# Patient Record
Sex: FEMALE | Race: WHITE | NOT HISPANIC OR LATINO | Employment: OTHER | URBAN - METROPOLITAN AREA
[De-identification: names, ages, dates, MRNs, and addresses within clinical notes are randomized per-mention and may not be internally consistent; named-entity substitution may affect disease eponyms.]

---

## 2017-02-07 ENCOUNTER — TELEPHONE (OUTPATIENT)
Dept: FAMILY MEDICINE | Facility: CLINIC | Age: 67
End: 2017-02-07

## 2017-02-07 NOTE — TELEPHONE ENCOUNTER
Dr. Biggs, will you auth same day appt slot? pls advise.  Misha Penny,  [facility]

## 2017-02-07 NOTE — TELEPHONE ENCOUNTER
Pt trying to get in this week as she will be out of town next week   And needs to be seen for refills    Call to advise if you will fit her in on thurs    As she leaves Friday at noon    Thank you  Gem Gama (Self) [phone] (H)

## 2017-02-08 NOTE — TELEPHONE ENCOUNTER
Called and left msg for pt to call either Angelica DUMONT or the care team back so we can see where we can fit pt in to be seen tomorrow.  Misha Penny,  [facility]

## 2017-02-09 ENCOUNTER — OFFICE VISIT (OUTPATIENT)
Dept: FAMILY MEDICINE | Facility: CLINIC | Age: 67
End: 2017-02-09
Payer: COMMERCIAL

## 2017-02-09 VITALS
HEART RATE: 95 BPM | TEMPERATURE: 97.3 F | OXYGEN SATURATION: 98 % | DIASTOLIC BLOOD PRESSURE: 83 MMHG | HEIGHT: 65 IN | WEIGHT: 179.1 LBS | BODY MASS INDEX: 29.84 KG/M2 | SYSTOLIC BLOOD PRESSURE: 138 MMHG

## 2017-02-09 DIAGNOSIS — K21.00 GASTROESOPHAGEAL REFLUX DISEASE WITH ESOPHAGITIS: ICD-10-CM

## 2017-02-09 DIAGNOSIS — Z11.59 NEED FOR HEPATITIS C SCREENING TEST: ICD-10-CM

## 2017-02-09 DIAGNOSIS — E61.2 MAGNESIUM DEFICIENCY: ICD-10-CM

## 2017-02-09 DIAGNOSIS — Z23 NEED FOR PROPHYLACTIC VACCINATION AGAINST STREPTOCOCCUS PNEUMONIAE (PNEUMOCOCCUS): ICD-10-CM

## 2017-02-09 DIAGNOSIS — F33.1 MAJOR DEPRESSIVE DISORDER, RECURRENT EPISODE, MODERATE (H): ICD-10-CM

## 2017-02-09 DIAGNOSIS — G47.31 CENTRAL SLEEP APNEA: ICD-10-CM

## 2017-02-09 DIAGNOSIS — E55.9 VITAMIN D DEFICIENCY: ICD-10-CM

## 2017-02-09 DIAGNOSIS — J20.9 ACUTE BRONCHITIS, UNSPECIFIED ORGANISM: ICD-10-CM

## 2017-02-09 DIAGNOSIS — I10 HYPERTENSION, GOAL BELOW 140/90: ICD-10-CM

## 2017-02-09 DIAGNOSIS — N18.2 CHRONIC KIDNEY DISEASE, STAGE II (MILD): ICD-10-CM

## 2017-02-09 DIAGNOSIS — G47.419 CONTROLLED NARCOLEPSY: Primary | ICD-10-CM

## 2017-02-09 DIAGNOSIS — D35.2 PITUITARY MICROADENOMA (H): ICD-10-CM

## 2017-02-09 DIAGNOSIS — N18.2 CKD (CHRONIC KIDNEY DISEASE) STAGE 2, GFR 60-89 ML/MIN: ICD-10-CM

## 2017-02-09 DIAGNOSIS — F98.8 ATTENTION DEFICIT DISORDER OF ADULT: ICD-10-CM

## 2017-02-09 DIAGNOSIS — Z96.653 STATUS POST TOTAL BILATERAL KNEE REPLACEMENT: ICD-10-CM

## 2017-02-09 DIAGNOSIS — Z13.6 CARDIOVASCULAR SCREENING; LDL GOAL LESS THAN 130: ICD-10-CM

## 2017-02-09 DIAGNOSIS — Z78.0 ASYMPTOMATIC POSTMENOPAUSAL STATUS: ICD-10-CM

## 2017-02-09 DIAGNOSIS — Z12.31 VISIT FOR SCREENING MAMMOGRAM: ICD-10-CM

## 2017-02-09 DIAGNOSIS — G47.33 OBSTRUCTIVE SLEEP APNEA SYNDROME: ICD-10-CM

## 2017-02-09 DIAGNOSIS — G47.419 PRIMARY NARCOLEPSY WITHOUT CATAPLEXY: ICD-10-CM

## 2017-02-09 DIAGNOSIS — J31.0 CHRONIC RHINITIS: ICD-10-CM

## 2017-02-09 LAB
CHOLEST SERPL-MCNC: 250 MG/DL
CREAT UR-MCNC: 106 MG/DL
HDLC SERPL-MCNC: 50 MG/DL
LDLC SERPL CALC-MCNC: 175 MG/DL
MICROALBUMIN UR-MCNC: 13 MG/L
MICROALBUMIN/CREAT UR: 12.64 MG/G CR (ref 0–25)
NONHDLC SERPL-MCNC: 200 MG/DL
TRIGL SERPL-MCNC: 125 MG/DL

## 2017-02-09 PROCEDURE — 82043 UR ALBUMIN QUANTITATIVE: CPT | Performed by: FAMILY MEDICINE

## 2017-02-09 PROCEDURE — 82306 VITAMIN D 25 HYDROXY: CPT | Performed by: FAMILY MEDICINE

## 2017-02-09 PROCEDURE — 80061 LIPID PANEL: CPT | Performed by: FAMILY MEDICINE

## 2017-02-09 PROCEDURE — 99214 OFFICE O/P EST MOD 30 MIN: CPT | Performed by: FAMILY MEDICINE

## 2017-02-09 PROCEDURE — 80069 RENAL FUNCTION PANEL: CPT | Performed by: FAMILY MEDICINE

## 2017-02-09 PROCEDURE — 83735 ASSAY OF MAGNESIUM: CPT | Performed by: FAMILY MEDICINE

## 2017-02-09 PROCEDURE — 86803 HEPATITIS C AB TEST: CPT | Performed by: FAMILY MEDICINE

## 2017-02-09 PROCEDURE — 36415 COLL VENOUS BLD VENIPUNCTURE: CPT | Performed by: FAMILY MEDICINE

## 2017-02-09 RX ORDER — DEXTROAMPHETAMINE SACCHARATE, AMPHETAMINE ASPARTATE MONOHYDRATE, DEXTROAMPHETAMINE SULFATE AND AMPHETAMINE SULFATE 7.5; 7.5; 7.5; 7.5 MG/1; MG/1; MG/1; MG/1
30 CAPSULE, EXTENDED RELEASE ORAL DAILY
Qty: 30 CAPSULE | Refills: 0 | Status: SHIPPED | OUTPATIENT
Start: 2017-02-09 | End: 2017-12-18

## 2017-02-09 RX ORDER — FLUTICASONE PROPIONATE 110 UG/1
2 AEROSOL, METERED RESPIRATORY (INHALATION) 2 TIMES DAILY
Qty: 3 INHALER | Refills: 3 | Status: SHIPPED | OUTPATIENT
Start: 2017-02-09 | End: 2017-12-18

## 2017-02-09 RX ORDER — ALBUTEROL SULFATE 0.83 MG/ML
1 SOLUTION RESPIRATORY (INHALATION) EVERY 6 HOURS PRN
Qty: 100 VIAL | Refills: 3 | Status: SHIPPED | OUTPATIENT
Start: 2017-02-09 | End: 2017-12-18

## 2017-02-09 RX ORDER — MOMETASONE FUROATE MONOHYDRATE 50 UG/1
2 SPRAY, METERED NASAL DAILY
Qty: 3 BOX | Refills: 3 | Status: SHIPPED | OUTPATIENT
Start: 2017-02-09 | End: 2017-12-18

## 2017-02-09 RX ORDER — DEXTROAMPHETAMINE SACCHARATE, AMPHETAMINE ASPARTATE, DEXTROAMPHETAMINE SULFATE AND AMPHETAMINE SULFATE 5; 5; 5; 5 MG/1; MG/1; MG/1; MG/1
TABLET ORAL
Qty: 90 TABLET | Refills: 0 | Status: CANCELLED | OUTPATIENT
Start: 2017-02-09

## 2017-02-09 RX ORDER — CODEINE PHOSPHATE AND GUAIFENESIN 10; 100 MG/5ML; MG/5ML
2 SOLUTION ORAL EVERY 4 HOURS PRN
Qty: 236 ML | Refills: 0 | Status: CANCELLED | OUTPATIENT
Start: 2017-02-09

## 2017-02-09 RX ORDER — SERTRALINE HYDROCHLORIDE 100 MG/1
100 TABLET, FILM COATED ORAL 2 TIMES DAILY
Qty: 180 TABLET | Refills: 3 | Status: SHIPPED | OUTPATIENT
Start: 2017-02-09 | End: 2018-03-15

## 2017-02-09 RX ORDER — DEXTROAMPHETAMINE SACCHARATE, AMPHETAMINE ASPARTATE, DEXTROAMPHETAMINE SULFATE AND AMPHETAMINE SULFATE 5; 5; 5; 5 MG/1; MG/1; MG/1; MG/1
20 TABLET ORAL DAILY
Qty: 30 TABLET | Refills: 0 | Status: SHIPPED | OUTPATIENT
Start: 2017-02-09 | End: 2017-12-18

## 2017-02-09 ASSESSMENT — ANXIETY QUESTIONNAIRES
2. NOT BEING ABLE TO STOP OR CONTROL WORRYING: NOT AT ALL
6. BECOMING EASILY ANNOYED OR IRRITABLE: NOT AT ALL
3. WORRYING TOO MUCH ABOUT DIFFERENT THINGS: NOT AT ALL
GAD7 TOTAL SCORE: 0
IF YOU CHECKED OFF ANY PROBLEMS ON THIS QUESTIONNAIRE, HOW DIFFICULT HAVE THESE PROBLEMS MADE IT FOR YOU TO DO YOUR WORK, TAKE CARE OF THINGS AT HOME, OR GET ALONG WITH OTHER PEOPLE: NOT DIFFICULT AT ALL
1. FEELING NERVOUS, ANXIOUS, OR ON EDGE: NOT AT ALL
5. BEING SO RESTLESS THAT IT IS HARD TO SIT STILL: NOT AT ALL
7. FEELING AFRAID AS IF SOMETHING AWFUL MIGHT HAPPEN: NOT AT ALL

## 2017-02-09 ASSESSMENT — PATIENT HEALTH QUESTIONNAIRE - PHQ9: 5. POOR APPETITE OR OVEREATING: NOT AT ALL

## 2017-02-09 ASSESSMENT — PAIN SCALES - GENERAL: PAINLEVEL: MODERATE PAIN (4)

## 2017-02-09 NOTE — NURSING NOTE
"Chief Complaint   Patient presents with     Asthma     Fasting     Hypertension     Chronic Disease Management     Depression     Anxiety     Attention Deficit Disorder       Initial /83 mmHg  Pulse 95  Temp(Src) 97.3  F (36.3  C) (Oral)  Ht 5' 4.75\" (1.645 m)  Wt 179 lb 1.6 oz (81.239 kg)  BMI 30.02 kg/m2  SpO2 98%  Breastfeeding? No Estimated body mass index is 30.02 kg/(m^2) as calculated from the following:    Height as of this encounter: 5' 4.75\" (1.645 m).    Weight as of this encounter: 179 lb 1.6 oz (81.239 kg).  Medication Reconciliation: complete     Larry Broussard CMA    "

## 2017-02-09 NOTE — PROGRESS NOTES
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  She also has a fall backwards about a week ago and saw her specialist.  Inflamed with right shoulder pain. Pain currently 3/10.     Hypertension Follow-up      Outpatient blood pressures are being checked at home.  Results are 130-150/<90.    Low Salt Diet: low salt       Depression and Anxiety Follow-Up    Status since last visit: Worsened    Other associated symptoms:tired/sleeping too much    Complicating factors:     Significant life event: Yes-  Stopped working, Family members passed away     Current substance abuse: None    PHQ-9 SCORE 2/2/2016 10/7/2016 2/9/2017   Total Score - - -   Total Score 6 7 15     LORETTA-7 SCORE 2/2/2016 10/7/2016 2/9/2017   Total Score - - -   Total Score 1 6 0        PHQ-9  English      PHQ-9   Any Language     GAD7       Asthma Follow-Up    Was ACT completed today?    Yes    ACT Total Scores 2/9/2017   ACT TOTAL SCORE -   ASTHMA ER VISITS -   ASTHMA HOSPITALIZATIONS -   ACT TOTAL SCORE (Goal Greater than or Equal to 20) 23   In the past 12 months, how many times did you visit the emergency room for your asthma without being admitted to the hospital? 0   In the past 12 months, how many times were you hospitalized overnight because of your asthma? 0       Chronic Kidney Disease Follow-up      Current NSAID use? Yes, occasionally takes Ibuprofen for pain      Amount of exercise or physical activity: None    Problems taking medications regularly: No    Medication side effects: none  Diet: low salt    Medication Followup of Adderall    Taking Medication as prescribed: yes    Side Effects:  None    Medication Helping Symptoms:  \"too much or too little\" -  " Patient would like to discuss about extended release           Problem list and histories reviewed & adjusted, as indicated.  Additional history: as documented    Patient Active Problem List   Diagnosis     Depression with anxiety     TMJ (temporomandibular joint syndrome)     S/P hip replacement     Congenital ureteric stenosis     Attention deficit disorder of adult     Lacrimal duct stenosis     Chronic rhinitis     CARDIOVASCULAR SCREENING; LDL GOAL LESS THAN 160     Intermittent asthma     Advanced directives, counseling/discussion     Major depressive disorder, recurrent episode, moderate (H)     Pituitary microadenoma (H)     Obstructive sleep apnea syndrome     Hypertension, goal below 140/90     Osteoarthritis of right knee     S/P total hip arthroplasty     Acute posthemorrhagic anemia     Constipation     Central sleep apnea     Controlled narcolepsy     Esophageal reflux (GERD)     Status post total knee replacement     CKD (chronic kidney disease) stage 2, GFR 60-89 ml/min     Primary narcolepsy without cataplexy     Past Surgical History   Procedure Laterality Date     C reimplant ureter,extensive tailoring  1973 1997     C repair enterocele,vag apprch  2008     Prolift     Soft tissue surgery       Orthopedic surgery       bilat total hip     C total knee arthroplasty       Rectocele repair       Arthroplasty knee  7/9/2014     Procedure: ARTHROPLASTY KNEE;  Surgeon: Levi Johnson MD;  Location:  OR     C total abd hysterectomy+blad repr  1992     Vaginal approach with oophrectomy     Genitourinary surgery       kidney surgery X 3     Arthroplasty knee Left 11/24/2014     Procedure: ARTHROPLASTY KNEE;  Surgeon: Levi Johnson MD;  Location:  OR       Social History   Substance Use Topics     Smoking status: Never Smoker      Smokeless tobacco: Never Used     Alcohol Use: No     Family History   Problem Relation Age of Onset     Hypertension Mother      Arthritis Mother      CEREBROVASCULAR  DISEASE Father      Three Strokes     DIABETES Father      and brother     Thyroid Disease Sister      Hypothyroid         Current Outpatient Prescriptions   Medication Sig Dispense Refill     sertraline (ZOLOFT) 100 MG tablet Take 1 tablet (100 mg) by mouth 2 times daily 180 tablet 3     omeprazole (PRILOSEC) 20 MG CR capsule Take 1 capsule (20 mg) by mouth daily as needed 90 capsule 1     mometasone (NASONEX) 50 MCG/ACT spray Spray 2 sprays into both nostrils daily 3 Box 3     fluticasone (FLOVENT HFA) 110 MCG/ACT Inhaler Inhale 2 puffs into the lungs 2 times daily 3 Inhaler 3     albuterol (2.5 MG/3ML) 0.083% neb solution Take 1 vial (2.5 mg) by nebulization every 6 hours as needed for shortness of breath / dyspnea 100 vial 3     amphetamine-dextroamphetamine (ADDERALL XR) 30 MG per 24 hr capsule Take 1 capsule (30 mg) by mouth daily 30 capsule 0     amphetamine-dextroamphetamine (ADDERALL) 20 MG per tablet Take 1 tablet (20 mg) by mouth daily In the afternoon 30 tablet 0     losartan (COZAAR) 50 MG tablet Take 1 tablet (50 mg) by mouth daily 90 tablet 3     guaiFENesin-codeine (ROBITUSSIN AC) 100-10 MG/5ML SOLN Take 10 mLs by mouth every 4 hours as needed for cough 236 mL 0     [DISCONTINUED] sertraline (ZOLOFT) 100 MG tablet Take 1 tablet (100 mg) by mouth 2 times daily 60 tablet 0     [DISCONTINUED] omeprazole (PRILOSEC) 20 MG capsule Take 1 capsule (20 mg) by mouth daily as needed 90 capsule 3     [DISCONTINUED] fluticasone (FLOVENT HFA) 110 MCG/ACT inhaler Inhale 2 puffs into the lungs 2 times daily 3 Inhaler 3     [DISCONTINUED] albuterol (2.5 MG/3ML) 0.083% nebulizer solution Take 3 mLs (2.5 mg) by nebulization every 6 hours as needed for shortness of breath / dyspnea (Patient taking differently: Take 1 ampule by nebulization 3 times daily q6h prn) 30 vial 1     Allergies   Allergen Reactions     Flagyl [Metronidazole Hcl] Anaphylaxis and Rash     Latex Other (See Comments)     Runny nose     Sulfa Drugs  "Anaphylaxis     Tape [Adhesive Tape] Hives     Metoprolol Itching and Rash     Recent Labs   Lab Test  02/02/16   1634  11/25/14   0620   09/19/13   0717   08/06/13   1636  08/25/11   A1C   --    --    --   5.6   --    --    --    --    LDL  126*   --    --   141*   --    --    --   150*   HDL  54   --    --   51   --    --    --   59   TRIG  105   --    --   111   --    --    --   84   ALT   --    --    --    --    --   20   --    --    CR  1.06*  1.04   < >  0.84   < >  0.82   < >   --    GFRESTIMATED  52*  53*   < >  68   < >  70   < >   --    GFRESTBLACK  63  64   < >  83   < >  85   < >   --    POTASSIUM  3.8  4.4   < >  4.1   < >  5.2   < >   --    TSH  1.62   --    --   1.56   --   1.20   < >   --     < > = values in this interval not displayed.      BP Readings from Last 3 Encounters:   02/09/17 138/83   12/17/16 124/74   10/07/16 120/86    Wt Readings from Last 3 Encounters:   02/09/17 179 lb 1.6 oz (81.239 kg)   12/17/16 184 lb 6 oz (83.632 kg)   10/07/16 184 lb (83.462 kg)                  Labs reviewed in EPIC  Problem list, Medication list, Allergies, and Medical/Social/Surgical histories reviewed in [facility] and updated as appropriate.    ROS:  Constitutional, HEENT, cardiovascular, pulmonary, gi and gu systems are negative, except as otherwise noted.    OBJECTIVE:                                                    /83 mmHg  Pulse 95  Temp(Src) 97.3  F (36.3  C) (Oral)  Ht 5' 4.75\" (1.645 m)  Wt 179 lb 1.6 oz (81.239 kg)  BMI 30.02 kg/m2  SpO2 98%  Breastfeeding? No  Body mass index is 30.02 kg/(m^2).  GENERAL APPEARANCE: healthy, alert and no distress  EYES: Eyes grossly normal to inspection, PERRL and conjunctivae and sclerae normal  HENT: ear canals and TM's normal, nose and mouth without ulcers or lesions, oropharynx clear and oral mucous membranes moist  NECK: no adenopathy, no asymmetry, masses, or scars and thyroid normal to palpation  RESP: lungs clear to auscultation - no rales, " rhonchi or wheezes  CV: regular rates and rhythm, normal S1 S2, no S3 or S4, no murmur, click or rub, no peripheral edema and peripheral pulses strong  ABDOMEN: soft, nontender, no hepatosplenomegaly, no masses and bowel sounds normal  MS: no musculoskeletal defects are noted and gait is age appropriate without ataxia  SKIN: no suspicious lesions or rashes  NEURO: Normal strength and tone, sensory exam grossly normal, mentation intact and speech normal  PSYCH: mentation appears normal and affect normal/bright     Diagnostic Test Results:  Results for orders placed or performed in visit on 12/17/16   *UA reflex to Microscopic and Culture ([facility] and [facility] (except [place] and [place])   Result Value Ref Range    Color Urine Yellow     Appearance Urine Slightly Cloudy     Glucose Urine Negative NEG mg/dL    Bilirubin Urine Negative NEG    Ketones Urine Negative NEG mg/dL    Specific Gravity Urine >1.030 1.003 - 1.035    Blood Urine Trace (A) NEG    pH Urine 5.5 5.0 - 7.0 pH    Protein Albumin Urine Negative NEG mg/dL    Urobilinogen Urine 0.2 0.2 - 1.0 EU/dL    Nitrite Urine Positive (A) NEG    Leukocyte Esterase Urine Moderate (A) NEG    Source Midstream Urine    Urine Microscopic   Result Value Ref Range    WBC Urine >100  Clumps of WBC's seen   (A) 0 - 2 /HPF    RBC Urine O - 2 0 - 2 /HPF    Squamous Epithelial /LPF Urine Few FEW /LPF    Bacteria Urine Many (A) NEG /HPF   Urine Culture Aerobic Bacterial   Result Value Ref Range    Specimen Description Midstream Urine     Culture Micro >100,000 colonies/mL Escherichia coli (A)     Micro Report Status FINAL 12/19/2016     Organism: >100,000 colonies/mL Escherichia coli        Susceptibility    >100,000 colonies/ml escherichia coli (jonathan) -  (no method available)     AMPICILLIN <=2 Susceptible  ug/mL     CEFAZOLIN Value in next row  ug/mL      <=4 SusceptibleCefazolin JONATHAN breakpoints are for the treatment of uncomplicated urinary tract  infections.  For the treatment of systemic infections, please contact the laboratory for additional testing.     CEFOXITIN Value in next row  ug/mL      <=4 SusceptibleCefazolin DAKOTA breakpoints are for the treatment of uncomplicated urinary tract infections.  For the treatment of systemic infections, please contact the laboratory for additional testing.     CEFTAZIDIME Value in next row  ug/mL      <=4 SusceptibleCefazolin DAKOTA breakpoints are for the treatment of uncomplicated urinary tract infections.  For the treatment of systemic infections, please contact the laboratory for additional testing.     CEFTRIAXONE Value in next row  ug/mL      <=4 SusceptibleCefazolin DAKOTA breakpoints are for the treatment of uncomplicated urinary tract infections.  For the treatment of systemic infections, please contact the laboratory for additional testing.     CIPROFLOXACIN Value in next row  ug/mL      <=4 SusceptibleCefazolin DAKOTA breakpoints are for the treatment of uncomplicated urinary tract infections.  For the treatment of systemic infections, please contact the laboratory for additional testing.     GENTAMICIN Value in next row  ug/mL      <=4 SusceptibleCefazolin DAKOTA breakpoints are for the treatment of uncomplicated urinary tract infections.  For the treatment of systemic infections, please contact the laboratory for additional testing.     LEVOFLOXACIN Value in next row  ug/mL      <=4 SusceptibleCefazolin DAKOTA breakpoints are for the treatment of uncomplicated urinary tract infections.  For the treatment of systemic infections, please contact the laboratory for additional testing.     NITROFURANTOIN Value in next row  ug/mL      <=4 SusceptibleCefazolin DAKOTA breakpoints are for the treatment of uncomplicated urinary tract infections.  For the treatment of systemic infections, please contact the laboratory for additional testing.     TOBRAMYCIN Value in next row  ug/mL      <=4 SusceptibleCefazolin DAKOTA breakpoints are for  "the treatment of uncomplicated urinary tract infections.  For the treatment of systemic infections, please contact the laboratory for additional testing.     Trimethoprim/Sulfa Value in next row  ug/mL      <=4 SusceptibleCefazolin DAKOTA breakpoints are for the treatment of uncomplicated urinary tract infections.  For the treatment of systemic infections, please contact the laboratory for additional testing.     AMPICILLIN/SULBACTAM Value in next row  ug/mL      <=4 SusceptibleCefazolin DAKOTA breakpoints are for the treatment of uncomplicated urinary tract infections.  For the treatment of systemic infections, please contact the laboratory for additional testing.     Piperacillin/Tazo Value in next row  ug/mL      <=4 SusceptibleCefazolin DAKOTA breakpoints are for the treatment of uncomplicated urinary tract infections.  For the treatment of systemic infections, please contact the laboratory for additional testing.     CEFEPIME Value in next row  ug/mL      <=4 SusceptibleCefazolin DAKOTA breakpoints are for the treatment of uncomplicated urinary tract infections.  For the treatment of systemic infections, please contact the laboratory for additional testing.        ASSESSMENT/PLAN:                                                        BMI:   Estimated body mass index is 30.02 kg/(m^2) as calculated from the following:    Height as of this encounter: 5' 4.75\" (1.645 m).    Weight as of this encounter: 179 lb 1.6 oz (81.239 kg).   Weight management plan: Discussed healthy diet and exercise guidelines and patient will follow up in 3 months in clinic to re-evaluate.        ICD-10-CM    1. Controlled narcolepsy- will change to long acting first in the morning to see if this allows for a smoother transition. Booster dose in the afternoon as needed.  G47.419 amphetamine-dextroamphetamine (ADDERALL XR) 30 MG per 24 hr capsule     amphetamine-dextroamphetamine (ADDERALL) 20 MG per tablet   2. Attention deficit disorder of adult " F98.8 amphetamine-dextroamphetamine (ADDERALL) 20 MG per tablet   3. Major depressive disorder, recurrent episode, moderate (H) F33.1 sertraline (ZOLOFT) 100 MG tablet twice a day    4. Gastroesophageal reflux disease with esophagitis K21.0 omeprazole (PRILOSEC) 20 MG CR capsule   5. Hypertension, goal below 140/90 I10 Albumin Random Urine Quantitative   6. Chronic rhinitis J31.0 mometasone (NASONEX) 50 MCG/ACT spray     fluticasone (FLOVENT HFA) 110 MCG/ACT Inhaler   7. Visit for screening mammogram Z12.31 MA SCREENING DIGITAL BILAT - Future  (s+30)   8. Need for prophylactic vaccination against Streptococcus pneumoniae (pneumococcus) Z23      ADMIN VACCINE, ADDL [[id number]]     PNEUMOCOCCAL CONJ VACCINE 13 VALENT IM (PREVNAR 13)   9. Need for hepatitis C screening test Z11.59 Hepatitis C Screen Reflex to HCV RNA Quant and Genotype   10. Asymptomatic postmenopausal status Z78.0 DEXA HIP/PELVIS/SPINE - Future   11. Obstructive sleep apnea syndrome G47.33 Uses CPAP   12. Pituitary microadenoma (H) D35.2 stable   13. CKD (chronic kidney disease) stage 2, GFR 60-89 ml/min N18.2 Flow variation on ultrasound report   14. Status post total bilateral knee replacement Z96.653    15. Central sleep apnea G47.31    16. Primary narcolepsy without cataplexy G47.419    17. CARDIOVASCULAR SCREENING; LDL GOAL LESS THAN 130 Z13.6 Lipid panel reflex to direct LDL   18. Acute bronchitis, unspecified organism J20.9 albuterol (2.5 MG/3ML) 0.083% neb solution       CONSULTATION/REFERRAL to neurology as needed for narcolepsy if not improving.  FUTURE LABS:       - Schedule fasting labs in 6 months  FUTURE APPOINTMENTS:       - Follow-up visit in 3 months or sooner if any questions or concerns.   Work on weight loss  Regular exercise  See Patient Instructions    Edna Biggs MD  [facility]

## 2017-02-09 NOTE — MR AVS SNAPSHOT
After Visit Summary   2/9/2017    Gem Gama    MRN: [id number]           Patient Information     Date Of Birth          1950        Visit Information        Provider Department      2/9/2017 11:40 AM Edna Biggs MD [facility]        Today's Diagnoses     Asymptomatic postmenopausal status    -  1     Gastroesophageal reflux disease with esophagitis         Major depressive disorder, recurrent episode, moderate (H)         Chronic rhinitis         Visit for screening mammogram         Attention deficit disorder of adult         Controlled narcolepsy         Need for prophylactic vaccination against Streptococcus pneumoniae (pneumococcus)         Need for hepatitis C screening test         Hypertension, goal below 140/90         Obstructive sleep apnea syndrome         Pituitary microadenoma (H)         CKD (chronic kidney disease) stage 2, GFR 60-89 ml/min         Status post total bilateral knee replacement         Central sleep apnea         Primary narcolepsy without cataplexy         CARDIOVASCULAR SCREENING; LDL GOAL LESS THAN 130         Acute bronchitis, unspecified organism           Care Instructions      Attention Deficit/Hyperactivity Disorder (ADHD, ADD) in Adults  You ve always had trouble concentrating. Your mind wanders, and it s hard to finish tasks. As a result, you didn t do well in school. And now, you often struggle with your job. Sometimes this makes you sykes or depressed. These may be symptoms of attention deficit  hyperactivity disorder (ADHD) or may still be referred to as attention deficit disorder (ADD). To find out more, talk to your healthcare provider. He or she can offer guidance and support.  Symptoms  of ADD in adults  For an adult to be diagnosed with ADHD, the symptoms must have been present since childhood. The symptoms may include:    Trouble thinking things through    Low self-esteem    Depression    Trouble holding a  job    Memory problems    Problems with a marriage or relationship    Lack of discipline   What is ADHD?  Attention deficit hyperactivity disorder makes it hard to focus your mind. You may daydream a lot. And you may be restless much of the time. As a result, you may have trouble with detailed or boring work. And it may be hard to stick with one project for very long. You also may forget things. Or, you may miss key points during a lecture or meeting. You may even have trouble sitting through a movie or concert. At times, you may feel frustrated or angry. This can affect your relationships with others.  Who does it affect?  ADHD begins in childhood. Sometimes, your symptoms may improve as you get older. But they also may persist into your adult years. ADHD is often thought of as a  kid s problem.  That s why it s often missed in adults. In fact, many parents learn they have ADHD when their children are diagnosed.  What causes it?  The exact cause of ADHD isn t known. The disorder does run in families. Having one parent with ADHD makes it more likely you ll have it too. And the part of your brain that controls attention may be involved. Certain brain chemicals that are out of balance may also play a role.  What can be done?  The first step is finding out if you really have ADHD. Your doctor will use special guidelines to diagnose the disorder. Most adults with ADHD are greatly helped by therapy and coaching. In some cases, your doctor may also prescribe medicine to ease your symptoms.  Resources    National Resource Center on AD/HDwww.bjit0geti.org    Attention Deficit Disorder Associationwww.add.org     7037-4688 [employer]. [address] [place] [address], [place] [address]. All rights reserved. This information is not intended as a substitute for professional medical care. Always follow your healthcare professional's instructions.        Bronchitis with Wheezing (Viral or Bacterial: Adult)    Bronchitis is  an infection of the air passages. It often occurs during the common cold and is usually caused by a virus. Symptoms include cough with mucus (phlegm) and low-grade fever. This illness is contagious during the first few days and is spread through the air by coughing and sneezing, or by direct contact (touching the sick person and then touching your own eyes, nose, or mouth).  If there is a lot of inflammation, air flow is restricted. The air passages may also go into spasm, especially if you have asthma. This causes wheezing and difficulty breathing even in people who do not have asthma.  Bronchitis usually lasts 7 to 14 days. The wheezing should improve with treatment during the first week. An inhaler is often prescribed to relax the air passages and stop wheezing. Antibiotics will be prescribed if your doctor thinks there is also a secondary bacterial infection.  Home care    If symptoms are severe, rest at home for the first 2 to 3 days. When you go back to your usual activities, don't let yourself get too tired.    Do not smoke. Also avoid being exposed to secondhand smoke.    You may use over-the-counter medicine to control fever or pain, unless another medicine was prescribed. Note: If you have chronic liver or kidney disease or have ever had a stomach ulcer or gastrointestinal bleeding, talk with your healthcare provider before using these medicines. Also talk to your provider if you are taking medicine to prevent blood clots.) Aspirin should never be given to anyone younger than 18 years of age who is ill with a viral infection or fever. It may cause severe liver or brain damage.    Your appetite may be poor, so a light diet is fine. Avoid dehydration by drinking 6 to 8 glasses of fluids per day (such as water, soft drinks, sports drinks, juices, tea, or soup). Extra fluids will help loosen secretions in the nose and lungs.    Over-the-counter cough, cold, and sore-throat medicines will not shorten the length  of the illness, but they may be helpful to reduce symptoms. (Note: Do not use decongestants if you have high blood pressure.)    If you were given an inhaler, use it exactly as directed. If you need to use it more often than prescribed, your condition may be worsening. If this happens, contact your healthcare provider.    If prescribed, finish all antibiotic medicine, even if you are feeling better after only a few days.  Follow-up care  Follow up with your healthcare provider, or as advised. If you had an X-ray or ECG (electrocardiogram), a specialist will review it. You will be notified of any new findings that may affect your care.  Note: If you are age 65 or older, or if you have a chronic lung disease or condition that affects your immune system, or you smoke, talk to your healthcare provider about having a pneumococcal vaccinations and a yearly influenza vaccination (flu shot).  When to seek medical advice   Call your healthcare provider right away if any of these occur:    Fever of 100.4 F (38 C) or higher    Coughing up increasing amounts of colored sputum    Weakness, drowsiness, headache, facial pain, ear pain, or a stiff neck  Call 911, or get immediate medical care  Contact emergency services right away if any of these occur.    Coughing up blood    Worsening weakness, drowsiness, headache, or stiff neck    Increased wheezing not helped with medication, shortness of breath, or pain with breathing    7525-7896 [employer] [address] [place], [place] [address]. All rights reserved. This information is not intended as a substitute for professional medical care. Always follow your healthcare professional's instructions.              Follow-ups after your visit        Future tests that were ordered for you today     Open Future Orders        Priority Expected Expires Ordered    MA SCREENING DIGITAL BILAT - Future  (s+30) Routine  2/9/2018 2/9/2017    DEXA HIP/PELVIS/SPINE - Future Routine   "2/9/2018 2/9/2017            Who to contact     If you have questions or need follow up information about today's clinic visit or your schedule please contact [facility] directly at [phone].  Normal or non-critical lab and imaging results will be communicated to you by MyChart, letter or phone within 4 business days after the clinic has received the results. If you do not hear from us within 7 days, please contact the clinic through [employer] or phone. If you have a critical or abnormal lab result, we will notify you by phone as soon as possible.  Submit refill requests through [employer] or call your pharmacy and they will forward the refill request to us. Please allow 3 business days for your refill to be completed.          Additional Information About Your Visit        [employer] Information     [employer] gives you secure access to your electronic health record. If you see a primary care provider, you can also send messages to your care team and make appointments. If you have questions, please call your primary care clinic.  If you do not have a primary care provider, please call [phone] and they will assist you.        Care EveryWhere ID     This is your Care EveryWhere ID. This could be used by other organizations to access your [place] medical records  [id number]        Your Vitals Were     Pulse Temperature Height BMI (Body Mass Index) Pulse Oximetry Breastfeeding?    95 97.3  F (36.3  C) (Oral) 5' 4.75\" (1.645 m) 30.02 kg/m2 98% No       Blood Pressure from Last 3 Encounters:   02/09/17 138/83   12/17/16 124/74   10/07/16 120/86    Weight from Last 3 Encounters:   02/09/17 179 lb 1.6 oz (81.239 kg)   12/17/16 184 lb 6 oz (83.632 kg)   10/07/16 184 lb (83.462 kg)              We Performed the Following          ADMIN VACCINE, ADDL [27099]     Albumin Random Urine Quantitative     Asthma Action Plan   (Please complete E-AAP by signing order and opening link in order details)     " DEPRESSION ACTION PLAN (DAP) Order [[id number]]     Hepatitis C Screen Reflex to HCV RNA Quant and Genotype     Lipid panel reflex to direct LDL     PNEUMOCOCCAL CONJ VACCINE 13 VALENT IM (PREVNAR 13)          Today's Medication Changes          These changes are accurate as of: 2/9/17 12:14 PM.  If you have any questions, ask your nurse or doctor.               These medicines have changed or have updated prescriptions.        Dose/Directions    albuterol (2.5 MG/3ML) 0.083% neb solution   This may have changed:    - how much to take  - when to take this  - reasons to take this  - additional instructions   Used for:  Acute bronchitis, unspecified organism        Dose:  1 vial   Take 1 vial (2.5 mg) by nebulization every 6 hours as needed for shortness of breath / dyspnea   Quantity:  100 vial   Refills:  3       * amphetamine-dextroamphetamine 30 MG per 24 hr capsule   Commonly known as:  ADDERALL XR   This may have changed:  You were already taking a medication with the same name, and this prescription was added. Make sure you understand how and when to take each.   Used for:  Controlled narcolepsy   Changed by:  Edna Biggs MD        Dose:  30 mg   Take 1 capsule (30 mg) by mouth daily   Quantity:  30 capsule   Refills:  0       * amphetamine-dextroamphetamine 20 MG per tablet   Commonly known as:  ADDERALL   This may have changed:    - how much to take  - how to take this  - when to take this  - additional instructions   Used for:  Attention deficit disorder of adult, Controlled narcolepsy   Changed by:  Edna Biggs MD        Dose:  20 mg   Take 1 tablet (20 mg) by mouth daily In the afternoon   Quantity:  30 tablet   Refills:  0       * Notice:  This list has 2 medication(s) that are the same as other medications prescribed for you. Read the directions carefully, and ask your doctor or other care provider to review them with you.         Where to get your medicines      These medications were  sent to [facility] - [place] - [address] [place] AT [facility] [address] [place] [address]     Phone:  [phone]    - albuterol (2.5 MG/3ML) 0.083% neb solution  - fluticasone 110 MCG/ACT Inhaler  - mometasone 50 MCG/ACT spray  - omeprazole 20 MG CR capsule  - sertraline 100 MG tablet      Some of these will need a paper prescription and others can be bought over the counter.  Ask your nurse if you have questions.     Bring a paper prescription for each of these medications    - amphetamine-dextroamphetamine 20 MG per tablet  - amphetamine-dextroamphetamine 30 MG per 24 hr capsule             Primary Care Provider Office Phone # Fax #    Ednaariel Biggs [phone]       [facility] [address]  [facility] [address]        Thank you!     Thank you for choosing [facility]  for your care. Our goal is always to provide you with excellent care. Hearing back from our patients is one way we can continue to improve our services. Please take a few minutes to complete the written survey that you may receive in the mail after your visit with us. Thank you!             Your Updated Medication List - Protect others around you: Learn how to safely use, store and throw away your medicines at www.disposemymeds.org.          This list is accurate as of: 2/9/17 12:14 PM.  Always use your most recent med list.                   Brand Name Dispense Instructions for use    albuterol (2.5 MG/3ML) 0.083% neb solution     100 vial    Take 1 vial (2.5 mg) by nebulization every 6 hours as needed for shortness of breath / dyspnea       * amphetamine-dextroamphetamine 30 MG per 24 hr capsule    ADDERALL XR    30 capsule    Take 1 capsule (30 mg) by mouth daily       * amphetamine-dextroamphetamine 20 MG per tablet    ADDERALL    30 tablet    Take 1 tablet (20 mg) by mouth daily In the afternoon       fluticasone 110 MCG/ACT  Inhaler    FLOVENT HFA    3 Inhaler    Inhale 2 puffs into the lungs 2 times daily       guaiFENesin-codeine 100-10 MG/5ML Soln solution    ROBITUSSIN AC    236 mL    Take 10 mLs by mouth every 4 hours as needed for cough       losartan 50 MG tablet    COZAAR    90 tablet    Take 1 tablet (50 mg) by mouth daily       mometasone 50 MCG/ACT spray    NASONEX    3 Box    Spray 2 sprays into both nostrils daily       omeprazole 20 MG CR capsule    priLOSEC    90 capsule    Take 1 capsule (20 mg) by mouth daily as needed       sertraline 100 MG tablet    ZOLOFT    180 tablet    Take 1 tablet (100 mg) by mouth 2 times daily       * Notice:  This list has 2 medication(s) that are the same as other medications prescribed for you. Read the directions carefully, and ask your doctor or other care provider to review them with you.

## 2017-02-09 NOTE — PATIENT INSTRUCTIONS
Attention Deficit/Hyperactivity Disorder (ADHD, ADD) in Adults  You ve always had trouble concentrating. Your mind wanders, and it s hard to finish tasks. As a result, you didn t do well in school. And now, you often struggle with your job. Sometimes this makes you sykes or depressed. These may be symptoms of attention deficit  hyperactivity disorder (ADHD) or may still be referred to as attention deficit disorder (ADD). To find out more, talk to your healthcare provider. He or she can offer guidance and support.  Symptoms  of ADD in adults  For an adult to be diagnosed with ADHD, the symptoms must have been present since childhood. The symptoms may include:    Trouble thinking things through    Low self-esteem    Depression    Trouble holding a job    Memory problems    Problems with a marriage or relationship    Lack of discipline   What is ADHD?  Attention deficit hyperactivity disorder makes it hard to focus your mind. You may daydream a lot. And you may be restless much of the time. As a result, you may have trouble with detailed or boring work. And it may be hard to stick with one project for very long. You also may forget things. Or, you may miss key points during a lecture or meeting. You may even have trouble sitting through a movie or concert. At times, you may feel frustrated or angry. This can affect your relationships with others.  Who does it affect?  ADHD begins in childhood. Sometimes, your symptoms may improve as you get older. But they also may persist into your adult years. ADHD is often thought of as a  kid s problem.  That s why it s often missed in adults. In fact, many parents learn they have ADHD when their children are diagnosed.  What causes it?  The exact cause of ADHD isn t known. The disorder does run in families. Having one parent with ADHD makes it more likely you ll have it too. And the part of your brain that controls attention may be involved. Certain brain chemicals that are out  of balance may also play a role.  What can be done?  The first step is finding out if you really have ADHD. Your doctor will use special guidelines to diagnose the disorder. Most adults with ADHD are greatly helped by therapy and coaching. In some cases, your doctor may also prescribe medicine to ease your symptoms.  Resources    National Resource Center on AD/HDwww.vbmg6ujvp.org    Attention Deficit Disorder Associationwww.add.org     2777-2427 [employer]. [address] [place] [address]. All rights reserved. This information is not intended as a substitute for professional medical care. Always follow your healthcare professional's instructions.        Bronchitis with Wheezing (Viral or Bacterial: Adult)    Bronchitis is an infection of the air passages. It often occurs during the common cold and is usually caused by a virus. Symptoms include cough with mucus (phlegm) and low-grade fever. This illness is contagious during the first few days and is spread through the air by coughing and sneezing, or by direct contact (touching the sick person and then touching your own eyes, nose, or mouth).  If there is a lot of inflammation, air flow is restricted. The air passages may also go into spasm, especially if you have asthma. This causes wheezing and difficulty breathing even in people who do not have asthma.  Bronchitis usually lasts 7 to 14 days. The wheezing should improve with treatment during the first week. An inhaler is often prescribed to relax the air passages and stop wheezing. Antibiotics will be prescribed if your doctor thinks there is also a secondary bacterial infection.  Home care    If symptoms are severe, rest at home for the first 2 to 3 days. When you go back to your usual activities, don't let yourself get too tired.    Do not smoke. Also avoid being exposed to secondhand smoke.    You may use over-the-counter medicine to control fever or pain, unless another medicine was  prescribed. Note: If you have chronic liver or kidney disease or have ever had a stomach ulcer or gastrointestinal bleeding, talk with your healthcare provider before using these medicines. Also talk to your provider if you are taking medicine to prevent blood clots.) Aspirin should never be given to anyone younger than 18 years of age who is ill with a viral infection or fever. It may cause severe liver or brain damage.    Your appetite may be poor, so a light diet is fine. Avoid dehydration by drinking 6 to 8 glasses of fluids per day (such as water, soft drinks, sports drinks, juices, tea, or soup). Extra fluids will help loosen secretions in the nose and lungs.    Over-the-counter cough, cold, and sore-throat medicines will not shorten the length of the illness, but they may be helpful to reduce symptoms. (Note: Do not use decongestants if you have high blood pressure.)    If you were given an inhaler, use it exactly as directed. If you need to use it more often than prescribed, your condition may be worsening. If this happens, contact your healthcare provider.    If prescribed, finish all antibiotic medicine, even if you are feeling better after only a few days.  Follow-up care  Follow up with your healthcare provider, or as advised. If you had an X-ray or ECG (electrocardiogram), a specialist will review it. You will be notified of any new findings that may affect your care.  Note: If you are age 65 or older, or if you have a chronic lung disease or condition that affects your immune system, or you smoke, talk to your healthcare provider about having a pneumococcal vaccinations and a yearly influenza vaccination (flu shot).  When to seek medical advice   Call your healthcare provider right away if any of these occur:    Fever of 100.4 F (38 C) or higher    Coughing up increasing amounts of colored sputum    Weakness, drowsiness, headache, facial pain, ear pain, or a stiff neck  Call 911, or get immediate  medical care  Contact emergency services right away if any of these occur.    Coughing up blood    Worsening weakness, drowsiness, headache, or stiff neck    Increased wheezing not helped with medication, shortness of breath, or pain with breathing    1848-2215 [employer]. [address] [place] [address], [place] [address]. All rights reserved. This information is not intended as a substitute for professional medical care. Always follow your healthcare professional's instructions.

## 2017-02-10 LAB — HCV AB SERPL QL IA: NORMAL

## 2017-02-10 ASSESSMENT — ASTHMA QUESTIONNAIRES: ACT_TOTALSCORE: 23

## 2017-02-10 ASSESSMENT — PATIENT HEALTH QUESTIONNAIRE - PHQ9: SUM OF ALL RESPONSES TO PHQ QUESTIONS 1-9: 15

## 2017-02-10 ASSESSMENT — ANXIETY QUESTIONNAIRES: GAD7 TOTAL SCORE: 0

## 2017-02-11 ENCOUNTER — TELEPHONE (OUTPATIENT)
Dept: FAMILY MEDICINE | Facility: CLINIC | Age: 67
End: 2017-02-11

## 2017-02-11 DIAGNOSIS — E61.2 MAGNESIUM DEFICIENCY: ICD-10-CM

## 2017-02-11 DIAGNOSIS — E55.9 VITAMIN D DEFICIENCY: ICD-10-CM

## 2017-02-11 DIAGNOSIS — N18.2 CHRONIC KIDNEY DISEASE, STAGE II (MILD): Primary | ICD-10-CM

## 2017-02-11 DIAGNOSIS — N18.2 CKD (CHRONIC KIDNEY DISEASE) STAGE 2, GFR 60-89 ML/MIN: ICD-10-CM

## 2017-02-11 NOTE — PROGRESS NOTES
Quick Note:    Dear Gem Gama,    Your test results are attached. I am happy to let you know that they are stable and your medications can stay the same.    The cholesterol is higher and this may be due to weight gain. They did not do the other labs that were ordered and should have been released. I am having them add these, but they may need more blood sample for some of the testing.     Please call me if you have any questions about these test results or about your care.    Sincerely,    Edna Biggs MD  ______

## 2017-02-11 NOTE — TELEPHONE ENCOUNTER
Labs should have been released when patient was seen on 2-. Please do them as ordered or call patient to return to lab to complete orders.  Edna Biggs MD

## 2017-02-13 LAB
ALBUMIN SERPL-MCNC: 4.4 G/DL (ref 3.4–5)
ANION GAP SERPL CALCULATED.3IONS-SCNC: 10 MMOL/L (ref 3–14)
BUN SERPL-MCNC: 15 MG/DL (ref 7–30)
CALCIUM SERPL-MCNC: 8.8 MG/DL (ref 8.5–10.1)
CHLORIDE SERPL-SCNC: 105 MMOL/L (ref 94–109)
CO2 SERPL-SCNC: 23 MMOL/L (ref 20–32)
CREAT SERPL-MCNC: 1.05 MG/DL (ref 0.52–1.04)
DEPRECATED CALCIDIOL+CALCIFEROL SERPL-MC: 17 UG/L (ref 20–75)
GFR SERPL CREATININE-BSD FRML MDRD: 52 ML/MIN/1.7M2
GLUCOSE SERPL-MCNC: 101 MG/DL (ref 70–99)
MAGNESIUM SERPL-MCNC: 2.3 MG/DL (ref 1.6–2.3)
PHOSPHATE SERPL-MCNC: 4.2 MG/DL (ref 2.5–4.5)
POTASSIUM SERPL-SCNC: 4.5 MMOL/L (ref 3.4–5.3)
SODIUM SERPL-SCNC: 138 MMOL/L (ref 133–144)

## 2017-02-14 NOTE — PROGRESS NOTES
Dear Gem    The test for vitamin D is getting low and you may want to take at least 2000 units a day for now. The kidney test is stable. The blood sugar is normal and you do not have diabetes.     Please contact me by MyChart if you have any questions about your labs or management.    Edna Biggs MD

## 2017-02-23 ENCOUNTER — OFFICE VISIT (OUTPATIENT)
Dept: FAMILY MEDICINE | Facility: CLINIC | Age: 67
End: 2017-02-23
Payer: COMMERCIAL

## 2017-02-23 VITALS
HEART RATE: 133 BPM | OXYGEN SATURATION: 98 % | BODY MASS INDEX: 29.66 KG/M2 | SYSTOLIC BLOOD PRESSURE: 122 MMHG | HEIGHT: 65 IN | WEIGHT: 178 LBS | TEMPERATURE: 97.1 F | DIASTOLIC BLOOD PRESSURE: 78 MMHG

## 2017-02-23 DIAGNOSIS — J01.01 ACUTE RECURRENT MAXILLARY SINUSITIS: Primary | ICD-10-CM

## 2017-02-23 DIAGNOSIS — E78.5 HYPERLIPIDEMIA LDL GOAL <130: ICD-10-CM

## 2017-02-23 DIAGNOSIS — N18.30 CKD (CHRONIC KIDNEY DISEASE) STAGE 3, GFR 30-59 ML/MIN (H): ICD-10-CM

## 2017-02-23 DIAGNOSIS — G47.419 PRIMARY NARCOLEPSY WITHOUT CATAPLEXY: ICD-10-CM

## 2017-02-23 DIAGNOSIS — I10 HYPERTENSION, GOAL BELOW 140/90: ICD-10-CM

## 2017-02-23 DIAGNOSIS — E55.9 VITAMIN D DEFICIENCY: ICD-10-CM

## 2017-02-23 PROCEDURE — 99214 OFFICE O/P EST MOD 30 MIN: CPT | Performed by: FAMILY MEDICINE

## 2017-02-23 RX ORDER — AMOXICILLIN 500 MG/1
1000 CAPSULE ORAL 3 TIMES DAILY
Qty: 60 CAPSULE | Refills: 0 | Status: SHIPPED | OUTPATIENT
Start: 2017-02-23 | End: 2017-12-18

## 2017-02-23 ASSESSMENT — PAIN SCALES - GENERAL: PAINLEVEL: NO PAIN (0)

## 2017-02-23 NOTE — PATIENT INSTRUCTIONS
How to contact your care team: [phone] Pharmacy [phone]   PATTY HAMILTON MD KATYA GEORGIEV, PA-C CHRIS JONES, PA-C NAM HO, MD JONATHAN BATES, MD ARVIN VOCAL, MD    Clinic hours M-Th 7am-7pm Fri 7am-5pm.   Urgent care M-F 11am-9pm  Sat/Sun 9am-5pm.   Pharmacy   Mon-Th:  8:00am-8pm   Fri:  8:00am-6:00pm  Sat/Sun  8:00am-5:00 pm     Vitamin D 2000 units once a day.       Sinusitis (Antibiotic Treatment)    The sinuses are air-filled spaces within the bones of the face. They connect to the inside of the nose. Sinusitis is an inflammation of the tissue lining the sinus cavity. Sinus inflammation can occur during a cold. It can also be due to allergies to pollens and other particles in the air. Sinusitis can cause symptoms of sinus congestion and fullness. A sinus infection causes fever, headache and facial pain. There is often green or yellow drainage from the nose or into the back of the throat (post-nasal drip). You have been given antibiotics to treat this condition.  Home care:    Take the full course of antibiotics as instructed. Do not stop taking them, even if you feel better.    Drink plenty of water, hot tea, and other liquids. This may help thin mucus. It also may promote sinus drainage.    Heat may help soothe painful areas of the face. Use a towel soaked in hot water. Or,  the shower and direct the hot spray onto your face. Using a vaporizer along with a menthol rub at night may also help.     An expectorant containing guaifenesin may help thin the mucus and promote drainage from the sinuses.    Over-the-counter decongestants may be used unless a similar medicine was prescribed. Nasal sprays work the fastest. Use one that contains phenylephrine or oxymetazoline. First blow the nose gently. Then use the spray. Do not use these medicines more often than directed on the label or symptoms may get worse. You may also use tablets containing pseudoephedrine. Avoid  products that combine ingredients, because side effects may be increased. Read labels. You can also ask the pharmacist for help. (NOTE: Persons with high blood pressure should not use decongestants. They can raise blood pressure.)    Over-the-counter antihistamines may help if allergies contributed to your sinusitis.      Do not use nasal rinses or irrigation during an acute sinus infection, unless told to by your health care provider. Rinsing may spread the infection to other sinuses.    Use acetaminophen or ibuprofen to control pain, unless another pain medicine was prescribed. (If you have chronic liver or kidney disease or ever had a stomach ulcer, talk with your doctor before using these medicines. Aspirin should never be used in anyone under 18 years of age who is ill with a fever. It may cause severe liver damage.)    Don't smoke. This can worsen symptoms.  Follow-up care  Follow up with your healthcare provider or our staff if you are not improving within the next week.  When to seek medical advice  Call your healthcare provider if any of these occur:    Facial pain or headache becoming more severe    Stiff neck    Unusual drowsiness or confusion    Swelling of the forehead or eyelids    Vision problems, including blurred or double vision    Fever of 100.4 F (38 C) or higher, or as directed by your healthcare provider    Seizure    Breathing problems    Symptoms not resolving within 10 days    [id number] [employer]. [address] [place] [address], [place] [address]. All rights reserved. This information is not intended as a substitute for professional medical care. Always follow your healthcare professional's instructions.        Chronic Kidney Disease (CKD)    The role of the kidneys is to remove waste products and excess water from the blood.  When the kidneys do not function normally and waste products begin to build up in the blood, this is called chronic kidney disease (CKD). CKD is defined as the  presence of kidney damage or a decrease in kidney function lasting 3 months or more. CKD allows excess water, waste, and toxic substances to build up in the body. This can eventually become life-threatening, requiring dialysis or a kidney transplant to stay alive. This most severe form is called end stage renal disease or ESRD.  Diabetes is the leading causes of chronic renal failure. Other causes include high blood pressure, hardening of the arteries (atherosclerosis), lupus, inflammation of the blood vessels (vasculitis), prior viral and bacterial infections, and others. Certain over-the-counter pain medicines can cause renal failure when taken often over a long period of time. These include aspirin, ibuprofen, and related anti-inflammatory medicines called NSAIDs (nonsteroidal anti-inflammatory drugs).  Home care  The following guidelines will help you care for yourself at home:  1. If you have diabetes, talk to your doctor about the quality of your blood sugar control and any adjustments needed to your diet, lifestyle, or medicines.  2. If you have high blood pressure:    Take prescribed medicine to lower your blood pressure to the recommended goal of less than 130/80.    Take up a regular exercise program that you enjoy. Check with your doctor to be sure your planned exercise program is right for you.    Reduce your salt (sodium) intake. Your doctor can tell you how much salt per day is safe for you.  3. If you are overweight, talk to your doctor about a weight loss plan.  4. If you smoke, you must quit. Smoking worsens kidney disease. Talk to your doctor about ways to help you quit.  For more information, visit the following links:    www.smokefree.gov/sites/default/files/pdf/clearing-the-air-accessible.pdf    www.smokefree.gov    www.cancer.org/healthy/stayawayfromtobacco/guidetoquittingsmoking/  5. Most patients with chronic renal failure need to follow a special diet.  Be sure you understand yours. In  general, you will need to restrict protein, salt, potassium, and phosphorus. You also need to limit fluid intake. A calcium supplement may be prescribed to protect your bones from osteoporosis.  6. CKD is a risk factor for heart disease. Talk to your doctor about any other risk factors you might have and what you can do to lessen them.  7. Talk to your doctor about any medicines you are taking to determine if they need to be reduced or stopped.  8. Avoid the following over-the-counter medicines, or consult your doctor before using:    Aspirin and nonsteroidal anti-inflammatory drugs (NSAIDs) such as ibuprofen or naproxen (short-term use of acetaminophen for fever or pain is okay)    Laxatives and antacids containing magnesium or aluminum    Fleet or phospho soda enemas containing phosphorus    Certain stomach acid-blocking medicine such as cimetidine or ranitidine     Decongestants containing pseudoephedrine     Herbal supplements  Follow-up care  Follow up with your doctor or as advised by our staff. Contact one of the following for more information:    American Association of Kidney Patients [phone] [phone] www.aakp.org    National Kidney Foundation [phone] 478-0323 www.kidney.org    American Kidney Fund [phone] [phone] www.kidneyfund.org    National Kidney Disease Education Program (324) 4KIDNXL www.nkdep.nih.gov  [NOTE: If an X-ray, EKG (cardiogram), or other diagnostic test was taken, another specialist will review it. You will be notified of any new findings that may affect your care.]  When to seek medical care  Get prompt medical attention or contact your doctor if any of the following occur:    Nausea or vomiting    Fever greater than 100.4 F (38 C)    Severe weakness, dizziness, fainting, drowsiness, or confusion    Chest pain or shortness of breath    Unexpected weight gain or swelling in the legs, ankles, or around the eyes    Heart beating fast, slow, or irregularly    Decrease or absent urine output     [id number] [employer]. [address] [place] [address], [place] [address]. All rights reserved. This information is not intended as a substitute for professional medical care. Always follow your healthcare professional's instructions.

## 2017-02-23 NOTE — PROGRESS NOTES
SUBJECTIVE:                                                    Gem Gama is a 66 year old female who presents to clinic today for the following health issues:    Acute Illness   Acute illness concerns: Possible sinus infection  Onset: x2weeks, worsened 1/19/17    Fever: YES    Chills/Sweats: yes    Headache (location?): YES- frontal    Sinus Pressure:YES    Conjunctivitis:  no    Ear Pain: no but plugged    Rhinorrhea: YES    Congestion: YES    Sore Throat: YES     Cough: YES-productive of green sputum, with shortness of breath    Wheeze: no     Decreased Appetite: YES    Nausea: no     Vomiting: no     Diarrhea:  YES- one day and took Imodium and helped    Dysuria/Freq.: no     Fatigue/Achiness: YES    Sick/Strep Exposure: no      Therapies Tried and outcome: Benadryl helped over night but not in the morning, Ibuprofen helped loosen the sinus pressure      Hyperlipidemia Follow-Up      Rate your low fat/cholesterol diet?: good    Taking statin?  Yes, no muscle aches from statin    Other lipid medications/supplements?:  none     Hypertension Follow-up      Outpatient blood pressures are not being checked.    Low Salt Diet: no added salt     Chronic Kidney Disease Follow-up      Current NSAID use?  No      Problem list and histories reviewed & adjusted, as indicated.  Additional history: as documented    Patient Active Problem List   Diagnosis     Depression with anxiety     TMJ (temporomandibular joint syndrome)     S/P hip replacement     Congenital ureteric stenosis     Attention deficit disorder of adult     Lacrimal duct stenosis     Chronic rhinitis     CARDIOVASCULAR SCREENING; LDL GOAL LESS THAN 160     Intermittent asthma     Advanced directives, counseling/discussion     Major depressive disorder, recurrent episode, moderate (H)     Pituitary microadenoma (H)     Obstructive sleep apnea syndrome     Hypertension, goal below 140/90     Osteoarthritis of right knee     S/P total hip arthroplasty      Acute posthemorrhagic anemia     Constipation     Central sleep apnea     Controlled narcolepsy     Esophageal reflux (GERD)     Status post total knee replacement     Primary narcolepsy without cataplexy     Past Surgical History   Procedure Laterality Date     C reimplant ureter,extensive tailoring  1973 1997     C repair enterocele,vag apprch  2008     Prolift     Soft tissue surgery       Orthopedic surgery       bilat total hip     C total knee arthroplasty       Rectocele repair       Arthroplasty knee  7/9/2014     Procedure: ARTHROPLASTY KNEE;  Surgeon: Levi Johnson MD;  Location: [facility] OR     C total abd hysterectomy+blad repr  1992     Vaginal approach with oophrectomy     Genitourinary surgery       kidney surgery X 3     Arthroplasty knee Left 11/24/2014     Procedure: ARTHROPLASTY KNEE;  Surgeon: Levi Johnson MD;  Location:  OR       Social History   Substance Use Topics     Smoking status: Never Smoker     Smokeless tobacco: Never Used     Alcohol use No     Family History   Problem Relation Age of Onset     Hypertension Mother      Arthritis Mother      CEREBROVASCULAR DISEASE Father      Three Strokes     DIABETES Father      and brother     Thyroid Disease Sister      Hypothyroid         Current Outpatient Prescriptions   Medication Sig Dispense Refill     sertraline (ZOLOFT) 100 MG tablet Take 1 tablet (100 mg) by mouth 2 times daily 180 tablet 3     omeprazole (PRILOSEC) 20 MG CR capsule Take 1 capsule (20 mg) by mouth daily as needed 90 capsule 1     mometasone (NASONEX) 50 MCG/ACT spray Spray 2 sprays into both nostrils daily 3 Box 3     fluticasone (FLOVENT HFA) 110 MCG/ACT Inhaler Inhale 2 puffs into the lungs 2 times daily 3 Inhaler 3     albuterol (2.5 MG/3ML) 0.083% neb solution Take 1 vial (2.5 mg) by nebulization every 6 hours as needed for shortness of breath / dyspnea 100 vial 3     amphetamine-dextroamphetamine (ADDERALL XR) 30 MG per 24 hr capsule Take 1 capsule (30 mg) by mouth  "daily 30 capsule 0     amphetamine-dextroamphetamine (ADDERALL) 20 MG per tablet Take 1 tablet (20 mg) by mouth daily In the afternoon 30 tablet 0     losartan (COZAAR) 50 MG tablet Take 1 tablet (50 mg) by mouth daily 90 tablet 3     guaiFENesin-codeine (ROBITUSSIN AC) 100-10 MG/5ML SOLN Take 10 mLs by mouth every 4 hours as needed for cough 236 mL 0     Allergies   Allergen Reactions     Flagyl [Metronidazole Hcl] Anaphylaxis and Rash     Latex Other (See Comments)     Runny nose     Sulfa Drugs Anaphylaxis     Tape [Adhesive Tape] Hives     Metoprolol Itching and Rash     Recent Labs   Lab Test  02/09/17   1230  02/02/16   1634   09/19/13   0717   08/06/13   1636   A1C   --    --    --   5.6   --    --    LDL  175*  126*   --   141*   --    --    HDL  50  54   --   51   --    --    TRIG  125  105   --   111   --    --    ALT   --    --    --    --    --   20   CR  1.05*  1.06*   < >  0.84   < >  0.82   GFRESTIMATED  52*  52*   < >  68   < >  70   GFRESTBLACK  63  63   < >  83   < >  85   POTASSIUM  4.5  3.8   < >  4.1   < >  5.2   TSH   --   1.62   --   1.56   --   1.20    < > = values in this interval not displayed.      BP Readings from Last 3 Encounters:   02/23/17 122/78   02/09/17 138/83   12/17/16 124/74    Wt Readings from Last 3 Encounters:   02/23/17 178 lb (80.7 kg)   02/09/17 179 lb 1.6 oz (81.2 kg)   12/17/16 184 lb 6 oz (83.6 kg)                  Labs reviewed in EPIC  Problem list, Medication list, Allergies, and Medical/Social/Surgical histories reviewed in [facility] and updated as appropriate.    ROS:  Constitutional, HEENT, cardiovascular, pulmonary, gi and gu systems are negative, except as otherwise noted.    OBJECTIVE:                                                    /78 (BP Location: Left arm, Patient Position: Chair, Cuff Size: Adult Regular)  Pulse 133  Temp 97.1  F (36.2  C) (Oral)  Ht 5' 4.75\" (1.645 m)  Wt 178 lb (80.7 kg)  SpO2 98%  Breastfeeding? No  BMI 29.85 kg/m2  Body mass index " is 29.85 kg/(m^2).  GENERAL: mild acutely ill, alert and no distress  EYES: Eyes grossly normal to inspection, PERRL and conjunctivae and sclerae normal  HENT: ear canals and TM's normal, nose and mouth without ulcers or lesions, throat is erythematous with no exudate. Sinuses tender to percussion over maxillary sinus area. Nasal discharge present.  NECK: anterior cervical adenopathy, no asymmetry, masses, or scars and thyroid normal to palpation  RESP: lungs clear to auscultation - no rales, rhonchi or wheezes  CV: regular rate and rhythm, normal S1 S2, no S3 or S4, no murmur, click or rub, no peripheral edema and peripheral pulses strong  ABDOMEN: soft, nontender, no hepatosplenomegaly, no masses and bowel sounds normal  MS: no gross musculoskeletal defects noted, no edema  SKIN: no suspicious lesions or rashes  NEURO: Normal strength and tone, mentation intact and speech normal  PSYCH: mentation appears normal, affect normal/bright     Diagnostic Test Results:  Results for orders placed or performed in visit on 02/09/17   Hepatitis C Screen Reflex to HCV RNA Quant and Genotype   Result Value Ref Range    Hepatitis C Antibody  NR     Nonreactive   Assay performance characteristics have not been established for newborns,   infants, and children     Lipid panel reflex to direct LDL   Result Value Ref Range    Cholesterol 250 (H) <200 mg/dL    Triglycerides 125 <150 mg/dL    HDL Cholesterol 50 >49 mg/dL    LDL Cholesterol Calculated 175 (H) <100 mg/dL    Non HDL Cholesterol 200 (H) <130 mg/dL   Albumin Random Urine Quantitative   Result Value Ref Range    Creatinine Urine 106 mg/dL    Albumin Urine mg/L 13 mg/L    Albumin Urine mg/g Cr 12.64 0 - 25 mg/g Cr   Magnesium   Result Value Ref Range    Magnesium 2.3 1.6 - 2.3 mg/dL   Vitamin D Deficiency   Result Value Ref Range    Vitamin D Deficiency screening 17 (L) 20 - 75 ug/L   Renal panel (Alb, BUN, Ca, Cl, CO2, Creat, Gluc, Phos, K, Na)   Result Value Ref Range     "Sodium 138 133 - 144 mmol/L    Potassium 4.5 3.4 - 5.3 mmol/L    Chloride 105 94 - 109 mmol/L    Carbon Dioxide 23 20 - 32 mmol/L    Anion Gap 10 3 - 14 mmol/L    Glucose 101 (H) 70 - 99 mg/dL    Urea Nitrogen 15 7 - 30 mg/dL    Creatinine 1.05 (H) 0.52 - 1.04 mg/dL    GFR Estimate 52 (L) >60 mL/min/1.7m2    GFR Estimate If Black 63 >60 mL/min/1.7m2    Calcium 8.8 8.5 - 10.1 mg/dL    Phosphorus 4.2 2.5 - 4.5 mg/dL    Albumin 4.4 3.4 - 5.0 g/dL        ASSESSMENT/PLAN:                                                        BMI:   Estimated body mass index is 29.85 kg/(m^2) as calculated from the following:    Height as of this encounter: 5' 4.75\" (1.645 m).    Weight as of this encounter: 178 lb (80.7 kg).   Weight management plan: Discussed healthy diet and exercise guidelines and patient will follow up in 3 months in clinic to re-evaluate.        ICD-10-CM    1. Acute recurrent maxillary sinusitis J01.01 amoxicillin (AMOXIL) 500 MG capsule 2 three times a day for 10 days  Use Afrin nose spray or other 12 hour spray twice a day for 3 days. Follow this spray 5-10 minutes later with steroid nose spray after nasal passages open up. Continue to use the steroid nose spray twice a day for the next 2-3 weeks or longer to keep the nasal passages and sinuses open.     2. Hypertension, goal below 140/90 I10 Well controlled on medications    3. CKD (chronic kidney disease) stage 3, GFR 30-59 ml/min N18.3 Stable for past 5 years   4. Hyperlipidemia LDL goal <130 E78.5 Recheck 3 months, diet changes.    5. Primary narcolepsy without cataplexy G47.419 Continue Adderall   6. Vitamin D deficiency E55.9 Vitamin D 2000 units a day.        FUTURE LABS:       - Schedule non-fasting labs in 3 months  FUTURE APPOINTMENTS:       - Follow-up visit in 3 months or sooner if any questions or concerns.   Work on weight loss  Regular exercise  See Patient Instructions    Edna Biggs MD  [facility]    "

## 2017-02-23 NOTE — MR AVS SNAPSHOT
After Visit Summary   2/23/2017    Gem Gama    MRN: [id number]           Patient Information     Date Of Birth          1950        Visit Information        Provider Department      2/23/2017 1:20 PM Edna Biggs MD [facility]        Today's Diagnoses     Acute recurrent maxillary sinusitis    -  1    Hypertension, goal below 140/90        CKD (chronic kidney disease) stage 3, GFR 30-59 ml/min        Hyperlipidemia LDL goal <130        Primary narcolepsy without cataplexy        Vitamin D deficiency          Care Instructions    How to contact your care team: [phone] Pharmacy [phone]   PATTY HAMILTON MD KATYA GEORGIEV, PA-C CHRIS JONES, PA-C NAM HO, MD JONATHAN BATES, MD ARVIN VOCAL, MD    Clinic hours M-Th 7am-7pm Fri 7am-5pm.   Urgent care M-F 11am-9pm  Sat/Sun 9am-5pm.   Pharmacy   Mon-Th:  8:00am-8pm   Fri:  8:00am-6:00pm  Sat/Sun  8:00am-5:00 pm     Vitamin D 2000 units once a day.       Sinusitis (Antibiotic Treatment)    The sinuses are air-filled spaces within the bones of the face. They connect to the inside of the nose. Sinusitis is an inflammation of the tissue lining the sinus cavity. Sinus inflammation can occur during a cold. It can also be due to allergies to pollens and other particles in the air. Sinusitis can cause symptoms of sinus congestion and fullness. A sinus infection causes fever, headache and facial pain. There is often green or yellow drainage from the nose or into the back of the throat (post-nasal drip). You have been given antibiotics to treat this condition.  Home care:    Take the full course of antibiotics as instructed. Do not stop taking them, even if you feel better.    Drink plenty of water, hot tea, and other liquids. This may help thin mucus. It also may promote sinus drainage.    Heat may help soothe painful areas of the face. Use a towel soaked in hot water. Or,  the  shower and direct the hot spray onto your face. Using a vaporizer along with a menthol rub at night may also help.     An expectorant containing guaifenesin may help thin the mucus and promote drainage from the sinuses.    Over-the-counter decongestants may be used unless a similar medicine was prescribed. Nasal sprays work the fastest. Use one that contains phenylephrine or oxymetazoline. First blow the nose gently. Then use the spray. Do not use these medicines more often than directed on the label or symptoms may get worse. You may also use tablets containing pseudoephedrine. Avoid products that combine ingredients, because side effects may be increased. Read labels. You can also ask the pharmacist for help. (NOTE: Persons with high blood pressure should not use decongestants. They can raise blood pressure.)    Over-the-counter antihistamines may help if allergies contributed to your sinusitis.      Do not use nasal rinses or irrigation during an acute sinus infection, unless told to by your health care provider. Rinsing may spread the infection to other sinuses.    Use acetaminophen or ibuprofen to control pain, unless another pain medicine was prescribed. (If you have chronic liver or kidney disease or ever had a stomach ulcer, talk with your doctor before using these medicines. Aspirin should never be used in anyone under 18 years of age who is ill with a fever. It may cause severe liver damage.)    Don't smoke. This can worsen symptoms.  Follow-up care  Follow up with your healthcare provider or our staff if you are not improving within the next week.  When to seek medical advice  Call your healthcare provider if any of these occur:    Facial pain or headache becoming more severe    Stiff neck    Unusual drowsiness or confusion    Swelling of the forehead or eyelids    Vision problems, including blurred or double vision    Fever of 100.4 F (38 C) or higher, or as directed by your healthcare  provider    Seizure    Breathing problems    Symptoms not resolving within 10 days    1327-5943 [employer].. [address] [place] [address], [place] [address]. All rights reserved. This information is not intended as a substitute for professional medical care. Always follow your healthcare professional's instructions.        Chronic Kidney Disease (CKD)    The role of the kidneys is to remove waste products and excess water from the blood.  When the kidneys do not function normally and waste products begin to build up in the blood, this is called chronic kidney disease (CKD). CKD is defined as the presence of kidney damage or a decrease in kidney function lasting 3 months or more. CKD allows excess water, waste, and toxic substances to build up in the body. This can eventually become life-threatening, requiring dialysis or a kidney transplant to stay alive. This most severe form is called end stage renal disease or ESRD.  Diabetes is the leading causes of chronic renal failure. Other causes include high blood pressure, hardening of the arteries (atherosclerosis), lupus, inflammation of the blood vessels (vasculitis), prior viral and bacterial infections, and others. Certain over-the-counter pain medicines can cause renal failure when taken often over a long period of time. These include aspirin, ibuprofen, and related anti-inflammatory medicines called NSAIDs (nonsteroidal anti-inflammatory drugs).  Home care  The following guidelines will help you care for yourself at home:  1. If you have diabetes, talk to your doctor about the quality of your blood sugar control and any adjustments needed to your diet, lifestyle, or medicines.  2. If you have high blood pressure:    Take prescribed medicine to lower your blood pressure to the recommended goal of less than 130/80.    Take up a regular exercise program that you enjoy. Check with your doctor to be sure your planned exercise program is right for you.    Reduce  your salt (sodium) intake. Your doctor can tell you how much salt per day is safe for you.  3. If you are overweight, talk to your doctor about a weight loss plan.  4. If you smoke, you must quit. Smoking worsens kidney disease. Talk to your doctor about ways to help you quit.  For more information, visit the following links:    www.smokefree.gov/sites/default/files/pdf/clearing-the-air-accessible.pdf    www.smokefree.gov    www.cancer.org/healthy/stayawayfromtobacco/guidetoquittingsmoking/  5. Most patients with chronic renal failure need to follow a special diet.  Be sure you understand yours. In general, you will need to restrict protein, salt, potassium, and phosphorus. You also need to limit fluid intake. A calcium supplement may be prescribed to protect your bones from osteoporosis.  6. CKD is a risk factor for heart disease. Talk to your doctor about any other risk factors you might have and what you can do to lessen them.  7. Talk to your doctor about any medicines you are taking to determine if they need to be reduced or stopped.  8. Avoid the following over-the-counter medicines, or consult your doctor before using:    Aspirin and nonsteroidal anti-inflammatory drugs (NSAIDs) such as ibuprofen or naproxen (short-term use of acetaminophen for fever or pain is okay)    Laxatives and antacids containing magnesium or aluminum    Fleet or phospho soda enemas containing phosphorus    Certain stomach acid-blocking medicine such as cimetidine or ranitidine     Decongestants containing pseudoephedrine     Herbal supplements  Follow-up care  Follow up with your doctor or as advised by our staff. Contact one of the following for more information:    American Association of Kidney Patients (708) 552-7822 www.aakp.org    National Kidney Foundation ([phone] 139-9782 www.kidney.org    American Kidney Fund (436) 175-8480 www.kidneyfund.org    National Kidney Disease Education Program (973) 4GADNROJELIO www.nkdep.nih.gov  [NOTE:  If an X-ray, EKG (cardiogram), or other diagnostic test was taken, another specialist will review it. You will be notified of any new findings that may affect your care.]  When to seek medical care  Get prompt medical attention or contact your doctor if any of the following occur:    Nausea or vomiting    Fever greater than 100.4 F (38 C)    Severe weakness, dizziness, fainting, drowsiness, or confusion    Chest pain or shortness of breath    Unexpected weight gain or swelling in the legs, ankles, or around the eyes    Heart beating fast, slow, or irregularly    Decrease or absent urine output    8314-5456 [employer]. [address] [place] [address]. All rights reserved. This information is not intended as a substitute for professional medical care. Always follow your healthcare professional's instructions.              Follow-ups after your visit        Follow-up notes from your care team     Return in about 3 months (around 5/23/2017) for Lab Work, medication follow up, Physical Exam.      Who to contact     If you have questions or need follow up information about today's clinic visit or your schedule please contact [facility] directly at [phone].  Normal or non-critical lab and imaging results will be communicated to you by RocketBankhart, letter or phone within 4 business days after the clinic has received the results. If you do not hear from us within 7 days, please contact the clinic through RocketBankhart or phone. If you have a critical or abnormal lab result, we will notify you by phone as soon as possible.  Submit refill requests through AIRTAME or call your pharmacy and they will forward the refill request to us. Please allow 3 business days for your refill to be completed.          Additional Information About Your Visit        RocketBankhart Information     AIRTAME gives you secure access to your electronic health record. If you see a primary care provider, you can also  "send messages to your care team and make appointments. If you have questions, please call your primary care clinic.  If you do not have a primary care provider, please call [phone] and they will assist you.        Care EveryWhere ID     This is your Care EveryWhere ID. This could be used by other organizations to access your [facility] medical records  [id number]        Your Vitals Were     Pulse Temperature Height Pulse Oximetry Breastfeeding? BMI (Body Mass Index)    133 97.1  F (36.2  C) (Oral) 5' 4.75\" (1.645 m) 98% No 29.85 kg/m2       Blood Pressure from Last 3 Encounters:   02/23/17 122/78   02/09/17 138/83   12/17/16 124/74    Weight from Last 3 Encounters:   02/23/17 178 lb (80.7 kg)   02/09/17 179 lb 1.6 oz (81.2 kg)   12/17/16 184 lb 6 oz (83.6 kg)              Today, you had the following     No orders found for display         Today's Medication Changes          These changes are accurate as of: 2/23/17  1:52 PM.  If you have any questions, ask your nurse or doctor.               Start taking these medicines.        Dose/Directions    amoxicillin 500 MG capsule   Commonly known as:  AMOXIL   Used for:  Acute recurrent maxillary sinusitis   Started by:  Edna Biggs MD        Dose:  1000 mg   Take 2 capsules (1,000 mg) by mouth 3 times daily   Quantity:  60 capsule   Refills:  0            Where to get your medicines      These medications were sent to [facility] - [place], [place] - [address]  [address], [facility] [address]     Phone:  [phone]     amoxicillin 500 MG capsule                Primary Care Provider Office Phone # Fax #    Edna Biggs [phone]       [facility] [address]  [facility] [address]        Thank you!     Thank you for choosing [facility]  for your care. Our goal is always to provide you with excellent care. Hearing back from our patients is one way we can " continue to improve our services. Please take a few minutes to complete the written survey that you may receive in the mail after your visit with us. Thank you!             Your Updated Medication List - Protect others around you: Learn how to safely use, store and throw away your medicines at www.disposemymeds.org.          This list is accurate as of: 2/23/17  1:52 PM.  Always use your most recent med list.                   Brand Name Dispense Instructions for use    albuterol (2.5 MG/3ML) 0.083% neb solution     100 vial    Take 1 vial (2.5 mg) by nebulization every 6 hours as needed for shortness of breath / dyspnea       amoxicillin 500 MG capsule    AMOXIL    60 capsule    Take 2 capsules (1,000 mg) by mouth 3 times daily       * amphetamine-dextroamphetamine 30 MG per 24 hr capsule    ADDERALL XR    30 capsule    Take 1 capsule (30 mg) by mouth daily       * amphetamine-dextroamphetamine 20 MG per tablet    ADDERALL    30 tablet    Take 1 tablet (20 mg) by mouth daily In the afternoon       fluticasone 110 MCG/ACT Inhaler    FLOVENT HFA    3 Inhaler    Inhale 2 puffs into the lungs 2 times daily       guaiFENesin-codeine 100-10 MG/5ML Soln solution    ROBITUSSIN AC    236 mL    Take 10 mLs by mouth every 4 hours as needed for cough       losartan 50 MG tablet    COZAAR    90 tablet    Take 1 tablet (50 mg) by mouth daily       mometasone 50 MCG/ACT spray    NASONEX    3 Box    Spray 2 sprays into both nostrils daily       omeprazole 20 MG CR capsule    priLOSEC    90 capsule    Take 1 capsule (20 mg) by mouth daily as needed       sertraline 100 MG tablet    ZOLOFT    180 tablet    Take 1 tablet (100 mg) by mouth 2 times daily       * Notice:  This list has 2 medication(s) that are the same as other medications prescribed for you. Read the directions carefully, and ask your doctor or other care provider to review them with you.

## 2017-02-23 NOTE — NURSING NOTE
"Chief Complaint   Patient presents with     Sinus Problem     Possible infection       Initial /78 (BP Location: Left arm, Patient Position: Chair, Cuff Size: Adult Regular)  Pulse 133  Temp 97.1  F (36.2  C) (Oral)  Ht 5' 4.75\" (1.645 m)  Wt 178 lb (80.7 kg)  SpO2 98%  Breastfeeding? No  BMI 29.85 kg/m2 Estimated body mass index is 29.85 kg/(m^2) as calculated from the following:    Height as of this encounter: 5' 4.75\" (1.645 m).    Weight as of this encounter: 178 lb (80.7 kg).  Medication Reconciliation: complete     Larry rBoussard CMA    "

## 2017-05-31 ENCOUNTER — TELEPHONE (OUTPATIENT)
Dept: FAMILY MEDICINE | Facility: CLINIC | Age: 67
End: 2017-05-31

## 2017-05-31 NOTE — LETTER
May 31, 2017      Gem Gama  [address]  [place] [address]    Dear Gem Gama,     At [facility]  we care about your health and are committed to providing quality patient care, which includes staying current on preventative cancer screenings.  You can increase your chances of finding and treating cancers through regular screenings.      Our records show that you are due for the following screening:      Mammogram for breast cancer   Recommended every 1-2 years for women age 50 and older  Mammograms help detect breast cancer, which is the most common cancer among women in the [place].  You may need to start having mammograms earlier and more often if you have had breast cancer, breast problems, or a family history of breast cancer.       You may contact us by phone at [facility] at [phone] to schedule your mammogram at your earliest convenience.    If you have already had a mammogram at another facility, please call us so that we may update your chart.      Your partners in health,      Quality Committee   [facility]

## 2017-05-31 NOTE — TELEPHONE ENCOUNTER
Patient is due for a mammogram. Left message for patient to call back  Lilibeth Lerner Scheduling at [phone]. Encounter sent to reception team to send letter to home address.     If patient returns call, please help them schedule a mammogram.     Thank you,    [facility]

## 2017-07-07 ENCOUNTER — TELEPHONE (OUTPATIENT)
Dept: FAMILY MEDICINE | Facility: CLINIC | Age: 67
End: 2017-07-07

## 2017-07-07 NOTE — TELEPHONE ENCOUNTER
[facility] ([facility]) - Declined; Patient is not interested in scheduling a mammo at this time/    Outreach ,  Angelica Broussard

## 2017-07-13 ENCOUNTER — TRANSFERRED RECORDS (OUTPATIENT)
Dept: HEALTH INFORMATION MANAGEMENT | Facility: CLINIC | Age: 67
End: 2017-07-13

## 2017-07-13 LAB
CREAT SERPL-MCNC: 0.86 MG/DL (ref 0.5–0.99)
GFR SERPL CREATININE-BSD FRML MDRD: 69 ML/MIN/1.73M2
GLUCOSE SERPL-MCNC: 96 MG/DL (ref 65–99)
POTASSIUM SERPL-SCNC: 4.4 MMOL/L (ref 3.5–5.3)

## 2017-07-14 ENCOUNTER — TELEPHONE (OUTPATIENT)
Dept: FAMILY MEDICINE | Facility: CLINIC | Age: 67
End: 2017-07-14

## 2017-07-14 NOTE — LETTER
[address] [address]  [phone]  Dept: [phone]      August 8, 2017      Gem Gama  [address]  [place] [facility] [address]      Dear Gem,     At [facility] we care about your health and are committed to providing quality patient care. Which includes staying current on preventive cancer screenings.  You can increase your chances of finding and treating cancers through regular screenings.      Our records indicate you may be due for the following:    Physical Exam  Yearly health examination and possible blood work.    Mammogram  Mammogram for breast cancer   Recommended every 1-2 years for women age 50 and older  Mammograms help detect breast cancer, which is the most common cancer among women in the [place].  You may need to start having mammograms earlier and more often if you have had breast cancer, breast problems, or a family history of breast cancer.     Depression Check  Follow up with a simple, quick depression questionnaire called a PHQ-9. Please complete from attachment in the blueKiwi message or contact our office to go over the questionnaire.    This questionnaire has two main purposes: 1)  It helps your healthcare provider to determine your response to the care we have provided you and helps us guide further management of your mental well being.  2)  Reassure your healthcare provider that your symptoms are stable, if you are no longer experiencing depression symptoms.     Asthma Check  The Asthma Control Test(ACT) is a screening tool helps us to assess how well your asthma is controlled. Good asthma control leads to less asthma symptoms and greater health. If your asthma is not in good control (score less than 20) it is recommended you be seen by your provider for medication and lifestyle adjustments. Please complete the attached questionnaire and return back at your earliest convenience.    To  schedule an appointment or discuss any further questions, you may contact us by phone at the [facility] at [phone] or online through the patient [email or web address] @ [email or web address]    If you have had any of the screenings listed above at another facility, please call us so that we may update your chart.      Your partners in health,        Quality Committee at [facility]/hal

## 2017-07-14 NOTE — TELEPHONE ENCOUNTER
Panel Management Review      BP Readings from Last 1 Encounters:   02/23/17 122/78    ,   Lab Results   Component Value Date    A1C 5.6 09/19/2013   , 7/7/2017    Fail List measure:     Depression / Dysthymia review  PHQ-9 SCORE 2/2/2016 10/7/2016 2/9/2017   Total Score - - -   Total Score 6 7 15      Patient is due for:  PHQ9      Patient is due/failing the following:   PHQ9    Action needed:   Patient needs to do PHQ9.    Type of outreach:    Sent Swipesense message.    Questions for provider review:    None                                                                                                                                    Larry Broussard      Chart routed to Care Team .

## 2017-07-25 NOTE — TELEPHONE ENCOUNTER
This writer attempted to contact Gem on 07/25/17  Reason for call update PHQ9 and left message to return call.  When patient calls back, please contact 1st floor Lilibeth Lerner. routine priority.        Larry Broussard

## 2017-08-01 NOTE — TELEPHONE ENCOUNTER
This writer attempted to contact Gem on 08/01/17  Reason for call update PHQ9 and left message to return call or check mychart.  When patient calls back, please contact 1st floor Lilibeth Lerner. routine priority.        Larry Broussard

## 2017-08-08 NOTE — TELEPHONE ENCOUNTER
PHQ Index dates 7/9 - 9/9/17    This writer attempted to contact Gem on 08/08/17  Reason for call update PHQ9, update ACT, Physical exam with labs, medication check and left message to return call.  When patient calls back, please contact 1st floor Lilibeth Lerner. routine priority.    Reminder letter also sent to patient's home due to patient has not read Quixey message and there have been previous multiple phone attempts.    Larry Broussard

## 2017-08-15 NOTE — TELEPHONE ENCOUNTER
PHQ Index dates 7/9 - 9/9/17     This writer attempted to contact Gem on 08/15/17  Reason for call update PHQ9, update ACT, Physical exam with labs, medication check and left message to return call.  When patient calls back, please contact 1st floor Lilibeth Lerner. routine priority.       Larry Broussard

## 2017-08-22 ASSESSMENT — PATIENT HEALTH QUESTIONNAIRE - PHQ9: SUM OF ALL RESPONSES TO PHQ QUESTIONS 1-9: 4

## 2017-08-22 NOTE — TELEPHONE ENCOUNTER
Patient contacted. PHQ9 score 4.  Appointment schedule for 9/7/17 with Dr Biggs for physical, med check, labs.    Larry Broussard CMA

## 2017-12-18 ENCOUNTER — OFFICE VISIT (OUTPATIENT)
Dept: FAMILY MEDICINE | Facility: CLINIC | Age: 67
End: 2017-12-18
Payer: MEDICARE

## 2017-12-18 VITALS
OXYGEN SATURATION: 98 % | RESPIRATION RATE: 14 BRPM | DIASTOLIC BLOOD PRESSURE: 94 MMHG | WEIGHT: 194 LBS | SYSTOLIC BLOOD PRESSURE: 177 MMHG | HEART RATE: 115 BPM | HEIGHT: 65 IN | TEMPERATURE: 98.1 F | BODY MASS INDEX: 32.32 KG/M2

## 2017-12-18 DIAGNOSIS — Z91.81 AT RISK FOR FALLING: ICD-10-CM

## 2017-12-18 DIAGNOSIS — N18.30 CKD (CHRONIC KIDNEY DISEASE) STAGE 3, GFR 30-59 ML/MIN (H): ICD-10-CM

## 2017-12-18 DIAGNOSIS — D35.2 PITUITARY MICROADENOMA (H): ICD-10-CM

## 2017-12-18 DIAGNOSIS — Z12.31 VISIT FOR SCREENING MAMMOGRAM: ICD-10-CM

## 2017-12-18 DIAGNOSIS — Z78.0 ASYMPTOMATIC POSTMENOPAUSAL STATUS: ICD-10-CM

## 2017-12-18 DIAGNOSIS — K21.00 GASTROESOPHAGEAL REFLUX DISEASE WITH ESOPHAGITIS: ICD-10-CM

## 2017-12-18 DIAGNOSIS — F33.1 MAJOR DEPRESSIVE DISORDER, RECURRENT EPISODE, MODERATE (H): Primary | ICD-10-CM

## 2017-12-18 DIAGNOSIS — Z23 NEED FOR PROPHYLACTIC VACCINATION AND INOCULATION AGAINST INFLUENZA: ICD-10-CM

## 2017-12-18 DIAGNOSIS — Z96.653 STATUS POST TOTAL BILATERAL KNEE REPLACEMENT: ICD-10-CM

## 2017-12-18 DIAGNOSIS — F98.8 ATTENTION DEFICIT DISORDER OF ADULT: ICD-10-CM

## 2017-12-18 DIAGNOSIS — Z23 NEED FOR PROPHYLACTIC VACCINATION AGAINST STREPTOCOCCUS PNEUMONIAE (PNEUMOCOCCUS): ICD-10-CM

## 2017-12-18 DIAGNOSIS — I10 HYPERTENSION, GOAL BELOW 140/90: ICD-10-CM

## 2017-12-18 DIAGNOSIS — I10 ESSENTIAL HYPERTENSION WITH GOAL BLOOD PRESSURE LESS THAN 140/90: ICD-10-CM

## 2017-12-18 DIAGNOSIS — J45.20 MILD INTERMITTENT ASTHMA WITHOUT COMPLICATION: ICD-10-CM

## 2017-12-18 DIAGNOSIS — J20.9 ACUTE BRONCHITIS, UNSPECIFIED ORGANISM: ICD-10-CM

## 2017-12-18 LAB
ALBUMIN SERPL-MCNC: 4 G/DL (ref 3.4–5)
ALBUMIN UR-MCNC: NEGATIVE MG/DL
ANION GAP SERPL CALCULATED.3IONS-SCNC: 11 MMOL/L (ref 3–14)
APPEARANCE UR: CLEAR
BACTERIA #/AREA URNS HPF: ABNORMAL /HPF
BILIRUB UR QL STRIP: NEGATIVE
BUN SERPL-MCNC: 20 MG/DL (ref 7–30)
CALCIUM SERPL-MCNC: 9.2 MG/DL (ref 8.5–10.1)
CHLORIDE SERPL-SCNC: 107 MMOL/L (ref 94–109)
CO2 SERPL-SCNC: 25 MMOL/L (ref 20–32)
COLOR UR AUTO: YELLOW
CREAT SERPL-MCNC: 1 MG/DL (ref 0.52–1.04)
CREAT UR-MCNC: 108 MG/DL
ERYTHROCYTE [DISTWIDTH] IN BLOOD BY AUTOMATED COUNT: 13.9 % (ref 10–15)
GFR SERPL CREATININE-BSD FRML MDRD: 55 ML/MIN/1.7M2
GLUCOSE SERPL-MCNC: 120 MG/DL (ref 70–99)
GLUCOSE UR STRIP-MCNC: NEGATIVE MG/DL
HCT VFR BLD AUTO: 41.7 % (ref 35–47)
HGB BLD-MCNC: 14.8 G/DL (ref 11.7–15.7)
HGB UR QL STRIP: ABNORMAL
KETONES UR STRIP-MCNC: NEGATIVE MG/DL
LEUKOCYTE ESTERASE UR QL STRIP: NEGATIVE
MCH RBC QN AUTO: 30 PG (ref 26.5–33)
MCHC RBC AUTO-ENTMCNC: 35.5 G/DL (ref 31.5–36.5)
MCV RBC AUTO: 84 FL (ref 78–100)
MICROALBUMIN UR-MCNC: 9 MG/L
MICROALBUMIN/CREAT UR: 8.44 MG/G CR (ref 0–25)
NITRATE UR QL: NEGATIVE
NON-SQ EPI CELLS #/AREA URNS LPF: ABNORMAL /LPF
PH UR STRIP: 5.5 PH (ref 5–7)
PHOSPHATE SERPL-MCNC: 5.1 MG/DL (ref 2.5–4.5)
PLATELET # BLD AUTO: 177 10E9/L (ref 150–450)
POTASSIUM SERPL-SCNC: 3.8 MMOL/L (ref 3.4–5.3)
RBC # BLD AUTO: 4.94 10E12/L (ref 3.8–5.2)
RBC #/AREA URNS AUTO: ABNORMAL /HPF
SODIUM SERPL-SCNC: 143 MMOL/L (ref 133–144)
SOURCE: ABNORMAL
SP GR UR STRIP: 1.02 (ref 1–1.03)
UROBILINOGEN UR STRIP-ACNC: 0.2 EU/DL (ref 0.2–1)
WBC # BLD AUTO: 5.6 10E9/L (ref 4–11)
WBC #/AREA URNS AUTO: ABNORMAL /HPF

## 2017-12-18 PROCEDURE — 82306 VITAMIN D 25 HYDROXY: CPT | Performed by: FAMILY MEDICINE

## 2017-12-18 PROCEDURE — 81001 URINALYSIS AUTO W/SCOPE: CPT | Performed by: FAMILY MEDICINE

## 2017-12-18 PROCEDURE — 85027 COMPLETE CBC AUTOMATED: CPT | Performed by: FAMILY MEDICINE

## 2017-12-18 PROCEDURE — 82043 UR ALBUMIN QUANTITATIVE: CPT | Performed by: FAMILY MEDICINE

## 2017-12-18 PROCEDURE — 36415 COLL VENOUS BLD VENIPUNCTURE: CPT | Performed by: FAMILY MEDICINE

## 2017-12-18 PROCEDURE — 99214 OFFICE O/P EST MOD 30 MIN: CPT | Performed by: FAMILY MEDICINE

## 2017-12-18 PROCEDURE — 80069 RENAL FUNCTION PANEL: CPT | Performed by: FAMILY MEDICINE

## 2017-12-18 RX ORDER — ALBUTEROL SULFATE 0.83 MG/ML
1 SOLUTION RESPIRATORY (INHALATION) EVERY 6 HOURS PRN
Qty: 100 VIAL | Refills: 3 | Status: SHIPPED | OUTPATIENT
Start: 2017-12-18 | End: 2019-06-22

## 2017-12-18 RX ORDER — LOSARTAN POTASSIUM 100 MG/1
100 TABLET ORAL DAILY
Qty: 90 TABLET | Refills: 3 | Status: SHIPPED | OUTPATIENT
Start: 2017-12-18 | End: 2018-09-27

## 2017-12-18 ASSESSMENT — PAIN SCALES - GENERAL: PAINLEVEL: NO PAIN (0)

## 2017-12-18 NOTE — PATIENT INSTRUCTIONS
At [facility], we strive to deliver an exceptional experience to you, every time we see you.  If you receive a survey in the mail, please send us back your thoughts. We really do value your feedback.    Based on your medical history, these are the current health maintenance/preventive care services that you are due for (some may have been done at this visit.)  Health Maintenance Due   Topic Date Due     FALL RISK ASSESSMENT  12/23/2013     DEXA SCAN SCREENING (SYSTEM ASSIGNED)  12/23/2013     PNEUMOCOCCAL (1 of 2 - PCV13) 12/23/2013     MAMMO SCREEN Q2 YR (SYSTEM ASSIGNED)  11/05/2014     ADVANCE DIRECTIVE PLANNING Q5 YRS  06/18/2017     ASTHMA CONTROL TEST Q6 MOS  08/09/2017     INFLUENZA VACCINE (SYSTEM ASSIGNED)  09/01/2017     PHQ-9 Q6 MONTHS  01/14/2018         Suggested websites for health information:  Www.[address].[employer] : Up to date and easily searchable information on multiple topics.  Www.[place].gov : medication info, interactive tutorials, watch real surgeries online  Www.familydoctor.org : good info from the Academy of Family Physicians  Www.cdc.gov : public health info, travel advisories, epidemics (H1N1)  Www.aap.org : children's health info, normal development, vaccinations  Www.health.[facility].[place].us : [place] dept of health, public health issues in [place], N1N1    Your care team:                            Family Medicine Internal Medicine   MD Danny Christian MD Shantel Branch-Fleming, MD Katya Georgiev PA-C Nam Ho, MD Pediatrics   PATTY Caro, DEVON Vallejo APRN MD Raissa Handy MD Deborah Mielke, MD Kim Thein, KATE CNP      Clinic hours: Monday - Thursday 7 am-7 pm; Fridays 7 am-5 pm.   Urgent care: Monday - Friday 11 am-9 pm; Saturday and Sunday 9 am-5 pm.  Pharmacy : Monday -Thursday 8 am-8 pm; Friday 8 am-6 pm; Saturday and Sunday 9 am-5 pm.     Clinic: [phone]   Pharmacy: [phone]    Tips  to Control Acid Reflux    To control acid reflux, you ll need to make some basic diet and lifestyle changes. The simple steps outlined below may be all you ll need to ease discomfort.  Watch what you eat    Avoid fatty foods and spicy foods.    Eat fewer acidic foods, such as citrus and tomato-based foods. These can increase symptoms.    Limit drinking alcohol, caffeine, and fizzy beverages. All increase acid reflux.    Try limiting chocolate, peppermint, and spearmint. These can worsen acid reflux in some people.  Watch when you eat    Avoid lying down for 3 hours after eating.    Do not snack before going to bed.  Raise your head  Raising your head and upper body by 4 to 6 inches helps limit reflux when you re lying down. Put blocks under the head of your bed frame to raise it.  Other changes    Lose weight, if you need to    Don t exercise near bedtime    Avoid tight-fitting clothes    Limit aspirin and ibuprofen    Stop smoking   Date Last Reviewed: 7/1/2016 2000-2017 [employer]. [address] [place] [address], [place] [address]. All rights reserved. This information is not intended as a substitute for professional medical care. Always follow your healthcare professional's instructions.        Chronic Kidney Disease (CKD)     The role of the kidneys is to remove waste products and extra water from the blood.  When the kidneys do not work as they should, waste products begin to build up in the blood. This is called chronic kidney disease (CKD). CKD means that you have kidney damage or a decrease in kidney function lasting at least 3 months. CKD allows extra water, waste, and toxins to build up in the body. This can eventually become life-threatening. You might need dialysis or a kidney transplant to stay alive. This most severe form is called end stage renal disease.  Diabetes is the leading causes of chronic renal failure. Other causes include high blood pressure, hardening of the arteries  (atherosclerosis), lupus, inflammation of the blood vessels (vasculitis), and past viral or bacterial infections. Certain over-the-counter pain medicines can cause renal failure when taken often over a long period of time. These include aspirin, ibuprofen, and related anti-inflammatory medicines called NSAIDs (nonsteroidal anti-inflammatory drugs).  Home care  The following guidelines will help you care for yourself at home:    If you have diabetes, talk with your healthcare provider about keeping your blood sugar under control. Ask if you need to make and changes to your diet, lifestyle, or medicines.    If you have high blood pressure:    Take prescribed medicine to lower your blood pressure to the recommended goal of less than 130/80.    Start a regular exercise program that you enjoy. Check with your healthcare provider to be sure your planned exercise program is right for you.    Eat less salt (sodium). Your healthcare provider can tell you how much salt per day is safe for you.    If you are overweight, talk with your healthcare provider about a weight loss plan.    If you smoke, you must quit. Smoking makes kidney disease worse. Talk with your healthcare provider about ways to help you quit.  For more information, visit the following links:    www.smokefree.gov/sites/default/files/pdf/clearing-the-air-accessible.pdf    www.smokefree.gov    www.cancer.org/healthy/stayawayfromtobacco/guidetoquittingsmoking/    Most people with CKD need to follow a special diet.  Be sure you understand yours. In general, you will need to limit protein, salt, potassium, and phosphorus. You also need to limit how much fluid you drink.     CKD is a risk factor for heart disease. Talk with your healthcare provider about any other risk factors you might have and what you can do to lessen them.    Talk with your healthcare provider about any medicines you are taking to find out if they need to be reduced or stopped.    Don't use the  following over-the-counter medicines, or consult your healthcare provider before using:    Aspirin and NSAIDs such as ibuprofen or naproxen. Using acetaminophen for fever or pain is OK.    Laxatives and antacids containing magnesium or aluminum    Fleet or phospho soda enemas containing phosphorus    Certain stomach acid-blocking medicine such as cimetidine or ranitidine     Decongestants containing pseudoephedrine     Herbal supplements  Follow-up care  Follow up with your healthcare provider, or as advised. Contact one of the following for more information:    American Association of Kidney Patients 978-908-6155 www.aakp.org    National Kidney Foundation 780-647-3004 www.kidney.org    American Kidney Fund 504-813-9744 www.kidneyfund.org    National Kidney Disease Education Program 866-4KIDNEY www.nkdep.nih.gov  If an X-ray, ECG (cardiogram), or other diagnostic test was taken, you will be told of any new findings that may affect your care.  Call 911  Call 911 if you have any of the following:    Severe weakness, dizziness, fainting, drowsiness, or confusion    Chest pain or shortness of breath    Heart beating fast, slow, or irregularly  When to seek medical advice  Call your healthcare provider right away if any of these occur:    Nausea or vomiting    Fever of 100.4 F (38 C) or higher, or as directed by your healthcare provider    Unexpected weight gain or swelling in the legs, ankles, or around the eyes    Decrease or absent urine output  Date Last Reviewed: 9/1/2016 2000-2017 [employer]. [address] [place] [address], [place] [address]. All rights reserved. This information is not intended as a substitute for professional medical care. Always follow your healthcare professional's instructions.

## 2017-12-18 NOTE — PROGRESS NOTES
SUBJECTIVE:   Gem Gama is a 66 year old female who presents to clinic today for the following health issues:    Hypertension Follow-up      Outpatient blood pressures are being checked at home.  Results are high.    Low Salt Diet: no added salt          Amount of exercise or physical activity: None    Problems taking medications regularly: Yes,  side effects    Medication side effects: made pt too gittery and dry mouth    Diet: low salt    Would like to talk about kidneys and shortness of breath.      Depression and Anxiety Follow-Up    Status since last visit: Worsened due to loss of job and purpose in life. 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     Other associated symptoms:None    Complicating factors:     Significant life event: No     Current substance abuse: None    PHQ-9 Score and MyChart F/U Questions 10/7/2016 2/9/2017 8/22/2017   Total Score 7 15 4   Q9: Suicide Ideation Not at all Not at all Not at all     LORETTA-7 SCORE 2/2/2016 10/7/2016 2/9/2017   Total Score - - -   Total Score 1 6 0       PHQ-9  English  PHQ-9   Any Language  GAD7  Suicide Assessment Five-step Evaluation and Treatment (SAFE-T)        Problem list and histories reviewed & adjusted, as indicated.  Additional history: as documented    Patient Active Problem List   Diagnosis     Depression with anxiety     TMJ (temporomandibular joint syndrome)     S/P hip replacement     Congenital ureteric stenosis     Attention deficit disorder of adult     Lacrimal duct stenosis     Chronic rhinitis     CARDIOVASCULAR SCREENING; LDL GOAL LESS THAN 160     Intermittent asthma     Advanced directives, counseling/discussion     Major depressive disorder, recurrent episode, moderate (H)     Pituitary microadenoma (H)     Obstructive sleep apnea syndrome     Hypertension, goal below 140/90     Osteoarthritis of right knee     S/P total hip arthroplasty     Acute posthemorrhagic anemia     Constipation     Central sleep apnea     Controlled  narcolepsy     Esophageal reflux (GERD)     Status post total knee replacement     Primary narcolepsy without cataplexy     Vitamin D deficiency     CKD (chronic kidney disease) stage 3, GFR 30-59 ml/min     Past Surgical History:   Procedure Laterality Date     ARTHROPLASTY KNEE  7/9/2014    Procedure: ARTHROPLASTY KNEE;  Surgeon: Levi Johnson MD;  Location: [facility] OR     ARTHROPLASTY KNEE Left 11/24/2014    Procedure: ARTHROPLASTY KNEE;  Surgeon: Levi Johnson MD;  Location: [facility] OR     C REIMPLANT URETER,EXTENSIVE TAILORING  1973 1997     C REPAIR ENTEROCELE,VAG APPRCH  2008    Prolift     C TOTAL ABD HYSTERECTOMY+BLAD REPR  1992    Vaginal approach with oophrectomy     C TOTAL KNEE ARTHROPLASTY       GENITOURINARY SURGERY      kidney surgery X 3     ORTHOPEDIC SURGERY      bilat total hip     RECTOCELE REPAIR       SOFT TISSUE SURGERY         Social History   Substance Use Topics     Smoking status: Never Smoker     Smokeless tobacco: Never Used     Alcohol use No     Family History   Problem Relation Age of Onset     Hypertension Mother      Arthritis Mother      CEREBROVASCULAR DISEASE Father      Three Strokes     DIABETES Father      and brother     Thyroid Disease Sister      Hypothyroid         Current Outpatient Prescriptions   Medication Sig Dispense Refill     sertraline (ZOLOFT) 100 MG tablet Take 1 tablet (100 mg) by mouth 2 times daily 180 tablet 3     omeprazole (PRILOSEC) 20 MG CR capsule Take 1 capsule (20 mg) by mouth daily as needed 90 capsule 1     albuterol (2.5 MG/3ML) 0.083% neb solution Take 1 vial (2.5 mg) by nebulization every 6 hours as needed for shortness of breath / dyspnea 100 vial 3     amphetamine-dextroamphetamine (ADDERALL XR) 30 MG per 24 hr capsule Take 1 capsule (30 mg) by mouth daily 30 capsule 0     amphetamine-dextroamphetamine (ADDERALL) 20 MG per tablet Take 1 tablet (20 mg) by mouth daily In the afternoon 30 tablet 0     losartan (COZAAR) 50 MG tablet Take 1 tablet (50  "mg) by mouth daily 90 tablet 3     guaiFENesin-codeine (ROBITUSSIN AC) 100-10 MG/5ML SOLN Take 10 mLs by mouth every 4 hours as needed for cough 236 mL 0     Allergies   Allergen Reactions     Flagyl [Metronidazole Hcl] Anaphylaxis and Rash     Latex Other (See Comments)     Runny nose     Sulfa Drugs Anaphylaxis     Tape [Adhesive Tape] Hives     Metoprolol Itching and Rash     Recent Labs   Lab Test 07/13/17 02/09/17   1230  02/02/16   1634   09/19/13   0717   08/06/13   1636   A1C   --    --    --    --   5.6   --    --    LDL   --   175*  126*   --   141*   --    --    HDL   --   50  54   --   51   --    --    TRIG   --   125  105   --   111   --    --    ALT   --    --    --    --    --    --   20   CR  0.86  1.05*  1.06*   < >  0.84   < >  0.82   GFRESTIMATED  69  52*  52*   < >  68   < >  70   GFRESTBLACK  80  63  63   < >  83   < >  85   POTASSIUM  4.4  4.5  3.8   < >  4.1   < >  5.2   TSH   --    --   1.62   --   1.56   --   1.20    < > = values in this interval not displayed.      BP Readings from Last 3 Encounters:   12/18/17 (!) 177/94   02/23/17 122/78   02/09/17 138/83    Wt Readings from Last 3 Encounters:   12/18/17 194 lb (88 kg)   02/23/17 178 lb (80.7 kg)   02/09/17 179 lb 1.6 oz (81.2 kg)                  Labs reviewed in EPIC          Reviewed and updated as needed this visit by clinical staffTobacco  Allergies  Meds  Med Hx  Surg Hx  Fam Hx  Soc Hx      Reviewed and updated as needed this visit by Provider         ROS:  Constitutional, HEENT, cardiovascular, pulmonary, gi and gu systems are negative, except as otherwise noted.      OBJECTIVE:   BP (!) 177/94 (BP Location: Left arm, Patient Position: Chair, Cuff Size: Adult Large)  Pulse 115  Temp 98.1  F (36.7  C) (Oral)  Resp 14  Ht 5' 4.75\" (1.645 m)  Wt 194 lb (88 kg)  SpO2 98%  BMI 32.53 kg/m2  Body mass index is 32.53 kg/(m^2).  GENERAL: healthy, alert, well nourished, well hydrated, no distress, obese  HENT: ear canals- " normal; TMs- normal; Nose- normal; Mouth- no ulcers, no lesions, throat is clear with no erythema or exudate.   NECK: no tenderness, no adenopathy, no asymmetry, no masses, no stiffness; thyroid- normal to palpation  RESP: lungs clear to auscultation - no rales, no rhonchi, no wheezes  CV: regular rates and rhythm, normal S1 S2, no S3 or S4 and no murmur, no click or rub -  ABDOMEN: soft, no tenderness, no  hepatosplenomegaly, no masses, normal bowel sounds  MS: extremities- no gross deformities noted, no edema  SKIN: no suspicious lesions, no rashes  NEURO: strength and tone- normal, sensory exam- grossly normal, mentation- intact, speech- normal, reflexes- symmetric  BACK: no CVA tenderness, no paralumbar tenderness  PSYCH: Alert and oriented times 3; speech- coherent , normal rate and volume; able to articulate logical thoughts, able to abstract reason, no tangential thoughts, no hallucinations or delusions, affect- normal     Diagnostic Test Results:  Results for orders placed or performed in visit on 12/18/17 (from the past 24 hour(s))   Renal panel   Result Value Ref Range    Sodium 143 133 - 144 mmol/L    Potassium 3.8 3.4 - 5.3 mmol/L    Chloride 107 94 - 109 mmol/L    Carbon Dioxide 25 20 - 32 mmol/L    Anion Gap 11 3 - 14 mmol/L    Glucose 120 (H) 70 - 99 mg/dL    Urea Nitrogen 20 7 - 30 mg/dL    Creatinine 1.00 0.52 - 1.04 mg/dL    GFR Estimate 55 (L) >60 mL/min/1.7m2    GFR Estimate If Black 67 >60 mL/min/1.7m2    Calcium 9.2 8.5 - 10.1 mg/dL    Phosphorus 5.1 (H) 2.5 - 4.5 mg/dL    Albumin 4.0 3.4 - 5.0 g/dL   CBC with platelets   Result Value Ref Range    WBC 5.6 4.0 - 11.0 10e9/L    RBC Count 4.94 3.8 - 5.2 10e12/L    Hemoglobin 14.8 11.7 - 15.7 g/dL    Hematocrit 41.7 35.0 - 47.0 %    MCV 84 78 - 100 fl    MCH 30.0 26.5 - 33.0 pg    MCHC 35.5 31.5 - 36.5 g/dL    RDW 13.9 10.0 - 15.0 %    Platelet Count 177 150 - 450 10e9/L   Albumin Random Urine Quantitative with Creat Ratio   Result Value Ref Range  "   Creatinine Urine 108 mg/dL    Albumin Urine mg/L 9 mg/L    Albumin Urine mg/g Cr 8.44 0 - 25 mg/g Cr   UA reflex to Microscopic and Culture   Result Value Ref Range    Color Urine Yellow     Appearance Urine Clear     Glucose Urine Negative NEG^Negative mg/dL    Bilirubin Urine Negative NEG^Negative    Ketones Urine Negative NEG^Negative mg/dL    Specific Gravity Urine 1.025 1.003 - 1.035    Blood Urine Trace (A) NEG^Negative    pH Urine 5.5 5.0 - 7.0 pH    Protein Albumin Urine Negative NEG^Negative mg/dL    Urobilinogen Urine 0.2 0.2 - 1.0 EU/dL    Nitrite Urine Negative NEG^Negative    Leukocyte Esterase Urine Negative NEG^Negative    Source Midstream Urine    Urine Microscopic   Result Value Ref Range    WBC Urine 2-5 (A) OTO2^O - 2 /HPF    RBC Urine O - 2 OTO2^O - 2 /HPF    Squamous Epithelial /LPF Urine Moderate (A) FEW^Few /LPF    Bacteria Urine Few (A) NEG^Negative /HPF       ASSESSMENT/PLAN:         Tobacco Cessation:   reports that she has never smoked. She has never used smokeless tobacco.      BMI:   Estimated body mass index is 32.53 kg/(m^2) as calculated from the following:    Height as of this encounter: 5' 4.75\" (1.645 m).    Weight as of this encounter: 194 lb (88 kg).   Weight management plan: Discussed healthy diet and exercise guidelines and patient will follow up in 3 months in clinic to re-evaluate.            ICD-10-CM    1. Major depressive disorder, recurrent episode, moderate (H) F33.1 Feeling abandoned by her co-workers and family since losing her job. Has lost her purpose in life. Not interested in therapy having been through this in the past too often. She is looking at work and other opportunities. Family issues also. Difficult to get out due to occasional diarrhea with fecal incontinence   2. Attention deficit disorder of adult F98.8 Stopped the Adderall last spring after stopped working. Sleeping OK and does not need to restart. No symptoms of Narcolepsy.     3. Pituitary " microadenoma (H) D35.2 stable   4. Need for prophylactic vaccination and inoculation against influenza- patient left without vaccines Z23      ADMIN VACCINE, FIRST [33762]     CANCELED: FLU VACCINE, INCREASED ANTIGEN, PRESV FREE, AGE 65+ [37622]   5. Need for prophylactic vaccination against Streptococcus pneumoniae (pneumococcus) Z23      ADMIN VACCINE, ADDL [61818]     CANCELED: Pneumococcal vaccine 23 valent PPSV23  (Pneumovax) [54776]   6. CKD (chronic kidney disease) stage 3, GFR 30-59 ml/min- follow up abnormal kidney tests in the past.  N18.3 Vitamin D Deficiency     Renal panel     CBC with platelets     Albumin Random Urine Quantitative with Creat Ratio     UA reflex to Microscopic and Culture  Urine Microscopic   7. Mild intermittent asthma without complication J45.20    8. Hypertension, goal below 140/90 I10    9. Visit for screening mammogram Z12.31 MA SCREENING DIGITAL BILAT - Future  (s+30)   10. Status post total bilateral knee replacement Z96.653 stable   11. At risk for falling Z91.81 No falls   12. Essential hypertension with goal blood pressure less than 140/90 I10 losartan (COZAAR) 100 MG tablet- increase to 100 mg and follow up in 1 months   13. Acute bronchitis, unspecified organism J20.9 albuterol (2.5 MG/3ML) 0.083% neb solution   14. Asymptomatic postmenopausal status Z78.0 DEXA HIP/PELVIS/SPINE - Future   15. Gastroesophageal reflux disease with esophagitis K21.0 GASTROENTEROLOGY ADULT REF PROCEDURE ONLY for endoscopy to assess.       FURTHER TESTING:       - endoscopy for persistent reflux symptoms   CONSULTATION/REFERRAL to gastroenterology and may need colorectal for fecal incontinence.   FUTURE LABS:       - Schedule fasting labs in 3 months  FUTURE APPOINTMENTS:       - Follow-up visit in 1-2 months or sooner if any questions or concerns. We did not have time today to address all issues in depth.  Work on weight loss  Regular exercise  See Patient Instructions    Edna Biggs,  MD  [facility]

## 2017-12-18 NOTE — MR AVS SNAPSHOT
After Visit Summary   12/18/2017    Gem Gama    MRN: [id number]           Patient Information     Date Of Birth          1950        Visit Information        Provider Department      12/18/2017 2:00 PM Edna Biggs MD [facility]        Today's Diagnoses     Major depressive disorder, recurrent episode, moderate (H)    -  1    Asymptomatic postmenopausal status        Visit for screening mammogram        At risk for falling        Need for prophylactic vaccination and inoculation against influenza        Need for prophylactic vaccination against Streptococcus pneumoniae (pneumococcus)        Attention deficit disorder of adult        Mild intermittent asthma without complication        Pituitary microadenoma (H)        Hypertension, goal below 140/90        Status post total bilateral knee replacement        Essential hypertension with goal blood pressure less than 140/90        CKD (chronic kidney disease) stage 3, GFR 30-59 ml/min        Acute bronchitis, unspecified organism        Gastroesophageal reflux disease with esophagitis          Care Instructions    At [facility], we strive to deliver an exceptional experience to you, every time we see you.  If you receive a survey in the mail, please send us back your thoughts. We really do value your feedback.    Based on your medical history, these are the current health maintenance/preventive care services that you are due for (some may have been done at this visit.)  Health Maintenance Due   Topic Date Due     FALL RISK ASSESSMENT  12/23/2013     DEXA SCAN SCREENING (SYSTEM ASSIGNED)  12/23/2013     PNEUMOCOCCAL (1 of 2 - PCV13) 12/23/2013     MAMMO SCREEN Q2 YR (SYSTEM ASSIGNED)  11/05/2014     ADVANCE DIRECTIVE PLANNING Q5 YRS  06/18/2017     ASTHMA CONTROL TEST Q6 MOS  08/09/2017     INFLUENZA VACCINE (SYSTEM ASSIGNED)  09/01/2017     PHQ-9 Q6 MONTHS  01/14/2018         Suggested  websites for health information:  Www.[employer].org : Up to date and easily searchable information on multiple topics.  Www.medlineplus.gov : medication info, interactive tutorials, watch real surgeries online  Www.familydoctor.org : good info from the Academy of Family Physicians  Www.cdc.gov : public health info, travel advisories, epidemics (H1N1)  Www.aap.org : children's health info, normal development, vaccinations  Www.health.[facility].[place].us : [place] dept of health, public health issues in [place], N1N1    Your care team:                            Family Medicine Internal Medicine   MD Danny Christian MD Shantel Branch-Fleming, MD Katya Georgiev PA-C Nam Ho, MD Pediatrics   PATTY Caro, DEVON Vallejo APRN CNP   MD Raissa Bravo MD Deborah Mielke, MD Kim Thein, APRN CNP      Clinic hours: Monday - Thursday 7 am-7 pm; Fridays 7 am-5 pm.   Urgent care: Monday - Friday 11 am-9 pm; Saturday and Sunday 9 am-5 pm.  Pharmacy : Monday -Thursday 8 am-8 pm; Friday 8 am-6 pm; Saturday and Sunday 9 am-5 pm.     Clinic: [phone]   Pharmacy: [phone]    Tips to Control Acid Reflux    To control acid reflux, you ll need to make some basic diet and lifestyle changes. The simple steps outlined below may be all you ll need to ease discomfort.  Watch what you eat    Avoid fatty foods and spicy foods.    Eat fewer acidic foods, such as citrus and tomato-based foods. These can increase symptoms.    Limit drinking alcohol, caffeine, and fizzy beverages. All increase acid reflux.    Try limiting chocolate, peppermint, and spearmint. These can worsen acid reflux in some people.  Watch when you eat    Avoid lying down for 3 hours after eating.    Do not snack before going to bed.  Raise your head  Raising your head and upper body by 4 to 6 inches helps limit reflux when you re lying down. Put blocks under the head of your bed frame to raise it.  Other  changes    Lose weight, if you need to    Don t exercise near bedtime    Avoid tight-fitting clothes    Limit aspirin and ibuprofen    Stop smoking   Date Last Reviewed: 7/1/2016 2000-2017 [employer]. [address] [place] [address], [place] [address]. All rights reserved. This information is not intended as a substitute for professional medical care. Always follow your healthcare professional's instructions.        Chronic Kidney Disease (CKD)     The role of the kidneys is to remove waste products and extra water from the blood.  When the kidneys do not work as they should, waste products begin to build up in the blood. This is called chronic kidney disease (CKD). CKD means that you have kidney damage or a decrease in kidney function lasting at least 3 months. CKD allows extra water, waste, and toxins to build up in the body. This can eventually become life-threatening. You might need dialysis or a kidney transplant to stay alive. This most severe form is called end stage renal disease.  Diabetes is the leading causes of chronic renal failure. Other causes include high blood pressure, hardening of the arteries (atherosclerosis), lupus, inflammation of the blood vessels (vasculitis), and past viral or bacterial infections. Certain over-the-counter pain medicines can cause renal failure when taken often over a long period of time. These include aspirin, ibuprofen, and related anti-inflammatory medicines called NSAIDs (nonsteroidal anti-inflammatory drugs).  Home care  The following guidelines will help you care for yourself at home:    If you have diabetes, talk with your healthcare provider about keeping your blood sugar under control. Ask if you need to make and changes to your diet, lifestyle, or medicines.    If you have high blood pressure:    Take prescribed medicine to lower your blood pressure to the recommended goal of less than 130/80.    Start a regular exercise program that you enjoy. Check with  your healthcare provider to be sure your planned exercise program is right for you.    Eat less salt (sodium). Your healthcare provider can tell you how much salt per day is safe for you.    If you are overweight, talk with your healthcare provider about a weight loss plan.    If you smoke, you must quit. Smoking makes kidney disease worse. Talk with your healthcare provider about ways to help you quit.  For more information, visit the following links:    www.smokefree.gov/sites/default/files/pdf/clearing-the-air-accessible.pdf    www.smokefree.gov    www.cancer.org/healthy/stayawayfromtobacco/guidetoquittingsmoking/    Most people with CKD need to follow a special diet.  Be sure you understand yours. In general, you will need to limit protein, salt, potassium, and phosphorus. You also need to limit how much fluid you drink.     CKD is a risk factor for heart disease. Talk with your healthcare provider about any other risk factors you might have and what you can do to lessen them.    Talk with your healthcare provider about any medicines you are taking to find out if they need to be reduced or stopped.    Don't use the following over-the-counter medicines, or consult your healthcare provider before using:    Aspirin and NSAIDs such as ibuprofen or naproxen. Using acetaminophen for fever or pain is OK.    Laxatives and antacids containing magnesium or aluminum    Fleet or phospho soda enemas containing phosphorus    Certain stomach acid-blocking medicine such as cimetidine or ranitidine     Decongestants containing pseudoephedrine     Herbal supplements  Follow-up care  Follow up with your healthcare provider, or as advised. Contact one of the following for more information:    American Association of Kidney Patients 452-894-4279 www.aakp.org    National Kidney Foundation 052-386-7360 www.kidney.org    American Kidney Fund 475-960-3469 www.kidneyfund.org    National Kidney Disease Education Program 866-4KIDNEY  www.nkdep.nih.gov  If an X-ray, ECG (cardiogram), or other diagnostic test was taken, you will be told of any new findings that may affect your care.  Call 911  Call 911 if you have any of the following:    Severe weakness, dizziness, fainting, drowsiness, or confusion    Chest pain or shortness of breath    Heart beating fast, slow, or irregularly  When to seek medical advice  Call your healthcare provider right away if any of these occur:    Nausea or vomiting    Fever of 100.4 F (38 C) or higher, or as directed by your healthcare provider    Unexpected weight gain or swelling in the legs, ankles, or around the eyes    Decrease or absent urine output  Date Last Reviewed: 9/1/2016 2000-2017 [employer]. [address] [place] [address]. All rights reserved. This information is not intended as a substitute for professional medical care. Always follow your healthcare professional's instructions.                Follow-ups after your visit        Additional Services     GASTROENTEROLOGY ADULT REF PROCEDURE ONLY       Last Lab Result: Creatinine (mg/dL)       Date                     Value                 07/13/2017               0.86             ----------  Body mass index is 32.53 kg/(m^2).      Patient will be contacted to schedule procedure.     Please be aware that coverage of these services is subject to the terms and limitations of your health insurance plan.  Call member services at your health plan with any benefit or coverage questions.  Any procedures must be performed at a [facility] facility OR coordinated by your clinic's referral office.    Please bring the following with you to your appointment:    (1) Any X-Rays, CTs or MRIs which have been performed.  Contact the facility where they were done to arrange for  prior to your scheduled appointment.    (2) List of current medications   (3) This referral request   (4) Any documents/labs given to you for this referral                   Follow-up notes from your care team     Return in about 4 weeks (around 1/15/2018) for medication follow up, recheck.      Your next 10 appointments already scheduled     Dec 18, 2017  2:00 PM CST   [facility]na [facility]gs MD   [facility]    [address] [place] [id number]   [phone]           Bring a current list of meds and any records pertaining to this visit. For Physicals, please bring immunization records and any forms needing to be filled out. Please arrive 10 minutes early to complete paperwork.              Future tests that were ordered for you today     Open Future Orders        Priority Expected Expires Ordered    DEXA HIP/PELVIS/SPINE - Future Routine  12/18/2018 12/18/2017    MA SCREENING DIGITAL BILAT - Future  (s+30) Routine  12/18/2018 12/18/2017            Who to contact     If you have questions or need follow up information about today's clinic visit or your schedule please contact [facility] directly at [phone].  Normal or non-critical lab and imaging results will be communicated to you by [employer], letter or phone within 4 business days after the clinic has received the results. If you do not hear from us within 7 days, please contact the clinic through [employer] or phone. If you have a critical or abnormal lab result, we will notify you by phone as soon as possible.  Submit refill requests through [employer] or call your pharmacy and they will forward the refill request to us. Please allow 3 business days for your refill to be completed.          Additional Information About Your Visit        [employer] nethart Information     [employer] gives you secure access to your electronic health record. If you see a primary care provider, you can also send messages to your care team and make appointments. If you have questions, please call your primary care clinic.  If you do not have a primary care  "provider, please call [phone] and they will assist you.        Care EveryWhere ID     This is your Care EveryWhere ID. This could be used by other organizations to access your [facility] medical records  [id number]        Your Vitals Were     Pulse Temperature Respirations Height Pulse Oximetry BMI (Body Mass Index)    115 98.1  F (36.7  C) (Oral) 14 5' 4.75\" (1.645 m) 98% 32.53 kg/m2       Blood Pressure from Last 3 Encounters:   12/18/17 (!) 177/94   02/23/17 122/78   02/09/17 138/83    Weight from Last 3 Encounters:   12/18/17 194 lb (88 kg)   02/23/17 178 lb (80.7 kg)   02/09/17 179 lb 1.6 oz (81.2 kg)              We Performed the Following          ADMIN VACCINE, ADDL [08242]          ADMIN VACCINE, FIRST [57404]     Albumin Random Urine Quantitative with Creat Ratio     CBC with platelets     FLU VACCINE, INCREASED ANTIGEN, PRESV FREE, AGE 65+ [03819]     GASTROENTEROLOGY ADULT REF PROCEDURE ONLY     Pneumococcal vaccine 23 valent PPSV23  (Pneumovax) [18755]     Renal panel     UA reflex to Microscopic and Culture     Vitamin D Deficiency          Today's Medication Changes          These changes are accurate as of: 12/18/17  1:47 PM.  If you have any questions, ask your nurse or doctor.               These medicines have changed or have updated prescriptions.        Dose/Directions    losartan 100 MG tablet   Commonly known as:  COZAAR   This may have changed:    - medication strength  - how much to take   Used for:  Essential hypertension with goal blood pressure less than 140/90   Changed by:  Edna Biggs MD        Dose:  100 mg   Take 1 tablet (100 mg) by mouth daily   Quantity:  90 tablet   Refills:  3         Stop taking these medicines if you haven't already. Please contact your care team if you have questions.     amphetamine-dextroamphetamine 20 MG per tablet   Commonly known as:  ADDERALL   Stopped by:  Edna Biggs MD           amphetamine-dextroamphetamine 30 MG per 24 hr " capsule   Commonly known as:  ADDERALL XR   Stopped by:  Edna Biggs MD                Where to get your medicines      These medications [place] [id number] [place] [address] [place] AT [facility] [address] [place] [address]     Phone:  [phone]     albuterol (2.5 MG/3ML) 0.083% neb solution    losartan 100 MG tablet                Primary Care Provider Office Phone # Fax #    Edna Biggs [phone]       [address] [place]  [facility] [address]        Equal Access to Services     [facility]: Hadii aad ku hadasho Soomaali, waaxda luqadaha, qaybta kaalmada adeegyada, waxay idiin hayaan adeyvonne lawson . So [facility] [phone].    ATENCIÓN: Si habla español, tiene a amato disposición servicios gratuitos de asistencia lingüística. Llame al [phone].    We comply with applicable federal civil rights laws and [place] laws. We do not discriminate on the basis of race, color, national origin, age, disability, sex, sexual orientation, or gender identity.            Thank you!     Thank you for choosing [facility]  for your care. Our goal is always to provide you with excellent care. Hearing back from our patients is one way we can continue to improve our services. Please take a few minutes to complete the written survey that you may receive in the mail after your visit with us. Thank you!             Your Updated Medication List - Protect others around you: Learn how to safely use, store and throw away your medicines at www.disposemymeds.org.          This list is accurate as of: 12/18/17  1:47 PM.  Always use your most recent med list.                   Brand Name Dispense Instructions for use Diagnosis    albuterol (2.5 MG/3ML) 0.083% neb solution     100 vial    Take 1 vial (2.5 mg) by nebulization every 6 hours as needed for shortness of breath / dyspnea    Acute bronchitis, unspecified  organism       guaiFENesin-codeine 100-10 MG/5ML Soln solution    ROBITUSSIN AC    236 mL    Take 10 mLs by mouth every 4 hours as needed for cough    Acute sinusitis with symptoms > 10 days       losartan 100 MG tablet    COZAAR    90 tablet    Take 1 tablet (100 mg) by mouth daily    Essential hypertension with goal blood pressure less than 140/90       omeprazole 20 MG CR capsule    priLOSEC    90 capsule    Take 1 capsule (20 mg) by mouth daily as needed    Gastroesophageal reflux disease with esophagitis       sertraline 100 MG tablet    ZOLOFT    180 tablet    Take 1 tablet (100 mg) by mouth 2 times daily    Major depressive disorder, recurrent episode, moderate (H)

## 2017-12-19 LAB — DEPRECATED CALCIDIOL+CALCIFEROL SERPL-MC: 26 UG/L (ref 20–75)

## 2017-12-19 NOTE — PROGRESS NOTES
Dear Gem    Your test results are attached. I am happy to let you know that they are stable.    The kidney test is a little lower and is in chronic kidney disease stage 3 level. The blood sugar is normal and you do not have diabetes. The urine does not show any infection. The complete blood count was normal and you do not have anemia or signs of infection. The vitamin D test is still pending. I will let you know these results later.     Please follow up with the endoscopy. Let me know if you also would like to see someone for the issue with the stool.     Keep me updated on your next endeavors. Follow up in 1-2 months.     Please contact me by [employer] if you have any questions about your labs or management.    Edna Biggs MD

## 2017-12-20 NOTE — PROGRESS NOTES
Dear Gem    Your test results are attached.    The vitamin D is better at 26. Please keep taking 1000 to 2000 units a day.     Please contact me by MyChart if you have any questions about your labs or management.    Edna Biggs MD

## 2017-12-26 ENCOUNTER — RADIANT APPOINTMENT (OUTPATIENT)
Dept: MAMMOGRAPHY | Facility: CLINIC | Age: 67
End: 2017-12-26
Attending: FAMILY MEDICINE
Payer: MEDICARE

## 2017-12-26 DIAGNOSIS — Z12.31 VISIT FOR SCREENING MAMMOGRAM: ICD-10-CM

## 2017-12-26 PROCEDURE — G0202 SCR MAMMO BI INCL CAD: HCPCS | Mod: TC

## 2017-12-26 NOTE — LETTER
December 27, 2017      Gem Gama  [address]  [place] [address]              Dear Gem ,    I am happy to let you know that your mammogram was normal. You may schedule a follow up mammogram in 1-2 years depending on your risk factors and family history. Please let me know if you have any questions about your results. You should be receiving a letter from the radiologist.    Edna Biggs MD/carlos  Results for orders placed or performed in visit on 12/26/17   MA SCREENING DIGITAL BILAT - Future  (s+30)    Narrative    SCREENING MAMMOGRAM, BILATERAL, DIGITAL w/CAD - 12/26/2017 1:20 PM.    BREAST SYMPTOMS: No current breast complaints.     COMPARISON:  07/23/2008.    BREAST DENSITY: Scattered fibroglandular densities.    COMMENTS: No findings of suspicion for malignancy.       Impression    IMPRESSION: BI-RADS CATEGORY: 1 - Negative.    RECOMMENDED FOLLOW-UP: Annual Mammography.  Recommend routine annual screening mammography.    Exam results letter mailed to patient.                BARBY LEAHY MD

## 2017-12-27 NOTE — PROGRESS NOTES
Dear Gem Gama,    I am happy to let you know that your mammogram was normal. You may schedule a follow up mammogram in 1-2 years depending on your risk factors and family history. Please let me know if you have any questions about your results. You should be receiving a letter from the radiologist.    Edna Biggs MD

## 2018-02-13 ENCOUNTER — TRANSFERRED RECORDS (OUTPATIENT)
Dept: HEALTH INFORMATION MANAGEMENT | Facility: CLINIC | Age: 68
End: 2018-02-13

## 2018-02-19 ENCOUNTER — TRANSFERRED RECORDS (OUTPATIENT)
Dept: HEALTH INFORMATION MANAGEMENT | Facility: CLINIC | Age: 68
End: 2018-02-19

## 2018-02-21 ENCOUNTER — TRANSFERRED RECORDS (OUTPATIENT)
Dept: HEALTH INFORMATION MANAGEMENT | Facility: CLINIC | Age: 68
End: 2018-02-21

## 2018-02-27 ENCOUNTER — TRANSFERRED RECORDS (OUTPATIENT)
Dept: HEALTH INFORMATION MANAGEMENT | Facility: CLINIC | Age: 68
End: 2018-02-27

## 2018-03-15 ENCOUNTER — OFFICE VISIT (OUTPATIENT)
Dept: FAMILY MEDICINE | Facility: CLINIC | Age: 68
End: 2018-03-15
Payer: MEDICARE

## 2018-03-15 VITALS
SYSTOLIC BLOOD PRESSURE: 146 MMHG | HEART RATE: 118 BPM | BODY MASS INDEX: 32.27 KG/M2 | DIASTOLIC BLOOD PRESSURE: 73 MMHG | TEMPERATURE: 98.1 F | OXYGEN SATURATION: 97 % | HEIGHT: 64 IN | WEIGHT: 189 LBS

## 2018-03-15 DIAGNOSIS — N18.30 CKD (CHRONIC KIDNEY DISEASE) STAGE 3, GFR 30-59 ML/MIN (H): ICD-10-CM

## 2018-03-15 DIAGNOSIS — E78.5 HYPERLIPIDEMIA LDL GOAL <130: ICD-10-CM

## 2018-03-15 DIAGNOSIS — Z00.00 NORMAL PHYSICAL EXAM, ROUTINE: Primary | ICD-10-CM

## 2018-03-15 DIAGNOSIS — I49.9 IRREGULAR HEARTBEAT: ICD-10-CM

## 2018-03-15 DIAGNOSIS — I10 HYPERTENSION, GOAL BELOW 140/90: ICD-10-CM

## 2018-03-15 DIAGNOSIS — G47.31 CENTRAL SLEEP APNEA: ICD-10-CM

## 2018-03-15 DIAGNOSIS — R15.9 INCONTINENCE OF FECES WITH FECAL URGENCY: ICD-10-CM

## 2018-03-15 DIAGNOSIS — F33.1 MAJOR DEPRESSIVE DISORDER, RECURRENT EPISODE, MODERATE (H): ICD-10-CM

## 2018-03-15 DIAGNOSIS — R15.2 INCONTINENCE OF FECES WITH FECAL URGENCY: ICD-10-CM

## 2018-03-15 DIAGNOSIS — G47.419 PRIMARY NARCOLEPSY WITHOUT CATAPLEXY: ICD-10-CM

## 2018-03-15 PROCEDURE — 92551 PURE TONE HEARING TEST AIR: CPT | Performed by: FAMILY MEDICINE

## 2018-03-15 PROCEDURE — G0438 PPPS, INITIAL VISIT: HCPCS | Performed by: FAMILY MEDICINE

## 2018-03-15 PROCEDURE — 93000 ELECTROCARDIOGRAM COMPLETE: CPT | Performed by: FAMILY MEDICINE

## 2018-03-15 RX ORDER — SERTRALINE HYDROCHLORIDE 100 MG/1
100 TABLET, FILM COATED ORAL DAILY
Qty: 90 TABLET | Refills: 3 | Status: SHIPPED | OUTPATIENT
Start: 2018-03-15 | End: 2019-06-22

## 2018-03-15 ASSESSMENT — ACTIVITIES OF DAILY LIVING (ADL): CURRENT_FUNCTION: NO ASSISTANCE NEEDED

## 2018-03-15 ASSESSMENT — PAIN SCALES - GENERAL: PAINLEVEL: NO PAIN (0)

## 2018-03-15 NOTE — PROGRESS NOTES
SUBJECTIVE:   Gem Gama is a 67 year old female who presents for Preventive Visit.  Are you in the first 12 months of your Medicare coverage?  No    Physical   Annual:     Getting at least 3 servings of Calcium per day::  NO    Bi-annual eye exam::  Yes    Dental care twice a year::  Yes    Sleep apnea or symptoms of sleep apnea::  Sleep apnea (CPAP-further discuss)    Diet::  Other (tries to avoid foods with phosporous)    Frequency of exercise::  1 day/week    Duration of exercise::  15-30 minutes    Taking medications regularly::  Yes    Medication side effects::  None    Additional concerns today::  YES (saw [facility] for sparadic diarrhea and completed a scope, gettings results tomorrow)    Ability to successfully perform activities of daily living: no assistance needed  Home Safety:  No safety concerns identified  Hearing Impairment: difficulty understanding soft or whispered speech, need to ask people to speak up or repeat themselves and feel that people are mumbling or not speaking clearly      Right Ear:      1000 Hz RESPONSE- on Level:   20 db  (Conditioning sound)   1000 Hz: RESPONSE- on Level:   20 db    2000 Hz: RESPONSE- on Level:   20 db    4000 Hz: RESPONSE- on Level: 25 db  6000 Hz: RESPONSE - on Level: 35 db    Left Ear:     6000 Hz: RESPONSE- on Level: 20 db   4000 Hz: RESPONSE- on Level:   20 db    2000 Hz: RESPONSE- on Level:   20 db    1000 Hz: RESPONSE- on Level:   20 db     500 Hz: RESPONSE- on Level:   20 db     Right Ear:    500 Hz: RESPONSE- on Level: 20 db    Hearing Acuity: REFER    Hearing Assessment: abnormal--refer to audiology    Fall risk:  Fallen 2 or more times in the past year?: No  Any fall with injury in the past year?: Yes (once on black ice)  Timed Up and Go Test (>13.5 is fall risk; contact physician) : 7    COGNITIVE SCREEN  1) Repeat 3 items (Banana, Sunrise, Chair)    2) Clock draw: NORMAL  3) 3 item recall: Recalls NO objects   Results: Normal clock, 0 items recalled:  PROBABLE COGNITIVE IMPAIRMENT, **INFORM PROVIDER**    Mini-CogTM Copyright CLAU Dow. Licensed by the author for use in [facility]; reprinted with permission (manisha@[email or web address]). All rights reserved.        Reviewed and updated as needed this visit by clinical staff  Tobacco  Allergies  Meds  Med Hx  Surg Hx  Fam Hx  Soc Hx        Reviewed and updated as needed this visit by Provider        Social History   Substance Use Topics     Smoking status: Never Smoker     Smokeless tobacco: Never Used     Alcohol use No       No flowsheet data found.No flowsheet data found.        Hypertension Follow-up      Outpatient blood pressures are not being checked.    Low Salt Diet: no added salt    Depression and Anxiety Follow-Up    Status since last visit: No change    Other associated symptoms:None    Complicating factors:     Significant life event: No     Current substance abuse: None    PHQ-9 2/9/2017 8/22/2017 3/15/2018   Total Score 15 4 10   Q9: Suicide Ideation Not at all Not at all Not at all     LORETTA-7 SCORE 2/2/2016 10/7/2016 2/9/2017   Total Score - - -   Total Score 1 6 0       PHQ-9  English  PHQ-9   Any Language  LORETTA-7  Suicide Assessment Five-step Evaluation and Treatment (SAFE-T)  Chronic Kidney Disease Follow-up      Current NSAID use?  No      Today's PHQ-2 Score:   PHQ-2 ( 1999 Pfizer) 3/15/2018   Q1: Little interest or pleasure in doing things 3   Q2: Feeling down, depressed or hopeless 1   PHQ-2 Score 4       Do you feel safe in your environment - Yes    Do you have a Health Care Directive?: No: Advance care planning was reviewed with patient; patient declined at this time.    Current providers sharing in care for this patient include:   Patient Care Team:  Edna Biggs MD as PCP - General (Family Practice)    The following health maintenance items are reviewed in Epic and correct as of today:  Health Maintenance   Topic Date Due     DEXA SCAN SCREENING (SYSTEM ASSIGNED)  12/23/2013      ADVANCE DIRECTIVE PLANNING Q5 YRS  06/18/2017     ASTHMA CONTROL TEST Q6 MOS  08/09/2017     PHQ-9 Q6 MONTHS  01/14/2018     ASTHMA ACTION PLAN Q1 YR  02/09/2018     LIPID MONITORING Q1 YEAR  02/09/2018     DEPRESSION ACTION PLAN Q1 YR  02/09/2018     INFLUENZA VACCINE (SYSTEM ASSIGNED)  09/01/2018     BMP Q1 YR  12/18/2018     FALL RISK ASSESSMENT  12/18/2018     MICROALBUMIN Q1 YEAR  12/18/2018     PNEUMOCOCCAL (2 of 2 - PPSV23) 02/19/2019     MAMMO SCREEN Q2 YR (SYSTEM ASSIGNED)  12/26/2019     TETANUS IMMUNIZATION (SYSTEM ASSIGNED)  08/28/2023     COLON CANCER SCREEN (SYSTEM ASSIGNED)  02/21/2028     HEPATITIS C SCREENING  Completed     Labs reviewed in EPIC  BP Readings from Last 3 Encounters:   03/15/18 146/73   12/18/17 (!) 177/94   02/23/17 122/78    Wt Readings from Last 3 Encounters:   03/15/18 189 lb (85.7 kg)   12/18/17 194 lb (88 kg)   02/23/17 178 lb (80.7 kg)                  Patient Active Problem List   Diagnosis     Depression with anxiety     TMJ (temporomandibular joint syndrome)     S/P hip replacement     Congenital ureteric stenosis     Attention deficit disorder of adult     Lacrimal duct stenosis     Chronic rhinitis     CARDIOVASCULAR SCREENING; LDL GOAL LESS THAN 160     Intermittent asthma     Advanced directives, counseling/discussion     Major depressive disorder, recurrent episode, moderate (H)     Pituitary microadenoma (H)     Obstructive sleep apnea syndrome     Hypertension, goal below 140/90     Osteoarthritis of right knee     S/P total hip arthroplasty     Acute posthemorrhagic anemia     Constipation     Central sleep apnea     Controlled narcolepsy     Esophageal reflux (GERD)     Status post total knee replacement     Primary narcolepsy without cataplexy     Vitamin D deficiency     CKD (chronic kidney disease) stage 3, GFR 30-59 ml/min     Past Surgical History:   Procedure Laterality Date     ARTHROPLASTY KNEE  7/9/2014    Procedure: ARTHROPLASTY KNEE;  Surgeon: Elizabeth  Levi SALEH MD;  Location: [facility] OR     ARTHROPLASTY KNEE Left 11/24/2014    Procedure: ARTHROPLASTY KNEE;  Surgeon: Levi Johnson MD;  Location: [facility] OR     C REIMPLANT URETER,EXTENSIVE TAILORING  1973 1997     C REPAIR ENTEROCELE,VAG APPRCH  2008    Prolift     C TOTAL ABD HYSTERECTOMY+BLAD REPR  1992    Vaginal approach with oophrectomy     C TOTAL KNEE ARTHROPLASTY       GENITOURINARY SURGERY      kidney surgery X 3     ORTHOPEDIC SURGERY      bilat total hip     RECTOCELE REPAIR       SOFT TISSUE SURGERY         Social History   Substance Use Topics     Smoking status: Never Smoker     Smokeless tobacco: Never Used     Alcohol use No     Family History   Problem Relation Age of Onset     Hypertension Mother      Arthritis Mother      CEREBROVASCULAR DISEASE Father      Three Strokes     DIABETES Father      and brother     Thyroid Disease Sister      Hypothyroid         Current Outpatient Prescriptions   Medication Sig Dispense Refill     VITAMIN D, CHOLECALCIFEROL, PO Take 2,000 Units by mouth 3 times daily        sertraline (ZOLOFT) 100 MG tablet Take 1 tablet (100 mg) by mouth daily 90 tablet 3     losartan (COZAAR) 100 MG tablet Take 1 tablet (100 mg) by mouth daily 90 tablet 3     albuterol (2.5 MG/3ML) 0.083% neb solution Take 1 vial (2.5 mg) by nebulization every 6 hours as needed for shortness of breath / dyspnea (Patient not taking: Reported on 3/15/2018) 100 vial 3     [DISCONTINUED] sertraline (ZOLOFT) 100 MG tablet Take 1 tablet (100 mg) by mouth 2 times daily (Patient taking differently: Take 50 mg by mouth daily ) 180 tablet 3     guaiFENesin-codeine (ROBITUSSIN AC) 100-10 MG/5ML SOLN Take 10 mLs by mouth every 4 hours as needed for cough (Patient not taking: Reported on 3/15/2018) 236 mL 0     Allergies   Allergen Reactions     Flagyl [Metronidazole Hcl] Anaphylaxis and Rash     Latex Other (See Comments)     Runny nose     Sulfa Drugs Anaphylaxis     Tape [Adhesive Tape] Hives     Metoprolol Itching  "and Rash     Recent Labs   Lab Test  12/18/17   1353 07/13/17 02/09/17   1230  02/02/16   1634   09/19/13   0717   08/06/13   1636   A1C   --    --    --    --    --   5.6   --    --    LDL   --    --   175*  126*   --   141*   --    --    HDL   --    --   50  54   --   51   --    --    TRIG   --    --   125  105   --   111   --    --    ALT   --    --    --    --    --    --    --   20   CR  1.00  0.86  1.05*  1.06*   < >  0.84   < >  0.82   GFRESTIMATED  55*  69  52*  52*   < >  68   < >  70   GFRESTBLACK  67  80  63  63   < >  83   < >  85   POTASSIUM  3.8  4.4  4.5  3.8   < >  4.1   < >  5.2   TSH   --    --    --   1.62   --   1.56   --   1.20    < > = values in this interval not displayed.        Pneumonia Vaccine:Adults age 65+ who received Pneumovax (PPSV23) at 65 years or older: Should be given PCV13 > 1 year after their most recent PPSV23  Mammogram Screening: Patient over age 50, mutual decision to screen reflected in health maintenance.    Review of Systems  Constitutional, HEENT, cardiovascular, pulmonary, gi and gu systems are negative, except as otherwise noted.    OBJECTIVE:   /73 (BP Location: Left arm, Patient Position: Chair, Cuff Size: Adult Large)  Pulse 118  Temp 98.1  F (36.7  C) (Oral)  Ht 5' 4\" (1.626 m)  Wt 189 lb (85.7 kg)  SpO2 97%  Breastfeeding? No  BMI 32.44 kg/m2 Estimated body mass index is 32.44 kg/(m^2) as calculated from the following:    Height as of this encounter: 5' 4\" (1.626 m).    Weight as of this encounter: 189 lb (85.7 kg).  Physical Exam  GENERAL: elderly, alert, well nourished, well hydrated, no distress  HENT: ear canals- normal; TMs- normal; Nose- normal; Mouth- no ulcers, no lesions, missing dentition  NECK: no tenderness, no adenopathy, no asymmetry, no masses, no stiffness; thyroid- normal to palpation  RESP: lungs clear to auscultation - no rales, no rhonchi, no wheezes  CV: regular rates and rhythm, normal S1 S2, no S3 or S4 and no murmur, no click " or rub, normal pulses  ABDOMEN: soft, no tenderness, no  hepatosplenomegaly, no masses, normal bowel sounds  MS: extremities- no gross deformities noted, no edema  SKIN: no suspicious lesions, no rashes, age related skin changes with seborrheic keratosis and no actinic keratosis.    NEURO: strength and tone- normal, sensory exam- grossly normal, reflexes- symmetric  BACK: no CVA tenderness, no paralumbar tenderness  MENTAL STATUS EXAM:  Appearance/Behavior: no apparent distress, neatly groomed, dressed appropriately for weather, appears stated age and is not frail-appearing  Speech: normal  Mood/Affect: normal affect  Insight: Good     ASSESSMENT / PLAN:       ICD-10-CM    1. 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 Also hearing issues and intermittent diarrhea causing fecal incontinence. Sleep apnea symptoms managed by sleep specialist.    2. Major depressive disorder, recurrent episode, moderate (H) F33.1 sertraline (ZOLOFT) 100 MG tablet- stopped medication but has restarted at 50 mg and will go to 100 mg due to recurrent symptoms.    3. Hypertension, goal below 140/90 I10 Very high last visit and back in normal range.    4. CKD (chronic kidney disease) stage 3, GFR 30-59 ml/min N18.3 High 50's. Low risk of dialysis. Recheck in 3-6 months.    5. Central sleep apnea G47.31 CPAP has not been effective and uses dental appliance   6. Primary narcolepsy without cataplexy G47.419 Needs to go to specialist for refill of Adderall if needed.    7. Hyperlipidemia LDL goal <130 E78.5 Lipid panel reflex to direct LDL Fasting   8. Irregular heartbeat I49.9 EKG 12-lead complete w/read - Clinics     Zio Patch Holter- 10 day to catch weekly episodes of panic like attacks associated with irregular heart beat. Normal EKG   9. Incontinence of feces with fecal urgency R15.9 Associated with diarrhea and loose crampy  "stools. Had a colonoscopy with [facility]. Results tomorrow.     R15.2        End of Life Planning:  Patient currently has an advanced directive: Yes.  Practitioner is supportive of decision.    COUNSELING:  Reviewed preventive health counseling, as reflected in patient instructions       Regular exercise       Healthy diet/nutrition       Vision screening       Hearing screening       Dental care       Osteoporosis Prevention/Bone Health       Colon cancer screening        Estimated body mass index is 32.44 kg/(m^2) as calculated from the following:    Height as of this encounter: 5' 4\" (1.626 m).    Weight as of this encounter: 189 lb (85.7 kg).  Weight management plan: Discussed healthy diet and exercise guidelines and patient will follow up in 3 months in clinic to re-evaluate.   reports that she has never smoked. She has never used smokeless tobacco.      Appropriate preventive services were discussed with this patient, including applicable screening as appropriate for cardiovascular disease, diabetes, osteopenia/osteoporosis, and glaucoma.  As appropriate for age/gender, discussed screening for colorectal cancer, prostate cancer, breast cancer, and cervical cancer. Checklist reviewing preventive services available has been given to the patient.    Reviewed patients plan of care and provided an AVS. The Basic Care Plan (routine screening as documented in Health Maintenance) for Gem meets the Care Plan requirement. This Care Plan has been established and reviewed with the Patient.    Counseling Resources:  ATP IV Guidelines  Pooled Cohorts Equation Calculator  Breast Cancer Risk Calculator  FRAX Risk Assessment  ICSI Preventive Guidelines  Dietary Guidelines for Americans, 2010  USDA's MyPlate  ASA Prophylaxis  Lung CA Screening    Edna Biggs MD  [facility]  "

## 2018-03-15 NOTE — MR AVS SNAPSHOT
After Visit Summary   3/15/2018    Gem Gama    MRN: [id number]           Patient Information     Date Of Birth          1950        Visit Information        Provider Department      3/15/2018 7:20 AM Edna Biggs MD [facility]        Today's Diagnoses     Normal physical exam, routine    -  1    Asymptomatic postmenopausal status        Major depressive disorder, recurrent episode, moderate (H)        Hypertension, goal below 140/90        CKD (chronic kidney disease) stage 3, GFR 30-59 ml/min        Central sleep apnea        Primary narcolepsy without cataplexy        Hyperlipidemia LDL goal <130        Irregular heartbeat          Care Instructions    At [facility], we strive to deliver an exceptional experience to you, every time we see you.  If you receive a survey in the mail, please send us back your thoughts. We really do value your feedback.    Based on your medical history, these are the current health maintenance/preventive care services that you are due for (some may have been done at this visit.)  Health Maintenance Due   Topic Date Due     DEXA SCAN SCREENING (SYSTEM ASSIGNED)  12/23/2013     ADVANCE DIRECTIVE PLANNING Q5 YRS  06/18/2017     ASTHMA CONTROL TEST Q6 MOS  08/09/2017     PHQ-9 Q6 MONTHS  01/14/2018     ASTHMA ACTION PLAN Q1 YR  02/09/2018     LIPID MONITORING Q1 YEAR  02/09/2018     DEPRESSION ACTION PLAN Q1 YR  02/09/2018         Suggested websites for health information:  Www.iiyuma.org : Up to date and easily searchable information on multiple topics.  Www.medlineplus.gov : medication info, interactive tutorials, watch real surgeries online  Www.familydoctor.org : good info from the Academy of Family Physicians  Www.cdc.gov : public health info, travel advisories, epidemics (H1N1)  Www.aap.org : children's health info, normal development, vaccinations  Www.health.state.[place].us : [place] dept of health, public  health issues in [place], N1N1    Your care team:                            Family Medicine Internal Medicine   MD Danny Christian MD Shantel Branch-Fleming, MD Katya Georgiev PA-C Nam Ho, MD Pediatrics   PATTY Caro, DEVON Vallejo APRN CNP   MD Raissa Bravo MD Deborah Mielke, MD Kim Thein, APRN [facility]      Clinic hours: Monday - Thursday 7 am-7 pm; Fridays 7 am-5 pm.   Urgent care: Monday - Friday 11 am-9 pm; Saturday and Sunday 9 am-5 pm.  Pharmacy : Monday -Thursday 8 am-8 pm; Friday 8 am-6 pm; Saturday and Sunday 9 am-5 pm.     Clinic: [phone]   Pharmacy: [phone]    Please call to schedule your DEXA scan at [facility] by calling [phone].       Preventive Health Recommendations  Female Ages 65 +    Yearly exam:     See your health care provider every year in order to  o Review health changes.   o Discuss preventive care.    o Review your medicines if your doctor has prescribed any.      You no longer need a yearly Pap test unless you've had an abnormal Pap test in the past 10 years. If you have vaginal symptoms, such as bleeding or discharge, be sure to talk with your provider about a Pap test.      Every 1 to 2 years, have a mammogram.  If you are over 69, talk with your health care provider about whether or not you want to continue having screening mammograms.      Every 10 years, have a colonoscopy. Or, have a yearly FIT test (stool test). These exams will check for colon cancer.       Have a cholesterol test every 5 years, or more often if your doctor advises it.       Have a diabetes test (fasting glucose) every three years. If you are at risk for diabetes, you should have this test more often.       At age 65, have a bone density scan (DEXA) to check for osteoporosis (brittle bone disease).    Shots:    Get a flu shot each year.    Get a tetanus shot every 10 years.    Talk to your doctor about  your pneumonia vaccines. There are now two you should receive - Pneumovax (PPSV 23) and Prevnar (PCV 13).    Talk to your doctor about the shingles vaccine.    Talk to your doctor about the hepatitis B vaccine.    Nutrition:     Eat at least 5 servings of fruits and vegetables each day.      Eat whole-grain bread, whole-wheat pasta and brown rice instead of white grains and rice.      Talk to your provider about Calcium and Vitamin D.     Lifestyle    Exercise at least 150 minutes a week (30 minutes a day, 5 days a week). This will help you control your weight and prevent disease.      Limit alcohol to one drink per day.      No smoking.       Wear sunscreen to prevent skin cancer.       See your dentist twice a year for an exam and cleaning.      See your eye doctor every 1 to 2 years to screen for conditions such as glaucoma, macular degeneration, cataracts, etc           Follow-ups after your visit        Follow-up notes from your care team     Return in about 6 months (around 9/15/2018) for medication follow up, BP Recheck.      Future tests that were ordered for you today     Open Future Orders        Priority Expected Expires Ordered    Zio Patch Holter Routine  4/29/2018 3/15/2018    Lipid panel reflex to direct LDL Fasting Routine 3/22/2018 3/15/2019 3/15/2018            Who to contact     If you have questions or need follow up information about today's clinic visit or your schedule please contact [facility] directly at [phone].  Normal or non-critical lab and imaging results will be communicated to you by MyChart, letter or phone within 4 business days after the clinic has received the results. If you do not hear from us within 7 days, please contact the clinic through MyChart or phone. If you have a critical or abnormal lab result, we will notify you by phone as soon as possible.  Submit refill requests through [employer] or call your pharmacy and they will forward the refill request  "to us. Please allow 3 business days for your refill to be completed.          Additional Information About Your Visit        [employer] Information     [employer] (formerly [employer]) gives you secure access to your electronic health record. If you see a primary care provider, you can also send messages to your care team and make appointments. If you have questions, please call your primary care clinic.  If you do not have a primary care provider, please call [phone] and they will assist you.        Care EveryWhere ID     This is your Care EveryWhere ID. This could be used by other organizations to access your [facility] medical records  [id number]        Your Vitals Were     Pulse Temperature Height Pulse Oximetry Breastfeeding? BMI (Body Mass Index)    118 98.1  F (36.7  C) (Oral) 5' 4\" (1.626 m) 97% No 32.44 kg/m2       Blood Pressure from Last 3 Encounters:   03/15/18 146/73   12/18/17 (!) 177/94   02/23/17 122/78    Weight from Last 3 Encounters:   03/15/18 189 lb (85.7 kg)   12/18/17 194 lb (88 kg)   02/23/17 178 lb (80.7 kg)              We Performed the Following     EKG 12-lead complete w/read - Clinics          Today's Medication Changes          These changes are accurate as of 3/15/18  8:13 AM.  If you have any questions, ask your nurse or doctor.               These medicines have changed or have updated prescriptions.        Dose/Directions    sertraline 100 MG tablet   Commonly known as:  ZOLOFT   This may have changed:  when to take this   Used for:  Major depressive disorder, recurrent episode, moderate (H)   Changed by:  Edna Biggs MD        Dose:  100 mg   Take 1 tablet (100 mg) by mouth daily   Quantity:  90 tablet   Refills:  3            Where to get your medicines      These medications were sent to [facility]  [address] - [address] [place] AT [facility] [address] [place] [address]     Phone:  [phone]     sertraline 100 MG tablet                " Primary Care Provider Office Phone # Fax #    Edna Biggs [phone] [address]  [facility] [address]        Equal Access to Services     NAMITA KU : Hadii ranjan ku hadzaino Soomaali, waaxda luqadaha, qaybta kaalmada adeegyada, deidre shearern dwight vicente laBibianapaige baldwin. So [facility] [phone].    ATENCIÓN: Si habla español, tiene a amato disposición servicios gratuitos de asistencia lingüística. Llame al [phone].    We comply with applicable federal civil rights laws and [place] laws. We do not discriminate on the basis of race, color, national origin, age, disability, sex, sexual orientation, or gender identity.            Thank you!     Thank you for choosing [facility]  for your care. Our goal is always to provide you with excellent care. Hearing back from our patients is one way we can continue to improve our services. Please take a few minutes to complete the written survey that you may receive in the mail after your visit with us. Thank you!             Your Updated Medication List - Protect others around you: Learn how to safely use, store and throw away your medicines at www.disposemymeds.org.          This list is accurate as of 3/15/18  8:13 AM.  Always use your most recent med list.                   Brand Name Dispense Instructions for use Diagnosis    albuterol (2.5 MG/3ML) 0.083% neb solution     100 vial    Take 1 vial (2.5 mg) by nebulization every 6 hours as needed for shortness of breath / dyspnea    Acute bronchitis, unspecified organism       guaiFENesin-codeine 100-10 MG/5ML Soln solution    ROBITUSSIN AC    236 mL    Take 10 mLs by mouth every 4 hours as needed for cough    Acute sinusitis with symptoms > 10 days       losartan 100 MG tablet    COZAAR    90 tablet    Take 1 tablet (100 mg) by mouth daily    Essential hypertension with goal blood pressure less than 140/90       sertraline 100 MG tablet    ZOLOFT    90 tablet    Take 1  tablet (100 mg) by mouth daily    Major depressive disorder, recurrent episode, moderate (H)       VITAMIN D (CHOLECALCIFEROL) PO      Take 2,000 Units by mouth 3 times daily

## 2018-03-15 NOTE — PATIENT INSTRUCTIONS
At [facility], we strive to deliver an exceptional experience to you, every time we see you.  If you receive a survey in the mail, please send us back your thoughts. We really do value your feedback.    Based on your medical history, these are the current health maintenance/preventive care services that you are due for (some may have been done at this visit.)  Health Maintenance Due   Topic Date Due     DEXA SCAN SCREENING (SYSTEM ASSIGNED)  12/23/2013     ADVANCE DIRECTIVE PLANNING Q5 YRS  06/18/2017     ASTHMA CONTROL TEST Q6 MOS  08/09/2017     PHQ-9 Q6 MONTHS  01/14/2018     ASTHMA ACTION PLAN Q1 YR  02/09/2018     LIPID MONITORING Q1 YEAR  02/09/2018     DEPRESSION ACTION PLAN Q1 YR  02/09/2018         Suggested websites for health information:  Www.[employer] : Up to date and easily searchable information on multiple topics.  Www.[employer].gov : medication info, interactive tutorials, watch real surgeries online  Www.familydoctor.org : good info from the Academy of Family Physicians  Www.cdc.gov : public health info, travel advisories, epidemics (H1N1)  Www.aap.org : children's health info, normal development, vaccinations  Www.health.[facility].[place].us : [place] dept of health, public health issues in [place], N1N1    Your care team:                            Family Medicine Internal Medicine   MD Danny Christian MD Shantel Branch-Fleming, MD Katya Georgiev PA-C Nam Ho, MD Pediatrics   Mustapha Suresh PABETO Avlaos, MD Raissa Morrison CNP, MD Deborah Mielke, MD Kim Thein, APRN CNP      Clinic hours: Monday - Thursday 7 am-7 pm; Fridays 7 am-5 pm.   Urgent care: Monday - Friday 11 am-9 pm; Saturday and Sunday 9 am-5 pm.  Pharmacy : Monday -Thursday 8 am-8 pm; Friday 8 am-6 pm; Saturday and Sunday 9 am-5 pm.     Clinic: [phone]   Pharmacy: [phone]    Please call to schedule your DEXA scan at [facility]  imaging center by calling [phone].       Preventive Health Recommendations  Female Ages 65 +    Yearly exam:     See your health care provider every year in order to  o Review health changes.   o Discuss preventive care.    o Review your medicines if your doctor has prescribed any.      You no longer need a yearly Pap test unless you've had an abnormal Pap test in the past 10 years. If you have vaginal symptoms, such as bleeding or discharge, be sure to talk with your provider about a Pap test.      Every 1 to 2 years, have a mammogram.  If you are over 69, talk with your health care provider about whether or not you want to continue having screening mammograms.      Every 10 years, have a colonoscopy. Or, have a yearly FIT test (stool test). These exams will check for colon cancer.       Have a cholesterol test every 5 years, or more often if your doctor advises it.       Have a diabetes test (fasting glucose) every three years. If you are at risk for diabetes, you should have this test more often.       At age 65, have a bone density scan (DEXA) to check for osteoporosis (brittle bone disease).    Shots:    Get a flu shot each year.    Get a tetanus shot every 10 years.    Talk to your doctor about your pneumonia vaccines. There are now two you should receive - Pneumovax (PPSV 23) and Prevnar (PCV 13).    Talk to your doctor about the shingles vaccine.    Talk to your doctor about the hepatitis B vaccine.    Nutrition:     Eat at least 5 servings of fruits and vegetables each day.      Eat whole-grain bread, whole-wheat pasta and brown rice instead of white grains and rice.      Talk to your provider about Calcium and Vitamin D.     Lifestyle    Exercise at least 150 minutes a week (30 minutes a day, 5 days a week). This will help you control your weight and prevent disease.      Limit alcohol to one drink per day.      No smoking.       Wear sunscreen to prevent skin cancer.       See your dentist twice a  year for an exam and cleaning.    See your eye doctor every 1 to 2 years to screen for conditions such as glaucoma, macular degeneration, cataracts, etc   About Arrhythmias    Electrical impulses cause the normal heart to beat 60 to 100 times a minute while at rest. These impulses come from a natural pacemaker deep inside the heart muscle. Each impulse causes the heart muscle to contract. This causes the blood to flow through the heart and out to the tissues and organs of your body.  An arrhythmia is a change from the normal speed or pattern of these electrical impulses. This can cause the heart to beat too fast (tachycardia); or too slow (bradycardia); or in an unsteady pattern (irregular rhythm).  Symptoms of arrhythmias  Different people experience arrhythmias differently. Sometimes they may not have symptoms, but just notice a change in their pulse. Symptoms can include:  Fluttering feeling in the chest  Shortness of breath  Chest pain or pressure  Neck fullness  Lightheadedness or dizziness  Fainting or almost fainting  Palpitations (the sense that your heart is fluttering or beating fast or hard or irregularly)  Tiredness, fatigue, or weakness  Cardiac arrest  Causes of arrhythmias  Arrhythmias are most often due to heart disease such as:  Coronary artery disease  Heart valve disease  Enlarged heart  High blood pressure  Heart failure  Other causes of  arrhythmia include:  Certain medicines (such as asthma inhalers and decongestants)  Some herbal supplements  Cardiac stimulant drugs (such as cocaine, amphetamine, diet pills, certain decongestant cold medicines, caffeine, and nicotine)  Excessive alcohol use  Anxiety and panic disorder  Thyroid disease  Anemia  Diabetes  Sleep apnea  Obesity  Congenital heart disease  Cardiac genetic diseases  Arrhythmias can often be prevented. The cause and type of arrhythmia determines the best treatment. Sometimes your doctor may want to monitor your heart rate over a  24-hour period or longer. This can help identify the cause of your arrhythmia and find the best treatment. This can be done with a Holter monitor, a portable EKG recording device attached by wires to your chest. Or you may get an event monitor, which you can place over the skin in front of your heart to record heart rhythms. You can carry this with you as you go about your routine activities during the monitoring period. Implantable loop recorders may also be used to monitor the heart rhythm for up to 2 years. This miniature device is placed underneath the skin overlying the heart.  Home care  The following guidelines will help you care for yourself at home:  Avoid cardiac stimulants (such as cocaine, amphetamine, diet pills, certain decongestant cold medicines, caffeine, and nicotine).  If you smoke, stop smoking. Contact your doctor or a local stop-smoking program for help.  Tell your doctor about any prescription, over-the-counter, or herbal medicines you take. These may be affecting your heart rhythm.  Follow-up care  Follow up with your healthcare provider, or as advised. If a Holter monitor has been recommended, contact the cardiologist you have been referred to as soon as you can  the device. Other outpatient tests may also be arranged for you at that time.  Call 911  This is the fastest and safest way to get to the emergency department. The paramedics can also start treatment on the way to the hospital, if needed.  Don't wait until your symptoms are severe to call 911. Other reasons to call 911 besides chest pain include:  Chest, shoulder, arm, neck, or back pain  Shortness of breath  Feeling lightheaded, faint, or dizzy  Unexplained fainting  Rapid heart beat  Slower than usual heart rate compared to your normal  Very irregular heartbeat  Chest pain (angina) with weakness, dizziness, heavy sweating, nausea, or vomiting  Extreme drowsiness, or confusion  Weakness of an arm or leg or one side of the  "face  Difficulty with speech or vision  When to seek medical advice  Remember, things are not always like they are on TV. Sometimes it is not so obvious. You may only feel weak or just \"not right.\" If it is not clear or if you have any doubt, call for advice.  Seek help for chest pain, or it feels different from usual, even if your symptoms are mild.  Don't drive yourself. Have someone else drive. If no one can drive you, call 911.  If your doctor has given you medicines to take when you have symptoms, take them, but do not delay getting help while trying to find them.  Date Last Reviewed: 4/25/2016 2000-2017 [employer]. [address], [place] [address]. All rights reserved. This information is not intended as a substitute for professional medical care. Always follow your healthcare professional's instructions.          "

## 2018-03-16 ENCOUNTER — TRANSFERRED RECORDS (OUTPATIENT)
Dept: HEALTH INFORMATION MANAGEMENT | Facility: CLINIC | Age: 68
End: 2018-03-16

## 2018-03-16 ASSESSMENT — ASTHMA QUESTIONNAIRES: ACT_TOTALSCORE: 21

## 2018-03-16 ASSESSMENT — PATIENT HEALTH QUESTIONNAIRE - PHQ9: SUM OF ALL RESPONSES TO PHQ QUESTIONS 1-9: 10

## 2018-03-22 ENCOUNTER — TRANSFERRED RECORDS (OUTPATIENT)
Dept: HEALTH INFORMATION MANAGEMENT | Facility: CLINIC | Age: 68
End: 2018-03-22

## 2018-04-09 ENCOUNTER — TRANSFERRED RECORDS (OUTPATIENT)
Dept: HEALTH INFORMATION MANAGEMENT | Facility: CLINIC | Age: 68
End: 2018-04-09

## 2018-04-11 ENCOUNTER — TRANSFERRED RECORDS (OUTPATIENT)
Dept: HEALTH INFORMATION MANAGEMENT | Facility: CLINIC | Age: 68
End: 2018-04-11

## 2018-06-13 ENCOUNTER — HOSPITAL ENCOUNTER (OUTPATIENT)
Dept: SPEECH THERAPY | Facility: CLINIC | Age: 68
End: 2018-06-13
Attending: INTERNAL MEDICINE
Payer: MEDICARE

## 2018-06-13 ENCOUNTER — HOSPITAL ENCOUNTER (OUTPATIENT)
Dept: GENERAL RADIOLOGY | Facility: CLINIC | Age: 68
Discharge: HOME | End: 2018-06-13
Attending: INTERNAL MEDICINE | Admitting: INTERNAL MEDICINE
Payer: MEDICARE

## 2018-06-13 DIAGNOSIS — R13.10 DYSPHAGIA, UNSPECIFIED TYPE: ICD-10-CM

## 2018-06-13 DIAGNOSIS — R19.7 DIARRHEA, UNSPECIFIED TYPE: ICD-10-CM

## 2018-06-13 DIAGNOSIS — R15.9 INCONTINENCE OF FECES, UNSPECIFIED FECAL INCONTINENCE TYPE: ICD-10-CM

## 2018-06-13 PROCEDURE — 74230 X-RAY XM SWLNG FUNCJ C+: CPT

## 2018-06-13 PROCEDURE — 92526 ORAL FUNCTION THERAPY: CPT | Mod: GN | Performed by: SPEECH-LANGUAGE PATHOLOGIST

## 2018-06-13 PROCEDURE — G8998 SWALLOW D/C STATUS: HCPCS | Mod: GN,CI | Performed by: SPEECH-LANGUAGE PATHOLOGIST

## 2018-06-13 PROCEDURE — 92610 EVALUATE SWALLOWING FUNCTION: CPT | Mod: GN,59 | Performed by: SPEECH-LANGUAGE PATHOLOGIST

## 2018-06-13 PROCEDURE — G8997 SWALLOW GOAL STATUS: HCPCS | Mod: GN,CI | Performed by: SPEECH-LANGUAGE PATHOLOGIST

## 2018-06-13 PROCEDURE — G8996 SWALLOW CURRENT STATUS: HCPCS | Mod: GN,CI | Performed by: SPEECH-LANGUAGE PATHOLOGIST

## 2018-06-13 PROCEDURE — 40000211 ZZHC STATISTIC SLP  DEPARTMENT VISIT: Performed by: SPEECH-LANGUAGE PATHOLOGIST

## 2018-06-13 PROCEDURE — 92611 MOTION FLUOROSCOPY/SWALLOW: CPT | Mod: GN | Performed by: SPEECH-LANGUAGE PATHOLOGIST

## 2018-06-13 NOTE — PROGRESS NOTES
[facility]          OUTPATIENT SWALLOW  EVALUATION  PLAN OF TREATMENT FOR OUTPATIENT REHABILITATION  (COMPLETE FOR INITIAL CLAIMS ONLY)  Patient's Last Name, First Name, M.I.  YOB: 1950  Gem Gama     Provider's Name   [facility]   Medical Record No.  [id number]     Start of Care Date:  06/13/18   Onset Date:  06/13/08   Type:     ___PT   ____OT  ___X_SLP Medical Diagnosis:  Dysphagia      Treatment Diagnosis:  Dysphagia Visits from SOC:  1     _________________________________________________________________________________  Plan of Treatment/Functional Goals:  Planned Therapy Interventions: Dysphagia Treatment  Dysphagia treatment: Instruction of safe swallow strategies                     Goals   1. Goal Identifier: Education       Goal Description: Pt will verbalize understanding of safe swallow strategies to reduce pyriform residue.        Target Date: 06/13/18       Date Met: 06/13/18   2.                             3.                             4.                             5.                            6.                              7.                              8.                              Therapy Frequency:  (1 session)  Predicted Duration of Therapy Intervention (days/wks): 1 day    TYLER Green       I CERTIFY THE NEED FOR THESE SERVICES FURNISHED UNDER        THIS PLAN OF TREATMENT AND WHILE UNDER MY CARE     (Physician co-signature of this document indicates review and certification of the therapy plan).                  Certification date from: 06/13/18 Certification date to: 06/13/18          Referring Physician: Leatha Ricketts    Initial Assessment        See Epic Evaluation Start Of Care Date: 06/13/18

## 2018-06-13 NOTE — PROGRESS NOTES
06/13/18 1500   General Information   Type Of Visit Initial   Start Of Care Date 06/13/18   Referring Physician Leatha Ricketts   Orders Evaluate And Treat   Medical Diagnosis Dysphagia   Onset Of Illness/injury Or Date Of Surgery 06/13/08   Patient Role/employment History Retired   Patient/family Goals to improve swallow function   General Information Comments Per GI, this patient is a 67 year old female with PMHx of depression, kidney disease, previous rectal prolapse status post repair, and heartburn. Pt is currently not taking medication for GERD. Pt reported years of dysphagia with solids and now liquids. EGD and esophageal manometry was normal.    Clinical Swallow Evaluation   Oral Musculature generally intact   Structural Abnormalities none present   Dentition present and adequate   Mucosal Quality good   Mandibular Strength and Mobility intact   Oral Labial Strength and Mobility WFL   Lingual Strength and Mobility WFL   Velar Elevation intact   Buccal Strength and Mobility intact   Laryngeal Function Swallow;Voicing initiated;Dry swallow palpated   Oral Musculature Comments WFL   Additional Documentation Yes   Additional evaluation(s) completed today Yes   Rationale for completing additional evaluation Video swallow to assess pharyngeal phase   Clinical Swallow Eval: Thin Liquid Texture Trial   Mode of Presentation, Thin Liquids cup;self-fed   Oral Phase of Swallow WFL   Pharyngeal Phase of Swallow intact   Diagnostic Statement no oral deficits   Clinical Swallow Eval: Solid Food Texture Trial   Mode of Presentation, Solid self-fed   Oral Phase, Solid WFL   Pharyngeal Phase, Solid intact   Diagnostic Statement no oral deficits   VFSS Evaluation   VFSS Additional Documentation Yes   VFSS Eval: Radiology   Radiologist Dr. Maynard   Views Taken left lateral;A/P   Physical Location of Procedure [facility] Room 5   VFSS Eval: Thin Liquid Texture Trial   Mode of Presentation, Thin Liquid cup;straw   Order of  Presentation 1, 2, 6   Preparatory Phase WFL   Oral Phase, Thin Liquid WFL   Pharyngeal Phase, Thin Liquid Residue in pyriform sinus   Rosenbek's Penetration Aspiration Scale: Thin Liquid Trial Results 1 - no aspiration, contrast does not enter airway   Diagnostic Statement No penetration or aspiration; minimal pyriform residue   VFSS Eval: Puree Solid Texture Trial   Mode of Presentation, Puree spoon;self-fed   Order of Presentation 3   Preparatory Phase WFL   Oral Phase, Puree WFL   Pharyngeal Phase, Puree Residue in pyriform sinus   Rosenbek's Penetration Aspiration Scale: Puree Food Trial Results 1 - no aspiration, contrast does not enter airway   Diagnostic Statement No penetration or aspiration; mild pyriform residue   VFSS Eval: Semisolid Texture Trial   Mode of Presentation, Semisolid self-fed   Order of Presentation 4   Preparatory Phase WFL   Oral Phase, Semisolid WFL   Pharyngeal Phase, Semisolid Residue in pyriform sinus   Rosenbek's Penetration Aspiration Scale: Semisolid Food Trial Results 1 - no aspiration, contrast does not enter airway   Diagnostic Statement No penetration or aspiration; mild pyriform residue; did not clear with double swallow   VFSS Eval: Solid Food Texture Trial   Mode of Presentation, Solid self-fed   Order of Presentation 5   Preparatory Phase WFL   Oral Phase, Solid WFL   Pharyngeal Phase, Solid Residue in pyriform sinus   Rosenbek's Penetration Aspiration Scale: Solid Food Trial Results 1 - no aspiration, contrast does not enter airway   Diagnostic Statement No penetration or aspiration; mild pyriform residue; did not clear with double swallow   Swallow Compensations   Swallow Compensations Head turn right   Educational Assessment   Barriers to Learning No barriers   Esophageal Phase of Swallow   Patient reports or presents with symptoms of esophageal dysphagia Yes   Esophageal comments Hx of GERD   General Therapy Interventions   Planned Therapy Interventions Dysphagia  Treatment   Dysphagia treatment Instruction of safe swallow strategies   Swallow Eval: Clinical Impressions   Skilled Criteria for Therapy Intervention Skilled criteria met.  Treatment indicated.   Functional Assessment Scale (FAS) 6   Dysphagia Outcome Severity Scale (ALLY) Level 6 - ALLY   Treatment Diagnosis Dysphagia   Diet texture recommendations Regular diet;Thin liquids   Recommended Feeding/Eating Techniques alternate between small bites and sips of food/liquid;turn head right during every swallow;small sips/bites;hard swallow w/ each bite or sip   Rehab Potential good, to achieve stated therapy goals   Demonstrates Need for Referral to Another Service (ENT)   Therapy Frequency (1 session)   Predicted Duration of Therapy Intervention (days/wks) 1 day   Anticipated Discharge Disposition home   Risks and Benefits of Treatment have been explained. Yes   Patient, family and/or staff in agreement with Plan of Care Yes   Clinical Impression Comments SLP: This 67 year old female was referred for a video swallow study due to reports of difficulty swallowing both solids and liquids. Oral motor exam WFL. Adequate hyolaryngeal excursion and no overt s/sx of aspiration. Continued with video swallow study that found adequate oral control, good epiglottic inversion and no penetration or aspiration throughout. Abnormality noted after puree textures, pyriform sinus retention observed that continued with solids and liquids. When pt turned to A/P view, suspected diverticulum in the right pyriform sinus.  Also, cervical prominence noted at C6-7 level that did not obstruct the bolus. Pt educated on recommended: continue regular diet and thin liquids with head turn to the right as able. Continue choosing softer foods and alternating solids and liquids. Consider ENT consult for abnormality noted above. No further SLP services indicated at this time.    Swallow Goals   SLP Swallow Goals 1   Swallow Goal 1   Goal Identifier  Education   Goal Description Pt will verbalize understanding of safe swallow strategies to reduce pyriform residue.    Target Date 06/13/18   Date Met 06/13/18   Total Session Time   Total Session Time 62   Total Evaluation Time 35   Therapy Certification   Certification date from 06/13/18   Certification date to 06/13/18   Medical Diagnosis Dysphagia    Certification I certify the need for these services furnished under this plan of treatment and while under my care.  (Physician co-signature of this document indicates review and certification of the therapy plan).   SLP Medicare Only G-code   G-code Swallowing   Swallowing   Swallowing:  Current Status , Goal , Discharge -Mqqs Only-Modifier the same for all G-codes CI: 1-19% impairment   Swallowing: Current  & Discharge Modifier Rationale-Eval Only Video swallow study

## 2018-06-15 ENCOUNTER — TRANSFERRED RECORDS (OUTPATIENT)
Dept: HEALTH INFORMATION MANAGEMENT | Facility: CLINIC | Age: 68
End: 2018-06-15

## 2018-08-16 ENCOUNTER — APPOINTMENT (OUTPATIENT)
Dept: GENERAL RADIOLOGY | Facility: CLINIC | Age: 68
End: 2018-08-16
Attending: EMERGENCY MEDICINE
Payer: MEDICARE

## 2018-08-16 ENCOUNTER — HOSPITAL ENCOUNTER (EMERGENCY)
Facility: CLINIC | Age: 68
Discharge: HOME | End: 2018-08-16
Attending: EMERGENCY MEDICINE | Admitting: EMERGENCY MEDICINE
Payer: MEDICARE

## 2018-08-16 VITALS
WEIGHT: 190 LBS | DIASTOLIC BLOOD PRESSURE: 64 MMHG | TEMPERATURE: 98.9 F | HEIGHT: 64 IN | RESPIRATION RATE: 14 BRPM | SYSTOLIC BLOOD PRESSURE: 104 MMHG | OXYGEN SATURATION: 97 % | BODY MASS INDEX: 32.44 KG/M2 | HEART RATE: 105 BPM

## 2018-08-16 DIAGNOSIS — N28.9 ACUTE RENAL INSUFFICIENCY: ICD-10-CM

## 2018-08-16 DIAGNOSIS — E86.0 DEHYDRATION: ICD-10-CM

## 2018-08-16 DIAGNOSIS — R42 DIZZINESS: ICD-10-CM

## 2018-08-16 LAB
ALBUMIN SERPL-MCNC: 3.5 G/DL (ref 3.4–5)
ALBUMIN UR-MCNC: NEGATIVE MG/DL
ALP SERPL-CCNC: 108 U/L (ref 40–150)
ALT SERPL W P-5'-P-CCNC: 20 U/L (ref 0–50)
ANION GAP SERPL CALCULATED.3IONS-SCNC: 5 MMOL/L (ref 3–14)
ANION GAP SERPL CALCULATED.3IONS-SCNC: 8 MMOL/L (ref 3–14)
APPEARANCE UR: CLEAR
AST SERPL W P-5'-P-CCNC: 19 U/L (ref 0–45)
BACTERIA #/AREA URNS HPF: ABNORMAL /HPF
BASOPHILS # BLD AUTO: 0 10E9/L (ref 0–0.2)
BASOPHILS NFR BLD AUTO: 0.4 %
BILIRUB SERPL-MCNC: 0.4 MG/DL (ref 0.2–1.3)
BILIRUB UR QL STRIP: NEGATIVE
BUN SERPL-MCNC: 21 MG/DL (ref 7–30)
BUN SERPL-MCNC: 23 MG/DL (ref 7–30)
CALCIUM SERPL-MCNC: 8.1 MG/DL (ref 8.5–10.1)
CALCIUM SERPL-MCNC: 8.5 MG/DL (ref 8.5–10.1)
CHLORIDE SERPL-SCNC: 105 MMOL/L (ref 94–109)
CHLORIDE SERPL-SCNC: 111 MMOL/L (ref 94–109)
CO2 SERPL-SCNC: 27 MMOL/L (ref 20–32)
CO2 SERPL-SCNC: 29 MMOL/L (ref 20–32)
COLOR UR AUTO: ABNORMAL
CREAT SERPL-MCNC: 1.21 MG/DL (ref 0.52–1.04)
CREAT SERPL-MCNC: 1.34 MG/DL (ref 0.52–1.04)
DIFFERENTIAL METHOD BLD: NORMAL
EOSINOPHIL # BLD AUTO: 0 10E9/L (ref 0–0.7)
EOSINOPHIL NFR BLD AUTO: 0.3 %
ERYTHROCYTE [DISTWIDTH] IN BLOOD BY AUTOMATED COUNT: 13 % (ref 10–15)
GFR SERPL CREATININE-BSD FRML MDRD: 39 ML/MIN/1.7M2
GFR SERPL CREATININE-BSD FRML MDRD: 44 ML/MIN/1.7M2
GLUCOSE SERPL-MCNC: 109 MG/DL (ref 70–99)
GLUCOSE SERPL-MCNC: 98 MG/DL (ref 70–99)
GLUCOSE UR STRIP-MCNC: NEGATIVE MG/DL
HCT VFR BLD AUTO: 38.7 % (ref 35–47)
HGB BLD-MCNC: 13.7 G/DL (ref 11.7–15.7)
HGB UR QL STRIP: NEGATIVE
HYALINE CASTS #/AREA URNS LPF: 30 /LPF (ref 0–2)
IMM GRANULOCYTES # BLD: 0 10E9/L (ref 0–0.4)
IMM GRANULOCYTES NFR BLD: 0.1 %
INTERPRETATION ECG - MUSE: NORMAL
KETONES UR STRIP-MCNC: NEGATIVE MG/DL
LEUKOCYTE ESTERASE UR QL STRIP: ABNORMAL
LIPASE SERPL-CCNC: 205 U/L (ref 73–393)
LYMPHOCYTES # BLD AUTO: 1.7 10E9/L (ref 0.8–5.3)
LYMPHOCYTES NFR BLD AUTO: 23.4 %
MAGNESIUM SERPL-MCNC: 2 MG/DL (ref 1.6–2.3)
MCH RBC QN AUTO: 30 PG (ref 26.5–33)
MCHC RBC AUTO-ENTMCNC: 35.4 G/DL (ref 31.5–36.5)
MCV RBC AUTO: 85 FL (ref 78–100)
MONOCYTES # BLD AUTO: 0.5 10E9/L (ref 0–1.3)
MONOCYTES NFR BLD AUTO: 6.8 %
MUCOUS THREADS #/AREA URNS LPF: PRESENT /LPF
NEUTROPHILS # BLD AUTO: 5.1 10E9/L (ref 1.6–8.3)
NEUTROPHILS NFR BLD AUTO: 69 %
NITRATE UR QL: NEGATIVE
NRBC # BLD AUTO: 0 10*3/UL
NRBC BLD AUTO-RTO: 0 /100
PH UR STRIP: 5 PH (ref 5–7)
PLATELET # BLD AUTO: 154 10E9/L (ref 150–450)
POTASSIUM SERPL-SCNC: 3.5 MMOL/L (ref 3.4–5.3)
POTASSIUM SERPL-SCNC: 3.8 MMOL/L (ref 3.4–5.3)
PROT SERPL-MCNC: 6.8 G/DL (ref 6.8–8.8)
RBC # BLD AUTO: 4.57 10E12/L (ref 3.8–5.2)
RBC #/AREA URNS AUTO: 2 /HPF (ref 0–2)
SODIUM SERPL-SCNC: 140 MMOL/L (ref 133–144)
SODIUM SERPL-SCNC: 145 MMOL/L (ref 133–144)
SOURCE: ABNORMAL
SP GR UR STRIP: 1.01 (ref 1–1.03)
SQUAMOUS #/AREA URNS AUTO: <1 /HPF (ref 0–1)
UROBILINOGEN UR STRIP-MCNC: NORMAL MG/DL (ref 0–2)
WBC # BLD AUTO: 7.5 10E9/L (ref 4–11)
WBC #/AREA URNS AUTO: 1 /HPF (ref 0–5)

## 2018-08-16 PROCEDURE — 85025 COMPLETE CBC W/AUTO DIFF WBC: CPT | Performed by: EMERGENCY MEDICINE

## 2018-08-16 PROCEDURE — 83690 ASSAY OF LIPASE: CPT | Performed by: EMERGENCY MEDICINE

## 2018-08-16 PROCEDURE — 81001 URINALYSIS AUTO W/SCOPE: CPT | Performed by: EMERGENCY MEDICINE

## 2018-08-16 PROCEDURE — 96360 HYDRATION IV INFUSION INIT: CPT

## 2018-08-16 PROCEDURE — 71046 X-RAY EXAM CHEST 2 VIEWS: CPT

## 2018-08-16 PROCEDURE — 96361 HYDRATE IV INFUSION ADD-ON: CPT

## 2018-08-16 PROCEDURE — 99285 EMERGENCY DEPT VISIT HI MDM: CPT | Mod: 25

## 2018-08-16 PROCEDURE — 80048 BASIC METABOLIC PNL TOTAL CA: CPT | Performed by: EMERGENCY MEDICINE

## 2018-08-16 PROCEDURE — 80053 COMPREHEN METABOLIC PANEL: CPT | Performed by: EMERGENCY MEDICINE

## 2018-08-16 PROCEDURE — 93005 ELECTROCARDIOGRAM TRACING: CPT

## 2018-08-16 PROCEDURE — 83735 ASSAY OF MAGNESIUM: CPT | Performed by: EMERGENCY MEDICINE

## 2018-08-16 PROCEDURE — 25000128 H RX IP 250 OP 636: Performed by: EMERGENCY MEDICINE

## 2018-08-16 RX ADMIN — SODIUM CHLORIDE, POTASSIUM CHLORIDE, SODIUM LACTATE AND CALCIUM CHLORIDE 1000 ML: 600; 310; 30; 20 INJECTION, SOLUTION INTRAVENOUS at 18:32

## 2018-08-16 RX ADMIN — SODIUM CHLORIDE, POTASSIUM CHLORIDE, SODIUM LACTATE AND CALCIUM CHLORIDE 1000 ML: 600; 310; 30; 20 INJECTION, SOLUTION INTRAVENOUS at 17:01

## 2018-08-16 ASSESSMENT — ENCOUNTER SYMPTOMS
COUGH: 0
SHORTNESS OF BREATH: 1
BLOOD IN STOOL: 0
BACK PAIN: 1
LIGHT-HEADEDNESS: 1
NAUSEA: 1
VOMITING: 0
DIARRHEA: 0

## 2018-08-16 NOTE — ED AVS SNAPSHOT
Emergency Department    [address] [facility] [address]    Phone:  [phone]    Fax:  [phone]                                       Gem Gama   MRN: [id number]    Department:   Emergency Department   Date of Visit:  8/16/2018           After Visit Summary Signature Page     I have received my discharge instructions, and my questions have been answered. I have discussed any challenges I see with this plan with the nurse or doctor.    ..........................................................................................................................................  Patient/Patient Representative Signature      ..........................................................................................................................................  Patient Representative Print Name and Relationship to Patient    ..................................................               ................................................  Date                                            Time    ..........................................................................................................................................  Reviewed by Signature/Title    ...................................................              ..............................................  Date                                                            Time

## 2018-08-16 NOTE — ED AVS SNAPSHOT
Emergency Department    [address] [facility] [address]    Phone:  [phone]    Fax:  [phone]                                       Gem Gama   MRN: [id number]    Department:   Emergency Department   Date of Visit:  8/16/2018           Patient Information     Date Of Birth          1950        Your diagnoses for this visit were:     Dehydration     Acute renal insufficiency     Dizziness        You were seen by Estuardo Rondon MD.      Follow-up Information     Follow up with Edna Biggs MD. Schedule an appointment as soon as possible for a visit in 2 days.    Specialty:  Family Practice    Why:  For close follow up and recheck of your kidney function    Contact information:    [address]  [place] [address]  [phone]          Discharge Instructions         YOUR PRIMARY CARE PHYSICIAN SHOULD RECHECK YOUR KIDNEY FUNCTION IN 2-3 DAYS TO MAKE SURE IT HAS NORMALIZED. MAKE SURE TO DRINK 8 FULL GLASSES OF CLEAR LIQUID (NONCAFFEINATED) PER DAY. RETURN TO THE EMERGENCY DEPARTMENT FOR ANY WORSENING OF YOUR CONDITION    Renal Insufficiency  Your kidneys remove waste products and extra water from your body. When your kidneys don t work as they should, waste products build up in your blood. The early stage of this process is called renal insufficiency. If renal insufficiency gets worse, you can develop chronic renal failure. This allows extra water, waste, and toxic substances to build up in your body. This can become life threatening. You may need dialysis or a kidney transplant. The most serious form of renal insufficiency is end-stage renal disease.  Diabetes is the main cause of renal insufficiency.  Other causes include:    High blood pressure    Hardening of the arteries    Lupus    Inflammation of the blood vessels (vasculitis)    Viral or bacterial infection  Some over-the-counter (OTC) pain medicines can cause renal failure if you take them for a long  time. These include aspirin, ibuprofen, naproxen, and other nonsteroidal anti-inflammatory drugs (NSAIDs).  Home care  Follow these tips when caring for yourself at home:    If you have diabetes, talk with your healthcare provider about controlling your blood sugar. Ask if you need to make any changes to your diet, lifestyle, or medicines.    If you have high blood pressure:  ? Take your prescribed medicine. Your goal is to lower your blood pressure to less than 130/80, or as recommended by your provider.  ? Start a regular exercise program that you enjoy. Check with your healthcare provider to be sure your planned exercise program is right for you.  ? Cut back on the amount of salt (sodium) you eat. Your healthcare provider can tell you how much salt each day is safe for you.    If you are overweight, talk with your healthcare provider about a weight loss plan.    If you smoke, quit. Smoking makes kidney disease worse. Talk with your healthcare provider about ways to help you quit. For more information, visit:  ? [employer].gov/sites/default/files/pdf/clearing-the-air-accessible.pdf  ? www.smokefree.gov  ? www.cancer.org/healthy/stayawayfromtobacco/guidetoquittingsmoking/    Talk with your healthcare provider about any restrictions you should make in your diet. In general, you should limit the amount of protein, salt, potassium, and phosphorus. Don t drink too many fluids. Don t add salt at the table, and stay away from salty foods. You may need a calcium supplement to help prevent osteoporosis.    Talk with your healthcare provider about any medicines you are taking to find out if they need to be reduced or stopped.    Don t take the following OTC medicines, or talk with your healthcare provider before you take them:  ? Aspirin, ibuprofen, naproxen, and other NSAIDs. You may be able to use these for a short time to help with fever or pain.  ? Laxatives and antacids with magnesium or aluminum  ? Phospho soda enemas  with phosphorus  ? Certain stomach acid-blocking medicine such as cimetidine or ranitidine  ? Decongestants with pseudoephedrine  ? Herbal supplements  Follow-up care  Follow up with your healthcare provider, or as advised.  Contact one of the following for more information:    American Association of Kidney Patients, www.aakp.org    National Kidney Foundation, www.kidney.org    American Kidney Fund, www.kidneyfund.org    National Kidney Disease Education Program, www.nkdep.nih.gov  Call 911  Call 911 if any of the following occur:    Severe weakness, dizziness, fainting, drowsiness, or confusion    Chest pain or shortness of breath    Heart beating fast, slowly, or irregularly  When to seek medical advice  Call your healthcare provider right away if any of these occur:    Nausea or vomiting    Fever of 100.4 F (38 C) or higher, or as directed by your healthcare provider    Unexpected weight gain or swelling in the legs, ankles, or around your eyes    You don t urinate as much as normal, or you aren t able to urinate  Date Last Reviewed: 10/1/2016    9590-7813 [employer]. [address] [place] [address]. All rights reserved. This information is not intended as a substitute for professional medical care. Always follow your healthcare professional's instructions.      Dehydration (Adult)  Dehydration occurs when your body loses too much fluid. This may be the result of prolonged vomiting or diarrhea, excessive sweating, or a high fever. It may also happen if you don t drink enough fluid when you re sick or out in the heat. Misuse of diuretics (water pills) can also be a cause.  Symptoms include thirst and decreased urine output. You may also feel dizzy, weak, fatigued, or very drowsy. The diet described below is usually enough to treat dehydration. In some cases, you may need medicine.  Home care    Drink at least 12 8-ounce glasses of fluid every day to resolve the dehydration. Fluid may  include water; orange juice; lemonade; apple, grape, or cranberry juice; clear fruit drinks; electrolyte replacement and sports drinks; and teas and coffee without caffeine. Don't drink alcohol. If you have been diagnosed with a kidney disease, ask your doctor how much and what types of fluids you should drink to prevent dehydration. If you have kidney disease, fluid can build up in the body. This can be dangerous to your health.    If you have a fever, muscle aches, or a headache as a result of a cold or flu, you may take acetaminophen or ibuprofen, unless another medicine was prescribed. If you have chronic liver or kidney disease, or have ever had a stomach ulcer or gastrointestinal bleeding, talk with your healthcare provider before using these medicines. Don't take aspirin if you are younger than 18 and have a fever. Aspirin raises the chance for severe liver injury.  Follow-up care  Follow up with your healthcare provider, or as advised.  When to seek medical advice  Call your healthcare provider right away if any of these occur:    Continued vomiting    Frequent diarrhea (more than 5 times a day); blood (red or black color) or mucus in diarrhea    Blood in vomit or stool    Swollen abdomen or increasing abdominal pain    Weakness, dizziness, or fainting    Unusual drowsiness or confusion    Reduced urine output or extreme thirst    Fever of 100.4 F (38 C) or higher  Date Last Reviewed: 5/1/2017 2000-2017 [employer]. [address] [place], [place] [id number]. All rights reserved. This information is not intended as a substitute for professional medical care. Always follow your healthcare professional's instructions.          24 Hour Appointment Hotline       To make an appointment at any [facility] clinic, call [phone] ([phone]). If you don't have a family doctor or clinic, we will help you find one. [facility] clinics are conveniently located to serve the needs of you and your  family.             Review of your medicines      Our records show that you are taking the medicines listed below. If these are incorrect, please call your family doctor or clinic.        Dose / Directions Last dose taken    albuterol (2.5 MG/3ML) 0.083% neb solution   Dose:  1 vial   Quantity:  100 vial        Take 1 vial (2.5 mg) by nebulization every 6 hours as needed for shortness of breath / dyspnea   Refills:  3        guaiFENesin-codeine 100-10 MG/5ML Soln solution   Commonly known as:  ROBITUSSIN AC   Dose:  2 tsp.   Quantity:  236 mL        Take 10 mLs by mouth every 4 hours as needed for cough   Refills:  0        losartan 100 MG tablet   Commonly known as:  COZAAR   Dose:  100 mg   Quantity:  90 tablet        Take 1 tablet (100 mg) by mouth daily   Refills:  3        sertraline 100 MG tablet   Commonly known as:  ZOLOFT   Dose:  100 mg   Quantity:  90 tablet        Take 1 tablet (100 mg) by mouth daily   Refills:  3        VITAMIN D (CHOLECALCIFEROL) PO   Dose:  2000 Units        Take 2,000 Units by mouth 3 times daily   Refills:  0                Procedures and tests performed during your visit     Basic metabolic panel    CBC with platelets differential    Comprehensive metabolic panel    EKG 12-lead, tracing only    Lipase    Magnesium    Peripheral IV catheter    UA reflex to Microscopic and Culture    XR Chest 2 Views      Orders Needing Specimen Collection     None      Pending Results     No orders found from 8/14/2018 to 8/17/2018.            Pending Culture Results     No orders found from 8/14/2018 to 8/17/2018.            Pending Results Instructions     If you had any lab results that were not finalized at the time of your Discharge, you can call the ED Lab Result RN at [phone]. You will be contacted by this team for any positive Lab results or changes in treatment. The nurses are available 7 days a week from 10A to 6:30P.  You can leave a message 24 hours per day and they will return  your call.        Test Results From Your Hospital Stay        8/16/2018  5:24 PM      Component Results     Component Value Ref Range & Units Status    WBC 7.5 4.0 - 11.0 10e9/L Final    RBC Count 4.57 3.8 - 5.2 10e12/L Final    Hemoglobin 13.7 11.7 - 15.7 g/dL Final    Hematocrit 38.7 35.0 - 47.0 % Final    MCV 85 78 - 100 fl Final    MCH 30.0 26.5 - 33.0 pg Final    MCHC 35.4 31.5 - 36.5 g/dL Final    RDW 13.0 10.0 - 15.0 % Final    Platelet Count 154 150 - 450 10e9/L Final    Diff Method Automated Method  Final    % Neutrophils 69.0 % Final    % Lymphocytes 23.4 % Final    % Monocytes 6.8 % Final    % Eosinophils 0.3 % Final    % Basophils 0.4 % Final    % Immature Granulocytes 0.1 % Final    Nucleated RBCs 0 0 /100 Final    Absolute Neutrophil 5.1 1.6 - 8.3 10e9/L Final    Absolute Lymphocytes 1.7 0.8 - 5.3 10e9/L Final    Absolute Monocytes 0.5 0.0 - 1.3 10e9/L Final    Absolute Eosinophils 0.0 0.0 - 0.7 10e9/L Final    Absolute Basophils 0.0 0.0 - 0.2 10e9/L Final    Abs Immature Granulocytes 0.0 0 - 0.4 10e9/L Final    Absolute Nucleated RBC 0.0  Final         8/16/2018  5:42 PM      Component Results     Component Value Ref Range & Units Status    Sodium 140 133 - 144 mmol/L Final    Potassium 3.5 3.4 - 5.3 mmol/L Final    Chloride 105 94 - 109 mmol/L Final    Carbon Dioxide 27 20 - 32 mmol/L Final    Anion Gap 8 3 - 14 mmol/L Final    Glucose 109 (H) 70 - 99 mg/dL Final    Urea Nitrogen 23 7 - 30 mg/dL Final    Creatinine 1.34 (H) 0.52 - 1.04 mg/dL Final    GFR Estimate 39 (L) >60 mL/min/1.7m2 Final    Non  GFR Calc    GFR Estimate If Black 47 (L) >60 mL/min/1.7m2 Final    African American GFR Calc    Calcium 8.5 8.5 - 10.1 mg/dL Final    Bilirubin Total 0.4 0.2 - 1.3 mg/dL Final    Albumin 3.5 3.4 - 5.0 g/dL Final    Protein Total 6.8 6.8 - 8.8 g/dL Final    Alkaline Phosphatase 108 40 - 150 U/L Final    ALT 20 0 - 50 U/L Final    AST 19 0 - 45 U/L Final         8/16/2018  5:40 PM       Component Results     Component Value Ref Range & Units Status    Magnesium 2.0 1.6 - 2.3 mg/dL Final         8/16/2018  5:40 PM      Component Results     Component Value Ref Range & Units Status    Lipase 205 73 - 393 U/L Final         8/16/2018  6:17 PM      Component Results     Component Value Ref Range & Units Status    Color Urine Light Yellow  Final    Appearance Urine Clear  Final    Glucose Urine Negative NEG^Negative mg/dL Final    Bilirubin Urine Negative NEG^Negative Final    Ketones Urine Negative NEG^Negative mg/dL Final    Specific Gravity Urine 1.008 1.003 - 1.035 Final    Blood Urine Negative NEG^Negative Final    pH Urine 5.0 5.0 - 7.0 pH Final    Protein Albumin Urine Negative NEG^Negative mg/dL Final    Urobilinogen mg/dL Normal 0.0 - 2.0 mg/dL Final    Nitrite Urine Negative NEG^Negative Final    Leukocyte Esterase Urine Trace (A) NEG^Negative Final    Source Midstream Urine  Final    RBC Urine 2 0 - 2 /HPF Final    WBC Urine 1 0 - 5 /HPF Final    Bacteria Urine Few (A) NEG^Negative /HPF Final    Squamous Epithelial /HPF Urine <1 0 - 1 /HPF Final    Mucous Urine Present (A) NEG^Negative /LPF Final    Hyaline Casts 30 (H) 0 - 2 /LPF Final         8/16/2018  5:39 PM      Narrative     XR CHEST TWO VIEWS   8/16/2018 5:12 PM     HISTORY: Back pain.    COMPARISON: Chest x-ray 1/10/2014.        Impression     IMPRESSION: PA and lateral views of the chest. Lungs are clear. Heart  is normal in size. No effusions are evident. No pneumothorax.  Degenerative spine changes are noted.    ALVIN ÁLVAREZ MD         8/16/2018  8:05 PM      Component Results     Component Value Ref Range & Units Status    Sodium 145 (H) 133 - 144 mmol/L Final    Potassium 3.8 3.4 - 5.3 mmol/L Final    Chloride 111 (H) 94 - 109 mmol/L Final    Carbon Dioxide 29 20 - 32 mmol/L Final    Anion Gap 5 3 - 14 mmol/L Final    Glucose 98 70 - 99 mg/dL Final    Urea Nitrogen 21 7 - 30 mg/dL Final    Creatinine 1.21 (H) 0.52 - 1.04 mg/dL  Final    GFR Estimate 44 (L) >60 mL/min/1.7m2 Final    Non  GFR Calc    GFR Estimate If Black 53 (L) >60 mL/min/1.7m2 Final    African American GFR Calc    Calcium 8.1 (L) 8.5 - 10.1 mg/dL Final                Clinical Quality Measure: Blood Pressure Screening     Your blood pressure was checked while you were in the emergency department today. The last reading we obtained was  BP: 142/76 . Please read the guidelines below about what these numbers mean and what you should do about them.  If your systolic blood pressure (the top number) is less than 120 and your diastolic blood pressure (the bottom number) is less than 80, then your blood pressure is normal. There is nothing more that you need to do about it.  If your systolic blood pressure (the top number) is 120-139 or your diastolic blood pressure (the bottom number) is 80-89, your blood pressure may be higher than it should be. You should have your blood pressure rechecked within a year by a primary care provider.  If your systolic blood pressure (the top number) is 140 or greater or your diastolic blood pressure (the bottom number) is 90 or greater, you may have high blood pressure. High blood pressure is treatable, but if left untreated over time it can put you at risk for heart attack, stroke, or kidney failure. You should have your blood pressure rechecked by a primary care provider within the next 4 weeks.  If your provider in the emergency department today gave you specific instructions to follow-up with your doctor or provider even sooner than that, you should follow that instruction and not wait for up to 4 weeks for your follow-up visit.        Thank you for choosing [facility]       Thank you for choosing [facility] for your care. Our goal is always to provide you with excellent care. Hearing back from our patients is one way we can continue to improve our services. Please take a few minutes to complete the written survey that you may  receive in the mail after you visit with us. Thank you!        [employer] Information     [employer] gives you secure access to your electronic health record. If you see a primary care provider, you can also send messages to your care team and make appointments. If you have questions, please call your primary care clinic.  If you do not have a primary care provider, please call [phone] and they will assist you.        Care EveryWhere ID     This is your Care EveryWhere ID. This could be used by other organizations to access your [facility] medical records  [id number]        Equal Access to Services     [facility] : Laurent Wiggins, clayton potts, layne granados, deidre lawson . So [facility] [phone].    ATENCIÓN: Si habla español, tiene a amato disposición servicios gratuitos de asistencia lingüística. Llame al [phone].    We comply with applicable federal civil rights laws and [place] laws. We do not discriminate on the basis of race, color, national origin, age, disability, sex, sexual orientation, or gender identity.            After Visit Summary       This is your record. Keep this with you and show to your community pharmacist(s) and doctor(s) at your next visit.

## 2018-08-16 NOTE — ED PROVIDER NOTES
History     Chief Complaint:  Lightheadedness    HPI   Gem Gama is a 67 year old female who presents with lightheadedness and back pain. The patient states that she had an urgent bowel movement at 1330 and shortly thereafter she started feeling lightheaded and off balanced. She continued her regular activity and had to stop and sit down multiple times because she felt like she was about to faint. Sitting down seemed to help momentarily, but did not resolve her lightheadedness. The patient also started experiencing back pain between her shoulder blades this afternoon which is worse with movement. She denies any fall or trauma. She has also felt nauseated, but denies any chest pain, syncope, vomiting, diarrhea today, bloody stool, leg swelling, rashes, worsening shortness of breath, or coughing. The patient notes that she has been seeing a gastroenterologist for problems with fecal urgency and incontinence. She has had a colonoscopy in the last 6 months which found a few diverticula and benign polyps. She also notes that she has a history of feeling lightheaded, but it usually quickly resolves once she sits down.       Allergies:  Flagyl [Metronidazole Hcl]  Latex  Sulfa Drugs  Tape [Adhesive Tape]  Metoprolol     Medications:    Albuterol  Cozaar  Zoloft  Vitamin D     Past Medical History:    ADHD  Anxiety  Arthritis  Asthma  C. Difficile diarrhea  Sleep apnea  Depression  GERD  Hypertension  Narcolepsy  Renal Disease      Past Surgical History:    Knee arthroplasty  Enterocele repair  Hysterectomy  Kidney surgery  Hip surgery      Family History:    Hypertension  Arthritis  Cerebrovascular disease  Diabetes  Thyroid disease    Social History:  Patient presents alone  Negative for tobacco use.  Negative for alcohol use.  Marital Status:       Review of Systems   Respiratory: Positive for shortness of breath. Negative for cough.    Cardiovascular: Negative for chest pain and leg swelling.  "  Gastrointestinal: Positive for nausea. Negative for blood in stool, diarrhea and vomiting.   Musculoskeletal: Positive for back pain.   Neurological: Positive for light-headedness. Negative for syncope.   All other systems reviewed and are negative.      Physical Exam   First Vitals:  BP: 142/76  Pulse: 105  Temp: 98.9  F (37.2  C)  Resp: 14  Height: 162.6 cm (5' 4\")  Weight: 86.2 kg (190 lb)  SpO2: 96 %      Physical Exam  General: Well appearing, nontoxic. Resting comfortably  Head:  Scalp, face, and head appear normal  Eyes:  Pupils are equal, round, and reactive to light    Conjunctivae non-injected and sclerae white  ENT:    The external nose is normal    Pinnae are normal    The oropharynx is normal, mucous membranes dry    Posterior pharynx clear without swelling, exudates or erythema     Uvula is in the midline  Neck:  Normal range of motion    There is no rigidity noted    Trachea is in the midline  CV:  Regular rate and rhythm     Normal S1/S2, no S3/S4    No murmur or rub. Radial pulses 2+ bilaterally. DP pulses 2+ and intact bilaterally.  Resp:  Lungs are clear and equal bilaterally    There is no tachypnea    No increased work of breathing    No rales, wheezing, or rhonchi  GI:  Abdomen is soft, no rigidity or guarding    No distension, or mass    No tenderness or rebound tenderness   MS:  Normal muscular tone. Mild soft tissue tenderness to palpation over the mid back. T and L spine non-tender without stepoffs.    Symmetric motor strength    No lower extremity edema. No calf swelling or tenderness to palpation.  Skin:  No rash or acute skin lesions noted  Neuro: Awake and alert, oriented x3. CN II-XII intact. Strength and sensation 5/5 and intact throughout.    Speech is normal and fluent    Moves all extremities spontaneously  Psych: Normal affect.  Appropriate interactions.     Emergency Department Course   ECG:    Time: 1522 Vent. Rate 101 bpm. TN interval 134. QRS duration 76. QT/QTc 316/409. " P-R-T axis 33 -16 59. Sinus tachycardia. Possible anterior infarct, age undetermined. Abnormal ECG.    Imaging:  Radiographic findings were communicated with the patient who voiced understanding of the findings.  X-ray Chest, 2 views:  PA and lateral views of the chest. Lungs are clear. Heart  is normal in size. No effusions are evident. No pneumothorax.  Degenerative spine changes are noted.  Result per radiology.      Laboratory:  CBC: WBC: 7.5, HGB: 13.7, PLT: 154  CMP: Glucose 109 (H), Creatinine: 1.34 (H), GFR: 39 (L), o/w WNL   Magnesium: 2.0  Lipase: 205  UA with micro: Leukocyte esterase: trace, Bacteria: few, Mucous: present, Hyaline casts: 30 (H) o/w negative    Interventions:  1701 - Lactated ringers 1L IV  1832 - Lactated ringers 1L IV    Emergency Department Course:  Nursing notes and vitals reviewed.  1615: I performed an exam of the patient as documented above. IV inserted and blood drawn.  Labs and imaging ordered.    2014: I rechecked the patient. Findings and plan explained to the Patient. Patient discharged home with instructions regarding supportive care, medications, and reasons to return. The importance of close follow-up was reviewed.       Impression & Plan    Medical Decision Making:  Gem Gama is a 67 year old female who presents for evaluation of lightheadedness.  This is clearly not a vertiginous sensation so would not do further workup for peripheral or central vertigo.  A broad differential of their lightheadedness was considered including dehydration, anemia, electrolyte abnormality, medication, stroke, ACS, orthostasis, dehydration, presyncope, arrhythmias, metabolic disturbances, endocrine disturbances, hyperglycemia, infection, etc. Lab results show that the patient is dehydrated with a mild ARTURO. Given the increase in her creatinine 2L IVF where given and BMP rechecked which showed improving creatinine. They feel improved after fluids and she was able to ambulate without  difficulty. The remainder of her ED workup was unremarkable. EKG without acute ischemia or dysrhythmia. No evidence of heart strain. Ultimately given her likely prerenal ARTURO and UA results I feel that her symptoms are due to dehydration.  Supportive outpatient management is indicated. I discussed with the patient that her PCP should follow her creatinine very closely to make sure it returns to her previous baseline. I encouraged good PO fluid intake. Return precautions were discussed with patient. The patient's questions were answered and the patient was agreeable with discharge.       Diagnosis:    ICD-10-CM    1. Dehydration E86.0    2. Acute renal insufficiency N28.9    3. Dizziness R42        Disposition:  discharged to home    IEstuardo, am serving as a scribe on 8/16/2018 at 4:15 PM to personally document services performed by Estuardo Rondon MD based on my observations and the provider's statements to me.       Estuardo Wyman  8/16/2018    EMERGENCY DEPARTMENT       Estuardo Rondon MD  08/17/18 [id number]

## 2018-08-21 ENCOUNTER — OFFICE VISIT (OUTPATIENT)
Dept: FAMILY MEDICINE | Facility: CLINIC | Age: 68
End: 2018-08-21
Payer: MEDICARE

## 2018-08-21 VITALS
TEMPERATURE: 97.6 F | BODY MASS INDEX: 31.93 KG/M2 | DIASTOLIC BLOOD PRESSURE: 89 MMHG | WEIGHT: 186 LBS | HEART RATE: 101 BPM | SYSTOLIC BLOOD PRESSURE: 149 MMHG | OXYGEN SATURATION: 97 %

## 2018-08-21 DIAGNOSIS — I10 HYPERTENSION GOAL BP (BLOOD PRESSURE) < 140/90: ICD-10-CM

## 2018-08-21 DIAGNOSIS — N18.31 CHRONIC KIDNEY DISEASE (CKD) STAGE G3A/A1, MODERATELY DECREASED GLOMERULAR FILTRATION RATE (GFR) BETWEEN 45-59 ML/MIN/1.73 SQUARE METER AND ALBUMINURIA CREATININE RATIO LESS THAN 30 MG/G (H): Primary | ICD-10-CM

## 2018-08-21 DIAGNOSIS — N39.41 URGE INCONTINENCE OF URINE: ICD-10-CM

## 2018-08-21 LAB
ANION GAP SERPL CALCULATED.3IONS-SCNC: 9 MMOL/L (ref 3–14)
BUN SERPL-MCNC: 15 MG/DL (ref 7–30)
CALCIUM SERPL-MCNC: 9 MG/DL (ref 8.5–10.1)
CHLORIDE SERPL-SCNC: 109 MMOL/L (ref 94–109)
CO2 SERPL-SCNC: 22 MMOL/L (ref 20–32)
CREAT SERPL-MCNC: 1.04 MG/DL (ref 0.52–1.04)
GFR SERPL CREATININE-BSD FRML MDRD: 52 ML/MIN/1.7M2
GLUCOSE SERPL-MCNC: 97 MG/DL (ref 70–99)
POTASSIUM SERPL-SCNC: 3.9 MMOL/L (ref 3.4–5.3)
SODIUM SERPL-SCNC: 140 MMOL/L (ref 133–144)

## 2018-08-21 PROCEDURE — 36415 COLL VENOUS BLD VENIPUNCTURE: CPT | Performed by: INTERNAL MEDICINE

## 2018-08-21 PROCEDURE — 80048 BASIC METABOLIC PNL TOTAL CA: CPT | Performed by: INTERNAL MEDICINE

## 2018-08-21 PROCEDURE — 99214 OFFICE O/P EST MOD 30 MIN: CPT | Performed by: INTERNAL MEDICINE

## 2018-08-21 RX ORDER — DARIFENACIN 7.5 MG/1
7.5 TABLET, EXTENDED RELEASE ORAL DAILY
Qty: 30 TABLET | Refills: 0 | Status: SHIPPED | OUTPATIENT
Start: 2018-08-21 | End: 2018-09-27

## 2018-08-21 RX ORDER — AMLODIPINE BESYLATE 5 MG/1
5 TABLET ORAL 2 TIMES DAILY WITH MEALS
Qty: 60 TABLET | Refills: 1 | Status: SHIPPED | OUTPATIENT
Start: 2018-08-21 | End: 2018-09-27

## 2018-08-21 ASSESSMENT — ANXIETY QUESTIONNAIRES
5. BEING SO RESTLESS THAT IT IS HARD TO SIT STILL: NOT AT ALL
6. BECOMING EASILY ANNOYED OR IRRITABLE: NOT AT ALL
2. NOT BEING ABLE TO STOP OR CONTROL WORRYING: NOT AT ALL
GAD7 TOTAL SCORE: 1
1. FEELING NERVOUS, ANXIOUS, OR ON EDGE: NOT AT ALL
3. WORRYING TOO MUCH ABOUT DIFFERENT THINGS: NOT AT ALL
7. FEELING AFRAID AS IF SOMETHING AWFUL MIGHT HAPPEN: SEVERAL DAYS

## 2018-08-21 ASSESSMENT — PATIENT HEALTH QUESTIONNAIRE - PHQ9: 5. POOR APPETITE OR OVEREATING: NOT AT ALL

## 2018-08-21 ASSESSMENT — PAIN SCALES - GENERAL: PAINLEVEL: NO PAIN (0)

## 2018-08-21 NOTE — PATIENT INSTRUCTIONS
At [facility], we strive to deliver an exceptional experience to you, every time we see you.  If you receive a survey in the mail, please send us back your thoughts. We really do value your feedback.    Suggested websites for health information:  Www.[employer].org : Up to date and easily searchable information on multiple topics.  Www.medlineplus.gov : medication info, interactive tutorials, watch real surgeries online  Www.familydoctor.org : good info from the Academy of Family Physicians  Www.cdc.gov : public health info, travel advisories, epidemics (H1N1)  Www.aap.org : children's health info, normal development, vaccinations  Www.health.[facility].[place].us : [place] dept of health, public health issues in [place], N1N1    Your care team:                            Family Medicine Internal Medicine   MD Danny Christian MD Shantel Branch-Fleming, MD Katya Georgiev PA-C Megan Hill, APRN CNP    Sd Wagner MD Pediatrics   Mustapha Suresh, PABETO Avalos, CNP MD Maria E Bone APRN CNP   MD Raissa Bravo MD Deborah Mielke, MD Kim Thein, APRN CNP      Clinic hours: Monday - Thursday 7 am-7 pm; Fridays 7 am-5 pm.   Urgent care: Monday - Friday 11 am-9 pm; Saturday and Sunday 9 am-5 pm.  Pharmacy : Monday -Thursday 8 am-8 pm; Friday 8 am-6 pm; Saturday and Sunday 9 am-5 pm.     Clinic: [phone]   Pharmacy: [phone]

## 2018-08-21 NOTE — PROGRESS NOTES
SUBJECTIVE:   Gem Gama is a 67 year old female who presents to clinic today for the following health issues:    ED/UC Followup:    Facility:   ED  Date of visit: 8/16/2018  Reason for visit: dehydration, acute renal insufficiency, and dizziness  Current Status: Pt was told to f/u with pcp to have her kidney function rechecked.  Pt denies any dizziness. Pt states she feel foggy and tired. Pt has increased her fluid intake. Pt was told to drink 12 cups of water daily, which appears to improve her condition.         Problem list and histories reviewed & adjusted, as indicated.  Additional history: as documented    Patient Active Problem List   Diagnosis     Depression with anxiety     TMJ (temporomandibular joint syndrome)     S/P hip replacement     Congenital ureteric stenosis     Attention deficit disorder of adult     Lacrimal duct stenosis     Chronic rhinitis     CARDIOVASCULAR SCREENING; LDL GOAL LESS THAN 160     Intermittent asthma     Advanced directives, counseling/discussion     Major depressive disorder, recurrent episode, moderate (H)     Pituitary microadenoma (H)     Obstructive sleep apnea syndrome     Hypertension, goal below 140/90     Osteoarthritis of right knee     S/P total hip arthroplasty     Acute posthemorrhagic anemia     Constipation     Central sleep apnea     Controlled narcolepsy     Esophageal reflux (GERD)     Status post total knee replacement     Primary narcolepsy without cataplexy     Vitamin D deficiency     CKD (chronic kidney disease) stage 3, GFR 30-59 ml/min     Incontinence of feces with fecal urgency     Past Surgical History:   Procedure Laterality Date     ARTHROPLASTY KNEE  7/9/2014    Procedure: ARTHROPLASTY KNEE;  Surgeon: Levi Johnson MD;  Location: [facility] OR     ARTHROPLASTY KNEE Left 11/24/2014    Procedure: ARTHROPLASTY KNEE;  Surgeon: Levi Johnson MD;  Location:  OR     C REIMPLANT URETER,EXTENSIVE TAILORING  1973 1997     [facility]     C TOTAL ABD HYSTERECTOMY+BLAD REPR  1992    Vaginal approach with oophrectomy     C TOTAL KNEE ARTHROPLASTY       GENITOURINARY SURGERY      kidney surgery X 3     ORTHOPEDIC SURGERY      bilat total hip     RECTOCELE REPAIR       SOFT TISSUE SURGERY         Social History   Substance Use Topics     Smoking status: Never Smoker     Smokeless tobacco: Never Used     Alcohol use No     Family History   Problem Relation Age of Onset     Hypertension Mother      Arthritis Mother      Cerebrovascular Disease Father      Three Strokes     Diabetes Father      and brother     Thyroid Disease Sister      Hypothyroid         Allergies   Allergen Reactions     Flagyl [Metronidazole Hcl] Anaphylaxis and Rash     Latex Other (See Comments)     Runny nose     Sulfa Drugs Anaphylaxis     Tape [Adhesive Tape] Hives     Metoprolol Itching and Rash     Recent Labs   Lab Test  08/21/18   1058  08/16/18   1938  08/16/18   1700   02/09/17   1230  02/02/16   1634   09/19/13   0717   08/06/13   1636   A1C   --    --    --    --    --    --    --   5.6   --    --    LDL   --    --    --    --   175*  126*   --   141*   --    --    HDL   --    --    --    --   50  54   --   51   --    --    TRIG   --    --    --    --   125  105   --   111   --    --    ALT   --    --   20   --    --    --    --    --    --   20   CR  1.04  1.21*  1.34*   < >  1.05*  1.06*   < >  0.84   < >  0.82   GFRESTIMATED  52*  44*  39*   < >  52*  52*   < >  68   < >  70   GFRESTBLACK  63  53*  47*   < >  63  63   < >  83   < >  85   POTASSIUM  3.9  3.8  3.5   < >  4.5  3.8   < >  4.1   < >  5.2   TSH   --    --    --    --    --   1.62   --   1.56   --   1.20    < > = values in this interval not displayed.      BP Readings from Last 3 Encounters:   08/21/18 149/89   08/16/18 104/64   03/15/18 146/73    Wt Readings from Last 3 Encounters:   08/21/18 186 lb (84.4 kg)   08/16/18 190 lb (86.2 kg)   03/15/18 189 lb (85.7 kg)                  ROS:  CONSTITUTIONAL: NEGATIVE for fever, chills, change in weight  INTEGUMENTARY/SKIN: NEGATIVE for worrisome rashes, moles or lesions  ENT/MOUTH: NEGATIVE for ear, mouth and throat problems  RESP: NEGATIVE for significant cough or SOB  CV: NEGATIVE for chest pain, palpitations or peripheral edema  GI: NEGATIVE for nausea, abdominal pain, heartburn, or change in bowel habits   female: POSITIVE for urge incontinence.  MUSCULOSKELETAL: NEGATIVE for significant arthralgias or myalgia  NEURO: NEGATIVE for weakness, dizziness or paresthesias  ENDOCRINE: NEGATIVE for temperature intolerance, skin/hair changes  HEME: NEGATIVE for bleeding problems  PSYCHIATRIC: NEGATIVE for changes in mood or affect    OBJECTIVE:     /89 (BP Location: Left arm, Patient Position: Sitting, Cuff Size: Adult Regular)  Pulse 101  Temp 97.6  F (36.4  C) (Oral)  Wt 186 lb (84.4 kg)  SpO2 97%  BMI 31.93 kg/m2  Body mass index is 31.93 kg/(m^2).  GENERAL: healthy, alert and no distress  EYES: Eyes grossly normal to inspection and conjunctivae and sclerae normal  HENT: normal cephalic/atraumatic and oral mucous membranes moist  NECK: no adenopathy, no asymmetry, masses, or scars and thyroid normal to palpation  RESP: lungs clear to auscultation - no rales, rhonchi or wheezes  CV: regular rate and rhythm, normal S1 S2, no S3 or S4, no murmur, click or rub, no peripheral edema and peripheral pulses strong  ABDOMEN: soft, nontender, no hepatosplenomegaly, no masses and bowel sounds normal  MS: no gross musculoskeletal defects noted, no edema  SKIN: no suspicious lesions or rashes  NEURO: Normal strength and tone, mentation intact and speech normal  PSYCH: mentation appears normal, affect normal/bright    Diagnostic Test Results:  Pending.  ASSESSMENT/PLAN:   (N18.3) Chronic kidney disease (CKD) stage G3a/A1, moderately decreased glomerular filtration rate (GFR) between 45-59 mL/min/1.73 square meter and albuminuria creatinine ratio  less than 30 mg/g  (primary encounter diagnosis)  Comment:   Plan: Basic metabolic panel  (Ca, Cl, CO2, Creat,         Gluc, K, Na, BUN)            (N39.41) Urge incontinence of urine  Comment: Recommend medication with less muscarinic side effecf  Plan: darifenacin (ENABLEX) 7.5 MG 24 hr tablet            (I10) Hypertension goal BP (blood pressure) < 140/90  Comment: Avoid any diuretics due to risk of kidney injury.  Plan: amLODIPine (NORVASC) 5 MG tablet          Follow-up visit if condition worsens.    Danny Conteh MD  [facility]

## 2018-08-21 NOTE — MR AVS SNAPSHOT
After Visit Summary   8/21/2018    Gem Gama    MRN: [id number]           Patient Information     Date Of Birth          1950        Visit Information        Provider Department      8/21/2018 10:00 AM Danny Conteh MD [facility]        Today's Diagnoses     Chronic kidney disease (CKD) stage G3a/A1, moderately decreased glomerular filtration rate (GFR) between 45-59 mL/min/1.73 square meter and albuminuria creatinine ratio less than 30 mg/g    -  1    Urge incontinence of urine        Hypertension goal BP (blood pressure) < 140/90          Care Instructions    At [facility], we strive to deliver an exceptional experience to you, every time we see you.  If you receive a survey in the mail, please send us back your thoughts. We really do value your feedback.    Suggested websites for health information:  Www.backstitch : Up to date and easily searchable information on multiple topics.  Www.[employer].gov : medication info, interactive tutorials, watch real surgeries online  Www.familydoctor.org : good info from the Academy of Family Physicians  Www.cdc.gov : public health info, travel advisories, epidemics (H1N1)  Www.aap.org : children's health info, normal development, vaccinations  Www.health.[facility].[place].us : [place] dept of health, public health issues in [place], N1N1    Your care team:                            Family Medicine Internal Medicine   MD Danny Christian MD Shantel Branch-Fleming, MD Katya Georgiev PA-C Megan Hill, APRBELGICA Wagner MD Pediatrics   PATTY Caro, MD Maria E Ram APRN CNP   MD Raissa Bravo MD Deborah Mielke, MD Kim Thein, APRN [facility]      Clinic hours: Monday - Thursday 7 am-7 pm; Fridays 7 am-5 pm.   Urgent care: Monday - Friday 11 am-9 pm; Saturday and Sunday 9 am-5 pm.  Pharmacy : Monday -Thursday 8 am-8 pm; Friday 8 am-6 pm;  Saturday and Sunday 9 am-5 pm.     Clinic: [phone]   Pharmacy: [phone]              Follow-ups after your visit        Follow-up notes from your care team     Return in about 4 weeks (around 9/18/2018).      Who to contact     If you have questions or need follow up information about today's clinic visit or your schedule please contact [facility] [place] [facility] directly at [phone].  Normal or non-critical lab and imaging results will be communicated to you by BatesHookhart, letter or phone within 4 business days after the clinic has received the results. If you do not hear from us within 7 days, please contact the clinic through BatesHookhart or phone. If you have a critical or abnormal lab result, we will notify you by phone as soon as possible.  Submit refill requests through Freenom or call your pharmacy and they will forward the refill request to us. Please allow 3 business days for your refill to be completed.          Additional Information About Your Visit        BatesHookhart Information     Freenom gives you secure access to your electronic health record. If you see a primary care provider, you can also send messages to your care team and make appointments. If you have questions, please call your primary care clinic.  If you do not have a primary care provider, please call [phone] and they will assist you.        Care EveryWhere ID     This is your Care EveryWhere ID. This could be used by other organizations to access your [place] medical records  [id number]        Your Vitals Were     Pulse Temperature Pulse Oximetry BMI (Body Mass Index)          101 97.6  F (36.4  C) (Oral) 97% 31.93 kg/m2         Blood Pressure from Last 3 Encounters:   08/21/18 149/89   08/16/18 104/64   03/15/18 146/73    Weight from Last 3 Encounters:   08/21/18 186 lb (84.4 kg)   08/16/18 190 lb (86.2 kg)   03/15/18 189 lb (85.7 kg)              We Performed the Following     Basic metabolic panel  (Ca, Cl, CO2,  Creat, Gluc, K, Na, BUN)          Today's Medication Changes          These changes are accurate as of 8/21/18 10:54 AM.  If you have any questions, ask your nurse or doctor.               Start taking these medicines.        Dose/Directions    amLODIPine 5 MG tablet   Commonly known as:  NORVASC   Used for:  Hypertension goal BP (blood pressure) < 140/90   Started by:  Danny Conteh MD        Dose:  5 mg   Take 1 tablet (5 mg) by mouth 2 times daily (with meals)   Quantity:  60 tablet   Refills:  1       darifenacin 7.5 MG 24 hr tablet   Commonly known as:  ENABLEX   Used for:  Urge incontinence of urine   Started by:  Danny Conteh MD        Dose:  7.5 mg   Take 1 tablet (7.5 mg) by mouth daily   Quantity:  30 tablet   Refills:  0            Where to get your medicines      These medications were sent to [facility] [address] [place] [address] [place] [id number] [place] [address] [facility] [address]  [address] [place] [address]     Phone:  [phone]     amLODIPine 5 MG tablet    darifenacin 7.5 MG 24 hr tablet                Primary Care Provider Office Phone # Fax #    Edna Sharona Biggs [phone]       [address]  [facility] [address]        Equal Access to Services     [facility] AH: Hadii aad ku hadasho Soomaali, waaxda luqadaha, qaybta kaalmada adeegyada, waxay idiin hayaan adeeg kharash la'paige . So [facility] [phone].    ATENCIÓN: Si habla español, tiene a amato disposición servicios gratuitos de asistencia lingüística. Llame al [phone].    We comply with applicable federal civil rights laws and [place] laws. We do not discriminate on the basis of race, color, national origin, age, disability, sex, sexual orientation, or gender identity.            Thank you!     Thank you for choosing [facility]  for your care. Our goal is always to provide you with excellent care. Hearing back from our patients is one way we can continue to  improve our services. Please take a few minutes to complete the written survey that you may receive in the mail after your visit with us. Thank you!             Your Updated Medication List - Protect others around you: Learn how to safely use, store and throw away your medicines at www.disposemymeds.org.          This list is accurate as of 8/21/18 10:54 AM.  Always use your most recent med list.                   Brand Name Dispense Instructions for use Diagnosis    albuterol (2.5 MG/3ML) 0.083% neb solution     100 vial    Take 1 vial (2.5 mg) by nebulization every 6 hours as needed for shortness of breath / dyspnea    Acute bronchitis, unspecified organism       amLODIPine 5 MG tablet    NORVASC    60 tablet    Take 1 tablet (5 mg) by mouth 2 times daily (with meals)    Hypertension goal BP (blood pressure) < 140/90       darifenacin 7.5 MG 24 hr tablet    ENABLEX    30 tablet    Take 1 tablet (7.5 mg) by mouth daily    Urge incontinence of urine       guaiFENesin-codeine 100-10 MG/5ML Soln solution    ROBITUSSIN AC    236 mL    Take 10 mLs by mouth every 4 hours as needed for cough    Acute sinusitis with symptoms > 10 days       losartan 100 MG tablet    COZAAR    90 tablet    Take 1 tablet (100 mg) by mouth daily    Essential hypertension with goal blood pressure less than 140/90       sertraline 100 MG tablet    ZOLOFT    90 tablet    Take 1 tablet (100 mg) by mouth daily    Major depressive disorder, recurrent episode, moderate (H)       VITAMIN D (CHOLECALCIFEROL) PO      Take 2,000 Units by mouth 3 times daily

## 2018-08-22 ASSESSMENT — ANXIETY QUESTIONNAIRES: GAD7 TOTAL SCORE: 1

## 2018-08-22 ASSESSMENT — ASTHMA QUESTIONNAIRES: ACT_TOTALSCORE: 24

## 2018-08-22 ASSESSMENT — PATIENT HEALTH QUESTIONNAIRE - PHQ9: SUM OF ALL RESPONSES TO PHQ QUESTIONS 1-9: 8

## 2018-09-27 ENCOUNTER — OFFICE VISIT (OUTPATIENT)
Dept: FAMILY MEDICINE | Facility: CLINIC | Age: 68
End: 2018-09-27
Payer: MEDICARE

## 2018-09-27 VITALS
RESPIRATION RATE: 18 BRPM | OXYGEN SATURATION: 98 % | DIASTOLIC BLOOD PRESSURE: 52 MMHG | HEIGHT: 64 IN | SYSTOLIC BLOOD PRESSURE: 127 MMHG | HEART RATE: 120 BPM | TEMPERATURE: 98.4 F | BODY MASS INDEX: 32.1 KG/M2 | WEIGHT: 188 LBS

## 2018-09-27 DIAGNOSIS — N18.30 CHRONIC KIDNEY DISEASE, STAGE 3 (MODERATE): ICD-10-CM

## 2018-09-27 DIAGNOSIS — Z23 NEED FOR PROPHYLACTIC VACCINATION AND INOCULATION AGAINST INFLUENZA: ICD-10-CM

## 2018-09-27 DIAGNOSIS — I10 HYPERTENSION GOAL BP (BLOOD PRESSURE) < 130/80: Primary | ICD-10-CM

## 2018-09-27 DIAGNOSIS — N32.81 OVERACTIVE BLADDER: ICD-10-CM

## 2018-09-27 DIAGNOSIS — Z23 NEED FOR PROPHYLACTIC VACCINATION AGAINST STREPTOCOCCUS PNEUMONIAE (PNEUMOCOCCUS): ICD-10-CM

## 2018-09-27 PROCEDURE — G0008 ADMIN INFLUENZA VIRUS VAC: HCPCS | Performed by: INTERNAL MEDICINE

## 2018-09-27 PROCEDURE — G0009 ADMIN PNEUMOCOCCAL VACCINE: HCPCS | Performed by: INTERNAL MEDICINE

## 2018-09-27 PROCEDURE — 90662 IIV NO PRSV INCREASED AG IM: CPT | Performed by: INTERNAL MEDICINE

## 2018-09-27 PROCEDURE — 90732 PPSV23 VACC 2 YRS+ SUBQ/IM: CPT | Performed by: INTERNAL MEDICINE

## 2018-09-27 PROCEDURE — 99214 OFFICE O/P EST MOD 30 MIN: CPT | Mod: 25 | Performed by: INTERNAL MEDICINE

## 2018-09-27 RX ORDER — TOLTERODINE 4 MG/1
4 CAPSULE, EXTENDED RELEASE ORAL DAILY
Qty: 30 CAPSULE | Refills: 11 | Status: ON HOLD | OUTPATIENT
Start: 2018-09-27 | End: 2019-10-09

## 2018-09-27 RX ORDER — AMLODIPINE BESYLATE 5 MG/1
5 TABLET ORAL 2 TIMES DAILY WITH MEALS
Qty: 180 TABLET | Refills: 3 | Status: ON HOLD | OUTPATIENT
Start: 2018-09-27 | End: 2019-10-09

## 2018-09-27 ASSESSMENT — PAIN SCALES - GENERAL: PAINLEVEL: NO PAIN (0)

## 2018-09-27 NOTE — MR AVS SNAPSHOT
After Visit Summary   9/27/2018    Gem Gama    MRN: [id number]           Patient Information     Date Of Birth          1950        Visit Information        Provider Department      9/27/2018 8:20 AM Danny Conteh MD [facility]        Today's Diagnoses     Hypertension goal BP (blood pressure) < 130/80    -  1    Overactive bladder        Chronic kidney disease, stage 3 (moderate)        Fatigue, unspecified type        Need for prophylactic vaccination and inoculation against influenza        Need for prophylactic vaccination against Streptococcus pneumoniae (pneumococcus)          Care Instructions    At [facility], we strive to deliver an exceptional experience to you, every time we see you.  If you receive a survey in the mail, please send us back your thoughts. We really do value your feedback.    Your care team:                            Family Medicine Internal Medicine   MD Danny Christian MD Shantel Branch-Fleming, MD Katya Georgiev PA-C Megan Hill, APRN CNP    Sd Wagner MD Pediatrics   PATTY Caro, CNP MD Maria E Bone APRN CNP   MD Raissa Bravo MD Deborah Mielke, MD Kim Thein, APRN [facility]      Clinic hours: Monday - Thursday 7 am-7 pm; Fridays 7 am-5 pm.   Urgent care: Monday - Friday 11 am-9 pm; Saturday and Sunday 9 am-5 pm.  Pharmacy : Monday -Thursday 8 am-8 pm; Friday 8 am-6 pm; Saturday and Sunday 9 am-5 pm.     Clinic: [phone]   Pharmacy: [phone]                Follow-ups after your visit        Additional Services     NEPHROLOGY ADULT REFERRAL       Your provider has referred you to: FM[facility]: [facility] [place] [phone]   [email or web address]    Please be aware that coverage of these services is subject to the terms  and limitations of your health insurance plan.  Call member services at your health plan with any benefit or coverage questions.      Reason for referral:  Unexplained decline in eGFR > 15 ml/min between two readings.   Please bring the following to your appointment:    >>   Any x-rays, CTs or MRIs which have been performed.  Contact the facility where they were done to arrange for  prior to your scheduled appointment.   >>   List of current medications   >>   This referral request   >>   Any documents/labs given to you for this referral                  Future tests that were ordered for you today     Open Future Orders        Priority Expected Expires Ordered    US Renal Complete Routine  9/27/2019 9/27/2018            Who to contact     If you have questions or need follow up information about today's clinic visit or your schedule please contact [facility] directly at [phone].  Normal or non-critical lab and imaging results will be communicated to you by MyChart, letter or phone within 4 business days after the clinic has received the results. If you do not hear from us within 7 days, please contact the clinic through Sumavisoshart or phone. If you have a critical or abnormal lab result, we will notify you by phone as soon as possible.  Submit refill requests through [email or web address] or call your pharmacy and they will forward the refill request to us. Please allow 3 business days for your refill to be completed.          Additional Information About Your Visit        Found[employer].com Information     [email or web address] gives you secure access to your electronic health record. If you see a primary care provider, you can also send messages to your care team and make appointments. If you have questions, please call your primary care clinic.  If you do not have a primary care provider, please call [phone] and they will assist you.        Care EveryWhere ID     This is your Care EveryWhere ID. This could be used by  "other organizations to access your [place] medical records  [id number]        Your Vitals Were     Pulse Temperature Respirations Height Pulse Oximetry BMI (Body Mass Index)    120 98.4  F (36.9  C) (Oral) 18 5' 4\" (1.626 m) 98% 32.27 kg/m2       Blood Pressure from Last 3 Encounters:   09/27/18 127/52   08/21/18 149/89   08/16/18 104/64    Weight from Last 3 Encounters:   09/27/18 188 lb (85.3 kg)   08/21/18 186 lb (84.4 kg)   08/16/18 190 lb (86.2 kg)              We Performed the Following     FLU VACCINE, INCREASED ANTIGEN, PRESV FREE     NEPHROLOGY ADULT REFERRAL     PPSV23, IM/SUBQ (2+ YRS) - [id number]          Today's Medication Changes          These changes are accurate as of 9/27/18  9:31 AM.  If you have any questions, ask your nurse or doctor.               Start taking these medicines.        Dose/Directions    tolterodine 4 MG 24 hr capsule   Commonly known as:  DETROL LA   Used for:  Overactive bladder   Started by:  Danny Conteh MD        Dose:  4 mg   Take 1 capsule (4 mg) by mouth daily   Quantity:  30 capsule   Refills:  11         Stop taking these medicines if you haven't already. Please contact your care team if you have questions.     darifenacin 7.5 MG 24 hr tablet   Commonly known as:  ENABLEX   Stopped by:  Danny Conteh MD           losartan 100 MG tablet   Commonly known as:  COZAAR   Stopped by:  Danny Conteh MD                Where to get your medicines      These medications were sent to [id number]  [place]  [address] [place] AT [facility] [address] [place] [address]     Phone:  [phone]     amLODIPine 5 MG tablet    tolterodine 4 MG 24 hr capsule                Primary Care Provider Office Phone # Fax #    Danny Conteh [phone] [address]  [place] [address]        Equal Access to Services     ROLAN KU AH: clayton Fuentes " delroy quynhlucindapaul nazanindeidre esparza ah. So [facility] [phone].    ATENCIÓN: Si rosemary ramirez, tiene a amato disposición servicios gratuitos de asistencia lingüística. Stephan al [phone].    We comply with applicable federal civil rights laws and [place] laws. We do not discriminate on the basis of race, color, national origin, age, disability, sex, sexual orientation, or gender identity.            Thank you!     Thank you for choosing [facility]  for your care. Our goal is always to provide you with excellent care. Hearing back from our patients is one way we can continue to improve our services. Please take a few minutes to complete the written survey that you may receive in the mail after your visit with us. Thank you!             Your Updated Medication List - Protect others around you: Learn how to safely use, store and throw away your medicines at www.disposemymeds.org.          This list is accurate as of 9/27/18  9:31 AM.  Always use your most recent med list.                   Brand Name Dispense Instructions for use Diagnosis    albuterol (2.5 MG/3ML) 0.083% neb solution     100 vial    Take 1 vial (2.5 mg) by nebulization every 6 hours as needed for shortness of breath / dyspnea    Acute bronchitis, unspecified organism       amLODIPine 5 MG tablet    NORVASC    180 tablet    Take 1 tablet (5 mg) by mouth 2 times daily (with meals)    Hypertension goal BP (blood pressure) < 130/80       guaiFENesin-codeine 100-10 MG/5ML Soln solution    ROBITUSSIN AC    236 mL    Take 10 mLs by mouth every 4 hours as needed for cough    Acute sinusitis with symptoms > 10 days       sertraline 100 MG tablet    ZOLOFT    90 tablet    Take 1 tablet (100 mg) by mouth daily    Major depressive disorder, recurrent episode, moderate (H)       tolterodine 4 MG 24 hr capsule    DETROL LA    30 capsule    Take 1 capsule (4 mg) by mouth daily    Overactive bladder        VITAMIN D (CHOLECALCIFEROL) PO      Take 2,000 Units by mouth 3 times daily

## 2018-09-27 NOTE — PATIENT INSTRUCTIONS
At [facility], we strive to deliver an exceptional experience to you, every time we see you.  If you receive a survey in the mail, please send us back your thoughts. We really do value your feedback.    Your care team:                            Family Medicine Internal Medicine   MD Danny Christian MD Shantel Branch-Fleming, MD Katya Georgiev PA-C Megan Hill, APRN DEVON Wagner MD Pediatrics   Mustapha Suresh, PATTY Avalos, MD Maria E Ram APRN CNP   MD Raissa Bravo MD Deborah Mielke, MD Sharona Huitron, APRN [facility]      Clinic hours: Monday - Thursday 7 am-7 pm; Fridays 7 am-5 pm.   Urgent care: Monday - Friday 11 am-9 pm; Saturday and Sunday 9 am-5 pm.  Pharmacy : Monday -Thursday 8 am-8 pm; Friday 8 am-6 pm; Saturday and Sunday 9 am-5 pm.     Clinic: [phone]   Pharmacy: [phone]

## 2018-09-27 NOTE — PROGRESS NOTES
SUBJECTIVE:   Gem Gama is a 67 year old female who presents to clinic today for the following health issues:      1) Hypertension Follow-up      Outpatient blood pressures are not being checked.    Low Salt Diet: low salt    Amount of exercise or physical activity: None    Problems taking medications regularly: No    Medication side effects: nuisance side effects from Amlodipine (minimal swelling of both ankles)    Diet: regular (no restrictions) and low salt          Problem list and histories reviewed & adjusted, as indicated.  Additional history: as documented    Patient Active Problem List   Diagnosis     Depression with anxiety     TMJ (temporomandibular joint syndrome)     S/P hip replacement     Congenital ureteric stenosis     Attention deficit disorder of adult     Lacrimal duct stenosis     Chronic rhinitis     CARDIOVASCULAR SCREENING; LDL GOAL LESS THAN 160     Intermittent asthma     Advanced directives, counseling/discussion     Major depressive disorder, recurrent episode, moderate (H)     Pituitary microadenoma (H)     Obstructive sleep apnea syndrome     Hypertension, goal below 140/90     Osteoarthritis of right knee     S/P total hip arthroplasty     Acute posthemorrhagic anemia     Constipation     Central sleep apnea     Controlled narcolepsy     Esophageal reflux (GERD)     Status post total knee replacement     Primary narcolepsy without cataplexy     Vitamin D deficiency     CKD (chronic kidney disease) stage 3, GFR 30-59 ml/min     Incontinence of feces with fecal urgency     Past Surgical History:   Procedure Laterality Date     ARTHROPLASTY KNEE  7/9/2014    Procedure: ARTHROPLASTY KNEE;  Surgeon: Levi Johnson MD;  Location: [facility] OR     ARTHROPLASTY KNEE Left 11/24/2014    Procedure: ARTHROPLASTY KNEE;  Surgeon: Levi Johnson MD;  Location: [facility] OR     C REIMPLANT URETER,EXTENSIVE TAILORING  1973 1997     C REPAIR ENTEROCELE,VAG APPRCH  2008    Prolift     C TOTAL ABD  HYSTERECTOMY+BLAD REPR  1992    Vaginal approach with oophrectomy     C TOTAL KNEE ARTHROPLASTY       GENITOURINARY SURGERY      kidney surgery X 3     ORTHOPEDIC SURGERY      bilat total hip     RECTOCELE REPAIR       SOFT TISSUE SURGERY         Social History   Substance Use Topics     Smoking status: Never Smoker     Smokeless tobacco: Never Used     Alcohol use No     Family History   Problem Relation Age of Onset     Hypertension Mother      Arthritis Mother      Cerebrovascular Disease Father      Three Strokes     Diabetes Father      and brother     Thyroid Disease Sister      Hypothyroid         Allergies   Allergen Reactions     Flagyl [Metronidazole Hcl] Anaphylaxis and Rash     Latex Other (See Comments)     Runny nose     Sulfa Drugs Anaphylaxis     Tape [Adhesive Tape] Hives     Metoprolol Itching and Rash     Recent Labs   Lab Test  08/21/18   1058  08/16/18   1938  08/16/18   1700   02/09/17   1230  02/02/16   1634   09/19/13   0717   08/06/13   1636   A1C   --    --    --    --    --    --    --   5.6   --    --    LDL   --    --    --    --   175*  126*   --   141*   --    --    HDL   --    --    --    --   50  54   --   51   --    --    TRIG   --    --    --    --   125  105   --   111   --    --    ALT   --    --   20   --    --    --    --    --    --   20   CR  1.04  1.21*  1.34*   < >  1.05*  1.06*   < >  0.84   < >  0.82   GFRESTIMATED  52*  44*  39*   < >  52*  52*   < >  68   < >  70   GFRESTBLACK  63  53*  47*   < >  63  63   < >  83   < >  85   POTASSIUM  3.9  3.8  3.5   < >  4.5  3.8   < >  4.1   < >  5.2   TSH   --    --    --    --    --   1.62   --   1.56   --   1.20    < > = values in this interval not displayed.      BP Readings from Last 3 Encounters:   09/27/18 127/52   08/21/18 149/89   08/16/18 104/64    Wt Readings from Last 3 Encounters:   09/27/18 188 lb (85.3 kg)   08/21/18 186 lb (84.4 kg)   08/16/18 190 lb (86.2 kg)                       ROS:  CONSTITUTIONAL: NEGATIVE for  "fever, chills, change in weight  INTEGUMENTARY/SKIN: NEGATIVE for worrisome rashes, moles or lesions  EYES: NEGATIVE for vision changes or irritation  ENT/MOUTH: NEGATIVE for ear, mouth and throat problems  RESP: NEGATIVE for significant cough or SOB  CV: NEGATIVE for chest pain, palpitations or peripheral edema  GI: NEGATIVE for nausea, abdominal pain, heartburn, or change in bowel habits  : NEGATIVE for frequency, dysuria, or hematuria  MUSCULOSKELETAL: NEGATIVE for significant arthralgias or myalgia  NEURO: NEGATIVE for weakness, dizziness or paresthesias  ENDOCRINE: NEGATIVE for temperature intolerance, skin/hair changes  HEME: NEGATIVE for bleeding problems  PSYCHIATRIC: NEGATIVE for changes in mood or affect    OBJECTIVE:     /52 (BP Location: Left arm, Patient Position: Chair, Cuff Size: Adult Large)  Pulse 120  Temp 98.4  F (36.9  C) (Oral)  Resp 18  Ht 5' 4\" (1.626 m)  Wt 188 lb (85.3 kg)  SpO2 98%  BMI 32.27 kg/m2  Body mass index is 32.27 kg/(m^2).  GENERAL: healthy, alert and no distress  EYES: Eyes grossly normal to inspection, PERRL and conjunctivae and sclerae normal  HENT: ear canals and TM's normal, nose and mouth without ulcers or lesions  NECK: no adenopathy, no asymmetry, masses, or scars and thyroid normal to palpation  RESP: lungs clear to auscultation - no rales, rhonchi or wheezes  CV: regular rate and rhythm, normal S1 S2, no S3 or S4, no murmur, click or rub, no peripheral edema and peripheral pulses strong  ABDOMEN: soft, nontender, no hepatosplenomegaly, no masses and bowel sounds normal  MS: no gross musculoskeletal defects noted, no edema  SKIN: no suspicious lesions or rashes  NEURO: Normal strength and tone, mentation intact and speech normal  PSYCH: mentation appears normal, affect normal/bright    Diagnostic Test Results:  Results for orders placed or performed in visit on 08/21/18   Basic metabolic panel  (Ca, Cl, CO2, Creat, Gluc, K, Na, BUN)   Result Value Ref " Range    Sodium 140 133 - 144 mmol/L    Potassium 3.9 3.4 - 5.3 mmol/L    Chloride 109 94 - 109 mmol/L    Carbon Dioxide 22 20 - 32 mmol/L    Anion Gap 9 3 - 14 mmol/L    Glucose 97 70 - 99 mg/dL    Urea Nitrogen 15 7 - 30 mg/dL    Creatinine 1.04 0.52 - 1.04 mg/dL    GFR Estimate 52 (L) >60 mL/min/1.7m2    GFR Estimate If Black 63 >60 mL/min/1.7m2    Calcium 9.0 8.5 - 10.1 mg/dL       ASSESSMENT/PLAN:     (I10) Hypertension goal BP (blood pressure) < 130/80  (primary encounter diagnosis)  Comment: Improved with Amlodipine, with minor nuisance side effects.  Plan: amLODIPine (NORVASC) 5 MG tablet            (N32.81) Overactive bladder  Comment:   Plan: tolterodine (DETROL LA) 4 MG 24 hr capsule,         UROLOGY ADULT REFERRAL            (N18.3) Chronic kidney disease, stage 3 (moderate)  Comment: History of dilated ureteropelvic junction, right kidney. Recent serum creatinine is normal after discontinuation of Losartan. Avoid nephrotoxic agents.  Plan: US Renal Complete, NEPHROLOGY ADULT REFERRAL            (Z23) Need for prophylactic vaccination and inoculation against influenza  Comment:   Plan: FLU VACCINE, INCREASED ANTIGEN, PRESV FREE,         Vaccine Administration, Initial [82255]            (Z23) Need for prophylactic vaccination against Streptococcus pneumoniae (pneumococcus)  Comment:   Plan: PPSV23, IM/SUBQ (2+ YRS) - [id number], EA         ADD'L VACCINE            Follow-up visit if condition worsens.      Danny Conteh MD  [facility]

## 2018-09-27 NOTE — NURSING NOTE
Screening Questionnaire for Adult Immunization    Are you sick today?   No   Do you have allergies to medications, food, a vaccine component or latex?   Yes   Have you ever had a serious reaction after receiving a vaccination?   No   Do you have a long-term health problem with heart disease, lung disease, asthma, kidney disease, metabolic disease (e.g. diabetes), anemia, or other blood disorder?   Yes   Do you have cancer, leukemia, HIV/AIDS, or any other immune system problem?   No   In the past 3 months, have you taken medications that affect  your immune system, such as prednisone, other steroids, or anticancer drugs; drugs for the treatment of rheumatoid arthritis, Crohn s disease, or psoriasis; or have you had radiation treatments?   No   Have you had a seizure, or a brain or other nervous system problem?   No   During the past year, have you received a transfusion of blood or blood     products, or been given immune (gamma) globulin or antiviral drug?   No   For women: Are you pregnant or is there a chance you could become        pregnant during the next month?   No   Have you received any vaccinations in the past 4 weeks?   No     Immunization questionnaire was positive for at least one answer.  Notified Wero.        Per orders of Dr. Conteh, injection of FLU and PCV 23 given by Dionne Curtis. Patient instructed to remain in clinic for 15 minutes afterwards, and to report any adverse reaction to me immediately.       Screening performed by Dionne Curtis on 9/27/2018 at 10:05 AM.

## 2019-03-07 DIAGNOSIS — N18.30 CKD (CHRONIC KIDNEY DISEASE) STAGE 3, GFR 30-59 ML/MIN (H): Primary | ICD-10-CM

## 2019-03-13 ENCOUNTER — OFFICE VISIT (OUTPATIENT)
Dept: NEPHROLOGY | Facility: CLINIC | Age: 69
End: 2019-03-13
Payer: MEDICARE

## 2019-03-13 ENCOUNTER — TELEPHONE (OUTPATIENT)
Dept: NEPHROLOGY | Facility: CLINIC | Age: 69
End: 2019-03-13

## 2019-03-13 VITALS
BODY MASS INDEX: 32.61 KG/M2 | DIASTOLIC BLOOD PRESSURE: 85 MMHG | OXYGEN SATURATION: 98 % | HEART RATE: 79 BPM | WEIGHT: 190 LBS | SYSTOLIC BLOOD PRESSURE: 144 MMHG

## 2019-03-13 DIAGNOSIS — E55.9 VITAMIN D DEFICIENCY: ICD-10-CM

## 2019-03-13 DIAGNOSIS — Q62.10: ICD-10-CM

## 2019-03-13 DIAGNOSIS — I10 HYPERTENSION, GOAL BELOW 140/90: ICD-10-CM

## 2019-03-13 DIAGNOSIS — N18.30 CKD (CHRONIC KIDNEY DISEASE) STAGE 3, GFR 30-59 ML/MIN (H): Primary | ICD-10-CM

## 2019-03-13 DIAGNOSIS — E55.9 VITAMIN D DEFICIENCY: Primary | ICD-10-CM

## 2019-03-13 LAB
ALBUMIN SERPL-MCNC: 3.9 G/DL (ref 3.4–5)
ALBUMIN UR-MCNC: NEGATIVE MG/DL
ANION GAP SERPL CALCULATED.3IONS-SCNC: 6 MMOL/L (ref 3–14)
APPEARANCE UR: ABNORMAL
BILIRUB UR QL STRIP: NEGATIVE
BUN SERPL-MCNC: 15 MG/DL (ref 7–30)
CALCIUM SERPL-MCNC: 8.8 MG/DL (ref 8.5–10.1)
CHLORIDE SERPL-SCNC: 108 MMOL/L (ref 94–109)
CO2 SERPL-SCNC: 28 MMOL/L (ref 20–32)
COLOR UR AUTO: ABNORMAL
CREAT SERPL-MCNC: 0.94 MG/DL (ref 0.52–1.04)
CREAT UR-MCNC: 60 MG/DL
DEPRECATED CALCIDIOL+CALCIFEROL SERPL-MC: 18 UG/L (ref 20–75)
ERYTHROCYTE [DISTWIDTH] IN BLOOD BY AUTOMATED COUNT: 13.1 % (ref 10–15)
GFR SERPL CREATININE-BSD FRML MDRD: 61 ML/MIN/{1.73_M2}
GLUCOSE SERPL-MCNC: 104 MG/DL (ref 70–99)
GLUCOSE UR STRIP-MCNC: NEGATIVE MG/DL
HCT VFR BLD AUTO: 42.7 % (ref 35–47)
HGB BLD-MCNC: 15 G/DL (ref 11.7–15.7)
HGB UR QL STRIP: NEGATIVE
KETONES UR STRIP-MCNC: NEGATIVE MG/DL
LEUKOCYTE ESTERASE UR QL STRIP: NEGATIVE
MCH RBC QN AUTO: 29.5 PG (ref 26.5–33)
MCHC RBC AUTO-ENTMCNC: 35.1 G/DL (ref 31.5–36.5)
MCV RBC AUTO: 84 FL (ref 78–100)
MICROALBUMIN UR-MCNC: <5 MG/L
MICROALBUMIN/CREAT UR: NORMAL MG/G CR (ref 0–25)
NITRATE UR QL: NEGATIVE
NON-SQ EPI CELLS #/AREA URNS LPF: ABNORMAL /LPF
PH UR STRIP: 5 PH (ref 5–7)
PHOSPHATE SERPL-MCNC: 4.7 MG/DL (ref 2.5–4.5)
PLATELET # BLD AUTO: 186 10E9/L (ref 150–450)
POTASSIUM SERPL-SCNC: 3.6 MMOL/L (ref 3.4–5.3)
PROT UR-MCNC: <0.05 G/L
PROT/CREAT 24H UR: NORMAL G/G CR (ref 0–0.2)
PTH-INTACT SERPL-MCNC: 30 PG/ML (ref 18–80)
RBC # BLD AUTO: 5.09 10E12/L (ref 3.8–5.2)
RBC #/AREA URNS AUTO: ABNORMAL /HPF
SODIUM SERPL-SCNC: 142 MMOL/L (ref 133–144)
SOURCE: ABNORMAL
SP GR UR STRIP: 1.01 (ref 1–1.03)
UROBILINOGEN UR STRIP-MCNC: NORMAL MG/DL (ref 0–2)
WBC # BLD AUTO: 5.4 10E9/L (ref 4–11)
WBC #/AREA URNS AUTO: ABNORMAL /HPF

## 2019-03-13 PROCEDURE — 99204 OFFICE O/P NEW MOD 45 MIN: CPT | Performed by: INTERNAL MEDICINE

## 2019-03-13 PROCEDURE — 83970 ASSAY OF PARATHORMONE: CPT | Performed by: INTERNAL MEDICINE

## 2019-03-13 PROCEDURE — 80069 RENAL FUNCTION PANEL: CPT | Performed by: INTERNAL MEDICINE

## 2019-03-13 PROCEDURE — 82043 UR ALBUMIN QUANTITATIVE: CPT | Performed by: INTERNAL MEDICINE

## 2019-03-13 PROCEDURE — 82306 VITAMIN D 25 HYDROXY: CPT | Performed by: INTERNAL MEDICINE

## 2019-03-13 PROCEDURE — 84156 ASSAY OF PROTEIN URINE: CPT | Performed by: INTERNAL MEDICINE

## 2019-03-13 PROCEDURE — 36415 COLL VENOUS BLD VENIPUNCTURE: CPT | Performed by: INTERNAL MEDICINE

## 2019-03-13 PROCEDURE — 85027 COMPLETE CBC AUTOMATED: CPT | Performed by: INTERNAL MEDICINE

## 2019-03-13 PROCEDURE — 81001 URINALYSIS AUTO W/SCOPE: CPT | Performed by: INTERNAL MEDICINE

## 2019-03-13 RX ORDER — AMOXICILLIN 500 MG/1
CAPSULE ORAL
Refills: 1 | COMMUNITY
Start: 2019-02-09 | End: 2020-09-06

## 2019-03-13 RX ORDER — LOSARTAN POTASSIUM 25 MG/1
12.5 TABLET ORAL DAILY
Qty: 30 TABLET | Refills: 1 | Status: SHIPPED | OUTPATIENT
Start: 2019-03-13 | End: 2019-06-25

## 2019-03-13 RX ORDER — CHOLECALCIFEROL (VITAMIN D3) 50 MCG
1 TABLET ORAL DAILY
Qty: 30 TABLET | Refills: 1 | Status: SHIPPED | OUTPATIENT
Start: 2019-03-13 | End: 2019-06-25

## 2019-03-13 ASSESSMENT — PAIN SCALES - GENERAL: PAINLEVEL: MILD PAIN (3)

## 2019-03-13 NOTE — PATIENT INSTRUCTIONS
1. Start taking Losartan at 12.5 mg daily   2. Your goal blood pressure will be < 130/90  3. Increase Losartan to 25 mg daily if pressures remain above the goal  4. We will recheck your kidney function in 1 week after starting Losartan

## 2019-03-13 NOTE — PROGRESS NOTES
March 13, 2019    CC:     HPI: Gem Gama is a 68 year old female who presents for evaluation of elevated creatinine.   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 She has had surgery for the same in 1972. She also had ureteropelvic junction obstruction requiring surgery in 1996 and 1997. She has also been diagnosed with HTN and has been on antihypertensives for past few years. Her creatinine has ranged most recently between 0.8-1.0 mg/dl however has had creatinine as high as 1.34 mg/dl and has fluctuated widely.     - History of Hematuria: no  - Swelling: no  - Hx of UTIs: no  - Hx of stones: no  - Rashes/Joint pain: no  - Family hx of kidney disease: no  - NSAID use: no    Patient denies any LE edema, exertional dyspnea/orthopnea/PND, dizziness   lightheadedness, nausea, vomiting or diarrhea.     # Elevated creatinine: she likely has some degree of underlying CKD from the complications of congenital ureteral constriction. Renogram done in 2013 was consistent with partially obstructed right renal pelvis, but the degree was low and appeared to be a result of remote pyeloplasty.   --Currently her creatinine is at 0.94 which is her baseline. UA without hematuria or proteinuria. Fluctuations in her kidney function are expected especially after starting Losartan.   --Will recheck US kidneys.     # BP/Volume status: Most of the times optimally controlled to a goal of < 130/90 mm of Hg. She will continue to check home blood pressures.   --Currently on 5 mg BID of amlodipine   --Will start losartan 12.5 mg daily.  --Check BMP in 1 week.     # Vitamin D deficiency: Will start Cholecalciferol 2000 international unit(s) daily.     Candice Interiano MD        Allergies   Allergen Reactions     Flagyl [Metronidazole Hcl] Anaphylaxis and Rash     Latex Other (See Comments)     Runny nose     Sulfa Drugs Anaphylaxis     Tape [Adhesive Tape] Hives     Metoprolol Itching and Rash          Current Outpatient Medications on File Prior to Visit:  amLODIPine (NORVASC) 5 MG tablet Take 1 tablet (5 mg) by mouth 2 times daily (with meals)   sertraline (ZOLOFT) 100 MG tablet Take 1 tablet (100 mg) by mouth daily   albuterol (2.5 MG/3ML) 0.083% neb solution Take 1 vial (2.5 mg) by nebulization every 6 hours as needed for shortness of breath / dyspnea (Patient not taking: Reported on 3/15/2018)   amoxicillin (AMOXIL) 500 MG capsule TK 4 CS PO ONE HOUR BEFORE DENTAL APPOINTMENT   guaiFENesin-codeine (ROBITUSSIN AC) 100-10 MG/5ML SOLN Take 10 mLs by mouth every 4 hours as needed for cough (Patient not taking: Reported on 3/15/2018)   tolterodine (DETROL LA) 4 MG 24 hr capsule Take 1 capsule (4 mg) by mouth daily (Patient not taking: Reported on 3/13/2019)   VITAMIN D, CHOLECALCIFEROL, PO Take 2,000 Units by mouth 3 times daily      No current facility-administered medications on file prior to visit.     Past Medical History:   Diagnosis Date     ADHD (attention deficit hyperactivity disorder)      Anxiety      Arthritis     back, hands, knees, hips     Asthma     controlled--has coughing symptoms     C. difficile diarrhea      Central sleep apnea      Depression      Gastro-oesophageal reflux disease      Hypertension      Narcolepsy      Renal disease     gfr is aroung 48     Sleep apnea     uses Cpap       Past Surgical History:   Procedure Laterality Date     ARTHROPLASTY KNEE  7/9/2014    Procedure: ARTHROPLASTY KNEE;  Surgeon: Levi Johnson MD;  Location: [facility] OR     ARTHROPLASTY KNEE Left 11/24/2014    Procedure: ARTHROPLASTY KNEE;  Surgeon: Levi Johnson MD;  Location:  OR     C REIMPLANT URETER,EXTENSIVE TAILORING  1973 1997     C REPAIR ENTEROCELE,VAG APPRCH  2008    Prolift     C TOTAL ABD HYSTERECTOMY+BLAD REPR  1992    Vaginal approach with oophrectomy     C TOTAL KNEE ARTHROPLASTY       GENITOURINARY SURGERY      kidney surgery X 3     ORTHOPEDIC SURGERY      bilat total hip      RECTOCELE REPAIR       SOFT TISSUE SURGERY         Social History     Tobacco Use     Smoking status: Never Smoker     Smokeless tobacco: Never Used   Substance Use Topics     Alcohol use: No     Drug use: No       Family History   Problem Relation Age of Onset     Hypertension Mother      Arthritis Mother      Cerebrovascular Disease Father         Three Strokes     Diabetes Father         and brother     Thyroid Disease Sister         Hypothyroid       ROS: A 12 system review of systems was negative other than noted here or above.     Exam:  /85 (BP Location: Right arm, Patient Position: Sitting, Cuff Size: Adult Large)   Pulse 79   Wt 86.2 kg (190 lb)   SpO2 98%   BMI 32.61 kg/m      GENERAL APPEARANCE: alert and no distress  EYES: PERRL, no scleral icterus  HENT: mouth without ulcers or lesions  NECK: supple, no adenopathy  RESP: lungs clear to auscultation   CV: regular rhythm, normal rate, no rub  Extremities: no clubbing, cyanosis, or edema  SKIN: no rash  NEURO: mentation intact and speech normal  PSYCH: affect normal/bright    Results:    Orders Only on 03/13/2019   Component Date Value Ref Range Status     Creatinine Urine 03/13/2019 60  mg/dL Final     Albumin Urine mg/L 03/13/2019 <5  mg/L Final     Albumin Urine mg/g Cr 03/13/2019 Unable to calculate due to low value  0 - 25 mg/g Cr Final     WBC 03/13/2019 5.4  4.0 - 11.0 10e9/L Final     RBC Count 03/13/2019 5.09  3.8 - 5.2 10e12/L Final     Hemoglobin 03/13/2019 15.0  11.7 - 15.7 g/dL Final     Hematocrit 03/13/2019 42.7  35.0 - 47.0 % Final     MCV 03/13/2019 84  78 - 100 fl Final     MCH 03/13/2019 29.5  26.5 - 33.0 pg Final     MCHC 03/13/2019 35.1  31.5 - 36.5 g/dL Final     RDW 03/13/2019 13.1  10.0 - 15.0 % Final     Platelet Count 03/13/2019 186  150 - 450 10e9/L Final     Color Urine 03/13/2019 Straw   Final     Appearance Urine 03/13/2019 Slightly Cloudy   Final     Glucose Urine 03/13/2019 Negative  NEG^Negative mg/dL Final      Bilirubin Urine 03/13/2019 Negative  NEG^Negative Final     Ketones Urine 03/13/2019 Negative  NEG^Negative mg/dL Final     Specific Gravity Urine 03/13/2019 1.006  1.003 - 1.035 Final     Blood Urine 03/13/2019 Negative  NEG^Negative Final     pH Urine 03/13/2019 5.0  5.0 - 7.0 pH Final     Protein Albumin Urine 03/13/2019 Negative  NEG^Negative mg/dL Final     Urobilinogen mg/dL 03/13/2019 Normal  0.0 - 2.0 mg/dL Final     Nitrite Urine 03/13/2019 Negative  NEG^Negative Final     Leukocyte Esterase Urine 03/13/2019 Negative  NEG^Negative Final     Source 03/13/2019 Midstream Urine   Final     WBC Urine 03/13/2019 0 - 5  OTO5^0 - 5 /HPF Final     RBC Urine 03/13/2019 O - 2  OTO2^O - 2 /HPF Final     Squamous Epithelial /LPF Urine 03/13/2019 Moderate* FEW^Few /LPF Final

## 2019-03-13 NOTE — TELEPHONE ENCOUNTER
Per Dr Interiano, she would like the pt to take Cholecalciferol 2000 units daily. This prescription was sent to the pt's pharmacy.  Luisa Winters RN

## 2019-03-13 NOTE — TELEPHONE ENCOUNTER
Candice Interiano MD  [facility] Nephrology Adult [place]             Your creatinine is 0.9 mg/dl with an estimated GFR of 61. This is great. Like we discussed it has remained stable over last several years and we often do see fluctuations depending on the hydration status. Lets start the Losartan for your blood pressure with anticipating a slight rise in creatinine.      Written by Candice Interiano MD on 3/13/2019  2:59 PM   You do have vitamin D deficiency. We will send the prescription for replacement to your pharmacy.

## 2019-03-13 NOTE — NURSING NOTE
Gem Gama's goals for this visit include:   Chief Complaint   Patient presents with     Consult     CKD3  RP: Dr. Conteh       She requests these members of her care team be copied on today's visit information: no    PCP: Danny Conteh    Referring Provider:  Danny Conteh MD  [id number] [place] [address]    /85 (BP Location: Right arm, Patient Position: Sitting, Cuff Size: Adult Large)   Pulse 79   Wt 86.2 kg (190 lb)   SpO2 98%   BMI 32.61 kg/m      Do you need any medication refills at today's visit? No    Jossie Cedeno LPN

## 2019-03-13 NOTE — TELEPHONE ENCOUNTER
Spoke to the pt regarding Dr Interiano's recommendations below. She would like a prescription for Vitamin D sent to her pharmacy as soon as possible because she is going out of town until next week. She is aware that the Losartan was already sent.  She is planning on repeating her labs on 3/20 and keep an eye on her blood pressure in the meantime.    Luisa Winters RN

## 2019-03-20 ENCOUNTER — ANCILLARY PROCEDURE (OUTPATIENT)
Dept: ULTRASOUND IMAGING | Facility: CLINIC | Age: 69
End: 2019-03-20
Attending: INTERNAL MEDICINE
Payer: MEDICARE

## 2019-03-20 DIAGNOSIS — N18.30 CKD (CHRONIC KIDNEY DISEASE) STAGE 3, GFR 30-59 ML/MIN (H): ICD-10-CM

## 2019-03-20 DIAGNOSIS — N18.30 CHRONIC KIDNEY DISEASE, STAGE 3 (MODERATE): ICD-10-CM

## 2019-03-20 LAB
ANION GAP SERPL CALCULATED.3IONS-SCNC: 7 MMOL/L (ref 3–14)
BUN SERPL-MCNC: 16 MG/DL (ref 7–30)
CALCIUM SERPL-MCNC: 8.4 MG/DL (ref 8.5–10.1)
CHLORIDE SERPL-SCNC: 112 MMOL/L (ref 94–109)
CO2 SERPL-SCNC: 29 MMOL/L (ref 20–32)
CREAT SERPL-MCNC: 1 MG/DL (ref 0.52–1.04)
GFR SERPL CREATININE-BSD FRML MDRD: 57 ML/MIN/{1.73_M2}
GLUCOSE SERPL-MCNC: 122 MG/DL (ref 70–99)
POTASSIUM SERPL-SCNC: 4 MMOL/L (ref 3.4–5.3)
SODIUM SERPL-SCNC: 148 MMOL/L (ref 133–144)

## 2019-03-20 PROCEDURE — 80048 BASIC METABOLIC PNL TOTAL CA: CPT | Performed by: INTERNAL MEDICINE

## 2019-03-20 PROCEDURE — 76770 US EXAM ABDO BACK WALL COMP: CPT | Performed by: RADIOLOGY

## 2019-03-20 PROCEDURE — 36415 COLL VENOUS BLD VENIPUNCTURE: CPT | Performed by: INTERNAL MEDICINE

## 2019-06-25 DIAGNOSIS — I10 HYPERTENSION, GOAL BELOW 140/90: ICD-10-CM

## 2019-06-25 DIAGNOSIS — E55.9 VITAMIN D DEFICIENCY: ICD-10-CM

## 2019-06-25 RX ORDER — CHOLECALCIFEROL (VITAMIN D3) 50 MCG
1 TABLET ORAL DAILY
Qty: 90 TABLET | Refills: 3 | Status: SHIPPED | OUTPATIENT
Start: 2019-06-25

## 2019-06-25 RX ORDER — LOSARTAN POTASSIUM 25 MG/1
12.5 TABLET ORAL DAILY
Qty: 45 TABLET | Refills: 1 | Status: SHIPPED | OUTPATIENT
Start: 2019-06-25 | End: 2019-09-05

## 2019-06-25 NOTE — TELEPHONE ENCOUNTER
VITAMIN D3 2,000IU TABLETS  Last Written Prescription Date:  03/13/2019  Last Fill Quantity: 30 TABLETS,  # refills: 1   Last office visit: 3/13/2019 with prescribing provider:    Last refill per Pharmacy   Future Office Visit:   Next 5 appointments (look out 90 days)    Sep 11, 2019 10:00 AM CDT  [facility] [address] [place] [id number]  [phone]         Chance Coleman CMA (AAMA)

## 2019-06-25 NOTE — TELEPHONE ENCOUNTER
LOSARTAN 25MG TABLETS  Last Written Prescription Date:  03/13/2019  Last Fill Quantity: 30 TABLETS,  # refills: 1   Last office visit: 3/13/2019 with prescribing provider:    Last refill per Pharmacy   Future Office Visit:   Next 5 appointments (look out 90 days)    Sep 11, 2019 10:00 AM CDT  Return Visit with Candice Interiano MD  [facility] [address] [place] [id number]  [phone]         Chance Coleman CMA (AAMA)

## 2019-08-15 DIAGNOSIS — N18.30 CKD (CHRONIC KIDNEY DISEASE) STAGE 3, GFR 30-59 ML/MIN (H): Primary | ICD-10-CM

## 2019-08-29 DIAGNOSIS — I10 HYPERTENSION GOAL BP (BLOOD PRESSURE) < 140/90: ICD-10-CM

## 2019-08-29 NOTE — TELEPHONE ENCOUNTER
"Requested Prescriptions   Pending Prescriptions Disp Refills     amLODIPine (NORVASC) 5 MG tablet [Pharmacy Med Name: AMLODIPINE BESYLATE 5MG TABLETS]  Last Written Prescription Date:  09/27/18  Last Fill Quantity: 180,  # refills: 3   Last Office Visit with FMDELFINO, ML or [facility] prescribing provider:  09/27/18-Vocal   Future Office Visit:    Next 5 appointments (look out 90 days)    Sep 11, 2019 10:00 AM CDT  Return Visit with Candice Interiano MD  [facility]) [id number] [place] [id number] [phone]        180 tablet 0     Sig: TAKE 1 TABLET BY MOUTH TWICE DAILY WITH MEALS       Calcium Channel Blockers Protocol  Failed - 8/29/2019  8:13 AM        Failed - Blood pressure under 140/90 in past 12 months     BP Readings from Last 3 Encounters:   03/13/19 144/85   09/27/18 127/52   08/21/18 149/89                 Passed - Recent (12 mo) or future (30 days) visit within the authorizing provider's specialty     Patient had office visit in the last 12 months or has a visit in the next 30 days with authorizing provider or within the authorizing provider's specialty.  See \"Patient Info\" tab in inbasket, or \"Choose Columns\" in Meds & Orders section of the refill encounter.              Passed - Medication is active on med list        Passed - Patient is age 18 or older        Passed - No active pregnancy on record        Passed - Normal serum creatinine on file in past 12 months     Recent Labs   Lab Test 03/20/19  0911  04/29/14  1318   CR 1.00   < >  --    CRPOC  --   --  0.8    < > = values in this interval not displayed.             Passed - No positive pregnancy test in past 12 months          "

## 2019-09-03 DIAGNOSIS — I10 HYPERTENSION GOAL BP (BLOOD PRESSURE) < 140/90: ICD-10-CM

## 2019-09-03 RX ORDER — AMLODIPINE BESYLATE 5 MG/1
TABLET ORAL
Qty: 30 TABLET | Refills: 0 | Status: SHIPPED | OUTPATIENT
Start: 2019-09-03 | End: 2019-09-03

## 2019-09-03 NOTE — TELEPHONE ENCOUNTER
Please schedule patient. Mary refill given.   Needs appointment for further refills.   Tori Ndiaye RN

## 2019-09-04 NOTE — TELEPHONE ENCOUNTER
"Requested Prescriptions   Pending Prescriptions Disp Refills     amLODIPine (NORVASC) 5 MG tablet [Pharmacy Med Name: AMLODIPINE BESYLATE 5MG TABLETS] 180 tablet 0     Sig: TAKE 1 TABLET BY MOUTH TWICE DAILY WITH MEALS         Last Written Prescription Date:  9/27/18  Last Fill Quantity: 180,  # refills: 3   Last Office Visit with FMG, P or [facility] prescribing provider:  9/27/18  Future Office Visit:    Next 5 appointments (look out 90 days)    Sep 11, 2019 10:00 AM CDT  Return Visit with Candice Interiano MD  [facility] ([facility]) [id number] [place] [id number]  [phone]             Calcium Channel Blockers Protocol  Failed - 9/3/2019  7:02 PM        Failed - Blood pressure under 140/90 in past 12 months     BP Readings from Last 3 Encounters:   03/13/19 144/85   09/27/18 127/52   08/21/18 149/89                 Passed - Recent (12 mo) or future (30 days) visit within the authorizing provider's specialty     Patient had office visit in the last 12 months or has a visit in the next 30 days with authorizing provider or within the authorizing provider's specialty.  See \"Patient Info\" tab in inbasket, or \"Choose Columns\" in Meds & Orders section of the refill encounter.              Passed - Medication is active on med list        Passed - Patient is age 18 or older        Passed - No active pregnancy on record        Passed - Normal serum creatinine on file in past 12 months     Recent Labs   Lab Test 03/20/19  0911  04/29/14  1318   CR 1.00   < >  --    CRPOC  --   --  0.8    < > = values in this interval not displayed.             Passed - No positive pregnancy test in past 12 months              Deacon Faarax  [facility]  "

## 2019-09-05 DIAGNOSIS — I10 HYPERTENSION, GOAL BELOW 140/90: ICD-10-CM

## 2019-09-05 RX ORDER — LOSARTAN POTASSIUM 25 MG/1
12.5 TABLET ORAL DAILY
Qty: 45 TABLET | Refills: 1 | Status: ON HOLD | OUTPATIENT
Start: 2019-09-05 | End: 2019-10-09

## 2019-09-05 NOTE — TELEPHONE ENCOUNTER
Writer received a refill request from: [facility] in [place], [place]. [phone]    Medication: losartan (COZAAR) 25 MG tablet     Sig: Take 0.5 tablets (12.5 mg) by mouth daily     Date last dispensed: 6/25/19      Pt's last office visit: 3/13/19  Next scheduled office visit: 9/11/19      Per the RN/LPN medication refill protocol, writer is to refill this request. If able to refill, please call the pt to inform them the RX was sent to the pharmacy. If unable to refill, route this encounter to the prescribing physician for authorization or further instructions.

## 2019-09-06 RX ORDER — AMLODIPINE BESYLATE 5 MG/1
TABLET ORAL
Qty: 180 TABLET | Refills: 0 | Status: ON HOLD | OUTPATIENT
Start: 2019-09-06 | End: 2019-10-13

## 2019-10-03 ENCOUNTER — HEALTH MAINTENANCE LETTER (OUTPATIENT)
Age: 69
End: 2019-10-03

## 2019-10-09 ENCOUNTER — APPOINTMENT (OUTPATIENT)
Dept: GENERAL RADIOLOGY | Facility: CLINIC | Age: 69
DRG: 271 | End: 2019-10-09
Attending: EMERGENCY MEDICINE
Payer: MEDICARE

## 2019-10-09 ENCOUNTER — HOSPITAL ENCOUNTER (INPATIENT)
Facility: CLINIC | Age: 69
LOS: 4 days | Discharge: HOME | DRG: 271 | End: 2019-10-13
Attending: EMERGENCY MEDICINE | Admitting: INTERNAL MEDICINE
Payer: MEDICARE

## 2019-10-09 ENCOUNTER — SURGERY (OUTPATIENT)
Age: 69
End: 2019-10-09
Payer: MEDICARE

## 2019-10-09 DIAGNOSIS — I21.02 ST ELEVATION MYOCARDIAL INFARCTION INVOLVING LEFT ANTERIOR DESCENDING (LAD) CORONARY ARTERY (H): ICD-10-CM

## 2019-10-09 DIAGNOSIS — I21.3 ST ELEVATION MYOCARDIAL INFARCTION (STEMI), UNSPECIFIED ARTERY (H): ICD-10-CM

## 2019-10-09 DIAGNOSIS — I21.02 ST ELEVATION MYOCARDIAL INFARCTION INVOLVING LEFT ANTERIOR DESCENDING (LAD) CORONARY ARTERY (H): Primary | ICD-10-CM

## 2019-10-09 LAB
ANION GAP SERPL CALCULATED.3IONS-SCNC: 9 MMOL/L (ref 3–14)
BASOPHILS # BLD AUTO: 0 10E9/L (ref 0–0.2)
BASOPHILS NFR BLD AUTO: 0.4 %
BUN SERPL-MCNC: 12 MG/DL (ref 7–30)
CALCIUM SERPL-MCNC: 9.2 MG/DL (ref 8.5–10.1)
CHLORIDE SERPL-SCNC: 107 MMOL/L (ref 94–109)
CHOLEST SERPL-MCNC: 238 MG/DL
CO2 SERPL-SCNC: 25 MMOL/L (ref 20–32)
CREAT SERPL-MCNC: 1.06 MG/DL (ref 0.52–1.04)
DIFFERENTIAL METHOD BLD: NORMAL
EOSINOPHIL # BLD AUTO: 0.2 10E9/L (ref 0–0.7)
EOSINOPHIL NFR BLD AUTO: 1.9 %
ERYTHROCYTE [DISTWIDTH] IN BLOOD BY AUTOMATED COUNT: 13.3 % (ref 10–15)
ERYTHROCYTE [DISTWIDTH] IN BLOOD BY AUTOMATED COUNT: 13.4 % (ref 10–15)
GFR SERPL CREATININE-BSD FRML MDRD: 53 ML/MIN/{1.73_M2}
GLUCOSE BLDC GLUCOMTR-MCNC: 129 MG/DL (ref 70–99)
GLUCOSE BLDC GLUCOMTR-MCNC: 145 MG/DL (ref 70–99)
GLUCOSE SERPL-MCNC: 145 MG/DL (ref 70–99)
HCT VFR BLD AUTO: 38.1 % (ref 35–47)
HCT VFR BLD AUTO: 40.5 % (ref 35–47)
HDLC SERPL-MCNC: 45 MG/DL
HGB BLD-MCNC: 13.6 G/DL (ref 11.7–15.7)
HGB BLD-MCNC: 14.6 G/DL (ref 11.7–15.7)
IMM GRANULOCYTES # BLD: 0 10E9/L (ref 0–0.4)
IMM GRANULOCYTES NFR BLD: 0.2 %
INTERPRETATION ECG - MUSE: NORMAL
KCT BLD-ACNC: 219 SEC (ref 75–150)
KCT BLD-ACNC: 223 SEC (ref 75–150)
LDLC SERPL CALC-MCNC: 166 MG/DL
LMWH PPP CHRO-ACNC: 0.89 IU/ML
LYMPHOCYTES # BLD AUTO: 2.1 10E9/L (ref 0.8–5.3)
LYMPHOCYTES NFR BLD AUTO: 25.8 %
MCH RBC QN AUTO: 29.8 PG (ref 26.5–33)
MCH RBC QN AUTO: 30 PG (ref 26.5–33)
MCHC RBC AUTO-ENTMCNC: 35.7 G/DL (ref 31.5–36.5)
MCHC RBC AUTO-ENTMCNC: 36 G/DL (ref 31.5–36.5)
MCV RBC AUTO: 83 FL (ref 78–100)
MCV RBC AUTO: 84 FL (ref 78–100)
MONOCYTES # BLD AUTO: 0.7 10E9/L (ref 0–1.3)
MONOCYTES NFR BLD AUTO: 9.1 %
NEUTROPHILS # BLD AUTO: 5 10E9/L (ref 1.6–8.3)
NEUTROPHILS NFR BLD AUTO: 62.6 %
NONHDLC SERPL-MCNC: 193 MG/DL
NRBC # BLD AUTO: 0 10*3/UL
NRBC BLD AUTO-RTO: 0 /100
PLATELET # BLD AUTO: 169 10E9/L (ref 150–450)
PLATELET # BLD AUTO: 204 10E9/L (ref 150–450)
POTASSIUM SERPL-SCNC: 3.6 MMOL/L (ref 3.4–5.3)
RBC # BLD AUTO: 4.54 10E12/L (ref 3.8–5.2)
RBC # BLD AUTO: 4.9 10E12/L (ref 3.8–5.2)
SODIUM SERPL-SCNC: 141 MMOL/L (ref 133–144)
TRIGL SERPL-MCNC: 136 MG/DL
TROPONIN I SERPL-MCNC: 0.04 UG/L (ref 0–0.04)
WBC # BLD AUTO: 12.2 10E9/L (ref 4–11)
WBC # BLD AUTO: 8.1 10E9/L (ref 4–11)

## 2019-10-09 PROCEDURE — 027035Z DILATION OF CORONARY ARTERY, ONE ARTERY WITH TWO DRUG-ELUTING INTRALUMINAL DEVICES, PERCUTANEOUS APPROACH: ICD-10-PCS | Performed by: INTERNAL MEDICINE

## 2019-10-09 PROCEDURE — 25000128 H RX IP 250 OP 636: Performed by: INTERNAL MEDICINE

## 2019-10-09 PROCEDURE — C1894 INTRO/SHEATH, NON-LASER: HCPCS | Performed by: INTERNAL MEDICINE

## 2019-10-09 PROCEDURE — 93010 ELECTROCARDIOGRAM REPORT: CPT | Performed by: INTERNAL MEDICINE

## 2019-10-09 PROCEDURE — 25800030 ZZH RX IP 258 OP 636: Performed by: INTERNAL MEDICINE

## 2019-10-09 PROCEDURE — 96374 THER/PROPH/DIAG INJ IV PUSH: CPT

## 2019-10-09 PROCEDURE — 99285 EMERGENCY DEPT VISIT HI MDM: CPT | Mod: 25

## 2019-10-09 PROCEDURE — 3E033XZ INTRODUCTION OF VASOPRESSOR INTO PERIPHERAL VEIN, PERCUTANEOUS APPROACH: ICD-10-PCS | Performed by: INTERNAL MEDICINE

## 2019-10-09 PROCEDURE — 71045 X-RAY EXAM CHEST 1 VIEW: CPT

## 2019-10-09 PROCEDURE — 25000132 ZZH RX MED GY IP 250 OP 250 PS 637: Mod: GY | Performed by: INTERNAL MEDICINE

## 2019-10-09 PROCEDURE — C1887 CATHETER, GUIDING: HCPCS | Performed by: INTERNAL MEDICINE

## 2019-10-09 PROCEDURE — C1725 CATH, TRANSLUMIN NON-LASER: HCPCS | Performed by: INTERNAL MEDICINE

## 2019-10-09 PROCEDURE — 84484 ASSAY OF TROPONIN QUANT: CPT | Performed by: EMERGENCY MEDICINE

## 2019-10-09 PROCEDURE — 99152 MOD SED SAME PHYS/QHP 5/>YRS: CPT | Mod: GC | Performed by: INTERNAL MEDICINE

## 2019-10-09 PROCEDURE — 20000003 ZZH R&B ICU

## 2019-10-09 PROCEDURE — 27210794 ZZH OR GENERAL SUPPLY STERILE: Performed by: INTERNAL MEDICINE

## 2019-10-09 PROCEDURE — 99222 1ST HOSP IP/OBS MODERATE 55: CPT | Mod: 25 | Performed by: INTERNAL MEDICINE

## 2019-10-09 PROCEDURE — 99233 SBSQ HOSP IP/OBS HIGH 50: CPT | Performed by: INTERNAL MEDICINE

## 2019-10-09 PROCEDURE — 92941 PRQ TRLML REVSC TOT OCCL AMI: CPT | Mod: LD | Performed by: INTERNAL MEDICINE

## 2019-10-09 PROCEDURE — 5A02210 ASSISTANCE WITH CARDIAC OUTPUT USING BALLOON PUMP, CONTINUOUS: ICD-10-PCS | Performed by: INTERNAL MEDICINE

## 2019-10-09 PROCEDURE — 25000132 ZZH RX MED GY IP 250 OP 250 PS 637: Mod: GY | Performed by: EMERGENCY MEDICINE

## 2019-10-09 PROCEDURE — 00000146 ZZHCL STATISTIC GLUCOSE BY METER IP

## 2019-10-09 PROCEDURE — C1769 GUIDE WIRE: HCPCS | Performed by: INTERNAL MEDICINE

## 2019-10-09 PROCEDURE — 25000128 H RX IP 250 OP 636: Performed by: EMERGENCY MEDICINE

## 2019-10-09 PROCEDURE — 80061 LIPID PANEL: CPT | Performed by: EMERGENCY MEDICINE

## 2019-10-09 PROCEDURE — 85520 HEPARIN ASSAY: CPT | Performed by: INTERNAL MEDICINE

## 2019-10-09 PROCEDURE — 85347 COAGULATION TIME ACTIVATED: CPT

## 2019-10-09 PROCEDURE — 93005 ELECTROCARDIOGRAM TRACING: CPT

## 2019-10-09 PROCEDURE — B2111ZZ FLUOROSCOPY OF MULTIPLE CORONARY ARTERIES USING LOW OSMOLAR CONTRAST: ICD-10-PCS | Performed by: INTERNAL MEDICINE

## 2019-10-09 PROCEDURE — 36415 COLL VENOUS BLD VENIPUNCTURE: CPT | Performed by: INTERNAL MEDICINE

## 2019-10-09 PROCEDURE — 93454 CORONARY ARTERY ANGIO S&I: CPT | Mod: XU

## 2019-10-09 PROCEDURE — 93454 CORONARY ARTERY ANGIO S&I: CPT | Mod: 26 | Performed by: INTERNAL MEDICINE

## 2019-10-09 PROCEDURE — 85025 COMPLETE CBC W/AUTO DIFF WBC: CPT | Performed by: EMERGENCY MEDICINE

## 2019-10-09 PROCEDURE — 85027 COMPLETE CBC AUTOMATED: CPT | Performed by: INTERNAL MEDICINE

## 2019-10-09 PROCEDURE — 80048 BASIC METABOLIC PNL TOTAL CA: CPT | Performed by: EMERGENCY MEDICINE

## 2019-10-09 PROCEDURE — 33967 INSERT I-AORT PERCUT DEVICE: CPT | Performed by: INTERNAL MEDICINE

## 2019-10-09 PROCEDURE — C9606 PERC D-E COR REVASC W AMI S: HCPCS | Performed by: INTERNAL MEDICINE

## 2019-10-09 PROCEDURE — 33967 INSERT I-AORT PERCUT DEVICE: CPT | Mod: GC | Performed by: INTERNAL MEDICINE

## 2019-10-09 PROCEDURE — 25000125 ZZHC RX 250: Performed by: INTERNAL MEDICINE

## 2019-10-09 PROCEDURE — C1874 STENT, COATED/COV W/DEL SYS: HCPCS | Performed by: INTERNAL MEDICINE

## 2019-10-09 DEVICE — STENT SYNERGY DRUG ELUTING 3.00X16MM  H7493926016300: Type: IMPLANTABLE DEVICE | Status: FUNCTIONAL

## 2019-10-09 DEVICE — STENT SYNERGY DRUG ELUTING 2.75X38MM  H7493926038270: Type: IMPLANTABLE DEVICE | Status: FUNCTIONAL

## 2019-10-09 RX ORDER — ASPIRIN 81 MG/1
324 TABLET, CHEWABLE ORAL ONCE
Status: COMPLETED | OUTPATIENT
Start: 2019-10-09 | End: 2019-10-09

## 2019-10-09 RX ORDER — LOSARTAN POTASSIUM 25 MG/1
25 TABLET ORAL DAILY
Status: ON HOLD | COMMUNITY
End: 2019-10-13

## 2019-10-09 RX ORDER — POTASSIUM CHLORIDE 29.8 MG/ML
20 INJECTION INTRAVENOUS
Status: DISCONTINUED | OUTPATIENT
Start: 2019-10-09 | End: 2019-10-13 | Stop reason: HOSPADM

## 2019-10-09 RX ORDER — ONDANSETRON 4 MG/1
4 TABLET, ORALLY DISINTEGRATING ORAL EVERY 6 HOURS PRN
Status: DISCONTINUED | OUTPATIENT
Start: 2019-10-09 | End: 2019-10-13 | Stop reason: HOSPADM

## 2019-10-09 RX ORDER — HYDROMORPHONE HYDROCHLORIDE 1 MG/ML
0.2 INJECTION, SOLUTION INTRAMUSCULAR; INTRAVENOUS; SUBCUTANEOUS
Status: DISCONTINUED | OUTPATIENT
Start: 2019-10-09 | End: 2019-10-13 | Stop reason: HOSPADM

## 2019-10-09 RX ORDER — ASPIRIN 81 MG/1
81 TABLET ORAL DAILY
Status: DISCONTINUED | OUTPATIENT
Start: 2019-10-10 | End: 2019-10-13 | Stop reason: HOSPADM

## 2019-10-09 RX ORDER — ATROPINE SULFATE 0.1 MG/ML
0.5 INJECTION INTRAVENOUS EVERY 5 MIN PRN
Status: ACTIVE | OUTPATIENT
Start: 2019-10-09 | End: 2019-10-10

## 2019-10-09 RX ORDER — ACETAMINOPHEN 325 MG/1
650 TABLET ORAL EVERY 4 HOURS PRN
Status: DISCONTINUED | OUTPATIENT
Start: 2019-10-09 | End: 2019-10-10

## 2019-10-09 RX ORDER — NALOXONE HYDROCHLORIDE 0.4 MG/ML
.2-.4 INJECTION, SOLUTION INTRAMUSCULAR; INTRAVENOUS; SUBCUTANEOUS
Status: DISCONTINUED | OUTPATIENT
Start: 2019-10-09 | End: 2019-10-09

## 2019-10-09 RX ORDER — LORAZEPAM 2 MG/ML
0.5 INJECTION INTRAMUSCULAR
Status: DISCONTINUED | OUTPATIENT
Start: 2019-10-09 | End: 2019-10-10 | Stop reason: HOSPADM

## 2019-10-09 RX ORDER — SODIUM CHLORIDE 9 MG/ML
INJECTION, SOLUTION INTRAVENOUS CONTINUOUS
Status: ACTIVE | OUTPATIENT
Start: 2019-10-09 | End: 2019-10-10

## 2019-10-09 RX ORDER — HEPARIN SODIUM 1000 [USP'U]/ML
INJECTION, SOLUTION INTRAVENOUS; SUBCUTANEOUS
Status: DISCONTINUED | OUTPATIENT
Start: 2019-10-09 | End: 2019-10-09 | Stop reason: HOSPADM

## 2019-10-09 RX ORDER — POTASSIUM CL/LIDO/0.9 % NACL 10MEQ/0.1L
10 INTRAVENOUS SOLUTION, PIGGYBACK (ML) INTRAVENOUS
Status: DISCONTINUED | OUTPATIENT
Start: 2019-10-09 | End: 2019-10-13 | Stop reason: HOSPADM

## 2019-10-09 RX ORDER — NITROGLYCERIN 0.4 MG/1
0.4 TABLET SUBLINGUAL EVERY 5 MIN PRN
Status: DISCONTINUED | OUTPATIENT
Start: 2019-10-09 | End: 2019-10-13 | Stop reason: HOSPADM

## 2019-10-09 RX ORDER — POTASSIUM CHLORIDE 1500 MG/1
20 TABLET, EXTENDED RELEASE ORAL
Status: DISCONTINUED | OUTPATIENT
Start: 2019-10-09 | End: 2019-10-10 | Stop reason: HOSPADM

## 2019-10-09 RX ORDER — NITROGLYCERIN 0.4 MG/1
0.4 TABLET SUBLINGUAL EVERY 5 MIN PRN
Status: DISCONTINUED | OUTPATIENT
Start: 2019-10-09 | End: 2019-10-09

## 2019-10-09 RX ORDER — NALOXONE HYDROCHLORIDE 0.4 MG/ML
.1-.4 INJECTION, SOLUTION INTRAMUSCULAR; INTRAVENOUS; SUBCUTANEOUS
Status: DISCONTINUED | OUTPATIENT
Start: 2019-10-09 | End: 2019-10-09

## 2019-10-09 RX ORDER — POTASSIUM CHLORIDE 1.5 G/1.58G
20-40 POWDER, FOR SOLUTION ORAL
Status: DISCONTINUED | OUTPATIENT
Start: 2019-10-09 | End: 2019-10-13 | Stop reason: HOSPADM

## 2019-10-09 RX ORDER — MAGNESIUM SULFATE HEPTAHYDRATE 40 MG/ML
4 INJECTION, SOLUTION INTRAVENOUS EVERY 4 HOURS PRN
Status: DISCONTINUED | OUTPATIENT
Start: 2019-10-09 | End: 2019-10-13 | Stop reason: HOSPADM

## 2019-10-09 RX ORDER — POTASSIUM CHLORIDE 1500 MG/1
20-40 TABLET, EXTENDED RELEASE ORAL
Status: DISCONTINUED | OUTPATIENT
Start: 2019-10-09 | End: 2019-10-13 | Stop reason: HOSPADM

## 2019-10-09 RX ORDER — FENTANYL CITRATE 50 UG/ML
25-50 INJECTION, SOLUTION INTRAMUSCULAR; INTRAVENOUS
Status: DISCONTINUED | OUTPATIENT
Start: 2019-10-09 | End: 2019-10-09

## 2019-10-09 RX ORDER — ATORVASTATIN CALCIUM 40 MG/1
40 TABLET, FILM COATED ORAL DAILY
Status: DISCONTINUED | OUTPATIENT
Start: 2019-10-09 | End: 2019-10-13 | Stop reason: HOSPADM

## 2019-10-09 RX ORDER — EPTIFIBATIDE 2 MG/ML
INJECTION, SOLUTION INTRAVENOUS
Status: DISCONTINUED | OUTPATIENT
Start: 2019-10-09 | End: 2019-10-09 | Stop reason: HOSPADM

## 2019-10-09 RX ORDER — MAGNESIUM SULFATE HEPTAHYDRATE 40 MG/ML
2 INJECTION, SOLUTION INTRAVENOUS DAILY PRN
Status: DISCONTINUED | OUTPATIENT
Start: 2019-10-09 | End: 2019-10-13 | Stop reason: HOSPADM

## 2019-10-09 RX ORDER — HEPARIN SODIUM 10000 [USP'U]/100ML
800 INJECTION, SOLUTION INTRAVENOUS CONTINUOUS
Status: DISCONTINUED | OUTPATIENT
Start: 2019-10-09 | End: 2019-10-09

## 2019-10-09 RX ORDER — SODIUM CHLORIDE 9 MG/ML
INJECTION, SOLUTION INTRAVENOUS CONTINUOUS
Status: DISCONTINUED | OUTPATIENT
Start: 2019-10-09 | End: 2019-10-09

## 2019-10-09 RX ORDER — LIDOCAINE 40 MG/G
CREAM TOPICAL
Status: DISCONTINUED | OUTPATIENT
Start: 2019-10-09 | End: 2019-10-10 | Stop reason: HOSPADM

## 2019-10-09 RX ORDER — PHENYLEPHRINE HYDROCHLORIDE 10 MG/ML
INJECTION INTRAVENOUS
Status: DISCONTINUED | OUTPATIENT
Start: 2019-10-09 | End: 2019-10-09 | Stop reason: HOSPADM

## 2019-10-09 RX ORDER — LORAZEPAM 0.5 MG/1
0.5 TABLET ORAL
Status: DISCONTINUED | OUTPATIENT
Start: 2019-10-09 | End: 2019-10-10 | Stop reason: HOSPADM

## 2019-10-09 RX ORDER — FLUMAZENIL 0.1 MG/ML
0.2 INJECTION, SOLUTION INTRAVENOUS
Status: ACTIVE | OUTPATIENT
Start: 2019-10-09 | End: 2019-10-10

## 2019-10-09 RX ORDER — ADENOSINE 3 MG/ML
INJECTION, SOLUTION INTRAVENOUS
Status: DISCONTINUED | OUTPATIENT
Start: 2019-10-09 | End: 2019-10-09 | Stop reason: HOSPADM

## 2019-10-09 RX ORDER — DOPAMINE HYDROCHLORIDE 160 MG/100ML
INJECTION, SOLUTION INTRAVENOUS CONTINUOUS PRN
Status: DISCONTINUED | OUTPATIENT
Start: 2019-10-09 | End: 2019-10-09 | Stop reason: HOSPADM

## 2019-10-09 RX ORDER — FENTANYL CITRATE 50 UG/ML
INJECTION, SOLUTION INTRAMUSCULAR; INTRAVENOUS
Status: DISCONTINUED | OUTPATIENT
Start: 2019-10-09 | End: 2019-10-09 | Stop reason: HOSPADM

## 2019-10-09 RX ORDER — POTASSIUM CHLORIDE 7.45 MG/ML
10 INJECTION INTRAVENOUS
Status: DISCONTINUED | OUTPATIENT
Start: 2019-10-09 | End: 2019-10-13 | Stop reason: HOSPADM

## 2019-10-09 RX ORDER — IOPAMIDOL 755 MG/ML
INJECTION, SOLUTION INTRAVASCULAR
Status: DISCONTINUED | OUTPATIENT
Start: 2019-10-09 | End: 2019-10-09 | Stop reason: HOSPADM

## 2019-10-09 RX ORDER — ONDANSETRON 2 MG/ML
4 INJECTION INTRAMUSCULAR; INTRAVENOUS EVERY 6 HOURS PRN
Status: DISCONTINUED | OUTPATIENT
Start: 2019-10-09 | End: 2019-10-13 | Stop reason: HOSPADM

## 2019-10-09 RX ORDER — ATROPINE SULFATE 0.1 MG/ML
INJECTION INTRAVENOUS
Status: DISCONTINUED | OUTPATIENT
Start: 2019-10-09 | End: 2019-10-09 | Stop reason: HOSPADM

## 2019-10-09 RX ORDER — HEPARIN SODIUM 10000 [USP'U]/100ML
650 INJECTION, SOLUTION INTRAVENOUS CONTINUOUS
Status: DISCONTINUED | OUTPATIENT
Start: 2019-10-09 | End: 2019-10-10

## 2019-10-09 RX ORDER — NALOXONE HYDROCHLORIDE 0.4 MG/ML
.1-.4 INJECTION, SOLUTION INTRAMUSCULAR; INTRAVENOUS; SUBCUTANEOUS
Status: DISCONTINUED | OUTPATIENT
Start: 2019-10-09 | End: 2019-10-10

## 2019-10-09 RX ADMIN — PHENYLEPHRINE HYDROCHLORIDE 100 MCG: 10 INJECTION INTRAVENOUS at 13:10

## 2019-10-09 RX ADMIN — LIDOCAINE HYDROCHLORIDE 10 ML: 10 INJECTION, SOLUTION EPIDURAL; INFILTRATION; INTRACAUDAL; PERINEURAL at 12:51

## 2019-10-09 RX ADMIN — MIDAZOLAM 0.5 MG: 1 INJECTION INTRAMUSCULAR; INTRAVENOUS at 12:50

## 2019-10-09 RX ADMIN — ATROPINE SULFATE 0.5 MG: 0.1 INJECTION, SOLUTION ENDOTRACHEAL; INTRAMUSCULAR; INTRAVENOUS; SUBCUTANEOUS at 12:53

## 2019-10-09 RX ADMIN — TICAGRELOR 90 MG: 90 TABLET ORAL at 19:44

## 2019-10-09 RX ADMIN — PHENYLEPHRINE HYDROCHLORIDE 100 MCG: 10 INJECTION INTRAVENOUS at 13:14

## 2019-10-09 RX ADMIN — MIDAZOLAM 1 MG: 1 INJECTION INTRAMUSCULAR; INTRAVENOUS at 12:41

## 2019-10-09 RX ADMIN — ACETAMINOPHEN 650 MG: 325 TABLET, FILM COATED ORAL at 19:44

## 2019-10-09 RX ADMIN — ASPIRIN 81 MG 324 MG: 81 TABLET ORAL at 12:16

## 2019-10-09 RX ADMIN — SODIUM CHLORIDE: 9 INJECTION, SOLUTION INTRAVENOUS at 21:51

## 2019-10-09 RX ADMIN — NITROGLYCERIN 0.4 MG: 0.4 TABLET SUBLINGUAL at 12:28

## 2019-10-09 RX ADMIN — TICAGRELOR 180 MG: 90 TABLET ORAL at 12:16

## 2019-10-09 RX ADMIN — FENTANYL CITRATE 25 MCG: 50 INJECTION, SOLUTION INTRAMUSCULAR; INTRAVENOUS at 13:30

## 2019-10-09 RX ADMIN — ATORVASTATIN CALCIUM 40 MG: 40 TABLET, FILM COATED ORAL at 16:13

## 2019-10-09 RX ADMIN — HEPARIN SODIUM 3000 UNITS: 1000 INJECTION, SOLUTION INTRAVENOUS; SUBCUTANEOUS at 12:53

## 2019-10-09 RX ADMIN — EPTIFIBATIDE 14688 MCG: 2 INJECTION, SOLUTION INTRAVENOUS at 13:24

## 2019-10-09 RX ADMIN — IOPAMIDOL 230 ML: 755 INJECTION, SOLUTION INTRAVENOUS at 13:51

## 2019-10-09 RX ADMIN — HEPARIN SODIUM 800 UNITS/HR: 10000 INJECTION, SOLUTION INTRAVENOUS at 16:10

## 2019-10-09 RX ADMIN — HEPARIN SODIUM 4000 UNITS: 1000 INJECTION, SOLUTION INTRAVENOUS; SUBCUTANEOUS at 13:05

## 2019-10-09 RX ADMIN — ADENOSINE 360 MCG: 3 INJECTION, SOLUTION INTRAVENOUS at 13:27

## 2019-10-09 RX ADMIN — FENTANYL CITRATE 50 MCG: 50 INJECTION, SOLUTION INTRAMUSCULAR; INTRAVENOUS at 12:41

## 2019-10-09 RX ADMIN — PHENYLEPHRINE HYDROCHLORIDE 100 MCG: 10 INJECTION INTRAVENOUS at 13:09

## 2019-10-09 RX ADMIN — HEPARIN SODIUM 4500 UNITS: 1000 INJECTION, SOLUTION INTRAVENOUS; SUBCUTANEOUS at 12:24

## 2019-10-09 RX ADMIN — FENTANYL CITRATE 25 MCG: 50 INJECTION, SOLUTION INTRAMUSCULAR; INTRAVENOUS at 12:50

## 2019-10-09 RX ADMIN — DOPAMINE HYDROCHLORIDE 5 MCG/KG/MIN: 160 INJECTION, SOLUTION INTRAVENOUS at 13:13

## 2019-10-09 RX ADMIN — MIDAZOLAM 0.5 MG: 1 INJECTION INTRAMUSCULAR; INTRAVENOUS at 13:30

## 2019-10-09 ASSESSMENT — MIFFLIN-ST. JEOR
SCORE: 1321.47
SCORE: 1378

## 2019-10-09 ASSESSMENT — ACTIVITIES OF DAILY LIVING (ADL)
ADLS_ACUITY_SCORE: 17
ADLS_ACUITY_SCORE: 14

## 2019-10-09 NOTE — ED NOTES
Bed: ED04  Expected date:   Expected time:   Means of arrival:   Comments:  [place] [address] Syncope 70 f

## 2019-10-09 NOTE — PLAN OF CARE
Resumed care at approximately 1600. Alert and oriented. Clear lung sounds. On NC 2L. SR. Balloon pump in place. Right groin site WDL. Clear liquid diet. Purwick in place. Heparin gtt started. Family at bedside. Will continue to monitor.

## 2019-10-09 NOTE — Clinical Note
Stent deployed in the proximal left anterior descending. Max pressure = 14 sandoval. Total duration = 18 seconds.

## 2019-10-09 NOTE — PROVIDER NOTIFICATION
Notified Dr. Briones with cardiology of [facility] see rhythm strip, per MD it is to be expected and will continue to monitor, also asked for IABP parameters, goal MAP greater than 65, ok to leave IABP on 1:2 settings as long as pt tolerates, I asked Dr. Briones about heparin gtt for pt if IABP is not on 1:1 status, Dr. Briones to order heparin gtt, also clarified diet orders and plan of care.

## 2019-10-09 NOTE — H&P
[facility]    History and Physical  [facility] Intensive Care     Date of Admission:  10/9/2019    Assessment & Plan    Ms. Gama is a 67 y/o woman with PMH significant for hypertension who presented earlier today with chest pain and brief loss of consciousness.  ECG demonstrated ST elevation in the anterolateral leads and she was taken emergently to the cath lab and underwent PCI with EDWIN x2 to proximal LAD.  She was hypotensive in the cath lab requiring dopamine and neosynephrine and intra-aortic balloon pump was placed.    Gem states this morning she was out shopping and had the sudden onset of chest discomfort, diaphoresis, n/v/diarrhea.  She continued to shop and a bystander noted she was not well, called EMS.  There are reports she had a brief loss of consciousness and the bystander lowered her to the ground.    In the cath lab, she was found to have an occluded proximal LAD.  She was hypotensive and given multiple doses of johnny and started on a dopamine gtt.  Ultimately, an IABP was placed.  She underwent successful PCI of proximal LAD x2.  She had moderate disease in her LCx.  Her RCA was not evaluated.  Currently, in the ICU she is stable on IABP at 1:1 with MAPs in the 80's.    Neurology:  No issues: alert, oriented    Cardiovascular:  S/p STEMI: LAD distribution  -balloon pump in place, patient now stable. On heparin drip. Antiplatelet drugs per cardiology    Pulmonary:        No issues  -    Renal  No issues: follow electrolytes following dye load. Avoid nephrotoxins  -    ID:         No issues  -    GI  No issues: NPO while having to lie flat  -    Nutrition  NPO for now:  -    Endocrine:  No issues: glycemic control per unit protocol  -    Heme/Onc:  No issues  -    DVT Prophylaxis: On heparin drip  GI Prophylaxis: Not indicated    Restraints: Restraints for medical healing needed: NO    Family update by me today: No    Roddy Hunter    Time Spent on this Encounter    Billing:  I spent 45 minutes bedside and on the inpatient unit today managing the critical care of Gem Gama in relation to the issues listed in this note.    Code Status   Full Code    Primary Care Physician   Danny Balbuena Vocal    Chief Complaint   Chest pain    History is obtained from the electronic health record and cardiology    History of Present Illness   Gem Gama is a 68 year old female who presents with STEMI    Past Medical History    I have reviewed this patient's medical history and updated it with pertinent information if needed.   Past Medical History:   Diagnosis Date     ADHD (attention deficit hyperactivity disorder)      Anxiety      Arthritis     back, hands, knees, hips     Asthma     controlled--has coughing symptoms     C. difficile diarrhea      Central sleep apnea      Depression      Gastro-oesophageal reflux disease      Hypertension      Narcolepsy      Renal disease     gfr is aroung 48     Sleep apnea     uses Cpap       Past Surgical History   I have reviewed this patient's surgical history and updated it with pertinent information if needed.  Past Surgical History:   Procedure Laterality Date     ARTHROPLASTY KNEE  7/9/2014    Procedure: ARTHROPLASTY KNEE;  Surgeon: Levi Johnson MD;  Location: [facility] OR     ARTHROPLASTY KNEE Left 11/24/2014    Procedure: ARTHROPLASTY KNEE;  Surgeon: Levi Johnson MD;  Location: [facility] OR     C REIMPLANT URETER,EXTENSIVE TAILORING  1973 1997     C REPAIR ENTEROCELE,VAG APPRCH  2008    Prolift     C TOTAL ABD HYSTERECTOMY+BLAD REPR  1992    Vaginal approach with oophrectomy     C TOTAL KNEE ARTHROPLASTY       GENITOURINARY SURGERY      kidney surgery X 3     ORTHOPEDIC SURGERY      bilat total hip     RECTOCELE REPAIR       SOFT TISSUE SURGERY         Prior to Admission Medications   Prior to Admission Medications   Prescriptions Last Dose Informant Patient Reported? Taking?   albuterol (PROVENTIL) (2.5 MG/3ML) 0.083% neb solution prn  Self No No   Sig: USE 1 VIAL BY NEBULIZATION EVERY 6 HOURS AS NEEDED FOR SHORTNESS OF BREATH/ DYSPNEA   amLODIPine (NORVASC) 5 MG tablet 10/8/2019 at Unknown time Self No Yes   Sig: TAKE 1 TABLET BY MOUTH TWICE DAILY WITH MEALS   Patient taking differently: Take 10 mg by mouth daily    amoxicillin (AMOXIL) 500 MG capsule prn Self Yes No   Sig: TK 4 CS PO ONE HOUR BEFORE DENTAL APPOINTMENT   guaiFENesin-codeine (ROBITUSSIN AC) 100-10 MG/5ML SOLN prn Self No No   Sig: Take 10 mLs by mouth every 4 hours as needed for cough   Patient not taking: Reported on 3/15/2018   losartan (COZAAR) 25 MG tablet 10/9/2019 at Unknown time Self Yes Yes   Sig: Take 25 mg by mouth daily   sertraline (ZOLOFT) 100 MG tablet 10/9/2019 at Unknown time Self No Yes   Sig: TAKE 1 TABLET(100 MG) BY MOUTH DAILY   vitamin D3 (CHOLECALCIFEROL) 2000 units (50 mcg) tablet 10/9/2019 at Unknown time Self No Yes   Sig: Take 1 tablet (2,000 Units) by mouth daily      Facility-Administered Medications: None     Allergies   Allergies   Allergen Reactions     Flagyl [Metronidazole Hcl] Anaphylaxis and Rash     Latex Other (See Comments)     Runny nose     Sulfa Drugs Anaphylaxis     Tape [Adhesive Tape] Hives     Metoprolol Itching and Rash       Social History   I have reviewed this patient's social history and updated it with pertinent information if needed. Gem Gama  reports that she has never smoked. She has never used smokeless tobacco. She reports that she does not drink alcohol or use drugs.    Family History   I have reviewed this patient's family history and updated it with pertinent information if needed.   Family History   Problem Relation Age of Onset     Hypertension Mother      Arthritis Mother      Cerebrovascular Disease Father         Three Strokes     Diabetes Father         and brother     Thyroid Disease Sister         Hypothyroid       Review of Systems   Review of systems not obtained due to patient factors - critical  condition    Physical Exam   Temp: 99.1  F (37.3  C) Temp src: Oral Temp  Min: 97.3  F (36.3  C)  Max: 99.1  F (37.3  C) BP: 90/58 Pulse: 85 Heart Rate: 85 Resp: 17 SpO2: 97 % O2 Device: Nasal cannula Oxygen Delivery: 2 LPM  Vital Signs with Ranges  Temp:  [97.3  F (36.3  C)-99.1  F (37.3  C)] 99.1  F (37.3  C)  Pulse:  [61-85] 85  Heart Rate:  [61-90] 85  Resp:  [11-24] 17  BP: ()/() 90/58  SpO2:  [93 %-97 %] 97 %  192 lbs 7.39 oz    Constitutional: alert, oriented, comfortable  Eyes: PERRL  ENT: moist mucosal membranes  Respiratory: no rales, rhonchi  Cardiovascular: RRR, no murmur or rub  GI: soft, without tenderness  Genitourinary: no Greene  Skin: warm, no mottling  Musculoskeletal: balloon pump in place  Neurologic: grossly intact    Data   Results for orders placed or performed during the hospital encounter of 10/09/19 (from the past 24 hour(s))   EKG 12-lead, tracing only   Result Value Ref Range    Interpretation ECG Click View Image link to view waveform and result    Basic metabolic panel   Result Value Ref Range    Sodium 141 133 - 144 mmol/L    Potassium 3.6 3.4 - 5.3 mmol/L    Chloride 107 94 - 109 mmol/L    Carbon Dioxide 25 20 - 32 mmol/L    Anion Gap 9 3 - 14 mmol/L    Glucose 145 (H) 70 - 99 mg/dL    Urea Nitrogen 12 7 - 30 mg/dL    Creatinine 1.06 (H) 0.52 - 1.04 mg/dL    GFR Estimate 53 (L) >60 mL/min/[1.73_m2]    GFR Estimate If Black 61 >60 mL/min/[1.73_m2]    Calcium 9.2 8.5 - 10.1 mg/dL   CBC with platelets differential   Result Value Ref Range    WBC 8.1 4.0 - 11.0 10e9/L    RBC Count 4.90 3.8 - 5.2 10e12/L    Hemoglobin 14.6 11.7 - 15.7 g/dL    Hematocrit 40.5 35.0 - 47.0 %    MCV 83 78 - 100 fl    MCH 29.8 26.5 - 33.0 pg    MCHC 36.0 31.5 - 36.5 g/dL    RDW 13.3 10.0 - 15.0 %    Platelet Count 204 150 - 450 10e9/L    Diff Method Automated Method     % Neutrophils 62.6 %    % Lymphocytes 25.8 %    % Monocytes 9.1 %    % Eosinophils 1.9 %    % Basophils 0.4 %    % Immature  Granulocytes 0.2 %    Nucleated RBCs 0 0 /100    Absolute Neutrophil 5.0 1.6 - 8.3 10e9/L    Absolute Lymphocytes 2.1 0.8 - 5.3 10e9/L    Absolute Monocytes 0.7 0.0 - 1.3 10e9/L    Absolute Eosinophils 0.2 0.0 - 0.7 10e9/L    Absolute Basophils 0.0 0.0 - 0.2 10e9/L    Abs Immature Granulocytes 0.0 0 - 0.4 10e9/L    Absolute Nucleated RBC 0.0    Troponin I   Result Value Ref Range    Troponin I ES 0.035 0.000 - 0.045 ug/L   Lipid Profile   Result Value Ref Range    Cholesterol 238 (H) <200 mg/dL    Triglycerides 136 <150 mg/dL    HDL Cholesterol 45 (L) >49 mg/dL    LDL Cholesterol Calculated 166 (H) <100 mg/dL    Non HDL Cholesterol 193 (H) <130 mg/dL   XR Chest Port 1 View    Narrative    CHEST ONE VIEW PORTABLE   10/9/2019 12:18 PM     HISTORY: Acute chest pain.    COMPARISON: 8/16/2018.      Impression    IMPRESSION: No acute abnormality.    BROOK SERRA MD   Activated clotting time POCT   Result Value Ref Range    Activated Clot Time 219 (H) 75 - 150 sec   Activated clotting time POCT   Result Value Ref Range    Activated Clot Time 223 (H) 75 - 150 sec   Cardiac Catheterization    Narrative      Severe single-vessel obstructive CAD with thrombotic occlusion of the   proximal LAD, as well as moderate non-obstructive CAD in OM1 and OM2. The   RCA was not injected due to its expected small size, concerns re: contrast   exposure, and hemodynamic instability prioritizing IABP insertion.    Successful PCI of the proximal LAD with overlapping Synergy EDWIN x 2 (3.0   x 16 proximal, 2.75 x 38 distal)    Successful IABP insertion in the RFA      Glucose by meter   Result Value Ref Range    Glucose 145 (H) 70 - 99 mg/dL   Cardiology IP Consult: Patient to be seen: Routine - within 24 hours; STEMI; Consultant may enter orders: Yes; Requesting provider? Other supervising provider; Name: MD Jennifer Zarate Cynthia K, MD     10/9/2019  3:16 PM  CARDIOLOGY CONSULT    REASON FOR CONSULT: STEMI    HISTORY  OF PRESENT ILLNESS:   Ms. Gama is a 67 y/o woman with PMH   significant for hypertension who presented earlier today with   chest pain and brief loss of consciousness.  ECG demonstrated ST   elevation in the anterolateral leads and she was taken emergently   to the cath lab and underwent PCI with EDWIN x2 to proximal LAD.    She was hypotensive in the cath lab requiring dopamine and   neosynephrine and intra-aortic balloon pump was placed.    Gem states this morning she was out shopping and had the   sudden onset of chest discomfort, diaphoresis, n/v/diarrhea.  She   continued to shop and a bystander noted she was not well, called   EMS.  There are reports she had a brief loss of consciousness and   the bystander lowered her to the ground.    In the cath lab, she was found to have an occluded proximal LAD.    She was hypotensive and given multiple doses of johnny and started   on a dopamine gtt.  Ultimately, an IABP was placed.  She   underwent successful PCI of proximal LAD x2.  She had moderate   disease in her LCx.  Her RCA was not evaluated.  Currently, in the ICU she is stable on IABP at 1:1 with MAPs in   the 80's.  She is feeling well.  She denies any exertional chest   pain, chest discomfort.  She has some left leg discomfort and low   back pain from lying on her back.        PAST MEDICAL HISTORY:  1.  GERD  2.  Anxiety/ADHD  3.  Asthma  4.  Depression  5.  GERD  6.  Hypertension  7.  LEA     MEDICATIONS:  Current Facility-Administered Medications   Medication     0.9% sodium chloride BOLUS     acetaminophen (TYLENOL) tablet 650 mg     [START ON 10/10/2019] aspirin EC tablet 81 mg     atorvastatin (LIPITOR) tablet 40 mg     atropine injection 0.5 mg     flumazenil (ROMAZICON) injection 0.2 mg     HYDROmorphone (PF) (DILAUDID) injection 0.2 mg     lidocaine (LMX4) cream     lidocaine 1 % 0.1-1 mL     lidocaine 1 % 5 mL     LORazepam (ATIVAN) injection 0.5 mg    Or     LORazepam (ATIVAN) tablet 0.5 mg      magnesium sulfate 2 g in water intermittent infusion     magnesium sulfate 4 g in 100 mL sterile water (premade)     midazolam (VERSED) injection 0.5-1 mg     naloxone (NARCAN) injection 0.1-0.4 mg     nitroGLYcerin (NITROSTAT) sublingual tablet 0.4 mg     ondansetron (ZOFRAN-ODT) ODT tab 4 mg    Or     ondansetron (ZOFRAN) injection 4 mg     Patient is already receiving anticoagulation with heparin,   enoxaparin (LOVENOX), warfarin (COUMADIN)  or other anticoagulant   medication     Patient is NOT allergic to contrast dye OR was given   pre-procedure oral steroids for treatment     Percutaneous Coronary Intervention orders placed (this is   information for BPA alerting)     potassium chloride (KLOR-CON) Packet 20-40 mEq     potassium chloride 10 mEq in 100 mL intermittent infusion with   10 mg lidocaine     potassium chloride 10 mEq in 100 mL sterile water intermittent   infusion (premix)     potassium chloride 20 mEq in 50 mL intermittent infusion     potassium chloride ER (K-DUR/KLOR-CON M) CR tablet 20 mEq     potassium chloride ER (K-DUR/KLOR-CON M) CR tablet 20-40 mEq     Reason ACE/ARB/ARNI order not selected     Reason beta blocker not prescribed     sodium chloride (PF) 0.9% PF flush 3 mL     sodium chloride (PF) 0.9% PF flush 3 mL     sodium chloride 0.9% infusion     sodium phosphate 15 mmol in D5W intermittent infusion     sodium phosphate 20 mmol in D5W intermittent infusion     sodium phosphate 25 mmol in D5W intermittent infusion     ticagrelor (BRILINTA) tablet 90 mg       ALLERGIES:  Allergies   Allergen Reactions     Flagyl [Metronidazole Hcl] Anaphylaxis and Rash     Latex Other (See Comments)     Runny nose     Sulfa Drugs Anaphylaxis     Tape [Adhesive Tape] Hives     Metoprolol Itching and Rash       SOCIAL HISTORY:  I have reviewed this patient's social history and updated it with   pertinent information if needed. Gem Curranan Natan  reports that   she has never smoked. She has never used  smokeless tobacco. She   reports that she does not drink alcohol or use drugs.    FAMILY HISTORY:  I have reviewed this patient's family history and updated it with   pertinent information if needed.   Family History   Problem Relation Age of Onset     Hypertension Mother      Arthritis Mother      Cerebrovascular Disease Father         Three Strokes     Diabetes Father         and brother     Thyroid Disease Sister         Hypothyroid       REVIEW OF SYSTEMS:  A complete ROS was obtained and the pertinent positives were   outlined in the history of present illness above.  The remainder   of systems is negative.        PHYSICAL EXAM:  Temp: 97.3  F (36.3  C) Temp src: Oral BP: 90/58 Pulse: 85 Heart   Rate: 80 Resp: 11 SpO2: 96 % O2 Device: Nasal cannula Oxygen   Delivery: 4 LPM  Vital Signs with Ranges  Temp:  [97.3  F (36.3  C)] 97.3  F (36.3  C)  Pulse:  [61-85] 85  Heart Rate:  [61-88] 80  Resp:  [11-19] 11  BP: ()/(58-94) 90/58  SpO2:  [96 %-97 %] 96 %  180 lbs 0 oz    Constitutional: awake, alert, no distress  Eyes: PERRL, sclera nonicteric  ENT: trachea midline  Respiratory:  CTAB  Cardiovascular: RRR no m/r/g  GI: nondistended, nontender, bowel sounds present  Lymph/Hematologic: no lymphadenopathy  Skin: dry, no rash  Musculoskeletal: good muscle tone, no edema bilaterally  Neurologic: no focal deficits  Neuropsychiatric: appropriate affact    DATA:  Reviewed in EPIC    Coronary Angiogram 10/9/19    Severe single-vessel obstructive CAD with thrombotic occlusion   of the proximal LAD, as well as moderate non-obstructive CAD in   OM1 and OM2. The RCA was not injected due to its expected small   size, concerns re: contrast exposure, and hemodynamic instability   prioritizing IABP insertion.    Successful PCI of the proximal LAD with overlapping Synergy EDWIN   x 2 (3.0 x 16 proximal, 2.75 x 38 distal)    Successful IABP insertion in the RFA        ASSESSMENT:  1.  Anterior STEMI with hemodynamic instability:   S/P PCI of   culprit occluded proximal LAD with EDWIN x2.  IABP in place with   1:1 setting; hemodynamically stable with MAPs 80's.    2.  Hypertension      RECOMMENDATIONS:  1. Turn down IABP to 2:1.  May be able to discontinue later today   versus tomorrow morning.  2.  Echocardiogram pending  3.  ASA indefinitely, brilinta 90 mg PO BID at least on year  4.  Atorvastatin 40 mg daily  5.  When balloon bump is ready to be removed, will bring to the   cath lab to remove and evaluate RCA before sheath removal.      Mel Dennison MD  Cardiology - [facility]  Pager:  [phone]  October 9, 2019   EKG 12-lead, tracing only    Result Value Ref Range    Interpretation ECG Click View Image link to view waveform and result    CBC with platelets   Result Value Ref Range    WBC 12.2 (H) 4.0 - 11.0 10e9/L    RBC Count 4.54 3.8 - 5.2 10e12/L    Hemoglobin 13.6 11.7 - 15.7 g/dL    Hematocrit 38.1 35.0 - 47.0 %    MCV 84 78 - 100 fl    MCH 30.0 26.5 - 33.0 pg    MCHC 35.7 31.5 - 36.5 g/dL    RDW 13.4 10.0 - 15.0 %    Platelet Count 169 150 - 450 10e9/L     Roddy Hunter MD  [facility] Intensivist Service

## 2019-10-09 NOTE — ED TRIAGE NOTES
"Out shopping and became hot, sweaty, had emesis x1, became syncopal and assiste dot floor by bystander who states \"momentary loss of consciousness\". Repeat emesis and BM in rig. Epigastric and R chest pain.   "

## 2019-10-09 NOTE — CONSULTS
CARDIOLOGY CONSULT    REASON FOR CONSULT: STEMI    HISTORY OF PRESENT ILLNESS:   Ms. Gama is a 67 y/o woman with PMH significant for hypertension who presented earlier today with chest pain and brief loss of consciousness.  ECG demonstrated ST elevation in the anterolateral leads and she was taken emergently to the cath lab and underwent PCI with EDWIN x2 to proximal LAD.  She was hypotensive in the cath lab requiring dopamine and neosynephrine and intra-aortic balloon pump was placed.    Gem states this morning she was out shopping and had the sudden onset of chest discomfort, diaphoresis, n/v/diarrhea.  She continued to shop and a bystander noted she was not well, called EMS.  There are reports she had a brief loss of consciousness and the bystander lowered her to the ground.    In the cath lab, she was found to have an occluded proximal LAD.  She was hypotensive and given multiple doses of johnny and started on a dopamine gtt.  Ultimately, an IABP was placed.  She underwent successful PCI of proximal LAD x2.  She had moderate disease in her LCx.  Her RCA was not evaluated.  Currently, in the ICU she is stable on IABP at 1:1 with MAPs in the 80's.  She is feeling well.  She denies any exertional chest pain, chest discomfort.  She has some left leg discomfort and low back pain from lying on her back.        PAST MEDICAL HISTORY:  1.  GERD  2.  Anxiety/ADHD  3.  Asthma  4.  Depression  5.  GERD  6.  Hypertension  7.  LEA     MEDICATIONS:  Current Facility-Administered Medications   Medication     0.9% sodium chloride BOLUS     acetaminophen (TYLENOL) tablet 650 mg     [START ON 10/10/2019] aspirin EC tablet 81 mg     atorvastatin (LIPITOR) tablet 40 mg     atropine injection 0.5 mg     flumazenil (ROMAZICON) injection 0.2 mg     HYDROmorphone (PF) (DILAUDID) injection 0.2 mg     lidocaine (LMX4) cream     lidocaine 1 % 0.1-1 mL     lidocaine 1 % 5 mL     LORazepam (ATIVAN) injection 0.5 mg    Or     LORazepam (ATIVAN)  tablet 0.5 mg     magnesium sulfate 2 g in water intermittent infusion     magnesium sulfate 4 g in 100 mL sterile water (premade)     midazolam (VERSED) injection 0.5-1 mg     naloxone (NARCAN) injection 0.1-0.4 mg     nitroGLYcerin (NITROSTAT) sublingual tablet 0.4 mg     ondansetron (ZOFRAN-ODT) ODT tab 4 mg    Or     ondansetron (ZOFRAN) injection 4 mg     Patient is already receiving anticoagulation with heparin, enoxaparin (LOVENOX), warfarin (COUMADIN)  or other anticoagulant medication     Patient is NOT allergic to contrast dye OR was given pre-procedure oral steroids for treatment     Percutaneous Coronary Intervention orders placed (this is information for BPA alerting)     potassium chloride (KLOR-CON) Packet 20-40 mEq     potassium chloride 10 mEq in 100 mL intermittent infusion with 10 mg lidocaine     potassium chloride 10 mEq in 100 mL sterile water intermittent infusion (premix)     potassium chloride 20 mEq in 50 mL intermittent infusion     potassium chloride ER (K-DUR/KLOR-CON M) CR tablet 20 mEq     potassium chloride ER (K-DUR/KLOR-CON M) CR tablet 20-40 mEq     Reason ACE/ARB/ARNI order not selected     Reason beta blocker not prescribed     sodium chloride (PF) 0.9% PF flush 3 mL     sodium chloride (PF) 0.9% PF flush 3 mL     sodium chloride 0.9% infusion     sodium phosphate 15 mmol in D5W intermittent infusion     sodium phosphate 20 mmol in D5W intermittent infusion     sodium phosphate 25 mmol in D5W intermittent infusion     ticagrelor (BRILINTA) tablet 90 mg       ALLERGIES:  Allergies   Allergen Reactions     Flagyl [Metronidazole Hcl] Anaphylaxis and Rash     Latex Other (See Comments)     Runny nose     Sulfa Drugs Anaphylaxis     Tape [Adhesive Tape] Hives     Metoprolol Itching and Rash       SOCIAL HISTORY:  I have reviewed this patient's social history and updated it with pertinent information if needed. Gem Romano Natan  reports that she has never smoked. She has never used  smokeless tobacco. She reports that she does not drink alcohol or use drugs.    FAMILY HISTORY:  I have reviewed this patient's family history and updated it with pertinent information if needed.   Family History   Problem Relation Age of Onset     Hypertension Mother      Arthritis Mother      Cerebrovascular Disease Father         Three Strokes     Diabetes Father         and brother     Thyroid Disease Sister         Hypothyroid       REVIEW OF SYSTEMS:  A complete ROS was obtained and the pertinent positives were outlined in the history of present illness above.  The remainder of systems is negative.        PHYSICAL EXAM:  Temp: 97.3  F (36.3  C) Temp src: Oral BP: 90/58 Pulse: 85 Heart Rate: 80 Resp: 11 SpO2: 96 % O2 Device: Nasal cannula Oxygen Delivery: 4 LPM  Vital Signs with Ranges  Temp:  [97.3  F (36.3  C)] 97.3  F (36.3  C)  Pulse:  [61-85] 85  Heart Rate:  [61-88] 80  Resp:  [11-19] 11  BP: ()/(58-94) 90/58  SpO2:  [96 %-97 %] 96 %  180 lbs 0 oz    Constitutional: awake, alert, no distress  Eyes: PERRL, sclera nonicteric  ENT: trachea midline  Respiratory:  CTAB  Cardiovascular: RRR no m/r/g  GI: nondistended, nontender, bowel sounds present  Lymph/Hematologic: no lymphadenopathy  Skin: dry, no rash  Musculoskeletal: good muscle tone, no edema bilaterally  Neurologic: no focal deficits  Neuropsychiatric: appropriate affact    DATA:  Reviewed in EPIC    Coronary Angiogram 10/9/19    Severe single-vessel obstructive CAD with thrombotic occlusion of the proximal LAD, as well as moderate non-obstructive CAD in OM1 and OM2. The RCA was not injected due to its expected small size, concerns re: contrast exposure, and hemodynamic instability prioritizing IABP insertion.    Successful PCI of the proximal LAD with overlapping Synergy EDWIN x 2 (3.0 x 16 proximal, 2.75 x 38 distal)    Successful IABP insertion in the RFA        ASSESSMENT:  1.  Anterior STEMI with hemodynamic instability:  S/P PCI of culprit  occluded proximal LAD with EDWIN x2.  IABP in place with 1:1 setting; hemodynamically stable with MAPs 80's.    2.  Hypertension      RECOMMENDATIONS:  1. Turn down IABP to 2:1.  May be able to discontinue later today versus tomorrow morning.  2.  Echocardiogram pending  3.  ASA indefinitely, brilinta 90 mg PO BID at least on year  4.  Atorvastatin 40 mg daily  5.  When balloon bump is ready to be removed, will bring to the cath lab to remove and evaluate RCA before sheath removal.      Mel Dennison MD  Cardiology - [facility]  Pager:  [phone]  October 9, 2019

## 2019-10-09 NOTE — ED PROVIDER NOTES
History     Chief Complaint:  Chest Pain and Loss of Consciousness    HPI history is somewhat limited by patient acuity.    Gem Gama is a 68 year old female, with a history of hypertension, who presents to the ED via EMS for evaluation of chest pain and loss of consciousness. The patient reports that she was shopping earlier today when she developed chest pain and became hot, diaphoretic and vomited. The patient became syncopal and was assisted down to the floor by a bystander who reports that the patient had a momentary loss of consciousness. EMS reports that the patient had a repeat episode of emesis and bowel movement en route. The patient reports central chest pain that radiates to her right side jaw.  She is never been diagnosed with any heart problems.    Allergies:  Flagyl, anaphylaxis and rash  Sulfa drugs, anaphylaxis  Metoprolol, itching and rash     Medications:    Proventil  Norvasc  Amoxil  Robitussin AC  Cozaar  Zoloft  Detrol LA  Vitamin D    Past Medical History:    ADHD  Anxiety  Arthritis  Asthma  Clostridium difficile  Central sleep apnea  Depression  GERD  Hypertension  Narcolepsy  Renal disease  Congenital ureteric stenosis  TMJ  Chronic rhinitis  Lacrimal duct stenosis  Pituitary microadenoma    Past Surgical History:    Arthroplasty knee, right and left  Reimplant ureter, extensive tailoring  Repair enterocele, vag approach  Total abdominal hysterectomy  Kidney surgery x3  Bilateral total hip   Rectocele repair  Soft tissue surgery    Family History:    Hypertension, mother  Arthritis, mother  CVD, father  Strokes x3, father  Diabetes, father and brother  Thyroid disease, sister    Social History:  Negative for tobacco use.  Negative for alcohol use.  Negative for drug use.  Marital Status:   [5]     Review of Systems   Unable to perform ROS: Acuity of condition     Physical Exam     Patient Vitals for the past 24 hrs:   BP Temp Temp src Pulse Heart Rate Resp SpO2 Height  "Weight   10/09/19 1219 124/84 -- -- 64 61 -- -- -- --   10/09/19 1207 113/66 97.3  F (36.3  C) Oral 61 -- 18 97 % 1.626 m (5' 4\") 81.6 kg (180 lb)     Physical Exam  General: Ill-appearing woman sitting upright in room 4  HENT: mucous membranes moist  CV: regular rate, regular rhythm, no lower extremity edema, no JVD, palpable symmetric radial pulses  Resp: clear throughout, normal effort, no crackles or wheezing  GI: abdomen soft and nontender, no guarding, negative Harry's sign  MSK: no bony tenderness to chest  Skin: appropriately warm, diaphoretic, no erythema or vesicles to chest wall  Neuro: alert, clear speech, oriented   Psych: anxious, cooperative      Emergency Department Course   ECG:  Indication: Chest Pain and loss of consciousness   Time: 1206  Vent. Rate 62 bpm. MN interval 154. QRS duration 82. QT/QTc 426/432. P-R-T axis -18 -29 -25.  Normal sinus rhythm. ST elevation, consider anterior injury or acute infarct. ACUTE MI/STEMI. Abnormal ECG. Read time: 1210    Imaging:  Radiographic findings were communicated with the patient who voiced understanding of the findings.  XR Chest 1 view, portable:   No acute abnormality as per radiology.    Laboratory:  CBC: WBC: 8.1, HGB: 14.6, PLT: 204  BMP: Glucose 145 (H), Creatinine: 1.06 (H), GFR: 53 (L), o/w WNL   1208 Troponin: 0.035    Interventions:  1216 Aspirin chewable tablet 324 mg PO   Brilinta tablet 180 mg PO  Heparin bolus IV    Emergency Department Course:  Nursing notes and vitals reviewed. (1207) I performed an exam of the patient as documented above.     ECG was obtained. I spoke with patient to obtain initial focused history.    1208 I called STEMI code.     I performed electronic chart review in [employer].  The patient was placed on continuous cardiac and pulse ox monitoring.    1211 I spoke with the interventional cardiologist , Dr. Chatterjee, who agrees with the plan for emergent cardiac catheterization.    IV inserted. Medicine administered as " documented above. Blood drawn. This was sent to the lab for further testing, results above.     I updated the patient.  I spoke with her son who is now at bedside.    The patient had a portable chest x-ray performed while in the emergency department, findings above.     1225 I rechecked the patient and discussed the results of her workup thus far.     The patient was sent to the cath lab.     Impression & Plan    CMS Diagnoses: The patient has a STEMI     The patient was evaluated at 1208   Patient was transferred  to the cath lab which was activated due to ECG changes.   ASA given in the ER  Medical Decision Making:  Her presentation is consistent with an acute anterior STEMI.  She has acute chest discomfort, diaphoresis, vomiting, and electrocardiogram findings including ST elevation with reciprocal ST depression to support this diagnosis.  Alternate etiologies including aortic pathology, pneumothorax, gastritis, and many others were considered.  She was given our standard STEMI medications and transferred emergently to the cardiac Cath Lab for further intervention.  It is anticipated that she will be admitted to the CICU thereafter.    Diagnosis:    ICD-10-CM    1. ST elevation myocardial infarction (STEMI), unspecified artery (H) I21.3 Basic metabolic panel     Basic metabolic panel     CBC with platelets differential     CBC with platelets differential     Troponin I     Troponin I       Disposition:  The patient was sent to the cath lab.     Critical Care Time: Over 35 minutes, independent of procedures.  This included time spent obtaining a history and physical, reviewing the patient's chart, interpreting lab and imaging studies, re-examining the patient, coordinating care with other providers, and documenting ED care provided.  She is having an acute heart attack and required initiation of heparin and multiple other medications in the emergency department as part of her initial resuscitation, and was  transferred emergently to the cardiac catheterization lab for further care.  Her condition carries high morbidity and mortality.      Scribe Disclosure:  I, Pham Jeong, am serving as a scribe on 10/9/2019 at 12:08 PM to personally document services performed by Luis Leyva MD based on my observations and the provider's statements to me.     This record was created at least in part using electronic voice recognition software, so please excuse any typographical errors.     Pham Jeong  10/9/2019    EMERGENCY DEPARTMENT       Luis Leyva MD  10/09/19 [id number]

## 2019-10-09 NOTE — Clinical Note
The first balloon was inserted into the left anterior descending and proximal left anterior descending.Max pressure = 10 sandoval. Total duration = 10 seconds.     Max pressure = 10 sandoval. Total duration = 5 seconds.    Balloon reinflated a second time: Max pressure = 10 sandoval. Total duration = 5 seconds.  Balloon reinflated a third time: Max pressure = 10 sandoval. Total duration = 5 seconds.  Balloon reinflated a fourth time: Max pressure = 12 sandoval. Total duration = 8 seconds.  Balloon reinflated a fourth time: Max pressure = 12 sandoval. Total duration = 5 seconds.

## 2019-10-09 NOTE — Clinical Note
The first balloon was inserted into the left anterior descending and proximal left anterior descending.Max pressure = 12 sandoval. Total duration = 5 seconds.     Max pressure = 12 sandoval. Total duration = 5 seconds.    Balloon reinflated a second time: Max pressure = 12 sandoval. Total duration = 5 seconds.

## 2019-10-09 NOTE — Clinical Note
Stent deployed in the proximal left anterior descending. Max pressure = 14 sandoval. Total duration = 20 seconds.

## 2019-10-10 ENCOUNTER — APPOINTMENT (OUTPATIENT)
Dept: CARDIOLOGY | Facility: CLINIC | Age: 69
DRG: 271 | End: 2019-10-10
Attending: INTERNAL MEDICINE
Payer: MEDICARE

## 2019-10-10 PROBLEM — I21.02 ST ELEVATION MYOCARDIAL INFARCTION INVOLVING LEFT ANTERIOR DESCENDING (LAD) CORONARY ARTERY (H): Status: ACTIVE | Noted: 2019-10-09

## 2019-10-10 LAB
ANION GAP SERPL CALCULATED.3IONS-SCNC: 5 MMOL/L (ref 3–14)
BUN SERPL-MCNC: 11 MG/DL (ref 7–30)
CALCIUM SERPL-MCNC: 8.2 MG/DL (ref 8.5–10.1)
CHLORIDE SERPL-SCNC: 111 MMOL/L (ref 94–109)
CO2 SERPL-SCNC: 26 MMOL/L (ref 20–32)
CREAT SERPL-MCNC: 0.94 MG/DL (ref 0.52–1.04)
ERYTHROCYTE [DISTWIDTH] IN BLOOD BY AUTOMATED COUNT: 13.6 % (ref 10–15)
GFR SERPL CREATININE-BSD FRML MDRD: 61 ML/MIN/{1.73_M2}
GLUCOSE BLDC GLUCOMTR-MCNC: 117 MG/DL (ref 70–99)
GLUCOSE BLDC GLUCOMTR-MCNC: 122 MG/DL (ref 70–99)
GLUCOSE BLDC GLUCOMTR-MCNC: 85 MG/DL (ref 70–99)
GLUCOSE BLDC GLUCOMTR-MCNC: 90 MG/DL (ref 70–99)
GLUCOSE BLDC GLUCOMTR-MCNC: 92 MG/DL (ref 70–99)
GLUCOSE SERPL-MCNC: 92 MG/DL (ref 70–99)
HCT VFR BLD AUTO: 36.7 % (ref 35–47)
HGB BLD-MCNC: 12.9 G/DL (ref 11.7–15.7)
LMWH PPP CHRO-ACNC: 0.14 IU/ML
MAGNESIUM SERPL-MCNC: 2.1 MG/DL (ref 1.6–2.3)
MCH RBC QN AUTO: 29.7 PG (ref 26.5–33)
MCHC RBC AUTO-ENTMCNC: 35.1 G/DL (ref 31.5–36.5)
MCV RBC AUTO: 84 FL (ref 78–100)
PHOSPHATE SERPL-MCNC: 3.5 MG/DL (ref 2.5–4.5)
PLATELET # BLD AUTO: 161 10E9/L (ref 150–450)
POTASSIUM SERPL-SCNC: 3.4 MMOL/L (ref 3.4–5.3)
RBC # BLD AUTO: 4.35 10E12/L (ref 3.8–5.2)
SODIUM SERPL-SCNC: 142 MMOL/L (ref 133–144)
WBC # BLD AUTO: 8.4 10E9/L (ref 4–11)

## 2019-10-10 PROCEDURE — 00000146 ZZHCL STATISTIC GLUCOSE BY METER IP

## 2019-10-10 PROCEDURE — 99233 SBSQ HOSP IP/OBS HIGH 50: CPT | Performed by: INTERNAL MEDICINE

## 2019-10-10 PROCEDURE — 87641 MR-STAPH DNA AMP PROBE: CPT | Performed by: INTERNAL MEDICINE

## 2019-10-10 PROCEDURE — 80048 BASIC METABOLIC PNL TOTAL CA: CPT | Performed by: INTERNAL MEDICINE

## 2019-10-10 PROCEDURE — 93306 TTE W/DOPPLER COMPLETE: CPT | Mod: 26 | Performed by: INTERNAL MEDICINE

## 2019-10-10 PROCEDURE — B2111ZZ FLUOROSCOPY OF MULTIPLE CORONARY ARTERIES USING LOW OSMOLAR CONTRAST: ICD-10-PCS | Performed by: INTERNAL MEDICINE

## 2019-10-10 PROCEDURE — 25000128 H RX IP 250 OP 636: Performed by: INTERNAL MEDICINE

## 2019-10-10 PROCEDURE — 99153 MOD SED SAME PHYS/QHP EA: CPT | Performed by: INTERNAL MEDICINE

## 2019-10-10 PROCEDURE — 25000132 ZZH RX MED GY IP 250 OP 250 PS 637: Mod: GY | Performed by: INTERNAL MEDICINE

## 2019-10-10 PROCEDURE — 25500064 ZZH RX 255 OP 636: Performed by: INTERNAL MEDICINE

## 2019-10-10 PROCEDURE — 40000264 ECHOCARDIOGRAM COMPLETE

## 2019-10-10 PROCEDURE — 25800030 ZZH RX IP 258 OP 636: Performed by: INTERNAL MEDICINE

## 2019-10-10 PROCEDURE — 20000003 ZZH R&B ICU

## 2019-10-10 PROCEDURE — 27210794 ZZH OR GENERAL SUPPLY STERILE: Performed by: INTERNAL MEDICINE

## 2019-10-10 PROCEDURE — 87640 STAPH A DNA AMP PROBE: CPT | Performed by: INTERNAL MEDICINE

## 2019-10-10 PROCEDURE — 85347 COAGULATION TIME ACTIVATED: CPT

## 2019-10-10 PROCEDURE — 36415 COLL VENOUS BLD VENIPUNCTURE: CPT | Performed by: INTERNAL MEDICINE

## 2019-10-10 PROCEDURE — 93454 CORONARY ARTERY ANGIO S&I: CPT | Performed by: INTERNAL MEDICINE

## 2019-10-10 PROCEDURE — 99232 SBSQ HOSP IP/OBS MODERATE 35: CPT | Performed by: INTERNAL MEDICINE

## 2019-10-10 PROCEDURE — 99152 MOD SED SAME PHYS/QHP 5/>YRS: CPT | Performed by: INTERNAL MEDICINE

## 2019-10-10 PROCEDURE — 84100 ASSAY OF PHOSPHORUS: CPT | Performed by: INTERNAL MEDICINE

## 2019-10-10 PROCEDURE — 83735 ASSAY OF MAGNESIUM: CPT | Performed by: INTERNAL MEDICINE

## 2019-10-10 PROCEDURE — 85027 COMPLETE CBC AUTOMATED: CPT | Performed by: INTERNAL MEDICINE

## 2019-10-10 PROCEDURE — 85520 HEPARIN ASSAY: CPT | Performed by: INTERNAL MEDICINE

## 2019-10-10 RX ORDER — NALOXONE HYDROCHLORIDE 0.4 MG/ML
.1-.4 INJECTION, SOLUTION INTRAMUSCULAR; INTRAVENOUS; SUBCUTANEOUS
Status: DISCONTINUED | OUTPATIENT
Start: 2019-10-10 | End: 2019-10-13 | Stop reason: HOSPADM

## 2019-10-10 RX ORDER — FENTANYL CITRATE 50 UG/ML
50 INJECTION, SOLUTION INTRAMUSCULAR; INTRAVENOUS ONCE
Status: COMPLETED | OUTPATIENT
Start: 2019-10-10 | End: 2019-10-10

## 2019-10-10 RX ORDER — SODIUM CHLORIDE 9 MG/ML
INJECTION, SOLUTION INTRAVENOUS CONTINUOUS
Status: DISCONTINUED | OUTPATIENT
Start: 2019-10-10 | End: 2019-10-11

## 2019-10-10 RX ORDER — FENTANYL CITRATE 50 UG/ML
25-50 INJECTION, SOLUTION INTRAMUSCULAR; INTRAVENOUS
Status: DISCONTINUED | OUTPATIENT
Start: 2019-10-10 | End: 2019-10-11

## 2019-10-10 RX ORDER — LORAZEPAM 2 MG/ML
0.5 INJECTION INTRAMUSCULAR
Status: DISCONTINUED | OUTPATIENT
Start: 2019-10-10 | End: 2019-10-10

## 2019-10-10 RX ORDER — LIDOCAINE 40 MG/G
CREAM TOPICAL
Status: DISCONTINUED | OUTPATIENT
Start: 2019-10-10 | End: 2019-10-10

## 2019-10-10 RX ORDER — NALOXONE HYDROCHLORIDE 0.4 MG/ML
.2-.4 INJECTION, SOLUTION INTRAMUSCULAR; INTRAVENOUS; SUBCUTANEOUS
Status: DISCONTINUED | OUTPATIENT
Start: 2019-10-10 | End: 2019-10-10

## 2019-10-10 RX ORDER — FLUMAZENIL 0.1 MG/ML
0.2 INJECTION, SOLUTION INTRAVENOUS
Status: DISCONTINUED | OUTPATIENT
Start: 2019-10-10 | End: 2019-10-11

## 2019-10-10 RX ORDER — LIDOCAINE 40 MG/G
CREAM TOPICAL
Status: DISCONTINUED | OUTPATIENT
Start: 2019-10-10 | End: 2019-10-13 | Stop reason: HOSPADM

## 2019-10-10 RX ORDER — POTASSIUM CHLORIDE 1500 MG/1
20 TABLET, EXTENDED RELEASE ORAL
Status: DISCONTINUED | OUTPATIENT
Start: 2019-10-10 | End: 2019-10-10

## 2019-10-10 RX ORDER — SODIUM CHLORIDE 9 MG/ML
INJECTION, SOLUTION INTRAVENOUS CONTINUOUS
Status: DISCONTINUED | OUTPATIENT
Start: 2019-10-10 | End: 2019-10-10

## 2019-10-10 RX ORDER — ATROPINE SULFATE 0.1 MG/ML
0.5 INJECTION INTRAVENOUS EVERY 5 MIN PRN
Status: DISCONTINUED | OUTPATIENT
Start: 2019-10-10 | End: 2019-10-11

## 2019-10-10 RX ORDER — FENTANYL CITRATE 50 UG/ML
INJECTION, SOLUTION INTRAMUSCULAR; INTRAVENOUS
Status: DISCONTINUED | OUTPATIENT
Start: 2019-10-10 | End: 2019-10-10 | Stop reason: HOSPADM

## 2019-10-10 RX ORDER — ACETAMINOPHEN 325 MG/1
650 TABLET ORAL EVERY 4 HOURS PRN
Status: DISCONTINUED | OUTPATIENT
Start: 2019-10-10 | End: 2019-10-13 | Stop reason: HOSPADM

## 2019-10-10 RX ORDER — NITROGLYCERIN 5 MG/ML
VIAL (ML) INTRAVENOUS
Status: DISCONTINUED | OUTPATIENT
Start: 2019-10-10 | End: 2019-10-10 | Stop reason: HOSPADM

## 2019-10-10 RX ORDER — LORAZEPAM 0.5 MG/1
0.5 TABLET ORAL
Status: DISCONTINUED | OUTPATIENT
Start: 2019-10-10 | End: 2019-10-10

## 2019-10-10 RX ADMIN — ASPIRIN 81 MG: 81 TABLET, DELAYED RELEASE ORAL at 09:10

## 2019-10-10 RX ADMIN — ACETAMINOPHEN 650 MG: 325 TABLET, FILM COATED ORAL at 07:43

## 2019-10-10 RX ADMIN — POTASSIUM CHLORIDE 20 MEQ: 1.5 POWDER, FOR SOLUTION ORAL at 07:39

## 2019-10-10 RX ADMIN — ATORVASTATIN CALCIUM 40 MG: 40 TABLET, FILM COATED ORAL at 09:10

## 2019-10-10 RX ADMIN — TICAGRELOR 90 MG: 90 TABLET ORAL at 19:52

## 2019-10-10 RX ADMIN — Medication 2000 UNITS: at 07:27

## 2019-10-10 RX ADMIN — HUMAN ALBUMIN MICROSPHERES AND PERFLUTREN 9 ML: 10; .22 INJECTION, SOLUTION INTRAVENOUS at 12:00

## 2019-10-10 RX ADMIN — SODIUM CHLORIDE: 9 INJECTION, SOLUTION INTRAVENOUS at 16:00

## 2019-10-10 RX ADMIN — SODIUM CHLORIDE: 9 INJECTION, SOLUTION INTRAVENOUS at 22:01

## 2019-10-10 RX ADMIN — MIDAZOLAM HYDROCHLORIDE 2 MG: 1 INJECTION, SOLUTION INTRAMUSCULAR; INTRAVENOUS at 15:51

## 2019-10-10 RX ADMIN — TICAGRELOR 90 MG: 90 TABLET ORAL at 07:39

## 2019-10-10 RX ADMIN — FENTANYL CITRATE 50 MCG: 50 INJECTION INTRAMUSCULAR; INTRAVENOUS at 15:50

## 2019-10-10 ASSESSMENT — ACTIVITIES OF DAILY LIVING (ADL)
ADLS_ACUITY_SCORE: 13

## 2019-10-10 ASSESSMENT — MIFFLIN-ST. JEOR: SCORE: 1375

## 2019-10-10 NOTE — PROGRESS NOTES
[facility]  Intensive Care Daily Progress Note  [facility] Intensive Care     Date of Admission:  10/9/2019    Assessment & Plan    Ms. Gama is a 67 y/o woman with PMH significant for hypertension who presented earlier today with chest pain and brief loss of consciousness.  ECG demonstrated ST elevation in the anterolateral leads and she was taken emergently to the cath lab and underwent PCI with EDWIN x2 to proximal LAD.  She was hypotensive in the cath lab requiring dopamine and neosynephrine and intra-aortic balloon pump was placed.    Gem states this morning she was out shopping and had the sudden onset of chest discomfort, diaphoresis, n/v/diarrhea.  She continued to shop and a bystander noted she was not well, called EMS.  There are reports she had a brief loss of consciousness and the bystander lowered her to the ground.    In the cath lab, she was found to have an occluded proximal LAD.  She was hypotensive and given multiple doses of johnny and started on a dopamine gtt.  Ultimately, an IABP was placed.  She underwent successful PCI of proximal LAD x2.  She had moderate disease in her LCx.  Her RCA was not evaluated.  Currently, in the ICU she is hemodynamically stable on IABP at 1:2 with MAPs in the 80's.    Plan is to go to cath lab, remove balloon pump and check RCA.    Neurology:  No issues: alert, oriented    Cardiovascular:  S/p STEMI: LAD distribution  -balloon pump in place, patient now stable. On heparin drip. Antiplatelet drugs per cardiology    Pulmonary:        No issues  -    Renal  No issues: follow electrolytes following dye load. Avoid nephrotoxins  -    ID:         No issues  -    GI  No issues: NPO while having to lie flat  -    Nutrition  NPO for now:  -    Endocrine:  No issues: glycemic control per unit protocol  -    Heme/Onc:  No issues  -    DVT Prophylaxis: On heparin drip  GI Prophylaxis: Not indicated    Restraints: Restraints for medical healing needed:  NO    Family update by me today: No    Roddy Hunter    Time Spent on this Encounter   Level 3    Code Status   Full Code    Primary Care Physician   Danny Balbuena Vocal    Chief Complaint   Chest pain    History is obtained from the electronic health record and cardiology    History of Present Illness   Gem Gama is a 68 year old female who presents with STEMI    Past Medical History    I have reviewed this patient's medical history and updated it with pertinent information if needed.   Past Medical History:   Diagnosis Date     ADHD (attention deficit hyperactivity disorder)      Anxiety      Arthritis     back, hands, knees, hips     Asthma     controlled--has coughing symptoms     C. difficile diarrhea      Central sleep apnea      Depression      Gastro-oesophageal reflux disease      Hypertension      Narcolepsy      Renal disease     gfr is aroung 48     Sleep apnea     uses Cpap       Past Surgical History   I have reviewed this patient's surgical history and updated it with pertinent information if needed.  Past Surgical History:   Procedure Laterality Date     ARTHROPLASTY KNEE  7/9/2014    Procedure: ARTHROPLASTY KNEE;  Surgeon: Levi Johnson MD;  Location: [facility] OR     ARTHROPLASTY KNEE Left 11/24/2014    Procedure: ARTHROPLASTY KNEE;  Surgeon: Levi Johnson MD;  Location: [facility] OR     C REIMPLANT URETER,EXTENSIVE TAILORING  1973 1997     C REPAIR ENTEROCELE,VAG APPRCH  2008    Prolift     C TOTAL ABD HYSTERECTOMY+BLAD REPR  1992    Vaginal approach with oophrectomy     C TOTAL KNEE ARTHROPLASTY       GENITOURINARY SURGERY      kidney surgery X 3     ORTHOPEDIC SURGERY      bilat total hip     RECTOCELE REPAIR       SOFT TISSUE SURGERY         Prior to Admission Medications   Prior to Admission Medications   Prescriptions Last Dose Informant Patient Reported? Taking?   albuterol (PROVENTIL) (2.5 MG/3ML) 0.083% neb solution prn Self No No   Sig: USE 1 VIAL BY NEBULIZATION EVERY 6 HOURS AS  NEEDED FOR SHORTNESS OF BREATH/ DYSPNEA   amLODIPine (NORVASC) 5 MG tablet 10/8/2019 at Unknown time Self No Yes   Sig: TAKE 1 TABLET BY MOUTH TWICE DAILY WITH MEALS   Patient taking differently: Take 10 mg by mouth daily    amoxicillin (AMOXIL) 500 MG capsule prn Self Yes No   Sig: TK 4 CS PO ONE HOUR BEFORE DENTAL APPOINTMENT   guaiFENesin-codeine (ROBITUSSIN AC) 100-10 MG/5ML SOLN prn Self No No   Sig: Take 10 mLs by mouth every 4 hours as needed for cough   Patient not taking: Reported on 3/15/2018   losartan (COZAAR) 25 MG tablet 10/9/2019 at Unknown time Self Yes Yes   Sig: Take 25 mg by mouth daily   sertraline (ZOLOFT) 100 MG tablet 10/9/2019 at Unknown time Self No Yes   Sig: TAKE 1 TABLET(100 MG) BY MOUTH DAILY   vitamin D3 (CHOLECALCIFEROL) 2000 units (50 mcg) tablet 10/9/2019 at Unknown time Self No Yes   Sig: Take 1 tablet (2,000 Units) by mouth daily      Facility-Administered Medications: None     Allergies   Allergies   Allergen Reactions     Flagyl [Metronidazole Hcl] Anaphylaxis and Rash     Latex Other (See Comments)     Runny nose     Sulfa Drugs Anaphylaxis     Tape [Adhesive Tape] Hives     Metoprolol Itching and Rash       Social History   I have reviewed this patient's social history and updated it with pertinent information if needed. Gem Gama  reports that she has never smoked. She has never used smokeless tobacco. She reports that she does not drink alcohol or use drugs.    Family History   I have reviewed this patient's family history and updated it with pertinent information if needed.   Family History   Problem Relation Age of Onset     Hypertension Mother      Arthritis Mother      Cerebrovascular Disease Father         Three Strokes     Diabetes Father         and brother     Thyroid Disease Sister         Hypothyroid       Review of Systems   Review of systems not obtained due to patient factors - critical condition    Physical Exam   Temp: 100.4  F (38  C) Temp src: Oral  Temp  Min: 97.9  F (36.6  C)  Max: 100.4  F (38  C) BP: 101/69 Pulse: 68 Heart Rate: 78 Resp: 19 SpO2: 97 % O2 Device: Nasal cannula Oxygen Delivery: 2 LPM  Vital Signs with Ranges  Temp:  [97.9  F (36.6  C)-100.4  F (38  C)] 100.4  F (38  C)  Pulse:  [64-85] 68  Heart Rate:  [57-90] 78  Resp:  [10-31] 19  BP: ()/() 101/69  SpO2:  [93 %-100 %] 97 %  191 lbs 12.8 oz    Constitutional: alert, oriented, comfortable  Eyes: PERRL  ENT: moist mucosal membranes  Respiratory: no rales, rhonchi  Cardiovascular: RRR, no murmur or rub  GI: soft, without tenderness  Genitourinary: no Greene  Skin: warm, no mottling  Musculoskeletal: balloon pump in place  Neurologic: grossly intact    Data   Results for orders placed or performed during the hospital encounter of 10/09/19 (from the past 24 hour(s))   XR Chest Port 1 View    Narrative    CHEST ONE VIEW PORTABLE   10/9/2019 12:18 PM     HISTORY: Acute chest pain.    COMPARISON: 8/16/2018.      Impression    IMPRESSION: No acute abnormality.    BROOK SERRA MD   Activated clotting time POCT   Result Value Ref Range    Activated Clot Time 219 (H) 75 - 150 sec   Activated clotting time POCT   Result Value Ref Range    Activated Clot Time 223 (H) 75 - 150 sec   Cardiac Catheterization    Narrative      Severe single-vessel obstructive CAD with thrombotic occlusion of the   proximal LAD, as well as moderate non-obstructive CAD in OM1 and OM2. The   RCA was not injected due to its expected small size, concerns re: contrast   exposure, and hemodynamic instability prioritizing IABP insertion.    Successful PCI of the proximal LAD with overlapping Synergy EDWIN x 2 (3.0   x 16 proximal, 2.75 x 38 distal)    Successful IABP insertion in the RFA      Glucose by meter   Result Value Ref Range    Glucose 145 (H) 70 - 99 mg/dL   Cardiology IP Consult: Patient to be seen: Routine - within 24 hours; STEMI; Consultant may enter orders: Yes; Requesting provider? Other supervising  provider; Name: MD Jennifer Zarate Cynthia K, MD     10/9/2019  3:16 PM  CARDIOLOGY CONSULT    REASON FOR CONSULT: STEMI    HISTORY OF PRESENT ILLNESS:   Ms. Gama is a 69 y/o woman with PMH   significant for hypertension who presented earlier today with   chest pain and brief loss of consciousness.  ECG demonstrated ST   elevation in the anterolateral leads and she was taken emergently   to the cath lab and underwent PCI with EDWIN x2 to proximal LAD.    She was hypotensive in the cath lab requiring dopamine and   neosynephrine and intra-aortic balloon pump was placed.    Gem states this morning she was out shopping and had the   sudden onset of chest discomfort, diaphoresis, n/v/diarrhea.  She   continued to shop and a bystander noted she was not well, called   EMS.  There are reports she had a brief loss of consciousness and   the bystander lowered her to the ground.    In the cath lab, she was found to have an occluded proximal LAD.    She was hypotensive and given multiple doses of johnny and started   on a dopamine gtt.  Ultimately, an IABP was placed.  She   underwent successful PCI of proximal LAD x2.  She had moderate   disease in her LCx.  Her RCA was not evaluated.  Currently, in the ICU she is stable on IABP at 1:1 with MAPs in   the 80's.  She is feeling well.  She denies any exertional chest   pain, chest discomfort.  She has some left leg discomfort and low   back pain from lying on her back.        PAST MEDICAL HISTORY:  1.  GERD  2.  Anxiety/ADHD  3.  Asthma  4.  Depression  5.  GERD  6.  Hypertension  7.  LEA     MEDICATIONS:  Current Facility-Administered Medications   Medication     0.9% sodium chloride BOLUS     acetaminophen (TYLENOL) tablet 650 mg     [START ON 10/10/2019] aspirin EC tablet 81 mg     atorvastatin (LIPITOR) tablet 40 mg     atropine injection 0.5 mg     flumazenil (ROMAZICON) injection 0.2 mg     HYDROmorphone (PF) (DILAUDID) injection 0.2 mg     lidocaine  (LMX4) cream     lidocaine 1 % 0.1-1 mL     lidocaine 1 % 5 mL     LORazepam (ATIVAN) injection 0.5 mg    Or     LORazepam (ATIVAN) tablet 0.5 mg     magnesium sulfate 2 g in water intermittent infusion     magnesium sulfate 4 g in 100 mL sterile water (premade)     midazolam (VERSED) injection 0.5-1 mg     naloxone (NARCAN) injection 0.1-0.4 mg     nitroGLYcerin (NITROSTAT) sublingual tablet 0.4 mg     ondansetron (ZOFRAN-ODT) ODT tab 4 mg    Or     ondansetron (ZOFRAN) injection 4 mg     Patient is already receiving anticoagulation with heparin,   enoxaparin (LOVENOX), warfarin (COUMADIN)  or other anticoagulant   medication     Patient is NOT allergic to contrast dye OR was given   pre-procedure oral steroids for treatment     Percutaneous Coronary Intervention orders placed (this is   information for BPA alerting)     potassium chloride (KLOR-CON) Packet 20-40 mEq     potassium chloride 10 mEq in 100 mL intermittent infusion with   10 mg lidocaine     potassium chloride 10 mEq in 100 mL sterile water intermittent   infusion (premix)     potassium chloride 20 mEq in 50 mL intermittent infusion     potassium chloride ER (K-DUR/KLOR-CON M) CR tablet 20 mEq     potassium chloride ER (K-DUR/KLOR-CON M) CR tablet 20-40 mEq     Reason ACE/ARB/ARNI order not selected     Reason beta blocker not prescribed     sodium chloride (PF) 0.9% PF flush 3 mL     sodium chloride (PF) 0.9% PF flush 3 mL     sodium chloride 0.9% infusion     sodium phosphate 15 mmol in D5W intermittent infusion     sodium phosphate 20 mmol in D5W intermittent infusion     sodium phosphate 25 mmol in D5W intermittent infusion     ticagrelor (BRILINTA) tablet 90 mg       ALLERGIES:  Allergies   Allergen Reactions     Flagyl [Metronidazole Hcl] Anaphylaxis and Rash     Latex Other (See Comments)     Runny nose     Sulfa Drugs Anaphylaxis     Tape [Adhesive Tape] Hives     Metoprolol Itching and Rash       SOCIAL HISTORY:  I have reviewed this patient's  social history and updated it with   pertinent information if needed. Gem Gama  reports that   she has never smoked. She has never used smokeless tobacco. She   reports that she does not drink alcohol or use drugs.    FAMILY HISTORY:  I have reviewed this patient's family history and updated it with   pertinent information if needed.   Family History   Problem Relation Age of Onset     Hypertension Mother      Arthritis Mother      Cerebrovascular Disease Father         Three Strokes     Diabetes Father         and brother     Thyroid Disease Sister         Hypothyroid       REVIEW OF SYSTEMS:  A complete ROS was obtained and the pertinent positives were   outlined in the history of present illness above.  The remainder   of systems is negative.        PHYSICAL EXAM:  Temp: 97.3  F (36.3  C) Temp src: Oral BP: 90/58 Pulse: 85 Heart   Rate: 80 Resp: 11 SpO2: 96 % O2 Device: Nasal cannula Oxygen   Delivery: 4 LPM  Vital Signs with Ranges  Temp:  [97.3  F (36.3  C)] 97.3  F (36.3  C)  Pulse:  [61-85] 85  Heart Rate:  [61-88] 80  Resp:  [11-19] 11  BP: ()/(58-94) 90/58  SpO2:  [96 %-97 %] 96 %  180 lbs 0 oz    Constitutional: awake, alert, no distress  Eyes: PERRL, sclera nonicteric  ENT: trachea midline  Respiratory:  CTAB  Cardiovascular: RRR no m/r/g  GI: nondistended, nontender, bowel sounds present  Lymph/Hematologic: no lymphadenopathy  Skin: dry, no rash  Musculoskeletal: good muscle tone, no edema bilaterally  Neurologic: no focal deficits  Neuropsychiatric: appropriate affact    DATA:  Reviewed in EPIC    Coronary Angiogram 10/9/19    Severe single-vessel obstructive CAD with thrombotic occlusion   of the proximal LAD, as well as moderate non-obstructive CAD in   OM1 and OM2. The RCA was not injected due to its expected small   size, concerns re: contrast exposure, and hemodynamic instability   prioritizing IABP insertion.    Successful PCI of the proximal LAD with overlapping Synergy EDWIN   x 2  (3.0 x 16 proximal, 2.75 x 38 distal)    Successful IABP insertion in the RFA        ASSESSMENT:  1.  Anterior STEMI with hemodynamic instability:  S/P PCI of   culprit occluded proximal LAD with EDWIN x2.  IABP in place with   1:1 setting; hemodynamically stable with MAPs 80's.    2.  Hypertension      RECOMMENDATIONS:  1. Turn down IABP to 2:1.  May be able to discontinue later today   versus tomorrow morning.  2.  Echocardiogram pending  3.  ASA indefinitely, brilinta 90 mg PO BID at least on year  4.  Atorvastatin 40 mg daily  5.  When balloon bump is ready to be removed, will bring to the   cath lab to remove and evaluate RCA before sheath removal.      Mel Dennison MD  Cardiology - [facility]  Pager:  [phone]  October 9, 2019   EKG 12-lead, tracing only    Result Value Ref Range    Interpretation ECG Click View Image link to view waveform and result    CBC with platelets   Result Value Ref Range    WBC 12.2 (H) 4.0 - 11.0 10e9/L    RBC Count 4.54 3.8 - 5.2 10e12/L    Hemoglobin 13.6 11.7 - 15.7 g/dL    Hematocrit 38.1 35.0 - 47.0 %    MCV 84 78 - 100 fl    MCH 30.0 26.5 - 33.0 pg    MCHC 35.7 31.5 - 36.5 g/dL    RDW 13.4 10.0 - 15.0 %    Platelet Count 169 150 - 450 10e9/L   Glucose by meter   Result Value Ref Range    Glucose 129 (H) 70 - 99 mg/dL   Heparin Xa level (AM Draw)   Result Value Ref Range    Heparin 10A Level 0.89 IU/mL   Glucose by meter   Result Value Ref Range    Glucose 117 (H) 70 - 99 mg/dL   CBC with platelets   Result Value Ref Range    WBC 8.4 4.0 - 11.0 10e9/L    RBC Count 4.35 3.8 - 5.2 10e12/L    Hemoglobin 12.9 11.7 - 15.7 g/dL    Hematocrit 36.7 35.0 - 47.0 %    MCV 84 78 - 100 fl    MCH 29.7 26.5 - 33.0 pg    MCHC 35.1 31.5 - 36.5 g/dL    RDW 13.6 10.0 - 15.0 %    Platelet Count 161 150 - 450 10e9/L   Basic metabolic panel   Result Value Ref Range    Sodium 142 133 - 144 mmol/L    Potassium 3.4 3.4 - 5.3 mmol/L    Chloride 111 (H) 94 - 109 mmol/L    Carbon Dioxide 26 20 - 32  mmol/L    Anion Gap 5 3 - 14 mmol/L    Glucose 92 70 - 99 mg/dL    Urea Nitrogen 11 7 - 30 mg/dL    Creatinine 0.94 0.52 - 1.04 mg/dL    GFR Estimate 61 >60 mL/min/[1.73_m2]    GFR Estimate If Black 71 >60 mL/min/[1.73_m2]    Calcium 8.2 (L) 8.5 - 10.1 mg/dL   Heparin Xa (10a) Level   Result Value Ref Range    Heparin 10A Level 0.14 IU/mL   Magnesium   Result Value Ref Range    Magnesium 2.1 1.6 - 2.3 mg/dL   Phosphorus   Result Value Ref Range    Phosphorus 3.5 2.5 - 4.5 mg/dL   Glucose by meter   Result Value Ref Range    Glucose 92 70 - 99 mg/dL     Roddy Hunter MD  [facility] Intensivist Service

## 2019-10-10 NOTE — PRE-PROCEDURE
GENERAL PRE-PROCEDURE:   Procedure:  Rca injection, remove iabp  Date/Time:  10/10/2019 2:44 PM    Written consent obtained?: Yes    Risks and benefits: Risks, benefits and alternatives were discussed    Consent given by:  Patient  Patient states understanding of procedure being performed: Yes    Patient's understanding of procedure matches consent: Yes    Procedure consent matches procedure scheduled: Yes    Appropriately NPO:  Yes  ASA Class:  Class 3- Severe systemic disease, definite functional limitations  Mallampati  :  Grade 2- soft palate, base of uvula, tonsillar pillars, and portion of posterior pharyngeal wall visible  Lungs:  Lungs clear with good breath sounds bilaterally  Heart:  Normal heart sounds and rate  History & Physical reviewed:  History and physical reviewed and no updates needed  Statement of review:  I have reviewed the lab findings, diagnostic data, medications, and the plan for sedation

## 2019-10-10 NOTE — PLAN OF CARE
VSS. IABP at 1:2 with MAP 80-90 overnight. Some c/o slight CP and HA, relieved with tylenol. Heparin gtt infusing. Unable to void- strait cathed for 1000. Likely angio of RCA today, then balloon pump removal.

## 2019-10-10 NOTE — PLAN OF CARE
Patient alert and oriented x 4, vital signs stable. Patient went to cath lab at 1415, returned at 1545, left groin site clean, dry and intact, no hematoma no bleed. IABP sheath removed at 1555 by Dr Licea, pressure held on right groin until 1625, at 1630 reapplied until 1645 for small bleed. Left groin site slightly ecchymotic, no hematoma no bleeding. Patient son at bedside for visit and update.

## 2019-10-10 NOTE — PROGRESS NOTES
" [facility]    Cardiology Progress Note    Date of Service (when I saw the patient): 10/10/2019            Assessment and Plan:     ASSESSMENT:  1.  Anterior STEMI with hemodynamic instability:  S/P PCI of culprit occluded proximal LAD with EDWIN x2.  RCA not evaluated during procedure due to instability, contrast exposure and expected small vessel size.  IABP placed for hemodynamic instability during cath procedure.  Patient has remained hemodynamically stable overnight on 1:2 IABP.   IABP paused and patients MAPs remained >65.    2.  Ischemic cardiomyopathy:  Preliminary echo images reviewed demonstrating moderately reduced LV function and LAD wall motion abnormalities.    3.  Hx of hypertension        RECOMMENDATIONS:  1.  Return to cath lab today to evaluate RCA and IABP/sheath removal.  Procedure and risks reviewed and consent obtained. Heparin gtt on hold per cath lab.    2.  ASA indefinitely, brilinta 90 mg PO BID at least on year  3.  Atorvastatin 40 mg daily  4.  Will start low dose beta blocker/ACEI as BP allows once  IABP removed and patients hemodynamics have remained stable.    Mel Dennison MD [facility]  Cardiology                Interval History:   Doing well. Noted some mild chest discomfort overnight that improved with repositioning.               Medications:       aspirin  81 mg Oral Daily     atorvastatin  40 mg Oral Daily     sodium chloride (PF)  3 mL Intracatheter Q8H     ticagrelor  90 mg Oral Q12H              Physical Exam:   Blood pressure 101/69, pulse 68, temperature 99.6  F (37.6  C), temperature source Oral, resp. rate 14, height 1.626 m (5' 4\"), weight 87 kg (191 lb 12.8 oz), SpO2 96 %, not currently breastfeeding.  Vitals:    10/09/19 1207 10/09/19 1415 10/10/19 0600   Weight: 81.6 kg (180 lb) 87.3 kg (192 lb 7.4 oz) 87 kg (191 lb 12.8 oz)       Intake/Output Summary (Last 24 hours) at 10/10/2019 1129  Last data filed at 10/10/2019 1100  Gross per 24 hour   Intake 673 ml "   Output 1900 ml   Net -1227 ml           Vital Sign Ranges  Temperature Temp  Av.9  F (37.2  C)  Min: 97.3  F (36.3  C)  Max: 100  F (37.8  C)   Blood pressure Systolic (24hrs), Av , Min:90 , Max:130        Diastolic (24hrs), Av, Min:48, Max:115      Pulse Pulse  Av.3  Min: 61  Max: 85   Respirations Resp  Av.3  Min: 9  Max: 31   Pulse oximetry SpO2  Av.9 %  Min: 93 %  Max: 100 %         EXAM:    Constitutional:    in no apparent distress    Skin:    normal    Head:    Normocephalic, atramatic    Eyes:    pupils equal, round and reactive to light, extra ocular muscles intact, sclera clear, conjunctiva normal    Neck:    No JVD   Lungs:    CTAB   Cardiovascular:    S1, S2 RRR no m/r/g   Abdomen:    normal bowel sounds    Extremities and Back:    no cyanosis or clubbing and No edema bilaterally, warm and well perfused   Neurological:    No gross or focal neurologic abnormalities               Data:     Recent Labs   Lab Test 10/10/19  0639 10/09/19  1625  14  0553 14  0628   WBC 8.4 12.2*   < >  --   --    HGB 12.9 13.6   < >  --  10.3*   MCV 84 84   < >  --   --     169   < >  --   --    INR  --   --   --  1.69* 1.76*    < > = values in this interval not displayed.      Recent Labs   Lab Test 10/10/19  0639 10/09/19  1208    141   POTASSIUM 3.4 3.6   CHLORIDE 111* 107   BUN 11 12   CR 0.94 1.06*     Recent Labs   Lab 10/10/19  0639 10/09/19  1208   GLC 92 145*     Recent Labs   Lab Test 18  1700 13  1636   ALT 20 20   AST 19 26     Troponin I ES   Date Value Ref Range Status   10/09/2019 0.035 0.000 - 0.045 ug/L Final     Comment:     The 99th percentile for upper reference range is 0.045 ug/L.  Troponin values   in the range of 0.045 - 0.120 ug/L may be associated with risks of adverse   clinical events.           Recent Labs   Lab Test 10/10/19  0639 18  1700 17  1230   MAG 2.1 2.0 2.3       Lab Results   Component Value Date    CHOL 238  10/09/2019     Lab Results   Component Value Date    HDL 45 10/09/2019     Lab Results   Component Value Date     10/09/2019     Lab Results   Component Value Date    TRIG 136 10/09/2019     Lab Results   Component Value Date    CHOLHDLRATIO 4.2 09/19/2013          TSH   Date Value Ref Range Status   02/02/2016 1.62 0.40 - 4.00 mU/L Final         Mel Dennison MD, FACC  Cardiology

## 2019-10-10 NOTE — CONSULTS
Received standard consult to see patient --> Provider Order:  Dietitian to see for Heart Healthy Diet education    Patient currently resting in ICU  Plan for 2nd angiogram today  Patient currently not appropriate for diet education  Will check education needs as indicated closer to discharge    Olena Hazel RD, LD, CNSC   Clinical Dietitian - [facility]

## 2019-10-11 ENCOUNTER — APPOINTMENT (OUTPATIENT)
Dept: OCCUPATIONAL THERAPY | Facility: CLINIC | Age: 69
DRG: 271 | End: 2019-10-11
Attending: INTERNAL MEDICINE
Payer: MEDICARE

## 2019-10-11 LAB
ANION GAP SERPL CALCULATED.3IONS-SCNC: 5 MMOL/L (ref 3–14)
BUN SERPL-MCNC: 8 MG/DL (ref 7–30)
CALCIUM SERPL-MCNC: 8 MG/DL (ref 8.5–10.1)
CHLORIDE SERPL-SCNC: 111 MMOL/L (ref 94–109)
CO2 SERPL-SCNC: 25 MMOL/L (ref 20–32)
CREAT SERPL-MCNC: 0.95 MG/DL (ref 0.52–1.04)
GFR SERPL CREATININE-BSD FRML MDRD: 60 ML/MIN/{1.73_M2}
GLUCOSE BLDC GLUCOMTR-MCNC: 107 MG/DL (ref 70–99)
GLUCOSE SERPL-MCNC: 92 MG/DL (ref 70–99)
INTERPRETATION ECG - MUSE: NORMAL
KCT BLD-ACNC: 118 SEC (ref 75–150)
LMWH PPP CHRO-ACNC: <0.1 IU/ML
MAGNESIUM SERPL-MCNC: 2 MG/DL (ref 1.6–2.3)
MRSA DNA SPEC QL NAA+PROBE: NEGATIVE
POTASSIUM SERPL-SCNC: 3.9 MMOL/L (ref 3.4–5.3)
SODIUM SERPL-SCNC: 141 MMOL/L (ref 133–144)
SPECIMEN SOURCE: NORMAL

## 2019-10-11 PROCEDURE — 25000132 ZZH RX MED GY IP 250 OP 250 PS 637: Mod: GY | Performed by: PHYSICIAN ASSISTANT

## 2019-10-11 PROCEDURE — 25000132 ZZH RX MED GY IP 250 OP 250 PS 637: Mod: GY | Performed by: INTERNAL MEDICINE

## 2019-10-11 PROCEDURE — 25000128 H RX IP 250 OP 636: Performed by: INTERNAL MEDICINE

## 2019-10-11 PROCEDURE — 85520 HEPARIN ASSAY: CPT | Performed by: INTERNAL MEDICINE

## 2019-10-11 PROCEDURE — 21000001 ZZH R&B HEART CARE

## 2019-10-11 PROCEDURE — 99233 SBSQ HOSP IP/OBS HIGH 50: CPT | Performed by: INTERNAL MEDICINE

## 2019-10-11 PROCEDURE — 83735 ASSAY OF MAGNESIUM: CPT | Performed by: INTERNAL MEDICINE

## 2019-10-11 PROCEDURE — 97165 OT EVAL LOW COMPLEX 30 MIN: CPT | Mod: GO

## 2019-10-11 PROCEDURE — 00000146 ZZHCL STATISTIC GLUCOSE BY METER IP

## 2019-10-11 PROCEDURE — 99207 ZZC CONSULT E&M CHANGED TO INITIAL LEVEL: CPT | Performed by: PHYSICIAN ASSISTANT

## 2019-10-11 PROCEDURE — 99232 SBSQ HOSP IP/OBS MODERATE 35: CPT | Performed by: INTERNAL MEDICINE

## 2019-10-11 PROCEDURE — 97110 THERAPEUTIC EXERCISES: CPT | Mod: GO

## 2019-10-11 PROCEDURE — 80048 BASIC METABOLIC PNL TOTAL CA: CPT | Performed by: INTERNAL MEDICINE

## 2019-10-11 PROCEDURE — 97535 SELF CARE MNGMENT TRAINING: CPT | Mod: GO

## 2019-10-11 PROCEDURE — 36415 COLL VENOUS BLD VENIPUNCTURE: CPT | Performed by: INTERNAL MEDICINE

## 2019-10-11 PROCEDURE — 99222 1ST HOSP IP/OBS MODERATE 55: CPT | Performed by: PHYSICIAN ASSISTANT

## 2019-10-11 RX ORDER — SERTRALINE HYDROCHLORIDE 100 MG/1
100 TABLET, FILM COATED ORAL DAILY
Status: DISCONTINUED | OUTPATIENT
Start: 2019-10-11 | End: 2019-10-13 | Stop reason: HOSPADM

## 2019-10-11 RX ORDER — HEPARIN SODIUM 5000 [USP'U]/.5ML
5000 INJECTION, SOLUTION INTRAVENOUS; SUBCUTANEOUS EVERY 8 HOURS
Status: DISCONTINUED | OUTPATIENT
Start: 2019-10-11 | End: 2019-10-11

## 2019-10-11 RX ORDER — CARVEDILOL 3.12 MG/1
3.12 TABLET ORAL 2 TIMES DAILY WITH MEALS
Status: DISCONTINUED | OUTPATIENT
Start: 2019-10-11 | End: 2019-10-13 | Stop reason: HOSPADM

## 2019-10-11 RX ORDER — ALBUTEROL SULFATE 0.83 MG/ML
2.5 SOLUTION RESPIRATORY (INHALATION) EVERY 4 HOURS PRN
Status: DISCONTINUED | OUTPATIENT
Start: 2019-10-11 | End: 2019-10-13 | Stop reason: HOSPADM

## 2019-10-11 RX ADMIN — LOSARTAN POTASSIUM 12.5 MG: 25 TABLET ORAL at 13:48

## 2019-10-11 RX ADMIN — HEPARIN SODIUM 5000 UNITS: 5000 INJECTION, SOLUTION INTRAVENOUS; SUBCUTANEOUS at 11:21

## 2019-10-11 RX ADMIN — TICAGRELOR 90 MG: 90 TABLET ORAL at 21:00

## 2019-10-11 RX ADMIN — SERTRALINE HYDROCHLORIDE 100 MG: 100 TABLET ORAL at 11:21

## 2019-10-11 RX ADMIN — ATORVASTATIN CALCIUM 40 MG: 40 TABLET, FILM COATED ORAL at 08:07

## 2019-10-11 RX ADMIN — CARVEDILOL 3.12 MG: 3.12 TABLET, FILM COATED ORAL at 17:51

## 2019-10-11 RX ADMIN — ASPIRIN 81 MG: 81 TABLET, DELAYED RELEASE ORAL at 08:07

## 2019-10-11 RX ADMIN — CARVEDILOL 3.12 MG: 3.12 TABLET, FILM COATED ORAL at 11:21

## 2019-10-11 RX ADMIN — TICAGRELOR 90 MG: 90 TABLET ORAL at 08:07

## 2019-10-11 RX ADMIN — POTASSIUM CHLORIDE 20 MEQ: 1500 TABLET, EXTENDED RELEASE ORAL at 10:23

## 2019-10-11 ASSESSMENT — ACTIVITIES OF DAILY LIVING (ADL)
DRESS: 0-->INDEPENDENT
AMBULATION: 0-->INDEPENDENT
ADLS_ACUITY_SCORE: 16
ADLS_ACUITY_SCORE: 17
BATHING: 0-->INDEPENDENT
TRANSFERRING: 0-->INDEPENDENT
RETIRED_COMMUNICATION: 0-->UNDERSTANDS/COMMUNICATES WITHOUT DIFFICULTY
ADLS_ACUITY_SCORE: 13
COGNITION: 0 - NO COGNITION ISSUES REPORTED
ADLS_ACUITY_SCORE: 16
ADLS_ACUITY_SCORE: 13
RETIRED_EATING: 0-->INDEPENDENT
ADLS_ACUITY_SCORE: 13
TOILETING: 0-->INDEPENDENT
SWALLOWING: 0-->SWALLOWS FOODS/LIQUIDS WITHOUT DIFFICULTY
FALL_HISTORY_WITHIN_LAST_SIX_MONTHS: NO

## 2019-10-11 ASSESSMENT — MIFFLIN-ST. JEOR: SCORE: 1367

## 2019-10-11 NOTE — PLAN OF CARE
"Pt A/O. VSS. Up with sba/ind. Tele shows SR. Pt denies any CP or SOB. Transferred from ICU today. Possible discharge tomorrow.   Blood pressure 117/74, pulse 93, temperature 98.1  F (36.7  C), temperature source Oral, resp. rate 16, height 1.626 m (5' 4\"), weight 86.2 kg (190 lb 0.6 oz), SpO2 95 %, not currently breastfeeding.      "

## 2019-10-11 NOTE — PLAN OF CARE
A&Ox4. VSS on RA. SR with occasional PAC/PVCs. Up to BSC with assist of 1, GB, voiding adequately. L wrist site bruised, unchanged. 2 groin sites intact, CMS intact, unchanged.

## 2019-10-11 NOTE — PLAN OF CARE
"Discharge Planner OT   Patient plan for discharge: home w/OPCR at [place] location  Current status: Cardiac rehab eval completed and treatment initiated on 67yo female admitted with STEMI, now s/p PCI w/EDWIN x 2 to LAD. She also returned to cath leg for removal of IABP and further assessment of L Cx and R RCA but no intervention performed. Pt lives alone in a split-level home w/approx 14 stairs. She reports being indep with all self-cares, household tasks, meds, finances, driving. She hires outdoor work. Her son spoke privately with therapist and reported she is a \"hoarder\". Today patient demonstrated indep with basic self-cares, bed mobility and ambulation in ICU room. VSS during out of bed activity -see VS flowsheet. Will advance activity upon transfer to floor.  Initiated education in S/S and risk factor mgmt, activity progression post STEMI, HEP.   Barriers to return to prior living situation: none anticipated   Recommendations for discharge: home w/assist for heavy chores and OPCR  Rationale for recommendations: pt will benefit from progressive, monitored exercise program at discharge to maximize recovery       Entered by: Andrea Mahan 10/11/2019 12:03 PM      "

## 2019-10-11 NOTE — PROGRESS NOTES
[facility]  Intensive Care Daily Progress Note  [facility] Intensive Care     Date of Admission:  10/9/2019    Assessment & Plan    Ms. Gama is a 69 y/o woman with PMH significant for hypertension who presented with chest pain and brief loss of consciousness.  ECG demonstrated ST elevation in the anterolateral leads and she was taken emergently to the cath lab and underwent PCI with EDWIN x2 to proximal LAD.  She was hypotensive in the cath lab requiring dopamine and neosynephrine and intra-aortic balloon pump was placed.    Gem states that the morning of admissionsjorge was out shopping and had the sudden onset of chest discomfort, diaphoresis, n/v/diarrhea.  She continued to shop and a bystander noted she was not well, called EMS.  There are reports she had a brief loss of consciousness and the bystander lowered her to the ground.    In the cath lab, she was found to have an occluded proximal LAD.  She was hypotensive and given multiple doses of johnny and started on a dopamine gtt.  Ultimately, an IABP was placed.  She underwent successful PCI of proximal LAD x2.  She had moderate disease in her LCx.  Her RCA was not evaluated.  The following day her hemodynamics improved, her balloon pump was removed, she was weaned off pressors and her RCA was evaluated in cath lab.  Today she remains hemodynamically stable and is breathing comfortably on room air.    Neurology:  No issues: alert, oriented    Cardiovascular:  S/p STEMI: LAD distribution  Continue atorva, ASA, brilinta.  Holding metoprolol today due to allergy.      Pulmonary:        No issues--on room air  -    Renal:  Creatinine stable at 0.95  Electrolytes ok  -    ID:         No issues  -    GI/nutrition  Advanced to 2gm sodium diet  -    Endocrine:  Glycemic control per unit protocol:  Presently at goal with fsg 92.  -    Heme/Onc:  Last cbc 10/10 without issues.  hgb ok 12.9, plttls ok 161, wbc ok 8.4.  -    DVT Prophylaxis: On heparin  "drip  GI Prophylaxis: Not indicated    Restraints: Restraints for medical healing needed: NO    Family update by me today: pt herself at bedside    Nadeem Purdy    Time Spent on this Encounter   Level 3    Code Status   Full Code    Primary Care Physician   Danny Balbuena Vocal    Chief Complaint   Chest pain    History is obtained from the electronic health record and cardiology    History of Present Illness   Gem Gama is a 68 year old female who presents with STEMI.    INTERVAL EVENTS:   No overnight events.  Remains hemodynamically stable, satting well on room air.  Denies chest pain.  Says she occasionally feels like she is only getting \"half a breath\" but this has improved today and her breathing is unlabored.  Denies dizzyness, lightheadedness.  Metoprolol was ordered for her yesterday but she states this causes her severe itching and refuses to take it, so I've cancelled it.  Her heart rate remains acceptable in the 80s to low 90s.    Past Medical History    I have reviewed this patient's medical history and updated it with pertinent information if needed.   Past Medical History:   Diagnosis Date     ADHD (attention deficit hyperactivity disorder)      Anxiety      Arthritis     back, hands, knees, hips     Asthma     controlled--has coughing symptoms     C. difficile diarrhea      Central sleep apnea      Depression      Gastro-oesophageal reflux disease      Hypertension      Narcolepsy      Renal disease     gfr is aroung 48     Sleep apnea     uses Cpap       Past Surgical History   I have reviewed this patient's surgical history and updated it with pertinent information if needed.  Past Surgical History:   Procedure Laterality Date     ARTHROPLASTY KNEE  7/9/2014    Procedure: ARTHROPLASTY KNEE;  Surgeon: Levi Johnson MD;  Location: [facility] OR     ARTHROPLASTY KNEE Left 11/24/2014    Procedure: ARTHROPLASTY KNEE;  Surgeon: Levi Johnson MD;  Location:  OR     C REIMPLANT URETER,EXTENSIVE " TAILORING  1973 1997     C REPAIR ENTEROCELE,VAG APPRCH  2008    Prolift     C TOTAL ABD HYSTERECTOMY+BLAD REPR  1992    Vaginal approach with oophrectomy     C TOTAL KNEE ARTHROPLASTY       GENITOURINARY SURGERY      kidney surgery X 3     ORTHOPEDIC SURGERY      bilat total hip     RECTOCELE REPAIR       SOFT TISSUE SURGERY         Prior to Admission Medications   Prior to Admission Medications   Prescriptions Last Dose Informant Patient Reported? Taking?   albuterol (PROVENTIL) (2.5 MG/3ML) 0.083% neb solution prn Self No No   Sig: USE 1 VIAL BY NEBULIZATION EVERY 6 HOURS AS NEEDED FOR SHORTNESS OF BREATH/ DYSPNEA   amLODIPine (NORVASC) 5 MG tablet 10/8/2019 at Unknown time Self No Yes   Sig: TAKE 1 TABLET BY MOUTH TWICE DAILY WITH MEALS   Patient taking differently: Take 10 mg by mouth daily    amoxicillin (AMOXIL) 500 MG capsule prn Self Yes No   Sig: TK 4 CS PO ONE HOUR BEFORE DENTAL APPOINTMENT   guaiFENesin-codeine (ROBITUSSIN AC) 100-10 MG/5ML SOLN prn Self No No   Sig: Take 10 mLs by mouth every 4 hours as needed for cough   Patient not taking: Reported on 3/15/2018   losartan (COZAAR) 25 MG tablet 10/9/2019 at Unknown time Self Yes Yes   Sig: Take 25 mg by mouth daily   sertraline (ZOLOFT) 100 MG tablet 10/9/2019 at Unknown time Self No Yes   Sig: TAKE 1 TABLET(100 MG) BY MOUTH DAILY   vitamin D3 (CHOLECALCIFEROL) 2000 units (50 mcg) tablet 10/9/2019 at Unknown time Self No Yes   Sig: Take 1 tablet (2,000 Units) by mouth daily      Facility-Administered Medications: None     Allergies   Allergies   Allergen Reactions     Flagyl [Metronidazole Hcl] Anaphylaxis and Rash     Latex Other (See Comments)     Runny nose     Sulfa Drugs Anaphylaxis     Tape [Adhesive Tape] Hives     Metoprolol Itching and Rash       Social History   I have reviewed this patient's social history and updated it with pertinent information if needed. Gem Curranan Natan  reports that she has never smoked. She has never used smokeless  tobacco. She reports that she does not drink alcohol or use drugs.    Family History   I have reviewed this patient's family history and updated it with pertinent information if needed.   Family History   Problem Relation Age of Onset     Hypertension Mother      Arthritis Mother      Cerebrovascular Disease Father         Three Strokes     Diabetes Father         and brother     Thyroid Disease Sister         Hypothyroid       Review of Systems   Review of systems not obtained due to patient factors - critical condition    Physical Exam   Temp: 99.9  F (37.7  C) Temp src: Oral Temp  Min: 98.4  F (36.9  C)  Max: 99.9  F (37.7  C) BP: 120/72 Pulse: 97 Heart Rate: 87 Resp: 10 SpO2: 98 % O2 Device: None (Room air) Oxygen Delivery: 2 LPM  Vital Signs with Ranges  Temp:  [98.4  F (36.9  C)-99.9  F (37.7  C)] 99.9  F (37.7  C)  Pulse:  [64-97] 97  Heart Rate:  [66-95] 87  Resp:  [0-29] 10  BP: ()/(52-89) 120/72  SpO2:  [91 %-100 %] 98 %  190 lbs .58 oz    Constitutional: alert, oriented, comfortable  Eyes: PERRL  ENT: moist mucosal membranes  Respiratory: no rales, rhonchi  Cardiovascular: RRR, no murmur or rub  GI: soft, without tenderness  Genitourinary: no Greene  Skin: warm, no mottling  Musculoskeletal: extremities warm/well perfused.  1+ symmetric b/l LE edema  Neurologic: awake, alert, oriented, interactive, rapid/fluent/appropriate speech, normal str/sens x4.    Data   Results for orders placed or performed during the hospital encounter of 10/09/19 (from the past 24 hour(s))   Echocardiogram Complete    Narrative    [id number]  [id number]  [id number]  [id number]^ERMA^NITA^[facility]  Echocardiography Laboratory  [address] [place] [address]        Name: INDER SMITH  MRN: [id number]  : 1950  Study Date: 10/10/2019 11:04 AM  Age: 68 yrs  Gender: Female  Patient Location: [facility]  Reason For Study: CAD  Ordering Physician: NITA RITCHIE  Referring  Physician: Danny Conteh  Performed By: Jp Velasquez     BSA: 1.9 m2  Height: 64 in  Weight: 180 lb  HR: 78  BP: 101/69 mmHg  _____________________________________________________________________________  __        Procedure  Complete Portable Echo Adult. Optison (NDC #[id number]) given intravenously.  _____________________________________________________________________________  __        Interpretation Summary     Left ventricular systolic function is moderate to severely reduced. LVEF is  estimated at 35-40%.  Akinesis of the basal to apical anterior, mid anteroseptal, apical septal wall  segments. Dyskinesis of the apex. Hypokinesis of the basal to mid inferoseptal  wall segments.  The right ventricle is normal in structure, function and size.  There are no prior studies available for comparison.  _____________________________________________________________________________  __        Left Ventricle  The left ventricle is normal in size. There is normal left ventricular wall  thickness. Left ventricular systolic function is moderate to severely reduced.  The visual ejection fraction is estimated at 35-40%. Grade I or early  diastolic dysfunction. Akinesis of the basal to apical anterior, mid  anteroseptal, apical septal wall segments. Dyskinesis of the apex. Hypokinesis  of the basal to mid inferoseptal wall segments. No evidence of left  ventricular mass or tumors.     Right Ventricle  The right ventricle is normal in structure, function and size.     Atria  The left atrium is borderline dilated. Right atrial size is normal.     Mitral Valve  There is trace to mild mitral regurgitation.        Tricuspid Valve  The tricuspid valve is not well visualized, but is grossly normal. There is  trace tricuspid regurgitation. The right ventricular systolic pressure is  approximated at 24mmHg plus the right atrial pressure.     Aortic Valve  The aortic valve is trileaflet. There is moderate trileaflet aortic  sclerosis.  There is trace to mild aortic regurgitation.     Pulmonic Valve  The pulmonic valve is not well seen, but is grossly normal.     Vessels  The aortic root is normal size. The inferior vena cava was normal in size with  preserved respiratory variability. IVC diameter <2.1 cm collapsing >50% with  sniff suggests a normal RA pressure of 3 mmHg.     Pericardium  There is no pericardial effusion.     _____________________________________________________________________________  __  MMode/2D Measurements & Calculations  IVSd: 1.0 cm  LVIDd: 4.0 cm  LVIDs: 3.1 cm  LVPWd: 0.84 cm  FS: 22.9 %  LV mass(C)d: 115.9 grams  LV mass(C)dI: 62.0 grams/m2     Ao root diam: 3.4 cm  LA dimension: 3.6 cm  asc Aorta Diam: 2.5 cm  LA/Ao: 1.1  LVOT diam: 1.9 cm  LVOT area: 2.9 cm2  LA Volume (BP): 67.3 ml  LA Volume Index (BP): 36.0 ml/m2  RWT: 0.42        Doppler Measurements & Calculations  MV E max charlie: 75.2 cm/sec  MV A max charlie: 74.7 cm/sec  MV E/A: 1.0  MV dec slope: 482.2 cm/sec2     Ao V2 max: 162.9 cm/sec  Ao max PG: 10.0 mmHg  Ao V2 mean: 103.6 cm/sec  Ao mean P.1 mmHg  Ao V2 VTI: 29.9 cm  THADDEUS(I,D): 1.9 cm2  THADDEUS(V,D): 2.2 cm2  LV V1 max P.8 mmHg  LV V1 max: 120.3 cm/sec  LV V1 VTI: 19.3 cm  SV(LVOT): 56.7 ml  SI(LVOT): 30.3 ml/m2  PA acc time: 0.11 sec  TR max charlie: 242.3 cm/sec  TR max P.5 mmHg  Pulm Sys Charlie: 41.2 cm/sec  Pulm Mello Charlie: 31.5 cm/sec  Pulm S/D: 1.3  AV Charlie Ratio (DI): 0.74  THADDEUS Index (cm2/m2): 1.0  E/E' avg: 10.4  Lateral E/e': 7.6  Medial E/e': 13.3              _____________________________________________________________________________  __        Report approved by: Jasson Love 10/10/2019 02:09 PM      Glucose by meter   Result Value Ref Range    Glucose 85 70 - 99 mg/dL   Activated clotting time POCT   Result Value Ref Range    Activated Clot Time 118 75 - 150 sec   Cardiac Catheterization    Narrative      Ost LAD lesion is 10% stenosed.    Prox Cx lesion is 15% stenosed.    Prox RCA  lesion is 40% stenosed.     1.Limited LCA injections. The stents from yesterday in LAD widely patent.   No significant lesions in LCA  2 Very small technically codominant RCA with moderate non flow limiting   disease  3. IABP pulled in ICU     Glucose by meter   Result Value Ref Range    Glucose 90 70 - 99 mg/dL   Methicillin Resist/Sens S. aureus PCR   Result Value Ref Range    Specimen Description Nares     Methicillin Resist/Sens S. aureus PCR Negative NEG^Negative   Glucose by meter   Result Value Ref Range    Glucose 122 (H) 70 - 99 mg/dL   Glucose by meter   Result Value Ref Range    Glucose 107 (H) 70 - 99 mg/dL   Basic metabolic panel   Result Value Ref Range    Sodium 141 133 - 144 mmol/L    Potassium 3.9 3.4 - 5.3 mmol/L    Chloride 111 (H) 94 - 109 mmol/L    Carbon Dioxide 25 20 - 32 mmol/L    Anion Gap 5 3 - 14 mmol/L    Glucose 92 70 - 99 mg/dL    Urea Nitrogen 8 7 - 30 mg/dL    Creatinine 0.95 0.52 - 1.04 mg/dL    GFR Estimate 60 (L) >60 mL/min/[1.73_m2]    GFR Estimate If Black 70 >60 mL/min/[1.73_m2]    Calcium 8.0 (L) 8.5 - 10.1 mg/dL   Heparin Xa (10a) Level   Result Value Ref Range    Heparin 10A Level <0.10 IU/mL   Magnesium   Result Value Ref Range    Magnesium 2.0 1.6 - 2.3 mg/dL     Labs personally reviewed/interpreted:  See a/p  D/w Dr. Ya of the hospitalist service.

## 2019-10-11 NOTE — PROGRESS NOTES
" [facility]    Cardiology Progress Note    Date of Service (when I saw the patient): 10/11/2019            Assessment and Plan:     ASSESSMENT:  1.  Anterior STEMI with hemodynamic instability:  S/P PCI of culprit occluded proximal LAD with EDWIN x2.  IABP placed jasmeet-procedurally. Patient went back to the cath lab yesterday to have RCA evaluated (not performed at initial procedure) and IABP removed. Doing well, no symptoms of concern.   2.  Ischemic cardiomyopathy:  EF 35-40% with LAD wall motion abnormalities.  Weight up since admit but appears euvolemic and maintaining sats on room air  3.  Hx of hypertension        RECOMMENDATIONS:  1.  ASA indefinitely, brilinta 90 mg PO BID at least on year  2.  Atorvastatin 40 mg daily  3.  Patient reports an allergy to metoprolol with itching.  She is agreeable to trying carvedilol 3.125 mg PO BID.  Will add low dose lisinopril as BPallows.  4.  Agree with transfer out of ICU today, probable discharge tomorrow.       Mel Dennison MD [facility]  Cardiology            Interval History:     Doing well.  Denies chest pain, shortness of breath.           Medications:       aspirin  81 mg Oral Daily     atorvastatin  40 mg Oral Daily     carvedilol  3.125 mg Oral BID w/meals     heparin  5,000 Units Subcutaneous Q8H     [START ON 10/12/2019] influenza Vac Split High-Dose  0.5 mL Intramuscular Prior to discharge     sodium chloride (PF)  3 mL Intracatheter Q8H     ticagrelor  90 mg Oral Q12H              Physical Exam:   Blood pressure 123/65, pulse 93, temperature 99.9  F (37.7  C), temperature source Oral, resp. rate 16, height 1.626 m (5' 4\"), weight 86.2 kg (190 lb 0.6 oz), SpO2 97 %, not currently breastfeeding.  Vitals:    10/09/19 1207 10/09/19 1415 10/10/19 0600 10/11/19 0300   Weight: 81.6 kg (180 lb) 87.3 kg (192 lb 7.4 oz) 87 kg (191 lb 12.8 oz) 86.2 kg (190 lb 0.6 oz)       Intake/Output Summary (Last 24 hours) at 10/11/2019 1037  Last data filed at " 10/11/2019 1000  Gross per 24 hour   Intake 2886.5 ml   Output 3000 ml   Net -113.5 ml           Vital Sign Ranges  Temperature Temp  Av.1  F (37.3  C)  Min: 98.4  F (36.9  C)  Max: 99.9  F (37.7  C)   Blood pressure Systolic (24hrs), Av , Min:84 , Max:123        Diastolic (24hrs), Av, Min:52, Max:89      Pulse Pulse  Av.3  Min: 64  Max: 97   Respirations Resp  Av.7  Min: 0  Max: 29   Pulse oximetry SpO2  Av.2 %  Min: 91 %  Max: 100 %         EXAM:    Constitutional:    in no apparent distress    Skin:    normal    Head:    Normocephalic, atramatic    Eyes:    pupils equal, round and reactive to light, extra ocular muscles intact, sclera clear, conjunctiva normal    Neck:    No JVD   Lungs:    CTAB   Cardiovascular:    S1, S2 RRR no m/r/g   Abdomen:    normal bowel sounds    Extremities and Back:    no cyanosis or clubbing and No edema bilaterally   Neurological:    No gross or focal neurologic abnormalities               Data:     Recent Labs   Lab Test 10/10/19  0639 10/09/19  1625  14  0553 14  0628   WBC 8.4 12.2*   < >  --   --    HGB 12.9 13.6   < >  --  10.3*   MCV 84 84   < >  --   --     169   < >  --   --    INR  --   --   --  1.69* 1.76*    < > = values in this interval not displayed.      Recent Labs   Lab Test 10/11/19  0523 10/10/19  0639    142   POTASSIUM 3.9 3.4   CHLORIDE 111* 111*   BUN 8 11   CR 0.95 0.94     Recent Labs   Lab 10/11/19  0523 10/10/19  0639 10/09/19  1208   GLC 92 92 145*     Recent Labs   Lab Test 18  1700 13  1636   ALT 20 20   AST 19 26     Troponin I ES   Date Value Ref Range Status   10/09/2019 0.035 0.000 - 0.045 ug/L Final     Comment:     The 99th percentile for upper reference range is 0.045 ug/L.  Troponin values   in the range of 0.045 - 0.120 ug/L may be associated with risks of adverse   clinical events.           Recent Labs   Lab Test 10/11/19  0523 10/10/19  0639 18  1700   MAG 2.0 2.1 2.0        Lab Results   Component Value Date    CHOL 238 10/09/2019     Lab Results   Component Value Date    HDL 45 10/09/2019     Lab Results   Component Value Date     10/09/2019     Lab Results   Component Value Date    TRIG 136 10/09/2019     Lab Results   Component Value Date    CHOLHDLRATIO 4.2 09/19/2013          TSH   Date Value Ref Range Status   02/02/2016 1.62 0.40 - 4.00 mU/L Final           Mel Dennison MD, FACC  Cardiology

## 2019-10-11 NOTE — PROGRESS NOTES
"   10/11/19 1022   Quick Adds   Type of Visit Initial Occupational Therapy Evaluation   Living Environment   Lives With alone   Living Arrangements independent living facility   Home Accessibility stairs to enter home;stairs within home   Number of Stairs, Main Entrance 3   Stair Railings, Main Entrance none   Number of Stairs, Within Home, Primary other (see comments)  (spllit - 2 sets of 7)   Stair Railings, Within Home, Primary railings safe and in good condition   Transportation Anticipated car, drives self;family or friend will provide   Living Environment Comment Son spoke to therapist privately and reported that his mother is a \"hoarder\" and that the kids do not like to go over there anymore   Self-Care   Usual Activity Tolerance good   Current Activity Tolerance fair   Regular Exercise No   Functional Level   Ambulation 0-->independent   Transferring 0-->independent   Toileting 0-->independent   Bathing 0-->independent   Dressing 0-->independent   Eating 0-->independent   Communication 0-->understands/communicates without difficulty   Swallowing 0-->swallows foods/liquids without difficulty   Cognition 0 - no cognition issues reported   Fall history within last six months no   Prior Functional Level Comment Indep all IADLs including meds, finances, driving. Hires outdoor work.   General Information   Onset of Illness/Injury or Date of Surgery - Date 10/10/19   Referring Physician Dr. Otoole   Patient/Family Goals Statement return home   Additional Occupational Profile Info/Pertinent History of Current Problem 67yo female admitted with chest pain and LOC and diagnosed STEMI. Underwent emergent cardiac cath w/PCI/EDWIN x 2 to prox LAD. Later same day, returned to cath lab for removal of IABP and  further assessment of L cx and R RCA but no intervention done.    Precautions/Limitations   (L wrist and B groin access sites)   Cognitive Status Examination   Orientation orientation to person, place and time   Level " "of Consciousness alert   Follows Commands (Cognition) WNL   Visual Perception   Visual Perception No deficits were identified;Wears glasses   Pain Assessment   Patient Currently in Pain No   Range of Motion (ROM)   ROM Quick Adds No deficits were identified   Strength   Manual Muscle Testing Quick Adds No deficits were identified   Mobility   Bed Mobility Comments Independent   Transfer Skill: Bed to Chair/Chair to Bed   Level of Liberty: Bed to Chair independent   Transfer Skill: Sit to Stand   Level of Liberty: Sit/Stand independent   Transfer Skill: Toilet Transfer   Level of Liberty: Toilet independent   Balance   Balance Comments Indep ambulation no AD   General Therapy Interventions   Planned Therapy Interventions ADL retraining;home program guidelines;progressive activity/exercise;risk factor education   Clinical Impression   Criteria for Skilled Therapeutic Interventions Met yes, treatment indicated   OT Diagnosis decreased IADL performance   Influenced by the following impairments endurance   Assessment of Occupational Performance 1-3 Performance Deficits   Identified Performance Deficits household mgmt, social skills   Clinical Decision Making (Complexity) Low complexity   Therapy Frequency 2x/day   Predicted Duration of Therapy Intervention (days/wks) 3 days   Anticipated Discharge Disposition Home with Outpatient Therapy   Risks and Benefits of Treatment have been explained. Yes   Patient, Family & other staff in agreement with plan of care Yes   [facility] [employer] [employer] \"6 Clicks\"   2016, Trustees of [facility], under license to [employer].  All rights reserved.   6 Clicks Short Forms Daily Activity Inpatient Short Form   [facility]  \"6 Clicks\" Daily Activity Inpatient Short Form   1. Putting on and taking off regular lower body clothing? 4 - None   2. Bathing (including washing, rinsing, drying)? 4 - None   3. Toileting, which includes using toilet, bedpan or " urinal? 4 - None   4. Putting on and taking off regular upper body clothing? 4 - None   5. Taking care of personal grooming such as brushing teeth? 4 - None   6. Eating meals? 4 - None   Daily Activity Raw Score (Score out of 24.Lower scores equate to lower levels of function) 24   Total Evaluation Time   Total Evaluation Time (Minutes) 15

## 2019-10-11 NOTE — CONSULTS
Consult Date:  10/11/2019      PRIMARY CARE PROVIDER:  Danny Conteh MD.      REASON FOR CONSULTATION:  Assume medical management.      REQUESTING PHYSICIAN:  Dr. Hunter.      HISTORY OF PRESENT ILLNESS:  Gem Gama is a 68-year-old female with past medical history of hypertension, CKD, vitamin D deficiency, depression, obstructive sleep apnea who presented on 10/9/2019 to the Emergency Department at [facility] for evaluation of abrupt onset chest pain and presyncope.  The [place] where she developed abrupt onset substernal chest pain with associated jaw pain, diaphoresis and nausea.  The patient was slightly lightheaded and unsteady on her feet.  She was assisted to the floor by a bystander and called 911.      On arrival in the Emergency Department, the patient was noted to have ST segment elevation in the anterior leads.  She was given full dose aspirin and loaded with Brilinta and a cardiac catheterization lab was activated.  She is now status post drug-eluting stent placement x2 to the proximal LAD.  The patient had hemodynamic instability during initial catheterization requiring intraaortic balloon pump and pressor support.  The patient was transported to the ICU following stent placement and following stabilization, returned to the Cath Lab on 10/10/2019 for complete evaluation of the RCA and removal of balloon pump.  The patient since has been asymptomatic and hemodynamically stable without requiring ongoing pressure management post-stenting echocardiogram indicated a reduced EF to 35% to 40% with LAD wall motion abnormalities.  She has been followed closely by Cardiology Service in the Intensive Care Unit and has been cleared to transfer to the floor today.  Hospitalist Service was contacted.      The patient presently is evaluated in hospital room with son at bedside.  She is awake, reports that she has not been having any symptoms at all.  Does note she  typically has symptoms of heartburn which inhibit her ability to sleep throughout the night; however, now in hindsight, she is wondering if that was related to a cardiac source.  She otherwise has questions regarding logistics of returning home.  She lives in a single family home and will not be able to lift more than 10 pounds for the foreseeable future.  She otherwise has no acute concerns at this time.      PAST MEDICAL HISTORY:   1.  Hypertension.   2.  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 Dominguez of Nephrology.   3.  Vitamin D deficiency.   4.  Depression.   5.  Asthma.   6.  Obstructive sleep apnea.      PAST SURGICAL HISTORY:   1.  Bilateral total hip.   2.  Kidney surgery x3 related to congenital kidney defect.   3.  Hysterectomy with bladder repair.   4.  Left total knee arthroplasty.   5.  Right total knee arthroplasty.      PRIOR TO ADMISSION MEDICATIONS:   Prior to Admission medications    Medication Sig Last Dose Taking? Auth Provider   aspirin (ASA) 81 MG EC tablet Take 1 tablet (81 mg) by mouth daily  Yes Dalia Stein MD   atorvastatin (LIPITOR) 40 MG tablet Take 1 tablet (40 mg) by mouth daily  Yes Dalia Stein MD   carvedilol (COREG) 3.125 MG tablet Take 1 tablet (3.125 mg) by mouth 2 times daily (with meals)  Yes Dalia Stein MD   clopidogrel (PLAVIX) 75 MG tablet Take 1 tablet (75 mg) by mouth daily  Yes Dalia Stein MD   losartan (COZAAR) 25 MG tablet Take 0.5 tablets (12.5 mg) by mouth daily  Yes Dalia Stein MD   nitroGLYcerin (NITROSTAT) 0.4 MG sublingual tablet For chest pain place 1 tablet under the tongue every 5 minutes for 3 doses. If symptoms persist 5 minutes after 1st dose call 911.  Yes Dalia Stein MD   sertraline (ZOLOFT) 100 MG tablet TAKE 1 TABLET(100 MG) BY MOUTH DAILY 10/9/2019 at Unknown time Yes Danny Conteh MD   vitamin D3 (CHOLECALCIFEROL) 2000 units (50 mcg) tablet Take  1 tablet (2,000 Units) by mouth daily 10/9/2019 at Unknown time Yes Candice Interiano MD   albuterol (PROVENTIL) (2.5 MG/3ML) 0.083% neb solution USE 1 VIAL BY NEBULIZATION EVERY 6 HOURS AS NEEDED FOR SHORTNESS OF BREATH/ DYSPNEA prn  Danny Conteh MD   amoxicillin (AMOXIL) 500 MG capsule TK 4 CS PO ONE HOUR BEFORE DENTAL APPOINTMENT prn  Reported, Patient   guaiFENesin-codeine (ROBITUSSIN AC) 100-10 MG/5ML SOLN Take 10 mLs by mouth every 4 hours as needed for cough  Patient not taking: Reported on 3/15/2018 prn  Drea Mcadams PA-C           ALLERGIES:   1.  FLAGYL CAUSING ANAPHYLAXIS AND A RASH.   2.  LATEX, REACTION IS UNKNOWN.   3.  SULFA CAUSING ANAPHYLAXIS.   4.  TAPE, CAUSING HIVES.   5.  METOPROLOL CAUSING ITCHING AND A RASH.      FAMILY HISTORY:  Significant for CVA and heart disease in father.  Mother with history of hypertension.      SOCIAL HISTORY:  The patient currently lives in [place] in a single family home.  She does live alone.  She is a nonsmoker, nondrinker.      REVIEW OF SYSTEMS:  A 10-point review of systems was performed and is otherwise negative.  Please refer to the HPI.      PHYSICAL EXAMINATION:   VITAL SIGNS:  Temperature 98.6, heart rate 93, respiratory rate 26, blood pressure 119/94, SpO2 of 95% on room air.   GENERAL:  Well-developed, well-nourished female who appears comfortable.   HEENT:  Head is normocephalic.  EOMs are intact bilaterally.  Nose and mouth are patent.  Mucous membranes are moist.   LUNGS:  Clear to auscultation bilaterally without wheeze or crackles.   CARDIOVASCULAR:  Tachycardic, normal S1, S2, no murmur appreciated.   ABDOMEN:  Obese, nontender, nondistended, positive bowel sounds, located diffusely  EXTREMITIES:  The patient is spontaneously moving bilateral upper and lower extremities.  Radial and pedal pulses are 2+ bilaterally.   SKIN:  Warm and dry, no rash, no pedal edema.      LABORATORY AND IMAGING:  As reviewed in Epic and as  noted in HPI.      ASSESSMENT AND PLAN:  Gem Gama is a 68-year-old female with past medical history of hypertension, CKD, vitamin D deficiency, depression, asthma, obstructive sleep apnea who presented to the Emergency Department on 10/09/2019 for evaluation of abrupt onset chest pain with associated diaphoresis, nausea and presyncope was identified to have a STEMI involving the anterior leads.  She is now status post cardiac catheterization with drug-eluting stent x2 to the LAD.  The patient had hemodynamic instability, initially requiring intraaortic balloon pump as well as pressor support, which has since been weaned.  She is now asymptomatic and stable to transfer from the ICU.  Hospitalist Service will assume care.   1.  STEMI involving the anterior wall.  The patient is status post drug-eluting stent x2 on 10/09/2019, patient is being followed by Cardiology postprocedurally.  She has been initiated on dual antiplatelet therapy with Brilinta and aspirin.  She has also been started on low-dose Coreg following adverse reaction to metoprolol as well as atorvastatin 40 mg daily.  Blood pressures remained slightly elevated on my evaluation today.  Notably, she is on losartan as an outpatient.  Therefore, will resume at a low-dose of 12.5 mg daily and monitor with repeat BMP in the morning.  Further management as per Cardiology.   2.  Ischemic cardiomyopathy with an EF of 35% to 40%, as evidenced on echocardiogram on admission.  The patient presently appears euvolemic, will continue to monitor.   3.  Chronic kidney disease, secondary to congenital kidney defect, creatinine appears at baseline.  Avoid nephrotoxic medications as able.   4.  Depression.  Continue on prior to admission medications.   5.  Asthma, not in acute exacerbation, monitor.   6.  Deep venous thrombosis prophylaxis:  Ambulation and PCDs.      CODE STATUS:  The patient is full code.      This patient was staffed with Dr. Dalia Stein who  independently interviewed and evaluated patient and is in agreement with above.         WILBERTO HUMMEL MD       As dictated by ANSELMO MICHAUD PA-C            D: 10/11/2019   T: 10/11/2019   MT: GA      Name:     INDER SMITH   MRN:      [id number]        Account:       [id number]   :      1950           Consult Date:  10/11/2019      Document: [id number]

## 2019-10-12 ENCOUNTER — APPOINTMENT (OUTPATIENT)
Dept: OCCUPATIONAL THERAPY | Facility: CLINIC | Age: 69
DRG: 271 | End: 2019-10-12
Payer: MEDICARE

## 2019-10-12 LAB
ANION GAP SERPL CALCULATED.3IONS-SCNC: 6 MMOL/L (ref 3–14)
BUN SERPL-MCNC: 13 MG/DL (ref 7–30)
CALCIUM SERPL-MCNC: 8.5 MG/DL (ref 8.5–10.1)
CHLORIDE SERPL-SCNC: 114 MMOL/L (ref 94–109)
CO2 SERPL-SCNC: 23 MMOL/L (ref 20–32)
CREAT SERPL-MCNC: 0.83 MG/DL (ref 0.52–1.04)
GFR SERPL CREATININE-BSD FRML MDRD: 71 ML/MIN/{1.73_M2}
GLUCOSE BLDC GLUCOMTR-MCNC: 113 MG/DL (ref 70–99)
GLUCOSE SERPL-MCNC: 99 MG/DL (ref 70–99)
POTASSIUM SERPL-SCNC: 4.2 MMOL/L (ref 3.4–5.3)
SODIUM SERPL-SCNC: 143 MMOL/L (ref 133–144)

## 2019-10-12 PROCEDURE — 25000132 ZZH RX MED GY IP 250 OP 250 PS 637: Mod: GY | Performed by: PHYSICIAN ASSISTANT

## 2019-10-12 PROCEDURE — 80048 BASIC METABOLIC PNL TOTAL CA: CPT | Performed by: PHYSICIAN ASSISTANT

## 2019-10-12 PROCEDURE — 97535 SELF CARE MNGMENT TRAINING: CPT | Mod: GO

## 2019-10-12 PROCEDURE — 36415 COLL VENOUS BLD VENIPUNCTURE: CPT | Performed by: PHYSICIAN ASSISTANT

## 2019-10-12 PROCEDURE — 97110 THERAPEUTIC EXERCISES: CPT | Mod: GO | Performed by: OCCUPATIONAL THERAPIST

## 2019-10-12 PROCEDURE — 00000146 ZZHCL STATISTIC GLUCOSE BY METER IP

## 2019-10-12 PROCEDURE — 90662 IIV NO PRSV INCREASED AG IM: CPT | Performed by: INTERNAL MEDICINE

## 2019-10-12 PROCEDURE — 25000128 H RX IP 250 OP 636: Performed by: INTERNAL MEDICINE

## 2019-10-12 PROCEDURE — 99232 SBSQ HOSP IP/OBS MODERATE 35: CPT | Performed by: INTERNAL MEDICINE

## 2019-10-12 PROCEDURE — 21000001 ZZH R&B HEART CARE

## 2019-10-12 PROCEDURE — 97110 THERAPEUTIC EXERCISES: CPT | Mod: GO

## 2019-10-12 RX ORDER — LISINOPRIL 2.5 MG/1
2.5 TABLET ORAL DAILY
Status: DISCONTINUED | OUTPATIENT
Start: 2019-10-12 | End: 2019-10-12

## 2019-10-12 RX ADMIN — INFLUENZA A VIRUS A/MICHIGAN/45/2015 X-275 (H1N1) ANTIGEN (FORMALDEHYDE INACTIVATED), INFLUENZA A VIRUS A/SINGAPORE/INFIMH-16-0019/2016 IVR-186 (H3N2) ANTIGEN (FORMALDEHYDE INACTIVATED), AND INFLUENZA B VIRUS B/MARYLAND/15/2016 BX-69A (A B/COLORADO/6/2017-LIKE VIRUS) ANTIGEN (FORMALDEHYDE INACTIVATED) 0.5 ML: 60; 60; 60 INJECTION, SUSPENSION INTRAMUSCULAR at 09:53

## 2019-10-12 RX ADMIN — TICAGRELOR 90 MG: 90 TABLET ORAL at 21:20

## 2019-10-12 RX ADMIN — CARVEDILOL 3.12 MG: 3.12 TABLET, FILM COATED ORAL at 09:44

## 2019-10-12 RX ADMIN — SERTRALINE HYDROCHLORIDE 100 MG: 100 TABLET ORAL at 09:43

## 2019-10-12 RX ADMIN — TICAGRELOR 90 MG: 90 TABLET ORAL at 09:43

## 2019-10-12 RX ADMIN — CARVEDILOL 3.12 MG: 3.12 TABLET, FILM COATED ORAL at 17:13

## 2019-10-12 RX ADMIN — ASPIRIN 81 MG: 81 TABLET, DELAYED RELEASE ORAL at 09:43

## 2019-10-12 RX ADMIN — ATORVASTATIN CALCIUM 40 MG: 40 TABLET, FILM COATED ORAL at 09:43

## 2019-10-12 RX ADMIN — LOSARTAN POTASSIUM 12.5 MG: 25 TABLET ORAL at 09:43

## 2019-10-12 ASSESSMENT — ACTIVITIES OF DAILY LIVING (ADL)
ADLS_ACUITY_SCORE: 18

## 2019-10-12 ASSESSMENT — MIFFLIN-ST. JEOR: SCORE: 1353.23

## 2019-10-12 NOTE — PROGRESS NOTES
[facility]    Cardiology Progress Note     Assessment & Plan    Ms. Gama is a 67 y/o woman with PMH significant for hypertension who presented  with chest pain and brief loss of consciousness.  ECG demonstrated ST elevation in the anterolateral leads and she was taken emergently to the cath lab and underwent PCI with EDWIN x2 to proximal LAD.     1.  Anterior STEMI with hemodynamic instability:    S/P PCI of culprit occluded proximal LAD with EDWIN x2.  IABP placed jasmeet-procedurally. Patient went back to the cath lab yesterday to have RCA evaluated (not performed at initial procedure) and IABP removed. No obstructive disease in the right circulation. TTE with LVED 35-40%. Doing well, no symptoms of concern.    - ASA indefinitely, brilinta 90 mg PO BID at least on year   - Atorvastatin 40 mg daily   - Continue carvedilol 3.125 mg BID, hx of allergy to metoprolol   - Continue losartan 12.5 mg, uptitrate as tolerated   - Cardiac rehab, OP cardiology f/u   - Consider discharging tomorrow    2.  Ischemic cardiomyopathy:    LVEF 35-40% with LAD wall motion abnormalities   - Heart failure meds as above    3.  Dyspne on exertion  Chronic and likely driven by deconditioning. Her coronary disease has been fixed as documented above. She is not volume overloaded on physical exam and did not have elevated filling pressures or IVC dilation on TTE. Denies smoking history, no known diagnosis of COPD. Hgb within acceptable range. Explained to her that getting on a regular exercise regimen would be critical.    - Inpatient cardiac rehab, transition to outpatient on discharge   - Likely home tomorrow, patient doesn't feel up to going home today    4. Hx of hypertension   - Acceptable BP control   - Coreg and losartan as above      Tiesha Robles MD    Interval History   No acute events overnight. Feels well this AM. Ambulating without difficulty.       Physical Exam   Temp: 99.4  F (37.4  C) Temp src: Oral BP:  107/71 Pulse: 93 Heart Rate: 78 Resp: 16 SpO2: 95 % O2 Device: None (Room air)    Vitals:    10/10/19 0600 10/11/19 0300 10/12/19 0553   Weight: 87 kg (191 lb 12.8 oz) 86.2 kg (190 lb 0.6 oz) 84.8 kg (187 lb)     Vital Signs with Ranges  Temp:  [98.1  F (36.7  C)-99.4  F (37.4  C)] 99.4  F (37.4  C)  Pulse:  [] 93  Heart Rate:  [78-94] 78  Resp:  [10-27] 16  BP: (107-139)/() 107/71  SpO2:  [90 %-98 %] 95 %  I/O last 3 completed shifts:  In: 1140 [P.O.:840; I.V.:300]  Out: 750 [Urine:750]    Constitutional: No apparent distress.   Eyes: No xanthelasma or conjunctivitis  Respiratory: Clear to auscultation bilaterally. No crackles or wheezes.  Cardiovascular: Regular rate and rhythm. Normal S1 and S2. No murmurs. No extra heart sounds. No JVD.   Extremities: No peripheral edema. Bilateral groin access sites without undue pain, no hematoma or bruits.   Neurologic: Moving all extremities. No facial assymmetry.  Psychiatric: Alert and oriented. Answers questions appropriately.     Medications     - MEDICATION INSTRUCTIONS -       - MEDICATION INSTRUCTIONS -       Percutaneous Coronary Intervention orders placed (this is information for BPA alerting)       ACE/ARB/ARNI NOT PRESCRIBED         aspirin  81 mg Oral Daily     atorvastatin  40 mg Oral Daily     carvedilol  3.125 mg Oral BID w/meals     influenza Vac Split High-Dose  0.5 mL Intramuscular Prior to discharge     losartan  12.5 mg Oral Daily     sertraline  100 mg Oral Daily     sodium chloride (PF)  3 mL Intracatheter Q8H     ticagrelor  90 mg Oral Q12H       Data      Coronary angiogram (10/11/2019):    Ost LAD lesion is 10% stenosed.    Prox Cx lesion is 15% stenosed.    Prox RCA lesion is 40% stenosed.     1.Limited LCA injections. The stents from yesterday in LAD widely patent. No significant lesions in LCA  2 Very small technically codominant RCA with moderate non flow limiting disease  3. IABP pulled in ICU     Results for orders placed or  performed during the hospital encounter of 10/09/19 (from the past 24 hour(s))   Basic metabolic panel   Result Value Ref Range    Sodium 143 133 - 144 mmol/L    Potassium 4.2 3.4 - 5.3 mmol/L    Chloride 114 (H) 94 - 109 mmol/L    Carbon Dioxide 23 20 - 32 mmol/L    Anion Gap 6 3 - 14 mmol/L    Glucose 99 70 - 99 mg/dL    Urea Nitrogen 13 7 - 30 mg/dL    Creatinine 0.83 0.52 - 1.04 mg/dL    GFR Estimate 71 >60 mL/min/[1.73_m2]    GFR Estimate If Black 82 >60 mL/min/[1.73_m2]    Calcium 8.5 8.5 - 10.1 mg/dL

## 2019-10-12 NOTE — PROGRESS NOTES
[facility]    Medicine Progress Note - Hospitalist Service       Date of Admission:  10/9/2019  Assessment & Plan       Gem Gama is a 68-year-old female with past medical history of hypertension, CKD, vitamin D deficiency, depression, asthma, obstructive sleep apnea who presented to the Emergency Department on 10/09/2019 for evaluation of abrupt onset chest pain with associated diaphoresis, nausea and presyncope was identified to have a STEMI involving the anterior leads.  She is now status post cardiac catheterization with drug-eluting stent x2 to the LAD.  The patient had hemodynamic instability, initially requiring intraaortic balloon pump as well as pressor support, which has since been weaned.  She is now asymptomatic and stable to transfer from the ICU.  Hospitalist Service will assume care.       STEMI involving the anterior wall.  The patient is status post drug-eluting stent x2 on 10/09/2019, patient is being followed by Cardiology postprocedurally.  She has been initiated on dual antiplatelet therapy with Brilinta and aspirin.  She has also been started on low-dose Coreg following adverse reaction to metoprolol as well as atorvastatin 40 mg daily.  Also on low-dose losartan of 12.5 mg daily  -Blood pressure today stable/on the softer side so continue current medication     Ischemic cardiomyopathy with an EF of 35% to 40%,   as evidenced on echocardiogram on admission.  The patient presently appears euvolemic, will continue to monitor.   -Continue statin, losartan and Coreg as above     Chronic kidney disease, secondary to congenital kidney defect, creatinine appears at baseline.  Avoid nephrotoxic medications as able.     Sleep apnea:  Patient was found to have dyspnea and woke-up multiple times in the middle of night unable to breathe, has not tolerated CPAP machines before   -Recommend outpatient formal sleep study and follow-up with pulmonary specialist   -Will do overnight sleep  study to determine the lowest oxygen saturation    Depression.  Continue on prior to admission medications.    Asthma, not in acute exacerbation, monitor.     Diet: 2 Gram Sodium Diet    DVT Prophylaxis: Pneumatic Compression Devices  Greene Catheter: not present  Code Status: Full Code      Disposition Plan   Expected discharge: Tomorrow, recommended to prior living arrangement once Once cleared by cardiology and needs to remain stable.  Entered: Dalia Stein MD 10/12/2019, 4:53 PM       The patient's care was discussed with the Bedside Nurse and Patient.    Dalia Stein MD  Hospitalist Service  [facility]    ______________________________________________________________________    Interval History   Patient continued to have multiple episodes of dyspnea overnight which was apparently bothersome otherwise denies any chest pain, shortness of breath during the day, cough, nausea or vomiting.    Data reviewed today: I reviewed all medications, new labs and imaging results over the last 24 hours. I personally reviewed no images or EKG's today.    Physical Exam   Vital Signs: Temp: 99.4  F (37.4  C) Temp src: Oral BP: 121/64(post CR exercise)   Heart Rate: 84 Resp: 16 SpO2: 95 % O2 Device: None (Room air)    Weight: 187 lbs 0 oz  Exam:  Constitutional: Awake, alert and no distress. Appears comfortable  Head: Normocephalic. No masses, lesions, tenderness or abnormalities  ENT: ENT exam normal, no neck nodes or sinus tenderness  Cardiovascular: RRR.  No murmurs, no rubs or JVD  Respiratory: Normal WOB,b/l equal air entry, no wheezes or crackles   Gastrointestinal: Abdomen soft, non-tender. BS normal. No masses, organomegaly  : Deferred  Extremities : No edema , no clubbing or cyanosis        Data   Recent Labs   Lab 10/12/19  0623 10/11/19  0523 10/10/19  0639 10/09/19  1625 10/09/19  1208   WBC  --   --  8.4 12.2* 8.1   HGB  --   --  12.9 13.6 14.6   MCV  --   --  84 84 83   PLT  --   --  161 791 411     141 142  --  141   POTASSIUM 4.2 3.9 3.4  --  3.6   CHLORIDE 114* 111* 111*  --  107   CO2 23 25 26  --  25   BUN 13 8 11  --  12   CR 0.83 0.95 0.94  --  1.06*   ANIONGAP 6 5 5  --  9   FRACISCO 8.5 8.0* 8.2*  --  9.2   GLC 99 92 92  --  145*   TROPI  --   --   --   --  0.035     No results found for this or any previous visit (from the past 24 hour(s)).  Medications     - MEDICATION INSTRUCTIONS -       - MEDICATION INSTRUCTIONS -       Percutaneous Coronary Intervention orders placed (this is information for BPA alerting)       ACE/ARB/ARNI NOT PRESCRIBED         aspirin  81 mg Oral Daily     atorvastatin  40 mg Oral Daily     carvedilol  3.125 mg Oral BID w/meals     losartan  12.5 mg Oral Daily     sertraline  100 mg Oral Daily     sodium chloride (PF)  3 mL Intracatheter Q8H     ticagrelor  90 mg Oral Q12H

## 2019-10-12 NOTE — PLAN OF CARE
Discharge Planner OT   Patient plan for discharge: home w/OPCR at [place] location  Current status: pt seen for cardiac rehab and tolerated 6min walk without adaptive device CGA also completing 10 stairs. Appeared significantly SOB however 02 100%, /71, HR 99 bpm at completion. Initiated education in post-MI/CAD risk factor and S/S mgmt, activity progression.   Barriers to return to prior living situation: lives alone  Recommendations for discharge: home with OPCR and family assist with IADLS including household mgmt, transport  Rationale for recommendations: will benefit from progressive, monitored exercise program       Entered by: Andrea Mahan 10/12/2019 1:31 PM

## 2019-10-12 NOTE — PLAN OF CARE
Patient is S/P two stents to LAD. L/R groin sites unchanged. R is bruised. L wrist is bruised, intact. CMS intact. VSS. A/O x 4, pleasant. Very BANDA. She is able to get to BR but is SOB by the time she gets to bed. On RA. Denies pain x did c/o dysuria that she thinks may actually be her hemorrhoids. Will forward. Cup in BR pending collection for UA if ordered. Has incontinence of bladder/bowel at times and per her history. SBA while up.

## 2019-10-12 NOTE — PLAN OF CARE
Discharge Planner OT   Patient plan for discharge: home w/OPCR at [place] location   Current status: Pt completed ambulation in the halls for 275 feet, 5 minutes on the treadmill at 1.0 MPH, and 10 stairs.  Anxious and worked up at times about her situation and how she has been to see medical people several times before this but they have not found any cardiac issues.  Partly blaming herself that she did not do something more to find out.  Reassurance provided by therapist which helped somewhat.  Vital signs stable, HHR during exercise 105 bpm.  Barriers to return to prior living situation: Lives alone  Recommendations for discharge: home with OPCR and family assist with IADLS including household mgmt, transport  Rationale for recommendations: Will benefit from skilled CR to increase endurance and awareness of heart healthy  behaviors.       Entered by: Shade Wetzel 10/12/2019 4:39 PM

## 2019-10-12 NOTE — PLAN OF CARE
"Pt A/O. VSS. Tele shows SR. Pt denies any CP or SOB. Walked with rehab today, did well except for some SOB, pt states has had this feeling for a while. Woke up last night SOB as well, doing sleep study overnight. Possible discharge tomorrow.    Blood pressure 121/64, pulse 93, temperature 99.4  F (37.4  C), temperature source Oral, resp. rate 16, height 1.626 m (5' 4\"), weight 84.8 kg (187 lb), SpO2 95 %, not currently breastfeeding.    "

## 2019-10-13 ENCOUNTER — APPOINTMENT (OUTPATIENT)
Dept: OCCUPATIONAL THERAPY | Facility: CLINIC | Age: 69
DRG: 271 | End: 2019-10-13
Payer: MEDICARE

## 2019-10-13 VITALS
BODY MASS INDEX: 31.72 KG/M2 | OXYGEN SATURATION: 97 % | RESPIRATION RATE: 16 BRPM | HEIGHT: 64 IN | TEMPERATURE: 98 F | SYSTOLIC BLOOD PRESSURE: 134 MMHG | WEIGHT: 185.8 LBS | HEART RATE: 102 BPM | DIASTOLIC BLOOD PRESSURE: 70 MMHG

## 2019-10-13 LAB
ALBUMIN UR-MCNC: NEGATIVE MG/DL
ANION GAP SERPL CALCULATED.3IONS-SCNC: 6 MMOL/L (ref 3–14)
APPEARANCE UR: CLEAR
BASOPHILS # BLD AUTO: 0 10E9/L (ref 0–0.2)
BASOPHILS NFR BLD AUTO: 0.5 %
BILIRUB UR QL STRIP: NEGATIVE
BUN SERPL-MCNC: 19 MG/DL (ref 7–30)
CALCIUM SERPL-MCNC: 8.5 MG/DL (ref 8.5–10.1)
CHLORIDE SERPL-SCNC: 111 MMOL/L (ref 94–109)
CO2 SERPL-SCNC: 23 MMOL/L (ref 20–32)
COLOR UR AUTO: YELLOW
CREAT SERPL-MCNC: 0.88 MG/DL (ref 0.52–1.04)
DIFFERENTIAL METHOD BLD: ABNORMAL
EOSINOPHIL # BLD AUTO: 0.4 10E9/L (ref 0–0.7)
EOSINOPHIL NFR BLD AUTO: 6.8 %
ERYTHROCYTE [DISTWIDTH] IN BLOOD BY AUTOMATED COUNT: 13.6 % (ref 10–15)
GFR SERPL CREATININE-BSD FRML MDRD: 66 ML/MIN/{1.73_M2}
GLUCOSE SERPL-MCNC: 109 MG/DL (ref 70–99)
GLUCOSE UR STRIP-MCNC: NEGATIVE MG/DL
HCT VFR BLD AUTO: 35.8 % (ref 35–47)
HGB BLD-MCNC: 12.5 G/DL (ref 11.7–15.7)
HGB UR QL STRIP: NEGATIVE
IMM GRANULOCYTES # BLD: 0 10E9/L (ref 0–0.4)
IMM GRANULOCYTES NFR BLD: 0.2 %
KETONES UR STRIP-MCNC: NEGATIVE MG/DL
LEUKOCYTE ESTERASE UR QL STRIP: ABNORMAL
LYMPHOCYTES # BLD AUTO: 1.1 10E9/L (ref 0.8–5.3)
LYMPHOCYTES NFR BLD AUTO: 16.9 %
MCH RBC QN AUTO: 29.8 PG (ref 26.5–33)
MCHC RBC AUTO-ENTMCNC: 34.9 G/DL (ref 31.5–36.5)
MCV RBC AUTO: 85 FL (ref 78–100)
MONOCYTES # BLD AUTO: 0.4 10E9/L (ref 0–1.3)
MONOCYTES NFR BLD AUTO: 6.9 %
MUCOUS THREADS #/AREA URNS LPF: PRESENT /LPF
NEUTROPHILS # BLD AUTO: 4.3 10E9/L (ref 1.6–8.3)
NEUTROPHILS NFR BLD AUTO: 68.7 %
NITRATE UR QL: NEGATIVE
NRBC # BLD AUTO: 0 10*3/UL
NRBC BLD AUTO-RTO: 0 /100
PH UR STRIP: 5 PH (ref 5–7)
PLATELET # BLD AUTO: 137 10E9/L (ref 150–450)
POTASSIUM SERPL-SCNC: 4 MMOL/L (ref 3.4–5.3)
RBC # BLD AUTO: 4.2 10E12/L (ref 3.8–5.2)
RBC #/AREA URNS AUTO: 4 /HPF (ref 0–2)
SODIUM SERPL-SCNC: 140 MMOL/L (ref 133–144)
SOURCE: ABNORMAL
SP GR UR STRIP: 1.01 (ref 1–1.03)
SQUAMOUS #/AREA URNS AUTO: 3 /HPF (ref 0–1)
UROBILINOGEN UR STRIP-MCNC: NORMAL MG/DL (ref 0–2)
WBC # BLD AUTO: 6.2 10E9/L (ref 4–11)
WBC #/AREA URNS AUTO: 1 /HPF (ref 0–5)

## 2019-10-13 PROCEDURE — 36415 COLL VENOUS BLD VENIPUNCTURE: CPT | Performed by: PHYSICIAN ASSISTANT

## 2019-10-13 PROCEDURE — 25000132 ZZH RX MED GY IP 250 OP 250 PS 637: Mod: GY | Performed by: PHYSICIAN ASSISTANT

## 2019-10-13 PROCEDURE — 99239 HOSP IP/OBS DSCHRG MGMT >30: CPT | Performed by: INTERNAL MEDICINE

## 2019-10-13 PROCEDURE — 25000132 ZZH RX MED GY IP 250 OP 250 PS 637: Mod: GY | Performed by: HOSPITALIST

## 2019-10-13 PROCEDURE — 40000275 ZZH STATISTIC RCP TIME EA 10 MIN

## 2019-10-13 PROCEDURE — 85025 COMPLETE CBC W/AUTO DIFF WBC: CPT | Performed by: PHYSICIAN ASSISTANT

## 2019-10-13 PROCEDURE — 94762 N-INVAS EAR/PLS OXIMTRY CONT: CPT

## 2019-10-13 PROCEDURE — 25000132 ZZH RX MED GY IP 250 OP 250 PS 637: Mod: GY | Performed by: INTERNAL MEDICINE

## 2019-10-13 PROCEDURE — 80048 BASIC METABOLIC PNL TOTAL CA: CPT | Performed by: PHYSICIAN ASSISTANT

## 2019-10-13 PROCEDURE — 81001 URINALYSIS AUTO W/SCOPE: CPT | Performed by: INTERNAL MEDICINE

## 2019-10-13 PROCEDURE — 97110 THERAPEUTIC EXERCISES: CPT | Mod: GO | Performed by: OCCUPATIONAL THERAPIST

## 2019-10-13 PROCEDURE — 99231 SBSQ HOSP IP/OBS SF/LOW 25: CPT | Performed by: INTERNAL MEDICINE

## 2019-10-13 RX ORDER — CLOPIDOGREL BISULFATE 75 MG/1
75 TABLET ORAL DAILY
Qty: 30 TABLET | Refills: 0 | Status: SHIPPED | OUTPATIENT
Start: 2019-10-13 | End: 2019-10-28

## 2019-10-13 RX ORDER — CARVEDILOL 3.12 MG/1
3.12 TABLET ORAL 2 TIMES DAILY WITH MEALS
Qty: 60 TABLET | Refills: 0 | Status: SHIPPED | OUTPATIENT
Start: 2019-10-13 | End: 2019-10-28

## 2019-10-13 RX ORDER — CLOPIDOGREL 300 MG/1
300 TABLET, FILM COATED ORAL ONCE
Status: COMPLETED | OUTPATIENT
Start: 2019-10-13 | End: 2019-10-13

## 2019-10-13 RX ORDER — ATORVASTATIN CALCIUM 40 MG/1
40 TABLET, FILM COATED ORAL DAILY
Qty: 30 TABLET | Refills: 0 | Status: SHIPPED | OUTPATIENT
Start: 2019-10-14 | End: 2019-10-28

## 2019-10-13 RX ORDER — NITROGLYCERIN 0.4 MG/1
TABLET SUBLINGUAL
Qty: 15 TABLET | Refills: 0 | Status: SHIPPED | OUTPATIENT
Start: 2019-10-13 | End: 2020-09-06

## 2019-10-13 RX ORDER — LOSARTAN POTASSIUM 25 MG/1
12.5 TABLET ORAL DAILY
Qty: 30 TABLET | Refills: 0 | Status: SHIPPED | OUTPATIENT
Start: 2019-10-14 | End: 2019-10-28

## 2019-10-13 RX ADMIN — SERTRALINE HYDROCHLORIDE 100 MG: 100 TABLET ORAL at 09:13

## 2019-10-13 RX ADMIN — ASPIRIN 81 MG: 81 TABLET, DELAYED RELEASE ORAL at 09:13

## 2019-10-13 RX ADMIN — CLOPIDOGREL BISULFATE 300 MG: 300 TABLET, FILM COATED ORAL at 12:55

## 2019-10-13 RX ADMIN — TICAGRELOR 90 MG: 90 TABLET ORAL at 09:13

## 2019-10-13 RX ADMIN — MELATONIN 5 MG TABLET 5 MG: at 00:18

## 2019-10-13 RX ADMIN — LOSARTAN POTASSIUM 12.5 MG: 25 TABLET ORAL at 09:13

## 2019-10-13 RX ADMIN — CARVEDILOL 3.12 MG: 3.12 TABLET, FILM COATED ORAL at 09:12

## 2019-10-13 RX ADMIN — ATORVASTATIN CALCIUM 40 MG: 40 TABLET, FILM COATED ORAL at 09:13

## 2019-10-13 ASSESSMENT — MIFFLIN-ST. JEOR: SCORE: 1347.78

## 2019-10-13 ASSESSMENT — ACTIVITIES OF DAILY LIVING (ADL)
ADLS_ACUITY_SCORE: 18

## 2019-10-13 NOTE — PROGRESS NOTES
[facility]    Cardiology Progress Note     Assessment & Plan   Ms. Gama is a 67 y/o woman with PMH significant for hypertension who presented  with chest pain and brief loss of consciousness.  ECG demonstrated ST elevation in the anterolateral leads and she was taken emergently to the cath lab and underwent PCI with EDWIN x2 to proximal LAD.      1.  Anterior STEMI with hemodynamic instability:    S/P PCI of culprit occluded proximal LAD with EDWIN x2.  IABP placed jasmeet-procedurally. Patient went back to the cath lab yesterday to have RCA evaluated (not performed at initial procedure) and IABP removed. No obstructive disease in the right circulation. TTE with LVED 35-40%. Doing well, no symptoms of concern.               - ASA indefinitely, brilinta 90 mg PO BID at least on year              - Atorvastatin 40 mg daily              - Continue carvedilol 3.125 mg BID, hx of allergy to metoprolol              - Continue losartan 12.5 mg, uptitrate as tolerated              - Cardiac rehab, OP cardiology f/u              - Ok to discharge today from a cardiac perspective     2.  Ischemic cardiomyopathy:    LVEF 35-40% with LAD wall motion abnormalities              - Heart failure meds as above     3.  Dyspne on exertion  Chronic and likely driven by deconditioning. No compelling cardiac etiology found. Her coronary disease has been fixed as documented above. She is not volume overloaded on physical exam and did not have elevated left sided filling pressures or IVC dilation on TTE. She denies any significant smoking history, no known diagnosis of COPD. Hgb within acceptable range. Explained to her that getting on a regular exercise regimen would be critical. SoB proceeded initiation of ticagrelor and has not worsened since the medication was introduced.              - Inpatient cardiac rehab, transition to outpatient on discharge   - Will defer to hospitalist whether to proceed with CXR +/- PFTs     4. Hx  of hypertension              - Acceptable BP control              - Coreg and losartan as above      Tiesha Robles MD    Interval History   No acute events overnight. Feels well this AM.    Physical Exam   Temp: 98  F (36.7  C) Temp src: Oral BP: 99/60 Pulse: 79 Heart Rate: 82 Resp: 16 SpO2: 97 % O2 Device: None (Room air)    Vitals:    10/11/19 0300 10/12/19 0553 10/13/19 0208   Weight: 86.2 kg (190 lb 0.6 oz) 84.8 kg (187 lb) 84.3 kg (185 lb 12.8 oz)     Vital Signs with Ranges  Temp:  [98  F (36.7  C)-99.4  F (37.4  C)] 98  F (36.7  C)  Pulse:  [79] 79  Heart Rate:  [74-99] 82  Resp:  [16] 16  BP: ()/(57-71) 99/60  SpO2:  [95 %-100 %] 97 %  I/O last 3 completed shifts:  In: 103 [P.O.:100; I.V.:3]  Out: -     Constitutional: Awake, alert and no distress. Appears comfortable  Head: Normocephalic. No masses, lesions, tenderness or abnormalities  ENT: ENT exam normal, no neck nodes or sinus tenderness  Cardiovascular: RRR.  No murmurs, no rubs or JVD  Respiratory: Normal WOB,b/l equal air entry, no wheezes or crackles   Gastrointestinal: Abdomen soft, non-tender. BS normal. No masses, organomegaly  : Deferred  Extremities : No edema , no clubbing or cyanosis      Medications     - MEDICATION INSTRUCTIONS -       - MEDICATION INSTRUCTIONS -       Percutaneous Coronary Intervention orders placed (this is information for BPA alerting)       ACE/ARB/ARNI NOT PRESCRIBED         aspirin  81 mg Oral Daily     atorvastatin  40 mg Oral Daily     carvedilol  3.125 mg Oral BID w/meals     losartan  12.5 mg Oral Daily     melatonin  5 mg Oral At Bedtime     sertraline  100 mg Oral Daily     sodium chloride (PF)  3 mL Intracatheter Q8H     ticagrelor  90 mg Oral Q12H       Data   Results for orders placed or performed during the hospital encounter of 10/09/19 (from the past 24 hour(s))   Glucose by meter   Result Value Ref Range    Glucose 113 (H) 70 - 99 mg/dL   Basic metabolic panel   Result Value Ref Range     Sodium 140 133 - 144 mmol/L    Potassium 4.0 3.4 - 5.3 mmol/L    Chloride 111 (H) 94 - 109 mmol/L    Carbon Dioxide 23 20 - 32 mmol/L    Anion Gap 6 3 - 14 mmol/L    Glucose 109 (H) 70 - 99 mg/dL    Urea Nitrogen 19 7 - 30 mg/dL    Creatinine 0.88 0.52 - 1.04 mg/dL    GFR Estimate 66 >60 mL/min/[1.73_m2]    GFR Estimate If Black 76 >60 mL/min/[1.73_m2]    Calcium 8.5 8.5 - 10.1 mg/dL   CBC with platelets differential   Result Value Ref Range    WBC 6.2 4.0 - 11.0 10e9/L    RBC Count 4.20 3.8 - 5.2 10e12/L    Hemoglobin 12.5 11.7 - 15.7 g/dL    Hematocrit 35.8 35.0 - 47.0 %    MCV 85 78 - 100 fl    MCH 29.8 26.5 - 33.0 pg    MCHC 34.9 31.5 - 36.5 g/dL    RDW 13.6 10.0 - 15.0 %    Platelet Count 137 (L) 150 - 450 10e9/L    Diff Method Automated Method     % Neutrophils 68.7 %    % Lymphocytes 16.9 %    % Monocytes 6.9 %    % Eosinophils 6.8 %    % Basophils 0.5 %    % Immature Granulocytes 0.2 %    Nucleated RBCs 0 0 /100    Absolute Neutrophil 4.3 1.6 - 8.3 10e9/L    Absolute Lymphocytes 1.1 0.8 - 5.3 10e9/L    Absolute Monocytes 0.4 0.0 - 1.3 10e9/L    Absolute Eosinophils 0.4 0.0 - 0.7 10e9/L    Absolute Basophils 0.0 0.0 - 0.2 10e9/L    Abs Immature Granulocytes 0.0 0 - 0.4 10e9/L    Absolute Nucleated RBC 0.0

## 2019-10-13 NOTE — PLAN OF CARE
Discharge Planner OT   Patient plan for discharge: Pt planning to go to rashid's home, willing to participate in Phase II CR  Current status: Pt more relaxed, not as anxious this morning.  Ambulated 275 feet in cruz, did 10 stairs, and ambulated on treadmill 7 minutes at 1.0 MPH.  Rated exertion at 4.  Vital signs stable.  Was a little preoccupied on what she would need to arrange to go home to daughter's but looking forward to discharge.  Barriers to return to prior living situation: None at this time if with family.  Will have a longer drive to Phase II CR if coming to [place] for therapy.  Daughter lives in [place].  Recommendations for discharge: Daughter's home with Phase II CR.  Rationale for recommendations: Pt would benefit from skilled services to increase endurance and independence in ADLs and home care tasks, learn more about heart healthy behaviors.       Entered by: Shade Wetzel 10/13/2019 11:36 AM

## 2019-10-13 NOTE — DISCHARGE INSTRUCTIONS
Cardiac Angioplasty/Stent Discharge Instructions - Femoral & Radial    After you go home:      Have an adult stay with you until tomorrow.    Drink extra fluids for 2 days.    You may resume your normal diet.    No smoking       For 24 hours - due to the sedation you received:    Relax and take it easy.    Do NOT make any important or legal decisions.    Do NOT drive or operate machines at home or at work.    Do NOT drink alcohol.    Care of Wrist & Groin Puncture Sites:      For the first 24 hrs - check the puncture site every 1-2 hours while awake.    For 2 days, when you cough, sneeze, laugh or move your bowels,  hold your hand over the puncture site and press firmly on/above the site    Remove the bandaid after 24 hours. If there is minor oozing, apply another bandaid and remove it after 12 hours.    It is normal to have a small bruise or pea size lump at the site.    You may shower tomorrow. Do NOT take a bath, or use a hot tub or pool for at least 3 days. Do NOT scrub the site. Do not use lotion or powder near the puncture site.    Activity - For 2 days:    Wrist site:      do not use your hand or arm to support your weight (such as rising from a chair)     do not bend your wrist (such as lifting a garage door).    do not lift more than 5 pounds or exercise your arm (such as tennis, golf or bowling).    Do NOT do any heavy activity such as exercise, lifting, or straining.     Groin site:        No stooping or squatting    Do NOT do any heavy activity such as exercise, lifting, or straining.     No housework, yard work or any activity that make you sweat    Do NOT lift more than 10 pounds    Bleeding:    If you start bleeding from the site in your wrist:      Sit down and press firmly on/above the site with your fingers for 10 minutes.     Once bleeding stops, keep your arm still for 2 hours.     Call [facility] as soon as you can.    If you start bleeding from the site in your groin:      Lie down flat and  press firmly on/above the site for 10 minutes.    Once bleeding stops, lay flat for 2 hours.     Call [facility] as soon as you can.    Call 911 right away if you have heavy bleeding or bleeding that does not stop.      Medicines:      If you are taking an antiplatelet medication such as Plavix, Brilinta or Effient, do not stop taking it until you talk to your cardiologist.        If you are on Metformin (Glucophage), do not restart it until you have blood tests (within 2 to 3 days after discharge).  After you have your blood drawn, you may restart the Metformin.     Take your medications, including blood thinners, unless your provider tells you not to.  If you take Coumadin (Warfarin), have your INR checked by your provider in  3-5 days. Call your clinic to schedule this.    If you have stopped any medicines, check with your provider about when to restart them.    Follow Up Appointments:      Follow up with [facility] Nurse Practitioner at [facility] of patient preference in 7-10 days.    Cardiac Rehab will contact you for follow up care.    Call the clinic if:      You have increased pain or a large or growing hard lump around the site.    The site is red, swollen, hot or tender.    Blood or fluid is draining from the site.    You have chills or a fever greater than 101 F (38 C).    Your arm or leg feels numb, cool or changes color.    You have hives, a rash or unusual itching.    New pain in the back or belly that you cannot control with Tylenol.    Any questions or concerns.    Other Instructions:      If you received a stent - carry your stent card with you at all times.        [facility] [place]:    [phone] [facility] (7 days a week)

## 2019-10-13 NOTE — PROVIDER NOTIFICATION
MD Notification    Notified Person: MD    Notified Person Name: Marianna    Notification Date/Time: 10/12/19 1009    Notification Interaction:amcon text page    Purpose of Notification:Pt request for prn bedtime melatonin, pt reports usually taking 5mg at home.     Orders Received: See new order for bedtime 5mg of melatonin.    Comments:

## 2019-10-13 NOTE — DISCHARGE SUMMARY
[facility]  Hospitalist Discharge Summary       Date of Admission:  10/9/2019  Date of Discharge:  10/13/2019  1:36 PM  Discharging Provider: Dalia Stein MD      Discharge Diagnoses   ST elevation MI involving the anterior wall  Ischemic cardiomyopathy with a EF of 35 to 40%  Chronic kidney disease  History of asthma  Depression    Follow-ups Needed After Discharge   Follow-up Appointments     Follow-up and recommended labs and tests       Follow up with primary care provider, Danny Conteh, within 7 days   for hospital follow- up.  The following labs/tests are recommended:   CBC/BMP.    Follow-up with cardiology next available.  Follow-up with cardiac rehab   next available.             Unresulted Labs Ordered in the Past 30 Days of this Admission     No orders found from 9/9/2019 to 10/10/2019.          Discharge Disposition   Discharged to home  Condition at discharge: Stable    Hospital Course   Gem Gama is a 68-year-old female with past medical history of hypertension, CKD, vitamin D deficiency, depression, asthma, obstructive sleep apnea who presented to the Emergency Department on 10/09/2019 for evaluation of abrupt onset chest pain with associated diaphoresis, nausea and presyncope was identified to have a STEMI involving the anterior leads.  She is now status post cardiac catheterization with drug-eluting stent x2 to the LAD.  The patient had hemodynamic instability, initially requiring intraaortic balloon pump as well as pressor support, which has since been weaned.  She is now asymptomatic and stable to transfer from the ICU.  Hospitalist Service will assume care.        STEMI involving the anterior wall.  The patient is status post drug-eluting stent x2 on 10/09/2019, patient is being followed by Cardiology postprocedurally.  She has been initiated on dual antiplatelet therapy with Brilinta and aspirin.  However due to cost Brilinta changed to Plavix.  She was given loading  dose of Plavix prior to discharge  She has also been started on low-dose Coreg following adverse reaction to metoprolol as well as atorvastatin 40 mg daily.  Also on low-dose losartan of 12.5 mg daily  -Patient advised to monitor her blood pressure and readings to PCP/cardiology for any medication adjustment      Ischemic cardiomyopathy with an EF of 35% to 40%,   as evidenced on echocardiogram on admission.  The patient presently appears euvolemic, will continue to monitor.   -Continue statin, losartan and Coreg as above      Chronic kidney disease, secondary to congenital kidney defect, creatinine appears at baseline.  Avoid nephrotoxic medications as able.      Question of sleep apnea:  Patient was found to have dyspnea and woke-up multiple times in the middle of night unable to breathe, reported that she has not tolerated CPAP machines before   -However on doing overnight sleep study her O2 sats did not drop below 89 even once.       Depression.  Continue on prior to admission medications.    Asthma, not in acute exacerbation, monitor.     Consultations This Hospital Stay   NUTRITION SERVICES ADULT IP CONSULT  CARDIAC REHAB IP CONSULT  PHARMACY IP CONSULT  PHARMACY IP CONSULT  CARDIOLOGY IP CONSULT  PHARMACY TO DOSE HEPARIN  PHARMACY TO DOSE MEDICATION  PHARMACY IP CONSULT  PHARMACY IP CONSULT  SOCIAL WORK IP CONSULT  PHARMACY LIAISON FOR MEDICATION COVERAGE CONSULT  SMOKING CESSATION PROGRAM IP CONSULT  SMOKING CESSATION PROGRAM IP CONSULT    Code Status   Full Code    Time Spent on this Encounter   I, Dalia Stein MD, personally saw the patient today and spent greater than 30 minutes discharging this patient.       Dalia Stein MD  [facility]  ______________________________________________________________________    Physical Exam   Vital Signs: Temp: 98  F (36.7  C) Temp src: Oral BP: 134/70(post CR exercise) Pulse: 102 Heart Rate: 82 Resp: 16 SpO2: 97 % O2 Device: None (Room air)    Weight:  185 lbs 12.8 oz  Exam:  Constitutional: Awake, alert and no distress. Appears comfortable  Head: Normocephalic. No masses, lesions, tenderness or abnormalities  ENT: ENT exam normal, no neck nodes or sinus tenderness  Cardiovascular: RRR.  No murmurs, no rubs or JVD  Respiratory: Normal WOB,b/l equal air entry, no wheezes or crackles   Gastrointestinal: Abdomen soft, non-tender. BS normal. No masses, organomegaly  : Deferred  Extremities : No edema , no clubbing or cyanosis         Primary Care Physician   Danny Conteh    Discharge Orders      CARDIAC REHAB REFERRAL      Discharge Order: F/U with Cardiac  [facility]      Reason for your hospital stay    You had ST elevation MI and had coronary angiogram and stent placed.  Also during evaluation you are found to have ischemic cardiomyopathy with ejection fraction 35 to 40%(normal 55 to 60%).  You will need to take aspirin and Brilinta for a year and aspirin lifelong, also you been started on Coreg at a smaller dose.  Due to your soft/low blood pressure your home losartan has been decreased to half the dose of 12.5 mg.  Also your home amlodipine has been discontinued.  Monitor your blood pressure on a regular basis and readings to PCP/cardiology for any medication adjustment.  Also would recommend low-sodium, fluid restricted diet.     Follow-up and recommended labs and tests     Follow up with primary care provider, Danny Conteh, within 7 days for hospital follow- up.  The following labs/tests are recommended: CBC/BMP.    Follow-up with cardiology next available.  Follow-up with cardiac rehab next available.     Activity    Your activity upon discharge: activity as tolerated.  Post coronary angiogram precautions as advised     Monitor and record    blood pressure daily and readings to PCP/cardiology for any medication adjustment  fluid intake and output daily  Limit intake to 2 L per day     Full Code    As documented during admission     Diet    Follow this  diet upon discharge: Orders Placed This Encounter      2 Gram Sodium Diet       Significant Results and Procedures   Most Recent 3 CBC's:  Recent Labs   Lab Test 10/13/19  0600 10/10/19  0639 10/09/19  1625   WBC 6.2 8.4 12.2*   HGB 12.5 12.9 13.6   MCV 85 84 84   * 161 169     Most Recent 3 BMP's:  Recent Labs   Lab Test 10/13/19  0600 10/12/19  0623 10/11/19  0523    143 141   POTASSIUM 4.0 4.2 3.9   CHLORIDE 111* 114* 111*   CO2 23 23 25   BUN 19 13 8   CR 0.88 0.83 0.95   ANIONGAP 6 6 5   FRACISCO 8.5 8.5 8.0*   * 99 92     Most Recent 2 LFT's:  Recent Labs   Lab Test 08/16/18  1700 08/06/13  1636   AST 19 26   ALT 20 20   ALKPHOS 108 98   BILITOTAL 0.4 0.8   ,   Results for orders placed or performed during the hospital encounter of 10/09/19   XR Chest Port 1 View    Narrative    CHEST ONE VIEW PORTABLE   10/9/2019 12:18 PM     HISTORY: Acute chest pain.    COMPARISON: 8/16/2018.      Impression    IMPRESSION: No acute abnormality.    BROOK SERRA MD       Discharge Medications   Discharge Medication List as of 10/13/2019  1:26 PM      START taking these medications    Details   aspirin (ASA) 81 MG EC tablet Take 1 tablet (81 mg) by mouth daily, Disp-90 tablet, R-0, E-PrescribeFuture refills by PCP Dr. Danny Conteh with phone number [phone].      atorvastatin (LIPITOR) 40 MG tablet Take 1 tablet (40 mg) by mouth daily, Disp-30 tablet, R-0, E-Prescribe      carvedilol (COREG) 3.125 MG tablet Take 1 tablet (3.125 mg) by mouth 2 times daily (with meals), Disp-60 tablet, R-0, E-PrescribeFuture refills by PCP Dr. Danny Conteh with phone number [phone].      clopidogrel (PLAVIX) 75 MG tablet Take 1 tablet (75 mg) by mouth daily, Disp-30 tablet, R-0, E-PrescribeFuture refills by PCP Dr. Danny Conteh with phone number [phone].      nitroGLYcerin (NITROSTAT) 0.4 MG sublingual tablet For chest pain place 1 tablet under the tongue every 5 minutes for 3 doses. If  symptoms persist 5 minutes after 1st dose call 911., Disp-15 tablet, R-0, E-PrescribeFuture refills by PCP Dr. Danny Conteh with phone number [phone].         CONTINUE these medications which have CHANGED    Details   losartan (COZAAR) 25 MG tablet Take 0.5 tablets (12.5 mg) by mouth daily, Disp-30 tablet, R-0, E-PrescribeFuture refills by PCP Dr. Danny Conteh with phone number [phone].         CONTINUE these medications which have NOT CHANGED    Details   albuterol (PROVENTIL) (2.5 MG/3ML) 0.083% neb solution USE 1 VIAL BY NEBULIZATION EVERY 6 HOURS AS NEEDED FOR SHORTNESS OF BREATH/ DYSPNEA, Disp-300 mL, R-5, E-Prescribe      amoxicillin (AMOXIL) 500 MG capsule TK 4 CS PO ONE HOUR BEFORE DENTAL APPOINTMENT, R-1, Historical      guaiFENesin-codeine (ROBITUSSIN AC) 100-10 MG/5ML SOLN Take 10 mLs by mouth every 4 hours as needed for cough, Disp-236 mL, R-0, Local Print      sertraline (ZOLOFT) 100 MG tablet TAKE 1 TABLET(100 MG) BY MOUTH DAILY, Disp-90 tablet, R-1, E-Prescribe      vitamin D3 (CHOLECALCIFEROL) 2000 units (50 mcg) tablet Take 1 tablet (2,000 Units) by mouth daily, Disp-90 tablet, R-3, E-Prescribe         STOP taking these medications       amLODIPine (NORVASC) 5 MG tablet Comments:   Reason for Stopping:         ticagrelor (BRILINTA) 90 MG tablet Comments:   Reason for Stopping:             Allergies   Allergies   Allergen Reactions     Flagyl [Metronidazole Hcl] Anaphylaxis and Rash     Latex Other (See Comments)     Runny nose     Sulfa Drugs Anaphylaxis     Tape [Adhesive Tape] Hives     Metoprolol Itching and Rash

## 2019-10-13 NOTE — PLAN OF CARE
VSS. Tele shows SR. Pt denies any CP or SOB. Discharge instructions, medication indications/side effects, and follow up instructions discussed w/ pt. Handouts given. All questions answered. All pt belongings are accounted for and sent  w/ pt. Pt left floor via wheelchair and was driven home by son.

## 2019-10-13 NOTE — PLAN OF CARE
Occupational Therapy Discharge Summary    Reason for therapy discharge:    Discharged to home with outpatient therapy.    Progress towards therapy goal(s). See goals on Care Plan in [facility] electronic health record for goal details.  Goals partially met.  Barriers to achieving goals:   limited tolerance for therapy and discharge from facility.    Therapy recommendation(s):    Continued therapy is recommended.  Rationale/Recommendations:  OPCR phase 2 recommended for progressive monitored exercise. Pt going to daughter's home during her recovery and will then be able to attend OPCR from there.

## 2019-10-13 NOTE — PLAN OF CARE
3801-1225: A&Ox4. VSS on RA. Denies CP or shortness of breath. Bilat groin sites and L radial, ecchymotic, soft, flat. Tele SR. Up independently. Completed overnight oximetry trend. Plan for dismissal 10-13.

## 2019-10-14 ENCOUNTER — TELEPHONE (OUTPATIENT)
Dept: FAMILY MEDICINE | Facility: CLINIC | Age: 69
End: 2019-10-14

## 2019-10-14 ENCOUNTER — TELEPHONE (OUTPATIENT)
Dept: CARDIOLOGY | Facility: CLINIC | Age: 69
End: 2019-10-14

## 2019-10-14 NOTE — TELEPHONE ENCOUNTER
Patient discharged from [facility] IP  ( Inpatient or ER).    Discharge location: [facility]  Discharge date: 10/13/19  Diagnosis: ST Elevation myocardial Infarction (STEMI), unspecified ARTERY (H), ST elevation myocardial infarction involving left anterio  Patient has been in the ER/IP 0/1 times.  Care Coord:  NA  Please follow up as appropriate. If no follow up required, please close encounter.

## 2019-10-14 NOTE — TELEPHONE ENCOUNTER
Next 5 appointments (look out 90 days)    Oct 15, 2019 11:40 AM CDT  [facility] [address] [place] [id number] [phone]        Patient is already scheduled for hospital follow up. Closing encounter.       Mert Resendiz RN, BSN, PHN

## 2019-10-14 NOTE — TELEPHONE ENCOUNTER
Patient was evaluated by cardiology while inpatient for chest pain with syncope and STEMI. 10/9/19: PCI via RFA with overlapping EDWIN x 2 to pLAD. 10/10/19: repeat coronary angiogram via LFA secondary to recurrent chest pain shows patent stents. Writer attempted to call patient to discuss any post hospital d/c questions, review medication changes, and confirm f/u appts, but no answer. VM left to return my phone call. RN will confirm with patient that she was d/c with the antiplatelet Plavix. Pt has an Rx for PRN SL Nitroglycerin. RN will confirm with patient that she needs to still schedule for cardiology [facility] f/u as well as cardiac rehab. JERICA Ruffin RN.

## 2019-10-15 ENCOUNTER — OFFICE VISIT (OUTPATIENT)
Dept: FAMILY MEDICINE | Facility: CLINIC | Age: 69
End: 2019-10-15
Payer: MEDICARE

## 2019-10-15 VITALS
OXYGEN SATURATION: 97 % | BODY MASS INDEX: 31.89 KG/M2 | DIASTOLIC BLOOD PRESSURE: 69 MMHG | RESPIRATION RATE: 18 BRPM | HEIGHT: 64 IN | HEART RATE: 88 BPM | TEMPERATURE: 98.9 F | SYSTOLIC BLOOD PRESSURE: 118 MMHG

## 2019-10-15 DIAGNOSIS — I25.10 ATHEROSCLEROSIS OF CORONARY ARTERY OF NATIVE HEART, ANGINA PRESENCE UNSPECIFIED, UNSPECIFIED VESSEL OR LESION TYPE: ICD-10-CM

## 2019-10-15 DIAGNOSIS — Z86.79 H/O MYOCARDIAL ISCHEMIA: Primary | ICD-10-CM

## 2019-10-15 DIAGNOSIS — I21.02 ST ELEVATION MYOCARDIAL INFARCTION INVOLVING LEFT ANTERIOR DESCENDING (LAD) CORONARY ARTERY (H): ICD-10-CM

## 2019-10-15 LAB
ALBUMIN SERPL-MCNC: 4 G/DL (ref 3.4–5)
ALP SERPL-CCNC: 89 U/L (ref 40–150)
ALT SERPL W P-5'-P-CCNC: 32 U/L (ref 0–50)
ANION GAP SERPL CALCULATED.3IONS-SCNC: 6 MMOL/L (ref 3–14)
AST SERPL W P-5'-P-CCNC: 31 U/L (ref 0–45)
BASOPHILS # BLD AUTO: 0.1 10E9/L (ref 0–0.2)
BASOPHILS NFR BLD AUTO: 1 %
BILIRUB SERPL-MCNC: 1 MG/DL (ref 0.2–1.3)
BUN SERPL-MCNC: 19 MG/DL (ref 7–30)
CALCIUM SERPL-MCNC: 8.9 MG/DL (ref 8.5–10.1)
CHLORIDE SERPL-SCNC: 107 MMOL/L (ref 94–109)
CO2 SERPL-SCNC: 25 MMOL/L (ref 20–32)
CREAT SERPL-MCNC: 1.09 MG/DL (ref 0.52–1.04)
DIFFERENTIAL METHOD BLD: NORMAL
EOSINOPHIL # BLD AUTO: 0.5 10E9/L (ref 0–0.7)
EOSINOPHIL NFR BLD AUTO: 8 %
ERYTHROCYTE [DISTWIDTH] IN BLOOD BY AUTOMATED COUNT: 14 % (ref 10–15)
GFR SERPL CREATININE-BSD FRML MDRD: 51 ML/MIN/{1.73_M2}
GLUCOSE SERPL-MCNC: 90 MG/DL (ref 70–99)
HCT VFR BLD AUTO: 39.7 % (ref 35–47)
HGB BLD-MCNC: 13.8 G/DL (ref 11.7–15.7)
LYMPHOCYTES # BLD AUTO: 1 10E9/L (ref 0.8–5.3)
LYMPHOCYTES NFR BLD AUTO: 17 %
MCH RBC QN AUTO: 30.7 PG (ref 26.5–33)
MCHC RBC AUTO-ENTMCNC: 34.8 G/DL (ref 31.5–36.5)
MCV RBC AUTO: 88 FL (ref 78–100)
MONOCYTES # BLD AUTO: 0.5 10E9/L (ref 0–1.3)
MONOCYTES NFR BLD AUTO: 9 %
NEUTROPHILS # BLD AUTO: 3.9 10E9/L (ref 1.6–8.3)
NEUTROPHILS NFR BLD AUTO: 65 %
PLATELET # BLD AUTO: 204 10E9/L (ref 150–450)
PLATELET # BLD EST: NORMAL 10*3/UL
POTASSIUM SERPL-SCNC: 4.6 MMOL/L (ref 3.4–5.3)
PROT SERPL-MCNC: 8.1 G/DL (ref 6.8–8.8)
RBC # BLD AUTO: 4.5 10E12/L (ref 3.8–5.2)
RBC MORPH BLD: NORMAL
SODIUM SERPL-SCNC: 138 MMOL/L (ref 133–144)
WBC # BLD AUTO: 6 10E9/L (ref 4–11)

## 2019-10-15 PROCEDURE — 80053 COMPREHEN METABOLIC PANEL: CPT | Performed by: INTERNAL MEDICINE

## 2019-10-15 PROCEDURE — 99495 TRANSJ CARE MGMT MOD F2F 14D: CPT | Performed by: INTERNAL MEDICINE

## 2019-10-15 PROCEDURE — 36415 COLL VENOUS BLD VENIPUNCTURE: CPT | Performed by: INTERNAL MEDICINE

## 2019-10-15 PROCEDURE — 93000 ELECTROCARDIOGRAM COMPLETE: CPT | Performed by: INTERNAL MEDICINE

## 2019-10-15 PROCEDURE — 85025 COMPLETE CBC W/AUTO DIFF WBC: CPT | Performed by: INTERNAL MEDICINE

## 2019-10-15 ASSESSMENT — PATIENT HEALTH QUESTIONNAIRE - PHQ9: SUM OF ALL RESPONSES TO PHQ QUESTIONS 1-9: 8

## 2019-10-15 NOTE — PATIENT INSTRUCTIONS
At [facility], we strive to deliver an exceptional experience to you, every time we see you.  If you receive a survey in the mail, please send us back your thoughts. We really do value your feedback.    Based on your medical history, these are the current health maintenance/preventive care services that you are due for (some may have been done at this visit.)  Health Maintenance Due   Topic Date Due     DEXA  1950     ZOSTER IMMUNIZATION (2 of 3) 10/23/2013     ADVANCE CARE PLANNING  06/18/2017     ASTHMA ACTION PLAN  02/09/2018     ASTHMA CONTROL TEST  02/21/2019     PHQ-9  02/21/2019     MEDICARE ANNUAL WELLNESS VISIT  03/15/2019     FALL RISK ASSESSMENT  03/15/2019         Suggested websites for health information:  Www.[employer] : Up to date and easily searchable information on multiple topics.  Www.[employer].gov : medication info, interactive tutorials, watch real surgeries online  Www.familydoctor.org : good info from the Academy of Family Physicians  Www.cdc.gov : public health info, travel advisories, epidemics (H1N1)  Www.aap.org : children's health info, normal development, vaccinations  Www.health.[facility].[place].us : [place] dept of health, public health issues in [place], N1N1    Your care team:                            Family Medicine Internal Medicine   MD Danny Christian MD Shantel Branch-Fleming, MD Katya Georgiev PA-C Nam Ho, MD Pediatrics   PATTY Caro, MD Raissa Morrison CNP, MD Deborah Mielke, MD Kim Thein, APRBELGICA CNP      Clinic hours: Monday - Thursday 7 am-7 pm; Fridays 7 am-5 pm.   Urgent care: Monday - Friday 11 am-9 pm; Saturday and Sunday 9 am-5 pm.  Pharmacy : Monday -Thursday 8 am-8 pm; Friday 8 am-6 pm; Saturday and Sunday 9 am-5 pm.     Clinic: [phone]   Pharmacy: [phone]    GOALS:    1) Start cardiac rehabilitation.    2) Increase Losartan to 50 mg during next  appointment (4 weeks).    3) Prevent another cardiac event.    4) Consider taking a long-acting nitroglycerin (Isosorbide mononitrate) to prevent ischemia.    5) Check BP every morning.    6) Watch for intake of salt due to risk of heart failure.

## 2019-10-15 NOTE — PROGRESS NOTES
Subjective     Gem Gama is a 68 year old female who presents to clinic today for the following health issues:    [facility]       Hospital Follow-up Visit:    Hospital/Nursing Home/IP Rehab Facility: [facility]  Date of Admission: 10/09/2019  Date of Discharge: 10/13/2019  Reason(s) for Admission: ST elevation MI involving the anterior wall, Ischemic cardiomyopathy with a EF of 35 to 40%, CKD, Asthma, Depression        -patient is still having some SOB   -patient noticed 2 bumps on her wrist where the catheter was inserted  -patient stated that yesterday she had a sensation in hr right eye, has now gone away    -still tired, but able to walk for 5 minutes.  -prior to hospitalization, patient was experiencing chest pain, radiating to right subcostal and right posterior thoracic pain, jaw pain bilaterally and right eye cramping (for the past year)         Problems taking medications regularly:  None       Medication changes since discharge: Aspirin, atorvastatin, carvedilol, clopidogrel, nitroglycerin       Problems adhering to non-medication therapy:  None    Summary of hospitalization:  [facility] discharge summary reviewed  Diagnostic Tests/Treatments reviewed.  Follow up needed: Yes.  Other Healthcare Providers Involved in Patient s Care:         None  Update since discharge: improved.     Post Discharge Medication Reconciliation: discharge medications reconciled, continue medications without change.  Plan of care communicated with patient     Coding guidelines for this visit:  Type of Medical   Decision Making Face-to-Face Visit       within 7 Days of discharge Face-to-Face Visit        within 14 days of discharge   Moderate Complexity [id number]   High Complexity [id number]                Patient Active Problem List   Diagnosis     Depression with anxiety     TMJ (temporomandibular joint syndrome)     S/P hip replacement     Congenital ureteric stenosis     Lacrimal duct stenosis      Chronic rhinitis     CARDIOVASCULAR SCREENING; LDL GOAL LESS THAN 160     Intermittent asthma     Advanced directives, counseling/discussion     Major depressive disorder, recurrent episode, moderate (H)     Pituitary microadenoma (H)     Obstructive sleep apnea syndrome     Hypertension, goal below 140/90     Osteoarthritis of right knee     S/P total hip arthroplasty     Constipation     Central sleep apnea     Controlled narcolepsy     Esophageal reflux (GERD)     Status post total knee replacement     Primary narcolepsy without cataplexy     Vitamin D deficiency     CKD (chronic kidney disease) stage 3, GFR 30-59 ml/min (H)     Incontinence of feces with fecal urgency     Overactive bladder     Fatigue, unspecified type     STEMI (ST elevation myocardial infarction) (H)     ST elevation myocardial infarction involving left anterior descending (LAD) coronary artery (H)     Past Surgical History:   Procedure Laterality Date     ARTHROPLASTY KNEE  7/9/2014    Procedure: ARTHROPLASTY KNEE;  Surgeon: Levi Johnson MD;  Location:  OR     ARTHROPLASTY KNEE Left 11/24/2014    Procedure: ARTHROPLASTY KNEE;  Surgeon: Levi Johnson MD;  Location:  OR      REIMPLANT URETER,EXTENSIVE TAILORING  1973 1997     C REPAIR ENTEROCELE,VAG APPRCH  2008    Prolift     C TOTAL ABD HYSTERECTOMY+BLAD REPR  1992    Vaginal approach with oophrectomy     C TOTAL KNEE ARTHROPLASTY       CV CORONARY ANGIOGRAM N/A 10/9/2019    Procedure: Coronary Angiogram;  Surgeon: Chiquita Chatterjee MD;  Location:  HEART CARDIAC CATH LAB     CV HEART CATHETERIZATION WITH POSSIBLE INTERVENTION N/A 10/10/2019    Procedure: Heart Catheterization with Possible Intervention;  Surgeon: Chin Garcia MD;  Location:  HEART CARDIAC CATH LAB     CV INTRA-AORTIC BALLOON PUMP INSERTION N/A 10/9/2019    Procedure: Intra-Aortic Balloon Pump Insertion;  Surgeon: Chiquita Chatterjee MD;  Location:  HEART CARDIAC CATH LAB     CV INTRA-AORTIC  BALLOON PUMP REMOVAL N/A 10/10/2019    Procedure: Intra-Aortic Balloon Pump Removal;  Surgeon: Chin Garcia MD;  Location:  HEART CARDIAC CATH LAB     CV PCI STENT DRUG ELUTING N/A 10/9/2019    Procedure: PCI Stent Drug Eluting;  Surgeon: Chiquita Chatterjee MD;  Location:  HEART CARDIAC CATH LAB     GENITOURINARY SURGERY      kidney surgery X 3     ORTHOPEDIC SURGERY      bilat total hip     RECTOCELE REPAIR       SOFT TISSUE SURGERY         Social History     Tobacco Use     Smoking status: Never Smoker     Smokeless tobacco: Never Used   Substance Use Topics     Alcohol use: No     Family History   Problem Relation Age of Onset     Hypertension Mother      Arthritis Mother      Cerebrovascular Disease Father         Three Strokes     Diabetes Father         and brother     Thyroid Disease Sister         Hypothyroid         Allergies   Allergen Reactions     Flagyl [Metronidazole Hcl] Anaphylaxis and Rash     Latex Other (See Comments)     Runny nose     Sulfa Drugs Anaphylaxis     Tape [Adhesive Tape] Hives     Metoprolol Itching and Rash     Recent Labs   Lab Test 10/15/19  1246 10/13/19  0600  10/09/19  1208  08/16/18  1700  02/09/17  1230 02/02/16  1634  09/19/13  0717  08/06/13  1636   A1C  --   --   --   --   --   --   --   --   --   --  5.6  --   --    LDL  --   --   --  166*  --   --   --  175* 126*  --  141*  --   --    HDL  --   --   --  45*  --   --   --  50 54  --  51  --   --    TRIG  --   --   --  136  --   --   --  125 105  --  111  --   --    ALT 32  --   --   --   --  20  --   --   --   --   --   --  20   CR 1.09* 0.88   < > 1.06*   < > 1.34*   < > 1.05* 1.06*   < > 0.84   < > 0.82   GFRESTIMATED 51* 66   < > 53*   < > 39*   < > 52* 52*   < > 68   < > 70   GFRESTBLACK 59* 76   < > 61   < > 47*   < > 63 63   < > 83   < > 85   POTASSIUM 4.6 4.0   < > 3.6   < > 3.5   < > 4.5 3.8   < > 4.1   < > 5.2   TSH  --   --   --   --   --   --   --   --  1.62  --  1.56  --  1.20    < > = values in  "this interval not displayed.      BP Readings from Last 3 Encounters:   10/15/19 118/69   10/13/19 134/70   03/13/19 144/85    Wt Readings from Last 3 Encounters:   10/13/19 84.3 kg (185 lb 12.8 oz)   03/13/19 86.2 kg (190 lb)   09/27/18 85.3 kg (188 lb)                      Reviewed and updated as needed this visit by Provider         Review of Systems   ROS COMP: CONSTITUTIONAL: NEGATIVE for fever, chills, change in weight  INTEGUMENTARY/SKIN: NEGATIVE for worrisome rashes, moles or lesions  EYES: NEGATIVE for vision changes or irritation  ENT/MOUTH: NEGATIVE for ear, mouth and throat problems  RESP: NEGATIVE for significant cough or SOB  CV: NEGATIVE for chest pain, palpitations or peripheral edema  GI: NEGATIVE for nausea, abdominal pain, heartburn, or change in bowel habits  : NEGATIVE for frequency, dysuria, or hematuria  MUSCULOSKELETAL: NEGATIVE for significant arthralgias or myalgia  NEURO: NEGATIVE for weakness, dizziness or paresthesias  ENDOCRINE: NEGATIVE for temperature intolerance, skin/hair changes  HEME: NEGATIVE for bleeding problems  PSYCHIATRIC: NEGATIVE for changes in mood or affect      Objective    /69 (BP Location: Right arm, Patient Position: Chair, Cuff Size: Adult Regular)   Pulse 88   Temp 98.9  F (37.2  C) (Oral)   Resp 18   Ht 1.626 m (5' 4\")   SpO2 97%   BMI 31.89 kg/m     Physical Exam   GENERAL: healthy, alert and no distress  EYES: Eyes grossly normal to inspection, PERRL and conjunctivae and sclerae normal  HENT: ear canals and TM's normal, nose and mouth without ulcers or lesions  NECK: no adenopathy, no asymmetry, masses, or scars and thyroid normal to palpation  RESP: lungs clear to auscultation - no rales, rhonchi or wheezes  CV: regular rate and rhythm, normal S1 S2, no S3 or S4, no murmur, click or rub, no peripheral edema and peripheral pulses strong  ABDOMEN: soft, nontender, no hepatosplenomegaly, no masses and bowel sounds normal  MS: no gross " musculoskeletal defects noted, no edema  SKIN: no suspicious lesions or rashes  NEURO: Normal strength and tone, mentation intact and speech normal  PSYCH: mentation appears normal, affect normal/bright    Diagnostic Test Results:  Results for orders placed or performed in visit on 10/15/19   CBC with platelets and differential   Result Value Ref Range    WBC 6.0 4.0 - 11.0 10e9/L    RBC Count 4.50 3.8 - 5.2 10e12/L    Hemoglobin 13.8 11.7 - 15.7 g/dL    Hematocrit 39.7 35.0 - 47.0 %    MCV 88 78 - 100 fl    MCH 30.7 26.5 - 33.0 pg    MCHC 34.8 31.5 - 36.5 g/dL    RDW 14.0 10.0 - 15.0 %    Platelet Count 204 150 - 450 10e9/L    % Neutrophils 65.0 %    % Lymphocytes 17.0 %    % Monocytes 9.0 %    % Eosinophils 8.0 %    % Basophils 1.0 %    Absolute Neutrophil 3.9 1.6 - 8.3 10e9/L    Absolute Lymphocytes 1.0 0.8 - 5.3 10e9/L    Absolute Monocytes 0.5 0.0 - 1.3 10e9/L    Absolute Eosinophils 0.5 0.0 - 0.7 10e9/L    Absolute Basophils 0.1 0.0 - 0.2 10e9/L    RBC Morphology Normal     Platelet Estimate       Automated count confirmed.  Platelet morphology is normal.    Diff Method Automated Method    Comprehensive metabolic panel (BMP + Alb, Alk Phos, ALT, AST, Total. Bili, TP)   Result Value Ref Range    Sodium 138 133 - 144 mmol/L    Potassium 4.6 3.4 - 5.3 mmol/L    Chloride 107 94 - 109 mmol/L    Carbon Dioxide 25 20 - 32 mmol/L    Anion Gap 6 3 - 14 mmol/L    Glucose 90 70 - 99 mg/dL    Urea Nitrogen 19 7 - 30 mg/dL    Creatinine 1.09 (H) 0.52 - 1.04 mg/dL    GFR Estimate 51 (L) >60 mL/min/[1.73_m2]    GFR Estimate If Black 59 (L) >60 mL/min/[1.73_m2]    Calcium 8.9 8.5 - 10.1 mg/dL    Bilirubin Total 1.0 0.2 - 1.3 mg/dL    Albumin 4.0 3.4 - 5.0 g/dL    Protein Total 8.1 6.8 - 8.8 g/dL    Alkaline Phosphatase 89 40 - 150 U/L    ALT 32 0 - 50 U/L    AST 31 0 - 45 U/L           Assessment & Plan     1. H/O myocardial ischemia    - EKG 12-lead complete w/read - Clinics    2. Atherosclerosis of coronary artery of native  "heart, angina presence unspecified, unspecified vessel or lesion type    - EKG 12-lead complete w/read - Clinics  - CBC with platelets and differential  - Comprehensive metabolic panel (BMP + Alb, Alk Phos, ALT, AST, Total. Bili, TP)    3. ST elevation myocardial infarction involving left anterior descending (LAD) coronary artery (H)         BMI:   Estimated body mass index is 31.89 kg/m  as calculated from the following:    Height as of this encounter: 1.626 m (5' 4\").    Weight as of 10/13/19: 84.3 kg (185 lb 12.8 oz).   Weight management plan: dietary changes.          Return in about 4 weeks (around 11/12/2019).    Danny Conteh MD  [facility]        "

## 2019-10-16 ASSESSMENT — ASTHMA QUESTIONNAIRES: ACT_TOTALSCORE: 20

## 2019-10-18 NOTE — TELEPHONE ENCOUNTER
Writer called pt and she denies any chest pain or lightheadedness. States she continues to experience some BANDA, but improved since hospitalization. Denies any medication questions and states has adequate supply of Plavix. Raul groin access sites are healing without signs of infection, swelling, drainage. Reviewed cardiac rehab OV and need to schedule ARMANDO f/u OV as ordered. Call transferred to scheduling. Verbalized understanding without further questions. JERICA Ruffin RN.

## 2019-10-25 PROBLEM — I25.10 CORONARY ARTERY DISEASE INVOLVING NATIVE CORONARY ARTERY OF NATIVE HEART WITHOUT ANGINA PECTORIS: Status: ACTIVE | Noted: 2019-10-25

## 2019-10-25 PROBLEM — I25.5 ISCHEMIC CARDIOMYOPATHY: Status: ACTIVE | Noted: 2019-10-25

## 2019-10-28 ENCOUNTER — OFFICE VISIT (OUTPATIENT)
Dept: CARDIOLOGY | Facility: CLINIC | Age: 69
End: 2019-10-28
Attending: INTERNAL MEDICINE
Payer: MEDICARE

## 2019-10-28 VITALS
SYSTOLIC BLOOD PRESSURE: 110 MMHG | BODY MASS INDEX: 31.43 KG/M2 | HEIGHT: 64 IN | WEIGHT: 184.1 LBS | HEART RATE: 78 BPM | DIASTOLIC BLOOD PRESSURE: 62 MMHG | OXYGEN SATURATION: 97 %

## 2019-10-28 DIAGNOSIS — N18.30 CKD (CHRONIC KIDNEY DISEASE) STAGE 3, GFR 30-59 ML/MIN (H): ICD-10-CM

## 2019-10-28 DIAGNOSIS — I10 BENIGN ESSENTIAL HYPERTENSION: ICD-10-CM

## 2019-10-28 DIAGNOSIS — I50.22 CHRONIC SYSTOLIC HEART FAILURE (H): ICD-10-CM

## 2019-10-28 DIAGNOSIS — I21.02 ST ELEVATION MYOCARDIAL INFARCTION INVOLVING LEFT ANTERIOR DESCENDING (LAD) CORONARY ARTERY (H): ICD-10-CM

## 2019-10-28 DIAGNOSIS — I25.5 ISCHEMIC CARDIOMYOPATHY: ICD-10-CM

## 2019-10-28 DIAGNOSIS — I25.810 CORONARY ARTERY DISEASE INVOLVING AUTOLOGOUS ARTERY CORONARY BYPASS GRAFT WITHOUT ANGINA PECTORIS: Primary | ICD-10-CM

## 2019-10-28 LAB
ANION GAP SERPL CALCULATED.3IONS-SCNC: 11.3 MMOL/L (ref 6–17)
BUN SERPL-MCNC: 16 MG/DL (ref 7–30)
CALCIUM SERPL-MCNC: 9.6 MG/DL (ref 8.5–10.5)
CHLORIDE SERPL-SCNC: 105 MMOL/L (ref 98–107)
CO2 SERPL-SCNC: 26 MMOL/L (ref 23–29)
CREAT SERPL-MCNC: 1.62 MG/DL (ref 0.7–1.3)
GFR SERPL CREATININE-BSD FRML MDRD: 31 ML/MIN/{1.73_M2}
GLUCOSE SERPL-MCNC: 97 MG/DL (ref 70–105)
POTASSIUM SERPL-SCNC: 4.3 MMOL/L (ref 3.5–5.1)
SODIUM SERPL-SCNC: 138 MMOL/L (ref 136–145)

## 2019-10-28 PROCEDURE — 99214 OFFICE O/P EST MOD 30 MIN: CPT | Performed by: NURSE PRACTITIONER

## 2019-10-28 PROCEDURE — 80048 BASIC METABOLIC PNL TOTAL CA: CPT | Performed by: NURSE PRACTITIONER

## 2019-10-28 PROCEDURE — 36415 COLL VENOUS BLD VENIPUNCTURE: CPT | Performed by: NURSE PRACTITIONER

## 2019-10-28 RX ORDER — CLOPIDOGREL BISULFATE 75 MG/1
75 TABLET ORAL DAILY
Qty: 90 TABLET | Refills: 3 | Status: SHIPPED | OUTPATIENT
Start: 2019-10-28 | End: 2020-10-06

## 2019-10-28 RX ORDER — CARVEDILOL 3.12 MG/1
3.12 TABLET ORAL 2 TIMES DAILY WITH MEALS
Qty: 180 TABLET | Refills: 3 | Status: SHIPPED | OUTPATIENT
Start: 2019-10-28 | End: 2020-10-15

## 2019-10-28 RX ORDER — PNV NO.95/FERROUS FUM/FOLIC AC 28MG-0.8MG
1 TABLET ORAL EVERY MORNING
COMMUNITY

## 2019-10-28 RX ORDER — LOSARTAN POTASSIUM 25 MG/1
12.5 TABLET ORAL DAILY
Qty: 45 TABLET | Refills: 3 | Status: SHIPPED | OUTPATIENT
Start: 2019-10-28 | End: 2019-10-28

## 2019-10-28 RX ORDER — ATORVASTATIN CALCIUM 40 MG/1
40 TABLET, FILM COATED ORAL DAILY
Qty: 90 TABLET | Refills: 3 | Status: SHIPPED | OUTPATIENT
Start: 2019-10-28 | End: 2020-10-15

## 2019-10-28 ASSESSMENT — MIFFLIN-ST. JEOR: SCORE: 1340.07

## 2019-10-28 NOTE — LETTER
10/28/2019    Danny Conteh MD  [address]  [place] [address]    RE: Gem Gama       Dear Colleague,    I had the pleasure of seeing Gem Gama in the [facility].    Cardiology Clinic Progress Note  Gem Gama MRN# [id number]   YOB: 1950 Age: 68 year old   Primary Cardiologist: Dr. Dennison Reason for visit: Post hospital follow up.             Assessment and Plan:   Gem Gama is a very pleasant 68 year old female with a history of coronary artery disease, ischemic cardiomyopathy, hypertension, CKD, asthma and depression. Patient here today for post-hospital follow up.     1. Coronary artery disease - s/p EDWIN x 2 to LAD 10/9/2019 in the setting of anterior STEMI, Coronary angiogram 10/9 showed severe single vessel obstructive CAD of LAD (100%) as well as moderate non-obstructive CAD in OM1 and OM2 (60%, 50%). She went back to the cath lab 10/10 for evaluation of RCA which showed no obstructive RCA disease. Stable, no signs/symptoms of angina.    - Continue aspirin and plavix for at minimum of 1 year post revascularization uninterrupted.    - Continue carvedilol and atorvastatin   - Cardiac rehab to start 10/29   - Counseled on lifestyle modifications.     2. Ischemic cardiomyopathy/HFrEF - EF 35-40%, grade I diastolic dysfunction, RV normal function and size. Patient appears compensated and euvolemic on exam. HF symptoms well controlled. Plan to recheck labs today after clinic visit, if kidney function stable will further optimize HF medications by increasing losartan to 25 mg daily.    - NYHA class II, stage C   - Etiology : ischemic    - Fluid status : euvolemic    - Diuretic regimen : no diuretic needs   - Last RHC : none   - Ischemic evaluation : cardiac catheterization with revascularization 10/9/19   - Guideline directed medical therapy    - Betablocker: carvedilol 3.125mg BID    - ACEI/ARB/ARNI: losartan 12.5mg  daily    - Aldactone antagonist: none    3. Hypertension - continue current medication regimen.     4. CKD - secondary to congenital kidney defect, labs 10/15/19 show stable kidney function, plan to recheck labs today after visit and further optimize HF medications if kidney function stable (increase losartan to 25mg daily).           Changes today: none    Follow up plan:     BMP after clinic visit today, if kidney function stable will increase losartan to 25mg daily    Follow up with me in 1 month with labs prior    Cardiac rehab starting 10/29/2019    Follow up with Dr. Dennison with echocardiogram prior in 3 months        History of Presenting Illness:    Gem Gama is a very pleasant 68 year old female with a history of coronary artery disease, ischemic cardiomyopathy, hypertension, CKD, asthma and depression.     Patient was hospitalized 10/9/2019-10/13/2019 due to STEMI, EKG demonstrated ST elevation in the anterolateral leads, patient was taken emergently taken to cath lab and underwent EDWIN x 2 to proximal LAD. Coronary angiogram showed severe single vessel obstructive CAD of LAD (100%) as well as moderate non-obstructive CAD in OM1 and OM2 (60%, 50%). Patient initially requiring intraaortic balloon pump and pressor support. Echocardiogram showed EF 35-40%, grade I diastolic dysfunction, RV normal function and size, and no significant valvular disease. Patient was taken back to cath lab to have RCA evaluated which showed no obstructive disease (40% RCA stenosis).  Patient was initially on Brilinta and aspirin, but due to cost Brilinta was changed to Plavix. Patient was started on carvedilol, losartan and atorvastatin.     Patient is here today for post hospital follow up.     Patient reports feeling good. Patient has long list of questions today, brings her notebook and concerns about symptoms she was having prior to MI. Patient states the day she went home from the hospital she wasn't feeling great,  noting she was short of breath and lightheaded. Has noted she has gradually continued to feel better. States end of last week started to feel close to baseline. Monitoring weights daily a home. Patient denies chest pain or chest tightness. Notes her back pain, jaw pain and indigestion has improved. Sleeping good. Denies orthopnea or PND. Denies shortness of breath at rest. Will note exertional dyspnea with walking > 11 mins, otherwise able to complete ADLs and IADLs with no dyspnea. Denies dizziness, lightheadedness or other presyncopal symptoms. Denies tachycardia or palpitations. States significant levels of stress in 2016 or 2017, and states typically 20/10. Taking medications daily, denies any missed doses. Denies episodes of bleeding.     Right and left groin acces site denies tender, denies drainage, states unsure about ecchymosis.     Labs from 10/15/19 show stable kidney function and electrolytes, creatinine 1.09. Hemoglobin 13.8. Blood pressure 145/80, recheck 110/62 and HR 78 in clinic today. Monitoring blood pressure at home, 100-130s/60-70s.     Appetite good, using healthy choice freezer meals picking options with less than 500mg sodium. Walking most days for exercise for 11 mins, also getting activity with errands, walked in the grocery store for an hour this weekend. Denies tobacco or alcohol use.         Recent Hospitalizations   10/9/2019-10/13/2019 due to STEMI, EKG demonstrated ST elevation in the anterolateral leads, patient was taken emergently taken to cath lab and underwent EDWIN x 2 to proximal LAD. Echocardiogram showed EF 35-40%, grade I diastolic dysfunction, RV normal function and size, and no significant valvular disease.        Social History    Single, 4 children, 9 grandchildren, retired  (physics and chemistry), enjoys knitting.   Social History     Socioeconomic History     Marital status:      Spouse name: Not on file     Number of children: Not on file      Years of education: Not on file     Highest education level: Not on file   Occupational History     Not on file   Social Needs     Financial resource strain: Not on file     Food insecurity:     Worry: Not on file     Inability: Not on file     Transportation needs:     Medical: Not on file     Non-medical: Not on file   Tobacco Use     Smoking status: Never Smoker     Smokeless tobacco: Never Used   Substance and Sexual Activity     Alcohol use: No     Drug use: No     Sexual activity: Never     Partners: Male     Birth control/protection: None   Lifestyle     Physical activity:     Days per week: Not on file     Minutes per session: Not on file     Stress: Not on file   Relationships     Social connections:     Talks on phone: Not on file     Gets together: Not on file     Attends [employer] service: Not on file     Active member of club or organization: Not on file     Attends meetings of clubs or organizations: Not on file     Relationship status: Not on file     Intimate partner violence:     Fear of current or ex partner: Not on file     Emotionally abused: Not on file     Physically abused: Not on file     Forced sexual activity: Not on file   Other Topics Concern     Parent/sibling w/ CABG, MI or angioplasty before 65F 55M? No      Service No     Blood Transfusions No     Caffeine Concern No     Occupational Exposure No     Hobby Hazards No     Sleep Concern Yes     Comment: Uses CPAP     Stress Concern No     Weight Concern Yes     Special Diet No     Back Care No     Exercise No     Bike Helmet No     Comment:       Seat Belt Yes     Self-Exams Yes   Social History Narrative     Not on file            Review of Systems:   Skin:  Negative     Eyes:  Positive for glasses  ENT:  Positive for hearing loss  Respiratory:  Positive for shortness of breath  Cardiovascular:    Positive for;lightheadedness;dizziness  Gastroenterology: Negative    Genitourinary:  not assessed    Musculoskeletal:  Positive for  "arthritis  Neurologic:  Positive for headaches  Psychiatric:  Positive for sleep disturbances  Heme/Lymph/Imm:  Positive for allergies  Endocrine:  Negative           Physical Exam:   Vitals: BP (!) 145/80   Pulse 78   Ht 1.626 m (5' 4\")   Wt 83.5 kg (184 lb 1.6 oz)   SpO2 97%   BMI 31.60 kg/m      Wt Readings from Last 4 Encounters:   10/28/19 83.5 kg (184 lb 1.6 oz)   10/13/19 84.3 kg (185 lb 12.8 oz)   03/13/19 86.2 kg (190 lb)   09/27/18 85.3 kg (188 lb)     GEN: well nourished, in no acute distress, anxious.  HEENT:  Pupils equal, round. Sclerae nonicteric.   NECK: Supple, no masses appreciated. No JVP appreciated on exam.   C/V:  Regular rate and rhythm, no murmur, rub or gallop.   RESP: Respirations are unlabored. Clear to auscultation bilaterally without wheezing, rales, or rhonchi.  GI: Abdomen soft, nontender.  EXTREM: No LE edema. Bilateral posterior tibialis pulses palpable.   NEURO: Alert and oriented, cooperative.  SKIN: Warm and dry.        Data:   ECHO 10/10/2019  Left ventricular systolic function is moderate to severely reduced. LVEF is  estimated at 35-40%.  Akinesis of the basal to apical anterior, mid anteroseptal, apical septal wall  segments. Dyskinesis of the apex. Hypokinesis of the basal to mid inferoseptal  wall segments.  The right ventricle is normal in structure, function and size.  There are no prior studies available for comparison.    Cardiac catheterization 10/9/2019  Left Main   Ost LM lesion is 30% stenosed. The lesion is moderately calcified.   Left Anterior Descending   Prox LAD lesion is 100% stenosed. Culprit lesion. The lesion is type C - high risk and thrombotic.   Left Circumflex   The vessel is large.   First Obtuse Marginal Branch   The vessel is large.   1st Mrg lesion is 60% stenosed.   Second Obtuse Marginal Branch   The vessel is large.   2nd Mrg lesion is 50% stenosed.   Third Obtuse Marginal Branch   Right Coronary Artery   The vessel was not injected. "     Cardiac catheterization 10/10/2019  Left Main   The vessel was visualized by selective angiography and is moderate in size. There was 0% vessel disease.   Left Anterior Descending   Ost LAD lesion is 10% stenosed.   Previously placed Prox LAD drug eluting stent is widely patent. Previous treatment took place <1 month ago. No thrombosis in the previous stent. No restenosis in the previous stent.   Left Circumflex   Prox Cx lesion is 15% stenosed.   Right Coronary Artery   Prox RCA lesion is 40% stenosed. Very small barely codominant RCA mild narrowing vessel <2.5mm     1.Limited LCA injections. The stents from yesterday in LAD widely patent. No significant lesions in LCA  2 Very small technically codominant RCA with moderate non flow limiting disease  3. IABP pulled in ICU      LIPID RESULTS:  Lab Results   Component Value Date    CHOL 238 (H) 10/09/2019    HDL 45 (L) 10/09/2019     (H) 10/09/2019    TRIG 136 10/09/2019    CHOLHDLRATIO 4.2 09/19/2013     LIVER ENZYME RESULTS:  Lab Results   Component Value Date    AST 31 10/15/2019    ALT 32 10/15/2019     CBC RESULTS:  Lab Results   Component Value Date    WBC 6.0 10/15/2019    RBC 4.50 10/15/2019    HGB 13.8 10/15/2019    HCT 39.7 10/15/2019    MCV 88 10/15/2019    MCH 30.7 10/15/2019    MCHC 34.8 10/15/2019    RDW 14.0 10/15/2019     10/15/2019     BMP RESULTS:  Lab Results   Component Value Date     10/15/2019    POTASSIUM 4.6 10/15/2019    CHLORIDE 107 10/15/2019    CO2 25 10/15/2019    ANIONGAP 6 10/15/2019    GLC 90 10/15/2019    BUN 19 10/15/2019    CR 1.09 (H) 10/15/2019    GFRESTIMATED 51 (L) 10/15/2019    GFRESTBLACK 59 (L) 10/15/2019    FRACISCO 8.9 10/15/2019      A1C RESULTS:  Lab Results   Component Value Date    A1C 5.6 09/19/2013     INR RESULTS:  Lab Results   Component Value Date    INR 1.69 (H) 11/27/2014    INR 1.76 (H) 11/26/2014            Medications     Current Outpatient Medications   Medication Sig Dispense Refill      albuterol (PROVENTIL) (2.5 MG/3ML) 0.083% neb solution USE 1 VIAL BY NEBULIZATION EVERY 6 HOURS AS NEEDED FOR SHORTNESS OF BREATH/ DYSPNEA 300 mL 5     amoxicillin (AMOXIL) 500 MG capsule TK 4 CS PO ONE HOUR BEFORE DENTAL APPOINTMENT  1     aspirin (ASA) 81 MG EC tablet Take 1 tablet (81 mg) by mouth daily 90 tablet 0     atorvastatin (LIPITOR) 40 MG tablet Take 1 tablet (40 mg) by mouth daily 30 tablet 0     carvedilol (COREG) 3.125 MG tablet Take 1 tablet (3.125 mg) by mouth 2 times daily (with meals) 60 tablet 0     clopidogrel (PLAVIX) 75 MG tablet Take 1 tablet (75 mg) by mouth daily 30 tablet 0     losartan (COZAAR) 25 MG tablet Take 0.5 tablets (12.5 mg) by mouth daily 30 tablet 0     Melatonin-Pyridoxine (MELATIN PO) prn       nitroGLYcerin (NITROSTAT) 0.4 MG sublingual tablet For chest pain place 1 tablet under the tongue every 5 minutes for 3 doses. If symptoms persist 5 minutes after 1st dose call 911. 15 tablet 0     Prenatal Multivit-Min-Fe-FA (PRENATAL/IRON) TABS        sertraline (ZOLOFT) 100 MG tablet TAKE 1 TABLET(100 MG) BY MOUTH DAILY 90 tablet 1     vitamin D3 (CHOLECALCIFEROL) 2000 units (50 mcg) tablet Take 1 tablet (2,000 Units) by mouth daily 90 tablet 3     guaiFENesin-codeine (ROBITUSSIN AC) 100-10 MG/5ML SOLN Take 10 mLs by mouth every 4 hours as needed for cough (Patient not taking: Reported on 3/15/2018) 236 mL 0          Past Medical History     Past Medical History:   Diagnosis Date     ADHD (attention deficit hyperactivity disorder)      Anxiety      Arthritis     back, hands, knees, hips     Asthma     controlled--has coughing symptoms     C. difficile diarrhea      Central sleep apnea      Coronary artery disease involving native coronary artery of native heart without angina pectoris 10/25/2019     Depression      Gastro-oesophageal reflux disease      Hypertension      Ischemic cardiomyopathy 10/25/2019     Narcolepsy      Renal disease     gfr is aroung 48     Sleep apnea      uses Cpap     Past Surgical History:   Procedure Laterality Date     ARTHROPLASTY KNEE  7/9/2014    Procedure: ARTHROPLASTY KNEE;  Surgeon: Levi Johnson MD;  Location:  OR     ARTHROPLASTY KNEE Left 11/24/2014    Procedure: ARTHROPLASTY KNEE;  Surgeon: Levi Johnson MD;  Location:  OR     C REIMPLANT URETER,EXTENSIVE TAILORING  1973 1997     C REPAIR ENTEROCELE,VAG APPRCH  2008    Prolift     C TOTAL ABD HYSTERECTOMY+BLAD REPR  1992    Vaginal approach with oophrectomy     C TOTAL KNEE ARTHROPLASTY       CV CORONARY ANGIOGRAM N/A 10/9/2019    Procedure: Coronary Angiogram;  Surgeon: Chiquita Chatterjee MD;  Location:  HEART CARDIAC CATH LAB     CV HEART CATHETERIZATION WITH POSSIBLE INTERVENTION N/A 10/10/2019    Procedure: Heart Catheterization with Possible Intervention;  Surgeon: Chin Garcia MD;  Location:  HEART CARDIAC CATH LAB     CV INTRA-AORTIC BALLOON PUMP INSERTION N/A 10/9/2019    Procedure: Intra-Aortic Balloon Pump Insertion;  Surgeon: Chiquita Chatterjee MD;  Location:  HEART CARDIAC CATH LAB     CV INTRA-AORTIC BALLOON PUMP REMOVAL N/A 10/10/2019    Procedure: Intra-Aortic Balloon Pump Removal;  Surgeon: Chin Garcia MD;  Location:  HEART CARDIAC CATH LAB     CV PCI STENT DRUG ELUTING N/A 10/9/2019    Procedure: PCI Stent Drug Eluting;  Surgeon: Chiquita Chatterjee MD;  Location:  HEART CARDIAC CATH LAB     GENITOURINARY SURGERY      kidney surgery X 3     ORTHOPEDIC SURGERY      bilat total hip     RECTOCELE REPAIR       SOFT TISSUE SURGERY       Family History   Problem Relation Age of Onset     Hypertension Mother      Arthritis Mother      Cerebrovascular Disease Father         Three Strokes     Diabetes Father         and brother     Thyroid Disease Sister         Hypothyroid            Allergies   Flagyl [metronidazole hcl]; Latex; Sulfa drugs; Tape [adhesive tape]; and Metoprolol        KATE Murdock CNP  [facility]  Pager:  [phone]      Thank you for allowing me to participate in the care of your patient.    Sincerely,     KATE Murdock [facility]

## 2019-10-28 NOTE — PROGRESS NOTES
Cardiology Clinic Progress Note  Gem Gama MRN# [id number]   YOB: 1950 Age: 68 year old   Primary Cardiologist: Dr. Dennison Reason for visit: Post hospital follow up.             Assessment and Plan:   Gem Gama is a very pleasant 68 year old female with a history of coronary artery disease, ischemic cardiomyopathy, hypertension, CKD, asthma and depression. Patient here today for post-hospital follow up.     1. Coronary artery disease - s/p EDWIN x 2 to LAD 10/9/2019 in the setting of anterior STEMI, Coronary angiogram 10/9 showed severe single vessel obstructive CAD of LAD (100%) as well as moderate non-obstructive CAD in OM1 and OM2 (60%, 50%). She went back to the cath lab 10/10 for evaluation of RCA which showed no obstructive RCA disease. Stable, no signs/symptoms of angina.    - Continue aspirin and plavix for at minimum of 1 year post revascularization uninterrupted.    - Continue carvedilol and atorvastatin   - Cardiac rehab to start 10/29   - Counseled on lifestyle modifications.     2. Ischemic cardiomyopathy/HFrEF - EF 35-40%, grade I diastolic dysfunction, RV normal function and size. Patient appears compensated and euvolemic on exam. HF symptoms well controlled. Plan to recheck labs today after clinic visit, if kidney function stable will further optimize HF medications by increasing losartan to 25 mg daily.    - NYHA class II, stage C   - Etiology : ischemic    - Fluid status : euvolemic    - Diuretic regimen : no diuretic needs   - Last RHC : none   - Ischemic evaluation : cardiac catheterization with revascularization 10/9/19   - Guideline directed medical therapy    - Betablocker: carvedilol 3.125mg BID    - ACEI/ARB/ARNI: losartan 12.5mg daily    - Aldactone antagonist: none    3. Hypertension - continue current medication regimen.     4. CKD - secondary to congenital kidney defect, labs 10/15/19 show stable kidney function, plan to recheck labs today after visit and  further optimize HF medications if kidney function stable (increase losartan to 25mg daily).           Changes today: none    Follow up plan:     BMP after clinic visit today, if kidney function stable will increase losartan to 25mg daily    Follow up with me in 1 month with labs prior    Cardiac rehab starting 10/29/2019    Follow up with Dr. Dennison with echocardiogram prior in 3 months        History of Presenting Illness:    Gem Gama is a very pleasant 68 year old female with a history of coronary artery disease, ischemic cardiomyopathy, hypertension, CKD, asthma and depression.     Patient was hospitalized 10/9/2019-10/13/2019 due to STEMI, EKG demonstrated ST elevation in the anterolateral leads, patient was taken emergently taken to cath lab and underwent EDWIN x 2 to proximal LAD. Coronary angiogram showed severe single vessel obstructive CAD of LAD (100%) as well as moderate non-obstructive CAD in OM1 and OM2 (60%, 50%). Patient initially requiring intraaortic balloon pump and pressor support. Echocardiogram showed EF 35-40%, grade I diastolic dysfunction, RV normal function and size, and no significant valvular disease. Patient was taken back to cath lab to have RCA evaluated which showed no obstructive disease (40% RCA stenosis).  Patient was initially on Brilinta and aspirin, but due to cost Brilinta was changed to Plavix. Patient was started on carvedilol, losartan and atorvastatin.     Patient is here today for post hospital follow up.     Patient reports feeling good. Patient has long list of questions today, brings her notebook and concerns about symptoms she was having prior to MI. Patient states the day she went home from the hospital she wasn't feeling great, noting she was short of breath and lightheaded. Has noted she has gradually continued to feel better. States end of last week started to feel close to baseline. Monitoring weights daily a home. Patient denies chest pain or chest  tightness. Notes her back pain, jaw pain and indigestion has improved. Sleeping good. Denies orthopnea or PND. Denies shortness of breath at rest. Will note exertional dyspnea with walking > 11 mins, otherwise able to complete ADLs and IADLs with no dyspnea. Denies dizziness, lightheadedness or other presyncopal symptoms. Denies tachycardia or palpitations. States significant levels of stress in 2016 or 2017, and states typically 20/10. Taking medications daily, denies any missed doses. Denies episodes of bleeding.     Right and left groin acces site denies tender, denies drainage, states unsure about ecchymosis.     Labs from 10/15/19 show stable kidney function and electrolytes, creatinine 1.09. Hemoglobin 13.8. Blood pressure 145/80, recheck 110/62 and HR 78 in clinic today. Monitoring blood pressure at home, 100-130s/60-70s.     Appetite good, using healthy choice freezer meals picking options with less than 500mg sodium. Walking most days for exercise for 11 mins, also getting activity with errands, walked in the grocery store for an hour this weekend. Denies tobacco or alcohol use.         Recent Hospitalizations   10/9/2019-10/13/2019 due to STEMI, EKG demonstrated ST elevation in the anterolateral leads, patient was taken emergently taken to cath lab and underwent EDWIN x 2 to proximal LAD. Echocardiogram showed EF 35-40%, grade I diastolic dysfunction, RV normal function and size, and no significant valvular disease.        Social History    Single, 4 children, 9 grandchildren, retired  (physics and chemistry), enjoys knitting.   Social History     Socioeconomic History     Marital status:      Spouse name: Not on file     Number of children: Not on file     Years of education: Not on file     Highest education level: Not on file   Occupational History     Not on file   Social Needs     Financial resource strain: Not on file     Food insecurity:     Worry: Not on file     Inability: Not  on file     Transportation needs:     Medical: Not on file     Non-medical: Not on file   Tobacco Use     Smoking status: Never Smoker     Smokeless tobacco: Never Used   Substance and Sexual Activity     Alcohol use: No     Drug use: No     Sexual activity: Never     Partners: Male     Birth control/protection: None   Lifestyle     Physical activity:     Days per week: Not on file     Minutes per session: Not on file     Stress: Not on file   Relationships     Social connections:     Talks on phone: Not on file     Gets together: Not on file     Attends Buddhism service: Not on file     Active member of club or organization: Not on file     Attends meetings of clubs or organizations: Not on file     Relationship status: Not on file     Intimate partner violence:     Fear of current or ex partner: Not on file     Emotionally abused: Not on file     Physically abused: Not on file     Forced sexual activity: Not on file   Other Topics Concern     Parent/sibling w/ CABG, MI or angioplasty before 65F 55M? No      Service No     Blood Transfusions No     Caffeine Concern No     Occupational Exposure No     Hobby Hazards No     Sleep Concern Yes     Comment: Uses CPAP     Stress Concern No     Weight Concern Yes     Special Diet No     Back Care No     Exercise No     Bike Helmet No     Comment:       Seat Belt Yes     Self-Exams Yes   Social History Narrative     Not on file            Review of Systems:   Skin:  Negative     Eyes:  Positive for glasses  ENT:  Positive for hearing loss  Respiratory:  Positive for shortness of breath  Cardiovascular:    Positive for;lightheadedness;dizziness  Gastroenterology: Negative    Genitourinary:  not assessed    Musculoskeletal:  Positive for arthritis  Neurologic:  Positive for headaches  Psychiatric:  Positive for sleep disturbances  Heme/Lymph/Imm:  Positive for allergies  Endocrine:  Negative           Physical Exam:   Vitals: BP (!) 145/80   Pulse 78   Ht 1.626 m  "(5' 4\")   Wt 83.5 kg (184 lb 1.6 oz)   SpO2 97%   BMI 31.60 kg/m     Wt Readings from Last 4 Encounters:   10/28/19 83.5 kg (184 lb 1.6 oz)   10/13/19 84.3 kg (185 lb 12.8 oz)   03/13/19 86.2 kg (190 lb)   09/27/18 85.3 kg (188 lb)     GEN: well nourished, in no acute distress, anxious.  HEENT:  Pupils equal, round. Sclerae nonicteric.   NECK: Supple, no masses appreciated. No JVP appreciated on exam.   C/V:  Regular rate and rhythm, no murmur, rub or gallop.   RESP: Respirations are unlabored. Clear to auscultation bilaterally without wheezing, rales, or rhonchi.  GI: Abdomen soft, nontender.  EXTREM: No LE edema. Bilateral posterior tibialis pulses palpable.   NEURO: Alert and oriented, cooperative.  SKIN: Warm and dry.        Data:   ECHO 10/10/2019  Left ventricular systolic function is moderate to severely reduced. LVEF is  estimated at 35-40%.  Akinesis of the basal to apical anterior, mid anteroseptal, apical septal wall  segments. Dyskinesis of the apex. Hypokinesis of the basal to mid inferoseptal  wall segments.  The right ventricle is normal in structure, function and size.  There are no prior studies available for comparison.    Cardiac catheterization 10/9/2019  Left Main   Ost LM lesion is 30% stenosed. The lesion is moderately calcified.   Left Anterior Descending   Prox LAD lesion is 100% stenosed. Culprit lesion. The lesion is type C - high risk and thrombotic.   Left Circumflex   The vessel is large.   First Obtuse Marginal Branch   The vessel is large.   1st Mrg lesion is 60% stenosed.   Second Obtuse Marginal Branch   The vessel is large.   2nd Mrg lesion is 50% stenosed.   Third Obtuse Marginal Branch   Right Coronary Artery   The vessel was not injected.     Cardiac catheterization 10/10/2019  Left Main   The vessel was visualized by selective angiography and is moderate in size. There was 0% vessel disease.   Left Anterior Descending   Ost LAD lesion is 10% stenosed.   Previously placed " Prox LAD drug eluting stent is widely patent. Previous treatment took place <1 month ago. No thrombosis in the previous stent. No restenosis in the previous stent.   Left Circumflex   Prox Cx lesion is 15% stenosed.   Right Coronary Artery   Prox RCA lesion is 40% stenosed. Very small barely codominant RCA mild narrowing vessel <2.5mm     1.Limited LCA injections. The stents from yesterday in LAD widely patent. No significant lesions in LCA  2 Very small technically codominant RCA with moderate non flow limiting disease  3. IABP pulled in ICU      LIPID RESULTS:  Lab Results   Component Value Date    CHOL 238 (H) 10/09/2019    HDL 45 (L) 10/09/2019     (H) 10/09/2019    TRIG 136 10/09/2019    CHOLHDLRATIO 4.2 09/19/2013     LIVER ENZYME RESULTS:  Lab Results   Component Value Date    AST 31 10/15/2019    ALT 32 10/15/2019     CBC RESULTS:  Lab Results   Component Value Date    WBC 6.0 10/15/2019    RBC 4.50 10/15/2019    HGB 13.8 10/15/2019    HCT 39.7 10/15/2019    MCV 88 10/15/2019    MCH 30.7 10/15/2019    MCHC 34.8 10/15/2019    RDW 14.0 10/15/2019     10/15/2019     BMP RESULTS:  Lab Results   Component Value Date     10/15/2019    POTASSIUM 4.6 10/15/2019    CHLORIDE 107 10/15/2019    CO2 25 10/15/2019    ANIONGAP 6 10/15/2019    GLC 90 10/15/2019    BUN 19 10/15/2019    CR 1.09 (H) 10/15/2019    GFRESTIMATED 51 (L) 10/15/2019    GFRESTBLACK 59 (L) 10/15/2019    FRACISCO 8.9 10/15/2019      A1C RESULTS:  Lab Results   Component Value Date    A1C 5.6 09/19/2013     INR RESULTS:  Lab Results   Component Value Date    INR 1.69 (H) 11/27/2014    INR 1.76 (H) 11/26/2014            Medications     Current Outpatient Medications   Medication Sig Dispense Refill     albuterol (PROVENTIL) (2.5 MG/3ML) 0.083% neb solution USE 1 VIAL BY NEBULIZATION EVERY 6 HOURS AS NEEDED FOR SHORTNESS OF BREATH/ DYSPNEA 300 mL 5     amoxicillin (AMOXIL) 500 MG capsule TK 4 CS PO ONE HOUR BEFORE DENTAL APPOINTMENT  1      aspirin (ASA) 81 MG EC tablet Take 1 tablet (81 mg) by mouth daily 90 tablet 0     atorvastatin (LIPITOR) 40 MG tablet Take 1 tablet (40 mg) by mouth daily 30 tablet 0     carvedilol (COREG) 3.125 MG tablet Take 1 tablet (3.125 mg) by mouth 2 times daily (with meals) 60 tablet 0     clopidogrel (PLAVIX) 75 MG tablet Take 1 tablet (75 mg) by mouth daily 30 tablet 0     losartan (COZAAR) 25 MG tablet Take 0.5 tablets (12.5 mg) by mouth daily 30 tablet 0     Melatonin-Pyridoxine (MELATIN PO) prn       nitroGLYcerin (NITROSTAT) 0.4 MG sublingual tablet For chest pain place 1 tablet under the tongue every 5 minutes for 3 doses. If symptoms persist 5 minutes after 1st dose call 911. 15 tablet 0     Prenatal Multivit-Min-Fe-FA (PRENATAL/IRON) TABS        sertraline (ZOLOFT) 100 MG tablet TAKE 1 TABLET(100 MG) BY MOUTH DAILY 90 tablet 1     vitamin D3 (CHOLECALCIFEROL) 2000 units (50 mcg) tablet Take 1 tablet (2,000 Units) by mouth daily 90 tablet 3     guaiFENesin-codeine (ROBITUSSIN AC) 100-10 MG/5ML SOLN Take 10 mLs by mouth every 4 hours as needed for cough (Patient not taking: Reported on 3/15/2018) 236 mL 0          Past Medical History     Past Medical History:   Diagnosis Date     ADHD (attention deficit hyperactivity disorder)      Anxiety      Arthritis     back, hands, knees, hips     Asthma     controlled--has coughing symptoms     C. difficile diarrhea      Central sleep apnea      Coronary artery disease involving native coronary artery of native heart without angina pectoris 10/25/2019     Depression      Gastro-oesophageal reflux disease      Hypertension      Ischemic cardiomyopathy 10/25/2019     Narcolepsy      Renal disease     gfr is aroung 48     Sleep apnea     uses Cpap     Past Surgical History:   Procedure Laterality Date     ARTHROPLASTY KNEE  7/9/2014    Procedure: ARTHROPLASTY KNEE;  Surgeon: Levi Johnson MD;  Location: [facility] OR     ARTHROPLASTY KNEE Left 11/24/2014    Procedure: ARTHROPLASTY  KNEE;  Surgeon: Levi Johnson MD;  Location:  OR     C REIMPLANT URETER,EXTENSIVE TAILORING  1973 1997     C REPAIR ENTEROCELE,VAG APPRCH  2008    Prolift     C TOTAL ABD HYSTERECTOMY+BLAD REPR  1992    Vaginal approach with oophrectomy     C TOTAL KNEE ARTHROPLASTY       CV CORONARY ANGIOGRAM N/A 10/9/2019    Procedure: Coronary Angiogram;  Surgeon: Chiquita Chatterjee MD;  Location:  HEART CARDIAC CATH LAB     CV HEART CATHETERIZATION WITH POSSIBLE INTERVENTION N/A 10/10/2019    Procedure: Heart Catheterization with Possible Intervention;  Surgeon: Chin Garcia MD;  Location:  HEART CARDIAC CATH LAB     CV INTRA-AORTIC BALLOON PUMP INSERTION N/A 10/9/2019    Procedure: Intra-Aortic Balloon Pump Insertion;  Surgeon: Chiquita Chatterjee MD;  Location:  HEART CARDIAC CATH LAB     CV INTRA-AORTIC BALLOON PUMP REMOVAL N/A 10/10/2019    Procedure: Intra-Aortic Balloon Pump Removal;  Surgeon: Chin Garcia MD;  Location:  HEART CARDIAC CATH LAB     CV PCI STENT DRUG ELUTING N/A 10/9/2019    Procedure: PCI Stent Drug Eluting;  Surgeon: Chiquita Chatterjee MD;  Location:  HEART CARDIAC CATH LAB     GENITOURINARY SURGERY      kidney surgery X 3     ORTHOPEDIC SURGERY      bilat total hip     RECTOCELE REPAIR       SOFT TISSUE SURGERY       Family History   Problem Relation Age of Onset     Hypertension Mother      Arthritis Mother      Cerebrovascular Disease Father         Three Strokes     Diabetes Father         and brother     Thyroid Disease Sister         Hypothyroid            Allergies   Flagyl [metronidazole hcl]; Latex; Sulfa drugs; Tape [adhesive tape]; and Metoprolol        KATE Murdock [facility]  Pager: [phone]

## 2019-10-28 NOTE — PATIENT INSTRUCTIONS
1. Labs today, will call with results, if kidney function stable will plan to increase losartan to 1 tablet daily.  2. Follow up with Leonor in 1 month with labs prior  3. Please call with any questions/concerns [phone]

## 2019-10-28 NOTE — LETTER
10/28/2019    Danny Conteh MD  [address]  [place] [address]    RE: Gem Gama       Dear Colleague,    I had the pleasure of seeing Gem Gama in the [facility].    Cardiology Clinic Progress Note  Gem Gama MRN# [id number]   YOB: 1950 Age: 68 year old   Primary Cardiologist: Dr. Dennison Reason for visit: Post hospital follow up.             Assessment and Plan:   Gem Gama is a very pleasant 68 year old female with a history of coronary artery disease, ischemic cardiomyopathy, hypertension, CKD, asthma and depression. Patient here today for post-hospital follow up.     1. Coronary artery disease - s/p EDWIN x 2 to LAD 10/9/2019 in the setting of anterior STEMI, Coronary angiogram 10/9 showed severe single vessel obstructive CAD of LAD (100%) as well as moderate non-obstructive CAD in OM1 and OM2 (60%, 50%). She went back to the cath lab 10/10 for evaluation of RCA which showed no obstructive RCA disease. Stable, no signs/symptoms of angina.    - Continue aspirin and plavix for at minimum of 1 year post revascularization uninterrupted.    - Continue carvedilol and atorvastatin   - Cardiac rehab to start 10/29   - Counseled on lifestyle modifications.     2. Ischemic cardiomyopathy/HFrEF - EF 35-40%, grade I diastolic dysfunction, RV normal function and size. Patient appears compensated and euvolemic on exam. HF symptoms well controlled. Plan to recheck labs today after clinic visit, if kidney function stable will further optimize HF medications by increasing losartan to 25 mg daily.    - NYHA class II, stage C   - Etiology : ischemic    - Fluid status : euvolemic    - Diuretic regimen : no diuretic needs   - Last RHC : none   - Ischemic evaluation : cardiac catheterization with revascularization 10/9/19   - Guideline directed medical therapy    - Betablocker: carvedilol 3.125mg BID    - ACEI/ARB/ARNI: losartan 12.5mg  daily    - Aldactone antagonist: none    3. Hypertension - continue current medication regimen.     4. CKD - secondary to congenital kidney defect, labs 10/15/19 show stable kidney function, plan to recheck labs today after visit and further optimize HF medications if kidney function stable (increase losartan to 25mg daily).           Changes today: none    Follow up plan:     BMP after clinic visit today, if kidney function stable will increase losartan to 25mg daily    Follow up with me in 1 month with labs prior    Cardiac rehab starting 10/29/2019    Follow up with Dr. Dennison with echocardiogram prior in 3 months        History of Presenting Illness:    Gem Gama is a very pleasant 68 year old female with a history of coronary artery disease, ischemic cardiomyopathy, hypertension, CKD, asthma and depression.     Patient was hospitalized 10/9/2019-10/13/2019 due to STEMI, EKG demonstrated ST elevation in the anterolateral leads, patient was taken emergently taken to cath lab and underwent EDWIN x 2 to proximal LAD. Coronary angiogram showed severe single vessel obstructive CAD of LAD (100%) as well as moderate non-obstructive CAD in OM1 and OM2 (60%, 50%). Patient initially requiring intraaortic balloon pump and pressor support. Echocardiogram showed EF 35-40%, grade I diastolic dysfunction, RV normal function and size, and no significant valvular disease. Patient was taken back to cath lab to have RCA evaluated which showed no obstructive disease (40% RCA stenosis).  Patient was initially on Brilinta and aspirin, but due to cost Brilinta was changed to Plavix. Patient was started on carvedilol, losartan and atorvastatin.     Patient is here today for post hospital follow up.     Patient reports feeling good. Patient has long list of questions today, brings her notebook and concerns about symptoms she was having prior to MI. Patient states the day she went home from the hospital she wasn't feeling great,  noting she was short of breath and lightheaded. Has noted she has gradually continued to feel better. States end of last week started to feel close to baseline. Monitoring weights daily a home. Patient denies chest pain or chest tightness. Notes her back pain, jaw pain and indigestion has improved. Sleeping good. Denies orthopnea or PND. Denies shortness of breath at rest. Will note exertional dyspnea with walking > 11 mins, otherwise able to complete ADLs and IADLs with no dyspnea. Denies dizziness, lightheadedness or other presyncopal symptoms. Denies tachycardia or palpitations. States significant levels of stress in 2016 or 2017, and states typically 20/10. Taking medications daily, denies any missed doses. Denies episodes of bleeding.     Right and left groin acces site denies tender, denies drainage, states unsure about ecchymosis.     Labs from 10/15/19 show stable kidney function and electrolytes, creatinine 1.09. Hemoglobin 13.8. Blood pressure 145/80, recheck 110/62 and HR 78 in clinic today. Monitoring blood pressure at home, 100-130s/60-70s.     Appetite good, using healthy choice freezer meals picking options with less than 500mg sodium. Walking most days for exercise for 11 mins, also getting activity with errands, walked in the grocery store for an hour this weekend. Denies tobacco or alcohol use.         Recent Hospitalizations   10/9/2019-10/13/2019 due to STEMI, EKG demonstrated ST elevation in the anterolateral leads, patient was taken emergently taken to cath lab and underwent EDWIN x 2 to proximal LAD. Echocardiogram showed EF 35-40%, grade I diastolic dysfunction, RV normal function and size, and no significant valvular disease.        Social History    Single, 4 children, 9 grandchildren, retired  (physics and chemistry), enjoys knitting.   Social History     Socioeconomic History     Marital status:      Spouse name: Not on file     Number of children: Not on file      Years of education: Not on file     Highest education level: Not on file   Occupational History     Not on file   Social Needs     Financial resource strain: Not on file     Food insecurity:     Worry: Not on file     Inability: Not on file     Transportation needs:     Medical: Not on file     Non-medical: Not on file   Tobacco Use     Smoking status: Never Smoker     Smokeless tobacco: Never Used   Substance and Sexual Activity     Alcohol use: No     Drug use: No     Sexual activity: Never     Partners: Male     Birth control/protection: None   Lifestyle     Physical activity:     Days per week: Not on file     Minutes per session: Not on file     Stress: Not on file   Relationships     Social connections:     Talks on phone: Not on file     Gets together: Not on file     Attends Confucianist service: Not on file     Active member of club or organization: Not on file     Attends meetings of clubs or organizations: Not on file     Relationship status: Not on file     Intimate partner violence:     Fear of current or ex partner: Not on file     Emotionally abused: Not on file     Physically abused: Not on file     Forced sexual activity: Not on file   Other Topics Concern     Parent/sibling w/ CABG, MI or angioplasty before 65F 55M? No      Service No     Blood Transfusions No     Caffeine Concern No     Occupational Exposure No     Hobby Hazards No     Sleep Concern Yes     Comment: Uses CPAP     Stress Concern No     Weight Concern Yes     Special Diet No     Back Care No     Exercise No     Bike Helmet No     Comment:       Seat Belt Yes     Self-Exams Yes   Social History Narrative     Not on file            Review of Systems:   Skin:  Negative     Eyes:  Positive for glasses  ENT:  Positive for hearing loss  Respiratory:  Positive for shortness of breath  Cardiovascular:    Positive for;lightheadedness;dizziness  Gastroenterology: Negative    Genitourinary:  not assessed    Musculoskeletal:  Positive for  "arthritis  Neurologic:  Positive for headaches  Psychiatric:  Positive for sleep disturbances  Heme/Lymph/Imm:  Positive for allergies  Endocrine:  Negative           Physical Exam:   Vitals: BP (!) 145/80   Pulse 78   Ht 1.626 m (5' 4\")   Wt 83.5 kg (184 lb 1.6 oz)   SpO2 97%   BMI 31.60 kg/m      Wt Readings from Last 4 Encounters:   10/28/19 83.5 kg (184 lb 1.6 oz)   10/13/19 84.3 kg (185 lb 12.8 oz)   03/13/19 86.2 kg (190 lb)   09/27/18 85.3 kg (188 lb)     GEN: well nourished, in no acute distress, anxious.  HEENT:  Pupils equal, round. Sclerae nonicteric.   NECK: Supple, no masses appreciated. No JVP appreciated on exam.   C/V:  Regular rate and rhythm, no murmur, rub or gallop.   RESP: Respirations are unlabored. Clear to auscultation bilaterally without wheezing, rales, or rhonchi.  GI: Abdomen soft, nontender.  EXTREM: No LE edema. Bilateral posterior tibialis pulses palpable.   NEURO: Alert and oriented, cooperative.  SKIN: Warm and dry.        Data:   ECHO 10/10/2019  Left ventricular systolic function is moderate to severely reduced. LVEF is  estimated at 35-40%.  Akinesis of the basal to apical anterior, mid anteroseptal, apical septal wall  segments. Dyskinesis of the apex. Hypokinesis of the basal to mid inferoseptal  wall segments.  The right ventricle is normal in structure, function and size.  There are no prior studies available for comparison.    Cardiac catheterization 10/9/2019  Left Main   Ost LM lesion is 30% stenosed. The lesion is moderately calcified.   Left Anterior Descending   Prox LAD lesion is 100% stenosed. Culprit lesion. The lesion is type C - high risk and thrombotic.   Left Circumflex   The vessel is large.   First Obtuse Marginal Branch   The vessel is large.   1st Mrg lesion is 60% stenosed.   Second Obtuse Marginal Branch   The vessel is large.   2nd Mrg lesion is 50% stenosed.   Third Obtuse Marginal Branch   Right Coronary Artery   The vessel was not injected. "     Cardiac catheterization 10/10/2019  Left Main   The vessel was visualized by selective angiography and is moderate in size. There was 0% vessel disease.   Left Anterior Descending   Ost LAD lesion is 10% stenosed.   Previously placed Prox LAD drug eluting stent is widely patent. Previous treatment took place <1 month ago. No thrombosis in the previous stent. No restenosis in the previous stent.   Left Circumflex   Prox Cx lesion is 15% stenosed.   Right Coronary Artery   Prox RCA lesion is 40% stenosed. Very small barely codominant RCA mild narrowing vessel <2.5mm     1.Limited LCA injections. The stents from yesterday in LAD widely patent. No significant lesions in LCA  2 Very small technically codominant RCA with moderate non flow limiting disease  3. IABP pulled in ICU      LIPID RESULTS:  Lab Results   Component Value Date    CHOL 238 (H) 10/09/2019    HDL 45 (L) 10/09/2019     (H) 10/09/2019    TRIG 136 10/09/2019    CHOLHDLRATIO 4.2 09/19/2013     LIVER ENZYME RESULTS:  Lab Results   Component Value Date    AST 31 10/15/2019    ALT 32 10/15/2019     CBC RESULTS:  Lab Results   Component Value Date    WBC 6.0 10/15/2019    RBC 4.50 10/15/2019    HGB 13.8 10/15/2019    HCT 39.7 10/15/2019    MCV 88 10/15/2019    MCH 30.7 10/15/2019    MCHC 34.8 10/15/2019    RDW 14.0 10/15/2019     10/15/2019     BMP RESULTS:  Lab Results   Component Value Date     10/15/2019    POTASSIUM 4.6 10/15/2019    CHLORIDE 107 10/15/2019    CO2 25 10/15/2019    ANIONGAP 6 10/15/2019    GLC 90 10/15/2019    BUN 19 10/15/2019    CR 1.09 (H) 10/15/2019    GFRESTIMATED 51 (L) 10/15/2019    GFRESTBLACK 59 (L) 10/15/2019    FRACISCO 8.9 10/15/2019      A1C RESULTS:  Lab Results   Component Value Date    A1C 5.6 09/19/2013     INR RESULTS:  Lab Results   Component Value Date    INR 1.69 (H) 11/27/2014    INR 1.76 (H) 11/26/2014            Medications     Current Outpatient Medications   Medication Sig Dispense Refill      albuterol (PROVENTIL) (2.5 MG/3ML) 0.083% neb solution USE 1 VIAL BY NEBULIZATION EVERY 6 HOURS AS NEEDED FOR SHORTNESS OF BREATH/ DYSPNEA 300 mL 5     amoxicillin (AMOXIL) 500 MG capsule TK 4 CS PO ONE HOUR BEFORE DENTAL APPOINTMENT  1     aspirin (ASA) 81 MG EC tablet Take 1 tablet (81 mg) by mouth daily 90 tablet 0     atorvastatin (LIPITOR) 40 MG tablet Take 1 tablet (40 mg) by mouth daily 30 tablet 0     carvedilol (COREG) 3.125 MG tablet Take 1 tablet (3.125 mg) by mouth 2 times daily (with meals) 60 tablet 0     clopidogrel (PLAVIX) 75 MG tablet Take 1 tablet (75 mg) by mouth daily 30 tablet 0     losartan (COZAAR) 25 MG tablet Take 0.5 tablets (12.5 mg) by mouth daily 30 tablet 0     Melatonin-Pyridoxine (MELATIN PO) prn       nitroGLYcerin (NITROSTAT) 0.4 MG sublingual tablet For chest pain place 1 tablet under the tongue every 5 minutes for 3 doses. If symptoms persist 5 minutes after 1st dose call 911. 15 tablet 0     Prenatal Multivit-Min-Fe-FA (PRENATAL/IRON) TABS        sertraline (ZOLOFT) 100 MG tablet TAKE 1 TABLET(100 MG) BY MOUTH DAILY 90 tablet 1     vitamin D3 (CHOLECALCIFEROL) 2000 units (50 mcg) tablet Take 1 tablet (2,000 Units) by mouth daily 90 tablet 3     guaiFENesin-codeine (ROBITUSSIN AC) 100-10 MG/5ML SOLN Take 10 mLs by mouth every 4 hours as needed for cough (Patient not taking: Reported on 3/15/2018) 236 mL 0          Past Medical History     Past Medical History:   Diagnosis Date     ADHD (attention deficit hyperactivity disorder)      Anxiety      Arthritis     back, hands, knees, hips     Asthma     controlled--has coughing symptoms     C. difficile diarrhea      Central sleep apnea      Coronary artery disease involving native coronary artery of native heart without angina pectoris 10/25/2019     Depression      Gastro-oesophageal reflux disease      Hypertension      Ischemic cardiomyopathy 10/25/2019     Narcolepsy      Renal disease     gfr is aroung 48     Sleep apnea      uses Cpap     Past Surgical History:   Procedure Laterality Date     ARTHROPLASTY KNEE  7/9/2014    Procedure: ARTHROPLASTY KNEE;  Surgeon: Levi Johnson MD;  Location:  OR     ARTHROPLASTY KNEE Left 11/24/2014    Procedure: ARTHROPLASTY KNEE;  Surgeon: Levi Johnson MD;  Location:  OR     C REIMPLANT URETER,EXTENSIVE TAILORING  1973 1997     C REPAIR ENTEROCELE,VAG APPRCH  2008    Prolift     C TOTAL ABD HYSTERECTOMY+BLAD REPR  1992    Vaginal approach with oophrectomy     C TOTAL KNEE ARTHROPLASTY       CV CORONARY ANGIOGRAM N/A 10/9/2019    Procedure: Coronary Angiogram;  Surgeon: Chiquita Chatterjee MD;  Location:  HEART CARDIAC CATH LAB     CV HEART CATHETERIZATION WITH POSSIBLE INTERVENTION N/A 10/10/2019    Procedure: Heart Catheterization with Possible Intervention;  Surgeon: Chin Garcia MD;  Location:  HEART CARDIAC CATH LAB     CV INTRA-AORTIC BALLOON PUMP INSERTION N/A 10/9/2019    Procedure: Intra-Aortic Balloon Pump Insertion;  Surgeon: Chiquita Chatterjee MD;  Location:  HEART CARDIAC CATH LAB     CV INTRA-AORTIC BALLOON PUMP REMOVAL N/A 10/10/2019    Procedure: Intra-Aortic Balloon Pump Removal;  Surgeon: Chin Garcia MD;  Location:  HEART CARDIAC CATH LAB     CV PCI STENT DRUG ELUTING N/A 10/9/2019    Procedure: PCI Stent Drug Eluting;  Surgeon: Chiquita Chatterjee MD;  Location:  HEART CARDIAC CATH LAB     GENITOURINARY SURGERY      kidney surgery X 3     ORTHOPEDIC SURGERY      bilat total hip     RECTOCELE REPAIR       SOFT TISSUE SURGERY       Family History   Problem Relation Age of Onset     Hypertension Mother      Arthritis Mother      Cerebrovascular Disease Father         Three Strokes     Diabetes Father         and brother     Thyroid Disease Sister         Hypothyroid            Allergies   Flagyl [metronidazole hcl]; Latex; Sulfa drugs; Tape [adhesive tape]; and Metoprolol        KATE Murdock CNP  [facility]  Pager:  [phone]      Thank you for allowing me to participate in the care of your patient.      Sincerely,     KATE Murdock [facility]    cc:   Kelvin Jones MD  [address]  [place] [id number]

## 2019-10-29 ENCOUNTER — HOSPITAL ENCOUNTER (OUTPATIENT)
Dept: CARDIAC REHAB | Facility: CLINIC | Age: 69
End: 2019-10-29
Attending: INTERNAL MEDICINE
Payer: MEDICARE

## 2019-10-29 DIAGNOSIS — I21.02 ST ELEVATION MYOCARDIAL INFARCTION INVOLVING LEFT ANTERIOR DESCENDING (LAD) CORONARY ARTERY (H): ICD-10-CM

## 2019-10-29 PROCEDURE — 93797 PHYS/QHP OP CAR RHAB WO ECG: CPT

## 2019-10-29 PROCEDURE — 40000575 ZZH STATISTIC OP CARDIAC VISIT #2

## 2019-10-29 PROCEDURE — 40000116 ZZH STATISTIC OP CR VISIT

## 2019-10-29 PROCEDURE — 93798 PHYS/QHP OP CAR RHAB W/ECG: CPT

## 2019-11-04 ENCOUNTER — HOSPITAL ENCOUNTER (OUTPATIENT)
Dept: CARDIAC REHAB | Facility: CLINIC | Age: 69
End: 2019-11-04
Attending: INTERNAL MEDICINE
Payer: MEDICARE

## 2019-11-04 PROCEDURE — 93798 PHYS/QHP OP CAR RHAB W/ECG: CPT | Performed by: CLINICAL EXERCISE PHYSIOLOGIST

## 2019-11-04 PROCEDURE — 40000116 ZZH STATISTIC OP CR VISIT: Performed by: CLINICAL EXERCISE PHYSIOLOGIST

## 2019-11-06 ENCOUNTER — HOSPITAL ENCOUNTER (OUTPATIENT)
Dept: CARDIAC REHAB | Facility: CLINIC | Age: 69
End: 2019-11-06
Attending: INTERNAL MEDICINE
Payer: MEDICARE

## 2019-11-06 PROCEDURE — 40000116 ZZH STATISTIC OP CR VISIT

## 2019-11-06 PROCEDURE — 93798 PHYS/QHP OP CAR RHAB W/ECG: CPT

## 2019-11-08 ENCOUNTER — HOSPITAL ENCOUNTER (OUTPATIENT)
Dept: CARDIAC REHAB | Facility: CLINIC | Age: 69
End: 2019-11-08
Attending: INTERNAL MEDICINE
Payer: MEDICARE

## 2019-11-08 PROCEDURE — 40000116 ZZH STATISTIC OP CR VISIT

## 2019-11-08 PROCEDURE — 93798 PHYS/QHP OP CAR RHAB W/ECG: CPT

## 2019-11-11 ENCOUNTER — HOSPITAL ENCOUNTER (OUTPATIENT)
Dept: CARDIAC REHAB | Facility: CLINIC | Age: 69
End: 2019-11-11
Attending: INTERNAL MEDICINE
Payer: MEDICARE

## 2019-11-11 PROCEDURE — 40000116 ZZH STATISTIC OP CR VISIT: Performed by: REHABILITATION PRACTITIONER

## 2019-11-11 PROCEDURE — 93798 PHYS/QHP OP CAR RHAB W/ECG: CPT | Performed by: REHABILITATION PRACTITIONER

## 2019-11-12 ENCOUNTER — OFFICE VISIT (OUTPATIENT)
Dept: CARDIOLOGY | Facility: CLINIC | Age: 69
End: 2019-11-12
Payer: MEDICARE

## 2019-11-12 VITALS
WEIGHT: 182 LBS | HEIGHT: 64 IN | HEART RATE: 84 BPM | SYSTOLIC BLOOD PRESSURE: 102 MMHG | DIASTOLIC BLOOD PRESSURE: 70 MMHG | BODY MASS INDEX: 31.07 KG/M2

## 2019-11-12 DIAGNOSIS — I25.810 CORONARY ARTERY DISEASE INVOLVING AUTOLOGOUS ARTERY CORONARY BYPASS GRAFT WITHOUT ANGINA PECTORIS: ICD-10-CM

## 2019-11-12 DIAGNOSIS — I50.22 CHRONIC SYSTOLIC HEART FAILURE (H): ICD-10-CM

## 2019-11-12 DIAGNOSIS — I25.810 CORONARY ARTERY DISEASE INVOLVING AUTOLOGOUS ARTERY CORONARY BYPASS GRAFT WITHOUT ANGINA PECTORIS: Primary | ICD-10-CM

## 2019-11-12 DIAGNOSIS — N18.30 CKD (CHRONIC KIDNEY DISEASE) STAGE 3, GFR 30-59 ML/MIN (H): ICD-10-CM

## 2019-11-12 DIAGNOSIS — I25.5 ISCHEMIC CARDIOMYOPATHY: ICD-10-CM

## 2019-11-12 DIAGNOSIS — I10 BENIGN ESSENTIAL HYPERTENSION: ICD-10-CM

## 2019-11-12 LAB
ANION GAP SERPL CALCULATED.3IONS-SCNC: 15 MMOL/L (ref 6–17)
BUN SERPL-MCNC: 12 MG/DL (ref 7–30)
CALCIUM SERPL-MCNC: 10 MG/DL (ref 8.5–10.5)
CHLORIDE SERPL-SCNC: 106 MMOL/L (ref 98–107)
CO2 SERPL-SCNC: 28 MMOL/L (ref 23–29)
CREAT SERPL-MCNC: 1.13 MG/DL (ref 0.7–1.3)
GFR SERPL CREATININE-BSD FRML MDRD: 48 ML/MIN/{1.73_M2}
GLUCOSE SERPL-MCNC: 103 MG/DL (ref 70–105)
POTASSIUM SERPL-SCNC: 4 MMOL/L (ref 3.5–5.1)
SODIUM SERPL-SCNC: 145 MMOL/L (ref 136–145)

## 2019-11-12 PROCEDURE — 80048 BASIC METABOLIC PNL TOTAL CA: CPT | Performed by: NURSE PRACTITIONER

## 2019-11-12 PROCEDURE — 99214 OFFICE O/P EST MOD 30 MIN: CPT | Performed by: NURSE PRACTITIONER

## 2019-11-12 PROCEDURE — 36415 COLL VENOUS BLD VENIPUNCTURE: CPT | Performed by: NURSE PRACTITIONER

## 2019-11-12 ASSESSMENT — MIFFLIN-ST. JEOR: SCORE: 1330.55

## 2019-11-12 NOTE — PATIENT INSTRUCTIONS
1. No medication changes  2. Follow up with Dr. Dennison with echocardiogram prior in 3 months  3. Please call with any questions/concerns [phone]  4. Labs today show improved kidney function with stopping losartan.  Component      Latest Ref Rng & Units 10/15/2019 10/28/2019 11/12/2019   Sodium      136 - 145 mmol/L 138 138 145   Potassium      3.5 - 5.1 mmol/L 4.6 4.3 4.0   Chloride      98 - 107 mmol/L 107 105 106   Carbon Dioxide      23 - 29 mmol/L 25 26 28   Anion Gap      6 - 17 mmol/L 6 11.3 15   Glucose      70 - 105 mg/dL 90 97 103   Urea Nitrogen      7 - 30 mg/dL 19 16 12   Creatinine      0.70 - 1.30 mg/dL 1.09 (H) 1.62 (H) 1.13   GFR Estimate      >60 mL/min/1.73:m2 51 (L) 31 (L) 48 (L)   GFR Estimate If Black      >60 mL/min/1.73:m2 59 (L) 38 (L) 58 (L)   Calcium      8.5 - 10.5 mg/dL 8.9 9.6 10.0   Bilirubin Total      0.2 - 1.3 mg/dL 1.0     Albumin      3.4 - 5.0 g/dL 4.0     Protein Total      6.8 - 8.8 g/dL 8.1     Alkaline Phosphatase      40 - 150 U/L 89     ALT      0 - 50 U/L 32     AST      0 - 45 U/L 31

## 2019-11-12 NOTE — LETTER
11/12/2019    Danny Conteh MD  [address]  [place] [address]    RE: Gem Gama       Dear Colleague,    I had the pleasure of seeing Gem Gama in the [facility].    Cardiology Clinic Progress Note  Gem Gama MRN# [id number]   YOB: 1950 Age: 68 year old   Primary Cardiologist: Dr. Dennison Reason for visit: Cardiology follow up            Assessment and Plan:   Gem Gama is a very pleasant 68 year old female with a history of coronary artery disease, ischemic cardiomyopathy, hypertension, CKD, asthma and depression. Patient here today for cardiology follow up, patient is doing well from cardiac stand point.     1. Coronary artery disease - s/p EDWIN x 2 to LAD 10/9/2019 in the setting of anterior STEMI, Coronary angiogram 10/9 showed severe single vessel obstructive CAD of LAD (100%) as well as moderate non-obstructive CAD in OM1 and OM2 (60%, 50%). She went back to the cath lab 10/10 for evaluation of RCA which showed no obstructive RCA disease. Stable, no signs/symptoms of angina.    - Continue aspirin and plavix for at minimum of 1 year post revascularization uninterrupted.    - Continue carvedilol and atorvastatin   - Continue cardiac rehab   - Counseled on lifestyle modifications.     2. Ischemic cardiomyopathy/HFrEF - EF 35-40%, grade I diastolic dysfunction, RV normal function and size. Patient appears compensated and euvolemic on exam. HF symptoms well controlled. Intolerant to losartan due to ARTURO, resolved with discontinuation of low dose losartan. Plan to repeat echocardiogram in 3 months after revascularization.    - NYHA class II, stage C   - Etiology : ischemic    - Fluid status : euvolemic    - Diuretic regimen : no diuretic needs   - Last RHC : none   - Ischemic evaluation : cardiac catheterization with revascularization 10/9/19   - Guideline directed medical therapy    - Betablocker: carvedilol 3.125mg BID    -  ACEI/ARB/ARNI: none, losartan 12.5mg stopped due to worsening kidney function, improved kidney function after stopping losartan.     - Aldactone antagonist: none    3. Hypertension - continue current medication regimen.     4. CKD - secondary to congenital kidney defect, labs 10/15/19 show stable kidney function, labs after last clinic visit 2 weeks ago showed worsening kidney function, creatinine 1.09-> 1.62, at that time stopped losartan with plan to recheck labs today which showed improved kidney function creatinine 1.13. Plan to remain off losartan.           Changes today: none    Follow up plan:     Follow up with Dr. Dennison with echocardiogram prior in 3 months        History of Presenting Illness:    Gem Gama is a very pleasant 68 year old female with a history of coronary artery disease, ischemic cardiomyopathy, hypertension, CKD, asthma and depression.     Patient was hospitalized 10/9/2019-10/13/2019 due to STEMI, EKG demonstrated ST elevation in the anterolateral leads, patient was taken emergently taken to cath lab and underwent EDWIN x 2 to proximal LAD. Coronary angiogram showed severe single vessel obstructive CAD of LAD (100%) as well as moderate non-obstructive CAD in OM1 and OM2 (60%, 50%). Patient initially requiring intraaortic balloon pump and pressor support. Echocardiogram showed EF 35-40%, grade I diastolic dysfunction, RV normal function and size, and no significant valvular disease. Patient was taken back to cath lab to have RCA evaluated which showed no obstructive disease (40% RCA stenosis).  Patient was initially on Brilinta and aspirin, but due to cost Brilinta was changed to Plavix. Patient was started on carvedilol, losartan and atorvastatin.     I first met patient during post hospital follow up in October 2019, at that time patient was doing well from cardiac standpoint, plans to start cardiac rehab, BMP was completed after clinic visit which showed worsening kidney  function, creatinine 1.09->1.62. Advised to stop losartan 12.5mg and move f/u to 2 weeks.     Patient is here today for cardiology follow up.     Patient reports feeling good. Patient again comes with a long list of questions, which we spent a long time reviewing, most of these questions are all about things from her past many years ago, including tests from 1999. Has not been monitoring weight at home. Clinic weight stable, today 182#. Patient denies chest pain or chest tightness. Denies shortness of breath at rest. Notes occasional exertional dyspnea with stairs/walking too fast, feels this has been improving. Sleeping good. Denies orthopnea or PND. Occasional postural lightheadedness, otherwise denies dizziness or other presyncopal symptoms. Denies tachycardia or palpitations. Taking medications daily, denies any missed doses. Denies episodes of bleeding (hematochezia, hemoptysis, melena, hematuria). Feels like her stress levels have been better.     Labs today show improved kidney function, creatinine 1.62-> 1.13. Blood pressure 110/80 and HR 84 in clinic today. Monitoring blood pressure at home, reports always less than 120/80.     Appetite good, using healthy choice freezer meals picking options with less than 500mg sodium. Eating most meals at home. Continues to diligently monitor sodium. Going to cardiac rehab 3 days per week, notes she has been increasing regimen at rehab. Still getting activity with errands and household chores. Denies tobacco or alcohol use.         Recent Hospitalizations   10/9/2019-10/13/2019 due to STEMI, EKG demonstrated ST elevation in the anterolateral leads, patient was taken emergently taken to cath lab and underwent EDWIN x 2 to proximal LAD. Echocardiogram showed EF 35-40%, grade I diastolic dysfunction, RV normal function and size, and no significant valvular disease.        Social History    Single, 4 children, 9 grandchildren, retired  (physics and chemistry),  enjoys knitting.   Social History     Socioeconomic History     Marital status:      Spouse name: Not on file     Number of children: Not on file     Years of education: Not on file     Highest education level: Not on file   Occupational History     Not on file   Social Needs     Financial resource strain: Not on file     Food insecurity:     Worry: Not on file     Inability: Not on file     Transportation needs:     Medical: Not on file     Non-medical: Not on file   Tobacco Use     Smoking status: Never Smoker     Smokeless tobacco: Never Used   Substance and Sexual Activity     Alcohol use: No     Drug use: No     Sexual activity: Never     Partners: Male     Birth control/protection: None   Lifestyle     Physical activity:     Days per week: Not on file     Minutes per session: Not on file     Stress: Not on file   Relationships     Social connections:     Talks on phone: Not on file     Gets together: Not on file     Attends [employer] service: Not on file     Active member of club or organization: Not on file     Attends meetings of clubs or organizations: Not on file     Relationship status: Not on file     Intimate partner violence:     Fear of current or ex partner: Not on file     Emotionally abused: Not on file     Physically abused: Not on file     Forced sexual activity: Not on file   Other Topics Concern     Parent/sibling w/ CABG, MI or angioplasty before 65F 55M? No      Service No     Blood Transfusions No     Caffeine Concern No     Occupational Exposure No     Hobby Hazards No     Sleep Concern Yes     Comment: Uses CPAP     Stress Concern No     Weight Concern Yes     Special Diet No     Back Care No     Exercise No     Bike Helmet No     Comment:       Seat Belt Yes     Self-Exams Yes   Social History Narrative     Not on file            Review of Systems:   Skin:  Negative     Eyes:  Positive for glasses  ENT:  Positive for hearing loss  Respiratory:  Positive for dyspnea on  "exertion  Cardiovascular:    Positive for;lightheadedness  Gastroenterology: Negative    Genitourinary:  Negative    Musculoskeletal:  Positive for arthritis  Neurologic:  Negative    Psychiatric:  Positive for sleep disturbances  Heme/Lymph/Imm:  Positive for allergies  Endocrine:  Negative           Physical Exam:   Vitals: /70   Pulse 84   Ht 1.626 m (5' 4\")   Wt 82.6 kg (182 lb)   BMI 31.24 kg/m      Wt Readings from Last 4 Encounters:   11/12/19 82.6 kg (182 lb)   10/28/19 83.5 kg (184 lb 1.6 oz)   10/13/19 84.3 kg (185 lb 12.8 oz)   03/13/19 86.2 kg (190 lb)     GEN: well nourished, in no acute distress, anxious.  HEENT:  Pupils equal, round. Sclerae nonicteric.   NECK: Supple, no masses appreciated. No JVP appreciated on exam.   C/V:  Regular rate and rhythm, no murmur, rub or gallop.   RESP: Respirations are unlabored. Clear to auscultation bilaterally without wheezing, rales, or rhonchi.  GI: Abdomen soft, nontender.  EXTREM: No LE edema.   NEURO: Alert and oriented, cooperative.  SKIN: Warm and dry.        Data:   ECHO 10/10/2019  Left ventricular systolic function is moderate to severely reduced. LVEF is  estimated at 35-40%.  Akinesis of the basal to apical anterior, mid anteroseptal, apical septal wall  segments. Dyskinesis of the apex. Hypokinesis of the basal to mid inferoseptal  wall segments.  The right ventricle is normal in structure, function and size.  There are no prior studies available for comparison.    Cardiac catheterization 10/9/2019  Left Main   Ost LM lesion is 30% stenosed. The lesion is moderately calcified.   Left Anterior Descending   Prox LAD lesion is 100% stenosed. Culprit lesion. The lesion is type C - high risk and thrombotic.   Left Circumflex   The vessel is large.   First Obtuse Marginal Branch   The vessel is large.   1st Mrg lesion is 60% stenosed.   Second Obtuse Marginal Branch   The vessel is large.   2nd Mrg lesion is 50% stenosed.   Third Obtuse Marginal " Branch   Right Coronary Artery   The vessel was not injected.     Cardiac catheterization 10/10/2019  Left Main   The vessel was visualized by selective angiography and is moderate in size. There was 0% vessel disease.   Left Anterior Descending   Ost LAD lesion is 10% stenosed.   Previously placed Prox LAD drug eluting stent is widely patent. Previous treatment took place <1 month ago. No thrombosis in the previous stent. No restenosis in the previous stent.   Left Circumflex   Prox Cx lesion is 15% stenosed.   Right Coronary Artery   Prox RCA lesion is 40% stenosed. Very small barely codominant RCA mild narrowing vessel <2.5mm     1.Limited LCA injections. The stents from yesterday in LAD widely patent. No significant lesions in LCA  2 Very small technically codominant RCA with moderate non flow limiting disease  3. IABP pulled in ICU      LIPID RESULTS:  Lab Results   Component Value Date    CHOL 238 (H) 10/09/2019    HDL 45 (L) 10/09/2019     (H) 10/09/2019    TRIG 136 10/09/2019    CHOLHDLRATIO 4.2 09/19/2013     LIVER ENZYME RESULTS:  Lab Results   Component Value Date    AST 31 10/15/2019    ALT 32 10/15/2019     CBC RESULTS:  Lab Results   Component Value Date    WBC 6.0 10/15/2019    RBC 4.50 10/15/2019    HGB 13.8 10/15/2019    HCT 39.7 10/15/2019    MCV 88 10/15/2019    MCH 30.7 10/15/2019    MCHC 34.8 10/15/2019    RDW 14.0 10/15/2019     10/15/2019     BMP RESULTS:  Lab Results   Component Value Date     11/12/2019    POTASSIUM 4.0 11/12/2019    CHLORIDE 106 11/12/2019    CO2 28 11/12/2019    ANIONGAP 15 11/12/2019     11/12/2019    BUN 12 11/12/2019    CR 1.13 11/12/2019    GFRESTIMATED 48 (L) 11/12/2019    GFRESTBLACK 58 (L) 11/12/2019    FRACISCO 10.0 11/12/2019      A1C RESULTS:  Lab Results   Component Value Date    A1C 5.6 09/19/2013     INR RESULTS:  Lab Results   Component Value Date    INR 1.69 (H) 11/27/2014    INR 1.76 (H) 11/26/2014            Medications     Current  Outpatient Medications   Medication Sig Dispense Refill     albuterol (PROVENTIL) (2.5 MG/3ML) 0.083% neb solution USE 1 VIAL BY NEBULIZATION EVERY 6 HOURS AS NEEDED FOR SHORTNESS OF BREATH/ DYSPNEA 300 mL 5     amoxicillin (AMOXIL) 500 MG capsule TK 4 CS PO ONE HOUR BEFORE DENTAL APPOINTMENT  1     aspirin (ASA) 81 MG EC tablet Take 1 tablet (81 mg) by mouth daily 90 tablet 0     atorvastatin (LIPITOR) 40 MG tablet Take 1 tablet (40 mg) by mouth daily 90 tablet 3     carvedilol (COREG) 3.125 MG tablet Take 1 tablet (3.125 mg) by mouth 2 times daily (with meals) 180 tablet 3     clopidogrel (PLAVIX) 75 MG tablet Take 1 tablet (75 mg) by mouth daily 90 tablet 3     guaiFENesin-codeine (ROBITUSSIN AC) 100-10 MG/5ML SOLN Take 10 mLs by mouth every 4 hours as needed for cough 236 mL 0     Melatonin-Pyridoxine (MELATIN PO) prn       nitroGLYcerin (NITROSTAT) 0.4 MG sublingual tablet For chest pain place 1 tablet under the tongue every 5 minutes for 3 doses. If symptoms persist 5 minutes after 1st dose call 911. 15 tablet 0     Prenatal Multivit-Min-Fe-FA (PRENATAL/IRON) TABS        sertraline (ZOLOFT) 100 MG tablet TAKE 1 TABLET(100 MG) BY MOUTH DAILY 90 tablet 1     vitamin D3 (CHOLECALCIFEROL) 2000 units (50 mcg) tablet Take 1 tablet (2,000 Units) by mouth daily 90 tablet 3          Past Medical History     Past Medical History:   Diagnosis Date     ADHD (attention deficit hyperactivity disorder)      Anxiety      Arthritis     back, hands, knees, hips     Asthma     controlled--has coughing symptoms     C. difficile diarrhea      Central sleep apnea      Coronary artery disease involving native coronary artery of native heart without angina pectoris 10/25/2019     Depression      Gastro-oesophageal reflux disease      Hypertension      Ischemic cardiomyopathy 10/25/2019     Narcolepsy      Renal disease     gfr is aroung 48     Sleep apnea     uses Cpap     Past Surgical History:   Procedure Laterality Date      ARTHROPLASTY KNEE  7/9/2014    Procedure: ARTHROPLASTY KNEE;  Surgeon: Levi Johnson MD;  Location:  OR     ARTHROPLASTY KNEE Left 11/24/2014    Procedure: ARTHROPLASTY KNEE;  Surgeon: Levi Johnson MD;  Location:  OR     C REIMPLANT URETER,EXTENSIVE TAILORING  1973 1997     C REPAIR ENTEROCELE,VAG APPRCH  2008    Prolift     C TOTAL ABD HYSTERECTOMY+BLAD REPR  1992    Vaginal approach with oophrectomy     C TOTAL KNEE ARTHROPLASTY       CV CORONARY ANGIOGRAM N/A 10/9/2019    Procedure: Coronary Angiogram;  Surgeon: Chiquita Chatterjee MD;  Location:  HEART CARDIAC CATH LAB     CV HEART CATHETERIZATION WITH POSSIBLE INTERVENTION N/A 10/10/2019    Procedure: Heart Catheterization with Possible Intervention;  Surgeon: Chin Garcia MD;  Location:  HEART CARDIAC CATH LAB     CV INTRA-AORTIC BALLOON PUMP INSERTION N/A 10/9/2019    Procedure: Intra-Aortic Balloon Pump Insertion;  Surgeon: Chiquita Chatterjee MD;  Location:  HEART CARDIAC CATH LAB     CV INTRA-AORTIC BALLOON PUMP REMOVAL N/A 10/10/2019    Procedure: Intra-Aortic Balloon Pump Removal;  Surgeon: Chni Garcia MD;  Location:  HEART CARDIAC CATH LAB     CV PCI STENT DRUG ELUTING N/A 10/9/2019    Procedure: PCI Stent Drug Eluting;  Surgeon: Chiquita Chatterjee MD;  Location:  HEART CARDIAC CATH LAB     GENITOURINARY SURGERY      kidney surgery X 3     ORTHOPEDIC SURGERY      bilat total hip     RECTOCELE REPAIR       SOFT TISSUE SURGERY       Family History   Problem Relation Age of Onset     Hypertension Mother      Arthritis Mother      Cerebrovascular Disease Father         Three Strokes     Diabetes Father         and brother     Thyroid Disease Sister         Hypothyroid            Allergies   Flagyl [metronidazole hcl]; Latex; Sulfa drugs; Tape [adhesive tape]; and Metoprolol        KATE Murdock CNP  [facility]  Pager: [phone]          Thank you for allowing me to participate in the care of your  patient.    Sincerely,     KATE Murdock [facility]

## 2019-11-12 NOTE — PROGRESS NOTES
Cardiology Clinic Progress Note  Gem Gama MRN# [id number]   YOB: 1950 Age: 68 year old   Primary Cardiologist: Dr. Dennison Reason for visit: Cardiology follow up            Assessment and Plan:   Gem Gama is a very pleasant 68 year old female with a history of coronary artery disease, ischemic cardiomyopathy, hypertension, CKD, asthma and depression. Patient here today for cardiology follow up, patient is doing well from cardiac stand point.     1. Coronary artery disease - s/p EDWIN x 2 to LAD 10/9/2019 in the setting of anterior STEMI, Coronary angiogram 10/9 showed severe single vessel obstructive CAD of LAD (100%) as well as moderate non-obstructive CAD in OM1 and OM2 (60%, 50%). She went back to the cath lab 10/10 for evaluation of RCA which showed no obstructive RCA disease. Stable, no signs/symptoms of angina.    - Continue aspirin and plavix for at minimum of 1 year post revascularization uninterrupted.    - Continue carvedilol and atorvastatin   - Continue cardiac rehab   - Counseled on lifestyle modifications.     2. Ischemic cardiomyopathy/HFrEF - EF 35-40%, grade I diastolic dysfunction, RV normal function and size. Patient appears compensated and euvolemic on exam. HF symptoms well controlled. Intolerant to losartan due to ARTURO, resolved with discontinuation of low dose losartan. Plan to repeat echocardiogram in 3 months after revascularization.    - NYHA class II, stage C   - Etiology : ischemic    - Fluid status : euvolemic    - Diuretic regimen : no diuretic needs   - Last RHC : none   - Ischemic evaluation : cardiac catheterization with revascularization 10/9/19   - Guideline directed medical therapy    - Betablocker: carvedilol 3.125mg BID    - ACEI/ARB/ARNI: none, losartan 12.5mg stopped due to worsening kidney function, improved kidney function after stopping losartan.     - Aldactone antagonist: none    3. Hypertension - continue current medication regimen.     4.  CKD - secondary to congenital kidney defect, labs 10/15/19 show stable kidney function, labs after last clinic visit 2 weeks ago showed worsening kidney function, creatinine 1.09-> 1.62, at that time stopped losartan with plan to recheck labs today which showed improved kidney function creatinine 1.13. Plan to remain off losartan.           Changes today: none    Follow up plan:     Follow up with Dr. Dennison with echocardiogram prior in 3 months        History of Presenting Illness:    Gem Gama is a very pleasant 68 year old female with a history of coronary artery disease, ischemic cardiomyopathy, hypertension, CKD, asthma and depression.     Patient was hospitalized 10/9/2019-10/13/2019 due to STEMI, EKG demonstrated ST elevation in the anterolateral leads, patient was taken emergently taken to cath lab and underwent EDWIN x 2 to proximal LAD. Coronary angiogram showed severe single vessel obstructive CAD of LAD (100%) as well as moderate non-obstructive CAD in OM1 and OM2 (60%, 50%). Patient initially requiring intraaortic balloon pump and pressor support. Echocardiogram showed EF 35-40%, grade I diastolic dysfunction, RV normal function and size, and no significant valvular disease. Patient was taken back to cath lab to have RCA evaluated which showed no obstructive disease (40% RCA stenosis).  Patient was initially on Brilinta and aspirin, but due to cost Brilinta was changed to Plavix. Patient was started on carvedilol, losartan and atorvastatin.     I first met patient during post hospital follow up in October 2019, at that time patient was doing well from cardiac standpoint, plans to start cardiac rehab, BMP was completed after clinic visit which showed worsening kidney function, creatinine 1.09->1.62. Advised to stop losartan 12.5mg and move f/u to 2 weeks.     Patient is here today for cardiology follow up.     Patient reports feeling good. Patient again comes with a long list of questions, which we  spent a long time reviewing, most of these questions are all about things from her past many years ago, including tests from 1999. Has not been monitoring weight at home. Clinic weight stable, today 182#. Patient denies chest pain or chest tightness. Denies shortness of breath at rest. Notes occasional exertional dyspnea with stairs/walking too fast, feels this has been improving. Sleeping good. Denies orthopnea or PND. Occasional postural lightheadedness, otherwise denies dizziness or other presyncopal symptoms. Denies tachycardia or palpitations. Taking medications daily, denies any missed doses. Denies episodes of bleeding (hematochezia, hemoptysis, melena, hematuria). Feels like her stress levels have been better.     Labs today show improved kidney function, creatinine 1.62-> 1.13. Blood pressure 110/80 and HR 84 in clinic today. Monitoring blood pressure at home, reports always less than 120/80.     Appetite good, using healthy choice freezer meals picking options with less than 500mg sodium. Eating most meals at home. Continues to diligently monitor sodium. Going to cardiac rehab 3 days per week, notes she has been increasing regimen at rehab. Still getting activity with errands and household chores. Denies tobacco or alcohol use.         Recent Hospitalizations   10/9/2019-10/13/2019 due to STEMI, EKG demonstrated ST elevation in the anterolateral leads, patient was taken emergently taken to cath lab and underwent EDWIN x 2 to proximal LAD. Echocardiogram showed EF 35-40%, grade I diastolic dysfunction, RV normal function and size, and no significant valvular disease.        Social History    Single, 4 children, 9 grandchildren, retired  (physics and chemistry), enjoys knitting.   Social History     Socioeconomic History     Marital status:      Spouse name: Not on file     Number of children: Not on file     Years of education: Not on file     Highest education level: Not on file    Occupational History     Not on file   Social Needs     Financial resource strain: Not on file     Food insecurity:     Worry: Not on file     Inability: Not on file     Transportation needs:     Medical: Not on file     Non-medical: Not on file   Tobacco Use     Smoking status: Never Smoker     Smokeless tobacco: Never Used   Substance and Sexual Activity     Alcohol use: No     Drug use: No     Sexual activity: Never     Partners: Male     Birth control/protection: None   Lifestyle     Physical activity:     Days per week: Not on file     Minutes per session: Not on file     Stress: Not on file   Relationships     Social connections:     Talks on phone: Not on file     Gets together: Not on file     Attends Anabaptism service: Not on file     Active member of club or organization: Not on file     Attends meetings of clubs or organizations: Not on file     Relationship status: Not on file     Intimate partner violence:     Fear of current or ex partner: Not on file     Emotionally abused: Not on file     Physically abused: Not on file     Forced sexual activity: Not on file   Other Topics Concern     Parent/sibling w/ CABG, MI or angioplasty before 65F 55M? No      Service No     Blood Transfusions No     Caffeine Concern No     Occupational Exposure No     Hobby Hazards No     Sleep Concern Yes     Comment: Uses CPAP     Stress Concern No     Weight Concern Yes     Special Diet No     Back Care No     Exercise No     Bike Helmet No     Comment:       Seat Belt Yes     Self-Exams Yes   Social History Narrative     Not on file            Review of Systems:   Skin:  Negative     Eyes:  Positive for glasses  ENT:  Positive for hearing loss  Respiratory:  Positive for dyspnea on exertion  Cardiovascular:    Positive for;lightheadedness  Gastroenterology: Negative    Genitourinary:  Negative    Musculoskeletal:  Positive for arthritis  Neurologic:  Negative    Psychiatric:  Positive for sleep  "disturbances  Heme/Lymph/Imm:  Positive for allergies  Endocrine:  Negative           Physical Exam:   Vitals: /70   Pulse 84   Ht 1.626 m (5' 4\")   Wt 82.6 kg (182 lb)   BMI 31.24 kg/m     Wt Readings from Last 4 Encounters:   11/12/19 82.6 kg (182 lb)   10/28/19 83.5 kg (184 lb 1.6 oz)   10/13/19 84.3 kg (185 lb 12.8 oz)   03/13/19 86.2 kg (190 lb)     GEN: well nourished, in no acute distress, anxious.  HEENT:  Pupils equal, round. Sclerae nonicteric.   NECK: Supple, no masses appreciated. No JVP appreciated on exam.   C/V:  Regular rate and rhythm, no murmur, rub or gallop.   RESP: Respirations are unlabored. Clear to auscultation bilaterally without wheezing, rales, or rhonchi.  GI: Abdomen soft, nontender.  EXTREM: No LE edema.   NEURO: Alert and oriented, cooperative.  SKIN: Warm and dry.        Data:   ECHO 10/10/2019  Left ventricular systolic function is moderate to severely reduced. LVEF is  estimated at 35-40%.  Akinesis of the basal to apical anterior, mid anteroseptal, apical septal wall  segments. Dyskinesis of the apex. Hypokinesis of the basal to mid inferoseptal  wall segments.  The right ventricle is normal in structure, function and size.  There are no prior studies available for comparison.    Cardiac catheterization 10/9/2019  Left Main   Ost LM lesion is 30% stenosed. The lesion is moderately calcified.   Left Anterior Descending   Prox LAD lesion is 100% stenosed. Culprit lesion. The lesion is type C - high risk and thrombotic.   Left Circumflex   The vessel is large.   First Obtuse Marginal Branch   The vessel is large.   1st Mrg lesion is 60% stenosed.   Second Obtuse Marginal Branch   The vessel is large.   2nd Mrg lesion is 50% stenosed.   Third Obtuse Marginal Branch   Right Coronary Artery   The vessel was not injected.     Cardiac catheterization 10/10/2019  Left Main   The vessel was visualized by selective angiography and is moderate in size. There was 0% vessel disease. "   Left Anterior Descending   Ost LAD lesion is 10% stenosed.   Previously placed Prox LAD drug eluting stent is widely patent. Previous treatment took place <1 month ago. No thrombosis in the previous stent. No restenosis in the previous stent.   Left Circumflex   Prox Cx lesion is 15% stenosed.   Right Coronary Artery   Prox RCA lesion is 40% stenosed. Very small barely codominant RCA mild narrowing vessel <2.5mm     1.Limited LCA injections. The stents from yesterday in LAD widely patent. No significant lesions in LCA  2 Very small technically codominant RCA with moderate non flow limiting disease  3. IABP pulled in ICU      LIPID RESULTS:  Lab Results   Component Value Date    CHOL 238 (H) 10/09/2019    HDL 45 (L) 10/09/2019     (H) 10/09/2019    TRIG 136 10/09/2019    CHOLHDLRATIO 4.2 09/19/2013     LIVER ENZYME RESULTS:  Lab Results   Component Value Date    AST 31 10/15/2019    ALT 32 10/15/2019     CBC RESULTS:  Lab Results   Component Value Date    WBC 6.0 10/15/2019    RBC 4.50 10/15/2019    HGB 13.8 10/15/2019    HCT 39.7 10/15/2019    MCV 88 10/15/2019    MCH 30.7 10/15/2019    MCHC 34.8 10/15/2019    RDW 14.0 10/15/2019     10/15/2019     BMP RESULTS:  Lab Results   Component Value Date     11/12/2019    POTASSIUM 4.0 11/12/2019    CHLORIDE 106 11/12/2019    CO2 28 11/12/2019    ANIONGAP 15 11/12/2019     11/12/2019    BUN 12 11/12/2019    CR 1.13 11/12/2019    GFRESTIMATED 48 (L) 11/12/2019    GFRESTBLACK 58 (L) 11/12/2019    FRACISCO 10.0 11/12/2019      A1C RESULTS:  Lab Results   Component Value Date    A1C 5.6 09/19/2013     INR RESULTS:  Lab Results   Component Value Date    INR 1.69 (H) 11/27/2014    INR 1.76 (H) 11/26/2014            Medications     Current Outpatient Medications   Medication Sig Dispense Refill     albuterol (PROVENTIL) (2.5 MG/3ML) 0.083% neb solution USE 1 VIAL BY NEBULIZATION EVERY 6 HOURS AS NEEDED FOR SHORTNESS OF BREATH/ DYSPNEA 300 mL 5      amoxicillin (AMOXIL) 500 MG capsule TK 4 CS PO ONE HOUR BEFORE DENTAL APPOINTMENT  1     aspirin (ASA) 81 MG EC tablet Take 1 tablet (81 mg) by mouth daily 90 tablet 0     atorvastatin (LIPITOR) 40 MG tablet Take 1 tablet (40 mg) by mouth daily 90 tablet 3     carvedilol (COREG) 3.125 MG tablet Take 1 tablet (3.125 mg) by mouth 2 times daily (with meals) 180 tablet 3     clopidogrel (PLAVIX) 75 MG tablet Take 1 tablet (75 mg) by mouth daily 90 tablet 3     guaiFENesin-codeine (ROBITUSSIN AC) 100-10 MG/5ML SOLN Take 10 mLs by mouth every 4 hours as needed for cough 236 mL 0     Melatonin-Pyridoxine (MELATIN PO) prn       nitroGLYcerin (NITROSTAT) 0.4 MG sublingual tablet For chest pain place 1 tablet under the tongue every 5 minutes for 3 doses. If symptoms persist 5 minutes after 1st dose call 911. 15 tablet 0     Prenatal Multivit-Min-Fe-FA (PRENATAL/IRON) TABS        sertraline (ZOLOFT) 100 MG tablet TAKE 1 TABLET(100 MG) BY MOUTH DAILY 90 tablet 1     vitamin D3 (CHOLECALCIFEROL) 2000 units (50 mcg) tablet Take 1 tablet (2,000 Units) by mouth daily 90 tablet 3          Past Medical History     Past Medical History:   Diagnosis Date     ADHD (attention deficit hyperactivity disorder)      Anxiety      Arthritis     back, hands, knees, hips     Asthma     controlled--has coughing symptoms     C. difficile diarrhea      Central sleep apnea      Coronary artery disease involving native coronary artery of native heart without angina pectoris 10/25/2019     Depression      Gastro-oesophageal reflux disease      Hypertension      Ischemic cardiomyopathy 10/25/2019     Narcolepsy      Renal disease     gfr is aroung 48     Sleep apnea     uses Cpap     Past Surgical History:   Procedure Laterality Date     ARTHROPLASTY KNEE  7/9/2014    Procedure: ARTHROPLASTY KNEE;  Surgeon: Levi Johnson MD;  Location: [facility] OR     ARTHROPLASTY KNEE Left 11/24/2014    Procedure: ARTHROPLASTY KNEE;  Surgeon: Levi Johnson MD;   Location:  OR     C REIMPLANT URETER,EXTENSIVE TAILORING  1973 1997     C REPAIR ENTEROCELE,VAG APPRCH  2008    Prolift     C TOTAL ABD HYSTERECTOMY+BLAD REPR  1992    Vaginal approach with oophrectomy     C TOTAL KNEE ARTHROPLASTY       CV CORONARY ANGIOGRAM N/A 10/9/2019    Procedure: Coronary Angiogram;  Surgeon: Chiquita Chatterjee MD;  Location:  HEART CARDIAC CATH LAB     CV HEART CATHETERIZATION WITH POSSIBLE INTERVENTION N/A 10/10/2019    Procedure: Heart Catheterization with Possible Intervention;  Surgeon: Chin Garcia MD;  Location:  HEART CARDIAC CATH LAB     CV INTRA-AORTIC BALLOON PUMP INSERTION N/A 10/9/2019    Procedure: Intra-Aortic Balloon Pump Insertion;  Surgeon: Chiquita Chatterjee MD;  Location:  HEART CARDIAC CATH LAB     CV INTRA-AORTIC BALLOON PUMP REMOVAL N/A 10/10/2019    Procedure: Intra-Aortic Balloon Pump Removal;  Surgeon: Chin Garcia MD;  Location:  HEART CARDIAC CATH LAB     CV PCI STENT DRUG ELUTING N/A 10/9/2019    Procedure: PCI Stent Drug Eluting;  Surgeon: Chiquita Chatterjee MD;  Location:  HEART CARDIAC CATH LAB     GENITOURINARY SURGERY      kidney surgery X 3     ORTHOPEDIC SURGERY      bilat total hip     RECTOCELE REPAIR       SOFT TISSUE SURGERY       Family History   Problem Relation Age of Onset     Hypertension Mother      Arthritis Mother      Cerebrovascular Disease Father         Three Strokes     Diabetes Father         and brother     Thyroid Disease Sister         Hypothyroid            Allergies   Flagyl [metronidazole hcl]; Latex; Sulfa drugs; Tape [adhesive tape]; and Metoprolol        KATE Murdock [facility]  Pager: [phone]

## 2019-11-13 ENCOUNTER — HOSPITAL ENCOUNTER (OUTPATIENT)
Dept: CARDIAC REHAB | Facility: CLINIC | Age: 69
End: 2019-11-13
Attending: INTERNAL MEDICINE
Payer: MEDICARE

## 2019-11-13 PROCEDURE — 40000116 ZZH STATISTIC OP CR VISIT

## 2019-11-13 PROCEDURE — 93798 PHYS/QHP OP CAR RHAB W/ECG: CPT

## 2019-11-13 PROCEDURE — 93797 PHYS/QHP OP CAR RHAB WO ECG: CPT

## 2019-11-13 PROCEDURE — 40000575 ZZH STATISTIC OP CARDIAC VISIT #2

## 2019-11-15 ENCOUNTER — HOSPITAL ENCOUNTER (OUTPATIENT)
Dept: CARDIAC REHAB | Facility: CLINIC | Age: 69
End: 2019-11-15
Attending: INTERNAL MEDICINE
Payer: MEDICARE

## 2019-11-15 PROCEDURE — 93798 PHYS/QHP OP CAR RHAB W/ECG: CPT | Performed by: OCCUPATIONAL THERAPIST

## 2019-11-15 PROCEDURE — 40000116 ZZH STATISTIC OP CR VISIT: Performed by: OCCUPATIONAL THERAPIST

## 2019-11-18 ENCOUNTER — HOSPITAL ENCOUNTER (OUTPATIENT)
Dept: CARDIAC REHAB | Facility: CLINIC | Age: 69
End: 2019-11-18
Attending: INTERNAL MEDICINE
Payer: MEDICARE

## 2019-11-18 PROCEDURE — 40000116 ZZH STATISTIC OP CR VISIT

## 2019-11-18 PROCEDURE — 40000575 ZZH STATISTIC OP CARDIAC VISIT #2

## 2019-11-18 PROCEDURE — 93797 PHYS/QHP OP CAR RHAB WO ECG: CPT | Mod: 59

## 2019-11-18 PROCEDURE — 93798 PHYS/QHP OP CAR RHAB W/ECG: CPT

## 2019-11-20 ENCOUNTER — HOSPITAL ENCOUNTER (OUTPATIENT)
Dept: CARDIAC REHAB | Facility: CLINIC | Age: 69
End: 2019-11-20
Attending: INTERNAL MEDICINE
Payer: MEDICARE

## 2019-11-20 PROCEDURE — 40000116 ZZH STATISTIC OP CR VISIT

## 2019-11-20 PROCEDURE — 93797 PHYS/QHP OP CAR RHAB WO ECG: CPT

## 2019-11-20 PROCEDURE — 93798 PHYS/QHP OP CAR RHAB W/ECG: CPT

## 2019-11-20 PROCEDURE — 40000575 ZZH STATISTIC OP CARDIAC VISIT #2

## 2019-11-22 ENCOUNTER — HOSPITAL ENCOUNTER (OUTPATIENT)
Dept: CARDIAC REHAB | Facility: CLINIC | Age: 69
End: 2019-11-22
Attending: INTERNAL MEDICINE
Payer: MEDICARE

## 2019-11-22 PROCEDURE — 40000116 ZZH STATISTIC OP CR VISIT

## 2019-11-22 PROCEDURE — 93798 PHYS/QHP OP CAR RHAB W/ECG: CPT

## 2019-11-25 ENCOUNTER — HOSPITAL ENCOUNTER (OUTPATIENT)
Dept: CARDIAC REHAB | Facility: CLINIC | Age: 69
End: 2019-11-25
Attending: INTERNAL MEDICINE
Payer: MEDICARE

## 2019-11-25 PROCEDURE — 40000116 ZZH STATISTIC OP CR VISIT

## 2019-11-25 PROCEDURE — 93798 PHYS/QHP OP CAR RHAB W/ECG: CPT

## 2019-11-25 PROCEDURE — 93797 PHYS/QHP OP CAR RHAB WO ECG: CPT

## 2019-11-25 PROCEDURE — 40000575 ZZH STATISTIC OP CARDIAC VISIT #2

## 2019-11-27 ENCOUNTER — HOSPITAL ENCOUNTER (OUTPATIENT)
Dept: CARDIAC REHAB | Facility: CLINIC | Age: 69
End: 2019-11-27
Attending: INTERNAL MEDICINE
Payer: MEDICARE

## 2019-11-27 PROCEDURE — 40000575 ZZH STATISTIC OP CARDIAC VISIT #2: Performed by: OCCUPATIONAL THERAPIST

## 2019-11-27 PROCEDURE — 93797 PHYS/QHP OP CAR RHAB WO ECG: CPT | Performed by: OCCUPATIONAL THERAPIST

## 2019-11-27 PROCEDURE — 40000116 ZZH STATISTIC OP CR VISIT: Performed by: OCCUPATIONAL THERAPIST

## 2019-11-27 PROCEDURE — 93798 PHYS/QHP OP CAR RHAB W/ECG: CPT | Performed by: OCCUPATIONAL THERAPIST

## 2019-12-02 ENCOUNTER — HOSPITAL ENCOUNTER (OUTPATIENT)
Dept: CARDIAC REHAB | Facility: CLINIC | Age: 69
End: 2019-12-02
Attending: INTERNAL MEDICINE
Payer: MEDICARE

## 2019-12-02 PROCEDURE — 93798 PHYS/QHP OP CAR RHAB W/ECG: CPT | Performed by: CLINICAL EXERCISE PHYSIOLOGIST

## 2019-12-02 PROCEDURE — 40000575 ZZH STATISTIC OP CARDIAC VISIT #2: Performed by: CLINICAL EXERCISE PHYSIOLOGIST

## 2019-12-02 PROCEDURE — 40000116 ZZH STATISTIC OP CR VISIT: Performed by: CLINICAL EXERCISE PHYSIOLOGIST

## 2019-12-02 PROCEDURE — 93797 PHYS/QHP OP CAR RHAB WO ECG: CPT | Performed by: CLINICAL EXERCISE PHYSIOLOGIST

## 2019-12-04 ENCOUNTER — OFFICE VISIT (OUTPATIENT)
Dept: URGENT CARE | Facility: URGENT CARE | Age: 69
End: 2019-12-04
Payer: MEDICARE

## 2019-12-04 VITALS
TEMPERATURE: 98 F | SYSTOLIC BLOOD PRESSURE: 156 MMHG | DIASTOLIC BLOOD PRESSURE: 81 MMHG | BODY MASS INDEX: 30.73 KG/M2 | OXYGEN SATURATION: 98 % | HEART RATE: 74 BPM | WEIGHT: 179 LBS

## 2019-12-04 DIAGNOSIS — J01.90 ACUTE SINUSITIS WITH SYMPTOMS > 10 DAYS: Primary | ICD-10-CM

## 2019-12-04 PROCEDURE — 99213 OFFICE O/P EST LOW 20 MIN: CPT | Performed by: FAMILY MEDICINE

## 2019-12-04 RX ORDER — AMOXICILLIN 875 MG
875 TABLET ORAL 2 TIMES DAILY
Qty: 14 TABLET | Refills: 0 | Status: SHIPPED | OUTPATIENT
Start: 2019-12-04 | End: 2020-02-27

## 2019-12-04 ASSESSMENT — ENCOUNTER SYMPTOMS
RHINORRHEA: 0
VOMITING: 0
FEVER: 0
ABDOMINAL PAIN: 0
DYSURIA: 0
NAUSEA: 0
SHORTNESS OF BREATH: 1
SORE THROAT: 0
CHILLS: 0
PALPITATIONS: 0
FREQUENCY: 0
COUGH: 0
WHEEZING: 0
STRIDOR: 0
CHEST TIGHTNESS: 0
WOUND: 0
CONSTIPATION: 0
DIARRHEA: 0
HEADACHES: 0
EYE PAIN: 0

## 2019-12-04 NOTE — PROGRESS NOTES
"SUBJECTIVE:   Gem Gama is a 68 year old female presenting with a chief complaint of   Chief Complaint   Patient presents with     Sinus Problem     Patient complains of sinus problems and sob        She is an established patient of [facility].    Hx of nasal congestion and \"possible sinus infection\". Shortness of breath with exertion over the past 24 hours. Denies chest pain however. Post nasal drip. Nasal congestion and sinus pressure     Hx of prior MI -- 2 months ago   LAD blockage and CHF recently   Had itching with lasix in the past       Review of Systems   Constitutional: Negative for chills and fever.   HENT: Positive for congestion (over the past week. ). Negative for ear pain, rhinorrhea and sore throat.    Eyes: Negative for pain and visual disturbance.   Respiratory: Positive for shortness of breath. Negative for cough, chest tightness, wheezing and stridor.    Cardiovascular: Negative for palpitations.   Gastrointestinal: Negative for abdominal pain, constipation, diarrhea, nausea and vomiting.   Genitourinary: Negative for dyspareunia, dysuria, frequency, genital sores, vaginal bleeding, vaginal discharge and vaginal pain.        Hysterectomy 1992    Skin: Negative for rash and wound.   Allergic/Immunologic: Negative for environmental allergies, food allergies and immunocompromised state.   Neurological: Negative for headaches.       Past Medical History:   Diagnosis Date     ADHD (attention deficit hyperactivity disorder)      Anxiety      Arthritis     back, hands, knees, hips     Asthma     controlled--has coughing symptoms     C. difficile diarrhea      Central sleep apnea      Coronary artery disease involving native coronary artery of native heart without angina pectoris 10/25/2019     Depression      Gastro-oesophageal reflux disease      Hypertension      Ischemic cardiomyopathy 10/25/2019     Narcolepsy      Renal disease     gfr is aroung 48     Sleep apnea     uses Cpap     Family " History   Problem Relation Age of Onset     Hypertension Mother      Arthritis Mother      Cerebrovascular Disease Father         Three Strokes     Diabetes Father         and brother     Thyroid Disease Sister         Hypothyroid     Current Outpatient Medications   Medication Sig Dispense Refill     albuterol (PROVENTIL) (2.5 MG/3ML) 0.083% neb solution USE 1 VIAL BY NEBULIZATION EVERY 6 HOURS AS NEEDED FOR SHORTNESS OF BREATH/ DYSPNEA 300 mL 5     amoxicillin (AMOXIL) 500 MG capsule TK 4 CS PO ONE HOUR BEFORE DENTAL APPOINTMENT  1     aspirin (ASA) 81 MG EC tablet Take 1 tablet (81 mg) by mouth daily 90 tablet 0     atorvastatin (LIPITOR) 40 MG tablet Take 1 tablet (40 mg) by mouth daily 90 tablet 3     carvedilol (COREG) 3.125 MG tablet Take 1 tablet (3.125 mg) by mouth 2 times daily (with meals) 180 tablet 3     clopidogrel (PLAVIX) 75 MG tablet Take 1 tablet (75 mg) by mouth daily 90 tablet 3     guaiFENesin-codeine (ROBITUSSIN AC) 100-10 MG/5ML SOLN Take 10 mLs by mouth every 4 hours as needed for cough 236 mL 0     Melatonin-Pyridoxine (MELATIN PO) prn       nitroGLYcerin (NITROSTAT) 0.4 MG sublingual tablet For chest pain place 1 tablet under the tongue every 5 minutes for 3 doses. If symptoms persist 5 minutes after 1st dose call 911. 15 tablet 0     Prenatal Multivit-Min-Fe-FA (PRENATAL/IRON) TABS        sertraline (ZOLOFT) 100 MG tablet TAKE 1 TABLET(100 MG) BY MOUTH DAILY 90 tablet 1     vitamin D3 (CHOLECALCIFEROL) 2000 units (50 mcg) tablet Take 1 tablet (2,000 Units) by mouth daily 90 tablet 3     Social History     Tobacco Use     Smoking status: Never Smoker     Smokeless tobacco: Never Used   Substance Use Topics     Alcohol use: No       OBJECTIVE  BP (!) 156/81 (BP Location: Left arm, Patient Position: Chair, Cuff Size: Adult Regular)   Pulse 74   Temp 98  F (36.7  C) (Oral)   Wt 81.2 kg (179 lb)   SpO2 98%   BMI 30.73 kg/m      Physical Exam  HENT:      Head: Normocephalic and atraumatic.       Right Ear: External ear normal.      Left Ear: External ear normal.      Nose: Mucosal edema present.      Right Turbinates: Swollen.      Left Turbinates: Swollen.      Right Sinus: Frontal sinus tenderness present.      Left Sinus: Frontal sinus tenderness present.      Mouth/Throat:      Pharynx: No oropharyngeal exudate.   Eyes:      General: No scleral icterus.        Right eye: No discharge.         Left eye: No discharge.      Conjunctiva/sclera: Conjunctivae normal.      Pupils: Pupils are equal, round, and reactive to light.   Neck:      Musculoskeletal: Neck supple.      Thyroid: No thyromegaly.      Trachea: No tracheal deviation.   Cardiovascular:      Rate and Rhythm: Normal rate and regular rhythm.      Heart sounds: Normal heart sounds. No murmur. No friction rub. No gallop.    Pulmonary:      Effort: Pulmonary effort is normal. No respiratory distress.      Breath sounds: Normal breath sounds. No stridor. No wheezing or rales.   Chest:      Chest wall: No tenderness.   Abdominal:      General: Bowel sounds are normal. There is no distension.      Palpations: Abdomen is soft. There is no mass.      Tenderness: There is no abdominal tenderness. There is no guarding or rebound.   Musculoskeletal:         General: No tenderness or deformity.   Lymphadenopathy:      Cervical: No cervical adenopathy.   Skin:     General: Skin is warm and dry.      Findings: No erythema or rash.   Neurological:      Mental Status: She is alert and oriented to person, place, and time.      Cranial Nerves: No cranial nerve deficit.   Psychiatric:         Judgment: Judgment normal.           ASSESSMENT:    ICD-10-CM    1. Acute sinusitis with symptoms > 10 days J01.90 amoxicillin (AMOXIL) 875 MG tablet        PLAN:  Will hold x 24 hours and begin abx if worsening   Trial of nasal saline OTC and consideration for trial of flonase OTC   Patient educational/instructional material provided including reasons for follow-up     The patient indicates understanding of these issues and agrees with the plan.   Ron Sandhu MD

## 2019-12-06 ENCOUNTER — HOSPITAL ENCOUNTER (OUTPATIENT)
Dept: CARDIAC REHAB | Facility: CLINIC | Age: 69
End: 2019-12-06
Attending: INTERNAL MEDICINE
Payer: MEDICARE

## 2019-12-06 PROCEDURE — 40000116 ZZH STATISTIC OP CR VISIT: Performed by: OCCUPATIONAL THERAPIST

## 2019-12-06 PROCEDURE — 93798 PHYS/QHP OP CAR RHAB W/ECG: CPT | Performed by: OCCUPATIONAL THERAPIST

## 2019-12-09 ENCOUNTER — HOSPITAL ENCOUNTER (OUTPATIENT)
Dept: CARDIAC REHAB | Facility: CLINIC | Age: 69
End: 2019-12-09
Attending: INTERNAL MEDICINE
Payer: MEDICARE

## 2019-12-09 PROCEDURE — 40000116 ZZH STATISTIC OP CR VISIT: Performed by: CLINICAL EXERCISE PHYSIOLOGIST

## 2019-12-09 PROCEDURE — 93798 PHYS/QHP OP CAR RHAB W/ECG: CPT | Performed by: CLINICAL EXERCISE PHYSIOLOGIST

## 2019-12-10 ENCOUNTER — OFFICE VISIT (OUTPATIENT)
Dept: FAMILY MEDICINE | Facility: CLINIC | Age: 69
End: 2019-12-10
Payer: MEDICARE

## 2019-12-10 VITALS
OXYGEN SATURATION: 98 % | BODY MASS INDEX: 31 KG/M2 | RESPIRATION RATE: 18 BRPM | DIASTOLIC BLOOD PRESSURE: 71 MMHG | WEIGHT: 181.6 LBS | HEIGHT: 64 IN | TEMPERATURE: 98 F | SYSTOLIC BLOOD PRESSURE: 110 MMHG | HEART RATE: 82 BPM

## 2019-12-10 DIAGNOSIS — I50.22 CHRONIC SYSTOLIC HEART FAILURE (H): ICD-10-CM

## 2019-12-10 DIAGNOSIS — E78.5 HYPERLIPIDEMIA LDL GOAL <70: ICD-10-CM

## 2019-12-10 DIAGNOSIS — I50.22 CHRONIC SYSTOLIC HEART FAILURE (H): Primary | ICD-10-CM

## 2019-12-10 DIAGNOSIS — E55.9 VITAMIN D DEFICIENCY: ICD-10-CM

## 2019-12-10 DIAGNOSIS — I10 HYPERTENSION GOAL BP (BLOOD PRESSURE) < 130/80: ICD-10-CM

## 2019-12-10 LAB
ALBUMIN SERPL-MCNC: 4 G/DL (ref 3.4–5)
ALP SERPL-CCNC: 111 U/L (ref 40–150)
ALT SERPL W P-5'-P-CCNC: 30 U/L (ref 0–50)
ANION GAP SERPL CALCULATED.3IONS-SCNC: 7 MMOL/L (ref 3–14)
AST SERPL W P-5'-P-CCNC: 26 U/L (ref 0–45)
BILIRUB SERPL-MCNC: 0.7 MG/DL (ref 0.2–1.3)
BUN SERPL-MCNC: 13 MG/DL (ref 7–30)
CALCIUM SERPL-MCNC: 9.2 MG/DL (ref 8.5–10.1)
CHLORIDE SERPL-SCNC: 111 MMOL/L (ref 94–109)
CHOLEST SERPL-MCNC: 123 MG/DL
CO2 SERPL-SCNC: 26 MMOL/L (ref 20–32)
CREAT SERPL-MCNC: 0.93 MG/DL (ref 0.52–1.04)
CREAT UR-MCNC: 185 MG/DL
GFR SERPL CREATININE-BSD FRML MDRD: 62 ML/MIN/{1.73_M2}
GLUCOSE SERPL-MCNC: 93 MG/DL (ref 70–99)
HDLC SERPL-MCNC: 44 MG/DL
LDLC SERPL CALC-MCNC: 57 MG/DL
MICROALBUMIN UR-MCNC: 9 MG/L
MICROALBUMIN/CREAT UR: 4.95 MG/G CR (ref 0–25)
NONHDLC SERPL-MCNC: 79 MG/DL
POTASSIUM SERPL-SCNC: 4.4 MMOL/L (ref 3.4–5.3)
PROT SERPL-MCNC: 7.4 G/DL (ref 6.8–8.8)
SODIUM SERPL-SCNC: 144 MMOL/L (ref 133–144)
TRIGL SERPL-MCNC: 110 MG/DL

## 2019-12-10 PROCEDURE — 80053 COMPREHEN METABOLIC PANEL: CPT | Performed by: INTERNAL MEDICINE

## 2019-12-10 PROCEDURE — 80061 LIPID PANEL: CPT | Performed by: INTERNAL MEDICINE

## 2019-12-10 PROCEDURE — 36415 COLL VENOUS BLD VENIPUNCTURE: CPT | Performed by: INTERNAL MEDICINE

## 2019-12-10 PROCEDURE — 99214 OFFICE O/P EST MOD 30 MIN: CPT | Performed by: INTERNAL MEDICINE

## 2019-12-10 PROCEDURE — 82306 VITAMIN D 25 HYDROXY: CPT | Performed by: INTERNAL MEDICINE

## 2019-12-10 PROCEDURE — 82043 UR ALBUMIN QUANTITATIVE: CPT | Performed by: INTERNAL MEDICINE

## 2019-12-10 RX ORDER — LISINOPRIL 2.5 MG/1
2.5 TABLET ORAL AT BEDTIME
Qty: 30 TABLET | Refills: 5 | Status: SHIPPED | OUTPATIENT
Start: 2019-12-10 | End: 2019-12-10

## 2019-12-10 ASSESSMENT — PAIN SCALES - GENERAL: PAINLEVEL: NO PAIN (0)

## 2019-12-10 ASSESSMENT — MIFFLIN-ST. JEOR: SCORE: 1328.73

## 2019-12-10 NOTE — PROGRESS NOTES
Subjective     Gem Gama is a 68 year old female who presents to clinic today for the following health issues:    HPI   Hypertension Follow-up      Do you check your blood pressure regularly outside of the clinic? Yes     Are you following a low salt diet? Yes- Patient is requesting a list of food     Are your blood pressures ever more than 140 on the top number (systolic) OR more   than 90 on the bottom number (diastolic), for example 140/90? No    How many servings of fruits and vegetables do you eat daily?  2-3    On average, how many sweetened beverages do you drink each day (Examples: soda, juice, sweet tea, etc.  Do NOT count diet or artificially sweetened beverages)?   2-3    How many days per week do you miss taking your medication? 0    Other concerns: fluid retention (according to patient), associated with weight gain.       Patient Active Problem List   Diagnosis     Depression with anxiety     TMJ (temporomandibular joint syndrome)     S/P hip replacement     Congenital ureteric stenosis     Lacrimal duct stenosis     Chronic rhinitis     CARDIOVASCULAR SCREENING; LDL GOAL LESS THAN 160     Intermittent asthma     Advanced directives, counseling/discussion     Major depressive disorder, recurrent episode, moderate (H)     Pituitary microadenoma (H)     Obstructive sleep apnea syndrome     Benign essential hypertension     Osteoarthritis of right knee     S/P total hip arthroplasty     Constipation     Central sleep apnea     Controlled narcolepsy     Esophageal reflux (GERD)     Status post total knee replacement     Primary narcolepsy without cataplexy     Vitamin D deficiency     CKD (chronic kidney disease) stage 3, GFR 30-59 ml/min (H)     Incontinence of feces with fecal urgency     Overactive bladder     Fatigue, unspecified type     STEMI (ST elevation myocardial infarction) (H)     ST elevation myocardial infarction involving left anterior descending (LAD) coronary artery (H)     Coronary  artery disease involving native coronary artery of native heart without angina pectoris     Ischemic cardiomyopathy     Chronic systolic heart failure (H)     Past Surgical History:   Procedure Laterality Date     ARTHROPLASTY KNEE  7/9/2014    Procedure: ARTHROPLASTY KNEE;  Surgeon: Levi Johnson MD;  Location: [facility] OR     ARTHROPLASTY KNEE Left 11/24/2014    Procedure: ARTHROPLASTY KNEE;  Surgeon: Levi Johnson MD;  Location:  OR     C REIMPLANT URETER,EXTENSIVE TAILORING  1973 1997     C REPAIR ENTEROCELE,VAG APPRCH  2008    Prolift     C TOTAL ABD HYSTERECTOMY+BLAD REPR  1992    Vaginal approach with oophrectomy     C TOTAL KNEE ARTHROPLASTY       CV CORONARY ANGIOGRAM N/A 10/9/2019    Procedure: Coronary Angiogram;  Surgeon: Chiquita Chatterjee MD;  Location:  [facility]     CV HEART CATHETERIZATION WITH POSSIBLE INTERVENTION N/A 10/10/2019    Procedure: Heart Catheterization with Possible Intervention;  Surgeon: Chin Garcia MD;  Location:  [facility]     CV INTRA-AORTIC BALLOON PUMP INSERTION N/A 10/9/2019    Procedure: Intra-Aortic Balloon Pump Insertion;  Surgeon: Chiquita Chatterjee MD;  Location:  [facility]     CV INTRA-AORTIC BALLOON PUMP REMOVAL N/A 10/10/2019    Procedure: Intra-Aortic Balloon Pump Removal;  Surgeon: Chin Garcia MD;  Location:  [facility]     CV PCI STENT DRUG ELUTING N/A 10/9/2019    Procedure: PCI Stent Drug Eluting;  Surgeon: Chiquita Chatterjee MD;  Location:  HE[facility]ARD[facility] CATH LAB     GENITOURINARY SURGERY      kidney surgery X 3     ORTHOPEDIC SURGERY      bilat total hip     RECTOCELE REPAIR       SOFT TISSUE SURGERY         Social History     Tobacco Use     Smoking status: Never Smoker     Smokeless tobacco: Never Used   Substance Use Topics     Alcohol use: No     Family History   Problem Relation Age of Onset     Hypertension Mother      Arthritis Mother      Cerebrovascular Disease Father          Three Strokes     Diabetes Father         and brother     Thyroid Disease Sister         Hypothyroid         Allergies   Allergen Reactions     Latex Other (See Comments) and Shortness Of Breath     Runny nose  PN: LW Reaction: DIFFICULTY BREATHING       Flagyl [Metronidazole Hcl] Anaphylaxis and Rash     Food      PN: LW FI1: nka LW FI2:     Hydrochlorothiazide W/Triamterene      PN: LW Reaction: skin rash     No Clinical Screening - See Comments      PN: LW Other1: -Adhesive Tape     Sulfa Drugs Anaphylaxis, Hives and Itching     PN: LW Reaction: HIVES, Swelling     Tape [Adhesive Tape] Hives     Metoprolol Itching and Rash     Metronidazole Hives and Rash     PN: LW Reaction: HIVES       Recent Labs   Lab Test 12/10/19  1215 11/12/19  1006  10/15/19  1246  10/09/19  1208  08/16/18  1700  02/09/17  1230 02/02/16  1634  09/19/13  0717   A1C  --   --   --   --   --   --   --   --   --   --   --   --  5.6   LDL 57  --   --   --   --  166*  --   --   --  175* 126*  --  141*   HDL 44*  --   --   --   --  45*  --   --   --  50 54  --  51   TRIG 110  --   --   --   --  136  --   --   --  125 105  --  111   ALT 30  --   --  32  --   --   --  20  --   --   --   --   --    CR 0.93 1.13   < > 1.09*   < > 1.06*   < > 1.34*   < > 1.05* 1.06*   < > 0.84   GFRESTIMATED 62 48*   < > 51*   < > 53*   < > 39*   < > 52* 52*   < > 68   GFRESTBLACK 71 58*   < > 59*   < > 61   < > 47*   < > 63 63   < > 83   POTASSIUM 4.4 4.0   < > 4.6   < > 3.6   < > 3.5   < > 4.5 3.8   < > 4.1   TSH  --   --   --   --   --   --   --   --   --   --  1.62  --  1.56    < > = values in this interval not displayed.      BP Readings from Last 3 Encounters:   12/10/19 110/71   12/04/19 (!) 156/81   11/12/19 102/70    Wt Readings from Last 3 Encounters:   12/10/19 82.4 kg (181 lb 9.6 oz)   12/04/19 81.2 kg (179 lb)   11/12/19 82.6 kg (182 lb)                      Reviewed and updated as needed this visit by Provider         Review of Systems   ROS COMP:  "CONSTITUTIONAL: NEGATIVE for fever, chills, change in weight  INTEGUMENTARY/SKIN: NEGATIVE for worrisome rashes, moles or lesions  EYES: NEGATIVE for vision changes or irritation  ENT/MOUTH: NEGATIVE for ear, mouth and throat problems  RESP: NEGATIVE for significant cough or SOB  CV: NEGATIVE for chest pain, palpitations or peripheral edema  GI: NEGATIVE for nausea, abdominal pain, heartburn, or change in bowel habits  : NEGATIVE for frequency, dysuria, or hematuria  MUSCULOSKELETAL: NEGATIVE for significant arthralgias or myalgia  NEURO: NEGATIVE for weakness, dizziness or paresthesias  ENDOCRINE: NEGATIVE for temperature intolerance, skin/hair changes  HEME: NEGATIVE for bleeding problems  PSYCHIATRIC: NEGATIVE for changes in mood or affect      Objective    /71 (BP Location: Left arm, Patient Position: Sitting, Cuff Size: Adult Large)   Pulse 82   Temp 98  F (36.7  C) (Oral)   Resp 18   Ht 1.626 m (5' 4\")   Wt 82.4 kg (181 lb 9.6 oz)   LMP  (LMP Unknown)   SpO2 98%   Breastfeeding No   BMI 31.17 kg/m     Physical Exam   GENERAL: healthy, alert and no distress  EYES: Eyes grossly normal to inspection, PERRL and conjunctivae and sclerae normal  HENT: ear canals and TM's normal, nose and mouth without ulcers or lesions  NECK: no adenopathy, no asymmetry, masses, or scars and thyroid normal to palpation  RESP: lungs clear to auscultation - no rales, rhonchi or wheezes  CV: regular rate and rhythm, normal S1 S2, no S3 or S4, no murmur, click or rub, no peripheral edema and peripheral pulses strong  ABDOMEN: soft, nontender, no hepatosplenomegaly, no masses and bowel sounds normal  MS: no gross musculoskeletal defects noted, no edema  SKIN: no suspicious lesions or rashes  NEURO: Normal strength and tone, mentation intact and speech normal  PSYCH: mentation appears normal, affect normal/bright    Diagnostic Test Results:  Results for orders placed or performed in visit on 12/10/19   Lipid panel reflex " to direct LDL Non-fasting     Status: Abnormal   Result Value Ref Range    Cholesterol 123 <200 mg/dL    Triglycerides 110 <150 mg/dL    HDL Cholesterol 44 (L) >49 mg/dL    LDL Cholesterol Calculated 57 <100 mg/dL    Non HDL Cholesterol 79 <130 mg/dL   Vitamin D Deficiency     Status: None   Result Value Ref Range    Vitamin D Deficiency screening 42 20 - 75 ug/L   Albumin Random Urine Quantitative with Creat Ratio     Status: None   Result Value Ref Range    Creatinine Urine 185 mg/dL    Albumin Urine mg/L 9 mg/L    Albumin Urine mg/g Cr 4.95 0 - 25 mg/g Cr   Comprehensive metabolic panel     Status: Abnormal   Result Value Ref Range    Sodium 144 133 - 144 mmol/L    Potassium 4.4 3.4 - 5.3 mmol/L    Chloride 111 (H) 94 - 109 mmol/L    Carbon Dioxide 26 20 - 32 mmol/L    Anion Gap 7 3 - 14 mmol/L    Glucose 93 70 - 99 mg/dL    Urea Nitrogen 13 7 - 30 mg/dL    Creatinine 0.93 0.52 - 1.04 mg/dL    GFR Estimate 62 >60 mL/min/[1.73_m2]    GFR Estimate If Black 71 >60 mL/min/[1.73_m2]    Calcium 9.2 8.5 - 10.1 mg/dL    Bilirubin Total 0.7 0.2 - 1.3 mg/dL    Albumin 4.0 3.4 - 5.0 g/dL    Protein Total 7.4 6.8 - 8.8 g/dL    Alkaline Phosphatase 111 40 - 150 U/L    ALT 30 0 - 50 U/L    AST 26 0 - 45 U/L           Assessment & Plan     1. Chronic systolic heart failure (H)    - NUTRITION REFERRAL  - Creatinine; Standing  - Potassium; Standing  - Comprehensive metabolic panel    2. Hypertension goal BP (blood pressure) < 130/80    - Albumin Random Urine Quantitative with Creat Ratio    3. Hyperlipidemia LDL goal <70    - Lipid panel reflex to direct LDL Non-fasting    4. Vitamin D deficiency    - Vitamin D Deficiency       Return in about 4 months (around 4/10/2020).    Danny Conteh MD  [facility]

## 2019-12-10 NOTE — TELEPHONE ENCOUNTER
"Requested Prescriptions   Pending Prescriptions Disp Refills     lisinopril (PRINIVIL/ZESTRIL) 2.5 MG tablet [Pharmacy Med Name: LISINOPRIL 2.5MG TABLETS] 90 tablet 0     Sig: TAKE 1 TABLET(2.5 MG) BY MOUTH AT BEDTIME         Last Written Prescription Date:  12/10/19  Last Fill Quantity: 30,  # refills: 5   Last Office Visit with [facility], [facility] or [facility] prescribing provider:  12/10/19   Future Office Visit:    Next 5 appointments (look out 90 days)    Feb 24, 2020 10:45 AM CST  Return Visit with Mel Dennison MD  [facility] [address] [place], [place] [id number]  [facility] [id number] [phone] OPT 2             ACE Inhibitors (Including Combos) Protocol Passed - 12/10/2019 12:13 PM        Passed - Blood pressure under 140/90 in past 12 months     BP Readings from Last 3 Encounters:   12/10/19 110/71   12/04/19 (!) 156/81   11/12/19 102/70                 Passed - Recent (12 mo) or future (30 days) visit within the authorizing provider's specialty     Patient has had an office visit with the authorizing provider or a provider within the authorizing providers department within the previous 12 mos or has a future within next 30 days. See \"Patient Info\" tab in inbasket, or \"Choose Columns\" in Meds & Orders section of the refill encounter.              Passed - Medication is active on med list        Passed - Patient is age 18 or older        Passed - No active pregnancy on record        Passed - Normal serum creatinine on file in past 12 months     Recent Labs   Lab Test 11/12/19  1006  04/29/14  1318   CR 1.13   < >  --    CRPOC  --   --  0.8    < > = values in this interval not displayed.             Passed - Normal serum potassium on file in past 12 months     Recent Labs   Lab Test 11/12/19  1006   POTASSIUM 4.0             Passed - No positive pregnancy test within past 12 months              Deacon Faarax  [facility]  "

## 2019-12-10 NOTE — PATIENT INSTRUCTIONS
Patient Education     Eating Heart-Healthy Food: Using the DASH Plan    Eating for your heart doesn t have to be hard or boring. You just need to know how to make healthier choices. The DASH eating plan has been developed to help you do just that. DASH stands for Dietary Approaches to Stop Hypertension. It is a plan that has been proven to be healthier for your heart and to lower your risk for high blood pressure. It can also help lower your risk for cancer, heart disease, osteoporosis, and diabetes.  Choosing from each food group  Choose foods from each of the food groups below each day. Try to get the recommended number of servings for each food group. The serving numbers are based on a diet of 2,000 calories a day. Talk to your doctor if you re unsure about your calorie needs. Along with getting the correct servings, the DASH plan also recommends a sodium intake less than 2,300 mg per day.        Grains  Servings: 6 to 8 a day  A serving is:    1 slice bread    1 ounce dry cereal    Half a cup cooked rice, pasta or cereal  Best choices: Whole grains and any grains high in fiber. Vegetables  Servings: 4 to 5 a day  A serving is:    1 cup raw leafy vegetable    Half a cup cut-up raw or cooked vegetable    Half a cup vegetable juice  Best choices: Fresh or frozen vegetables prepared without added salt or fat.   Fruits  Servings: 4 to 5 a day  A serving is:    1 medium fruit    One-quarter cup dried fruit    Half a cup fresh, frozen, or canned fruit    Half a cup of 100% fruit juices  Best choices: A variety of fresh fruits of different colors. Whole fruits are a better choice than fruit juices. Low-fat or fat-free dairy  Servings: 2 to 3 a day  A serving is:    1 cup milk    1 cup yogurt    One and a half ounces cheese  Best choices: Skim or 1% milk, low-fat or fat-free yogurt or buttermilk, and low-fat cheeses.         Lean meats, poultry, fish  Servings: 6 or fewer a day  A serving is:    1 ounce cooked meats,  poultry, or fish    1 egg  Best choices: Lean poultry and fish. Trim away visible fat. Broil, grill, roast, or boil instead of frying. Remove skin from poultry before eating. Limit how much red meat you eat.  Nuts, seeds, beans  Servings: 4 to 5 a week  A serving is:    One-third cup nuts (one and a half ounces)    2 tablespoons nut butter or seeds    Half a cup cooked dry beans or legumes  Best choices: Dry roasted nuts with no salt added, lentils, kidney beans, garbanzo beans, and whole taveras beans.   Fats and oils  Servings: 2 to 3 a day  A serving is:    1 teaspoon vegetable oil    1 teaspoon soft margarine    1 tablespoon mayonnaise    2 tablespoons salad dressing  Best choices: Nut and vegetable oils (nontropical vegetable oils), such as olive and canola oil. Sweets  Servings: 5 a week or fewer  A serving is:    1 tablespoon sugar, maple syrup, or honey    1 tablespoon jam or jelly    1 half-ounce jelly beans (about 15)    1 cup lemonade  Best choices: Dried fruit can be a satisfying sweet. Choose low-fat sweets. And watch your serving sizes!      For more on the DASH eating plan, visit:  www.nhlbi.nih.gov/health/health-topics/topics/dash   Date Last Reviewed: 6/1/2016 2000-2018 [employer]. [address] [place] [address]. All rights reserved. This information is not intended as a substitute for professional medical care. Always follow your healthcare professional's instructions.           Patient Education     Eating Heart-Healthy Foods  Eating has a big impact on your heart health. In fact, eating healthier can improve several of your heart risks at once. For instance, it helps you manage weight, cholesterol, and blood pressure. Here are ideas to help you make heart-healthy changes without giving up all the foods and flavors you love.  Getting started    Talk with your healthcare provider about eating plans, such as the DASH or Mediterranean diet. You may also be referred to a  dietitian.    Change a few things at a time. Give yourself time to get used to a few eating changes before adding more.    Work to create a tasty, healthy eating plan that you can stick to for the rest of your life.    Goals for healthy eating  Below are some tips to improve your eating habits:    Limit saturated fats and trans fats. Saturated fats raise your levels of cholesterol, so keep these fats to a minimum. They are found in foods such as fatty meats, whole milk, cheese, and palm and coconut oils. Avoid trans fats because they lower good cholesterol as well as raise bad cholesterol. Trans fats are most often found in processed foods.    Reduce sodium (salt) intake. Eating too much salt may increase your blood pressure. Limit your sodium intake to 2,300 milligrams (mg) per day (the amount in 1 teaspoon of salt), or less if your healthcare provider recommends it. Dining out less often and eating fewer processed foods are two great ways to decrease the amount of salt you consume.    Managing calories. A calorie is a unit of energy. Your body burns calories for fuel, but if you eat more calories than your body burns, the extras are stored as fat. Your healthcare provider can help you create a diet plan to manage your calories. This will likely include eating healthier foods as well as exercising regularly. To help you track your progress, keep a diary to record what you eat and how often you exercise.  Choose the right foods  Aim to make these foods staples of your diet. If you have diabetes, you may have different recommendations than what is listed here:    Fruits and vegetables provide plenty of nutrients without a lot of calories. At meals, fill half your plate with these foods. Split the other half of your plate between whole grains and lean protein.    Whole grains are high in fiber and rich in vitamins and nutrients. Good choices include whole-wheat bread, pasta, and brown rice.    Lean proteins give you  nutrition with less fat. Good choices include fish, skinless chicken, and beans.    Low-fat or nonfat dairy provides nutrients without a lot of fat. Try low-fat or nonfat milk, cheese, or yogurt.    Healthy fats can be good for you in small amounts. These are unsaturated fats, such as olive oil, nuts, and fish. Try to have at least 2 servings per week of fatty fish, such as salmon, sardines, mackerel, rainbow trout, and albacore tuna. These contain omega-3 fatty acids, which are good for your heart. Flaxseed is another source of a heart-healthy fat.  More on heart-healthy eating  Read food labels  Healthy eating starts at the grocery store. Be sure to pay attention to food labels on packaged foods. Look for products that are high in fiber and protein, and low in saturated fat, cholesterol, and sodium. Avoid products that contain trans fat. And pay close attention to serving size. For instance, if you plan to eat two servings, double all the numbers on the label.  Prepare food right  A key part of healthy cooking is cutting down on added fat and salt. Look on the internet for lower-fat, lower-sodium recipes. Also, try these tips:    Remove fat from meat and skin from poultry before cooking.    Skim fat from the surface of soups and sauces.    Broil, boil, bake, steam, grill, and microwave food without added fats.    Choose ingredients that spice up your food without adding calories, fat, or sodium. Try these items: horseradish, hot sauce, lemon, mustard, nonfat salad dressings, and vinegar. For salt-free herbs and spices, try basil, cilantro, cinnamon, pepper, and rosemary.  Date Last Reviewed: 10/1/2017    3730-5757 [employer]. [address] [place] [address], [place] [address]. All rights reserved. This information is not intended as a substitute for professional medical care. Always follow your healthcare professional's instructions.           Patient Education     Eating Heart-Healthy Foods  Eating has a  big impact on your heart health. In fact, eating healthier can improve several of your heart risks at once. For instance, it helps you manage weight, cholesterol, and blood pressure. Here are ideas to help you make heart-healthy changes without giving up all the foods and flavors you love.  Getting started    Talk with your healthcare provider about eating plans, such as the DASH or Mediterranean diet. You may also be referred to a dietitian.    Change a few things at a time. Give yourself time to get used to a few eating changes before adding more.    Work to create a tasty, healthy eating plan that you can stick to for the rest of your life.    Goals for healthy eating  Below are some tips to improve your eating habits:    Limit saturated fats and trans fats. Saturated fats raise your levels of cholesterol, so keep these fats to a minimum. They are found in foods such as fatty meats, whole milk, cheese, and palm and coconut oils. Avoid trans fats because they lower good cholesterol as well as raise bad cholesterol. Trans fats are most often found in processed foods.    Reduce sodium (salt) intake. Eating too much salt may increase your blood pressure. Limit your sodium intake to 2,300 milligrams (mg) per day (the amount in 1 teaspoon of salt), or less if your healthcare provider recommends it. Dining out less often and eating fewer processed foods are two great ways to decrease the amount of salt you consume.    Managing calories. A calorie is a unit of energy. Your body burns calories for fuel, but if you eat more calories than your body burns, the extras are stored as fat. Your healthcare provider can help you create a diet plan to manage your calories. This will likely include eating healthier foods as well as exercising regularly. To help you track your progress, keep a diary to record what you eat and how often you exercise.  Choose the right foods  Aim to make these foods staples of your diet. If you have  diabetes, you may have different recommendations than what is listed here:    Fruits and vegetables provide plenty of nutrients without a lot of calories. At meals, fill half your plate with these foods. Split the other half of your plate between whole grains and lean protein.    Whole grains are high in fiber and rich in vitamins and nutrients. Good choices include whole-wheat bread, pasta, and brown rice.    Lean proteins give you nutrition with less fat. Good choices include fish, skinless chicken, and beans.    Low-fat or nonfat dairy provides nutrients without a lot of fat. Try low-fat or nonfat milk, cheese, or yogurt.    Healthy fats can be good for you in small amounts. These are unsaturated fats, such as olive oil, nuts, and fish. Try to have at least 2 servings per week of fatty fish, such as salmon, sardines, mackerel, rainbow trout, and albacore tuna. These contain omega-3 fatty acids, which are good for your heart. Flaxseed is another source of a heart-healthy fat.  More on heart-healthy eating  Read food labels  Healthy eating starts at the grocery store. Be sure to pay attention to food labels on packaged foods. Look for products that are high in fiber and protein, and low in saturated fat, cholesterol, and sodium. Avoid products that contain trans fat. And pay close attention to serving size. For instance, if you plan to eat two servings, double all the numbers on the label.  Prepare food right  A key part of healthy cooking is cutting down on added fat and salt. Look on the internet for lower-fat, lower-sodium recipes. Also, try these tips:    Remove fat from meat and skin from poultry before cooking.    Skim fat from the surface of soups and sauces.    Broil, boil, bake, steam, grill, and microwave food without added fats.    Choose ingredients that spice up your food without adding calories, fat, or sodium. Try these items: horseradish, hot sauce, lemon, mustard, nonfat salad dressings, and  vinegar. For salt-free herbs and spices, try basil, cilantro, cinnamon, pepper, and rosemary.  Date Last Reviewed: 10/1/2017    7759-7792 [employer]. [address] [place] [address], [place] [address]. All rights reserved. This information is not intended as a substitute for professional medical care. Always follow your healthcare professional's instructions.           Patient Education     Low-Salt Choices  Eating salt (sodium) can make your body retain too much water. Excess water makes your heart work harder. Canned, packaged, and frozen foods are easy to prepare. But they are often high in sodium. Here are some ideas for low-salt foods you can easily make yourself.    For breakfast    Fruit or 100% fruit juice    Whole-wheat bread or an English muffin. Look for sodium content on Nutrition Facts labels.    Low-fat milk or yogurt    Unsalted eggs    Shredded wheat    Corn tortillas    Unsalted steamed rice    Regular (not instant) hot cereal, made without salt  Stay away from:    Sausage, alford, and ham    Flour tortillas    Packaged muffins, pancakes, and biscuits    Instant hot cereals    Cottage cheese  For lunch and dinner    Fresh fish, chicken, turkey, or meat--baked, broiled, or roasted without salt    Dry beans, cooked without salt    Tofu, stir-fried without salt    Unsalted fresh fruit and vegetables, or frozen or canned fruit and vegetables with no added salt  Stay away from:    Lunch or deli meat that is cured or smoked    Cheese    Tomato juice and ketchup    Canned vegetables, soups, and fish not labeled as no-salt-added or reduced sodium    Packaged gravies and sauces    Olives, pickles, and relish    Bottled salad dressings  For snacks and desserts    Yogurt    Unsalted, air-popped popcorn    Unsalted nuts or seeds  Stay away from:    Pies and cakes    Packaged dessert mixes    Pizza    Canned and packaged puddings    Pretzels, chips, crackers, and nuts--unless the label says unsalted  Date  Last Reviewed: 6/1/2017 2000-2018 [employer]. [address] [place] [address], [place] [address]. All rights reserved. This information is not intended as a substitute for professional medical care. Always follow your healthcare professional's instructions.

## 2019-12-11 ENCOUNTER — HOSPITAL ENCOUNTER (OUTPATIENT)
Dept: CARDIAC REHAB | Facility: CLINIC | Age: 69
End: 2019-12-11
Attending: INTERNAL MEDICINE
Payer: MEDICARE

## 2019-12-11 LAB — DEPRECATED CALCIDIOL+CALCIFEROL SERPL-MC: 42 UG/L (ref 20–75)

## 2019-12-11 PROCEDURE — 93798 PHYS/QHP OP CAR RHAB W/ECG: CPT | Performed by: CLINICAL EXERCISE PHYSIOLOGIST

## 2019-12-11 PROCEDURE — 93797 PHYS/QHP OP CAR RHAB WO ECG: CPT | Performed by: CLINICAL EXERCISE PHYSIOLOGIST

## 2019-12-11 PROCEDURE — 40000575 ZZH STATISTIC OP CARDIAC VISIT #2: Performed by: CLINICAL EXERCISE PHYSIOLOGIST

## 2019-12-11 PROCEDURE — 40000116 ZZH STATISTIC OP CR VISIT: Performed by: CLINICAL EXERCISE PHYSIOLOGIST

## 2019-12-12 RX ORDER — LISINOPRIL 2.5 MG/1
TABLET ORAL
Qty: 90 TABLET | Refills: 1 | Status: SHIPPED | OUTPATIENT
Start: 2019-12-12 | End: 2020-09-11

## 2019-12-12 NOTE — TELEPHONE ENCOUNTER
Rx converted to 90 day supply with one refill instead of 30 day supply with 5 refills.     Rx sent.     Tori Ndiaye RN  [facility]

## 2019-12-13 ENCOUNTER — HOSPITAL ENCOUNTER (OUTPATIENT)
Dept: CARDIAC REHAB | Facility: CLINIC | Age: 69
End: 2019-12-13
Attending: INTERNAL MEDICINE
Payer: MEDICARE

## 2019-12-13 PROCEDURE — 93798 PHYS/QHP OP CAR RHAB W/ECG: CPT | Performed by: OCCUPATIONAL THERAPIST

## 2019-12-13 PROCEDURE — 40000116 ZZH STATISTIC OP CR VISIT: Performed by: OCCUPATIONAL THERAPIST

## 2019-12-16 ENCOUNTER — HOSPITAL ENCOUNTER (OUTPATIENT)
Dept: CARDIAC REHAB | Facility: CLINIC | Age: 69
End: 2019-12-16
Attending: INTERNAL MEDICINE
Payer: MEDICARE

## 2019-12-16 PROCEDURE — 93798 PHYS/QHP OP CAR RHAB W/ECG: CPT | Performed by: CLINICAL EXERCISE PHYSIOLOGIST

## 2019-12-16 PROCEDURE — 40000116 ZZH STATISTIC OP CR VISIT: Performed by: CLINICAL EXERCISE PHYSIOLOGIST

## 2019-12-27 ENCOUNTER — HOSPITAL ENCOUNTER (OUTPATIENT)
Dept: CARDIAC REHAB | Facility: CLINIC | Age: 69
End: 2019-12-27
Attending: INTERNAL MEDICINE
Payer: MEDICARE

## 2019-12-27 PROCEDURE — 93798 PHYS/QHP OP CAR RHAB W/ECG: CPT | Performed by: OCCUPATIONAL THERAPIST

## 2019-12-27 PROCEDURE — 40000116 ZZH STATISTIC OP CR VISIT: Performed by: OCCUPATIONAL THERAPIST

## 2019-12-31 ENCOUNTER — HOSPITAL ENCOUNTER (OUTPATIENT)
Dept: CARDIAC REHAB | Facility: CLINIC | Age: 69
End: 2019-12-31
Attending: INTERNAL MEDICINE
Payer: MEDICARE

## 2019-12-31 PROCEDURE — 93798 PHYS/QHP OP CAR RHAB W/ECG: CPT

## 2019-12-31 PROCEDURE — 40000116 ZZH STATISTIC OP CR VISIT

## 2020-01-01 ENCOUNTER — NURSE TRIAGE (OUTPATIENT)
Dept: NURSING | Facility: CLINIC | Age: 70
End: 2020-01-01

## 2020-01-02 DIAGNOSIS — I50.22 CHRONIC SYSTOLIC HEART FAILURE (H): ICD-10-CM

## 2020-01-02 LAB
CREAT SERPL-MCNC: 1.1 MG/DL (ref 0.52–1.04)
GFR SERPL CREATININE-BSD FRML MDRD: 50 ML/MIN/{1.73_M2}
POTASSIUM SERPL-SCNC: 5.1 MMOL/L (ref 3.4–5.3)

## 2020-01-02 PROCEDURE — 36415 COLL VENOUS BLD VENIPUNCTURE: CPT | Performed by: INTERNAL MEDICINE

## 2020-01-02 PROCEDURE — 82565 ASSAY OF CREATININE: CPT | Performed by: INTERNAL MEDICINE

## 2020-01-02 PROCEDURE — 84132 ASSAY OF SERUM POTASSIUM: CPT | Performed by: INTERNAL MEDICINE

## 2020-01-02 NOTE — TELEPHONE ENCOUNTER
Patient calling because would like to make appt for lab draw at [facility] location. Warm transfer to scheduling.

## 2020-01-08 ENCOUNTER — HOSPITAL ENCOUNTER (OUTPATIENT)
Dept: CARDIAC REHAB | Facility: CLINIC | Age: 70
End: 2020-01-08
Attending: INTERNAL MEDICINE
Payer: MEDICARE

## 2020-01-08 PROCEDURE — 93798 PHYS/QHP OP CAR RHAB W/ECG: CPT

## 2020-01-08 PROCEDURE — 40000116 ZZH STATISTIC OP CR VISIT

## 2020-01-16 DIAGNOSIS — I50.22 CHRONIC SYSTOLIC HEART FAILURE (H): ICD-10-CM

## 2020-01-16 LAB
CREAT SERPL-MCNC: 1.12 MG/DL (ref 0.52–1.04)
GFR SERPL CREATININE-BSD FRML MDRD: 49 ML/MIN/{1.73_M2}
POTASSIUM SERPL-SCNC: 4.7 MMOL/L (ref 3.4–5.3)

## 2020-01-16 PROCEDURE — 82565 ASSAY OF CREATININE: CPT | Performed by: INTERNAL MEDICINE

## 2020-01-16 PROCEDURE — 36415 COLL VENOUS BLD VENIPUNCTURE: CPT | Performed by: INTERNAL MEDICINE

## 2020-01-16 PROCEDURE — 84132 ASSAY OF SERUM POTASSIUM: CPT | Performed by: INTERNAL MEDICINE

## 2020-01-17 ENCOUNTER — TELEPHONE (OUTPATIENT)
Dept: CARDIOLOGY | Facility: CLINIC | Age: 70
End: 2020-01-17

## 2020-01-17 NOTE — TELEPHONE ENCOUNTER
Received VM from pt stating she took 2 SL nitroglycerin tablets today and still has a little bit of a strange feeling in her chest. Pt said she was not sure if she should go to the ER or not. Called back to pt, no answer. Left VM with call back number for Team 1.     Addendum 1/17/20: Received return call from pt. Pt stated she is now feeling better and she is not sure if her symptoms were due to chest pain or indigestion. Pt noted she had some dietary indiscretions yesterday and ate more salt and cholesterol than she usually does. Pt stated her weight is up 1.5 lbs today. Regarding her chest pain symptoms, pt stated she awoke with the symptoms just before 12:00 pm today, described symptoms as moderate pressure on the right side of her chest and also in her right flank. Pt stated she also felt indigestion, which she described as nausea and a gassy feeling. Pt stated she took 2 SL nitroglycerin and still felt some pressure in her chest and right flank, although it did decrease. Pt stated she then drank some Mountain Dew and had a bowel movement and now she feels fine. Pt wonders if MARQUES Galvez would like to see her again in clinic. Pt is currently scheduled for an echo on 2/20/20 and follow up with Dr. Dennison on 2/24/20. Will update MARQUES Galvez and call back with any recommendations.

## 2020-01-17 NOTE — TELEPHONE ENCOUNTER
Called back to pt and communicated recommendations from MARQUES Galvez to continue to monitor and RN will call back to check in next week to see how pt is doing. Reviewed that pt can call back sooner if needed, provided number for Team 1.Pt verbalized agreement with plan. Reminder sent to Team 1 board to call pt next week.

## 2020-01-17 NOTE — TELEPHONE ENCOUNTER
Reviewed phone encounter. Recommend continued monitoring of symptoms. Cardiology nurse to call patient next week, if recurrent/worsening symptoms would recommend sooner follow up appointment.     KATE Murdock [facility]  Pager: [phone]

## 2020-01-23 NOTE — TELEPHONE ENCOUNTER
Called pt to check in and review if any recurrent symptoms, no answer. Left VM requesting call back to Team 1.     Addendum 1/23/20: Received return call from pt. Pt stated she has not had any recurrence of her symptoms. Pt stated unfortunately she has had issues with her narcolepsy and missed recent appointments with the dietitian and cardiac rehab because she was sleeping. Pt stated she is going to call and reschedule and she has the numbers to call. Requested pt call back to our office if she has recurrent symptoms prior to her follow up with Dr. Dennison.

## 2020-01-29 ENCOUNTER — HOSPITAL ENCOUNTER (OUTPATIENT)
Dept: CARDIAC REHAB | Facility: CLINIC | Age: 70
End: 2020-01-29
Attending: INTERNAL MEDICINE
Payer: MEDICARE

## 2020-01-29 PROCEDURE — G0463 HOSPITAL OUTPT CLINIC VISIT: HCPCS

## 2020-01-29 PROCEDURE — 40000116 ZZH STATISTIC OP CR VISIT

## 2020-02-03 ENCOUNTER — HOSPITAL ENCOUNTER (OUTPATIENT)
Dept: CARDIAC REHAB | Facility: CLINIC | Age: 70
End: 2020-02-03
Attending: INTERNAL MEDICINE
Payer: MEDICARE

## 2020-02-03 PROCEDURE — 40000116 ZZH STATISTIC OP CR VISIT: Performed by: OCCUPATIONAL THERAPIST

## 2020-02-03 PROCEDURE — 93798 PHYS/QHP OP CAR RHAB W/ECG: CPT | Performed by: OCCUPATIONAL THERAPIST

## 2020-02-05 ENCOUNTER — HOSPITAL ENCOUNTER (OUTPATIENT)
Dept: CARDIAC REHAB | Facility: CLINIC | Age: 70
End: 2020-02-05
Attending: INTERNAL MEDICINE
Payer: MEDICARE

## 2020-02-05 PROCEDURE — 40000116 ZZH STATISTIC OP CR VISIT

## 2020-02-05 PROCEDURE — 93798 PHYS/QHP OP CAR RHAB W/ECG: CPT

## 2020-02-08 ENCOUNTER — HEALTH MAINTENANCE LETTER (OUTPATIENT)
Age: 70
End: 2020-02-08

## 2020-02-24 ENCOUNTER — OFFICE VISIT (OUTPATIENT)
Dept: CARDIOLOGY | Facility: CLINIC | Age: 70
End: 2020-02-24
Attending: NURSE PRACTITIONER
Payer: MEDICARE

## 2020-02-24 VITALS
SYSTOLIC BLOOD PRESSURE: 94 MMHG | HEIGHT: 64 IN | BODY MASS INDEX: 29.53 KG/M2 | DIASTOLIC BLOOD PRESSURE: 50 MMHG | HEART RATE: 86 BPM | WEIGHT: 173 LBS

## 2020-02-24 DIAGNOSIS — I25.810 CORONARY ARTERY DISEASE INVOLVING AUTOLOGOUS ARTERY CORONARY BYPASS GRAFT WITHOUT ANGINA PECTORIS: ICD-10-CM

## 2020-02-24 PROCEDURE — 99214 OFFICE O/P EST MOD 30 MIN: CPT | Performed by: INTERNAL MEDICINE

## 2020-02-24 ASSESSMENT — MIFFLIN-ST. JEOR: SCORE: 1284.72

## 2020-02-24 NOTE — LETTER
2/24/2020    Danny Conteh MD  [address]  [place] [address]    RE: Gem Gama       Dear Colleague,    I had the pleasure of seeing Gem Gama in the [facility].    CARDIOLOGY CLINIC VISIT  DATE OF SERVICE:  February 24, 2020    PRIMARY CARE PHYSICIAN:  Danny Conteh    HISTORY OF PRESENT ILLNESS:  Gem Gama is a very pleasant 68 year old female with a history of coronary artery disease, ischemic cardiomyopathy, hypertension, CKD, asthma and depression who presents to clinic today for follow up.  She was last seen in 11/2019 with Leonor Dan CNP.  In brief review, patient was hospitalized 10/9/2019-10/13/2019 due to STEMI, EKG demonstrated ST elevation in the anterolateral leads, patient was taken emergently taken to cath lab and underwent EDWIN x 2 to proximal LAD. Coronary angiogram showed severe single vessel obstructive CAD of LAD (100%) as well as moderate non-obstructive CAD in OM1 and OM2 (60%, 50%). Patient initially requiring intraaortic balloon pump and pressor support. Echocardiogram showed EF 35-40%, grade I diastolic dysfunction, RV normal function and size, and no significant valvular disease. Patient was taken back to cath lab to have RCA evaluated which showed no obstructive disease (40% RCA stenosis).  Patient was initially on Brilinta and aspirin, but due to cost Brilinta was changed to Plavix. Patient was started on carvedilol, losartan and atorvastatin.   She was recently hospitalized 2/9/2020-2/10/2020 for chest pain, n/v.  This was felt related to a reaction to something she ate followed by a vagal reaction.  An echocardiogram was obtained during that admission which demonstrated normalization of LV function 55-60% with no regional wall motion abnormalities.  She had ARTURO which responded to IV fluids.  She was also started on low dose lisinopril 2.5 mg daily.  In follow up today, Heidy reports feeling well.  She denies any  exertional chest pain, chest discomfort or shortness of breath.  She denies any orthopnea, PND or lower extremity edema.  She is entirely without cardiovascular complaints.        PAST MEDICAL HISTORY:  Past Medical History:   Diagnosis Date     ADHD (attention deficit hyperactivity disorder)      Anxiety      Arthritis     back, hands, knees, hips     Asthma     controlled--has coughing symptoms     C. difficile diarrhea      Central sleep apnea      Coronary artery disease involving native coronary artery of native heart without angina pectoris 10/25/2019     Depression      Gastro-oesophageal reflux disease      Hypertension      Ischemic cardiomyopathy 10/25/2019     Narcolepsy      Renal disease     gfr is aroung 48     Sleep apnea     uses Cpap       MEDICATIONS:  Current Outpatient Medications   Medication     albuterol (PROVENTIL) (2.5 MG/3ML) 0.083% neb solution     amoxicillin (AMOXIL) 500 MG capsule     aspirin (ASA) 81 MG EC tablet     atorvastatin (LIPITOR) 40 MG tablet     carvedilol (COREG) 3.125 MG tablet     clopidogrel (PLAVIX) 75 MG tablet     guaiFENesin-codeine (ROBITUSSIN AC) 100-10 MG/5ML SOLN     lisinopril (PRINIVIL/ZESTRIL) 2.5 MG tablet     Melatonin-Pyridoxine (MELATIN PO)     nitroGLYcerin (NITROSTAT) 0.4 MG sublingual tablet     Prenatal Multivit-Min-Fe-FA (PRENATAL/IRON) TABS     sertraline (ZOLOFT) 100 MG tablet     vitamin D3 (CHOLECALCIFEROL) 2000 units (50 mcg) tablet     No current facility-administered medications for this visit.        ALLERGIES:  Allergies   Allergen Reactions     Latex Other (See Comments) and Shortness Of Breath     Runny nose  PN: LW Reaction: DIFFICULTY BREATHING       Flagyl [Metronidazole Hcl] Anaphylaxis and Rash     Food      PN: LW FI1: nka LW FI2:     Hydrochlorothiazide W/Triamterene      PN: LW Reaction: skin rash     No Clinical Screening - See Comments      PN: LW Other1: -Adhesive Tape     Sulfa Drugs Anaphylaxis, Hives and Itching     PN: LW  Reaction: HIVES, Swelling     Tape [Adhesive Tape] Hives     Metoprolol Itching and Rash     Metronidazole Hives and Rash     PN: LW Reaction: HIVES         SOCIAL HISTORY:  I have reviewed this patient's social history and updated it with pertinent information if needed. Gem Gama  reports that she has never smoked. She has never used smokeless tobacco. She reports that she does not drink alcohol or use drugs.    FAMILY HISTORY:  I have reviewed this patient's family history and updated it with pertinent information if needed.   Family History   Problem Relation Age of Onset     Hypertension Mother      Arthritis Mother      Cerebrovascular Disease Father         Three Strokes     Diabetes Father         and brother     Thyroid Disease Sister         Hypothyroid       REVIEW OF SYSTEMS:  A complete ROS was obtained and the pertinent positives are outlined in the history of present illness above.  The remainder of systems is negative.        PHYSICAL EXAM:                     Vital Signs with Ranges     0 lbs 0 oz    Constitutional: awake, alert, no distress  Eyes: PERRL, sclera nonicteric  ENT: trachea midline  Respiratory: CTAB  Cardiovascular: RRR no m/r/g, no JVD  GI: nondistended, nontender, bowel sounds present  Lymph/Hematologic: no lymphadenopathy  Skin: dry, no rash  Musculoskeletal: good muscle tone, strength 5/5 in upper and lower extremities  Neurologic: no focal deficits  Neuropsychiatric: appropriate affact    DATA:  Labs: Reviewed in EPIC          ASSESSMENT/PLAN:  Gem Gama is a very pleasant 68 year old female with a history of coronary artery disease, ischemic cardiomyopathy with normalization of LV function, hypertension, CKD, asthma and depression. Patient is here today for routine follow up, doing well.       1. Coronary artery disease - s/p EDWIN x 2 to LAD 10/9/2019 in the setting of anterior STEMI, Coronary angiogram 10/9 showed severe single vessel obstructive CAD of LAD (100%)  as well as moderate non-obstructive CAD in OM1 and OM2 (60%, 50%). She went back to the cath lab 10/10 for evaluation of RCA which showed no obstructive RCA disease. Stable, no signs/symptoms of angina.               - Continue aspirin and plavix for at minimum of 1 year post revascularization uninterrupted.               - Continue carvedilol and atorvastatin             2. Previous ischemic cardiomyopathy/HFrEF - EF 35-40% initially with normalization of LV function EF 55-60% and wall motion on recent echocardiogram performed at outside hospital.  Continue low dose carvedilol and lisinopril.       3. Hypertension - continue current medication regimen.      4. CKD -  Renal function stable on low dose lisinopril.    5.  Plan for follow up in 6 months with Leonor Dan CNP.        Mel Dennison MD  Cardiology - [facility]  Pager:  [phone]  February 24, 2020    Thank you for allowing me to participate in the care of your patient.      Sincerely,     Mel Dennison MD     [facility]

## 2020-02-24 NOTE — PROGRESS NOTES
CARDIOLOGY CLINIC VISIT  DATE OF SERVICE:  February 24, 2020    PRIMARY CARE PHYSICIAN:  Danny Balbuena Vocal    HISTORY OF PRESENT ILLNESS:  Gem Gama is a very pleasant 68 year old female with a history of coronary artery disease, ischemic cardiomyopathy, hypertension, CKD, asthma and depression who presents to clinic today for follow up.  She was last seen in 11/2019 with Leonor Dan CNP.  In brief review, patient was hospitalized 10/9/2019-10/13/2019 due to STEMI, EKG demonstrated ST elevation in the anterolateral leads, patient was taken emergently taken to cath lab and underwent EDWIN x 2 to proximal LAD. Coronary angiogram showed severe single vessel obstructive CAD of LAD (100%) as well as moderate non-obstructive CAD in OM1 and OM2 (60%, 50%). Patient initially requiring intraaortic balloon pump and pressor support. Echocardiogram showed EF 35-40%, grade I diastolic dysfunction, RV normal function and size, and no significant valvular disease. Patient was taken back to cath lab to have RCA evaluated which showed no obstructive disease (40% RCA stenosis).  Patient was initially on Brilinta and aspirin, but due to cost Brilinta was changed to Plavix. Patient was started on carvedilol, losartan and atorvastatin.   She was recently hospitalized 2/9/2020-2/10/2020 for chest pain, n/v.  This was felt related to a reaction to something she ate followed by a vagal reaction.  An echocardiogram was obtained during that admission which demonstrated normalization of LV function 55-60% with no regional wall motion abnormalities.  She had ARTURO which responded to IV fluids.  She was also started on low dose lisinopril 2.5 mg daily.  In follow up today, Heidy reports feeling well.  She denies any exertional chest pain, chest discomfort or shortness of breath.  She denies any orthopnea, PND or lower extremity edema.  She is entirely without cardiovascular complaints.        PAST MEDICAL HISTORY:  Past Medical History:    Diagnosis Date     ADHD (attention deficit hyperactivity disorder)      Anxiety      Arthritis     back, hands, knees, hips     Asthma     controlled--has coughing symptoms     C. difficile diarrhea      Central sleep apnea      Coronary artery disease involving native coronary artery of native heart without angina pectoris 10/25/2019     Depression      Gastro-oesophageal reflux disease      Hypertension      Ischemic cardiomyopathy 10/25/2019     Narcolepsy      Renal disease     gfr is aroung 48     Sleep apnea     uses Cpap       MEDICATIONS:  Current Outpatient Medications   Medication     albuterol (PROVENTIL) (2.5 MG/3ML) 0.083% neb solution     amoxicillin (AMOXIL) 500 MG capsule     aspirin (ASA) 81 MG EC tablet     atorvastatin (LIPITOR) 40 MG tablet     carvedilol (COREG) 3.125 MG tablet     clopidogrel (PLAVIX) 75 MG tablet     guaiFENesin-codeine (ROBITUSSIN AC) 100-10 MG/5ML SOLN     lisinopril (PRINIVIL/ZESTRIL) 2.5 MG tablet     Melatonin-Pyridoxine (MELATIN PO)     nitroGLYcerin (NITROSTAT) 0.4 MG sublingual tablet     Prenatal Multivit-Min-Fe-FA (PRENATAL/IRON) TABS     sertraline (ZOLOFT) 100 MG tablet     vitamin D3 (CHOLECALCIFEROL) 2000 units (50 mcg) tablet     No current facility-administered medications for this visit.        ALLERGIES:  Allergies   Allergen Reactions     Latex Other (See Comments) and Shortness Of Breath     Runny nose  PN: LW Reaction: DIFFICULTY BREATHING       Flagyl [Metronidazole Hcl] Anaphylaxis and Rash     Food      PN: LW FI1: nka LW FI2:     Hydrochlorothiazide W/Triamterene      PN: LW Reaction: skin rash     No Clinical Screening - See Comments      PN: LW Other1: -Adhesive Tape     Sulfa Drugs Anaphylaxis, Hives and Itching     PN: LW Reaction: HIVES, Swelling     Tape [Adhesive Tape] Hives     Metoprolol Itching and Rash     Metronidazole Hives and Rash     PN: LW Reaction: HIVES         SOCIAL HISTORY:  I have reviewed this patient's social history and  updated it with pertinent information if needed. Gem Gama  reports that she has never smoked. She has never used smokeless tobacco. She reports that she does not drink alcohol or use drugs.    FAMILY HISTORY:  I have reviewed this patient's family history and updated it with pertinent information if needed.   Family History   Problem Relation Age of Onset     Hypertension Mother      Arthritis Mother      Cerebrovascular Disease Father         Three Strokes     Diabetes Father         and brother     Thyroid Disease Sister         Hypothyroid       REVIEW OF SYSTEMS:  A complete ROS was obtained and the pertinent positives are outlined in the history of present illness above.  The remainder of systems is negative.        PHYSICAL EXAM:                     Vital Signs with Ranges     0 lbs 0 oz    Constitutional: awake, alert, no distress  Eyes: PERRL, sclera nonicteric  ENT: trachea midline  Respiratory: CTAB  Cardiovascular: RRR no m/r/g, no JVD  GI: nondistended, nontender, bowel sounds present  Lymph/Hematologic: no lymphadenopathy  Skin: dry, no rash  Musculoskeletal: good muscle tone, strength 5/5 in upper and lower extremities  Neurologic: no focal deficits  Neuropsychiatric: appropriate affact    DATA:  Labs: Reviewed in EPIC          ASSESSMENT/PLAN:  Gem Gama is a very pleasant 68 year old female with a history of coronary artery disease, ischemic cardiomyopathy with normalization of LV function, hypertension, CKD, asthma and depression. Patient is here today for routine follow up, doing well.       1. Coronary artery disease - s/p EDWIN x 2 to LAD 10/9/2019 in the setting of anterior STEMI, Coronary angiogram 10/9 showed severe single vessel obstructive CAD of LAD (100%) as well as moderate non-obstructive CAD in OM1 and OM2 (60%, 50%). She went back to the cath lab 10/10 for evaluation of RCA which showed no obstructive RCA disease. Stable, no signs/symptoms of angina.               -  Continue aspirin and plavix for at minimum of 1 year post revascularization uninterrupted.               - Continue carvedilol and atorvastatin             2. Previous ischemic cardiomyopathy/HFrEF - EF 35-40% initially with normalization of LV function EF 55-60% and wall motion on recent echocardiogram performed at outside hospital.  Continue low dose carvedilol and lisinopril.       3. Hypertension - continue current medication regimen.      4. CKD - Renal function stable on low dose lisinopril.    5.  Plan for follow up in 6 months with Leonor Dan CNP.        Mel Dennison MD  Cardiology - [facility]  Pager:  [phone]  February 24, 2020

## 2020-02-27 ENCOUNTER — OFFICE VISIT (OUTPATIENT)
Dept: FAMILY MEDICINE | Facility: CLINIC | Age: 70
End: 2020-02-27
Payer: MEDICARE

## 2020-02-27 VITALS
OXYGEN SATURATION: 98 % | SYSTOLIC BLOOD PRESSURE: 135 MMHG | TEMPERATURE: 97.8 F | HEIGHT: 64 IN | WEIGHT: 173 LBS | RESPIRATION RATE: 18 BRPM | BODY MASS INDEX: 29.53 KG/M2 | DIASTOLIC BLOOD PRESSURE: 69 MMHG | HEART RATE: 74 BPM

## 2020-02-27 DIAGNOSIS — K52.9 POSTPRANDIAL DIARRHEA: Primary | ICD-10-CM

## 2020-02-27 PROCEDURE — 82656 EL-1 FECAL QUAL/SEMIQ: CPT | Performed by: INTERNAL MEDICINE

## 2020-02-27 PROCEDURE — 83516 IMMUNOASSAY NONANTIBODY: CPT | Performed by: INTERNAL MEDICINE

## 2020-02-27 PROCEDURE — 36415 COLL VENOUS BLD VENIPUNCTURE: CPT | Performed by: INTERNAL MEDICINE

## 2020-02-27 PROCEDURE — 87506 IADNA-DNA/RNA PROBE TQ 6-11: CPT | Performed by: INTERNAL MEDICINE

## 2020-02-27 PROCEDURE — 87209 SMEAR COMPLEX STAIN: CPT | Performed by: INTERNAL MEDICINE

## 2020-02-27 PROCEDURE — 99214 OFFICE O/P EST MOD 30 MIN: CPT | Performed by: INTERNAL MEDICINE

## 2020-02-27 PROCEDURE — 87177 OVA AND PARASITES SMEARS: CPT | Performed by: INTERNAL MEDICINE

## 2020-02-27 PROCEDURE — 83516 IMMUNOASSAY NONANTIBODY: CPT | Mod: 59 | Performed by: INTERNAL MEDICINE

## 2020-02-27 ASSESSMENT — MIFFLIN-ST. JEOR: SCORE: 1284.72

## 2020-02-27 ASSESSMENT — PAIN SCALES - GENERAL: PAINLEVEL: NO PAIN (0)

## 2020-02-27 NOTE — PATIENT INSTRUCTIONS
At [facility], we strive to deliver an exceptional experience to you, every time we see you.  If you receive a survey in the mail, please send us back your thoughts. We really do value your feedback.    Based on your medical history, these are the current health maintenance/preventive care services that you are due for (some may have been done at this visit.)  Health Maintenance Due   Topic Date Due     DEXA  1950     ZOSTER IMMUNIZATION (2 of 3) 10/23/2013     ADVANCE CARE PLANNING  06/18/2017     ASTHMA ACTION PLAN  02/09/2018     MEDICARE ANNUAL WELLNESS VISIT  03/15/2019     FALL RISK ASSESSMENT  03/15/2019     MAMMO SCREENING  12/26/2019         Suggested websites for health information:  [email or web address] : Up to date and easily searchable information on multiple topics.  Www.medlineplus.gov : medication info, interactive tutorials, watch real surgeries online  Www.familydoctor.org : good info from the Academy of Family Physicians  Www.cdc.gov : public health info, travel advisories, epidemics (H1N1)  Www.aap.org : children's health info, normal development, vaccinations  Www.health.state[place] : [place] dept of health, public health issues in [place], N1N1    Your care team:                            Family Medicine Internal Medicine   MD Danny Christian MD Shantel Branch-Fleming, MD Katya Georgiev PA-C Nam Ho, MD Pediatrics   PATTY Caro, DEVON Vallejo APRN MD Raissa Handy MD Deborah Mielke, MD Kim Thein, APRN CNP      Clinic hours: Monday - Thursday 7 am-7 pm; Fridays 7 am-5 pm.   Urgent care: Monday - Friday 11 am-9 pm; Saturday and Sunday 9 am-5 pm.  Pharmacy : Monday -Thursday 8 am-8 pm; Friday 8 am-6 pm; Saturday and Sunday 9 am-5 pm.     Clinic: [phone]   Pharmacy: [phone]

## 2020-02-27 NOTE — PROGRESS NOTES
Subjective     Gem Gama is a 69 year old female who presents to clinic today for the following health issues:    HPI       Hospital Follow-up Visit:    Hospital/Nursing Home/ Rehab Facility: [facility]  Date of Admission: 2/9/2020  Date of Discharge: 2/10/2020  Reason(s) for Admission: Chest pain, nausea and vomiting, diarrhea and indigestion. Patient noticed that her symptoms are precipitated by intake of fatty foods (deep fried appetizers). Paternal family history is significant for unspecified gallbladder disorder. Maternal side is significant for cholelithiasis.         Problems taking medications regularly:  None       Medication changes since discharge: None       Problems adhering to non-medication therapy:  None    Summary of hospitalization:  CareEverywhere information obtained and reviewed  Diagnostic Tests/Treatments reviewed.  Follow up needed: Yes.  Other Healthcare Providers Involved in Patient s Care:         None  Update since discharge: fluctuating course.     Post Discharge Medication Reconciliation: discharge medications reconciled and changed, per note/orders (see AVS).  Plan of care communicated with patient     Coding guidelines for this visit:  Type of Medical   Decision Making Face-to-Face Visit       within 7 Days of discharge Face-to-Face Visit        within 14 days of discharge   Moderate Complexity [id number]   High Complexity [id number]                Patient Active Problem List   Diagnosis     Depression with anxiety     TMJ (temporomandibular joint syndrome)     S/P hip replacement     Congenital ureteric stenosis     Lacrimal duct stenosis     Chronic rhinitis     CARDIOVASCULAR SCREENING; LDL GOAL LESS THAN 160     Intermittent asthma     Advanced directives, counseling/discussion     Major depressive disorder, recurrent episode, moderate (H)     Pituitary microadenoma (H)     Obstructive sleep apnea syndrome     Benign essential hypertension      Osteoarthritis of right knee     S/P total hip arthroplasty     Constipation     Central sleep apnea     Controlled narcolepsy     Esophageal reflux (GERD)     Status post total knee replacement     Primary narcolepsy without cataplexy     Vitamin D deficiency     CKD (chronic kidney disease) stage 3, GFR 30-59 ml/min (H)     Incontinence of feces with fecal urgency     Overactive bladder     Fatigue, unspecified type     STEMI (ST elevation myocardial infarction) (H)     ST elevation myocardial infarction involving left anterior descending (LAD) coronary artery (H)     Coronary artery disease involving native coronary artery of native heart without angina pectoris     Ischemic cardiomyopathy     Chronic systolic heart failure (H)     Past Surgical History:   Procedure Laterality Date     ARTHROPLASTY KNEE  7/9/2014    Procedure: ARTHROPLASTY KNEE;  Surgeon: Levi Johnson MD;  Location:  OR     ARTHROPLASTY KNEE Left 11/24/2014    Procedure: ARTHROPLASTY KNEE;  Surgeon: Levi Johnson MD;  Location: [facility] REIMPLANT URETER,EXTENSIVE TAILORING  1973 1997     C REPAIR ENTEROCELE,VAG APPRCH  2008    Prolift     C TOTAL ABD HYSTERECTOMY+BLAD REPR  1992    Vaginal approach with oophrectomy     C TOTAL KNEE ARTHROPLASTY       CV CORONARY ANGIOGRAM N/A 10/9/2019    Procedure: Coronary Angiogram;  Surgeon: Chiquita Chatterjee MD;  Location:  HEART CARDIAC CATH LAB     CV HEART CATHETERIZATION WITH POSSIBLE INTERVENTION N/A 10/10/2019    Procedure: Heart Catheterization with Possible Intervention;  Surgeon: Chin Garcia MD;  Location:  HEART CARDIAC CATH LAB     CV INTRA-AORTIC BALLOON PUMP INSERTION N/A 10/9/2019    Procedure: Intra-Aortic Balloon Pump Insertion;  Surgeon: Chiquita Chatterjee MD;  Location:  HEART CARDIAC CATH LAB     CV INTRA-AORTIC BALLOON PUMP REMOVAL N/A 10/10/2019    Procedure: Intra-Aortic Balloon Pump Removal;  Surgeon: Chin Garcia MD;  Location:  HEART CARDIAC  CATH LAB     CV PCI STENT DRUG ELUTING N/A 10/9/2019    Procedure: PCI Stent Drug Eluting;  Surgeon: Chiquita Chatterjee MD;  Location:  HEART CARDIAC CATH LAB     GENITOURINARY SURGERY      kidney surgery X 3     ORTHOPEDIC SURGERY      bilat total hip     RECTOCELE REPAIR       SOFT TISSUE SURGERY         Social History     Tobacco Use     Smoking status: Never Smoker     Smokeless tobacco: Never Used   Substance Use Topics     Alcohol use: No     Family History   Problem Relation Age of Onset     Hypertension Mother      Arthritis Mother      Cerebrovascular Disease Father         Three Strokes     Diabetes Father         and brother     Thyroid Disease Sister         Hypothyroid         Allergies   Allergen Reactions     Latex Other (See Comments) and Shortness Of Breath     Runny nose  PN: LW Reaction: DIFFICULTY BREATHING       Flagyl [Metronidazole Hcl] Anaphylaxis and Rash     Food      PN: LW FI1: nka LW FI2:     Hydrochlorothiazide W/Triamterene      PN: LW Reaction: skin rash     No Clinical Screening - See Comments      PN: LW Other1: -Adhesive Tape     Sulfa Drugs Anaphylaxis, Hives and Itching     PN: LW Reaction: HIVES, Swelling     Tape [Adhesive Tape] Hives     Metoprolol Itching and Rash     Metronidazole Hives and Rash     PN: LW Reaction: HIVES       Recent Labs   Lab Test 01/16/20  1634 01/02/20  1704 12/10/19  1215  10/15/19  1246  10/09/19  1208  08/16/18  1700  02/09/17  1230 02/02/16  1634  09/19/13  0717   A1C  --   --   --   --   --   --   --   --   --   --   --   --   --  5.6   LDL  --   --  57  --   --   --  166*  --   --   --  175* 126*  --  141*   HDL  --   --  44*  --   --   --  45*  --   --   --  50 54  --  51   TRIG  --   --  110  --   --   --  136  --   --   --  125 105  --  111   ALT  --   --  30  --  32  --   --   --  20  --   --   --   --   --    CR 1.12* 1.10* 0.93   < > 1.09*   < > 1.06*   < > 1.34*   < > 1.05* 1.06*   < > 0.84   GFRESTIMATED 49* 50* 62   < > 51*   < >  "53*   < > 39*   < > 52* 52*   < > 68   GFRESTBLACK 57* 58* 71   < > 59*   < > 61   < > 47*   < > 63 63   < > 83   POTASSIUM 4.7 5.1 4.4   < > 4.6   < > 3.6   < > 3.5   < > 4.5 3.8   < > 4.1   TSH  --   --   --   --   --   --   --   --   --   --   --  1.62  --  1.56    < > = values in this interval not displayed.      BP Readings from Last 3 Encounters:   02/27/20 135/69   02/24/20 94/50   12/10/19 110/71    Wt Readings from Last 3 Encounters:   02/27/20 78.5 kg (173 lb)   02/24/20 78.5 kg (173 lb)   12/10/19 82.4 kg (181 lb 9.6 oz)                    Reviewed and updated as needed this visit by Provider         Review of Systems   ROS COMP: CONSTITUTIONAL: NEGATIVE for fever, chills, change in weight  INTEGUMENTARY/SKIN: NEGATIVE for worrisome rashes, moles or lesions  EYES: NEGATIVE for vision changes or irritation  ENT/MOUTH: NEGATIVE for ear, mouth and throat problems  RESP: NEGATIVE for significant cough or SOB  CV: NEGATIVE for chest pain, palpitations or peripheral edema  GI: NEGATIVE for hematemesis, hematochezia, jaundice, melena and nausea  : NEGATIVE for frequency, dysuria, or hematuria  MUSCULOSKELETAL: NEGATIVE for significant arthralgias or myalgia  NEURO: NEGATIVE for weakness, dizziness or paresthesias  ENDOCRINE: NEGATIVE for temperature intolerance, skin/hair changes  HEME: NEGATIVE for bleeding problems  PSYCHIATRIC: NEGATIVE for changes in mood or affect      Objective    /69 (BP Location: Left arm, Patient Position: Chair, Cuff Size: Adult Large)   Pulse 74   Temp 97.8  F (36.6  C) (Oral)   Resp 18   Ht 1.626 m (5' 4\")   Wt 78.5 kg (173 lb)   LMP  (LMP Unknown)   SpO2 98%   BMI 29.70 kg/m    Body mass index is 29.7 kg/m .  Physical Exam   GENERAL: healthy, alert and no distress  EYES: Eyes grossly normal to inspection, PERRL and conjunctivae and sclerae normal  HENT: ear canals and TM's normal, nose and mouth without ulcers or lesions  NECK: no adenopathy, no asymmetry, masses, or " scars and thyroid normal to palpation  RESP: lungs clear to auscultation - no rales, rhonchi or wheezes  CV: regular rate and rhythm, normal S1 S2, no S3 or S4, no murmur, click or rub, no peripheral edema and peripheral pulses strong  ABDOMEN: soft, nontender, no hepatosplenomegaly, no masses and bowel sounds normal  MS: no gross musculoskeletal defects noted, no edema  SKIN: no suspicious lesions or rashes  NEURO: Normal strength and tone, mentation intact and speech normal  PSYCH: mentation appears normal, affect normal/bright    Diagnostic Test Results:  Epic reviewed.     Assessment & Plan     1. Postprandial diarrhea    - Elastase Fecal; Future  - Enteric Bacteria and Virus Panel by KRISTEN Stool; Future  - Clostridium difficile Toxin B PCR; Future  - Ova and Parasite Exam Routine; Future  - Tissue transglutaminase lissette IgA and IgG  - US Abdomen Complete; Future       FUTURE APPOINTMENTS:       - Follow-up visit in 4 weeks.      Danny Conteh MD  [facility]

## 2020-02-28 DIAGNOSIS — K52.9 POSTPRANDIAL DIARRHEA: ICD-10-CM

## 2020-02-28 LAB
C COLI+JEJUNI+LARI FUSA STL QL NAA+PROBE: NOT DETECTED
EC STX1 GENE STL QL NAA+PROBE: NOT DETECTED
EC STX2 GENE STL QL NAA+PROBE: NOT DETECTED
ENTERIC PATHOGEN COMMENT: NORMAL
NOROV GI+II ORF1-ORF2 JNC STL QL NAA+PR: NOT DETECTED
RVA NSP5 STL QL NAA+PROBE: NOT DETECTED
SALMONELLA SP RPOD STL QL NAA+PROBE: NOT DETECTED
SHIGELLA SP+EIEC IPAH STL QL NAA+PROBE: NOT DETECTED
TTG IGA SER-ACNC: 1 U/ML
TTG IGG SER-ACNC: 1 U/ML
V CHOL+PARA RFBL+TRKH+TNAA STL QL NAA+PR: NOT DETECTED
Y ENTERO RECN STL QL NAA+PROBE: NOT DETECTED

## 2020-03-02 LAB
O+P STL MICRO: NORMAL
O+P STL MICRO: NORMAL
SPECIMEN SOURCE: NORMAL

## 2020-03-04 ENCOUNTER — ANCILLARY PROCEDURE (OUTPATIENT)
Dept: ULTRASOUND IMAGING | Facility: CLINIC | Age: 70
End: 2020-03-04
Attending: INTERNAL MEDICINE
Payer: MEDICARE

## 2020-03-04 DIAGNOSIS — K52.9 POSTPRANDIAL DIARRHEA: ICD-10-CM

## 2020-03-04 PROCEDURE — 76700 US EXAM ABDOM COMPLETE: CPT

## 2020-03-05 LAB — ELASTASE PANC STL-MCNT: >800 UG/G

## 2020-03-12 ENCOUNTER — HOSPITAL ENCOUNTER (OUTPATIENT)
Dept: CARDIAC REHAB | Facility: CLINIC | Age: 70
End: 2020-03-12
Attending: INTERNAL MEDICINE
Payer: MEDICARE

## 2020-03-12 PROCEDURE — 93798 PHYS/QHP OP CAR RHAB W/ECG: CPT | Performed by: OCCUPATIONAL THERAPIST

## 2020-03-12 PROCEDURE — 93797 PHYS/QHP OP CAR RHAB WO ECG: CPT | Performed by: OCCUPATIONAL THERAPIST

## 2020-03-12 PROCEDURE — 40000575 ZZH STATISTIC OP CARDIAC VISIT #2: Performed by: OCCUPATIONAL THERAPIST

## 2020-03-12 PROCEDURE — 40000116 ZZH STATISTIC OP CR VISIT: Performed by: OCCUPATIONAL THERAPIST

## 2020-04-13 ENCOUNTER — NURSE TRIAGE (OUTPATIENT)
Dept: NURSING | Facility: CLINIC | Age: 70
End: 2020-04-13

## 2020-04-14 NOTE — TELEPHONE ENCOUNTER
"Ate big dinner, and afterwards got sharp pain in shoulder blades and up to neck, 7/10.      Took Nitroglycerine under the tongue and pain went down to 1/10.    BP 77/62 before nitroglycerin, then 96/60 after the Nitroglycerine.  Now 126/70.    Denies other symptoms at this time.    This is similar pain she had when she had her heart attack, so strongly recommended 911 per guidelines.      Patient said more than once she is not having any concerning symptoms and doesn't feel she needs to call 911 at this time.  She will follow up with her cardiologist tomorrow, and if pain comes back will call 911.     Damari Ram RN  [facility]      Reason for Disposition    [1] Similar pain previously AND [2] it was from \"heart attack\"    Additional Information    Negative: Passed out (i.e., lost consciousness, collapsed and was not responding)    Negative: Shock suspected (e.g., cold/pale/clammy skin, too weak to stand, low BP, rapid pulse)    Protocols used: SHOULDER PAIN-A-AH      "

## 2020-05-09 ENCOUNTER — NURSE TRIAGE (OUTPATIENT)
Dept: NURSING | Facility: CLINIC | Age: 70
End: 2020-05-09
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
## 2020-05-18 ENCOUNTER — OFFICE VISIT (OUTPATIENT)
Dept: URGENT CARE | Facility: URGENT CARE | Age: 70
End: 2020-05-18
Payer: MEDICARE

## 2020-05-18 VITALS
OXYGEN SATURATION: 97 % | DIASTOLIC BLOOD PRESSURE: 69 MMHG | RESPIRATION RATE: 12 BRPM | SYSTOLIC BLOOD PRESSURE: 105 MMHG | HEART RATE: 85 BPM | WEIGHT: 171.2 LBS | TEMPERATURE: 98.7 F | BODY MASS INDEX: 29.39 KG/M2

## 2020-05-18 DIAGNOSIS — N18.30 CKD (CHRONIC KIDNEY DISEASE) STAGE 3, GFR 30-59 ML/MIN (H): ICD-10-CM

## 2020-05-18 DIAGNOSIS — B02.9 HERPES ZOSTER WITHOUT COMPLICATION: Primary | ICD-10-CM

## 2020-05-18 PROCEDURE — 99214 OFFICE O/P EST MOD 30 MIN: CPT | Performed by: PHYSICIAN ASSISTANT

## 2020-05-18 RX ORDER — VALACYCLOVIR HYDROCHLORIDE 1 G/1
1000 TABLET, FILM COATED ORAL 3 TIMES DAILY
Qty: 21 TABLET | Refills: 0 | Status: SHIPPED | OUTPATIENT
Start: 2020-05-18 | End: 2020-06-01

## 2020-05-18 ASSESSMENT — ENCOUNTER SYMPTOMS
SORE THROAT: 0
ENDOCRINE NEGATIVE: 1
NECK STIFFNESS: 0
BACK PAIN: 0
RESPIRATORY NEGATIVE: 1
COUGH: 0
CARDIOVASCULAR NEGATIVE: 1
EYES NEGATIVE: 1
WOUND: 0
HEADACHES: 0
FEVER: 0
WEAKNESS: 0
PALPITATIONS: 0
ALLERGIC/IMMUNOLOGIC NEGATIVE: 1
MYALGIAS: 0
JOINT SWELLING: 0
SHORTNESS OF BREATH: 0
CHILLS: 0
ARTHRALGIAS: 0
RHINORRHEA: 0
NECK PAIN: 0
HEMATOLOGIC/LYMPHATIC NEGATIVE: 1
DIZZINESS: 0
DIARRHEA: 0
LIGHT-HEADEDNESS: 0
VOMITING: 0
MUSCULOSKELETAL NEGATIVE: 1
NAUSEA: 0
BRUISES/BLEEDS EASILY: 0

## 2020-05-18 NOTE — PATIENT INSTRUCTIONS
Patient Education     Shingles  Shingles is a viral infection caused by the same virus that causes chicken pox. Anyone who has had chicken pox may get shingles later in life. The virus stays in the body, but remains asleep (dormant). Shingles often occurs in older persons or persons with lowered immunity. But it can affect anyone at any age.  Shingles starts as a tingling patch of skin on one side of the body. Small, painful blisters may then appear. The rash rarely spreads to other parts of the body.  Exposure to shingles can't cause shingles. However, it can cause chicken pox in anyone who has not had chicken pox or has not been vaccinated. The contagious period ends when all blisters have crusted over, generally 1 to 2 weeks after the illness starts.  After the blisters heal, the affected skin may be sensitive or painful for weeks or months, gradually resolving over time. But, sometimes this can last longer and be permanent (called postherpetic neuralgia.)  Shingles vaccines are available. Vaccination can help prevent shingles or make it less painful. It is generally recommended for adults older than 50, even if you've had singles in the past. Talk with your healthcare provider about when to get vaccinated and which vaccine is best for you.  Home care    Medicines may be prescribed to help relieve pain. Take these medicines as directed. Ask your healthcare provider or pharmacist before using over-the-counter medicines for helping treat pain and itching.    In certain cases, antiviral medicines may be prescribed to reduce pain, shorten the illness, and prevent neuralgia. Take these medicines as directed.    Compresses made from a solution of cool water mixed with cornstarch or baking soda may help relieve pain and itching.     Gently wash skin daily with soap and water to help prevent infection. Be certain to rinse off all of the soap, which can be irritating.    Trim fingernails and try not to scratch.  Scratching the sores may leave scars.    Stay home from work or school until all blisters have formed a crust and you are no longer contagious.  Follow-up care  Follow up with your healthcare provider, or as directed.  When to seek medical advice    Fever of 100.4 F (38 C) or higher, or as directed by your healthcare provider    Affected skin is on the face or neck and any of the following occur:  ? Headache  ? Eye pain  ? Changes in vision  ? Sores near the eye  ? Weakness of facial muscles    Blisters occurring on new areas of the body    Pain, redness, or swelling of a joint    Signs of skin infection: colored drainage from the sores, warmth, increasing redness, fever, or increasing pain  Date Last Reviewed: 4/1/2018 2000-2019 [employer]. [address] [place] [address], [place] [address]. All rights reserved. This information is not intended as a substitute for professional medical care. Always follow your healthcare professional's instructions.

## 2020-05-18 NOTE — PROGRESS NOTES
"Chief Complaint:    Chief Complaint   Patient presents with     Derm Problem     Rash on face x1 week, pain in right ear and under right eye       HPI: Gem Gama is an 69 year old female who presents for evaluation and treatment of rash on the face.  Symptoms started 1 week ago and have not changed.  The rash is painful with a prickly, burning sensation.      Patient has a complicated medical Hx \"see problem list below.\"    ROS:      Review of Systems   Constitutional: Negative for chills and fever.   HENT: Negative for congestion, ear pain, rhinorrhea and sore throat.    Eyes: Negative.    Respiratory: Negative.  Negative for cough and shortness of breath.    Cardiovascular: Negative.  Negative for chest pain and palpitations.   Gastrointestinal: Negative for diarrhea, nausea and vomiting.   Endocrine: Negative.    Genitourinary: Negative.    Musculoskeletal: Negative.  Negative for arthralgias, back pain, joint swelling, myalgias, neck pain and neck stiffness.   Skin: Positive for rash. Negative for wound.   Allergic/Immunologic: Negative.  Negative for immunocompromised state.   Neurological: Negative for dizziness, weakness, light-headedness and headaches.   Hematological: Negative.  Does not bruise/bleed easily.        Family History   Family History   Problem Relation Age of Onset     Hypertension Mother      Arthritis Mother      Cerebrovascular Disease Father         Three Strokes     Diabetes Father         and brother     Thyroid Disease Sister         Hypothyroid       Social History  Social History     Socioeconomic History     Marital status:      Spouse name: Not on file     Number of children: Not on file     Years of education: Not on file     Highest education level: Not on file   Occupational History     Not on file   Social Needs     Financial resource strain: Not on file     Food insecurity     Worry: Not on file     Inability: Not on file     Transportation needs     Medical: Not " on file     Non-medical: Not on file   Tobacco Use     Smoking status: Never Smoker     Smokeless tobacco: Never Used   Substance and Sexual Activity     Alcohol use: No     Drug use: No     Sexual activity: Never     Partners: Male     Birth control/protection: None   Lifestyle     Physical activity     Days per week: Not on file     Minutes per session: Not on file     Stress: Not on file   Relationships     Social connections     Talks on phone: Not on file     Gets together: Not on file     Attends Sabianism service: Not on file     Active member of club or organization: Not on file     Attends meetings of clubs or organizations: Not on file     Relationship status: Not on file     Intimate partner violence     Fear of current or ex partner: Not on file     Emotionally abused: Not on file     Physically abused: Not on file     Forced sexual activity: Not on file   Other Topics Concern     Parent/sibling w/ CABG, MI or angioplasty before 65F 55M? No      Service No     Blood Transfusions No     Caffeine Concern No     Occupational Exposure No     Hobby Hazards No     Sleep Concern Yes     Comment: Uses CPAP     Stress Concern No     Weight Concern Yes     Special Diet No     Back Care No     Exercise No     Bike Helmet No     Comment:       Seat Belt Yes     Self-Exams Yes   Social History Narrative     Not on file        Surgical History:  Past Surgical History:   Procedure Laterality Date     ARTHROPLASTY KNEE  7/9/2014    Procedure: ARTHROPLASTY KNEE;  Surgeon: Levi Johnson MD;  Location: [facility] OR     ARTHROPLASTY KNEE Left 11/24/2014    Procedure: ARTHROPLASTY KNEE;  Surgeon: Levi Johnson MD;  Location:  OR     C REIMPLANT URETER,EXTENSIVE TAILORING  1973 1997     C REPAIR ENTEROCELE,VAG APPRCH  2008    Prolift     C TOTAL ABD HYSTERECTOMY+BLAD REPR  1992    Vaginal approach with oophrectomy     C TOTAL KNEE ARTHROPLASTY       CV CORONARY ANGIOGRAM N/A 10/9/2019    Procedure: Coronary Angiogram;   Surgeon: Chiquita Chatterjee MD;  Location:  [facility]ATH [facility]     CV HEART CATHETERIZATION WITH POSSIBLE INTERVENTION N/A 10/10/2019    Procedure: Heart Catheterization with Possible Intervention;  Surgeon: Chin Garcia MD;  Location:  [facility]     CV INTRA-AORTIC BALLOON PUMP INSERTION N/A 10/9/2019    Procedure: Intra-Aortic Balloon Pump Insertion;  Surgeon: Chiquita Chatterjee MD;  Location:  HE[facility] CATH LAB     CV INTRA-AORTIC BALLOON PUMP REMOVAL N/A 10/10/2019    Procedure: Intra-Aortic Balloon Pump Removal;  Surgeon: Chin Garcia MD;  Location:  [facility]ATH [facility]     CV PCI STENT DRUG ELUTING N/A 10/9/2019    Procedure: PCI Stent Drug Eluting;  Surgeon: Chiquita Chatterjee MD;  Location:  HE[facility] CARD[facility] CATH LAB     GENITOURINARY SURGERY      kidney surgery X 3     ORTHOPEDIC SURGERY      bilat total hip     RECTOCELE REPAIR       SOFT TISSUE SURGERY          Problem List:  Patient Active Problem List   Diagnosis     Depression with anxiety     TMJ (temporomandibular joint syndrome)     S/P hip replacement     Congenital ureteric stenosis     Lacrimal duct stenosis     Chronic rhinitis     CARDIOVASCULAR SCREENING; LDL GOAL LESS THAN 160     Intermittent asthma     Advanced directives, counseling/discussion     Major depressive disorder, recurrent episode, moderate (H)     Pituitary microadenoma (H)     Obstructive sleep apnea syndrome     Benign essential hypertension     Osteoarthritis of right knee     S/P total hip arthroplasty     Constipation     Central sleep apnea     Controlled narcolepsy     Esophageal reflux (GERD)     Status post total knee replacement     Primary narcolepsy without cataplexy     Vitamin D deficiency     CKD (chronic kidney disease) stage 3, GFR 30-59 ml/min (H)     Incontinence of feces with fecal urgency     Overactive bladder     Fatigue, unspecified type     STEMI (ST elevation myocardial infarction) (H)     ST  elevation myocardial infarction involving left anterior descending (LAD) coronary artery (H)     Coronary artery disease involving native coronary artery of native heart without angina pectoris     Ischemic cardiomyopathy     Chronic systolic heart failure (H)        Allergies:  Allergies   Allergen Reactions     Latex Other (See Comments) and Shortness Of Breath     Runny nose  PN: LW Reaction: DIFFICULTY BREATHING       Flagyl [Metronidazole Hcl] Anaphylaxis and Rash     Food      PN: LW FI1: nka LW FI2:     Hydrochlorothiazide W/Triamterene      PN: LW Reaction: skin rash     No Clinical Screening - See Comments      PN: LW Other1: -Adhesive Tape     Sulfa Drugs Anaphylaxis, Hives and Itching     PN: LW Reaction: HIVES, Swelling     Tape [Adhesive Tape] Hives     Metoprolol Itching and Rash     Metronidazole Hives and Rash     PN: LW Reaction: HIVES          Current Meds:    Current Outpatient Medications:      aspirin (ASA) 81 MG EC tablet, Take 1 tablet (81 mg) by mouth daily, Disp: 90 tablet, Rfl: 0     atorvastatin (LIPITOR) 40 MG tablet, Take 1 tablet (40 mg) by mouth daily, Disp: 90 tablet, Rfl: 3     carvedilol (COREG) 3.125 MG tablet, Take 1 tablet (3.125 mg) by mouth 2 times daily (with meals), Disp: 180 tablet, Rfl: 3     clopidogrel (PLAVIX) 75 MG tablet, Take 1 tablet (75 mg) by mouth daily, Disp: 90 tablet, Rfl: 3     lisinopril (PRINIVIL/ZESTRIL) 2.5 MG tablet, TAKE 1 TABLET(2.5 MG) BY MOUTH AT BEDTIME, Disp: 90 tablet, Rfl: 1     Melatonin-Pyridoxine (MELATIN PO), prn, Disp: , Rfl:      Prenatal Multivit-Min-Fe-FA (PRENATAL/IRON) TABS, , Disp: , Rfl:      sertraline (ZOLOFT) 100 MG tablet, TAKE 1 TABLET(100 MG) BY MOUTH DAILY, Disp: 90 tablet, Rfl: 0     valACYclovir (VALTREX) 1000 mg tablet, Take 1 tablet (1,000 mg) by mouth 3 times daily for 7 days, Disp: 21 tablet, Rfl: 0     vitamin D3 (CHOLECALCIFEROL) 2000 units (50 mcg) tablet, Take 1 tablet (2,000 Units) by mouth daily, Disp: 90 tablet,  Rfl: 3     amoxicillin (AMOXIL) 500 MG capsule, TK 4 CS PO ONE HOUR BEFORE DENTAL APPOINTMENT, Disp: , Rfl: 1     nitroGLYcerin (NITROSTAT) 0.4 MG sublingual tablet, For chest pain place 1 tablet under the tongue every 5 minutes for 3 doses. If symptoms persist 5 minutes after 1st dose call 911. (Patient not taking: Reported on 5/18/2020), Disp: 15 tablet, Rfl: 0     PHYSICAL EXAM:     Vital signs noted and reviewed by Darrell Gillette PA-C  /69   Pulse 85   Temp 98.7  F (37.1  C) (Tympanic)   Resp 12   Wt 77.7 kg (171 lb 3.2 oz)   LMP  (LMP Unknown)   SpO2 97%   BMI 29.39 kg/m       PEFR:    Physical Exam  Vitals signs and nursing note reviewed.   Constitutional:       General: She is not in acute distress.     Appearance: She is well-developed. She is not ill-appearing, toxic-appearing or diaphoretic.   HENT:      Head: Normocephalic and atraumatic.      Right Ear: Tympanic membrane and external ear normal. No drainage, swelling or tenderness. Tympanic membrane is not perforated, erythematous, retracted or bulging.      Left Ear: Tympanic membrane and external ear normal. No drainage, swelling or tenderness. Tympanic membrane is not perforated, erythematous, retracted or bulging.      Nose: No mucosal edema, congestion or rhinorrhea.      Right Sinus: No maxillary sinus tenderness or frontal sinus tenderness.      Left Sinus: No maxillary sinus tenderness or frontal sinus tenderness.      Mouth/Throat:      Pharynx: No pharyngeal swelling, oropharyngeal exudate, posterior oropharyngeal erythema or uvula swelling.      Tonsils: No tonsillar abscesses.   Eyes:      Pupils: Pupils are equal, round, and reactive to light.   Neck:      Musculoskeletal: Full passive range of motion without pain, normal range of motion and neck supple.      Trachea: Trachea normal.   Cardiovascular:      Rate and Rhythm: Normal rate and regular rhythm.      Heart sounds: Normal heart sounds, S1 normal and S2 normal. No  murmur. No friction rub. No gallop.    Pulmonary:      Effort: Pulmonary effort is normal. No respiratory distress.      Breath sounds: Normal breath sounds. No decreased breath sounds, wheezing, rhonchi or rales.   Abdominal:      General: Bowel sounds are normal. There is no distension.      Palpations: Abdomen is soft. Abdomen is not rigid. There is no mass.      Tenderness: There is no abdominal tenderness. There is no guarding or rebound.   Lymphadenopathy:      Cervical: No cervical adenopathy.   Skin:     General: Skin is warm and dry.      Findings: Rash present. Rash is papular and vesicular.      Comments: Papules and vesicles on R side of face.     Neurological:      Mental Status: She is alert and oriented to person, place, and time.      Cranial Nerves: No cranial nerve deficit.      Deep Tendon Reflexes: Reflexes are normal and symmetric.   Psychiatric:         Behavior: Behavior normal. Behavior is cooperative.         Thought Content: Thought content normal.         Judgment: Judgment normal.          Labs:     No results found for any visits on 05/18/20.    Medical Decision Making:    Differential Diagnosis:  Rash: Atopic dermatitis  Herpes zoster  Hives      ASSESSMENT:     1. Herpes zoster without complication    2. CKD (chronic kidney disease) stage 3, GFR 30-59 ml/min (H)           PLAN:     Rx for Valtrex sent in.  Dosage adjustment for CKD not needed at this time per up to date.  Patient instructed to follow up with PCP in 1 week if symptoms are not improving.  Sooner if symptoms worsen.  Patient instructed to follow up with her eye doctor ASAP.  Worrisome symptoms discussed with instructions to go to the ED.  Patient verbalized understanding and agreed with this plan.     Darrell Gillette PA-C  5/18/2020, 11:47 AM

## 2020-06-01 ENCOUNTER — TELEPHONE (OUTPATIENT)
Dept: FAMILY MEDICINE | Facility: CLINIC | Age: 70
End: 2020-06-01

## 2020-06-01 DIAGNOSIS — B02.9 HERPES ZOSTER WITHOUT COMPLICATION: ICD-10-CM

## 2020-06-01 RX ORDER — VALACYCLOVIR HYDROCHLORIDE 1 G/1
1000 TABLET, FILM COATED ORAL 3 TIMES DAILY
Qty: 21 TABLET | Refills: 0 | Status: SHIPPED | OUTPATIENT
Start: 2020-06-01 | End: 2020-09-06

## 2020-06-01 NOTE — TELEPHONE ENCOUNTER
Called and let patient know that medication was sent to pharmacy.     Patient is also aware that if sx are still ongoing/persistant it will require a visit with her PCP.    Patient has agreed to this plan and has no questions at this time.     Humaira Membreno RN  [facility]

## 2020-06-01 NOTE — TELEPHONE ENCOUNTER
Please call patient and advise her that I sent in for a refill.  If symptoms are not improving, she will need to be seen by one of the primary providers.    Darrell Gillette PA-C

## 2020-06-01 NOTE — TELEPHONE ENCOUNTER
"Called to discuss the reaction that patient had left in message    States that the soreness is coming back in her nose, on her cheeks and the bottom of her nose.     Since she has stopped taking it the sores are coming back and spreading on her face.     The day I started taking the valtrex the pain started to improve.     RN attempted to get the patient scheduled for a virtual visit with PCP.     Patient is declining. States that there is no way for her provider to be able to see up her nose and would just like a message to be sent to Dr. Conteh and to  for a refill on medication.     Patient also would rather not come into UC as she has \"risk factors\"    Routing to providers to please advise.     Humaira Membreno RN  [facility]          "

## 2020-06-01 NOTE — TELEPHONE ENCOUNTER
.Reason for call:  Medication   If this is a refill request, has the caller requested the refill from the pharmacy already? No  Will the patient be using a [facility]? No  Name of the pharmacy and phone number for the current request[facility]    Name of the medication requested: valacy clovir 1000mg tablet    Other request: pt would like a refill on the script. She has stopped taking it and it is giving her a bad reaction    Phone number to reach patient:  Home number on file [phone] (home)    Best Time:  anytime    Can we leave a detailed message on this number?  YES    Travel screening: Negative

## 2020-06-16 DIAGNOSIS — F33.1 MAJOR DEPRESSIVE DISORDER, RECURRENT EPISODE, MODERATE (H): ICD-10-CM

## 2020-06-17 RX ORDER — SERTRALINE HYDROCHLORIDE 100 MG/1
TABLET, FILM COATED ORAL
Qty: 90 TABLET | Refills: 0 | Status: SHIPPED | OUTPATIENT
Start: 2020-06-17 | End: 2020-09-28

## 2020-06-17 NOTE — TELEPHONE ENCOUNTER
Routing refill request to provider for review/approval because:      PHQ-9 score not less than 5 in past 6 months    Sara Shrestha RN

## 2020-06-18 ASSESSMENT — PATIENT HEALTH QUESTIONNAIRE - PHQ9: SUM OF ALL RESPONSES TO PHQ QUESTIONS 1-9: 3

## 2020-06-18 NOTE — TELEPHONE ENCOUNTER
Patient would like provider to know that her shingles are better.   Also want to bring to provider's attention that she has been very dehydrated and since then her tailbone hurts. Rates pain 4/10 when sitting. Offered to schedule virtual visit  Patient declined and states she just want to inform provider, if it gets worse she will call clinic back. States she thinks its gotten better.    Zoraida Andersen MA

## 2020-06-18 NOTE — TELEPHONE ENCOUNTER
Patient informed by phone of provider's message.   PHQ9  Completed.  PHQ-9 score:    PHQ 6/18/2020   PHQ-9 Total Score 3   Q9: Thoughts of better off dead/self-harm past 2 weeks Not at all     Zoraida Andersen MA

## 2020-06-19 ASSESSMENT — ASTHMA QUESTIONNAIRES: ACT_TOTALSCORE: 24

## 2020-09-06 ENCOUNTER — OFFICE VISIT (OUTPATIENT)
Dept: URGENT CARE | Facility: URGENT CARE | Age: 70
End: 2020-09-06
Payer: MEDICARE

## 2020-09-06 VITALS
WEIGHT: 168 LBS | DIASTOLIC BLOOD PRESSURE: 74 MMHG | TEMPERATURE: 99 F | OXYGEN SATURATION: 99 % | HEART RATE: 129 BPM | SYSTOLIC BLOOD PRESSURE: 127 MMHG | BODY MASS INDEX: 28.84 KG/M2

## 2020-09-06 DIAGNOSIS — R30.0 DYSURIA: Primary | ICD-10-CM

## 2020-09-06 PROCEDURE — 87186 SC STD MICRODIL/AGAR DIL: CPT | Performed by: FAMILY MEDICINE

## 2020-09-06 PROCEDURE — 87088 URINE BACTERIA CULTURE: CPT | Performed by: FAMILY MEDICINE

## 2020-09-06 PROCEDURE — 99213 OFFICE O/P EST LOW 20 MIN: CPT | Performed by: FAMILY MEDICINE

## 2020-09-06 PROCEDURE — 87086 URINE CULTURE/COLONY COUNT: CPT | Performed by: FAMILY MEDICINE

## 2020-09-06 RX ORDER — FLUCONAZOLE 150 MG/1
150 TABLET ORAL DAILY
Qty: 3 TABLET | Refills: 0 | Status: SHIPPED | OUTPATIENT
Start: 2020-09-06 | End: 2020-09-09

## 2020-09-06 RX ORDER — AMOXICILLIN 500 MG/1
CAPSULE ORAL
Qty: 18 CAPSULE | Refills: 0 | Status: SHIPPED | OUTPATIENT
Start: 2020-09-06 | End: 2020-09-29

## 2020-09-06 NOTE — PATIENT INSTRUCTIONS
Take prescribed medication as directed.    Keep up fluid intake.    Return to clinic or E [facility] if symptoms worsen.

## 2020-09-06 NOTE — PROGRESS NOTES
CHIEF COMPLAINT    Dysuria X 3 days.      HISTORY    She developed dysuria, chills and some back ache. SX for 3 days. No V or D.    Has h/o bladder mesh problem.      Patient Active Problem List   Diagnosis     Depression with anxiety     TMJ (temporomandibular joint syndrome)     S/P hip replacement     Congenital ureteric stenosis     Lacrimal duct stenosis     Chronic rhinitis     CARDIOVASCULAR SCREENING; LDL GOAL LESS THAN 160     Intermittent asthma     Advanced directives, counseling/discussion     Major depressive disorder, recurrent episode, moderate (H)     Pituitary microadenoma (H)     Obstructive sleep apnea syndrome     Benign essential hypertension     Osteoarthritis of right knee     S/P total hip arthroplasty     Constipation     Central sleep apnea     Controlled narcolepsy     Esophageal reflux (GERD)     Status post total knee replacement     Primary narcolepsy without cataplexy     Vitamin D deficiency     CKD (chronic kidney disease) stage 3, GFR 30-59 ml/min (H)     Incontinence of feces with fecal urgency     Overactive bladder     Fatigue, unspecified type     STEMI (ST elevation myocardial infarction) (H)     ST elevation myocardial infarction involving left anterior descending (LAD) coronary artery (H)     Coronary artery disease involving native coronary artery of native heart without angina pectoris     Ischemic cardiomyopathy     Chronic systolic heart failure (H)       REVIEW OF SYSTEMS    No cough or SOB  No abd pain  No edema      Past Medical History:   Diagnosis Date     ADHD (attention deficit hyperactivity disorder)      Anxiety      Arthritis     back, hands, knees, hips     Asthma     controlled--has coughing symptoms     C. difficile diarrhea      Central sleep apnea      Coronary artery disease involving native coronary artery of native heart without angina pectoris 10/25/2019     Depression      Gastro-oesophageal reflux disease      Hypertension      Ischemic cardiomyopathy  10/25/2019     Narcolepsy      Renal disease     gfr is aroung 48     Sleep apnea     uses Cpap         EXAM  /74   Pulse 129   Temp 99  F (37.2  C) (Tympanic)   Wt 76.2 kg (168 lb)   LMP  (LMP Unknown)   SpO2 99%   BMI 28.84 kg/m      NAD  Non labored resp  No CVAT  Abd non tender      Unable to void for full U/A  Attempt culture.      (R30.0) Dysuria  (primary encounter diagnosis)  Comment:   Plan: Urine Culture Aerobic Bacterial, amoxicillin         (AMOXIL) 500 MG capsule, fluconazole (DIFLUCAN)        150 MG tablet, CANCELED: UA with Microscopic         reflex to Culture

## 2020-09-09 LAB
BACTERIA SPEC CULT: ABNORMAL
SPECIMEN SOURCE: ABNORMAL

## 2020-09-11 DIAGNOSIS — I50.22 CHRONIC SYSTOLIC HEART FAILURE (H): ICD-10-CM

## 2020-09-11 RX ORDER — LISINOPRIL 2.5 MG/1
TABLET ORAL
Qty: 30 TABLET | Refills: 0 | Status: SHIPPED | OUTPATIENT
Start: 2020-09-11 | End: 2020-10-14

## 2020-09-11 NOTE — TELEPHONE ENCOUNTER
Mary refill given.    Inform patient that she is due for a clinic (face-to-face) encounter in order to receive additional refills.

## 2020-09-11 NOTE — TELEPHONE ENCOUNTER
Routing refill request to provider for review/approval because:      No Normal serum creatinine on file in past 12 months    Sara Shrestha RN

## 2020-09-11 NOTE — LETTER
September 14, 2020      Gem Gama  [address]  [place] [address]        Dear ,    At [facility] we care about your health and are committed to providing quality patient care. Regular appointments are a vital part of the care and management of your health and can help prevent many of the complications that can occur.       It has come to our attention that you have been given a one time refill of lisinopril (ZESTRIL)  and that you are due for a visit with your provider for further refills.  Please call [facility] at [phone] or use [email or web address] to schedule your appointment.       Sincerely,   [facility] Care Team

## 2020-09-14 NOTE — TELEPHONE ENCOUNTER
Reminder letter to schedule needed appt for further refills mailed to pt's home address.      Cony VELASQUEZ (R))

## 2020-09-28 ENCOUNTER — TELEPHONE (OUTPATIENT)
Dept: FAMILY MEDICINE | Facility: CLINIC | Age: 70
End: 2020-09-28

## 2020-09-28 DIAGNOSIS — F33.1 MAJOR DEPRESSIVE DISORDER, RECURRENT EPISODE, MODERATE (H): ICD-10-CM

## 2020-09-28 NOTE — LETTER
October 1, 2020      Gem Gama  [address]  [place] [address]        Dear ,    At [facility] we care about your health and are committed to providing quality patient care. Regular appointments are a vital part of the care and management of your health and can help prevent many of the complications that can occur.       It has come to our attention that you have been given a one time refill of sertraline (ZOLOFT)  and that you are due for a visit with your provider for further refills.  Please call [facility] at [phone] or use [facility] to schedule your appointment.       Sincerely,   [facility] Care Team

## 2020-09-29 ENCOUNTER — VIRTUAL VISIT (OUTPATIENT)
Dept: CARDIOLOGY | Facility: CLINIC | Age: 70
End: 2020-09-29
Attending: INTERNAL MEDICINE
Payer: MEDICARE

## 2020-09-29 DIAGNOSIS — I25.5 ISCHEMIC CARDIOMYOPATHY: ICD-10-CM

## 2020-09-29 DIAGNOSIS — I25.810 CORONARY ARTERY DISEASE INVOLVING AUTOLOGOUS ARTERY CORONARY BYPASS GRAFT WITHOUT ANGINA PECTORIS: Primary | ICD-10-CM

## 2020-09-29 DIAGNOSIS — I10 BENIGN ESSENTIAL HYPERTENSION: ICD-10-CM

## 2020-09-29 DIAGNOSIS — I21.02 ST ELEVATION MYOCARDIAL INFARCTION INVOLVING LEFT ANTERIOR DESCENDING (LAD) CORONARY ARTERY (H): ICD-10-CM

## 2020-09-29 PROCEDURE — 99214 OFFICE O/P EST MOD 30 MIN: CPT | Mod: 95 | Performed by: NURSE PRACTITIONER

## 2020-09-29 RX ORDER — AMOXICILLIN 500 MG/1
2000 CAPSULE ORAL
COMMUNITY
End: 2023-09-08

## 2020-09-29 NOTE — PROGRESS NOTES
"Gem Gama is a 69 year old female who is being evaluated via a billable video visit.      The patient has been notified of following:     \"This video visit will be conducted via a call between you and your physician/provider. We have found that certain health care needs can be provided without the need for an in-person physical exam.  This service lets us provide the care you need with a video conversation.  If a prescription is necessary we can send it directly to your pharmacy.  If lab work is needed we can place an order for that and you can then stop by our lab to have the test done at a later time.    Video visits are billed at different rates depending on your insurance coverage.  Please reach out to your insurance provider with any questions.    If during the course of the call the physician/provider feels a video visit is not appropriate, you will not be charged for this service.\"    Patient has given verbal consent for Video visit? Yes  How would you like to obtain your AVS? MyChart  If you are dropped from the video visit, the video invite should be resent to: [phone]  Will anyone else be joining your video visit? No       Vitals - Patient Reported  Weight (Patient Reported): 77.6 kg (171 lb)  Height (Patient Reported): 163.8 cm (5' 4.5\")  BMI (Based on Pt Reported Ht/Wt): 28.9    Review Of Systems  Skin: NEGATIVE  Eyes:Ears/Nose/Throat: NEGATIVE  Respiratory: coughing- getting worse  Cardiovascular: chest pain- \"vauge ache\" a couple episodes, light headed  Gastrointestinal: some heart burn   Genitourinary:NEGATIVE   Musculoskeletal: jaw ache couple nights ago  Neurologic: numbness in right hand, headache   Psychiatric: NEGATIVE  Hematologic/Lymphatic/Immunologic: NEGATIVE  Endocrine:  NEGATIVE    Send invitation to: [phone]    Jaja Wray LPN    Video-Visit Details    Type of service:  Video Visit    Video Start Time: 1:10 PM  Video End Time: 1:40 PM    Originating Location (pt. " Location): Home    Distant Location (provider location):  [facility]     Platform used for Video Visit: [facility]        Cardiology Clinic Progress Note  Gem Gama MRN# [id number]   YOB: 1950 Age: 69 year old   Primary Cardiologist: Dr. Dennison Reason for visit: cardiology follow up            Assessment and Plan:   Gme Gama is a very pleasant 69 year old female with a history of coronary artery disease, ischemic cardiomyopathy, hypertension, CKD, asthma and depression. Patient here today for cardiology follow up, patient is doing well from cardiac stand point.      1. Coronary artery disease - s/p EDWIN x 2 to LAD 10/9/2019 in the setting of anterior STEMI, Coronary angiogram 10/9 showed severe single vessel obstructive CAD of LAD (100%) as well as moderate non-obstructive CAD in OM1 and OM2 (60%, 50%). She went back to the cath lab 10/10 for evaluation of RCA which showed no obstructive RCA disease. Stable, reports of vague chest ache which is atypical. Advised to continue monitoring. Reviewed signs/symptoms of cardiac emergency and instructed to seek immediate care if develop. Also advised to notify clinic if any increase in frequency or change in chest pain.               - Continue aspirin and plavix for at minimum of 1 year post revascularization uninterrupted.               - Continue carvedilol and atorvastatin              - Continue cardiac rehab              - Counseled on lifestyle modifications.      2. Ischemic cardiomyopathy/HFrEF - LVEF normalized to 55-60% in Feb 2020 from 35-40% in October 2019.   HF symptoms well controlled. Continue current medications.               - NYHA class II, stage C              - Etiology : ischemic               - Fluid status : euvolemic               - Diuretic regimen : no diuretic needs              - Last RHC : none              - Ischemic evaluation : cardiac catheterization with revascularization  10/9/19              - Guideline directed medical therapy                          - Betablocker: carvedilol 3.125mg BID                          - ACEI/ARB/ARNI: lisinopril 2.5                           - Aldactone antagonist: none     3. Hypertension - controlled, continue current medication regimen.      4. CKD - secondary to congenital kidney defect, stable, no recently labs.             Changes today: None    Follow up plan:     Follow up with Dr. Dennison in 6 months for annual follow up.     Addendum 10/6/20  Reviewed DAPT with Dr. Dennison and given patients proximal LAD stents and tolerating DAPT would recommend continuing for another year.         History of Presenting Illness:    Gem Gama is a very pleasant 68 year old female with a history of coronary artery disease, ischemic cardiomyopathy, hypertension, CKD, asthma and depression.      Patient was hospitalized 10/9/2019-10/13/2019 due to STEMI, EKG demonstrated ST elevation in the anterolateral leads, patient was taken emergently taken to cath lab and underwent EDWIN x 2 to proximal LAD. Coronary angiogram showed severe single vessel obstructive CAD of LAD (100%) as well as moderate non-obstructive CAD in OM1 and OM2 (60%, 50%). Patient initially requiring intraaortic balloon pump and pressor support. Echocardiogram showed EF 35-40%, grade I diastolic dysfunction, RV normal function and size, and no significant valvular disease. Patient was taken back to cath lab to have RCA evaluated which showed no obstructive disease (40% RCA stenosis).  Patient was initially on Brilinta and aspirin, but due to cost Brilinta was changed to Plavix. Patient was started on carvedilol, losartan and atorvastatin.      I first met patient during post hospital follow up in October 2019, at that time patient was doing well from cardiac standpoint.     Heidy was rehospitalized in February 2020 with complaints of chest pain and nausea/vomiting. This was thought to be  related to something she ate followed by a vagal reaction. Patient tells me that after discharge her PCP found gallstones which were felt to likely be contributing. An echocardiogram was completed at this time which demonstrated normalization of LV function, EF 55-60% and no regional wall motion abnormalities. She was started on low dose lisinopril 2.5mg.     She was last seen by Dr. Dennison in February 2020. At that time she was doing well from a cardiovascular standpoint and no medications were changed.     Video visit today for cardiology follow up.     Patient reports feeling good. Monitoring weights daily a home. Notes some vague chest aching, lasting seconds to a couple mins and occurring randomly 1 time every 2-4 weeks, can occur at rest, activity does not worsening pain, not similar to prior chest pain. Denies any aggravating factors. Pain resolves on its own. No associated symptoms. Notes this sensation can be similar to her gallbladder symptoms. Denies shortness of breath. Will note some exertional dyspnea with more strenuous activity. Occasional postural lightheadedness, otherwise denies dizziness, lightheadedness or other presyncopal symptoms. Denies tachycardia or palpitations. Denies any episodes of bleeding. Taking medications daily.     No recent labs. No home vital signs.     Appetite good. Trying to limit sodium and cholesterol. No set exercise routine, note her plan was to attend the [employer] program but currently cancelled due to COVID. Denies alcohol and tobacco use. 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 Notes she has been struggling with isolation with COVID.         Recent Hospitalizations   2/2020 : chest pain, echo showed normalized LVEF        Social History    Single, 4 children, 9 grandchildren, retired  (physics and chemistry), enjoys knitting.   Social History      Socioeconomic History     Marital status:      Spouse name: Not on file     Number of children: Not on file     Years of education: Not on file     Highest education level: Not on file   Occupational History     Not on file   Social Needs     Financial resource strain: Not on file     Food insecurity     Worry: Not on file     Inability: Not on file     Transportation needs     Medical: Not on file     Non-medical: Not on file   Tobacco Use     Smoking status: Never Smoker     Smokeless tobacco: Never Used   Substance and Sexual Activity     Alcohol use: No     Drug use: No     Sexual activity: Never     Partners: Male     Birth control/protection: None   Lifestyle     Physical activity     Days per week: Not on file     Minutes per session: Not on file     Stress: Not on file   Relationships     Social connections     Talks on phone: Not on file     Gets together: Not on file     Attends Jainism service: Not on file     Active member of club or organization: Not on file     Attends meetings of clubs or organizations: Not on file     Relationship status: Not on file     Intimate partner violence     Fear of current or ex partner: Not on file     Emotionally abused: Not on file     Physically abused: Not on file     Forced sexual activity: Not on file   Other Topics Concern     Parent/sibling w/ CABG, MI or angioplasty before 65F 55M? No      Service No     Blood Transfusions No     Caffeine Concern No     Occupational Exposure No     Hobby Hazards No     Sleep Concern Yes     Comment: Uses CPAP     Stress Concern No     Weight Concern Yes     Special Diet No     Back Care No     Exercise No     Bike Helmet No     Comment:       Seat Belt Yes     Self-Exams Yes   Social History Narrative     Not on file            Review of Systems:   Agree with above ROS         Physical Exam:   Vitals: LMP  (LMP Unknown)    Wt Readings from Last 4 Encounters:   09/06/20 76.2 kg (168 lb)   05/18/20 77.7 kg (171  lb 3.2 oz)   02/27/20 78.5 kg (173 lb)   02/24/20 78.5 kg (173 lb)     GEN: well nourished, in no acute distress.  HEENT:  Pupils equal, round. Sclerae nonicteric.   NECK: No masses appreciated  RESP: Respirations are unlabored. No cough. No audible wheezing.   EXTREM: No LE edema.  NEURO: Alert and oriented, cooperative.  SKIN: No visible rashes       Data:   ECHO 2/2020  * The left ventricle is normal size.  * The left ventricular systolic function is normal, estimated LVEF 55-60%.  * Focal hypokinesis of the mid-anteroseptum and LV apex.  * There is no hemodynamically significant valvular heart disease.  * No pulmonary hypertension, estimated right ventricular systolic pressure  is 27 mmHg.  * Compared to prior study dated 10/10/2019 (report in Care Everywhere) the  wall motion abnormalities and the EF are improved.    Cardiac catheterization 10/9/2019  Left Main   Ost LM lesion is 30% stenosed. The lesion is moderately calcified.   Left Anterior Descending   Prox LAD lesion is 100% stenosed. Culprit lesion. The lesion is type C - high risk and thrombotic.   Left Circumflex   The vessel is large.   First Obtuse Marginal Branch   The vessel is large.   1st Mrg lesion is 60% stenosed.   Second Obtuse Marginal Branch   The vessel is large.   2nd Mrg lesion is 50% stenosed.   Third Obtuse Marginal Branch   Right Coronary Artery   The vessel was not injected.      Cardiac catheterization 10/10/2019  Left Main   The vessel was visualized by selective angiography and is moderate in size. There was 0% vessel disease.   Left Anterior Descending   Ost LAD lesion is 10% stenosed.   Previously placed Prox LAD drug eluting stent is widely patent. Previous treatment took place <1 month ago. No thrombosis in the previous stent. No restenosis in the previous stent.   Left Circumflex   Prox Cx lesion is 15% stenosed.   Right Coronary Artery   Prox RCA lesion is 40% stenosed. Very small barely codominant RCA mild narrowing vessel  <2.5mm      1.Limited LCA injections. The stents from yesterday in LAD widely patent. No significant lesions in LCA  2 Very small technically codominant RCA with moderate non flow limiting disease  3. IABP pulled in ICU    LIPID RESULTS:  Lab Results   Component Value Date    CHOL 123 12/10/2019    HDL 44 (L) 12/10/2019    LDL 57 12/10/2019    TRIG 110 12/10/2019    CHOLHDLRATIO 4.2 09/19/2013     LIVER ENZYME RESULTS:  Lab Results   Component Value Date    AST 26 12/10/2019    ALT 30 12/10/2019     CBC RESULTS:  Lab Results   Component Value Date    WBC 6.0 10/15/2019    RBC 4.50 10/15/2019    HGB 13.8 10/15/2019    HCT 39.7 10/15/2019    MCV 88 10/15/2019    MCH 30.7 10/15/2019    MCHC 34.8 10/15/2019    RDW 14.0 10/15/2019     10/15/2019     BMP RESULTS:  Lab Results   Component Value Date     12/10/2019    POTASSIUM 4.7 01/16/2020    CHLORIDE 111 (H) 12/10/2019    CO2 26 12/10/2019    ANIONGAP 7 12/10/2019    GLC 93 12/10/2019    BUN 13 12/10/2019    CR 1.12 (H) 01/16/2020    GFRESTIMATED 49 (L) 01/16/2020    GFRESTBLACK 57 (L) 01/16/2020    FRACISCO 9.2 12/10/2019      A1C RESULTS:  Lab Results   Component Value Date    A1C 5.6 09/19/2013     INR RESULTS:  Lab Results   Component Value Date    INR 1.69 (H) 11/27/2014    INR 1.76 (H) 11/26/2014            Medications     Current Outpatient Medications   Medication Sig Dispense Refill     amoxicillin (AMOXIL) 500 MG capsule Take as directed before dental work       aspirin (ASA) 81 MG EC tablet Take 1 tablet (81 mg) by mouth daily 90 tablet 0     atorvastatin (LIPITOR) 40 MG tablet Take 1 tablet (40 mg) by mouth daily 90 tablet 3     carvedilol (COREG) 3.125 MG tablet Take 1 tablet (3.125 mg) by mouth 2 times daily (with meals) 180 tablet 3     clopidogrel (PLAVIX) 75 MG tablet Take 1 tablet (75 mg) by mouth daily 90 tablet 3     lisinopril (ZESTRIL) 2.5 MG tablet TAKE 1 TABLET(2.5 MG) BY MOUTH AT BEDTIME 30 tablet 0     Melatonin-Pyridoxine (MELATIN PO)  prn       Prenatal Multivit-Min-Fe-FA (PRENATAL/IRON) TABS        sertraline (ZOLOFT) 100 MG tablet TAKE 1 TABLET(100 MG) BY MOUTH DAILY 90 tablet 0     vitamin D3 (CHOLECALCIFEROL) 2000 units (50 mcg) tablet Take 1 tablet (2,000 Units) by mouth daily 90 tablet 3          Past Medical History     Past Medical History:   Diagnosis Date     ADHD (attention deficit hyperactivity disorder)      Anxiety      Arthritis     back, hands, knees, hips     Asthma     controlled--has coughing symptoms     C. difficile diarrhea      Central sleep apnea      Coronary artery disease involving native coronary artery of native heart without angina pectoris 10/25/2019     Depression      Gastro-oesophageal reflux disease      Hypertension      Ischemic cardiomyopathy 10/25/2019     Narcolepsy      Renal disease     gfr is aroung 48     Sleep apnea     uses Cpap     Past Surgical History:   Procedure Laterality Date     ARTHROPLASTY KNEE  7/9/2014    Procedure: ARTHROPLASTY KNEE;  Surgeon: Levi Johnson MD;  Location:  OR     ARTHROPLASTY KNEE Left 11/24/2014    Procedure: ARTHROPLASTY KNEE;  Surgeon: Levi Johnson MD;  Location:  OR     C REIMPLANT URETER,EXTENSIVE TAILORING  1973 1997     C REPAIR ENTEROCELE,VAG APPRCH  2008    Prolift     C TOTAL ABD HYSTERECTOMY+BLAD REPR  1992    Vaginal approach with oophrectomy     C TOTAL KNEE ARTHROPLASTY       CV CORONARY ANGIOGRAM N/A 10/9/2019    Procedure: Coronary Angiogram;  Surgeon: Chiquita Chatterjee MD;  Location:  HEART CARDIAC CATH LAB     CV HEART CATHETERIZATION WITH POSSIBLE INTERVENTION N/A 10/10/2019    Procedure: Heart Catheterization with Possible Intervention;  Surgeon: Chin Garcia MD;  Location:  HEART CARDIAC CATH LAB     CV INTRA-AORTIC BALLOON PUMP INSERTION N/A 10/9/2019    Procedure: Intra-Aortic Balloon Pump Insertion;  Surgeon: Chiquita Chatterjee MD;  Location:  HEART CARDIAC CATH LAB     CV INTRA-AORTIC BALLOON PUMP REMOVAL N/A  10/10/2019    Procedure: Intra-Aortic Balloon Pump Removal;  Surgeon: Chin Garcia MD;  Location:  HEART CARDIAC CATH LAB     CV PCI STENT DRUG ELUTING N/A 10/9/2019    Procedure: PCI Stent Drug Eluting;  Surgeon: Chiquita Chatterjee MD;  Location:  HEART CARDIAC CATH LAB     GENITOURINARY SURGERY      kidney surgery X 3     ORTHOPEDIC SURGERY      bilat total hip     RECTOCELE REPAIR       SOFT TISSUE SURGERY       Family History   Problem Relation Age of Onset     Hypertension Mother      Arthritis Mother      Cerebrovascular Disease Father         Three Strokes     Diabetes Father         and brother     Thyroid Disease Sister         Hypothyroid            Allergies   Latex; Flagyl [metronidazole hcl]; Food; Hydrochlorothiazide w/triamterene; No clinical screening - see comments; Sulfa drugs; Tape [adhesive tape]; Metoprolol; and Metronidazole        KATE Guajardo [facility]  Pager: [phone]

## 2020-09-29 NOTE — LETTER
"9/29/2020    Danny Conteh MD  [address]  [place] [address]    RE: Gem Gama       Dear Colleague,    I had the pleasure of seeing Gem Gama in the [facility].    Gem Gama is a 69 year old female who is being evaluated via a billable video visit.      The patient has been notified of following:     \"This video visit will be conducted via a call between you and your physician/provider. We have found that certain health care needs can be provided without the need for an in-person physical exam.  This service lets us provide the care you need with a video conversation.  If a prescription is necessary we can send it directly to your pharmacy.  If lab work is needed we can place an order for that and you can then stop by our lab to have the test done at a later time.    Video visits are billed at different rates depending on your insurance coverage.  Please reach out to your insurance provider with any questions.    If during the course of the call the physician/provider feels a video visit is not appropriate, you will not be charged for this service.\"    Patient has given verbal consent for Video visit? Yes  How would you like to obtain your AVS? MyChart  If you are dropped from the video visit, the video invite should be resent to: [phone]  Will anyone else be joining your video visit? No       Vitals - Patient Reported  Weight (Patient Reported): 77.6 kg (171 lb)  Height (Patient Reported): 163.8 cm (5' 4.5\")  BMI (Based on Pt Reported Ht/Wt): 28.9    Review Of Systems  Skin: NEGATIVE  Eyes:Ears/Nose/Throat: NEGATIVE  Respiratory: coughing- getting worse  Cardiovascular: chest pain- \"vauge ache\" a couple episodes, light headed  Gastrointestinal: some heart burn   Genitourinary:NEGATIVE   Musculoskeletal: jaw ache couple nights ago  Neurologic: numbness in right hand, headache   Psychiatric: NEGATIVE  Hematologic/Lymphatic/Immunologic: " NEGATIVE  Endocrine:  NEGATIVE    Send invitation to: [phone]    Jaja Wray LPN    Video-Visit Details    Type of service:  Video Visit    Video Start Time: 1:10 PM  Video End Time: 1:40 PM    Originating Location (pt. Location): Home    Distant Location (provider location):  [facility]     Platform used for Video Visit: [facility]        Cardiology Clinic Progress Note  Gem Gama MRN# [id number]   YOB: 1950 Age: 69 year old   Primary Cardiologist: Dr. Dennison Reason for visit: cardiology follow up            Assessment and Plan:   Gem Gama is a very pleasant 69 year old female with a history of coronary artery disease, ischemic cardiomyopathy, hypertension, CKD, asthma and depression. Patient here today for cardiology follow up, patient is doing well from cardiac stand point.      1. Coronary artery disease - s/p EDWIN x 2 to LAD 10/9/2019 in the setting of anterior STEMI, Coronary angiogram 10/9 showed severe single vessel obstructive CAD of LAD (100%) as well as moderate non-obstructive CAD in OM1 and OM2 (60%, 50%). She went back to the cath lab 10/10 for evaluation of RCA which showed no obstructive RCA disease. Stable, reports of vague chest ache which is atypical. Advised to continue monitoring. Reviewed signs/symptoms of cardiac emergency and instructed to seek immediate care if develop. Also advised to notify clinic if any increase in frequency or change in chest pain.               - Continue aspirin and plavix for at minimum of 1 year post revascularization uninterrupted.               - Continue carvedilol and atorvastatin              - Continue cardiac rehab              - Counseled on lifestyle modifications.      2. Ischemic cardiomyopathy/HFrEF - LVEF normalized to 55-60% in Feb 2020 from 35-40% in October 2019.   HF symptoms well controlled. Continue current medications.               - NYHA class II, stage C              -  Etiology : ischemic               - Fluid status : euvolemic               - Diuretic regimen : no diuretic needs              - Last RHC : none              - Ischemic evaluation : cardiac catheterization with revascularization 10/9/19              - Guideline directed medical therapy                          - Betablocker: carvedilol 3.125mg BID                          - ACEI/ARB/ARNI: lisinopril 2.5                           - Aldactone antagonist: none     3. Hypertension - controlled, continue current medication regimen.      4. CKD - secondary to congenital kidney defect, stable, no recently labs.         Changes today: None    Follow up plan:     Follow up with Dr. Dennison in 6 months for annual follow up.     Addendum 10/6/20  Reviewed DAPT with Dr. Dennison and given patients proximal LAD stents and tolerating DAPT would recommend continuing for another year.         History of Presenting Illness:    Gem Gama is a very pleasant 68 year old female with a history of coronary artery disease, ischemic cardiomyopathy, hypertension, CKD, asthma and depression.      Patient was hospitalized 10/9/2019-10/13/2019 due to STEMI, EKG demonstrated ST elevation in the anterolateral leads, patient was taken emergently taken to cath lab and underwent EDWIN x 2 to proximal LAD. Coronary angiogram showed severe single vessel obstructive CAD of LAD (100%) as well as moderate non-obstructive CAD in OM1 and OM2 (60%, 50%). Patient initially requiring intraaortic balloon pump and pressor support. Echocardiogram showed EF 35-40%, grade I diastolic dysfunction, RV normal function and size, and no significant valvular disease. Patient was taken back to cath lab to have RCA evaluated which showed no obstructive disease (40% RCA stenosis).  Patient was initially on Brilinta and aspirin, but due to cost Brilinta was changed to Plavix. Patient was started on carvedilol, losartan and atorvastatin.      I first met patient during  post hospital follow up in October 2019, at that time patient was doing well from cardiac standpoint.     Heidy was rehospitalized in February 2020 with complaints of chest pain and nausea/vomiting. This was thought to be related to something she ate followed by a vagal reaction. Patient tells me that after discharge her PCP found gallstones which were felt to likely be contributing. An echocardiogram was completed at this time which demonstrated normalization of LV function, EF 55-60% and no regional wall motion abnormalities. She was started on low dose lisinopril 2.5mg.     She was last seen by Dr. Dennison in February 2020. At that time she was doing well from a cardiovascular standpoint and no medications were changed.     Video visit today for cardiology follow up.     Patient reports feeling good. Monitoring weights daily a home. Notes some vague chest aching, lasting seconds to a couple mins and occurring randomly 1 time every 2-4 weeks, can occur at rest, activity does not worsening pain, not similar to prior chest pain. Denies any aggravating factors. Pain resolves on its own. No associated symptoms. Notes this sensation can be similar to her gallbladder symptoms. Denies shortness of breath. Will note some exertional dyspnea with more strenuous activity. Occasional postural lightheadedness, otherwise denies dizziness, lightheadedness or other presyncopal symptoms. Denies tachycardia or palpitations. Denies any episodes of bleeding. Taking medications daily.     No recent labs. No home vital signs.     Appetite good. Trying to limit sodium and cholesterol. No set exercise routine, note her plan was to attend the [employer] program but currently cancelled due to COVID. Denies alcohol and tobacco use. 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 Notes she has been struggling with isolation with COVID.         Recent  Hospitalizations   2/2020 : chest pain, echo showed normalized LVEF        Social History    Single, 4 children, 9 grandchildren, retired  (physics and chemistry), enjoys knitting.   Social History     Socioeconomic History     Marital status:      Spouse name: Not on file     Number of children: Not on file     Years of education: Not on file     Highest education level: Not on file   Occupational History     Not on file   Social Needs     Financial resource strain: Not on file     Food insecurity     Worry: Not on file     Inability: Not on file     Transportation needs     Medical: Not on file     Non-medical: Not on file   Tobacco Use     Smoking status: Never Smoker     Smokeless tobacco: Never Used   Substance and Sexual Activity     Alcohol use: No     Drug use: No     Sexual activity: Never     Partners: Male     Birth control/protection: None   Lifestyle     Physical activity     Days per week: Not on file     Minutes per session: Not on file     Stress: Not on file   Relationships     Social connections     Talks on phone: Not on file     Gets together: Not on file     Attends [employer] service: Not on file     Active member of club or organization: Not on file     Attends meetings of clubs or organizations: Not on file     Relationship status: Not on file     Intimate partner violence     Fear of current or ex partner: Not on file     Emotionally abused: Not on file     Physically abused: Not on file     Forced sexual activity: Not on file   Other Topics Concern     Parent/sibling w/ CABG, MI or angioplasty before 65F 55M? No      Service No     Blood Transfusions No     Caffeine Concern No     Occupational Exposure No     Hobby Hazards No     Sleep Concern Yes     Comment: Uses CPAP     Stress Concern No     Weight Concern Yes     Special Diet No     Back Care No     Exercise No     Bike Helmet No     Comment:       Seat Belt Yes     Self-Exams Yes   Social History Narrative      Not on file            Review of Systems:   Agree with above ROS         Physical Exam:   Vitals: LMP  (LMP Unknown)    Wt Readings from Last 4 Encounters:   09/06/20 76.2 kg (168 lb)   05/18/20 77.7 kg (171 lb 3.2 oz)   02/27/20 78.5 kg (173 lb)   02/24/20 78.5 kg (173 lb)     GEN: well nourished, in no acute distress.  HEENT:  Pupils equal, round. Sclerae nonicteric.   NECK: No masses appreciated  RESP: Respirations are unlabored. No cough. No audible wheezing.   EXTREM: No LE edema.  NEURO: Alert and oriented, cooperative.  SKIN: No visible rashes       Data:   ECHO 2/2020  * The left ventricle is normal size.  * The left ventricular systolic function is normal, estimated LVEF 55-60%.  * Focal hypokinesis of the mid-anteroseptum and LV apex.  * There is no hemodynamically significant valvular heart disease.  * No pulmonary hypertension, estimated right ventricular systolic pressure  is 27 mmHg.  * Compared to prior study dated 10/10/2019 (report in Care Everywhere) the  wall motion abnormalities and the EF are improved.    Cardiac catheterization 10/9/2019  Left Main   Ost LM lesion is 30% stenosed. The lesion is moderately calcified.   Left Anterior Descending   Prox LAD lesion is 100% stenosed. Culprit lesion. The lesion is type C - high risk and thrombotic.   Left Circumflex   The vessel is large.   First Obtuse Marginal Branch   The vessel is large.   1st Mrg lesion is 60% stenosed.   Second Obtuse Marginal Branch   The vessel is large.   2nd Mrg lesion is 50% stenosed.   Third Obtuse Marginal Branch   Right Coronary Artery   The vessel was not injected.      Cardiac catheterization 10/10/2019  Left Main   The vessel was visualized by selective angiography and is moderate in size. There was 0% vessel disease.   Left Anterior Descending   Ost LAD lesion is 10% stenosed.   Previously placed Prox LAD drug eluting stent is widely patent. Previous treatment took place <1 month ago. No thrombosis in the  previous stent. No restenosis in the previous stent.   Left Circumflex   Prox Cx lesion is 15% stenosed.   Right Coronary Artery   Prox RCA lesion is 40% stenosed. Very small barely codominant RCA mild narrowing vessel <2.5mm      1.Limited LCA injections. The stents from yesterday in LAD widely patent. No significant lesions in LCA  2 Very small technically codominant RCA with moderate non flow limiting disease  3. IABP pulled in ICU    LIPID RESULTS:  Lab Results   Component Value Date    CHOL 123 12/10/2019    HDL 44 (L) 12/10/2019    LDL 57 12/10/2019    TRIG 110 12/10/2019    CHOLHDLRATIO 4.2 09/19/2013     LIVER ENZYME RESULTS:  Lab Results   Component Value Date    AST 26 12/10/2019    ALT 30 12/10/2019     CBC RESULTS:  Lab Results   Component Value Date    WBC 6.0 10/15/2019    RBC 4.50 10/15/2019    HGB 13.8 10/15/2019    HCT 39.7 10/15/2019    MCV 88 10/15/2019    MCH 30.7 10/15/2019    MCHC 34.8 10/15/2019    RDW 14.0 10/15/2019     10/15/2019     BMP RESULTS:  Lab Results   Component Value Date     12/10/2019    POTASSIUM 4.7 01/16/2020    CHLORIDE 111 (H) 12/10/2019    CO2 26 12/10/2019    ANIONGAP 7 12/10/2019    GLC 93 12/10/2019    BUN 13 12/10/2019    CR 1.12 (H) 01/16/2020    GFRESTIMATED 49 (L) 01/16/2020    GFRESTBLACK 57 (L) 01/16/2020    FRACISCO 9.2 12/10/2019      A1C RESULTS:  Lab Results   Component Value Date    A1C 5.6 09/19/2013     INR RESULTS:  Lab Results   Component Value Date    INR 1.69 (H) 11/27/2014    INR 1.76 (H) 11/26/2014            Medications     Current Outpatient Medications   Medication Sig Dispense Refill     amoxicillin (AMOXIL) 500 MG capsule Take as directed before dental work       aspirin (ASA) 81 MG EC tablet Take 1 tablet (81 mg) by mouth daily 90 tablet 0     atorvastatin (LIPITOR) 40 MG tablet Take 1 tablet (40 mg) by mouth daily 90 tablet 3     carvedilol (COREG) 3.125 MG tablet Take 1 tablet (3.125 mg) by mouth 2 times daily (with meals) 180 tablet 3      clopidogrel (PLAVIX) 75 MG tablet Take 1 tablet (75 mg) by mouth daily 90 tablet 3     lisinopril (ZESTRIL) 2.5 MG tablet TAKE 1 TABLET(2.5 MG) BY MOUTH AT BEDTIME 30 tablet 0     Melatonin-Pyridoxine (MELATIN PO) prn       Prenatal Multivit-Min-Fe-FA (PRENATAL/IRON) TABS        sertraline (ZOLOFT) 100 MG tablet TAKE 1 TABLET(100 MG) BY MOUTH DAILY 90 tablet 0     vitamin D3 (CHOLECALCIFEROL) 2000 units (50 mcg) tablet Take 1 tablet (2,000 Units) by mouth daily 90 tablet 3          Past Medical History     Past Medical History:   Diagnosis Date     ADHD (attention deficit hyperactivity disorder)      Anxiety      Arthritis     back, hands, knees, hips     Asthma     controlled--has coughing symptoms     C. difficile diarrhea      Central sleep apnea      Coronary artery disease involving native coronary artery of native heart without angina pectoris 10/25/2019     Depression      Gastro-oesophageal reflux disease      Hypertension      Ischemic cardiomyopathy 10/25/2019     Narcolepsy      Renal disease     gfr is aroung 48     Sleep apnea     uses Cpap     Past Surgical History:   Procedure Laterality Date     ARTHROPLASTY KNEE  7/9/2014    Procedure: ARTHROPLASTY KNEE;  Surgeon: Levi Johnson MD;  Location:  OR     ARTHROPLASTY KNEE Left 11/24/2014    Procedure: ARTHROPLASTY KNEE;  Surgeon: Levi Johnson MD;  Location:  OR      REIMPLANT URETER,EXTENSIVE TAILORING  1973 1997     C REPAIR ENTEROCELE,VAG APPRCH  2008    Prolift     C TOTAL ABD HYSTERECTOMY+BLAD REPR  1992    Vaginal approach with oophrectomy     C TOTAL KNEE ARTHROPLASTY       CV CORONARY ANGIOGRAM N/A 10/9/2019    Procedure: Coronary Angiogram;  Surgeon: Chiquita Chatterjee MD;  Location:  HEART CARDIAC CATH LAB     CV HEART CATHETERIZATION WITH POSSIBLE INTERVENTION N/A 10/10/2019    Procedure: Heart Catheterization with Possible Intervention;  Surgeon: Chin Garcia MD;  Location:  HEART CARDIAC CATH LAB     CV  INTRA-AORTIC BALLOON PUMP INSERTION N/A 10/9/2019    Procedure: Intra-Aortic Balloon Pump Insertion;  Surgeon: Chiquita Chatterjee MD;  Location:  HEART CARDIAC CATH LAB     CV INTRA-AORTIC BALLOON PUMP REMOVAL N/A 10/10/2019    Procedure: Intra-Aortic Balloon Pump Removal;  Surgeon: Chin Garcia MD;  Location:  HEART CARDIAC CATH LAB     CV PCI STENT DRUG ELUTING N/A 10/9/2019    Procedure: PCI Stent Drug Eluting;  Surgeon: Chiquita Chatterjee MD;  Location:  HEART CARDIAC CATH LAB     GENITOURINARY SURGERY      kidney surgery X 3     ORTHOPEDIC SURGERY      bilat total hip     RECTOCELE REPAIR       SOFT TISSUE SURGERY       Family History   Problem Relation Age of Onset     Hypertension Mother      Arthritis Mother      Cerebrovascular Disease Father         Three Strokes     Diabetes Father         and brother     Thyroid Disease Sister         Hypothyroid            Allergies   Latex; Flagyl [metronidazole hcl]; Food; Hydrochlorothiazide w/triamterene; No clinical screening - see comments; Sulfa drugs; Tape [adhesive tape]; Metoprolol; and Metronidazole        KATE Guajardo CNP  [facility]  Pager: [phone]      Thank you for allowing me to participate in the care of your patient.    Sincerely,     KATE Guajardo CNP     [facility]

## 2020-10-01 RX ORDER — SERTRALINE HYDROCHLORIDE 100 MG/1
100 TABLET, FILM COATED ORAL DAILY
Qty: 90 TABLET | Refills: 0 | Status: SHIPPED | OUTPATIENT
Start: 2020-10-01 | End: 2020-10-15

## 2020-10-01 NOTE — TELEPHONE ENCOUNTER
"Medication is being filled for 1 time refill only due to:  Patient needs to be seen because due for depression recheck.     Team to please call patient and schedule medication recheck appointment. Thank you.        Requested Prescriptions   Pending Prescriptions Disp Refills     sertraline (ZOLOFT) 100 MG tablet 90 tablet 0     Sig: Take 1 tablet (100 mg) by mouth daily       SSRIs Protocol Failed - 10/1/2020  1:42 PM        Failed - Recent (6 mo) or future (30 days) visit within the authorizing provider's specialty     Patient had office visit in the last 6 months or has a visit in the next 30 days with authorizing provider or within the authorizing provider's specialty.  See \"Patient Info\" tab in inbasket, or \"Choose Columns\" in Meds & Orders section of the refill encounter.            Passed - PHQ-9 score less than 5 in past 6 months     Please review last PHQ-9 score.           Passed - Medication is active on med list        Passed - Patient is age 18 or older        Passed - No active pregnancy on record        Passed - No positive pregnancy test in last 12 months               Mert Resendiz RN, BSN, PHN    "

## 2020-10-06 RX ORDER — CLOPIDOGREL BISULFATE 75 MG/1
75 TABLET ORAL DAILY
Qty: 90 TABLET | Refills: 3 | Status: SHIPPED | OUTPATIENT
Start: 2020-10-06 | End: 2021-10-14

## 2020-10-12 DIAGNOSIS — I50.22 CHRONIC SYSTOLIC HEART FAILURE (H): ICD-10-CM

## 2020-10-14 RX ORDER — LISINOPRIL 2.5 MG/1
TABLET ORAL
Qty: 90 TABLET | Refills: 0 | Status: SHIPPED | OUTPATIENT
Start: 2020-10-14 | End: 2020-10-15

## 2020-10-14 NOTE — TELEPHONE ENCOUNTER
Mary refill given.    Patient informed that she requires a face-to-face appointment tomorrow.    She agrees to a face-to-face appointment at 11:00 AM tomorrow.

## 2020-10-14 NOTE — TELEPHONE ENCOUNTER
Routing refill request to provider for review/approval because:  Labs not current:  Creatinine    Luisa Bonilla RN, [facility] Triage

## 2020-10-15 ENCOUNTER — OFFICE VISIT (OUTPATIENT)
Dept: FAMILY MEDICINE | Facility: CLINIC | Age: 70
End: 2020-10-15
Payer: MEDICARE

## 2020-10-15 VITALS
HEART RATE: 78 BPM | WEIGHT: 170 LBS | SYSTOLIC BLOOD PRESSURE: 130 MMHG | DIASTOLIC BLOOD PRESSURE: 78 MMHG | TEMPERATURE: 98.2 F | RESPIRATION RATE: 20 BRPM | HEIGHT: 65 IN | OXYGEN SATURATION: 99 % | BODY MASS INDEX: 28.32 KG/M2

## 2020-10-15 DIAGNOSIS — Z23 NEED FOR PROPHYLACTIC VACCINATION AND INOCULATION AGAINST INFLUENZA: ICD-10-CM

## 2020-10-15 DIAGNOSIS — E78.5 HYPERLIPIDEMIA LDL GOAL <70: ICD-10-CM

## 2020-10-15 DIAGNOSIS — D84.9 IMMUNOSUPPRESSED STATUS (H): ICD-10-CM

## 2020-10-15 DIAGNOSIS — I50.22 CHRONIC SYSTOLIC HEART FAILURE (H): ICD-10-CM

## 2020-10-15 DIAGNOSIS — K80.20 CALCULUS OF GALLBLADDER WITHOUT CHOLECYSTITIS WITHOUT OBSTRUCTION: ICD-10-CM

## 2020-10-15 DIAGNOSIS — Z62.812 PERSONAL HISTORY OF NEGLECT IN CHILDHOOD: ICD-10-CM

## 2020-10-15 DIAGNOSIS — I25.10 CORONARY ARTERY DISEASE INVOLVING NATIVE CORONARY ARTERY OF NATIVE HEART WITHOUT ANGINA PECTORIS: ICD-10-CM

## 2020-10-15 DIAGNOSIS — Z23 NEED FOR SHINGLES VACCINE: ICD-10-CM

## 2020-10-15 DIAGNOSIS — F43.10 PTSD (POST-TRAUMATIC STRESS DISORDER): ICD-10-CM

## 2020-10-15 DIAGNOSIS — F33.1 MAJOR DEPRESSIVE DISORDER, RECURRENT EPISODE, MODERATE (H): ICD-10-CM

## 2020-10-15 DIAGNOSIS — I10 HYPERTENSION GOAL BP (BLOOD PRESSURE) < 140/90: Primary | ICD-10-CM

## 2020-10-15 DIAGNOSIS — N18.31 CKD STAGE G3A/A2, GFR 45-59 AND ALBUMIN CREATININE RATIO 30-299 MG/G (H): ICD-10-CM

## 2020-10-15 LAB
ALBUMIN SERPL-MCNC: 4.1 G/DL (ref 3.4–5)
ALP SERPL-CCNC: 103 U/L (ref 40–150)
ALT SERPL W P-5'-P-CCNC: 38 U/L (ref 0–50)
ANION GAP SERPL CALCULATED.3IONS-SCNC: 4 MMOL/L (ref 3–14)
AST SERPL W P-5'-P-CCNC: 33 U/L (ref 0–45)
BILIRUB DIRECT SERPL-MCNC: 0.2 MG/DL (ref 0–0.2)
BILIRUB SERPL-MCNC: 0.8 MG/DL (ref 0.2–1.3)
BUN SERPL-MCNC: 19 MG/DL (ref 7–30)
CALCIUM SERPL-MCNC: 9.2 MG/DL (ref 8.5–10.1)
CHLORIDE SERPL-SCNC: 107 MMOL/L (ref 94–109)
CHOLEST SERPL-MCNC: 162 MG/DL
CO2 SERPL-SCNC: 29 MMOL/L (ref 20–32)
CREAT SERPL-MCNC: 1.14 MG/DL (ref 0.52–1.04)
CREAT UR-MCNC: 107 MG/DL
GFR SERPL CREATININE-BSD FRML MDRD: 48 ML/MIN/{1.73_M2}
GLUCOSE SERPL-MCNC: 101 MG/DL (ref 70–99)
HDLC SERPL-MCNC: 59 MG/DL
LDLC SERPL CALC-MCNC: 82 MG/DL
MICROALBUMIN UR-MCNC: 9 MG/L
MICROALBUMIN/CREAT UR: 8.61 MG/G CR (ref 0–25)
NONHDLC SERPL-MCNC: 103 MG/DL
POTASSIUM SERPL-SCNC: 4.8 MMOL/L (ref 3.4–5.3)
PROT SERPL-MCNC: 7.8 G/DL (ref 6.8–8.8)
SODIUM SERPL-SCNC: 140 MMOL/L (ref 133–144)
TRIGL SERPL-MCNC: 105 MG/DL

## 2020-10-15 PROCEDURE — 80061 LIPID PANEL: CPT | Performed by: INTERNAL MEDICINE

## 2020-10-15 PROCEDURE — 36415 COLL VENOUS BLD VENIPUNCTURE: CPT | Performed by: INTERNAL MEDICINE

## 2020-10-15 PROCEDURE — 99214 OFFICE O/P EST MOD 30 MIN: CPT | Mod: 25 | Performed by: INTERNAL MEDICINE

## 2020-10-15 PROCEDURE — 90471 IMMUNIZATION ADMIN: CPT | Performed by: INTERNAL MEDICINE

## 2020-10-15 PROCEDURE — G0008 ADMIN INFLUENZA VIRUS VAC: HCPCS | Mod: 59 | Performed by: INTERNAL MEDICINE

## 2020-10-15 PROCEDURE — 90662 IIV NO PRSV INCREASED AG IM: CPT | Performed by: INTERNAL MEDICINE

## 2020-10-15 PROCEDURE — 82043 UR ALBUMIN QUANTITATIVE: CPT | Performed by: INTERNAL MEDICINE

## 2020-10-15 PROCEDURE — 90750 HZV VACC RECOMBINANT IM: CPT | Performed by: INTERNAL MEDICINE

## 2020-10-15 PROCEDURE — 80076 HEPATIC FUNCTION PANEL: CPT | Performed by: INTERNAL MEDICINE

## 2020-10-15 PROCEDURE — 80048 BASIC METABOLIC PNL TOTAL CA: CPT | Performed by: INTERNAL MEDICINE

## 2020-10-15 RX ORDER — URSODIOL 300 MG/1
300 CAPSULE ORAL 2 TIMES DAILY
Qty: 60 CAPSULE | Refills: 11 | Status: SHIPPED | OUTPATIENT
Start: 2020-10-15 | End: 2021-10-14

## 2020-10-15 RX ORDER — VALSARTAN 40 MG/1
40 TABLET ORAL DAILY
Qty: 30 TABLET | Refills: 11 | Status: SHIPPED | OUTPATIENT
Start: 2020-10-15 | End: 2021-10-14

## 2020-10-15 RX ORDER — LISINOPRIL 2.5 MG/1
TABLET ORAL
Qty: 90 TABLET | Refills: 0 | Status: CANCELLED | OUTPATIENT
Start: 2020-10-15

## 2020-10-15 RX ORDER — CARVEDILOL 3.12 MG/1
3.12 TABLET ORAL 2 TIMES DAILY WITH MEALS
Qty: 180 TABLET | Refills: 3 | Status: SHIPPED | OUTPATIENT
Start: 2020-10-15 | End: 2021-09-13

## 2020-10-15 RX ORDER — ATORVASTATIN CALCIUM 40 MG/1
40 TABLET, FILM COATED ORAL DAILY
Qty: 90 TABLET | Refills: 3 | Status: SHIPPED | OUTPATIENT
Start: 2020-10-15 | End: 2021-09-13

## 2020-10-15 RX ORDER — NITROGLYCERIN 0.4 MG/1
0.4 TABLET SUBLINGUAL
COMMUNITY
Start: 2020-02-10 | End: 2020-12-09

## 2020-10-15 RX ORDER — SERTRALINE HYDROCHLORIDE 100 MG/1
100 TABLET, FILM COATED ORAL DAILY
Qty: 90 TABLET | Refills: 3 | Status: SHIPPED | OUTPATIENT
Start: 2020-10-15 | End: 2021-09-13

## 2020-10-15 ASSESSMENT — MIFFLIN-ST. JEOR: SCORE: 1279.05

## 2020-10-15 ASSESSMENT — PAIN SCALES - GENERAL: PAINLEVEL: NO PAIN (0)
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
## 2020-11-07 ENCOUNTER — HEALTH MAINTENANCE LETTER (OUTPATIENT)
Age: 70
End: 2020-11-07

## 2020-12-09 ENCOUNTER — APPOINTMENT (OUTPATIENT)
Dept: GENERAL RADIOLOGY | Facility: CLINIC | Age: 70
DRG: 287 | End: 2020-12-09
Attending: EMERGENCY MEDICINE
Payer: MEDICARE

## 2020-12-09 ENCOUNTER — HOSPITAL ENCOUNTER (INPATIENT)
Facility: CLINIC | Age: 70
LOS: 2 days | Discharge: HOME | DRG: 287 | End: 2020-12-12
Attending: EMERGENCY MEDICINE | Admitting: STUDENT IN AN ORGANIZED HEALTH CARE EDUCATION/TRAINING PROGRAM
Payer: MEDICARE

## 2020-12-09 ENCOUNTER — OFFICE VISIT (OUTPATIENT)
Dept: FAMILY MEDICINE | Facility: CLINIC | Age: 70
End: 2020-12-09
Payer: MEDICARE

## 2020-12-09 VITALS
SYSTOLIC BLOOD PRESSURE: 135 MMHG | HEIGHT: 64 IN | DIASTOLIC BLOOD PRESSURE: 72 MMHG | OXYGEN SATURATION: 98 % | HEART RATE: 68 BPM | WEIGHT: 173 LBS | BODY MASS INDEX: 29.53 KG/M2

## 2020-12-09 DIAGNOSIS — M54.6 MIDLINE THORACIC BACK PAIN, UNSPECIFIED CHRONICITY: ICD-10-CM

## 2020-12-09 DIAGNOSIS — M54.9 ACUTE RIGHT-SIDED BACK PAIN, UNSPECIFIED BACK LOCATION: ICD-10-CM

## 2020-12-09 DIAGNOSIS — I25.119 CORONARY ARTERY DISEASE WITH ANGINA PECTORIS, UNSPECIFIED VESSEL OR LESION TYPE, UNSPECIFIED WHETHER NATIVE OR TRANSPLANTED HEART (H): ICD-10-CM

## 2020-12-09 DIAGNOSIS — R07.9 CHEST PAIN, UNSPECIFIED TYPE: ICD-10-CM

## 2020-12-09 DIAGNOSIS — D35.2 PITUITARY MICROADENOMA (H): ICD-10-CM

## 2020-12-09 DIAGNOSIS — I25.10 CORONARY ARTERY DISEASE INVOLVING NATIVE CORONARY ARTERY OF NATIVE HEART WITHOUT ANGINA PECTORIS: Primary | ICD-10-CM

## 2020-12-09 LAB
ALBUMIN SERPL-MCNC: 3.6 G/DL (ref 3.4–5)
ALP SERPL-CCNC: 92 U/L (ref 40–150)
ALT SERPL W P-5'-P-CCNC: 25 U/L (ref 0–50)
ANION GAP SERPL CALCULATED.3IONS-SCNC: 6 MMOL/L (ref 3–14)
AST SERPL W P-5'-P-CCNC: 32 U/L (ref 0–45)
BASOPHILS # BLD AUTO: 0 10E9/L (ref 0–0.2)
BASOPHILS NFR BLD AUTO: 0.7 %
BILIRUB DIRECT SERPL-MCNC: 0.2 MG/DL (ref 0–0.2)
BILIRUB SERPL-MCNC: 0.7 MG/DL (ref 0.2–1.3)
BUN SERPL-MCNC: 15 MG/DL (ref 7–30)
CALCIUM SERPL-MCNC: 8.6 MG/DL (ref 8.5–10.1)
CHLORIDE SERPL-SCNC: 107 MMOL/L (ref 94–109)
CO2 SERPL-SCNC: 26 MMOL/L (ref 20–32)
CREAT SERPL-MCNC: 0.97 MG/DL (ref 0.52–1.04)
DIFFERENTIAL METHOD BLD: NORMAL
EOSINOPHIL # BLD AUTO: 0.2 10E9/L (ref 0–0.7)
EOSINOPHIL NFR BLD AUTO: 3.7 %
ERYTHROCYTE [DISTWIDTH] IN BLOOD BY AUTOMATED COUNT: 13.1 % (ref 10–15)
GFR SERPL CREATININE-BSD FRML MDRD: 58 ML/MIN/{1.73_M2}
GLUCOSE SERPL-MCNC: 95 MG/DL (ref 70–99)
HCT VFR BLD AUTO: 39.4 % (ref 35–47)
HGB BLD-MCNC: 13.7 G/DL (ref 11.7–15.7)
IMM GRANULOCYTES # BLD: 0 10E9/L (ref 0–0.4)
IMM GRANULOCYTES NFR BLD: 0 %
INTERPRETATION ECG - MUSE: NORMAL
LABORATORY COMMENT REPORT: NORMAL
LYMPHOCYTES # BLD AUTO: 1.3 10E9/L (ref 0.8–5.3)
LYMPHOCYTES NFR BLD AUTO: 23.3 %
MCH RBC QN AUTO: 29.9 PG (ref 26.5–33)
MCHC RBC AUTO-ENTMCNC: 34.8 G/DL (ref 31.5–36.5)
MCV RBC AUTO: 86 FL (ref 78–100)
MONOCYTES # BLD AUTO: 0.4 10E9/L (ref 0–1.3)
MONOCYTES NFR BLD AUTO: 6.9 %
NEUTROPHILS # BLD AUTO: 3.5 10E9/L (ref 1.6–8.3)
NEUTROPHILS NFR BLD AUTO: 65.4 %
NRBC # BLD AUTO: 0 10*3/UL
NRBC BLD AUTO-RTO: 0 /100
PLATELET # BLD AUTO: 164 10E9/L (ref 150–450)
POTASSIUM SERPL-SCNC: 4.1 MMOL/L (ref 3.4–5.3)
PROT SERPL-MCNC: 7.3 G/DL (ref 6.8–8.8)
RBC # BLD AUTO: 4.58 10E12/L (ref 3.8–5.2)
SARS-COV-2 RNA SPEC QL NAA+PROBE: NEGATIVE
SARS-COV-2 RNA SPEC QL NAA+PROBE: NORMAL
SODIUM SERPL-SCNC: 139 MMOL/L (ref 133–144)
SPECIMEN SOURCE: NORMAL
SPECIMEN SOURCE: NORMAL
TROPONIN I SERPL-MCNC: <0.015 UG/L (ref 0–0.04)
TROPONIN I SERPL-MCNC: <0.015 UG/L (ref 0–0.04)
WBC # BLD AUTO: 5.4 10E9/L (ref 4–11)

## 2020-12-09 PROCEDURE — G0378 HOSPITAL OBSERVATION PER HR: HCPCS

## 2020-12-09 PROCEDURE — 93000 ELECTROCARDIOGRAM COMPLETE: CPT | Performed by: NURSE PRACTITIONER

## 2020-12-09 PROCEDURE — 71046 X-RAY EXAM CHEST 2 VIEWS: CPT

## 2020-12-09 PROCEDURE — C9803 HOPD COVID-19 SPEC COLLECT: HCPCS

## 2020-12-09 PROCEDURE — U0003 INFECTIOUS AGENT DETECTION BY NUCLEIC ACID (DNA OR RNA); SEVERE ACUTE RESPIRATORY SYNDROME CORONAVIRUS 2 (SARS-COV-2) (CORONAVIRUS DISEASE [COVID-19]), AMPLIFIED PROBE TECHNIQUE, MAKING USE OF HIGH THROUGHPUT TECHNOLOGIES AS DESCRIBED BY CMS-2020-01-R: HCPCS | Performed by: EMERGENCY MEDICINE

## 2020-12-09 PROCEDURE — 80048 BASIC METABOLIC PNL TOTAL CA: CPT | Performed by: EMERGENCY MEDICINE

## 2020-12-09 PROCEDURE — 85025 COMPLETE CBC W/AUTO DIFF WBC: CPT | Performed by: EMERGENCY MEDICINE

## 2020-12-09 PROCEDURE — 99220 PR INITIAL OBSERVATION CARE,LEVEL III: CPT | Performed by: STUDENT IN AN ORGANIZED HEALTH CARE EDUCATION/TRAINING PROGRAM

## 2020-12-09 PROCEDURE — 80076 HEPATIC FUNCTION PANEL: CPT | Performed by: EMERGENCY MEDICINE

## 2020-12-09 PROCEDURE — 250N000013 HC RX MED GY IP 250 OP 250 PS 637: Performed by: STUDENT IN AN ORGANIZED HEALTH CARE EDUCATION/TRAINING PROGRAM

## 2020-12-09 PROCEDURE — 99285 EMERGENCY DEPT VISIT HI MDM: CPT | Mod: 25

## 2020-12-09 PROCEDURE — 84484 ASSAY OF TROPONIN QUANT: CPT | Performed by: EMERGENCY MEDICINE

## 2020-12-09 PROCEDURE — 93005 ELECTROCARDIOGRAM TRACING: CPT

## 2020-12-09 PROCEDURE — 99207 PR INPT ADMISSION FROM CLINIC: CPT | Performed by: NURSE PRACTITIONER

## 2020-12-09 RX ORDER — LIDOCAINE 40 MG/G
CREAM TOPICAL
Status: DISCONTINUED | OUTPATIENT
Start: 2020-12-09 | End: 2020-12-12 | Stop reason: HOSPADM

## 2020-12-09 RX ORDER — NITROGLYCERIN 0.4 MG/1
0.4 TABLET SUBLINGUAL EVERY 5 MIN PRN
Status: DISCONTINUED | OUTPATIENT
Start: 2020-12-09 | End: 2020-12-12 | Stop reason: HOSPADM

## 2020-12-09 RX ORDER — CLOPIDOGREL BISULFATE 75 MG/1
75 TABLET ORAL DAILY
Status: DISCONTINUED | OUTPATIENT
Start: 2020-12-10 | End: 2020-12-12 | Stop reason: HOSPADM

## 2020-12-09 RX ORDER — AMOXICILLIN 250 MG
2 CAPSULE ORAL 2 TIMES DAILY
Status: DISCONTINUED | OUTPATIENT
Start: 2020-12-09 | End: 2020-12-12 | Stop reason: HOSPADM

## 2020-12-09 RX ORDER — ATORVASTATIN CALCIUM 40 MG/1
40 TABLET, FILM COATED ORAL DAILY
Status: DISCONTINUED | OUTPATIENT
Start: 2020-12-10 | End: 2020-12-12 | Stop reason: HOSPADM

## 2020-12-09 RX ORDER — ASPIRIN 81 MG/1
81 TABLET ORAL DAILY
Status: DISCONTINUED | OUTPATIENT
Start: 2020-12-10 | End: 2020-12-11

## 2020-12-09 RX ORDER — ACETAMINOPHEN 325 MG/1
650 TABLET ORAL EVERY 4 HOURS PRN
Status: DISCONTINUED | OUTPATIENT
Start: 2020-12-09 | End: 2020-12-11

## 2020-12-09 RX ORDER — VALSARTAN 40 MG/1
40 TABLET ORAL DAILY
Status: DISCONTINUED | OUTPATIENT
Start: 2020-12-10 | End: 2020-12-12 | Stop reason: HOSPADM

## 2020-12-09 RX ORDER — NITROGLYCERIN 0.4 MG/1
0.4 TABLET SUBLINGUAL EVERY 5 MIN PRN
Qty: 10 TABLET | Refills: 0 | Status: SHIPPED | OUTPATIENT
Start: 2020-12-09 | End: 2021-02-11

## 2020-12-09 RX ORDER — ONDANSETRON 2 MG/ML
4 INJECTION INTRAMUSCULAR; INTRAVENOUS EVERY 6 HOURS PRN
Status: DISCONTINUED | OUTPATIENT
Start: 2020-12-09 | End: 2020-12-12 | Stop reason: HOSPADM

## 2020-12-09 RX ORDER — POLYETHYLENE GLYCOL 3350 17 G/17G
17 POWDER, FOR SOLUTION ORAL DAILY
Status: DISCONTINUED | OUTPATIENT
Start: 2020-12-09 | End: 2020-12-12 | Stop reason: HOSPADM

## 2020-12-09 RX ORDER — ONDANSETRON 4 MG/1
4 TABLET, ORALLY DISINTEGRATING ORAL EVERY 6 HOURS PRN
Status: DISCONTINUED | OUTPATIENT
Start: 2020-12-09 | End: 2020-12-12 | Stop reason: HOSPADM

## 2020-12-09 RX ORDER — AMOXICILLIN 250 MG
1 CAPSULE ORAL 2 TIMES DAILY
Status: DISCONTINUED | OUTPATIENT
Start: 2020-12-09 | End: 2020-12-12 | Stop reason: HOSPADM

## 2020-12-09 RX ADMIN — ACETAMINOPHEN 650 MG: 325 TABLET, FILM COATED ORAL at 19:30

## 2020-12-09 ASSESSMENT — ANXIETY QUESTIONNAIRES
3. WORRYING TOO MUCH ABOUT DIFFERENT THINGS: NOT AT ALL
6. BECOMING EASILY ANNOYED OR IRRITABLE: NOT AT ALL
7. FEELING AFRAID AS IF SOMETHING AWFUL MIGHT HAPPEN: SEVERAL DAYS
5. BEING SO RESTLESS THAT IT IS HARD TO SIT STILL: NOT AT ALL
GAD7 TOTAL SCORE: 3
1. FEELING NERVOUS, ANXIOUS, OR ON EDGE: SEVERAL DAYS
2. NOT BEING ABLE TO STOP OR CONTROL WORRYING: NOT AT ALL
IF YOU CHECKED OFF ANY PROBLEMS ON THIS QUESTIONNAIRE, HOW DIFFICULT HAVE THESE PROBLEMS MADE IT FOR YOU TO DO YOUR WORK, TAKE CARE OF THINGS AT HOME, OR GET ALONG WITH OTHER PEOPLE: NOT DIFFICULT AT ALL

## 2020-12-09 ASSESSMENT — PAIN SCALES - GENERAL: PAINLEVEL: MODERATE PAIN (5)

## 2020-12-09 ASSESSMENT — ENCOUNTER SYMPTOMS
DIARRHEA: 0
DIFFICULTY URINATING: 0
MYALGIAS: 1
CONSTIPATION: 0
DYSURIA: 0
BACK PAIN: 1
SHORTNESS OF BREATH: 0
HEMATURIA: 0

## 2020-12-09 ASSESSMENT — MIFFLIN-ST. JEOR
SCORE: 1309.67
SCORE: 1284.72

## 2020-12-09 ASSESSMENT — PATIENT HEALTH QUESTIONNAIRE - PHQ9
SUM OF ALL RESPONSES TO PHQ QUESTIONS 1-9: 3
5. POOR APPETITE OR OVEREATING: SEVERAL DAYS

## 2020-12-09 NOTE — PATIENT INSTRUCTIONS
EKG does not show a new abnormality but given your history and progressive symptoms, you need to go to the emergency room for further evaluation and monitoring.

## 2020-12-09 NOTE — ED NOTES
Bed: ED21  Expected date:   Expected time:   Means of arrival:   Comments:  Back pain change in ekg

## 2020-12-09 NOTE — PHARMACY-ADMISSION MEDICATION HISTORY
Pharmacy Medication History  Admission medication history interview status for the 12/9/2020  admission is complete. See EPIC admission navigator for prior to admission medications       Medication history sources: Patient and Care Everywhere  Location of interview: Phone  Adherence Assessment: Good    Significant changes made to the medication list:         Additional medication history information:        Medication reconciliation completed by provider prior to medication history? No    Time spent in this activity: 15min      Prior to Admission medications    Medication Sig Last Dose Taking? Auth Provider   aspirin (ASA) 81 MG EC tablet Take 1 tablet (81 mg) by mouth daily 12/9/2020 at am Yes Dalia Stein MD   atorvastatin (LIPITOR) 40 MG tablet Take 1 tablet (40 mg) by mouth daily 12/9/2020 at am Yes Danny Conteh MD   carvedilol (COREG) 3.125 MG tablet Take 1 tablet (3.125 mg) by mouth 2 times daily (with meals) 12/9/2020 at am Yes Danny Conteh MD   clopidogrel (PLAVIX) 75 MG tablet Take 1 tablet (75 mg) by mouth daily 12/9/2020 at am Yes Leonor Wilson APRN CNP   Melatonin-Pyridoxine (MELATIN PO) Take 1 tablet by mouth nightly as needed (sleep)  prn Yes Reported, Patient   Prenatal Multivit-Min-Fe-FA (PRENATAL/IRON) TABS  12/9/2020 at am Yes Reported, Patient   sertraline (ZOLOFT) 100 MG tablet Take 1 tablet (100 mg) by mouth daily 12/9/2020 at am Yes Danny Conteh MD   ursodiol (ACTIGALL) 300 MG capsule Take 1 capsule (300 mg) by mouth 2 times daily 12/9/2020 at am Yes Danny Conteh MD   valsartan (DIOVAN) 40 MG tablet Take 1 tablet (40 mg) by mouth daily 12/9/2020 at am Yes Danny Conteh MD   vitamin D3 (CHOLECALCIFEROL) 2000 units (50 mcg) tablet Take 1 tablet (2,000 Units) by mouth daily 12/9/2020 at am Yes Candice Interiano MD   amoxicillin (AMOXIL) 500 MG capsule Take as directed before dental work prn  Reported, Patient   nitroGLYcerin (NITROSTAT) 0.4 MG  sublingual tablet Place 1 tablet (0.4 mg) under the tongue every 5 minutes as needed for chest pain Rosalva Rolon, KATE CNP       The information provided in this note is only as accurate as the sources available at the time of the update(s).

## 2020-12-09 NOTE — ED NOTES
"[facility]  ED Nurse Handoff Report    ED Chief complaint: Back Pain and Abnormal Ekg      ED Diagnosis:   Final diagnoses:   None       Code Status: To be addressed by admitting provider    Allergies:   Allergies   Allergen Reactions     Latex Other (See Comments) and Shortness Of Breath     Runny nose  PN: LW Reaction: DIFFICULTY BREATHING       Flagyl [Metronidazole Hcl] Anaphylaxis and Rash     Food      PN: LW FI1: nka LW FI2:     Hydrochlorothiazide W/Triamterene      PN: LW Reaction: skin rash     No Clinical Screening - See Comments      PN: LW Other1: -Adhesive Tape     Sulfa Drugs Anaphylaxis, Hives and Itching     PN: LW Reaction: HIVES, Swelling     Tape [Adhesive Tape] Hives     Metoprolol Itching and Rash     Metronidazole Hives and Rash     PN: LW Reaction: HIVES         Patient Story: sent over from primary for back pain, same back pain when she had her STEMI.   Focused Assessment:  A&Ox4. Denies CP in ED, denies sob, cough, fevers. Reports \"5/10\" pain between shoulder blades that goes \"straight through\" into anterior chest. VSS in ED. Calm, cooperative and resting on cart.     Treatments and/or interventions provided: EKG, CXR  Patient's response to treatments and/or interventions: resting on cart    To be done/followed up on inpatient unit:  Continue with plan of care per admitting MD.    Does this patient have any cognitive concerns?: none    Activity level - Baseline/Home:  Independent  Activity Level - Current:   Independent    Patient's Preferred language: English   Needed?: No    Isolation: None  Infection: Not Applicable  Patient tested for COVID 19 prior to admission: YES  Bariatric?: No    Vital Signs:   Vitals:    12/09/20 1030 12/09/20 1045 12/09/20 1115 12/09/20 1230   BP:    119/52   Pulse: 52 54 58 54   Resp: 21 18 16    Temp:       TempSrc:       SpO2: 97% 97% 96% 96%   Weight:       Height:           Cardiac Rhythm:     Was the PSS-3 completed:   Yes  What " interventions are required if any?               Family Comments: None present  OBS brochure/video discussed/provided to patient/family: N/A          For the majority of the shift this patient's behavior was Green.   Behavioral interventions performed were NA.    ED NURSE PHONE NUMBER: [phone]  RECEIVING UNIT ED HANDOFF REVIEW    ED Nurse Handoff Report was reviewed by: Nadeem Gary RN on December 9, 2020 at 3:54 PM

## 2020-12-10 ENCOUNTER — APPOINTMENT (OUTPATIENT)
Dept: NUCLEAR MEDICINE | Facility: CLINIC | Age: 70
DRG: 287 | End: 2020-12-10
Attending: HOSPITALIST
Payer: MEDICARE

## 2020-12-10 ENCOUNTER — APPOINTMENT (OUTPATIENT)
Dept: CARDIOLOGY | Facility: CLINIC | Age: 70
DRG: 287 | End: 2020-12-10
Attending: HOSPITALIST
Payer: MEDICARE

## 2020-12-10 LAB
ANION GAP SERPL CALCULATED.3IONS-SCNC: 3 MMOL/L (ref 3–14)
BUN SERPL-MCNC: 16 MG/DL (ref 7–30)
CALCIUM SERPL-MCNC: 8.6 MG/DL (ref 8.5–10.1)
CHLORIDE SERPL-SCNC: 109 MMOL/L (ref 94–109)
CO2 SERPL-SCNC: 29 MMOL/L (ref 20–32)
CREAT SERPL-MCNC: 1.04 MG/DL (ref 0.52–1.04)
CV STRESS MAX HR HE: 80
ERYTHROCYTE [DISTWIDTH] IN BLOOD BY AUTOMATED COUNT: 13.2 % (ref 10–15)
GFR SERPL CREATININE-BSD FRML MDRD: 54 ML/MIN/{1.73_M2}
GLUCOSE SERPL-MCNC: 99 MG/DL (ref 70–99)
HCT VFR BLD AUTO: 39.7 % (ref 35–47)
HGB BLD-MCNC: 13.8 G/DL (ref 11.7–15.7)
MCH RBC QN AUTO: 29.7 PG (ref 26.5–33)
MCHC RBC AUTO-ENTMCNC: 34.8 G/DL (ref 31.5–36.5)
MCV RBC AUTO: 85 FL (ref 78–100)
PLATELET # BLD AUTO: 154 10E9/L (ref 150–450)
POTASSIUM SERPL-SCNC: 4.3 MMOL/L (ref 3.4–5.3)
RATE PRESSURE PRODUCT: NORMAL
RBC # BLD AUTO: 4.65 10E12/L (ref 3.8–5.2)
SODIUM SERPL-SCNC: 141 MMOL/L (ref 133–144)
STRESS ECHO BASELINE DIASTOLIC HE: 72
STRESS ECHO BASELINE HR: 60
STRESS ECHO BASELINE SYSTOLIC BP: 125
STRESS ECHO CALCULATED PERCENT HR: 54 %
STRESS ECHO LAST STRESS DIASTOLIC BP: 60
STRESS ECHO LAST STRESS SYSTOLIC BP: 130
STRESS ECHO TARGET HR: 149
TROPONIN I SERPL-MCNC: <0.015 UG/L (ref 0–0.04)
WBC # BLD AUTO: 4.7 10E9/L (ref 4–11)

## 2020-12-10 PROCEDURE — A9502 TC99M TETROFOSMIN: HCPCS | Performed by: STUDENT IN AN ORGANIZED HEALTH CARE EDUCATION/TRAINING PROGRAM

## 2020-12-10 PROCEDURE — 343N000001 HC RX 343: Performed by: STUDENT IN AN ORGANIZED HEALTH CARE EDUCATION/TRAINING PROGRAM

## 2020-12-10 PROCEDURE — 93017 CV STRESS TEST TRACING ONLY: CPT

## 2020-12-10 PROCEDURE — 78452 HT MUSCLE IMAGE SPECT MULT: CPT

## 2020-12-10 PROCEDURE — 85027 COMPLETE CBC AUTOMATED: CPT | Performed by: STUDENT IN AN ORGANIZED HEALTH CARE EDUCATION/TRAINING PROGRAM

## 2020-12-10 PROCEDURE — 999N000049 HC STATISTIC ECHO STRESS OR NM NPI

## 2020-12-10 PROCEDURE — 36415 COLL VENOUS BLD VENIPUNCTURE: CPT | Performed by: STUDENT IN AN ORGANIZED HEALTH CARE EDUCATION/TRAINING PROGRAM

## 2020-12-10 PROCEDURE — 84484 ASSAY OF TROPONIN QUANT: CPT | Performed by: STUDENT IN AN ORGANIZED HEALTH CARE EDUCATION/TRAINING PROGRAM

## 2020-12-10 PROCEDURE — 78452 HT MUSCLE IMAGE SPECT MULT: CPT | Mod: 26 | Performed by: INTERNAL MEDICINE

## 2020-12-10 PROCEDURE — 93018 CV STRESS TEST I&R ONLY: CPT | Performed by: INTERNAL MEDICINE

## 2020-12-10 PROCEDURE — 99222 1ST HOSP IP/OBS MODERATE 55: CPT | Mod: 25 | Performed by: INTERNAL MEDICINE

## 2020-12-10 PROCEDURE — 99225 PR SUBSEQUENT OBSERVATION CARE,LEVEL II: CPT | Performed by: HOSPITALIST

## 2020-12-10 PROCEDURE — 250N000013 HC RX MED GY IP 250 OP 250 PS 637: Performed by: STUDENT IN AN ORGANIZED HEALTH CARE EDUCATION/TRAINING PROGRAM

## 2020-12-10 PROCEDURE — G0378 HOSPITAL OBSERVATION PER HR: HCPCS

## 2020-12-10 PROCEDURE — 80048 BASIC METABOLIC PNL TOTAL CA: CPT | Performed by: STUDENT IN AN ORGANIZED HEALTH CARE EDUCATION/TRAINING PROGRAM

## 2020-12-10 PROCEDURE — 210N000002 HC R&B HEART CARE

## 2020-12-10 PROCEDURE — 93016 CV STRESS TEST SUPVJ ONLY: CPT | Performed by: INTERNAL MEDICINE

## 2020-12-10 PROCEDURE — 250N000011 HC RX IP 250 OP 636: Performed by: INTERNAL MEDICINE

## 2020-12-10 RX ORDER — CAFFEINE CITRATE 20 MG/ML
60 SOLUTION INTRAVENOUS
Status: DISCONTINUED | OUTPATIENT
Start: 2020-12-10 | End: 2020-12-11 | Stop reason: HOSPADM

## 2020-12-10 RX ORDER — AMINOPHYLLINE 25 MG/ML
50-100 INJECTION, SOLUTION INTRAVENOUS
Status: DISCONTINUED | OUTPATIENT
Start: 2020-12-10 | End: 2020-12-11 | Stop reason: HOSPADM

## 2020-12-10 RX ORDER — REGADENOSON 0.08 MG/ML
0.4 INJECTION, SOLUTION INTRAVENOUS ONCE
Status: COMPLETED | OUTPATIENT
Start: 2020-12-10 | End: 2020-12-10

## 2020-12-10 RX ORDER — ALBUTEROL SULFATE 90 UG/1
2 AEROSOL, METERED RESPIRATORY (INHALATION) EVERY 5 MIN PRN
Status: DISCONTINUED | OUTPATIENT
Start: 2020-12-10 | End: 2020-12-11 | Stop reason: HOSPADM

## 2020-12-10 RX ORDER — ACYCLOVIR 200 MG/1
0-1 CAPSULE ORAL
Status: DISCONTINUED | OUTPATIENT
Start: 2020-12-10 | End: 2020-12-11 | Stop reason: HOSPADM

## 2020-12-10 RX ADMIN — ASPIRIN 81 MG: 81 TABLET, DELAYED RELEASE ORAL at 08:55

## 2020-12-10 RX ADMIN — VALSARTAN 40 MG: 40 TABLET ORAL at 08:54

## 2020-12-10 RX ADMIN — DOCUSATE SODIUM 50 MG AND SENNOSIDES 8.6 MG 1 TABLET: 8.6; 5 TABLET, FILM COATED ORAL at 08:56

## 2020-12-10 RX ADMIN — TETROFOSMIN 32.4 MCI.: 1.38 INJECTION, POWDER, LYOPHILIZED, FOR SOLUTION INTRAVENOUS at 10:10

## 2020-12-10 RX ADMIN — DOCUSATE SODIUM 50 MG AND SENNOSIDES 8.6 MG 2 TABLET: 8.6; 5 TABLET, FILM COATED ORAL at 20:50

## 2020-12-10 RX ADMIN — ACETAMINOPHEN 650 MG: 325 TABLET, FILM COATED ORAL at 22:30

## 2020-12-10 RX ADMIN — TETROFOSMIN 9.8 MCI.: 1.38 INJECTION, POWDER, LYOPHILIZED, FOR SOLUTION INTRAVENOUS at 08:30

## 2020-12-10 RX ADMIN — ATORVASTATIN CALCIUM 40 MG: 40 TABLET, FILM COATED ORAL at 08:54

## 2020-12-10 RX ADMIN — REGADENOSON 0.4 MG: 0.08 INJECTION, SOLUTION INTRAVENOUS at 10:06

## 2020-12-10 RX ADMIN — CLOPIDOGREL BISULFATE 75 MG: 75 TABLET, FILM COATED ORAL at 08:54

## 2020-12-10 ASSESSMENT — ACTIVITIES OF DAILY LIVING (ADL)
ADLS_ACUITY_SCORE: 17
ADLS_ACUITY_SCORE: 17

## 2020-12-10 ASSESSMENT — ANXIETY QUESTIONNAIRES: GAD7 TOTAL SCORE: 3

## 2020-12-10 ASSESSMENT — ASTHMA QUESTIONNAIRES: ACT_TOTALSCORE: 24

## 2020-12-10 NOTE — CONSULTS
Consult Date:  12/10/2020      CARDIOLOGY CONSULTATION      REASON FOR CONSULTATION:  Chest pain.      HISTORY OF PRESENT ILLNESS:  This is a 69-year-old woman, a very pleasant woman.  She has coronary disease, and I actually met her a year and a half ago when she presented with her heart problem.  On 10/09/2019, she presented in extremis condition.  She had clotted off her ostial LAD.  Dr. Chatterjee did balloon angioplasty and stented that vessel.  There was, however, some slow flow and emboli down.  The circumflex marginal had moderate disease.  She was left dominant, and the right coronary was imaged actually on a separate day because of dye load, and there was only mild-to-moderate disease.  A balloon pump was placed at that time.  Her last evaluation of her EF was 10/10/2019.  EF was 35-40% with akinesis of the basal to apical anterior and anteroseptal walls, dyskinesis of the actual apex, and some hypokinesis of the basal mid inferoseptal wall segment.  The right ventricle was normal.  As best I can tell, there has been no followup of her ejection fraction since that time.  She presents now with back pain, which was her initial presentation last year.  She described this as a pressure band across her upper back, worse and if she goes for a walk and occasionally going to perhaps into her jaw.  No dizziness, no syncope.  This has been on and off since the spring, became much worse in the past month, and finally she came in.  She is ruled out for myocardial infarction.  Nuclear stress test shows a small area of a mild degree of infarction in the apical segment with normal ejection fraction.  That would suggest a remarkable improvement after Dr. Chatterjee's an angioplasty.      PAST MEDICAL HISTORY:  Cardiac as above, ADHD, narcolepsy, Clostridium difficile diarrhea in the past, central sleep apnea, GERD, depression, anxiety, arthritis, hypertension, asthma, renal insufficiency, cholelithiasis, which she is on medication  for.       PAST SURGICAL HISTORY:  Knee surgery, rectocele repair, ureter reimplantation, vaginal hysterectomy, and bladder repair.      MEDICATIONS ON ADMISSION:  Aspirin 81 mg daily, Lipitor 40 mg daily, carvedilol 3.125 mg b.i.d., Plavix 75 mg daily, melatonin as needed, multivitamins, Zoloft 100 mg daily, Actigall 300 mg b.i.d., Diovan 40 mg daily, vitamin D3, nitroglycerin p.r.n.      ALLERGIES:  LISTED AS LATEX, FLAGYL (RASH), HYDROCHLOROTHIAZIDE; SULFA LEADS TO ANAPHYLAXIS AND HIVES, TAPE; METOPROLOL CAUSES ITCHING.      SOCIAL HISTORY:  She is a never smoker, does not drink alcohol.  She is .      FAMILY HISTORY:  Mother has arthritis and hypertension.  Father had cerebrovascular disease with multiple strokes and diabetes.  Sister has thyroid disease.  Daughter has multiple sclerosis and Sjogren's.      REVIEW OF SYSTEMS:     MUSCULOSKELETAL:  She has had various arthritides in her back, and that is why she was not sure if this is a recurrence of her heart pain, which showed her back last year, or simply arthritis.  She has had previous orthopedic procedures.   GASTROINTESTINAL:  Negative.   RESPIRATORY:  Negative.   NEUROLOGIC:  Otherwise negative, although she mentions tingling or numbness in both in the fingers of both hands.     INFECTIOUS DISEASE:  There has been no recent travel, no infection.   The remainder of review of systems negative.      PHYSICAL EXAMINATION:   GENERAL:  Reveals a pleasant woman in no apparent distress.   VITAL SIGNS:  Currently, blood pressure 112/69, heart rate 66, temperature 36.4.  Weight 79.4 kg.   SKIN:  No petechiae or rash.   HEENT:  Nonicteric.   NECK:  Supple without thyromegaly or adenopathy.   CHEST:  Exam is clear.   CARDIAC:  Regular rate and rhythm.  No murmur, gallop, rub, or click.  Carotid pulses 2+, femoral pulses 2+, dorsal pedal pulse 1+.  Neck veins are hard to assess but are probably normal.   ABDOMEN:  There is no organomegaly.  Abdominal aorta is  not palpably enlarged.   EXTREMITIES:  Show no cyanosis, clubbing or edema.   NEUROLOGIC:  Alert and oriented, cranial nerves II-XII are intact, motor and sensory intact.      LABORATORY DATA:  Nuclear stress test as above.  Sodium 141, potassium 4.3, creatinine 1.04.  Serial troponin negative.  Glucose 99.  White count 4.7, hemoglobin 13.8, MCV 85, platelet count 154,000.  COVID test is negative.  Chest x-ray:  No acute infiltrates.  EKG from outside hospital shows sinus bradycardia, a QS in lead V1 and V2.  Followup EKG here shows sinus bradycardia, but there is not a Q-wave in V2.  ST-T segments are normal.      IMPRESSION:  This patient presents with back pain, which is a little confusing because she does have a history of arthritis in her back, but that was also one of her presenting symptoms with her heart attack.  The difference here is some of this back pain seems to get worse now with exercise.  The nuclear stress test shows a small area of apical ischemia.  This is first of all a remarkable improvement in her EF after Dr. Chatterjee placed the stent, but one would expect if this is stent restenosis it would have been a large area of anterior wall, not just the apex since it was in origin LAD occlusion.  There was also moderate OM disease and mild nondominant right disease.  Given the clinical history of back pain, which comes on with exertion and a stress test, albeit a small area of ischemia, I think heart catheterization is indicated to make sure there is not a problem with the stent or any new blockages.  The risks and benefits of angiogram, angioplasty or bypass surgery including the risk of stroke, heart attack and death were discussed with the patient, and she wishes to proceed.  We will plan on doing it tomorrow.  She understands that she will have a heart catheterization and if she needs a stent, she will have to extend another year of the time on Plavix.  If nothing is found, and this was a false  positive stress test which is possible, than other thoughts would include simple arthritis or gallbladder disease since she has known gallstones.  Those tend not to come on with exertion, however.      Total time spent was 40 minutes, greater than 50% with counseling.         CHIQUI BENITEZ MD             D: 12/10/2020   T: 12/10/2020   MT:       Name:     INDER SMITH   MRN:      [id number]        Account:       [id number]   :      1950           Consult Date:  12/10/2020      Document: [id number]       cc: Mel Dennison MD

## 2020-12-10 NOTE — UTILIZATION REVIEW
Admission Status; Secondary Review Determination         Under the authority of the Utilization Management Committee, the utilization review process indicated a secondary review on the above patient.  The review outcome is based on review of the medical records, discussions with staff, and applying clinical experience noted on the date of the review.        (x)      Inpatient Status Appropriate - This patient's medical care is consistent with medical management for inpatient care and reasonable inpatient medical practice.     RATIONALE FOR DETERMINATION   The patient is a 69-year-old female admitted on 12/9/2020.  She came in with history of atypical chest pain along the right side of her chest and back.  She has a long history of coronary artery disease with prior anterior STEMI and coronary artery stents.  She had an abnormal Lexiscan today and is scheduled for an angiogram tomorrow.  Due to complex cardiac past medical history and requirement to stay an additional night to obtain an angiogram for possible intervention, recommendation is to switch her from patient class observation to patient class inpatient.  Troponin levels have remained low.  Dr. Romie Mccall paged and request to make this change will be made.     The severity of illness, intensity of service provided, expected LOS and risk for adverse outcome make the care complex, high risk and appropriate for hospital admission.        The information on this document is developed by the utilization review team in order for the business office to ensure compliance.  This only denotes the appropriateness of proper admission status and does not reflect the quality of care rendered.         The definitions of Inpatient Status and Observation Status used in making the determination above are those provided in the CMS Coverage Manual, Chapter 1 and Chapter 6, section 70.4.      Sincerely,     Guanako Ricci MD  Physician Advisor  Utilization Review/ Case  Management  [facility].

## 2020-12-10 NOTE — PROGRESS NOTES
Patient tolerated Divina-scan well.  Endorses sub sternal chest pressure during test rated at 2/10.  Patient states chest tightness/pressure subsided at 2 minutes post completion of scan.  Patient will wait in hospital for next step of testing.  Patient transferred back to room.

## 2020-12-10 NOTE — PLAN OF CARE
Alert and oriented. VSS on RA. Tele SB. Up independently in the room. Denies any pain/sob. Slept all night. NPO since midnight for stress echo today. Will continue to monitor.

## 2020-12-10 NOTE — PROGRESS NOTES
[facility]    Medicine Progress Note - Hospitalist Service       Date of Admission:  12/9/2020  Assessment & Plan       Gem Gama is a 69 year old female admitted on 12/9/2020. She presents with right sided chest pain.         Chest pain, unspecified type  Ischemic cardiomyopathy  Chronic systolic heart failure (H)  Coronary artery disease with angina pectoris, unspecified vessel or lesion type, unspecified whether native or transplanted heart (H)    Assessment: Presents with atypical chest pain over the past several days along with right-sided back pain.  Admission work-up is unremarkable including CBC/BMP/troponin.  Her EKG showed no dynamic ST changes.  Does have a history of s/p EDWIN x 2 to LAD 10/9/2019 in the setting of anterior STEMI, Coronary angiogram 10/9 showed severe single vessel obstructive CAD of LAD (100%) as well as moderate non-obstructive CAD in OM1 and OM2 (60%, 50%). She went back to the cath lab 10/10/2019 for evaluation of RCA which showed no obstructive RCA disease.  Currently she is hemodynamically stable without any chest pain.  - Lexiscan today - abnormal results - given history will consult cardiology - greatly appreciate assistance  - Continue prior to admission aspirin/Lipitor/Plavix/valsartan  - Held prior to admission Coreg for stress echo in a.m.     Depression with anxiety  Major depressive disorder, recurrent episode, moderate (H)    Assessment/Plan: Continue prior to admission Zoloft at discharge     Intermittent asthma  Obstructive sleep apnea syndrome    Assessment/Plan: Stable, follows outpatient     Esophageal reflux (GERD)    Assessment/Plan: Stable, follows outpatient     CKD stage G3a/A2, GFR 45-59 and albumin creatinine ratio  mg/g    Assessment/Plan: Creatinine on admission stable at 0.97, avoid NSAIDs and significant nephrotoxins     Hyperlipidemia LDL goal <70    Assessment/Plan: Continue statin     Acute right-sided back pain,  unspecified back location    Assessment/Plan: Appears musculoskeletal in nature, treat supportively         Diet: NPO for Medical/Clinical Reasons Except for: Ice Chips, Meds    DVT Prophylaxis: Pneumatic Compression Devices  Greene Catheter: not present  Code Status: Full Code           Disposition Plan   Expected discharge: pending clinical course and cardiology recs  Entered: Romie Tomas MD 12/10/2020, 8:11 AM       The patient's care was discussed with the Bedside Nurse and Patient.    Romie Tomas MD  Hospitalist Service  [facility]  Contact information available via [facility] Paging/Directory    ______________________________________________________________________    Interval History   Patient seen and examined earlier today.  Continues to have some discomfort in her back primarily.  She denies any shortness of breath, fevers or chills.  Lexiscan completed today with abnormal results.  Given her history will request input of cardiology prior to discharge as she is continuing to have symptoms.    Data reviewed today: I reviewed all medications, new labs and imaging results over the last 24 hours. I personally reviewed no images or EKG's today.    Physical Exam   Vital Signs: Temp: 97.6  F (36.4  C) Temp src: Oral BP: 121/69 Pulse: 53   Resp: 16 SpO2: 96 % O2 Device: None (Room air)    Weight: 175 lbs 0 oz    Gen: NAD, pleasant  HEENT: Normocephalic, EOMI, MMM  Resp: no crackles,  no wheezes, no increased work of resp  CV: S1S2 heard, reg rhythm, reg rate, no pitting pedal edema  Abdo: soft, nontender, nondistended, bowel sounds present  Ext: calves nontender, well perfused  Neuro: AAOx3, CN grossly intact, no facial asymmetry      Data   Recent Labs   Lab 12/10/20  0557 12/09/20  1231 12/09/20  1031   WBC 4.7  --  5.4   HGB 13.8  --  13.7   MCV 85  --  86     --  164     --  139   POTASSIUM 4.3  --  4.1   CHLORIDE 109  --  107   CO2 29  --  26   BUN 16  --  15   CR  1.04  --  0.97   ANIONGAP 3  --  6   FRACISCO 8.6  --  8.6   GLC 99  --  95   ALBUMIN  --   --  3.6   PROTTOTAL  --   --  7.3   BILITOTAL  --   --  0.7   ALKPHOS  --   --  92   ALT  --   --  25   AST  --   --  32   TROPI <0.015 <0.015 <0.015     Recent Results (from the past 24 hour(s))   NM Lexiscan stress test (nuc card)   Result Value    Target     Baseline Systolic     Baseline Diastolic BP 72    Last Stress Systolic     Last Stress Diastolic BP 60    Baseline HR 60    Max HR 80    Calculated Percent HR 54    Rate Pressure Product 10,400.0    Narrative       The nuclear stress test is abnormal.     There is a small area of a mild degree of infarction in the apical   segment(s) of the left ventricle associated with a mild degree of   jasmeet-infarct ischemia. This appears to be in the left anterior descending   distribution.     Left ventricular function is normal.     There is no prior study for comparison.

## 2020-12-10 NOTE — H&P
[facility]    History and Physical - Hospitalist Service       Date of Admission:  12/9/2020    Assessment & Plan   Gem Gama is a 69 year old female admitted on 12/9/2020. She presents with right sided chest pain.         Chest pain, unspecified type    Ischemic cardiomyopathy    Chronic systolic heart failure (H)    Coronary artery disease with angina pectoris, unspecified vessel or lesion type, unspecified whether native or transplanted heart (H)    Assessment: Presents with atypical chest pain over the past several days along with right-sided back pain.  Admission work-up is unremarkable including CBC/BMP/troponin.  Her EKG showed no dynamic ST changes.  Does have a history of s/p EDWIN x 2 to LAD 10/9/2019 in the setting of anterior STEMI, Coronary angiogram 10/9 showed severe single vessel obstructive CAD of LAD (100%) as well as moderate non-obstructive CAD in OM1 and OM2 (60%, 50%). She went back to the cath lab 10/10/2019 for evaluation of RCA which showed no obstructive RCA disease.  Currently she is hemodynamically stable without any chest pain.    Plan:   -Admit observation  -Stress echo in a.m.  -Continue prior to admission aspirin/Lipitor/Plavix/valsartan  -Hold prior to admission Coreg for stress echo in a.m.      Depression with anxiety    Major depressive disorder, recurrent episode, moderate (H)    Assessment/Plan: Continue prior to admission Zoloft at discharge      Intermittent asthma    Obstructive sleep apnea syndrome    Assessment/Plan: Stable, follows outpatient      Esophageal reflux (GERD)    Assessment/Plan: Stable, follows outpatient      CKD stage G3a/A2, GFR 45-59 and albumin creatinine ratio  mg/g    Assessment/Plan: Creatinine on admission stable at 0.97, avoid NSAIDs and significant nephrotoxins      Hyperlipidemia LDL goal <70    Assessment/Plan: Continue statin      Acute right-sided back pain, unspecified back location    Assessment/Plan:  Appears musculoskeletal in nature, treat supportively       Diet: Combination Diet 2 gm NA Diet; No Caffeine Diet  NPO for Medical/Clinical Reasons Except for: Ice Chips, Meds    DVT Prophylaxis: DAPT  Greene Catheter: not present  Code Status: Full Code           Disposition Plan   Expected discharge: Tomorrow, recommended to prior living arrangement once ACS rule out completed.  Entered: Armando Herbert MD 12/09/2020, 6:34 PM     The patient's care was discussed with the Patient and ED provider.    Armando Herbert MD  [facility]  Contact information available via [facility] Paging/Directory      ______________________________________________________________________    Chief Complaint     Chest pain    History is obtained from the patient    History of Present Illness   Gem Gama is a 69 year old female with past medical history of coronary disease status post STEMI, chronic systolic heart failure, hypertension presents for evaluation of chest pain/back pain.    She reports that for the past several days she had intermittent right-sided chest along with some back pain.  She also endorsed jaw pain and right shoulder pain in addition to her other symptoms.  She reports that her pain improves when she lays flat, and is worse with exertion.  She reports that due to her anginal symptoms, she has had more difficulty ambulating over the past couple days.  She denies taking any pain control medications.  She reports her symptoms are essentially identical to her MI in 2019.  She denies any fevers or chills, no cough, no worsening lower extremity swelling or calf pain.  She denies any nausea/vomiting or abdominal pain.  She denies any headaches, slurred speech, lightheadedness.  She has no other complaints at this time    Review of Systems    The 10 point Review of Systems is negative other than noted in the HPI or here.     Past Medical History    I have reviewed this patient's medical history and updated  it with pertinent information if needed.   Past Medical History:   Diagnosis Date     ADHD (attention deficit hyperactivity disorder)      Anxiety      Arthritis     back, hands, knees, hips     Asthma     controlled--has coughing symptoms     C. difficile diarrhea      Central sleep apnea      Coronary artery disease involving native coronary artery of native heart without angina pectoris 10/25/2019     Depression      Gastro-oesophageal reflux disease      Hypertension      Ischemic cardiomyopathy 10/25/2019     Narcolepsy      Renal disease     gfr is aroung 48     Sleep apnea     uses Cpap       Past Surgical History   I have reviewed this patient's surgical history and updated it with pertinent information if needed.  Past Surgical History:   Procedure Laterality Date     ARTHROPLASTY KNEE  7/9/2014    Procedure: ARTHROPLASTY KNEE;  Surgeon: Levi Johnson MD;  Location:  OR     ARTHROPLASTY KNEE Left 11/24/2014    Procedure: ARTHROPLASTY KNEE;  Surgeon: Levi Johnson MD;  Location:  OR     C REIMPLANT URETER,EXTENSIVE TAILORING  1973 1997     C REPAIR ENTEROCELE,VAG APPRCH  2008    Prolift     C TOTAL ABD HYSTERECTOMY+BLAD REPR  1992    Vaginal approach with oophrectomy     C TOTAL KNEE ARTHROPLASTY       CV CORONARY ANGIOGRAM N/A 10/9/2019    Procedure: Coronary Angiogram;  Surgeon: Chiquita Chatterjee MD;  Location: [facility] CARDIAC CATH LAB     CV HEART CATHETERIZATION WITH POSSIBLE INTERVENTION N/A 10/10/2019    Procedure: Heart Catheterization with Possible Intervention;  Surgeon: Chin Garcia MD;  Location:  HEART CARDIAC CATH LAB     CV INTRA-AORTIC BALLOON PUMP INSERTION N/A 10/9/2019    Procedure: Intra-Aortic Balloon Pump Insertion;  Surgeon: Chiquita Chatterjee MD;  Location:  HEART CARDIAC CATH LAB     CV INTRA-AORTIC BALLOON PUMP REMOVAL N/A 10/10/2019    Procedure: Intra-Aortic Balloon Pump Removal;  Surgeon: Chin Garcia MD;  Location:  HEART CARDIAC CATH LAB      CV PCI STENT DRUG ELUTING N/A 10/9/2019    Procedure: PCI Stent Drug Eluting;  Surgeon: Chiquita Chatterjee MD;  Location:  HEART CARDIAC CATH LAB     GENITOURINARY SURGERY      kidney surgery X 3     ORTHOPEDIC SURGERY      bilat total hip     RECTOCELE REPAIR       SOFT TISSUE SURGERY         Social History   I have reviewed this patient's social history and updated it with pertinent information if needed.  Social History     Tobacco Use     Smoking status: Never Smoker     Smokeless tobacco: Never Used   Substance Use Topics     Alcohol use: No     Drug use: No       Family History   I have reviewed this patient's family history and updated it with pertinent information if needed.  Family History   Problem Relation Age of Onset     Hypertension Mother      Arthritis Mother      Cerebrovascular Disease Father         Three Strokes     Diabetes Father         and brother     Thyroid Disease Sister         Hypothyroid     Multiple Sclerosis Daughter      Sjogren's Daughter        Prior to Admission Medications   Prior to Admission Medications   Prescriptions Last Dose Informant Patient Reported? Taking?   Melatonin-Pyridoxine (MELATIN PO) prn  Yes Yes   Sig: Take 1 tablet by mouth nightly as needed (sleep)    Prenatal Multivit-Min-Fe-FA (PRENATAL/IRON) TABS 12/9/2020 at am  Yes Yes   amoxicillin (AMOXIL) 500 MG capsule prn  Yes No   Sig: Take as directed before dental work   aspirin (ASA) 81 MG EC tablet 12/9/2020 at am  No Yes   Sig: Take 1 tablet (81 mg) by mouth daily   atorvastatin (LIPITOR) 40 MG tablet 12/9/2020 at am  No Yes   Sig: Take 1 tablet (40 mg) by mouth daily   carvedilol (COREG) 3.125 MG tablet 12/9/2020 at am  No Yes   Sig: Take 1 tablet (3.125 mg) by mouth 2 times daily (with meals)   clopidogrel (PLAVIX) 75 MG tablet 12/9/2020 at am  No Yes   Sig: Take 1 tablet (75 mg) by mouth daily   nitroGLYcerin (NITROSTAT) 0.4 MG sublingual tablet prn  No No   Sig: Place 1 tablet (0.4 mg) under the  tongue every 5 minutes as needed for chest pain   sertraline (ZOLOFT) 100 MG tablet 12/9/2020 at am  No Yes   Sig: Take 1 tablet (100 mg) by mouth daily   ursodiol (ACTIGALL) 300 MG capsule 12/9/2020 at am  No Yes   Sig: Take 1 capsule (300 mg) by mouth 2 times daily   valsartan (DIOVAN) 40 MG tablet 12/9/2020 at am  No Yes   Sig: Take 1 tablet (40 mg) by mouth daily   vitamin D3 (CHOLECALCIFEROL) 2000 units (50 mcg) tablet 12/9/2020 at am Self No Yes   Sig: Take 1 tablet (2,000 Units) by mouth daily      Facility-Administered Medications: None     Allergies   Allergies   Allergen Reactions     Latex Other (See Comments) and Shortness Of Breath     Runny nose  PN: LW Reaction: DIFFICULTY BREATHING       Flagyl [Metronidazole Hcl] Anaphylaxis and Rash     Food      PN: LW FI1: nka LW FI2:     Hydrochlorothiazide W/Triamterene      PN: LW Reaction: skin rash     No Clinical Screening - See Comments      PN: LW Other1: -Adhesive Tape     Sulfa Drugs Anaphylaxis, Hives and Itching     PN: LW Reaction: HIVES, Swelling     Tape [Adhesive Tape] Hives     Metoprolol Itching and Rash     Metronidazole Hives and Rash     PN: LW Reaction: HIVES         Physical Exam   Vital Signs: Temp: 98.3  F (36.8  C) Temp src: Oral BP: 136/59 Pulse: 54   Resp: 28 SpO2: 97 % O2 Device: None (Room air)    Weight: 175 lbs 0 oz    Constitutional: awake, alert, cooperative, no apparent distress.   Eyes: Lids and lashes normal, pupils equal, round and reactive to light   ENT: Normocephalic, without obvious abnormality, atraumatic, sinuses nontender on palpation   Hematologic / Lymphatic: no cervical lymphadenopathy   Respiratory: CTABL   Cardiovascular: RRR with no m/r/g   GI: Normal bowel sounds, soft, non-distended, non-tender.   kin: normal skin color, texture, turgor   Musculoskeletal: There is no redness, warmth, or swelling of the joints. Full range of motion noted.   Neurologic: Awake, alert, oriented to name, place and time. Cranial  nerves II-XII are grossly intact. Motor is 5 out of 5 bilaterally. Sensory is intact.   Neuropsychiatric: normal mood and affect    Data   Data reviewed today: I reviewed all medications, new labs and imaging results over the last 24 hours. I personally reviewed the EKG tracing showing Sinus bradycardia and the chest x-ray image(s) showing See below.    Most Recent 3 CBC's:  Recent Labs   Lab Test 12/09/20  1031 10/15/19  1246 10/13/19  0600   WBC 5.4 6.0 6.2   HGB 13.7 13.8 12.5   MCV 86 88 85    204 137*     Most Recent 3 BMP's:  Recent Labs   Lab Test 12/09/20  1031 10/15/20  1256 01/16/20  1634 12/10/19  1215 12/10/19  1215    140  --   --  144   POTASSIUM 4.1 4.8 4.7   < > 4.4   CHLORIDE 107 107  --   --  111*   CO2 26 29  --   --  26   BUN 15 19  --   --  13   CR 0.97 1.14* 1.12*   < > 0.93   ANIONGAP 6 4  --   --  7   FRACISCO 8.6 9.2  --   --  9.2   GLC 95 101*  --   --  93    < > = values in this interval not displayed.     Most Recent 2 LFT's:  Recent Labs   Lab Test 12/09/20  1031 10/15/20  1256   AST 32 33   ALT 25 38   ALKPHOS 92 103   BILITOTAL 0.7 0.8     Most Recent 3 INR's:  Recent Labs   Lab Test 11/27/14  0553 11/26/14  0628 11/25/14  0620   INR 1.69* 1.76* 1.61*     Most Recent 3 Troponin's:  Recent Labs   Lab Test 12/09/20  1231 12/09/20  1031 10/09/19  1208   TROPI <0.015 <0.015 0.035     Recent Results (from the past 24 hour(s))   XR Chest 2 Views    Narrative    CHEST TWO VIEWS 12/9/2020 11:37 AM     HISTORY: chest pain    COMPARISON: 10/9/2019       Impression    IMPRESSION: There are no acute infiltrates. The cardiac silhouette is  not enlarged. Pulmonary vasculature is unremarkable.    SCAR GALLEGOS MD

## 2020-12-10 NOTE — PLAN OF CARE
Pt denies chest pain and says the pain in her upper back has gotten better, up independently, alert and oriented, Tele SB

## 2020-12-11 ENCOUNTER — SURGERY (OUTPATIENT)
Age: 70
End: 2020-12-11
Payer: COMMERCIAL

## 2020-12-11 DIAGNOSIS — I25.10 CORONARY ARTERY DISEASE INVOLVING NATIVE CORONARY ARTERY OF NATIVE HEART WITHOUT ANGINA PECTORIS: ICD-10-CM

## 2020-12-11 PROCEDURE — 250N000011 HC RX IP 250 OP 636: Performed by: INTERNAL MEDICINE

## 2020-12-11 PROCEDURE — 250N000009 HC RX 250: Performed by: INTERNAL MEDICINE

## 2020-12-11 PROCEDURE — B2111ZZ FLUOROSCOPY OF MULTIPLE CORONARY ARTERIES USING LOW OSMOLAR CONTRAST: ICD-10-PCS | Performed by: INTERNAL MEDICINE

## 2020-12-11 PROCEDURE — 4A023N7 MEASUREMENT OF CARDIAC SAMPLING AND PRESSURE, LEFT HEART, PERCUTANEOUS APPROACH: ICD-10-PCS | Performed by: INTERNAL MEDICINE

## 2020-12-11 PROCEDURE — 93458 L HRT ARTERY/VENTRICLE ANGIO: CPT | Performed by: INTERNAL MEDICINE

## 2020-12-11 PROCEDURE — 99152 MOD SED SAME PHYS/QHP 5/>YRS: CPT | Mod: 59 | Performed by: INTERNAL MEDICINE

## 2020-12-11 PROCEDURE — 250N000013 HC RX MED GY IP 250 OP 250 PS 637: Performed by: INTERNAL MEDICINE

## 2020-12-11 PROCEDURE — 93454 CORONARY ARTERY ANGIO S&I: CPT | Performed by: INTERNAL MEDICINE

## 2020-12-11 PROCEDURE — 258N000003 HC RX IP 258 OP 636: Performed by: INTERNAL MEDICINE

## 2020-12-11 PROCEDURE — 99232 SBSQ HOSP IP/OBS MODERATE 35: CPT | Mod: 25 | Performed by: INTERNAL MEDICINE

## 2020-12-11 PROCEDURE — 272N000001 HC OR GENERAL SUPPLY STERILE: Performed by: INTERNAL MEDICINE

## 2020-12-11 PROCEDURE — 210N000002 HC R&B HEART CARE

## 2020-12-11 PROCEDURE — 99152 MOD SED SAME PHYS/QHP 5/>YRS: CPT | Performed by: INTERNAL MEDICINE

## 2020-12-11 PROCEDURE — 250N000013 HC RX MED GY IP 250 OP 250 PS 637: Performed by: PHYSICIAN ASSISTANT

## 2020-12-11 PROCEDURE — 93454 CORONARY ARTERY ANGIO S&I: CPT | Mod: 26 | Performed by: INTERNAL MEDICINE

## 2020-12-11 PROCEDURE — 250N000013 HC RX MED GY IP 250 OP 250 PS 637: Performed by: STUDENT IN AN ORGANIZED HEALTH CARE EDUCATION/TRAINING PROGRAM

## 2020-12-11 PROCEDURE — 99153 MOD SED SAME PHYS/QHP EA: CPT | Performed by: INTERNAL MEDICINE

## 2020-12-11 RX ORDER — FENTANYL CITRATE 50 UG/ML
INJECTION, SOLUTION INTRAMUSCULAR; INTRAVENOUS
Status: DISCONTINUED | OUTPATIENT
Start: 2020-12-11 | End: 2020-12-11 | Stop reason: HOSPADM

## 2020-12-11 RX ORDER — IOPAMIDOL 755 MG/ML
INJECTION, SOLUTION INTRAVASCULAR
Status: DISCONTINUED | OUTPATIENT
Start: 2020-12-11 | End: 2020-12-11 | Stop reason: HOSPADM

## 2020-12-11 RX ORDER — NALOXONE HYDROCHLORIDE 0.4 MG/ML
0.4 INJECTION, SOLUTION INTRAMUSCULAR; INTRAVENOUS; SUBCUTANEOUS
Status: DISCONTINUED | OUTPATIENT
Start: 2020-12-11 | End: 2020-12-12 | Stop reason: HOSPADM

## 2020-12-11 RX ORDER — CARVEDILOL 3.12 MG/1
3.12 TABLET ORAL 2 TIMES DAILY WITH MEALS
Status: DISCONTINUED | OUTPATIENT
Start: 2020-12-11 | End: 2020-12-12 | Stop reason: HOSPADM

## 2020-12-11 RX ORDER — NITROGLYCERIN 0.4 MG/1
0.4 TABLET SUBLINGUAL EVERY 5 MIN PRN
Qty: 10 TABLET | Refills: 0 | Status: CANCELLED | OUTPATIENT
Start: 2020-12-11

## 2020-12-11 RX ORDER — FENTANYL CITRATE 50 UG/ML
25-50 INJECTION, SOLUTION INTRAMUSCULAR; INTRAVENOUS
Status: ACTIVE | OUTPATIENT
Start: 2020-12-11 | End: 2020-12-11

## 2020-12-11 RX ORDER — LIDOCAINE 40 MG/G
CREAM TOPICAL
Status: DISCONTINUED | OUTPATIENT
Start: 2020-12-11 | End: 2020-12-11

## 2020-12-11 RX ORDER — SODIUM CHLORIDE 9 MG/ML
INJECTION, SOLUTION INTRAVENOUS CONTINUOUS
Status: DISCONTINUED | OUTPATIENT
Start: 2020-12-11 | End: 2020-12-12 | Stop reason: HOSPADM

## 2020-12-11 RX ORDER — SODIUM CHLORIDE 9 MG/ML
INJECTION, SOLUTION INTRAVENOUS CONTINUOUS
Status: DISCONTINUED | OUTPATIENT
Start: 2020-12-11 | End: 2020-12-11 | Stop reason: HOSPADM

## 2020-12-11 RX ORDER — ACETAMINOPHEN 325 MG/1
650 TABLET ORAL EVERY 4 HOURS PRN
Status: DISCONTINUED | OUTPATIENT
Start: 2020-12-11 | End: 2020-12-12 | Stop reason: HOSPADM

## 2020-12-11 RX ORDER — ATROPINE SULFATE 0.1 MG/ML
0.5 INJECTION INTRAVENOUS
Status: ACTIVE | OUTPATIENT
Start: 2020-12-11 | End: 2020-12-12

## 2020-12-11 RX ORDER — LORAZEPAM 0.5 MG/1
0.5 TABLET ORAL
Status: DISCONTINUED | OUTPATIENT
Start: 2020-12-11 | End: 2020-12-11 | Stop reason: HOSPADM

## 2020-12-11 RX ORDER — LORAZEPAM 2 MG/ML
0.5 INJECTION INTRAMUSCULAR
Status: DISCONTINUED | OUTPATIENT
Start: 2020-12-11 | End: 2020-12-11 | Stop reason: HOSPADM

## 2020-12-11 RX ORDER — NALOXONE HYDROCHLORIDE 0.4 MG/ML
0.2 INJECTION, SOLUTION INTRAMUSCULAR; INTRAVENOUS; SUBCUTANEOUS
Status: DISCONTINUED | OUTPATIENT
Start: 2020-12-11 | End: 2020-12-12 | Stop reason: HOSPADM

## 2020-12-11 RX ORDER — POTASSIUM CHLORIDE 1500 MG/1
20 TABLET, EXTENDED RELEASE ORAL
Status: DISCONTINUED | OUTPATIENT
Start: 2020-12-11 | End: 2020-12-11 | Stop reason: HOSPADM

## 2020-12-11 RX ORDER — FLUMAZENIL 0.1 MG/ML
0.2 INJECTION, SOLUTION INTRAVENOUS
Status: ACTIVE | OUTPATIENT
Start: 2020-12-11 | End: 2020-12-12

## 2020-12-11 RX ADMIN — MIDAZOLAM 1 MG: 1 INJECTION INTRAMUSCULAR; INTRAVENOUS at 16:58

## 2020-12-11 RX ADMIN — SODIUM CHLORIDE: 9 INJECTION, SOLUTION INTRAVENOUS at 09:08

## 2020-12-11 RX ADMIN — ASPIRIN 325 MG: 325 TABLET, COATED ORAL at 09:10

## 2020-12-11 RX ADMIN — CARVEDILOL 3.12 MG: 3.12 TABLET, FILM COATED ORAL at 22:05

## 2020-12-11 RX ADMIN — LIDOCAINE HYDROCHLORIDE 10 ML: 10 INJECTION, SOLUTION EPIDURAL; INFILTRATION; INTRACAUDAL; PERINEURAL at 17:12

## 2020-12-11 RX ADMIN — IOPAMIDOL 35 ML: 755 INJECTION, SOLUTION INTRAVENOUS at 17:21

## 2020-12-11 RX ADMIN — ATORVASTATIN CALCIUM 40 MG: 40 TABLET, FILM COATED ORAL at 09:10

## 2020-12-11 RX ADMIN — CLOPIDOGREL BISULFATE 75 MG: 75 TABLET, FILM COATED ORAL at 09:09

## 2020-12-11 RX ADMIN — FENTANYL CITRATE 50 MCG: 50 INJECTION, SOLUTION INTRAMUSCULAR; INTRAVENOUS at 16:58

## 2020-12-11 RX ADMIN — VALSARTAN 40 MG: 40 TABLET ORAL at 09:09

## 2020-12-11 RX ADMIN — DOCUSATE SODIUM 50 MG AND SENNOSIDES 8.6 MG 1 TABLET: 8.6; 5 TABLET, FILM COATED ORAL at 22:00

## 2020-12-11 RX ADMIN — CARVEDILOL 3.12 MG: 3.12 TABLET, FILM COATED ORAL at 12:37

## 2020-12-11 RX ADMIN — DOCUSATE SODIUM 50 MG AND SENNOSIDES 8.6 MG 1 TABLET: 8.6; 5 TABLET, FILM COATED ORAL at 09:10

## 2020-12-11 ASSESSMENT — ACTIVITIES OF DAILY LIVING (ADL)
DRESSING/BATHING_DIFFICULTY: NO
FALL_HISTORY_WITHIN_LAST_SIX_MONTHS: NO
ADLS_ACUITY_SCORE: 17
ADLS_ACUITY_SCORE: 17
PATIENT_/_FAMILY_COMMUNICATION_STYLE: SPOKEN LANGUAGE (ENGLISH OR BILINGUAL)
ADLS_ACUITY_SCORE: 17
TOILETING_ISSUES: NO
ADLS_ACUITY_SCORE: 17
WEAR_GLASSES_OR_BLIND: YES
ADLS_ACUITY_SCORE: 17
WALKING_OR_CLIMBING_STAIRS_DIFFICULTY: NO
DIFFICULTY_EATING/SWALLOWING: NO
DOING_ERRANDS_INDEPENDENTLY_DIFFICULTY: NO
CONCENTRATING,_REMEMBERING_OR_MAKING_DECISIONS_DIFFICULTY: NO
DIFFICULTY_COMMUNICATING: NO
ADLS_ACUITY_SCORE: 17

## 2020-12-11 ASSESSMENT — MIFFLIN-ST. JEOR: SCORE: 1277.47

## 2020-12-11 NOTE — PROGRESS NOTES
[facility]    Medicine Progress Note - Hospitalist Service       Date of Admission:  12/9/2020  Assessment & Plan       Gem Gama is a 69 year old female admitted on 12/9/2020. She presents with right sided chest pain.      Chest pain, unspecified type  Ischemic cardiomyopathy  Chronic systolic heart failure (H)  Coronary artery disease with angina pectoris, unspecified vessel or lesion type, unspecified whether native or transplanted heart (H): Presents with atypical chest pain over the past several days along with right-sided back pain.  Admission work-up is unremarkable including CBC/BMP/troponin.  Her EKG showed no dynamic ST changes.  Does have a history of s/p EDWIN x 2 to LAD 10/9/2019 in the setting of anterior STEMI, Coronary angiogram 10/9 showed severe single vessel obstructive CAD of LAD (100%) as well as moderate non-obstructive CAD in OM1 and OM2 (60%, 50%). She went back to the cath lab 10/10/2019 for evaluation of RCA which showed no obstructive RCA disease.    - Underwent Lexiscan 12/10/20,  small area of a mild degree of infarction in the apical segment(s) of the left ventricle associated with a mild degree of jasmeet-infarct ischemia. Cardiology consulted. Plan for angiogram today  - Continue prior to admission aspirin/Lipitor/Plavix/valsartan/Coreg  - Possibly discharge later today if angiogram negative     Depression with anxiety  Major depressive disorder, recurrent episode, moderate (H)    Assessment/Plan: Continue prior to admission Zoloft at discharge     Intermittent asthma  Obstructive sleep apnea syndrome    Assessment/Plan: Stable, follows outpatient     Esophageal reflux (GERD)    Assessment/Plan: Stable, follows outpatient     CKD stage G3a/A2, GFR 45-59 and albumin creatinine ratio  mg/g    Assessment/Plan: Creatinine on admission stable at 0.97, avoid NSAIDs and significant nephrotoxins      Acute right-sided back pain, unspecified back location     Assessment/Plan: Appears musculoskeletal in nature, treat supportively        Diet: Combination Diet 2 gm NA Diet  NPO for Medical/Clinical Reasons Except for: Meds    DVT Prophylaxis: Pneumatic Compression Devices  Greene Catheter: not present  Code Status: Full Code           Disposition Plan   Expected discharge: Pending Cardiolgoy work-up  Entered: Kelli Marks PA-C 12/11/2020, 12:01 PM       The patient's care was discussed with the Patient.    Kelli Marks PA-C  Hospitalist Service  [facility]  Contact information available via [facility] Paging/Directory    ______________________________________________________________________    Interval History   Doing well. Back pain improved. Angiogram later today    Data reviewed today: I reviewed all medications, new labs and imaging results over the last 24 hours. I personally reviewed no images or EKG's today.    Physical Exam   Vital Signs: Temp: 97.9  F (36.6  C) Temp src: Oral BP: 128/70 Pulse: 54   Resp: 20 SpO2: 96 % O2 Device: None (Room air)    Weight: 167 lbs 14.4 oz  Constitutional: Alert, resting comfortably in NAD  HEENT: Head normocephalic, atraumatic. Eyes sclera non icteric.   Respiratory: Normal effort, symmetric expansion, no crackles or wheezing  Cardiovascular: RRR no murmurs   GI: Non distended, normal bowels sounds, no tenderness or guarding  MSK: LE without edema. Dorsalis pedis pulse palpated bilaterally.   Skin/Integumen: Clear  Neuro: CN II-XII grossly intact  Psych:  Alert and oriented x 3. Normal affect      Data   Recent Labs   Lab 12/10/20  0557 12/09/20  1231 12/09/20  1031   WBC 4.7  --  5.4   HGB 13.8  --  13.7   MCV 85  --  86     --  164     --  139   POTASSIUM 4.3  --  4.1   CHLORIDE 109  --  107   CO2 29  --  26   BUN 16  --  15   CR 1.04  --  0.97   ANIONGAP 3  --  6   FRACISCO 8.6  --  8.6   GLC 99  --  95   ALBUMIN  --   --  3.6   PROTTOTAL  --   --  7.3   BILITOTAL  --   --  0.7   ALKPHOS   --   --  92   ALT  --   --  25   AST  --   --  32   TROPI <0.015 <0.015 <0.015     Recent Results (from the past 24 hour(s))   NM Lexiscan stress test (nuc card)   Result Value    Target     Baseline Systolic     Baseline Diastolic BP 72    Last Stress Systolic     Last Stress Diastolic BP 60    Baseline HR 60    Max HR 80    Calculated Percent HR 54    Rate Pressure Product 10,400.0    Narrative       The nuclear stress test is abnormal.     There is a small area of a mild degree of infarction in the apical   segment(s) of the left ventricle associated with a mild degree of   jasmeet-infarct ischemia. This appears to be in the left anterior descending   distribution.     Left ventricular function is normal.     There is no prior study for comparison.

## 2020-12-11 NOTE — PROGRESS NOTES
"[facility]  Cardiology Progress Note         Assessment and Plan:     Chest pain, unspecified type    Depression with anxiety    Intermittent asthma    Major depressive disorder, recurrent episode, moderate (H)    Obstructive sleep apnea syndrome    Esophageal reflux (GERD)    CKD stage G3a/A2, GFR 45-59 and albumin creatinine ratio  mg/g    Ischemic cardiomyopathy    Chronic systolic heart failure (H)    Hyperlipidemia LDL goal <70    Coronary artery disease with angina pectoris, unspecified vessel or lesion type, unspecified whether native or transplanted heart (H)    Acute right-sided back pain, unspecified back location    * No resolved hospital problems. *      Waxing and waning back pain and fleeting ant chest pain--sounds less cardiac now but given history and gxt result will do heart cath today  No other problems noted over noc             Interval History:   See above              Medications:   Scheduled Meds:     aspirin  325 mg Oral Daily     atorvastatin  40 mg Oral Daily     clopidogrel  75 mg Oral Daily     polyethylene glycol  17 g Oral Daily     senna-docusate  1 tablet Oral BID    Or     senna-docusate  2 tablet Oral BID     sodium chloride (PF)  3 mL Intracatheter Q8H     valsartan  40 mg Oral Daily     PRN Meds: acetaminophen, albuterol, aminophylline, caffeine citrate, HOLD MEDICATION, HOLD MEDICATION, HOLD MEDICATION, HOLD MEDICATION, lidocaine 4%, lidocaine (buffered or not buffered), LORazepam **OR** LORazepam, melatonin, nitroGLYcerin, ondansetron **OR** ondansetron, - MEDICATION INSTRUCTIONS -, potassium chloride, sodium chloride (PF), sodium chloride (PF), sodium chloride (PF), sodium chloride bacteriostatic         Physical Exam:   Blood pressure 126/69, pulse 54, temperature 97.9  F (36.6  C), temperature source Oral, resp. rate 16, height 1.651 m (5' 5\"), weight 76.2 kg (167 lb 14.4 oz), SpO2 96 %, not currently breastfeeding.  Vitals:    12/09/20 1021 12/11/20 " 0540   Weight: 79.4 kg (175 lb) 76.2 kg (167 lb 14.4 oz)       Intake/Output Summary (Last 24 hours) at 2020 0956  Last data filed at 2020 0908  Gross per 24 hour   Intake 480 ml   Output --   Net 480 ml           Vital Sign Ranges  Temperature Temp  Av.3  F (36.8  C)  Min: 97.9  F (36.6  C)  Max: 98.5  F (36.9  C)   Blood pressure Systolic (24hrs), Av , Min:112 , Max:142        Diastolic (24hrs), Av, Min:60, Max:86      Pulse Pulse  Av.5  Min: 54  Max: 90   Respirations Resp  Av  Min: 16  Max: 16   Pulse oximetry SpO2  Av.2 %  Min: 96 %  Max: 98 %             Constitutional: Awake, alert, cooperative, no apparent distress       Skin: No rash, petechia, cyanosis       Neck: No thyromegaly or adenopathy       Lungs: Clear        Cardiovasc: rrr       Abdomen: Normal bowel sounds, soft, non-distended, non-tender, no hepatomegaly       Neuro: Alert and oriented x3, non focal exam       Extremities: No edema--I palpated her back/spine and no difference in on/off back pain       Other:             Data:     Recent Labs   Lab Test 12/10/20  0557 20  1031 14  0553 14  0553 14  0628   WBC 4.7 5.4   < >  --   --    HGB 13.8 13.7   < >  --  10.3*   MCV 85 86   < >  --   --     164   < >  --   --    INR  --   --   --  1.69* 1.76*    < > = values in this interval not displayed.      Recent Labs   Lab Test 12/10/20  0557 20  1031    139   POTASSIUM 4.3 4.1   CHLORIDE 109 107   BUN 16 15   CR 1.04 0.97     Recent Labs   Lab 12/10/20  0557 20  1031   GLC 99 95     Recent Labs   Lab Test 20  1031 10/15/20  1256   ALT 25 38   AST 32 33     No components found for: TROPONINIES    Lab Results   Component Value Date    CHOL 162 10/15/2020     Lab Results   Component Value Date    HDL 59 10/15/2020     Lab Results   Component Value Date    LDL 82 10/15/2020     Lab Results   Component Value Date    TRIG 105 10/15/2020     Lab Results    Component Value Date    CHOLHDLRATIO 4.2 09/19/2013          TSH   Date Value Ref Range Status   02/02/2016 1.62 0.40 - 4.00 mU/L Final

## 2020-12-11 NOTE — PLAN OF CARE
A&Ox4, denies CP, mild pain to upper back. Denies SOB, on RA. C/o headache with relief from tylenol. Tele SR, VSS, on RA. Up independent in room. NPO since MN for angiogram in am.

## 2020-12-11 NOTE — PLAN OF CARE
Pt A/O. Denied pain. Vitals stable on RA. Tele SR. St. By assist with IV pole. Reviewed angiogram process, gave packet.  Planning angio today, scheduled for 2pm. NPO all shift. Consent signed and on chart. Fluids running. Continue to monitor.

## 2020-12-11 NOTE — PROGRESS NOTES
A/O x 4, vss, bradycardic , a-febrile, denies chest pain, up with SBA to the bathroom, tele SB, no symptoms, patient had diana-scan this morning, results abnormal, cardiology consulted, angiogram tomorrow, NPO after midnight, continue to monitor

## 2020-12-12 ENCOUNTER — APPOINTMENT (OUTPATIENT)
Dept: CT IMAGING | Facility: CLINIC | Age: 70
DRG: 287 | End: 2020-12-12
Attending: INTERNAL MEDICINE
Payer: MEDICARE

## 2020-12-12 VITALS
BODY MASS INDEX: 28.84 KG/M2 | WEIGHT: 173.1 LBS | OXYGEN SATURATION: 99 % | SYSTOLIC BLOOD PRESSURE: 127 MMHG | DIASTOLIC BLOOD PRESSURE: 61 MMHG | RESPIRATION RATE: 18 BRPM | HEART RATE: 57 BPM | HEIGHT: 65 IN | TEMPERATURE: 97.7 F

## 2020-12-12 PROCEDURE — 250N000011 HC RX IP 250 OP 636: Performed by: INTERNAL MEDICINE

## 2020-12-12 PROCEDURE — 250N000013 HC RX MED GY IP 250 OP 250 PS 637: Performed by: STUDENT IN AN ORGANIZED HEALTH CARE EDUCATION/TRAINING PROGRAM

## 2020-12-12 PROCEDURE — 250N000013 HC RX MED GY IP 250 OP 250 PS 637: Performed by: INTERNAL MEDICINE

## 2020-12-12 PROCEDURE — 71275 CT ANGIOGRAPHY CHEST: CPT

## 2020-12-12 PROCEDURE — 99233 SBSQ HOSP IP/OBS HIGH 50: CPT | Performed by: INTERNAL MEDICINE

## 2020-12-12 PROCEDURE — 99238 HOSP IP/OBS DSCHRG MGMT 30/<: CPT | Performed by: INTERNAL MEDICINE

## 2020-12-12 PROCEDURE — 250N000009 HC RX 250: Performed by: INTERNAL MEDICINE

## 2020-12-12 PROCEDURE — 250N000013 HC RX MED GY IP 250 OP 250 PS 637: Performed by: PHYSICIAN ASSISTANT

## 2020-12-12 RX ORDER — IOPAMIDOL 755 MG/ML
80 INJECTION, SOLUTION INTRAVASCULAR ONCE
Status: COMPLETED | OUTPATIENT
Start: 2020-12-12 | End: 2020-12-12

## 2020-12-12 RX ADMIN — VALSARTAN 40 MG: 40 TABLET ORAL at 09:08

## 2020-12-12 RX ADMIN — ATORVASTATIN CALCIUM 40 MG: 40 TABLET, FILM COATED ORAL at 09:04

## 2020-12-12 RX ADMIN — SODIUM CHLORIDE 92 ML: 9 INJECTION, SOLUTION INTRAVENOUS at 10:34

## 2020-12-12 RX ADMIN — CARVEDILOL 3.12 MG: 3.12 TABLET, FILM COATED ORAL at 09:04

## 2020-12-12 RX ADMIN — ACETAMINOPHEN 650 MG: 325 TABLET, FILM COATED ORAL at 12:49

## 2020-12-12 RX ADMIN — CLOPIDOGREL BISULFATE 75 MG: 75 TABLET, FILM COATED ORAL at 09:04

## 2020-12-12 RX ADMIN — IOPAMIDOL 80 ML: 755 INJECTION, SOLUTION INTRAVENOUS at 10:34

## 2020-12-12 RX ADMIN — DOCUSATE SODIUM 50 MG AND SENNOSIDES 8.6 MG 2 TABLET: 8.6; 5 TABLET, FILM COATED ORAL at 09:47

## 2020-12-12 ASSESSMENT — MIFFLIN-ST. JEOR: SCORE: 1301.06

## 2020-12-12 ASSESSMENT — ACTIVITIES OF DAILY LIVING (ADL)
ADLS_ACUITY_SCORE: 17

## 2020-12-12 NOTE — PLAN OF CARE
Neuro- A&OX4  Most Recent Vitals- Temp: 97.7  F (36.5  C) Temp src: Oral BP: 127/61 Pulse: 57   Resp: 18 SpO2: 99 % O2 Device: None (Room air) Oxygen Delivery: 2 LPM  Tele/Cardiac- SR  Resp- RA  Activity- Independent   Pain- mid back pain Tylenol given with some effect  Drips- none   Drains/Tubes- P-IV removed before discharge   Skin- intact   GI/- WDL  Aggression Color- Green  COVID status- Negative  Plan- Discharge instructions reviewed with pt and answered all questions. Pt expressed understanding of all discharge instructions and follow ups. Pt was discharged home with her son at 1500.   Misc-     Daron Lau RN

## 2020-12-12 NOTE — PROGRESS NOTES
Pt returned from cath lab, [facility], no intervention. Bedrest till 22:00. Good distal pulse, groin site CDI. no noted bruit. Denies pain. vitals stable. Tele SR. Continue to monitior

## 2020-12-12 NOTE — PROGRESS NOTES
[facility]    Cardiology Progress Note     Assessment & Plan   Gem Gama is a 69 year old female who was admitted on 12/9/2020.    #1 Chest pain and back pain, unspecified type  #2 Depression with anxiety  #3 Intermittent asthma  #4 Major depressive disorder, recurrent episode, moderate (H)  #5 Obstructive sleep apnea syndrome  #6  Esophageal reflux (GERD)  #7  CKD stage G3a/A2, GFR 45-59 and albumin creatinine ratio  mg/g  #8  Ischemic cardiomyopathy  #9  Chronic systolic heart failure (H)  #10 Hyperlipidemia LDL goal <70  #11 Coronary artery disease with angina pectoris    Assessment:  Patient was admitted with chest and back pain.  The etiology of the back pain and chest pain is unclear at this time.  The patient underwent a coronary angiogram that noted no lesions amendable to PCI.  She continues to have chest and back pain this morning.  She believes that this is better than admission.  Patient has a known history of coronary artery disease and relays a history of what sounds to be like renal artery stenosis.    I do understand that the patient had a contrast load yesterday however, I believe it would be reasonable to obtain a chest CTA to ensure that she does not have aortic dissection.  The patient understands the risk of further contrast loads and would like to proceed.  If possible I would hope that the radiologist could also obtain some information about her pulmonary arteries as well.  The patient endorses shortness of breath as well with the chest and back pain.    Plan:  We will obtain a CTA of the chest.  We will try to prioritize evaluation for aortic dissection but I am hopeful that we could obtain information regarding potential pulmonary embolism as well.  CT would also assess the other chest anatomy to determine if there is any clear cause for her pain.    If her scan does not have any concerning features she may be eligible for discharge later today pending  the timing of the test.    Continue other medical therapies at this time.      Nickolas Martinez MD    Interval History   Continues to have chest and back pain.  The coronary angiogram did not note any lesions amenable to PCI.    Physical Exam   Temp: 97.7  F (36.5  C) Temp src: Oral BP: 127/61 Pulse: 57   Resp: 18 SpO2: 97 % O2 Device: None (Room air) Oxygen Delivery: 2 LPM  Vitals:    12/09/20 1021 12/11/20 0540 12/12/20 0621   Weight: 79.4 kg (175 lb) 76.2 kg (167 lb 14.4 oz) 78.5 kg (173 lb 1.6 oz)     Vital Signs with Ranges  Temp:  [97.7  F (36.5  C)-98.1  F (36.7  C)] 97.7  F (36.5  C)  Pulse:  [48-61] 57  Resp:  [18-20] 18  BP: ()/(48-95) 127/61  SpO2:  [94 %-97 %] 97 %  I/O last 3 completed shifts:  In: 1240 [P.O.:240; I.V.:1000]  Out: 0     GENERAL APPEARANCE: Alert and oriented x3. No acute distress.  SKIN: Inspection of the skin reveals no rashes, ulcerations, warm, dry  NECK: Supple and symmetric.   Normal JVP  LUNGS: Clear breath sounds throughout bilaterally, no wheezes, no rhonchi  CARDIOVASCULAR: S1, S2, regular rate and rhythm without any murmurs, gallops, rubs. The carotid pulses were normal and 2+ bilaterally without bruits. Peripheral pulses were 2+ and symmetric.  ABDOMEN: Soft, non-tender, non-distended with normal bowel sounds.  No ascites noted.  EXTREMITIES: No edema.  NEUROLOGIC:  Normal mood and affect.  Sensation to touch was normal.    Medications     sodium chloride Stopped (12/11/20 2012)     sodium chloride         atorvastatin  40 mg Oral Daily     carvedilol  3.125 mg Oral BID w/meals     clopidogrel  75 mg Oral Daily     polyethylene glycol  17 g Oral Daily     senna-docusate  1 tablet Oral BID    Or     senna-docusate  2 tablet Oral BID     sodium chloride (PF)  3 mL Intracatheter Q8H     valsartan  40 mg Oral Daily       Data   Results for orders placed or performed during the hospital encounter of 12/09/20 (from the past 24 hour(s))   Cardiac Catheterization     Narrative    1.  No acute coronary lesions to explain the patient's current   presentation.  2.  There is a  at the ostium of the jailed, small-moderate D1 branch   which would not be a good candidate for  PCI.  3.  Widely patent proximal-mid LAD stent.    4.  Mild-moderate nonobstructive CAD elsewhere.

## 2020-12-12 NOTE — DISCHARGE INSTRUCTIONS
Cardiac Angiogram Discharge Instructions - Femoral    After you go home:      Have an adult stay with you until tomorrow.    Drink extra fluids for 2 days.    You may resume your normal diet.    No smoking       For 24 hours - due to the sedation you received:    Relax and take it easy.    Do NOT make any important or legal decisions.    Do NOT drive or operate machines at home or at work.    Do NOT drink alcohol.    Care of Groin Puncture Site:      For the first 24 hrs - check the puncture site every 1-2 hours while awake.    For 2 days, when you cough, sneeze, laugh or move your bowels, hold your hand over the puncture site and press firmly.    Remove the bandaid after 24 hours. If there is minor oozing, apply another bandaid and remove it after 12 hours.    It is normal to have a small bruise or pea size lump at the site.    You may shower tomorrow. Do NOT take a bath, or use a hot tub or pool for at least 3 days. Do NOT scrub the site. Do not use lotion or powder near the puncture site.    Activity:            For 2 days:    No stooping or squatting    Do NOT do any heavy activity such as exercise, lifting, or straining.     No housework, yard work or any activity that make you sweat    Do NOT lift more than 10 pounds    Bleeding:      If you start bleeding from the site in your groin, lie down flat and press firmly on/above the site for 10 minutes.     Once bleeding stops, lay flat for 2 hours.     Call [facility] as soon as you can.       Call 911 right away if you have heavy bleeding or bleeding that does not stop.      Medicines:      If you are taking an antiplatelet medication such as Plavix, Brilinta or Effient, do not stop taking it until you talk to your cardiologist.      If you are on Metformin (Glucophage), do not restart it until you have blood tests (within 2 to 3 days after discharge).  After you have your blood drawn, you may restart the Metformin.     Take your medications, including blood  thinners, unless your provider tells you not to.      If you take Coumadin (Warfarin), have your INR checked by your provider in  3-5 days. Call your clinic to schedule this.    If you have stopped any medicines, check with your provider about when to restart them.    Follow Up Appointments:      Follow up with [facility] Nurse Practitioner at [facility] of patient preference in 7-10 days.    Call the clinic if:      You have increased pain or a large or growing hard lump around the site.    The site is red, swollen, hot or tender.    Blood or fluid is draining from the site.    You have chills or a fever greater than 101 F (38 C).    Your leg feels numb, cool or changes color.    You have hives, a rash or unusual itching.    New pain in the back or belly that you cannot control with Tylenol.    Any questions or concerns.          [facility] [place]:    [phone] [facility] (7 days a week)

## 2020-12-12 NOTE — DISCHARGE SUMMARY
[facility]  Hospitalist Discharge Summary      Date of Admission:  12/9/2020  Date of Discharge:  12/12/2020  Discharging Provider: Lorrie Light MD, FACP    Discharge Diagnoses   Chest pain, unspecified, most likely musculoskeletal.  History of coronary artery disease with angina pectoris.  Chronic systolic heart failure.  Ischemic cardiomyopathy.  History of depression with anxiety.  Major depressive disorder, recurrent episode, moderate.  Intermittent asthma.  Obstructive sleep apnea syndrome.  Esophageal reflux.  CKD stage III.  Chronic back pain, seems to be musculoskeletal.    Follow-ups Needed After Discharge   Follow-up Appointments     Follow-up and recommended labs and tests      Follow up with primary care provider, Danny Conteh, within 7 days   to evaluate treatment change and for hospital follow- up.  The following   labs/tests are recommended: BMP.  Follow up with Cardiology 4-6 weeks or as recommended             Unresulted Labs Ordered in the Past 30 Days of this Admission     No orders found from 11/9/2020 to 12/10/2020.          Discharge Disposition   Discharged to home  Condition at discharge: Stable      Hospital Course   Gem Gama is a 69 year old female admitted on 12/9/2020. She presents with right sided chest pain. Here are further details regarding her current hospitalization      Chest pain, unspecified type  Ischemic cardiomyopathy  Chronic systolic heart failure (H)  Coronary artery disease with angina pectoris, unspecified vessel or lesion type, unspecified whether native or transplanted heart (H): Presented with atypical chest pain over the past several days along with right-sided back pain.  Admission work-up is unremarkable including CBC/BMP/troponin.  Her EKG showed no dynamic ST changes.  Does have a history of s/p EDWIN x 2 to LAD 10/9/2019 in the setting of anterior STEMI, Coronary angiogram 10/9 showed severe single vessel obstructive CAD of  LAD (100%) as well as moderate non-obstructive CAD in OM1 and OM2 (60%, 50%). She went back to the cath lab 10/10/2019 for evaluation of RCA which showed no obstructive RCA disease.        -- Underwent Lexiscan 12/10/20,  small area of a mild degree of infarction in the apical segment(s) of the left ventricle associated with a mild degree of jasmeet-infarct ischemia. Cardiology consulted.   --Patient underwent coronary angiogram on December 11 which noted no lesions amendable to PCI.  --Patient was admitted by cardiology and due to her presentation of chest and back pain, CTA chest was obtained to ensure that she does not have any aortic dissection.  --The results were reviewed which is negative for any thoracic aortic dissection or other acute abnormalities and there is also no evidence of pulmonary embolism.  --Patient was okay to be discharged from cardiac point of view, patient was discharged on her PTA medications.  --I advised patient to follow-up with her primary care physician and cardiology after the hospitalization.    Depression with anxiety  Major depressive disorder, recurrent episode, moderate (H)  -- Continue prior to admission Zoloft at discharge     Intermittent asthma  Obstructive sleep apnea syndrome  -- Stable, follows outpatient     Esophageal reflux (GERD)  -- Stable, follows outpatient     CKD stage G3a/A2, GFR 45-59 and albumin creatinine ratio  mg/g  -- Creatinine on admission stable at 0.97, avoid NSAIDs and significant nephrotoxins      Acute right-sided back pain, unspecified back location  -- Appears musculoskeletal in nature, treat supportively    Patient was seen and examined on the day of discharge ,she is feeling well, does not have any complaints , I did review the discharge medications and instructions with the patient and plan for her to follow up with the PCP after the hospitalization .patient was in agreement , she is discharged in stable condition to her  home    Consultations This Hospital Stay   CARDIOLOGY IP CONSULT  PHARMACY IP CONSULT  PHARMACY IP CONSULT  SMOKING CESSATION PROGRAM IP CONSULT    Code Status   Full Code    Time Spent on this Encounter   I, Lorrie Light MD, personally saw the patient today and spent less than or equal to 30 minutes discharging this patient.     Lorrie Light MD, FACP  [facility]  [address], [address]  [place] [address]  Phone: [phone]  ______________________________________________________________________    Physical Exam   Vital Signs: Temp: 97.7  F (36.5  C) Temp src: Oral BP: 127/61 Pulse: 51   Resp: 18 SpO2: 97 % O2 Device: None (Room air) Oxygen Delivery: 2 LPM  Weight: 173 lbs 1.6 oz     Physical Exam  Vitals signs and nursing note reviewed.   Constitutional:       Appearance: She is well-developed.   HENT:      Head: Normocephalic and atraumatic.   Eyes:      Conjunctiva/sclera: Conjunctivae normal.      Pupils: Pupils are equal, round, and reactive to light.   Neck:      Musculoskeletal: Normal range of motion and neck supple.      Thyroid: No thyromegaly.   Cardiovascular:      Rate and Rhythm: Normal rate and regular rhythm.      Heart sounds: Normal heart sounds. No murmur.   Pulmonary:      Effort: Pulmonary effort is normal. No respiratory distress.      Breath sounds: Normal breath sounds. No wheezing.   Abdominal:      General: Bowel sounds are normal.      Palpations: Abdomen is soft.      Tenderness: There is no abdominal tenderness. There is no guarding or rebound.   Musculoskeletal: Normal range of motion.         General: No deformity.   Skin:     General: Skin is warm and dry.   Neurological:      Mental Status: She is alert and oriented to person, place, and time.   Psychiatric:         Behavior: Behavior normal.              Primary Care Physician   Danny Balbuena Vocal    Discharge Orders      Reason for your hospital stay    You were admitted to the hospital  secondary to chest pain, your angiogram was negative for any obstruction and showed stable medical disease     Follow-up and recommended labs and tests    Follow up with primary care provider, Danny Conteh, within 7 days to evaluate treatment change and for hospital follow- up.  The following labs/tests are recommended: BMP.  Follow up with Cardiology 4-6 weeks or as recommended     Activity    Your activity upon discharge: activity as tolerated and no driving for today     Discharge Instructions    Please continue to take your home medications , you underwent angiogram yesterday,s evening which did not show any new obstruction and you did not need any stent placement , your cardiologist has recommended to continue taking current med's , if you continue to have issues with discomfort then please have further discussion with your PCP.     Full Code     Diet    Follow this diet upon discharge: Orders Placed This Encounter      Low Saturated Fat Na <2400 mg         Significant Results and Procedures   Results for orders placed or performed during the hospital encounter of 12/09/20   XR Chest 2 Views    Narrative    CHEST TWO VIEWS 12/9/2020 11:37 AM     HISTORY: chest pain    COMPARISON: 10/9/2019       Impression    IMPRESSION: There are no acute infiltrates. The cardiac silhouette is  not enlarged. Pulmonary vasculature is unremarkable.    SCAR GALLEGOS MD   Cardiac Catheterization    Narrative    1.  No acute coronary lesions to explain the patient's current   presentation.  2.  There is a  at the ostium of the jailed, small-moderate D1 branch   which would not be a good candidate for  PCI.  3.  Widely patent proximal-mid LAD stent.    4.  Mild-moderate nonobstructive CAD elsewhere.   NM Lexiscan stress test (nuc card)     Value    Target     Baseline Systolic     Baseline Diastolic BP 72    Last Stress Systolic     Last Stress Diastolic BP 60    Baseline HR 60    Max HR 80     Calculated Percent HR 54    Rate Pressure Product 10,400.0    Narrative       The nuclear stress test is abnormal.     There is a small area of a mild degree of infarction in the apical   segment(s) of the left ventricle associated with a mild degree of   jasmeet-infarct ischemia. This appears to be in the left anterior descending   distribution.     Left ventricular function is normal.     There is no prior study for comparison.           Discharge Medications   Current Discharge Medication List      CONTINUE these medications which have NOT CHANGED    Details   aspirin (ASA) 81 MG EC tablet Take 1 tablet (81 mg) by mouth daily  Qty: 90 tablet, Refills: 0    Comments: Future refills by PCP Dr. Danny Conteh with phone number [phone].  Associated Diagnoses: ST elevation myocardial infarction involving left anterior descending (LAD) coronary artery (H)      atorvastatin (LIPITOR) 40 MG tablet Take 1 tablet (40 mg) by mouth daily  Qty: 90 tablet, Refills: 3    Associated Diagnoses: Coronary artery disease involving native coronary artery of native heart without angina pectoris; Hyperlipidemia LDL goal <70      carvedilol (COREG) 3.125 MG tablet Take 1 tablet (3.125 mg) by mouth 2 times daily (with meals)  Qty: 180 tablet, Refills: 3    Associated Diagnoses: Coronary artery disease involving native coronary artery of native heart without angina pectoris      clopidogrel (PLAVIX) 75 MG tablet Take 1 tablet (75 mg) by mouth daily  Qty: 90 tablet, Refills: 3    Associated Diagnoses: ST elevation myocardial infarction involving left anterior descending (LAD) coronary artery (H)      Melatonin-Pyridoxine (MELATIN PO) Take 1 tablet by mouth nightly as needed (sleep)       Prenatal Multivit-Min-Fe-FA (PRENATAL/IRON) TABS       sertraline (ZOLOFT) 100 MG tablet Take 1 tablet (100 mg) by mouth daily  Qty: 90 tablet, Refills: 3    Associated Diagnoses: Major depressive disorder, recurrent episode, moderate (H)       ursodiol (ACTIGALL) 300 MG capsule Take 1 capsule (300 mg) by mouth 2 times daily  Qty: 60 capsule, Refills: 11    Associated Diagnoses: Calculus of gallbladder without cholecystitis without obstruction      valsartan (DIOVAN) 40 MG tablet Take 1 tablet (40 mg) by mouth daily  Qty: 30 tablet, Refills: 11    Comments: Substitute for Lisinopril 2.5 mg.  Associated Diagnoses: Chronic systolic heart failure (H)      vitamin D3 (CHOLECALCIFEROL) 2000 units (50 mcg) tablet Take 1 tablet (2,000 Units) by mouth daily  Qty: 90 tablet, Refills: 3    Associated Diagnoses: Vitamin D deficiency      amoxicillin (AMOXIL) 500 MG capsule Take as directed before dental work      nitroGLYcerin (NITROSTAT) 0.4 MG sublingual tablet Place 1 tablet (0.4 mg) under the tongue every 5 minutes as needed for chest pain  Qty: 10 tablet, Refills: 0    Associated Diagnoses: Coronary artery disease involving native coronary artery of native heart without angina pectoris           Allergies   Allergies   Allergen Reactions     Latex Other (See Comments) and Shortness Of Breath     Runny nose  PN: LW Reaction: DIFFICULTY BREATHING       Flagyl [Metronidazole Hcl] Anaphylaxis and Rash     Food      PN: LW FI1: nka LW FI2:     Hydrochlorothiazide W/Triamterene      PN: LW Reaction: skin rash     No Clinical Screening - See Comments      PN: LW Other1: -Adhesive Tape     Sulfa Drugs Anaphylaxis, Hives and Itching     PN: LW Reaction: HIVES, Swelling     Tape [Adhesive Tape] Hives     Metoprolol Itching and Rash     Metronidazole Hives and Rash     PN: LW Reaction: HIVES

## 2020-12-12 NOTE — PROGRESS NOTES
Pt here with angio today-normal coronaries. A&O. Neurovascular intact. VSS. Tele sb/sd 59. caridac diet.   Up with sba. Denies pain. Pt scoring green on the Aggression Stop Light Tool. Plan:  Discharge to home today.

## 2020-12-14 NOTE — TELEPHONE ENCOUNTER
[facility] fax states this refill request for Nitrostat was a computer error and to disregard.    Will close encounter.

## 2020-12-15 ENCOUNTER — VIRTUAL VISIT (OUTPATIENT)
Dept: CARDIOLOGY | Facility: CLINIC | Age: 70
End: 2020-12-15
Payer: MEDICARE

## 2020-12-15 DIAGNOSIS — I10 HYPERTENSION GOAL BP (BLOOD PRESSURE) < 140/90: ICD-10-CM

## 2020-12-15 DIAGNOSIS — I25.119 CORONARY ARTERY DISEASE WITH ANGINA PECTORIS, UNSPECIFIED VESSEL OR LESION TYPE, UNSPECIFIED WHETHER NATIVE OR TRANSPLANTED HEART (H): Primary | ICD-10-CM

## 2020-12-15 DIAGNOSIS — I25.5 ISCHEMIC CARDIOMYOPATHY: ICD-10-CM

## 2020-12-15 DIAGNOSIS — R07.9 CHEST PAIN, UNSPECIFIED TYPE: ICD-10-CM

## 2020-12-15 DIAGNOSIS — E78.5 HYPERLIPIDEMIA LDL GOAL <70: ICD-10-CM

## 2020-12-15 DIAGNOSIS — N18.31 CKD STAGE G3A/A2, GFR 45-59 AND ALBUMIN CREATININE RATIO 30-299 MG/G (H): ICD-10-CM

## 2020-12-15 PROCEDURE — 99214 OFFICE O/P EST MOD 30 MIN: CPT | Mod: 95 | Performed by: NURSE PRACTITIONER

## 2020-12-15 NOTE — LETTER
"12/15/2020    Danny Conteh MD  [address]  [place] [address]    RE: Gem Gama       Dear Colleague,    I had the pleasure of seeing Gem Gama in the [facility].    Gem Gama is a 69 year old female who is being evaluated via a billable video visit.      The patient has been notified of following:     \"This video visit will be conducted via a call between you and your physician/provider. We have found that certain health care needs can be provided without the need for an in-person physical exam.  This service lets us provide the care you need with a video conversation.  If a prescription is necessary we can send it directly to your pharmacy.  If lab work is needed we can place an order for that and you can then stop by our lab to have the test done at a later time.    Video visits are billed at different rates depending on your insurance coverage.  Please reach out to your insurance provider with any questions.    If during the course of the call the physician/provider feels a video visit is not appropriate, you will not be charged for this service.\"    Patient has given verbal consent for Video visit? Yes  How would you like to obtain your AVS? MyChart  If you are dropped from the video visit, the video invite should be resent to: Text to cell phone: [phone]  Will anyone else be joining your video visit? No        Video-Visit Details    Type of service:  Video Visit    Video Start Time: 4:28 PM  Video End Time: 4:56 PM    Originating Location (pt. Location): Home    Distant Location (provider location):  [facility]     Platform used for Video Visit: Doximity      Patient unable to assess vitals   Review Of Systems  Skin: NEGATIVE  Eyes:Ears/Nose/Throat: NEGATIVE  Respiratory: NEGATIVE  Cardiovascular:NEGATIVE  Gastrointestinal: NEGATIVE  Genitourinary:NEGATIVE   Musculoskeletal: NEGATIVE  Neurologic: " NEGATIVE  Psychiatric: NEGATIVE  Hematologic/Lymphatic/Immunologic: NEGATIVE  Endocrine:  NEGATIVE      Cardiology Clinic Progress Note  Gem Gama MRN# [id number]   YOB: 1950 Age: 69 year old   Primary Cardiologist: Dr. Dennison Reason for visit: post hospital follow up            Assessment and Plan:   Gem Gama is a very pleasant 69 year old female with a history of coronary artery disease, ischemic cardiomyopathy, hypertension, CKD, asthma and depression.     Video visit today for post hospital follow up. She reports her chest/back pain has improved. During her hospitalization no cardiac etiology for the pain was identified. She underwent a nuclear stress test which showed mild degree of jasmeet-infarct ischemia and ultimately a coronary angiogram no acute coronary lesions, widely patent proximal-mid LAD stent,  at the ostium of the jailed, small-moderate D1 branch, not amendable to PCI. CTA chest 12/12 no evidence of thoracic aortic dissection and no evidence of PE.      1. Coronary artery disease - s/p EDWIN x 2 to LAD 10/9/2019 in the setting of anterior STEMI, Coronary angiogram 10/9 showed severe single vessel obstructive CAD of LAD (100%) as well as moderate non-obstructive CAD in OM1 and OM2 (60%, 50%). She went back to the cath lab 10/10 for evaluation of RCA which showed no obstructive RCA disease. As above she was recently hospitalized for chest pain, notes symptoms have improved since discharge, still unclear etiology.               - Continue aspirin and plavix for 2 years post intervention.               - Continue carvedilol and atorvastatin              - Counseled on lifestyle modifications.      2. Ischemic cardiomyopathy/HFrEF - LVEF normalized to 55-60% in Feb 2020 from 35-40% in October 2019.   HF symptoms well controlled. Continue current medications.               - NYHA class II, stage C              - Etiology : ischemic               - Fluid status : euvolemic                - Diuretic regimen : no diuretic needs              - Last RHC : none              - Ischemic evaluation : cardiac catheterization with revascularization 10/9/19              - Guideline directed medical therapy                          - Betablocker: carvedilol 3.125mg BID                          - ACEI/ARB/ARNI: lisinopril 2.5mg                           - Aldactone antagonist: none     3. Hypertension - controlled, continue current medication regimen.      4. CKD - secondary to congenital kidney defect, stable, labs from 12/10 show creatinine 1.04.          Changes today: none    Follow up plan:     Follow up with Dr. Dennison as planned in March        History of Presenting Illness:    Gem Gama is a very pleasant 68 year old female with a history of coronary artery disease, ischemic cardiomyopathy, hypertension, CKD, asthma and depression.      Patient was hospitalized 10/9/2019-10/13/2019 due to STEMI, EKG demonstrated ST elevation in the anterolateral leads, patient was taken emergently taken to cath lab and underwent EDWIN x 2 to proximal LAD. Coronary angiogram showed severe single vessel obstructive CAD of LAD (100%) as well as moderate non-obstructive CAD in OM1 and OM2 (60%, 50%). Patient initially requiring intraaortic balloon pump and pressor support. Echocardiogram showed EF 35-40%, grade I diastolic dysfunction, RV normal function and size, and no significant valvular disease. Patient was taken back to cath lab to have RCA evaluated which showed no obstructive disease (40% RCA stenosis).  Patient was initially on Brilinta and aspirin, but due to cost Brilinta was changed to Plavix. Patient was started on carvedilol, losartan and atorvastatin.      I first met patient during post hospital follow up in October 2019, at that time patient was doing well from cardiac standpoint.      Heidy was rehospitalized in February 2020 with complaints of chest pain and nausea/vomiting. This was  thought to be related to something she ate followed by a vagal reaction. Patient tells me that after discharge her PCP found gallstones which were felt to likely be contributing. An echocardiogram was completed at this time which demonstrated normalization of LV function, EF 55-60% and no regional wall motion abnormalities. She was started on low dose lisinopril 2.5mg.      She was last seen by Dr. Dennison in February 2020. At that time she was doing well from a cardiovascular standpoint and no medications were changed.     Most recently she was hospitalized 12/9-12/12 for chest pain. Nuclear stress test completed which showed small area of a mild degree of infarction in the apical segment associated with mild degree of jasmeet-infarct ischemia. Cardiac catheterization completed 12/11 no acute coronary lesions, widely patent proximal-mid LAD stent,  at the ostium of the jailed, small-moderate D1 branch, not amendable to PCI. CTA chest 12/12 no evidence of thoracic aortic dissection and no evidence of PE. She was discharged on her prior to admission medications.     Video visit today for cardiology follow up post hospitalization.     Patient reports feeling good. States chest/back pain has improved.  Since hospital discharge denies chest pain or chest tightness. Denies shortness of breath at rest. Denies exertional dyspnea. Denies dizziness, lightheadedness or other presyncopal symptoms. Denies tachycardia or palpitations.     Labs from 12/10 showed stable kidney function (creatinine 1.04, BUN 16), stable electrolytes. No vitals at home today. Recalls last check at home 120s systolically.     Appetite good. Trying to limit sodium and cholesterol. No set exercise routine, note her plan was to attend the [employer] program but currently cancelled due to COVID. Denies alcohol and tobacco use. 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         Recent Hospitalizations   2/2020 : chest pain, echo showed normalized LVEF        Social History    Single, 4 children, 9 grandchildren, retired  (physics and chemistry), enjoys knitting.    Social History     Socioeconomic History     Marital status:      Spouse name: Not on file     Number of children: Not on file     Years of education: Not on file     Highest education level: Not on file   Occupational History     Not on file   Social Needs     Financial resource strain: Not on file     Food insecurity     Worry: Not on file     Inability: Not on file     Transportation needs     Medical: Not on file     Non-medical: Not on file   Tobacco Use     Smoking status: Never Smoker     Smokeless tobacco: Never Used   Substance and Sexual Activity     Alcohol use: No     Drug use: No     Sexual activity: Never     Partners: Male     Birth control/protection: None   Lifestyle     Physical activity     Days per week: Not on file     Minutes per session: Not on file     Stress: Not on file   Relationships     Social connections     Talks on phone: Not on file     Gets together: Not on file     Attends Worship service: Not on file     Active member of club or organization: Not on file     Attends meetings of clubs or organizations: Not on file     Relationship status: Not on file     Intimate partner violence     Fear of current or ex partner: Not on file     Emotionally abused: Not on file     Physically abused: Not on file     Forced sexual activity: Not on file   Other Topics Concern     Parent/sibling w/ CABG, MI or angioplasty before 65F 55M? No      Service No     Blood Transfusions No     Caffeine Concern No     Occupational Exposure No     Hobby Hazards No     Sleep Concern Yes     Comment: Uses CPAP     Stress Concern No     Weight Concern Yes     Special Diet No     Back Care No     Exercise No     Bike Helmet No     Comment:       Seat Belt Yes     Self-Exams  Yes   Social History Narrative     Not on file            Review of Systems:   Agree with above ROS         Physical Exam:   Vitals: LMP  (LMP Unknown)    Wt Readings from Last 4 Encounters:   12/12/20 78.5 kg (173 lb 1.6 oz)   12/09/20 78.5 kg (173 lb)   10/15/20 77.1 kg (170 lb)   09/06/20 76.2 kg (168 lb)     GEN: well nourished, in no acute distress.  HEENT:  Pupils equal, round. Sclerae nonicteric.   NECK: Supple, no masses appreciated.  RESP: Respirations are unlabored. No cough.   NEURO: Alert and oriented, cooperative.  SKIN: No visible rash       Data:   ECHO 10/10/19  Left ventricular systolic function is moderate to severely reduced. LVEF is  estimated at 35-40%.  Akinesis of the basal to apical anterior, mid anteroseptal, apical septal wall  segments. Dyskinesis of the apex. Hypokinesis of the basal to mid inferoseptal  wall segments.  The right ventricle is normal in structure, function and size.  There are no prior studies available for comparison.    CTA Chest 12/12/20  1.  No evidence for thoracic aortic dissection or other acute  abnormality. No evidence for pulmonary embolism.  2.  Very mild subpleural patchy groundglass opacities at the right  lower lobe suggesting an infectious or inflammatory cause and clinical  correlation is recommended.  3.  Cholelithiasis.    Cardiac catheterization 12/11/20  1.  No acute coronary lesions to explain the patient's current presentation.  2.  There is a  at the ostium of the jailed, small-moderate D1 branch which would not be a good candidate for  PCI.  3.  Widely patent proximal-mid LAD stent.    4.  Mild-moderate nonobstructive CAD elsewhere.    Nuclear stress test 12/10/20     The nuclear stress test is abnormal.     There is a small area of a mild degree of infarction in the apical segment(s) of the left ventricle associated with a mild degree of jasmeet-infarct ischemia. This appears to be in the left anterior descending distribution.     Left  ventricular function is normal.     There is no prior study for comparison.      LIPID RESULTS:  Lab Results   Component Value Date    CHOL 162 10/15/2020    HDL 59 10/15/2020    LDL 82 10/15/2020    TRIG 105 10/15/2020    CHOLHDLRATIO 4.2 09/19/2013     LIVER ENZYME RESULTS:  Lab Results   Component Value Date    AST 32 12/09/2020    ALT 25 12/09/2020     CBC RESULTS:  Lab Results   Component Value Date    WBC 4.7 12/10/2020    RBC 4.65 12/10/2020    HGB 13.8 12/10/2020    HCT 39.7 12/10/2020    MCV 85 12/10/2020    MCH 29.7 12/10/2020    MCHC 34.8 12/10/2020    RDW 13.2 12/10/2020     12/10/2020     BMP RESULTS:  Lab Results   Component Value Date     12/10/2020    POTASSIUM 4.3 12/10/2020    CHLORIDE 109 12/10/2020    CO2 29 12/10/2020    ANIONGAP 3 12/10/2020    GLC 99 12/10/2020    BUN 16 12/10/2020    CR 1.04 12/10/2020    GFRESTIMATED 54 (L) 12/10/2020    GFRESTBLACK 62 12/10/2020    FRACISCO 8.6 12/10/2020      A1C RESULTS:  Lab Results   Component Value Date    A1C 5.6 09/19/2013     INR RESULTS:  Lab Results   Component Value Date    INR 1.69 (H) 11/27/2014    INR 1.76 (H) 11/26/2014            Medications     Current Outpatient Medications   Medication Sig Dispense Refill     aspirin (ASA) 81 MG EC tablet Take 1 tablet (81 mg) by mouth daily 90 tablet 0     atorvastatin (LIPITOR) 40 MG tablet Take 1 tablet (40 mg) by mouth daily 90 tablet 3     carvedilol (COREG) 3.125 MG tablet Take 1 tablet (3.125 mg) by mouth 2 times daily (with meals) 180 tablet 3     clopidogrel (PLAVIX) 75 MG tablet Take 1 tablet (75 mg) by mouth daily 90 tablet 3     Melatonin-Pyridoxine (MELATIN PO) Take 1 tablet by mouth nightly as needed (sleep)        Prenatal Multivit-Min-Fe-FA (PRENATAL/IRON) TABS        sertraline (ZOLOFT) 100 MG tablet Take 1 tablet (100 mg) by mouth daily 90 tablet 3     ursodiol (ACTIGALL) 300 MG capsule Take 1 capsule (300 mg) by mouth 2 times daily 60 capsule 11     valsartan (DIOVAN) 40 MG  tablet Take 1 tablet (40 mg) by mouth daily 30 tablet 11     vitamin D3 (CHOLECALCIFEROL) 2000 units (50 mcg) tablet Take 1 tablet (2,000 Units) by mouth daily 90 tablet 3     amoxicillin (AMOXIL) 500 MG capsule Take as directed before dental work       nitroGLYcerin (NITROSTAT) 0.4 MG sublingual tablet Place 1 tablet (0.4 mg) under the tongue every 5 minutes as needed for chest pain (Patient not taking: Reported on 12/15/2020) 10 tablet 0          Past Medical History     Past Medical History:   Diagnosis Date     ADHD (attention deficit hyperactivity disorder)      Anxiety      Arthritis     back, hands, knees, hips     Asthma     controlled--has coughing symptoms     C. difficile diarrhea      Central sleep apnea      Coronary artery disease involving native coronary artery of native heart without angina pectoris 10/25/2019     Depression      Gastro-oesophageal reflux disease      Hypertension      Ischemic cardiomyopathy 10/25/2019     Narcolepsy      Renal disease     gfr is aroung 48     Sleep apnea     uses Cpap     Past Surgical History:   Procedure Laterality Date     ARTHROPLASTY KNEE  7/9/2014    Procedure: ARTHROPLASTY KNEE;  Surgeon: Levi Johnson MD;  Location:  OR     ARTHROPLASTY KNEE Left 11/24/2014    Procedure: ARTHROPLASTY KNEE;  Surgeon: Levi Johnson MD;  Location:  OR      REIMPLANT URETER,EXTENSIVE TAILORING  1973 1997     C REPAIR ENTEROCELE,VAG APPRCH  2008    Prolift     C TOTAL ABD HYSTERECTOMY+BLAD REPR  1992    Vaginal approach with oophrectomy     C TOTAL KNEE ARTHROPLASTY       CV CORONARY ANGIOGRAM N/A 10/9/2019    Procedure: Coronary Angiogram;  Surgeon: Chiquita Chatterjee MD;  Location:  HEART CARDIAC CATH LAB     CV HEART CATHETERIZATION WITH POSSIBLE INTERVENTION N/A 10/10/2019    Procedure: Heart Catheterization with Possible Intervention;  Surgeon: Chin Garcia MD;  Location:  HEART CARDIAC CATH LAB     CV INTRA-AORTIC BALLOON PUMP INSERTION N/A  10/9/2019    Procedure: Intra-Aortic Balloon Pump Insertion;  Surgeon: Chiquita Chatterjee MD;  Location:  HEART CARDIAC CATH LAB     CV INTRA-AORTIC BALLOON PUMP REMOVAL N/A 10/10/2019    Procedure: Intra-Aortic Balloon Pump Removal;  Surgeon: Chin Garcia MD;  Location:  HEART CARDIAC CATH LAB     CV LEFT HEART CATH N/A 12/11/2020    Procedure: Heart Catheterization with Possible Intervention;  Surgeon: Pilo Otoole MD;  Location:  HEART CARDIAC CATH LAB     CV PCI STENT DRUG ELUTING N/A 10/9/2019    Procedure: PCI Stent Drug Eluting;  Surgeon: Chiquita Chatterjee MD;  Location:  HEART CARDIAC CATH LAB     GENITOURINARY SURGERY      kidney surgery X 3     ORTHOPEDIC SURGERY      bilat total hip     RECTOCELE REPAIR       SOFT TISSUE SURGERY       Family History   Problem Relation Age of Onset     Hypertension Mother      Arthritis Mother      Cerebrovascular Disease Father         Three Strokes     Diabetes Father         and brother     Thyroid Disease Sister         Hypothyroid     Multiple Sclerosis Daughter      Sjogren's Daughter             Allergies   Latex, Flagyl [metronidazole hcl], Food, Hydrochlorothiazide w/triamterene, No clinical screening - see comments, Sulfa drugs, Tape [adhesive tape], Metoprolol, and Metronidazole        KATE Guajardo CNP  [facility]  Pager: [phone]      Thank you for allowing me to participate in the care of your patient.    Sincerely,     KATE Guajardo CNP     [facility]

## 2020-12-15 NOTE — PROGRESS NOTES
"Gem Gama is a 69 year old female who is being evaluated via a billable video visit.      The patient has been notified of following:     \"This video visit will be conducted via a call between you and your physician/provider. We have found that certain health care needs can be provided without the need for an in-person physical exam.  This service lets us provide the care you need with a video conversation.  If a prescription is necessary we can send it directly to your pharmacy.  If lab work is needed we can place an order for that and you can then stop by our lab to have the test done at a later time.    Video visits are billed at different rates depending on your insurance coverage.  Please reach out to your insurance provider with any questions.    If during the course of the call the physician/provider feels a video visit is not appropriate, you will not be charged for this service.\"    Patient has given verbal consent for Video visit? Yes  How would you like to obtain your AVS? MyChart  If you are dropped from the video visit, the video invite should be resent to: Text to cell phone: [phone]  Will anyone else be joining your video visit? No        Video-Visit Details    Type of service:  Video Visit    Video Start Time: 4:28 PM  Video End Time: 4:56 PM    Originating Location (pt. Location): Home    Distant Location (provider location):  [facility]     Platform used for Video Visit: Doximity      Patient unable to assess vitals   Review Of Systems  Skin: NEGATIVE  Eyes:Ears/Nose/Throat: NEGATIVE  Respiratory: NEGATIVE  Cardiovascular:NEGATIVE  Gastrointestinal: NEGATIVE  Genitourinary:NEGATIVE   Musculoskeletal: NEGATIVE  Neurologic: NEGATIVE  Psychiatric: NEGATIVE  Hematologic/Lymphatic/Immunologic: NEGATIVE  Endocrine:  NEGATIVE      Cardiology Clinic Progress Note  Gem Gama MRN# [id number]   YOB: 1950 Age: 69 year old   Primary Cardiologist: Dr." Juma Reason for visit: post hospital follow up            Assessment and Plan:   Gem Gama is a very pleasant 69 year old female with a history of coronary artery disease, ischemic cardiomyopathy, hypertension, CKD, asthma and depression.     Video visit today for post hospital follow up. She reports her chest/back pain has improved. During her hospitalization no cardiac etiology for the pain was identified. She underwent a nuclear stress test which showed mild degree of jasmeet-infarct ischemia and ultimately a coronary angiogram no acute coronary lesions, widely patent proximal-mid LAD stent,  at the ostium of the jailed, small-moderate D1 branch, not amendable to PCI. CTA chest 12/12 no evidence of thoracic aortic dissection and no evidence of PE.      1. Coronary artery disease - s/p EDWIN x 2 to LAD 10/9/2019 in the setting of anterior STEMI, Coronary angiogram 10/9 showed severe single vessel obstructive CAD of LAD (100%) as well as moderate non-obstructive CAD in OM1 and OM2 (60%, 50%). She went back to the cath lab 10/10 for evaluation of RCA which showed no obstructive RCA disease. As above she was recently hospitalized for chest pain, notes symptoms have improved since discharge, still unclear etiology.               - Continue aspirin and plavix for 2 years post intervention.               - Continue carvedilol and atorvastatin              - Counseled on lifestyle modifications.      2. Ischemic cardiomyopathy/HFrEF - LVEF normalized to 55-60% in Feb 2020 from 35-40% in October 2019.   HF symptoms well controlled. Continue current medications.               - NYHA class II, stage C              - Etiology : ischemic               - Fluid status : euvolemic               - Diuretic regimen : no diuretic needs              - Last RHC : none              - Ischemic evaluation : cardiac catheterization with revascularization 10/9/19              - Guideline directed medical  therapy                          - Betablocker: carvedilol 3.125mg BID                          - ACEI/ARB/ARNI: lisinopril 2.5mg                           - Aldactone antagonist: none     3. Hypertension - controlled, continue current medication regimen.      4. CKD - secondary to congenital kidney defect, stable, labs from 12/10 show creatinine 1.04.            Changes today: none    Follow up plan:     Follow up with Dr. Dennison as planned in March        History of Presenting Illness:    Gem Gama is a very pleasant 68 year old female with a history of coronary artery disease, ischemic cardiomyopathy, hypertension, CKD, asthma and depression.      Patient was hospitalized 10/9/2019-10/13/2019 due to STEMI, EKG demonstrated ST elevation in the anterolateral leads, patient was taken emergently taken to cath lab and underwent EDWIN x 2 to proximal LAD. Coronary angiogram showed severe single vessel obstructive CAD of LAD (100%) as well as moderate non-obstructive CAD in OM1 and OM2 (60%, 50%). Patient initially requiring intraaortic balloon pump and pressor support. Echocardiogram showed EF 35-40%, grade I diastolic dysfunction, RV normal function and size, and no significant valvular disease. Patient was taken back to cath lab to have RCA evaluated which showed no obstructive disease (40% RCA stenosis).  Patient was initially on Brilinta and aspirin, but due to cost Brilinta was changed to Plavix. Patient was started on carvedilol, losartan and atorvastatin.      I first met patient during post hospital follow up in October 2019, at that time patient was doing well from cardiac standpoint.      Heidy was rehospitalized in February 2020 with complaints of chest pain and nausea/vomiting. This was thought to be related to something she ate followed by a vagal reaction. Patient tells me that after discharge her PCP found gallstones which were felt to likely be contributing. An echocardiogram was completed at this  time which demonstrated normalization of LV function, EF 55-60% and no regional wall motion abnormalities. She was started on low dose lisinopril 2.5mg.      She was last seen by Dr. Dennison in February 2020. At that time she was doing well from a cardiovascular standpoint and no medications were changed.     Most recently she was hospitalized 12/9-12/12 for chest pain. Nuclear stress test completed which showed small area of a mild degree of infarction in the apical segment associated with mild degree of jasmeet-infarct ischemia. Cardiac catheterization completed 12/11 no acute coronary lesions, widely patent proximal-mid LAD stent,  at the ostium of the jailed, small-moderate D1 branch, not amendable to PCI. CTA chest 12/12 no evidence of thoracic aortic dissection and no evidence of PE. She was discharged on her prior to admission medications.     Video visit today for cardiology follow up post hospitalization.     Patient reports feeling good. States chest/back pain has improved.  Since hospital discharge denies chest pain or chest tightness. Denies shortness of breath at rest. Denies exertional dyspnea. Denies dizziness, lightheadedness or other presyncopal symptoms. Denies tachycardia or palpitations.     Labs from 12/10 showed stable kidney function (creatinine 1.04, BUN 16), stable electrolytes. No vitals at home today. Recalls last check at home 120s systolically.     Appetite good. Trying to limit sodium and cholesterol. No set exercise routine, note her plan was to attend the [employer] program but currently cancelled due to COVID. Denies alcohol and tobacco use. 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         Recent Hospitalizations   2/2020 : chest pain, echo showed normalized LVEF        Social History    Single, 4 children, 9 grandchildren, retired  (physics and chemistry), enjoys  shiloh.    Social History     Socioeconomic History     Marital status:      Spouse name: Not on file     Number of children: Not on file     Years of education: Not on file     Highest education level: Not on file   Occupational History     Not on file   Social Needs     Financial resource strain: Not on file     Food insecurity     Worry: Not on file     Inability: Not on file     Transportation needs     Medical: Not on file     Non-medical: Not on file   Tobacco Use     Smoking status: Never Smoker     Smokeless tobacco: Never Used   Substance and Sexual Activity     Alcohol use: No     Drug use: No     Sexual activity: Never     Partners: Male     Birth control/protection: None   Lifestyle     Physical activity     Days per week: Not on file     Minutes per session: Not on file     Stress: Not on file   Relationships     Social connections     Talks on phone: Not on file     Gets together: Not on file     Attends Alevism service: Not on file     Active member of club or organization: Not on file     Attends meetings of clubs or organizations: Not on file     Relationship status: Not on file     Intimate partner violence     Fear of current or ex partner: Not on file     Emotionally abused: Not on file     Physically abused: Not on file     Forced sexual activity: Not on file   Other Topics Concern     Parent/sibling w/ CABG, MI or angioplasty before 65F 55M? No      Service No     Blood Transfusions No     Caffeine Concern No     Occupational Exposure No     Hobby Hazards No     Sleep Concern Yes     Comment: Uses CPAP     Stress Concern No     Weight Concern Yes     Special Diet No     Back Care No     Exercise No     Bike Helmet No     Comment:       Seat Belt Yes     Self-Exams Yes   Social History Narrative     Not on file            Review of Systems:   Agree with above ROS         Physical Exam:   Vitals: LMP  (LMP Unknown)    Wt Readings from Last 4 Encounters:   12/12/20 78.5 kg (173  lb 1.6 oz)   12/09/20 78.5 kg (173 lb)   10/15/20 77.1 kg (170 lb)   09/06/20 76.2 kg (168 lb)     GEN: well nourished, in no acute distress.  HEENT:  Pupils equal, round. Sclerae nonicteric.   NECK: Supple, no masses appreciated.  RESP: Respirations are unlabored. No cough.   NEURO: Alert and oriented, cooperative.  SKIN: No visible rash       Data:   ECHO 10/10/19  Left ventricular systolic function is moderate to severely reduced. LVEF is  estimated at 35-40%.  Akinesis of the basal to apical anterior, mid anteroseptal, apical septal wall  segments. Dyskinesis of the apex. Hypokinesis of the basal to mid inferoseptal  wall segments.  The right ventricle is normal in structure, function and size.  There are no prior studies available for comparison.    CTA Chest 12/12/20  1.  No evidence for thoracic aortic dissection or other acute  abnormality. No evidence for pulmonary embolism.  2.  Very mild subpleural patchy groundglass opacities at the right  lower lobe suggesting an infectious or inflammatory cause and clinical  correlation is recommended.  3.  Cholelithiasis.    Cardiac catheterization 12/11/20  1.  No acute coronary lesions to explain the patient's current presentation.  2.  There is a  at the ostium of the jailed, small-moderate D1 branch which would not be a good candidate for  PCI.  3.  Widely patent proximal-mid LAD stent.    4.  Mild-moderate nonobstructive CAD elsewhere.    Nuclear stress test 12/10/20     The nuclear stress test is abnormal.     There is a small area of a mild degree of infarction in the apical segment(s) of the left ventricle associated with a mild degree of jasmeet-infarct ischemia. This appears to be in the left anterior descending distribution.     Left ventricular function is normal.     There is no prior study for comparison.      LIPID RESULTS:  Lab Results   Component Value Date    CHOL 162 10/15/2020    HDL 59 10/15/2020    LDL 82 10/15/2020    TRIG 105 10/15/2020     CHOLHDLRATIO 4.2 09/19/2013     LIVER ENZYME RESULTS:  Lab Results   Component Value Date    AST 32 12/09/2020    ALT 25 12/09/2020     CBC RESULTS:  Lab Results   Component Value Date    WBC 4.7 12/10/2020    RBC 4.65 12/10/2020    HGB 13.8 12/10/2020    HCT 39.7 12/10/2020    MCV 85 12/10/2020    MCH 29.7 12/10/2020    MCHC 34.8 12/10/2020    RDW 13.2 12/10/2020     12/10/2020     BMP RESULTS:  Lab Results   Component Value Date     12/10/2020    POTASSIUM 4.3 12/10/2020    CHLORIDE 109 12/10/2020    CO2 29 12/10/2020    ANIONGAP 3 12/10/2020    GLC 99 12/10/2020    BUN 16 12/10/2020    CR 1.04 12/10/2020    GFRESTIMATED 54 (L) 12/10/2020    GFRESTBLACK 62 12/10/2020    FRACISCO 8.6 12/10/2020      A1C RESULTS:  Lab Results   Component Value Date    A1C 5.6 09/19/2013     INR RESULTS:  Lab Results   Component Value Date    INR 1.69 (H) 11/27/2014    INR 1.76 (H) 11/26/2014            Medications     Current Outpatient Medications   Medication Sig Dispense Refill     aspirin (ASA) 81 MG EC tablet Take 1 tablet (81 mg) by mouth daily 90 tablet 0     atorvastatin (LIPITOR) 40 MG tablet Take 1 tablet (40 mg) by mouth daily 90 tablet 3     carvedilol (COREG) 3.125 MG tablet Take 1 tablet (3.125 mg) by mouth 2 times daily (with meals) 180 tablet 3     clopidogrel (PLAVIX) 75 MG tablet Take 1 tablet (75 mg) by mouth daily 90 tablet 3     Melatonin-Pyridoxine (MELATIN PO) Take 1 tablet by mouth nightly as needed (sleep)        Prenatal Multivit-Min-Fe-FA (PRENATAL/IRON) TABS        sertraline (ZOLOFT) 100 MG tablet Take 1 tablet (100 mg) by mouth daily 90 tablet 3     ursodiol (ACTIGALL) 300 MG capsule Take 1 capsule (300 mg) by mouth 2 times daily 60 capsule 11     valsartan (DIOVAN) 40 MG tablet Take 1 tablet (40 mg) by mouth daily 30 tablet 11     vitamin D3 (CHOLECALCIFEROL) 2000 units (50 mcg) tablet Take 1 tablet (2,000 Units) by mouth daily 90 tablet 3     amoxicillin (AMOXIL) 500 MG capsule Take as  directed before dental work       nitroGLYcerin (NITROSTAT) 0.4 MG sublingual tablet Place 1 tablet (0.4 mg) under the tongue every 5 minutes as needed for chest pain (Patient not taking: Reported on 12/15/2020) 10 tablet 0          Past Medical History     Past Medical History:   Diagnosis Date     ADHD (attention deficit hyperactivity disorder)      Anxiety      Arthritis     back, hands, knees, hips     Asthma     controlled--has coughing symptoms     C. difficile diarrhea      Central sleep apnea      Coronary artery disease involving native coronary artery of native heart without angina pectoris 10/25/2019     Depression      Gastro-oesophageal reflux disease      Hypertension      Ischemic cardiomyopathy 10/25/2019     Narcolepsy      Renal disease     gfr is aroung 48     Sleep apnea     uses Cpap     Past Surgical History:   Procedure Laterality Date     ARTHROPLASTY KNEE  7/9/2014    Procedure: ARTHROPLASTY KNEE;  Surgeon: Levi Johnson MD;  Location:  OR     ARTHROPLASTY KNEE Left 11/24/2014    Procedure: ARTHROPLASTY KNEE;  Surgeon: Levi Johnson MD;  Location:  OR      REIMPLANT URETER,EXTENSIVE TAILORING  1973 1997     C REPAIR ENTEROCELE,VAG APPRCH  2008    Prolift     C TOTAL ABD HYSTERECTOMY+BLAD REPR  1992    Vaginal approach with oophrectomy     C TOTAL KNEE ARTHROPLASTY       CV CORONARY ANGIOGRAM N/A 10/9/2019    Procedure: Coronary Angiogram;  Surgeon: Chiquita Chatterjee MD;  Location:  HEART CARDIAC CATH LAB     CV HEART CATHETERIZATION WITH POSSIBLE INTERVENTION N/A 10/10/2019    Procedure: Heart Catheterization with Possible Intervention;  Surgeon: Chin Garcia MD;  Location:  HEART CARDIAC CATH LAB     CV INTRA-AORTIC BALLOON PUMP INSERTION N/A 10/9/2019    Procedure: Intra-Aortic Balloon Pump Insertion;  Surgeon: Chiquita Chatterjee MD;  Location:  HEART CARDIAC CATH LAB     CV INTRA-AORTIC BALLOON PUMP REMOVAL N/A 10/10/2019    Procedure: Intra-Aortic Balloon  Pump Removal;  Surgeon: Chin Garcia MD;  Location:  HEART CARDIAC CATH LAB     CV LEFT HEART CATH N/A 12/11/2020    Procedure: Heart Catheterization with Possible Intervention;  Surgeon: Pilo Otoole MD;  Location:  HEART CARDIAC CATH LAB     CV PCI STENT DRUG ELUTING N/A 10/9/2019    Procedure: PCI Stent Drug Eluting;  Surgeon: Chiquita Chatterjee MD;  Location:  HEART CARDIAC CATH LAB     GENITOURINARY SURGERY      kidney surgery X 3     ORTHOPEDIC SURGERY      bilat total hip     RECTOCELE REPAIR       SOFT TISSUE SURGERY       Family History   Problem Relation Age of Onset     Hypertension Mother      Arthritis Mother      Cerebrovascular Disease Father         Three Strokes     Diabetes Father         and brother     Thyroid Disease Sister         Hypothyroid     Multiple Sclerosis Daughter      Sjogren's Daughter             Allergies   Latex, Flagyl [metronidazole hcl], Food, Hydrochlorothiazide w/triamterene, No clinical screening - see comments, Sulfa drugs, Tape [adhesive tape], Metoprolol, and Metronidazole        KATE Guajardo [facility]  Pager: [phone]

## 2020-12-16 ENCOUNTER — TELEPHONE (OUTPATIENT)
Dept: FAMILY MEDICINE | Facility: CLINIC | Age: 70
End: 2020-12-16

## 2020-12-16 NOTE — TELEPHONE ENCOUNTER
Patient had virtual visit yesterday with cardiology and has hospital follow up scheduled tomorrow in less than 24 hours. Closing encounter.  Sara Shrestha RN

## 2020-12-16 NOTE — TELEPHONE ENCOUNTER
Patient discharged from [facility] IP  ( Inpatient or ER).    Discharge location: [facility]  Discharge date: 12/12/2020  Diagnosis: Chest Pain, Unspecified type  Patient has been in the ER/IP 0/1 times.  Care Coord:  NA  Please follow up as appropriate. If no follow up required, please close encounter.

## 2020-12-17 ENCOUNTER — OFFICE VISIT (OUTPATIENT)
Dept: FAMILY MEDICINE | Facility: CLINIC | Age: 70
End: 2020-12-17
Payer: MEDICARE

## 2020-12-17 VITALS
SYSTOLIC BLOOD PRESSURE: 135 MMHG | TEMPERATURE: 97.6 F | OXYGEN SATURATION: 99 % | DIASTOLIC BLOOD PRESSURE: 64 MMHG | BODY MASS INDEX: 29.37 KG/M2 | HEART RATE: 70 BPM | WEIGHT: 176.25 LBS | HEIGHT: 65 IN

## 2020-12-17 DIAGNOSIS — N18.31 CKD STAGE G3A/A2, GFR 45-59 AND ALBUMIN CREATININE RATIO 30-299 MG/G (H): ICD-10-CM

## 2020-12-17 DIAGNOSIS — Z00.00 ENCOUNTER FOR MEDICARE ANNUAL WELLNESS EXAM: Primary | ICD-10-CM

## 2020-12-17 DIAGNOSIS — Z12.31 ENCOUNTER FOR SCREENING MAMMOGRAM FOR BREAST CANCER: ICD-10-CM

## 2020-12-17 DIAGNOSIS — E28.39 ESTROGEN DEFICIENCY: ICD-10-CM

## 2020-12-17 DIAGNOSIS — Z09 HOSPITAL DISCHARGE FOLLOW-UP: ICD-10-CM

## 2020-12-17 DIAGNOSIS — R07.9 CHEST PAIN, UNSPECIFIED TYPE: ICD-10-CM

## 2020-12-17 DIAGNOSIS — R32 URINARY INCONTINENCE, UNSPECIFIED TYPE: ICD-10-CM

## 2020-12-17 DIAGNOSIS — Z23 NEED FOR VACCINATION: ICD-10-CM

## 2020-12-17 DIAGNOSIS — I10 HYPERTENSION GOAL BP (BLOOD PRESSURE) < 140/90: ICD-10-CM

## 2020-12-17 DIAGNOSIS — Z23 NEED FOR SHINGLES VACCINE: ICD-10-CM

## 2020-12-17 DIAGNOSIS — E78.5 HYPERLIPIDEMIA LDL GOAL <70: ICD-10-CM

## 2020-12-17 DIAGNOSIS — R73.09 ELEVATED GLUCOSE: ICD-10-CM

## 2020-12-17 LAB
ALBUMIN SERPL-MCNC: 3.7 G/DL (ref 3.4–5)
ALP SERPL-CCNC: 106 U/L (ref 40–150)
ALT SERPL W P-5'-P-CCNC: 26 U/L (ref 0–50)
ANION GAP SERPL CALCULATED.3IONS-SCNC: 6 MMOL/L (ref 3–14)
AST SERPL W P-5'-P-CCNC: 26 U/L (ref 0–45)
BILIRUB SERPL-MCNC: 0.4 MG/DL (ref 0.2–1.3)
BUN SERPL-MCNC: 18 MG/DL (ref 7–30)
CALCIUM SERPL-MCNC: 8.8 MG/DL (ref 8.5–10.1)
CHLORIDE SERPL-SCNC: 107 MMOL/L (ref 94–109)
CHOLEST SERPL-MCNC: 122 MG/DL
CO2 SERPL-SCNC: 30 MMOL/L (ref 20–32)
CREAT SERPL-MCNC: 1 MG/DL (ref 0.52–1.04)
GFR SERPL CREATININE-BSD FRML MDRD: 56 ML/MIN/{1.73_M2}
GLUCOSE SERPL-MCNC: 113 MG/DL (ref 70–99)
HBA1C MFR BLD: 5.2 % (ref 0–5.6)
HDLC SERPL-MCNC: 51 MG/DL
LDLC SERPL CALC-MCNC: 37 MG/DL
NONHDLC SERPL-MCNC: 71 MG/DL
POTASSIUM SERPL-SCNC: 3.9 MMOL/L (ref 3.4–5.3)
PROT SERPL-MCNC: 7 G/DL (ref 6.8–8.8)
SODIUM SERPL-SCNC: 143 MMOL/L (ref 133–144)
TRIGL SERPL-MCNC: 168 MG/DL

## 2020-12-17 PROCEDURE — 83036 HEMOGLOBIN GLYCOSYLATED A1C: CPT | Performed by: NURSE PRACTITIONER

## 2020-12-17 PROCEDURE — 99214 OFFICE O/P EST MOD 30 MIN: CPT | Mod: 25 | Performed by: NURSE PRACTITIONER

## 2020-12-17 PROCEDURE — 80053 COMPREHEN METABOLIC PANEL: CPT | Performed by: NURSE PRACTITIONER

## 2020-12-17 PROCEDURE — G0439 PPPS, SUBSEQ VISIT: HCPCS | Performed by: NURSE PRACTITIONER

## 2020-12-17 PROCEDURE — 90471 IMMUNIZATION ADMIN: CPT | Performed by: NURSE PRACTITIONER

## 2020-12-17 PROCEDURE — 80061 LIPID PANEL: CPT | Performed by: NURSE PRACTITIONER

## 2020-12-17 PROCEDURE — 36415 COLL VENOUS BLD VENIPUNCTURE: CPT | Performed by: NURSE PRACTITIONER

## 2020-12-17 PROCEDURE — 90750 HZV VACC RECOMBINANT IM: CPT | Performed by: NURSE PRACTITIONER

## 2020-12-17 RX ORDER — ZOSTER VACCINE RECOMBINANT, ADJUVANTED 50 MCG/0.5
1 KIT INTRAMUSCULAR ONCE
Qty: 0.5 ML | Refills: 0 | Status: SHIPPED | OUTPATIENT
Start: 2020-12-17 | End: 2020-12-17

## 2020-12-17 ASSESSMENT — MIFFLIN-ST. JEOR: SCORE: 1315.34

## 2020-12-17 NOTE — NURSING NOTE
Prior to immunization administration, verified patients identity using patient s name and date of birth. Please see Immunization Activity for additional information.     Screening Questionnaire for Adult Immunization    Are you sick today?   No   Do you have allergies to medications, food, a vaccine component or latex?   No   Have you ever had a serious reaction after receiving a vaccination?   No   Do you have a long-term health problem with heart, lung, kidney, or metabolic disease (e.g., diabetes), asthma, a blood disorder, no spleen, complement component deficiency, a cochlear implant, or a spinal fluid leak?  Are you on long-term aspirin therapy?   Yes   Do you have cancer, leukemia, HIV/AIDS, or any other immune system problem?   No   Do you have a parent, brother, or sister with an immune system problem?   No   In the past 3 months, have you taken medications that affect  your immune system, such as prednisone, other steroids, or anticancer drugs; drugs for the treatment of rheumatoid arthritis, Crohn s disease, or psoriasis; or have you had radiation treatments?   No   Have you had a seizure, or a brain or other nervous system problem?   No   During the past year, have you received a transfusion of blood or blood    products, or been given immune (gamma) globulin or antiviral drug?   No   For women: Are you pregnant or is there a chance you could become       pregnant during the next month?   No   Have you received any vaccinations in the past 4 weeks?   No     Immunization questionnaire was positive for at least one answer.  Notified kirt lewis.        Per orders of kirt lewis, injection of Shingrix #2 given by Rosemarie Nation CMA. Patient instructed to remain in clinic for 15 minutes afterwards, and to report any adverse reaction to me immediately.       Screening performed by Rosemarie Nation CMA on 12/17/2020 at 2:29 PM.

## 2020-12-17 NOTE — RESULT ENCOUNTER NOTE
Daren Moody,  You do not have diabetes or pre-diabetes.  Your other labs are still pending.  Thank you,  KATE Gómez, NP-C  [facility]

## 2020-12-17 NOTE — PROGRESS NOTES
Subjective     Gem Gama is a 69 year old female who presents to clinic today for the following health issues:    HPI           Hospital Follow-up Visit:    Hospital/Nursing Home/IP Rehab Facility: [facility]  Date of Admission: 12/9/20  Date of Discharge: 12/12/20  Reason(s) for Admission: Chest and upper back pain      Was your hospitalization related to COVID-19? No   Problems taking medications regularly:  None  Medication changes since discharge: None  Problems adhering to non-medication therapy:  None    Summary of hospitalization:  [facility] discharge summary reviewed  Diagnostic Tests/Treatments reviewed.  Follow up needed: see cardiology  Other Healthcare Providers Involved in Patient s Care:         None  Update since discharge: stable. Post Discharge Medication Reconciliation: discharge medications reconciled, continue medications without change.  Plan of care communicated with patient          Daughter had a stroke and Heidy is staying with her right now since October.     Saw cardiology 2 days ago. Heart is stable, it was not her heart causing chest pain. Under went nuc stress test, mild jasmeet-infarct ischemia, angio noted no acute coronary lesions. No stenting needed. CTA chest 12/12/ no dissection or PE.  Still having some upper back pain/discomfort. Continue on plavix and aspirin. Use coreg 3.125 mg BID and lisinopril 2.5 mg daily    Often headaches central forehead. tylenol helps. Occurring before she was living with her daughter but maybe not as frequent. Her daughter has bad migraines. Heidy had them when she was in her 30s. Glasses script is up to date.  Hx of shingles on her face and into her right eye, sometimes it will get blurry.     BS-notes has been high at times when checked with labs. Does not check sugars at home.  She was told at some point she is prediabetic, she got upset about that as she had never been told that before.  A1c today was 5.2, she is not  "prediabetic.    She reports she has some ADHD, unable to take medication because of her heart.  Her conversation was not scattered but she did seem to be speaking fast and talking about a lot of topics that branched off from what we were talking about.  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    Hx of gall bladder disease    Also going to do medicare wellness exam today      Patient has been advised of split billing requirements and indicates understanding: Yes  Are you in the first 12 months of your Medicare Part B coverage?  No    Physical Health:    In general, how would you rate your overall physical health? good    Outside of work, how many days during the week do you exercise? none    Outside of work, approximately how many minutes a day do you exercise?less than 15 minutes    If you drink alcohol do you typically have >3 drinks per day or >7 drinks per week? No    Do you usually eat at least 4 servings of fruit and vegetables a day, include whole grains & fiber and avoid regularly eating high fat or \"junk\" foods? NO    Do you have any problems taking medications regularly?  No    Do you have any side effects from medications? none    Needs assistance for the following daily activities: no assistance needed    Which of the following safety concerns are present in your home?  lack of grab bars in the bathroom     Hearing impairment: Yes, Difficulty following a conversation in a noisy restaurant or crowded room.    Need to ask people to speak up or repeat themselves. Right ear is worse than the left. Feels early for hearing aids.     Difficulty understanding soft or whispered speech.    In the past 6 months, have you been bothered by leaking of urine? Yes, wears overnight pads, sometimes cannot control it. In the 1990s, she had a 'sling' for her bladder.     Mental Health:    In general, how would you rate your overall mental " or emotional health? good  PHQ-2 Score:      Do you feel safe in your environment? Yes    Have you ever done Advance Care Planning? (For example, a Health Directive, POLST, or a discussion with a medical provider or your loved ones about your wishes): No, advance care planning information given to patient to review.  Patient declined advance care planning discussion at this time.    Additional concerns to address?  No    Fall risk:  Fallen 2 or more times in the past year?: No  Any fall with injury in the past year?: No  click delete button to remove this line now  Cognitive Screenin) Repeat 3 items (Leader, Season, Table)  2 items  2) Clock draw: NORMAL  3) 3 item recall: Recalls NO objects   Results: NORMAL clock, 0 items recalled: COGNITIVE IMPAIRMENT LESS LIKELY    Has ADHD, but cannot be on medication for it due to heart  Hx of dyslexia also, which makes it difficult memorization    Colonoscopy due   Had flu shot and first shingle vaccine. Due for 2nd shingrix today.     Mini-CogTM Copyright CLAU [employer]. Licensed by the author for use in [facility]; reprinted with permission ([email or web address]). All rights reserved.      Do you have sleep apnea, excessive snoring or daytime drowsiness?: yes had apnea on her back, had sleep study 3 times. [place] more tired with cpap due to discomfort.     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 Daughter reports hearing less snoring. Sleeping okay since stents placed a year ago.     Mood: is good for now.         Reviewed and updated as needed this visit by clinical staff  Tobacco  Allergies  Meds  Problems  Med Hx  Surg Hx  Fam Hx  Soc Hx          Reviewed and updated as needed this visit by Provider  Tobacco  Allergies  Meds  Problems  Med Hx  Surg Hx  Fam Hx         Social History     Tobacco Use     Smoking status: Never Smoker     Smokeless tobacco: Never Used   Substance Use  "Topics     Alcohol use: No                           Current providers sharing in care for this patient include:   Patient Care Team:  Danny Conteh MD as PCP - General (Internal Medicine)  Danny Conteh MD as Assigned PCP  Mel Dennison MD as Assigned Heart and Vascular Provider    The following health maintenance items are reviewed in Epic and correct as of today:  Health Maintenance   Topic Date Due     DEXA  1950     ASTHMA ACTION PLAN  02/09/2018     MAMMO SCREENING  12/26/2019     ASTHMA CONTROL TEST  06/09/2021     PHQ-9  06/09/2021     MICROALBUMIN  10/15/2021     MEDICARE ANNUAL WELLNESS VISIT  12/17/2021     BMP  12/17/2021     LIPID  12/17/2021     FALL RISK ASSESSMENT  12/17/2021     DTAP/TDAP/TD IMMUNIZATION (2 - Td) 08/28/2023     ADVANCE CARE PLANNING  12/17/2025     COLORECTAL CANCER SCREENING  02/21/2028     HEPATITIS C SCREENING  Completed     DEPRESSION ACTION PLAN  Completed     INFLUENZA VACCINE  Completed     Pneumococcal Vaccine: 65+ Years  Completed     ZOSTER IMMUNIZATION  Completed     Pneumococcal Vaccine: Pediatrics (0 to 5 Years) and At-Risk Patients (6 to 64 Years)  Aged Out     IPV IMMUNIZATION  Aged Out     MENINGITIS IMMUNIZATION  Aged Out     HEPATITIS B IMMUNIZATION  Aged Out     Lab work is in process  Labs reviewed in EPIC  Pneumonia Vaccine:Adults age 65+ who received Pneumovax (PPSV23) at 65 years or older: Should be given PCV13 > 1 year after their most recent PPSV23  Mammogram Screening: Mammogram Screening: Patient over age 50, mutual decision to screen reflected in health maintenance.  Last 3 Pap and HPV Results:      ROS:  Constitutional, HEENT, cardiovascular, pulmonary, gi and gu systems are negative, except as otherwise noted.    OBJECTIVE:   /64 (BP Location: Left arm, Patient Position: Sitting, Cuff Size: Adult Regular)   Pulse 70   Temp 97.6  F (36.4  C) (Oral)   Ht 1.651 m (5' 5\")   Wt 79.9 kg (176 lb 4 oz)   LMP  (LMP Unknown) " "  SpO2 99%   BMI 29.33 kg/m   Estimated body mass index is 29.33 kg/m  as calculated from the following:    Height as of this encounter: 1.651 m (5' 5\").    Weight as of this encounter: 79.9 kg (176 lb 4 oz).  EXAM:   GENERAL APPEARANCE: healthy, alert and no distress  EYES: Eyes grossly normal to inspection, PERRL and conjunctivae and sclerae normal  HENT: ear canals and TM's normal, nose and mouth without ulcers or lesions, oropharynx clear and oral mucous membranes moist  NECK: no adenopathy, no asymmetry, masses, or scars and thyroid normal to palpation  RESP: lungs clear to auscultation - no rales, rhonchi or wheezes  BREAST: normal without masses, tenderness or nipple discharge and no palpable axillary masses or adenopathy  CV: regular rate and rhythm, normal S1 S2, no S3 or S4, no murmur, click or rub, no peripheral edema and peripheral pulses strong  ABDOMEN: soft, nontender, no hepatosplenomegaly, no masses and bowel sounds normal  MS: no musculoskeletal defects are noted and gait is age appropriate without ataxia  SKIN: no suspicious lesions or rashes  NEURO: Normal strength and tone, sensory exam grossly normal, mentation intact and speech normal  PSYCH: mentation appears normal and affect normal/bright    Diagnostic Test Results:  Labs reviewed in Epic  Results for orders placed or performed in visit on 12/17/20 (from the past 24 hour(s))   Comprehensive metabolic panel (BMP + Alb, Alk Phos, ALT, AST, Total. Bili, TP)   Result Value Ref Range    Sodium 143 133 - 144 mmol/L    Potassium 3.9 3.4 - 5.3 mmol/L    Chloride 107 94 - 109 mmol/L    Carbon Dioxide 30 20 - 32 mmol/L    Anion Gap 6 3 - 14 mmol/L    Glucose 113 (H) 70 - 99 mg/dL    Urea Nitrogen 18 7 - 30 mg/dL    Creatinine 1.00 0.52 - 1.04 mg/dL    GFR Estimate 56 (L) >60 mL/min/[1.73_m2]    GFR Estimate If Black 65 >60 mL/min/[1.73_m2]    Calcium 8.8 8.5 - 10.1 mg/dL    Bilirubin Total 0.4 0.2 - 1.3 mg/dL    Albumin 3.7 3.4 - 5.0 g/dL    " Protein Total 7.0 6.8 - 8.8 g/dL    Alkaline Phosphatase 106 40 - 150 U/L    ALT 26 0 - 50 U/L    AST 26 0 - 45 U/L   Lipid panel reflex to direct LDL Non-fasting   Result Value Ref Range    Cholesterol 122 <200 mg/dL    Triglycerides 168 (H) <150 mg/dL    HDL Cholesterol 51 >49 mg/dL    LDL Cholesterol Calculated 37 <100 mg/dL    Non HDL Cholesterol 71 <130 mg/dL   Hemoglobin A1c   Result Value Ref Range    Hemoglobin A1C 5.2 0 - 5.6 %       ASSESSMENT / PLAN:   1. Encounter for Medicare annual wellness exam  Exam was normal    Hospital follow up/chest pain  Stable, saw cardiology, determined to not be related to the heart. No further issues and back is improved also    2. Elevated glucose  Not diabetic  - Hemoglobin A1c    3. Hypertension goal BP (blood pressure) < 140/90  stable  - Comprehensive metabolic panel (BMP + Alb, Alk Phos, ALT, AST, Total. Bili, TP)    4. Hyperlipidemia LDL goal <70  stable  - Lipid panel reflex to direct LDL Non-fasting    5. CKD stage G3a/A2, GFR 45-59 and albumin creatinine ratio  mg/g  stable  - Comprehensive metabolic panel (BMP + Alb, Alk Phos, ALT, AST, Total. Bili, TP)    6. Urinary incontinence, unspecified type  On-going for years, she isn't sure if she wants it fixed or not but she states if she lives as long as some relatives she would want it fixed  - UROLOGY ADULT REFERRAL; Future    7. Encounter for screening mammogram for breast cancer  Due for mammogram  - MA SCREENING DIGITAL BILAT - Future  (s+30); Future    8. Estrogen deficiency  Due for DEXA  - DEXA HIP/PELVIS/SPINE - Future; Future    9. Need for shingles vaccine  Given 2nd shot  - zoster vaccine recombinant adjuvanted (SHINGRIX) injection; Inject 0.5 mLs into the muscle once for 1 dose  Dispense: 0.5 mL; Refill: 0    10. Need for vaccination  As above  - ZOSTER VACCINE RECOMBINANT ADJUVANTED IM NJX    Patient has been advised of split billing requirements and indicates understanding: Yes  With results  "message  COUNSELING:  Reviewed preventive health counseling, as reflected in patient instructions    Estimated body mass index is 29.33 kg/m  as calculated from the following:    Height as of this encounter: 1.651 m (5' 5\").    Weight as of this encounter: 79.9 kg (176 lb 4 oz).        She reports that she has never smoked. She has never used smokeless tobacco.    Appropriate preventive services were discussed with this patient, including applicable screening as appropriate for cardiovascular disease, diabetes, osteopenia/osteoporosis, and glaucoma.  As appropriate for age/gender, discussed screening for colorectal cancer, prostate cancer, breast cancer, and cervical cancer. Checklist reviewing preventive services available has been given to the patient.    Reviewed patients plan of care and provided an AVS. The Basic Care Plan (routine screening as documented in Health Maintenance) for Gem meets the Care Plan requirement. This Care Plan has been established and reviewed with the Patient.    Counseling Resources:  ATP IV Guidelines  Pooled Cohorts Equation Calculator  Breast Cancer Risk Calculator  BRCA-Related Cancer Risk Assessment: FHS-7 Tool  FRAX Risk Assessment  ICSI Preventive Guidelines  Dietary Guidelines for Americans, 2010  USDA's MyPlate  ASA Prophylaxis  Lung CA Screening    KATE Gómez, NP-C  [facility]    "

## 2020-12-17 NOTE — PATIENT INSTRUCTIONS
Patient Education   Personalized Prevention Plan  You are due for the preventive services outlined below.  Your care team is available to assist you in scheduling these services.  If you have already completed any of these items, please share that information with your care team to update in your medical record.  Health Maintenance Due   Topic Date Due     Osteoporosis Screening  1950     Discuss Advance Care Planning  06/18/2017     Asthma Action Plan - yearly  02/09/2018     Annual Wellness Visit  03/15/2019     Mammogram  12/26/2019     Zoster (Shingles) Vaccine (3 of 3) 12/10/2020        Patient Education   Personalized Prevention Plan  You are due for the preventive services outlined below.  Your care team is available to assist you in scheduling these services.  If you have already completed any of these items, please share that information with your care team to update in your medical record.  Health Maintenance Due   Topic Date Due     Osteoporosis Screening  1950     Discuss Advance Care Planning  06/18/2017     Asthma Action Plan - yearly  02/09/2018     Annual Wellness Visit  03/15/2019     Mammogram  12/26/2019     Zoster (Shingles) Vaccine (3 of 3) 12/10/2020

## 2020-12-18 NOTE — RESULT ENCOUNTER NOTE
Daren Moody,   Your labs are back and are stable. As a reminder there may be split billing for your visit as we also did your medicare wellness exam plus the hospital follow up. I will send the note to Dr. Conteh also to make sure he is aware of what we all talked about. Take care! Let me know if you have questions.   Thank you,  KATE Gómez, NP-C  [facility]

## 2021-02-02 ENCOUNTER — ANCILLARY PROCEDURE (OUTPATIENT)
Dept: MAMMOGRAPHY | Facility: CLINIC | Age: 71
End: 2021-02-02
Attending: NURSE PRACTITIONER
Payer: MEDICARE

## 2021-02-02 DIAGNOSIS — Z12.31 ENCOUNTER FOR SCREENING MAMMOGRAM FOR BREAST CANCER: ICD-10-CM

## 2021-02-02 PROCEDURE — 77063 BREAST TOMOSYNTHESIS BI: CPT | Mod: TC | Performed by: RADIOLOGY

## 2021-02-02 PROCEDURE — 77067 SCR MAMMO BI INCL CAD: CPT | Mod: TC | Performed by: RADIOLOGY

## 2021-02-03 NOTE — RESULT ENCOUNTER NOTE
Daren Moody,  Your mammogram was normal.  Please repeat in 1 year.  Thank you,  KATE Gómez, NP-C  [facility]

## 2021-02-10 ENCOUNTER — MYC MEDICAL ADVICE (OUTPATIENT)
Dept: FAMILY MEDICINE | Facility: CLINIC | Age: 71
End: 2021-02-10

## 2021-02-11 ENCOUNTER — MYC REFILL (OUTPATIENT)
Dept: FAMILY MEDICINE | Facility: CLINIC | Age: 71
End: 2021-02-11

## 2021-02-11 DIAGNOSIS — I25.10 CORONARY ARTERY DISEASE INVOLVING NATIVE CORONARY ARTERY OF NATIVE HEART WITHOUT ANGINA PECTORIS: ICD-10-CM

## 2021-02-12 RX ORDER — NITROGLYCERIN 0.4 MG/1
0.4 TABLET SUBLINGUAL EVERY 5 MIN PRN
Qty: 10 TABLET | Refills: 0 | Status: SHIPPED | OUTPATIENT
Start: 2021-02-12 | End: 2021-02-19

## 2021-02-12 NOTE — TELEPHONE ENCOUNTER
Prescription approved per [facility] Refill Protocol.    Joann Nelson RN  [facility]

## 2021-02-17 DIAGNOSIS — I25.10 CORONARY ARTERY DISEASE INVOLVING NATIVE CORONARY ARTERY OF NATIVE HEART WITHOUT ANGINA PECTORIS: ICD-10-CM

## 2021-02-17 RX ORDER — NITROGLYCERIN 0.4 MG/1
0.4 TABLET SUBLINGUAL EVERY 5 MIN PRN
Qty: 10 TABLET | Refills: 0 | OUTPATIENT
Start: 2021-02-17

## 2021-02-17 NOTE — TELEPHONE ENCOUNTER
Patient just refilled #10 SL NTG tablets 1 week ago.   Routing to provider to advise.  Darcy Saucedo BSN, RN

## 2021-02-17 NOTE — TELEPHONE ENCOUNTER
Patient needs to be seen if using her nitroglycerin that often.  Please have her schedule an appointment.

## 2021-02-18 NOTE — TELEPHONE ENCOUNTER
This writer attempted to contact Patient on 02/18/21      Reason for call needs visit and left message.      If patient calls back:   Schedule Office Visit appointment within 1 week with primary care, document that pt called and close encounter         Stephy Freeman MA

## 2021-02-19 RX ORDER — NITROGLYCERIN 0.4 MG/1
0.4 TABLET SUBLINGUAL EVERY 5 MIN PRN
Qty: 10 TABLET | Refills: 0 | Status: SHIPPED | OUTPATIENT
Start: 2021-02-19 | End: 2021-10-14

## 2021-02-19 NOTE — TELEPHONE ENCOUNTER
Called and spoke to the patient and she states that she just wanted a refill to keep 1 bottle in her purse and 1 bottle to leave at home. Please advise.  Stephy Freeman MA  [facility]  2nd Floor  Primary Care

## 2021-05-06 NOTE — DISCHARGE INSTRUCTIONS
YOUR PRIMARY CARE PHYSICIAN SHOULD RECHECK YOUR KIDNEY FUNCTION IN 2-3 DAYS TO MAKE SURE IT HAS NORMALIZED. MAKE SURE TO DRINK 8 FULL GLASSES OF CLEAR LIQUID (NONCAFFEINATED) PER DAY. RETURN TO THE EMERGENCY DEPARTMENT FOR ANY WORSENING OF YOUR CONDITION    Renal Insufficiency  Your kidneys remove waste products and extra water from your body. When your kidneys don t work as they should, waste products build up in your blood. The early stage of this process is called renal insufficiency. If renal insufficiency gets worse, you can develop chronic renal failure. This allows extra water, waste, and toxic substances to build up in your body. This can become life threatening. You may need dialysis or a kidney transplant. The most serious form of renal insufficiency is end-stage renal disease.  Diabetes is the main cause of renal insufficiency.  Other causes include:    High blood pressure    Hardening of the arteries    Lupus    Inflammation of the blood vessels (vasculitis)    Viral or bacterial infection  Some over-the-counter (OTC) pain medicines can cause renal failure if you take them for a long time. These include aspirin, ibuprofen, naproxen, and other nonsteroidal anti-inflammatory drugs (NSAIDs).  Home care  Follow these tips when caring for yourself at home:    If you have diabetes, talk with your healthcare provider about controlling your blood sugar. Ask if you need to make any changes to your diet, lifestyle, or medicines.    If you have high blood pressure:  ? Take your prescribed medicine. Your goal is to lower your blood pressure to less than 130/80, or as recommended by your provider.  ? Start a regular exercise program that you enjoy. Check with your healthcare provider to be sure your planned exercise program is right for you.  ? Cut back on the amount of salt (sodium) you eat. Your healthcare provider can tell you how much salt each day is safe for you.    If you are overweight, talk with your  Patient presents with:  ER F/U: DD Rm 6, ER F/U Cortisone Injection, Headache HTN      HPI:  Pt presents for ER f/u. Pt had elevated BP after recent R knee cortisone injection. Her BP normalized in under 24 hours. Pt is compliant with her meds.   She is Post-menopausal     CAD (coronary artery disease)     History of non-ST elevation myocardial infarction (NSTEMI)     Prediabetes     Chest pain of uncertain etiology     Presence of drug coated stent in right coronary artery     PMR (polymyalgia rheumatica healthcare provider about a weight loss plan.    If you smoke, quit. Smoking makes kidney disease worse. Talk with your healthcare provider about ways to help you quit. For more information, visit:  ? smokefree.gov/sites/default/files/pdf/clearing-the-air-accessible.pdf  ? www.smokefree.gov  ? www.cancer.org/healthy/stayawayfromtobacco/guidetoquittingsmoking/    Talk with your healthcare provider about any restrictions you should make in your diet. In general, you should limit the amount of protein, salt, potassium, and phosphorus. Don t drink too many fluids. Don t add salt at the table, and stay away from salty foods. You may need a calcium supplement to help prevent osteoporosis.    Talk with your healthcare provider about any medicines you are taking to find out if they need to be reduced or stopped.    Don t take the following OTC medicines, or talk with your healthcare provider before you take them:  ? Aspirin, ibuprofen, naproxen, and other NSAIDs. You may be able to use these for a short time to help with fever or pain.  ? Laxatives and antacids with magnesium or aluminum  ? Phospho soda enemas with phosphorus  ? Certain stomach acid-blocking medicine such as cimetidine or ranitidine  ? Decongestants with pseudoephedrine  ? Herbal supplements  Follow-up care  Follow up with your healthcare provider, or as advised.  Contact one of the following for more information:    American Association of Kidney Patients, www.aakp.org    National Kidney Foundation, www.kidney.org    American Kidney Fund, www.kidneyfund.org    National Kidney Disease Education Program, www.nkdep.nih.gov  Call 911  Call 911 if any of the following occur:    Severe weakness, dizziness, fainting, drowsiness, or confusion    Chest pain or shortness of breath    Heart beating fast, slowly, or irregularly  When to seek medical advice  Call your healthcare provider right away if any of these occur:    Nausea or vomiting    Fever of 100.4 F (38 C)  or higher, or as directed by your healthcare provider    Unexpected weight gain or swelling in the legs, ankles, or around your eyes    You don t urinate as much as normal, or you aren t able to urinate  Date Last Reviewed: 10/1/2016    0423-3821 [employer]. [address] [place] [address]. All rights reserved. This information is not intended as a substitute for professional medical care. Always follow your healthcare professional's instructions.      Dehydration (Adult)  Dehydration occurs when your body loses too much fluid. This may be the result of prolonged vomiting or diarrhea, excessive sweating, or a high fever. It may also happen if you don t drink enough fluid when you re sick or out in the heat. Misuse of diuretics (water pills) can also be a cause.  Symptoms include thirst and decreased urine output. You may also feel dizzy, weak, fatigued, or very drowsy. The diet described below is usually enough to treat dehydration. In some cases, you may need medicine.  Home care    Drink at least 12 8-ounce glasses of fluid every day to resolve the dehydration. Fluid may include water; orange juice; lemonade; apple, grape, or cranberry juice; clear fruit drinks; electrolyte replacement and sports drinks; and teas and coffee without caffeine. Don't drink alcohol. If you have been diagnosed with a kidney disease, ask your doctor how much and what types of fluids you should drink to prevent dehydration. If you have kidney disease, fluid can build up in the body. This can be dangerous to your health.    If you have a fever, muscle aches, or a headache as a result of a cold or flu, you may take acetaminophen or ibuprofen, unless another medicine was prescribed. If you have chronic liver or kidney disease, or have ever had a stomach ulcer or gastrointestinal bleeding, talk with your healthcare provider before using these medicines. Don't take aspirin if you are younger than 18 and have a fever.  past we agreed to take this approach again. Pt will take Amlodipine 2.5 mg PO q3-4 hours for a total of 10 mg in a 24 hour period. Pt voices understanding and will call with questions or problems.     Adverse effect of drug, subsequent encounter    No ord Aspirin raises the chance for severe liver injury.  Follow-up care  Follow up with your healthcare provider, or as advised.  When to seek medical advice  Call your healthcare provider right away if any of these occur:    Continued vomiting    Frequent diarrhea (more than 5 times a day); blood (red or black color) or mucus in diarrhea    Blood in vomit or stool    Swollen abdomen or increasing abdominal pain    Weakness, dizziness, or fainting    Unusual drowsiness or confusion    Reduced urine output or extreme thirst    Fever of 100.4 F (38 C) or higher  Date Last Reviewed: 5/1/2017 2000-2017 [employer]. [address] [place] [phone]. All rights reserved. This information is not intended as a substitute for professional medical care. Always follow your healthcare professional's instructions.

## 2021-06-12 ENCOUNTER — NURSE TRIAGE (OUTPATIENT)
Dept: NURSING | Facility: CLINIC | Age: 71
End: 2021-06-12

## 2021-06-12 ENCOUNTER — ANCILLARY PROCEDURE (OUTPATIENT)
Dept: GENERAL RADIOLOGY | Facility: CLINIC | Age: 71
End: 2021-06-12
Attending: NURSE PRACTITIONER
Payer: MEDICARE

## 2021-06-12 ENCOUNTER — OFFICE VISIT (OUTPATIENT)
Dept: URGENT CARE | Facility: URGENT CARE | Age: 71
End: 2021-06-12
Payer: MEDICARE

## 2021-06-12 VITALS
WEIGHT: 177 LBS | DIASTOLIC BLOOD PRESSURE: 68 MMHG | HEART RATE: 78 BPM | SYSTOLIC BLOOD PRESSURE: 129 MMHG | TEMPERATURE: 98.2 F | OXYGEN SATURATION: 97 % | BODY MASS INDEX: 29.45 KG/M2 | RESPIRATION RATE: 20 BRPM

## 2021-06-12 DIAGNOSIS — R30.0 DYSURIA: ICD-10-CM

## 2021-06-12 DIAGNOSIS — D69.8 OTHER SPECIFIED HEMORRHAGIC CONDITIONS (H): ICD-10-CM

## 2021-06-12 DIAGNOSIS — R23.3 EASY BRUISING: ICD-10-CM

## 2021-06-12 DIAGNOSIS — M19.031 OSTEOARTHRITIS OF RIGHT WRIST, UNSPECIFIED OSTEOARTHRITIS TYPE: ICD-10-CM

## 2021-06-12 DIAGNOSIS — R82.90 BAD ODOR OF URINE: ICD-10-CM

## 2021-06-12 DIAGNOSIS — M25.531 RIGHT WRIST PAIN: Primary | ICD-10-CM

## 2021-06-12 LAB
ALBUMIN UR-MCNC: NEGATIVE MG/DL
APPEARANCE UR: CLEAR
APTT PPP: 28 SEC (ref 22–37)
BACTERIA #/AREA URNS HPF: ABNORMAL /HPF
BASOPHILS # BLD AUTO: 0 10E9/L (ref 0–0.2)
BASOPHILS NFR BLD AUTO: 0.7 %
BILIRUB UR QL STRIP: NEGATIVE
COLOR UR AUTO: YELLOW
DIFFERENTIAL METHOD BLD: NORMAL
EOSINOPHIL # BLD AUTO: 0.3 10E9/L (ref 0–0.7)
EOSINOPHIL NFR BLD AUTO: 4.5 %
ERYTHROCYTE [DISTWIDTH] IN BLOOD BY AUTOMATED COUNT: 12.8 % (ref 10–15)
GLUCOSE UR STRIP-MCNC: NEGATIVE MG/DL
HCT VFR BLD AUTO: 42.4 % (ref 35–47)
HGB BLD-MCNC: 14.8 G/DL (ref 11.7–15.7)
HGB UR QL STRIP: NEGATIVE
KETONES UR STRIP-MCNC: NEGATIVE MG/DL
LEUKOCYTE ESTERASE UR QL STRIP: ABNORMAL
LYMPHOCYTES # BLD AUTO: 1.3 10E9/L (ref 0.8–5.3)
LYMPHOCYTES NFR BLD AUTO: 22.8 %
MCH RBC QN AUTO: 30 PG (ref 26.5–33)
MCHC RBC AUTO-ENTMCNC: 34.9 G/DL (ref 31.5–36.5)
MCV RBC AUTO: 86 FL (ref 78–100)
MONOCYTES # BLD AUTO: 0.4 10E9/L (ref 0–1.3)
MONOCYTES NFR BLD AUTO: 6.8 %
NEUTROPHILS # BLD AUTO: 3.6 10E9/L (ref 1.6–8.3)
NEUTROPHILS NFR BLD AUTO: 65.2 %
NITRATE UR QL: NEGATIVE
PH UR STRIP: 6 PH (ref 5–7)
PLATELET # BLD AUTO: 177 10E9/L (ref 150–450)
RBC # BLD AUTO: 4.94 10E12/L (ref 3.8–5.2)
RBC #/AREA URNS AUTO: ABNORMAL /HPF
SOURCE: ABNORMAL
SP GR UR STRIP: 1.01 (ref 1–1.03)
UROBILINOGEN UR STRIP-ACNC: 0.2 EU/DL (ref 0.2–1)
WBC # BLD AUTO: 5.6 10E9/L (ref 4–11)
WBC #/AREA URNS AUTO: ABNORMAL /HPF

## 2021-06-12 PROCEDURE — 99214 OFFICE O/P EST MOD 30 MIN: CPT | Performed by: NURSE PRACTITIONER

## 2021-06-12 PROCEDURE — 87086 URINE CULTURE/COLONY COUNT: CPT | Performed by: NURSE PRACTITIONER

## 2021-06-12 PROCEDURE — 81001 URINALYSIS AUTO W/SCOPE: CPT | Performed by: NURSE PRACTITIONER

## 2021-06-12 PROCEDURE — 73110 X-RAY EXAM OF WRIST: CPT | Mod: RT | Performed by: RADIOLOGY

## 2021-06-12 PROCEDURE — 85730 THROMBOPLASTIN TIME PARTIAL: CPT | Performed by: NURSE PRACTITIONER

## 2021-06-12 PROCEDURE — 85025 COMPLETE CBC W/AUTO DIFF WBC: CPT | Performed by: NURSE PRACTITIONER

## 2021-06-12 PROCEDURE — 36415 COLL VENOUS BLD VENIPUNCTURE: CPT | Performed by: NURSE PRACTITIONER

## 2021-06-12 RX ORDER — NITROFURANTOIN 25; 75 MG/1; MG/1
100 CAPSULE ORAL 2 TIMES DAILY
Qty: 10 CAPSULE | Refills: 0 | Status: SHIPPED | OUTPATIENT
Start: 2021-06-12 | End: 2021-06-17

## 2021-06-12 ASSESSMENT — ENCOUNTER SYMPTOMS
FEVER: 0
SHORTNESS OF BREATH: 0
COUGH: 0
NAUSEA: 0
BRUISES/BLEEDS EASILY: 1
SORE THROAT: 0
HEADACHES: 0
VOMITING: 0
CHILLS: 0
DIARRHEA: 0
RHINORRHEA: 0

## 2021-06-12 NOTE — PROGRESS NOTES
SUBJECTIVE:   Gem Gama is a 70 year old female presenting with a chief complaint of   Chief Complaint   Patient presents with     Bleeding/Bruising     Bruising on left lower leg and upper leg, noticed this morning       She is an established patient of [facility].    Bruising     Onset of symptoms was this morning.  Course of illness is same.    Severity moderate  Current and Associated symptoms: bruising easily, has noted a bruise on top of left foot, noted 2 on the right thigh last week  Treatment measures tried include None tried.  Predisposing factors include None.    Urine has odor  Duration : 2 days  Current and associated symptoms: urine odor  Treatment measures tried: None  Predisposing factors: non    Right wrist pain  Duration : wrist pain injury 7  days ago. No injury or trauma encountered, suspect a sharp bone inside  Treatment measures tried include None tried.   Prior history of related problems: no prior problems with this area in the past.  Fell and injured right wrist more than 10 years ago.         Review of Systems   Constitutional: Negative for chills and fever.   HENT: Negative for congestion, ear pain, rhinorrhea and sore throat.    Respiratory: Negative for cough and shortness of breath.    Gastrointestinal: Negative for diarrhea, nausea and vomiting.   Genitourinary:        Odor in urine   Musculoskeletal:        Right wrist pain   Neurological: Negative for headaches.   Hematological: Bruises/bleeds easily.   All other systems reviewed and are negative.      Past Medical History:   Diagnosis Date     ADHD (attention deficit hyperactivity disorder)      Anxiety      Arthritis     back, hands, knees, hips     Asthma     controlled--has coughing symptoms     C. difficile diarrhea      Central sleep apnea      Coronary artery disease involving native coronary artery of native heart without angina pectoris 10/25/2019     Depression      Gastro-oesophageal reflux disease      Hypertension       Ischemic cardiomyopathy 10/25/2019     Narcolepsy      Renal disease     gfr is aroung 48     Sleep apnea     uses Cpap     Family History   Problem Relation Age of Onset     Hypertension Mother      Arthritis Mother      Cerebrovascular Disease Father         Three Strokes     Diabetes Father         and brother     Thyroid Disease Sister         Hypothyroid     Multiple Sclerosis Daughter      Sjogren's Daughter      Current Outpatient Medications   Medication Sig Dispense Refill     amoxicillin (AMOXIL) 500 MG capsule Take as directed before dental work       aspirin (ASA) 81 MG EC tablet Take 1 tablet (81 mg) by mouth daily 90 tablet 0     atorvastatin (LIPITOR) 40 MG tablet Take 1 tablet (40 mg) by mouth daily 90 tablet 3     carvedilol (COREG) 3.125 MG tablet Take 1 tablet (3.125 mg) by mouth 2 times daily (with meals) 180 tablet 3     clopidogrel (PLAVIX) 75 MG tablet Take 1 tablet (75 mg) by mouth daily 90 tablet 3     Melatonin-Pyridoxine (MELATIN PO) Take 1 tablet by mouth nightly as needed (sleep)        nitroGLYcerin (NITROSTAT) 0.4 MG sublingual tablet Place 1 tablet (0.4 mg) under the tongue every 5 minutes as needed for chest pain 10 tablet 0     Prenatal Multivit-Min-Fe-FA (PRENATAL/IRON) TABS        sertraline (ZOLOFT) 100 MG tablet Take 1 tablet (100 mg) by mouth daily 90 tablet 3     ursodiol (ACTIGALL) 300 MG capsule Take 1 capsule (300 mg) by mouth 2 times daily 60 capsule 11     valsartan (DIOVAN) 40 MG tablet Take 1 tablet (40 mg) by mouth daily 30 tablet 11     vitamin D3 (CHOLECALCIFEROL) 2000 units (50 mcg) tablet Take 1 tablet (2,000 Units) by mouth daily 90 tablet 3     Social History     Tobacco Use     Smoking status: Never Smoker     Smokeless tobacco: Never Used   Substance Use Topics     Alcohol use: No       OBJECTIVE  /68 (BP Location: Left arm, Patient Position: Sitting, Cuff Size: Adult Large)   Pulse 78   Temp 98.2  F (36.8  C) (Tympanic)   Resp 20   Wt 80.3 kg  (177 lb)   LMP  (LMP Unknown)   SpO2 97%   BMI 29.45 kg/m      Physical Exam  Vitals signs and nursing note reviewed.   Constitutional:       General: She is not in acute distress.     Appearance: She is well-developed. She is not diaphoretic.   HENT:      Head: Normocephalic and atraumatic.      Right Ear: Tympanic membrane and external ear normal.      Left Ear: Tympanic membrane and external ear normal.   Eyes:      Pupils: Pupils are equal, round, and reactive to light.   Neck:      Musculoskeletal: Normal range of motion and neck supple.   Pulmonary:      Effort: Pulmonary effort is normal. No respiratory distress.      Breath sounds: Normal breath sounds.   Musculoskeletal:      Comments: Right wrist exam: normal exam, no swelling, tenderness, instability; ligaments intact, FROM all hand, wrist, finger joints.   Lymphadenopathy:      Cervical: No cervical adenopathy.   Skin:     General: Skin is warm and dry.      Findings: Bruising present.      Comments: A 4 mm in diameter bruise on the right dorsum of left foot  Bruises on right thigh have changed color to blue/black consistent with resolution   Neurological:      Mental Status: She is alert.      Cranial Nerves: No cranial nerve deficit.         Labs:  Results for orders placed or performed in visit on 06/12/21 (from the past 24 hour(s))   CBC with platelets and differential   Result Value Ref Range    WBC 5.6 4.0 - 11.0 10e9/L    RBC Count 4.94 3.8 - 5.2 10e12/L    Hemoglobin 14.8 11.7 - 15.7 g/dL    Hematocrit 42.4 35.0 - 47.0 %    MCV 86 78 - 100 fl    MCH 30.0 26.5 - 33.0 pg    MCHC 34.9 31.5 - 36.5 g/dL    RDW 12.8 10.0 - 15.0 %    Platelet Count 177 150 - 450 10e9/L    % Neutrophils 65.2 %    % Lymphocytes 22.8 %    % Monocytes 6.8 %    % Eosinophils 4.5 %    % Basophils 0.7 %    Absolute Neutrophil 3.6 1.6 - 8.3 10e9/L    Absolute Lymphocytes 1.3 0.8 - 5.3 10e9/L    Absolute Monocytes 0.4 0.0 - 1.3 10e9/L    Absolute Eosinophils 0.3 0.0 - 0.7  10e9/L    Absolute Basophils 0.0 0.0 - 0.2 10e9/L    Diff Method Automated Method    XR Wrist Right G/E 3 Views    Narrative    XR WRIST RT G/E 3 VW 6/12/2021 12:36 PM    HISTORY: right wrist pain; Right wrist pain    COMPARISON: None.    FINDINGS: No fracture. Moderate ulnar positive variance. Advanced  degenerative changes at the first CMC joint.    JOSE DAS MD   *UA reflex to Microscopic and Culture ([facility] and [facility] (except [place] and [place])    Specimen: Midstream Urine   Result Value Ref Range    Color Urine Yellow     Appearance Urine Clear     Glucose Urine Negative NEG^Negative mg/dL    Bilirubin Urine Negative NEG^Negative    Ketones Urine Negative NEG^Negative mg/dL    Specific Gravity Urine 1.010 1.003 - 1.035    Blood Urine Negative NEG^Negative    pH Urine 6.0 5.0 - 7.0 pH    Protein Albumin Urine Negative NEG^Negative mg/dL    Urobilinogen Urine 0.2 0.2 - 1.0 EU/dL    Nitrite Urine Negative NEG^Negative    Leukocyte Esterase Urine Small (A) NEG^Negative    Source Midstream Urine        X-Ray was done, my findings are: negative for fractures or dislocation    ASSESSMENT:      ICD-10-CM    1. Right wrist pain  M25.531 XR Wrist Right G/E 3 Views     Urine Microscopic   2. Bad odor of urine  R82.90 *UA reflex to Microscopic and Culture ([place] [facility] (except [place] and [place])   3. Easy bruising  R23.8 CBC with platelets and differential     Partial thromboplastin time   4. Other specified hemorrhagic conditions (H)   D69.8 Partial thromboplastin time   5. Osteoarthritis of right wrist, unspecified osteoarthritis type  M19.031           PLAN:  I discussed lab and xray results with the patient.  Gem Gama will follow up with PCP tod discuss rest ofthe results.   Antibiotics as prescribed.    Will do a urine culture  Patient educational/instructional material provided including reasons for follow-up    The patient indicates understanding of these issues  and agrees with the plan.

## 2021-06-12 NOTE — PATIENT INSTRUCTIONS
Patient Education     Dysuria  Dysuria is when you have pain during urination. It is often described as a burning feeling. Learn more about this problem and how it can be treated.     Painful urination (dysuria) is often caused by a problem in the urinary tract.   What causes dysuria?  Possible causes include:    Infection with a bacteria or virus such as a urinary tract infection (UTI) or a sexually transmitted infection (STI)    Sensitivity or allergy to chemicals such as those found in lotions and other products    Prostate or bladder problems    Radiation therapy to the pelvic area  How is dysuria diagnosed?  Your healthcare provider will examine you. He or she will ask about your symptoms and health. After talking with you and doing a physical exam, your healthcare provider may know what is causing your dysuria. You will often have to give a urine sample. Tests of your urine (urinalysis) are done. A urinalysis may include:    Looking at the urine sample (visual exam)    Checking for substances (chemical exam)    Looking at a small amount of the urine under a microscope (microscopic exam)  Some parts of the urinalysis may be done in the provider's office and some in a lab. The urine sample may also be checked for bacteria and yeast (urine culture). Your healthcare provider will tell you more about these tests if they are needed.  How is dysuria treated?  Treatment depends on the cause. If you have a bacterial infection, you may need antibiotics. You may be given medicines to make it easier for you to urinate and help ease pain. Your healthcare provider can tell you more about your treatment options. If not treated, symptoms may get worse.  When to call your healthcare provider  Call the healthcare provider right away if you have any of the following:    Fever of  100.4  F ( 38 C) or higher, or as directed by your provider    No improvement after 3 days of treatment    Trouble urinating because of pain    New  or increased discharge from the vagina or penis    Rash or joint pain    Increased back or belly pain    Enlarged painful lymph nodes (lumps) in the groin  [employer] last reviewed this educational content on 4/1/2019 2000-2021 [employer]. All rights reserved. This information is not intended as a substitute for professional medical care. Always follow your healthcare professional's instructions.           Patient Education     What Is Arthritis?  Arthritis is a disease that affects the joints. Joints are the parts where bones meet and move. It can affect any joint in your body. There are more than 100 types of arthritis. They include:      Osteoarthritis    Rheumatoid arthritis    Gout    Lupus  If your symptoms are mild, medicines may be enough to ease pain and swelling. For more severe arthritis, you may need surgery. This can improve the condition of the joint. Or it can replace part or all of the joint.       What causes arthritis?  Cartilage is a smooth substance that protects the ends of your bones and provides cushioning. When you have arthritis, this cartilage breaks down and can no longer protect your bones. This can happen from an autoimmune disease or it can happen from wear and tear, infections, or trauma. The bones rub against each other, causing pain and swelling. Over time, small pieces of rough or splintered bone (bone spurs) may develop. The joint's range of motion can become limited.   Symptoms  Some of the more common symptoms of arthritis include:     Joint pain and stiffness. These symptoms get worse with long periods of rest. They may also get worse from using a joint too long or too hard.    Joints that have lost normal shape and motion. They may look swollen and be hard to move.    Sore, inflamed joints. They may look red and feel warm.    Grinding or popping noise. This happens with joint movement.    Tiredness (fatigue). You may feel tired all the time.  Reducing  symptoms  You can help ease symptoms in these ways:    Losing weight    Exercising    Strengthening muscles around the joint to reduce the strain on the joint    Using hot and cold packs on your joints    Using over-the-counter and prescription medicines  Talk with your healthcare provider about the best treatments for your condition.   [employer] last reviewed this educational content on 7/1/2019 2000-2021 [employer]. All rights reserved. This information is not intended as a substitute for professional medical care. Always follow your healthcare professional's instructions.           Patient Education     What Is Arthritis?  Arthritis is a disease that affects the joints. Joints are the parts where bones meet and move. It can affect any joint in your body. There are more than 100 types of arthritis. They include:      Osteoarthritis    Rheumatoid arthritis    Gout    Lupus  If your symptoms are mild, medicines may be enough to ease pain and swelling. For more severe arthritis, you may need surgery. This can improve the condition of the joint. Or it can replace part or all of the joint.       What causes arthritis?  Cartilage is a smooth substance that protects the ends of your bones and provides cushioning. When you have arthritis, this cartilage breaks down and can no longer protect your bones. This can happen from an autoimmune disease or it can happen from wear and tear, infections, or trauma. The bones rub against each other, causing pain and swelling. Over time, small pieces of rough or splintered bone (bone spurs) may develop. The joint's range of motion can become limited.   Symptoms  Some of the more common symptoms of arthritis include:     Joint pain and stiffness. These symptoms get worse with long periods of rest. They may also get worse from using a joint too long or too hard.    Joints that have lost normal shape and motion. They may look swollen and be hard to move.    Sore, inflamed  joints. They may look red and feel warm.    Grinding or popping noise. This happens with joint movement.    Tiredness (fatigue). You may feel tired all the time.  Reducing symptoms  You can help ease symptoms in these ways:    Losing weight    Exercising    Strengthening muscles around the joint to reduce the strain on the joint    Using hot and cold packs on your joints    Using over-the-counter and prescription medicines  Talk with your healthcare provider about the best treatments for your condition.   [employer] last reviewed this educational content on 7/1/2019 2000-2021 [employer]. All rights reserved. This information is not intended as a substitute for professional medical care. Always follow your healthcare professional's instructions.

## 2021-06-12 NOTE — TELEPHONE ENCOUNTER
She previously had an heart attack. MDs asked her if she had bruising, she now sees three big brown bruises on her left leg. Noticed it several days ago on thigh. Last night, didn't notice anything on foot and now foot has a brown spot on left foot. She said she bleeds from her gums when she brushes her teeth. She will go to urgent care today.  Maria Guadalupe Zuniga RN  [facility]      Additional Information    Negative: Shock suspected (e.g., cold/pale/clammy skin, too weak to stand, low BP, rapid pulse)    Negative: Sounds like a life-threatening emergency to the triager    Negative: Bruises with fever    Negative: Tiny bruises (spots or dots) of unknown cause    Negative: Bruise(s) of forehead or head    Negative: Bruise(s) of face or jaw    Negative: Followed an injury, and triager doesn't know which injury guideline to use first    Negative: Post-operative bruising    Negative: Dizziness or lightheadedness    Negative: [1] Bruise on head/face, chest, or abdomen AND [2]  taking Coumadin (warfarin) or other strong blood thinner, or known bleeding disorder (e.g., thrombocytopenia)    Negative: Unexplained bleeding from another site (e.g., gums, nose, urine) as well    Negative: Patient sounds very sick or weak to the triager    Negative: [1] Not caused by an injury AND [2] 5 or more bruises now    [1] Raised bruise AND [2] size > 2 inches (5 cm) AND [3] getting bigger    Protocols used: BRUISES-A-AH

## 2021-06-13 LAB
BACTERIA SPEC CULT: NO GROWTH
Lab: NORMAL
SPECIMEN SOURCE: NORMAL

## 2021-09-05 ENCOUNTER — HEALTH MAINTENANCE LETTER (OUTPATIENT)
Age: 71
End: 2021-09-05

## 2021-09-23 ENCOUNTER — APPOINTMENT (OUTPATIENT)
Dept: GENERAL RADIOLOGY | Facility: CLINIC | Age: 71
DRG: 690 | End: 2021-09-23
Attending: EMERGENCY MEDICINE
Payer: MEDICARE

## 2021-09-23 ENCOUNTER — APPOINTMENT (OUTPATIENT)
Dept: CT IMAGING | Facility: CLINIC | Age: 71
DRG: 690 | End: 2021-09-23
Attending: EMERGENCY MEDICINE
Payer: MEDICARE

## 2021-09-23 ENCOUNTER — HOSPITAL ENCOUNTER (INPATIENT)
Facility: CLINIC | Age: 71
LOS: 2 days | Discharge: HOME | DRG: 690 | End: 2021-09-26
Attending: EMERGENCY MEDICINE | Admitting: INTERNAL MEDICINE
Payer: MEDICARE

## 2021-09-23 DIAGNOSIS — Z29.9 PREVENTIVE MEASURE: ICD-10-CM

## 2021-09-23 DIAGNOSIS — R31.9 URINARY TRACT INFECTION WITH HEMATURIA, SITE UNSPECIFIED: ICD-10-CM

## 2021-09-23 DIAGNOSIS — A49.8 CITROBACTER INFECTION: Primary | ICD-10-CM

## 2021-09-23 DIAGNOSIS — R07.9 ACUTE CHEST PAIN: ICD-10-CM

## 2021-09-23 DIAGNOSIS — R50.9 FEVER AND CHILLS: ICD-10-CM

## 2021-09-23 DIAGNOSIS — B96.20 E. COLI UTI: ICD-10-CM

## 2021-09-23 DIAGNOSIS — B37.31 CANDIDIASIS OF VAGINA: ICD-10-CM

## 2021-09-23 DIAGNOSIS — N39.0 URINARY TRACT INFECTION WITH HEMATURIA, SITE UNSPECIFIED: ICD-10-CM

## 2021-09-23 DIAGNOSIS — N39.0 E. COLI UTI: ICD-10-CM

## 2021-09-23 DIAGNOSIS — R51.9 ACUTE NONINTRACTABLE HEADACHE, UNSPECIFIED HEADACHE TYPE: ICD-10-CM

## 2021-09-23 LAB
ALBUMIN SERPL-MCNC: 3.4 G/DL (ref 3.4–5)
ALBUMIN UR-MCNC: NEGATIVE MG/DL
ALP SERPL-CCNC: 93 U/L (ref 40–150)
ALT SERPL W P-5'-P-CCNC: 25 U/L (ref 0–50)
ANION GAP SERPL CALCULATED.3IONS-SCNC: 6 MMOL/L (ref 3–14)
APPEARANCE UR: CLEAR
AST SERPL W P-5'-P-CCNC: 23 U/L (ref 0–45)
ATRIAL RATE - MUSE: 73 BPM
BACTERIA #/AREA URNS HPF: ABNORMAL /HPF
BASOPHILS # BLD AUTO: 0 10E3/UL (ref 0–0.2)
BASOPHILS NFR BLD AUTO: 0 %
BILIRUB SERPL-MCNC: 0.9 MG/DL (ref 0.2–1.3)
BILIRUB UR QL STRIP: NEGATIVE
BUN SERPL-MCNC: 15 MG/DL (ref 7–30)
CALCIUM SERPL-MCNC: 8.1 MG/DL (ref 8.5–10.1)
CHLORIDE BLD-SCNC: 106 MMOL/L (ref 94–109)
CO2 SERPL-SCNC: 25 MMOL/L (ref 20–32)
COLOR UR AUTO: ABNORMAL
CREAT SERPL-MCNC: 1.06 MG/DL (ref 0.52–1.04)
DIASTOLIC BLOOD PRESSURE - MUSE: NORMAL MMHG
EOSINOPHIL # BLD AUTO: 0.1 10E3/UL (ref 0–0.7)
EOSINOPHIL NFR BLD AUTO: 1 %
ERYTHROCYTE [DISTWIDTH] IN BLOOD BY AUTOMATED COUNT: 12.4 % (ref 10–15)
GFR SERPL CREATININE-BSD FRML MDRD: 53 ML/MIN/1.73M2
GLUCOSE BLD-MCNC: 97 MG/DL (ref 70–99)
GLUCOSE UR STRIP-MCNC: NEGATIVE MG/DL
HCT VFR BLD AUTO: 37.6 % (ref 35–47)
HGB BLD-MCNC: 13.1 G/DL (ref 11.7–15.7)
HGB UR QL STRIP: ABNORMAL
HOLD SPECIMEN: NORMAL
IMM GRANULOCYTES # BLD: 0 10E3/UL
IMM GRANULOCYTES NFR BLD: 0 %
INTERPRETATION ECG - MUSE: NORMAL
KETONES UR STRIP-MCNC: NEGATIVE MG/DL
LACTATE SERPL-SCNC: 0.8 MMOL/L (ref 0.7–2)
LEUKOCYTE ESTERASE UR QL STRIP: ABNORMAL
LIPASE SERPL-CCNC: 107 U/L (ref 73–393)
LYMPHOCYTES # BLD AUTO: 0.7 10E3/UL (ref 0.8–5.3)
LYMPHOCYTES NFR BLD AUTO: 8 %
MCH RBC QN AUTO: 30.1 PG (ref 26.5–33)
MCHC RBC AUTO-ENTMCNC: 34.8 G/DL (ref 31.5–36.5)
MCV RBC AUTO: 86 FL (ref 78–100)
MONOCYTES # BLD AUTO: 0.7 10E3/UL (ref 0–1.3)
MONOCYTES NFR BLD AUTO: 8 %
MUCOUS THREADS #/AREA URNS LPF: PRESENT /LPF
NEUTROPHILS # BLD AUTO: 7.1 10E3/UL (ref 1.6–8.3)
NEUTROPHILS NFR BLD AUTO: 83 %
NITRATE UR QL: POSITIVE
NRBC # BLD AUTO: 0 10E3/UL
NRBC BLD AUTO-RTO: 0 /100
NT-PROBNP SERPL-MCNC: 724 PG/ML (ref 0–900)
P AXIS - MUSE: 45 DEGREES
PH UR STRIP: 5 [PH] (ref 5–7)
PLATELET # BLD AUTO: 133 10E3/UL (ref 150–450)
POTASSIUM BLD-SCNC: 3.9 MMOL/L (ref 3.4–5.3)
PR INTERVAL - MUSE: 138 MS
PROT SERPL-MCNC: 7.3 G/DL (ref 6.8–8.8)
QRS DURATION - MUSE: 80 MS
QT - MUSE: 382 MS
QTC - MUSE: 420 MS
R AXIS - MUSE: -22 DEGREES
RBC # BLD AUTO: 4.35 10E6/UL (ref 3.8–5.2)
RBC URINE: 2 /HPF
SARS-COV-2 RNA RESP QL NAA+PROBE: NEGATIVE
SODIUM SERPL-SCNC: 137 MMOL/L (ref 133–144)
SP GR UR STRIP: 1.01 (ref 1–1.03)
SQUAMOUS EPITHELIAL: <1 /HPF
SYSTOLIC BLOOD PRESSURE - MUSE: NORMAL MMHG
T AXIS - MUSE: 50 DEGREES
TROPONIN I SERPL-MCNC: <0.015 UG/L (ref 0–0.04)
TROPONIN I SERPL-MCNC: <0.015 UG/L (ref 0–0.04)
UROBILINOGEN UR STRIP-MCNC: NORMAL MG/DL
VENTRICULAR RATE- MUSE: 73 BPM
WBC # BLD AUTO: 8.6 10E3/UL (ref 4–11)
WBC CLUMPS #/AREA URNS HPF: PRESENT /HPF
WBC URINE: 56 /HPF

## 2021-09-23 PROCEDURE — 87086 URINE CULTURE/COLONY COUNT: CPT | Performed by: EMERGENCY MEDICINE

## 2021-09-23 PROCEDURE — 83690 ASSAY OF LIPASE: CPT | Performed by: EMERGENCY MEDICINE

## 2021-09-23 PROCEDURE — 87635 SARS-COV-2 COVID-19 AMP PRB: CPT | Performed by: EMERGENCY MEDICINE

## 2021-09-23 PROCEDURE — 99285 EMERGENCY DEPT VISIT HI MDM: CPT | Mod: 25

## 2021-09-23 PROCEDURE — 85025 COMPLETE CBC W/AUTO DIFF WBC: CPT | Performed by: EMERGENCY MEDICINE

## 2021-09-23 PROCEDURE — C9803 HOPD COVID-19 SPEC COLLECT: HCPCS

## 2021-09-23 PROCEDURE — 70450 CT HEAD/BRAIN W/O DYE: CPT | Mod: MG

## 2021-09-23 PROCEDURE — 83880 ASSAY OF NATRIURETIC PEPTIDE: CPT | Performed by: EMERGENCY MEDICINE

## 2021-09-23 PROCEDURE — 96361 HYDRATE IV INFUSION ADD-ON: CPT

## 2021-09-23 PROCEDURE — 250N000011 HC RX IP 250 OP 636: Performed by: EMERGENCY MEDICINE

## 2021-09-23 PROCEDURE — 84484 ASSAY OF TROPONIN QUANT: CPT | Mod: 91 | Performed by: EMERGENCY MEDICINE

## 2021-09-23 PROCEDURE — 258N000003 HC RX IP 258 OP 636: Performed by: EMERGENCY MEDICINE

## 2021-09-23 PROCEDURE — 36415 COLL VENOUS BLD VENIPUNCTURE: CPT | Performed by: EMERGENCY MEDICINE

## 2021-09-23 PROCEDURE — 96366 THER/PROPH/DIAG IV INF ADDON: CPT

## 2021-09-23 PROCEDURE — 87040 BLOOD CULTURE FOR BACTERIA: CPT | Performed by: EMERGENCY MEDICINE

## 2021-09-23 PROCEDURE — 82040 ASSAY OF SERUM ALBUMIN: CPT | Performed by: EMERGENCY MEDICINE

## 2021-09-23 PROCEDURE — 71045 X-RAY EXAM CHEST 1 VIEW: CPT

## 2021-09-23 PROCEDURE — 250N000013 HC RX MED GY IP 250 OP 250 PS 637: Performed by: EMERGENCY MEDICINE

## 2021-09-23 PROCEDURE — 96365 THER/PROPH/DIAG IV INF INIT: CPT

## 2021-09-23 PROCEDURE — 84484 ASSAY OF TROPONIN QUANT: CPT | Performed by: EMERGENCY MEDICINE

## 2021-09-23 PROCEDURE — 81001 URINALYSIS AUTO W/SCOPE: CPT | Performed by: EMERGENCY MEDICINE

## 2021-09-23 PROCEDURE — 83605 ASSAY OF LACTIC ACID: CPT | Performed by: EMERGENCY MEDICINE

## 2021-09-23 PROCEDURE — 93005 ELECTROCARDIOGRAM TRACING: CPT

## 2021-09-23 RX ORDER — CEFTRIAXONE 2 G/1
2 INJECTION, POWDER, FOR SOLUTION INTRAMUSCULAR; INTRAVENOUS ONCE
Status: COMPLETED | OUTPATIENT
Start: 2021-09-23 | End: 2021-09-23

## 2021-09-23 RX ORDER — ACETAMINOPHEN 325 MG/1
650 TABLET ORAL ONCE
Status: COMPLETED | OUTPATIENT
Start: 2021-09-23 | End: 2021-09-23

## 2021-09-23 RX ADMIN — CEFTRIAXONE SODIUM 2 G: 2 INJECTION, POWDER, FOR SOLUTION INTRAMUSCULAR; INTRAVENOUS at 20:35

## 2021-09-23 RX ADMIN — SODIUM CHLORIDE 1000 ML: 9 INJECTION, SOLUTION INTRAVENOUS at 22:40

## 2021-09-23 RX ADMIN — ACETAMINOPHEN 650 MG: 325 TABLET, FILM COATED ORAL at 20:03

## 2021-09-23 NOTE — ED PROVIDER NOTES
History   Chief Complaint:  Chest Pain and Headache     HPI   Gem Gama is a 70 year old female with history of STEMI, ischemic cardiomyopathy, chronic kidney disease who presents from home for concern of urinary burning for the past few days which is since developed into an intermittent headache with associated intermittent chest pressure that radiates to the jaw and shoulder.  No associated shortness of breath or palpitations.  No associated diaphoresis.  She does not believe it is exertional.  She states that she has a chronic cough and nothing new.  She states that she has had some worsening shortness of breath on exertion.  She is currently chest pain-free.  She was not aware that she had a fever.  She is fully vaccinated against Covid.  No known Covid exposures.    Review of Systems  As noted per HPI.  Remainder of a 10 point review of systems was negative.    Allergies:  Latex  Flagyl  Food  Hydrochlorothiazide   Sulfa Drugs  Adhesive Tape  Metoprolol  Metronidazole    Medications:  Amoxicillin  Lipitor  Coreg  Plavix  Melatin  Nitrostat  Zoloft  Actigall  Divan    Past Medical History:    ADHD  Anxiety  Arthritis  Asthma  C. diff  CAD  Depression  Central sleep apnea  GERD  Hypertension  Ischemia cardiomyopathy  Narcolepsy  CKD  Calculus of gallbladder without obstruction  Congential ureteric stenosis  TMJ  Chronic rhinitis  Lacrimal duct stenosis  Pituitary microadenoma  Osteoarthritis, right knee  Vitamin D deficiency  Overactive bladder  STEMI  Ischemic cardiomyopathy    Past Surgical History:    Rectocele repair  Total hip replacement, bilateral  Kidney surgery x3  CV PCI stent drug eluting  Left heart cath  Intra aortic balloon removal  Intra aortic balloon  Total knee arthroplasty, left  Total abdominal hysterectomy, bladder repair, combined     Family History:    Mother: Hypertension, Arthritis  Father: Stroke x3, Diabetes  Brother: Diabetes  Sister: Hypothyroid  Daughter: Sjogren's, Multiple  Sclerosis    Social History:  The patient was accompanied to the ED by EMS.    Physical Exam     Patient Vitals for the past 24 hrs:   BP Temp Temp src Pulse Resp SpO2 Weight   09/23/21 2330 96/55 -- -- 82 11 95 % --   09/23/21 2300 114/53 -- -- -- -- -- --   09/23/21 2255 -- 99.7  F (37.6  C) Oral -- -- -- --   09/23/21 2200 119/54 -- -- 86 21 96 % --   09/23/21 2130 112/60 -- -- 79 10 94 % --   09/23/21 2115 -- -- -- 74 16 94 % --   09/23/21 2100 126/62 -- -- 70 13 95 % --   09/23/21 2030 116/53 -- -- 69 8 95 % --   09/23/21 2000 110/56 -- -- 76 15 -- --   09/23/21 1930 120/64 -- -- 74 16 95 % --   09/23/21 1821 128/87 (!) 102.5  F (39.2  C) Oral 72 14 99 % 81.6 kg (180 lb)       Physical Exam  General: Well-nourished, warm to touch  Eyes: PERRL, conjunctivae pink no scleral icterus or conjunctival injection.  No photophobia  ENT:  Moist mucus membranes, posterior oropharynx clear without erythema or exudates  Respiratory:  Lungs clear to auscultation bilaterally, no crackles/rubs/wheezes.  Good air movement  CV: Normal rate and rhythm, no murmurs/rubs/gallops  GI:  Abdomen soft and non-distended.  Normoactive BS.  No tenderness, guarding or rebound  Skin: Warm, dry.  No rashes or petechiae  Musculoskeletal: No peripheral edema or calf tenderness  Neuro: Alert and oriented to person/place/time.  Neck supple.  PERRL, EOMI no nystagmus, no aphasia/facial droop/dysarthria, tongue midline, symmetric palatal elevation, normal strength at SCM/trapezius/BUE/BLE, normal coordination to FNF at BUE, normal casual gait, negative romberg, sensation intact to LT over face/BUE/BLE  Psychiatric: Normal affect    Emergency Department Course   ECG:  ECG taken at 1834, ECG read at 1839  Sinus rhythm with prematura atrial complexes with aberrant conduction  Cannot rule out Anteroseptal infarct, age undetermined  Abnormal ECG  Rate 73 bpm. DC interval 138 ms. QRS duration 80 ms. QT/QTc 382/420 ms. P-R-T axes 42 -22  50.    Imaging:  Head CT w/o contrast  1.  No acute intracranial process.  Reading per radiology    XR Chest Port 1 View  Negative chest.  Reading per radiology    Laboratory:  UA with Microscopic: Blood Trace (A), Nitrite Positive (A), Leukocyte Esterase Large (A), WBC 56 (H), WBC Clumps Present (A), Bacteria Few (A), Mucous Urine Present (A) o/w Negative    CBC: WBC 8.6, HGB 13.1,  (L)  CMP: GFR 8.1 (L), Calcium 53 (L) o/w WNL (Creatinine 1.06 (H))    Troponin (Collected 1832): <0.015  Troponin (Collected 2241): <0.015    Lactic acid (Collected 1832): 0.8    Lipase: 107    Nt probnp inpatient: 724    Symptomatic COVID-19 Virus (Coronavirus) by PCR Nasopharyngeal swab: Negative    Blood Cultures x2: Pending    Urine Culture Aerobic Bacterial: Pending    Emergency Department Course:  Reviewed:  I reviewed nursing notes, vitals, past medical history and care everywhere    Assessments:  1826 I obtained history and examined the patient as noted above.     2334 I rechecked and updated the patient regarding the imaging results, laboratory results and the plan for care.    Consults:    I spoke with the hospitalist, Dr. Luong, regarding placement for the patient.    Interventions:  2003 Tylenol 650mg PO  2035 Rocephin 2g IV  2240 NS 1L IV Bolus    Disposition:  The patient was admitted to the hospital under the care of Dr. Luong.     Impression & Plan   Medical Decision Making:  Gem Gama is a 70 year old female who comes with several days of urinary tract infection symptoms and new chest pain with radiation to the jaw and the neck along with a headache.  She is noted to have a fever here.  Headache was gradual in onset.  I doubt subarachnoid hemorrhage.  She has no neck stiffness or photophobia.  I doubt meningitis.  Head CT was eventually obtained because she was somewhat dizzy with walking and fortunately this shows no signs of intracranial hemorrhage or other abnormality.  I do not believe she needs a  spinal tap at this time.  Her labs reveal a convincing urinary tract infection.  She does not appear to have severe sepsis with a normal lactic acid, however I do suspect that her symptom complex may be secondary to bacteremia.  Blood cultures are pending.  She was treated with IV fluids and IV Rocephin.  She was given Tylenol for her fever.  In terms of her chest pain, her EKG was nonischemic.  Her troponin was negative and a repeat troponin were negative as well.  I suspect this has more to do with the febrile UTI.  The patient ambulated and she had no focal neurologic deficits but she was very weak and stumbled.  Given her generalized weakness, fever and urinary tract infection along with her other symptoms we will admit her to the hospital for ongoing monitoring and treatment with parenteral antibiotics until she is improved and back to her baseline and safe for discharge to her home.    Diagnosis:    ICD-10-CM    1. Urinary tract infection with hematuria, site unspecified  N39.0     R31.9    2. Fever and chills  R50.9    3. Acute chest pain  R07.9    4. Acute nonintractable headache, unspecified headache type  R51.9        Scribe Disclosure:  TONYA, Roddy Dennis, am serving as a scribe at 6:24 PM on 9/23/2021 to document services personally performed by Veronika Ramirez MD based on my observations and the provider's statements to me.      Veronika Ramirez MD  09/24/21 0134

## 2021-09-23 NOTE — ED TRIAGE NOTES
Patient comes in via EMS from home.  Patient reports that she first began having urinary burning for several days. She then started to develop a headache and chest pain/shoulder and jaw pain several days ago that is intermittent.  She had a MI in 2019.  CHF history.  Denies any new cough but does endorse some worsening SOB on exertion.  EKG unremarkable for EMS.  Currently chest pain free.    Pt is 15y1m old  girl, identifies as bisexual, domiciled with mother and brother (19) and half brother (26) - dad lives [facility] (parents sep when pt was 1), attends [employer] 12th grade reg ed, PMH of migraine, reported hx of depression and anxiety, no prior suicidal attempts or hospitalizations, hx of multiple ER eval to [facility] 9/20/18 and 11/9/18, hx non suicidal self harm via cutting, sent by school after endorsing to school episode of suicidal ideations and self injurious behavior last night.    Patient is minimally engaging and guarded.  Patient was provided with collateral information and confirms that she went to the guidance office and spent the day there after telling guidance that she had thoughts of wanting to die and cut herself last night.  Patient reports that school was "OK" yesterday and denies acute stressors, but states that home is complicated and would not elaborate.  Patient reports that she has been taking medications inconsistently and as per collateral, had recent break up with college boyfriend and maternal grandmother is now living with them and was recently diagnosed with Alzheimer's.  Mother reports that patient has been taking on too much responsibility between school and her friends and does not get enough sleep and is probably feeling overwhelmed.      Patient denies depressive symptoms including depressed mood, anhedonia, changes in energy/concentration/appetite, sleep disturbances, or feelings of guilt. States that she does not sleep much or eat much, but denies weight changes.  Patient denies manic symptoms including elevated mood, increased irritability, mood lability, distractibility, grandiosity, pressured speech, increase in goal-directed activity, or decreased need for sleep. Patient denies any psychotic symptoms including paranoia, ideas of reference, thought insertion/broadcasting, or auditory/visual/olfactory/tactile/gustatory hallucinations. Patient reports anxiety secondary to being in the hospital.  She denies suicidal/homicidal ideations, intent or plans.  Denies urges to self harm.  States that she cut last night to her left arm with blade from a pencil sharpener, and then states but prior she cut a week ago and then prior to that months to years ago.       Future oriented, long term goals of going to college, wants to attend [employer] and study theater. Pt reports that she has missed taking her medications recently stating that she is just forgetful and "forgot." Currently on Zoloft 200mg, with some benefit per records pt. was previously on Prozac since 08/2018.     Please see  note for additional collateral information obtained by . No acute safety concerns by patient or parent

## 2021-09-23 NOTE — ED NOTES
Patient attempted to ambulate to the bathroom but became lightheaded upon standing.  Straight cath for urine done.  Tolerated procedure well

## 2021-09-23 NOTE — ED NOTES
Bed: ED07  Expected date:   Expected time:   Means of arrival:   Comments:  [place] 70 F chest pain eta 1800

## 2021-09-24 PROBLEM — R51.9 ACUTE NONINTRACTABLE HEADACHE, UNSPECIFIED HEADACHE TYPE: Status: ACTIVE | Noted: 2021-09-24

## 2021-09-24 PROBLEM — R07.9 ACUTE CHEST PAIN: Status: ACTIVE | Noted: 2021-09-24

## 2021-09-24 PROBLEM — N39.0 URINARY TRACT INFECTION WITH HEMATURIA, SITE UNSPECIFIED: Status: ACTIVE | Noted: 2021-09-24

## 2021-09-24 PROBLEM — R50.9 FEVER AND CHILLS: Status: ACTIVE | Noted: 2021-09-24

## 2021-09-24 PROBLEM — R31.9 URINARY TRACT INFECTION WITH HEMATURIA, SITE UNSPECIFIED: Status: ACTIVE | Noted: 2021-09-24

## 2021-09-24 PROCEDURE — 250N000013 HC RX MED GY IP 250 OP 250 PS 637: Performed by: INTERNAL MEDICINE

## 2021-09-24 PROCEDURE — 99233 SBSQ HOSP IP/OBS HIGH 50: CPT | Performed by: INTERNAL MEDICINE

## 2021-09-24 PROCEDURE — 250N000011 HC RX IP 250 OP 636: Performed by: INTERNAL MEDICINE

## 2021-09-24 PROCEDURE — 999N000128 HC STATISTIC PERIPHERAL IV START W/O US GUIDANCE

## 2021-09-24 PROCEDURE — G0378 HOSPITAL OBSERVATION PER HR: HCPCS

## 2021-09-24 PROCEDURE — 96361 HYDRATE IV INFUSION ADD-ON: CPT

## 2021-09-24 PROCEDURE — 250N000013 HC RX MED GY IP 250 OP 250 PS 637: Performed by: EMERGENCY MEDICINE

## 2021-09-24 PROCEDURE — 99223 1ST HOSP IP/OBS HIGH 75: CPT | Mod: AI | Performed by: INTERNAL MEDICINE

## 2021-09-24 PROCEDURE — 120N000004 HC R&B MS OVERFLOW

## 2021-09-24 PROCEDURE — 258N000003 HC RX IP 258 OP 636: Performed by: INTERNAL MEDICINE

## 2021-09-24 RX ORDER — ACETAMINOPHEN 325 MG/1
975 TABLET ORAL ONCE
Status: COMPLETED | OUTPATIENT
Start: 2021-09-24 | End: 2021-09-24

## 2021-09-24 RX ORDER — ATORVASTATIN CALCIUM 40 MG/1
40 TABLET, FILM COATED ORAL DAILY
Status: DISCONTINUED | OUTPATIENT
Start: 2021-09-24 | End: 2021-09-26 | Stop reason: HOSPADM

## 2021-09-24 RX ORDER — NALOXONE HYDROCHLORIDE 0.4 MG/ML
0.4 INJECTION, SOLUTION INTRAMUSCULAR; INTRAVENOUS; SUBCUTANEOUS
Status: DISCONTINUED | OUTPATIENT
Start: 2021-09-24 | End: 2021-09-26 | Stop reason: HOSPADM

## 2021-09-24 RX ORDER — CEFTRIAXONE 1 G/1
1 INJECTION, POWDER, FOR SOLUTION INTRAMUSCULAR; INTRAVENOUS EVERY 24 HOURS
Status: DISCONTINUED | OUTPATIENT
Start: 2021-09-24 | End: 2021-09-26 | Stop reason: HOSPADM

## 2021-09-24 RX ORDER — PROCHLORPERAZINE MALEATE 5 MG
5 TABLET ORAL EVERY 6 HOURS PRN
Status: DISCONTINUED | OUTPATIENT
Start: 2021-09-24 | End: 2021-09-26 | Stop reason: HOSPADM

## 2021-09-24 RX ORDER — ONDANSETRON 4 MG/1
4 TABLET, ORALLY DISINTEGRATING ORAL EVERY 6 HOURS PRN
Status: DISCONTINUED | OUTPATIENT
Start: 2021-09-24 | End: 2021-09-26 | Stop reason: HOSPADM

## 2021-09-24 RX ORDER — CLOPIDOGREL BISULFATE 75 MG/1
75 TABLET ORAL DAILY
Status: DISCONTINUED | OUTPATIENT
Start: 2021-09-24 | End: 2021-09-26 | Stop reason: HOSPADM

## 2021-09-24 RX ORDER — NALOXONE HYDROCHLORIDE 0.4 MG/ML
0.2 INJECTION, SOLUTION INTRAMUSCULAR; INTRAVENOUS; SUBCUTANEOUS
Status: DISCONTINUED | OUTPATIENT
Start: 2021-09-24 | End: 2021-09-26 | Stop reason: HOSPADM

## 2021-09-24 RX ORDER — ACETAMINOPHEN 325 MG/1
650 TABLET ORAL EVERY 6 HOURS PRN
Status: DISCONTINUED | OUTPATIENT
Start: 2021-09-24 | End: 2021-09-26 | Stop reason: HOSPADM

## 2021-09-24 RX ORDER — ASPIRIN 81 MG/1
81 TABLET ORAL DAILY
Status: DISCONTINUED | OUTPATIENT
Start: 2021-09-24 | End: 2021-09-26 | Stop reason: HOSPADM

## 2021-09-24 RX ORDER — ACETAMINOPHEN 650 MG/1
650 SUPPOSITORY RECTAL EVERY 6 HOURS PRN
Status: DISCONTINUED | OUTPATIENT
Start: 2021-09-24 | End: 2021-09-26 | Stop reason: HOSPADM

## 2021-09-24 RX ORDER — ONDANSETRON 2 MG/ML
4 INJECTION INTRAMUSCULAR; INTRAVENOUS EVERY 6 HOURS PRN
Status: DISCONTINUED | OUTPATIENT
Start: 2021-09-24 | End: 2021-09-26 | Stop reason: HOSPADM

## 2021-09-24 RX ORDER — URSODIOL 300 MG/1
300 CAPSULE ORAL 2 TIMES DAILY
Status: DISCONTINUED | OUTPATIENT
Start: 2021-09-24 | End: 2021-09-26 | Stop reason: HOSPADM

## 2021-09-24 RX ORDER — PHENOL 1.4 %
10 AEROSOL, SPRAY (ML) MUCOUS MEMBRANE
COMMUNITY

## 2021-09-24 RX ORDER — VALSARTAN 40 MG/1
40 TABLET ORAL DAILY
Status: DISCONTINUED | OUTPATIENT
Start: 2021-09-24 | End: 2021-09-26 | Stop reason: HOSPADM

## 2021-09-24 RX ORDER — SODIUM CHLORIDE 9 MG/ML
INJECTION, SOLUTION INTRAVENOUS CONTINUOUS
Status: DISCONTINUED | OUTPATIENT
Start: 2021-09-24 | End: 2021-09-24

## 2021-09-24 RX ORDER — CARVEDILOL 3.12 MG/1
3.12 TABLET ORAL 2 TIMES DAILY WITH MEALS
Status: DISCONTINUED | OUTPATIENT
Start: 2021-09-24 | End: 2021-09-26 | Stop reason: HOSPADM

## 2021-09-24 RX ORDER — PROCHLORPERAZINE 25 MG
12.5 SUPPOSITORY, RECTAL RECTAL EVERY 12 HOURS PRN
Status: DISCONTINUED | OUTPATIENT
Start: 2021-09-24 | End: 2021-09-26 | Stop reason: HOSPADM

## 2021-09-24 RX ADMIN — URSODIOL 300 MG: 300 CAPSULE ORAL at 20:38

## 2021-09-24 RX ADMIN — SODIUM CHLORIDE 1000 ML: 9 INJECTION, SOLUTION INTRAVENOUS at 05:02

## 2021-09-24 RX ADMIN — VALSARTAN 40 MG: 40 TABLET ORAL at 20:38

## 2021-09-24 RX ADMIN — CARVEDILOL 3.12 MG: 3.12 TABLET, FILM COATED ORAL at 19:52

## 2021-09-24 RX ADMIN — ACETAMINOPHEN 975 MG: 325 TABLET, FILM COATED ORAL at 04:45

## 2021-09-24 RX ADMIN — SERTRALINE HYDROCHLORIDE 100 MG: 50 TABLET ORAL at 19:52

## 2021-09-24 RX ADMIN — CLOPIDOGREL BISULFATE 75 MG: 75 TABLET ORAL at 19:52

## 2021-09-24 RX ADMIN — ASPIRIN 81 MG: 81 TABLET, COATED ORAL at 19:52

## 2021-09-24 RX ADMIN — SODIUM CHLORIDE: 9 INJECTION, SOLUTION INTRAVENOUS at 08:24

## 2021-09-24 RX ADMIN — ATORVASTATIN CALCIUM 40 MG: 40 TABLET, FILM COATED ORAL at 19:51

## 2021-09-24 RX ADMIN — ACETAMINOPHEN 650 MG: 325 TABLET, FILM COATED ORAL at 19:51

## 2021-09-24 RX ADMIN — CEFTRIAXONE SODIUM 1 G: 1 INJECTION, POWDER, FOR SOLUTION INTRAMUSCULAR; INTRAVENOUS at 19:48

## 2021-09-24 ASSESSMENT — ACTIVITIES OF DAILY LIVING (ADL)
ADLS_ACUITY_SCORE: 6
ADLS_ACUITY_SCORE: 6

## 2021-09-24 NOTE — PROGRESS NOTES
Observation goals PRIOR TO DISCHARGE    Comments:   -diagnostic tests and consults completed and resulted - Not Met  -vital signs normal or at patient baseline - Not Met  -tolerating oral intake to maintain hydration - Met  -infection is improving - Not Met  -returns to baseline functional status - Not Met  Nurse to notify provider when observation goals have been met and patient is ready for discharge.

## 2021-09-24 NOTE — UTILIZATION REVIEW
Admission Status; Secondary Review Determination    Under the authority of the Utilization Management Committee, the utilization review process indicated a secondary review on the above patient. The review outcome is based on review of the medical records, discussions with staff, and applying clinical experience noted on the date of the review.    (x) Inpatient Status Appropriate - This patient's medical care is consistent with medical management for inpatient care and reasonable inpatient medical practice.    RATIONALE FOR DETERMINATION: 70-year-old female with history of coronary disease with ischemic cardiomyopathy, chronic kidney disease who presented to the hospital with progressive worsening dysuria leading to headache and chest discomfort on the day of admission and found to have marked pyuria with a temperature of 102.5 degrees.  Patient felt to have significant UTI with systemic symptoms.  Patient started on IV fluids and IV antibiotics with a significant recurrent temperature of 103.2 early morning on hospital day 2.  Due to patient's comorbidities and significant systemic UTI, inpatient management is appropriate.    At the time of admission with the information available to the attending physician more than 2 nights Hospital complex care was anticipated, based on patient risk of adverse outcome if treated as outpatient and complex care required. Inpatient admission is appropriate based on the Medicare guidelines.    This document was produced using voice recognition software    The information on this document is developed by the utilization review team in order for the business office to ensure compliance. This only denotes the appropriateness of proper admission status and does not reflect the quality of care rendered.    The definitions of Inpatient Status and Observation Status used in making the determination above are those provided in the CMS Coverage Manual, Chapter 1 and Chapter 6, section  70.4.    Sincerely,    Taye Castro MD  Utilization Review  Physician Advisor  [facility].

## 2021-09-24 NOTE — PROGRESS NOTES
[facility]    Hospitalist Progress Note    Date of Service (when I saw the patient): 09/24/2021  Admit date: 9/23/2021    Assessment & Plan   Gem Gama is a 70 year old female with coronary artery disease, ischemic cardiomyopathy, dyslipidemia, history of ST elevation MI, CKD stage III, LEA, GERD, depression, anxiety who was admitted to observation on 9/23/2021 for treatment of urinary tract infection.    Presented with several days of dysuria and then developed fever.  Febrile to 103 F but normal WBC and no hypotension or tachycardia.  Very weak in the emergency department and did not feel safe going home.  Urinalysis with positive nitrates, large leukocyte esterase, WBC 56, few bacteria.     Recurrent fevers, weakness secondary to pylenephritis - WBC nl, no other criteria for SIRS. UA 56 WBC.   H/o reconstructive surgery of R kidney due to history of congenital kidney defect - last surgery ~ 1996    - UCx > 100,000 GNR follow. BCx NGTD  - continue ceftriaxone pending urine & blood culture and sensitivities  - If fevers continues or develops SIRS/Sepsis, image kidneys.      H/o STEMI  CAD  Ischemic Cardiomyopathy  * S/p PCI with stent to LAD in 10/2019 and required temporary IABP.   * Angiogram in 12/2020  at the ostium of jailed, small-moderate D1 branch, not good candidate for  PCI. Medical mgt recommended.   * Last echo in 10/2019 EF 35-40% with significant WMAs. RV normal size and fxn. Normal EF on lexiscan in 12/2020.   Dyslipidemia  Transient chest pain   Transient R upper chest pain on arrival to the ED which resolved within a couple of minutes. Troponins negative x 2 and EKG was nonischemic. Chest x-ray normal.  - Resume PTA ASA, atorvastatin, coreg, clopidogrel  - Stop IVF started at admission   - changed to low sodium, cardiac diet (patient actually requested)      Headache  Likely secondary to above. Associated with fever. No neck stiffness or vision changes.  Head  CT without any acute intracranial process.   - Avoiding NSAIDs due to CKD, DAPT  - excedrine (without ASA) PRN  - oxycodone 2.5 -5 mg ordered PRN up to 3 doses.      CKD stage 3A, stable - baseline Cr ~ 1  Recent Labs   Lab 09/23/21  1832   CR 1.06*      Gallstones - patient has intermittent bouts of diarrhea with abdominal cramping she attributes to her gallstones. Liver enzymes normal at admission. No RUQ pain.   - Continue PTA ursodiol  - Defer referral for cholecystectomy to PCP  - F/u liver panel    Depression, Anxiety  - Resume PTA sertraline.     Central sleep apnea and narcolepsy     Asymptomatic Rule Out of COVID-19 infection - Negative      Diet: Orders Placed This Encounter      Combination Diet 2 gm NA Diet; Low Saturated Fat Diet     IVF: Stop  Greene Catheter: Not present     DVT Prophylaxis: DAPT, PCDs  Code Status: Full Code     Disposition: Expected discharge. Anticipate 1-2 more days. Once without a fever for 24 hours. Given recurrent fevers, she systemic symptoms > changed to inpatient.   Communication: Discussed with RN and patient  on 09/24/21    Tatyana Arellano  Hospitalist Service  [facility]  Securely message with the Vocera Web Console (learn more here)  Text page via Mendeley Paging/Directory    Total time spent:  > 35 minutes  Time spent counseling, coordination of care: 25 minutes including discussion with care team and patient, personal review and interpretation of labs and studies as noted above (Note: more history added above after review of EMR and discussion with patient.)       Interval History   Events over last 24 hours as outlined above.   Patient noting HA is coming back in forehead, moderate in severity.   Discussed multiple issues and symptoms as outlined above.   Denies any SOB, no further CP, abdominal pain, N/V/D.     -Data reviewed today: I reviewed all new labs and imaging results over the last 24 hours. I personally reviewed the EKG tracing showing  NSR with nonspecific ST - T wave changes. No specific acute ischemic changes. .    Physical Exam   Temp: 98  F (36.7  C) Temp src: Oral BP: 120/47 Pulse: 71   Resp: 20 SpO2: 99 % O2 Device: None (Room air)    Vitals:    09/23/21 1821 09/24/21 0757   Weight: 81.6 kg (180 lb) 82.4 kg (181 lb 11.2 oz)     Vital Signs with Ranges  Temp:  [98  F (36.7  C)-103.2  F (39.6  C)] 98  F (36.7  C)  Pulse:  [63-86] 71  Resp:  [8-21] 20  BP: ()/(39-87) 120/47  SpO2:  [91 %-99 %] 99 %  I/O last 3 completed shifts:  In: 425 [P.O.:325; IV Piggyback:100]  Out: -     Today's Exam  Constitutional:  NAD,   Neuropsyche: Very talkative,alert and oriented, answers questions appropriately.   Respiratory:  Breathing comfortably, good air exchange, no wheezes, no crackles.   Cardiovascular:  Regular rate and rhythm, no edema.  GI:  soft, NT/ND, BS normal  Skin/Integumen:  No acute rash or sign of bleeding.     Medications   All medications reviewed on 09/24/21      sodium chloride 100 mL/hr at 09/24/21 1529       aspirin  81 mg Oral Daily     atorvastatin  40 mg Oral Daily     carvedilol  3.125 mg Oral BID w/meals     cefTRIAXone  1 g Intravenous Q24H     clopidogrel  75 mg Oral Daily     sertraline  100 mg Oral Daily     ursodiol  300 mg Oral BID     valsartan  40 mg Oral Daily       Data   Recent Labs   Lab 09/23/21  2241 09/23/21  1905 09/23/21  1832   WBC  --  8.6  --    HGB  --  13.1  --    MCV  --  86  --    PLT  --  133*  --    NA  --   --  137   POTASSIUM  --   --  3.9   CHLORIDE  --   --  106   CO2  --   --  25   BUN  --   --  15   CR  --   --  1.06*   ANIONGAP  --   --  6   FRACISCO  --   --  8.1*   GLC  --   --  97   ALBUMIN  --   --  3.4   PROTTOTAL  --   --  7.3   BILITOTAL  --   --  0.9   ALKPHOS  --   --  93   ALT  --   --  25   AST  --   --  23   LIPASE  --   --  107   TROPONIN <0.015  --  <0.015       Recent Results (from the past 24 hour(s))   XR Chest Port 1 View    Narrative    EXAM: XR CHEST PORT 1 VIEW  LOCATION: [facility]  DATE/TIME: 9/23/2021 6:55 PM    INDICATION: fever, chest pain  COMPARISON: 12/09/2020      Impression    IMPRESSION: Negative chest.   Head CT w/o contrast    Narrative    EXAM: CT HEAD W/O CONTRAST  LOCATION: [facility]  DATE/TIME: 9/23/2021 10:45 PM    INDICATION: Dizziness, non-specific  COMPARISON: MRI brain 4/29/2014  TECHNIQUE: Routine CT Head without IV contrast. Multiplanar reformats. Dose reduction techniques were used.    FINDINGS:  INTRACRANIAL CONTENTS: No intracranial hemorrhage, extraaxial collection, or mass effect.  No CT evidence of acute infarct. Normal parenchymal attenuation. Normal ventricles and sulci.     VISUALIZED ORBITS/SINUSES/MASTOIDS: No intraorbital abnormality. No paranasal sinus mucosal disease. No middle ear or mastoid effusion.    BONES/SOFT TISSUES: No acute abnormality.      Impression    IMPRESSION:  1.  No acute intracranial process.

## 2021-09-24 NOTE — PROGRESS NOTES
"IV team Paged:   \"Pt IV is compromised, Please help me start IV in OBS 6. Pt need IV access for IV fluids and IV antibiotics.   Thank you, Lakisha \"  "

## 2021-09-24 NOTE — ED NOTES
[facility]  ED Nurse Handoff Report    ED Chief complaint: Chest Pain and Headache      ED Diagnosis:   Final diagnoses:   None       Code Status: Full Code    Allergies:   Allergies   Allergen Reactions     Latex Other (See Comments) and Shortness Of Breath     Runny nose  PN: LW Reaction: DIFFICULTY BREATHING       Flagyl [Metronidazole Hcl] Anaphylaxis and Rash     Food      PN: LW FI1: nka LW FI2:     Hydrochlorothiazide W/Triamterene      PN: LW Reaction: skin rash     No Clinical Screening - See Comments      PN: LW Other1: -Adhesive Tape     Sulfa Drugs Anaphylaxis, Hives and Itching     PN: LW Reaction: HIVES, Swelling     Tape [Adhesive Tape] Hives     Metoprolol Itching and Rash     Metronidazole Hives and Rash     PN: LW Reaction: HIVES         Patient Story: Patient comes in with chest pain/headache and urinary burning.   Focused Assessment:  Urine shows UTI.  Patient was febrile on arrival, resolved with tylenol.  Patient is quite lightheaded when ambulating.  Chest pain seems to be resolved, it has been intermittent at home  But she has not had any in ER.  Vitals stable.  Abx given.     Treatments and/or interventions provided: IV, labs, CT   Patient's response to treatments and/or interventions: tolerated well     To be done/followed up on inpatient unit:  see in patient orders     Does this patient have any cognitive concerns?: none     Activity level - Baseline/Home:  Independent  Activity Level - Current:   Stand with assist x2    Patient's Preferred language: English   Needed?: No    Isolation: None  Infection: Not Applicable  Patient tested for COVID 19 prior to admission: YES  Bariatric?: No    Vital Signs:   Vitals:    09/23/21 2200 09/23/21 2255 09/23/21 2300 09/23/21 2330   BP: 119/54  114/53 96/55   Pulse: 86   82   Resp: 21   11   Temp:  99.7  F (37.6  C)     TempSrc:  Oral     SpO2: 96%   95%   Weight:           Cardiac Rhythm:Cardiac Rhythm: Normal sinus  rhythm    Was the PSS-3 completed:   Yes  What interventions are required if any?               Family Comments: son was here   OBS brochure/video discussed/provided to patient/family: N/A              Name of person given brochure if not patient:               Relationship to patient:     For the majority of the shift this patient's behavior was Green.   Behavioral interventions performed were .    ED NURSE PHONE NUMBER: [phone]

## 2021-09-24 NOTE — PHARMACY-ADMISSION MEDICATION HISTORY
Pharmacy Medication History  Admission medication history interview status for the 9/23/2021  admission is complete. See EPIC admission navigator for prior to admission medications     Location of Interview: Phone  Medication history sources: Patient, Surescripts and Care Everywhere    Significant changes made to the medication list:  - None    In the past week, patient estimated taking medication this percent of the time: greater than 90%    Additional medication history information:   - None    Medication reconciliation completed by provider prior to medication history? No    Time spent in this activity: 15 minutes    Prior to Admission medications    Medication Sig Last Dose Taking? Auth Provider   amoxicillin (AMOXIL) 500 MG capsule Take as directed before dental work prn Yes Reported, Patient   aspirin (ASA) 81 MG EC tablet Take 1 tablet (81 mg) by mouth daily 9/23/2021 at am Yes Dalia Stein MD   atorvastatin (LIPITOR) 40 MG tablet TAKE 1 TABLET(40 MG) BY MOUTH DAILY 9/23/2021 at am Yes Danny Conteh MD   carvedilol (COREG) 3.125 MG tablet TAKE 1 TABLET(3.125 MG) BY MOUTH TWICE DAILY WITH MEALS 9/23/2021 at am Yes Danny Conteh MD   clopidogrel (PLAVIX) 75 MG tablet Take 1 tablet (75 mg) by mouth daily 9/23/2021 at am Yes Leonor Wilson APRN CNP   Melatonin 10 MG TABS tablet Take 10 mg by mouth nightly as needed for sleep prn Yes Unknown, Entered By History   nitroGLYcerin (NITROSTAT) 0.4 MG sublingual tablet Place 1 tablet (0.4 mg) under the tongue every 5 minutes as needed for chest pain prn Yes Rosalva Avalos APRN CNP   Prenatal Multivit-Min-Fe-FA (PRENATAL/IRON) TABS Take 1 tablet by mouth daily  9/23/2021 at am Yes Reported, Patient   sertraline (ZOLOFT) 100 MG tablet TAKE 1 TABLET(100 MG) BY MOUTH DAILY 9/23/2021 at am Yes Danny Conteh MD   ursodiol (ACTIGALL) 300 MG capsule Take 1 capsule (300 mg) by mouth 2 times daily 9/23/2021 at am Yes Danny Conteh MD    valsartan (DIOVAN) 40 MG tablet Take 1 tablet (40 mg) by mouth daily 9/23/2021 at am Yes Danny Conteh MD   vitamin D3 (CHOLECALCIFEROL) 2000 units (50 mcg) tablet Take 1 tablet (2,000 Units) by mouth daily 9/23/2021 at am Yes Candice Interiano MD       The information provided in this note is only as accurate as the sources available at the time of update(s)

## 2021-09-24 NOTE — ED NOTES
Patients son heading out for the night.  Patient readjusted, lights turned off and she is going to get some sleep.  Hooked up to monitors, vitals q2 hours

## 2021-09-24 NOTE — PLAN OF CARE
Pt is A&Ox4, VSS on RA, Denies pain, Incontinent at times, SBA w/ GB, IV antibiotics q24hr, IV  ml/hr, continue to monitor

## 2021-09-24 NOTE — ED NOTES
"Pt was able to ambulate to the restroom and back independently. Pt reports that she is feeling \"wobbly\" and not very steady on her feet. Pt is not sure if she feels comfortable going home. MD notified.   "

## 2021-09-25 LAB
ALBUMIN SERPL-MCNC: 2.8 G/DL (ref 3.4–5)
ALP SERPL-CCNC: 75 U/L (ref 40–150)
ALT SERPL W P-5'-P-CCNC: 21 U/L (ref 0–50)
ANION GAP SERPL CALCULATED.3IONS-SCNC: 9 MMOL/L (ref 3–14)
AST SERPL W P-5'-P-CCNC: 19 U/L (ref 0–45)
BACTERIA UR CULT: ABNORMAL
BILIRUB DIRECT SERPL-MCNC: 0.2 MG/DL (ref 0–0.2)
BILIRUB SERPL-MCNC: 0.7 MG/DL (ref 0.2–1.3)
BUN SERPL-MCNC: 14 MG/DL (ref 7–30)
CALCIUM SERPL-MCNC: 8.1 MG/DL (ref 8.5–10.1)
CHLORIDE BLD-SCNC: 114 MMOL/L (ref 94–109)
CO2 SERPL-SCNC: 23 MMOL/L (ref 20–32)
CREAT SERPL-MCNC: 0.94 MG/DL (ref 0.52–1.04)
GFR SERPL CREATININE-BSD FRML MDRD: 61 ML/MIN/1.73M2
GLUCOSE BLD-MCNC: 102 MG/DL (ref 70–99)
POTASSIUM BLD-SCNC: 3.6 MMOL/L (ref 3.4–5.3)
PROT SERPL-MCNC: 6.3 G/DL (ref 6.8–8.8)
SODIUM SERPL-SCNC: 146 MMOL/L (ref 133–144)

## 2021-09-25 PROCEDURE — 250N000013 HC RX MED GY IP 250 OP 250 PS 637: Performed by: INTERNAL MEDICINE

## 2021-09-25 PROCEDURE — 80053 COMPREHEN METABOLIC PANEL: CPT | Performed by: INTERNAL MEDICINE

## 2021-09-25 PROCEDURE — 36415 COLL VENOUS BLD VENIPUNCTURE: CPT | Performed by: INTERNAL MEDICINE

## 2021-09-25 PROCEDURE — 99232 SBSQ HOSP IP/OBS MODERATE 35: CPT | Performed by: HOSPITALIST

## 2021-09-25 PROCEDURE — 250N000011 HC RX IP 250 OP 636: Performed by: INTERNAL MEDICINE

## 2021-09-25 PROCEDURE — 120N000004 HC R&B MS OVERFLOW

## 2021-09-25 RX ADMIN — VALSARTAN 40 MG: 40 TABLET ORAL at 08:18

## 2021-09-25 RX ADMIN — URSODIOL 300 MG: 300 CAPSULE ORAL at 19:45

## 2021-09-25 RX ADMIN — CARVEDILOL 3.12 MG: 3.12 TABLET, FILM COATED ORAL at 08:18

## 2021-09-25 RX ADMIN — SERTRALINE HYDROCHLORIDE 100 MG: 50 TABLET ORAL at 08:17

## 2021-09-25 RX ADMIN — CEFTRIAXONE SODIUM 1 G: 1 INJECTION, POWDER, FOR SOLUTION INTRAMUSCULAR; INTRAVENOUS at 19:45

## 2021-09-25 RX ADMIN — ACETAMINOPHINE, CAFFEINE 2 TABLET: 500; 65 TABLET, FILM COATED ORAL at 16:15

## 2021-09-25 RX ADMIN — URSODIOL 300 MG: 300 CAPSULE ORAL at 08:17

## 2021-09-25 RX ADMIN — ASPIRIN 81 MG: 81 TABLET, COATED ORAL at 08:17

## 2021-09-25 RX ADMIN — CARVEDILOL 3.12 MG: 3.12 TABLET, FILM COATED ORAL at 18:05

## 2021-09-25 RX ADMIN — CLOPIDOGREL BISULFATE 75 MG: 75 TABLET ORAL at 08:18

## 2021-09-25 RX ADMIN — ATORVASTATIN CALCIUM 40 MG: 40 TABLET, FILM COATED ORAL at 08:18

## 2021-09-25 ASSESSMENT — ACTIVITIES OF DAILY LIVING (ADL)
ADLS_ACUITY_SCORE: 6

## 2021-09-25 NOTE — PROGRESS NOTES
[facility]  Hospitalist Progress Note    Date of Service (when I saw the patient): 09/25/2021  Admit date: 9/23/2021    Assessment & Plan   Gem Gama is a 70 year old female with coronary artery disease, ischemic cardiomyopathy, dyslipidemia, history of ST elevation MI, CKD stage III, LEA, GERD, depression, anxiety who was admitted to observation on 9/23/2021 for treatment of urinary tract infection.    Presented with several days of dysuria and then developed fever.  Febrile to 103 F but normal WBC and no hypotension or tachycardia.  Very weak in the emergency department and did not feel safe going home.  Urinalysis with positive nitrates, large leukocyte esterase, WBC 56, few bacteria.     Recurrent fevers, weakness secondary to pylenephritis - WBC nl, no other criteria for SIRS. UA 56 WBC.   H/o reconstructive surgery of R kidney due to history of congenital kidney defect - last surgery ~ 1996    - UCx > E coli, sensitivity pending. BCx NGTD  - Continue ceftriaxone   - If fevers continues, plan CT abd pelvis.      H/o STEMI  CAD  Ischemic Cardiomyopathy  * S/p PCI with stent to LAD in 10/2019 and required temporary IABP.   * Angiogram in 12/2020  at the ostium of jailed, small-moderate D1 branch, not good candidate for  PCI. Medical mgt recommended.   * Last echo in 10/2019 EF 35-40% with significant WMAs. RV normal size and fxn. Normal EF on lexiscan in 12/2020.   Dyslipidemia  Transient chest pain   Transient Rt upper chest pain on arrival to the ED which resolved within a couple of minutes. Troponins negative x 2 and EKG was nonischemic. Chest x-ray normal.  - Resume PTA ASA, atorvastatin, coreg, clopidogrel  - Off IVF   - Changed to low sodium, cardiac diet (patient actually requested)      Headache  Likely secondary to above. Associated with fever. No neck stiffness or vision changes.  Head CT without any acute intracranial process.   - Avoiding NSAIDs due to CKD,  DAPT  - excedrine (without ASA) PRN  - oxycodone 2.5 -5 mg ordered PRN up to 3 doses.      CKD stage 3A, stable - baseline Cr ~ 1  Recent Labs   Lab 09/25/21  0648 09/23/21  1832   CR 0.94 1.06*      Gallstones - patient has intermittent bouts of diarrhea with abdominal cramping she attributes to her gallstones. Liver enzymes normal at admission. No RUQ pain.   - Continue PTA ursodiol  - Defer referral for cholecystectomy to PCP  - F/u liver panel    Depression, Anxiety  - Resume PTA sertraline.     Central sleep apnea and narcolepsy     Asymptomatic Rule Out of COVID-19 infection - Negative      Diet: Orders Placed This Encounter      Combination Diet 2 gm NA Diet; Low Saturated Fat Diet     IVF: Stop  Greene Catheter: Not present     DVT Prophylaxis: DAPT, PCDs  Code Status: Full Code     Disposition: Expected discharge. 1 to 2 days, once afebrile for 24 hours and urine culture and sensitivity available.     Communication: Discussed with RN and patient     Tiara Leonard  Hospitalist [facility]    Total time spent:  > 25 minutes     Interval History      Chart reviewed, discussed with RN, patient was evaluated.   She reports discomfort/ache over the bilateral flanks. Denies dysuria/hematuria.   Noted fever of upto 102.8 last night.    No dyspnea, nausea, cough or diarrhea.     -Data reviewed today: I reviewed all new labs  results over the last 24 hours. I personally reviewed no images or EKG's today.    Physical Exam   Temp: 97.8  F (36.6  C) Temp src: Oral BP: 124/55 Pulse: 66   Resp: 18 SpO2: 96 % O2 Device: None (Room air)    Vitals:    09/23/21 1821 09/24/21 0757   Weight: 81.6 kg (180 lb) 82.4 kg (181 lb 11.2 oz)     Vital Signs with Ranges  Temp:  [97.8  F (36.6  C)-102.8  F (39.3  C)] 97.8  F (36.6  C)  Pulse:  [63-76] 66  Resp:  [16-20] 18  BP: ()/(42-64) 124/55  SpO2:  [95 %-99 %] 96 %  I/O last 3 completed shifts:  In: 1182 [P.O.:325; I.V.:857]  Out: -         Constitutional:  NAD  Neuropsyche:  alert and oriented, very pleasant, calm, answers questions appropriately.   Respiratory: Breathing comfortably, good air exchange, no wheezes, no crackles.   Cardiovascular:  Regular rate and rhythm, no edema.  GI:  soft, NT/ND, BS normal. No CVA tenderness  Skin/Integumen:  No acute rash or sign of bleeding.     Medications   All medications reviewed on 09/25/2021        aspirin  81 mg Oral Daily     atorvastatin  40 mg Oral Daily     carvedilol  3.125 mg Oral BID w/meals     cefTRIAXone  1 g Intravenous Q24H     clopidogrel  75 mg Oral Daily     sertraline  100 mg Oral Daily     ursodiol  300 mg Oral BID     valsartan  40 mg Oral Daily       Data   Recent Labs   Lab 09/25/21  0648 09/23/21  2241 09/23/21  1905 09/23/21  1832   WBC  --   --  8.6  --    HGB  --   --  13.1  --    MCV  --   --  86  --    PLT  --   --  133*  --    *  --   --  137   POTASSIUM 3.6  --   --  3.9   CHLORIDE 114*  --   --  106   CO2 23  --   --  25   BUN 14  --   --  15   CR 0.94  --   --  1.06*   ANIONGAP 9  --   --  6   FRACISCO 8.1*  --   --  8.1*   *  --   --  97   ALBUMIN 2.8*  --   --  3.4   PROTTOTAL 6.3*  --   --  7.3   BILITOTAL 0.7  --   --  0.9   ALKPHOS 75  --   --  93   ALT 21  --   --  25   AST 19  --   --  23   LIPASE  --   --   --  107   TROPONIN  --  <0.015  --  <0.015       No results found for this or any previous visit (from the past 24 hour(s)).

## 2021-09-25 NOTE — PLAN OF CARE
Pt had t-max of 102.8 and after tylenol it came down to 99.7 other VSS. Pt c/o of a mild headache tylenol was given for that and her fever. Tolerate regular diet. Up independently to the bathroom voiding well.

## 2021-09-25 NOTE — PLAN OF CARE
A&Ox4 and VSS on RA. Up independently and tolerating 2 gm Na diet. IV SL and PRN Excedrin given for headache. Continue to monitor.

## 2021-09-26 VITALS
HEART RATE: 68 BPM | OXYGEN SATURATION: 96 % | SYSTOLIC BLOOD PRESSURE: 133 MMHG | RESPIRATION RATE: 16 BRPM | DIASTOLIC BLOOD PRESSURE: 70 MMHG | WEIGHT: 181.7 LBS | TEMPERATURE: 98.5 F | BODY MASS INDEX: 30.24 KG/M2

## 2021-09-26 LAB
ANION GAP SERPL CALCULATED.3IONS-SCNC: 5 MMOL/L (ref 3–14)
BUN SERPL-MCNC: 12 MG/DL (ref 7–30)
CALCIUM SERPL-MCNC: 8.7 MG/DL (ref 8.5–10.1)
CHLORIDE BLD-SCNC: 107 MMOL/L (ref 94–109)
CO2 SERPL-SCNC: 28 MMOL/L (ref 20–32)
CREAT SERPL-MCNC: 1.02 MG/DL (ref 0.52–1.04)
GFR SERPL CREATININE-BSD FRML MDRD: 55 ML/MIN/1.73M2
GLUCOSE BLD-MCNC: 95 MG/DL (ref 70–99)
POTASSIUM BLD-SCNC: 4.2 MMOL/L (ref 3.4–5.3)
SODIUM SERPL-SCNC: 140 MMOL/L (ref 133–144)

## 2021-09-26 PROCEDURE — 250N000013 HC RX MED GY IP 250 OP 250 PS 637: Performed by: INTERNAL MEDICINE

## 2021-09-26 PROCEDURE — 36415 COLL VENOUS BLD VENIPUNCTURE: CPT | Performed by: HOSPITALIST

## 2021-09-26 PROCEDURE — 99239 HOSP IP/OBS DSCHRG MGMT >30: CPT | Performed by: HOSPITALIST

## 2021-09-26 PROCEDURE — 80048 BASIC METABOLIC PNL TOTAL CA: CPT | Performed by: HOSPITALIST

## 2021-09-26 RX ORDER — SACCHAROMYCES BOULARDII 250 MG
250 CAPSULE ORAL 2 TIMES DAILY
Qty: 14 CAPSULE | Refills: 0 | Status: SHIPPED | OUTPATIENT
Start: 2021-09-26

## 2021-09-26 RX ORDER — FLUCONAZOLE 150 MG/1
150 TABLET ORAL ONCE
Qty: 1 TABLET | Refills: 0 | Status: SHIPPED | OUTPATIENT
Start: 2021-09-26 | End: 2021-09-26

## 2021-09-26 RX ORDER — CEFUROXIME AXETIL 500 MG/1
500 TABLET ORAL 2 TIMES DAILY
Qty: 14 TABLET | Refills: 0 | Status: SHIPPED | OUTPATIENT
Start: 2021-09-26 | End: 2021-09-26

## 2021-09-26 RX ORDER — CEFUROXIME AXETIL 500 MG/1
500 TABLET ORAL 2 TIMES DAILY
Qty: 14 TABLET | Refills: 0 | Status: SHIPPED | OUTPATIENT
Start: 2021-09-26 | End: 2021-11-08

## 2021-09-26 RX ADMIN — VALSARTAN 40 MG: 40 TABLET ORAL at 07:41

## 2021-09-26 RX ADMIN — CLOPIDOGREL BISULFATE 75 MG: 75 TABLET ORAL at 07:41

## 2021-09-26 RX ADMIN — ATORVASTATIN CALCIUM 40 MG: 40 TABLET, FILM COATED ORAL at 07:41

## 2021-09-26 RX ADMIN — SERTRALINE HYDROCHLORIDE 100 MG: 50 TABLET ORAL at 07:41

## 2021-09-26 RX ADMIN — URSODIOL 300 MG: 300 CAPSULE ORAL at 07:41

## 2021-09-26 RX ADMIN — ASPIRIN 81 MG: 81 TABLET, COATED ORAL at 07:41

## 2021-09-26 RX ADMIN — CARVEDILOL 3.12 MG: 3.12 TABLET, FILM COATED ORAL at 07:41

## 2021-09-26 ASSESSMENT — ACTIVITIES OF DAILY LIVING (ADL)
ADLS_ACUITY_SCORE: 6

## 2021-09-26 NOTE — DISCHARGE SUMMARY
[facility]  Hospitalist Discharge Summary      Date of Admission:  9/23/2021  Date of Discharge:  9/26/2021  Discharging Provider: Tiara Leonard MD      Discharge Diagnoses   E coli UTI and suspected pyelonephritis   H/o reconstructive surgery of Rt kidney   H/o STEMI  CAD  Ischemic Cardiomyopathy  Hyperlipidemia  Headache  CKD stage 3  Gallstone  Depression and anxiety  Central sleep apnea and narcolepsy      Follow-ups Needed After Discharge   Follow-up Appointments     Follow-up and recommended labs and tests       Follow up with primary care provider, Danny Conteh, within 7 days   to evaluate medication change and for hospital follow- up.               Unresulted Labs Ordered in the Past 30 Days of this Admission     Date and Time Order Name Status Description    9/23/2021  6:36 PM Blood Culture Arm, Right Preliminary     9/23/2021  6:36 PM Blood Culture Wrist, Right Preliminary       These results will be followed up by PCP    Discharge Disposition   Discharged to home  Condition at discharge: Stable     Hospital Course   Gem Gama is a 70 year old female with coronary artery disease, ischemic cardiomyopathy, dyslipidemia, history of ST elevation MI, CKD stage III, LEA, GERD, depression, anxiety who was admitted to observation on 9/23/2021 for treatment of urinary tract infection.    Presented with several days of dysuria and then developed fever.  Febrile to 103 F but normal WBC and no hypotension or tachycardia.  Very weak in the emergency department and did not feel safe going home.  Urinalysis with positive nitrates, large leukocyte esterase, WBC 56, few bacteria.     Recurrent fevers, weakness secondary to pylenephritis - WBC nl, no other criteria for SIRS. UA 56 WBC.   H/o reconstructive surgery of R kidney due to history of congenital kidney defect - last surgery ~ 1996    - UCx > E coli, pansensitivity. BCx NGTD  - Patient now has become afebrile for over 24  hours.  - Continued with ceftriaxone while in hospital, and continuing with 7 more days of Ceftin at discharge.   - with h/o renal surgery and recurrent UTIs, recommended follow up with PCP/urology for consideration of chronic suppressive therapy.     H/o STEMI  CAD  Ischemic Cardiomyopathy  * S/p PCI with stent to LAD in 10/2019 and required temporary IABP.   * Angiogram in 12/2020  at the ostium of jailed, small-moderate D1 branch, not good candidate for  PCI. Medical mgt recommended.   * Last echo in 10/2019 EF 35-40% with significant WMAs. RV normal size and fxn. Normal EF on lexiscan in 12/2020.   Dyslipidemia  Transient chest pain   Transient Rt upper chest pain on arrival to the ED which resolved within a couple of minutes. Troponins negative x 2 and EKG was nonischemic. Chest x-ray normal.  - Resumed PTA ASA, atorvastatin, coreg, clopidogrel      Headache  Likely secondary to above. Associated with fever. No neck stiffness or vision changes.  Head CT without any acute intracranial process.   - Avoiding NSAIDs due to CKD, DAPT  - excedrine (without ASA) PRN  - oxycodone 2.5 -5 mg ordered PRN up to 3 doses.   - suspect related to infection, resolved.      CKD stage 3A, stable - baseline Cr ~ 1  Recent Labs   Lab 09/25/21  0648 09/23/21  1832   CR 0.94 1.06*      Gallstones - patient has intermittent bouts of diarrhea with abdominal cramping she attributes to her gallstones. Liver enzymes normal at admission. No RUQ pain.   - Continue PTA ursodiol  - Defer referral for cholecystectomy to PCP  - F/u liver panel    Depression, Anxiety  - Resume PTA sertraline.     Central sleep apnea and narcolepsy     Asymptomatic Rule Out of COVID-19 infection - Negative      Diet: Orders Placed This Encounter      Combination Diet 2 gm NA Diet; Low Saturated Fat Diet     Greene Catheter: Not present     DVT Prophylaxis: DAPT, PCDs  Code Status: Full Code        Tiara Leonard  Hospitalist Service  [facility]  [facility]    Total time spent:  > 25 minutes       Consultations This Hospital Stay   None    Code Status   Full Code    Time Spent on this Encounter   I, Tiara Leonard MD, personally saw the patient today and spent greater than 30 minutes discharging this patient.       Tiara Leonard MD  [employer] [facility]  [address] [facility] [address]  Phone: [phone]  ______________________________________________________________________    Physical Exam   Vital Signs: Temp: 98.5  F (36.9  C) Temp src: Oral BP: 133/70 Pulse: 68   Resp: 16 SpO2: 96 % O2 Device: None (Room air)    Weight: 181 lbs 11.2 oz     Constitutional: NAD  HEENT: PERRLA EOMI  Neuropsyche:  alert and oriented, very pleasant, calm, answers questions appropriately.   Respiratory: Breathing comfortably, good air exchange, no wheezes, no crackles.   Cardiovascular:  Regular rate and rhythm, no edema.  GI:  soft, NT/ND, BS normal. No CVA tenderness  Skin/Integumen:  No acute rash or sign of bleeding.        Primary Care Physician   Danny Conteh    Discharge Orders      Reason for your hospital stay    UTI and sepsis     Follow-up and recommended labs and tests     Follow up with primary care provider, Danny Conteh, within 7 days to evaluate medication change and for hospital follow- up.     Activity    Your activity upon discharge: activity as tolerated     Diet    Follow this diet upon discharge: Orders Placed This Encounter      Combination Diet 2 gm NA Diet; Low Saturated Fat Diet       Significant Results and Procedures   Most Recent 3 CBC's:Recent Labs   Lab Test 09/23/21  1905 06/12/21  1232 12/10/20  0557   WBC 8.6 5.6 4.7   HGB 13.1 14.8 13.8   MCV 86 86 85   * 177 154     Most Recent 3 BMP's:Recent Labs   Lab Test 09/26/21  0646 09/25/21  0648 09/23/21  1832    146* 137   POTASSIUM 4.2 3.6 3.9   CHLORIDE 107 114* 106   CO2 28 23 25   BUN 12 14 15   CR 1.02 0.94 1.06*    ANIONGAP 5 9 6   FRACISCO 8.7 8.1* 8.1*   GLC 95 102* 97     Most Recent 2 LFT's:Recent Labs   Lab Test 09/25/21  0648 09/23/21  1832   AST 19 23   ALT 21 25   ALKPHOS 75 93   BILITOTAL 0.7 0.9     Most Recent 3 INR's:Recent Labs   Lab Test 11/27/14  0553 11/26/14  0628 11/25/14  0620   INR 1.69* 1.76* 1.61*     Most Recent 3 BNP's:Recent Labs   Lab Test 09/23/21  1832   NTBNPI 724     Most Recent D-dimer:No lab results found.  Most Recent 6 Bacteria Isolates From Any Culture (See EPIC Reports for Culture Details):Recent Labs   Lab Test 06/12/21  1244 09/06/20  1658 12/17/16  1339 07/31/15  1732 11/22/14  1323 06/24/14  1517   CULT No growth >100,000 colonies/mL  Escherichia coli  * >100,000 colonies/mL Escherichia coli* No growth >100,000 colonies/mL Escherichia coli* >100,000 colonies/mL Escherichia coli*     Most Recent TSH and T4:Recent Labs   Lab Test 02/02/16  1634 09/19/13  0717 09/19/13  0717   TSH 1.62   < > 1.56   T4  --   --  0.79    < > = values in this interval not displayed.     Most Recent 6 glucoses:Recent Labs   Lab Test 09/26/21  0646 09/25/21  0648 09/23/21  1832 12/17/20  1403 12/10/20  0557 12/09/20  1031   GLC 95 102* 97 113* 99 95     Most Recent Urinalysis:Recent Labs   Lab Test 09/23/21  1851 06/12/21  1244   COLOR Light Yellow Yellow   APPEARANCE Clear Clear   URINEGLC Negative Negative   URINEBILI Negative Negative   URINEKETONE Negative Negative   SG 1.014 1.010   UBLD Trace* Negative   URINEPH 5.0 6.0   PROTEIN Negative Negative   UROBILINOGEN  --  0.2   NITRITE Positive* Negative   LEUKEST Large* Small*   RBCU 2 O - 2   WBCU 56* 0 - 5   ,   Results for orders placed or performed during the hospital encounter of 09/23/21   XR Chest Port 1 View    Narrative    EXAM: XR CHEST PORT 1 VIEW  LOCATION: [facility]  DATE/TIME: 9/23/2021 6:55 PM    INDICATION: fever, chest pain  COMPARISON: 12/09/2020      Impression    IMPRESSION: Negative chest.   Head CT w/o contrast     Narrative    EXAM: CT HEAD W/O CONTRAST  LOCATION: [facility]  DATE/TIME: 9/23/2021 10:45 PM    INDICATION: Dizziness, non-specific  COMPARISON: MRI brain 4/29/2014  TECHNIQUE: Routine CT Head without IV contrast. Multiplanar reformats. Dose reduction techniques were used.    FINDINGS:  INTRACRANIAL CONTENTS: No intracranial hemorrhage, extraaxial collection, or mass effect.  No CT evidence of acute infarct. Normal parenchymal attenuation. Normal ventricles and sulci.     VISUALIZED ORBITS/SINUSES/MASTOIDS: No intraorbital abnormality. No paranasal sinus mucosal disease. No middle ear or mastoid effusion.    BONES/SOFT TISSUES: No acute abnormality.      Impression    IMPRESSION:  1.  No acute intracranial process.       Discharge Medications   Current Discharge Medication List      START taking these medications    Details   cefuroxime (CEFTIN) 500 MG tablet Take 1 tablet (500 mg) by mouth 2 times daily  Qty: 14 tablet, Refills: 0    Associated Diagnoses: Citrobacter infection; E. coli UTI      fluconazole (DIFLUCAN) 150 MG tablet Take 1 tablet (150 mg) by mouth once for 1 dose  Qty: 1 tablet, Refills: 0    Associated Diagnoses: Candidiasis of vagina      saccharomyces boulardii (FLORASTOR) 250 MG capsule Take 1 capsule (250 mg) by mouth 2 times daily  Qty: 14 capsule, Refills: 0    Associated Diagnoses: Preventive measure         CONTINUE these medications which have NOT CHANGED    Details   amoxicillin (AMOXIL) 500 MG capsule Take as directed before dental work      aspirin (ASA) 81 MG EC tablet Take 1 tablet (81 mg) by mouth daily  Qty: 90 tablet, Refills: 0    Comments: Future refills by PCP Dr. Danny Conteh with phone number [phone].  Associated Diagnoses: ST elevation myocardial infarction involving left anterior descending (LAD) coronary artery (H)      atorvastatin (LIPITOR) 40 MG tablet TAKE 1 TABLET(40 MG) BY MOUTH DAILY  Qty: 90 tablet, Refills: 0    Associated  Diagnoses: Coronary artery disease involving native coronary artery of native heart without angina pectoris; Hyperlipidemia LDL goal <70      carvedilol (COREG) 3.125 MG tablet TAKE 1 TABLET(3.125 MG) BY MOUTH TWICE DAILY WITH MEALS  Qty: 180 tablet, Refills: 0    Associated Diagnoses: Coronary artery disease involving native coronary artery of native heart without angina pectoris      clopidogrel (PLAVIX) 75 MG tablet Take 1 tablet (75 mg) by mouth daily  Qty: 90 tablet, Refills: 3    Associated Diagnoses: ST elevation myocardial infarction involving left anterior descending (LAD) coronary artery (H)      Melatonin 10 MG TABS tablet Take 10 mg by mouth nightly as needed for sleep      nitroGLYcerin (NITROSTAT) 0.4 MG sublingual tablet Place 1 tablet (0.4 mg) under the tongue every 5 minutes as needed for chest pain  Qty: 10 tablet, Refills: 0    Associated Diagnoses: Coronary artery disease involving native coronary artery of native heart without angina pectoris      Prenatal Multivit-Min-Fe-FA (PRENATAL/IRON) TABS Take 1 tablet by mouth daily       sertraline (ZOLOFT) 100 MG tablet TAKE 1 TABLET(100 MG) BY MOUTH DAILY  Qty: 90 tablet, Refills: 0    Associated Diagnoses: Major depressive disorder, recurrent episode, moderate (H)      ursodiol (ACTIGALL) 300 MG capsule Take 1 capsule (300 mg) by mouth 2 times daily  Qty: 60 capsule, Refills: 11    Associated Diagnoses: Calculus of gallbladder without cholecystitis without obstruction      valsartan (DIOVAN) 40 MG tablet Take 1 tablet (40 mg) by mouth daily  Qty: 30 tablet, Refills: 11    Comments: Substitute for Lisinopril 2.5 mg.  Associated Diagnoses: Chronic systolic heart failure (H)      vitamin D3 (CHOLECALCIFEROL) 2000 units (50 mcg) tablet Take 1 tablet (2,000 Units) by mouth daily  Qty: 90 tablet, Refills: 3    Associated Diagnoses: Vitamin D deficiency           Allergies   Allergies   Allergen Reactions     Latex Other (See Comments) and Shortness Of  Breath     Runny nose  PN: LW Reaction: DIFFICULTY BREATHING       Flagyl [Metronidazole Hcl] Anaphylaxis and Rash     Food      PN: LW FI1: nka LW FI2:     Hydrochlorothiazide W/Triamterene      PN: LW Reaction: skin rash     No Clinical Screening - See Comments      PN: LW Other1: -Adhesive Tape     Sulfa Drugs Anaphylaxis, Hives and Itching     PN: LW Reaction: HIVES, Swelling     Tape [Adhesive Tape] Hives     Metoprolol Itching and Rash     Metronidazole Hives and Rash     PN: LW Reaction: HIVES

## 2021-09-27 ENCOUNTER — PATIENT OUTREACH (OUTPATIENT)
Dept: CARE COORDINATION | Facility: CLINIC | Age: 71
End: 2021-09-27

## 2021-09-27 DIAGNOSIS — Z71.89 OTHER SPECIFIED COUNSELING: ICD-10-CM

## 2021-09-27 NOTE — PROGRESS NOTES
"Clinic Care Coordination Contact  [facility]: Post-Discharge Note  SITUATION                                                      Admission:    Admission Date: 09/23/21   Reason for Admission: E coli UTI and suspected pyelonephritis  Discharge:   Discharge Date: 09/26/21  Discharge Diagnosis: E coli UTI and suspected pyelonephritis    BACKGROUND                                                      Gem Gama is a very pleasant 70 year old female with coronary artery disease, ischemic cardiomyopathy, dyslipidemia, history of ST elevation MI, CKD stage III, LEA, GERD, depression, anxiety who presents with several days of dysuria.  Her initial triage complaint was of some chest pain and headache.  The headache was associated around the time frame of the urinary symptoms but given the chest pain that she noticed and her history of coronary artery disease she decided to come to the emergency department.  Feverish at home up to 103  F.  Denies any vision changes, loss of consciousness, neck stiffness.  She has not noticed any blood or clots in her urine.  Denies shortness of breath, cough, recent travel or ill contacts.  Denies abdominal pain, nausea, vomiting. Case reviewed with Dr. Ramirez from the Emergency Department. Labs and imaging reviewed.     ASSESSMENT           Discharge Assessment  How are you doing now that you are home?: \"I'm doing absolutely fine\"  How are your symptoms? (Red Flag symptoms escalate to triage hotline per guidelines): Improved  Do you feel your condition is stable enough to be safe at home until your provider visit?: Yes  Does the patient have their discharge instructions? : Yes  Does the patient have questions regarding their discharge instructions? : No  Were you started on any new medications or were there changes to any of your previous medications? : Yes  Does the patient have all of their medications?: Yes  Do you have all of your needed medical supplies or equipment (DME)?  " (i.e. oxygen tank, CPAP, cane, etc.): Yes  Discharge follow-up appointment scheduled within 14 calendar days? : No  Is patient agreeable to assistance with scheduling? : No                  PLAN                                                      Outpatient Plan:   Follow-up and recommended labs and tests       Follow up with primary care provider, Danny Conteh, within 7 days   to evaluate medication change and for hospital follow- up.            No future appointments.      For any urgent concerns, please contact our 24 hour nurse triage line: [phone] ([address] [place] [address])         Genny Julien  Community Health Worker  [facility]  [phone]

## 2021-09-28 LAB
BACTERIA BLD CULT: NO GROWTH
BACTERIA BLD CULT: NO GROWTH

## 2021-10-13 ENCOUNTER — TELEPHONE (OUTPATIENT)
Dept: FAMILY MEDICINE | Facility: CLINIC | Age: 71
End: 2021-10-13

## 2021-10-13 NOTE — TELEPHONE ENCOUNTER
Calling to schedule a lab appointment to come in now but not future labs are ordered, and it's too late in the day to get this done for patient, she has an appointment tomorrow with Dr. Conteh and she can do labs after her appointment.     Nenita Bowling RN

## 2021-10-14 ENCOUNTER — OFFICE VISIT (OUTPATIENT)
Dept: FAMILY MEDICINE | Facility: CLINIC | Age: 71
End: 2021-10-14
Payer: MEDICARE

## 2021-10-14 VITALS
TEMPERATURE: 97.8 F | WEIGHT: 179 LBS | OXYGEN SATURATION: 99 % | BODY MASS INDEX: 29.82 KG/M2 | HEIGHT: 65 IN | HEART RATE: 97 BPM | DIASTOLIC BLOOD PRESSURE: 80 MMHG | SYSTOLIC BLOOD PRESSURE: 134 MMHG

## 2021-10-14 DIAGNOSIS — E55.9 VITAMIN D DEFICIENCY: ICD-10-CM

## 2021-10-14 DIAGNOSIS — F33.1 MAJOR DEPRESSIVE DISORDER, RECURRENT EPISODE, MODERATE (H): ICD-10-CM

## 2021-10-14 DIAGNOSIS — I10 HYPERTENSION GOAL BP (BLOOD PRESSURE) < 140/90: ICD-10-CM

## 2021-10-14 DIAGNOSIS — I25.728 CORONARY ARTERY DISEASE OF AUTOLOGOUS BYPASS GRAFT WITH STABLE ANGINA PECTORIS (H): ICD-10-CM

## 2021-10-14 DIAGNOSIS — I50.22 CHRONIC SYSTOLIC HEART FAILURE (H): ICD-10-CM

## 2021-10-14 DIAGNOSIS — N18.32 CKD STAGE G3B/A1, GFR 30-44 AND ALBUMIN CREATININE RATIO <30 MG/G (H): ICD-10-CM

## 2021-10-14 DIAGNOSIS — K80.20 CHOLELITHIASIS WITHOUT CHOLECYSTITIS: ICD-10-CM

## 2021-10-14 DIAGNOSIS — Z23 NEED FOR PROPHYLACTIC VACCINATION AND INOCULATION AGAINST INFLUENZA: ICD-10-CM

## 2021-10-14 DIAGNOSIS — N12 PYELONEPHRITIS OF RIGHT KIDNEY: Primary | ICD-10-CM

## 2021-10-14 DIAGNOSIS — E78.5 HYPERLIPIDEMIA LDL GOAL <70: ICD-10-CM

## 2021-10-14 PROBLEM — I25.10 CORONARY ARTERY DISEASE INVOLVING NATIVE CORONARY ARTERY OF NATIVE HEART WITHOUT ANGINA PECTORIS: Status: RESOLVED | Noted: 2019-10-25 | Resolved: 2021-10-14

## 2021-10-14 PROBLEM — I21.3 STEMI (ST ELEVATION MYOCARDIAL INFARCTION) (H): Status: RESOLVED | Noted: 2019-10-09 | Resolved: 2021-10-14

## 2021-10-14 PROBLEM — R51.9 ACUTE NONINTRACTABLE HEADACHE, UNSPECIFIED HEADACHE TYPE: Status: RESOLVED | Noted: 2021-09-24 | Resolved: 2021-10-14

## 2021-10-14 PROBLEM — R07.9 CHEST PAIN, UNSPECIFIED TYPE: Status: RESOLVED | Noted: 2020-12-09 | Resolved: 2021-10-14

## 2021-10-14 PROBLEM — I21.02 ST ELEVATION MYOCARDIAL INFARCTION INVOLVING LEFT ANTERIOR DESCENDING (LAD) CORONARY ARTERY (H): Status: RESOLVED | Noted: 2019-10-09 | Resolved: 2021-10-14

## 2021-10-14 PROBLEM — R07.9 ACUTE CHEST PAIN: Status: RESOLVED | Noted: 2021-09-24 | Resolved: 2021-10-14

## 2021-10-14 PROBLEM — M54.9 ACUTE RIGHT-SIDED BACK PAIN, UNSPECIFIED BACK LOCATION: Status: RESOLVED | Noted: 2020-12-09 | Resolved: 2021-10-14

## 2021-10-14 LAB
ALBUMIN UR-MCNC: NEGATIVE MG/DL
ANION GAP SERPL CALCULATED.3IONS-SCNC: 7 MMOL/L (ref 3–14)
APPEARANCE UR: CLEAR
BILIRUB UR QL STRIP: NEGATIVE
BUN SERPL-MCNC: 13 MG/DL (ref 7–30)
CALCIUM SERPL-MCNC: 8.8 MG/DL (ref 8.5–10.1)
CHLORIDE BLD-SCNC: 109 MMOL/L (ref 94–109)
CO2 SERPL-SCNC: 26 MMOL/L (ref 20–32)
COLOR UR AUTO: YELLOW
CREAT SERPL-MCNC: 0.92 MG/DL (ref 0.52–1.04)
CREAT UR-MCNC: 101 MG/DL
GFR SERPL CREATININE-BSD FRML MDRD: 62 ML/MIN/1.73M2
GLUCOSE BLD-MCNC: 108 MG/DL (ref 70–99)
GLUCOSE UR STRIP-MCNC: NEGATIVE MG/DL
HGB UR QL STRIP: NEGATIVE
KETONES UR STRIP-MCNC: NEGATIVE MG/DL
LEUKOCYTE ESTERASE UR QL STRIP: NEGATIVE
MICROALBUMIN UR-MCNC: 6 MG/L
MICROALBUMIN/CREAT UR: 5.94 MG/G CR (ref 0–25)
NITRATE UR QL: NEGATIVE
PH UR STRIP: 5 [PH] (ref 5–7)
PHOSPHATE SERPL-MCNC: 4.7 MG/DL (ref 2.5–4.5)
POTASSIUM BLD-SCNC: 4 MMOL/L (ref 3.4–5.3)
RBC #/AREA URNS AUTO: ABNORMAL /HPF
SODIUM SERPL-SCNC: 142 MMOL/L (ref 133–144)
SP GR UR STRIP: 1.02 (ref 1–1.03)
SQUAMOUS #/AREA URNS AUTO: ABNORMAL /LPF
UROBILINOGEN UR STRIP-ACNC: 0.2 E.U./DL
WBC #/AREA URNS AUTO: ABNORMAL /HPF

## 2021-10-14 PROCEDURE — 82043 UR ALBUMIN QUANTITATIVE: CPT | Performed by: INTERNAL MEDICINE

## 2021-10-14 PROCEDURE — 82306 VITAMIN D 25 HYDROXY: CPT | Performed by: INTERNAL MEDICINE

## 2021-10-14 PROCEDURE — 90662 IIV NO PRSV INCREASED AG IM: CPT | Performed by: INTERNAL MEDICINE

## 2021-10-14 PROCEDURE — 80048 BASIC METABOLIC PNL TOTAL CA: CPT | Performed by: INTERNAL MEDICINE

## 2021-10-14 PROCEDURE — 96127 BRIEF EMOTIONAL/BEHAV ASSMT: CPT | Performed by: INTERNAL MEDICINE

## 2021-10-14 PROCEDURE — 99214 OFFICE O/P EST MOD 30 MIN: CPT | Mod: 25 | Performed by: INTERNAL MEDICINE

## 2021-10-14 PROCEDURE — 36415 COLL VENOUS BLD VENIPUNCTURE: CPT | Performed by: INTERNAL MEDICINE

## 2021-10-14 PROCEDURE — G0008 ADMIN INFLUENZA VIRUS VAC: HCPCS | Performed by: INTERNAL MEDICINE

## 2021-10-14 PROCEDURE — 84100 ASSAY OF PHOSPHORUS: CPT | Performed by: INTERNAL MEDICINE

## 2021-10-14 PROCEDURE — 81001 URINALYSIS AUTO W/SCOPE: CPT | Performed by: INTERNAL MEDICINE

## 2021-10-14 RX ORDER — SERTRALINE HYDROCHLORIDE 100 MG/1
100 TABLET, FILM COATED ORAL DAILY
Qty: 90 TABLET | Refills: 3 | Status: SHIPPED | OUTPATIENT
Start: 2021-10-14 | End: 2022-08-26

## 2021-10-14 RX ORDER — NITROGLYCERIN 0.4 MG/1
0.4 TABLET SUBLINGUAL EVERY 5 MIN PRN
Qty: 25 TABLET | Refills: 11 | Status: SHIPPED | OUTPATIENT
Start: 2021-10-14 | End: 2023-07-17

## 2021-10-14 RX ORDER — CARVEDILOL 3.12 MG/1
TABLET ORAL
Qty: 180 TABLET | Refills: 3 | Status: SHIPPED | OUTPATIENT
Start: 2021-10-14 | End: 2022-08-26

## 2021-10-14 RX ORDER — URSODIOL 300 MG/1
300 CAPSULE ORAL 2 TIMES DAILY
Qty: 180 CAPSULE | Refills: 3 | Status: SHIPPED | OUTPATIENT
Start: 2021-10-14 | End: 2021-11-08

## 2021-10-14 RX ORDER — ATORVASTATIN CALCIUM 40 MG/1
40 TABLET, FILM COATED ORAL DAILY
Qty: 90 TABLET | Refills: 3 | Status: SHIPPED | OUTPATIENT
Start: 2021-10-14 | End: 2022-08-26

## 2021-10-14 RX ORDER — ISOSORBIDE MONONITRATE 30 MG/1
15 TABLET, EXTENDED RELEASE ORAL DAILY
Qty: 45 TABLET | Refills: 3 | Status: SHIPPED | OUTPATIENT
Start: 2021-10-14 | End: 2022-02-08

## 2021-10-14 RX ORDER — CLOPIDOGREL BISULFATE 75 MG/1
75 TABLET ORAL DAILY
Qty: 90 TABLET | Refills: 3 | Status: SHIPPED | OUTPATIENT
Start: 2021-10-14 | End: 2022-08-26

## 2021-10-14 RX ORDER — VALSARTAN 40 MG/1
40 TABLET ORAL DAILY
Qty: 90 TABLET | Refills: 3 | Status: SHIPPED | OUTPATIENT
Start: 2021-10-14 | End: 2022-08-26

## 2021-10-14 ASSESSMENT — PATIENT HEALTH QUESTIONNAIRE - PHQ9
SUM OF ALL RESPONSES TO PHQ QUESTIONS 1-9: 9
SUM OF ALL RESPONSES TO PHQ QUESTIONS 1-9: 9
10. IF YOU CHECKED OFF ANY PROBLEMS, HOW DIFFICULT HAVE THESE PROBLEMS MADE IT FOR YOU TO DO YOUR WORK, TAKE CARE OF THINGS AT HOME, OR GET ALONG WITH OTHER PEOPLE: SOMEWHAT DIFFICULT

## 2021-10-14 ASSESSMENT — PAIN SCALES - GENERAL: PAINLEVEL: NO PAIN (0)

## 2021-10-14 ASSESSMENT — MIFFLIN-ST. JEOR: SCORE: 1322.82

## 2021-10-14 NOTE — PATIENT INSTRUCTIONS
At [facility], we strive to deliver an exceptional experience to you, every time we see you. If you receive a survey, please complete it as we do value your feedback.  If you have MyChart, you can expect to receive results automatically within 24 hours of their completion.  Your provider will send a note interpreting your results as well.   If you do not have MyChart, you should receive your results in about a week by mail.    Your care team:                            Family Medicine Internal Medicine   MD Danny Christian MD Shantel Branch-Fleming, MD Srinivasa Vaka, MD Katya Belousova, PABETO Gama, APRN CNP    Sd Wagner, MD Pediatrics   Mustapha Suresh, PABETO Avalos, CNP MD Maria E Bone APRN CNP   MD Raissa Bravo MD Deborah Mielke, MD Sharona Huitron, APRN [facility]      Clinic hours: Monday - Thursday 7 am-6 pm; Fridays 7 am-5 pm.   Urgent care: Monday - Friday 10 am- 8 pm; Saturday and Sunday 9 am-5 pm.    Clinic: [phone]       [facility]: Monday - Thursday 8 am - 7 pm; Friday 8 am - 6 pm  [facility]: [phone]     Use [email or web address] for 24/7 diagnosis and treatment of dozens of conditions.

## 2021-10-14 NOTE — PROGRESS NOTES
Naila Moody is a 70 year old who presents for the following health issues     1) Hospital follow-up for pyelonephritis.    2) Ursodiol follow-up      Answers for HPI/ROS submitted by the patient on 10/14/2021  If you checked off any problems, how difficult have these problems made it for you to do your work, take care of things at home, or get along with other people?: Somewhat difficult  PHQ9 TOTAL SCORE: 9\    DIAGNOSTICS    Sodium 133 - 144 mmol/L 140      Potassium 3.4 - 5.3 mmol/L 4.2     Chloride 94 - 109 mmol/L 107     Carbon Dioxide (CO2) 20 - 32 mmol/L 28     Anion Gap 3 - 14 mmol/L 5     Urea Nitrogen 7 - 30 mg/dL 12     Creatinine 0.52 - 1.04 mg/dL 1.02     Calcium 8.5 - 10.1 mg/dL 8.7     Glucose 70 - 99 mg/dL 95     GFR Estimate >60 mL/min/1.73m2 55Low     Comment: As of July 11, 2021, eGFR is calculated by the CKD-EPI creatinine equation, without race adjustment. eGFR can be influenced by muscle mass, exercise, and diet. The reported eGFR is an estimation only and is only applicable if the renal function is stable.   Resulting Agency  [facility]         Specimen Collected: 09/26/21  6:46 AM        Bilirubin Total 0.2 - 1.3 mg/dL 0.7      Bilirubin Direct 0.0 - 0.2 mg/dL 0.2     Protein Total 6.8 - 8.8 g/dL 6.3Low      Albumin 3.4 - 5.0 g/dL 2.8Low      Alkaline Phosphatase 40 - 150 U/L 75     AST 0 - 45 U/L 19     ALT 0 - 50 U/L 21    Resulting Agency  [facility]         Specimen Collected: 09/25/21  6:48 AM        Creatinine Urine mg/dL 107      Albumin Urine mg/L mg/L 9     Albumin Urine mg/g Cr 0 - 25 mg/g Cr 8.61    Resulting Agency  [facility]         Specimen Collected: 10/15/20  1:49 PM   Last Resulted: 10/15/20  8:44 PM         NM Lexiscan stress test (nuc card)  Order: [id number]  Status:  Final result   Visible to patient:  No (not released) Next appt:  10/20/2021 at 02:00 PM in Behavioral Health ([facility] Rose, [facility])   0 Result Notes    Details    Reading Physician Reading Date Result Priority    Edson Hidalgo MD  [phone] 12/10/2020 Routine   Edson Hidalgo MD  [phone] 12/10/2020                 Result Text        The nuclear stress test is abnormal.     There is a small area of a mild degree of infarction in the apical segment(s) of the left ventricle associated with a mild degree of jasmeet-infarct ischemia. This appears to be in the left anterior descending distribution.     Left ventricular function is normal.     There is no prior study for comparison.        ECG Summary    ECG Delayed R wave progression    The stress electrocardiogram is negative for inducible ischemic EKG changes.  There were no arrhythmias during stress.  There were no arrhythmias during recovery.         ASSESSMENT/PLAN  Pyelonephritis of right kidney  - UA with Microscopic reflex to Culture - lab collect  - Urine Microscopic    Cholelithiasis without cholecystitis  - US Abdomen Complete; Future  - ursodiol (ACTIGALL) 300 MG capsule; Take 1 capsule (300 mg) by mouth 2 times daily    Chronic systolic heart failure (H)  - REVIEW OF HEALTH MAINTENANCE PROTOCOL ORDERS  - Basic metabolic panel  (Ca, Cl, CO2, Creat, Gluc, K, Na, BUN)  - valsartan (DIOVAN) 40 MG tablet; Take 1 tablet (40 mg) by mouth daily  - carvedilol (COREG) 3.125 MG tablet; TAKE 1 TABLET(3.125 MG) BY MOUTH TWICE DAILY WITH MEALS  - Echocardiogram Complete; Future    Coronary artery disease of autologous bypass graft with stable angina pectoris (H)  - Basic metabolic panel  (Ca, Cl, CO2, Creat, Gluc, K, Na, BUN)  - valsartan (DIOVAN) 40 MG tablet; Take 1 tablet (40 mg) by mouth daily  - clopidogrel (PLAVIX) 75 MG tablet; Take 1 tablet (75 mg) by mouth daily  - nitroGLYcerin (NITROSTAT) 0.4 MG sublingual tablet; Place 1 tablet (0.4 mg) under the tongue every 5 minutes as needed for chest pain  - atorvastatin (LIPITOR) 40 MG tablet; Take 1 tablet (40 mg) by mouth daily  - isosorbide mononitrate (IMDUR) 30 MG 24 hr tablet; Take 0.5 tablets (15 mg) by  mouth daily  - Echocardiogram Complete; Future    Hypertension goal BP (blood pressure) < 140/90  - REVIEW OF HEALTH MAINTENANCE PROTOCOL ORDERS  - Albumin Random Urine Quantitative with Creat Ratio  - Basic metabolic panel  (Ca, Cl, CO2, Creat, Gluc, K, Na, BUN)    Hyperlipidemia LDL goal <70  - REVIEW OF HEALTH MAINTENANCE PROTOCOL ORDERS  - atorvastatin (LIPITOR) 40 MG tablet; Take 1 tablet (40 mg) by mouth daily    Major depressive disorder, recurrent episode, moderate (H)  - REVIEW OF HEALTH MAINTENANCE PROTOCOL ORDERS  - sertraline (ZOLOFT) 100 MG tablet; Take 1 tablet (100 mg) by mouth daily    CKD stage G3b/A1, GFR 30-44 and albumin creatinine ratio <30 mg/g (H)  - Phosphorus    Vitamin D deficiency  - Vitamin D Deficiency    Need for prophylactic vaccination and inoculation against influenza  - SC FLU VACCINE, INCREASED ANTIGEN, PRESV FREE    Disposition:  Follow-up in 6 months.    Danny Conteh MD  Internal Medicine

## 2021-10-15 LAB — DEPRECATED CALCIDIOL+CALCIFEROL SERPL-MC: 66 UG/L (ref 20–75)

## 2021-10-15 ASSESSMENT — PATIENT HEALTH QUESTIONNAIRE - PHQ9: SUM OF ALL RESPONSES TO PHQ QUESTIONS 1-9: 9

## 2021-10-20 ENCOUNTER — HOSPITAL ENCOUNTER (OUTPATIENT)
Dept: BEHAVIORAL HEALTH | Facility: CLINIC | Age: 71
End: 2021-10-20
Attending: FAMILY MEDICINE

## 2021-10-20 PROCEDURE — 999N000216 HC STATISTIC ADULT CD FACE TO FACE-NO CHRG: Mod: GT | Performed by: COUNSELOR
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
## 2021-10-24 PROBLEM — N12 PYELONEPHRITIS OF RIGHT KIDNEY: Status: ACTIVE | Noted: 2021-10-24

## 2021-10-28 ENCOUNTER — ANCILLARY PROCEDURE (OUTPATIENT)
Dept: ULTRASOUND IMAGING | Facility: CLINIC | Age: 71
End: 2021-10-28
Attending: INTERNAL MEDICINE
Payer: MEDICARE

## 2021-10-28 ENCOUNTER — ANCILLARY PROCEDURE (OUTPATIENT)
Dept: CARDIOLOGY | Facility: CLINIC | Age: 71
End: 2021-10-28
Attending: INTERNAL MEDICINE
Payer: MEDICARE

## 2021-10-28 DIAGNOSIS — I25.728 CORONARY ARTERY DISEASE OF AUTOLOGOUS BYPASS GRAFT WITH STABLE ANGINA PECTORIS (H): Primary | ICD-10-CM

## 2021-10-28 DIAGNOSIS — K80.20 CHOLELITHIASIS WITHOUT CHOLECYSTITIS: ICD-10-CM

## 2021-10-28 DIAGNOSIS — I50.22 CHRONIC SYSTOLIC HEART FAILURE (H): ICD-10-CM

## 2021-10-28 LAB — LVEF ECHO: NORMAL

## 2021-10-28 PROCEDURE — 76700 US EXAM ABDOM COMPLETE: CPT

## 2021-10-28 PROCEDURE — C8929 TTE W OR WO FOL WCON,DOPPLER: HCPCS | Performed by: INTERNAL MEDICINE

## 2021-10-28 RX ADMIN — Medication 7 ML: at 10:37

## 2021-10-29 ENCOUNTER — TELEPHONE (OUTPATIENT)
Dept: FAMILY MEDICINE | Facility: CLINIC | Age: 71
End: 2021-10-29
Payer: MEDICARE

## 2021-10-29 DIAGNOSIS — N13.5 URETEROPELVIC JUNCTION (UPJ) OBSTRUCTION, RIGHT: Primary | ICD-10-CM

## 2021-10-29 NOTE — TELEPHONE ENCOUNTER
Attempted to reach pt. There was no answer. LM to return call to nurse at [facility] at [phone].    Reason for call relay provider result message below as written and left message to return call to a nurse.    If patient calls back:   Route to Lilibeth Turner RN    ----- Message -----  From: Danny Conteh MD  Sent: 10/28/2021   2:47 PM CDT  To: [facility]    Ultrasound still shows gallstones WITHOUT gallbladder wall thickening.  Continue Ursodiol at the same dose.  Rest of the ultrasound is unremarkable (normal pancreas, normal liver, normal spleen and normal kidneys)    (Call patient)

## 2021-11-01 NOTE — TELEPHONE ENCOUNTER
This writer attempted to contact Gem on 11/01/21      Reason for call results note from provider as written below and left detailed message to give a return phone call at the [facility] and ask to speak to a nurse. [phone]      If patient calls back:   Relay message from provider below, (read verbatim), document that pt called and close encounter        Nenita Bowling RN

## 2021-11-02 NOTE — TELEPHONE ENCOUNTER
"Pt calling back, RN gave her the information below.  Patient has additional questions and would like a call-back.     1) Pt questions if the Ursodiol is working, states \"If the medication is working, why are the gallstones still present?\"  Pt also questions if she needs to have surgery to remove the stones.  Pt continues to have diarrhea but not as bad as before.     2) Pt was told to see a Urologist by a hospital provider.  Pt doesn't want to pick a random doctor.  She would like Dr. Conteh's input on a Urologist recommendation. Pt does not want to see a young doctor who strictly follows algorithms.  She wants to find a doctor who sees the \"big picture\" and follows \"heuristics\".  Pt would appreciate any input on finding a Urologist who would be good for her.      Pt can be reached at # on file, leave a detailed VM if no answer.     Ghada Stern RN/FNA  "

## 2021-11-03 NOTE — TELEPHONE ENCOUNTER
Ursodiol is necessary to reduce biliary colic even if gallstones are still present. It dissolves slowly and it takes 2 - 3 years.    Urology referral sent.   Patient agrees with plan.

## 2021-11-03 NOTE — TELEPHONE ENCOUNTER
Routing to provider to review questions below from Pt regarding the ultrasound results.       Marilu Wilson RN, BSN

## 2021-11-08 ENCOUNTER — OFFICE VISIT (OUTPATIENT)
Dept: CARDIOLOGY | Facility: CLINIC | Age: 71
End: 2021-11-08
Payer: MEDICARE

## 2021-11-08 VITALS
HEIGHT: 65 IN | WEIGHT: 179 LBS | BODY MASS INDEX: 29.82 KG/M2 | HEART RATE: 82 BPM | OXYGEN SATURATION: 97 % | DIASTOLIC BLOOD PRESSURE: 76 MMHG | SYSTOLIC BLOOD PRESSURE: 131 MMHG

## 2021-11-08 DIAGNOSIS — R06.09 DYSPNEA ON EXERTION: Primary | ICD-10-CM

## 2021-11-08 PROCEDURE — 99214 OFFICE O/P EST MOD 30 MIN: CPT | Performed by: INTERNAL MEDICINE

## 2021-11-08 ASSESSMENT — MIFFLIN-ST. JEOR: SCORE: 1314.88

## 2021-11-08 NOTE — PROGRESS NOTES
CARDIOLOGY CLINIC VISIT   DATE OF SERVICE:  November 8, 2021    PRIMARY CARE PHYSICIAN:  Danny Balbuena Vocal    HISTORY OF PRESENT ILLNESS:  Ms. Gem Gama is a very pleasant 68 year old female with a history of coronary artery disease, ischemic cardiomyopathy, hypertension, CKD, asthma and depression. She presents to clinic today for follow up.  She was last seen by Leonor Wilson CNP in 12/2020.       In brief review, the patient was hospitalized 10/9/2019-10/13/2019 due to STEMI, EKG demonstrated ST elevation in the anterolateral leads, patient was taken emergently taken to cath lab and underwent EDWIN x 2 to proximal LAD. Coronary angiogram showed severe single vessel obstructive CAD of LAD (100%) as well as moderate non-obstructive CAD in OM1 and OM2 (60%, 50%). Patient initially requiring intraaortic balloon pump and pressor support. Echocardiogram showed EF 35-40%, grade I diastolic dysfunction, RV normal function and size, and no significant valvular disease. Patient was taken back to cath lab to have RCA evaluated which showed no obstructive disease (40% RCA stenosis).  Patient was initially on Brilinta and aspirin, but due to cost Brilinta was changed to Plavix. Patient was started on carvedilol, losartan and atorvastatin.     Heidy Polo rehospitalized in February 2020 with complaints of chest pain and nausea/vomiting. This was thought to be related to something she ate followed by a vagal reaction. Patient tells me that after discharge her PCP found gallstones which were felt to likely be contributing. An echocardiogram was completed at this time which demonstrated normalization of LV function, EF 55-60% and no regional wall motion abnormalities. She was started on low dose lisinopril 2.5mg.        Most recently she was hospitalized 12/9-12/12/2020 for chest pain. Nuclear stress test completed which showed small area of a mild degree of infarction in the apical segment associated with mild degree of  "jasmeet-infarct ischemia. Cardiac catheterization completed 12/11 no acute coronary lesions, widely patent proximal-mid LAD stent,  at the ostium of the jailed, small-moderate D1 branch, not amendable to PCI. CTA chest 12/12 no evidence of thoracic aortic dissection and no evidence of PE. She was discharged on her prior to admission medications.     In follow up today, Heidy notes concerns of ongoing shortness of breath.  She states this is mostly with activity.  She states she has had it for \"years\", but isn't sure if there is something different about it since her last angiogram in 12/2020.  Her primary care physician put her on low dose imdur, and she feels this has helped with her shortness of breath symptoms.  She denies any exertional chest pain, chest discomfort.  She notes neck and upper back discomfort.  She also notes increased stress since her daughter had a stroke last year.  She has been told she has asthma in the past, but has not had a recent PFT assessment and is not on inhalers.   She had an echocardiogram performed last week which demonstrated significant improvement in her LV function to EF 50-55% with hypokinesis of the mid anteroseptal and inferoseptal segments.      PAST MEDICAL HISTORY:  Past Medical History:   Diagnosis Date     ADHD (attention deficit hyperactivity disorder)      Anxiety      Arthritis     back, hands, knees, hips     Asthma     controlled--has coughing symptoms     C. difficile diarrhea      Central sleep apnea      Coronary artery disease involving native coronary artery of native heart without angina pectoris 10/25/2019     Depression      Gastro-oesophageal reflux disease      Hypertension      Ischemic cardiomyopathy 10/25/2019     Narcolepsy      Renal disease     gfr is aroung 48     Sleep apnea     uses Cpap       MEDICATIONS:  Current Outpatient Medications   Medication     amoxicillin (AMOXIL) 500 MG capsule     aspirin (ASA) 81 MG EC tablet     atorvastatin " (LIPITOR) 40 MG tablet     carvedilol (COREG) 3.125 MG tablet     cefuroxime (CEFTIN) 500 MG tablet     clopidogrel (PLAVIX) 75 MG tablet     isosorbide mononitrate (IMDUR) 30 MG 24 hr tablet     Melatonin 10 MG TABS tablet     nitroGLYcerin (NITROSTAT) 0.4 MG sublingual tablet     Prenatal Multivit-Min-Fe-FA (PRENATAL/IRON) TABS     saccharomyces boulardii (FLORASTOR) 250 MG capsule     sertraline (ZOLOFT) 100 MG tablet     ursodiol (ACTIGALL) 300 MG capsule     valsartan (DIOVAN) 40 MG tablet     vitamin D3 (CHOLECALCIFEROL) 2000 units (50 mcg) tablet     No current facility-administered medications for this visit.       ALLERGIES:  Allergies   Allergen Reactions     Latex Other (See Comments) and Shortness Of Breath     Runny nose  PN: LW Reaction: DIFFICULTY BREATHING       Flagyl [Metronidazole Hcl] Anaphylaxis and Rash     Food      PN: LW FI1: nka LW FI2:     Hydrochlorothiazide W/Triamterene      PN: LW Reaction: skin rash     No Clinical Screening - See Comments      PN: LW Other1: -Adhesive Tape     Sulfa Drugs Anaphylaxis, Hives and Itching     PN: LW Reaction: HIVES, Swelling     Tape [Adhesive Tape] Hives     Metoprolol Itching and Rash     Metronidazole Hives and Rash     PN: LW Reaction: HIVES         SOCIAL HISTORY:  I have reviewed this patient's social history and updated it with pertinent information if needed. Gem Gama  reports that she has never smoked. She has never used smokeless tobacco. She reports that she does not drink alcohol and does not use drugs.    FAMILY HISTORY:  I have reviewed this patient's family history and updated it with pertinent information if needed.   Family History   Problem Relation Age of Onset     Hypertension Mother      Arthritis Mother      Cerebrovascular Disease Father         Three Strokes     Diabetes Father         and brother     Thyroid Disease Sister         Hypothyroid     Multiple Sclerosis Daughter      Sjogren's Daughter        REVIEW OF  SYSTEMS:  A complete ROS was obtained and the pertinent positives are outlined in the history of present illness above.  The remainder of systems is negative.      PHYSICAL EXAM:                     Vital Signs with Ranges     0 lbs 0 oz    Constitutional: awake, alert, no distress  Eyes: PERRL, sclera nonicteric  ENT: trachea midline  Respiratory: CTAB  Cardiovascular: RRR no m/r/g, no JVD  GI: nondistended, nontender, bowel sounds present  Lymph/Hematologic: no lymphadenopathy  Skin: dry, no rash  Musculoskeletal: good muscle tone, strength 5/5 in upper and lower extremities  Neurologic: no focal deficits  Neuropsychiatric: appropriate affact          ASSESSMENT:  1.  Coronary artery disease:  S/P anterior STEMI in 10/2019 with EDWIN x2 to mid LAD.  Repeat coronary angiogram in 12/2020 demonstrated patent stents and nonobstructive CAD.  Continues on ASA/plavix; will continue for now.   2.  Hx of  Ischemic cardiomyopathy:  Initial EF 35-40% with significant improvement by most recent echocardiogram 10/2021 with EF 50-55%  3.  Chronic dyspnea on exertion:  With subjective worsening.  She reports improvement with low dose imdur; could possibly be secondary to small vessel disease  4.  Hypertension:  At goal  5.  Hyperlipidemia:  At goal on atorvastatin      RECOMMENDATIONS:  1. Regadenoson nuclear stress test for further risk stratification.  If no new findings of ischemia, will stop plavix.  Continue ASA. Continue Statin, beta-blocker, imdur  2.  PFTs to assess for reactive airway disease  3.  Plan for follow up in 6 months or before than as necessary.      Mel Dennison MD [facility]  November 8, 2021

## 2021-11-08 NOTE — LETTER
11/8/2021    Danny Conteh MD  [address]  [place] [address]    RE: Gem Gama       Dear Colleague,    I had the pleasure of seeing Gem Gama in the [facility].  CARDIOLOGY CLINIC VISIT   DATE OF SERVICE:  November 8, 2021    PRIMARY CARE PHYSICIAN:  Danny Conteh    HISTORY OF PRESENT ILLNESS:  Ms. Gem Gama is a very pleasant 68 year old female with a history of coronary artery disease, ischemic cardiomyopathy, hypertension, CKD, asthma and depression. She presents to clinic today for follow up.  She was last seen by Leonor Wilson CNP in 12/2020.       In brief review, the patient was hospitalized 10/9/2019-10/13/2019 due to STEMI, EKG demonstrated ST elevation in the anterolateral leads, patient was taken emergently taken to cath lab and underwent EDWIN x 2 to proximal LAD. Coronary angiogram showed severe single vessel obstructive CAD of LAD (100%) as well as moderate non-obstructive CAD in OM1 and OM2 (60%, 50%). Patient initially requiring intraaortic balloon pump and pressor support. Echocardiogram showed EF 35-40%, grade I diastolic dysfunction, RV normal function and size, and no significant valvular disease. Patient was taken back to cath lab to have RCA evaluated which showed no obstructive disease (40% RCA stenosis).  Patient was initially on Brilinta and aspirin, but due to cost Brilinta was changed to Plavix. Patient was started on carvedilol, losartan and atorvastatin.     Heidy Polo rehospitalized in February 2020 with complaints of chest pain and nausea/vomiting. This was thought to be related to something she ate followed by a vagal reaction. Patient tells me that after discharge her PCP found gallstones which were felt to likely be contributing. An echocardiogram was completed at this time which demonstrated normalization of LV function, EF 55-60% and no regional wall motion abnormalities. She was  "started on low dose lisinopril 2.5mg.        Most recently she was hospitalized 12/9-12/12/2020 for chest pain. Nuclear stress test completed which showed small area of a mild degree of infarction in the apical segment associated with mild degree of jasmeet-infarct ischemia. Cardiac catheterization completed 12/11 no acute coronary lesions, widely patent proximal-mid LAD stent,  at the ostium of the jailed, small-moderate D1 branch, not amendable to PCI. CTA chest 12/12 no evidence of thoracic aortic dissection and no evidence of PE. She was discharged on her prior to admission medications.     In follow up today, Heidy notes concerns of ongoing shortness of breath.  She states this is mostly with activity.  She states she has had it for \"years\", but isn't sure if there is something different about it since her last angiogram in 12/2020.  Her primary care physician put her on low dose imdur, and she feels this has helped with her shortness of breath symptoms.  She denies any exertional chest pain, chest discomfort.  She notes neck and upper back discomfort.  She also notes increased stress since her daughter had a stroke last year.  She has been told she has asthma in the past, but has not had a recent PFT assessment and is not on inhalers.   She had an echocardiogram performed last week which demonstrated significant improvement in her LV function to EF 50-55% with hypokinesis of the mid anteroseptal and inferoseptal segments.      PAST MEDICAL HISTORY:  Past Medical History:   Diagnosis Date     ADHD (attention deficit hyperactivity disorder)      Anxiety      Arthritis     back, hands, knees, hips     Asthma     controlled--has coughing symptoms     C. difficile diarrhea      Central sleep apnea      Coronary artery disease involving native coronary artery of native heart without angina pectoris 10/25/2019     Depression      Gastro-oesophageal reflux disease      Hypertension      Ischemic cardiomyopathy " 10/25/2019     Narcolepsy      Renal disease     gfr is aroung 48     Sleep apnea     uses Cpap       MEDICATIONS:  Current Outpatient Medications   Medication     amoxicillin (AMOXIL) 500 MG capsule     aspirin (ASA) 81 MG EC tablet     atorvastatin (LIPITOR) 40 MG tablet     carvedilol (COREG) 3.125 MG tablet     cefuroxime (CEFTIN) 500 MG tablet     clopidogrel (PLAVIX) 75 MG tablet     isosorbide mononitrate (IMDUR) 30 MG 24 hr tablet     Melatonin 10 MG TABS tablet     nitroGLYcerin (NITROSTAT) 0.4 MG sublingual tablet     Prenatal Multivit-Min-Fe-FA (PRENATAL/IRON) TABS     saccharomyces boulardii (FLORASTOR) 250 MG capsule     sertraline (ZOLOFT) 100 MG tablet     ursodiol (ACTIGALL) 300 MG capsule     valsartan (DIOVAN) 40 MG tablet     vitamin D3 (CHOLECALCIFEROL) 2000 units (50 mcg) tablet     No current facility-administered medications for this visit.       ALLERGIES:  Allergies   Allergen Reactions     Latex Other (See Comments) and Shortness Of Breath     Runny nose  PN: LW Reaction: DIFFICULTY BREATHING       Flagyl [Metronidazole Hcl] Anaphylaxis and Rash     Food      PN: LW FI1: nka LW FI2:     Hydrochlorothiazide W/Triamterene      PN: LW Reaction: skin rash     No Clinical Screening - See Comments      PN: LW Other1: -Adhesive Tape     Sulfa Drugs Anaphylaxis, Hives and Itching     PN: LW Reaction: HIVES, Swelling     Tape [Adhesive Tape] Hives     Metoprolol Itching and Rash     Metronidazole Hives and Rash     PN: LW Reaction: HIVES         SOCIAL HISTORY:  I have reviewed this patient's social history and updated it with pertinent information if needed. Gem Gama  reports that she has never smoked. She has never used smokeless tobacco. She reports that she does not drink alcohol and does not use drugs.    FAMILY HISTORY:  I have reviewed this patient's family history and updated it with pertinent information if needed.   Family History   Problem Relation Age of Onset     Hypertension  Mother      Arthritis Mother      Cerebrovascular Disease Father         Three Strokes     Diabetes Father         and brother     Thyroid Disease Sister         Hypothyroid     Multiple Sclerosis Daughter      Sjogren's Daughter        REVIEW OF SYSTEMS:  A complete ROS was obtained and the pertinent positives are outlined in the history of present illness above.  The remainder of systems is negative.      PHYSICAL EXAM:                     Vital Signs with Ranges     0 lbs 0 oz    Constitutional: awake, alert, no distress  Eyes: PERRL, sclera nonicteric  ENT: trachea midline  Respiratory: CTAB  Cardiovascular: RRR no m/r/g, no JVD  GI: nondistended, nontender, bowel sounds present  Lymph/Hematologic: no lymphadenopathy  Skin: dry, no rash  Musculoskeletal: good muscle tone, strength 5/5 in upper and lower extremities  Neurologic: no focal deficits  Neuropsychiatric: appropriate affact          ASSESSMENT:  1.  Coronary artery disease:  S/P anterior STEMI in 10/2019 with EDWIN x2 to mid LAD.  Repeat coronary angiogram in 12/2020 demonstrated patent stents and nonobstructive CAD.  Continues on ASA/plavix; will continue for now.   2.  Hx of  Ischemic cardiomyopathy:  Initial EF 35-40% with significant improvement by most recent echocardiogram 10/2021 with EF 50-55%  3.  Chronic dyspnea on exertion:  With subjective worsening.  She reports improvement with low dose imdur; could possibly be secondary to small vessel disease  4.  Hypertension:  At goal  5.  Hyperlipidemia:  At goal on atorvastatin      RECOMMENDATIONS:  1. Regadenoson nuclear stress test for further risk stratification.  If no new findings of ischemia, will stop plavix.  Continue ASA. Continue Statin, beta-blocker, imdur  2.  PFTs to assess for reactive airway disease  3.  Plan for follow up in 6 months or before than as necessary.        Mel Dennison MD [facility]  November 8, 2021

## 2021-11-08 NOTE — PATIENT INSTRUCTIONS
-Schedule nuclear stress test  -Schedule PFTs (lung test)  -Follow up with Dr. Dennison in 6 months

## 2021-11-15 ENCOUNTER — HOSPITAL ENCOUNTER (OUTPATIENT)
Dept: CARDIOLOGY | Facility: CLINIC | Age: 71
End: 2021-11-15
Attending: INTERNAL MEDICINE
Payer: MEDICARE

## 2021-11-15 ENCOUNTER — HOSPITAL ENCOUNTER (OUTPATIENT)
Dept: CARDIOLOGY | Facility: CLINIC | Age: 71
Discharge: HOME | End: 2021-11-15
Attending: INTERNAL MEDICINE | Admitting: INTERNAL MEDICINE
Payer: MEDICARE

## 2021-11-15 ENCOUNTER — HOSPITAL ENCOUNTER (OUTPATIENT)
Dept: CARDIOLOGY | Facility: CLINIC | Age: 71
Setting detail: NUCLEAR MEDICINE
End: 2021-11-15
Attending: INTERNAL MEDICINE
Payer: MEDICARE

## 2021-11-15 VITALS
DIASTOLIC BLOOD PRESSURE: 60 MMHG | HEIGHT: 65 IN | OXYGEN SATURATION: 98 % | BODY MASS INDEX: 29.32 KG/M2 | HEART RATE: 81 BPM | WEIGHT: 176 LBS | SYSTOLIC BLOOD PRESSURE: 118 MMHG

## 2021-11-15 DIAGNOSIS — R06.09 DYSPNEA ON EXERTION: ICD-10-CM

## 2021-11-15 LAB
CV STRESS MAX HR HE: 107
NUC STRESS EJECTION FRACTION: 54 %
RATE PRESSURE PRODUCT: NORMAL
STRESS ECHO BASELINE DIASTOLIC HE: 60
STRESS ECHO BASELINE HR: 87 BPM
STRESS ECHO BASELINE SYSTOLIC BP: 118
STRESS ECHO CALCULATED PERCENT HR: 72 %
STRESS ECHO LAST STRESS DIASTOLIC BP: 66
STRESS ECHO LAST STRESS SYSTOLIC BP: 122
STRESS ECHO TARGET HR: 148

## 2021-11-15 PROCEDURE — G1004 CDSM NDSC: HCPCS | Performed by: INTERNAL MEDICINE

## 2021-11-15 PROCEDURE — 343N000001 HC RX 343: Performed by: INTERNAL MEDICINE

## 2021-11-15 PROCEDURE — 93018 CV STRESS TEST I&R ONLY: CPT | Mod: MG | Performed by: INTERNAL MEDICINE

## 2021-11-15 PROCEDURE — G1004 CDSM NDSC: HCPCS

## 2021-11-15 PROCEDURE — 93016 CV STRESS TEST SUPVJ ONLY: CPT | Performed by: INTERNAL MEDICINE

## 2021-11-15 PROCEDURE — 78452 HT MUSCLE IMAGE SPECT MULT: CPT | Mod: 26 | Performed by: INTERNAL MEDICINE

## 2021-11-15 PROCEDURE — 250N000011 HC RX IP 250 OP 636: Performed by: INTERNAL MEDICINE

## 2021-11-15 PROCEDURE — A9502 TC99M TETROFOSMIN: HCPCS | Performed by: INTERNAL MEDICINE

## 2021-11-15 RX ORDER — ALBUTEROL SULFATE 90 UG/1
2 AEROSOL, METERED RESPIRATORY (INHALATION) EVERY 5 MIN PRN
Status: DISCONTINUED | OUTPATIENT
Start: 2021-11-15 | End: 2021-11-16 | Stop reason: HOSPADM

## 2021-11-15 RX ORDER — CAFFEINE CITRATE 20 MG/ML
60 SOLUTION INTRAVENOUS
Status: DISCONTINUED | OUTPATIENT
Start: 2021-11-15 | End: 2021-11-16 | Stop reason: HOSPADM

## 2021-11-15 RX ORDER — REGADENOSON 0.08 MG/ML
0.4 INJECTION, SOLUTION INTRAVENOUS ONCE
Status: COMPLETED | OUTPATIENT
Start: 2021-11-15 | End: 2021-11-15

## 2021-11-15 RX ORDER — ACYCLOVIR 200 MG/1
0-1 CAPSULE ORAL
Status: DISCONTINUED | OUTPATIENT
Start: 2021-11-15 | End: 2021-11-16 | Stop reason: HOSPADM

## 2021-11-15 RX ORDER — AMINOPHYLLINE 25 MG/ML
50-100 INJECTION, SOLUTION INTRAVENOUS
Status: DISCONTINUED | OUTPATIENT
Start: 2021-11-15 | End: 2021-11-16 | Stop reason: HOSPADM

## 2021-11-15 RX ADMIN — TETROFOSMIN 9.97 MCI.: 1.38 INJECTION, POWDER, LYOPHILIZED, FOR SOLUTION INTRAVENOUS at 10:55

## 2021-11-15 RX ADMIN — REGADENOSON 0.4 MG: 0.08 INJECTION, SOLUTION INTRAVENOUS at 10:53

## 2021-11-15 RX ADMIN — TETROFOSMIN 3.44 MCI.: 1.38 INJECTION, POWDER, LYOPHILIZED, FOR SOLUTION INTRAVENOUS at 09:35

## 2021-11-15 ASSESSMENT — MIFFLIN-ST. JEOR: SCORE: 1301.27

## 2021-12-06 ENCOUNTER — HOSPITAL ENCOUNTER (OUTPATIENT)
Dept: CARDIAC REHAB | Facility: CLINIC | Age: 71
End: 2021-12-06
Attending: INTERNAL MEDICINE
Payer: MEDICARE

## 2021-12-06 DIAGNOSIS — R06.09 DYSPNEA ON EXERTION: ICD-10-CM

## 2021-12-06 PROCEDURE — 94726 PLETHYSMOGRAPHY LUNG VOLUMES: CPT

## 2021-12-06 PROCEDURE — 94060 EVALUATION OF WHEEZING: CPT

## 2021-12-06 PROCEDURE — 94729 DIFFUSING CAPACITY: CPT

## 2021-12-07 ENCOUNTER — PRE VISIT (OUTPATIENT)
Dept: UROLOGY | Facility: CLINIC | Age: 71
End: 2021-12-07
Payer: MEDICARE

## 2021-12-08 NOTE — TELEPHONE ENCOUNTER
MEDICAL RECORDS REQUEST   [facility]  Urology Clinic  [address] [place] [address]  PHONE: [phone]  Fax: [phone]        FUTURE VISIT INFORMATION                                                   Gem Juan Antonioismael Gama, : 1950 scheduled for future visit at [facility]    APPOINTMENT INFORMATION:    Date: 2021    Provider:  Aubree Butts PA    Reason for Visit/Diagnosis: Hx of right ureteropelvic junction obstruction, S/P stent placement, recent right pyelonephritis    REFERRAL INFORMATION:    Referring provider:  Danny Conteh MD     Specialty: N/A    Referring providers clinic:  [facility]    Clinic contact number: N/A    RECORDS REQUESTED FOR VISIT                                                     NOTES  STATUS/DETAILS   OFFICE NOTE from referring provider  yes , 10/14/2021 -- Danny Conteh MD in  [facility]   OFFICE NOTE from other specialist  yes   DISCHARGE SUMMARY from hospital  no   DISCHARGE REPORT from the ER  no   OPERATIVE REPORT  no   MEDICATION LIST  yes   LABS     URINALYSIS (UA)  yes   IMAGING (IMAGES & REPORT)  yes, 10/28/2021, 2020 -- US ABD     PRE-VISIT CHECKLIST      Record collection complete Yes   Appointment appropriately scheduled           (right time/right provider) Yes   Joint diagnostic appointment coordinated correctly          (ensure right order & amount of time) Yes   MyChart activation Yes   Questionnaire complete If no, please explain pending

## 2021-12-12 ENCOUNTER — PRE VISIT (OUTPATIENT)
Dept: UROLOGY | Facility: CLINIC | Age: 71
End: 2021-12-12
Payer: MEDICARE

## 2021-12-13 ENCOUNTER — OFFICE VISIT (OUTPATIENT)
Dept: UROLOGY | Facility: CLINIC | Age: 71
End: 2021-12-13
Attending: INTERNAL MEDICINE
Payer: MEDICARE

## 2021-12-13 VITALS
SYSTOLIC BLOOD PRESSURE: 129 MMHG | HEART RATE: 79 BPM | BODY MASS INDEX: 29.82 KG/M2 | WEIGHT: 179 LBS | DIASTOLIC BLOOD PRESSURE: 79 MMHG | HEIGHT: 65 IN

## 2021-12-13 DIAGNOSIS — Q63.9 CONGENITAL MALFORMATION OF KIDNEY, UNSPECIFIED: ICD-10-CM

## 2021-12-13 DIAGNOSIS — N39.41 URGE INCONTINENCE: Primary | ICD-10-CM

## 2021-12-13 DIAGNOSIS — N39.0 RECURRENT UTI: ICD-10-CM

## 2021-12-13 DIAGNOSIS — N13.5 URETEROPELVIC JUNCTION (UPJ) OBSTRUCTION, RIGHT: ICD-10-CM

## 2021-12-13 PROCEDURE — 99204 OFFICE O/P NEW MOD 45 MIN: CPT | Mod: 25 | Performed by: PHYSICIAN ASSISTANT

## 2021-12-13 PROCEDURE — 51798 US URINE CAPACITY MEASURE: CPT | Performed by: PHYSICIAN ASSISTANT

## 2021-12-13 RX ORDER — MIRABEGRON 25 MG/1
25 TABLET, FILM COATED, EXTENDED RELEASE ORAL DAILY
Qty: 30 TABLET | Refills: 3 | Status: SHIPPED | OUTPATIENT
Start: 2021-12-13 | End: 2021-12-14

## 2021-12-13 ASSESSMENT — PAIN SCALES - GENERAL: PAINLEVEL: NO PAIN (0)

## 2021-12-13 ASSESSMENT — MIFFLIN-ST. JEOR: SCORE: 1314.88

## 2021-12-13 NOTE — NURSING NOTE
"Chief Complaint   Patient presents with     Consult     kidney infection, right ureter is not alwasy straight, bladder leaking       Blood pressure 129/79, pulse 79, height 1.638 m (5' 4.5\"), weight 81.2 kg (179 lb), not currently breastfeeding. Body mass index is 30.25 kg/m .    Patient Active Problem List   Diagnosis     TMJ (temporomandibular joint syndrome)     S/P hip replacement     Congenital ureteric stenosis     Lacrimal duct stenosis     Chronic rhinitis     CARDIOVASCULAR SCREENING; LDL GOAL LESS THAN 160     Intermittent asthma     Advanced directives, counseling/discussion     Major depressive disorder, recurrent episode, moderate (H)     Pituitary microadenoma (H)     Obstructive sleep apnea syndrome     Hypertension goal BP (blood pressure) < 140/90     Osteoarthritis of right knee     S/P total hip arthroplasty     Central sleep apnea     Controlled narcolepsy     Esophageal reflux (GERD)     Status post total knee replacement     Primary narcolepsy without cataplexy     Vitamin D deficiency     CKD stage G3b/A1, GFR 30-44 and albumin creatinine ratio <30 mg/g (H)     Incontinence of feces with fecal urgency     Overactive bladder     Fatigue, unspecified type     Ischemic cardiomyopathy     Chronic systolic heart failure (H)     Hyperlipidemia LDL goal <70     Cholelithiasis without cholecystitis     Coronary artery disease with angina pectoris, unspecified vessel or lesion type, unspecified whether native or transplanted heart (H)     Fever and chills     Urinary tract infection with hematuria, site unspecified     Environmental allergies     Coronary artery disease of autologous bypass graft with stable angina pectoris (H)     Pyelonephritis of right kidney       Allergies   Allergen Reactions     Latex Other (See Comments) and Shortness Of Breath     Runny nose  PN: LW Reaction: DIFFICULTY BREATHING       Flagyl [Metronidazole Hcl] Anaphylaxis and Rash     Food      PN: LW FI1: nka LW FI2:     " Hydrochlorothiazide W/Triamterene      PN: LW Reaction: skin rash     No Clinical Screening - See Comments      PN: LW Other1: -Adhesive Tape     Sulfa Drugs Anaphylaxis, Hives and Itching     PN: LW Reaction: HIVES, Swelling     Tape [Adhesive Tape] Hives     Metoprolol Itching and Rash     Metronidazole Hives and Rash     PN: LW Reaction: HIVES         Current Outpatient Medications   Medication Sig Dispense Refill     aspirin (ASA) 81 MG EC tablet Take 1 tablet (81 mg) by mouth daily 90 tablet 0     atorvastatin (LIPITOR) 40 MG tablet Take 1 tablet (40 mg) by mouth daily 90 tablet 3     carvedilol (COREG) 3.125 MG tablet TAKE 1 TABLET(3.125 MG) BY MOUTH TWICE DAILY WITH MEALS 180 tablet 3     clopidogrel (PLAVIX) 75 MG tablet Take 1 tablet (75 mg) by mouth daily 90 tablet 3     isosorbide mononitrate (IMDUR) 30 MG 24 hr tablet Take 0.5 tablets (15 mg) by mouth daily (Patient taking differently: Take 30 mg by mouth daily ) 45 tablet 3     Melatonin 10 MG TABS tablet Take 10 mg by mouth nightly as needed for sleep       nitroGLYcerin (NITROSTAT) 0.4 MG sublingual tablet Place 1 tablet (0.4 mg) under the tongue every 5 minutes as needed for chest pain 25 tablet 11     Prenatal Multivit-Min-Fe-FA (PRENATAL/IRON) TABS Take 1 tablet by mouth daily        sertraline (ZOLOFT) 100 MG tablet Take 1 tablet (100 mg) by mouth daily 90 tablet 3     ursodiol (ACTIGALL) 300 MG capsule TAKE 1 CAPSULE(300 MG) BY MOUTH TWICE DAILY 60 capsule 11     vitamin D3 (CHOLECALCIFEROL) 2000 units (50 mcg) tablet Take 1 tablet (2,000 Units) by mouth daily 90 tablet 3     amoxicillin (AMOXIL) 500 MG capsule Take as directed before dental work (Patient not taking: Reported on 12/13/2021)       saccharomyces boulardii (FLORASTOR) 250 MG capsule Take 1 capsule (250 mg) by mouth 2 times daily (Patient not taking: Reported on 11/8/2021) 14 capsule 0     valsartan (DIOVAN) 40 MG tablet Take 1 tablet (40 mg) by mouth daily 90 tablet 3       Social  History     Tobacco Use     Smoking status: Never Smoker     Smokeless tobacco: Never Used   Substance Use Topics     Alcohol use: No     Drug use: No       Dinahpili Malhotra  12/13/2021  10:32 AM

## 2021-12-13 NOTE — LETTER
12/13/2021       RE: Gem Gama  [address]  [place] [id number]     Dear Colleague,    Thank you for referring your patient, Gem Gama, to the [facility] [place] at [facility]. Please see a copy of my visit note below.          Chief Complaint:   Kidney infection, bladder incontinence          History of Present Illness:   Gem Gama is a 70 year old female with a history of CAD, ischemic cardiomyopathy, hypertension, CKD stage III, and LEA who presents for follow up of E. Coli UTI and suspected pyelonephritis.  Patient presented to [facility] on 9/23/2021 with symptoms of dysuria and fevers, with urine culture positive for E. Coli.  She was treated with a course of ceftriaxone while inpatient and was discharged with a 7 day course of ceftin.  Follow up UA and UC from 10/14/2021 showed eradication of infection.  Patient does have a history of reconstructive surgery of the right kidney due to history of congenital kidney defect, with last surgery in 1996.  She does admit to chronic UTI's with having several infections per year.  This past year, in addition to her recent UTI with E. Coli in 9/2021, she was treated for a UTI on 6/12/2021 though UC was negative for infection.  She does admit to long standing history of urinary urgency and large volume urge incontinence.  She goes through 6-7 large pads per day.  She also reports some spontaneous incontinence in which she does not know she needs to urinate and will leak urine.  She admits to occasional frequency.  She is unsure if she fully empties her bladder.  She drinks 1-2 bottles of soda per day, and no alcohol use.  She is s/p hysterectomy and bladder sling in 1992, and repair of rectocele in 2008.      Recent abdominal US from 10/28/2021 revealed no renal masses or hydronephrosis, with incidental normal variant extrarenal pelvis on the right.            Past Medical History:     Past Medical History:   Diagnosis Date     ADHD (attention deficit hyperactivity disorder)      Anxiety      Arthritis     back, hands, knees, hips     Asthma     controlled--has coughing symptoms     C. difficile diarrhea      Central sleep apnea      Coronary artery disease involving native coronary artery of native heart without angina pectoris 10/25/2019     Depression      Gastro-oesophageal reflux disease      Hypertension      Ischemic cardiomyopathy 10/25/2019     Narcolepsy      Renal disease     gfr is aroung 48     Sleep apnea     uses Cpap            Past Surgical History:     Past Surgical History:   Procedure Laterality Date     ARTHROPLASTY KNEE  7/9/2014    Procedure: ARTHROPLASTY KNEE;  Surgeon: Levi Johnson MD;  Location:  OR     ARTHROPLASTY KNEE Left 11/24/2014    Procedure: ARTHROPLASTY KNEE;  Surgeon: Levi Johnson MD;  Location:  OR     C REIMPLANT URETER,EXTENSIVE TAILORING  1973 1997     C REPAIR ENTEROCELE,VAG APPRCH  2008    Prolift     C TOTAL ABD HYSTERECTOMY+BLAD REPR  1992    Vaginal approach with oophrectomy     C TOTAL KNEE ARTHROPLASTY       CV CORONARY ANGIOGRAM N/A 10/9/2019    Procedure: Coronary Angiogram;  Surgeon: Chiquita Chatterjee MD;  Location:  HEART CARDIAC CATH LAB     CV HEART CATHETERIZATION WITH POSSIBLE INTERVENTION N/A 10/10/2019    Procedure: Heart Catheterization with Possible Intervention;  Surgeon: Chin Garcia MD;  Location:  HEART CARDIAC CATH LAB     CV INTRA AORTIC BALLOON N/A 10/9/2019    Procedure: Intra-Aortic Balloon Pump Insertion;  Surgeon: Chiquita Chatterjee MD;  Location:  HEART CARDIAC CATH LAB     CV INTRA AORTIC BALLOON REMOVAL N/A 10/10/2019    Procedure: Intra-Aortic Balloon Pump Removal;  Surgeon: Chin Garcia MD;  Location:  HEART CARDIAC CATH LAB     CV LEFT HEART CATH N/A 12/11/2020    Procedure: Heart Catheterization with Possible Intervention;  Surgeon: Pilo Otoole,  MD;  Location:  HEART CARDIAC CATH LAB     CV PCI STENT DRUG ELUTING N/A 10/9/2019    Procedure: PCI Stent Drug Eluting;  Surgeon: Chiquita Chatterjee MD;  Location:  HEART CARDIAC CATH LAB     GENITOURINARY SURGERY      kidney surgery X 3     ORTHOPEDIC SURGERY      bilat total hip     RECTOCELE REPAIR       SOFT TISSUE SURGERY              Medications     Current Outpatient Medications   Medication     aspirin (ASA) 81 MG EC tablet     atorvastatin (LIPITOR) 40 MG tablet     carvedilol (COREG) 3.125 MG tablet     clopidogrel (PLAVIX) 75 MG tablet     isosorbide mononitrate (IMDUR) 30 MG 24 hr tablet     Melatonin 10 MG TABS tablet     nitroGLYcerin (NITROSTAT) 0.4 MG sublingual tablet     Prenatal Multivit-Min-Fe-FA (PRENATAL/IRON) TABS     sertraline (ZOLOFT) 100 MG tablet     ursodiol (ACTIGALL) 300 MG capsule     vitamin D3 (CHOLECALCIFEROL) 2000 units (50 mcg) tablet     amoxicillin (AMOXIL) 500 MG capsule     saccharomyces boulardii (FLORASTOR) 250 MG capsule     valsartan (DIOVAN) 40 MG tablet     No current facility-administered medications for this visit.            Family History:     Family History   Problem Relation Age of Onset     Hypertension Mother      Arthritis Mother      Cerebrovascular Disease Father         Three Strokes     Diabetes Father         and brother     Thyroid Disease Sister         Hypothyroid     Multiple Sclerosis Daughter      Sjogren's Daughter             Social History:     Social History     Socioeconomic History     Marital status:      Spouse name: Not on file     Number of children: Not on file     Years of education: Not on file     Highest education level: Not on file   Occupational History     Not on file   Tobacco Use     Smoking status: Never Smoker     Smokeless tobacco: Never Used   Substance and Sexual Activity     Alcohol use: No     Drug use: No     Sexual activity: Never     Partners: Male     Birth control/protection: None   Other Topics Concern  "    Parent/sibling w/ CABG, MI or angioplasty before 65F 55M? No      Service No     Blood Transfusions No     Caffeine Concern No     Occupational Exposure No     Hobby Hazards No     Sleep Concern Yes     Comment: Uses CPAP     Stress Concern No     Weight Concern Yes     Special Diet No     Back Care No     Exercise No     Bike Helmet No     Comment:       Seat Belt Yes     Self-Exams Yes   Social History Narrative     Not on file     Social Determinants of Health     Financial Resource Strain: Not on file   Food Insecurity: Not on file   Transportation Needs: Not on file   Physical Activity: Not on file   Stress: Not on file   Social Connections: Not on file   Intimate Partner Violence: Not on file   Housing Stability: Not on file            Allergies:   Latex, Flagyl [metronidazole hcl], Food, Hydrochlorothiazide w/triamterene, No clinical screening - see comments, Sulfa drugs, Tape [adhesive tape], Metoprolol, and Metronidazole         Review of Systems:  From intake questionnaire   Negative 14 system review except as noted on HPI, nurse's note.         Physical Exam:   Patient is a 70 year old  female   Vitals: Blood pressure 129/79, pulse 79, height 1.638 m (5' 4.5\"), weight 81.2 kg (179 lb), not currently breastfeeding.  General Appearance Adult: Alert, no acute distress, oriented  Lungs: no respiratory distress, or pursed lip breathing  Heart: No obvious jugular venous distension present  Abdomen: Body mass index is 30.25 kg/m .  Musculoskeltal: extremities normal, no peripheral edema  Skin: no suspicious lesions or rashes  Neuro: Alert, oriented, speech and mentation normal  : deferred      Uroflow and Post-Void Residual by Bladder Scan   PVR: 17 ml       Labs and Pathology:    I personally reviewed all applicable laboratory data and went over findings with patient  Significant for:    CBC RESULTS:  Recent Labs   Lab Test 09/23/21  1905 06/12/21  1232 12/10/20  0557 12/09/20  1031   WBC 8.6 5.6 " 4.7 5.4   HGB 13.1 14.8 13.8 13.7   * 177 154 164        BMP RESULTS:  Recent Labs   Lab Test 10/14/21  1140 09/26/21  0646 09/25/21  0648 09/23/21  1832 12/17/20  1403 12/10/20  0557 12/09/20  1031 10/15/20  1256    140 146* 137 143 141 139 140   POTASSIUM 4.0 4.2 3.6 3.9 3.9 4.3 4.1 4.8   CHLORIDE 109 107 114* 106 107 109 107 107   CO2 26 28 23 25 30 29 26 29   ANIONGAP 7 5 9 6 6 3 6 4   * 95 102* 97 113* 99 95 101*   BUN 13 12 14 15 18 16 15 19   CR 0.92 1.02 0.94 1.06* 1.00 1.04 0.97 1.14*   GFRESTIMATED 62 55* 61 53* 56* 54* 58* 48*   GFRESTBLACK  --   --   --   --  65 62 67 56*   FRACISCO 8.8 8.7 8.1* 8.1* 8.8 8.6 8.6 9.2       UA RESULTS:   Recent Labs   Lab Test 10/14/21  1140 09/23/21  1851 06/12/21  1244   SG 1.025 1.014 1.010   URINEPH 5.0 5.0 6.0   NITRITE Negative Positive* Negative   RBCU 0-2 2 O - 2   WBCU 0-5 56* 0 - 5       PSA RESULTS  No results found for: PSA      Imaging:    I personally reviewed all applicable imaging and went over findings with patient.  Significant for:    Abdominal ultrasound (10/28/2021)     Comparisons: 3/4/2020     HISTORY:    Cholelithiasis     FINDINGS:    The liver measures a craniocaudal dimension of 12.7 cm.  No focal liver lesion. Liver echogenicity is normal. The spleen  measures 10.5 cm. The gallbladder is filled with stones giving a wall  echo shadow appearance. There is no gallbladder wall thickening or  pericholecystic fluid. No sonographic Harry's sign was elicited. The  diameter of the common bile duct measures 3mm. The pancreas is  partially obscured but otherwise appears unremarkable. The aorta and  IVC are visualized. The right and left kidneys measure 9.5cm and 9.9  cm , respectively. No solid renal mass, stone or hydronephrosis.  Incidental normal variant extrarenal pelvis on the right.                                                                      IMPRESSION:    Cholelithiasis without evidence of cholecystitis.     SIOBHAN  MD STEPHANIE          Assessment and Plan:     Assessment: 70 year old female with a history of reconstructive surgery of the right kidney due to history of congenital kidney defect, with last surgery in 1996, and recent UTI with pyelonephritis diagnosed on 9/23/2021.  Patient reporting recurrent UTIs throughout the years, with last UTI treated in 6/2021 though UC was negative at that time.  Patient also with severe urinary incontinence and a history of hysterectomy with bladder sling in 1992 and repair of rectocele in 2008.  PVR is reassuringly low at 17 ml today, ruling out urinary retention as contributing factor.  Discussed pharmacologic treatment options for urge incontinence and patient wishes to trial medical therapy.  We will plan to initiate Myrbetriq 25 mg once daily.  Patient with a history of hypertension, though well controlled on current anti-hypertensives.  Patient has a blood pressure cuff at home and was advised to monitor her blood pressure when initiating Myrbetriq and notify me if her blood pressure begins to rise.  Given her symptoms of incontinence and previous bladder sling and rectocele repair, along with history of recurrent UTIs (though not always UC proven), we will plan proceed with cystoscopic evaluation.  We will also obtain a CT urogram given possible recurrent UTIs and history of congenital kidney defect s/p reconstructive repair.      Plan:  - Start Myrbetriq 25 mg once daily for treatment of incontinence.   - Monitor your blood pressure while on this medication and notify me if you see your blood pressure is increasing.   - Schedule CT urogram.   - Schedule next available cystoscopy with Dr. Rodriguez, Dr. Sapp, or Dr. Chu.    - Follow up with me in 3 months for recheck.  This can be a virtual visit.        SANYA Goddard  Department of Urology

## 2021-12-13 NOTE — CONFIDENTIAL NOTE
Reason for visit: consult     Relevant information: right pyelonephritis    Records/imaging/labs/orders: in epic    Pt called: n/a    At Rooming: standard

## 2021-12-13 NOTE — PROGRESS NOTES
Chief Complaint:   Kidney infection, bladder incontinence          History of Present Illness:   Gem Gama is a 70 year old female with a history of CAD, ischemic cardiomyopathy, hypertension, CKD stage III, and LEA who presents for follow up of E. Coli UTI and suspected pyelonephritis.  Patient presented to [facility] on 9/23/2021 with symptoms of dysuria and fevers, with urine culture positive for E. Coli.  She was treated with a course of ceftriaxone while inpatient and was discharged with a 7 day course of ceftin.  Follow up UA and UC from 10/14/2021 showed eradication of infection.  Patient does have a history of reconstructive surgery of the right kidney due to history of congenital kidney defect, with last surgery in 1996.  She does admit to chronic UTI's with having several infections per year.  This past year, in addition to her recent UTI with E. Coli in 9/2021, she was treated for a UTI on 6/12/2021 though UC was negative for infection.  She does admit to long standing history of urinary urgency and large volume urge incontinence.  She goes through 6-7 large pads per day.  She also reports some spontaneous incontinence in which she does not know she needs to urinate and will leak urine.  She admits to occasional frequency.  She is unsure if she fully empties her bladder.  She drinks 1-2 bottles of soda per day, and no alcohol use.  She is s/p hysterectomy and bladder sling in 1992, and repair of rectocele in 2008.      Recent abdominal US from 10/28/2021 revealed no renal masses or hydronephrosis, with incidental normal variant extrarenal pelvis on the right.           Past Medical History:     Past Medical History:   Diagnosis Date     ADHD (attention deficit hyperactivity disorder)      Anxiety      Arthritis     back, hands, knees, hips     Asthma     controlled--has coughing symptoms     C. difficile diarrhea      Central sleep apnea      Coronary artery disease involving native  coronary artery of native heart without angina pectoris 10/25/2019     Depression      Gastro-oesophageal reflux disease      Hypertension      Ischemic cardiomyopathy 10/25/2019     Narcolepsy      Renal disease     gfr is aroung 48     Sleep apnea     uses Cpap            Past Surgical History:     Past Surgical History:   Procedure Laterality Date     ARTHROPLASTY KNEE  7/9/2014    Procedure: ARTHROPLASTY KNEE;  Surgeon: Levi Johnson MD;  Location: [facility] OR     ARTHROPLASTY KNEE Left 11/24/2014    Procedure: ARTHROPLASTY KNEE;  Surgeon: Levi Johnson MD;  Location:  OR     C REIMPLANT URETER,EXTENSIVE TAILORING  1973 1997     C REPAIR ENTEROCELE,VAG APPRCH  2008    Prolift     C TOTAL ABD HYSTERECTOMY+BLAD REPR  1992    Vaginal approach with oophrectomy     C TOTAL KNEE ARTHROPLASTY       CV CORONARY ANGIOGRAM N/A 10/9/2019    Procedure: Coronary Angiogram;  Surgeon: Chiquita Chatterjee MD;  Location:  HEART CARDIAC CATH LAB     CV HEART CATHETERIZATION WITH POSSIBLE INTERVENTION N/A 10/10/2019    Procedure: Heart Catheterization with Possible Intervention;  Surgeon: Chin Garcia MD;  Location:  HEART CARDIAC CATH LAB     CV INTRA AORTIC BALLOON N/A 10/9/2019    Procedure: Intra-Aortic Balloon Pump Insertion;  Surgeon: Chiquita Chatterjee MD;  Location:  HEART CARDIAC CATH LAB     CV INTRA AORTIC BALLOON REMOVAL N/A 10/10/2019    Procedure: Intra-Aortic Balloon Pump Removal;  Surgeon: Chin Garcia MD;  Location:  HEART CARDIAC CATH LAB     CV LEFT HEART CATH N/A 12/11/2020    Procedure: Heart Catheterization with Possible Intervention;  Surgeon: Pilo Otoole MD;  Location:  HEART CARDIAC CATH LAB     CV PCI STENT DRUG ELUTING N/A 10/9/2019    Procedure: PCI Stent Drug Eluting;  Surgeon: Chiquita Chatterjee MD;  Location:  HEART CARDIAC CATH LAB     GENITOURINARY SURGERY      kidney surgery X 3     ORTHOPEDIC SURGERY      bilat total hip     RECTOCELE REPAIR        SOFT TISSUE SURGERY              Medications     Current Outpatient Medications   Medication     aspirin (ASA) 81 MG EC tablet     atorvastatin (LIPITOR) 40 MG tablet     carvedilol (COREG) 3.125 MG tablet     clopidogrel (PLAVIX) 75 MG tablet     isosorbide mononitrate (IMDUR) 30 MG 24 hr tablet     Melatonin 10 MG TABS tablet     nitroGLYcerin (NITROSTAT) 0.4 MG sublingual tablet     Prenatal Multivit-Min-Fe-FA (PRENATAL/IRON) TABS     sertraline (ZOLOFT) 100 MG tablet     ursodiol (ACTIGALL) 300 MG capsule     vitamin D3 (CHOLECALCIFEROL) 2000 units (50 mcg) tablet     amoxicillin (AMOXIL) 500 MG capsule     saccharomyces boulardii (FLORASTOR) 250 MG capsule     valsartan (DIOVAN) 40 MG tablet     No current facility-administered medications for this visit.            Family History:     Family History   Problem Relation Age of Onset     Hypertension Mother      Arthritis Mother      Cerebrovascular Disease Father         Three Strokes     Diabetes Father         and brother     Thyroid Disease Sister         Hypothyroid     Multiple Sclerosis Daughter      Sjogren's Daughter             Social History:     Social History     Socioeconomic History     Marital status:      Spouse name: Not on file     Number of children: Not on file     Years of education: Not on file     Highest education level: Not on file   Occupational History     Not on file   Tobacco Use     Smoking status: Never Smoker     Smokeless tobacco: Never Used   Substance and Sexual Activity     Alcohol use: No     Drug use: No     Sexual activity: Never     Partners: Male     Birth control/protection: None   Other Topics Concern     Parent/sibling w/ CABG, MI or angioplasty before 65F 55M? No      Service No     Blood Transfusions No     Caffeine Concern No     Occupational Exposure No     Hobby Hazards No     Sleep Concern Yes     Comment: Uses CPAP     Stress Concern No     Weight Concern Yes     Special Diet No     Back Care No  "    Exercise No     Bike Helmet No     Comment:       Seat Belt Yes     Self-Exams Yes   Social History Narrative     Not on file     Social Determinants of Health     Financial Resource Strain: Not on file   Food Insecurity: Not on file   Transportation Needs: Not on file   Physical Activity: Not on file   Stress: Not on file   Social Connections: Not on file   Intimate Partner Violence: Not on file   Housing Stability: Not on file            Allergies:   Latex, Flagyl [metronidazole hcl], Food, Hydrochlorothiazide w/triamterene, No clinical screening - see comments, Sulfa drugs, Tape [adhesive tape], Metoprolol, and Metronidazole         Review of Systems:  From intake questionnaire   Negative 14 system review except as noted on HPI, nurse's note.         Physical Exam:   Patient is a 70 year old  female   Vitals: Blood pressure 129/79, pulse 79, height 1.638 m (5' 4.5\"), weight 81.2 kg (179 lb), not currently breastfeeding.  General Appearance Adult: Alert, no acute distress, oriented  Lungs: no respiratory distress, or pursed lip breathing  Heart: No obvious jugular venous distension present  Abdomen: Body mass index is 30.25 kg/m .  Musculoskeltal: extremities normal, no peripheral edema  Skin: no suspicious lesions or rashes  Neuro: Alert, oriented, speech and mentation normal  : deferred      Uroflow and Post-Void Residual by Bladder Scan   PVR: 17 ml       Labs and Pathology:    I personally reviewed all applicable laboratory data and went over findings with patient  Significant for:    CBC RESULTS:  Recent Labs   Lab Test 09/23/21  1905 06/12/21  1232 12/10/20  0557 12/09/20  1031   WBC 8.6 5.6 4.7 5.4   HGB 13.1 14.8 13.8 13.7   * 177 154 164        BMP RESULTS:  Recent Labs   Lab Test 10/14/21  1140 09/26/21  0646 09/25/21  0648 09/23/21  1832 12/17/20  1403 12/10/20  0557 12/09/20  1031 10/15/20  1256    140 146* 137 143 141 139 140   POTASSIUM 4.0 4.2 3.6 3.9 3.9 4.3 4.1 4.8   CHLORIDE " 109 107 114* 106 107 109 107 107   CO2 26 28 23 25 30 29 26 29   ANIONGAP 7 5 9 6 6 3 6 4   * 95 102* 97 113* 99 95 101*   BUN 13 12 14 15 18 16 15 19   CR 0.92 1.02 0.94 1.06* 1.00 1.04 0.97 1.14*   GFRESTIMATED 62 55* 61 53* 56* 54* 58* 48*   GFRESTBLACK  --   --   --   --  65 62 67 56*   FRACISCO 8.8 8.7 8.1* 8.1* 8.8 8.6 8.6 9.2       UA RESULTS:   Recent Labs   Lab Test 10/14/21  1140 09/23/21  1851 06/12/21  1244   SG 1.025 1.014 1.010   URINEPH 5.0 5.0 6.0   NITRITE Negative Positive* Negative   RBCU 0-2 2 O - 2   WBCU 0-5 56* 0 - 5       PSA RESULTS  No results found for: PSA      Imaging:    I personally reviewed all applicable imaging and went over findings with patient.  Significant for:    Abdominal ultrasound (10/28/2021)     Comparisons: 3/4/2020     HISTORY:    Cholelithiasis     FINDINGS:    The liver measures a craniocaudal dimension of 12.7 cm.  No focal liver lesion. Liver echogenicity is normal. The spleen  measures 10.5 cm. The gallbladder is filled with stones giving a wall  echo shadow appearance. There is no gallbladder wall thickening or  pericholecystic fluid. No sonographic Harry's sign was elicited. The  diameter of the common bile duct measures 3mm. The pancreas is  partially obscured but otherwise appears unremarkable. The aorta and  IVC are visualized. The right and left kidneys measure 9.5cm and 9.9  cm , respectively. No solid renal mass, stone or hydronephrosis.  Incidental normal variant extrarenal pelvis on the right.                                                                      IMPRESSION:    Cholelithiasis without evidence of cholecystitis.     SIOBHAN BROWN MD          Assessment and Plan:     Assessment: 70 year old female with a history of reconstructive surgery of the right kidney due to history of congenital kidney defect, with last surgery in 1996, and recent UTI with pyelonephritis diagnosed on 9/23/2021.  Patient reporting recurrent UTIs throughout the  years, with last UTI treated in 6/2021 though UC was negative at that time.  Patient also with severe urinary incontinence and a history of hysterectomy with bladder sling in 1992 and repair of rectocele in 2008.  PVR is reassuringly low at 17 ml today, ruling out urinary retention as contributing factor.  Discussed pharmacologic treatment options for urge incontinence and patient wishes to trial medical therapy.  We will plan to initiate Myrbetriq 25 mg once daily.  Patient with a history of hypertension, though well controlled on current anti-hypertensives.  Patient has a blood pressure cuff at home and was advised to monitor her blood pressure when initiating Myrbetriq and notify me if her blood pressure begins to rise.  Given her symptoms of incontinence and previous bladder sling and rectocele repair, along with history of recurrent UTIs (though not always UC proven), we will plan proceed with cystoscopic evaluation.  We will also obtain a CT urogram given possible recurrent UTIs and history of congenital kidney defect s/p reconstructive repair.      Plan:  - Start Myrbetriq 25 mg once daily for treatment of incontinence.   - Monitor your blood pressure while on this medication and notify me if you see your blood pressure is increasing.   - Schedule CT urogram.   - Schedule next available cystoscopy with Dr. Rodriugez, Dr. Sapp, or Dr. Chu.    - Follow up with me in 3 months for recheck.  This can be a virtual visit.        SANYA Goddard  Department of Urology

## 2021-12-13 NOTE — PATIENT INSTRUCTIONS
UROLOGY CLINIC VISIT PATIENT INSTRUCTIONS    - Start Myrbetriq 25 mg once daily for treatment of incontinence.   - Monitor your blood pressure while on this medication and notify me if you see your blood pressure is increasing.   - Schedule CT urogram.   - Schedule next available cystoscopy with Dr. Rodriguez, Dr. Sapp, or Dr. Chu.    - Follow up with me in 3 months for recheck.  This can be a virtual visit.      If you have any issues, questions or concerns in the meantime, do not hesitate to contact us at [phone] or via [employer].     It was a pleasure meeting with you today.  Thank you for allowing me and my team the privilege of caring for you today.  YOU are the reason we are here, and I truly hope we provided you with the excellent service you deserve.  Please let us know if there is anything else we can do for you so that we can be sure you are leaving completely satisfied with your care experience.    Aubree Butts PA-C  Department of Urology

## 2021-12-14 LAB
DLCOUNC-%PRED-PRE: 90 %
DLCOUNC-PRE: 17.11 ML/MIN/MMHG
DLCOUNC-PRED: 19 ML/MIN/MMHG
ERV-%PRED-PRE: 79 %
ERV-PRE: 0.33 L
ERV-PRED: 0.42 L
EXPTIME-PRE: 7.46 SEC
FEF2575-%PRED-POST: 121 %
FEF2575-%PRED-PRE: 74 %
FEF2575-POST: 2.16 L/SEC
FEF2575-PRE: 1.32 L/SEC
FEF2575-PRED: 1.78 L/SEC
FEFMAX-%PRED-PRE: 83 %
FEFMAX-PRE: 4.51 L/SEC
FEFMAX-PRED: 5.42 L/SEC
FEV1-%PRED-PRE: 86 %
FEV1-PRE: 1.83 L
FEV1FEV6-PRE: 73 %
FEV1FEV6-PRED: 79 %
FEV1FVC-PRE: 74 %
FEV1FVC-PRED: 78 %
FEV1SVC-PRE: 74 %
FEV1SVC-PRED: 72 %
FIFMAX-PRE: 2.63 L/SEC
FRCPLETH-%PRED-PRE: 104 %
FRCPLETH-PRE: 2.81 L
FRCPLETH-PRED: 2.68 L
FVC-%PRED-PRE: 90 %
FVC-PRE: 2.48 L
FVC-PRED: 2.74 L
IC-%PRED-PRE: 84 %
IC-PRE: 2.14 L
IC-PRED: 2.53 L
RVPLETH-%PRED-PRE: 118 %
RVPLETH-PRE: 2.46 L
RVPLETH-PRED: 2.07 L
TLCPLETH-%PRED-PRE: 101 %
TLCPLETH-PRE: 4.95 L
TLCPLETH-PRED: 4.86 L
VA-%PRED-PRE: 99 %
VA-PRE: 4.57 L
VC-%PRED-PRE: 84 %
VC-PRE: 2.49 L
VC-PRED: 2.96 L

## 2022-01-19 ENCOUNTER — OFFICE VISIT (OUTPATIENT)
Dept: URGENT CARE | Facility: URGENT CARE | Age: 72
End: 2022-01-19
Payer: MEDICARE

## 2022-01-19 VITALS
HEART RATE: 75 BPM | DIASTOLIC BLOOD PRESSURE: 72 MMHG | BODY MASS INDEX: 29.99 KG/M2 | OXYGEN SATURATION: 98 % | WEIGHT: 180 LBS | TEMPERATURE: 97.8 F | HEIGHT: 65 IN | SYSTOLIC BLOOD PRESSURE: 156 MMHG

## 2022-01-19 DIAGNOSIS — L73.9 FOLLICULITIS: ICD-10-CM

## 2022-01-19 DIAGNOSIS — R39.9 UTI SYMPTOMS: ICD-10-CM

## 2022-01-19 DIAGNOSIS — N89.8 VAGINAL ODOR: Primary | ICD-10-CM

## 2022-01-19 LAB
ALBUMIN UR-MCNC: NEGATIVE MG/DL
APPEARANCE UR: CLEAR
BACTERIA #/AREA URNS HPF: ABNORMAL /HPF
BILIRUB UR QL STRIP: NEGATIVE
CLUE CELLS: ABNORMAL
COLOR UR AUTO: YELLOW
GLUCOSE UR STRIP-MCNC: NEGATIVE MG/DL
HGB UR QL STRIP: ABNORMAL
KETONES UR STRIP-MCNC: NEGATIVE MG/DL
LEUKOCYTE ESTERASE UR QL STRIP: ABNORMAL
NITRATE UR QL: NEGATIVE
PH UR STRIP: 5.5 [PH] (ref 5–7)
RBC #/AREA URNS AUTO: ABNORMAL /HPF
SP GR UR STRIP: 1.01 (ref 1–1.03)
TRICHOMONAS, WET PREP: ABNORMAL
UROBILINOGEN UR STRIP-ACNC: 0.2 E.U./DL
WBC #/AREA URNS AUTO: ABNORMAL /HPF
WBC'S/HIGH POWER FIELD, WET PREP: ABNORMAL
YEAST, WET PREP: ABNORMAL

## 2022-01-19 PROCEDURE — 99215 OFFICE O/P EST HI 40 MIN: CPT | Performed by: PREVENTIVE MEDICINE

## 2022-01-19 PROCEDURE — 87210 SMEAR WET MOUNT SALINE/INK: CPT | Performed by: PREVENTIVE MEDICINE

## 2022-01-19 PROCEDURE — 81001 URINALYSIS AUTO W/SCOPE: CPT | Performed by: PREVENTIVE MEDICINE

## 2022-01-19 RX ORDER — DOXYCYCLINE 100 MG/1
100 TABLET ORAL 2 TIMES DAILY
Qty: 14 TABLET | Refills: 0 | Status: SHIPPED | OUTPATIENT
Start: 2022-01-19 | End: 2022-01-26

## 2022-01-19 ASSESSMENT — MIFFLIN-ST. JEOR: SCORE: 1314.41

## 2022-01-20 NOTE — PROGRESS NOTES
Assessment & Plan     1. Vaginal odor  - Wet prep - lab collect; Future    2. UTI symptoms  - UA Macro with Reflex to Micro and Culture - lab collect; Future    Neg wet prep and neg ua - no evidence of uti or vaginal infections.  Believe all of her symptoms are related to the folliculitis    3. Follicultiis of skin near labia  Doxycycline 100 mg two times per day for 7 days     41 minutes spent on the date of the encounter doing chart review, review of test results, interpretation of tests, patient visit and documentation         No follow-ups on file.    Conrad Saunders MD  [facility]    Subjective     Gem Gama is a 71 year old year old female who presents to clinic today for the following health issues:    Patient presents with:  Headache  Vaginal Problem: Pt said that she has some sores on her labia    This is a 72 yo female who has pain near her labia and 2 sores there.  Also has slight pain with urination and a strange vaginal odor.  Otherwise feels well.  Ongoing for 3 days.  Not sexually active.      Patient Active Problem List   Diagnosis     TMJ (temporomandibular joint syndrome)     S/P hip replacement     Congenital ureteric stenosis     Lacrimal duct stenosis     Chronic rhinitis     CARDIOVASCULAR SCREENING; LDL GOAL LESS THAN 160     Intermittent asthma     Advanced directives, counseling/discussion     Major depressive disorder, recurrent episode, moderate (H)     Pituitary microadenoma (H)     Obstructive sleep apnea syndrome     Hypertension goal BP (blood pressure) < 140/90     Osteoarthritis of right knee     S/P total hip arthroplasty     Central sleep apnea     Controlled narcolepsy     Esophageal reflux (GERD)     Status post total knee replacement     Primary narcolepsy without cataplexy     Vitamin D deficiency     CKD stage G3b/A1, GFR 30-44 and albumin creatinine ratio <30 mg/g (H)     Incontinence of feces with fecal urgency     Overactive  bladder     Fatigue, unspecified type     Ischemic cardiomyopathy     Chronic systolic heart failure (H)     Hyperlipidemia LDL goal <70     Cholelithiasis without cholecystitis     Coronary artery disease with angina pectoris, unspecified vessel or lesion type, unspecified whether native or transplanted heart (H)     Fever and chills     Urinary tract infection with hematuria, site unspecified     Environmental allergies     Coronary artery disease of autologous bypass graft with stable angina pectoris (H)     Pyelonephritis of right kidney       Current Outpatient Medications   Medication     amoxicillin (AMOXIL) 500 MG capsule     aspirin (ASA) 81 MG EC tablet     atorvastatin (LIPITOR) 40 MG tablet     carvedilol (COREG) 3.125 MG tablet     clopidogrel (PLAVIX) 75 MG tablet     isosorbide mononitrate (IMDUR) 30 MG 24 hr tablet     Melatonin 10 MG TABS tablet     mirabegron (MYRBETRIQ) 25 MG 24 hr tablet     nitroGLYcerin (NITROSTAT) 0.4 MG sublingual tablet     Prenatal Multivit-Min-Fe-FA (PRENATAL/IRON) TABS     saccharomyces boulardii (FLORASTOR) 250 MG capsule     sertraline (ZOLOFT) 100 MG tablet     ursodiol (ACTIGALL) 300 MG capsule     valsartan (DIOVAN) 40 MG tablet     vitamin D3 (CHOLECALCIFEROL) 2000 units (50 mcg) tablet     No current facility-administered medications for this visit.       Past Medical History:   Diagnosis Date     ADHD (attention deficit hyperactivity disorder)      Anxiety      Arthritis     back, hands, knees, hips     Asthma     controlled--has coughing symptoms     C. difficile diarrhea      Central sleep apnea      Coronary artery disease involving native coronary artery of native heart without angina pectoris 10/25/2019     Depression      Gastro-oesophageal reflux disease      Hypertension      Ischemic cardiomyopathy 10/25/2019     Narcolepsy      Renal disease     gfr is aroung 48     Sleep apnea     uses Cpap       Social History   reports that she has never smoked. She  "has never used smokeless tobacco. She reports that she does not drink alcohol and does not use drugs.    Family History   Problem Relation Age of Onset     Hypertension Mother      Arthritis Mother      Cerebrovascular Disease Father         Three Strokes     Diabetes Father         and brother     Thyroid Disease Sister         Hypothyroid     Multiple Sclerosis Daughter      Sjogren's Daughter        Review of Systems  Constitutional, HEENT, cardiovascular, pulmonary, GI, , musculoskeletal, neuro, skin, endocrine and psych systems are negative, except as otherwise noted.      Objective    BP (!) 156/72 (BP Location: Left arm, Patient Position: Sitting, Cuff Size: Adult Regular)   Pulse 75   Temp 97.8  F (36.6  C) (Tympanic)   Ht 1.638 m (5' 4.5\")   Wt 81.6 kg (180 lb)   LMP  (LMP Unknown)   SpO2 98%   BMI 30.42 kg/m    Physical Exam   GENERAL: healthy, alert and no distress  EYES: Eyes grossly normal to inspection, PERRL and conjunctivae and sclerae normal  HENT: ear canals and TM's normal, nose and mouth without ulcers or lesions  NECK: no adenopathy, no asymmetry, masses, or scars and thyroid normal to palpation  RESP: lungs clear to auscultation - no rales, rhonchi or wheezes  CV: regular rate and rhythm, normal S1 S2, no S3 or S4, no murmur, click or rub, no peripheral edema and peripheral pulses strong  ABDOMEN: soft, nontender, no hepatosplenomegaly, no masses and bowel sounds normal  MS: no gross musculoskeletal defects noted, no edema  SKIN: foliiculitis near labia in three spots - ttp.  No cellulitis, abscess, cyst noted.  Normal labia, vagina, cervix.  Nl bimanual exam.  NEURO: Normal strength and tone, mentation intact and speech normal  PSYCH: mentation appears normal, affect normal/bright    Results for orders placed or performed in visit on 01/19/22   UA Macro with Reflex to Micro and Culture - lab collect     Status: Abnormal    Specimen: Urine, Midstream   Result Value Ref Range    Color " Urine Yellow Colorless, Straw, Light Yellow, Yellow    Appearance Urine Clear Clear    Glucose Urine Negative Negative mg/dL    Bilirubin Urine Negative Negative    Ketones Urine Negative Negative mg/dL    Specific Gravity Urine 1.010 1.003 - 1.035    Blood Urine Trace (A) Negative    pH Urine 5.5 5.0 - 7.0    Protein Albumin Urine Negative Negative mg/dL    Urobilinogen Urine 0.2 0.2, 1.0 E.U./dL    Nitrite Urine Negative Negative    Leukocyte Esterase Urine Small (A) Negative   Urine Microscopic     Status: Abnormal   Result Value Ref Range    Bacteria Urine Few (A) None Seen /HPF    RBC Urine 0-2 0-2 /HPF /HPF    WBC Urine 0-5 0-5 /HPF /HPF    Narrative    Urine Culture not indicated   Wet prep - lab collect     Status: Abnormal    Specimen: Vagina; Swab   Result Value Ref Range    Trichomonas Absent Absent    Yeast Absent Absent    Clue Cells Absent Absent    WBCs/high power field 1+ (A) None

## 2022-01-27 ENCOUNTER — PRE VISIT (OUTPATIENT)
Dept: UROLOGY | Facility: CLINIC | Age: 72
End: 2022-01-27
Payer: MEDICARE

## 2022-01-27 NOTE — CONFIDENTIAL NOTE
Reason for visit: Cystoscopy     Relevant information: Symptoms of incontinence, hx of bladder sling, rectocele repair, Allison    Records/imaging/labs/orders: CT urogram on 2/3    Pt called: Sent [email or web address] message about CT urogram    At Rooming: Urine dip

## 2022-01-31 ENCOUNTER — TELEPHONE (OUTPATIENT)
Dept: UROLOGY | Facility: CLINIC | Age: 72
End: 2022-01-31
Payer: MEDICARE

## 2022-01-31 NOTE — CONFIDENTIAL NOTE
Called patient informing her that she needs to have CT urogram done before coming in for cystoscopy. Patient expressed understanding. Transferred the call to imaging scheduling team.

## 2022-02-03 ENCOUNTER — ANCILLARY PROCEDURE (OUTPATIENT)
Dept: CT IMAGING | Facility: CLINIC | Age: 72
End: 2022-02-03
Attending: PHYSICIAN ASSISTANT
Payer: MEDICARE

## 2022-02-03 DIAGNOSIS — Q63.9 CONGENITAL MALFORMATION OF KIDNEY, UNSPECIFIED: ICD-10-CM

## 2022-02-03 DIAGNOSIS — N13.5 URETEROPELVIC JUNCTION (UPJ) OBSTRUCTION, RIGHT: ICD-10-CM

## 2022-02-03 DIAGNOSIS — N39.41 URGE INCONTINENCE: ICD-10-CM

## 2022-02-03 DIAGNOSIS — N39.0 RECURRENT UTI: ICD-10-CM

## 2022-02-03 LAB
CREAT BLD-MCNC: 1 MG/DL (ref 0.5–1)
GFR SERPL CREATININE-BSD FRML MDRD: 59 ML/MIN/1.73M2

## 2022-02-03 PROCEDURE — 74178 CT ABD&PLV WO CNTR FLWD CNTR: CPT | Mod: MG | Performed by: RADIOLOGY

## 2022-02-03 PROCEDURE — G1004 CDSM NDSC: HCPCS | Mod: GC | Performed by: RADIOLOGY

## 2022-02-03 RX ORDER — IOPAMIDOL 755 MG/ML
111 INJECTION, SOLUTION INTRAVASCULAR ONCE
Status: COMPLETED | OUTPATIENT
Start: 2022-02-03 | End: 2022-02-03

## 2022-02-03 RX ADMIN — IOPAMIDOL 111 ML: 755 INJECTION, SOLUTION INTRAVASCULAR at 14:39

## 2022-02-04 ENCOUNTER — TELEPHONE (OUTPATIENT)
Dept: FAMILY MEDICINE | Facility: CLINIC | Age: 72
End: 2022-02-04

## 2022-02-04 ENCOUNTER — OFFICE VISIT (OUTPATIENT)
Dept: UROLOGY | Facility: CLINIC | Age: 72
End: 2022-02-04
Payer: MEDICARE

## 2022-02-04 VITALS
SYSTOLIC BLOOD PRESSURE: 115 MMHG | HEART RATE: 74 BPM | HEIGHT: 64 IN | WEIGHT: 180 LBS | BODY MASS INDEX: 30.73 KG/M2 | DIASTOLIC BLOOD PRESSURE: 67 MMHG

## 2022-02-04 DIAGNOSIS — N39.0 RECURRENT UTI: ICD-10-CM

## 2022-02-04 DIAGNOSIS — N39.41 URGE INCONTINENCE: Primary | ICD-10-CM

## 2022-02-04 LAB
ALBUMIN UR-MCNC: NEGATIVE MG/DL
APPEARANCE UR: CLEAR
BILIRUB UR QL STRIP: NEGATIVE
COLOR UR AUTO: YELLOW
GLUCOSE UR STRIP-MCNC: NEGATIVE MG/DL
HGB UR QL STRIP: ABNORMAL
KETONES UR STRIP-MCNC: NEGATIVE MG/DL
LEUKOCYTE ESTERASE UR QL STRIP: ABNORMAL
NITRATE UR QL: NEGATIVE
PH UR STRIP: 5.5 [PH] (ref 5–8)
SP GR UR STRIP: 1.01 (ref 1–1.03)
UROBILINOGEN UR STRIP-ACNC: 0.2 E.U./DL

## 2022-02-04 PROCEDURE — 52000 CYSTOURETHROSCOPY: CPT | Performed by: OBSTETRICS & GYNECOLOGY

## 2022-02-04 RX ORDER — LIDOCAINE HYDROCHLORIDE 20 MG/ML
JELLY TOPICAL ONCE
Status: DISCONTINUED | OUTPATIENT
Start: 2022-02-04 | End: 2022-08-26

## 2022-02-04 RX ORDER — LIDOCAINE HYDROCHLORIDE 20 MG/ML
JELLY TOPICAL ONCE
Status: DISCONTINUED | OUTPATIENT
Start: 2022-02-04 | End: 2022-02-04

## 2022-02-04 ASSESSMENT — PAIN SCALES - GENERAL: PAINLEVEL: NO PAIN (0)

## 2022-02-04 ASSESSMENT — MIFFLIN-ST. JEOR: SCORE: 1306.47

## 2022-02-04 NOTE — LETTER
2/4/2022       RE: Gem Gama  [address]  [place] [address]     Dear Colleague,    Thank you for referring your patient, Gem Gama, to the [facility] [place] at [facility]. Please see a copy of my visit note below.    Reason for Visit:  Cystoscopy    Clinical Data: Ms. Gem Gama is a 71 year old female with a hx of recurrent UTIs    Cystoscopy procedure:  Pt. Was consented and placed in the lithotomy position.  She was cleaned and preparred in the usual fashion.  Lidocain gel was inserted into the urethra and given time to take effect.  A 16 fr flexible cystoscope was then inserted through the urethra and into the bladder.  The urethra was wnl.  The bladder was with w/o trabeculation.  No tumors, diverticulae, or stones.  Bilateral u/o's were effluxing clear urine.  The cystoscope was then withdrawn.  The pt. Tolerated the procedure well.    A/P:  71 year old female with recurrent UTIs  -F/U with your referring provider    Thank you for allowing me to participate in the care of  Ms. Gem Gama and I will keep you updated on her progress.    Shade Chu MD

## 2022-02-04 NOTE — NURSING NOTE
"Chief Complaint   Patient presents with     Cystoscopy     Symptoms of incontinence, hx of bladder sling, rectocele repair, Allison       Blood pressure 115/67, pulse 74, height 1.626 m (5' 4\"), weight 81.6 kg (180 lb), not currently breastfeeding. Body mass index is 30.9 kg/m .    Patient Active Problem List   Diagnosis     TMJ (temporomandibular joint syndrome)     S/P hip replacement     Congenital ureteric stenosis     Lacrimal duct stenosis     Chronic rhinitis     CARDIOVASCULAR SCREENING; LDL GOAL LESS THAN 160     Intermittent asthma     Advanced directives, counseling/discussion     Major depressive disorder, recurrent episode, moderate (H)     Pituitary microadenoma (H)     Obstructive sleep apnea syndrome     Hypertension goal BP (blood pressure) < 140/90     Osteoarthritis of right knee     S/P total hip arthroplasty     Central sleep apnea     Controlled narcolepsy     Esophageal reflux (GERD)     Status post total knee replacement     Primary narcolepsy without cataplexy     Vitamin D deficiency     CKD stage G3b/A1, GFR 30-44 and albumin creatinine ratio <30 mg/g (H)     Incontinence of feces with fecal urgency     Overactive bladder     Fatigue, unspecified type     Ischemic cardiomyopathy     Chronic systolic heart failure (H)     Hyperlipidemia LDL goal <70     Cholelithiasis without cholecystitis     Coronary artery disease with angina pectoris, unspecified vessel or lesion type, unspecified whether native or transplanted heart (H)     Fever and chills     Urinary tract infection with hematuria, site unspecified     Environmental allergies     Coronary artery disease of autologous bypass graft with stable angina pectoris (H)     Pyelonephritis of right kidney       Allergies   Allergen Reactions     Latex Other (See Comments) and Shortness Of Breath     Runny nose  PN: LW Reaction: DIFFICULTY BREATHING       Flagyl [Metronidazole Hcl] Anaphylaxis and Rash     Food      PN: LW FI1: nka LW FI2:     " Hydrochlorothiazide W/Triamterene      PN: LW Reaction: skin rash     No Clinical Screening - See Comments      PN: LW Other1: -Adhesive Tape     Sulfa Drugs Anaphylaxis, Hives and Itching     PN: LW Reaction: HIVES, Swelling     Tape [Adhesive Tape] Hives     Metoprolol Itching and Rash     Metronidazole Hives and Rash     PN: LW Reaction: HIVES         Current Outpatient Medications   Medication Sig Dispense Refill     amoxicillin (AMOXIL) 500 MG capsule Take as directed before dental work        aspirin (ASA) 81 MG EC tablet Take 1 tablet (81 mg) by mouth daily 90 tablet 0     atorvastatin (LIPITOR) 40 MG tablet Take 1 tablet (40 mg) by mouth daily 90 tablet 3     carvedilol (COREG) 3.125 MG tablet TAKE 1 TABLET(3.125 MG) BY MOUTH TWICE DAILY WITH MEALS 180 tablet 3     clopidogrel (PLAVIX) 75 MG tablet Take 1 tablet (75 mg) by mouth daily 90 tablet 3     isosorbide mononitrate (IMDUR) 30 MG 24 hr tablet Take 0.5 tablets (15 mg) by mouth daily (Patient taking differently: Take 30 mg by mouth daily ) 45 tablet 3     Melatonin 10 MG TABS tablet Take 10 mg by mouth nightly as needed for sleep       mirabegron (MYRBETRIQ) 25 MG 24 hr tablet Take 1 tablet (25 mg) by mouth daily 30 tablet 3     nitroGLYcerin (NITROSTAT) 0.4 MG sublingual tablet Place 1 tablet (0.4 mg) under the tongue every 5 minutes as needed for chest pain 25 tablet 11     Prenatal Multivit-Min-Fe-FA (PRENATAL/IRON) TABS Take 1 tablet by mouth daily        saccharomyces boulardii (FLORASTOR) 250 MG capsule Take 1 capsule (250 mg) by mouth 2 times daily 14 capsule 0     sertraline (ZOLOFT) 100 MG tablet Take 1 tablet (100 mg) by mouth daily 90 tablet 3     ursodiol (ACTIGALL) 300 MG capsule TAKE 1 CAPSULE(300 MG) BY MOUTH TWICE DAILY 60 capsule 11     valsartan (DIOVAN) 40 MG tablet Take 1 tablet (40 mg) by mouth daily 90 tablet 3     vitamin D3 (CHOLECALCIFEROL) 2000 units (50 mcg) tablet Take 1 tablet (2,000 Units) by mouth daily 90 tablet 3        Social History     Tobacco Use     Smoking status: Never Smoker     Smokeless tobacco: Never Used   Substance Use Topics     Alcohol use: No     Drug use: No       Invasive Procedure Safety Checklist:    Procedure: Cystoscopy    Action: Complete sections and checkboxes as appropriate.    Pre-procedure:  1. Patient ID Verified with 2 identifiers (Brittani and  or MRN) : YES    2. Procedure and site verified with patient/designee (when able) : YES    3. Accurate consent documentation in medical record : YES    4. H&P (or appropriate assessment) documented in medical record : N/A  H&P must be up to 30 days prior to procedure an updated within 24 hours of                 Procedure as applicable.     5. Relevant diagnostic and radiology test results appropriately labeled and displayed as applicable : YES    6. Blood products, implants, devices, and/or special equipment available for the procedure as applicable : YES    7. Procedure site(s) marked with provider initials [Exclusions: none] : NO    8. Marking not required. Reason : Yes  Procedure does not require site marking    Time Out:     Time-Out performed immediately prior to starting procedure, including verbal and active participation of all team members addressing: YES    1. Correct patient identity.  2. Confirmed that the correct side and site are marked.  3. An accurate procedure to be done.  4. Agreement on the procedure to be done.  5. Correct patient position.  6. Relevant images and results are properly labeled and appropriately displayed.  7. The need to administer antibiotics or fluids for irrigation purposes during the procedure as applicable.  8. Safety precautions based on patient history or medication use.    During Procedure: Verification of correct person, site, and procedure occurs any time the responsibility for care of the patient is transferred to another member of the care team.    The following medication was given:     MEDICATION:   Lidocaine without epinephrine 2% jelly  ROUTE: urethral   SITE: urethral   DOSE: 10 mL  LOT #: [id number]  : [employer]  EXPIRATION DATE: 7/23  NDC#: [address]-8   Was there drug waste? No    Prior to med admin, verified patient identity using patient's name and date of birth.  Due to med administration, patient instructed to remain in clinic for 15 minutes  afterwards, and to report any adverse reaction to me immediately.    Drug Amount Wasted:  None.  Vial/Syringe: DAVIN Woodard  2/4/2022  3:00 PM

## 2022-02-04 NOTE — PROGRESS NOTES
Reason for Visit:  Cystoscopy    Clinical Data: Ms. Gem Gama is a 71 year old female with a hx of recurrent UTIs    Cystoscopy procedure:  Pt. Was consented and placed in the lithotomy position.  She was cleaned and preparred in the usual fashion.  Lidocain gel was inserted into the urethra and given time to take effect.  A 16 fr flexible cystoscope was then inserted through the urethra and into the bladder.  The urethra was wnl.  The bladder was with w/o trabeculation.  No tumors, diverticulae, or stones.  Bilateral u/o's were effluxing clear urine.  The cystoscope was then withdrawn.  The pt. Tolerated the procedure well.    A/P:  71 year old female with recurrent UTIs  -F/U with your referring provider    Thank you for allowing me to participate in the care of  Ms. Gem Gama and I will keep you updated on her progress.    Shade Chu MD

## 2022-02-07 NOTE — TELEPHONE ENCOUNTER
Called pt and lvm to call back and schedule an appt.   
Called pt and lvm to call back to schedule an appt. Same day is okay.   
Pt called back and scheduled appt.   
Reason for Call:  Same Day Appointment, Requested Provider:  Danny Conteh MD    PCP: Danny Conteh    Reason for visit: follow up to CT results    Duration of symptoms: n/s    Have you been treated for this in the past? Yes    Additional comments: Patient looking to get in with Dr Conteh on the 8th or 9th to go over results. Or if it is non urgent please let her know. Please call.     Can we leave a detailed message on this number? YES    Phone number patient can be reached at: Home number on file [phone] (home)    Best Time: any    Call taken on 2/4/2022 at 5:38 PM by Micah Goins    
show

## 2022-02-08 ENCOUNTER — OFFICE VISIT (OUTPATIENT)
Dept: FAMILY MEDICINE | Facility: CLINIC | Age: 72
End: 2022-02-08
Payer: MEDICARE

## 2022-02-08 VITALS
WEIGHT: 180.8 LBS | RESPIRATION RATE: 18 BRPM | BODY MASS INDEX: 31.03 KG/M2 | OXYGEN SATURATION: 99 % | SYSTOLIC BLOOD PRESSURE: 127 MMHG | DIASTOLIC BLOOD PRESSURE: 76 MMHG | TEMPERATURE: 97.7 F | HEART RATE: 79 BPM

## 2022-02-08 DIAGNOSIS — N18.32 CKD STAGE G3B/A1, GFR 30-44 AND ALBUMIN CREATININE RATIO <30 MG/G (H): ICD-10-CM

## 2022-02-08 DIAGNOSIS — Z78.0 ASYMPTOMATIC POSTMENOPAUSAL STATUS: ICD-10-CM

## 2022-02-08 DIAGNOSIS — Z13.6 SCREENING FOR AAA (ABDOMINAL AORTIC ANEURYSM): ICD-10-CM

## 2022-02-08 DIAGNOSIS — E55.9 VITAMIN D DEFICIENCY: ICD-10-CM

## 2022-02-08 DIAGNOSIS — J84.112 IDIOPATHIC INTERSTITIAL FIBROSIS (H): Primary | ICD-10-CM

## 2022-02-08 DIAGNOSIS — F33.1 MAJOR DEPRESSIVE DISORDER, RECURRENT EPISODE, MODERATE (H): ICD-10-CM

## 2022-02-08 DIAGNOSIS — E78.5 HYPERLIPIDEMIA LDL GOAL <70: ICD-10-CM

## 2022-02-08 DIAGNOSIS — K21.9 GASTROESOPHAGEAL REFLUX DISEASE WITHOUT ESOPHAGITIS: ICD-10-CM

## 2022-02-08 DIAGNOSIS — I50.22 CHRONIC SYSTOLIC HEART FAILURE (H): ICD-10-CM

## 2022-02-08 DIAGNOSIS — I25.728 CORONARY ARTERY DISEASE OF AUTOLOGOUS BYPASS GRAFT WITH STABLE ANGINA PECTORIS (H): ICD-10-CM

## 2022-02-08 DIAGNOSIS — J45.30 MILD PERSISTENT ASTHMA WITHOUT COMPLICATION: ICD-10-CM

## 2022-02-08 LAB
ANION GAP SERPL CALCULATED.3IONS-SCNC: 5 MMOL/L (ref 3–14)
BUN SERPL-MCNC: 17 MG/DL (ref 7–30)
CALCIUM SERPL-MCNC: 9 MG/DL (ref 8.5–10.1)
CHLORIDE BLD-SCNC: 107 MMOL/L (ref 94–109)
CHOLEST SERPL-MCNC: 193 MG/DL
CO2 SERPL-SCNC: 28 MMOL/L (ref 20–32)
CREAT SERPL-MCNC: 1.13 MG/DL (ref 0.52–1.04)
FASTING STATUS PATIENT QL REPORTED: NO
GFR SERPL CREATININE-BSD FRML MDRD: 51 ML/MIN/1.73M2
GLUCOSE BLD-MCNC: 114 MG/DL (ref 70–99)
HDLC SERPL-MCNC: 54 MG/DL
LDLC SERPL CALC-MCNC: 92 MG/DL
NONHDLC SERPL-MCNC: 139 MG/DL
POTASSIUM BLD-SCNC: 4.6 MMOL/L (ref 3.4–5.3)
SODIUM SERPL-SCNC: 140 MMOL/L (ref 133–144)
TRIGL SERPL-MCNC: 234 MG/DL

## 2022-02-08 PROCEDURE — 80048 BASIC METABOLIC PNL TOTAL CA: CPT | Performed by: INTERNAL MEDICINE

## 2022-02-08 PROCEDURE — 99214 OFFICE O/P EST MOD 30 MIN: CPT | Performed by: INTERNAL MEDICINE

## 2022-02-08 PROCEDURE — 82306 VITAMIN D 25 HYDROXY: CPT | Performed by: INTERNAL MEDICINE

## 2022-02-08 PROCEDURE — 36415 COLL VENOUS BLD VENIPUNCTURE: CPT | Performed by: INTERNAL MEDICINE

## 2022-02-08 PROCEDURE — 80061 LIPID PANEL: CPT | Performed by: INTERNAL MEDICINE

## 2022-02-08 PROCEDURE — 83735 ASSAY OF MAGNESIUM: CPT | Performed by: INTERNAL MEDICINE

## 2022-02-08 RX ORDER — ALBUTEROL SULFATE 90 UG/1
1-2 AEROSOL, METERED RESPIRATORY (INHALATION) EVERY 4 HOURS PRN
Qty: 18 G | Refills: 11 | Status: SHIPPED | OUTPATIENT
Start: 2022-02-08 | End: 2023-02-06

## 2022-02-08 RX ORDER — ISOSORBIDE MONONITRATE 30 MG/1
30 TABLET, EXTENDED RELEASE ORAL DAILY
Qty: 90 TABLET | Refills: 3 | Status: SHIPPED | OUTPATIENT
Start: 2022-02-08 | End: 2022-12-09

## 2022-02-08 ASSESSMENT — PAIN SCALES - GENERAL: PAINLEVEL: NO PAIN (1)

## 2022-02-08 ASSESSMENT — ASTHMA QUESTIONNAIRES: ACT_TOTALSCORE: 19

## 2022-02-08 NOTE — PROGRESS NOTES
[facility] Internal Medicine Progress Note           Assessment and Plan:     Idiopathic interstitial fibrosis (H)  Cannot confirm diagnosis unless patient's undergoes CT of chest and sees pulmonary medicine.  - CT Chest w/o Contrast; Future  - Adult Pulmonary Medicine Referral; Future    Major depressive disorder, recurrent episode, moderate (H)  - Magnesium    Coronary artery disease of autologous bypass graft with stable angina pectoris (H)  - isosorbide mononitrate (IMDUR) 30 MG 24 hr tablet; Take 1 tablet (30 mg) by mouth daily    CKD stage G3b/A1, GFR 30-44 and albumin creatinine ratio <30 mg/g (H)  - Basic metabolic panel  (Ca, Cl, CO2, Creat, Gluc, K, Na, BUN)    Chronic systolic heart failure (H)  - Basic metabolic panel  (Ca, Cl, CO2, Creat, Gluc, K, Na, BUN)    Gastroesophageal reflux disease without esophagitis  - XR Esophagram w Upper GI; Future    Mild persistent asthma without complication  Asthma Action Plan (AAP)  PFTs last 12/6/2021 shows normal spirometry but with significant bronchodilator response.  - albuterol (PROAIR HFA/PROVENTIL HFA/VENTOLIN HFA) 108 (90 Base) MCG/ACT inhaler; Inhale 1-2 puffs into the lungs every 4 hours as needed for shortness of breath / dyspnea or wheezing  - fluticasone-vilanterol (BREO ELLIPTA) 100-25 MCG/INH inhaler; Inhale 1 puff into the lungs daily    Hyperlipidemia LDL goal <70  - Lipid panel reflex to direct LDL Non-fasting    Vitamin D deficiency  - Vitamin D Deficiency    Asymptomatic postmenopausal status  - DX Hip/Pelvis/Spine; Future           Interval History:   This is a 71 year old female with chronic urge incontinence who comes in today regarding possible chronic lung disease. The patient underwent a CT urogram last 2/3/2022 which also shows incidental groundglass opacities on both bases. Gem dos complain of exertional dyspnea despite excellent compliance of Breo and Albuterol inhaler.              Significant Problems:   Patient Active  Problem List   Diagnosis     TMJ (temporomandibular joint syndrome)     S/P hip replacement     Congenital ureteric stenosis     Lacrimal duct stenosis     Chronic rhinitis     CARDIOVASCULAR SCREENING; LDL GOAL LESS THAN 160     Intermittent asthma     Advanced directives, counseling/discussion     Major depressive disorder, recurrent episode, moderate (H)     Pituitary microadenoma (H)     Obstructive sleep apnea syndrome     Hypertension goal BP (blood pressure) < 140/90     Osteoarthritis of right knee     S/P total hip arthroplasty     Central sleep apnea     Controlled narcolepsy     Esophageal reflux (GERD)     Status post total knee replacement     Primary narcolepsy without cataplexy     Vitamin D deficiency     CKD stage G3b/A1, GFR 30-44 and albumin creatinine ratio <30 mg/g (H)     Incontinence of feces with fecal urgency     Overactive bladder     Fatigue, unspecified type     Ischemic cardiomyopathy     Chronic systolic heart failure (H)     Hyperlipidemia LDL goal <70     Cholelithiasis without cholecystitis     Coronary artery disease with angina pectoris, unspecified vessel or lesion type, unspecified whether native or transplanted heart (H)     Fever and chills     Urinary tract infection with hematuria, site unspecified     Environmental allergies     Coronary artery disease of autologous bypass graft with stable angina pectoris (H)     Pyelonephritis of right kidney              Review of Systems:   CONSTITUTIONAL: NEGATIVE for fever, chills, change in weight  INTEGUMENTARY/SKIN: NEGATIVE for worrisome rashes, moles or lesions  EYES: NEGATIVE for vision changes or irritation  ENT/MOUTH: NEGATIVE for ear, mouth and throat problems  RESP:POSITIVE for asthma and NEGATIVE for hemoptysis, pleurisy and wheezing  BREAST: NEGATIVE for masses, tenderness or discharge  CV: POSITIVE for coronary artery disease and heart failure and NEGATIVE for orthopnea, palpitations and paroxysmal nocturnal dyspnea  GI:  POSITIVE for GERD  : NEGATIVE for frequency, dysuria, or hematuria  MUSCULOSKELETAL: NEGATIVE for significant arthralgias or myalgia  NEURO: NEGATIVE for weakness, dizziness or paresthesias  ENDOCRINE: POSITIVE  for hyperlipidemia and Vitamin d deficiency.  HEME: NEGATIVE for bleeding problems  PSYCHIATRIC: POSITIVE for depression.             Physical Exam:   /76 (BP Location: Left arm, Patient Position: Chair, Cuff Size: Adult Regular)   Pulse 79   Temp 97.7  F (36.5  C) (Tympanic)   Resp 18   Wt 82 kg (180 lb 12.8 oz)   LMP  (LMP Unknown)   SpO2 99%   BMI 31.03 kg/m    Constitutional: Awake, alert, cooperative, no apparent distress, and appears stated age.  Eyes: extra-ocular muscles intact and sclera clear  ENT: normocepalic, without obvious abnormality  Lungs: no increased work of breathing, no retractions and harsh breath sounds bilaterally.  Cardiovascular: regular rate and rhythm and normal S1 and S2  Abdomen: No scars, normal bowel sounds, soft, non-distended, non-tender, no masses palpated, no hepatosplenomegally.  Musculoskeletal: no lower extremity pitting edema present  Neurologic: Mental Status Exam:  Level of Alertness:   awake  Orientation:   person, place, time  Memory:   normal  Fund of Knowledge:  normal  Attention/Concentration:  normal  Language:  normal  Motor Exam:  moves all extremities well and symmetrically  Neuropsychiatric: Normal affect, mood, orientation, memory and insight.  Skin: No rashes, erythema, pallor, petechia or purpura.          Data:   Epic reviewed.     Disposition:  Follow-up in 4 weeks.    Danny Conteh MD  Internal Medicine  [facility] Team

## 2022-02-09 LAB
DEPRECATED CALCIDIOL+CALCIFEROL SERPL-MC: 62 UG/L (ref 20–75)
MAGNESIUM SERPL-MCNC: 1.9 MG/DL (ref 1.8–2.6)

## 2022-02-10 ENCOUNTER — OFFICE VISIT (OUTPATIENT)
Dept: URGENT CARE | Facility: URGENT CARE | Age: 72
End: 2022-02-10
Payer: MEDICARE

## 2022-02-10 ENCOUNTER — ANCILLARY PROCEDURE (OUTPATIENT)
Dept: GENERAL RADIOLOGY | Facility: CLINIC | Age: 72
End: 2022-02-10
Attending: PHYSICIAN ASSISTANT
Payer: MEDICARE

## 2022-02-10 ENCOUNTER — NURSE TRIAGE (OUTPATIENT)
Dept: NURSING | Facility: CLINIC | Age: 72
End: 2022-02-10
Payer: MEDICARE

## 2022-02-10 VITALS
OXYGEN SATURATION: 97 % | WEIGHT: 183 LBS | SYSTOLIC BLOOD PRESSURE: 130 MMHG | HEART RATE: 112 BPM | TEMPERATURE: 98.3 F | RESPIRATION RATE: 22 BRPM | BODY MASS INDEX: 31.41 KG/M2 | DIASTOLIC BLOOD PRESSURE: 74 MMHG

## 2022-02-10 DIAGNOSIS — M25.551 HIP PAIN, RIGHT: ICD-10-CM
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
## 2022-02-10 DIAGNOSIS — N18.30 STAGE 3 CHRONIC KIDNEY DISEASE, UNSPECIFIED WHETHER STAGE 3A OR 3B CKD (H): ICD-10-CM

## 2022-02-10 DIAGNOSIS — S70.01XA CONTUSION OF RIGHT HIP, INITIAL ENCOUNTER: Primary | ICD-10-CM

## 2022-02-10 PROCEDURE — 99214 OFFICE O/P EST MOD 30 MIN: CPT | Performed by: PHYSICIAN ASSISTANT

## 2022-02-10 PROCEDURE — 73502 X-RAY EXAM HIP UNI 2-3 VIEWS: CPT | Mod: RT | Performed by: RADIOLOGY

## 2022-02-10 ASSESSMENT — ENCOUNTER SYMPTOMS
NECK STIFFNESS: 0
COUGH: 0
WOUND: 0
CARDIOVASCULAR NEGATIVE: 1
CONFUSION: 0
HEADACHES: 0
FEVER: 0
LIGHT-HEADEDNESS: 0
ARTHRALGIAS: 1
DECREASED CONCENTRATION: 0
EYES NEGATIVE: 1
SPEECH DIFFICULTY: 0
VOMITING: 0
DIARRHEA: 0
ALLERGIC/IMMUNOLOGIC NEGATIVE: 1
NECK PAIN: 0
CHILLS: 0
NAUSEA: 0
RESPIRATORY NEGATIVE: 1
WEAKNESS: 0
SORE THROAT: 0
BACK PAIN: 0
ENDOCRINE NEGATIVE: 1
MYALGIAS: 1
RHINORRHEA: 0
JOINT SWELLING: 0
NUMBNESS: 0
PALPITATIONS: 0
DIZZINESS: 0
SHORTNESS OF BREATH: 0

## 2022-02-11 NOTE — PROGRESS NOTES
Chief Complaint:    Chief Complaint   Patient presents with     Musculoskeletal Problem     hurt right hip on the back-fell on ice about before 5:00 p.m., get sharp pain when move certain ways          Medical Decision Making:    Vital signs reviewed by Darrell Gillette PA-C  /74 (BP Location: Left arm, Patient Position: Sitting, Cuff Size: Adult Regular)   Pulse 112   Temp 98.3  F (36.8  C) (Tympanic)   Resp 22   Wt 83 kg (183 lb)   LMP  (LMP Unknown)   SpO2 97%   BMI 31.41 kg/m      Differential Diagnosis:  Hip joint prain, hip fracture, ligamentous injury of hip, muscle strain, contusion, dislocation, bursitis      ASSESSMENT:     1. Contusion of right hip, initial encounter    2. Hip pain, right    3. Fall down stairs, initial encounter    4. Stage 3 chronic kidney disease, unspecified whether stage 3a or 3b CKD (H)           PLAN:    XR of the R hip was negative for any acute fracture per my read.  Rest and ice the affected area.  Tylenol for discomfort.  NSAIDS contraindicated with CKD.    Patient instructed to follow up with PCP in 1 week if symptoms are not improving.  Sooner if symptoms worsen.  Worrisome symptoms discussed with instructions to go to the ED.  Patient verbalized understanding and agreed with this plan.    Labs:     Results for orders placed or performed in visit on 02/10/22   XR Pelvis w Hip Right G/E 2 Views     Status: None    Narrative    EXAM: XR PELVIS AND HIP RIGHT 2 VIEWS  LOCATION: [facility]  DATE/TIME: 2/10/2022 7:35 PM    INDICATION: Right hip pain after a fall.  COMPARISON: None.      Impression    IMPRESSION:    Cemented right total hip arthroplasty. Normal joint alignment. Components are well seated without evidence of loosening. Pelvis and right hip negative for acute fracture.    Uncomplicated left total hip arthroplasty.    Moderate degenerative disc and facet changes in the lower lumbar spine at L4-L5 and L5-S1, and in the SI joints.        Current Meds:    Current Outpatient Medications:      albuterol (PROAIR HFA/PROVENTIL HFA/VENTOLIN HFA) 108 (90 Base) MCG/ACT inhaler, Inhale 1-2 puffs into the lungs every 4 hours as needed for shortness of breath / dyspnea or wheezing, Disp: 18 g, Rfl: 11     amoxicillin (AMOXIL) 500 MG capsule, Take as directed before dental work , Disp: , Rfl:      aspirin (ASA) 81 MG EC tablet, Take 1 tablet (81 mg) by mouth daily, Disp: 90 tablet, Rfl: 0     atorvastatin (LIPITOR) 40 MG tablet, Take 1 tablet (40 mg) by mouth daily, Disp: 90 tablet, Rfl: 3     carvedilol (COREG) 3.125 MG tablet, TAKE 1 TABLET(3.125 MG) BY MOUTH TWICE DAILY WITH MEALS, Disp: 180 tablet, Rfl: 3     clopidogrel (PLAVIX) 75 MG tablet, Take 1 tablet (75 mg) by mouth daily, Disp: 90 tablet, Rfl: 3     fluticasone-vilanterol (BREO ELLIPTA) 100-25 MCG/INH inhaler, Inhale 1 puff into the lungs daily, Disp: 28 each, Rfl: 5     isosorbide mononitrate (IMDUR) 30 MG 24 hr tablet, Take 1 tablet (30 mg) by mouth daily, Disp: 90 tablet, Rfl: 3     Melatonin 10 MG TABS tablet, Take 10 mg by mouth nightly as needed for sleep, Disp: , Rfl:      mirabegron (MYRBETRIQ) 25 MG 24 hr tablet, Take 1 tablet (25 mg) by mouth daily, Disp: 30 tablet, Rfl: 3     nitroGLYcerin (NITROSTAT) 0.4 MG sublingual tablet, Place 1 tablet (0.4 mg) under the tongue every 5 minutes as needed for chest pain, Disp: 25 tablet, Rfl: 11     Prenatal Multivit-Min-Fe-FA (PRENATAL/IRON) TABS, Take 1 tablet by mouth daily , Disp: , Rfl:      saccharomyces boulardii (FLORASTOR) 250 MG capsule, Take 1 capsule (250 mg) by mouth 2 times daily (Patient not taking: Reported on 2/8/2022), Disp: 14 capsule, Rfl: 0     sertraline (ZOLOFT) 100 MG tablet, Take 1 tablet (100 mg) by mouth daily, Disp: 90 tablet, Rfl: 3     ursodiol (ACTIGALL) 300 MG capsule, TAKE 1 CAPSULE(300 MG) BY MOUTH TWICE DAILY, Disp: 60 capsule, Rfl: 11     valsartan (DIOVAN) 40 MG tablet, Take 1 tablet (40 mg) by mouth daily,  Disp: 90 tablet, Rfl: 3     vitamin D3 (CHOLECALCIFEROL) 2000 units (50 mcg) tablet, Take 1 tablet (2,000 Units) by mouth daily, Disp: 90 tablet, Rfl: 3    Current Facility-Administered Medications:      lidocaine (XYLOCAINE) 2 % external gel, , Urethral, Once, Shade Chu MD    Allergies:  Allergies   Allergen Reactions     Latex Other (See Comments) and Shortness Of Breath     Runny nose  PN: LW Reaction: DIFFICULTY BREATHING       Flagyl [Metronidazole Hcl] Anaphylaxis and Rash     Food      PN: LW FI1: nka LW FI2:     Hydrochlorothiazide W/Triamterene      PN: LW Reaction: skin rash     No Clinical Screening - See Comments      PN: LW Other1: -Adhesive Tape     Sulfa Drugs Anaphylaxis, Hives and Itching     PN: LW Reaction: HIVES, Swelling     Tape [Adhesive Tape] Hives     Metoprolol Itching and Rash     Metronidazole Hives and Rash     PN: LW Reaction: HIVES         SUBJECTIVE    HPI: Gem Gama is an 71 year old female who presents for evaluation and treatment of R hip injury.  Patient slipped and fell tonight on some stairs and landed on her R side. This occurred approximately 3 hours before she presented to urgent care, as she was advised by a nurse via phone call to come for an X-ray. Patient endorses right buttock and back pain, but denies loss of function to hip and loss of range of motion. Patient denies hitting head during the fall and denies changes to consciousness, or mental acuity. Patient denies tingling, coolness, or loss of sensation in the right lower extremity. Patient denies symptoms in other limbs at this time.         ROS:      Review of Systems   Constitutional: Negative for chills and fever.   HENT: Negative for congestion, ear pain, rhinorrhea and sore throat.    Eyes: Negative.  Negative for visual disturbance.   Respiratory: Negative.  Negative for cough and shortness of breath.    Cardiovascular: Negative.  Negative for chest pain and palpitations.   Gastrointestinal:  Negative for diarrhea, nausea and vomiting.   Endocrine: Negative.    Genitourinary: Negative.    Musculoskeletal: Positive for arthralgias and myalgias. Negative for back pain, gait problem, joint swelling, neck pain and neck stiffness.   Skin: Negative.  Negative for rash and wound.   Allergic/Immunologic: Negative.  Negative for immunocompromised state.   Neurological: Negative for dizziness, syncope, speech difficulty, weakness, light-headedness, numbness and headaches.   Psychiatric/Behavioral: Negative for confusion and decreased concentration.        Family History   Family History   Problem Relation Age of Onset     Hypertension Mother      Arthritis Mother      Cerebrovascular Disease Mother      Cerebrovascular Disease Father         Three Strokes     Diabetes Father         and brother     Kidney Disease Father      Diabetes Brother      Birth defects Brother      Thyroid Disease Sister         Hypothyroid     Sjogren's Daughter      Diabetes Daughter      Cerebrovascular Disease Daughter      Diabetes Son        Social History  Social History     Socioeconomic History     Marital status:      Spouse name: Not on file     Number of children: Not on file     Years of education: Not on file     Highest education level: Not on file   Occupational History     Not on file   Tobacco Use     Smoking status: Never Smoker     Smokeless tobacco: Never Used   Substance and Sexual Activity     Alcohol use: No     Drug use: No     Sexual activity: Not Currently     Partners: Male     Birth control/protection: None   Other Topics Concern     Parent/sibling w/ CABG, MI or angioplasty before 65F 55M? No      Service No     Blood Transfusions No     Caffeine Concern No     Occupational Exposure No     Hobby Hazards No     Sleep Concern Yes     Comment: Uses CPAP     Stress Concern No     Weight Concern Yes     Special Diet No     Back Care No     Exercise No     Bike Helmet No     Comment:       Seat Belt  Yes     Self-Exams Yes   Social History Narrative     Not on file     Social Determinants of Health     Financial Resource Strain: Not on file   Food Insecurity: Not on file   Transportation Needs: Not on file   Physical Activity: Not on file   Stress: Not on file   Social Connections: Not on file   Intimate Partner Violence: Not on file   Housing Stability: Not on file        Surgical History:  Past Surgical History:   Procedure Laterality Date     ARTHROPLASTY KNEE  7/9/2014    Procedure: ARTHROPLASTY KNEE;  Surgeon: Levi Johnson MD;  Location:  OR     ARTHROPLASTY KNEE Left 11/24/2014    Procedure: ARTHROPLASTY KNEE;  Surgeon: Levi Johnson MD;  Location:  OR     CV CORONARY ANGIOGRAM N/A 10/9/2019    Procedure: Coronary Angiogram;  Surgeon: Chiquita Chatterjee MD;  Location:  HEART CARDIAC CATH LAB     CV HEART CATHETERIZATION WITH POSSIBLE INTERVENTION N/A 10/10/2019    Procedure: Heart Catheterization with Possible Intervention;  Surgeon: Chin Garcia MD;  Location:  HEART CARDIAC CATH LAB     CV INTRA AORTIC BALLOON N/A 10/9/2019    Procedure: Intra-Aortic Balloon Pump Insertion;  Surgeon: Chiquita Chatterjee MD;  Location:  HEART CARDIAC CATH LAB     CV INTRA AORTIC BALLOON REMOVAL N/A 10/10/2019    Procedure: Intra-Aortic Balloon Pump Removal;  Surgeon: Chin Garcia MD;  Location:  HEART CARDIAC CATH LAB     CV LEFT HEART CATH N/A 12/11/2020    Procedure: Heart Catheterization with Possible Intervention;  Surgeon: Pilo Otoole MD;  Location:  HEART CARDIAC CATH LAB     CV PCI STENT DRUG ELUTING N/A 10/9/2019    Procedure: PCI Stent Drug Eluting;  Surgeon: Chiquita Chatterjee MD;  Location:  HEART CARDIAC CATH LAB     GENITOURINARY SURGERY      kidney surgery X 3     ORTHOPEDIC SURGERY      bilat total hip     RECTOCELE REPAIR       SOFT TISSUE SURGERY       ZZC REIMPLANT URETER,EXTENSIVE TAILORING  1973 1997     [facility] REPAIR ENTEROCELE,VAG APPRCH  2008     Prolift     ZZC TOTAL ABD HYSTERECTOMY+BLAD REPR  1992    Vaginal approach with oophrectomy     ZZC TOTAL KNEE ARTHROPLASTY          Problem List:  Patient Active Problem List   Diagnosis     TMJ (temporomandibular joint syndrome)     S/P hip replacement     Congenital ureteric stenosis     Lacrimal duct stenosis     Chronic rhinitis     CARDIOVASCULAR SCREENING; LDL GOAL LESS THAN 160     Intermittent asthma     Advanced directives, counseling/discussion     Major depressive disorder, recurrent episode, moderate (H)     Pituitary microadenoma (H)     Obstructive sleep apnea syndrome     Hypertension goal BP (blood pressure) < 140/90     Osteoarthritis of right knee     S/P total hip arthroplasty     Central sleep apnea     Controlled narcolepsy     Esophageal reflux (GERD)     Status post total knee replacement     Primary narcolepsy without cataplexy     Vitamin D deficiency     CKD stage G3b/A1, GFR 30-44 and albumin creatinine ratio <30 mg/g (H)     Incontinence of feces with fecal urgency     Overactive bladder     Fatigue, unspecified type     Ischemic cardiomyopathy     Chronic systolic heart failure (H)     Hyperlipidemia LDL goal <70     Cholelithiasis without cholecystitis     Coronary artery disease with angina pectoris, unspecified vessel or lesion type, unspecified whether native or transplanted heart (H)     Fever and chills     Urinary tract infection with hematuria, site unspecified     Environmental allergies     Coronary artery disease of autologous bypass graft with stable angina pectoris (H)     Pyelonephritis of right kidney           OBJECTIVE:     Vital signs noted and reviewed by Darrell Gillette PA-C  /74 (BP Location: Left arm, Patient Position: Sitting, Cuff Size: Adult Regular)   Pulse 112   Temp 98.3  F (36.8  C) (Tympanic)   Resp 22   Wt 83 kg (183 lb)   LMP  (LMP Unknown)   SpO2 97%   BMI 31.41 kg/m       PEFR:    Physical Exam  Vitals and nursing note reviewed.    Constitutional:       General: She is not in acute distress.     Appearance: She is well-developed. She is not ill-appearing, toxic-appearing or diaphoretic.   HENT:      Head: Normocephalic and atraumatic.      Right Ear: Tympanic membrane and external ear normal. No drainage, swelling or tenderness. Tympanic membrane is not perforated, erythematous, retracted or bulging.      Left Ear: Tympanic membrane and external ear normal. No drainage, swelling or tenderness. Tympanic membrane is not perforated, erythematous, retracted or bulging.      Nose: No mucosal edema, congestion or rhinorrhea.      Right Sinus: No maxillary sinus tenderness or frontal sinus tenderness.      Left Sinus: No maxillary sinus tenderness or frontal sinus tenderness.      Mouth/Throat:      Pharynx: No pharyngeal swelling, oropharyngeal exudate, posterior oropharyngeal erythema or uvula swelling.      Tonsils: No tonsillar abscesses.   Eyes:      Pupils: Pupils are equal, round, and reactive to light.   Neck:      Trachea: Trachea normal.   Cardiovascular:      Rate and Rhythm: Normal rate and regular rhythm.      Heart sounds: Normal heart sounds, S1 normal and S2 normal. No murmur heard.  No friction rub. No gallop.    Pulmonary:      Effort: Pulmonary effort is normal. No respiratory distress.      Breath sounds: Normal breath sounds. No decreased breath sounds, wheezing, rhonchi or rales.   Abdominal:      General: Bowel sounds are normal. There is no distension.      Palpations: Abdomen is soft. Abdomen is not rigid. There is no mass.      Tenderness: There is no abdominal tenderness. There is no guarding or rebound.   Musculoskeletal:         General: Swelling and tenderness present.      Cervical back: Full passive range of motion without pain, normal range of motion and neck supple. No rigidity or tenderness.      Right hip: Tenderness present. No deformity, lacerations or bony tenderness. Normal range of motion. Normal strength.       Right knee: Normal range of motion.      Right ankle: Normal range of motion.      Right foot: Normal range of motion and normal capillary refill. No foot drop. Normal pulse.        Legs:    Lymphadenopathy:      Cervical: No cervical adenopathy.   Skin:     General: Skin is warm and dry.      Findings: Bruising present.   Neurological:      Mental Status: She is alert and oriented to person, place, and time.      Cranial Nerves: No cranial nerve deficit.      Sensory: No sensory deficit.      Motor: No weakness.      Coordination: Coordination normal.      Gait: Gait normal.      Deep Tendon Reflexes: Reflexes are normal and symmetric.   Psychiatric:         Behavior: Behavior normal. Behavior is cooperative.         Thought Content: Thought content normal.         Judgment: Judgment normal.             Darrell Gillette PA-C  2/10/2022, 7:10 PM

## 2022-02-11 NOTE — TELEPHONE ENCOUNTER
Slipped on icy steps at home. Denies hitting head. Landed on R hip and rib. No bleeding. Able to stand, bear wt and walk but standing/walking increases pain. Pain without activity also, swelling in hip. Full range of motion, says leg does not appear crooked or out of place. No ice pack or Tylenol yet. Dr told pt not to use ibuprofen. Advised see provider w/i 4 hrs. Will go to  - she is trying to get a ride. Says she can get in/out of a car.     Reason for Disposition    [1] Large swelling or bruise (> 2 inches or 5 cm) AND [2] able to bear weight    Additional Information    Negative: Serious injury with multiple fractures (broken bones)    Negative: [1] Major bleeding (e.g., actively dripping or spurting) AND [2] can't be stopped    Negative: Bullet wound, stabbed by knife, or other serious penetrating wound    Negative: Looks like a dislocated joint (crooked or deformed)    Negative: Can't stand (bear weight) or walk    Negative: Sounds like a life-threatening emergency to the triager    Negative: Wound looks infected    Negative: Puncture wound of hip area    Negative: Skin is split open or gaping (or length > 1/2 inch or 12 mm)    Negative: [1] Bleeding AND [2] won't stop after 10 minutes of direct pressure (using correct technique)    Negative: [1] Dirt in the wound AND [2] not removed with 15 minutes of scrubbing    Negative: Sounds like a serious injury to the triager    Negative: [1] SEVERE pain AND [2] not improved 2 hours after pain medicine/ice packs     Has not tried ice pack or pain meds    Negative: Suspicious history for the injury    Protocols used: HIP INJURY-A-

## 2022-02-11 NOTE — PATIENT INSTRUCTIONS
Patient Education     Lower Extremity Bruise  You have a bruise (contusion on a leg, knee, ankle, foot, or toe. Symptoms include pain, swelling, and skin discoloration. No bones are broken. This injury may take from a few days to a few weeks to heal.  During that time, the bruise may change from reddish in color, to purple-blue, to green-yellow, to yellow-brown.   Home care    Unless another medicine was prescribed, you can take acetaminophen, ibuprofen, or naproxen to control pain. Talk with your healthcare provider before using these medicines if you have chronic liver or kidney disease or ever had a stomach ulcer or digestive bleeding.    Elevate the injured area to reduce pain and swelling. As much as possible, sit or lie down with the injured area raised about the level of your heart. This is especially important during the first 48 hours.    Ice the injured area to help reduce pain and swelling. Wrap an ice pack or ice cubes in a plastic bag in a thin towel. Apply to the bruised area for 20 minutes every 1 to 2 hours the first day. Continue this 3 to 4 times a day until the pain and swelling goes away.    If crutches have been advised, don't bear full weight on the injured leg until you can do so without pain. You may return to sports when you are able to put full weight and impact on the injured leg without pain.    Follow up  Follow up with your healthcare provider, or as advised. Call if you are not improving within the next 1 to 2 weeks.   When to seek medical advice   Call your healthcare provider right away if any of these occur:    Increased pain or swelling    Foot or toes become cold, blue, numb or tingly    Signs of infection: Warmth, drainage, or increased redness or pain around the injury    Inability to move the injured area, or any joints below the injured area    Frequent bruising for unknown reasons  [employer] last reviewed this educational content on 8/1/2019 2000-2021 [employer]. All rights reserved. This information is not intended as a substitute for professional medical care. Always follow your healthcare professional's instructions.

## 2022-02-20 ENCOUNTER — HEALTH MAINTENANCE LETTER (OUTPATIENT)
Age: 72
End: 2022-02-20

## 2022-02-21 ENCOUNTER — ANCILLARY PROCEDURE (OUTPATIENT)
Dept: BONE DENSITY | Facility: CLINIC | Age: 72
End: 2022-02-21
Attending: INTERNAL MEDICINE
Payer: MEDICARE

## 2022-02-21 ENCOUNTER — ANCILLARY PROCEDURE (OUTPATIENT)
Dept: CT IMAGING | Facility: CLINIC | Age: 72
End: 2022-02-21
Attending: INTERNAL MEDICINE
Payer: MEDICARE

## 2022-02-21 ENCOUNTER — ANCILLARY PROCEDURE (OUTPATIENT)
Dept: GENERAL RADIOLOGY | Facility: CLINIC | Age: 72
End: 2022-02-21
Attending: INTERNAL MEDICINE
Payer: MEDICARE

## 2022-02-21 ENCOUNTER — MYC MEDICAL ADVICE (OUTPATIENT)
Dept: FAMILY MEDICINE | Facility: CLINIC | Age: 72
End: 2022-02-21

## 2022-02-21 DIAGNOSIS — K21.9 GASTROESOPHAGEAL REFLUX DISEASE WITHOUT ESOPHAGITIS: ICD-10-CM

## 2022-02-21 DIAGNOSIS — Z78.0 ASYMPTOMATIC POSTMENOPAUSAL STATUS: ICD-10-CM

## 2022-02-21 DIAGNOSIS — J84.112 IDIOPATHIC INTERSTITIAL FIBROSIS (H): ICD-10-CM

## 2022-02-21 PROCEDURE — G1004 CDSM NDSC: HCPCS | Performed by: RADIOLOGY

## 2022-02-21 PROCEDURE — 71250 CT THORAX DX C-: CPT | Mod: MG | Performed by: RADIOLOGY

## 2022-02-21 PROCEDURE — 77080 DXA BONE DENSITY AXIAL: CPT | Performed by: INTERNAL MEDICINE

## 2022-02-21 PROCEDURE — 74240 X-RAY XM UPR GI TRC 1CNTRST: CPT | Mod: GC | Performed by: RADIOLOGY

## 2022-02-22 RX ORDER — PANTOPRAZOLE SODIUM 40 MG/1
40 TABLET, DELAYED RELEASE ORAL DAILY
Qty: 90 TABLET | Refills: 3 | Status: SHIPPED | OUTPATIENT
Start: 2022-02-22 | End: 2023-02-06

## 2022-02-22 NOTE — TELEPHONE ENCOUNTER
Patient sent message over [employer] inquiring about the results for chest CT and  XR ESOPHAGRAM.    Routing to Dr. Conteh to please advise.    Malka Dominguez RN, [facility]

## 2022-02-23 ENCOUNTER — OFFICE VISIT (OUTPATIENT)
Dept: PULMONOLOGY | Facility: CLINIC | Age: 72
End: 2022-02-23
Payer: MEDICARE

## 2022-02-23 ENCOUNTER — ANCILLARY PROCEDURE (OUTPATIENT)
Dept: ULTRASOUND IMAGING | Facility: CLINIC | Age: 72
End: 2022-02-23
Attending: INTERNAL MEDICINE
Payer: MEDICARE

## 2022-02-23 VITALS
BODY MASS INDEX: 31.24 KG/M2 | HEIGHT: 64 IN | RESPIRATION RATE: 18 BRPM | WEIGHT: 183 LBS | OXYGEN SATURATION: 98 % | HEART RATE: 72 BPM | TEMPERATURE: 97.5 F | DIASTOLIC BLOOD PRESSURE: 69 MMHG | SYSTOLIC BLOOD PRESSURE: 108 MMHG

## 2022-02-23 DIAGNOSIS — Z13.6 SCREENING FOR AAA (ABDOMINAL AORTIC ANEURYSM): ICD-10-CM

## 2022-02-23 DIAGNOSIS — J45.20 MILD INTERMITTENT ASTHMA WITHOUT COMPLICATION: Primary | ICD-10-CM

## 2022-02-23 DIAGNOSIS — R06.02 SOB (SHORTNESS OF BREATH): ICD-10-CM

## 2022-02-23 PROCEDURE — 99205 OFFICE O/P NEW HI 60 MIN: CPT | Performed by: INTERNAL MEDICINE

## 2022-02-23 PROCEDURE — 76775 US EXAM ABDO BACK WALL LIM: CPT | Mod: GC | Performed by: RADIOLOGY

## 2022-02-23 RX ORDER — FLUTICASONE PROPIONATE AND SALMETEROL XINAFOATE 230; 21 UG/1; UG/1
2 AEROSOL, METERED RESPIRATORY (INHALATION) 2 TIMES DAILY
Qty: 12 G | Refills: 11 | Status: SHIPPED | OUTPATIENT
Start: 2022-02-23 | End: 2022-02-23

## 2022-02-23 ASSESSMENT — PAIN SCALES - GENERAL: PAINLEVEL: MILD PAIN (3)

## 2022-02-23 NOTE — PROGRESS NOTES
"Gem Gama's goals for this visit include: Consult  She requests these members of her care team be copied on today's visit information: PCP    PCP: Danny Conteh    Referring Provider:  Danny Conteh MD  [address] [place] [address]    /69   Pulse 72   Temp 97.5  F (36.4  C)   Resp 18   Ht 1.626 m (5' 4\")   Wt 83 kg (183 lb)   LMP  (LMP Unknown)   SpO2 98%   BMI 31.41 kg/m      Do you need any medication refills at today's visit? N    Anali Lara LPN  Pulmonary Medicine:  [facility]  Phone: [phone] Fax: [phone]      "

## 2022-02-23 NOTE — PROGRESS NOTES
Pulmonary Clinic New Patient Consult  Reason for Consult: SOB  History of Present Illness  I had the pleasure of seeing Gem Gama, who is a 70 year old female with a history of CAD status post stents, ischemic cardiomyopathy with now improved EF to 55%, CKD stage III, and LEA previously on CPAP who presents to clinic for an evaluation and management of shortness of breath.   In brief review, the patient was hospitalized 10/9/2019-10/13/2019 due to STEMI and underwent EDWIN x 2 to proximal LAD. Coronary angiogram showed severe single vessel obstructive CAD of LAD (100%) as well as moderate non-obstructive CAD in OM1 and OM2 (60%, 50%). Patient initially requiring intraaortic balloon pump and pressor support. Echocardiogram showed EF 35-40%. At the time, she had complained of some shortness of breath for several months as well as chest pain which seem to have resolved following her stents placement. Most recent echocardiogram shows improved EF 55-60% and no regional wall motion abnormalities. Most recently, she was hospitalized 12/9-12/12/2020 for concerns for unstable Angina but nuclear stress test completed which showed small area of a mild degree of infarction in the apical segment associated with mild degree of jasmeet-infarct ischemia. Cardiac catheterization completed 12/11/2022 showed no acute coronary lesions, widely patent proximal-mid LAD stent and CTA pulmonary was negative for PE.  Today,  Heidy notes concerns of ongoing shortness of breath particularly worse with activity. Occasionally episodic and rarely associated with wheezing. She does have occasional chest tightness and notices that her symptoms are worse in cold weather and increased humidity. She denies any overt chest pain, orthopnea nor pedal swellings. Denies any dysphonia or dysphagia. Remotely had allergy shots for significant seasonal allergies. Per patient, she was allergic to dust, dust mites, trees and weed. More recently, allergies are  less of an issue.  Never smoked. 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 Does not keep any pets and has a heated furnace with filters changed regularly. No family hx of chronic lung diseases or lung cancer.    Review of Systems:  10 of 14 systems reviewed and are negative unless otherwise stated in HPI.    Past Medical History:   Diagnosis Date     ADHD (attention deficit hyperactivity disorder)      Anxiety      Arthritis     back, hands, knees, hips     Asthma     controlled--has coughing symptoms     C. difficile diarrhea      Central sleep apnea      Coronary artery disease involving native coronary artery of native heart without angina pectoris 10/25/2019     Depression      Gastro-oesophageal reflux disease      Hypertension      Ischemic cardiomyopathy 10/25/2019     Narcolepsy      Renal disease     gfr is aroung 48     Sleep apnea     uses Cpap       Past Surgical History:   Procedure Laterality Date     ARTHROPLASTY KNEE  7/9/2014    Procedure: ARTHROPLASTY KNEE;  Surgeon: Levi Johnson MD;  Location:  OR     ARTHROPLASTY KNEE Left 11/24/2014    Procedure: ARTHROPLASTY KNEE;  Surgeon: Levi Johnson MD;  Location:  OR     CV CORONARY ANGIOGRAM N/A 10/9/2019    Procedure: Coronary Angiogram;  Surgeon: Chiquita Chatterjee MD;  Location:  HEART CARDIAC CATH LAB     CV HEART CATHETERIZATION WITH POSSIBLE INTERVENTION N/A 10/10/2019    Procedure: Heart Catheterization with Possible Intervention;  Surgeon: Chin Garcia MD;  Location:  HEART CARDIAC CATH LAB     CV INTRA AORTIC BALLOON N/A 10/9/2019    Procedure: Intra-Aortic Balloon Pump Insertion;  Surgeon: Chiquita Chatterjee MD;  Location:  HEART CARDIAC CATH LAB     CV INTRA AORTIC BALLOON REMOVAL N/A 10/10/2019    Procedure: Intra-Aortic Balloon Pump Removal;  Surgeon: Chin Garcia MD;  Location:  HEART CARDIAC CATH LAB     CV LEFT HEART CATH N/A 12/11/2020    Procedure: Heart  Catheterization with Possible Intervention;  Surgeon: Pilo Otoole MD;  Location:  HEART CARDIAC CATH LAB     CV PCI STENT DRUG ELUTING N/A 10/9/2019    Procedure: PCI Stent Drug Eluting;  Surgeon: Chiquita Chatterjee MD;  Location:  HEART CARDIAC CATH LAB     GENITOURINARY SURGERY      kidney surgery X 3     ORTHOPEDIC SURGERY      bilat total hip     RECTOCELE REPAIR       SOFT TISSUE SURGERY       ZZC REIMPLANT URETER,EXTENSIVE TAILORING  1973 1997     ZZC REPAIR ENTEROCELE,VAG APPRCH  2008    Prolift     ZZC TOTAL ABD HYSTERECTOMY+BLAD REPR  1992    Vaginal approach with oophrectomy     ZZC TOTAL KNEE ARTHROPLASTY         Family History   Problem Relation Age of Onset     Hypertension Mother      Arthritis Mother      Cerebrovascular Disease Mother      Cerebrovascular Disease Father         Three Strokes     Diabetes Father         and brother     Kidney Disease Father      Diabetes Brother      Birth defects Brother      Thyroid Disease Sister         Hypothyroid     Sjogren's Daughter      Diabetes Daughter      Cerebrovascular Disease Daughter      Diabetes Son        Social History     Socioeconomic History     Marital status:      Spouse name: Not on file     Number of children: Not on file     Years of education: Not on file     Highest education level: Not on file   Occupational History     Not on file   Tobacco Use     Smoking status: Never Smoker     Smokeless tobacco: Never Used   Substance and Sexual Activity     Alcohol use: No     Drug use: No     Sexual activity: Not Currently     Partners: Male     Birth control/protection: None   Other Topics Concern     Parent/sibling w/ CABG, MI or angioplasty before 65F 55M? No      Service No     Blood Transfusions No     Caffeine Concern No     Occupational Exposure No     Hobby Hazards No     Sleep Concern Yes     Comment: Uses CPAP     Stress Concern No     Weight Concern Yes     Special Diet No     Back Care No      Exercise No     Bike Helmet No     Comment:       Seat Belt Yes     Self-Exams Yes   Social History Narrative     Not on file     Social Determinants of Health     Financial Resource Strain: Not on file   Food Insecurity: Not on file   Transportation Needs: Not on file   Physical Activity: Not on file   Stress: Not on file   Social Connections: Not on file   Intimate Partner Violence: Not on file   Housing Stability: Not on file         Allergies   Allergen Reactions     Latex Other (See Comments) and Shortness Of Breath     Runny nose  PN: LW Reaction: DIFFICULTY BREATHING       Flagyl [Metronidazole Hcl] Anaphylaxis and Rash     Food      PN: LW FI1: nka LW FI2:     Hydrochlorothiazide W/Triamterene      PN: LW Reaction: skin rash     No Clinical Screening - See Comments      PN: LW Other1: -Adhesive Tape     Sulfa Drugs Anaphylaxis, Hives and Itching     PN: LW Reaction: HIVES, Swelling     Tape [Adhesive Tape] Hives     Metoprolol Itching and Rash     Metronidazole Hives and Rash     PN: LW Reaction: HIVES           Current Outpatient Medications:      albuterol (PROAIR HFA/PROVENTIL HFA/VENTOLIN HFA) 108 (90 Base) MCG/ACT inhaler, Inhale 1-2 puffs into the lungs every 4 hours as needed for shortness of breath / dyspnea or wheezing, Disp: 18 g, Rfl: 11     amoxicillin (AMOXIL) 500 MG capsule, Take as directed before dental work , Disp: , Rfl:      aspirin (ASA) 81 MG EC tablet, Take 1 tablet (81 mg) by mouth daily, Disp: 90 tablet, Rfl: 0     atorvastatin (LIPITOR) 40 MG tablet, Take 1 tablet (40 mg) by mouth daily, Disp: 90 tablet, Rfl: 3     carvedilol (COREG) 3.125 MG tablet, TAKE 1 TABLET(3.125 MG) BY MOUTH TWICE DAILY WITH MEALS, Disp: 180 tablet, Rfl: 3     clopidogrel (PLAVIX) 75 MG tablet, Take 1 tablet (75 mg) by mouth daily, Disp: 90 tablet, Rfl: 3     fluticasone-vilanterol (BREO ELLIPTA) 100-25 MCG/INH inhaler, Inhale 1 puff into the lungs daily, Disp: 28 each, Rfl: 5     isosorbide mononitrate  "(IMDUR) 30 MG 24 hr tablet, Take 1 tablet (30 mg) by mouth daily, Disp: 90 tablet, Rfl: 3     Melatonin 10 MG TABS tablet, Take 10 mg by mouth nightly as needed for sleep, Disp: , Rfl:      mirabegron (MYRBETRIQ) 25 MG 24 hr tablet, Take 1 tablet (25 mg) by mouth daily, Disp: 30 tablet, Rfl: 3     nitroGLYcerin (NITROSTAT) 0.4 MG sublingual tablet, Place 1 tablet (0.4 mg) under the tongue every 5 minutes as needed for chest pain, Disp: 25 tablet, Rfl: 11     pantoprazole (PROTONIX) 40 MG EC tablet, Take 1 tablet (40 mg) by mouth daily, Disp: 90 tablet, Rfl: 3     Prenatal Multivit-Min-Fe-FA (PRENATAL/IRON) TABS, Take 1 tablet by mouth daily , Disp: , Rfl:      saccharomyces boulardii (FLORASTOR) 250 MG capsule, Take 1 capsule (250 mg) by mouth 2 times daily, Disp: 14 capsule, Rfl: 0     sertraline (ZOLOFT) 100 MG tablet, Take 1 tablet (100 mg) by mouth daily, Disp: 90 tablet, Rfl: 3     ursodiol (ACTIGALL) 300 MG capsule, TAKE 1 CAPSULE(300 MG) BY MOUTH TWICE DAILY, Disp: 60 capsule, Rfl: 11     valsartan (DIOVAN) 40 MG tablet, Take 1 tablet (40 mg) by mouth daily, Disp: 90 tablet, Rfl: 3     vitamin D3 (CHOLECALCIFEROL) 2000 units (50 mcg) tablet, Take 1 tablet (2,000 Units) by mouth daily, Disp: 90 tablet, Rfl: 3    Current Facility-Administered Medications:      lidocaine (XYLOCAINE) 2 % external gel, , Urethral, Once, Shade Chu MD      Physical Exam:  /69   Pulse 72   Temp 97.5  F (36.4  C)   Resp 18   Ht 1.626 m (5' 4\")   Wt 83 kg (183 lb)   LMP  (LMP Unknown)   SpO2 98%   BMI 31.41 kg/m    GENERAL: Well developed, well nourished, alert, and in no apparent distress.  HEENT: Normocephalic, atraumatic. PERRL, EOMI. Oral mucosa is moist. No perioral cyanosis.  NECK: supple, no masses, no thyromegaly.  RESP:  Normal respiratory effort.  CTAB.  No rales, wheezes, rhonchi.  No cyanosis or clubbing.  CV: Normal S1, S2, regular rhythm, normal rate. No murmur.  No LE edema.   ABDOMEN:  Soft, " non-tender, non-distended.   SKIN: warm and dry. No rash.  NEURO: AAOx3.  Normal gait.  Fluent speech.  PSYCH: mentation appears normal.       Results:  PFTs: Discussed and reviewed with patient - normal spirometry with positive bronchodilator response  Most Recent [facility] Pulmonary Function Testing    FVC-Pred   Date Value Ref Range Status   12/06/2021 2.74 L      FVC-Pre   Date Value Ref Range Status   12/06/2021 2.48 L      FVC-%Pred-Pre   Date Value Ref Range Status   12/06/2021 90 %      FEV1-Pre   Date Value Ref Range Status   12/06/2021 1.83 L      FEV1-%Pred-Pre   Date Value Ref Range Status   12/06/2021 86 %      FEV1FVC-Pred   Date Value Ref Range Status   12/06/2021 78 %      FEV1FVC-Pre   Date Value Ref Range Status   12/06/2021 74 %      No results found for: 20029  FEFMax-Pred   Date Value Ref Range Status   12/06/2021 5.42 L/sec      FEFMax-Pre   Date Value Ref Range Status   12/06/2021 4.51 L/sec      FEFMax-%Pred-Pre   Date Value Ref Range Status   12/06/2021 83 %      ExpTime-Pre   Date Value Ref Range Status   12/06/2021 7.46 sec      FIFMax-Pre   Date Value Ref Range Status   12/06/2021 2.63 L/sec      FEV1FEV6-Pred   Date Value Ref Range Status   12/06/2021 79 %      FEV1FEV6-Pre   Date Value Ref Range Status   12/06/2021 73 %      No results found for: 20055  Imaging (personally reviewed in clinic today): CT chest without contrast 02/21/2022  IMPRESSION: Osteopenia with extensive degenerative changes in the spine. No CT evidence of advanced interstitial lung disease. Atherosclerosis. Air trapping. Extensive cholelithiasis.    Echocardiogram  Interpretation Summary  Left ventricular visual ejection fraction is 50-55%.  Hypokinesis of the mid anteroseptal, inferoseptal and distal septal segments.  The right ventricle is normal in size and function.  Mild aortic insufficiency.  The inferior vena cava is normal.   This study was compared with the study from 10/2019, the LV function and wall motion  abnormalities have improved substantially        Assessment and Plan:   Mild intermittent Asthma  PFTs support the diagnosis of asthma with significant bronchodilator response. Radiologically, there is evidence of air trapping on CT imaging which is also very supportive. Her symptoms of dyspnea may be likely due to uncontrolled asthma. She was very recently started on an ICS/LABA- Breo which I encouraged her to continue to use in a scheduled fashion. Prescriptions of high dose Breo was given today with refills.  We discussed trigger avoidance and an asthma action plan was formulated in clinic.  We discussed how uncontrolled GERD and LEA can complicate the control of asthma. She has uncontrolled GERD and she was recently started on pantoprazole. She is unable to tolerate CPAP and I advised that she returns to see her sleep doctor to discuss other alternative treatment options for LEA.    SOB  May be multifactorial due to asthma, CHF/CAD and deconditioning. Her improved EF and unremarkable stress test is reassuring for a cardiac perspective. She will benefit from pulmonary rehab while we intensify her asthma treatment and control. Referral was made to pulmonary rehab. She has undergone significant cardiopulmonary work up including CTA pulmonary, echocardiogram and LHC which are reassuring with no evidence of PE, pulmonary hypertension and ACS respectively.  LEA  Advised to see her sleep doctor as above to discuss alternative treatment options for LEA.  Questions and concerns were answered to the patient's satisfaction.  she was provided with my contact information should new questions or concerns arise in the interim.  she should return to clinic in 6 months  Up to date on vaccinations  I spent a total of 60 minutes face to face with Gem Gama during today's office visit. Over 50% of this time was spent counseling the patient and/or coordinating care regarding their pulmonary disease.    Luis A Moody,  MD  Pulmonary, Critical Care and Sleep Medicine  [facility]  Pager: [phone]        The above note was dictated using voice recognition software and may include typographical errors. Please contact the author for any clarifications.

## 2022-02-23 NOTE — PATIENT INSTRUCTIONS
Patient Education     Asthma  What is asthma?  Asthma is a long-term (chronic) ?lung disease. The airways react to triggers (allergens and irritants). This makes it hard to breathe. With exposure to triggers, changes can occur such as:     The airways become swollen and inflamed.    The muscles around the airways tighten.    More mucus is made. This leads to mucus plugs.  All of these changes make the airways narrow. This makes it hard for air to go out of the lungs. And fresh oxygen can't get into the body.   What causes asthma?  Experts don't know the exact cause of asthma. They believe it is partly inherited. The environment, infections, and chemicals released by the body also play a role.   Exercise causes symptoms in many people with asthma. Symptoms can occur during exercise. They can also occur right after exercise. In some people, stress or strong feelings can cause symptoms.   All of these may be asthma triggers:   Allergens Respiratory problem         Pollens (trees, grasses, and weeds)    Mold    Pets    Dust and dust mites    Cockroaches    Mice       Nasal allergies    Sinus infections    The flu    Viral infections, including the common cold   Irritants Medicines         Strong odors from perfumes, , cooking, paints, and varnishes    Chemicals (gases, fumes)    Air pollution    Changing weather (temperature, barometric pressure, humidity, and strong winds)    Smoke (tobacco-inhaled or secondhand)       Aspirin    NSAIDs (nonsteroidal anti-inflammatory drugs) such as ibuprofen   Other conditions Other         GERD (gastroesophageal reflux)    Sleep apnea    Overweight    Depression       Exercise, especially in cold weather    Strong feelings that go along with laughing or crying       Who is at risk for asthma?  It is most common in:     Children and teens ages 5 to17    People living in cities  Other factors include:    Personal or family history of asthma or allergies    Exposure to  secondhand tobacco smoke    Children with a family history of asthma    Children who have allergies or atopic dermatitis    Children exposed to secondhand and tobacco smoke    Living in areas with high levels of environmental air pollution  What are the symptoms of asthma?  Symptoms include:    Trouble breathing or shortness of breath    Chest tightness    Wheezing or a whistling sound when breathing    Coughing    Breathing becomes harder and may hurt    Talking and sleeping may be harder with severe symptoms  How is asthma diagnosed?  Your healthcare provider will ask about your health history. They will give you a physical exam. You will also have other tests. An important test is spirometry.   A spirometer is a device used to find out how well the lungs are working. It measures the amount and speed of air breathed out.   You may have other tests. These are done to check for conditions such as allergies.   How is asthma treated?  Treatment will depend on your symptoms, age, and general health. It will also depend on how severe the condition is.   There is no cure for asthma. It can often be controlled by staying away from triggers. And by taking medicines as prescribed by your healthcare provider.   Watching for symptoms and taking early, appropriate action is a key part of asthma care. So is knowing what to do if symptoms get worse. Experts advise making an Asthma Action Plan with your provider and educating your family and close friends about its purpose and content. This will help them provide correct asthma care if you are ill and can't care for yourself.   Medicines for asthma  The 2 types of asthma medicines are long-term control and short-term (quick-relief) medicines. Long-term control medicines are often taken every day. They help prevent symptoms. Quick-relief medicines calm asthma symptoms fast. But they only last for a short time. You may take either type of medicine alone. Some people take both.    Your healthcare provider should regularly check and adjust your medicines as needed.   Long-term control medicines  At first, it may take a few weeks for long-term control medicines to work. You must take these medicines every day. These medicines include:     Anti-inflammatory medicines. These medicines reduce or prevent airway swelling.    Bronchodilators. These relax muscles around the airways.    Leukotriene modifiers. These block the action of chemicals called leukotrienes. These are chemicals that cause airways to be inflamed and narrowed.     Biologic therapy. For people with asthma that isn't well controlled despite inhaler therapy, there are some newer medicines. These target the inflammatory cells in the body that start the asthma reaction. These medicines include anti-IL4, Anti-IL5, and anti-IgE medicines. They are often given by shot (injection) or infusion.  Quick-relief medicines  Quick-relief medicines quickly relax the muscles around the airways. But the relief only lasts about 2 to 3 hours.   These medicines may include:    Inhaled short-acting beta2-agonists .  These help relax muscles around the airways.    Inhaled anticholinergics . These block a chemical in the body called acetylcholine. This chemical contracts the muscles. It also causes more mucus in the airways.  Inhalation devices for asthma  Inhaled medicines go right to the lungs. They have fewer side effects than medicines taken by mouth. Inhaled medicines may be anti-inflammatory or bronchodilating. Or they may be both. The devices used are:     Metered-dose inhaler (MDI). This is the most common type of inhaler. It uses a chemical to push the medicine out of the inhaler. MDIs are held in front of or put into the mouth. Then the medicine is released in puffs. Or they may be used with a spacer device.    Nebulizer. This device sprays a fine mist of medicine. This is done through a mask using air under pressure, or an ultrasonic machine.  A mouthpiece or mask is connected to a machine by plastic tubing to deliver medicine.    Dry powder or rotary inhaler.  These inhalers deliver powered medicine as you breathe.  Living with asthma  Staying away from triggers is key in managing asthma. Triggers are specific to the individual and may include such things as allergens, irritants, other health problems, exercise, medicines, and strong emotions. The following can help you limit your exposure:   Allergies    Dust. Dust is the most common year-round allergen. The allergy is caused by tiny dust mites. Dust mites are found in mattresses, carpets, and fabric-covered (upholstered) furniture such as sofas and chairs. They live best in warm, humid conditions. It's important to limit your exposure. Keep your living area as clean as possible by vacuuming and dusting on a weekly basis. Keep the use of carpets to a minimum, and take extra care in the bedroom. Put dust mite covers on your mattress, box spring, and pillows.    Pollens. You may be allergic to pollen. If so, during pollen season keep all car and house windows closed. Use air conditioning. If you have been outside, shower, wash your hair, and change clothes when you go inside.     Pets. Pets that have fur or feathers often cause allergies. If you have pets, try not to touch them. If you do pet or handle them, wash your hands afterward. Keep pets off your furniture, bed, and out of your bedroom. Have someone brush and bathe your pet often.    Mold and mildew.  These can trigger asthma. When outside, stay away from damp, shady areas. Use exhaust fans when cooking or bathing. Keep indoor humidity below 45%. And drain and clean your dehumidifier often.    Exercise  Exercise is a common asthma trigger. But don't limit sports or exercise unless a healthcare provider tells you to. Exercise is good for your health and lungs. Swimming, golf, and karate are good choices if you have asthma. Always warm up before  exercise. And cool down after. Ask your provider about using your quick-relief medicine before starting exercise. Keep a log of what types of exercise trigger asthma problems and talk with your provider about possible ways to manage your symptoms.   Irritants  If you smoke, quit. This is hard to do, but your provider can help provide resources to aid your success.   Stay away from secondhand and thirdhand smoke. Don't let people smoke in your car or in your home.   In addition, stay away from other types of smoke. Don t use wood stoves or kerosene heaters. If you live in an area with air pollution, stay indoors during bad air days and wear a mask if you have to go outside. Also stay away from strong perfumes, cleaning products, fresh paint, and other things with strong odors.   Medicines  Some medicines can make asthma symptoms worse. These medicines include aspirin, NSAIDs (nonsteroidal anti-inflammatory drugs), and beta-blockers. Talk with your provider about your asthma history and medicine use.   Other health problems  Some health problems can make it harder to control asthma. These include:     Respiratory infections such as colds and the flu    GERD (gastroesophageal reflux) and heartburn    Being overweight    Sleep apnea    Depression    Work with your healthcare provider to treat any of these problems.   Strong emotions  The strong feelings that go with laughing and crying can trigger asthma symptoms. You can learn how to better manage your emotions.   Key points about asthma    Asthma is a long-term (chronic) lung disease.    Triggers irritate sensitive airways. This makes it hard to breathe.    Staying away from triggers is an important part of treatment.    Long-term medicines control symptoms. They are taken every day, even when you feel well.    Rescue medicines provide quick symptom relief. But they are short-term.    An Asthma Action Plan can help patients and family members take appropriate, timely  steps to control symptoms.    Next steps  Tips to help you get the most from a visit to your healthcare provider:    Know the reason for your visit and what you want to happen.    Before your visit, write down questions you want answered.    Bring someone with you to help you ask questions and remember what your provider tells you.    At the visit, write down the name of a new diagnosis, and any new medicines, treatments, or tests. Also write down any new instructions your provider gives you.    Know why a new medicine or treatment is prescribed, and how it will help you. Also know what the side effects are.    Ask if your condition can be treated in other ways.    Know why a test or procedure is recommended and what the results could mean.    Know what to expect if you do not take the medicine or have the test or procedure.    If you have a follow-up appointment, write down the date, time, and purpose for that visit.    Know how you can contact your provider if you have questions.  Kelly last reviewed this educational content on 12/1/2020 2000-2021 [employer]. All rights reserved. This information is not intended as a substitute for professional medical care. Always follow your healthcare professional's instructions.

## 2022-02-28 ENCOUNTER — MYC MEDICAL ADVICE (OUTPATIENT)
Dept: PULMONOLOGY | Facility: CLINIC | Age: 72
End: 2022-02-28
Payer: MEDICARE

## 2022-02-28 ENCOUNTER — PRE VISIT (OUTPATIENT)
Dept: UROLOGY | Facility: CLINIC | Age: 72
End: 2022-02-28
Payer: MEDICARE

## 2022-02-28 DIAGNOSIS — G47.33 OSA (OBSTRUCTIVE SLEEP APNEA): Primary | ICD-10-CM

## 2022-02-28 NOTE — CONFIDENTIAL NOTE
Reason for visit: follow-up symptom recheck     Relevant information: urge incontinence, Allison    Records/imaging/labs/orders: in epic    Pt called: n/a    At Rooming: standard

## 2022-03-07 ENCOUNTER — HOSPITAL ENCOUNTER (OUTPATIENT)
Facility: CLINIC | Age: 72
Discharge: HOME | End: 2022-03-07
Attending: INTERNAL MEDICINE | Admitting: INTERNAL MEDICINE
Payer: MEDICARE

## 2022-03-07 ENCOUNTER — OFFICE VISIT (OUTPATIENT)
Dept: UROLOGY | Facility: CLINIC | Age: 72
End: 2022-03-07
Payer: MEDICARE

## 2022-03-07 VITALS
BODY MASS INDEX: 30.73 KG/M2 | DIASTOLIC BLOOD PRESSURE: 70 MMHG | SYSTOLIC BLOOD PRESSURE: 113 MMHG | HEIGHT: 64 IN | HEART RATE: 74 BPM | WEIGHT: 180 LBS

## 2022-03-07 DIAGNOSIS — R82.90 CLOUDY URINE: ICD-10-CM

## 2022-03-07 DIAGNOSIS — N39.0 RECURRENT UTI: ICD-10-CM

## 2022-03-07 DIAGNOSIS — N39.41 URGE INCONTINENCE: Primary | ICD-10-CM

## 2022-03-07 LAB
ALBUMIN UR-MCNC: NEGATIVE MG/DL
APPEARANCE UR: CLEAR
BILIRUB UR QL STRIP: NEGATIVE
COLOR UR AUTO: YELLOW
GLUCOSE UR STRIP-MCNC: NEGATIVE MG/DL
HGB UR QL STRIP: NEGATIVE
HYALINE CASTS: 4 /LPF
KETONES UR STRIP-MCNC: NEGATIVE MG/DL
LEUKOCYTE ESTERASE UR QL STRIP: ABNORMAL
MUCOUS THREADS #/AREA URNS LPF: PRESENT /LPF
NITRATE UR QL: NEGATIVE
PH UR STRIP: 5 [PH] (ref 5–7)
RBC URINE: 1 /HPF
SP GR UR STRIP: 1.01 (ref 1–1.03)
SQUAMOUS EPITHELIAL: 3 /HPF
UROBILINOGEN UR STRIP-MCNC: NORMAL MG/DL
WBC URINE: 1 /HPF

## 2022-03-07 PROCEDURE — 87086 URINE CULTURE/COLONY COUNT: CPT

## 2022-03-07 PROCEDURE — 81001 URINALYSIS AUTO W/SCOPE: CPT | Performed by: PATHOLOGY

## 2022-03-07 PROCEDURE — 99213 OFFICE O/P EST LOW 20 MIN: CPT | Mod: 25 | Performed by: PHYSICIAN ASSISTANT

## 2022-03-07 PROCEDURE — 51798 US URINE CAPACITY MEASURE: CPT | Performed by: PHYSICIAN ASSISTANT

## 2022-03-07 PROCEDURE — 99000 SPECIMEN HANDLING OFFICE-LAB: CPT | Performed by: PATHOLOGY

## 2022-03-07 RX ORDER — MIRABEGRON 50 MG/1
50 TABLET, EXTENDED RELEASE ORAL DAILY
Qty: 90 TABLET | Refills: 0 | Status: SHIPPED | OUTPATIENT
Start: 2022-03-07 | End: 2022-06-09

## 2022-03-07 ASSESSMENT — PAIN SCALES - GENERAL: PAINLEVEL: NO PAIN (0)

## 2022-03-07 NOTE — LETTER
3/7/2022       RE: Gem Gama  [address]  [facility] [id number]     Dear Colleague,    Thank you for referring your patient, Gem Gama, to the [facility] [place] at [facility]. Please see a copy of my visit note below.          Chief Complaint:   Follow up          History of Present Illness:   Gem Gama is a 71 year old female with a history of CAD, ischemic cardiomyopathy, hypertension, CKD stage III, and LEA who presents for follow up of E. Coli UTI and suspected pyelonephritis.  Patient presented to [facility] on 9/23/2021 with symptoms of dysuria and fevers, with urine culture positive for E. Coli.  She was treated with a course of ceftriaxone while inpatient and was discharged with a 7 day course of ceftin.  Follow up UA and UC from 10/14/2021 showed eradication of infection.  Patient does have a history of reconstructive surgery of the right kidney due to history of congenital kidney defect, with last surgery in 1996.  Per previous records she is s/p right dismembered pyeloplasty in 1996 at [facility].  She does admit to chronic UTI's with having several infections per year.  This past year, in addition to her recent UTI with E. Coli in 9/2021, she was treated for a UTI on 6/12/2021 though UC was negative for infection.  She does admit to long standing history of urinary urgency and large volume urge incontinence.  She goes through 6-7 large pads per day.  She also reports some spontaneous incontinence in which she does not know she needs to urinate and will leak urine.  She admits to occasional frequency.  She is unsure if she fully empties her bladder.  She drinks 1-2 bottles of soda per day, and no alcohol use.  She is s/p hysterectomy and bladder sling in 1992, and repair of rectocele in 2008.  Recent abdominal US from 10/28/2021 revealed no renal masses or hydronephrosis, with incidental  normal variant extrarenal pelvis on the right.      Patient was recently seen by myself on 12/13/2021 at which time she was started on Myrbetriq 25 mg once daily for treatment of urinary urgency and incontinence.  She also underwent cystoscopy with Dr. Chu on 2/4/2022 which was normal.  CT urogram from 2/3/2022 showing prominent right > left extrarenal pelvis, with no significant hydronephrosis or evidence of urinary obstruction.  Since starting the Myrbetriq she reports some improvement in symptoms, especially when she first started it.  She reports it has since becom a little less effective but still better than baseline.  She reports she is currently going through 4 pads per day, which is improved from 6-7 times per night.  She reports the urgency is improved, and her urinary frequency is every 3-4 hours during the day.  She reports her nocturia has improved to almost 0, which is improved from once overnight.  She reports her urge incontinence with large volumes is much improved and she is able to hold it better.  No dysuria or hematuria.  She admits to some mild increased in her urine odor.  She does report a history of spastic esophagus and bowel, which are both improved on the Myrbetriq.           Past Medical History:     Past Medical History:   Diagnosis Date     ADHD (attention deficit hyperactivity disorder)      Anxiety      Arthritis     back, hands, knees, hips     Asthma     controlled--has coughing symptoms     C. difficile diarrhea      Central sleep apnea      Coronary artery disease involving native coronary artery of native heart without angina pectoris 10/25/2019     Depression      Gastro-oesophageal reflux disease      Hypertension      Ischemic cardiomyopathy 10/25/2019     Narcolepsy      Renal disease     gfr is aroung 48     Sleep apnea     uses Cpap            Past Surgical History:     Past Surgical History:   Procedure Laterality Date     ARTHROPLASTY KNEE  7/9/2014    Procedure:  ARTHROPLASTY KNEE;  Surgeon: Levi Johnson MD;  Location:  OR     ARTHROPLASTY KNEE Left 11/24/2014    Procedure: ARTHROPLASTY KNEE;  Surgeon: Levi Johnson MD;  Location:  OR     CV CORONARY ANGIOGRAM N/A 10/9/2019    Procedure: Coronary Angiogram;  Surgeon: Chiquita Chatterjee MD;  Location:  HEART CARDIAC CATH LAB     CV HEART CATHETERIZATION WITH POSSIBLE INTERVENTION N/A 10/10/2019    Procedure: Heart Catheterization with Possible Intervention;  Surgeon: Chin Garcia MD;  Location:  HEART CARDIAC CATH LAB     CV INTRA AORTIC BALLOON N/A 10/9/2019    Procedure: Intra-Aortic Balloon Pump Insertion;  Surgeon: Chiquita Chatterjee MD;  Location:  HEART CARDIAC CATH LAB     CV INTRA AORTIC BALLOON REMOVAL N/A 10/10/2019    Procedure: Intra-Aortic Balloon Pump Removal;  Surgeon: Chin Garcia MD;  Location:  HEART CARDIAC CATH LAB     CV LEFT HEART CATH N/A 12/11/2020    Procedure: Heart Catheterization with Possible Intervention;  Surgeon: Pilo Otoole MD;  Location:  HEART CARDIAC CATH LAB     CV PCI STENT DRUG ELUTING N/A 10/9/2019    Procedure: PCI Stent Drug Eluting;  Surgeon: Chiquita Chatterjee MD;  Location:  HEART CARDIAC CATH LAB     GENITOURINARY SURGERY      kidney surgery X 3     ORTHOPEDIC SURGERY      bilat total hip     RECTOCELE REPAIR       SOFT TISSUE SURGERY       RUST REIMPLANT URETER,EXTENSIVE TAILORING  1973 1997     Z REPAIR ENTEROCELE,VAG APPRCH  2008    Prolift     RUST TOTAL ABD HYSTERECTOMY+BLAD REPR  1992    Vaginal approach with oophrectomy     Z TOTAL KNEE ARTHROPLASTY              Medications     Current Outpatient Medications   Medication     albuterol (PROAIR HFA/PROVENTIL HFA/VENTOLIN HFA) 108 (90 Base) MCG/ACT inhaler     amoxicillin (AMOXIL) 500 MG capsule     aspirin (ASA) 81 MG EC tablet     atorvastatin (LIPITOR) 40 MG tablet     carvedilol (COREG) 3.125 MG tablet     clopidogrel (PLAVIX) 75 MG tablet      fluticasone-vilanterol (BREO ELLIPTA) 100-25 MCG/INH inhaler     fluticasone-vilanterol (BREO ELLIPTA) 200-25 MCG/INH inhaler     isosorbide mononitrate (IMDUR) 30 MG 24 hr tablet     Melatonin 10 MG TABS tablet     mirabegron (MYRBETRIQ) 25 MG 24 hr tablet     nitroGLYcerin (NITROSTAT) 0.4 MG sublingual tablet     pantoprazole (PROTONIX) 40 MG EC tablet     Prenatal Multivit-Min-Fe-FA (PRENATAL/IRON) TABS     saccharomyces boulardii (FLORASTOR) 250 MG capsule     sertraline (ZOLOFT) 100 MG tablet     ursodiol (ACTIGALL) 300 MG capsule     valsartan (DIOVAN) 40 MG tablet     vitamin D3 (CHOLECALCIFEROL) 2000 units (50 mcg) tablet     Current Facility-Administered Medications   Medication     lidocaine (XYLOCAINE) 2 % external gel            Family History:     Family History   Problem Relation Age of Onset     Hypertension Mother      Arthritis Mother      Cerebrovascular Disease Mother      Cerebrovascular Disease Father         Three Strokes     Diabetes Father         and brother     Kidney Disease Father      Diabetes Brother      Birth defects Brother      Thyroid Disease Sister         Hypothyroid     Sjogren's Daughter      Diabetes Daughter      Cerebrovascular Disease Daughter      Diabetes Son             Social History:     Social History     Socioeconomic History     Marital status:      Spouse name: Not on file     Number of children: Not on file     Years of education: Not on file     Highest education level: Not on file   Occupational History     Not on file   Tobacco Use     Smoking status: Never Smoker     Smokeless tobacco: Never Used   Substance and Sexual Activity     Alcohol use: No     Drug use: No     Sexual activity: Not Currently     Partners: Male     Birth control/protection: None   Other Topics Concern     Parent/sibling w/ CABG, MI or angioplasty before 65F 55M? No      Service No     Blood Transfusions No     Caffeine Concern No     Occupational Exposure No     Hobby  "Hazards No     Sleep Concern Yes     Comment: Uses CPAP     Stress Concern No     Weight Concern Yes     Special Diet No     Back Care No     Exercise No     Bike Helmet No     Comment:       Seat Belt Yes     Self-Exams Yes   Social History Narrative     Not on file     Social Determinants of Health     Financial Resource Strain: Not on file   Food Insecurity: Not on file   Transportation Needs: Not on file   Physical Activity: Not on file   Stress: Not on file   Social Connections: Not on file   Intimate Partner Violence: Not on file   Housing Stability: Not on file            Allergies:   Latex, Flagyl [metronidazole hcl], Food, Hydrochlorothiazide w/triamterene, No clinical screening - see comments, Sulfa drugs, Tape [adhesive tape], Metoprolol, and Metronidazole         Review of Systems:  From intake questionnaire   Negative 14 system review except as noted on HPI, nurse's note.         Physical Exam:   Patient is a 71 year old  female   Vitals: Blood pressure 113/70, pulse 74, height 1.626 m (5' 4\"), weight 81.6 kg (180 lb), not currently breastfeeding.  General Appearance Adult: Alert, no acute distress, oriented  Lungs: no respiratory distress, or pursed lip breathing  Heart: No obvious jugular venous distension present  Abdomen: soft, nontender, no organomegaly or masses, Body mass index is 30.9 kg/m .  Musculoskeltal: extremities normal, no peripheral edema  Skin: no suspicious lesions or rashes  Neuro: Alert, oriented, speech and mentation normal  : deferred      Uroflow and Post-Void Residual by Bladder Scan   PVR: 20 ml       Labs and Pathology:    I personally reviewed all applicable laboratory data and went over findings with patient  Significant for:    CBC RESULTS:  Recent Labs   Lab Test 09/23/21  1905 06/12/21  1232 12/10/20  0557 12/09/20  1031   WBC 8.6 5.6 4.7 5.4   HGB 13.1 14.8 13.8 13.7   * 177 154 164        BMP RESULTS:  Recent Labs   Lab Test 02/08/22  1407 02/03/22  1438 " 10/14/21  1140 09/26/21  0646 09/25/21  0648 09/23/21  1832 12/17/20  1403 12/10/20  0557 12/09/20  1031 10/15/20  1256     --  142 140 146*   < > 143 141 139 140   POTASSIUM 4.6  --  4.0 4.2 3.6   < > 3.9 4.3 4.1 4.8   CHLORIDE 107  --  109 107 114*   < > 107 109 107 107   CO2 28  --  26 28 23   < > 30 29 26 29   ANIONGAP 5  --  7 5 9   < > 6 3 6 4   *  --  108* 95 102*   < > 113* 99 95 101*   BUN 17  --  13 12 14   < > 18 16 15 19   CR 1.13* 1.0 0.92 1.02 0.94   < > 1.00 1.04 0.97 1.14*   GFRESTIMATED 51* 59* 62 55* 61   < > 56* 54* 58* 48*   GFRESTBLACK  --   --   --   --   --   --  65 62 67 56*   FRACISCO 9.0  --  8.8 8.7 8.1*   < > 8.8 8.6 8.6 9.2    < > = values in this interval not displayed.       UA RESULTS:   Recent Labs   Lab Test 02/04/22  1506 01/19/22  1824 10/14/21  1140 09/23/21  1851   SG 1.015 1.010 1.025 1.014   URINEPH 5.5 5.5 5.0 5.0   NITRITE Negative Negative Negative Positive*   RBCU  --  0-2 0-2 2   WBCU  --  0-5 0-5 56*           Imaging:    I personally reviewed all applicable imaging and went over findings with patient.  Significant for:    EXAMINATION: CT UROGRAM WO & W CONTRAST, 2/3/2022 3:02 PM     TECHNIQUE:  Helical CT images from the lung bases through the  symphysis pubis were obtained  without and with contrast using CT  urogram protocol. Contrast dose: Isovue 370  111 mls     COMPARISON: Ultrasound 10/28/2021     HISTORY: Ureteropelvic junction (UPJ) obstruction, right; Urge  incontinence; Recurrent UTI; Congenital malformation of kidney,  unspecified     Additional history from the EMR: reconstructive surgery of the right  kidney due to history of congenital kidney defect, with last surgery  in 1996     FINDINGS:     Urinary tract: No suspicious renal masses or lesions. Surgical clips  noted about the right kidney. Small hypodensities in both kidneys  which are too small to characterize. Prominent right greater than left  renal pelvis. No significant hydronephrosis. No  hydroureter.  Visualization of the urinary bladder is limited due to beam hardening  artifact from bilateral hip prostheses.     Remainder of the abdomen and pelvis: No suspicious hepatic masses or  lesions. The gallbladder is filled with calcified gallstones. No  evidence of biliary obstruction. Pancreas is unremarkable. The spleen  is normal. The adrenal glands are normal. No abnormally dilated loops  of small or large bowel. The appendix is normal. Somewhat limited  evaluation of the deep pelvis due to beam hardening artifact from  bilateral hip prostheses. The major intra-abdominal vasculature is  patent without focal aneurysmal dilation or significant stenosis. No  lymphadenopathy. No intra-abdominal free air or free fluid.     Lung bases:  Scattered groundglass opacities at the periphery of the  bilateral lower lobes. No pleural effusion. Heart size is normal.     Bones and soft tissues: No acute or suspicious osseous or soft tissue  lesions                                                                      IMPRESSION:   1. Prominent right greater than left extrarenal pelvis. No significant  hydronephrosis or evidence of urinary obstruction.  2. Nonspecific scattered groundglass opacities in the periphery of the  bilateral lung bases which may represent infectious versus  inflammatory changes.            Assessment and Plan:     Assessment: 71 year old female with a history of right congenital kidney defect s/p right dismembered pyeloplasty in 1996 at [facility], recurrent UTIs, and urinary urge incontinence.  Recent CT urogram showing prominent right > left extrarenal pelvis, but no significant hydronephrosis or evidence of urinary obstruction.  Recent cystoscopy with Dr. Chu on 2/4/2022 also normal.  Patient recently initiated on Myrbetriq 25 mg once daily with improvement in urgency and incontinence, but continued mild symptoms at this time.  Discussed option for further titration of Myrbetriq to 50  mg once daily, and patient would like to try this.  BP well controlled and PVR reassuringly low at 20 ml today.  Patient advised to monitor her blood pressure when increasing her Myrbetriq and we'll plan for follow up in 2-3 months for recheck.  We also discussed option for initiating vaginal estrogen for prophylaxis with recurrent UTIs, and patient in agreement.  Will check UA/UC today given recent onset cloudy urine to ensure no signs of acute infection at this time.  Patient in agreement with plan.      Plan:  - Urinalysis and urine culture today, along with PVR.   - Stop Myrbetriq 25 mg once daily.   - Start Myrbetriq 50 mg once daily.   - Watch your blood pressure while on the higher dose of Myrbetriq.   - Start vaginal estrogren cream for recurrent UTIs.   - Follow up with me in 2-3 months for recheck.       SANYA Goddard  Department of Urology

## 2022-03-07 NOTE — PROGRESS NOTES
Chief Complaint:   Follow up          History of Present Illness:   Gem Gama is a 71 year old female with a history of CAD, ischemic cardiomyopathy, hypertension, CKD stage III, and LEA who presents for follow up of E. Coli UTI and suspected pyelonephritis.  Patient presented to [facility] on 9/23/2021 with symptoms of dysuria and fevers, with urine culture positive for E. Coli.  She was treated with a course of ceftriaxone while inpatient and was discharged with a 7 day course of ceftin.  Follow up UA and UC from 10/14/2021 showed eradication of infection.  Patient does have a history of reconstructive surgery of the right kidney due to history of congenital kidney defect, with last surgery in 1996.  Per previous records she is s/p right dismembered pyeloplasty in 1996 at [facility].  She does admit to chronic UTI's with having several infections per year.  This past year, in addition to her recent UTI with E. Coli in 9/2021, she was treated for a UTI on 6/12/2021 though UC was negative for infection.  She does admit to long standing history of urinary urgency and large volume urge incontinence.  She goes through 6-7 large pads per day.  She also reports some spontaneous incontinence in which she does not know she needs to urinate and will leak urine.  She admits to occasional frequency.  She is unsure if she fully empties her bladder.  She drinks 1-2 bottles of soda per day, and no alcohol use.  She is s/p hysterectomy and bladder sling in 1992, and repair of rectocele in 2008.  Recent abdominal US from 10/28/2021 revealed no renal masses or hydronephrosis, with incidental normal variant extrarenal pelvis on the right.      Patient was recently seen by myself on 12/13/2021 at which time she was started on Myrbetriq 25 mg once daily for treatment of urinary urgency and incontinence.  She also underwent cystoscopy with Dr. Chu on 2/4/2022 which was normal.  CT urogram from 2/3/2022 showing  prominent right > left extrarenal pelvis, with no significant hydronephrosis or evidence of urinary obstruction.  Since starting the Myrbetriq she reports some improvement in symptoms, especially when she first started it.  She reports it has since becom a little less effective but still better than baseline.  She reports she is currently going through 4 pads per day, which is improved from 6-7 times per night.  She reports the urgency is improved, and her urinary frequency is every 3-4 hours during the day.  She reports her nocturia has improved to almost 0, which is improved from once overnight.  She reports her urge incontinence with large volumes is much improved and she is able to hold it better.  No dysuria or hematuria.  She admits to some mild increased in her urine odor.  She does report a history of spastic esophagus and bowel, which are both improved on the Myrbetriq.           Past Medical History:     Past Medical History:   Diagnosis Date     ADHD (attention deficit hyperactivity disorder)      Anxiety      Arthritis     back, hands, knees, hips     Asthma     controlled--has coughing symptoms     C. difficile diarrhea      Central sleep apnea      Coronary artery disease involving native coronary artery of native heart without angina pectoris 10/25/2019     Depression      Gastro-oesophageal reflux disease      Hypertension      Ischemic cardiomyopathy 10/25/2019     Narcolepsy      Renal disease     gfr is aroung 48     Sleep apnea     uses Cpap            Past Surgical History:     Past Surgical History:   Procedure Laterality Date     ARTHROPLASTY KNEE  7/9/2014    Procedure: ARTHROPLASTY KNEE;  Surgeon: Levi Johnson MD;  Location:  OR     ARTHROPLASTY KNEE Left 11/24/2014    Procedure: ARTHROPLASTY KNEE;  Surgeon: Levi Johnson MD;  Location:  OR     CV CORONARY ANGIOGRAM N/A 10/9/2019    Procedure: Coronary Angiogram;  Surgeon: Chiquita Chatterjee MD;  Location:  HEART CARDIAC CATH  LAB     CV HEART CATHETERIZATION WITH POSSIBLE INTERVENTION N/A 10/10/2019    Procedure: Heart Catheterization with Possible Intervention;  Surgeon: Chin Garcia MD;  Location:  HEART CARDIAC CATH LAB     CV INTRA AORTIC BALLOON N/A 10/9/2019    Procedure: Intra-Aortic Balloon Pump Insertion;  Surgeon: Chiquita Chatterjee MD;  Location:  HEART CARDIAC CATH LAB     CV INTRA AORTIC BALLOON REMOVAL N/A 10/10/2019    Procedure: Intra-Aortic Balloon Pump Removal;  Surgeon: Chin Garcia MD;  Location:  HEART CARDIAC CATH LAB     CV LEFT HEART CATH N/A 12/11/2020    Procedure: Heart Catheterization with Possible Intervention;  Surgeon: Pilo Otoole MD;  Location:  HEART CARDIAC CATH LAB     CV PCI STENT DRUG ELUTING N/A 10/9/2019    Procedure: PCI Stent Drug Eluting;  Surgeon: Chiquita Chatterjee MD;  Location:  HEART CARDIAC CATH LAB     GENITOURINARY SURGERY      kidney surgery X 3     ORTHOPEDIC SURGERY      bilat total hip     RECTOCELE REPAIR       SOFT TISSUE SURGERY       [facility] REIMPLANT URETER,EXTENSIVE TAILORING  1973 1997     [facility] REPAIR ENTEROCELE,VAG APPRCH  2008    Prolift     C TOTAL ABD HYSTERECTOMY+BLAD REPR  1992    Vaginal approach with oophrectomy     ZZC TOTAL KNEE ARTHROPLASTY              Medications     Current Outpatient Medications   Medication     albuterol (PROAIR HFA/PROVENTIL HFA/VENTOLIN HFA) 108 (90 Base) MCG/ACT inhaler     amoxicillin (AMOXIL) 500 MG capsule     aspirin (ASA) 81 MG EC tablet     atorvastatin (LIPITOR) 40 MG tablet     carvedilol (COREG) 3.125 MG tablet     clopidogrel (PLAVIX) 75 MG tablet     fluticasone-vilanterol (BREO ELLIPTA) 100-25 MCG/INH inhaler     fluticasone-vilanterol (BREO ELLIPTA) 200-25 MCG/INH inhaler     isosorbide mononitrate (IMDUR) 30 MG 24 hr tablet     Melatonin 10 MG TABS tablet     mirabegron (MYRBETRIQ) 25 MG 24 hr tablet     nitroGLYcerin (NITROSTAT) 0.4 MG sublingual tablet     pantoprazole (PROTONIX) 40 MG EC  tablet     Prenatal Multivit-Min-Fe-FA (PRENATAL/IRON) TABS     saccharomyces boulardii (FLORASTOR) 250 MG capsule     sertraline (ZOLOFT) 100 MG tablet     ursodiol (ACTIGALL) 300 MG capsule     valsartan (DIOVAN) 40 MG tablet     vitamin D3 (CHOLECALCIFEROL) 2000 units (50 mcg) tablet     Current Facility-Administered Medications   Medication     lidocaine (XYLOCAINE) 2 % external gel            Family History:     Family History   Problem Relation Age of Onset     Hypertension Mother      Arthritis Mother      Cerebrovascular Disease Mother      Cerebrovascular Disease Father         Three Strokes     Diabetes Father         and brother     Kidney Disease Father      Diabetes Brother      Birth defects Brother      Thyroid Disease Sister         Hypothyroid     Sjogren's Daughter      Diabetes Daughter      Cerebrovascular Disease Daughter      Diabetes Son             Social History:     Social History     Socioeconomic History     Marital status:      Spouse name: Not on file     Number of children: Not on file     Years of education: Not on file     Highest education level: Not on file   Occupational History     Not on file   Tobacco Use     Smoking status: Never Smoker     Smokeless tobacco: Never Used   Substance and Sexual Activity     Alcohol use: No     Drug use: No     Sexual activity: Not Currently     Partners: Male     Birth control/protection: None   Other Topics Concern     Parent/sibling w/ CABG, MI or angioplasty before 65F 55M? No      Service No     Blood Transfusions No     Caffeine Concern No     Occupational Exposure No     Hobby Hazards No     Sleep Concern Yes     Comment: Uses CPAP     Stress Concern No     Weight Concern Yes     Special Diet No     Back Care No     Exercise No     Bike Helmet No     Comment:       Seat Belt Yes     Self-Exams Yes   Social History Narrative     Not on file     Social Determinants of Health     Financial Resource Strain: Not on file   Food  "Insecurity: Not on file   Transportation Needs: Not on file   Physical Activity: Not on file   Stress: Not on file   Social Connections: Not on file   Intimate Partner Violence: Not on file   Housing Stability: Not on file            Allergies:   Latex, Flagyl [metronidazole hcl], Food, Hydrochlorothiazide w/triamterene, No clinical screening - see comments, Sulfa drugs, Tape [adhesive tape], Metoprolol, and Metronidazole         Review of Systems:  From intake questionnaire   Negative 14 system review except as noted on HPI, nurse's note.         Physical Exam:   Patient is a 71 year old  female   Vitals: Blood pressure 113/70, pulse 74, height 1.626 m (5' 4\"), weight 81.6 kg (180 lb), not currently breastfeeding.  General Appearance Adult: Alert, no acute distress, oriented  Lungs: no respiratory distress, or pursed lip breathing  Heart: No obvious jugular venous distension present  Abdomen: soft, nontender, no organomegaly or masses, Body mass index is 30.9 kg/m .  Musculoskeltal: extremities normal, no peripheral edema  Skin: no suspicious lesions or rashes  Neuro: Alert, oriented, speech and mentation normal  : deferred      Uroflow and Post-Void Residual by Bladder Scan   PVR: 20 ml       Labs and Pathology:    I personally reviewed all applicable laboratory data and went over findings with patient  Significant for:    CBC RESULTS:  Recent Labs   Lab Test 09/23/21  1905 06/12/21  1232 12/10/20  0557 12/09/20  1031   WBC 8.6 5.6 4.7 5.4   HGB 13.1 14.8 13.8 13.7   * 177 154 164        BMP RESULTS:  Recent Labs   Lab Test 02/08/22  1407 02/03/22  1438 10/14/21  1140 09/26/21  0646 09/25/21  0648 09/23/21  1832 12/17/20  1403 12/10/20  0557 12/09/20  1031 10/15/20  1256     --  142 140 146*   < > 143 141 139 140   POTASSIUM 4.6  --  4.0 4.2 3.6   < > 3.9 4.3 4.1 4.8   CHLORIDE 107  --  109 107 114*   < > 107 109 107 107   CO2 28  --  26 28 23   < > 30 29 26 29   ANIONGAP 5  --  7 5 9   < > 6 3 6 4 "   *  --  108* 95 102*   < > 113* 99 95 101*   BUN 17  --  13 12 14   < > 18 16 15 19   CR 1.13* 1.0 0.92 1.02 0.94   < > 1.00 1.04 0.97 1.14*   GFRESTIMATED 51* 59* 62 55* 61   < > 56* 54* 58* 48*   GFRESTBLACK  --   --   --   --   --   --  65 62 67 56*   FRACISCO 9.0  --  8.8 8.7 8.1*   < > 8.8 8.6 8.6 9.2    < > = values in this interval not displayed.       UA RESULTS:   Recent Labs   Lab Test 02/04/22  1506 01/19/22  1824 10/14/21  1140 09/23/21  1851   SG 1.015 1.010 1.025 1.014   URINEPH 5.5 5.5 5.0 5.0   NITRITE Negative Negative Negative Positive*   RBCU  --  0-2 0-2 2   WBCU  --  0-5 0-5 56*           Imaging:    I personally reviewed all applicable imaging and went over findings with patient.  Significant for:    EXAMINATION: CT UROGRAM WO & W CONTRAST, 2/3/2022 3:02 PM     TECHNIQUE:  Helical CT images from the lung bases through the  symphysis pubis were obtained  without and with contrast using CT  urogram protocol. Contrast dose: Isovue 370  111 mls     COMPARISON: Ultrasound 10/28/2021     HISTORY: Ureteropelvic junction (UPJ) obstruction, right; Urge  incontinence; Recurrent UTI; Congenital malformation of kidney,  unspecified     Additional history from the EMR: reconstructive surgery of the right  kidney due to history of congenital kidney defect, with last surgery  in 1996     FINDINGS:     Urinary tract: No suspicious renal masses or lesions. Surgical clips  noted about the right kidney. Small hypodensities in both kidneys  which are too small to characterize. Prominent right greater than left  renal pelvis. No significant hydronephrosis. No hydroureter.  Visualization of the urinary bladder is limited due to beam hardening  artifact from bilateral hip prostheses.     Remainder of the abdomen and pelvis: No suspicious hepatic masses or  lesions. The gallbladder is filled with calcified gallstones. No  evidence of biliary obstruction. Pancreas is unremarkable. The spleen  is normal. The adrenal  glands are normal. No abnormally dilated loops  of small or large bowel. The appendix is normal. Somewhat limited  evaluation of the deep pelvis due to beam hardening artifact from  bilateral hip prostheses. The major intra-abdominal vasculature is  patent without focal aneurysmal dilation or significant stenosis. No  lymphadenopathy. No intra-abdominal free air or free fluid.     Lung bases:  Scattered groundglass opacities at the periphery of the  bilateral lower lobes. No pleural effusion. Heart size is normal.     Bones and soft tissues: No acute or suspicious osseous or soft tissue  lesions                                                                      IMPRESSION:   1. Prominent right greater than left extrarenal pelvis. No significant  hydronephrosis or evidence of urinary obstruction.  2. Nonspecific scattered groundglass opacities in the periphery of the  bilateral lung bases which may represent infectious versus  inflammatory changes.            Assessment and Plan:     Assessment: 71 year old female with a history of right congenital kidney defect s/p right dismembered pyeloplasty in 1996 at [facility], recurrent UTIs, and urinary urge incontinence.  Recent CT urogram showing prominent right > left extrarenal pelvis, but no significant hydronephrosis or evidence of urinary obstruction.  Recent cystoscopy with Dr. Chu on 2/4/2022 also normal.  Patient recently initiated on Myrbetriq 25 mg once daily with improvement in urgency and incontinence, but continued mild symptoms at this time.  Discussed option for further titration of Myrbetriq to 50 mg once daily, and patient would like to try this.  BP well controlled and PVR reassuringly low at 20 ml today.  Patient advised to monitor her blood pressure when increasing her Myrbetriq and we'll plan for follow up in 2-3 months for recheck.  We also discussed option for initiating vaginal estrogen for prophylaxis with recurrent UTIs, and patient in  agreement.  Will check UA/UC today given recent onset cloudy urine to ensure no signs of acute infection at this time.  Patient in agreement with plan.      Plan:  - Urinalysis and urine culture today, along with PVR.   - Stop Myrbetriq 25 mg once daily.   - Start Myrbetriq 50 mg once daily.   - Watch your blood pressure while on the higher dose of Myrbetriq.   - Start vaginal estrogren cream for recurrent UTIs.   - Follow up with me in 2-3 months for recheck.       SANYA Goddard  Department of Urology

## 2022-03-07 NOTE — NURSING NOTE
"Chief Complaint   Patient presents with     Follow Up     symptom check        Blood pressure 113/70, pulse 74, height 1.626 m (5' 4\"), weight 81.6 kg (180 lb), not currently breastfeeding. Body mass index is 30.9 kg/m .    Patient Active Problem List   Diagnosis     TMJ (temporomandibular joint syndrome)     S/P hip replacement     Congenital ureteric stenosis     Lacrimal duct stenosis     Chronic rhinitis     CARDIOVASCULAR SCREENING; LDL GOAL LESS THAN 160     Intermittent asthma     Advanced directives, counseling/discussion     Major depressive disorder, recurrent episode, moderate (H)     Pituitary microadenoma (H)     Obstructive sleep apnea syndrome     Hypertension goal BP (blood pressure) < 140/90     Osteoarthritis of right knee     S/P total hip arthroplasty     Central sleep apnea     Controlled narcolepsy     Esophageal reflux (GERD)     Status post total knee replacement     Primary narcolepsy without cataplexy     Vitamin D deficiency     CKD stage G3b/A1, GFR 30-44 and albumin creatinine ratio <30 mg/g (H)     Incontinence of feces with fecal urgency     Overactive bladder     Fatigue, unspecified type     Ischemic cardiomyopathy     Chronic systolic heart failure (H)     Hyperlipidemia LDL goal <70     Cholelithiasis without cholecystitis     Coronary artery disease with angina pectoris, unspecified vessel or lesion type, unspecified whether native or transplanted heart (H)     Fever and chills     Urinary tract infection with hematuria, site unspecified     Environmental allergies     Coronary artery disease of autologous bypass graft with stable angina pectoris (H)     Pyelonephritis of right kidney       Allergies   Allergen Reactions     Latex Other (See Comments) and Shortness Of Breath     Runny nose  PN: LW Reaction: DIFFICULTY BREATHING       Flagyl [Metronidazole Hcl] Anaphylaxis and Rash     Food      PN: LW FI1: nka LW FI2:     Hydrochlorothiazide W/Triamterene      PN: LW Reaction: " skin rash     No Clinical Screening - See Comments      PN: LW Other1: -Adhesive Tape     Sulfa Drugs Anaphylaxis, Hives and Itching     PN: LW Reaction: HIVES, Swelling     Tape [Adhesive Tape] Hives     Metoprolol Itching and Rash     Metronidazole Hives and Rash     PN: LW Reaction: HIVES         Current Outpatient Medications   Medication Sig Dispense Refill     albuterol (PROAIR HFA/PROVENTIL HFA/VENTOLIN HFA) 108 (90 Base) MCG/ACT inhaler Inhale 1-2 puffs into the lungs every 4 hours as needed for shortness of breath / dyspnea or wheezing 18 g 11     amoxicillin (AMOXIL) 500 MG capsule Take as directed before dental work        aspirin (ASA) 81 MG EC tablet Take 1 tablet (81 mg) by mouth daily 90 tablet 0     atorvastatin (LIPITOR) 40 MG tablet Take 1 tablet (40 mg) by mouth daily 90 tablet 3     carvedilol (COREG) 3.125 MG tablet TAKE 1 TABLET(3.125 MG) BY MOUTH TWICE DAILY WITH MEALS 180 tablet 3     clopidogrel (PLAVIX) 75 MG tablet Take 1 tablet (75 mg) by mouth daily 90 tablet 3     fluticasone-vilanterol (BREO ELLIPTA) 100-25 MCG/INH inhaler Inhale 1 puff into the lungs daily 28 each 5     fluticasone-vilanterol (BREO ELLIPTA) 200-25 MCG/INH inhaler Inhale 1 puff into the lungs daily 60 each 11     isosorbide mononitrate (IMDUR) 30 MG 24 hr tablet Take 1 tablet (30 mg) by mouth daily 90 tablet 3     Melatonin 10 MG TABS tablet Take 10 mg by mouth nightly as needed for sleep       mirabegron (MYRBETRIQ) 25 MG 24 hr tablet Take 1 tablet (25 mg) by mouth daily 30 tablet 3     nitroGLYcerin (NITROSTAT) 0.4 MG sublingual tablet Place 1 tablet (0.4 mg) under the tongue every 5 minutes as needed for chest pain 25 tablet 11     pantoprazole (PROTONIX) 40 MG EC tablet Take 1 tablet (40 mg) by mouth daily 90 tablet 3     Prenatal Multivit-Min-Fe-FA (PRENATAL/IRON) TABS Take 1 tablet by mouth daily        saccharomyces boulardii (FLORASTOR) 250 MG capsule Take 1 capsule (250 mg) by mouth 2 times daily 14 capsule 0      sertraline (ZOLOFT) 100 MG tablet Take 1 tablet (100 mg) by mouth daily 90 tablet 3     ursodiol (ACTIGALL) 300 MG capsule TAKE 1 CAPSULE(300 MG) BY MOUTH TWICE DAILY 60 capsule 11     valsartan (DIOVAN) 40 MG tablet Take 1 tablet (40 mg) by mouth daily 90 tablet 3     vitamin D3 (CHOLECALCIFEROL) 2000 units (50 mcg) tablet Take 1 tablet (2,000 Units) by mouth daily 90 tablet 3       Social History     Tobacco Use     Smoking status: Never Smoker     Smokeless tobacco: Never Used   Substance Use Topics     Alcohol use: No     Drug use: No       Charisma Alanis  3/7/2022  9:59 AM

## 2022-03-07 NOTE — PATIENT INSTRUCTIONS
UROLOGY CLINIC VISIT PATIENT INSTRUCTIONS    - Urinalysis and urine culture today, along with PVR.   - Stop Myrbetriq 25 mg once daily.   - Start Myrbetriq 50 mg once daily.   - Watch your blood pressure while on the higher dose of Myrbetriq.   - Start vaginal estrogren cream for recurrent UTIs.   - Follow up with me in 2-3 months for recheck.     If you have any issues, questions or concerns in the meantime, do not hesitate to contact us at [phone] or via [employer].     It was a pleasure meeting with you today.  Thank you for allowing me and my team the privilege of caring for you today.  YOU are the reason we are here, and I truly hope we provided you with the excellent service you deserve.  Please let us know if there is anything else we can do for you so that we can be sure you are leaving completely satisfied with your care experience.    Aubree Butts PA-C  Department of Urology

## 2022-03-08 LAB — BACTERIA UR CULT: NO GROWTH

## 2022-03-27 ENCOUNTER — MYC MEDICAL ADVICE (OUTPATIENT)
Dept: UROLOGY | Facility: CLINIC | Age: 72
End: 2022-03-27
Payer: MEDICARE

## 2022-03-27 DIAGNOSIS — N39.0 RECURRENT UTI: Primary | ICD-10-CM

## 2022-03-31 RX ORDER — ESTRADIOL 0.1 MG/G
1 CREAM VAGINAL
Qty: 42.5 G | Refills: 1 | Status: SHIPPED | OUTPATIENT
Start: 2022-03-31 | End: 2022-09-21

## 2022-03-31 NOTE — TELEPHONE ENCOUNTER
Prescription for estrace vaginal cream sent to patient's requested pharmacy.  Please inform the patient.   SANYA Goddard on 3/31/2022 at 5:41 PM

## 2022-03-31 NOTE — TELEPHONE ENCOUNTER
Writer called [facility] on [address] to obtain standard Rx recommendation for alternative to estrogen/estriol cream. Rx is estrace vaginal cream. Routed to Aubree Butts PA-C.

## 2022-04-01 ENCOUNTER — TELEPHONE (OUTPATIENT)
Dept: FAMILY MEDICINE | Facility: CLINIC | Age: 72
End: 2022-04-01
Payer: MEDICARE

## 2022-04-01 NOTE — TELEPHONE ENCOUNTER
Called pt and LVM stating they are due for a Medicare Annual Wellness Visit. Left callback number of [phone] to schedule.

## 2022-04-06 NOTE — PLAN OF CARE
A&Ox4. VSS on RA. Afebrile. Independent. Tolerating 2 gm Na diet. PIV SL. Rocephin q 24hrs for UTI. PRN Excedrin available for HA. Discharge home tomorrow if remain stable. Continue to monitor.   CDL physical, see scanned copy

## 2022-04-07 ENCOUNTER — HOSPITAL ENCOUNTER (OUTPATIENT)
Dept: CARDIAC REHAB | Facility: CLINIC | Age: 72
Discharge: HOME | End: 2022-04-07
Attending: INTERNAL MEDICINE
Payer: MEDICARE

## 2022-04-07 PROCEDURE — G0238 OTH RESP PROC, INDIV: HCPCS

## 2022-04-14 ENCOUNTER — HOSPITAL ENCOUNTER (OUTPATIENT)
Dept: CARDIAC REHAB | Facility: CLINIC | Age: 72
Discharge: HOME | End: 2022-04-14
Attending: INTERNAL MEDICINE
Payer: MEDICARE

## 2022-04-14 PROCEDURE — G0238 OTH RESP PROC, INDIV: HCPCS

## 2022-04-14 PROCEDURE — G0237 THERAPEUTIC PROCD STRG ENDUR: HCPCS

## 2022-04-19 ENCOUNTER — HOSPITAL ENCOUNTER (OUTPATIENT)
Dept: CARDIAC REHAB | Facility: CLINIC | Age: 72
Discharge: HOME | End: 2022-04-19
Attending: INTERNAL MEDICINE
Payer: MEDICARE

## 2022-04-19 DIAGNOSIS — R06.02 SOB (SHORTNESS OF BREATH): ICD-10-CM

## 2022-04-19 DIAGNOSIS — J45.20 MILD INTERMITTENT ASTHMA WITHOUT COMPLICATION: ICD-10-CM

## 2022-04-19 PROCEDURE — G0239 OTH RESP PROC, GROUP: HCPCS | Performed by: REHABILITATION PRACTITIONER

## 2022-04-21 ENCOUNTER — HOSPITAL ENCOUNTER (OUTPATIENT)
Dept: CARDIAC REHAB | Facility: CLINIC | Age: 72
Discharge: HOME | End: 2022-04-21
Attending: INTERNAL MEDICINE
Payer: MEDICARE

## 2022-04-21 PROCEDURE — G0239 OTH RESP PROC, GROUP: HCPCS

## 2022-04-28 ENCOUNTER — HOSPITAL ENCOUNTER (OUTPATIENT)
Dept: CARDIAC REHAB | Facility: CLINIC | Age: 72
Discharge: HOME | End: 2022-04-28
Attending: INTERNAL MEDICINE
Payer: MEDICARE

## 2022-04-28 PROCEDURE — G0239 OTH RESP PROC, GROUP: HCPCS

## 2022-05-03 ENCOUNTER — HOSPITAL ENCOUNTER (OUTPATIENT)
Dept: CARDIAC REHAB | Facility: CLINIC | Age: 72
Discharge: HOME | End: 2022-05-03
Attending: INTERNAL MEDICINE
Payer: MEDICARE

## 2022-05-03 PROCEDURE — G0239 OTH RESP PROC, GROUP: HCPCS

## 2022-05-10 ENCOUNTER — HOSPITAL ENCOUNTER (OUTPATIENT)
Dept: CARDIAC REHAB | Facility: CLINIC | Age: 72
Discharge: HOME | End: 2022-05-10
Attending: INTERNAL MEDICINE
Payer: MEDICARE

## 2022-05-10 PROCEDURE — G0239 OTH RESP PROC, GROUP: HCPCS

## 2022-05-12 ENCOUNTER — HOSPITAL ENCOUNTER (OUTPATIENT)
Dept: CARDIAC REHAB | Facility: CLINIC | Age: 72
Discharge: HOME | End: 2022-05-12
Attending: INTERNAL MEDICINE
Payer: MEDICARE

## 2022-05-12 PROCEDURE — G0239 OTH RESP PROC, GROUP: HCPCS

## 2022-05-17 ENCOUNTER — HOSPITAL ENCOUNTER (OUTPATIENT)
Dept: CARDIAC REHAB | Facility: CLINIC | Age: 72
Discharge: HOME | End: 2022-05-17
Attending: INTERNAL MEDICINE
Payer: MEDICARE

## 2022-05-17 PROCEDURE — G0239 OTH RESP PROC, GROUP: HCPCS

## 2022-05-19 ENCOUNTER — TELEPHONE (OUTPATIENT)
Dept: FAMILY MEDICINE | Facility: CLINIC | Age: 72
End: 2022-05-19
Payer: MEDICARE

## 2022-05-19 ENCOUNTER — HOSPITAL ENCOUNTER (OUTPATIENT)
Dept: CARDIAC REHAB | Facility: CLINIC | Age: 72
Discharge: HOME | End: 2022-05-19
Attending: INTERNAL MEDICINE
Payer: MEDICARE

## 2022-05-19 PROCEDURE — G0239 OTH RESP PROC, GROUP: HCPCS | Performed by: REHABILITATION PRACTITIONER

## 2022-05-19 NOTE — TELEPHONE ENCOUNTER
Patient is calling to schedule a visit for her bronchitis as she afraid of it turning into pneumonia.  No appointments tomorrow.  Advised patient to come into urgent care tomorrow, hours given.    Lilibeth Goodwin RN

## 2022-05-24 ENCOUNTER — HOSPITAL ENCOUNTER (OUTPATIENT)
Dept: CARDIAC REHAB | Facility: CLINIC | Age: 72
Discharge: HOME | End: 2022-05-24
Attending: INTERNAL MEDICINE
Payer: MEDICARE

## 2022-05-24 PROCEDURE — G0239 OTH RESP PROC, GROUP: HCPCS | Performed by: CLINICAL EXERCISE PHYSIOLOGIST

## 2022-05-26 ENCOUNTER — HOSPITAL ENCOUNTER (OUTPATIENT)
Dept: CARDIAC REHAB | Facility: CLINIC | Age: 72
Discharge: HOME | End: 2022-05-26
Attending: INTERNAL MEDICINE
Payer: MEDICARE

## 2022-05-26 PROCEDURE — G0239 OTH RESP PROC, GROUP: HCPCS

## 2022-05-31 ENCOUNTER — HOSPITAL ENCOUNTER (OUTPATIENT)
Dept: CARDIAC REHAB | Facility: CLINIC | Age: 72
Discharge: HOME | End: 2022-05-31
Attending: INTERNAL MEDICINE
Payer: MEDICARE

## 2022-05-31 PROCEDURE — G0239 OTH RESP PROC, GROUP: HCPCS

## 2022-06-01 ENCOUNTER — TELEPHONE (OUTPATIENT)
Dept: SLEEP MEDICINE | Facility: CLINIC | Age: 72
End: 2022-06-01
Payer: MEDICARE

## 2022-06-01 PROBLEM — R53.83 FATIGUE, UNSPECIFIED TYPE: Status: ACTIVE | Noted: 2018-09-27

## 2022-06-01 PROBLEM — K80.20 CHOLELITHIASIS WITHOUT CHOLECYSTITIS: Status: ACTIVE | Noted: 2020-10-15

## 2022-06-01 PROBLEM — N12 PYELONEPHRITIS OF RIGHT KIDNEY: Status: RESOLVED | Noted: 2021-10-24 | Resolved: 2022-06-01

## 2022-06-01 PROBLEM — R50.9 FEVER AND CHILLS: Status: RESOLVED | Noted: 2021-09-24 | Resolved: 2022-06-01

## 2022-06-01 PROBLEM — R31.9 URINARY TRACT INFECTION WITH HEMATURIA, SITE UNSPECIFIED: Status: RESOLVED | Noted: 2021-09-24 | Resolved: 2022-06-01

## 2022-06-01 PROBLEM — N32.81 OVERACTIVE BLADDER: Status: ACTIVE | Noted: 2018-09-27

## 2022-06-01 PROBLEM — I25.119 CORONARY ARTERY DISEASE WITH ANGINA PECTORIS, UNSPECIFIED VESSEL OR LESION TYPE, UNSPECIFIED WHETHER NATIVE OR TRANSPLANTED HEART (H): Chronic | Status: ACTIVE | Noted: 2020-12-09

## 2022-06-01 PROBLEM — I50.22 CHRONIC SYSTOLIC HEART FAILURE (H): Status: RESOLVED | Noted: 2019-12-10 | Resolved: 2022-06-01

## 2022-06-01 PROBLEM — E78.5 HYPERLIPIDEMIA LDL GOAL <70: Chronic | Status: ACTIVE | Noted: 2020-10-15

## 2022-06-01 PROBLEM — N18.31 STAGE 3A CHRONIC KIDNEY DISEASE (H): Chronic | Status: ACTIVE | Noted: 2017-12-18

## 2022-06-01 PROBLEM — N39.0 URINARY TRACT INFECTION WITH HEMATURIA, SITE UNSPECIFIED: Status: RESOLVED | Noted: 2021-09-24 | Resolved: 2022-06-01

## 2022-06-01 PROBLEM — I25.119 CORONARY ARTERY DISEASE WITH ANGINA PECTORIS, UNSPECIFIED VESSEL OR LESION TYPE, UNSPECIFIED WHETHER NATIVE OR TRANSPLANTED HEART (H): Status: ACTIVE | Noted: 2020-12-09

## 2022-06-01 PROBLEM — Z86.79 HISTORY OF ISCHEMIC CARDIOMYOPATHY: Chronic | Status: ACTIVE | Noted: 2019-10-25

## 2022-06-01 PROBLEM — E66.09 CLASS 1 OBESITY DUE TO EXCESS CALORIES WITH SERIOUS COMORBIDITY AND BODY MASS INDEX (BMI) OF 30.0 TO 30.9 IN ADULT: Chronic | Status: ACTIVE | Noted: 2022-06-01

## 2022-06-01 PROBLEM — E66.811 CLASS 1 OBESITY DUE TO EXCESS CALORIES WITH SERIOUS COMORBIDITY AND BODY MASS INDEX (BMI) OF 30.0 TO 30.9 IN ADULT: Chronic | Status: ACTIVE | Noted: 2022-06-01

## 2022-06-01 NOTE — TELEPHONE ENCOUNTER
[facility] in [place] for Sleep study prior to 6/2021. Need to have it fax to [phone] Attn: Yen. Per Dr. Hadley requests.

## 2022-06-02 ENCOUNTER — HOSPITAL ENCOUNTER (OUTPATIENT)
Dept: CARDIAC REHAB | Facility: CLINIC | Age: 72
Discharge: HOME | End: 2022-06-02
Attending: INTERNAL MEDICINE
Payer: MEDICARE

## 2022-06-02 ENCOUNTER — OFFICE VISIT (OUTPATIENT)
Dept: SLEEP MEDICINE | Facility: CLINIC | Age: 72
End: 2022-06-02
Attending: INTERNAL MEDICINE
Payer: MEDICARE

## 2022-06-02 ENCOUNTER — DOCUMENTATION ONLY (OUTPATIENT)
Dept: SLEEP MEDICINE | Facility: CLINIC | Age: 72
End: 2022-06-02

## 2022-06-02 ENCOUNTER — CARE COORDINATION (OUTPATIENT)
Dept: SLEEP MEDICINE | Facility: CLINIC | Age: 72
End: 2022-06-02

## 2022-06-02 VITALS
BODY MASS INDEX: 31.76 KG/M2 | DIASTOLIC BLOOD PRESSURE: 66 MMHG | OXYGEN SATURATION: 97 % | HEIGHT: 64 IN | HEART RATE: 92 BPM | SYSTOLIC BLOOD PRESSURE: 111 MMHG | WEIGHT: 186 LBS

## 2022-06-02 DIAGNOSIS — F51.04 CHRONIC INSOMNIA: ICD-10-CM

## 2022-06-02 DIAGNOSIS — F51.04 CHRONIC INSOMNIA: Primary | ICD-10-CM

## 2022-06-02 DIAGNOSIS — G47.33 OSA (OBSTRUCTIVE SLEEP APNEA): ICD-10-CM

## 2022-06-02 DIAGNOSIS — R53.83 FATIGUE, UNSPECIFIED TYPE: Primary | ICD-10-CM

## 2022-06-02 DIAGNOSIS — E66.811 CLASS 1 OBESITY DUE TO EXCESS CALORIES WITH SERIOUS COMORBIDITY AND BODY MASS INDEX (BMI) OF 30.0 TO 30.9 IN ADULT: Chronic | ICD-10-CM

## 2022-06-02 DIAGNOSIS — F51.5 NIGHTMARE DISORDER: ICD-10-CM

## 2022-06-02 DIAGNOSIS — I25.119 CORONARY ARTERY DISEASE WITH ANGINA PECTORIS, UNSPECIFIED VESSEL OR LESION TYPE, UNSPECIFIED WHETHER NATIVE OR TRANSPLANTED HEART (H): Chronic | ICD-10-CM

## 2022-06-02 DIAGNOSIS — G47.31 CENTRAL SLEEP APNEA: ICD-10-CM

## 2022-06-02 DIAGNOSIS — E66.09 CLASS 1 OBESITY DUE TO EXCESS CALORIES WITH SERIOUS COMORBIDITY AND BODY MASS INDEX (BMI) OF 30.0 TO 30.9 IN ADULT: Chronic | ICD-10-CM

## 2022-06-02 PROCEDURE — 99205 OFFICE O/P NEW HI 60 MIN: CPT | Performed by: INTERNAL MEDICINE

## 2022-06-02 PROCEDURE — G0239 OTH RESP PROC, GROUP: HCPCS | Performed by: REHABILITATION PRACTITIONER

## 2022-06-02 NOTE — NURSING NOTE
Writer scheduled patient for PSG & follow up. Writer scheduled patient with Dr. Gurrola & handed the [facility] information with the sleep diaries. Please see separate encounter for further information.

## 2022-06-02 NOTE — PATIENT INSTRUCTIONS
Insomnia and Behavioral Sleep Medicine Program    The [facility] Insomnia and Behavioral Sleep Medicine Program provides non-drug treatment for sleep problems including:    Cognitive-behavioral Therapies for Insomnia (CBT-I)  Management of Shift-work and Jet Lag  Management of Delayed, Advanced and Irregular Circadian Rhythm Sleep Disorders  Imagery Rehearsal Therapy (IRT) for Nightmare Disorder  PAP Therapy Desensitization    You have been referred for consultation with a sleep psychologist who specializes in behavioral sleep medicine and treatment of insomnia.  The [facility] Insomnia and Behavioral Sleep Medicine Program offers individualized telehealth services through our [facility] and online CBT-I.    Preparing for your Consultation    You will need to keep a Sleep Diary for at least a week prior to your visit. Complete the sleep diary each day first thing after you get up by answering a few key questions about your sleep using our convenient mobile mirna or paper sleep diary.  Your answers should be based on your recall of the past 24 hours.  Avoid watching the clock or recording data during the night.     Insomnia  Mirna    The Insomnia  mobile mirna  is a convenient way to keep track of your sleep prior to your sleep consultation.  Simply download the free mirna on your Apple or Android phone and record your information each morning.  The mirna includes training, self-assessment, and sleep schedule recommendations.  Prior to your consultation we recommend you use only the sleep diary function. You can e-mail yourself a copy of your sleep diary data by going to the Settings section and using the [employer] User Data function.  During your consultation your provider will review the data with you.          [facility] Sleep Diary    You can also track your sleep using the [facility] paper sleep diary.  You can upload your sleep diary and send it via a [employer]  message, fax it to [phone], or have it with you at the time of your consultation.            CBT-I:  Frequently Asked Questions    What is CBT-I?    Cognitive Behavioral Therapy for Insomnia, also known as CBT-I, is a highly effective non-drug treatment for insomnia. The [employer] recommends CBT-I as the first treatment for chronic insomnia.  Research has shown CBT-I to be safer and more effective long term than sleeping pills.    What does CBT-I involve?     CBT-I targets behaviors that lead to chronic insomnia:  Habits that weaken the bed as a cue for sleep  Habits that weaken your body's sleep drive and sleep/wake clock   Unhelpful sleep thoughts that increase sleep-related worry and arousal.    The process involves 3-6 telehealth visits that guide you to implement proven strategies to get a better night's sleep.    People often see improvement in their sleep within a few weeks. Research shows if you keep practicing the skills you learn your sleep is likely to continue to improve 6-12 months after treatment.    Does this program prescribe or manage sleep medication?    No.  Your prescribing provider is responsible to assist you in managing your sleep medications.  Some people choose to stop using sleep medication prior to or during CBT-I.  Our program can work with your prescribing provider to help reduce or eliminate use of sleep medications.     Getting Started Today!    If you haven't already done so, we recommend you consider making the following changes to your sleep habits prior to your sleep consultation:     Reduce your consumption of caffeine and alcohol.  Both can disrupt sleep and make strengthening your sleep more difficult.  Specifically:    - Avoid caffeine within 6 hours of bedtime   - No more than 3 caffeinated beverages per day (e.g. 8 oz. cup coffee or 12 oz. cup soda)            - No alcohol within 3 hours of bedtime    Make sure your bedroom is quiet, comfortable and  dark.  Noise, light and an uncomfortable sleep space can harm your sleep.      Keep the same sleep schedule 7 days a week.unless you do shift work.      Online CBT-I     If you want to get started today, research indicates that online CBT-I can be effective for some individuals. These programs requires comfort with sammy-based or online learning.  However, digital CBT-I programs are not for everyone.  Contraindications include:    Seizure disorders,   Bipolar disorder,   Unstable medical or mental health conditions,   Frailty or risk of falling  Pregnancy    You should consult a sleep specialist before using these resources if you have:    Sleep Apnea  Restless Leg Syndrome  Sleep Walking  REM behavior disorder  Night Terrors  Excessive Daytime Sleepiness  Are engaged in shift work  Use prescription sleep medication    Our Online CBT-I program    If your sleep provider recommends online CBT-I for you , the cost for an entire 6-week program is $40.    To get started, copy and paste the link below which will take you to the landing page to register:                           [email or web address]               If you wish to complete the online CBT-I program but do not plan to follow-up with a sleep provider, you are set to begin the program.    If you are planning to work with an [facility] sleep provider, there are a couple of extra steps you can take to share your sleep data with your sleep provider.  To share sleep log data, go to the left side navigation and click on the  share sleep log  button:         You will be taken to the page below where you will enter  the provider code [email or web address] into the box.          Once you press the locate button, the information for [facility] will pop up as below.  By pressing the Submit button your data will be sent to our  secure [facility] [employer] sleep program portal for review by your sleep provider. You will only need to do this step once.                                   Self-help Workbooks for Insomnia    If you have found self-help books useful in the past, you may want to consider reading one of the following books prior to your consultation:    Say Nasir to Insomnia: The Six-Week, Drug-Free Program Developed at [facility].  Erwin Gaona MD. Available in paperback, Vianey, and audiobook.    Overcoming Insomnia: A Cognitive-Behavioral Therapy Approach, Workbook.  Arcadio Suárez, PhD  and Natali Kelley, PhD.  Available in paperback and Vianey.    Quiet Your Mind and Get to Sleep: Solutions to Insomnia for Those with Depression, Anxiety, or Chronic Pain.  Anjali Garsia, PhD and Natali Kelley, PhD.  Available in paperback and Vianey

## 2022-06-02 NOTE — PROGRESS NOTES
Outpatient Sleep Medicine Consultation:      Name: Gem Gama MRN# [id number]   Age: 71 year old YOB: 1950     Date of Consultation: June 2, 2022  Consultation is requested by: Luis A Moody MD  [address] [place] [id number] Luis A Moody  Primary care provider: Danny Conteh       Reason for Sleep Consult:     Gem Gama is sent by Luis A Moody for a sleep consultation regarding 'sleep apnea'.    Chief Complaint   Patient presents with     Sleep Problem     Tired, tired, tired. Concerns with SOB. Currently in Pulmonary rehab but continues to have SOB. Stop using CPAP over 5 years ago.                  Assessment and Plan:     Fatigue and sleepiness (ESS 14) with long sleep times  Previous diagnoses of obstructive sleep apnea, complex sleep apnea, and narcolepsy with significant PLMS on sleep study 2013. Circumstances in regards to narcolepsy diagnosis is uncertain.    Previous treatment with PAP not felt to be beneficial, no details available  Currently additional symptoms of night sweats, nocturnal enuresis, headaches in morning  Comorbid coronary artery disease.  - Get 3rd sleep study from [facility]. Get last 2 clinic notes from [facility]  - Recommend Polysomnogram (using 4% desaturation/Medicare/ AASM 1B scoring rules) for re-evaluation of  sleep disordered breathing, periodic limb movements. She will need later sleep times  - She had hives from the glue on her last sleep study, will need to test her before Polysomnogram     Sleep onset difficulties (LEIA 14)  Multifactorial: psychophysiologic insomnia comorbid to depression,  sleep disordered breathing, narcolepsy, periodic limb movements, delayed sleep phase may all play a role. Insufficient time to address in detail.  - Referral for cognitive behavioral training    Nightmare disorder  - Referral for possible dream rehearsal therapy      I spent 50 minutes with patient including counseling, and >30 minutes with chart review, and  documentation     CC: Luis A Moody          History of Present Illness:     Patient is a full and tangential historian     She has been sleepy since . Symptoms started after her   in   Fatigue also started about then. She states her first sleep study was in the     At that time she was diagnosed with ....obstructive sleep apnea?     She also carries a diagnosis of narcolepsy which she says was diagnosed with nap testing at [facility] between  and . She was treated with stimulant therapy until her heart attack in     She has been on zoloft or other antidepressants off an on since     Sleep study MSI 13 - OAHI 18, LAZARO 5.6, RDI 23, low O2 88%. PLMI 344, PLMAI 4.4    Sleep study MSI 2013 (200#) - CPAP titrated to 14 cmh20 Cheyne-Rogers respirations were present. AHI 65, RDI 66. Central AHI 56. Low O2 88%    She had a 3rd study in , not available for review. It sounds like maybe she had an ASV machine for a bit     She stopped using PAP approximately 2017 years ago because it did not seem to help her feel better        SLEEP-WAKE SCHEDULE:     Work/School Days:  Usually gets into bed at  12   Takes patient about 1-2 hours to fall asleep due to an over-active mind    Wakes up in the middle of the night 0-1  times.  Wakes up due to  Use bathroom, anxiety, nightmares  She has trouble falling back asleep 0-1  times a week if she has a nightmare or is anxious.   She will read or do soduko  Patient is usually up at 10-12   Uses alarm:  Not usually    Weekends/Non-work Days/All Other Days:  schedule is similar      Sleep Need  Patient gets  10 hours of  sleep on average   Patient thinks she needs about 10 hours of sleep    Gem Gama prefers to sleep in this position(s):   Side. stomach  Patient states they do the following activities in bed:  Read, eat, watch TV in bed, computer    Naps  Patient takes a purposeful nap 1-2 times a week and naps are usually   in duration  She feels better after a nap:  Yes   She dozes off unintentionally  1 days per week  Patient has had a driving accident or near-miss due to sleepiness/drowsiness:  No        SLEEP DISRUPTIONS:    Breathing/Snoring  Patient snores: Yes, no current bedpartner  Other people complain about her snoring:  Yes    Patient has been told she stops breathing in her sleep:  Yes     Movement:  Patient gets pain, discomfort, with an urge to move:   No  Patient has been told she kicks her legs at night:  Yes     Behaviors in Sleep:  Gem Gama has experienced the following behaviors while sleeping:    Recurring nighttmares similar content      CAFFEINE AND OTHER SUBSTANCES:    Patient consumes caffeinated beverages per day:   1-2 /day  Last caffeine use is usually:  3  List of any prescribed or over the counter stimulants that patient takes:  Past rx with adderall (non since heart attack)  List of any prescribed or over the counter sleep medication patient takes:  melatonin  Patient drinks alcohol near bedtime:  No          SCALES:    EPWORTH SLEEPINESS SCALE      Midway Sleepiness Scale ( PATRICE Dimas  4614-1679<br>ESS - [place]/English - Final version - 21 Nov 07 - [facility] [place].) 6/2/2022   Midway Score (Sleep) 14         INSOMNIA SEVERITY INDEX (LEIA)      Insomnia Severity Index (LEIA) 6/2/2022   Difficulty falling asleep 2   Difficulty staying asleep 1   Problems waking up too early 0   How SATISFIED/DISSATISFIED are you with your CURRENT sleep pattern? 3   How NOTICEABLE to others do you think your sleep problem is in terms of impairing the quality of your life? 3   How WORRIED/DISTRESSED are you about your current sleep problem? 2   To what extent do you consider your sleep problem to INTERFERE with your daily functioning (e.g. daytime fatigue, mood, ability to function at work/daily chores, concentration, memory, mood, etc.) CURRENTLY? 3   LEIA Total Score 14       Guidelines for  Scoring/Interpretation:  Total score categories:  0-7 = No clinically significant insomnia   8-14 = Subthreshold insomnia   15-21 = Clinical insomnia (moderate severity)  22-28 = Clinical insomnia (severe)  Used via courtesy of www.[place].gov with permission from Gerald Aguilar PhD., [facility]      Allergies:    Allergies   Allergen Reactions     Latex Other (See Comments) and Shortness Of Breath     Runny nose  PN: LW Reaction: DIFFICULTY BREATHING       Flagyl [Metronidazole Hcl] Anaphylaxis and Rash     Food      PN: LW FI1: nka LW FI2:     Hydrochlorothiazide W/Triamterene      PN: LW Reaction: skin rash     No Clinical Screening - See Comments      PN: LW Other1: -Adhesive Tape     Sulfa Drugs Anaphylaxis, Hives and Itching     PN: LW Reaction: HIVES, Swelling     Tape [Adhesive Tape] Hives     Metoprolol Itching and Rash     Metronidazole Hives and Rash     PN: LW Reaction: HIVES         Medications:    Current Outpatient Medications   Medication Sig Dispense Refill     albuterol (PROAIR HFA/PROVENTIL HFA/VENTOLIN HFA) 108 (90 Base) MCG/ACT inhaler Inhale 1-2 puffs into the lungs every 4 hours as needed for shortness of breath / dyspnea or wheezing 18 g 11     amoxicillin (AMOXIL) 500 MG capsule Take as directed before dental work        aspirin (ASA) 81 MG EC tablet Take 1 tablet (81 mg) by mouth daily 90 tablet 0     atorvastatin (LIPITOR) 40 MG tablet Take 1 tablet (40 mg) by mouth daily 90 tablet 3     BREO ELLIPTA 200-25 MCG/INH Inhaler INHALE 1 PUFF INTO THE LUNGS DAILY       carvedilol (COREG) 3.125 MG tablet TAKE 1 TABLET(3.125 MG) BY MOUTH TWICE DAILY WITH MEALS 180 tablet 3     clopidogrel (PLAVIX) 75 MG tablet Take 1 tablet (75 mg) by mouth daily 90 tablet 3     estradiol (ESTRACE) 0.1 MG/GM vaginal cream Place 1 g vaginally twice a week 42.5 g 1     isosorbide mononitrate (IMDUR) 30 MG 24 hr tablet Take 1 tablet (30 mg) by mouth daily 90 tablet 3     Melatonin 10 MG TABS tablet Take  10 mg by mouth nightly as needed for sleep       mirabegron (MYRBETRIQ) 50 MG 24 hr tablet Take 1 tablet (50 mg) by mouth daily 90 tablet 0     nitroGLYcerin (NITROSTAT) 0.4 MG sublingual tablet Place 1 tablet (0.4 mg) under the tongue every 5 minutes as needed for chest pain 25 tablet 11     pantoprazole (PROTONIX) 40 MG EC tablet Take 1 tablet (40 mg) by mouth daily 90 tablet 3     Prenatal Multivit-Min-Fe-FA (PRENATAL/IRON) TABS Take 1 tablet by mouth daily        saccharomyces boulardii (FLORASTOR) 250 MG capsule Take 1 capsule (250 mg) by mouth 2 times daily 14 capsule 0     sertraline (ZOLOFT) 100 MG tablet Take 1 tablet (100 mg) by mouth daily 90 tablet 3     ursodiol (ACTIGALL) 300 MG capsule TAKE 1 CAPSULE(300 MG) BY MOUTH TWICE DAILY 60 capsule 11     valsartan (DIOVAN) 40 MG tablet Take 1 tablet (40 mg) by mouth daily 90 tablet 3     vitamin D3 (CHOLECALCIFEROL) 2000 units (50 mcg) tablet Take 1 tablet (2,000 Units) by mouth daily 90 tablet 3       Problem List:  Patient Active Problem List    Diagnosis Date Noted     Coronary artery disease with angina pectoris, unspecified vessel or lesion type, unspecified whether native or transplanted heart (H) 12/09/2020     Priority: High     Hospitalized 10/9/2019-10/13/2019 due to STEMI and underwent EDWIN x 2 to proximal LAD. Coronary angiogram showed severe single vessel obstructive CAD of LAD (100%) as well as moderate non-obstructive CAD in OM1 and OM2 (60%, 50%). Patient initially requiring intraaortic balloon pump and pressor support. Echocardiogram showed EF 35-40%.         Hyperlipidemia LDL goal <70 10/15/2020     Priority: Medium     History of ischemic cardiomyopathy 10/25/2019     Priority: Medium     Hospitalized 10/9/2019-10/13/2019 due to STEMI. Echocardiogram showed EF 35-40%. Later echocardiogram with improved EF 55-60%       Stage 3a chronic kidney disease (H) 12/18/2017     Priority: Medium     Primary narcolepsy without cataplexy 02/09/2017      Priority: Medium     Central sleep apnea 08/08/2014     Priority: Medium     Hypertension goal BP (blood pressure) < 140/90 04/07/2014     Priority: Medium     LEA (obstructive sleep apnea) 04/04/2014     Priority: Medium     Pituitary microadenoma (H) 08/29/2013     Priority: Medium     MRI 2011- There is a triangular-shaped area with delayed contrast enhancement at the left lateral portion of the pituitary gland posteriorly, measuring 2.5 x 4.3 mm. This is suggestive of a microadenoma (series 103, image 6       Major depressive disorder, recurrent episode, moderate (H) 07/25/2013     Priority: Medium     Intermittent asthma 06/18/1990     Priority: Medium     PFTS 12/2021 - FEV1- 1.83 (86%), ratio 0.74, 14% change with bronchodilators,  %, %, DLCO 90%        Class 1 obesity due to excess calories with serious comorbidity and body mass index (BMI) of 30.0 to 30.9 in adult 06/01/2022     Priority: Low     Cholelithiasis without cholecystitis 10/15/2020     Priority: Low     Overactive bladder 09/27/2018     Priority: Low     Fatigue, unspecified type 09/27/2018     Priority: Low     Hisotry of centeral apnea. BiPaP not effective. Cannot face mask either. Normal thyroid function tests last  9/2017.       Incontinence of feces with fecal urgency 03/15/2018     Priority: Low     Vitamin D deficiency 02/23/2017     Priority: Low     Esophageal reflux (GERD) 08/10/2014     Priority: Low     Osteoarthritis of right knee 06/24/2014     Priority: Low     Advanced directives, counseling/discussion 06/18/2012     Priority: Low     Discussed advance care planning with patient; information given to patient to review. 6/18/2012   Erin Hahn MA        Environmental allergies 07/13/2011     Priority: Low     Overview:   Dust, mold, cats , dogs, trees , dustmite and weeds.       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           Past Medical/Surgical History:  Past Medical History:   Diagnosis Date     ADHD (attention deficit hyperactivity disorder)      Anxiety      Arthritis     back, hands, knees, hips     Asthma      C. difficile diarrhea      Central sleep apnea      Coronary artery disease involving native coronary artery of native heart without angina pectoris      Depression      Fever and chills 09/24/2021     Gastro-oesophageal reflux disease      Hypertension      Ischemic cardiomyopathy      Narcolepsy      Pyelonephritis of right kidney 10/24/2021     Renal disease      S/P hip replacement 2004    Bilateral fall 2004 due to OA     Sleep apnea      Status post total knee replacement 12/29/2014     Urinary tract infection with hematuria, site unspecified 09/24/2021     Past Surgical History:   Procedure Laterality Date     ARTHROPLASTY KNEE  07/09/2014    Procedure: ARTHROPLASTY KNEE;  Surgeon: Levi Johnson MD;  Location:  OR     ARTHROPLASTY KNEE Left 11/24/2014    Procedure: ARTHROPLASTY KNEE;  Surgeon: Levi Johnson MD;  Location:  OR     CV CORONARY ANGIOGRAM N/A 10/09/2019    Procedure: Coronary Angiogram;  Surgeon: Chiquita Chatterjee MD;  Location:  HEART CARDIAC CATH LAB     CV HEART CATHETERIZATION WITH POSSIBLE INTERVENTION N/A 10/10/2019    Procedure: Heart Catheterization with Possible Intervention;  Surgeon: Chin Garcia MD;  Location: [facility] CARDIAC CATH LAB     CV INTRA AORTIC BALLOON N/A 10/09/2019    Procedure: Intra-Aortic Balloon Pump Insertion;  Surgeon: Chiquita Chatterjee MD;  Location:  HEART CARDIAC CATH LAB     CV INTRA AORTIC BALLOON REMOVAL N/A 10/10/2019    Procedure: Intra-Aortic Balloon Pump Removal;  Surgeon: Chin Garcia MD;  Location: [facility]  CARDIAC CATH LAB     CV LEFT HEART CATH N/A 12/11/2020    Procedure: Heart Catheterization with Possible Intervention;  Surgeon: Pilo Otoole MD;  Location:  HEART CARDIAC CATH LAB     CV PCI STENT DRUG ELUTING N/A 10/09/2019    Procedure: PCI Stent Drug Eluting;  Surgeon: Chiquita Chatterjee MD;  Location:  HEART CARDIAC CATH LAB     GENITOURINARY SURGERY  01/01/2008    Prolift     GENITOURINARY SURGERY  1973    In 1973, she had surgery to correct congenital narrowing in the ureter at its connection to the right kidney     GENITOURINARY SURGERY  1997 1997 pt went to [facility] and had surgery to remove the scar tissue, rebuild the bladder from previous ureter surgery.     ORTHOPEDIC SURGERY      bilat total hip     RECTOCELE REPAIR       ZZC TOTAL ABD HYSTERECTOMY+BLAD REPR  01/01/1992    Vaginal approach with oophrectomy     ZZC TOTAL KNEE ARTHROPLASTY         Social History:  Social History     Socioeconomic History     Marital status:      Spouse name: Not on file     Number of children: Not on file     Years of education: Not on file     Highest education level: Not on file   Occupational History     Not on file   Tobacco Use     Smoking status: Never Smoker     Smokeless tobacco: Never Used   Substance and Sexual Activity     Alcohol use: No     Drug use: No     Sexual activity: Not Currently     Partners: Male     Birth control/protection: None   Other Topics Concern     Parent/sibling w/ CABG, MI or angioplasty before 65F 55M? No      Service No     Blood Transfusions No     Caffeine Concern No     Occupational Exposure No     Hobby Hazards No     Sleep Concern Yes     Comment: Uses CPAP     Stress Concern No     Weight Concern Yes     Special Diet No     Back Care No     Exercise No     Bike Helmet No     Comment:       Seat Belt Yes     Self-Exams Yes   Social History Narrative     Not on file     Social Determinants of Health     Financial Resource Strain: Not on file  "  Food Insecurity: Not on file   Transportation Needs: Not on file   Physical Activity: Not on file   Stress: Not on file   Social Connections: Not on file   Intimate Partner Violence: Not on file   Housing Stability: Not on file       Family History:  Family History   Problem Relation Age of Onset     Hypertension Mother      Arthritis Mother      Cerebrovascular Disease Mother      Cerebrovascular Disease Father         Three Strokes     Diabetes Father         and brother     Kidney Disease Father      Diabetes Brother      Birth defects Brother      Thyroid Disease Sister         Hypothyroid     Sjogren's Daughter      Diabetes Daughter      Cerebrovascular Disease Daughter      Diabetes Son        Review of Systems:  A complete review of systems reviewed by me is negative with the exeption of what has been mentioned in the history of present illness.    Night sweats, double vision, dry mouth in morning, diarrhea at night, nocturnal enuresis, headaches in morning, anxiety, depressed mood    Physical Examination:  Vitals: /66   Pulse 92   Ht 1.626 m (5' 4\")   Wt 84.4 kg (186 lb)   LMP  (LMP Unknown)   SpO2 97%   BMI 31.93 kg/m    BMI= Body mass index is 31.93 kg/m .    Neck Cir (cm): 39 cm    GENERAL APPEARANCE: healthy, alert and no distress  EYES: Eyes grossly normal to inspection and conjunctivae and sclerae normal  HENT: nose and mouth without ulcers or lesions and oropharynx crowded  NECK: no adenopathy, no asymmetry, masses, or scars and thyroid normal to palpation  RESP: lungs clear to auscultation - no rales, rhonchi or wheezes  CV: regular rates and rhythm, normal S1 S2, no S3 or S4 and no murmur, click or rub  ABDOMEN: protuberant  MS: extremities normal- no gross deformities noted  SKIN: no suspicious lesions or rashes  NEURO: Normal strength and tone, mentation intact, speech normal and cranial nerves 2-12 intact  PSYCH: mentation appears normal and affect normal/bright  Mallampati Class: " II.  Tonsillar Stage: 2  visible at pillars.         Data: All pertinent previous laboratory data reviewed     Recent Labs   Lab Test 02/08/22  1407 02/03/22  1438 10/14/21  1140     --  142   POTASSIUM 4.6  --  4.0   CHLORIDE 107  --  109   CO2 28  --  26   ANIONGAP 5  --  7   *  --  108*   BUN 17  --  13   CR 1.13* 1.0 0.92   FRACISCO 9.0  --  8.8       Recent Labs   Lab Test 09/23/21  1905   WBC 8.6   RBC 4.35   HGB 13.1   HCT 37.6   MCV 86   MCH 30.1   MCHC 34.8   RDW 12.4   *       Recent Labs   Lab Test 09/25/21  0648   PROTTOTAL 6.3*   ALBUMIN 2.8*   BILITOTAL 0.7   ALKPHOS 75   AST 19   ALT 21       TSH (mU/L)   Date Value   02/02/2016 1.62   09/19/2013 1.56     Ferritin   Date/Time Value Ref Range Status   11/11/2014 07:07 PM 78 10 - 300 ng/mL Final         Chest CT: CT Chest w/o Contrast 02/21/2022  Narrative  CT chest without contrast  INDICATION: Idiopathic interstitial fibrosis  COMPARISON: 12/12/2020  FINDINGS: Subtle nodular thickening along the right major fissure  (series 4 image 127) suggestion of small benign intrapelvic lymph  node. Calcified granuloma in the right lower lobe (series 4 image  224). Mechanical fibrosis in the paravertebral right lower lobe  adjacent to prominent thoracic vertebral body osteophyte. Mild  reticular changes in the lingula peripherally (series 4 image 200),  most likely atelectatic comment cannot exclude inflammatory. No  dominant nodule or groundglass opacity. There is mild air trapping  upon expiration.  Bone detail toes diffuse osteopenia with degenerative changes  throughout the thoracic spine along with diffuse hepatic skeletal  hyperostosis.  There is abdominal aortic atherosclerosis. Stone filled gallbladder.  Extensive splenic artery calcifications. No adrenal nodule. Coronary  artery atherosclerosis. Heart size normal. Aorta and main pulmonary  artery calibers are normal. No pleural or pericardial effusion. No  enlarged hilar, mediastinal or  axillary lymph nodes. Included thyroid  appears grossly unremarkable.  Impression  IMPRESSION: Osteopenia with extensive degenerative changes in the  spine. No CT evidence of advanced interstitial lung disease.  Atherosclerosis. Air trapping. Extensive cholelithiasis.  DENA TAVERA MD        PFT: Most Recent [facility] Pulmonary Function Testing    FVC-Pred   Date Value Ref Range Status   12/06/2021 2.74 L      FVC-Pre   Date Value Ref Range Status   12/06/2021 2.48 L      FVC-%Pred-Pre   Date Value Ref Range Status   12/06/2021 90 %      FEV1-Pre   Date Value Ref Range Status   12/06/2021 1.83 L      FEV1-%Pred-Pre   Date Value Ref Range Status   12/06/2021 86 %      FEV1FVC-Pred   Date Value Ref Range Status   12/06/2021 78 %      FEV1FVC-Pre   Date Value Ref Range Status   12/06/2021 74 %      No results found for: 20029  FEFMax-Pred   Date Value Ref Range Status   12/06/2021 5.42 L/sec      FEFMax-Pre   Date Value Ref Range Status   12/06/2021 4.51 L/sec      FEFMax-%Pred-Pre   Date Value Ref Range Status   12/06/2021 83 %      ExpTime-Pre   Date Value Ref Range Status   12/06/2021 7.46 sec      FIFMax-Pre   Date Value Ref Range Status   12/06/2021 2.63 L/sec      FEV1FEV6-Pred   Date Value Ref Range Status   12/06/2021 79 %      FEV1FEV6-Pre   Date Value Ref Range Status   12/06/2021 73 %        Kashif Hadley MD 6/2/2022

## 2022-06-03 ENCOUNTER — CARE COORDINATION (OUTPATIENT)
Dept: SLEEP MEDICINE | Facility: CLINIC | Age: 72
End: 2022-06-03
Payer: MEDICARE

## 2022-06-03 DIAGNOSIS — F51.04 CHRONIC INSOMNIA: Primary | ICD-10-CM

## 2022-06-03 NOTE — NURSING NOTE
"Patient was scheduled for a PSG and follow up with Dr. Hadley. Patient stated she's had a previous reaction to the glue which is used during Sleep test This happened at [facility] back in 2013. Patient stated the reaction did not occur until the next day around 10:00 AM.     Writer left a message for patient to call me back. Patient needs to be scheduled to come in to test if the glue product we have at  sleep clinic will have a reaction to her or not. Patient will need to sign a consent form as well. Patient will need to call clinic back in a day or 2 to give us the answers. We can order 2 other glue types if needed.     1) pt needs to be scheduled in \"hst drop off\" (ok to double book)  2) pt will need to sign consent form prior to testing  3) order other products if needed.       "

## 2022-06-06 DIAGNOSIS — N39.41 URGE INCONTINENCE: ICD-10-CM

## 2022-06-07 ENCOUNTER — HOSPITAL ENCOUNTER (OUTPATIENT)
Dept: CARDIAC REHAB | Facility: CLINIC | Age: 72
Discharge: HOME | End: 2022-06-07
Attending: INTERNAL MEDICINE
Payer: MEDICARE

## 2022-06-07 PROCEDURE — G0239 OTH RESP PROC, GROUP: HCPCS

## 2022-06-07 NOTE — NURSING NOTE
Spoke with patient. She is scheduled to come in on 6/15/2022. Will arrive at 10:30 AM. Patient is aware she will sign a consent form day of skin test. No further actions needed.

## 2022-06-09 ENCOUNTER — HOSPITAL ENCOUNTER (OUTPATIENT)
Dept: CARDIAC REHAB | Facility: CLINIC | Age: 72
Discharge: HOME | End: 2022-06-09
Attending: INTERNAL MEDICINE
Payer: MEDICARE

## 2022-06-09 PROCEDURE — G0239 OTH RESP PROC, GROUP: HCPCS

## 2022-06-09 RX ORDER — MIRABEGRON 50 MG/1
50 TABLET, EXTENDED RELEASE ORAL DAILY
Qty: 90 TABLET | Refills: 1 | Status: SHIPPED | OUTPATIENT
Start: 2022-06-09 | End: 2022-12-14

## 2022-06-09 NOTE — TELEPHONE ENCOUNTER
mirabegron (MYRBETRIQ) 50 MG 24 hr tablet  Last Written Prescription Date:   3/7/2022  Last Fill Quantity: 90,   # refills: 0  Last Office Visit :  3/7/2022  Future Office visit:  None  90 Tabs, 1 Refill sent to pharm 6/9/2022      Gely Renner RN  Central Triage Red Flags/Med Refills

## 2022-06-16 ENCOUNTER — HOSPITAL ENCOUNTER (OUTPATIENT)
Dept: CARDIAC REHAB | Facility: CLINIC | Age: 72
Discharge: HOME | End: 2022-06-16
Attending: INTERNAL MEDICINE
Payer: MEDICARE

## 2022-06-16 PROCEDURE — G0239 OTH RESP PROC, GROUP: HCPCS

## 2022-06-20 ENCOUNTER — TELEPHONE (OUTPATIENT)
Dept: CARDIOLOGY | Facility: CLINIC | Age: 72
End: 2022-06-20

## 2022-06-20 NOTE — TELEPHONE ENCOUNTER
VM received from patient, requesting a call back regarding making an appointment with Dr. Dennison as soon as possible per pulmonary rehab. Contacted patient to review further. Patient states that at pulmonary rehab a couple of weeks ago, she had two days in a row that she could not complete her program due to lightheadedness and shortness of breath. Patient states that the pulmonary rehab therapist advised her to see cardiology as soon as possible, as her O2 saturations were fine and things looked good from a pulmonary standpoint. Patient does state that these symptoms were symptoms that she had back when she saw Dr. Dennison in November 2021, but they have gotten worse. Advised patient that an OV would be necessary to re-evaluate her symptoms. Reviewed with patient that RN will have scheduling reach out to her to see an ARMANDO or next available with Dr. Dennison, whichever is sooner. Patient verbalized understanding and agreed with plan of care.

## 2022-06-21 ENCOUNTER — OFFICE VISIT (OUTPATIENT)
Dept: CARDIOLOGY | Facility: CLINIC | Age: 72
End: 2022-06-21
Attending: INTERNAL MEDICINE
Payer: MEDICARE

## 2022-06-21 ENCOUNTER — LAB (OUTPATIENT)
Dept: LAB | Facility: CLINIC | Age: 72
End: 2022-06-21

## 2022-06-21 VITALS
WEIGHT: 185 LBS | SYSTOLIC BLOOD PRESSURE: 103 MMHG | HEIGHT: 64 IN | BODY MASS INDEX: 31.58 KG/M2 | HEART RATE: 62 BPM | OXYGEN SATURATION: 97 % | DIASTOLIC BLOOD PRESSURE: 58 MMHG

## 2022-06-21 DIAGNOSIS — R06.09 DYSPNEA ON EXERTION: ICD-10-CM

## 2022-06-21 LAB
ANION GAP SERPL CALCULATED.3IONS-SCNC: 6 MMOL/L (ref 3–14)
BUN SERPL-MCNC: 25 MG/DL (ref 7–30)
CALCIUM SERPL-MCNC: 8.8 MG/DL (ref 8.5–10.1)
CHLORIDE BLD-SCNC: 106 MMOL/L (ref 94–109)
CO2 SERPL-SCNC: 24 MMOL/L (ref 20–32)
CREAT SERPL-MCNC: 1.24 MG/DL (ref 0.52–1.04)
GFR SERPL CREATININE-BSD FRML MDRD: 46 ML/MIN/1.73M2
GLUCOSE BLD-MCNC: 113 MG/DL (ref 70–99)
HGB BLD-MCNC: 14.4 G/DL (ref 11.7–15.7)
NT-PROBNP SERPL-MCNC: 95 PG/ML (ref 0–900)
POTASSIUM BLD-SCNC: 3.9 MMOL/L (ref 3.4–5.3)
SODIUM SERPL-SCNC: 136 MMOL/L (ref 133–144)

## 2022-06-21 PROCEDURE — 83880 ASSAY OF NATRIURETIC PEPTIDE: CPT | Performed by: INTERNAL MEDICINE

## 2022-06-21 PROCEDURE — 85018 HEMOGLOBIN: CPT | Performed by: INTERNAL MEDICINE

## 2022-06-21 PROCEDURE — 80048 BASIC METABOLIC PNL TOTAL CA: CPT | Performed by: INTERNAL MEDICINE

## 2022-06-21 PROCEDURE — 36415 COLL VENOUS BLD VENIPUNCTURE: CPT | Performed by: INTERNAL MEDICINE

## 2022-06-21 PROCEDURE — 99214 OFFICE O/P EST MOD 30 MIN: CPT | Performed by: INTERNAL MEDICINE

## 2022-06-21 RX ORDER — LORATADINE 10 MG/1
10 TABLET ORAL AT BEDTIME
COMMUNITY

## 2022-06-21 NOTE — PROGRESS NOTES
CARDIOLOGY CLINIC VISIT   DATE OF SERVICE:  June 21, 2022    PRIMARY CARE PHYSICIAN:  Danny Balbuena Vocal    HISTORY OF PRESENT ILLNESS:  Ms. Gem Gama is a very pleasant 68 year old female with a history of coronary artery disease, ischemic cardiomyopathy, hypertension, CKD, asthma and depression. She presents to clinic today for follow up.  She was last seen by me in November 2021.       In brief review, the patient was hospitalized 10/9/2019-10/13/2019 due to STEMI, EKG demonstrated ST elevation in the anterolateral leads, patient was taken emergently taken to cath lab and underwent EDWIN x 2 to proximal LAD. Coronary angiogram showed severe single vessel obstructive CAD of LAD (100%) as well as moderate non-obstructive CAD in OM1 and OM2 (60%, 50%). Patient initially requiring intraaortic balloon pump and pressor support. Echocardiogram showed EF 35-40%, grade I diastolic dysfunction, RV normal function and size, and no significant valvular disease. Patient was taken back to cath lab to have RCA evaluated which showed no obstructive disease (40% RCA stenosis).  Patient was initially on Brilinta and aspirin, but due to cost Brilinta was changed to Plavix. Patient was started on carvedilol, losartan and atorvastatin.     Heidy Polo rehospitalized in February 2020 with complaints of chest pain and nausea/vomiting. This was thought to be related to something she ate followed by a vagal reaction. Patient tells me that after discharge her PCP found gallstones which were felt to likely be contributing. An echocardiogram was completed at this time which demonstrated normalization of LV function, EF 55-60% and no regional wall motion abnormalities. She was started on low dose lisinopril 2.5mg.        She was again hospitalized 12/9-12/12/2020 for chest pain. Nuclear stress test completed which showed small area of a mild degree of infarction in the apical segment associated with mild degree of jasmeet-infarct ischemia.  "Cardiac catheterization completed 12/11 no acute coronary lesions, widely patent proximal-mid LAD stent,  at the ostium of the jailed, small-moderate D1 branch, not amendable to PCI. CTA chest 12/12 no evidence of thoracic aortic dissection and no evidence of PE. She was discharged on her prior to admission medications.     At our last clinic visit in November, Heidy reported concerns of ongoing shortness of breath for \"years\".  This will occur at random; sometimes with activity, sometimes at rest.  She states she will be breathing fine and all of a sudden feel like she can't breath.  She takes a deep breath in and that helps.  At that time, she had a nuclear stress test performed which demonstrated a small area of nontransmural infarct in the mid to distal anteroseptal and apical segments with mild jasmeet-infarct ischemia consistent with her prior MI.   She had an echocardiogram performed last week which demonstrated significant improvement in her LV function to EF 50-55% with hypokinesis of the mid anteroseptal and inferoseptal segments.  She had PFTs done which were consistent with asthma and was seen by pulmonary.  She was started on Breo and albuterol and has been attending pulmonary rehab.  She states she has difficult getting through the class at times because of shortness of breath and dizziness.  She feels she has gained weight in her belly and wonders if it is fluid.      PAST MEDICAL HISTORY:  Past Medical History:   Diagnosis Date     ADHD (attention deficit hyperactivity disorder)      Anxiety      Arthritis     back, hands, knees, hips     Asthma      C. difficile diarrhea      Central sleep apnea      Coronary artery disease involving native coronary artery of native heart without angina pectoris      Depression      Fever and chills 09/24/2021     Gastro-oesophageal reflux disease      Hypertension      Ischemic cardiomyopathy      Narcolepsy      Pyelonephritis of right kidney 10/24/2021     Renal " disease      S/P hip replacement 2004    Bilateral fall 2004 due to OA     Sleep apnea      Status post total knee replacement 12/29/2014     Urinary tract infection with hematuria, site unspecified 09/24/2021       MEDICATIONS:  Current Outpatient Medications   Medication     albuterol (PROAIR HFA/PROVENTIL HFA/VENTOLIN HFA) 108 (90 Base) MCG/ACT inhaler     amoxicillin (AMOXIL) 500 MG capsule     aspirin (ASA) 81 MG EC tablet     atorvastatin (LIPITOR) 40 MG tablet     BREO ELLIPTA 200-25 MCG/INH Inhaler     carvedilol (COREG) 3.125 MG tablet     clopidogrel (PLAVIX) 75 MG tablet     estradiol (ESTRACE) 0.1 MG/GM vaginal cream     isosorbide mononitrate (IMDUR) 30 MG 24 hr tablet     loratadine (CLARITIN) 10 MG tablet     Melatonin 10 MG TABS tablet     mirabegron (MYRBETRIQ) 50 MG 24 hr tablet     nitroGLYcerin (NITROSTAT) 0.4 MG sublingual tablet     pantoprazole (PROTONIX) 40 MG EC tablet     Prenatal Multivit-Min-Fe-FA (PRENATAL/IRON) TABS     saccharomyces boulardii (FLORASTOR) 250 MG capsule     sertraline (ZOLOFT) 100 MG tablet     ursodiol (ACTIGALL) 300 MG capsule     valsartan (DIOVAN) 40 MG tablet     vitamin D3 (CHOLECALCIFEROL) 2000 units (50 mcg) tablet     Current Facility-Administered Medications   Medication     lidocaine (XYLOCAINE) 2 % external gel       ALLERGIES:  Allergies   Allergen Reactions     Latex Other (See Comments) and Shortness Of Breath     Runny nose  PN: LW Reaction: DIFFICULTY BREATHING       Flagyl [Metronidazole Hcl] Anaphylaxis and Rash     Food      PN: LW FI1: nka LW FI2:     Hydrochlorothiazide W/Triamterene      PN: LW Reaction: skin rash     No Clinical Screening - See Comments      PN: LW Other1: -Adhesive Tape     Sulfa Drugs Anaphylaxis, Hives and Itching     PN: LW Reaction: HIVES, Swelling     Tape [Adhesive Tape] Hives     Metoprolol Itching and Rash     Metronidazole Hives and Rash     PN: LW Reaction: HIVES         SOCIAL HISTORY:  Nonsmoker, no significant  ETOH    FAMILY HISTORY:  I have reviewed this patient's family history and updated it with pertinent information if needed.   Family History   Problem Relation Age of Onset     Hypertension Mother      Arthritis Mother      Cerebrovascular Disease Mother      Cerebrovascular Disease Father         Three Strokes     Diabetes Father         and brother     Kidney Disease Father      Diabetes Brother      Birth defects Brother      Thyroid Disease Sister         Hypothyroid     Sjogren's Daughter      Diabetes Daughter      Cerebrovascular Disease Daughter      Diabetes Son        REVIEW OF SYSTEMS:  A complete ROS was obtained and the pertinent positives are outlined in the history of present illness above.  The remainder of systems is negative.      PHYSICAL EXAM:      BP: 103/58 Pulse: 62     SpO2: 97 %      Vital Signs with Ranges  Pulse:  [62] 62  BP: (103)/(58) 103/58  SpO2:  [97 %] 97 %  185 lbs 0 oz    Constitutional: awake, alert, no distress  Eyes: PERRL, sclera nonicteric  ENT: trachea midline  Respiratory: CTAB  Cardiovascular: RRR no m/r/g, no JVD  GI: nondistended, nontender, bowel sounds present  Lymph/Hematologic: no lymphadenopathy  Skin: dry, no rash  Musculoskeletal: good muscle tone, strength 5/5 in upper and lower extremities  Neurologic: no focal deficits  Neuropsychiatric: appropriate affact          ASSESSMENT:  1.  Coronary artery disease:  S/P anterior STEMI in 10/2019 with EDWIN x2 to mid LAD.  Repeat coronary angiogram in 12/2020 demonstrated patent stents and nonobstructive CAD.  Continues on ASA/plavix; will continue for now.   2.  Hx of ischemic cardiomyopathy:  Initial EF 35-40% with significant improvement by most recent echocardiogram 10/2021 with EF 50-55%  3.  Chronic dyspnea on exertion:  With subjective worsening.  This has been evaluated in the past with nuclear stress test without significant ischemia, echocardiogram. She has been diagnosed with asthma and is going to pulmonary  rehab.   Suspect this is multifactorial in the setting of asthma, deconditioning.  May also be related to CAD,  However, work up as above has been unremarkable.  No evidence for CHF.    4.  Hypertension:  At goal  5.  Hyperlipidemia:  At goal on atorvastatin      RECOMMENDATIONS:  -Schedule echocardiogram to reassess LV function, filling pressures  -Repeat BMP, Hgb and NTproBNP  -Plan for follow up in 3 months to reassess symptoms.  If remains symptomatic, could consider R/L heart cath for definitive evaluation.      Mel Dennison MD [facility]  June 21, 2022

## 2022-06-21 NOTE — LETTER
6/21/2022    Danny Conteh MD  [address]  [place] [address]    RE: Gem Gama       Dear Colleague,     I had the pleasure of seeing Gem Gama in the [facility].  CARDIOLOGY CLINIC VISIT   DATE OF SERVICE:  June 21, 2022    PRIMARY CARE PHYSICIAN:  Danny Conteh    HISTORY OF PRESENT ILLNESS:  Ms. Gem Gama is a very pleasant 68 year old female with a history of coronary artery disease, ischemic cardiomyopathy, hypertension, CKD, asthma and depression. She presents to clinic today for follow up.  She was last seen by me in November 2021.       In brief review, the patient was hospitalized 10/9/2019-10/13/2019 due to STEMI, EKG demonstrated ST elevation in the anterolateral leads, patient was taken emergently taken to cath lab and underwent EDWIN x 2 to proximal LAD. Coronary angiogram showed severe single vessel obstructive CAD of LAD (100%) as well as moderate non-obstructive CAD in OM1 and OM2 (60%, 50%). Patient initially requiring intraaortic balloon pump and pressor support. Echocardiogram showed EF 35-40%, grade I diastolic dysfunction, RV normal function and size, and no significant valvular disease. Patient was taken back to cath lab to have RCA evaluated which showed no obstructive disease (40% RCA stenosis).  Patient was initially on Brilinta and aspirin, but due to cost Brilinta was changed to Plavix. Patient was started on carvedilol, losartan and atorvastatin.     Heidy Polo rehospitalized in February 2020 with complaints of chest pain and nausea/vomiting. This was thought to be related to something she ate followed by a vagal reaction. Patient tells me that after discharge her PCP found gallstones which were felt to likely be contributing. An echocardiogram was completed at this time which demonstrated normalization of LV function, EF 55-60% and no regional wall motion abnormalities. She was started on low dose lisinopril 2.5mg.        She  "was again hospitalized 12/9-12/12/2020 for chest pain. Nuclear stress test completed which showed small area of a mild degree of infarction in the apical segment associated with mild degree of jasmeet-infarct ischemia. Cardiac catheterization completed 12/11 no acute coronary lesions, widely patent proximal-mid LAD stent,  at the ostium of the jailed, small-moderate D1 branch, not amendable to PCI. CTA chest 12/12 no evidence of thoracic aortic dissection and no evidence of PE. She was discharged on her prior to admission medications.     At our last clinic visit in November, Heidy reported concerns of ongoing shortness of breath for \"years\".  This will occur at random; sometimes with activity, sometimes at rest.  She states she will be breathing fine and all of a sudden feel like she can't breath.  She takes a deep breath in and that helps.  At that time, she had a nuclear stress test performed which demonstrated a small area of nontransmural infarct in the mid to distal anteroseptal and apical segments with mild jasmeet-infarct ischemia consistent with her prior MI.   She had an echocardiogram performed last week which demonstrated significant improvement in her LV function to EF 50-55% with hypokinesis of the mid anteroseptal and inferoseptal segments.  She had PFTs done which were consistent with asthma and was seen by pulmonary.  She was started on Breo and albuterol and has been attending pulmonary rehab.  She states she has difficult getting through the class at times because of shortness of breath and dizziness.  She feels she has gained weight in her belly and wonders if it is fluid.      PAST MEDICAL HISTORY:  Past Medical History:   Diagnosis Date     ADHD (attention deficit hyperactivity disorder)      Anxiety      Arthritis     back, hands, knees, hips     Asthma      C. difficile diarrhea      Central sleep apnea      Coronary artery disease involving native coronary artery of native heart without angina " pectoris      Depression      Fever and chills 09/24/2021     Gastro-oesophageal reflux disease      Hypertension      Ischemic cardiomyopathy      Narcolepsy      Pyelonephritis of right kidney 10/24/2021     Renal disease      S/P hip replacement 2004    Bilateral fall 2004 due to OA     Sleep apnea      Status post total knee replacement 12/29/2014     Urinary tract infection with hematuria, site unspecified 09/24/2021       MEDICATIONS:  Current Outpatient Medications   Medication     albuterol (PROAIR HFA/PROVENTIL HFA/VENTOLIN HFA) 108 (90 Base) MCG/ACT inhaler     amoxicillin (AMOXIL) 500 MG capsule     aspirin (ASA) 81 MG EC tablet     atorvastatin (LIPITOR) 40 MG tablet     BREO ELLIPTA 200-25 MCG/INH Inhaler     carvedilol (COREG) 3.125 MG tablet     clopidogrel (PLAVIX) 75 MG tablet     estradiol (ESTRACE) 0.1 MG/GM vaginal cream     isosorbide mononitrate (IMDUR) 30 MG 24 hr tablet     loratadine (CLARITIN) 10 MG tablet     Melatonin 10 MG TABS tablet     mirabegron (MYRBETRIQ) 50 MG 24 hr tablet     nitroGLYcerin (NITROSTAT) 0.4 MG sublingual tablet     pantoprazole (PROTONIX) 40 MG EC tablet     Prenatal Multivit-Min-Fe-FA (PRENATAL/IRON) TABS     saccharomyces boulardii (FLORASTOR) 250 MG capsule     sertraline (ZOLOFT) 100 MG tablet     ursodiol (ACTIGALL) 300 MG capsule     valsartan (DIOVAN) 40 MG tablet     vitamin D3 (CHOLECALCIFEROL) 2000 units (50 mcg) tablet     Current Facility-Administered Medications   Medication     lidocaine (XYLOCAINE) 2 % external gel       ALLERGIES:  Allergies   Allergen Reactions     Latex Other (See Comments) and Shortness Of Breath     Runny nose  PN: LW Reaction: DIFFICULTY BREATHING       Flagyl [Metronidazole Hcl] Anaphylaxis and Rash     Food      PN: LW FI1: nka LW FI2:     Hydrochlorothiazide W/Triamterene      PN: LW Reaction: skin rash     No Clinical Screening - See Comments      PN: LW Other1: -Adhesive Tape     Sulfa Drugs Anaphylaxis, Hives and Itching      PN: LW Reaction: HIVES, Swelling     Tape [Adhesive Tape] Hives     Metoprolol Itching and Rash     Metronidazole Hives and Rash     PN: LW Reaction: HIVES         SOCIAL HISTORY:  Nonsmoker, no significant ETOH    FAMILY HISTORY:  I have reviewed this patient's family history and updated it with pertinent information if needed.   Family History   Problem Relation Age of Onset     Hypertension Mother      Arthritis Mother      Cerebrovascular Disease Mother      Cerebrovascular Disease Father         Three Strokes     Diabetes Father         and brother     Kidney Disease Father      Diabetes Brother      Birth defects Brother      Thyroid Disease Sister         Hypothyroid     Sjogren's Daughter      Diabetes Daughter      Cerebrovascular Disease Daughter      Diabetes Son        REVIEW OF SYSTEMS:  A complete ROS was obtained and the pertinent positives are outlined in the history of present illness above.  The remainder of systems is negative.      PHYSICAL EXAM:      BP: 103/58 Pulse: 62     SpO2: 97 %      Vital Signs with Ranges  Pulse:  [62] 62  BP: (103)/(58) 103/58  SpO2:  [97 %] 97 %  185 lbs 0 oz    Constitutional: awake, alert, no distress  Eyes: PERRL, sclera nonicteric  ENT: trachea midline  Respiratory: CTAB  Cardiovascular: RRR no m/r/g, no JVD  GI: nondistended, nontender, bowel sounds present  Lymph/Hematologic: no lymphadenopathy  Skin: dry, no rash  Musculoskeletal: good muscle tone, strength 5/5 in upper and lower extremities  Neurologic: no focal deficits  Neuropsychiatric: appropriate affact          ASSESSMENT:  1.  Coronary artery disease:  S/P anterior STEMI in 10/2019 with EDWIN x2 to mid LAD.  Repeat coronary angiogram in 12/2020 demonstrated patent stents and nonobstructive CAD.  Continues on ASA/plavix; will continue for now.   2.  Hx of ischemic cardiomyopathy:  Initial EF 35-40% with significant improvement by most recent echocardiogram 10/2021 with EF 50-55%  3.  Chronic dyspnea on  exertion:  With subjective worsening.  This has been evaluated in the past with nuclear stress test without significant ischemia, echocardiogram. She has been diagnosed with asthma and is going to pulmonary rehab.   Suspect this is multifactorial in the setting of asthma, deconditioning.  May also be related to CAD,  However, work up as above has been unremarkable.  No evidence for CHF.    4.  Hypertension:  At goal  5.  Hyperlipidemia:  At goal on atorvastatin      RECOMMENDATIONS:  -Schedule echocardiogram to reassess LV function, filling pressures  -Repeat BMP, Hgb and NTproBNP  -Plan for follow up in 3 months to reassess symptoms.  If remains symptomatic, could consider R/L heart cath for definitive evaluation.      Mel Dennison MD [facility]  June 21, 2022    Thank you for allowing me to participate in the care of your patient.      Sincerely,     Mel Denniosn MD     [facility]  cc:   Mel Dennison MD  [facility] [place]  [address]  [place]  [place] [address]

## 2022-06-23 ENCOUNTER — HOSPITAL ENCOUNTER (OUTPATIENT)
Dept: CARDIAC REHAB | Facility: CLINIC | Age: 72
Discharge: HOME | End: 2022-06-23
Attending: INTERNAL MEDICINE
Payer: MEDICARE

## 2022-06-23 PROCEDURE — G0239 OTH RESP PROC, GROUP: HCPCS

## 2022-06-27 ENCOUNTER — PRE VISIT (OUTPATIENT)
Dept: UROLOGY | Facility: CLINIC | Age: 72
End: 2022-06-27

## 2022-06-27 NOTE — TELEPHONE ENCOUNTER
Reason for visit: follow-up symptom check     Relevant information: urge incontinence, Allison    Records/imaging/labs/orders: in epic    Pt called: n/a    At Rooming: collect urine, pvr    Dinah Malhotra  6/27/2022  12:24 PM

## 2022-06-28 ENCOUNTER — HOSPITAL ENCOUNTER (OUTPATIENT)
Dept: CARDIAC REHAB | Facility: CLINIC | Age: 72
Discharge: HOME | End: 2022-06-28
Attending: INTERNAL MEDICINE
Payer: MEDICARE

## 2022-06-28 PROCEDURE — G0239 OTH RESP PROC, GROUP: HCPCS | Performed by: REHABILITATION PRACTITIONER

## 2022-06-30 ENCOUNTER — OFFICE VISIT (OUTPATIENT)
Dept: FAMILY MEDICINE | Facility: CLINIC | Age: 72
End: 2022-06-30
Payer: MEDICARE

## 2022-06-30 VITALS
TEMPERATURE: 98.8 F | HEIGHT: 65 IN | HEART RATE: 58 BPM | SYSTOLIC BLOOD PRESSURE: 124 MMHG | DIASTOLIC BLOOD PRESSURE: 72 MMHG | OXYGEN SATURATION: 98 % | RESPIRATION RATE: 20 BRPM | WEIGHT: 186.13 LBS | BODY MASS INDEX: 31.01 KG/M2

## 2022-06-30 DIAGNOSIS — N32.81 OVERACTIVE BLADDER: ICD-10-CM

## 2022-06-30 DIAGNOSIS — Z86.79 HISTORY OF ISCHEMIC CARDIOMYOPATHY: ICD-10-CM

## 2022-06-30 DIAGNOSIS — J45.30 MILD PERSISTENT ASTHMA WITHOUT COMPLICATION: ICD-10-CM

## 2022-06-30 DIAGNOSIS — F51.04 CHRONIC INSOMNIA: ICD-10-CM

## 2022-06-30 DIAGNOSIS — K80.20 CHOLELITHIASIS WITHOUT CHOLECYSTITIS: ICD-10-CM

## 2022-06-30 DIAGNOSIS — F33.1 MAJOR DEPRESSIVE DISORDER, RECURRENT EPISODE, MODERATE (H): Primary | ICD-10-CM

## 2022-06-30 PROCEDURE — 99214 OFFICE O/P EST MOD 30 MIN: CPT | Performed by: INTERNAL MEDICINE

## 2022-06-30 ASSESSMENT — ANXIETY QUESTIONNAIRES
4. TROUBLE RELAXING: SEVERAL DAYS
8. IF YOU CHECKED OFF ANY PROBLEMS, HOW DIFFICULT HAVE THESE MADE IT FOR YOU TO DO YOUR WORK, TAKE CARE OF THINGS AT HOME, OR GET ALONG WITH OTHER PEOPLE?: SOMEWHAT DIFFICULT
GAD7 TOTAL SCORE: 4
1. FEELING NERVOUS, ANXIOUS, OR ON EDGE: SEVERAL DAYS
7. FEELING AFRAID AS IF SOMETHING AWFUL MIGHT HAPPEN: NOT AT ALL
7. FEELING AFRAID AS IF SOMETHING AWFUL MIGHT HAPPEN: NOT AT ALL
2. NOT BEING ABLE TO STOP OR CONTROL WORRYING: SEVERAL DAYS
6. BECOMING EASILY ANNOYED OR IRRITABLE: NOT AT ALL
5. BEING SO RESTLESS THAT IT IS HARD TO SIT STILL: NOT AT ALL
GAD7 TOTAL SCORE: 4
GAD7 TOTAL SCORE: 4
3. WORRYING TOO MUCH ABOUT DIFFERENT THINGS: SEVERAL DAYS

## 2022-06-30 ASSESSMENT — PATIENT HEALTH QUESTIONNAIRE - PHQ9
SUM OF ALL RESPONSES TO PHQ QUESTIONS 1-9: 9
10. IF YOU CHECKED OFF ANY PROBLEMS, HOW DIFFICULT HAVE THESE PROBLEMS MADE IT FOR YOU TO DO YOUR WORK, TAKE CARE OF THINGS AT HOME, OR GET ALONG WITH OTHER PEOPLE: SOMEWHAT DIFFICULT
SUM OF ALL RESPONSES TO PHQ QUESTIONS 1-9: 9

## 2022-06-30 ASSESSMENT — PAIN SCALES - GENERAL: PAINLEVEL: NO PAIN (0)

## 2022-06-30 NOTE — PROGRESS NOTES
"  Assessment & Plan     Here for review of her medications at the behest of insurance [employer]    Major depressive disorder, recurrent episode, moderate (H)  Continue with Zoloft 100 mg    History of ischemic cardiomyopathy  She follows up with cardiologist  On Coreg and statin  Also on Plavix and aspirin  Recent echo has shown good LV function with EF around range of 55%  Also was started on Imdur by     Overactive bladder  Continue Myrbitriq    Chronic insomnia  Continue melatonin    Mild persistent asthma without complication  She is on Breo Ellipta and albuterol inhaler as needed  This is keeping her asthma under good control  Cholelithiasis without cholecystitis  She is on Urosodiol  This actually is helping with her gallstone symptoms      30 minutes spent on the date of the encounter doing chart review, history and exam, documentation and further activities per the note       BMI:   Estimated body mass index is 31.45 kg/m  as calculated from the following:    Height as of this encounter: 1.638 m (5' 4.5\").    Weight as of this encounter: 84.4 kg (186 lb 2 oz).           Return in about 6 months (around 12/30/2022).    Lee Mason MD  [facility] HealthcareBELGICA Moody is a 71 year old, presenting for the following health issues:  Recheck Medication (Insurance company want meds re-evaluated)      History of Present Illness     Asthma:  She presents for follow up of asthma.  She has some cough, no wheezing, and some shortness of breath. She is using a relief medication a few times a week. She does not miss any doses of her controller medication throughout the week.Patient is aware of the following triggers: animal dander, cold air, gastric reflux, humidity, smoke and upper respiratory infections. The patient has not had a visit to the Emergency Room, Urgent Care or Hospital due to asthma since the last clinic visit.     CKD: She uses over the counter pain medication, " "including Tylenol, a few times a week.    Mental Health Follow-up:  Patient presents to follow-up on Depression & Anxiety.Patient's depression since last visit has been:  Medium  The patient is having other symptoms associated with depression.  Patient's anxiety since last visit has been:  Medium  The patient is having other symptoms associated with anxiety.  Any significant life events: relationship concerns, job concerns, grief or loss and health concerns  Patient is not feeling anxious or having panic attacks.  Patient has no concerns about alcohol or drug use.    Heart Failure:  She presents for follow up of heart failure. She is experiencing shortness of breath with activity only, which is same as usual. She is not experiencing any lower extremity edema.   She denies orthopenea and coughs at night. Patient is checking weight daily. She has recently had a weight increase. She has no side effects from medications.  She has has a medical visit for heart failure 1 time since the last visit.    Hypertension: She presents for follow up of hypertension.  She does check blood pressure  regularly outside of the clinic. Outpatient blood pressures have not been over 140/90. She follows a low salt diet.     Headaches:   Since the patient's last clinic visit, headaches are: no change  The patient is getting headaches:  Not migraines, headaches several per week  She is not able to do normal daily activities when she has a migraine.  The patient is taking the following rescue/relief medications:  Tylenol   Patient states \"I get some relief\" from the rescue/relief medications.   The patient is taking the following medications to prevent migraines:  No medications to prevent migraines  In the past 4 weeks, the patient has gone to an Urgent Care or Emergency Room 0 times times due to headaches.    Vascular Disease:  She presents for follow up of vascular disease.  She takes daily aspirin.     Today's PHQ-9         PHQ-9 Total " "Score: 9    PHQ-9 Q9 Thoughts of better off dead/self-harm past 2 weeks :   Not at all    How difficult have these problems made it for you to do your work, take care of things at home, or get along with other people: Somewhat difficult  Today's LORETTA-7 Score: 4             Review of Systems   Constitutional, HEENT, cardiovascular, pulmonary, gi and gu systems are negative, except as otherwise noted.      Objective    /72 (BP Location: Right arm, Patient Position: Sitting, Cuff Size: Adult Regular)   Pulse 58   Temp 98.8  F (37.1  C) (Tympanic)   Resp 20   Ht 1.638 m (5' 4.5\")   Wt 84.4 kg (186 lb 2 oz)   LMP  (LMP Unknown)   SpO2 98%   BMI 31.45 kg/m    Body mass index is 31.45 kg/m .  Physical Exam   GENERAL: healthy, alert and no distress  NECK: no adenopathy, no asymmetry, masses, or scars and thyroid normal to palpation  RESP: lungs clear to auscultation - no rales, rhonchi or wheezes  CV: regular rate and rhythm, normal S1 S2, no S3 or S4, no murmur, click or rub, no peripheral edema and peripheral pulses strong  ABDOMEN: soft, nontender, no hepatosplenomegaly, no masses and bowel sounds normal  MS: no gross musculoskeletal defects noted, no edema                    .  ..  Answers for HPI/ROS submitted by the patient on 6/30/2022  If you checked off any problems, how difficult have these problems made it for you to do your work, take care of things at home, or get along with other people?: Somewhat difficult  PHQ9 TOTAL SCORE: 9      "

## 2022-07-06 ENCOUNTER — DOCUMENTATION ONLY (OUTPATIENT)
Dept: SLEEP MEDICINE | Facility: CLINIC | Age: 72
End: 2022-07-06

## 2022-07-06 ENCOUNTER — HOSPITAL ENCOUNTER (OUTPATIENT)
Dept: CARDIAC REHAB | Facility: CLINIC | Age: 72
Discharge: HOME | End: 2022-07-06
Attending: INTERNAL MEDICINE
Payer: MEDICARE

## 2022-07-06 PROCEDURE — G0238 OTH RESP PROC, INDIV: HCPCS

## 2022-07-06 NOTE — PROGRESS NOTES
Patient came in to make sure she does not have a reaction to any of the products used in the sleep study. She was given samples of 10-20 paste, nu Prep and lemon prep to try at home and will contact us if she has reactions so adjustments can be made the night of her study

## 2022-07-11 ENCOUNTER — OFFICE VISIT (OUTPATIENT)
Dept: UROLOGY | Facility: CLINIC | Age: 72
End: 2022-07-11
Payer: MEDICARE

## 2022-07-11 VITALS
SYSTOLIC BLOOD PRESSURE: 123 MMHG | WEIGHT: 182 LBS | HEART RATE: 59 BPM | BODY MASS INDEX: 30.32 KG/M2 | HEIGHT: 65 IN | DIASTOLIC BLOOD PRESSURE: 71 MMHG

## 2022-07-11 DIAGNOSIS — R31.0 GROSS HEMATURIA: ICD-10-CM

## 2022-07-11 DIAGNOSIS — N39.41 URGE INCONTINENCE: Primary | ICD-10-CM

## 2022-07-11 DIAGNOSIS — N39.0 RECURRENT UTI: ICD-10-CM

## 2022-07-11 LAB
ALBUMIN UR-MCNC: NEGATIVE MG/DL
APPEARANCE UR: CLEAR
BILIRUB UR QL STRIP: NEGATIVE
COLOR UR AUTO: YELLOW
GLUCOSE UR STRIP-MCNC: NEGATIVE MG/DL
HGB UR QL STRIP: NEGATIVE
HYALINE CASTS: 3 /LPF
KETONES UR STRIP-MCNC: NEGATIVE MG/DL
LEUKOCYTE ESTERASE UR QL STRIP: ABNORMAL
MUCOUS THREADS #/AREA URNS LPF: PRESENT /LPF
NITRATE UR QL: NEGATIVE
PH UR STRIP: 6 [PH] (ref 5–7)
RBC URINE: 3 /HPF
SP GR UR STRIP: 1.02 (ref 1–1.03)
SQUAMOUS EPITHELIAL: 1 /HPF
UROBILINOGEN UR STRIP-MCNC: NORMAL MG/DL
WBC URINE: 2 /HPF

## 2022-07-11 PROCEDURE — 51798 US URINE CAPACITY MEASURE: CPT | Performed by: PHYSICIAN ASSISTANT

## 2022-07-11 PROCEDURE — 99214 OFFICE O/P EST MOD 30 MIN: CPT | Mod: 25 | Performed by: PHYSICIAN ASSISTANT

## 2022-07-11 PROCEDURE — 81001 URINALYSIS AUTO W/SCOPE: CPT | Performed by: PATHOLOGY

## 2022-07-11 PROCEDURE — 87086 URINE CULTURE/COLONY COUNT: CPT | Mod: 90 | Performed by: PATHOLOGY

## 2022-07-11 PROCEDURE — 99000 SPECIMEN HANDLING OFFICE-LAB: CPT | Performed by: PATHOLOGY

## 2022-07-11 ASSESSMENT — PAIN SCALES - GENERAL: PAINLEVEL: NO PAIN (0)

## 2022-07-11 NOTE — PROGRESS NOTES
Chief Complaint:   Follow up          History of Present Illness:   Gem Gama is a 71 year old female with a history of CAD, ischemic cardiomyopathy, hypertension, CKD stage III, and LEA who presents for follow up of recurrent UTI and OAB.  Patient presented to [facility] on 9/23/2021 with symptoms of dysuria and fevers, with urine culture positive for E. Coli.  She was treated with a course of ceftriaxone while inpatient and was discharged with a 7 day course of ceftin.  Follow up UA and UC from 10/14/2021 showed eradication of infection.  Patient does have a history of reconstructive surgery of the right kidney due to history of congenital kidney defect, with last surgery in 1996.  Per previous records she is s/p right dismembered pyeloplasty in 1996 at [facility].  She does admit to chronic UTI's with having several infections per year. Over the past year, in addition to her recent UTI with E. Coli in 9/2021, she was treated for a UTI on 6/12/2021 though UC was negative for infection.  She does admit to long standing history of urinary urgency and large volume urge incontinence.  She goes through 6-7 large pads per day.  She also reports some spontaneous incontinence in which she does not know she needs to urinate and will leak urine.  She admits to occasional frequency.  She is unsure if she fully empties her bladder.  She drinks 1-2 bottles of soda per day, and no alcohol use.  She is s/p hysterectomy and bladder sling in 1992, and repair of rectocele in 2008.  Recent abdominal US from 10/28/2021 revealed no renal masses or hydronephrosis, with incidental normal variant extrarenal pelvis on the right.       Patient was initially seen by myself on 12/13/2021 at which time she was started on Myrbetriq 25 mg once daily for treatment of urinary urgency and incontinence.  She also underwent cystoscopy with Dr. Chu on 2/4/2022 which was normal.  CT urogram from 2/3/2022 showing prominent  right > left extrarenal pelvis, with no significant hydronephrosis or evidence of urinary obstruction.  At our most recent follow up on 3/7/2022, the patient reported some improvement in voiding symptoms since starting Myrbetriq and she was currently currently going through 4 pads per day, which is improved from 6-7 times per day.  She reports the urgency is improved, and her urinary frequency is every 3-4 hours during the day.  She reports her nocturia has improved to almost 0, which is improved from once overnight.  She reports her urge incontinence with large volumes is much improved and she is able to hold it better.  No dysuria or hematuria.  The shared decision was made to increase her Myrbetriq to 50 mg daily for further titration of symptoms, and the initiation of vaginal estrogen cream for UTI prophylaxis.      Today, the patient reports she some continued improvement on the Myrbetriq.  She is still having some urge incontinence, and is currently going through 3-4 pads per day, though states the volume of incontinence is much less than it was previously.  She states she is able to venture out into public more now as she is not soaking through her pads as she was previously doing.  She is very often able to hold her urine to make it to the restroom in time without leakage.  She is currently voiding every 3-4 hours during the day.  She denies any nocturia.  She does report a few episodes of reddish brown urine a few weeks ago, but states she was not well hydrated at that time and is wondering if she was dehydrated.  No dysuria.  Patient reports her irritable bowel and diarrhea symptoms are also little better on the Myrbetriq at this time.  Patient drinks one cup of coffee per day.           Past Medical History:     Past Medical History:   Diagnosis Date     ADHD (attention deficit hyperactivity disorder)      Anxiety      Arthritis     back, hands, knees, hips     Asthma      C. difficile diarrhea       Central sleep apnea      Coronary artery disease involving native coronary artery of native heart without angina pectoris      Depression      Fever and chills 09/24/2021     Gastro-oesophageal reflux disease      Hypertension      Ischemic cardiomyopathy      Narcolepsy      Pyelonephritis of right kidney 10/24/2021     Renal disease      S/P hip replacement 2004    Bilateral fall 2004 due to OA     Sleep apnea      Status post total knee replacement 12/29/2014     Urinary tract infection with hematuria, site unspecified 09/24/2021            Past Surgical History:     Past Surgical History:   Procedure Laterality Date     ARTHROPLASTY KNEE  07/09/2014    Procedure: ARTHROPLASTY KNEE;  Surgeon: Levi Johnson MD;  Location:  OR     ARTHROPLASTY KNEE Left 11/24/2014    Procedure: ARTHROPLASTY KNEE;  Surgeon: Levi Johnson MD;  Location:  OR     CV CORONARY ANGIOGRAM N/A 10/09/2019    Procedure: Coronary Angiogram;  Surgeon: Chiquita Chatterjee MD;  Location:  HEART CARDIAC CATH LAB     CV HEART CATHETERIZATION WITH POSSIBLE INTERVENTION N/A 10/10/2019    Procedure: Heart Catheterization with Possible Intervention;  Surgeon: Chin Garcia MD;  Location:  HEART CARDIAC CATH LAB     CV INTRA AORTIC BALLOON N/A 10/09/2019    Procedure: Intra-Aortic Balloon Pump Insertion;  Surgeon: Chiquita Chatterjee MD;  Location:  HEART CARDIAC CATH LAB     CV INTRA AORTIC BALLOON REMOVAL N/A 10/10/2019    Procedure: Intra-Aortic Balloon Pump Removal;  Surgeon: Chin Garcia MD;  Location:  HEART CARDIAC CATH LAB     CV LEFT HEART CATH N/A 12/11/2020    Procedure: Heart Catheterization with Possible Intervention;  Surgeon: Pilo Otoole MD;  Location:  HEART CARDIAC CATH LAB     CV PCI STENT DRUG ELUTING N/A 10/09/2019    Procedure: PCI Stent Drug Eluting;  Surgeon: Chiquita Chatterjee MD;  Location:  HEART CARDIAC CATH LAB     [facility]      GENITOURINARY SURGERY  1973    In 1973, she had surgery to 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     ORTHOPEDIC SURGERY      bilat total hip     RECTOCELE REPAIR       ZZC TOTAL ABD HYSTERECTOMY+BLAD REPR  01/01/1992    Vaginal approach with oophrectomy     ZZC TOTAL KNEE ARTHROPLASTY              Medications     Current Outpatient Medications   Medication     albuterol (PROAIR HFA/PROVENTIL HFA/VENTOLIN HFA) 108 (90 Base) MCG/ACT inhaler     amoxicillin (AMOXIL) 500 MG capsule     aspirin (ASA) 81 MG EC tablet     atorvastatin (LIPITOR) 40 MG tablet     BREO ELLIPTA 200-25 MCG/INH Inhaler     carvedilol (COREG) 3.125 MG tablet     clopidogrel (PLAVIX) 75 MG tablet     estradiol (ESTRACE) 0.1 MG/GM vaginal cream     isosorbide mononitrate (IMDUR) 30 MG 24 hr tablet     loratadine (CLARITIN) 10 MG tablet     Melatonin 10 MG TABS tablet     mirabegron (MYRBETRIQ) 50 MG 24 hr tablet     nitroGLYcerin (NITROSTAT) 0.4 MG sublingual tablet     pantoprazole (PROTONIX) 40 MG EC tablet     Prenatal Multivit-Min-Fe-FA (PRENATAL/IRON) TABS     saccharomyces boulardii (FLORASTOR) 250 MG capsule     sertraline (ZOLOFT) 100 MG tablet     ursodiol (ACTIGALL) 300 MG capsule     valsartan (DIOVAN) 40 MG tablet     vitamin D3 (CHOLECALCIFEROL) 2000 units (50 mcg) tablet     Current Facility-Administered Medications   Medication     lidocaine (XYLOCAINE) 2 % external gel            Family History:     Family History   Problem Relation Age of Onset     Hypertension Mother      Arthritis Mother      Cerebrovascular Disease Mother      Cerebrovascular Disease Father         Three Strokes     Diabetes Father         and brother     Kidney Disease Father      Diabetes Brother      Birth defects Brother      Thyroid Disease Sister          "Hypothyroid     Sjogren's Daughter      Diabetes Daughter      Cerebrovascular Disease Daughter      Diabetes Son             Social History:     Social History     Socioeconomic History     Marital status:      Spouse name: Not on file     Number of children: Not on file     Years of education: Not on file     Highest education level: Not on file   Occupational History     Occupation: Retired    Tobacco Use     Smoking status: Never Smoker     Smokeless tobacco: Never Used   Vaping Use     Vaping Use: Never used   Substance and Sexual Activity     Alcohol use: No     Drug use: No     Sexual activity: Not Currently     Partners: Male     Birth control/protection: None   Other Topics Concern     Parent/sibling w/ CABG, MI or angioplasty before 65F 55M? No      Service No     Blood Transfusions No     Caffeine Concern No     Occupational Exposure No     Hobby Hazards No     Sleep Concern Yes     Comment: Uses CPAP     Stress Concern No     Weight Concern Yes     Special Diet No     Back Care No     Exercise No     Bike Helmet No     Comment:       Seat Belt Yes     Self-Exams Yes   Social History Narrative     Not on file     Social Determinants of Health     Financial Resource Strain: Not on file   Food Insecurity: Not on file   Transportation Needs: Not on file   Physical Activity: Not on file   Stress: Not on file   Social Connections: Not on file   Intimate Partner Violence: Not on file   Housing Stability: Not on file            Allergies:   Latex, Flagyl [metronidazole hcl], Food, Hydrochlorothiazide w/triamterene, No clinical screening - see comments, Seasonal allergies, Sulfa drugs, Tape [adhesive tape], Metoprolol, and Metronidazole         Review of Systems:  From intake questionnaire   Negative 14 system review except as noted on HPI, nurse's note.         Physical Exam:   Patient is a 71 year old  female   Vitals: Blood pressure 123/71, pulse 59, height 1.638 m (5' 4.5\"), " weight 82.6 kg (182 lb), not currently breastfeeding.  General Appearance Adult: Alert, no acute distress, oriented  Lungs: no respiratory distress, or pursed lip breathing  Heart: No obvious jugular venous distension present  Abdomen: soft, nontender, no organomegaly or masses, Body mass index is 30.76 kg/m .  Musculoskeltal: extremities normal, no peripheral edema  Skin: no suspicious lesions or rashes  Neuro: Alert, oriented, speech and mentation normal  : deferred      Uroflow and Post-Void Residual by Bladder Scan   PVR: 21 ml       Labs and Pathology:    I personally reviewed all applicable laboratory data and went over findings with patient  Significant for:    CBC RESULTS:  Recent Labs   Lab Test 06/21/22  0842 09/23/21  1905 06/12/21  1232 12/10/20  0557 12/09/20  1031   WBC  --  8.6 5.6 4.7 5.4   HGB 14.4 13.1 14.8 13.8 13.7   PLT  --  133* 177 154 164        BMP RESULTS:  Recent Labs   Lab Test 06/21/22  0842 02/08/22  1407 02/03/22  1438 10/14/21  1140 09/26/21  0646 09/23/21  1832 12/17/20  1403 12/10/20  0557 12/09/20  1031 10/15/20  1256    140  --  142 140   < > 143 141 139 140   POTASSIUM 3.9 4.6  --  4.0 4.2   < > 3.9 4.3 4.1 4.8   CHLORIDE 106 107  --  109 107   < > 107 109 107 107   CO2 24 28  --  26 28   < > 30 29 26 29   ANIONGAP 6 5  --  7 5   < > 6 3 6 4   * 114*  --  108* 95   < > 113* 99 95 101*   BUN 25 17  --  13 12   < > 18 16 15 19   CR 1.24* 1.13* 1.0 0.92 1.02   < > 1.00 1.04 0.97 1.14*   GFRESTIMATED 46* 51* 59* 62 55*   < > 56* 54* 58* 48*   GFRESTBLACK  --   --   --   --   --   --  65 62 67 56*   FRACISCO 8.8 9.0  --  8.8 8.7   < > 8.8 8.6 8.6 9.2    < > = values in this interval not displayed.       UA RESULTS:   Recent Labs   Lab Test 03/07/22  1102 02/04/22  1506 01/19/22  1824 10/14/21  1140   SG 1.010 1.015 1.010 1.025   URINEPH 5.0 5.5 5.5 5.0   NITRITE Negative Negative Negative Negative   RBCU 1  --  0-2 0-2   WBCU 1  --  0-5 0-5           Imaging:    I personally  reviewed all applicable imaging and went over findings with patient.  Significant for:    EXAMINATION: CT UROGRAM WO & W CONTRAST, 2/3/2022 3:02 PM     TECHNIQUE:  Helical CT images from the lung bases through the  symphysis pubis were obtained  without and with contrast using CT  urogram protocol. Contrast dose: Isovue 370  111 mls     COMPARISON: Ultrasound 10/28/2021     HISTORY: Ureteropelvic junction (UPJ) obstruction, right; Urge  incontinence; Recurrent UTI; Congenital malformation of kidney,  unspecified     Additional history from the EMR: reconstructive surgery of the right  kidney due to history of congenital kidney defect, with last surgery  in 1996     FINDINGS:     Urinary tract: No suspicious renal masses or lesions. Surgical clips  noted about the right kidney. Small hypodensities in both kidneys  which are too small to characterize. Prominent right greater than left  renal pelvis. No significant hydronephrosis. No hydroureter.  Visualization of the urinary bladder is limited due to beam hardening  artifact from bilateral hip prostheses.     Remainder of the abdomen and pelvis: No suspicious hepatic masses or  lesions. The gallbladder is filled with calcified gallstones. No  evidence of biliary obstruction. Pancreas is unremarkable. The spleen  is normal. The adrenal glands are normal. No abnormally dilated loops  of small or large bowel. The appendix is normal. Somewhat limited  evaluation of the deep pelvis due to beam hardening artifact from  bilateral hip prostheses. The major intra-abdominal vasculature is  patent without focal aneurysmal dilation or significant stenosis. No  lymphadenopathy. No intra-abdominal free air or free fluid.     Lung bases:  Scattered groundglass opacities at the periphery of the  bilateral lower lobes. No pleural effusion. Heart size is normal.     Bones and soft tissues: No acute or suspicious osseous or soft tissue  lesions                                                                       IMPRESSION:   1. Prominent right greater than left extrarenal pelvis. No significant  hydronephrosis or evidence of urinary obstruction.  2. Nonspecific scattered groundglass opacities in the periphery of the  bilateral lung bases which may represent infectious versus  inflammatory changes.          Assessment and Plan:     Assessment: 71 year old female with a history of right congenital kidney defect s/p right dismembered pyeloplasty in 1996 at [facility], recurrent UTIs, and urinary urge incontinence.  Recent CT urogram showing prominent right > left extrarenal pelvis, but no significant hydronephrosis or evidence of urinary obstruction.  Recent cystoscopy with Dr. Chu on 2/4/2022 also normal.  Patient recently initiated on Myrbetriq 25 mg, which was most recently increased to 50 mg daily.  Patient reporting significant improvement in her urge incontinence and is very pleased with her progress, but continued to endorse some persistent urge incontinence.  We discussed option for addition of anticholinergic medication versus referral for UDS, and patient wishes to hold off on anticholinergic medications at this time due to risk for side effects.  Will plan for UDS for further evaluation of bladder function and determination of alternative treatment options.  She will continue her Myrbetriq 50 mg daily in the interim.  Patient with a few episodes of possible gross hematuria several weeks ago which she relates was secondary to dehydration.  Will plan for UA/UC today to rule out infection, along with urine cytology.  Reviewed with Dr. Jacob, and patient with normal cystoscopy and CT urogram in 2/2022, but if hematuria persists will refer back for repeat cystoscopy.      Plan:  - Schedule urodynamics study next available.   - Continue your Myrbetriq 50 mg once daily.       SANAY Goddard  Department of Urology

## 2022-07-11 NOTE — LETTER
7/11/2022       RE: Gem Gama  [address]  [facility] [address]     Dear Colleague,    Thank you for referring your patient, Gem Gama, to the [facility] [place] [facility]. Please see a copy of my visit note below.          Chief Complaint:   Follow up          History of Present Illness:   Gem Gama is a 71 year old female with a history of CAD, ischemic cardiomyopathy, hypertension, CKD stage III, and LEA who presents for follow up of recurrent UTI and OAB.  Patient presented to [facility] on 9/23/2021 with symptoms of dysuria and fevers, with urine culture positive for E. Coli.  She was treated with a course of ceftriaxone while inpatient and was discharged with a 7 day course of ceftin.  Follow up UA and UC from 10/14/2021 showed eradication of infection.  Patient does have a history of reconstructive surgery of the right kidney due to history of congenital kidney defect, with last surgery in 1996.  Per previous records she is s/p right dismembered pyeloplasty in 1996 at [facility].  She does admit to chronic UTI's with having several infections per year. Over the past year, in addition to her recent UTI with E. Coli in 9/2021, she was treated for a UTI on 6/12/2021 though UC was negative for infection.  She does admit to long standing history of urinary urgency and large volume urge incontinence.  She goes through 6-7 large pads per day.  She also reports some spontaneous incontinence in which she does not know she needs to urinate and will leak urine.  She admits to occasional frequency.  She is unsure if she fully empties her bladder.  She drinks 1-2 bottles of soda per day, and no alcohol use.  She is s/p hysterectomy and bladder sling in 1992, and repair of rectocele in 2008.  Recent abdominal US from 10/28/2021 revealed no renal masses or hydronephrosis, with incidental normal variant  extrarenal pelvis on the right.       Patient was initially seen by myself on 12/13/2021 at which time she was started on Myrbetriq 25 mg once daily for treatment of urinary urgency and incontinence.  She also underwent cystoscopy with Dr. Chu on 2/4/2022 which was normal.  CT urogram from 2/3/2022 showing prominent right > left extrarenal pelvis, with no significant hydronephrosis or evidence of urinary obstruction.  At our most recent follow up on 3/7/2022, the patient reported some improvement in voiding symptoms since starting Myrbetriq and she was currently currently going through 4 pads per day, which is improved from 6-7 times per day.  She reports the urgency is improved, and her urinary frequency is every 3-4 hours during the day.  She reports her nocturia has improved to almost 0, which is improved from once overnight.  She reports her urge incontinence with large volumes is much improved and she is able to hold it better.  No dysuria or hematuria.  The shared decision was made to increase her Myrbetriq to 50 mg daily for further titration of symptoms, and the initiation of vaginal estrogen cream for UTI prophylaxis.      Today, the patient reports she some continued improvement on the Myrbetriq.  She is still having some urge incontinence, and is currently going through 3-4 pads per day, though states the volume of incontinence is much less than it was previously.  She states she is able to venture out into public more now as she is not soaking through her pads as she was previously doing.  She is very often able to hold her urine to make it to the restroom in time without leakage.  She is currently voiding every 3-4 hours during the day.  She denies any nocturia.  She does report a few episodes of reddish brown urine a few weeks ago, but states she was not well hydrated at that time and is wondering if she was dehydrated.  No dysuria.  Patient reports her irritable bowel and diarrhea symptoms are  also little better on the Myrbetriq at this time.  Patient drinks one cup of coffee per day.           Past Medical History:     Past Medical History:   Diagnosis Date     ADHD (attention deficit hyperactivity disorder)      Anxiety      Arthritis     back, hands, knees, hips     Asthma      C. difficile diarrhea      Central sleep apnea      Coronary artery disease involving native coronary artery of native heart without angina pectoris      Depression      Fever and chills 09/24/2021     Gastro-oesophageal reflux disease      Hypertension      Ischemic cardiomyopathy      Narcolepsy      Pyelonephritis of right kidney 10/24/2021     Renal disease      S/P hip replacement 2004    Bilateral fall 2004 due to OA     Sleep apnea      Status post total knee replacement 12/29/2014     Urinary tract infection with hematuria, site unspecified 09/24/2021            Past Surgical History:     Past Surgical History:   Procedure Laterality Date     ARTHROPLASTY KNEE  07/09/2014    Procedure: ARTHROPLASTY KNEE;  Surgeon: Levi Johnson MD;  Location:  OR     ARTHROPLASTY KNEE Left 11/24/2014    Procedure: ARTHROPLASTY KNEE;  Surgeon: Levi Johnson MD;  Location:  OR     CV CORONARY ANGIOGRAM N/A 10/09/2019    Procedure: Coronary Angiogram;  Surgeon: Chiquita Chatterjee MD;  Location:  HEART CARDIAC CATH LAB     CV HEART CATHETERIZATION WITH POSSIBLE INTERVENTION N/A 10/10/2019    Procedure: Heart Catheterization with Possible Intervention;  Surgeon: Chin Garcia MD;  Location:  HEART CARDIAC CATH LAB     CV INTRA AORTIC BALLOON N/A 10/09/2019    Procedure: Intra-Aortic Balloon Pump Insertion;  Surgeon: Chiquita Chatterjee MD;  Location:  HEART CARDIAC CATH LAB     CV INTRA AORTIC BALLOON REMOVAL N/A 10/10/2019    Procedure: Intra-Aortic Balloon Pump Removal;  Surgeon: Chin Garcia MD;  Location:  HEART CARDIAC CATH LAB     CV LEFT HEART CATH N/A 12/11/2020    Procedure: Heart Catheterization  with Possible Intervention;  Surgeon: Pilo Otoole MD;  Location:  HEART CARDIAC CATH LAB     CV PCI STENT DRUG ELUTING N/A 10/09/2019    Procedure: PCI Stent Drug Eluting;  Surgeon: Chiquita Chatterjee MD;  Location:  HEART CARDIAC CATH LAB     GENITOURINARY SURGERY  01/01/2008    Prolift     GENITOURINARY SURGERY  1973    In 1973, she had surgery to 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     ORTHOPEDIC SURGERY      bilat total hip     RECTOCELE REPAIR       ZZC TOTAL ABD HYSTERECTOMY+BLAD REPR  01/01/1992    Vaginal approach with oophrectomy     ZZC TOTAL KNEE ARTHROPLASTY              Medications     Current Outpatient Medications   Medication     albuterol (PROAIR HFA/PROVENTIL HFA/VENTOLIN HFA) 108 (90 Base) MCG/ACT inhaler     amoxicillin (AMOXIL) 500 MG capsule     aspirin (ASA) 81 MG EC tablet     atorvastatin (LIPITOR) 40 MG tablet     BREO ELLIPTA 200-25 MCG/INH Inhaler     carvedilol (COREG) 3.125 MG tablet     clopidogrel (PLAVIX) 75 MG tablet     estradiol (ESTRACE) 0.1 MG/GM vaginal cream     isosorbide mononitrate (IMDUR) 30 MG 24 hr tablet     loratadine (CLARITIN) 10 MG tablet     Melatonin 10 MG TABS tablet     mirabegron (MYRBETRIQ) 50 MG 24 hr tablet     nitroGLYcerin (NITROSTAT) 0.4 MG sublingual tablet     pantoprazole (PROTONIX) 40 MG EC tablet     Prenatal Multivit-Min-Fe-FA (PRENATAL/IRON) TABS     saccharomyces boulardii (FLORASTOR) 250 MG capsule     sertraline (ZOLOFT) 100 MG tablet     ursodiol (ACTIGALL) 300 MG capsule     valsartan (DIOVAN) 40 MG tablet     vitamin D3 (CHOLECALCIFEROL) 2000 units (50 mcg) tablet     Current Facility-Administered Medications   Medication     lidocaine (XYLOCAINE) 2 % external gel            Family History:     Family History   Problem  Relation Age of Onset     Hypertension Mother      Arthritis Mother      Cerebrovascular Disease Mother      Cerebrovascular Disease Father         Three Strokes     Diabetes Father         and brother     Kidney Disease Father      Diabetes Brother      Birth defects Brother      Thyroid Disease Sister         Hypothyroid     Sjogren's Daughter      Diabetes Daughter      Cerebrovascular Disease Daughter      Diabetes Son             Social History:     Social History     Socioeconomic History     Marital status:      Spouse name: Not on file     Number of children: Not on file     Years of education: Not on file     Highest education level: Not on file   Occupational History     Occupation: Retired    Tobacco Use     Smoking status: Never Smoker     Smokeless tobacco: Never Used   Vaping Use     Vaping Use: Never used   Substance and Sexual Activity     Alcohol use: No     Drug use: No     Sexual activity: Not Currently     Partners: Male     Birth control/protection: None   Other Topics Concern     Parent/sibling w/ CABG, MI or angioplasty before 65F 55M? No      Service No     Blood Transfusions No     Caffeine Concern No     Occupational Exposure No     Hobby Hazards No     Sleep Concern Yes     Comment: Uses CPAP     Stress Concern No     Weight Concern Yes     Special Diet No     Back Care No     Exercise No     Bike Helmet No     Comment:       Seat Belt Yes     Self-Exams Yes   Social History Narrative     Not on file     Social Determinants of Health     Financial Resource Strain: Not on file   Food Insecurity: Not on file   Transportation Needs: Not on file   Physical Activity: Not on file   Stress: Not on file   Social Connections: Not on file   Intimate Partner Violence: Not on file   Housing Stability: Not on file            Allergies:   Latex, Flagyl [metronidazole hcl], Food, Hydrochlorothiazide w/triamterene, No clinical screening - see comments, Seasonal allergies,  "Sulfa drugs, Tape [adhesive tape], Metoprolol, and Metronidazole         Review of Systems:  From intake questionnaire   Negative 14 system review except as noted on HPI, nurse's note.         Physical Exam:   Patient is a 71 year old  female   Vitals: Blood pressure 123/71, pulse 59, height 1.638 m (5' 4.5\"), weight 82.6 kg (182 lb), not currently breastfeeding.  General Appearance Adult: Alert, no acute distress, oriented  Lungs: no respiratory distress, or pursed lip breathing  Heart: No obvious jugular venous distension present  Abdomen: soft, nontender, no organomegaly or masses, Body mass index is 30.76 kg/m .  Musculoskeltal: extremities normal, no peripheral edema  Skin: no suspicious lesions or rashes  Neuro: Alert, oriented, speech and mentation normal  : deferred      Uroflow and Post-Void Residual by Bladder Scan   PVR: 21 ml       Labs and Pathology:    I personally reviewed all applicable laboratory data and went over findings with patient  Significant for:    CBC RESULTS:  Recent Labs   Lab Test 06/21/22  0842 09/23/21  1905 06/12/21  1232 12/10/20  0557 12/09/20  1031   WBC  --  8.6 5.6 4.7 5.4   HGB 14.4 13.1 14.8 13.8 13.7   PLT  --  133* 177 154 164        BMP RESULTS:  Recent Labs   Lab Test 06/21/22  0842 02/08/22  1407 02/03/22  1438 10/14/21  1140 09/26/21  0646 09/23/21  1832 12/17/20  1403 12/10/20  0557 12/09/20  1031 10/15/20  1256    140  --  142 140   < > 143 141 139 140   POTASSIUM 3.9 4.6  --  4.0 4.2   < > 3.9 4.3 4.1 4.8   CHLORIDE 106 107  --  109 107   < > 107 109 107 107   CO2 24 28  --  26 28   < > 30 29 26 29   ANIONGAP 6 5  --  7 5   < > 6 3 6 4   * 114*  --  108* 95   < > 113* 99 95 101*   BUN 25 17  --  13 12   < > 18 16 15 19   CR 1.24* 1.13* 1.0 0.92 1.02   < > 1.00 1.04 0.97 1.14*   GFRESTIMATED 46* 51* 59* 62 55*   < > 56* 54* 58* 48*   GFRESTBLACK  --   --   --   --   --   --  65 62 67 56*   FRACISCO 8.8 9.0  --  8.8 8.7   < > 8.8 8.6 8.6 9.2    < > = values in " this interval not displayed.       UA RESULTS:   Recent Labs   Lab Test 03/07/22  1102 02/04/22  1506 01/19/22  1824 10/14/21  1140   SG 1.010 1.015 1.010 1.025   URINEPH 5.0 5.5 5.5 5.0   NITRITE Negative Negative Negative Negative   RBCU 1  --  0-2 0-2   WBCU 1  --  0-5 0-5           Imaging:    I personally reviewed all applicable imaging and went over findings with patient.  Significant for:    EXAMINATION: CT UROGRAM WO & W CONTRAST, 2/3/2022 3:02 PM     TECHNIQUE:  Helical CT images from the lung bases through the  symphysis pubis were obtained  without and with contrast using CT  urogram protocol. Contrast dose: Isovue 370  111 mls     COMPARISON: Ultrasound 10/28/2021     HISTORY: Ureteropelvic junction (UPJ) obstruction, right; Urge  incontinence; Recurrent UTI; Congenital malformation of kidney,  unspecified     Additional history from the EMR: reconstructive surgery of the right  kidney due to history of congenital kidney defect, with last surgery  in 1996     FINDINGS:     Urinary tract: No suspicious renal masses or lesions. Surgical clips  noted about the right kidney. Small hypodensities in both kidneys  which are too small to characterize. Prominent right greater than left  renal pelvis. No significant hydronephrosis. No hydroureter.  Visualization of the urinary bladder is limited due to beam hardening  artifact from bilateral hip prostheses.     Remainder of the abdomen and pelvis: No suspicious hepatic masses or  lesions. The gallbladder is filled with calcified gallstones. No  evidence of biliary obstruction. Pancreas is unremarkable. The spleen  is normal. The adrenal glands are normal. No abnormally dilated loops  of small or large bowel. The appendix is normal. Somewhat limited  evaluation of the deep pelvis due to beam hardening artifact from  bilateral hip prostheses. The major intra-abdominal vasculature is  patent without focal aneurysmal dilation or significant stenosis.  No  lymphadenopathy. No intra-abdominal free air or free fluid.     Lung bases:  Scattered groundglass opacities at the periphery of the  bilateral lower lobes. No pleural effusion. Heart size is normal.     Bones and soft tissues: No acute or suspicious osseous or soft tissue  lesions                                                                      IMPRESSION:   1. Prominent right greater than left extrarenal pelvis. No significant  hydronephrosis or evidence of urinary obstruction.  2. Nonspecific scattered groundglass opacities in the periphery of the  bilateral lung bases which may represent infectious versus  inflammatory changes.          Assessment and Plan:     Assessment: 71 year old female with a history of right congenital kidney defect s/p right dismembered pyeloplasty in 1996 at [facility], recurrent UTIs, and urinary urge incontinence.  Recent CT urogram showing prominent right > left extrarenal pelvis, but no significant hydronephrosis or evidence of urinary obstruction.  Recent cystoscopy with Dr. Chu on 2/4/2022 also normal.  Patient recently initiated on Myrbetriq 25 mg, which was most recently increased to 50 mg daily.  Patient reporting significant improvement in her urge incontinence and is very pleased with her progress, but continued to endorse some persistent urge incontinence.  We discussed option for addition of anticholinergic medication versus referral for UDS, and patient wishes to hold off on anticholinergic medications at this time due to risk for side effects.  Will plan for UDS for further evaluation of bladder function and determination of alternative treatment options.  She will continue her Myrbetriq 50 mg daily in the interim.  Patient with a few episodes of possible gross hematuria several weeks ago which she relates was secondary to dehydration.  Will plan for UA/UC today to rule out infection, along with urine cytology.  Reviewed with Dr. Jacob, and patient with  normal cystoscopy and CT urogram in 2/2022, but if hematuria persists will refer back for repeat cystoscopy.      Plan:  - Schedule urodynamics study next available.   - Continue your Myrbetriq 50 mg once daily.       SANYA Goddard  Department of Urology

## 2022-07-11 NOTE — PATIENT INSTRUCTIONS
UROLOGY CLINIC VISIT PATIENT INSTRUCTIONS    - Schedule urodynamics study next available.   - Continue your Myrbetriq 50 mg once daily.     If you have any issues, questions or concerns in the meantime, do not hesitate to contact us at [phone] or via [employer].     It was a pleasure meeting with you today.  Thank you for allowing me and my team the privilege of caring for you today.  YOU are the reason we are here, and I truly hope we provided you with the excellent service you deserve.  Please let us know if there is anything else we can do for you so that we can be sure you are leaving completely satisfied with your care experience.    Aubree Butts PA-C  Department of Urology

## 2022-07-12 LAB — BACTERIA UR CULT: NO GROWTH

## 2022-07-15 ENCOUNTER — PATIENT OUTREACH (OUTPATIENT)
Dept: UROLOGY | Facility: CLINIC | Age: 72
End: 2022-07-15

## 2022-07-15 ENCOUNTER — HOSPITAL ENCOUNTER (OUTPATIENT)
Dept: CARDIOLOGY | Facility: CLINIC | Age: 72
Discharge: HOME | End: 2022-07-15
Attending: INTERNAL MEDICINE | Admitting: INTERNAL MEDICINE
Payer: MEDICARE

## 2022-07-15 DIAGNOSIS — R31.0 GROSS HEMATURIA: Primary | ICD-10-CM

## 2022-07-15 DIAGNOSIS — R06.09 DYSPNEA ON EXERTION: ICD-10-CM

## 2022-07-15 LAB — LVEF ECHO: NORMAL

## 2022-07-15 PROCEDURE — 93306 TTE W/DOPPLER COMPLETE: CPT

## 2022-07-15 PROCEDURE — 93306 TTE W/DOPPLER COMPLETE: CPT | Mod: 26 | Performed by: INTERNAL MEDICINE

## 2022-07-15 NOTE — TELEPHONE ENCOUNTER
Reached out to pt to inform her of the need for a repeat urine sample for a UA and cytology. LVM with contact information. Will continue to follow as needed.

## 2022-07-18 ENCOUNTER — PATIENT OUTREACH (OUTPATIENT)
Dept: UROLOGY | Facility: CLINIC | Age: 72
End: 2022-07-18

## 2022-07-18 DIAGNOSIS — N39.0 RECURRENT UTI: Primary | ICD-10-CM

## 2022-07-18 NOTE — TELEPHONE ENCOUNTER
Reached out to pt to inquire about any questions she had related to follow up that is recommended by provider. Pt stated she knows that she needs to give a urine sample this week or early next week for a UA/UC and urine cytology. Pt stated she would like a mychart reminder for her UDS appointment that is in September. Pt had no other questions. Will continue to follow as needed.

## 2022-07-28 ENCOUNTER — LAB (OUTPATIENT)
Dept: LAB | Facility: CLINIC | Age: 72
End: 2022-07-28
Attending: PHYSICIAN ASSISTANT
Payer: MEDICARE

## 2022-07-28 DIAGNOSIS — R31.0 GROSS HEMATURIA: ICD-10-CM

## 2022-07-28 DIAGNOSIS — N39.0 RECURRENT UTI: ICD-10-CM

## 2022-07-28 LAB
ALBUMIN UR-MCNC: NEGATIVE MG/DL
APPEARANCE UR: ABNORMAL
BILIRUB UR QL STRIP: NEGATIVE
COLOR UR AUTO: YELLOW
GLUCOSE UR STRIP-MCNC: NEGATIVE MG/DL
HGB UR QL STRIP: ABNORMAL
KETONES UR STRIP-MCNC: NEGATIVE MG/DL
LEUKOCYTE ESTERASE UR QL STRIP: ABNORMAL
NITRATE UR QL: NEGATIVE
PH UR STRIP: 6 [PH] (ref 5–7)
RBC #/AREA URNS AUTO: ABNORMAL /HPF
SP GR UR STRIP: 1.02 (ref 1–1.03)
SQUAMOUS #/AREA URNS AUTO: ABNORMAL /LPF
UROBILINOGEN UR STRIP-ACNC: 0.2 E.U./DL
WBC #/AREA URNS AUTO: ABNORMAL /HPF

## 2022-07-28 PROCEDURE — 88112 CYTOPATH CELL ENHANCE TECH: CPT | Performed by: PATHOLOGY

## 2022-07-28 PROCEDURE — 87086 URINE CULTURE/COLONY COUNT: CPT

## 2022-07-28 PROCEDURE — 81001 URINALYSIS AUTO W/SCOPE: CPT

## 2022-07-30 ENCOUNTER — NURSE TRIAGE (OUTPATIENT)
Dept: NURSING | Facility: CLINIC | Age: 72
End: 2022-07-30

## 2022-07-30 LAB — BACTERIA UR CULT: NO GROWTH

## 2022-07-30 NOTE — TELEPHONE ENCOUNTER
"Gem is calling about Urinary lab results after receiving a Text msg stating that new test results were available    She had submitted a 2nd urine specimen to the lab on Fri, 7/29/22 after being told that the sample from the day before was too small    Chart Review shows test \"In process\" - Pt notified  Cytology, non-gynecologic [[id number]] (Order [id number])  Pathology and Cytology  Date: 7/29/2022 Department:  Laboratory Released By: Jackie Ramírez Authorizing: Aubree Butts PA       She denies worsening symptoms      Sapna Palmer, RN  [facility]      Reason for Disposition    [1] Caller requesting NON-URGENT health information AND [2] PCP's office is the best resource    Protocols used: INFORMATION ONLY CALL - NO TRIAGE-A-      "

## 2022-08-02 LAB
PATH REPORT.COMMENTS IMP SPEC: NORMAL
PATH REPORT.FINAL DX SPEC: NORMAL
PATH REPORT.GROSS SPEC: NORMAL
PATH REPORT.MICROSCOPIC SPEC OTHER STN: NORMAL
PATH REPORT.RELEVANT HX SPEC: NORMAL

## 2022-08-12 ENCOUNTER — OFFICE VISIT (OUTPATIENT)
Dept: SLEEP MEDICINE | Facility: CLINIC | Age: 72
End: 2022-08-12
Payer: MEDICARE

## 2022-08-12 VITALS
HEART RATE: 84 BPM | WEIGHT: 183 LBS | SYSTOLIC BLOOD PRESSURE: 130 MMHG | DIASTOLIC BLOOD PRESSURE: 76 MMHG | HEIGHT: 65 IN | BODY MASS INDEX: 30.49 KG/M2 | OXYGEN SATURATION: 96 %

## 2022-08-12 DIAGNOSIS — F51.04 CHRONIC INSOMNIA: ICD-10-CM

## 2022-08-12 DIAGNOSIS — F51.5 NIGHTMARE DISORDER: ICD-10-CM

## 2022-08-12 DIAGNOSIS — G47.33 OSA (OBSTRUCTIVE SLEEP APNEA): Primary | ICD-10-CM

## 2022-08-12 PROCEDURE — 90791 PSYCH DIAGNOSTIC EVALUATION: CPT | Performed by: PSYCHOLOGIST

## 2022-08-12 ASSESSMENT — SLEEP AND FATIGUE QUESTIONNAIRES
HOW LIKELY ARE YOU TO NOD OFF OR FALL ASLEEP WHILE SITTING INACTIVE IN A PUBLIC PLACE: SLIGHT CHANCE OF DOZING
HOW LIKELY ARE YOU TO NOD OFF OR FALL ASLEEP WHILE SITTING AND READING: SLIGHT CHANCE OF DOZING
HOW LIKELY ARE YOU TO NOD OFF OR FALL ASLEEP WHEN YOU ARE A PASSENGER IN A CAR FOR AN HOUR WITHOUT A BREAK: HIGH CHANCE OF DOZING
HOW LIKELY ARE YOU TO NOD OFF OR FALL ASLEEP WHILE SITTING AND TALKING TO SOMEONE: WOULD NEVER DOZE
HOW LIKELY ARE YOU TO NOD OFF OR FALL ASLEEP WHILE WATCHING TV: MODERATE CHANCE OF DOZING
HOW LIKELY ARE YOU TO NOD OFF OR FALL ASLEEP IN A CAR, WHILE STOPPED FOR A FEW MINUTES IN TRAFFIC: WOULD NEVER DOZE
HOW LIKELY ARE YOU TO NOD OFF OR FALL ASLEEP WHILE SITTING QUIETLY AFTER LUNCH WITHOUT ALCOHOL: SLIGHT CHANCE OF DOZING
HOW LIKELY ARE YOU TO NOD OFF OR FALL ASLEEP WHILE LYING DOWN TO REST IN THE AFTERNOON WHEN CIRCUMSTANCES PERMIT: HIGH CHANCE OF DOZING

## 2022-08-12 NOTE — PATIENT INSTRUCTIONS
CBT-I Module - Core Sleep Training      Now that you understand a bit more about how sleep works and what causes insomnia, you are ready to begin CBT-I Core Sleep Training.  This process involves five key strategies that will:    Strengthen your sleep drive and circadian sleep rhythm  Strengthen the link between your bed and sleep    It will be important that you continue to keep track of your sleep using your Insomnia  Mirna or your paper sleep diary.      Core Sleep Strategies    Much like physical activity and healthy eating strengthens our body, studies show that the following Core strategies are key to achieving stronger, healthier sleep. The focus is on quality of sleep (not quantity) and improving how you feel during the day.     Reduce Your Time in Bed    Spending extra time in bed trying to get more sleep actually can cause more sleep disruption and weaken sleep efficiency. Sleep efficiency is simply the percentage of time a person spends asleep while in bed. Normal sleep efficiency is thought to be 85% or greater.  By reducing your time in bed, you will be awake longer during the day leading to quicker and deeper sleep at night. This strategy does not reduce the amount of sleep you get, just the time you are awake in bed.                                             Your provider has recommended the following initial sleep schedule based on the average nightly amount of sleep you estimate you got in the past two weeks. It also takes into account the best time for you to sleep.     Your Sleep Schedule Prescription                       Total Time in Bed:  8 hours                  Bedtime:  No earlier than 1 AM                   Wake-up Time:  Out of bed every day by 9 AM,  You can also choose a different 8 hour window but be consistent once you choose a sleep schedule.      Don't go to bed until you feel sleepy (not tired or fatigued)     This helps increase your sleep drive by keeping you awake  longer.  If you go to bed when you're not sleepy, it gives your brain the wrong message and can lead to frustration.     If you feel sleepy before your prescribed bedtime, find activities that can help you stay awake until it is time for you to go to bed. Even brief naps in the evening can be very disruptive of sleep at night.      Don't stay in bed unless you are sleeping      If you are unable to fall asleep or return to sleep after about 30 minutes, get out of bed. This helps train your brain that the bed is for sleep. It is harmful to your sleep when you are worried or frustrated in bed. Do not read, eat, worry, think about sleep, use mobile phones or tablets, or watch TV in bed. Do not watch the clock during the night.     Go to another room and do something relaxing. Plan things you can do when you get out of bed. Avoid use of mobile devices or computers when out of bed.    Return to bed only when you feel sleepy again.  Repeat as often as needed throughout the night.      Get out of bed at the same time every day    Getting up at the same time helps set your biological clock. It is important to stick to your wake time no matter how much sleep you got the night before or how you feel in the morning.    Varying your wake can have the same effect as jet lag.  It disrupts your biological clock and makes you feel more tired and exhausted.  Trying to  catch up  on sleep on the weekends only makes things worse and sets you up for a cycle of poor sleep the following weekdays.      Make sure you set an alarm and keep it away from the bed to prevent looking at the clock during the night.       Avoid daytime napping    Avoid daytime napping if possible. Napping partially fulfills your need for sleep and weakens your sleep drive at night.    However, if you find yourself sleepy (not just tired) you can take a brief 15-30 minute nap during the day if needed.  Naps within 7-8 hours of your prescribed wake time are less  likely to affect your sleep the coming night.  Sleepy people make more mistakes and may hurt themselves or others. Therefore, safety trumps all other sleep prescriptions.  Never drive or operate equipment if drowsy or sleepy.     Changing Your Sleep Schedule Prescription    After two weeks, you can adjust your sleep schedule prescription based on how well you are sleeping. Use the following guidelines to change your prescribed time in bed based on information from your Insomnia  sammy or paper sleep diary.          Increase Time in Bed by 15 Minutes IF:    Your Insomnia  sammy Progress indicator estimates that your sleep efficiency has been greater than 90%.    OR you are falling asleep in less than 30 minutes AND awake less than 30 minutes during the night.              Decrease Time in Bed by 15 Minutes IF:    Your Insomnia  sammy sleep diary Progress indicator estimates that your sleep efficiency has been less than 80%.    OR it is taking longer than 30 minutes to fall asleep OR you are awake more than 30 minutes during the night.      DO NOT decrease your sleep schedule below 6 hours unless instructed by your provider.    Change is Challenging    Research shows that making significant changes in sleep habits improves sleep quality and how you feel. Improvement takes time and is not always steady. Change is challenging and can be stressful.  This is especially true if old habits - like spending more time in bed -- were a way to avoid worry about getting enough sleep.

## 2022-08-12 NOTE — PROGRESS NOTES
SLEEP MEDICINE CONSULTATION  Sleep Psychology  Aug 12, 2022    Name: Gem Gama MRN# [id number]   Age: 71 year old YOB: 1950     Date of Consultation: Aug 12, 2022  Consultation is requested by: Kashif Hadley MD  [id number] [facility] [address]  [place] [address]  Primary care provider: Danny Conteh    Reason for Sleep Consultation     Gem Gama is a 71 year old female seen today for a behavioral sleep medicine consultation because of insomnia and nightmares    Assessment and Plan     Sleep Diagnoses:           Chronic insomnia    Nightmare disorder    LEA (obstructive sleep apnea)    Co-occurring Conditions      Asthma    Coronary Artery Disease    PTSD, complex    Clinical Impressions:    Gem Gama was seen for a [facility] consultation and possible behavioral sleep intervention and treatment.  Patient history and clinical presentation is consistent with chronic psychophysiologic insomnia associated with complex posttraumatic stress disorder and depression.  She is a suitable candidate for cognitive behavioral therapy for insomnia.  However concomitant treatment for trauma is indicated based on recurrent nightmares, and a degree of hypervigilance and intrusive thoughts about past trauma affecting daytime functioning.  There also appears to be a mild delayed sleep phase circadian rhythm component that should be considered with behavioral treatment of insomnia.    Recommendations and Counselin.  Complete PSG as ordered by [facility] medicine  2.  Referral for  Trauma-based therapy and EMDR ( [facility] or [facility])  3.  Initiate brief behavioral therapy for insomnia with cognitive restructuring component      Gem was provided information about the pathophysiology of insomnia, psychophysiological factors contributing to the onset and maintenance of insomnia and how co-occurring medical conditions and intrinsic sleep disorders can affect  sleep.  Treatment options were discussed  as applicable to patient specific sleep concerns and symptoms. The benefits and potential early side effects of treatment including increased daytime sleepiness were discussed.     Patient was advised to consult with their prescribing provider around use of or changes to prescription sleep medication.  Patient was counseled on the importance of avoiding driving if drowsy.    Follow-up: 4 weeks     History of Present Sleep Complaint     Gem Gama is a 71 year old year old female with a relevant medical history of  LEA, CAD, Asthma who presents with longstanding history of insomnia triggered by death of  in the late 1990s.  This resulted in acute insomnia and the stressors of raising her family on her own.  His death was a suicide which added to the overall psychological distress.  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    She currently lives alone and is retired.  She has very limited structure to her live. She reports this has resulted in rather wide variation in her sleep schedle that depends in large part upon how long she perceives she has been sleeping.      Not currently using CPAP or APAP for treatment of LEA.  She is scheduled for a repeat sleep study in September 2022.    She reports a history of trauma associated with being falsely accused of racial slur while teaching.    Recurrent nightmares associated with trauma of 's suicide and subsequent traumas.      SLEEP-WAKE SCHEDULE:     Work/School Days: Patient goes to school/work:         Time to Bed:   Varies widely, bettween 10 pm - midnight.      Sleep Latency:   1-2 hours to fall asleep      Difficulty falling asleep:    7 nights per week       Number of awakenings:   times per night due to        She has trouble falling back asleep  7 nights          times a week and it usually takes   to get back to sleep                Patient is usually  up at  Between 10 am - 2 pm.      Uses alarm?  Uses phone alarm.         SLEEP HYGIENE:    Behaviors affecting Sleep   Uses computers or electronics within an hour before bedtime, Extends time in bed longer if after poor night of sleep, Sleeps in on weekends    Sleep Environment:    Bedroom is quiet, comfortable and dark, Sleeps alone    Caffeine, Alcohol and Other Substances:      Number of caffeinated beverages(coffee, tea, soda, energy drinks) that patient consumes  2-3per day:        Last caffeine use is usually:  3 pm  Sodas.      List of any prescribed or over the counter stimulants that patient takes:        List of any prescribed or over the counter sleep medication patient takes:        List of previous sleep medications that patients have tried:        Patient drinks alcohol to help them sleep:        Patient drinks alcohol near bedtime:      FAMILY HISTORY OF SLEEP DISORDERS      Patient has a family member been diagnosed with a sleep disorder:    5          PREVIOUS SLEEP EVALUATIONS:    Per 6/2/22 Sleep Consultation with shyanne Perez MD at [facility] [place] [place]:  Fatigue and sleepiness (ESS 14) with long sleep times  Previous diagnoses of obstructive sleep apnea, complex sleep apnea, and narcolepsy with significant PLMS on sleep study 2013. Circumstances in regards to narcolepsy diagnosis is uncertain.    Previous treatment with PAP not felt to be beneficial, no details available  Currently additional symptoms of night sweats, nocturnal enuresis, headaches in morning  Comorbid coronary artery disease.  - - Recommend Polysomnogram (using 4% desaturation/Medicare/ AASM 1B scoring rules) for re-evaluation of  sleep disordered breathing, periodic limb movements. She will need later sleep times  - She had hives from the glue on her last sleep study, will need to test her before Polysomnogram       SCALES               INSOMNIA SEVERITY INDEX (LEIA)      The Insomnia Severity Index  measures the severity of insomnia symptoms over the past two weeks.    1. Difficulty falling asleep: Severe    2. Difficulty staying asleep: Mild    3. Problems waking up too early: None    4. How SATISFIED/DISSATISFIED are youwith your CURRENT sleep pattern? Dissatisfied    5. How NOTICEABLE to others do you think your sleep problem is in terms of impairing the quality of your life? Very Much Noticeable      6. How WORRIED/DISTRESSED are you about your sleep problem? Somewhat    7. To what extent do you consider your sleep problem to INTERFERE with your daily functioning (e.g. daytime fatigue, mood, ability to functon at work/daily chores, concentration, memory, mood, etc.) CURRENTLY?   Much     LEIA Total Score: 16    Guidelines for Scoring/Interpretation:     0-7 = No clinically significant insomnia   8-14 = Subthreshold insomnia   15-21 = Clinical insomnia (moderate severity)  22-28 = Clinical insomnia (severe)            Vitals     LMP  (LMP Unknown)      Medical History     Allergies:    Allergies   Allergen Reactions     Latex Other (See Comments) and Shortness Of Breath     Runny nose  PN: LW Reaction: DIFFICULTY BREATHING       Flagyl [Metronidazole Hcl] Anaphylaxis and Rash     Food      PN: LW FI1: nka LW FI2:     Hydrochlorothiazide W/Triamterene      PN: LW Reaction: skin rash     No Clinical Screening - See Comments      PN: LW Other1: -Adhesive Tape     Seasonal Allergies      sneezing     Sulfa Drugs Anaphylaxis, Hives and Itching     PN: LW Reaction: HIVES, Swelling     Tape [Adhesive Tape] Hives     Metoprolol Itching and Rash     Metronidazole Hives and Rash     PN: LW Reaction: HIVES         Medications:    Current Outpatient Medications   Medication Sig Dispense Refill     albuterol (PROAIR HFA/PROVENTIL HFA/VENTOLIN HFA) 108 (90 Base) MCG/ACT inhaler Inhale 1-2 puffs into the lungs every 4 hours as needed for shortness of breath / dyspnea or wheezing 18 g 11     amoxicillin (AMOXIL) 500 MG  capsule Take as directed before dental work        aspirin (ASA) 81 MG EC tablet Take 1 tablet (81 mg) by mouth daily 90 tablet 0     atorvastatin (LIPITOR) 40 MG tablet Take 1 tablet (40 mg) by mouth daily 90 tablet 3     BREO ELLIPTA 200-25 MCG/INH Inhaler INHALE 1 PUFF INTO THE LUNGS DAILY       carvedilol (COREG) 3.125 MG tablet TAKE 1 TABLET(3.125 MG) BY MOUTH TWICE DAILY WITH MEALS 180 tablet 3     clopidogrel (PLAVIX) 75 MG tablet Take 1 tablet (75 mg) by mouth daily 90 tablet 3     estradiol (ESTRACE) 0.1 MG/GM vaginal cream Place 1 g vaginally twice a week 42.5 g 1     isosorbide mononitrate (IMDUR) 30 MG 24 hr tablet Take 1 tablet (30 mg) by mouth daily 90 tablet 3     loratadine (CLARITIN) 10 MG tablet Take 10 mg by mouth daily       Melatonin 10 MG TABS tablet Take 10 mg by mouth nightly as needed for sleep       mirabegron (MYRBETRIQ) 50 MG 24 hr tablet Take 1 tablet (50 mg) by mouth daily 90 tablet 1     nitroGLYcerin (NITROSTAT) 0.4 MG sublingual tablet Place 1 tablet (0.4 mg) under the tongue every 5 minutes as needed for chest pain 25 tablet 11     pantoprazole (PROTONIX) 40 MG EC tablet Take 1 tablet (40 mg) by mouth daily 90 tablet 3     Prenatal Multivit-Min-Fe-FA (PRENATAL/IRON) TABS Take 1 tablet by mouth daily        saccharomyces boulardii (FLORASTOR) 250 MG capsule Take 1 capsule (250 mg) by mouth 2 times daily 14 capsule 0     sertraline (ZOLOFT) 100 MG tablet Take 1 tablet (100 mg) by mouth daily 90 tablet 3     ursodiol (ACTIGALL) 300 MG capsule TAKE 1 CAPSULE(300 MG) BY MOUTH TWICE DAILY 60 capsule 11     valsartan (DIOVAN) 40 MG tablet Take 1 tablet (40 mg) by mouth daily 90 tablet 3     vitamin D3 (CHOLECALCIFEROL) 2000 units (50 mcg) tablet Take 1 tablet (2,000 Units) by mouth daily 90 tablet 3       Problem List:  Patient Active Problem List    Diagnosis Date Noted     Chronic insomnia 06/02/2022     Priority: Medium     Coronary artery disease with angina pectoris, unspecified  vessel or lesion type, unspecified whether native or transplanted heart (H) 12/09/2020     Priority: Medium     Hospitalized 10/9/2019-10/13/2019 due to STEMI and underwent EDWIN x 2 to proximal LAD. Coronary angiogram showed severe single vessel obstructive CAD of LAD (100%) as well as moderate non-obstructive CAD in OM1 and OM2 (60%, 50%). Patient initially requiring intraaortic balloon pump and pressor support. Echocardiogram showed EF 35-40%.         Hyperlipidemia LDL goal <70 10/15/2020     Priority: Medium     Cholelithiasis without cholecystitis 10/15/2020     Priority: Medium     History of ischemic cardiomyopathy 10/25/2019     Priority: Medium     Hospitalized 10/9/2019-10/13/2019 due to STEMI. Echocardiogram showed EF 35-40%. Later echocardiogram with improved EF 55-60%       Overactive bladder 09/27/2018     Priority: Medium     Fatigue, unspecified type 09/27/2018     Priority: Medium     Hisotry of centeral apnea. BiPaP not effective. Cannot face mask either. Normal thyroid function tests last  9/2017.       Incontinence of feces with fecal urgency 03/15/2018     Priority: Medium     Stage 3a chronic kidney disease (H) 12/18/2017     Priority: Medium     Vitamin D deficiency 02/23/2017     Priority: Medium     Primary narcolepsy without cataplexy 02/09/2017     Priority: Medium     Esophageal reflux (GERD) 08/10/2014     Priority: Medium     Central sleep apnea 08/08/2014     Priority: Medium     Osteoarthritis of right knee 06/24/2014     Priority: Medium     LEA (obstructive sleep apnea) 04/04/2014     Priority: Medium     Pituitary microadenoma (H) 08/29/2013     Priority: Medium     MRI 2011- There is a triangular-shaped area with delayed contrast enhancement at the left lateral portion of the pituitary gland posteriorly, measuring 2.5 x 4.3 mm. This is suggestive of a microadenoma (series 103, image 6       Major depressive disorder, recurrent episode, moderate (H) 07/25/2013     Priority: Medium      Advanced directives, counseling/discussion 06/18/2012     Priority: Medium     Discussed advance care planning with patient; information given to patient to review. 6/18/2012   Erin Hahn MA        Environmental allergies 07/13/2011     Priority: Medium     Overview:   Dust, mold, cats , dogs, trees , dustmite and weeds.       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        Intermittent asthma 06/18/1990     Priority: Medium     PFTS 12/2021 - FEV1- 1.83 (86%), ratio 0.74, 14% change with bronchodilators,  %, %, DLCO 90%        Class 1 obesity due to excess calories with serious comorbidity and body mass index (BMI) of 30.0 to 30.9 in adult 06/01/2022     Priority: Low        Past Medical/Surgical History:  Past Medical History:   Diagnosis Date     ADHD (attention deficit hyperactivity disorder)      Anxiety      Arthritis     back, hands, knees, hips     Asthma      C. difficile diarrhea      Central sleep apnea      Coronary artery disease involving native coronary artery of native heart without angina pectoris      Depression      Fever and chills 09/24/2021     Gastro-oesophageal reflux disease      Hypertension      Ischemic cardiomyopathy      Narcolepsy      Pyelonephritis of right kidney 10/24/2021     Renal disease      S/P hip replacement 2004    Bilateral fall 2004 due to OA     Sleep apnea      Status post total knee replacement 12/29/2014     Urinary tract infection with hematuria, site unspecified 09/24/2021     Patient Active Problem List    Diagnosis Date Noted     Chronic insomnia 06/02/2022     Priority: Medium     Coronary artery disease with angina pectoris,  unspecified vessel or lesion type, unspecified whether native or transplanted heart (H) 12/09/2020     Priority: Medium     Hospitalized 10/9/2019-10/13/2019 due to STEMI and underwent EDIWN x 2 to proximal LAD. Coronary angiogram showed severe single vessel obstructive CAD of LAD (100%) as well as moderate non-obstructive CAD in OM1 and OM2 (60%, 50%). Patient initially requiring intraaortic balloon pump and pressor support. Echocardiogram showed EF 35-40%.         Hyperlipidemia LDL goal <70 10/15/2020     Priority: Medium     Cholelithiasis without cholecystitis 10/15/2020     Priority: Medium     History of ischemic cardiomyopathy 10/25/2019     Priority: Medium     Hospitalized 10/9/2019-10/13/2019 due to STEMI. Echocardiogram showed EF 35-40%. Later echocardiogram with improved EF 55-60%       Overactive bladder 09/27/2018     Priority: Medium     Fatigue, unspecified type 09/27/2018     Priority: Medium     Hisotry of centeral apnea. BiPaP not effective. Cannot face mask either. Normal thyroid function tests last  9/2017.       Incontinence of feces with fecal urgency 03/15/2018     Priority: Medium     Stage 3a chronic kidney disease (H) 12/18/2017     Priority: Medium     Vitamin D deficiency 02/23/2017     Priority: Medium     Primary narcolepsy without cataplexy 02/09/2017     Priority: Medium     Esophageal reflux (GERD) 08/10/2014     Priority: Medium     Central sleep apnea 08/08/2014     Priority: Medium     Osteoarthritis of right knee 06/24/2014     Priority: Medium     LEA (obstructive sleep apnea) 04/04/2014     Priority: Medium     Pituitary microadenoma (H) 08/29/2013     Priority: Medium     MRI 2011- There is a triangular-shaped area with delayed contrast enhancement at the left lateral portion of the pituitary gland posteriorly, measuring 2.5 x 4.3 mm. This is suggestive of a microadenoma (series 103, image 6       Major depressive disorder, recurrent episode, moderate (H) 07/25/2013      Priority: Medium     Advanced directives, counseling/discussion 06/18/2012     Priority: Medium     Discussed advance care planning with patient; information given to patient to review. 6/18/2012   Erin Hahn MA        Environmental allergies 07/13/2011     Priority: Medium     Overview:   Dust, mold, cats , dogs, trees , dustmite and weeds.       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        Intermittent asthma 06/18/1990     Priority: Medium     PFTS 12/2021 - FEV1- 1.83 (86%), ratio 0.74, 14% change with bronchodilators,  %, %, DLCO 90%        Class 1 obesity due to excess calories with serious comorbidity and body mass index (BMI) of 30.0 to 30.9 in adult 06/01/2022     Priority: Low     Patient Active Problem List   Diagnosis     TMJ (temporomandibular joint syndrome)     Congenital ureteric stenosis     Lacrimal duct stenosis     Chronic rhinitis     Intermittent asthma     Advanced directives, counseling/discussion     Major depressive disorder, recurrent episode, moderate (H)     Pituitary microadenoma (H)     LEA (obstructive sleep apnea)     Osteoarthritis of right knee     Central sleep apnea     Esophageal reflux (GERD)     Primary narcolepsy without cataplexy     Vitamin D deficiency     Stage 3a chronic kidney disease (H)     Incontinence of feces with fecal urgency     Overactive bladder     Fatigue, unspecified type     History of ischemic cardiomyopathy     Hyperlipidemia LDL goal <70     Cholelithiasis without cholecystitis     Coronary artery disease with angina pectoris, unspecified vessel or lesion type, unspecified whether native or transplanted heart (H)      Environmental allergies     Class 1 obesity due to excess calories with serious comorbidity and body mass index (BMI) of 30.0 to 30.9 in adult     Chronic insomnia        Mental Health History     Prior Mental Health Diagnosis:   major depressive disorder and PTSD    Current Mental Health Treatment:   No current treatment other that sertraline, prior treatment for couples issues, marital confiict, trauma assosciatedd with carlos a'ds suicide.  She has not found talk therapy to be very helpful.  She is seeking out EMDR therapy but there are extensive waiting lists.    Chemical Abuse/Treatment:    None reported      Family History     Family History   Problem Relation Age of Onset     Hypertension Mother      Arthritis Mother      Cerebrovascular Disease Mother      Cerebrovascular Disease Father         Three Strokes     Diabetes Father         and brother     Kidney Disease Father      Diabetes Brother      Birth defects Brother      Thyroid Disease Sister         Hypothyroid     Sjogren's Daughter      Diabetes Daughter      Cerebrovascular Disease Daughter      Diabetes Son        Social History     Social History     Socioeconomic History     Marital status:    Occupational History     Occupation: Retired    Tobacco Use     Smoking status: Never Smoker     Smokeless tobacco: Never Used   Vaping Use     Vaping Use: Never used   Substance and Sexual Activity     Alcohol use: No     Drug use: No     Sexual activity: Not Currently     Partners: Male     Birth control/protection: None   Other Topics Concern     Parent/sibling w/ CABG, MI or angioplasty before 65F 55M? No      Service No     Blood Transfusions No     Caffeine Concern No     Occupational Exposure No     Hobby Hazards No     Sleep Concern Yes     Comment: Uses CPAP     Stress Concern No     Weight Concern Yes     Special Diet No     Back Care No     Exercise No     Bike Helmet No     Comment:       Seat Belt Yes     Self-Exams  Yes       Retired teacher     Mental Status Examination     Gem presented as oriented X3 with speech and language intact.  The patient was cooperative throughout the evaluation with no signs of hallucinations, delusions, loosening of associations or other thought disturbance.  Mood was normal Affect was congruent with mood. Insight and judgement were intact.  Memory appeared intact for recent and remote elements.  There was no report of suicidal ideation, intention or plan. Attention and concentration were within normal.        Chandu Gurrola, PsAlmaz, LP, DBSM  Diplomate, Behavioral Sleep Medicine  [facility]      Copy:   Danny Conteh MD  [id number]  [place] [address]    Note: This dictation was created using voice recognition software. This document may contain an error not identified before finalizing the document. If the error changes the accuracy of the document, I would appreciate it being brought to my attention.

## 2022-08-12 NOTE — NURSING NOTE
"Chief Complaint   Patient presents with     Sleep Problem       Initial /76   Pulse 84   Ht 1.638 m (5' 4.5\")   Wt 83 kg (183 lb)   LMP  (LMP Unknown)   SpO2 96%   BMI 30.93 kg/m   Estimated body mass index is 30.93 kg/m  as calculated from the following:    Height as of this encounter: 1.638 m (5' 4.5\").    Weight as of this encounter: 83 kg (183 lb).    Medication Reconciliation: complete    Delisa Valle CMA on 8/12/2022 at 1:07 PM   "

## 2022-08-18 ENCOUNTER — OFFICE VISIT (OUTPATIENT)
Dept: PULMONOLOGY | Facility: CLINIC | Age: 72
End: 2022-08-18
Payer: MEDICARE

## 2022-08-18 ENCOUNTER — MYC MEDICAL ADVICE (OUTPATIENT)
Dept: FAMILY MEDICINE | Facility: CLINIC | Age: 72
End: 2022-08-18

## 2022-08-18 VITALS
WEIGHT: 186.5 LBS | SYSTOLIC BLOOD PRESSURE: 111 MMHG | DIASTOLIC BLOOD PRESSURE: 59 MMHG | HEART RATE: 61 BPM | BODY MASS INDEX: 31.52 KG/M2 | OXYGEN SATURATION: 99 %

## 2022-08-18 DIAGNOSIS — J45.20 MILD INTERMITTENT ASTHMA WITHOUT COMPLICATION: Primary | ICD-10-CM

## 2022-08-18 PROCEDURE — 99215 OFFICE O/P EST HI 40 MIN: CPT | Performed by: INTERNAL MEDICINE

## 2022-08-18 NOTE — PROGRESS NOTES
Pulmonary Clinic New Patient Consult  Reason for Consult: SOB/Asthma  History of Present Illness   Gem Gama is a 70 year old female with a history of CAD status post stents, ischemic cardiomyopathy with now improved EF to 55%, CKD stage III, and LEA previously on CPAP who presents to clinic for management of same.  I last saw her in clinic on 02/22  To briefly review, the patient was hospitalized 10/9/2019-10/13/2019 due to STEMI and underwent EDWIN x 2 to proximal LAD.  She did have some ischemic cardiomyopathy that showed interval improvement on subsequent echocardiogram.  More recent cardiac catheterization completed 12/11/2022 due to hospitalization for unstable angina showed no acute coronary lesions, widely patent proximal-mid LAD stent and CTA pulmonary was negative for PE.  When I saw her in clinic, I thought her symptoms may be related to uncontrolled asthma as she did have typical symptoms of episodic shortness of breath and occasional chest tightness.  Her symptoms also appear to be related to cold weather and increase humidity.  I escalated her asthma regimen to a higher dose of Breo.  Today, she is doing a lot better.  Less shortness of breath and she denies any chest tightness.  She still has periods where she gets occasionally short of breath with significant exertion.  She also noticed that her symptoms coincided with increased seasonal allergies.  She has since began to take an every day Claritin with some interval improvement.  She denies any overt chest pain.  No hospitalizations for pneumonia nor asthma flares  Never smoked. 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 Does not keep any pets and has a heated furnace with filters changed regularly. No family hx of chronic lung diseases or lung cancer.    Review of Systems:  10 of 14 systems reviewed and are negative unless otherwise stated in HPI.    Past Medical History:   Diagnosis Date     ADHD (attention  deficit hyperactivity disorder)      Anxiety      Arthritis     back, hands, knees, hips     Asthma      C. difficile diarrhea      Central sleep apnea      Coronary artery disease involving native coronary artery of native heart without angina pectoris      Depression      Fever and chills 09/24/2021     Gastro-oesophageal reflux disease      Hypertension      Ischemic cardiomyopathy      Narcolepsy      Pyelonephritis of right kidney 10/24/2021     Renal disease      S/P hip replacement 2004    Bilateral fall 2004 due to OA     Sleep apnea      Status post total knee replacement 12/29/2014     Urinary tract infection with hematuria, site unspecified 09/24/2021       Past Surgical History:   Procedure Laterality Date     ARTHROPLASTY KNEE  07/09/2014    Procedure: ARTHROPLASTY KNEE;  Surgeon: Levi Johnson MD;  Location:  OR     ARTHROPLASTY KNEE Left 11/24/2014    Procedure: ARTHROPLASTY KNEE;  Surgeon: Levi Johnson MD;  Location:  OR     CV CORONARY ANGIOGRAM N/A 10/09/2019    Procedure: Coronary Angiogram;  Surgeon: Chiquita Chatterjee MD;  Location:  HEART CARDIAC CATH LAB     CV HEART CATHETERIZATION WITH POSSIBLE INTERVENTION N/A 10/10/2019    Procedure: Heart Catheterization with Possible Intervention;  Surgeon: Chin Garcia MD;  Location:  HEART CARDIAC CATH LAB     CV INTRA AORTIC BALLOON N/A 10/09/2019    Procedure: Intra-Aortic Balloon Pump Insertion;  Surgeon: Chiquita Chatterjee MD;  Location:  HEART CARDIAC CATH LAB     CV INTRA AORTIC BALLOON REMOVAL N/A 10/10/2019    Procedure: Intra-Aortic Balloon Pump Removal;  Surgeon: Chin Garcia MD;  Location:  HEART CARDIAC CATH LAB     CV LEFT HEART CATH N/A 12/11/2020    Procedure: Heart Catheterization with Possible Intervention;  Surgeon: Pilo Otoole MD;  Location:  HEART CARDIAC CATH LAB     CV PCI STENT DRUG ELUTING N/A 10/09/2019    Procedure: PCI Stent Drug Eluting;  Surgeon: Chiquita Chatterjee MD;   Location:  HEART CARDIAC CATH LAB     GENITOURINARY SURGERY  01/01/2008    Prolift     GENITOURINARY SURGERY  1973    In 1973, she had 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     ORTHOPEDIC SURGERY      bilat total hip     RECTOCELE REPAIR       ZZC TOTAL ABD HYSTERECTOMY+BLAD REPR  01/01/1992    Vaginal approach with oophrectomy     ZZC TOTAL KNEE ARTHROPLASTY         Family History   Problem Relation Age of Onset     Hypertension Mother      Arthritis Mother      Cerebrovascular Disease Mother      Cerebrovascular Disease Father         Three Strokes     Diabetes Father         and brother     Kidney Disease Father      Diabetes Brother      Birth defects Brother      Thyroid Disease Sister         Hypothyroid     Sjogren's Daughter      Diabetes Daughter      Cerebrovascular Disease Daughter      Diabetes Son        Social History     Socioeconomic History     Marital status:      Spouse name: Not on file     Number of children: Not on file     Years of education: Not on file     Highest education level: Not on file   Occupational History     Not on file   Tobacco Use     Smoking status: Never Smoker     Smokeless tobacco: Never Used   Substance and Sexual Activity     Alcohol use: No     Drug use: No     Sexual activity: Not Currently     Partners: Male     Birth control/protection: None   Other Topics Concern     Parent/sibling w/ CABG, MI or angioplasty before 65F 55M? No      Service No     Blood Transfusions No     Caffeine Concern No     Occupational Exposure No     Hobby Hazards No     Sleep Concern Yes     Comment: Uses CPAP     Stress Concern No     Weight Concern Yes     Special Diet No     Back Care No     Exercise No     Bike Helmet No     Comment:       Seat Belt Yes      Self-Exams Yes   Social History Narrative     Not on file     Social Determinants of Health     Financial Resource Strain: Not on file   Food Insecurity: Not on file   Transportation Needs: Not on file   Physical Activity: Not on file   Stress: Not on file   Social Connections: Not on file   Intimate Partner Violence: Not on file   Housing Stability: Not on file         Allergies   Allergen Reactions     Latex Other (See Comments) and Shortness Of Breath     Runny nose  PN: LW Reaction: DIFFICULTY BREATHING       Flagyl [Metronidazole Hcl] Anaphylaxis and Rash     Food      PN: LW FI1: nka LW FI2:     Hydrochlorothiazide W/Triamterene      PN: LW Reaction: skin rash     No Clinical Screening - See Comments      PN: LW Other1: -Adhesive Tape     Seasonal Allergies      sneezing     Sulfa Drugs Anaphylaxis, Hives and Itching     PN: LW Reaction: HIVES, Swelling     Tape [Adhesive Tape] Hives     Metoprolol Itching and Rash     Metronidazole Hives and Rash     PN: LW Reaction: HIVES           Current Outpatient Medications:      albuterol (PROAIR HFA/PROVENTIL HFA/VENTOLIN HFA) 108 (90 Base) MCG/ACT inhaler, Inhale 1-2 puffs into the lungs every 4 hours as needed for shortness of breath / dyspnea or wheezing, Disp: 18 g, Rfl: 11     amoxicillin (AMOXIL) 500 MG capsule, Take as directed before dental work , Disp: , Rfl:      aspirin (ASA) 81 MG EC tablet, Take 1 tablet (81 mg) by mouth daily, Disp: 90 tablet, Rfl: 0     atorvastatin (LIPITOR) 40 MG tablet, Take 1 tablet (40 mg) by mouth daily, Disp: 90 tablet, Rfl: 3     BREO ELLIPTA 200-25 MCG/INH Inhaler, INHALE 1 PUFF INTO THE LUNGS DAILY, Disp: , Rfl:      carvedilol (COREG) 3.125 MG tablet, TAKE 1 TABLET(3.125 MG) BY MOUTH TWICE DAILY WITH MEALS, Disp: 180 tablet, Rfl: 3     clopidogrel (PLAVIX) 75 MG tablet, Take 1 tablet (75 mg) by mouth daily, Disp: 90 tablet, Rfl: 3     estradiol (ESTRACE) 0.1 MG/GM vaginal cream, Place 1 g vaginally twice a week, Disp: 42.5 g,  Rfl: 1     isosorbide mononitrate (IMDUR) 30 MG 24 hr tablet, Take 1 tablet (30 mg) by mouth daily, Disp: 90 tablet, Rfl: 3     Melatonin 10 MG TABS tablet, Take 10 mg by mouth nightly as needed for sleep, Disp: , Rfl:      mirabegron (MYRBETRIQ) 50 MG 24 hr tablet, Take 1 tablet (50 mg) by mouth daily, Disp: 90 tablet, Rfl: 1     nitroGLYcerin (NITROSTAT) 0.4 MG sublingual tablet, Place 1 tablet (0.4 mg) under the tongue every 5 minutes as needed for chest pain, Disp: 25 tablet, Rfl: 11     pantoprazole (PROTONIX) 40 MG EC tablet, Take 1 tablet (40 mg) by mouth daily, Disp: 90 tablet, Rfl: 3     Prenatal Multivit-Min-Fe-FA (PRENATAL/IRON) TABS, Take 1 tablet by mouth daily , Disp: , Rfl:      saccharomyces boulardii (FLORASTOR) 250 MG capsule, Take 1 capsule (250 mg) by mouth 2 times daily, Disp: 14 capsule, Rfl: 0     sertraline (ZOLOFT) 100 MG tablet, Take 1 tablet (100 mg) by mouth daily, Disp: 90 tablet, Rfl: 3     ursodiol (ACTIGALL) 300 MG capsule, TAKE 1 CAPSULE(300 MG) BY MOUTH TWICE DAILY, Disp: 60 capsule, Rfl: 11     vitamin D3 (CHOLECALCIFEROL) 2000 units (50 mcg) tablet, Take 1 tablet (2,000 Units) by mouth daily, Disp: 90 tablet, Rfl: 3     loratadine (CLARITIN) 10 MG tablet, Take 10 mg by mouth daily (Patient not taking: Reported on 8/18/2022), Disp: , Rfl:      valsartan (DIOVAN) 40 MG tablet, Take 1 tablet (40 mg) by mouth daily, Disp: 90 tablet, Rfl: 3    Current Facility-Administered Medications:      lidocaine (XYLOCAINE) 2 % external gel, , Urethral, Once, Shade Chu MD      Physical Exam:  /59 (BP Location: Left arm, Patient Position: Sitting, Cuff Size: Adult Large)   Pulse 61   Wt 84.6 kg (186 lb 8 oz)   LMP  (LMP Unknown)   SpO2 99%   BMI 31.52 kg/m    GENERAL: Well developed, well nourished, alert, and in no apparent distress.  HEENT: Normocephalic, atraumatic. PERRL, EOMI. Oral mucosa is moist. No perioral cyanosis.  NECK: supple, no masses, no thyromegaly.  RESP:  Normal  respiratory effort.  CTAB.  No rales, wheezes, rhonchi.  No cyanosis or clubbing.  CV: Normal S1, S2, regular rhythm, normal rate. No murmur.  No LE edema.   ABDOMEN:  Soft, non-tender, non-distended.   SKIN: warm and dry. No rash.  NEURO: AAOx3.  Normal gait.  Fluent speech.  PSYCH: mentation appears normal.       Results:  PFTs: Discussed and reviewed with patient - normal spirometry with positive bronchodilator response  Most Recent [facility] Pulmonary Function Testing    FVC-Pred   Date Value Ref Range Status   12/06/2021 2.74 L      FVC-Pre   Date Value Ref Range Status   12/06/2021 2.48 L      FVC-%Pred-Pre   Date Value Ref Range Status   12/06/2021 90 %      FEV1-Pre   Date Value Ref Range Status   12/06/2021 1.83 L      FEV1-%Pred-Pre   Date Value Ref Range Status   12/06/2021 86 %      FEV1FVC-Pred   Date Value Ref Range Status   12/06/2021 78 %      FEV1FVC-Pre   Date Value Ref Range Status   12/06/2021 74 %      No results found for: 20029  FEFMax-Pred   Date Value Ref Range Status   12/06/2021 5.42 L/sec      FEFMax-Pre   Date Value Ref Range Status   12/06/2021 4.51 L/sec      FEFMax-%Pred-Pre   Date Value Ref Range Status   12/06/2021 83 %      ExpTime-Pre   Date Value Ref Range Status   12/06/2021 7.46 sec      FIFMax-Pre   Date Value Ref Range Status   12/06/2021 2.63 L/sec      FEV1FEV6-Pred   Date Value Ref Range Status   12/06/2021 79 %      FEV1FEV6-Pre   Date Value Ref Range Status   12/06/2021 73 %      No results found for: 20055  Imaging (personally reviewed in clinic today): CT chest without contrast 02/21/2022  IMPRESSION: Osteopenia with extensive degenerative changes in the spine. No CT evidence of advanced interstitial lung disease. Atherosclerosis. Air trapping. Extensive cholelithiasis.  Echocardiogram  Interpretation Summary  Left ventricular visual ejection fraction is 50-55%.  Hypokinesis of the mid anteroseptal, inferoseptal and distal septal segments.  The right ventricle is normal  in size and function.  Mild aortic insufficiency.  The inferior vena cava is normal.   This study was compared with the study from 10/2019, the LV function and wall motion abnormalities have improved substantially    Assessment and Plan:   Mild intermittent Asthma   ACT score today of 17 suggestive of fair asthma control .  She has derived significant benefit with higher dose Breo and I believe that she may benefit from more robust bronchodilation by the addition of a LAMA inhaler.  Prescriptions for Spiriva were was given today.  Her PFTs today shows interval improvement in lung function with improved FEV1.  We discussed trigger avoidance and an asthma action plan was formulated in clinic.  LEA   continue to follow with her sleep physician and at the [facility]  Questions and concerns were answered to the patient's satisfaction.  she was provided with my contact information should new questions or concerns arise in the interim.  she should return to clinic in 6 months  Up to date on vaccinations  I spent a total of 40 minutes face to face with Gem Gama during today's office visit. Over 50% of this time was spent counseling the patient and/or coordinating care regarding their pulmonary disease.    Luis A Moody MD  Pulmonary, Critical Care and Sleep Medicine  [facility]  Pager: [phone]        The above note was dictated using voice recognition software and may include typographical errors. Please contact the author for any clarifications.

## 2022-08-18 NOTE — NURSING NOTE
Gem Gama's goals for this visit include: none  She requests these members of her care team be copied on today's visit information: YES    PCP: Danny Conteh    Referring Provider:  No referring provider defined for this encounter.    /59 (BP Location: Left arm, Patient Position: Sitting, Cuff Size: Adult Large)   Pulse 61   Wt 84.6 kg (186 lb 8 oz)   LMP  (LMP Unknown)   SpO2 99%   BMI 31.52 kg/m      Do you need any medication refills at today's visit? NONE      Trevor Jerez, EMT

## 2022-08-19 ENCOUNTER — OFFICE VISIT (OUTPATIENT)
Dept: URGENT CARE | Facility: URGENT CARE | Age: 72
End: 2022-08-19
Payer: MEDICARE

## 2022-08-19 VITALS
TEMPERATURE: 98.4 F | SYSTOLIC BLOOD PRESSURE: 110 MMHG | OXYGEN SATURATION: 95 % | HEART RATE: 80 BPM | BODY MASS INDEX: 30.72 KG/M2 | WEIGHT: 181.8 LBS | RESPIRATION RATE: 20 BRPM | DIASTOLIC BLOOD PRESSURE: 72 MMHG

## 2022-08-19 DIAGNOSIS — Z87.448 H/O HEMATURIA: ICD-10-CM

## 2022-08-19 DIAGNOSIS — N39.0 RECURRENT UTI: ICD-10-CM

## 2022-08-19 DIAGNOSIS — N18.32 STAGE 3B CHRONIC KIDNEY DISEASE (H): ICD-10-CM

## 2022-08-19 DIAGNOSIS — R31.0 GROSS HEMATURIA: Primary | ICD-10-CM

## 2022-08-19 DIAGNOSIS — R30.0 DYSURIA: ICD-10-CM

## 2022-08-19 DIAGNOSIS — N39.41 URGE INCONTINENCE OF URINE: ICD-10-CM

## 2022-08-19 PROCEDURE — 87186 SC STD MICRODIL/AGAR DIL: CPT | Performed by: PHYSICIAN ASSISTANT

## 2022-08-19 PROCEDURE — 87086 URINE CULTURE/COLONY COUNT: CPT | Performed by: PHYSICIAN ASSISTANT

## 2022-08-19 PROCEDURE — 99214 OFFICE O/P EST MOD 30 MIN: CPT | Performed by: PHYSICIAN ASSISTANT

## 2022-08-19 RX ORDER — CEFDINIR 300 MG/1
300 CAPSULE ORAL 2 TIMES DAILY
Qty: 20 CAPSULE | Refills: 0 | Status: SHIPPED | OUTPATIENT
Start: 2022-08-19 | End: 2022-09-21

## 2022-08-19 NOTE — PROGRESS NOTES
ASSESSMENT:    ICD-10-CM    1. Gross hematuria  R31.0 Urine Culture Aerobic Bacterial - lab collect     cefdinir (OMNICEF) 300 MG capsule     Urine Culture Aerobic Bacterial - lab collect   2. Dysuria  R30.0 Urine Culture Aerobic Bacterial - lab collect     cefdinir (OMNICEF) 300 MG capsule     Urine Culture Aerobic Bacterial - lab collect     CANCELED: UA Macro with Reflex to Micro and Culture - lab collect     CANCELED: UA Macro with Reflex to Micro and Culture - lab collect   3. Stage 3b chronic kidney disease (H)  N18.32    4. Recurrent UTI  N39.0    5. H/O hematuria  Z87.448    6. Urge incontinence of urine  N39.41                PLAN: Gross hematuria.  Only enough urine for urine culture which we will run.  Will place on cefdinir.  Last GFR in June was 46 so do not need to adjust the dose at this time.  If GFR under 30 then dose should be adjusted.  Patient does not wish to sit around here to see if she can give us another sample.  As per ordered above.  Drink plenty of fluids.  Prevention and treatment of UTI's discussed. Follow up with primary care physician if not improving.  Advised about symptoms which might herald more serious problems.          Sharona Lawrence PA-C        Subjective:  72yo female with with h/o of recurrent UTI's,  hematuria, urge incontinence presents for  dysuria for a couple of days.  No flank pain or abdominal pain or fever, nausea, vomiting.  Very difficult even to give a small amount of urine here tonight.  Lab states only enough for urine culture.  I did look at the sample and it is bright red.  Last saw urology 7/11/2022.  Had normal cystoscopy and CT urogram from 2/2022. If hematuria persists, Urology recommends  repeat cystoscopy  and CT imaging. Neg cytology for high grade urothelial carcinoma 7 /28/2022.    Allergies   Allergen Reactions     Latex Other (See Comments) and Shortness Of Breath     Runny nose  PN: LW Reaction: DIFFICULTY BREATHING       Flagyl [Metronidazole  Hcl] Anaphylaxis and Rash     Food      PN: LW FI1: nka LW FI2:     Hydrochlorothiazide W/Triamterene      PN: LW Reaction: skin rash     No Clinical Screening - See Comments      PN: LW Other1: -Adhesive Tape     Seasonal Allergies      sneezing     Sulfa Drugs Anaphylaxis, Hives and Itching     PN: LW Reaction: HIVES, Swelling     Tape [Adhesive Tape] Hives     Metoprolol Itching and Rash     Metronidazole Hives and Rash     PN: LW Reaction: HIVES         Past Medical History:   Diagnosis Date     ADHD (attention deficit hyperactivity disorder)      Anxiety      Arthritis     back, hands, knees, hips     Asthma      C. difficile diarrhea      Central sleep apnea      Coronary artery disease involving native coronary artery of native heart without angina pectoris      Depression      Fever and chills 09/24/2021     Gastro-oesophageal reflux disease      Hypertension      Ischemic cardiomyopathy      Narcolepsy      Pyelonephritis of right kidney 10/24/2021     Renal disease      S/P hip replacement 2004    Bilateral fall 2004 due to OA     Sleep apnea      Status post total knee replacement 12/29/2014     Urinary tract infection with hematuria, site unspecified 09/24/2021       albuterol (PROAIR HFA/PROVENTIL HFA/VENTOLIN HFA) 108 (90 Base) MCG/ACT inhaler, Inhale 1-2 puffs into the lungs every 4 hours as needed for shortness of breath / dyspnea or wheezing  amoxicillin (AMOXIL) 500 MG capsule, Take as directed before dental work   aspirin (ASA) 81 MG EC tablet, Take 1 tablet (81 mg) by mouth daily  atorvastatin (LIPITOR) 40 MG tablet, Take 1 tablet (40 mg) by mouth daily  BREO ELLIPTA 200-25 MCG/INH Inhaler, INHALE 1 PUFF INTO THE LUNGS DAILY  carvedilol (COREG) 3.125 MG tablet, TAKE 1 TABLET(3.125 MG) BY MOUTH TWICE DAILY WITH MEALS  clopidogrel (PLAVIX) 75 MG tablet, Take 1 tablet (75 mg) by mouth daily  estradiol (ESTRACE) 0.1 MG/GM vaginal cream, Place 1 g vaginally twice a week  isosorbide mononitrate (IMDUR)  30 MG 24 hr tablet, Take 1 tablet (30 mg) by mouth daily  loratadine (CLARITIN) 10 MG tablet, Take 10 mg by mouth daily (Patient not taking: Reported on 8/18/2022)  Melatonin 10 MG TABS tablet, Take 10 mg by mouth nightly as needed for sleep  mirabegron (MYRBETRIQ) 50 MG 24 hr tablet, Take 1 tablet (50 mg) by mouth daily  nitroGLYcerin (NITROSTAT) 0.4 MG sublingual tablet, Place 1 tablet (0.4 mg) under the tongue every 5 minutes as needed for chest pain  pantoprazole (PROTONIX) 40 MG EC tablet, Take 1 tablet (40 mg) by mouth daily  Prenatal Multivit-Min-Fe-FA (PRENATAL/IRON) TABS, Take 1 tablet by mouth daily   saccharomyces boulardii (FLORASTOR) 250 MG capsule, Take 1 capsule (250 mg) by mouth 2 times daily  sertraline (ZOLOFT) 100 MG tablet, Take 1 tablet (100 mg) by mouth daily  tiotropium (SPIRIVA RESPIMAT) 2.5 MCG/ACT inhaler, Inhale 2 puffs into the lungs daily for 30 days  ursodiol (ACTIGALL) 300 MG capsule, TAKE 1 CAPSULE(300 MG) BY MOUTH TWICE DAILY  valsartan (DIOVAN) 40 MG tablet, Take 1 tablet (40 mg) by mouth daily  vitamin D3 (CHOLECALCIFEROL) 2000 units (50 mcg) tablet, Take 1 tablet (2,000 Units) by mouth daily    lidocaine (XYLOCAINE) 2 % external gel        Social History     Tobacco Use     Smoking status: Never Smoker     Smokeless tobacco: Never Used   Vaping Use     Vaping Use: Never used   Substance Use Topics     Alcohol use: No     Drug use: No       ROS:  General: negative for fever  ABD: Denies abd pain  : as above    OBJECTIVE:  LMP  (LMP Unknown)    General:   awake, alert, and cooperative.  NAD.   Head: Normocephalic, atraumatic.  Eyes: Conjunctiva clear, non icteric.   ABD: soft, no tenderness to palpation , no rigidity, guarding or rebound . No CVAT  Neuro: Alert and oriented - normal speech.

## 2022-08-22 ENCOUNTER — MYC MEDICAL ADVICE (OUTPATIENT)
Dept: FAMILY MEDICINE | Facility: CLINIC | Age: 72
End: 2022-08-22

## 2022-08-22 LAB — BACTERIA UR CULT: ABNORMAL

## 2022-08-23 NOTE — TELEPHONE ENCOUNTER
Provider sent pt a [employer] message. Informing that current antibiotic is appropriate for infection.      Reba Templeton RN  [facility] [place]

## 2022-08-26 ENCOUNTER — PRE VISIT (OUTPATIENT)
Dept: UROLOGY | Facility: CLINIC | Age: 72
End: 2022-08-26

## 2022-08-26 ENCOUNTER — OFFICE VISIT (OUTPATIENT)
Dept: FAMILY MEDICINE | Facility: CLINIC | Age: 72
End: 2022-08-26
Payer: MEDICARE

## 2022-08-26 VITALS
HEART RATE: 73 BPM | WEIGHT: 181.2 LBS | HEIGHT: 63 IN | BODY MASS INDEX: 32.11 KG/M2 | DIASTOLIC BLOOD PRESSURE: 63 MMHG | TEMPERATURE: 98.5 F | RESPIRATION RATE: 12 BRPM | OXYGEN SATURATION: 97 % | SYSTOLIC BLOOD PRESSURE: 120 MMHG

## 2022-08-26 DIAGNOSIS — Z00.00 ENCOUNTER FOR MEDICARE ANNUAL WELLNESS EXAM: Primary | ICD-10-CM

## 2022-08-26 DIAGNOSIS — F43.10 PTSD (POST-TRAUMATIC STRESS DISORDER): ICD-10-CM

## 2022-08-26 DIAGNOSIS — K80.20 CHOLELITHIASIS WITHOUT CHOLECYSTITIS: ICD-10-CM

## 2022-08-26 DIAGNOSIS — I25.728 CORONARY ARTERY DISEASE OF AUTOLOGOUS BYPASS GRAFT WITH STABLE ANGINA PECTORIS (H): ICD-10-CM

## 2022-08-26 DIAGNOSIS — E55.9 VITAMIN D DEFICIENCY: ICD-10-CM

## 2022-08-26 DIAGNOSIS — I50.22 CHRONIC SYSTOLIC HEART FAILURE (H): ICD-10-CM

## 2022-08-26 DIAGNOSIS — E78.5 HYPERLIPIDEMIA LDL GOAL <70: ICD-10-CM

## 2022-08-26 DIAGNOSIS — Z23 NEED FOR PNEUMOCOCCAL VACCINATION: ICD-10-CM

## 2022-08-26 PROBLEM — K44.9 HIATAL HERNIA: Status: ACTIVE | Noted: 2022-08-26

## 2022-08-26 LAB
ALBUMIN SERPL-MCNC: 4 G/DL (ref 3.4–5)
ALP SERPL-CCNC: 119 U/L (ref 40–150)
ALT SERPL W P-5'-P-CCNC: 29 U/L (ref 0–50)
ANION GAP SERPL CALCULATED.3IONS-SCNC: 3 MMOL/L (ref 3–14)
AST SERPL W P-5'-P-CCNC: 28 U/L (ref 0–45)
BASOPHILS # BLD AUTO: 0 10E3/UL (ref 0–0.2)
BASOPHILS NFR BLD AUTO: 1 %
BILIRUB SERPL-MCNC: 0.6 MG/DL (ref 0.2–1.3)
BUN SERPL-MCNC: 17 MG/DL (ref 7–30)
CALCIUM SERPL-MCNC: 9.1 MG/DL (ref 8.5–10.1)
CHLORIDE BLD-SCNC: 110 MMOL/L (ref 94–109)
CHOLEST SERPL-MCNC: 141 MG/DL
CO2 SERPL-SCNC: 30 MMOL/L (ref 20–32)
CREAT SERPL-MCNC: 0.92 MG/DL (ref 0.52–1.04)
EOSINOPHIL # BLD AUTO: 0.2 10E3/UL (ref 0–0.7)
EOSINOPHIL NFR BLD AUTO: 4 %
ERYTHROCYTE [DISTWIDTH] IN BLOOD BY AUTOMATED COUNT: 12.4 % (ref 10–15)
FASTING STATUS PATIENT QL REPORTED: YES
GFR SERPL CREATININE-BSD FRML MDRD: 65 ML/MIN/1.73M2
GLUCOSE BLD-MCNC: 100 MG/DL (ref 70–99)
HCT VFR BLD AUTO: 42.9 % (ref 35–47)
HDLC SERPL-MCNC: 60 MG/DL
HGB BLD-MCNC: 15 G/DL (ref 11.7–15.7)
IMM GRANULOCYTES # BLD: 0 10E3/UL
IMM GRANULOCYTES NFR BLD: 0 %
LDLC SERPL CALC-MCNC: 70 MG/DL
LYMPHOCYTES # BLD AUTO: 1.6 10E3/UL (ref 0.8–5.3)
LYMPHOCYTES NFR BLD AUTO: 28 %
MAGNESIUM SERPL-MCNC: 2.2 MG/DL (ref 1.6–2.3)
MCH RBC QN AUTO: 30.5 PG (ref 26.5–33)
MCHC RBC AUTO-ENTMCNC: 35 G/DL (ref 31.5–36.5)
MCV RBC AUTO: 87 FL (ref 78–100)
MONOCYTES # BLD AUTO: 0.4 10E3/UL (ref 0–1.3)
MONOCYTES NFR BLD AUTO: 8 %
NEUTROPHILS # BLD AUTO: 3.4 10E3/UL (ref 1.6–8.3)
NEUTROPHILS NFR BLD AUTO: 60 %
NONHDLC SERPL-MCNC: 81 MG/DL
PLATELET # BLD AUTO: 174 10E3/UL (ref 150–450)
POTASSIUM BLD-SCNC: 4.8 MMOL/L (ref 3.4–5.3)
PROT SERPL-MCNC: 7.7 G/DL (ref 6.8–8.8)
RBC # BLD AUTO: 4.91 10E6/UL (ref 3.8–5.2)
SODIUM SERPL-SCNC: 143 MMOL/L (ref 133–144)
TRIGL SERPL-MCNC: 55 MG/DL
WBC # BLD AUTO: 5.6 10E3/UL (ref 4–11)

## 2022-08-26 PROCEDURE — G0009 ADMIN PNEUMOCOCCAL VACCINE: HCPCS | Performed by: INTERNAL MEDICINE

## 2022-08-26 PROCEDURE — 36415 COLL VENOUS BLD VENIPUNCTURE: CPT | Performed by: INTERNAL MEDICINE

## 2022-08-26 PROCEDURE — 80053 COMPREHEN METABOLIC PANEL: CPT | Performed by: INTERNAL MEDICINE

## 2022-08-26 PROCEDURE — G0439 PPPS, SUBSEQ VISIT: HCPCS | Performed by: INTERNAL MEDICINE

## 2022-08-26 PROCEDURE — 90677 PCV20 VACCINE IM: CPT | Performed by: INTERNAL MEDICINE

## 2022-08-26 PROCEDURE — 82306 VITAMIN D 25 HYDROXY: CPT | Performed by: INTERNAL MEDICINE

## 2022-08-26 PROCEDURE — 80061 LIPID PANEL: CPT | Performed by: INTERNAL MEDICINE

## 2022-08-26 PROCEDURE — 85025 COMPLETE CBC W/AUTO DIFF WBC: CPT | Performed by: INTERNAL MEDICINE

## 2022-08-26 PROCEDURE — 83735 ASSAY OF MAGNESIUM: CPT | Performed by: INTERNAL MEDICINE

## 2022-08-26 RX ORDER — URSODIOL 300 MG/1
CAPSULE ORAL
Qty: 180 CAPSULE | Refills: 3 | Status: SHIPPED | OUTPATIENT
Start: 2022-08-26 | End: 2023-08-29

## 2022-08-26 RX ORDER — VALSARTAN 40 MG/1
40 TABLET ORAL DAILY
Qty: 90 TABLET | Refills: 3 | Status: SHIPPED | OUTPATIENT
Start: 2022-08-26 | End: 2023-09-08

## 2022-08-26 RX ORDER — CARVEDILOL 3.12 MG/1
TABLET ORAL
Qty: 180 TABLET | Refills: 3 | Status: SHIPPED | OUTPATIENT
Start: 2022-08-26 | End: 2023-08-30

## 2022-08-26 RX ORDER — ATORVASTATIN CALCIUM 40 MG/1
40 TABLET, FILM COATED ORAL DAILY
Qty: 90 TABLET | Refills: 3 | Status: SHIPPED | OUTPATIENT
Start: 2022-08-26 | End: 2023-08-30

## 2022-08-26 RX ORDER — SERTRALINE HYDROCHLORIDE 100 MG/1
100 TABLET, FILM COATED ORAL DAILY
Qty: 90 TABLET | Refills: 3 | Status: SHIPPED | OUTPATIENT
Start: 2022-08-26 | End: 2023-08-30

## 2022-08-26 RX ORDER — CLOPIDOGREL BISULFATE 75 MG/1
75 TABLET ORAL DAILY
Qty: 90 TABLET | Refills: 3 | Status: SHIPPED | OUTPATIENT
Start: 2022-08-26 | End: 2023-05-04

## 2022-08-26 ASSESSMENT — ENCOUNTER SYMPTOMS
FEVER: 1
MYALGIAS: 0
DYSURIA: 1
COUGH: 1
PALPITATIONS: 0
SHORTNESS OF BREATH: 1
ABDOMINAL PAIN: 0
HEMATURIA: 0
SORE THROAT: 0
HEMATOCHEZIA: 0
NERVOUS/ANXIOUS: 0
FREQUENCY: 1
CHILLS: 1
HEADACHES: 1
WEAKNESS: 0
ARTHRALGIAS: 0
DIZZINESS: 1
JOINT SWELLING: 0
PARESTHESIAS: 0
NAUSEA: 0
HEARTBURN: 0
EYE PAIN: 1

## 2022-08-26 ASSESSMENT — ASTHMA QUESTIONNAIRES
QUESTION_3 LAST FOUR WEEKS HOW OFTEN DID YOUR ASTHMA SYMPTOMS (WHEEZING, COUGHING, SHORTNESS OF BREATH, CHEST TIGHTNESS OR PAIN) WAKE YOU UP AT NIGHT OR EARLIER THAN USUAL IN THE MORNING: NOT AT ALL
ACT_TOTALSCORE: 20
QUESTION_4 LAST FOUR WEEKS HOW OFTEN HAVE YOU USED YOUR RESCUE INHALER OR NEBULIZER MEDICATION (SUCH AS ALBUTEROL): TWO OR THREE TIMES PER WEEK
QUESTION_5 LAST FOUR WEEKS HOW WOULD YOU RATE YOUR ASTHMA CONTROL: WELL CONTROLLED
ACT_TOTALSCORE: 20
QUESTION_1 LAST FOUR WEEKS HOW MUCH OF THE TIME DID YOUR ASTHMA KEEP YOU FROM GETTING AS MUCH DONE AT WORK, SCHOOL OR AT HOME: A LITTLE OF THE TIME
QUESTION_2 LAST FOUR WEEKS HOW OFTEN HAVE YOU HAD SHORTNESS OF BREATH: ONCE OR TWICE A WEEK

## 2022-08-26 ASSESSMENT — PATIENT HEALTH QUESTIONNAIRE - PHQ9
SUM OF ALL RESPONSES TO PHQ QUESTIONS 1-9: 7
SUM OF ALL RESPONSES TO PHQ QUESTIONS 1-9: 7
10. IF YOU CHECKED OFF ANY PROBLEMS, HOW DIFFICULT HAVE THESE PROBLEMS MADE IT FOR YOU TO DO YOUR WORK, TAKE CARE OF THINGS AT HOME, OR GET ALONG WITH OTHER PEOPLE: SOMEWHAT DIFFICULT

## 2022-08-26 ASSESSMENT — PAIN SCALES - GENERAL: PAINLEVEL: NO PAIN (0)

## 2022-08-26 ASSESSMENT — ACTIVITIES OF DAILY LIVING (ADL): CURRENT_FUNCTION: NO ASSISTANCE NEEDED

## 2022-08-26 NOTE — PROGRESS NOTES
"SUBJECTIVE:   Gem Gama is a 71 year old female who presents for Preventive Visit.    Patient has been advised of split billing requirements and indicates understanding: Yes  Are you in the first 12 months of your Medicare coverage?  No    Healthy Habits:     In general, how would you rate your overall health?  Fair    Frequency of exercise:  2-3 days/week    Duration of exercise:  15-30 minutes    Do you usually eat at least 4 servings of fruit and vegetables a day, include whole grains    & fiber and avoid regularly eating high fat or \"junk\" foods?  Yes    Taking medications regularly:  Yes    Medication side effects:  Lightheadedness    Ability to successfully perform activities of daily living:  No assistance needed    Home Safety:  No safety concerns identified    Hearing Impairment:  Difficulty following a conversation in a noisy restaurant or crowded room, find that men's voices are easier to understand than woman's and difficulty understanding soft or whispered speech    In the past 6 months, have you been bothered by leaking of urine? Yes    In general, how would you rate your overall mental or emotional health?  Fair      PHQ-2 Total Score: 0    Additional concerns today:  No    Do you feel safe in your environment? Yes    Have you ever done Advance Care Planning? (For example, a Health Directive, POLST, or a discussion with a medical provider or your loved ones about your wishes): No, advance care planning information given to patient to review.  Patient declined advance care planning discussion at this time.       Fall risk  Fallen 2 or more times in the past year?: No  Any fall with injury in the past year?: Yes  Cognitive Screening   1) Repeat 3 items (Leader, Season, Table)    2) Clock draw: NORMAL  3) 3 item recall: Recalls 3 objects  Results: 3 items recalled: COGNITIVE IMPAIRMENT LESS LIKELY    Mini-CogTM Copyright [employer] [place]. Licensed by the author for use in [facility]; " reprinted with permission (soob@.[email or web address]). All rights reserved.      Do you have sleep apnea, excessive snoring or daytime drowsiness?: no    Reviewed and updated as needed this visit by clinical staff                    Reviewed and updated as needed this visit by Provider                   Social History     Tobacco Use     Smoking status: Never Smoker     Smokeless tobacco: Never Used   Substance Use Topics     Alcohol use: No     If you drink alcohol do you typically have >3 drinks per day or >7 drinks per week? Not applicable    No flowsheet data found.    Current providers sharing in care for this patient include:   Patient Care Team:  Danny Conteh MD as PCP - General (Internal Medicine)  Danny Conteh MD as Assigned PCP  Beckie, SANYA Freire as Assigned Surgical Provider  Shade Chu MD as Assigned OBGYN Provider  Luis A Moody MD as Assigned Pulmonology Provider  Mel Dennison MD as Assigned Heart and Vascular Provider  Sabino Lewis EP as Cardiac Rehabilitation Therapist  Jomar, Kashif MCNALLY MD as Assigned Sleep Provider    The following health maintenance items are reviewed in Epic and correct as of today:  Health Maintenance Due   Topic Date Due     MEDICARE ANNUAL WELLNESS VISIT  12/17/2021     ASTHMA CONTROL TEST  08/08/2022     INFLUENZA VACCINE (1) 09/01/2022     Labs reviewed in EPIC  BP Readings from Last 3 Encounters:   08/26/22 120/63   08/19/22 110/72   08/18/22 111/59    Wt Readings from Last 3 Encounters:   08/26/22 82.2 kg (181 lb 3.2 oz)   08/19/22 82.5 kg (181 lb 12.8 oz)   08/18/22 84.6 kg (186 lb 8 oz)                  Patient Active Problem List   Diagnosis     TMJ (temporomandibular joint syndrome)     Congenital ureteric stenosis     Lacrimal duct stenosis     Chronic rhinitis     Intermittent asthma     Advanced directives, counseling/discussion     Major depressive disorder, recurrent episode, moderate (H)     Pituitary microadenoma (H)     LEA (obstructive  sleep apnea)     Osteoarthritis of right knee     Central sleep apnea     Esophageal reflux (GERD)     Primary narcolepsy without cataplexy     Vitamin D deficiency     Stage 3a chronic kidney disease (H)     Incontinence of feces with fecal urgency     Overactive bladder     Fatigue, unspecified type     History of ischemic cardiomyopathy     Hyperlipidemia LDL goal <70     Cholelithiasis without cholecystitis     Coronary artery disease with angina pectoris, unspecified vessel or lesion type, unspecified whether native or transplanted heart (H)     Environmental allergies     Class 1 obesity due to excess calories with serious comorbidity and body mass index (BMI) of 30.0 to 30.9 in adult     Chronic insomnia     Past Surgical History:   Procedure Laterality Date     ARTHROPLASTY KNEE  07/09/2014    Procedure: ARTHROPLASTY KNEE;  Surgeon: Levi Johnson MD;  Location:  OR     ARTHROPLASTY KNEE Left 11/24/2014    Procedure: ARTHROPLASTY KNEE;  Surgeon: Levi Johnson MD;  Location:  OR     CV CORONARY ANGIOGRAM N/A 10/09/2019    Procedure: Coronary Angiogram;  Surgeon: Chiquita Chatterjee MD;  Location:  HEART CARDIAC CATH LAB     CV HEART CATHETERIZATION WITH POSSIBLE INTERVENTION N/A 10/10/2019    Procedure: Heart Catheterization with Possible Intervention;  Surgeon: Chin Garcia MD;  Location:  HEART CARDIAC CATH LAB     CV INTRA AORTIC BALLOON N/A 10/09/2019    Procedure: Intra-Aortic Balloon Pump Insertion;  Surgeon: Chiquita Chatterjee MD;  Location:  HEART CARDIAC CATH LAB     CV INTRA AORTIC BALLOON REMOVAL N/A 10/10/2019    Procedure: Intra-Aortic Balloon Pump Removal;  Surgeon: Chin Garcia MD;  Location:  HEART CARDIAC CATH LAB     CV LEFT HEART CATH N/A 12/11/2020    Procedure: Heart Catheterization with Possible Intervention;  Surgeon: Pilo Otoole MD;  Location:  HEART CARDIAC CATH LAB     CV PCI STENT DRUG ELUTING N/A 10/09/2019    Procedure: PCI Stent  Drug Eluting;  Surgeon: Chiquita Chatterjee MD;  Location:  HEART CARDIAC CATH LAB     GENITOURINARY SURGERY  01/01/2008    Prolift     GENITOURINARY SURGERY  1973    In 1973, she had 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     ORTHOPEDIC SURGERY      bilat total hip     RECTOCELE REPAIR       ZZC TOTAL ABD HYSTERECTOMY+BLAD REPR  01/01/1992    Vaginal approach with oophrectomy     ZZC TOTAL KNEE ARTHROPLASTY         Social History     Tobacco Use     Smoking status: Never Smoker     Smokeless tobacco: Never Used   Substance Use Topics     Alcohol use: No     Family History   Problem Relation Age of Onset     Hypertension Mother      Arthritis Mother      Cerebrovascular Disease Mother      Cerebrovascular Disease Father         Three Strokes     Diabetes Father         and brother     Kidney Disease Father      Diabetes Brother      Birth defects Brother      Thyroid Disease Sister         Hypothyroid     Sjogren's Daughter      Diabetes Daughter      Cerebrovascular Disease Daughter      Diabetes Son          Current Outpatient Medications   Medication Sig Dispense Refill     albuterol (PROAIR HFA/PROVENTIL HFA/VENTOLIN HFA) 108 (90 Base) MCG/ACT inhaler Inhale 1-2 puffs into the lungs every 4 hours as needed for shortness of breath / dyspnea or wheezing 18 g 11     amoxicillin (AMOXIL) 500 MG capsule Take as directed before dental work        aspirin (ASA) 81 MG EC tablet Take 1 tablet (81 mg) by mouth daily 90 tablet 0     atorvastatin (LIPITOR) 40 MG tablet Take 1 tablet (40 mg) by mouth daily 90 tablet 3     BREO ELLIPTA 200-25 MCG/INH Inhaler INHALE 1 PUFF INTO THE LUNGS DAILY       carvedilol (COREG) 3.125 MG tablet TAKE 1 TABLET(3.125 MG) BY MOUTH TWICE DAILY WITH MEALS 180 tablet 3     cefdinir  (OMNICEF) 300 MG capsule Take 1 capsule (300 mg) by mouth 2 times daily 20 capsule 0     clopidogrel (PLAVIX) 75 MG tablet Take 1 tablet (75 mg) by mouth daily 90 tablet 3     estradiol (ESTRACE) 0.1 MG/GM vaginal cream Place 1 g vaginally twice a week 42.5 g 1     isosorbide mononitrate (IMDUR) 30 MG 24 hr tablet Take 1 tablet (30 mg) by mouth daily 90 tablet 3     loratadine (CLARITIN) 10 MG tablet Take 10 mg by mouth daily       Melatonin 10 MG TABS tablet Take 10 mg by mouth nightly as needed for sleep       mirabegron (MYRBETRIQ) 50 MG 24 hr tablet Take 1 tablet (50 mg) by mouth daily 90 tablet 1     nitroGLYcerin (NITROSTAT) 0.4 MG sublingual tablet Place 1 tablet (0.4 mg) under the tongue every 5 minutes as needed for chest pain 25 tablet 11     pantoprazole (PROTONIX) 40 MG EC tablet Take 1 tablet (40 mg) by mouth daily 90 tablet 3     Prenatal Multivit-Min-Fe-FA (PRENATAL/IRON) TABS Take 1 tablet by mouth daily        saccharomyces boulardii (FLORASTOR) 250 MG capsule Take 1 capsule (250 mg) by mouth 2 times daily 14 capsule 0     sertraline (ZOLOFT) 100 MG tablet Take 1 tablet (100 mg) by mouth daily 90 tablet 3     tiotropium (SPIRIVA RESPIMAT) 2.5 MCG/ACT inhaler Inhale 2 puffs into the lungs daily for 30 days 4 g 11     ursodiol (ACTIGALL) 300 MG capsule TAKE 1 CAPSULE(300 MG) BY MOUTH TWICE DAILY 60 capsule 11     valsartan (DIOVAN) 40 MG tablet Take 1 tablet (40 mg) by mouth daily 90 tablet 3     vitamin D3 (CHOLECALCIFEROL) 2000 units (50 mcg) tablet Take 1 tablet (2,000 Units) by mouth daily 90 tablet 3     Allergies   Allergen Reactions     Latex Other (See Comments) and Shortness Of Breath     Runny nose  PN: LW Reaction: DIFFICULTY BREATHING       Flagyl [Metronidazole Hcl] Anaphylaxis and Rash     Food      PN: LW FI1: nka LW FI2:     Hydrochlorothiazide W/Triamterene      PN: LW Reaction: skin rash     No Clinical Screening - See Comments      PN: LW Other1: -Adhesive Tape     Seasonal  Allergies      sneezing     Sulfa Drugs Anaphylaxis, Hives and Itching     PN: LW Reaction: HIVES, Swelling     Tape [Adhesive Tape] Hives     Metoprolol Itching and Rash     Metronidazole Hives and Rash     PN: LW Reaction: HIVES       Recent Labs   Lab Test 06/21/22  0842 02/08/22  1407 09/26/21  0646 09/25/21  0648 09/23/21  1832 09/23/21  1832 12/17/20  1403 12/10/20  0557 12/09/20  1031 10/15/20  1256 02/09/17  1230 02/02/16  1634   A1C  --   --   --   --   --   --  5.2  --   --   --   --   --    LDL  --  92  --   --   --   --  37  --   --  82   < > 126*   HDL  --  54  --   --   --   --  51  --   --  59   < > 54   TRIG  --  234*  --   --   --   --  168*  --   --  105   < > 105   ALT  --   --   --  21  --  25 26  --    < > 38   < >  --    CR 1.24* 1.13*   < > 0.94   < > 1.06* 1.00 1.04   < > 1.14*   < > 1.06*   GFRESTIMATED 46* 51*   < > 61   < > 53* 56* 54*   < > 48*   < > 52*   GFRESTBLACK  --   --   --   --   --   --  65 62   < > 56*   < > 63   POTASSIUM 3.9 4.6   < > 3.6   < > 3.9 3.9 4.3   < > 4.8   < > 3.8   TSH  --   --   --   --   --   --   --   --   --   --   --  1.62    < > = values in this interval not displayed.          Review of Systems   Constitutional: Positive for chills and fever.   HENT: Positive for ear pain and hearing loss. Negative for congestion and sore throat.    Eyes: Positive for pain. Negative for visual disturbance.   Respiratory: Positive for cough and shortness of breath.    Cardiovascular: Negative for chest pain, palpitations and peripheral edema.   Gastrointestinal: Negative for abdominal pain, heartburn, hematochezia and nausea.   Genitourinary: Positive for dysuria, frequency and urgency. Negative for genital sores and hematuria.   Musculoskeletal: Negative for arthralgias, joint swelling and myalgias.   Skin: Negative for rash.   Neurological: Positive for dizziness and headaches. Negative for weakness and paresthesias.   Psychiatric/Behavioral: Negative for mood changes.  "The patient is not nervous/anxious.      CONSTITUTIONAL: NEGATIVE for fever, chills, change in weight  INTEGUMENTARY/SKIN: NEGATIVE for worrisome rashes, moles or lesions  EYES: NEGATIVE for vision changes or irritation  ENT/MOUTH: NEGATIVE for ear, mouth and throat problems  RESP: NEGATIVE for significant cough or SOB  BREAST: NEGATIVE for masses, tenderness or discharge  CV: NEGATIVE for chest pain, palpitations or peripheral edema  GI: NEGATIVE for nausea, abdominal pain, heartburn, or change in bowel habits  : NEGATIVE for frequency, dysuria, or hematuria  MUSCULOSKELETAL: NEGATIVE for significant arthralgias or myalgia  NEURO: NEGATIVE for weakness, dizziness or paresthesias  ENDOCRINE: NEGATIVE for temperature intolerance, skin/hair changes  HEME: NEGATIVE for bleeding problems  PSYCHIATRIC: NEGATIVE for changes in mood or affect    OBJECTIVE:   /63 (BP Location: Left arm, Patient Position: Sitting, Cuff Size: Adult Large)   Pulse 73   Temp 98.5  F (36.9  C) (Oral)   Resp 12   Ht 1.61 m (5' 3.39\")   Wt 82.2 kg (181 lb 3.2 oz)   LMP  (LMP Unknown)   SpO2 97%   BMI 31.71 kg/m     Estimated body mass index is 30.72 kg/m  as calculated from the following:    Height as of 8/12/22: 1.638 m (5' 4.5\").    Weight as of 8/19/22: 82.5 kg (181 lb 12.8 oz).  Physical Exam  GENERAL: healthy, alert and no distress  EYES: Eyes grossly normal to inspection and conjunctivae and sclerae normal  NECK: no adenopathy and thyroid normal to palpation  RESP: lungs clear to auscultation - no rales, rhonchi or wheezes  CV: regular rate and rhythm, normal S1 S2, no S3 or S4, no murmur, click or rub, no peripheral edema and peripheral pulses strong  ABDOMEN: soft, nontender, no hepatosplenomegaly, no masses and bowel sounds normal  MS: no gross musculoskeletal defects noted, no edema  NEURO: Normal strength and tone, mentation intact and speech normal  PSYCH: mentation appears normal, affect normal/bright    Diagnostic " Test Results:    Echocardiogram last July 25, 2022.  Interpretation Summary     Left ventricular systolic function is normal.  The visual ejection fraction is 55-60%.  Grade I or early diastolic dysfunction.  No regional wall motion abnormalities noted.  The right ventricle is normal in size and function.  There is mild to moderate mitral valve regurgitation.  The left atrium is mildly dilated.  There is trace tricuspid valve regurgitation.  No hemodynamically significant valvular aortic stenosis.  The inferior vena cava is normal.     Compared to the previous study dated 10/28/2021, wall motion abnormalities  have resolved.    [id number] score is 4: (age, hx of MI, gender and hypertension).    ASSESSMENT / PLAN:     Encounter for Medicare annual wellness exam  - Lipid panel reflex to direct LDL Fasting  - CBC with platelets and differential  - Comprehensive metabolic panel  - Magnesium  - UA without Microscopic - lab collect    Cholelithiasis without cholecystitis  - ursodiol (ACTIGALL) 300 MG capsule; TAKE 1 CAPSULE(300 MG) BY MOUTH TWICE DAILY    Need for pneumococcal vaccination  - PNEUMOCOCCAL 20 VALENT CONJUGATE (PREVNAR 20)    Vitamin D deficiency  - Vitamin D Deficiency    Hyperlipidemia LDL goal <70  - atorvastatin (LIPITOR) 40 MG tablet; Take 1 tablet (40 mg) by mouth daily    Coronary artery disease of autologous bypass graft with stable angina pectoris (H)  - atorvastatin (LIPITOR) 40 MG tablet; Take 1 tablet (40 mg) by mouth daily  - clopidogrel (PLAVIX) 75 MG tablet; Take 1 tablet (75 mg) by mouth daily  - valsartan (DIOVAN) 40 MG tablet; Take 1 tablet (40 mg) by mouth daily    Chronic systolic heart failure (H)  - carvedilol (COREG) 3.125 MG tablet; TAKE 1 TABLET(3.125 MG) BY MOUTH TWICE DAILY WITH MEALS  - valsartan (DIOVAN) 40 MG tablet; Take 1 tablet (40 mg) by mouth daily    PTSD (post-traumatic stress disorder)  - sertraline (ZOLOFT) 100 MG tablet; Take 1 tablet (100 mg) by mouth daily  - Adult  "Mental Health  Referral; Future      Patient has been advised of split billing requirements and indicates understanding: Yes    COUNSELING:  Special attention given to:       Regular exercise       Vision screening       Immunizations    Vaccinated for: Pneumococcal             The ASCVD Risk score (Braddockargentina INTERIANO Jr., et al., 2013) failed to calculate for the following reasons:    The patient has a prior MI or stroke diagnosis    Estimated body mass index is 30.72 kg/m  as calculated from the following:    Height as of 8/12/22: 1.638 m (5' 4.5\").    Weight as of 8/19/22: 82.5 kg (181 lb 12.8 oz).    Weight management plan: diet and exercise.    She reports that she has never smoked. She has never used smokeless tobacco.      Appropriate preventive services were discussed with this patient, including applicable screening as appropriate for cardiovascular disease, diabetes, osteopenia/osteoporosis, and glaucoma.  As appropriate for age/gender, discussed screening for colorectal cancer, prostate cancer, breast cancer, and cervical cancer. Checklist reviewing preventive services available has been given to the patient.    Reviewed patients plan of care and provided an AVS. The Basic Care Plan (routine screening as documented in Health Maintenance) for Gem meets the Care Plan requirement. This Care Plan has been established and reviewed with the Patient.    Counseling Resources:  ATP IV Guidelines  Pooled Cohorts Equation Calculator  Breast Cancer Risk Calculator  Breast Cancer: Medication to Reduce Risk  FRAX Risk Assessment  ICSI Preventive Guidelines  Dietary Guidelines for Americans, 2010  USDA's MyPlate  ASA Prophylaxis  Lung CA Screening    Danny Conteh MD  [facility]    Identified Health Risks:  Answers for HPI/ROS submitted by the patient on 8/26/2022  If you checked off any problems, how difficult have these problems made it for you to do your work, take care of things " at home, or get along with other people?: Somewhat difficult  PHQ9 TOTAL SCORE: 7

## 2022-08-26 NOTE — NURSING NOTE
Prior to immunization administration, verified patients identity using patient s name and date of birth. Please see Immunization Activity for additional information.     Screening Questionnaire for Adult Immunization    Are you sick today?   No   Do you have allergies to medications, food, a vaccine component or latex?   No   Have you ever had a serious reaction after receiving a vaccination?   No   Do you have a long-term health problem with heart, lung, kidney, or metabolic disease (e.g., diabetes), asthma, a blood disorder, no spleen, complement component deficiency, a cochlear implant, or a spinal fluid leak?  Are you on long-term aspirin therapy?   No   Do you have cancer, leukemia, HIV/AIDS, or any other immune system problem?   No   Do you have a parent, brother, or sister with an immune system problem?   No   In the past 3 months, have you taken medications that affect  your immune system, such as prednisone, other steroids, or anticancer drugs; drugs for the treatment of rheumatoid arthritis, Crohn s disease, or psoriasis; or have you had radiation treatments?   No   Have you had a seizure, or a brain or other nervous system problem?   No   During the past year, have you received a transfusion of blood or blood    products, or been given immune (gamma) globulin or antiviral drug?   No   For women: Are you pregnant or is there a chance you could become       pregnant during the next month?   No   Have you received any vaccinations in the past 4 weeks?   No     Immunization questionnaire answers were all negative.        Per orders of Dr. Conteh, injection of PCV20 given by Belle Strauss RN. Patient instructed to remain in clinic for 15 minutes afterwards, and to report any adverse reaction to me immediately.       Screening performed by Belle Strauss RN on 8/26/2022 at 9:02 AM.

## 2022-08-26 NOTE — PATIENT INSTRUCTIONS
Patient Education   Personalized Prevention Plan  You are due for the preventive services outlined below.  Your care team is available to assist you in scheduling these services.  If you have already completed any of these items, please share that information with your care team to update in your medical record.  Health Maintenance Due   Topic Date Due     Annual Wellness Visit  12/17/2021     Asthma Control Test  08/08/2022     Flu Vaccine (1) 09/01/2022

## 2022-08-27 LAB
ALBUMIN UR-MCNC: NEGATIVE MG/DL
APPEARANCE UR: CLEAR
BILIRUB UR QL STRIP: NEGATIVE
COLOR UR AUTO: YELLOW
DEPRECATED CALCIDIOL+CALCIFEROL SERPL-MC: 74 UG/L (ref 20–75)
GLUCOSE UR STRIP-MCNC: NEGATIVE MG/DL
HGB UR QL STRIP: NEGATIVE
KETONES UR STRIP-MCNC: NEGATIVE MG/DL
LEUKOCYTE ESTERASE UR QL STRIP: NEGATIVE
NITRATE UR QL: NEGATIVE
PH UR STRIP: 5.5 [PH] (ref 5–7)
SP GR UR STRIP: <=1.005 (ref 1–1.03)
UROBILINOGEN UR STRIP-ACNC: 0.2 E.U./DL

## 2022-08-27 PROCEDURE — 81003 URINALYSIS AUTO W/O SCOPE: CPT | Performed by: INTERNAL MEDICINE

## 2022-09-02 ENCOUNTER — TELEPHONE (OUTPATIENT)
Dept: UROLOGY | Facility: CLINIC | Age: 72
End: 2022-09-02

## 2022-09-02 DIAGNOSIS — N39.0 RECURRENT UTI: Primary | ICD-10-CM

## 2022-09-02 NOTE — TELEPHONE ENCOUNTER
Voicemail left for patient today.  Patient is scheduled for Urodynamics testing on 9/12 and has a hx of recurrent UTI's.  Patient was recently treated for a positive UC on 8/19 with cefdinir.  UA on 8/27 was negative.  Ideally would like to repeat UA/UC next week to make sure her UA is still negative.  Orders placed.  We would need her to have the urine testing done by next Wednesday (9/7) at the very latest.  I asked her to please call back to discuss.    Kathrin Ralph, CMA   
[facility]

## 2022-09-04 ENCOUNTER — OFFICE VISIT (OUTPATIENT)
Dept: URGENT CARE | Facility: URGENT CARE | Age: 72
End: 2022-09-04
Payer: MEDICARE

## 2022-09-04 VITALS
TEMPERATURE: 99.6 F | WEIGHT: 180 LBS | DIASTOLIC BLOOD PRESSURE: 81 MMHG | BODY MASS INDEX: 31.5 KG/M2 | HEART RATE: 90 BPM | OXYGEN SATURATION: 95 % | SYSTOLIC BLOOD PRESSURE: 136 MMHG

## 2022-09-04 DIAGNOSIS — R30.0 DYSURIA: Primary | ICD-10-CM

## 2022-09-04 DIAGNOSIS — A04.72 C. DIFFICILE COLITIS: ICD-10-CM

## 2022-09-04 DIAGNOSIS — R19.7 DIARRHEA, UNSPECIFIED TYPE: ICD-10-CM

## 2022-09-04 PROCEDURE — 99213 OFFICE O/P EST LOW 20 MIN: CPT | Performed by: PHYSICIAN ASSISTANT

## 2022-09-04 NOTE — PROGRESS NOTES
ASSESSMENT:    ICD-10-CM    1. Dysuria  R30.0 UA Macro with Reflex to Micro and Culture - lab collect     Clostridium difficile Toxin B PCR     UA Macro with Reflex to Micro and Culture - lab collect     Clostridium difficile Toxin B PCR   2. Diarrhea, unspecified type  R19.7 Clostridium difficile Toxin B PCR     Clostridium difficile Toxin B PCR         PLAN: Recent UTI, treated with cefdinir, finished 1 week ago.  Now with diarrhea and dysuria since yesterday.  States she will not be able to give us a urine sample or stool sample here tonight.  Given kits to take home with her.  Bring back labs tomorrow.  I am not working but DEVON Katalina Box is and I asked her to check the in basket for results and treat patient appropriately.  Consider Macrobid.  Patient states she does not like Cipro.  Had  normal recent kidney function testing.  Does not need to be seen again.          Sharona Lawrence PA-C        Subjective:   71-year-old female presents for dysuria since yesterday.  No abdominal cramping.  Also had significant diarrhea yesterday, no blood in her stool.  History of C. difficile years ago and this reminds her of it.  Finished cefdinir 1 week ago for E. coli UTI, sensitive to cefdinir.  Had a follow-up repeat urine after treatment which was clear.  No fever.        Allergies   Allergen Reactions     Latex Other (See Comments) and Shortness Of Breath     Runny nose  PN: LW Reaction: DIFFICULTY BREATHING       Flagyl [Metronidazole Hcl] Anaphylaxis and Rash     Food      PN: LW FI1: nka LW FI2:     Hydrochlorothiazide W/Triamterene      PN: LW Reaction: skin rash     No Clinical Screening - See Comments      PN: LW Other1: -Adhesive Tape     Seasonal Allergies      sneezing     Sulfa Drugs Anaphylaxis, Hives and Itching     PN: LW Reaction: HIVES, Swelling     Tape [Adhesive Tape] Hives     Metoprolol Itching and Rash     Metronidazole Hives and Rash     PN: LW Reaction: HIVES         Past Medical History:    Diagnosis Date     ADHD (attention deficit hyperactivity disorder)      Anxiety      Arthritis     back, hands, knees, hips     Asthma      C. difficile diarrhea      Central sleep apnea      Coronary artery disease involving native coronary artery of native heart without angina pectoris      Depression      Fever and chills 09/24/2021     Gastro-oesophageal reflux disease      Hypertension      Ischemic cardiomyopathy      Major depressive disorder, recurrent episode, moderate (H) 7/25/2013     Narcolepsy      Pyelonephritis of right kidney 10/24/2021     Renal disease      S/P hip replacement 2004    Bilateral fall 2004 due to OA     Sleep apnea      Status post total knee replacement 12/29/2014     Urinary tract infection with hematuria, site unspecified 09/24/2021       albuterol (PROAIR HFA/PROVENTIL HFA/VENTOLIN HFA) 108 (90 Base) MCG/ACT inhaler, Inhale 1-2 puffs into the lungs every 4 hours as needed for shortness of breath / dyspnea or wheezing  amoxicillin (AMOXIL) 500 MG capsule, Take as directed before dental work   aspirin (ASA) 81 MG EC tablet, Take 1 tablet (81 mg) by mouth daily  atorvastatin (LIPITOR) 40 MG tablet, Take 1 tablet (40 mg) by mouth daily  BREO ELLIPTA 200-25 MCG/INH Inhaler, INHALE 1 PUFF INTO THE LUNGS DAILY  carvedilol (COREG) 3.125 MG tablet, TAKE 1 TABLET(3.125 MG) BY MOUTH TWICE DAILY WITH MEALS  cefdinir (OMNICEF) 300 MG capsule, Take 1 capsule (300 mg) by mouth 2 times daily  clopidogrel (PLAVIX) 75 MG tablet, Take 1 tablet (75 mg) by mouth daily  estradiol (ESTRACE) 0.1 MG/GM vaginal cream, Place 1 g vaginally twice a week  isosorbide mononitrate (IMDUR) 30 MG 24 hr tablet, Take 1 tablet (30 mg) by mouth daily  loratadine (CLARITIN) 10 MG tablet, Take 10 mg by mouth daily  Melatonin 10 MG TABS tablet, Take 10 mg by mouth nightly as needed for sleep  mirabegron (MYRBETRIQ) 50 MG 24 hr tablet, Take 1 tablet (50 mg) by mouth daily  nitroGLYcerin (NITROSTAT) 0.4 MG sublingual  tablet, Place 1 tablet (0.4 mg) under the tongue every 5 minutes as needed for chest pain  pantoprazole (PROTONIX) 40 MG EC tablet, Take 1 tablet (40 mg) by mouth daily  Prenatal Multivit-Min-Fe-FA (PRENATAL/IRON) TABS, Take 1 tablet by mouth daily   saccharomyces boulardii (FLORASTOR) 250 MG capsule, Take 1 capsule (250 mg) by mouth 2 times daily  sertraline (ZOLOFT) 100 MG tablet, Take 1 tablet (100 mg) by mouth daily  tiotropium (SPIRIVA RESPIMAT) 2.5 MCG/ACT inhaler, Inhale 2 puffs into the lungs daily for 30 days  ursodiol (ACTIGALL) 300 MG capsule, TAKE 1 CAPSULE(300 MG) BY MOUTH TWICE DAILY  valsartan (DIOVAN) 40 MG tablet, Take 1 tablet (40 mg) by mouth daily  vitamin D3 (CHOLECALCIFEROL) 2000 units (50 mcg) tablet, Take 1 tablet (2,000 Units) by mouth daily    No current facility-administered medications on file prior to visit.      Social History     Tobacco Use     Smoking status: Never Smoker     Smokeless tobacco: Never Used   Vaping Use     Vaping Use: Never used   Substance Use Topics     Alcohol use: No     Drug use: No       ROS:  General: negative for fever  ABD: Denies abd pain  : as above    OBJECTIVE:  /81   Pulse 90   Temp 99.6  F (37.6  C) (Tympanic)   Wt 81.6 kg (180 lb)   LMP  (LMP Unknown)   SpO2 95%   BMI 31.50 kg/m       General:   awake, alert, and cooperative.  NAD.   Head: Normocephalic, atraumatic.  Eyes: Conjunctiva clear, non icteric.   ABD: soft, no tenderness to palpation , no rigidity, guarding or rebound . No CVAT  Neuro: Alert and oriented - normal speech.

## 2022-09-05 ENCOUNTER — NURSE TRIAGE (OUTPATIENT)
Dept: NURSING | Facility: CLINIC | Age: 72
End: 2022-09-05

## 2022-09-05 DIAGNOSIS — N39.0 URINARY TRACT INFECTION: ICD-10-CM

## 2022-09-05 LAB
ALBUMIN UR-MCNC: 30 MG/DL
APPEARANCE UR: ABNORMAL
BACTERIA #/AREA URNS HPF: ABNORMAL /HPF
BILIRUB UR QL STRIP: NEGATIVE
C DIFF TOX B STL QL: POSITIVE
COLOR UR AUTO: YELLOW
GLUCOSE UR STRIP-MCNC: NEGATIVE MG/DL
HGB UR QL STRIP: ABNORMAL
KETONES UR STRIP-MCNC: NEGATIVE MG/DL
LEUKOCYTE ESTERASE UR QL STRIP: ABNORMAL
NITRATE UR QL: NEGATIVE
PH UR STRIP: 6 [PH] (ref 5–7)
RBC #/AREA URNS AUTO: ABNORMAL /HPF
SP GR UR STRIP: <=1.005 (ref 1–1.03)
SQUAMOUS #/AREA URNS AUTO: ABNORMAL /LPF
UROBILINOGEN UR STRIP-ACNC: 0.2 E.U./DL
WBC #/AREA URNS AUTO: >100 /HPF

## 2022-09-05 PROCEDURE — 87493 C DIFF AMPLIFIED PROBE: CPT | Performed by: PHYSICIAN ASSISTANT

## 2022-09-05 PROCEDURE — 81001 URINALYSIS AUTO W/SCOPE: CPT | Performed by: PHYSICIAN ASSISTANT

## 2022-09-05 PROCEDURE — 87086 URINE CULTURE/COLONY COUNT: CPT | Performed by: PHYSICIAN ASSISTANT

## 2022-09-05 RX ORDER — NITROFURANTOIN 25; 75 MG/1; MG/1
100 CAPSULE ORAL ONCE
Status: DISCONTINUED | OUTPATIENT
Start: 2022-09-05 | End: 2022-09-05 | Stop reason: CLARIF

## 2022-09-05 RX ORDER — NITROFURANTOIN 25; 75 MG/1; MG/1
100 CAPSULE ORAL 2 TIMES DAILY
Qty: 14 CAPSULE | Refills: 0 | Status: SHIPPED | OUTPATIENT
Start: 2022-09-05 | End: 2022-09-12

## 2022-09-05 NOTE — TELEPHONE ENCOUNTER
Nurse Triage SBAR    Is this a 2nd Level Triage? YES, LICENSED PRACTITIONER REVIEW IS REQUIRED    Situation: Heidy's UA results returned indicating an infection. She'd like treatment today.     Background: Was treated for UTI  with cefdinir, completed a week ago. Pain with urination began again 9/3.     Assessment: Needs treatment per note from yesterday.     Protocol Recommended Disposition:   No disposition on file.    Recommendation:   Verbal order given for macrobid BID for 1 week from Dr. Tiago Brown. Please be seen in clinic by PCP for recurrent symptoms.     FNA updated Heidy.  Patient voiced understanding and will follow disposition.   Elizabeth Cobian, BLANKA  [facility] Nurse [facility]visor       Reason for Disposition    [1] Caller has URGENT question (includes prescribed medication questions) AND [2] triager unable to answer question    Additional Information    Negative: Shock suspected (e.g., cold/pale/clammy skin, too weak to stand, low BP, rapid pulse)    Negative: Difficult to awaken or acting confused (e.g., disoriented, slurred speech)    Negative: Sounds like a life-threatening emergency to the triager    Negative: Recent (within last 24 hours) medical visit for an injury    Negative: Recent surgery or surgical procedure    Negative: Recent discharge from the hospital    Negative: Asthma attack diagnosed recently    Negative: Flu (influenza) diagnosed recently    Negative: Ear infection (otitis media, middle ear infection) diagnosed recently    Negative: Ear infection (otitis externa, swimmer's ear) diagnosed recently    Negative: [1] Sinus infection AND [2] taking an antibiotic    Negative: Skin infection (cellulitis) diagnosed recently    Negative: Strep throat (strep pharyngitis) diagnosed recently    Negative: Strep throat test result    Negative: Threatened miscarriage (threatened ) recently diagnosed    Negative: Urine infection (FEMALE; cystitis, pyelonephritis, urethritis) diagnosed recently     Negative: Urine infection (MALE; cystitis, pyelonephritis, prostatitis, epididymitis, orchitis, urethritis) diagnosed recently    Negative: Taking antibiotic for other infection    Negative: More than 24 hours since medical visit    Negative: [1] Drinking very little AND [2] dehydration suspected (e.g., no urine > 12 hours, very dry mouth, very lightheaded)    Negative: Patient sounds very sick or weak to the triager    Negative: Fever > 104 F (40 C)    Negative: [1] SEVERE pain (e.g., excruciating, pain scale 8-10) AND [2] not improved after pain medications    Negative: [1] Recent medical visit within 24 hours AND [2] condition / symptoms WORSE    Negative: [1] Recent medical visit within 24 hours AND [2] NEW symptom AND [3] that could be serious    Protocols used: RECENT MEDICAL VISIT FOR ILLNESS FOLLOW-UP CALL-A-

## 2022-09-06 ENCOUNTER — THERAPY VISIT (OUTPATIENT)
Dept: SLEEP MEDICINE | Facility: CLINIC | Age: 72
End: 2022-09-06
Payer: MEDICARE

## 2022-09-06 ENCOUNTER — NURSE TRIAGE (OUTPATIENT)
Dept: NURSING | Facility: CLINIC | Age: 72
End: 2022-09-06

## 2022-09-06 DIAGNOSIS — R53.83 FATIGUE, UNSPECIFIED TYPE: ICD-10-CM

## 2022-09-06 DIAGNOSIS — G47.33 OSA (OBSTRUCTIVE SLEEP APNEA): ICD-10-CM

## 2022-09-06 DIAGNOSIS — E66.09 CLASS 1 OBESITY DUE TO EXCESS CALORIES WITH SERIOUS COMORBIDITY AND BODY MASS INDEX (BMI) OF 30.0 TO 30.9 IN ADULT: Chronic | ICD-10-CM

## 2022-09-06 DIAGNOSIS — F51.04 CHRONIC INSOMNIA: ICD-10-CM

## 2022-09-06 DIAGNOSIS — E66.811 CLASS 1 OBESITY DUE TO EXCESS CALORIES WITH SERIOUS COMORBIDITY AND BODY MASS INDEX (BMI) OF 30.0 TO 30.9 IN ADULT: Chronic | ICD-10-CM

## 2022-09-06 DIAGNOSIS — G47.31 CENTRAL SLEEP APNEA: ICD-10-CM

## 2022-09-06 DIAGNOSIS — I25.119 CORONARY ARTERY DISEASE WITH ANGINA PECTORIS, UNSPECIFIED VESSEL OR LESION TYPE, UNSPECIFIED WHETHER NATIVE OR TRANSPLANTED HEART (H): Chronic | ICD-10-CM

## 2022-09-06 PROCEDURE — 95810 POLYSOM 6/> YRS 4/> PARAM: CPT | Performed by: INTERNAL MEDICINE

## 2022-09-06 RX ORDER — VANCOMYCIN HYDROCHLORIDE 125 MG/1
125 CAPSULE ORAL 3 TIMES DAILY
Qty: 30 CAPSULE | Refills: 0 | Status: SHIPPED | OUTPATIENT
Start: 2022-09-06 | End: 2022-09-16

## 2022-09-06 RX ORDER — VANCOMYCIN HYDROCHLORIDE 125 MG/1
125 CAPSULE ORAL 4 TIMES DAILY
Qty: 40 CAPSULE | Refills: 0 | Status: CANCELLED | OUTPATIENT
Start: 2022-09-06 | End: 2022-09-16

## 2022-09-06 NOTE — TELEPHONE ENCOUNTER
Nurse Triage SBAR    Is this a 2nd Level Triage? NO    Situation: Patient calling with Sleep Study Concern.  Consent: not needed    Pt being treated with Vanco and bactrim for UTI and c-diff at this time.  Has to drink a lot of water due to her having CKD and these medications being hard on her kidneys and pt is concerned that if she is awake to use the bathroom multiple times tonight it will affect the results of her sleep study that she's scheduled to do tonight.     Pt has called the clinic this morning and was told someone would call her back later in the day.  Calling again as she has not gotten a reply yet.               Janette Moreno RN [facility] 9/6/2022 5:12 PM                              Reason for Disposition    General information question, no triage required and triager able to answer question    Protocols used: INFORMATION ONLY CALL - [facility]

## 2022-09-07 LAB — BACTERIA UR CULT: NO GROWTH

## 2022-09-07 NOTE — TELEPHONE ENCOUNTER
Patient had arrived for sleep study per appointment calendar.     Encounter closed.     Caroline Feliciano RN on 9/7/2022 at 7:19 AM

## 2022-09-09 PROBLEM — R06.3 CHEYNE-STOKES BREATHING DISORDER: Chronic | Status: ACTIVE | Noted: 2022-09-09

## 2022-09-09 NOTE — PROCEDURES
" SLEEP STUDY INTERPRETATION  DIAGNOSTIC POLYSOMNOGRAPHY REPORT      Patient: INDER SMITH  YOB: 1950  Study Date: 9/6/2022  MRN: [id number]  Referring Provider: -  Ordering Provider: Kashif Hadley MD    Indications for Polysomnography: The patient is a 71 year old Female who is 5' 4\" and weighs 186.0 lbs. Her BMI is 32.1, [place] sleepiness scale 14 and neck circumference is 39 cm. A diagnostic polysomnogram was performed to evaluate for  fatigue and sleepiness (ESS 14) with long sleep times. Previous diagnoses of obstructive sleep apnea, complex sleep apnea, and narcolepsy with significant PLMS on sleep study 2013. Currently additional symptoms of night sweats, nocturnal enuresis, headaches in morning. Comorbid coronary artery disease.        Polysomnogram Data: A full night polysomnogram recorded the standard physiologic parameters including EEG, EOG, EMG, ECG, nasal and oral airflow. Respiratory parameters of chest and abdominal movements were recorded with respiratory inductance plethysmography. Oxygen saturation was recorded by pulse oximetry. Hypopnea scoring rule used: 1B 4%.    Sleep Architecture:   The total recording time of the polysomnogram was 458.3 minutes. The total sleep time was 335.0 minutes. Sleep latency was increased at 42.7 minutes with the use of a sleep aid (melatonin). REM latency was 244.5 minutes. Arousal index was increased at 44.1 arousals per hour. Sleep efficiency was decreased at 73.1%. Wake after sleep onset was 80.5 minutes. The patient spent 7.0% of total sleep time in Stage N1, 59.7% in Stage N2, 21.6% in Stage N3, and 11.6% in REM. Time in REM supine was 30.5 minutes.    Respiration:     Events ? The polysomnogram revealed a presence of 14 obstructive, 144 central, and 24 mixed apneas resulting in an apnea index of 32.6 events per hour. There were 8 obstructive hypopneas and 35 central hypopneas resulting in an obstructive hypopnea index of 1.4 and central " hypopnea index of 6.3 events per hour. The combined apnea/hypopnea index was 40.3 events per hour (central apnea/hypopnea index was 32.1 events per hour). The REM AHI was 21.5 events per hour. The supine AHI was 61.0 events per hour. The RERA index was 1.1 events per hour.  The RDI was 41.4 events per hour.        Snoring - was reported as moderate to loud.    Respiratory rate and pattern - was notable for Cheyne-Rogers respiratory rate and pattern.     Sustained Sleep Associated Hypoventilation - Transcutaneous carbon dioxide monitoring was not used    Sleep Associated Hypoxemia - (Greater than 5 minutes O2 sat at or below 88%) was present. Baseline oxygen saturation was 93.4%. Lowest oxygen saturation was 74.0%. Time spent less than or equal to 88% was 33.1 minutes. Time spent less than or equal to 89% was 46.8 minutes. There was a prolonged desaturation late in the study lasting 22 minutes that is suspicious for artifact     Movement Activity:     Periodic Limb Activity - There were 483 PLMs during the entire study. The PLM index was 86.5 movements per hour. The PLM Arousal Index was 13.1 per hour.    REM EMG Activity - Excessive transient/sustained muscle activity was present in 32/78 epochs of REM    Nocturnal Behavior - Abnormal sleep related behaviors were not noted    Bruxism - None apparent.      Cardiac Summary:   The average pulse rate was 60.1 bpm. The minimum pulse rate was 49.0 bpm while the maximum pulse rate was 72.0 bpm.  Arrhythmias were not noted.      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    Advice regarding the risks of drowsy driving.    Suggest optimizing sleep schedule and avoiding sleep  deprivation.    Weight management (if BMI > 30).    Pharmacologic therapy should be used for management of restless legs syndrome only if present and clinically indicated and not based on the presence of periodic limb movements alone.    Diagnostic Codes:   Cheyne?Rogers Breathing Pattern R06.3          _____________________________________   Electronically Signed By: Kashif Hadley MD 9/9/22           Range(%) Time in range (min)   0.0 - 89.0 46.8   0.0 - 88.0 33.1         Stage Min(mm Hg) Max(mm Hg)   Wake - -   NREM(1+2+3) - -   REM - -       Range(mmHg) Time in range (min)   55.0 - 100.0 -   Excluded data <20.0 & >65.0 459.0

## 2022-09-10 ENCOUNTER — MYC MEDICAL ADVICE (OUTPATIENT)
Dept: SLEEP MEDICINE | Facility: CLINIC | Age: 72
End: 2022-09-10

## 2022-09-10 DIAGNOSIS — D35.2 PITUITARY MICROADENOMA (H): ICD-10-CM

## 2022-09-10 DIAGNOSIS — R06.3 CHEYNE-STOKES BREATHING DISORDER: Primary | ICD-10-CM

## 2022-09-13 ENCOUNTER — MYC MEDICAL ADVICE (OUTPATIENT)
Dept: FAMILY MEDICINE | Facility: CLINIC | Age: 72
End: 2022-09-13

## 2022-09-13 ENCOUNTER — TELEPHONE (OUTPATIENT)
Dept: FAMILY MEDICINE | Facility: CLINIC | Age: 72
End: 2022-09-13

## 2022-09-13 NOTE — TELEPHONE ENCOUNTER
See MyChart message below. Advised patient to start an e-visit based on her symptoms.     Niya Brower RN    [facility]

## 2022-09-13 NOTE — TELEPHONE ENCOUNTER
Called with complaints of probable yeast infection. Patient has been taking antibiotics for a recent UTI, which treatment then caused C-diff. Patient is now finishing an antibiotic for C-diff. Patient notes she is prone to getting yeast infections from taking antibiotics, particularly from Cefdinir or this class of medication.  Experiencing vaginal irritation, dryness, itching, malodorous. Does not see any white, chunky or thick discharge.  Patient is asking for one time prescription for Diflucan. Wants prescription strength, doesn't want to take OTC as generally does not work for her.  Notes she has had many yeast infections in past and with antibiotic use and wiping of diarrhea stool from C-diff, feels this is what she has now.  Declines any additional visits, notes she has a virtual follow up with PCP on Monday. Doesn't want to have another visit, says she's had too many visits lately.    Is asking for prescription to be sent to [facility] in [place].      Routing to provider to review and advise.        Joann Nelson RN  [facility]

## 2022-09-18 LAB — SLPCOMP: NORMAL

## 2022-09-19 ENCOUNTER — VIRTUAL VISIT (OUTPATIENT)
Dept: FAMILY MEDICINE | Facility: CLINIC | Age: 72
End: 2022-09-19
Payer: MEDICARE

## 2022-09-19 DIAGNOSIS — A49.8 CLOSTRIDIUM DIFFICILE INFECTION: ICD-10-CM

## 2022-09-19 DIAGNOSIS — R33.9 URINARY RETENTION WITH INCOMPLETE BLADDER EMPTYING: ICD-10-CM

## 2022-09-19 DIAGNOSIS — N39.0 E. COLI UTI: Primary | ICD-10-CM

## 2022-09-19 DIAGNOSIS — B96.20 E. COLI UTI: Primary | ICD-10-CM

## 2022-09-19 DIAGNOSIS — B37.31 VULVOVAGINAL CANDIDIASIS: ICD-10-CM

## 2022-09-19 DIAGNOSIS — D35.2 PITUITARY MICROADENOMA (H): ICD-10-CM

## 2022-09-19 PROCEDURE — 99443 PR PHYSICIAN TELEPHONE EVALUATION 21-30 MIN: CPT | Mod: 95 | Performed by: INTERNAL MEDICINE

## 2022-09-19 RX ORDER — PHENAZOPYRIDINE HYDROCHLORIDE 100 MG/1
100 TABLET, FILM COATED ORAL 3 TIMES DAILY
Qty: 6 TABLET | Refills: 5 | Status: SHIPPED | OUTPATIENT
Start: 2022-09-19 | End: 2022-09-21

## 2022-09-19 RX ORDER — FLUCONAZOLE 150 MG/1
150 TABLET ORAL ONCE
Qty: 1 TABLET | Refills: 5 | Status: SHIPPED | OUTPATIENT
Start: 2022-09-19 | End: 2022-09-19

## 2022-09-19 RX ORDER — CHOLESTYRAMINE 4 G/9G
1 POWDER, FOR SUSPENSION ORAL
Qty: 30 PACKET | Refills: 5 | Status: SHIPPED | OUTPATIENT
Start: 2022-09-19 | End: 2022-09-26

## 2022-09-19 NOTE — PATIENT INSTRUCTIONS
At [facility], we strive to deliver an exceptional experience to you, every time we see you. If you receive a survey, please complete it as we do value your feedback.  If you have MyChart, you can expect to receive results automatically within 24 hours of their completion.  Your provider will send a note interpreting your results as well.   If you do not have MyChart, you should receive your results in about a week by mail.    Your care team:                            Family Medicine Internal Medicine   MD Danny Christian MD Shantel Branch-Fleming, MD Srinivasa Vaka, MD Katya Belousova, KATE Katz CNP, MD ([facility]   Mustapha Suresh, MD Yelena Garcia MD Amelia Massimini APRN CNP Kim Thein, APRN CNP Bethany Templen, MD             Clinic hours: Monday - Thursday 7 am-6 pm; Fridays 7 am-5 pm.   Urgent care: Monday - Friday 10 am- 8 pm; Saturday and Sunday 9 am-5 pm.    Clinic: [phone]       [facility]: Monday - Thursday 8 am - 7 pm; Friday 8 am - 6 pm  [facility]: [phone]

## 2022-09-19 NOTE — PROGRESS NOTES
Received DME and faxed to [facility] at [phone] right fax confirmed at 1:02pm

## 2022-09-19 NOTE — PROGRESS NOTES
Heidy is a 71 year old female with pituitary microadenoma, who is being evaluated via a billable telephone visit.      What phone number would you like to be contacted at? [phone]   How would you like to obtain your AVS? Aleydahart    SUBJECTIVE  This is a 71 year old presenting for the following health issues:  UTI (Follow up) and Vaginal Problem (Possible yeast infection)    Genitourinary - Female  Onset/Duration: 2-3 weeks  Description:   Painful urination (Dysuria): YES           Frequency: YES  Blood in urine (Hematuria): No  Delay in urine (Hesitency): No  Intensity: 4-5/10  Progression of Symptoms:  same  Accompanying Signs & Symptoms:  Fever/chills: YES- chills a couple nights. Denies fever  Flank pain: No  Nausea and vomiting: No  Vaginal symptoms: Labia feels dry and irritated  Abdominal/Pelvic Pain: No  History:   History of frequent UTI s: YES  History of kidney stones: No  Sexually Active: No  Possibility of pregnancy: No  Precipitating or alleviating factors: None  Therapies tried and outcome: Cranberry juice prn (contraindicated in Coumadin patients) and Increase fluid intake without relief. I don't what to do with C. Difficile. Improving since yesterday. Burning sensation is in the clitoris. Urethra is located way up in the clitoris. On a different matter, I was diagnosed to have Cheyne-Rogers breathing disorder. Urodynamic studies cancelled. Hard time to stop urinating.       OBJECTIVE  Vitals:  No vitals were obtained today due to virtual visit.  Physical Exam   Remainder of exam unable to be completed due to telephone visits      DIAGNOSTICS    10,000-50,000 CFU/mL Escherichia coli Abnormal             Resulting Agency: [facility]       Susceptibility     Escherichia coli     DAKOTA     Ampicillin 4 ug/mL Susceptible     Ampicillin/ Sulbactam <=2 ug/mL Susceptible     Cefazolin <=4 ug/mL Susceptible 1     Cefepime <=1 ug/mL Susceptible     Cefoxitin <=4 ug/mL Susceptible     Ceftazidime <=1 ug/mL  Susceptible     Ceftriaxone <=1 ug/mL Susceptible     Ciprofloxacin <=0.25 ug/mL Susceptible     Gentamicin <=1 ug/mL Susceptible     Levofloxacin <=0.12 ug/mL Susceptible     Nitrofurantoin <=16 ug/mL Susceptible     Piperacillin/Tazobactam <=4 ug/mL Susceptible     Tobramycin <=1 ug/mL Susceptible     Trimethoprim/Sulfamethoxazole <=1/19 ug/mL Susceptible              1 Cefazolin DAKOTA breakpoints are for the treatment of uncomplicated urinary tract infections. For the treatment of systemic infections, please contact the laboratory for additional testing.            Specimen Collected: 08/19/22  6:10 PM Last Resulted: 08/22/22  1:46 PM             Vitamin D, Total (25-Hydroxy) 20 - 75 ug/L 74  62  66  42 CM  18 Low  CM  26 CM  17 Low  CM     Resulting Agency  SCORE SCORE SCORE [facility]             Narrative  Performed by: SCORE  Season, race, dietary intake, and treatment affect the concentration of 25-hydroxy-Vitamin D. Values may decrease during winter months and increase during summer months. Values 20-29 ug/L may indicate Vitamin D insufficiency and values <20 ug/L may indicate Vitamin D deficiency.     Vitamin D determination is routinely performed by an immunoassay specific for 25 hydroxyvitamin D3.  If an individual is on vitamin D2(ergocalciferol) supplementation, please specify 25 OH vitamin D2 and D3 level determination by LCMSMS test VITD23.       Specimen Collected: 08/26/22  9:18 AM Last Resulted: 08/27/22 12:49 PM           Component Ref Range & Units 3 wk ago   (8/26/22) 7 mo ago   (2/8/22) 2 yr ago   (10/11/19) 2 yr ago   (10/10/19) 4 yr ago   (8/16/18) 5 yr ago   (2/9/17) 6 yr ago   (2/2/16)     Magnesium 1.6 - 2.3 mg/dL 2.2  1.9 R  2.0  2.1  2.0  2.3  1.9    Resulting Agency  [id number]              Specimen  Collected: 08/26/22  9:18 AM Last Resulted: 08/26/22 12:42 PM           8/27/2022  1:33 PM     Component Value Flag Ref Range Units Status   Color Urine Yellow   Colorless, Straw, Light Yellow, Yellow  Final   Appearance Urine Clear   Clear  Final   Glucose Urine Negative   Negative mg/dL Final   Bilirubin Urine Negative   Negative  Final   Ketones Urine Negative   Negative mg/dL Final   Specific Gravity Urine <=1.005   1.003 - 1.035  Final   Blood Urine Negative   Negative  Final   pH Urine 5.5   5.0 - 7.0  Final   Protein Albumin Urine Negative   Negative mg/dL Final   Urobilinogen Urine 0.2   0.2, 1.0 E.U./dL Final   Nitrite Urine Negative   Negative  Final   Leukocyte Esterase Urine Negative   Negative  Final   8/26/2022 12:47 PM - Lab, Background User    Component Value Flag Ref Range Units Status   Sodium 143   133 - 144 mmol/L Final   Potassium 4.8   3.4 - 5.3 mmol/L Final   Chloride 110   High   94 - 109 mmol/L Final   Carbon Dioxide (CO2) 30   20 - 32 mmol/L Final   Anion Gap 3   3 - 14 mmol/L Final   Urea Nitrogen 17   7 - 30 mg/dL Final   Creatinine 0.92   0.52 - 1.04 mg/dL Final   Calcium 9.1   8.5 - 10.1 mg/dL Final   Glucose 100   High   70 - 99 mg/dL Final   Alkaline Phosphatase 119   40 - 150 U/L Final   AST 28   0 - 45 U/L Final   ALT 29   0 - 50 U/L Final   Protein Total 7.7   6.8 - 8.8 g/dL Final   Albumin 4.0   3.4 - 5.0 g/dL Final   Bilirubin Total 0.6   0.2 - 1.3 mg/dL Final   GFR Estimate 65   >60 mL/min/1.73m2 Final   Comment:   Effective December 21, 2021 eGFRcr in adults is calculated using the 2021 CKD-EPI creatinine equation which includes age and gender (Nathan et al., NEJM, DOI: 10.1056/MCALab1540014)       ASSESSMENT/PLAN  E. coli UTI  - phenazopyridine (PYRIDIUM) 100 MG tablet; Take 1 tablet (100 mg) by mouth 3 times daily for 2 days  - UA with Microscopic reflex to Culture - lab collect; Future    Vulvovaginal candidiasis  - fluconazole (DIFLUCAN) 150 MG tablet; Take 1 tablet (150 mg)  by mouth once for 1 dose    Pituitary microadenoma (H)  - Cortisol; Standing  - TSH; Future  - T4, free; Future  - Prolactin; Future  - Cortisol; Future    Urinary retention with incomplete bladder emptying  - Miscellaneous Order for DME - ONLY FOR DME    Disposition:  Follow-up in 4 weeks.    Danny Conteh MD  Internal Medicine    Phone call duration: 48 minutes  Start: 11:12 AM  End: 12:00 PM

## 2022-09-21 ENCOUNTER — LAB (OUTPATIENT)
Dept: LAB | Facility: CLINIC | Age: 72
End: 2022-09-21
Payer: MEDICARE

## 2022-09-21 ENCOUNTER — OFFICE VISIT (OUTPATIENT)
Dept: SLEEP MEDICINE | Facility: CLINIC | Age: 72
End: 2022-09-21
Payer: COMMERCIAL

## 2022-09-21 VITALS
DIASTOLIC BLOOD PRESSURE: 67 MMHG | WEIGHT: 183.4 LBS | OXYGEN SATURATION: 96 % | HEART RATE: 77 BPM | RESPIRATION RATE: 16 BRPM | BODY MASS INDEX: 31.31 KG/M2 | HEIGHT: 64 IN | SYSTOLIC BLOOD PRESSURE: 128 MMHG

## 2022-09-21 DIAGNOSIS — G47.61 PLMD (PERIODIC LIMB MOVEMENT DISORDER): ICD-10-CM

## 2022-09-21 DIAGNOSIS — D35.2 PITUITARY MICROADENOMA (H): ICD-10-CM

## 2022-09-21 DIAGNOSIS — Z86.79 HISTORY OF ISCHEMIC CARDIOMYOPATHY: Chronic | ICD-10-CM

## 2022-09-21 DIAGNOSIS — N39.0 E. COLI UTI: ICD-10-CM

## 2022-09-21 DIAGNOSIS — B96.20 E. COLI UTI: ICD-10-CM

## 2022-09-21 DIAGNOSIS — I42.0 DILATED CARDIOMYOPATHY (H): ICD-10-CM

## 2022-09-21 DIAGNOSIS — R06.3 CHEYNE-STOKES BREATHING DISORDER: Primary | ICD-10-CM

## 2022-09-21 DIAGNOSIS — N18.31 STAGE 3A CHRONIC KIDNEY DISEASE (H): Primary | ICD-10-CM

## 2022-09-21 DIAGNOSIS — Z20.822 COVID-19 RULED OUT: ICD-10-CM

## 2022-09-21 DIAGNOSIS — F51.04 CHRONIC INSOMNIA: ICD-10-CM

## 2022-09-21 PROBLEM — A49.8 CLOSTRIDIUM DIFFICILE INFECTION: Status: ACTIVE | Noted: 2022-09-04

## 2022-09-21 LAB
CORTIS SERPL-MCNC: 9.2 UG/DL
FERRITIN SERPL-MCNC: 102 NG/ML (ref 8–252)
IRON SATN MFR SERPL: 49 % (ref 15–46)
IRON SERPL-MCNC: 152 UG/DL (ref 35–180)
PROLACTIN SERPL 3RD IS-MCNC: 11 NG/ML (ref 5–23)
T4 FREE SERPL-MCNC: 1.03 NG/DL (ref 0.76–1.46)
TIBC SERPL-MCNC: 308 UG/DL (ref 240–430)
TSH SERPL DL<=0.005 MIU/L-ACNC: 2.02 MU/L (ref 0.4–4)

## 2022-09-21 PROCEDURE — 82533 TOTAL CORTISOL: CPT

## 2022-09-21 PROCEDURE — 84443 ASSAY THYROID STIM HORMONE: CPT

## 2022-09-21 PROCEDURE — 99215 OFFICE O/P EST HI 40 MIN: CPT | Performed by: INTERNAL MEDICINE

## 2022-09-21 PROCEDURE — 36415 COLL VENOUS BLD VENIPUNCTURE: CPT

## 2022-09-21 PROCEDURE — 83550 IRON BINDING TEST: CPT

## 2022-09-21 PROCEDURE — 84146 ASSAY OF PROLACTIN: CPT

## 2022-09-21 PROCEDURE — 82728 ASSAY OF FERRITIN: CPT

## 2022-09-21 PROCEDURE — 84439 ASSAY OF FREE THYROXINE: CPT

## 2022-09-21 RX ORDER — PRAMIPEXOLE DIHYDROCHLORIDE 0.12 MG/1
.125-.25 TABLET ORAL AT BEDTIME
Qty: 60 TABLET | Refills: 2 | Status: SHIPPED | OUTPATIENT
Start: 2022-09-21 | End: 2022-12-08

## 2022-09-21 ASSESSMENT — SLEEP AND FATIGUE QUESTIONNAIRES
HOW LIKELY ARE YOU TO NOD OFF OR FALL ASLEEP WHILE LYING DOWN TO REST IN THE AFTERNOON WHEN CIRCUMSTANCES PERMIT: HIGH CHANCE OF DOZING
HOW LIKELY ARE YOU TO NOD OFF OR FALL ASLEEP WHILE SITTING QUIETLY AFTER LUNCH WITHOUT ALCOHOL: MODERATE CHANCE OF DOZING
HOW LIKELY ARE YOU TO NOD OFF OR FALL ASLEEP WHILE WATCHING TV: SLIGHT CHANCE OF DOZING
HOW LIKELY ARE YOU TO NOD OFF OR FALL ASLEEP WHILE SITTING AND TALKING TO SOMEONE: WOULD NEVER DOZE
HOW LIKELY ARE YOU TO NOD OFF OR FALL ASLEEP WHILE SITTING AND READING: SLIGHT CHANCE OF DOZING
HOW LIKELY ARE YOU TO NOD OFF OR FALL ASLEEP WHILE SITTING INACTIVE IN A PUBLIC PLACE: SLIGHT CHANCE OF DOZING
HOW LIKELY ARE YOU TO NOD OFF OR FALL ASLEEP IN A CAR, WHILE STOPPED FOR A FEW MINUTES IN TRAFFIC: SLIGHT CHANCE OF DOZING
HOW LIKELY ARE YOU TO NOD OFF OR FALL ASLEEP WHEN YOU ARE A PASSENGER IN A CAR FOR AN HOUR WITHOUT A BREAK: HIGH CHANCE OF DOZING

## 2022-09-21 NOTE — PROGRESS NOTES
Sleep Study Follow-Up Visit:    Date on this visit: 2022    Gem Gama comes in today for follow-up of her sleep study done at the [facility] for fatigue and sleepiness (ESS 14) with long sleep times. Previous diagnoses of obstructive sleep apnea, complex sleep apnea, and narcolepsy with significant PLMS on sleep study . Circumstances in regards to narcolepsy diagnosis uncertain.  Previous treatment with PAP not felt to be beneficial, no details available. Currently additional symptoms of night sweats, nocturnal enuresis, headaches in morning. Comorbid coronary artery disease.  - Sleep onset difficulties (LEIA 14). Multifactorial: psychophysiologic insomnia comorbid to depression, sleep disordered breathing, narcolepsy, periodic limb movements, delayed sleep phase may all play a role. Insufficient time to address in detail. Referredl for cognitive behavioral training   - Nightmare disorder. Referred for possible dream rehearsal therapy, saw Dr. Gurrola 2022.      She has been sleepy since . Symptoms started after her   in   Fatigue also started about then. She states her first sleep study was in the . At that time she was diagnosed with obstructive sleep apnea, though diagnosis uncertain and no records are available.     She also carries a diagnosis of narcolepsy which she says was diagnosed with nap testing at [facility] between  and . She was treated with stimulant therapy until her heart attack in 2019    She has been on zoloft or other antidepressants off an on since     Sleep study [facility] 13 - OAHI 18, LAZARO 5.6, RDI 23, low O2 88%. PLMI 34, PLMAI 4.4    Sleep study [facility] 2013 (200#) - CPAP titrated to 14 cmh20 Cheyne-Rogers respirations were present. AHI 65, RDI 66. Central AHI 56. Low O2 88%. PLMI 10    Tireation study [facility] 13- Bilevel, ASV titration. ASV EPAP min 8. EPAP max 15 Min PS 1, Max PS 15, auto was 'optimal'.   - PLMI  39, PL<THELMA 3.8    She was on an ASV machine in 2014    She stopped using PAP approximately 2017 because it did not seem to help her feel better       Study Date: 9/6/2022 (186.0 lbs)      Sleep Architecture:   The total recording time of the polysomnogram was 458.3 minutes. The total sleep time was 335.0 minutes. Sleep latency was increased at 42.7 minutes with the use of a sleep aid (melatonin). REM latency was 244.5 minutes. Arousal index was increased at 44.1 arousals per hour. Sleep efficiency was decreased at 73.1%. Wake after sleep onset was 80.5 minutes. The patient spent 7.0% of total sleep time in Stage N1, 59.7% in Stage N2, 21.6% in Stage N3, and 11.6% in REM. Time in REM supine was 30.5 minutes.     Respiration:     Events ? The polysomnogram revealed a presence of 14 obstructive, 144 central, and 24 mixed apneas resulting in an apnea index of 32.6 events per hour. There were 8 obstructive hypopneas and 35 central hypopneas resulting in an obstructive hypopnea index of 1.4 and central hypopnea index of 6.3 events per hour. The combined apnea/hypopnea index was 40.3 events per hour (central apnea/hypopnea index was 32.1 events per hour). The REM AHI was 21.5 events per hour. The supine AHI was 61.0 events per hour. The RERA index was 1.1 events per hour.  The RDI was 41.4 events per hour.    Snoring - was reported as moderate to loud.    Respiratory rate and pattern - was notable for Cheyne-Rogers respiratory rate and pattern.     Sustained Sleep Associated Hypoventilation - Transcutaneous carbon dioxide monitoring was not used    Sleep Associated Hypoxemia - (Greater than 5 minutes O2 sat at or below 88%) was present. Baseline oxygen saturation was 93.4%. Lowest oxygen saturation was 74.0%. Time spent less than or equal to 88% was 33.1 minutes. Time spent less than or equal to 89% was 46.8 minutes. There was a prolonged desaturation late in the study lasting 22 minutes that is suspicious for artifact       Movement Activity:     Periodic Limb Activity - There were 483 PLMs during the entire study. The PLM index was 86.5 movements per hour. The PLM Arousal Index was 13.1 per hour.    REM EMG Activity - Excessive transient/sustained muscle activity was present in 32/78 epochs of REM    Nocturnal Behavior - Abnormal sleep related behaviors were not noted    Bruxism - None apparent.     Cardiac Summary:   The average pulse rate was 60.1 bpm. The minimum pulse rate was 49.0 bpm while the maximum pulse rate was 72.0 bpm.  Arrhythmias were not noted.    No dream enactment behavior.   These findings were reviewed with patient.     Past medical/surgical history, family history, social history, medications and allergies were reviewed.      Current Outpatient Medications   Medication Sig Dispense Refill     albuterol (PROAIR HFA/PROVENTIL HFA/VENTOLIN HFA) 108 (90 Base) MCG/ACT inhaler Inhale 1-2 puffs into the lungs every 4 hours as needed for shortness of breath / dyspnea or wheezing 18 g 11     amoxicillin (AMOXIL) 500 MG capsule Take as directed before dental work        aspirin (ASA) 81 MG EC tablet Take 1 tablet (81 mg) by mouth daily 90 tablet 0     atorvastatin (LIPITOR) 40 MG tablet Take 1 tablet (40 mg) by mouth daily 90 tablet 3     BREO ELLIPTA 200-25 MCG/INH Inhaler INHALE 1 PUFF INTO THE LUNGS DAILY       carvedilol (COREG) 3.125 MG tablet TAKE 1 TABLET(3.125 MG) BY MOUTH TWICE DAILY WITH MEALS 180 tablet 3     cholestyramine (QUESTRAN) 4 g packet Take 1 packet (4 g) by mouth 3 times daily (with meals) 30 packet 5     clopidogrel (PLAVIX) 75 MG tablet Take 1 tablet (75 mg) by mouth daily 90 tablet 3     isosorbide mononitrate (IMDUR) 30 MG 24 hr tablet Take 1 tablet (30 mg) by mouth daily 90 tablet 3     loratadine (CLARITIN) 10 MG tablet Take 10 mg by mouth daily       Melatonin 10 MG TABS tablet Take 10 mg by mouth nightly as needed for sleep       mirabegron (MYRBETRIQ) 50 MG 24 hr tablet Take 1 tablet (50  mg) by mouth daily 90 tablet 1     nitroGLYcerin (NITROSTAT) 0.4 MG sublingual tablet Place 1 tablet (0.4 mg) under the tongue every 5 minutes as needed for chest pain 25 tablet 11     pantoprazole (PROTONIX) 40 MG EC tablet Take 1 tablet (40 mg) by mouth daily 90 tablet 3     phenazopyridine (PYRIDIUM) 100 MG tablet Take 1 tablet (100 mg) by mouth 3 times daily for 2 days 6 tablet 5     Prenatal Multivit-Min-Fe-FA (PRENATAL/IRON) TABS Take 1 tablet by mouth daily        saccharomyces boulardii (FLORASTOR) 250 MG capsule Take 1 capsule (250 mg) by mouth 2 times daily 14 capsule 0     sertraline (ZOLOFT) 100 MG tablet Take 1 tablet (100 mg) by mouth daily 90 tablet 3     ursodiol (ACTIGALL) 300 MG capsule TAKE 1 CAPSULE(300 MG) BY MOUTH TWICE DAILY 180 capsule 3     valsartan (DIOVAN) 40 MG tablet Take 1 tablet (40 mg) by mouth daily 90 tablet 3     vitamin D3 (CHOLECALCIFEROL) 2000 units (50 mcg) tablet Take 1 tablet (2,000 Units) by mouth daily 90 tablet 3     tiotropium (SPIRIVA RESPIMAT) 2.5 MCG/ACT inhaler Inhale 2 puffs into the lungs daily for 30 days 4 g 11         Problem List:  Patient Active Problem List    Diagnosis Date Noted     Coronary artery disease with angina pectoris, unspecified vessel or lesion type, unspecified whether native or transplanted heart (H) 12/09/2020     Priority: High     Hospitalized 10/9/2019-10/13/2019 due to STEMI and underwent EDWIN x 2 to proximal LAD. Coronary angiogram showed severe single vessel obstructive CAD of LAD (100%) as well as moderate non-obstructive CAD in OM1 and OM2 (60%, 50%). Patient initially requiring intraaortic balloon pump and pressor support. Echocardiogram showed EF 35-40%.         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 AHI 41.6, RDI 42.6, Supine AHI 67.7, REM AHI 24.6, Low O2 74.0%,  Time Spent ?88% 33.1 minutes / Time Spent ?89% 46.8 minutes. There was a prolonged desaturation late in the study lasting 22 minutes that is suspicious for artifact. PLM index was 86.5 movements per hour. The PLM Arousal Index was 13.1 per hour.       Clostridium difficile infection 09/04/2022     Priority: Medium     Hyperlipidemia LDL goal <70 10/15/2020     Priority: Medium     History of ischemic cardiomyopathy 10/25/2019     Priority: Medium     Hospitalized 10/9/2019-10/13/2019 due to STEMI. Echocardiogram showed EF 35-40%. Later echocardiogram with improved EF 55-60%       Stage 3a chronic kidney disease (H) 12/18/2017     Priority: Medium     Primary narcolepsy without cataplexy 02/09/2017     Priority: Medium     Pituitary microadenoma (H) 08/29/2013     Priority: Medium     MRI 2011- There is a triangular-shaped area with delayed contrast enhancement at the left lateral portion of the pituitary gland posteriorly, measuring 2.5 x 4.3 mm. This is suggestive of a microadenoma (series 103, image 6       Major depressive disorder, recurrent episode, moderate (H) 07/25/2013     Priority: Medium     Intermittent asthma 06/18/1990     Priority: Medium     PFTS 12/2021 - FEV1- 1.83 (86%), ratio 0.74, 14% change with bronchodilators,  %, %, DLCO 90%        Hiatal hernia 08/26/2022     Priority: Low     Chronic insomnia 06/02/2022     Priority: Low     Class 1 obesity due to excess calories with serious comorbidity and body mass index (BMI) of 30.0 to 30.9 in adult 06/01/2022     Priority: Low     Cholelithiasis without cholecystitis 10/15/2020     Priority: Low     Overactive bladder 09/27/2018     Priority: Low     Fatigue, unspecified type 09/27/2018     Priority: Low     Hisotry of centeral apnea. BiPaP not effective. Cannot face mask either. Normal thyroid function tests last  9/2017.       Incontinence of feces with fecal urgency 03/15/2018     Priority: Low     Vitamin D deficiency 02/23/2017      Priority: Low     Esophageal reflux (GERD) 08/10/2014     Priority: Low     Osteoarthritis of right knee 06/24/2014     Priority: Low     Advanced directives, counseling/discussion 06/18/2012     Priority: Low     Discussed advance care planning with patient; information given to patient to review. 6/18/2012   Erin Hahn MA        Environmental allergies 07/13/2011     Priority: Low     Overview:   Dust, mold, cats , dogs, trees , dustmite and weeds.       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           Impression/Plan:    Primary breathing abnormality is Cheyne lopez breathing with desaturations and arousals  - MRI brain (scheduled)  - Repeat polysomnography with full night titration of positive airway pressure therapy for the control of sleep disordered breathing.  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 No dream enactment behavior.   Implication discussed       I spent 35 minutes with patient including counseling, and 15 minutes with chart review, and documentation

## 2022-09-21 NOTE — NURSING NOTE
"Chief Complaint   Patient presents with     Study Results       Initial /67   Pulse 77   Resp 16   Ht 1.626 m (5' 4\")   Wt 83.2 kg (183 lb 6.4 oz)   LMP  (LMP Unknown)   SpO2 96%   BMI 31.48 kg/m   Estimated body mass index is 31.48 kg/m  as calculated from the following:    Height as of this encounter: 1.626 m (5' 4\").    Weight as of this encounter: 83.2 kg (183 lb 6.4 oz).    Medication Reconciliation: complete  ESS: 12  Neck circumference: 39 centimeters.  DME: N/A  Tomasa Quarles CMA      "

## 2022-09-21 NOTE — NURSING NOTE
PSG Titration with ASV and follow-up appointment with provider are both scheduled. Gave patient PSG hand-out and COVID-19 scheduling information at clinic visit. Patient wrote notes to herself to remember to bring her CPAP machine in to us to DL.  Got labs drawn.   Tomasa Quarles, CMA

## 2022-09-26 ENCOUNTER — TELEPHONE (OUTPATIENT)
Dept: FAMILY MEDICINE | Facility: CLINIC | Age: 72
End: 2022-09-26

## 2022-09-26 NOTE — TELEPHONE ENCOUNTER
Patient calling for a medication Questran and the insurance company will allow 2 times a day. [facility].  Patient needs for self cathereter sticks due to Medicare they need the office notes to say:  Self Cathereterization, how many per day and per month, length of the diagnosis.  Stephy Freeman MA  [facility]  2nd Floor  Primary Care

## 2022-10-01 ENCOUNTER — ANCILLARY PROCEDURE (OUTPATIENT)
Dept: MRI IMAGING | Facility: CLINIC | Age: 72
End: 2022-10-01
Attending: INTERNAL MEDICINE
Payer: MEDICARE

## 2022-10-01 DIAGNOSIS — D35.2 PITUITARY MICROADENOMA (H): ICD-10-CM

## 2022-10-01 DIAGNOSIS — R06.3 CHEYNE-STOKES BREATHING DISORDER: ICD-10-CM

## 2022-10-01 PROCEDURE — G1010 CDSM STANSON: HCPCS | Mod: GC | Performed by: RADIOLOGY

## 2022-10-01 PROCEDURE — A9585 GADOBUTROL INJECTION: HCPCS | Performed by: RADIOLOGY

## 2022-10-01 PROCEDURE — 70553 MRI BRAIN STEM W/O & W/DYE: CPT | Mod: MG | Performed by: RADIOLOGY

## 2022-10-01 RX ORDER — GADOBUTROL 604.72 MG/ML
10 INJECTION INTRAVENOUS ONCE
Status: COMPLETED | OUTPATIENT
Start: 2022-10-01 | End: 2022-10-01

## 2022-10-01 RX ADMIN — GADOBUTROL 8.5 ML: 604.72 INJECTION INTRAVENOUS at 11:52

## 2022-10-04 ENCOUNTER — OFFICE VISIT (OUTPATIENT)
Dept: CARDIOLOGY | Facility: CLINIC | Age: 72
End: 2022-10-04
Payer: MEDICARE

## 2022-10-04 VITALS
HEART RATE: 65 BPM | HEIGHT: 64 IN | WEIGHT: 195 LBS | SYSTOLIC BLOOD PRESSURE: 126 MMHG | DIASTOLIC BLOOD PRESSURE: 76 MMHG | BODY MASS INDEX: 33.29 KG/M2 | OXYGEN SATURATION: 97 %

## 2022-10-04 DIAGNOSIS — R06.09 DYSPNEA ON EXERTION: ICD-10-CM

## 2022-10-04 DIAGNOSIS — I25.10 CORONARY ARTERY DISEASE INVOLVING NATIVE HEART WITHOUT ANGINA PECTORIS, UNSPECIFIED VESSEL OR LESION TYPE: Primary | ICD-10-CM

## 2022-10-04 PROCEDURE — 99214 OFFICE O/P EST MOD 30 MIN: CPT | Performed by: INTERNAL MEDICINE

## 2022-10-04 NOTE — PATIENT INSTRUCTIONS
-Stop plavix, continue aspirin indefinitely  -No other changes made today  -Follow up in 12 months with Dr. Dennison

## 2022-10-04 NOTE — LETTER
10/4/2022    Danny Conteh MD  [address]  [place] [address]    RE: Gem Gama       Dear Colleague,     I had the pleasure of seeing Gem Gama in the [facility].  CARDIOLOGY CLINIC VISIT   DATE OF SERVICE:  October 4, 2022  PRIMARY CARE PHYSICIAN:  Danny Conteh    HISTORY OF PRESENT ILLNESS:  Ms. Gme Gama is a very pleasant 68 year old female with a history of coronary artery disease, ischemic cardiomyopathy, hypertension, CKD, asthma and depression. She presents to clinic today for follow up.  She was last seen by me in June 2021,     In brief review, the patient was hospitalized 10/9/2019-10/13/2019 due to STEMI, EKG demonstrated ST elevation in the anterolateral leads, patient was taken emergently taken to cath lab and underwent EDWIN x 2 to proximal LAD. Coronary angiogram showed severe single vessel obstructive CAD of LAD (100%) as well as moderate non-obstructive CAD in OM1 and OM2 (60%, 50%). Patient initially requiring intraaortic balloon pump and pressor support. Echocardiogram showed EF 35-40%, grade I diastolic dysfunction, RV normal function and size, and no significant valvular disease. Patient was taken back to cath lab to have RCA evaluated which showed no obstructive disease (40% RCA stenosis).  Patient was initially on Brilinta and aspirin, but due to cost Brilinta was changed to Plavix. Patient was started on carvedilol, losartan and atorvastatin.     Heidy was rehospitalized in February 2020 with complaints of chest pain and nausea/vomiting. This was thought to be related to something she ate followed by a vagal reaction. Patient tells me that after discharge her PCP found gallstones which were felt to likely be contributing. An echocardiogram was completed at this time which demonstrated normalization of LV function, EF 55-60% and no regional wall motion abnormalities. She was started on low dose lisinopril 2.5mg.        She was  "again hospitalized 12/9-12/12/2020 for chest pain. Nuclear stress test completed which showed small area of a mild degree of infarction in the apical segment associated with mild degree of jasmeet-infarct ischemia. Cardiac catheterization completed 12/11 no acute coronary lesions, widely patent proximal-mid LAD stent,  at the ostium of the jailed, small-moderate D1 branch, not amendable to PCI. CTA chest 12/12 no evidence of thoracic aortic dissection and no evidence of PE. She was discharged on her prior to admission medications.     At our last clinic visit in November, Heidy reported concerns of ongoing shortness of breath for \"years\".  This will occur at random; sometimes with activity, sometimes at rest.  She states she will be breathing fine and all of a sudden feel like she can't breath.  She takes a deep breath in and that helps.  At that time, she had a nuclear stress test performed which demonstrated a small area of nontransmural infarct in the mid to distal anteroseptal and apical segments with mild jasmeet-infarct ischemia consistent with her prior MI.   She had an echocardiogram performed last week which demonstrated significant improvement in her LV function to EF 50-55% with hypokinesis of the mid anteroseptal and inferoseptal segments.  She had PFTs done which were consistent with asthma and was seen by pulmonary.  She was started on Breo and albuterol and has been attending pulmonary rehab.      In follow up today, Gem reports feeling better.  She feels her shortness of breath has improved, although not entirely resolved.  She denies any chest pain, chest discomfort.  She denies any heart palpitation or racing heart.  She is otherwise without cardiac complaints.          PAST MEDICAL HISTORY:  Past Medical History:   Diagnosis Date     ADHD (attention deficit hyperactivity disorder)      Anxiety      Arthritis     back, hands, knees, hips     Asthma      C. difficile diarrhea      Central sleep " apnea      Cheyne-Rogers breathing 09/07/2022     Coronary artery disease involving native coronary artery of native heart without angina pectoris      Depression      Fever and chills 09/24/2021     Gastro-oesophageal reflux disease      Hypertension      Ischemic cardiomyopathy      Major depressive disorder, recurrent episode, moderate (H) 07/25/2013     Narcolepsy      Pituitary microadenoma (H) 2011    MRI 2011- There is a triangular-shaped area with delayed contrast enhancement at the left lateral portion of the pituitary gland posteriorly, measuring 2.5 x 4.3 mm. This is suggestive of a microadenoma, MRI 10/1/22 - Normal pituitary gland and whole brain MRI.     Pyelonephritis of right kidney 10/24/2021     Renal disease      S/P hip replacement 2004    Bilateral fall 2004 due to OA     Sleep apnea      Status post total knee replacement 12/29/2014     Urinary tract infection with hematuria, site unspecified 09/24/2021       MEDICATIONS:  Current Outpatient Medications   Medication     albuterol (PROAIR HFA/PROVENTIL HFA/VENTOLIN HFA) 108 (90 Base) MCG/ACT inhaler     amoxicillin (AMOXIL) 500 MG capsule     aspirin (ASA) 81 MG EC tablet     atorvastatin (LIPITOR) 40 MG tablet     BREO ELLIPTA 200-25 MCG/INH Inhaler     carvedilol (COREG) 3.125 MG tablet     cholestyramine (QUESTRAN) 4 g packet     clopidogrel (PLAVIX) 75 MG tablet     isosorbide mononitrate (IMDUR) 30 MG 24 hr tablet     loratadine (CLARITIN) 10 MG tablet     Melatonin 10 MG TABS tablet     mirabegron (MYRBETRIQ) 50 MG 24 hr tablet     nitroGLYcerin (NITROSTAT) 0.4 MG sublingual tablet     pantoprazole (PROTONIX) 40 MG EC tablet     pramipexole (MIRAPEX) 0.125 MG tablet     Prenatal Multivit-Min-Fe-FA (PRENATAL/IRON) TABS     saccharomyces boulardii (FLORASTOR) 250 MG capsule     sertraline (ZOLOFT) 100 MG tablet     ursodiol (ACTIGALL) 300 MG capsule     valsartan (DIOVAN) 40 MG tablet     vitamin D3 (CHOLECALCIFEROL) 2000 units (50 mcg)  tablet     tiotropium (SPIRIVA RESPIMAT) 2.5 MCG/ACT inhaler     No current facility-administered medications for this visit.       ALLERGIES:  Allergies   Allergen Reactions     Latex Other (See Comments) and Shortness Of Breath     Runny nose  PN: LW Reaction: DIFFICULTY BREATHING       Flagyl [Metronidazole Hcl] Anaphylaxis and Rash     Food      PN: LW FI1: nka LW FI2:     Hydrochlorothiazide W/Triamterene      PN: LW Reaction: skin rash     No Clinical Screening - See Comments      PN: LW Other1: -Adhesive Tape     Seasonal Allergies      sneezing     Sulfa Drugs Anaphylaxis, Hives and Itching     PN: LW Reaction: HIVES, Swelling     Tape [Adhesive Tape] Hives     Metoprolol Itching and Rash     Metronidazole Hives and Rash     PN: LW Reaction: HIVES         SOCIAL HISTORY:  Nonsmoker, no significant ETOH    FAMILY HISTORY:  I have reviewed this patient's family history and updated it with pertinent information if needed.   Family History   Problem Relation Age of Onset     Hypertension Mother      Arthritis Mother      Cerebrovascular Disease Mother      Cerebrovascular Disease Father         Three Strokes     Diabetes Father         and brother     Kidney Disease Father      Diabetes Brother      Birth defects Brother      Thyroid Disease Sister         Hypothyroid     Sjogren's Daughter      Diabetes Daughter      Cerebrovascular Disease Daughter      Diabetes Son        REVIEW OF SYSTEMS:  A complete ROS was obtained and the pertinent positives are outlined in the history of present illness above.  The remainder of systems is negative.      PHYSICAL EXAM:                     Vital Signs with Ranges     195 lbs 0 oz    Constitutional: awake, alert, no distress  Eyes: PERRL, sclera nonicteric  ENT: trachea midline  Respiratory: CTAB  Cardiovascular: RRR no m/r/g, no JVD  GI: nondistended, nontender, bowel sounds present  Lymph/Hematologic: no lymphadenopathy  Skin: dry, no rash  Musculoskeletal: good muscle  tone, strength 5/5 in upper and lower extremities  Neurologic: no focal deficits  Neuropsychiatric: appropriate affact          ASSESSMENT:  1.  Coronary artery disease:  S/P anterior STEMI in 10/2019 with EDWIN x2 to mid LAD.  Repeat coronary angiogram in 12/2020 demonstrated patent stents and nonobstructive CAD.  2.  Hx of ischemic cardiomyopathy:  Initial EF 35-40% with significant improvement by most recent echocardiogram 10/2021 with EF 50-55%  3.  Chronic dyspnea on exertion:  Improved.   Suspect this is multifactorial in the setting of asthma, deconditioning.  Do not suspect cardiac etiology.    4.  Hypertension:  At goal  5.  Hyperlipidemia:  At goal on atorvastatin      RECOMMENDATIONS:  -She has been maintained on ASA/pavix > 2years.  Reviewed risks/benefits of dual antiplatelet therapy.  At this point, recommend stopping plavix and continue ASA indefinitely.  She was agreeable with this plan  -Plan for follow up in 12 months or before than as necessary      Mel Dennison MD [facility]  October 4, 2022    Thank you for allowing me to participate in the care of your patient.      Sincerely,     Mel Dennison MD     [facility]  cc:   Mel Dennison MD  [facility] [place]  [address]  [place] [id number]

## 2022-10-11 NOTE — PROGRESS NOTES
CARDIOLOGY CLINIC VISIT   DATE OF SERVICE:  October 4, 2022  PRIMARY CARE PHYSICIAN:  Danny Balbuena Vocal    HISTORY OF PRESENT ILLNESS:  Ms. Gem Gama is a very pleasant 68 year old female with a history of coronary artery disease, ischemic cardiomyopathy, hypertension, CKD, asthma and depression. She presents to clinic today for follow up.  She was last seen by me in June 2021,     In brief review, the patient was hospitalized 10/9/2019-10/13/2019 due to STEMI, EKG demonstrated ST elevation in the anterolateral leads, patient was taken emergently taken to cath lab and underwent EDWIN x 2 to proximal LAD. Coronary angiogram showed severe single vessel obstructive CAD of LAD (100%) as well as moderate non-obstructive CAD in OM1 and OM2 (60%, 50%). Patient initially requiring intraaortic balloon pump and pressor support. Echocardiogram showed EF 35-40%, grade I diastolic dysfunction, RV normal function and size, and no significant valvular disease. Patient was taken back to cath lab to have RCA evaluated which showed no obstructive disease (40% RCA stenosis).  Patient was initially on Brilinta and aspirin, but due to cost Brilinta was changed to Plavix. Patient was started on carvedilol, losartan and atorvastatin.     Heidy was rehospitalized in February 2020 with complaints of chest pain and nausea/vomiting. This was thought to be related to something she ate followed by a vagal reaction. Patient tells me that after discharge her PCP found gallstones which were felt to likely be contributing. An echocardiogram was completed at this time which demonstrated normalization of LV function, EF 55-60% and no regional wall motion abnormalities. She was started on low dose lisinopril 2.5mg.        She was again hospitalized 12/9-12/12/2020 for chest pain. Nuclear stress test completed which showed small area of a mild degree of infarction in the apical segment associated with mild degree of jasmeet-infarct ischemia.  "Cardiac catheterization completed 12/11 no acute coronary lesions, widely patent proximal-mid LAD stent,  at the ostium of the jailed, small-moderate D1 branch, not amendable to PCI. CTA chest 12/12 no evidence of thoracic aortic dissection and no evidence of PE. She was discharged on her prior to admission medications.     At our last clinic visit in November, Heidy reported concerns of ongoing shortness of breath for \"years\".  This will occur at random; sometimes with activity, sometimes at rest.  She states she will be breathing fine and all of a sudden feel like she can't breath.  She takes a deep breath in and that helps.  At that time, she had a nuclear stress test performed which demonstrated a small area of nontransmural infarct in the mid to distal anteroseptal and apical segments with mild jasmeet-infarct ischemia consistent with her prior MI.   She had an echocardiogram performed last week which demonstrated significant improvement in her LV function to EF 50-55% with hypokinesis of the mid anteroseptal and inferoseptal segments.  She had PFTs done which were consistent with asthma and was seen by pulmonary.  She was started on Breo and albuterol and has been attending pulmonary rehab.      In follow up today, Gem reports feeling better.  She feels her shortness of breath has improved, although not entirely resolved.  She denies any chest pain, chest discomfort.  She denies any heart palpitation or racing heart.  She is otherwise without cardiac complaints.          PAST MEDICAL HISTORY:  Past Medical History:   Diagnosis Date     ADHD (attention deficit hyperactivity disorder)      Anxiety      Arthritis     back, hands, knees, hips     Asthma      C. difficile diarrhea      Central sleep apnea      Cheyne-Rogers breathing 09/07/2022     Coronary artery disease involving native coronary artery of native heart without angina pectoris      Depression      Fever and chills 09/24/2021     " Gastro-oesophageal reflux disease      Hypertension      Ischemic cardiomyopathy      Major depressive disorder, recurrent episode, moderate (H) 07/25/2013     Narcolepsy      Pituitary microadenoma (H) 2011    MRI 2011- There is a triangular-shaped area with delayed contrast enhancement at the left lateral portion of the pituitary gland posteriorly, measuring 2.5 x 4.3 mm. This is suggestive of a microadenoma, MRI 10/1/22 - Normal pituitary gland and whole brain MRI.     Pyelonephritis of right kidney 10/24/2021     Renal disease      S/P hip replacement 2004    Bilateral fall 2004 due to OA     Sleep apnea      Status post total knee replacement 12/29/2014     Urinary tract infection with hematuria, site unspecified 09/24/2021       MEDICATIONS:  Current Outpatient Medications   Medication     albuterol (PROAIR HFA/PROVENTIL HFA/VENTOLIN HFA) 108 (90 Base) MCG/ACT inhaler     amoxicillin (AMOXIL) 500 MG capsule     aspirin (ASA) 81 MG EC tablet     atorvastatin (LIPITOR) 40 MG tablet     BREO ELLIPTA 200-25 MCG/INH Inhaler     carvedilol (COREG) 3.125 MG tablet     cholestyramine (QUESTRAN) 4 g packet     clopidogrel (PLAVIX) 75 MG tablet     isosorbide mononitrate (IMDUR) 30 MG 24 hr tablet     loratadine (CLARITIN) 10 MG tablet     Melatonin 10 MG TABS tablet     mirabegron (MYRBETRIQ) 50 MG 24 hr tablet     nitroGLYcerin (NITROSTAT) 0.4 MG sublingual tablet     pantoprazole (PROTONIX) 40 MG EC tablet     pramipexole (MIRAPEX) 0.125 MG tablet     Prenatal Multivit-Min-Fe-FA (PRENATAL/IRON) TABS     saccharomyces boulardii (FLORASTOR) 250 MG capsule     sertraline (ZOLOFT) 100 MG tablet     ursodiol (ACTIGALL) 300 MG capsule     valsartan (DIOVAN) 40 MG tablet     vitamin D3 (CHOLECALCIFEROL) 2000 units (50 mcg) tablet     tiotropium (SPIRIVA RESPIMAT) 2.5 MCG/ACT inhaler     No current facility-administered medications for this visit.       ALLERGIES:  Allergies   Allergen Reactions     Latex Other (See  Comments) and Shortness Of Breath     Runny nose  PN: LW Reaction: DIFFICULTY BREATHING       Flagyl [Metronidazole Hcl] Anaphylaxis and Rash     Food      PN: LW FI1: nka LW FI2:     Hydrochlorothiazide W/Triamterene      PN: LW Reaction: skin rash     No Clinical Screening - See Comments      PN: LW Other1: -Adhesive Tape     Seasonal Allergies      sneezing     Sulfa Drugs Anaphylaxis, Hives and Itching     PN: LW Reaction: HIVES, Swelling     Tape [Adhesive Tape] Hives     Metoprolol Itching and Rash     Metronidazole Hives and Rash     PN: LW Reaction: HIVES         SOCIAL HISTORY:  Nonsmoker, no significant ETOH    FAMILY HISTORY:  I have reviewed this patient's family history and updated it with pertinent information if needed.   Family History   Problem Relation Age of Onset     Hypertension Mother      Arthritis Mother      Cerebrovascular Disease Mother      Cerebrovascular Disease Father         Three Strokes     Diabetes Father         and brother     Kidney Disease Father      Diabetes Brother      Birth defects Brother      Thyroid Disease Sister         Hypothyroid     Sjogren's Daughter      Diabetes Daughter      Cerebrovascular Disease Daughter      Diabetes Son        REVIEW OF SYSTEMS:  A complete ROS was obtained and the pertinent positives are outlined in the history of present illness above.  The remainder of systems is negative.      PHYSICAL EXAM:                     Vital Signs with Ranges     195 lbs 0 oz    Constitutional: awake, alert, no distress  Eyes: PERRL, sclera nonicteric  ENT: trachea midline  Respiratory: CTAB  Cardiovascular: RRR no m/r/g, no JVD  GI: nondistended, nontender, bowel sounds present  Lymph/Hematologic: no lymphadenopathy  Skin: dry, no rash  Musculoskeletal: good muscle tone, strength 5/5 in upper and lower extremities  Neurologic: no focal deficits  Neuropsychiatric: appropriate affact          ASSESSMENT:  1.  Coronary artery disease:  S/P anterior STEMI in  10/2019 with EDWIN x2 to mid LAD.  Repeat coronary angiogram in 12/2020 demonstrated patent stents and nonobstructive CAD.  2.  Hx of ischemic cardiomyopathy:  Initial EF 35-40% with significant improvement by most recent echocardiogram 10/2021 with EF 50-55%  3.  Chronic dyspnea on exertion:  Improved.   Suspect this is multifactorial in the setting of asthma, deconditioning.  Do not suspect cardiac etiology.    4.  Hypertension:  At goal  5.  Hyperlipidemia:  At goal on atorvastatin      RECOMMENDATIONS:  -She has been maintained on ASA/pavix > 2years.  Reviewed risks/benefits of dual antiplatelet therapy.  At this point, recommend stopping plavix and continue ASA indefinitely.  She was agreeable with this plan  -Plan for follow up in 12 months or before than as necessary      Mel Dennison MD [facility]  October 4, 2022

## 2022-10-21 ENCOUNTER — VIRTUAL VISIT (OUTPATIENT)
Dept: SLEEP MEDICINE | Facility: CLINIC | Age: 72
End: 2022-10-21
Payer: MEDICARE

## 2022-10-21 VITALS — WEIGHT: 183 LBS | BODY MASS INDEX: 31.24 KG/M2 | HEIGHT: 64 IN

## 2022-10-21 DIAGNOSIS — G47.61 PLMD (PERIODIC LIMB MOVEMENT DISORDER): Primary | ICD-10-CM

## 2022-10-21 DIAGNOSIS — G47.33 OSA (OBSTRUCTIVE SLEEP APNEA): ICD-10-CM

## 2022-10-21 DIAGNOSIS — F51.04 CHRONIC INSOMNIA: ICD-10-CM

## 2022-10-21 PROCEDURE — 90832 PSYTX W PT 30 MINUTES: CPT | Mod: 95 | Performed by: PSYCHOLOGIST

## 2022-10-21 ASSESSMENT — SLEEP AND FATIGUE QUESTIONNAIRES
HOW LIKELY ARE YOU TO NOD OFF OR FALL ASLEEP IN A CAR, WHILE STOPPED FOR A FEW MINUTES IN TRAFFIC: WOULD NEVER DOZE
HOW LIKELY ARE YOU TO NOD OFF OR FALL ASLEEP WHILE SITTING INACTIVE IN A PUBLIC PLACE: SLIGHT CHANCE OF DOZING
HOW LIKELY ARE YOU TO NOD OFF OR FALL ASLEEP WHILE SITTING AND READING: MODERATE CHANCE OF DOZING
HOW LIKELY ARE YOU TO NOD OFF OR FALL ASLEEP WHILE WATCHING TV: SLIGHT CHANCE OF DOZING
HOW LIKELY ARE YOU TO NOD OFF OR FALL ASLEEP WHILE SITTING AND TALKING TO SOMEONE: SLIGHT CHANCE OF DOZING
HOW LIKELY ARE YOU TO NOD OFF OR FALL ASLEEP WHILE SITTING QUIETLY AFTER LUNCH WITHOUT ALCOHOL: SLIGHT CHANCE OF DOZING
HOW LIKELY ARE YOU TO NOD OFF OR FALL ASLEEP WHILE LYING DOWN TO REST IN THE AFTERNOON WHEN CIRCUMSTANCES PERMIT: SLIGHT CHANCE OF DOZING
HOW LIKELY ARE YOU TO NOD OFF OR FALL ASLEEP WHEN YOU ARE A PASSENGER IN A CAR FOR AN HOUR WITHOUT A BREAK: MODERATE CHANCE OF DOZING

## 2022-10-21 ASSESSMENT — PAIN SCALES - GENERAL: PAINLEVEL: NO PAIN (0)

## 2022-10-21 NOTE — PROGRESS NOTES
Heidy is a 71 year old who is being evaluated via a billable video visit.      How would you like to obtain your AVS? MyChart  If the video visit is dropped, the invitation should be resent by: Text to cell phone: [phone]  Will anyone else be joining your video visit? Leeann      Jovanna Ganesh    Video-Visit Details    Video Start Time: 2:08 PM    Type of service:  Video Visit    Video End Time:2:36 PM    Originating Location (pt. Location): Home    Distant Location (provider location):  Off-site    Platform used for Video Visit: [facility]    SLEEP MEDICINE VIRTUAL VIDEO FOLLOW-UP VISIT  Sleep Psychology    Patient Name: Gem Gama  MRN:  [id number]  Date of Service: Oct 21, 2022       Subjective Report     Gem Gama  returns for a telehealth video visit to discuss progress in implementing behavioral strategies for the management of insomnia.  Patient consent for initiation of video visit was obtained and documented prior to initiation of visit.     Gem reports she altered her sleep schedule to 2 am to 10 am instead of original plan.  She feels she is better able to keep this schedule.    She was frustrated using the Insomnia  Mirna so she downloaded the [employer] Sleep Cycle Mirna which functions as an accelerometer with her smart watch.  The device also has sleep stories and activities which has been helpful.    Patient indicates she has been able to schedule appointment with a trauma therapist to begin EMDR therapy.  Overall mood has been improved with some overall reduction in anxiety.    We focused on behavioral transition from sleeping on her couch, reinforced consistently since death of her  in the late 1990s.  Discussed setting up and adapting to one of her bedrooms for the purpose of sleep.  Discussed use of bolster, weighted blanket and other strategies.  Discussed the rationale for creating a distinct bedroom space to strengthen the space as a cue for sleep only     .     Sleep Data:  "    Source of Sleep Estimates:  Self-report from a [employer] application    Average Time in Bed: Approximately 8 hours  Average Total Sleep Time: 6-7 hrs  Sleep Efficiency: Estimated to be between 75 and 85% %    EPWORTH SLEEPINESS SCALE    Sitting and reading 2   Watching TV 1   Sitting, inactive in a public place (theatre or mtg.) 1    As a passenger in a car 2   Lying down to rest in the afternoon when circumstance permit 1   Sitting and talking to someone 1   Sitting quietly after lunch without alcohol 1   In a car, while stopped for a few minutes in traffic 0   TOTAL SCORE (nl <11) 9     INSOMNIA SEVERITY INDEX     Difficulty falling asleep 3   Difficult staying asleep 2   Problems waking up to early 0   How SATISFIED/DISSATISFIED are you with your CURRENT sleep pattern? 2   How NOTICEABLE to others do you think your sleep pattern is in terms of your quality of life? 2   How WORRIED/DISTRESSED are you about your current sleep pattern? 3   To what extent do you consider your sleep problem to INTERFERE with your daily fuctioning(e.g. daytime fatigue, mood, ability to function at work/daily chores, concentration, mood,etc.) CURRENTLY? 3   INSOMNIA SEVERITY INDEX TOTAL SCORE 15    Absence of insomnia (0-7); sub-threshold insomnia (8-14); moderate insomnia (15-21); and severe insomnia (22-28)       Interventions     Strategies and recommendations including Stimulus control therapy and Sleep hygiene training were discussed today.       Vital Signs     Ht 1.626 m (5' 4\")   Wt 83 kg (183 lb)   LMP  (LMP Unknown)   BMI 31.41 kg/m       Mental Status     Orientation:  X3  Mood:  normal  Affect:  Congruent with mood  Speech/Language:  Normal  Thought Process: Intact  Associations:  Normal  Thought Content: Normal  Patient does not report any suicidal ideation, intention or plan.    Diagnostic Impressions and Plan     Chronic Insomnia  PLMD (periodic limb movement disorder)   LEA    Since last visit patient has made " progress in establishing a consistent sleep-wake schedule of 2 AM-10 AM.  She has also found referral to begin trauma therapy with EMDR therapy.  Focus of this visit was on sleep hygiene and transitioning from long-term use of self for sleep to establishing a bedroom as her consistent sleep space.    Plan:  Continue current sleep schedule and plan , Initiate trauma therapy with EMDR, next behavioral sleep goal is to establish one of her bedrooms as her sleep space and use 100% of the time    Follow-up: 2 months      Chandu Gurrola PsyD, VERA, [facility]  Diplomate, Behavioral Sleep Medicine  [facility]      Note: This dictation was created using voice recognition software. This document may contain an error not identified before finalizing the document. If the error changes the accuracy of the document, I would appreciate it being brought to my attention.

## 2022-10-29 ENCOUNTER — HEALTH MAINTENANCE LETTER (OUTPATIENT)
Age: 72
End: 2022-10-29

## 2022-10-31 PROCEDURE — 99285 EMERGENCY DEPT VISIT HI MDM: CPT | Mod: 25

## 2022-11-01 ENCOUNTER — HOSPITAL ENCOUNTER (INPATIENT)
Facility: CLINIC | Age: 72
LOS: 7 days | Discharge: HOME | DRG: 872 | End: 2022-11-08
Attending: EMERGENCY MEDICINE | Admitting: HOSPITALIST
Payer: MEDICARE

## 2022-11-01 DIAGNOSIS — N12 PYELONEPHRITIS: ICD-10-CM

## 2022-11-01 DIAGNOSIS — A49.8 CLOSTRIDIUM DIFFICILE INFECTION: Primary | ICD-10-CM

## 2022-11-01 DIAGNOSIS — A41.9 SEPSIS, DUE TO UNSPECIFIED ORGANISM, UNSPECIFIED WHETHER ACUTE ORGAN DYSFUNCTION PRESENT (H): ICD-10-CM

## 2022-11-01 LAB
ACINETOBACTER SPECIES: NOT DETECTED
ALBUMIN SERPL-MCNC: 3.9 G/DL (ref 3.4–5)
ALBUMIN UR-MCNC: 20 MG/DL
ALP SERPL-CCNC: 90 U/L (ref 40–150)
ALT SERPL W P-5'-P-CCNC: 27 U/L (ref 0–50)
ANION GAP SERPL CALCULATED.3IONS-SCNC: 9 MMOL/L (ref 3–14)
APPEARANCE UR: ABNORMAL
AST SERPL W P-5'-P-CCNC: 33 U/L (ref 0–45)
BACTERIA #/AREA URNS HPF: ABNORMAL /HPF
BASOPHILS # BLD AUTO: 0 10E3/UL (ref 0–0.2)
BASOPHILS NFR BLD AUTO: 0 %
BILIRUB SERPL-MCNC: 2 MG/DL (ref 0.2–1.3)
BILIRUB UR QL STRIP: NEGATIVE
BUN SERPL-MCNC: 18 MG/DL (ref 7–30)
CALCIUM SERPL-MCNC: 9.2 MG/DL (ref 8.5–10.1)
CHLORIDE BLD-SCNC: 100 MMOL/L (ref 94–109)
CITROBACTER SPECIES: NOT DETECTED
CO2 SERPL-SCNC: 27 MMOL/L (ref 20–32)
COLOR UR AUTO: ABNORMAL
CREAT SERPL-MCNC: 1.48 MG/DL (ref 0.52–1.04)
CTX-M: NOT DETECTED
ENTEROBACTER SPECIES: NOT DETECTED
EOSINOPHIL # BLD AUTO: 0 10E3/UL (ref 0–0.7)
EOSINOPHIL NFR BLD AUTO: 0 %
ERYTHROCYTE [DISTWIDTH] IN BLOOD BY AUTOMATED COUNT: 12.5 % (ref 10–15)
ESCHERICHIA COLI: DETECTED
FLUAV RNA SPEC QL NAA+PROBE: NEGATIVE
FLUBV RNA RESP QL NAA+PROBE: NEGATIVE
GFR SERPL CREATININE-BSD FRML MDRD: 37 ML/MIN/1.73M2
GLUCOSE BLD-MCNC: 133 MG/DL (ref 70–99)
GLUCOSE UR STRIP-MCNC: NEGATIVE MG/DL
HCT VFR BLD AUTO: 42.6 % (ref 35–47)
HGB BLD-MCNC: 14.5 G/DL (ref 11.7–15.7)
HGB UR QL STRIP: ABNORMAL
HOLD SPECIMEN: NORMAL
HOLD SPECIMEN: NORMAL
IMM GRANULOCYTES # BLD: 0 10E3/UL
IMM GRANULOCYTES NFR BLD: 0 %
IMP: NOT DETECTED
KETONES UR STRIP-MCNC: NEGATIVE MG/DL
KLEBSIELLA OXYTOCA: NOT DETECTED
KLEBSIELLA PNEUMONIAE: NOT DETECTED
KPC: NOT DETECTED
LACTATE SERPL-SCNC: 0.3 MMOL/L (ref 0.7–2)
LACTATE SERPL-SCNC: 0.9 MMOL/L (ref 0.7–2)
LEUKOCYTE ESTERASE UR QL STRIP: ABNORMAL
LYMPHOCYTES # BLD AUTO: 0.8 10E3/UL (ref 0.8–5.3)
LYMPHOCYTES NFR BLD AUTO: 6 %
MCH RBC QN AUTO: 30.5 PG (ref 26.5–33)
MCHC RBC AUTO-ENTMCNC: 34 G/DL (ref 31.5–36.5)
MCV RBC AUTO: 90 FL (ref 78–100)
MONOCYTES # BLD AUTO: 1 10E3/UL (ref 0–1.3)
MONOCYTES NFR BLD AUTO: 8 %
NDM: NOT DETECTED
NEUTROPHILS # BLD AUTO: 10.7 10E3/UL (ref 1.6–8.3)
NEUTROPHILS NFR BLD AUTO: 86 %
NITRATE UR QL: POSITIVE
NRBC # BLD AUTO: 0 10E3/UL
NRBC BLD AUTO-RTO: 0 /100
OXA (DETECTED/NOT DETECTED): NOT DETECTED
PH UR STRIP: 5.5 [PH] (ref 5–7)
PLATELET # BLD AUTO: 135 10E3/UL (ref 150–450)
POTASSIUM BLD-SCNC: 3.8 MMOL/L (ref 3.4–5.3)
PROT SERPL-MCNC: 8.1 G/DL (ref 6.8–8.8)
PROTEUS SPECIES: NOT DETECTED
PSEUDOMONAS AERUGINOSA: NOT DETECTED
RBC # BLD AUTO: 4.75 10E6/UL (ref 3.8–5.2)
RBC URINE: 4 /HPF
RSV RNA SPEC NAA+PROBE: NEGATIVE
SARS-COV-2 RNA RESP QL NAA+PROBE: NEGATIVE
SODIUM SERPL-SCNC: 136 MMOL/L (ref 133–144)
SP GR UR STRIP: 1.01 (ref 1–1.03)
SQUAMOUS EPITHELIAL: <1 /HPF
UROBILINOGEN UR STRIP-MCNC: NORMAL MG/DL
VIM: NOT DETECTED
WBC # BLD AUTO: 12.5 10E3/UL (ref 4–11)
WBC URINE: >182 /HPF

## 2022-11-01 PROCEDURE — 120N000001 HC R&B MED SURG/OB

## 2022-11-01 PROCEDURE — 258N000003 HC RX IP 258 OP 636: Performed by: HOSPITALIST

## 2022-11-01 PROCEDURE — 87086 URINE CULTURE/COLONY COUNT: CPT | Performed by: EMERGENCY MEDICINE

## 2022-11-01 PROCEDURE — 87637 SARSCOV2&INF A&B&RSV AMP PRB: CPT | Performed by: EMERGENCY MEDICINE

## 2022-11-01 PROCEDURE — 81001 URINALYSIS AUTO W/SCOPE: CPT | Performed by: EMERGENCY MEDICINE

## 2022-11-01 PROCEDURE — 36415 COLL VENOUS BLD VENIPUNCTURE: CPT | Performed by: EMERGENCY MEDICINE

## 2022-11-01 PROCEDURE — 96361 HYDRATE IV INFUSION ADD-ON: CPT

## 2022-11-01 PROCEDURE — 96375 TX/PRO/DX INJ NEW DRUG ADDON: CPT

## 2022-11-01 PROCEDURE — 250N000013 HC RX MED GY IP 250 OP 250 PS 637: Performed by: HOSPITALIST

## 2022-11-01 PROCEDURE — 80053 COMPREHEN METABOLIC PANEL: CPT | Performed by: EMERGENCY MEDICINE

## 2022-11-01 PROCEDURE — 250N000011 HC RX IP 250 OP 636

## 2022-11-01 PROCEDURE — 83605 ASSAY OF LACTIC ACID: CPT | Performed by: EMERGENCY MEDICINE

## 2022-11-01 PROCEDURE — 85025 COMPLETE CBC W/AUTO DIFF WBC: CPT | Performed by: EMERGENCY MEDICINE

## 2022-11-01 PROCEDURE — 258N000003 HC RX IP 258 OP 636: Performed by: EMERGENCY MEDICINE

## 2022-11-01 PROCEDURE — 83605 ASSAY OF LACTIC ACID: CPT | Performed by: HOSPITALIST

## 2022-11-01 PROCEDURE — 999N000127 HC STATISTIC PERIPHERAL IV START W US GUIDANCE

## 2022-11-01 PROCEDURE — 96365 THER/PROPH/DIAG IV INF INIT: CPT

## 2022-11-01 PROCEDURE — 87077 CULTURE AEROBIC IDENTIFY: CPT | Performed by: EMERGENCY MEDICINE

## 2022-11-01 PROCEDURE — 87149 DNA/RNA DIRECT PROBE: CPT | Performed by: EMERGENCY MEDICINE

## 2022-11-01 PROCEDURE — 96366 THER/PROPH/DIAG IV INF ADDON: CPT

## 2022-11-01 PROCEDURE — 99223 1ST HOSP IP/OBS HIGH 75: CPT | Mod: AI | Performed by: HOSPITALIST

## 2022-11-01 PROCEDURE — 250N000011 HC RX IP 250 OP 636: Performed by: EMERGENCY MEDICINE

## 2022-11-01 PROCEDURE — C9803 HOPD COVID-19 SPEC COLLECT: HCPCS

## 2022-11-01 PROCEDURE — 36415 COLL VENOUS BLD VENIPUNCTURE: CPT | Performed by: HOSPITALIST

## 2022-11-01 PROCEDURE — 250N000013 HC RX MED GY IP 250 OP 250 PS 637: Performed by: EMERGENCY MEDICINE

## 2022-11-01 RX ORDER — SERTRALINE HYDROCHLORIDE 100 MG/1
100 TABLET, FILM COATED ORAL DAILY
Status: DISCONTINUED | OUTPATIENT
Start: 2022-11-01 | End: 2022-11-08 | Stop reason: HOSPADM

## 2022-11-01 RX ORDER — SODIUM CHLORIDE 9 MG/ML
INJECTION, SOLUTION INTRAVENOUS CONTINUOUS
Status: DISCONTINUED | OUTPATIENT
Start: 2022-11-01 | End: 2022-11-03

## 2022-11-01 RX ORDER — ACETAMINOPHEN 325 MG/1
650 TABLET ORAL ONCE
Status: COMPLETED | OUTPATIENT
Start: 2022-11-01 | End: 2022-11-01

## 2022-11-01 RX ORDER — ATORVASTATIN CALCIUM 40 MG/1
40 TABLET, FILM COATED ORAL DAILY
Status: DISCONTINUED | OUTPATIENT
Start: 2022-11-01 | End: 2022-11-08 | Stop reason: HOSPADM

## 2022-11-01 RX ORDER — MAGNESIUM OXIDE 400 MG/1
400 TABLET ORAL EVERY MORNING
COMMUNITY

## 2022-11-01 RX ORDER — LIDOCAINE 40 MG/G
CREAM TOPICAL
Status: DISCONTINUED | OUTPATIENT
Start: 2022-11-01 | End: 2022-11-08 | Stop reason: HOSPADM

## 2022-11-01 RX ORDER — MAGNESIUM OXIDE 400 MG/1
400 TABLET ORAL DAILY
Status: DISCONTINUED | OUTPATIENT
Start: 2022-11-02 | End: 2022-11-08 | Stop reason: HOSPADM

## 2022-11-01 RX ORDER — MIRABEGRON 50 MG/1
50 TABLET, EXTENDED RELEASE ORAL DAILY
Status: DISCONTINUED | OUTPATIENT
Start: 2022-11-01 | End: 2022-11-08 | Stop reason: HOSPADM

## 2022-11-01 RX ORDER — ONDANSETRON 2 MG/ML
4 INJECTION INTRAMUSCULAR; INTRAVENOUS EVERY 6 HOURS PRN
Status: DISCONTINUED | OUTPATIENT
Start: 2022-11-01 | End: 2022-11-08 | Stop reason: HOSPADM

## 2022-11-01 RX ORDER — ACETAMINOPHEN 650 MG/1
650 SUPPOSITORY RECTAL EVERY 6 HOURS PRN
Status: DISCONTINUED | OUTPATIENT
Start: 2022-11-01 | End: 2022-11-02

## 2022-11-01 RX ORDER — ACETAMINOPHEN 325 MG/1
650 TABLET ORAL EVERY 6 HOURS PRN
Status: DISCONTINUED | OUTPATIENT
Start: 2022-11-01 | End: 2022-11-02

## 2022-11-01 RX ORDER — CARVEDILOL 3.12 MG/1
3.12 TABLET ORAL 2 TIMES DAILY WITH MEALS
Status: DISCONTINUED | OUTPATIENT
Start: 2022-11-01 | End: 2022-11-08 | Stop reason: HOSPADM

## 2022-11-01 RX ORDER — LORATADINE 10 MG/1
10 TABLET ORAL AT BEDTIME
Status: DISCONTINUED | OUTPATIENT
Start: 2022-11-01 | End: 2022-11-08 | Stop reason: HOSPADM

## 2022-11-01 RX ORDER — CLOPIDOGREL BISULFATE 75 MG/1
75 TABLET ORAL DAILY
Status: DISCONTINUED | OUTPATIENT
Start: 2022-11-01 | End: 2022-11-08 | Stop reason: HOSPADM

## 2022-11-01 RX ORDER — ONDANSETRON 2 MG/ML
4 INJECTION INTRAMUSCULAR; INTRAVENOUS ONCE
Status: COMPLETED | OUTPATIENT
Start: 2022-11-01 | End: 2022-11-01

## 2022-11-01 RX ORDER — SACCHAROMYCES BOULARDII 250 MG
250 CAPSULE ORAL 2 TIMES DAILY
Status: DISCONTINUED | OUTPATIENT
Start: 2022-11-01 | End: 2022-11-08 | Stop reason: HOSPADM

## 2022-11-01 RX ORDER — PANTOPRAZOLE SODIUM 40 MG/1
40 TABLET, DELAYED RELEASE ORAL DAILY
Status: DISCONTINUED | OUTPATIENT
Start: 2022-11-01 | End: 2022-11-08 | Stop reason: HOSPADM

## 2022-11-01 RX ORDER — URSODIOL 300 MG/1
300 CAPSULE ORAL 2 TIMES DAILY
Status: DISCONTINUED | OUTPATIENT
Start: 2022-11-01 | End: 2022-11-08 | Stop reason: HOSPADM

## 2022-11-01 RX ORDER — PROCHLORPERAZINE 25 MG
12.5 SUPPOSITORY, RECTAL RECTAL EVERY 12 HOURS PRN
Status: DISCONTINUED | OUTPATIENT
Start: 2022-11-01 | End: 2022-11-08 | Stop reason: HOSPADM

## 2022-11-01 RX ORDER — ONDANSETRON 2 MG/ML
INJECTION INTRAMUSCULAR; INTRAVENOUS
Status: COMPLETED
Start: 2022-11-01 | End: 2022-11-01

## 2022-11-01 RX ORDER — CEFTRIAXONE 1 G/1
1 INJECTION, POWDER, FOR SOLUTION INTRAMUSCULAR; INTRAVENOUS EVERY 24 HOURS
Status: DISCONTINUED | OUTPATIENT
Start: 2022-11-02 | End: 2022-11-02

## 2022-11-01 RX ORDER — ONDANSETRON 4 MG/1
4 TABLET, ORALLY DISINTEGRATING ORAL EVERY 6 HOURS PRN
Status: DISCONTINUED | OUTPATIENT
Start: 2022-11-01 | End: 2022-11-08 | Stop reason: HOSPADM

## 2022-11-01 RX ORDER — CEFTRIAXONE 1 G/1
1 INJECTION, POWDER, FOR SOLUTION INTRAMUSCULAR; INTRAVENOUS ONCE
Status: COMPLETED | OUTPATIENT
Start: 2022-11-01 | End: 2022-11-01

## 2022-11-01 RX ORDER — VANCOMYCIN HYDROCHLORIDE 125 MG/1
125 CAPSULE ORAL DAILY
Status: DISCONTINUED | OUTPATIENT
Start: 2022-11-01 | End: 2022-11-02

## 2022-11-01 RX ORDER — PROCHLORPERAZINE MALEATE 5 MG
5 TABLET ORAL EVERY 6 HOURS PRN
Status: DISCONTINUED | OUTPATIENT
Start: 2022-11-01 | End: 2022-11-08 | Stop reason: HOSPADM

## 2022-11-01 RX ORDER — ASPIRIN 81 MG/1
81 TABLET ORAL DAILY
Status: DISCONTINUED | OUTPATIENT
Start: 2022-11-02 | End: 2022-11-08 | Stop reason: HOSPADM

## 2022-11-01 RX ORDER — ISOSORBIDE MONONITRATE 30 MG/1
30 TABLET, EXTENDED RELEASE ORAL DAILY
Status: DISCONTINUED | OUTPATIENT
Start: 2022-11-01 | End: 2022-11-08 | Stop reason: HOSPADM

## 2022-11-01 RX ADMIN — ONDANSETRON 4 MG: 2 INJECTION INTRAMUSCULAR; INTRAVENOUS at 05:50

## 2022-11-01 RX ADMIN — ISOSORBIDE MONONITRATE 30 MG: 30 TABLET, EXTENDED RELEASE ORAL at 16:46

## 2022-11-01 RX ADMIN — ACETAMINOPHEN 650 MG: 325 TABLET, FILM COATED ORAL at 08:20

## 2022-11-01 RX ADMIN — ACETAMINOPHEN 650 MG: 325 TABLET, FILM COATED ORAL at 16:51

## 2022-11-01 RX ADMIN — CEFTRIAXONE SODIUM 1 G: 1 INJECTION, POWDER, FOR SOLUTION INTRAMUSCULAR; INTRAVENOUS at 12:42

## 2022-11-01 RX ADMIN — CLOPIDOGREL BISULFATE 75 MG: 75 TABLET ORAL at 16:46

## 2022-11-01 RX ADMIN — ATORVASTATIN CALCIUM 40 MG: 40 TABLET, FILM COATED ORAL at 16:47

## 2022-11-01 RX ADMIN — SODIUM CHLORIDE 1000 ML: 9 INJECTION, SOLUTION INTRAVENOUS at 08:10

## 2022-11-01 RX ADMIN — CARVEDILOL 3.12 MG: 3.12 TABLET, FILM COATED ORAL at 16:48

## 2022-11-01 RX ADMIN — URSODIOL 300 MG: 300 CAPSULE ORAL at 21:08

## 2022-11-01 RX ADMIN — PANTOPRAZOLE SODIUM 40 MG: 40 TABLET, DELAYED RELEASE ORAL at 16:46

## 2022-11-01 RX ADMIN — SODIUM CHLORIDE: 9 INJECTION, SOLUTION INTRAVENOUS at 16:58

## 2022-11-01 RX ADMIN — VANCOMYCIN HYDROCHLORIDE 125 MG: 125 CAPSULE ORAL at 16:45

## 2022-11-01 RX ADMIN — UMECLIDINIUM 2 PUFF: 62.5 AEROSOL, POWDER ORAL at 16:49

## 2022-11-01 RX ADMIN — FLUTICASONE FUROATE AND VILANTEROL TRIFENATATE 1 PUFF: 200; 25 POWDER RESPIRATORY (INHALATION) at 16:47

## 2022-11-01 RX ADMIN — SODIUM CHLORIDE 1000 ML: 9 INJECTION, SOLUTION INTRAVENOUS at 09:41

## 2022-11-01 RX ADMIN — LORATADINE 10 MG: 10 TABLET ORAL at 21:08

## 2022-11-01 RX ADMIN — Medication 250 MG: at 20:46

## 2022-11-01 RX ADMIN — ACETAMINOPHEN 650 MG: 325 TABLET, FILM COATED ORAL at 23:34

## 2022-11-01 RX ADMIN — MIRABEGRON 50 MG: 50 TABLET, FILM COATED, EXTENDED RELEASE ORAL at 16:46

## 2022-11-01 ASSESSMENT — ACTIVITIES OF DAILY LIVING (ADL)
ADLS_ACUITY_SCORE: 23
ADLS_ACUITY_SCORE: 37
ADLS_ACUITY_SCORE: 23

## 2022-11-01 ASSESSMENT — ENCOUNTER SYMPTOMS
DYSURIA: 1
FEVER: 1

## 2022-11-01 NOTE — ED NOTES
[facility]  ED Nurse Handoff Report    ED Chief complaint: Fever      ED Diagnosis:   Final diagnoses:   Pyelonephritis   Sepsis, due to unspecified organism, unspecified whether acute organ dysfunction present (H)       Code Status: Full Code    Allergies:   Allergies   Allergen Reactions     Latex Other (See Comments) and Shortness Of Breath     Runny nose  PN: LW Reaction: DIFFICULTY BREATHING       Flagyl [Metronidazole Hcl] Anaphylaxis and Rash     Food      PN: LW FI1: nka LW FI2:     Hydrochlorothiazide W/Triamterene      PN: LW Reaction: skin rash     No Clinical Screening - See Comments      PN: LW Other1: -Adhesive Tape     Seasonal Allergies      sneezing     Sulfa Drugs Anaphylaxis, Hives and Itching     PN: LW Reaction: HIVES, Swelling     Tape [Adhesive Tape] Hives     Metoprolol Itching and Rash     Metronidazole Hives and Rash     PN: LW Reaction: HIVES         Patient Story: Pt here for weakness, fever, and burning with urination. Pt requiring IV abx for UTI.  Focused Assessment:      Treatments and/or interventions provided: Fluids, abx  Patient's response to treatments and/or interventions: improving    To be done/followed up on inpatient unit:  continue with cares    Does this patient have any cognitive concerns?: none    Activity level - Baseline/Home:  Independent  Activity Level - Current:   Stand with Assist    Patient's Preferred language: English   Needed?: No    Isolation: None  Infection: Not Applicable  Patient tested for COVID 19 prior to admission: YES  Bariatric?: No    Vital Signs:   Vitals:    11/01/22 0044 11/01/22 0800 11/01/22 0957 11/01/22 1013   BP: 118/59 123/62 104/56 110/56   Pulse: 89 86     Resp: 16 16 16    Temp: 98  F (36.7  C) (!) 102.7  F (39.3  C)     TempSrc: Temporal Oral     SpO2: 95% 92% 90% 94%       Cardiac Rhythm:     Was the PSS-3 completed:   Yes  What interventions are required if any?               Family Comments: none  present  OBS brochure/video discussed/provided to patient/family: Yes              Name of person given brochure if not patient: none              Relationship to patient: none    For the majority of the shift this patient's behavior was Green.   Behavioral interventions performed were comfort.    ED NURSE PHONE NUMBER:[phone]

## 2022-11-01 NOTE — H&P
[facility]    History and Physical - Hospitalist Service       Date of Admission:  11/1/2022    Assessment & Plan      Gem Gama is a 71 year old female with history of coronary artery disease, ischemic cardiomyopathy, hypertension, recurrent UTI and pyelonephritis, sleep apnea, depression, anxiety, ADHD, GERD, and C. difficile who presented to the ER with a fever and dysuria.  After evaluation in the ER there was concern for sepsis due to pyelonephritis.  She was started on IV Rocephin and given IV fluids.  The hospitalist service was contacted to admit her for further evaluation and management.    Urinary tract infection  Suspected right pyelonephritis  Sepsis secondary to above  Presented with fevers and dysuria for 2 to 3 days.  UA significantly abnormal.  Urine culture pending.  WBC mildly elevated.  Febrile with a temperature up to 102.7 in the ER.  Lactic acid within normal limits.    Admit to inpatient.    Started on empiric Rocephin in the ER, continue the same.    Follow-up on urine culture result.    Follow-up on blood culture results.    IV fluids.    Has had multiple urinary tract and kidney infections in the past.  States she is following with a urologist and is going to have a urogram sometime in the near future.  Encouraged her to keep this follow-up with urology.    Acute kidney injury  Suspect this is prerenal.  UA abnormal with signs of infection, also small amounts of blood and protein.    IV fluids.    Monitor intake and output.    Recheck labs in AM.    Avoid nephrotoxic medications.    Consider further work-up if creatinine not improving with IV fluids.    Thrombocytopenia  Platelets 135.    Recheck in AM.    Elevated bilirubin  Other LFTs within normal limits.    Recheck in AM.    History of C. Difficile  Diagnosed with C. difficile in September 2022.  States she completed a course of treatment and her symptoms improved, although she does occasionally have  "some \"slimy stools.\"    Start vancomycin 125 mg daily, continue until 1 week after she completes her course of antibiotics for UTI/pyelonephritis/sepsis.    Coronary artery disease  Ischemic cardiomyopathy  Prior history of STEMI and October 2019, underwent EDWIN x2 to proximal LAD at that time.  LVEF was 35 to 40% at that time.  Most recent echocardiogram showed an LVEF of 55 to 60% with grade 1 diastolic dysfunction and mild to moderate mitral valve regurgitation.  Follows with Dr. Colon in cardiology clinic.    Currently asymptomatic.    Continue PTA aspirin, Plavix, atorvastatin, carvedilol, and Imdur.    PTA valsartan on hold for now in setting of ARTURO.    Hypertension  Blood pressure well controlled in the ER.    Continue PTA carvedilol with hold parameters.    Hold PTA valsartan for now in setting of relatively low blood pressure and ARTURO.    Sleep apnea    Has not tolerated CPAP in the past.    Depression  Anxiety  ADHD    Continue PTA sertraline.    GERD    Continue PTA pantoprazole.    COVID-19 PCR negative    Routine admission COVID-19 PCR testing was negative on 11/1/2022.       Diet:   regular diet  DVT Prophylaxis: Pneumatic Compression Devices  Greene Catheter: Not present  Central Lines: None  Cardiac Monitoring: None  Code Status:   DNR/DNI per discussion with the patient in the ER today    Clinically Significant Risk Factors Present on Admission                # Drug Induced Platelet Defect: home medication list includes an antiplatelet medication   # Hypertension: home medication list includes antihypertensive(s)     # Obesity: Estimated body mass index is 31.41 kg/m  as calculated from the following:    Height as of 10/21/22: 1.626 m (5' 4\").    Weight as of 10/21/22: 83 kg (183 lb).           Disposition Plan      Expected Discharge Date: 11/03/2022                The patient's care was discussed with the Bedside Nurse, Patient and ER provider.    Truman Guillen MD  Hospitalist Service  M " "[facility]  Securely message with the [employer] Web Console (learn more here)  Text page via [facility] Paging/Directory         ______________________________________________________________________    Chief Complaint   Fever, dysuria    History is obtained from the patient    History of Present Illness   Gem Gama is a 71 year old female with history of coronary artery disease, ischemic cardiomyopathy, hypertension, recurrent UTI and pyelonephritis, sleep apnea, depression, anxiety, ADHD, GERD, and C. difficile who presented to the ER with a fever and dysuria.  Her symptoms started a couple days ago.  She initially noticed fever and some chills.  She also developed dysuria.  Denies any hematuria or urinary frequency.  No abdominal pain or flank pain.  Has had some nausea and has thrown up several times.  Denies any hematemesis.  Denies any chest pain or shortness of breath.  Has occasionally had some \"slimy stools.\"  Due to the ongoing symptoms she came into the ER today for evaluation.    In the ER she was evaluated by Dr. Russell.  Vitals showed a temperature of 102.7, otherwise okay.  Labs significant for creatinine of 1.48, bilirubin 2.0, platelets 135, and WBC 12.5.  UA was significantly abnormal and suggestive of UTI.  Due to concern for pyelonephritis she was started on IV Rocephin.  The hospitalist service was contacted to admit her for further evaluation and management.    She reports a history of multiple UTIs in the past.  States she gets kidney infections once or twice a year.  Denied flank pain when I spoke to her but did have some right CVA tenderness.  She was diagnosed with C. difficile colitis in early September 2022.  She states she was treated but never completely got over it and still occasionally has some \"slimy stools.\"  Denies any melena or hematochezia.    Review of Systems    The 10 point Review of Systems is negative other than noted in the HPI or here.    Past " Medical History    I have reviewed this patient's medical history and updated it with pertinent information if needed.   Past Medical History:   Diagnosis Date     ADHD (attention deficit hyperactivity disorder)      Anxiety      Arthritis     back, hands, knees, hips     Asthma      C. difficile diarrhea      Central sleep apnea      Cheyne-Rogers breathing 09/07/2022     Coronary artery disease involving native coronary artery of native heart without angina pectoris      Depression      Fever and chills 09/24/2021     Gastro-oesophageal reflux disease      Hypertension      Ischemic cardiomyopathy      Major depressive disorder, recurrent episode, moderate (H) 07/25/2013     Narcolepsy      Pituitary microadenoma (H) 2011    MRI 2011- There is a triangular-shaped area with delayed contrast enhancement at the left lateral portion of the pituitary gland posteriorly, measuring 2.5 x 4.3 mm. This is suggestive of a microadenoma, MRI 10/1/22 - Normal pituitary gland and whole brain MRI.     Pyelonephritis of right kidney 10/24/2021     Renal disease      S/P hip replacement 2004    Bilateral fall 2004 due to OA     Sleep apnea      Status post total knee replacement 12/29/2014     Urinary tract infection with hematuria, site unspecified 09/24/2021     Past Surgical History   I have reviewed this patient's surgical history and updated it with pertinent information if needed.  Past Surgical History:   Procedure Laterality Date     ARTHROPLASTY KNEE  07/09/2014    Procedure: ARTHROPLASTY KNEE;  Surgeon: Levi Johnson MD;  Location:  OR     ARTHROPLASTY KNEE Left 11/24/2014    Procedure: ARTHROPLASTY KNEE;  Surgeon: Levi Johnson MD;  Location:  OR     CV CORONARY ANGIOGRAM N/A 10/09/2019    Procedure: Coronary Angiogram;  Surgeon: Chiquita Chatterjee MD;  Location:  HEART CARDIAC CATH LAB     CV HEART CATHETERIZATION WITH POSSIBLE INTERVENTION N/A 10/10/2019    Procedure: Heart Catheterization with Possible  Intervention;  Surgeon: Chin Garcia MD;  Location:  HEART CARDIAC CATH LAB     CV INTRA AORTIC BALLOON N/A 10/09/2019    Procedure: Intra-Aortic Balloon Pump Insertion;  Surgeon: Chiquita Chatterjee MD;  Location:  HEART CARDIAC CATH LAB     CV INTRA AORTIC BALLOON REMOVAL N/A 10/10/2019    Procedure: Intra-Aortic Balloon Pump Removal;  Surgeon: Chin Garcia MD;  Location:  HEART CARDIAC CATH LAB     CV LEFT HEART CATH N/A 12/11/2020    Procedure: Heart Catheterization with Possible Intervention;  Surgeon: Pilo Otoole MD;  Location:  HEART CARDIAC CATH LAB     CV PCI STENT DRUG ELUTING N/A 10/09/2019    Procedure: PCI Stent Drug Eluting;  Surgeon: Chiquita Chatterjee MD;  Location:  HEART CARDIAC CATH LAB     GENITOURINARY SURGERY  01/01/2008    Prolift     GENITOURINARY SURGERY  1973    In 1973, she had 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     ORTHOPEDIC SURGERY      bilat total hip     RECTOCELE REPAIR       ZZC TOTAL ABD HYSTERECTOMY+BLAD REPR  01/01/1992    Vaginal approach with oophrectomy     ZC TOTAL KNEE ARTHROPLASTY       Social History   I have reviewed this patient's social history and updated it with pertinent information if needed.  Social History     Tobacco Use     Smoking status: Never     Smokeless tobacco: Never   Vaping Use     Vaping Use: Never used   Substance Use Topics     Alcohol use: No     Drug use: No     Family History   I have reviewed this patient's family history and updated it with pertinent information if needed.  Family History   Problem Relation Age of Onset     Hypertension Mother      Arthritis Mother      Cerebrovascular Disease Mother      Cerebrovascular Disease Father         Three Strokes     Diabetes Father         and  brother     Kidney Disease Father      Diabetes Brother      Birth defects Brother      Thyroid Disease Sister         Hypothyroid     Sjogren's Daughter      Diabetes Daughter      Cerebrovascular Disease Daughter      Diabetes Son      Prior to Admission Medications   Prior to Admission Medications   Prescriptions Last Dose Informant Patient Reported? Taking?   BREO ELLIPTA 200-25 MCG/INH Inhaler 10/31/2022 at am  Yes Yes   Sig: INHALE 1 PUFF INTO THE LUNGS DAILY   Melatonin 10 MG TABS tablet Past Week at prn Self Yes Yes   Sig: Take 10 mg by mouth nightly as needed for sleep   Prenatal Multivit-Min-Fe-FA (PRENATAL/IRON) TABS 10/31/2022 at am Self Yes Yes   Sig: Take 1 tablet by mouth daily    albuterol (PROAIR HFA/PROVENTIL HFA/VENTOLIN HFA) 108 (90 Base) MCG/ACT inhaler  at pn  No No   Sig: Inhale 1-2 puffs into the lungs every 4 hours as needed for shortness of breath / dyspnea or wheezing   amoxicillin (AMOXIL) 500 MG capsule  Self Yes No   Sig: Take 2,000 mg by mouth once as needed Take as directed before dental work   aspirin (ASA) 81 MG EC tablet 10/31/2022 at am Self No Yes   Sig: Take 1 tablet (81 mg) by mouth daily   atorvastatin (LIPITOR) 40 MG tablet 10/31/2022 at am  No Yes   Sig: Take 1 tablet (40 mg) by mouth daily   carvedilol (COREG) 3.125 MG tablet 10/31/2022 at am  No Yes   Sig: TAKE 1 TABLET(3.125 MG) BY MOUTH TWICE DAILY WITH MEALS   cholestyramine (QUESTRAN) 4 g packet   No No   Sig: Take 1 packet (4 g) by mouth 2 times daily (with meals)   Patient taking differently: Take 1 packet by mouth 2 times daily as needed   clopidogrel (PLAVIX) 75 MG tablet 10/31/2022 at am  No Yes   Sig: Take 1 tablet (75 mg) by mouth daily   isosorbide mononitrate (IMDUR) 30 MG 24 hr tablet 10/31/2022 at am  No Yes   Sig: Take 1 tablet (30 mg) by mouth daily   loratadine (CLARITIN) 10 MG tablet 10/30/2022 at hs  Yes No   Sig: Take 10 mg by mouth At Bedtime   magnesium oxide (MAG-OX) 400 MG tablet 10/31/2022 at am   Yes Yes   Sig: Take 400 mg by mouth daily   mirabegron (MYRBETRIQ) 50 MG 24 hr tablet 10/31/2022 at am  No Yes   Sig: Take 1 tablet (50 mg) by mouth daily   nitroGLYcerin (NITROSTAT) 0.4 MG sublingual tablet  at prn  No No   Sig: Place 1 tablet (0.4 mg) under the tongue every 5 minutes as needed for chest pain   pantoprazole (PROTONIX) 40 MG EC tablet 10/31/2022 at am  No Yes   Sig: Take 1 tablet (40 mg) by mouth daily   pramipexole (MIRAPEX) 0.125 MG tablet   No No   Sig: Take 1-2 tablets (0.125-0.25 mg) by mouth At Bedtime Take 1 hour before bedtime   saccharomyces boulardii (FLORASTOR) 250 MG capsule 10/31/2022 at am  No Yes   Sig: Take 1 capsule (250 mg) by mouth 2 times daily   sertraline (ZOLOFT) 100 MG tablet 10/31/2022 at am  No Yes   Sig: Take 1 tablet (100 mg) by mouth daily   tiotropium (SPIRIVA RESPIMAT) 2.5 MCG/ACT inhaler 10/31/2022  No Yes   Sig: Inhale 2 puffs into the lungs daily for 30 days   ursodiol (ACTIGALL) 300 MG capsule 10/31/2022 at am  No Yes   Sig: TAKE 1 CAPSULE(300 MG) BY MOUTH TWICE DAILY   valsartan (DIOVAN) 40 MG tablet 10/31/2022 at am  No Yes   Sig: Take 1 tablet (40 mg) by mouth daily   vitamin D3 (CHOLECALCIFEROL) 2000 units (50 mcg) tablet 10/31/2022 at am Self No Yes   Sig: Take 1 tablet (2,000 Units) by mouth daily      Facility-Administered Medications: None     Allergies   Allergies   Allergen Reactions     Latex Other (See Comments) and Shortness Of Breath     Runny nose  PN: LW Reaction: DIFFICULTY BREATHING       Flagyl [Metronidazole Hcl] Anaphylaxis and Rash     Food      PN: LW FI1: nka LW FI2:     Hydrochlorothiazide W/Triamterene      PN: LW Reaction: skin rash     No Clinical Screening - See Comments      PN: LW Other1: -Adhesive Tape     Seasonal Allergies      sneezing     Sulfa Drugs Anaphylaxis, Hives and Itching     PN: LW Reaction: HIVES, Swelling     Tape [Adhesive Tape] Hives     Metoprolol Itching and Rash     Metronidazole Hives and Rash     PN: LW  Reaction: HIVES       Physical Exam   Vital Signs: Temp: (!) 102.7  F (39.3  C) Temp src: Oral BP: 110/56 Pulse: 86   Resp: 16 SpO2: 94 % O2 Device: Nasal cannula Oxygen Delivery: 2.5 LPM  Weight: 0 lbs 0 oz    Constitutional: awake, alert, cooperative, no apparent distress, laying in the ER bed  Eyes: sclera clear, conjunctiva normal  ENT: oral pharynx with moist mucous membranes  Respiratory: no increased work of breathing, clear to auscultation bilaterally, no crackles or wheezing  Cardiovascular: regular rate and rhythm, normal S1 and S2, no murmur noted  GI: normal bowel sounds, soft, non-distended, non-tender  Skin: warm, dry  Musculoskeletal: no lower extremity pitting edema present  Neurologic: awake, alert, answers questions appropriately, moves all extremities    Data   Data reviewed today: I reviewed all medications, new labs and imaging results over the last 24 hours. I personally reviewed no images or EKG's today.    Recent Labs   Lab 11/01/22  0544   WBC 12.5*   HGB 14.5   MCV 90   *      POTASSIUM 3.8   CHLORIDE 100   CO2 27   BUN 18   CR 1.48*   ANIONGAP 9   FRACISCO 9.2   *   ALBUMIN 3.9   PROTTOTAL 8.1   BILITOTAL 2.0*   ALKPHOS 90   ALT 27   AST 33

## 2022-11-01 NOTE — PHARMACY-ADMISSION MEDICATION HISTORY
Pharmacy Medication History  Admission medication history interview status for the 11/1/2022  admission is complete. See EPIC admission navigator for prior to admission medications     Location of Interview: Patient room  Medication history sources: Patient and Surescripts    Significant changes made to the medication list:  Magnesium was added.    In the past week, patient estimated taking medication this percent of the time: greater than 90%    Additional medication history information:   Of note: Gem mentioned that her provider plans to stop clopidogrel soon, but she is still taking it currently.  She has not been taking her Mirapex since she has been dealing with C diff.     She only uses the cholestyramine as needed.    Medication reconciliation completed by provider prior to medication history? No    Time spent in this activity: 45 minutes      Prior to Admission medications    Medication Sig Last Dose Taking? Auth Provider Long Term End Date   aspirin (ASA) 81 MG EC tablet Take 1 tablet (81 mg) by mouth daily 10/31/2022 at am Yes Dalia Stein MD     atorvastatin (LIPITOR) 40 MG tablet Take 1 tablet (40 mg) by mouth daily 10/31/2022 at am Yes Danny Conteh MD Yes    BREO ELLIPTA 200-25 MCG/INH Inhaler INHALE 1 PUFF INTO THE LUNGS DAILY 10/31/2022 at am Yes Reported, Patient     carvedilol (COREG) 3.125 MG tablet TAKE 1 TABLET(3.125 MG) BY MOUTH TWICE DAILY WITH MEALS 10/31/2022 at am Yes Danny Conteh MD Yes    clopidogrel (PLAVIX) 75 MG tablet Take 1 tablet (75 mg) by mouth daily 10/31/2022 at am Yes Danny Conteh MD Yes    isosorbide mononitrate (IMDUR) 30 MG 24 hr tablet Take 1 tablet (30 mg) by mouth daily 10/31/2022 at am Yes Danny Conteh MD Yes    magnesium oxide (MAG-OX) 400 MG tablet Take 400 mg by mouth daily 10/31/2022 at am Yes Unknown, Entered By History     Melatonin 10 MG TABS tablet Take 10 mg by mouth nightly as needed for sleep Past Week at prn Yes  Unknown, Entered By History     mirabegron (MYRBETRIQ) 50 MG 24 hr tablet Take 1 tablet (50 mg) by mouth daily 10/31/2022 at am Yes Aubree Butts PA     pantoprazole (PROTONIX) 40 MG EC tablet Take 1 tablet (40 mg) by mouth daily 10/31/2022 at am Yes Danny Conteh MD     Prenatal Multivit-Min-Fe-FA (PRENATAL/IRON) TABS Take 1 tablet by mouth daily  10/31/2022 at am Yes Reported, Patient     saccharomyces boulardii (FLORASTOR) 250 MG capsule Take 1 capsule (250 mg) by mouth 2 times daily 10/31/2022 at am Yes Tiara Leonard MD     sertraline (ZOLOFT) 100 MG tablet Take 1 tablet (100 mg) by mouth daily 10/31/2022 at am Yes Danny Conteh MD Yes    tiotropium (SPIRIVA RESPIMAT) 2.5 MCG/ACT inhaler Inhale 2 puffs into the lungs daily for 30 days 10/31/2022 Yes Luis A Moody MD Yes 11/1/22   ursodiol (ACTIGALL) 300 MG capsule TAKE 1 CAPSULE(300 MG) BY MOUTH TWICE DAILY 10/31/2022 at am Yes Danny Conteh MD     valsartan (DIOVAN) 40 MG tablet Take 1 tablet (40 mg) by mouth daily 10/31/2022 at am Yes Danny Conteh MD Yes    vitamin D3 (CHOLECALCIFEROL) 2000 units (50 mcg) tablet Take 1 tablet (2,000 Units) by mouth daily 10/31/2022 at am Yes Candice Interiano MD     albuterol (PROAIR HFA/PROVENTIL HFA/VENTOLIN HFA) 108 (90 Base) MCG/ACT inhaler Inhale 1-2 puffs into the lungs every 4 hours as needed for shortness of breath / dyspnea or wheezing  at pn  Danny Conteh MD Yes    amoxicillin (AMOXIL) 500 MG capsule Take 2,000 mg by mouth once as needed Take as directed before dental work   Reported, Patient     cholestyramine (QUESTRAN) 4 g packet Take 1 packet (4 g) by mouth 2 times daily (with meals)  Patient taking differently: Take 1 packet by mouth 2 times daily as needed   Vocal, Danny Balbuena MD Yes    loratadine (CLARITIN) 10 MG tablet Take 10 mg by mouth At Bedtime 10/30/2022 at hs  Reported, Patient     nitroGLYcerin (NITROSTAT) 0.4 MG sublingual tablet Place 1  tablet (0.4 mg) under the tongue every 5 minutes as needed for chest pain  at prn  Wero, Danny Balbuena MD Yes    pramipexole (MIRAPEX) 0.125 MG tablet Take 1-2 tablets (0.125-0.25 mg) by mouth At Bedtime Take 1 hour before bedtime   Kashif Hadley MD Yes        The information provided in this note is only as accurate as the sources available at the time of update(s)

## 2022-11-01 NOTE — PROGRESS NOTES
RECEIVING UNIT ED HANDOFF REVIEW    ED Nurse Handoff Report was reviewed by: Evelyn Villarreal RN on November 1, 2022 at 4:14 PM

## 2022-11-01 NOTE — ED PROVIDER NOTES
History     Chief Complaint:  Fever      HPI   Gem Gama is a 71 year old female who has a history of UTI, CKD, C. Difficile, Pyelonephritis of right kidney, and presents with fever. The patient has had a fever and dysuria since Saturday. She denies flank pain. She has frequent UTIs and last had one a couple weeks ago. She was given cefdinir for that. She states that she also had C. Difficile recently as well.    Review of Systems   Constitutional: Positive for fever.   Genitourinary: Positive for dysuria.   Musculoskeletal:        No flank pain   All other systems reviewed and are negative.    Allergies:  Latex  Flagyl [Metronidazole Hcl]  Food  Hydrochlorothiazide W/Triamterene  No Clinical Screening - See Comments  Seasonal Allergies  Sulfa Drugs  Tape [Adhesive Tape]  Metoprolol  Metronidazole    Medications:    Albuterol  amoxicillin   aspirin   atorvastatin   breo ellipta  carvedilol   cholestyramine  clopidogrel  isosorbide mononitrate   mirabegron   nitroglycerin  pantoprazole   pramipexole  Prenatal Multivit-Min-Fe-FA  saccharomyces boulardii  sertraline   tiotropium   ursodiol   valsartan     Past Medical History:    ADHD   Anxiety   Arthritis   Asthma   C. difficile diarrhea   Central sleep apnea   Cheyne-Rogers breathing   Coronary artery disease involving native coronary artery of native heart without angina pectoris   Depression   Gastro-oesophageal reflux disease   Hypertension   Ischemic cardiomyopathy   Major depressive disorder  Narcolepsy   Pituitary microadenoma  Pyelonephritis of right kidney   Renal disease   Urinary tract infection  Hiatal hernia  Hyperlipidemia  CKD  Vitamin D deficiency  TMJ     Past Surgical History:    Knee arthroplasty  Coronary angiogram  Heart catheterization  Genitourinary surgery  Hip replacement  Rectocele repair  Hysterectomy and oophorectomy    Family History:    Mother: hypertension, arthritis, cerebrovascular disease  Father: cerebrovascular disease,  diabetes, kidney disease  Brother: diabetes, birth defects  Daughter: sjogren's, diabetes, cerebrovascular disease  Son: diabetes    Social History:  Presents alone  Physical Exam     Patient Vitals for the past 24 hrs:   BP Temp Temp src Pulse Resp SpO2   11/01/22 1013 110/56 -- -- -- -- 94 %   11/01/22 0957 104/56 -- -- -- 16 90 %   11/01/22 0800 123/62 (!) 102.7  F (39.3  C) Oral 86 16 92 %   11/01/22 0044 118/59 98  F (36.7  C) Temporal 89 16 95 %       Physical Exam  General: Alert, No distress. Nontoxic appearance  Head: No signs of trauma.   Mouth/Throat: Oropharynx moist.   Eyes: Conjunctivae are normal. Pupils are equal..   Neck: Normal range of motion.    CV: Appears well perfused.  Resp:No respiratory distress.   Abdomen: Soft nontender  MSK: Normal range of motion. No obvious deformity.   Neuro: The patient is alert and interactive. FALCON. Speech normal. GCS 15  Skin: No lesion or sign of trauma noted.   Psych: normal mood and affect. behavior is normal.   Emergency Department Course     Laboratory:  Labs Ordered and Resulted from Time of ED Arrival to Time of ED Departure   COMPREHENSIVE METABOLIC PANEL - Abnormal       Result Value    Sodium 136      Potassium 3.8      Chloride 100      Carbon Dioxide (CO2) 27      Anion Gap 9      Urea Nitrogen 18      Creatinine 1.48 (*)     Calcium 9.2      Glucose 133 (*)     Alkaline Phosphatase 90      AST 33      ALT 27      Protein Total 8.1      Albumin 3.9      Bilirubin Total 2.0 (*)     GFR Estimate 37 (*)    ROUTINE UA WITH MICROSCOPIC REFLEX TO CULTURE - Abnormal    Color Urine Dark Yellow (*)     Appearance Urine Slightly Cloudy (*)     Glucose Urine Negative      Bilirubin Urine Negative      Ketones Urine Negative      Specific Gravity Urine 1.006      Blood Urine Small (*)     pH Urine 5.5      Protein Albumin Urine 20 (*)     Urobilinogen Urine Normal      Nitrite Urine Positive (*)     Leukocyte Esterase Urine Large (*)     Bacteria Urine Few (*)      RBC Urine 4 (*)     WBC Urine >182 (*)     Squamous Epithelials Urine <1     CBC WITH PLATELETS AND DIFFERENTIAL - Abnormal    WBC Count 12.5 (*)     RBC Count 4.75      Hemoglobin 14.5      Hematocrit 42.6      MCV 90      MCH 30.5      MCHC 34.0      RDW 12.5      Platelet Count 135 (*)     % Neutrophils 86      % Lymphocytes 6      % Monocytes 8      % Eosinophils 0      % Basophils 0      % Immature Granulocytes 0      NRBCs per 100 WBC 0      Absolute Neutrophils 10.7 (*)     Absolute Lymphocytes 0.8      Absolute Monocytes 1.0      Absolute Eosinophils 0.0      Absolute Basophils 0.0      Absolute Immature Granulocytes 0.0      Absolute NRBCs 0.0     LACTIC ACID WHOLE BLOOD - Abnormal    Lactic Acid 0.3 (*)    INFLUENZA A/B & SARS-COV2 PCR MULTIPLEX - Normal    Influenza A PCR Negative      Influenza B PCR Negative      RSV PCR Negative      SARS CoV2 PCR Negative     LACTIC ACID WHOLE BLOOD   BLOOD CULTURE   BLOOD CULTURE   URINE CULTURE     Emergency Department Course:    Reviewed:  I reviewed nursing notes, vitals, past medical history and Care Everywhere    Assessments:  0915 I obtained history and examined the patient as noted above.   1200 I rechecked the patient and explained findings.     Consults:   1218 I spoke with Dr. Guillen of the Hospitalist service to discuss the patient's findings, presentation, and plan of care.    Interventions:  0550 Zofran 4 mg IV  0810 NS 1000 mL IV  0820 Tylenol 650 mg PO  0941 NS 1000 mL IV  1230 Ceftriaxone 1 g IV    Disposition:  The patient was admitted to the hospital under the care of Dr. Guillen.     Impression & Plan      Medical Decision Making:  Gem Gama is 71 year old female who presents for evaluation of fever of 102 degrees.  Urinalysis is consistent with a urinary tract infection; given the systemic symptoms present, signs and symptoms consistent with pyelonephritis.   There is no clinical evidence of perinephric abscess, emphysematous pyelonephritis,  ureterolithiasis, appendicitis, colitis, diverticulitis or any intraabdominal catastrophe.  Patient was given dose of antibiotics in ED; see above.  Patient appears ill and I am concerned about the patients symptoms and past history.  Blood cultures were sent, fluids given and patient will be admitted to medicine for further cares.        Diagnosis:    ICD-10-CM    1. Pyelonephritis  N12       2. Sepsis, due to unspecified organism, unspecified whether acute organ dysfunction present (H)  A41.9         Scribe Disclosure:  Dorian CASTANEDA Hired, am serving as a scribe at 7:39 AM on 11/1/2022 to document services personally performed by Hipolito Russell MD based on my observations and the provider's statements to me.      Hipolito Russell MD  11/01/22 4642

## 2022-11-02 ENCOUNTER — APPOINTMENT (OUTPATIENT)
Dept: CT IMAGING | Facility: CLINIC | Age: 72
DRG: 872 | End: 2022-11-02
Attending: HOSPITALIST
Payer: MEDICARE

## 2022-11-02 LAB
ALBUMIN SERPL-MCNC: 2.3 G/DL (ref 3.4–5)
ALP SERPL-CCNC: 73 U/L (ref 40–150)
ALT SERPL W P-5'-P-CCNC: 22 U/L (ref 0–50)
ANION GAP SERPL CALCULATED.3IONS-SCNC: 6 MMOL/L (ref 3–14)
AST SERPL W P-5'-P-CCNC: 38 U/L (ref 0–45)
BACTERIA UR CULT: ABNORMAL
BILIRUB SERPL-MCNC: 0.8 MG/DL (ref 0.2–1.3)
BUN SERPL-MCNC: 18 MG/DL (ref 7–30)
CALCIUM SERPL-MCNC: 7.5 MG/DL (ref 8.5–10.1)
CHLORIDE BLD-SCNC: 110 MMOL/L (ref 94–109)
CO2 SERPL-SCNC: 19 MMOL/L (ref 20–32)
CREAT SERPL-MCNC: 1.12 MG/DL (ref 0.52–1.04)
ERYTHROCYTE [DISTWIDTH] IN BLOOD BY AUTOMATED COUNT: 12.8 % (ref 10–15)
GFR SERPL CREATININE-BSD FRML MDRD: 52 ML/MIN/1.73M2
GLUCOSE BLD-MCNC: 130 MG/DL (ref 70–99)
HCT VFR BLD AUTO: 34.5 % (ref 35–47)
HGB BLD-MCNC: 11.6 G/DL (ref 11.7–15.7)
LACTATE SERPL-SCNC: 0.7 MMOL/L (ref 0.7–2)
MCH RBC QN AUTO: 31 PG (ref 26.5–33)
MCHC RBC AUTO-ENTMCNC: 33.6 G/DL (ref 31.5–36.5)
MCV RBC AUTO: 92 FL (ref 78–100)
PLATELET # BLD AUTO: 87 10E3/UL (ref 150–450)
POTASSIUM BLD-SCNC: 3.9 MMOL/L (ref 3.4–5.3)
PROCALCITONIN SERPL-MCNC: 0.78 NG/ML
PROT SERPL-MCNC: 6.1 G/DL (ref 6.8–8.8)
RBC # BLD AUTO: 3.74 10E6/UL (ref 3.8–5.2)
SODIUM SERPL-SCNC: 135 MMOL/L (ref 133–144)
WBC # BLD AUTO: 10.7 10E3/UL (ref 4–11)

## 2022-11-02 PROCEDURE — 85027 COMPLETE CBC AUTOMATED: CPT | Performed by: HOSPITALIST

## 2022-11-02 PROCEDURE — 84145 PROCALCITONIN (PCT): CPT | Performed by: HOSPITALIST

## 2022-11-02 PROCEDURE — G1010 CDSM STANSON: HCPCS

## 2022-11-02 PROCEDURE — 250N000013 HC RX MED GY IP 250 OP 250 PS 637: Performed by: SPECIALIST

## 2022-11-02 PROCEDURE — 250N000013 HC RX MED GY IP 250 OP 250 PS 637: Performed by: HOSPITALIST

## 2022-11-02 PROCEDURE — 83605 ASSAY OF LACTIC ACID: CPT | Performed by: SPECIALIST

## 2022-11-02 PROCEDURE — 80053 COMPREHEN METABOLIC PANEL: CPT | Performed by: HOSPITALIST

## 2022-11-02 PROCEDURE — 36415 COLL VENOUS BLD VENIPUNCTURE: CPT | Performed by: HOSPITALIST

## 2022-11-02 PROCEDURE — 74177 CT ABD & PELVIS W/CONTRAST: CPT | Mod: MG

## 2022-11-02 PROCEDURE — 120N000001 HC R&B MED SURG/OB

## 2022-11-02 PROCEDURE — 99233 SBSQ HOSP IP/OBS HIGH 50: CPT | Performed by: HOSPITALIST

## 2022-11-02 PROCEDURE — 250N000011 HC RX IP 250 OP 636: Performed by: HOSPITALIST

## 2022-11-02 PROCEDURE — 258N000003 HC RX IP 258 OP 636: Performed by: HOSPITALIST

## 2022-11-02 PROCEDURE — 99223 1ST HOSP IP/OBS HIGH 75: CPT | Performed by: SPECIALIST

## 2022-11-02 PROCEDURE — 36415 COLL VENOUS BLD VENIPUNCTURE: CPT | Performed by: SPECIALIST

## 2022-11-02 PROCEDURE — 250N000009 HC RX 250: Performed by: HOSPITALIST

## 2022-11-02 RX ORDER — ACETAMINOPHEN 650 MG/1
650 SUPPOSITORY RECTAL EVERY 6 HOURS PRN
Status: DISCONTINUED | OUTPATIENT
Start: 2022-11-02 | End: 2022-11-08 | Stop reason: HOSPADM

## 2022-11-02 RX ORDER — ACETAMINOPHEN 325 MG/1
650 TABLET ORAL EVERY 6 HOURS PRN
Status: DISCONTINUED | OUTPATIENT
Start: 2022-11-02 | End: 2022-11-08 | Stop reason: HOSPADM

## 2022-11-02 RX ORDER — IOPAMIDOL 755 MG/ML
68 INJECTION, SOLUTION INTRAVASCULAR ONCE
Status: COMPLETED | OUTPATIENT
Start: 2022-11-02 | End: 2022-11-02

## 2022-11-02 RX ORDER — VANCOMYCIN HYDROCHLORIDE 125 MG/1
125 CAPSULE ORAL 2 TIMES DAILY
Status: DISCONTINUED | OUTPATIENT
Start: 2022-11-02 | End: 2022-11-03

## 2022-11-02 RX ORDER — CEFTRIAXONE 2 G/1
2 INJECTION, POWDER, FOR SOLUTION INTRAMUSCULAR; INTRAVENOUS EVERY 24 HOURS
Status: DISCONTINUED | OUTPATIENT
Start: 2022-11-02 | End: 2022-11-08 | Stop reason: HOSPADM

## 2022-11-02 RX ORDER — GUAIFENESIN/DEXTROMETHORPHAN 100-10MG/5
10 SYRUP ORAL EVERY 4 HOURS PRN
Status: DISCONTINUED | OUTPATIENT
Start: 2022-11-02 | End: 2022-11-08 | Stop reason: HOSPADM

## 2022-11-02 RX ADMIN — ACETAMINOPHEN 650 MG: 325 TABLET, FILM COATED ORAL at 09:13

## 2022-11-02 RX ADMIN — SERTRALINE HYDROCHLORIDE 100 MG: 100 TABLET ORAL at 09:01

## 2022-11-02 RX ADMIN — CARVEDILOL 3.12 MG: 3.12 TABLET, FILM COATED ORAL at 18:13

## 2022-11-02 RX ADMIN — CARVEDILOL 3.12 MG: 3.12 TABLET, FILM COATED ORAL at 09:01

## 2022-11-02 RX ADMIN — GUAIFENESIN AND DEXTROMETHORPHAN 10 ML: 100; 10 SYRUP ORAL at 22:05

## 2022-11-02 RX ADMIN — ATORVASTATIN CALCIUM 40 MG: 40 TABLET, FILM COATED ORAL at 09:01

## 2022-11-02 RX ADMIN — Medication 400 MG: at 09:01

## 2022-11-02 RX ADMIN — ACETAMINOPHEN 650 MG: 325 TABLET ORAL at 22:05

## 2022-11-02 RX ADMIN — URSODIOL 300 MG: 300 CAPSULE ORAL at 09:01

## 2022-11-02 RX ADMIN — CEFTRIAXONE SODIUM 2 G: 2 INJECTION, POWDER, FOR SOLUTION INTRAMUSCULAR; INTRAVENOUS at 13:24

## 2022-11-02 RX ADMIN — FLUTICASONE FUROATE AND VILANTEROL TRIFENATATE 1 PUFF: 200; 25 POWDER RESPIRATORY (INHALATION) at 09:03

## 2022-11-02 RX ADMIN — SODIUM CHLORIDE: 9 INJECTION, SOLUTION INTRAVENOUS at 13:28

## 2022-11-02 RX ADMIN — ASPIRIN 81 MG: 81 TABLET, COATED ORAL at 09:00

## 2022-11-02 RX ADMIN — SODIUM CHLORIDE: 9 INJECTION, SOLUTION INTRAVENOUS at 04:46

## 2022-11-02 RX ADMIN — VANCOMYCIN HYDROCHLORIDE 125 MG: 125 CAPSULE ORAL at 09:01

## 2022-11-02 RX ADMIN — IOPAMIDOL 68 ML: 755 INJECTION, SOLUTION INTRAVENOUS at 20:24

## 2022-11-02 RX ADMIN — ISOSORBIDE MONONITRATE 30 MG: 30 TABLET, EXTENDED RELEASE ORAL at 09:01

## 2022-11-02 RX ADMIN — SODIUM CHLORIDE 66 ML: 900 INJECTION INTRAVENOUS at 20:24

## 2022-11-02 RX ADMIN — PANTOPRAZOLE SODIUM 40 MG: 40 TABLET, DELAYED RELEASE ORAL at 09:01

## 2022-11-02 RX ADMIN — LORATADINE 10 MG: 10 TABLET ORAL at 21:08

## 2022-11-02 RX ADMIN — Medication 250 MG: at 20:07

## 2022-11-02 RX ADMIN — VANCOMYCIN HYDROCHLORIDE 125 MG: 125 CAPSULE ORAL at 20:07

## 2022-11-02 RX ADMIN — Medication 250 MG: at 09:01

## 2022-11-02 RX ADMIN — CLOPIDOGREL BISULFATE 75 MG: 75 TABLET ORAL at 09:01

## 2022-11-02 RX ADMIN — URSODIOL 300 MG: 300 CAPSULE ORAL at 20:07

## 2022-11-02 RX ADMIN — UMECLIDINIUM 2 PUFF: 62.5 AEROSOL, POWDER ORAL at 09:03

## 2022-11-02 RX ADMIN — MIRABEGRON 50 MG: 50 TABLET, FILM COATED, EXTENDED RELEASE ORAL at 09:01

## 2022-11-02 ASSESSMENT — ACTIVITIES OF DAILY LIVING (ADL)
ADLS_ACUITY_SCORE: 30
ADLS_ACUITY_SCORE: 23
ADLS_ACUITY_SCORE: 30
ADLS_ACUITY_SCORE: 26
ADLS_ACUITY_SCORE: 23
ADLS_ACUITY_SCORE: 27
ADLS_ACUITY_SCORE: 26
ADLS_ACUITY_SCORE: 23
ADLS_ACUITY_SCORE: 30
ADLS_ACUITY_SCORE: 23
ADLS_ACUITY_SCORE: 23
ADLS_ACUITY_SCORE: 26

## 2022-11-02 NOTE — PLAN OF CARE
Goal Outcome Evaluation:    Pt is A&Ox4, Temp was 102.9 at beginning of shift; all other VSS on 1 L O2.  Tylenol given, room temp turned down, cool wash cloth placed on pt forehead. Temp recheck is 97.9. Hospitalist paged regarding pt's cough. Hospitalist placed order for cough but pt declined to take it.  Denied pain, up with assist of 1 with gait belt and walker. I&Os. Will continue to monitor.

## 2022-11-02 NOTE — PROGRESS NOTES
"Dr Mindy newman messaged with this message\"Room ED 13 BH PIV blew up twice, pt is very hard stick, may need PICC. please advise. regina rn \". Pending call back. Will continue to monitor patient. Ricardo Hoffmann RN on 11/1/2022 at 7:01 PM    "

## 2022-11-02 NOTE — CONSULTS
[facility]    Infectious Disease Consultation     Date of Admission:  11/1/2022  Date of Consult : 11/02/22    Assessment:  71 YF with recurrent UTIs and recent c.diff colitis who has been hospitalized with dysuria, fever , chills and sweats and found to have E.coli bacteremia likely of urinary source. Urine cx is pending.     -E.coli bacteremia  -ARTURO improving  -Recent C.diff colitis  -Hx of congenital renal abnormality and surgeries on right kidney with recurrent UTIs  -Chronic medical conditions- coronary artery disease, ischemic cardiomyopathy, hypertension,  sleep apnea, depression, anxiety, ADHD, GERD,    Recommendations:  1. Follow cx/sensitivity data  2. Ct abdomen pelvis W/WO contrast  3. Continue ceftriaxone and oral vancomycin prophylaxis  ID will continue to follow    Umu Mckeon MD    Reason for Consult   I was asked to evaluate this patient for treatment recommendations for E.coli sepsis.    Primary Care Physician   Danny Balbuena Vocal    Chief Complaint   Fever, dysuria and positive blood cx    History is obtained from the patient and medical records    History of Present Illness   Gem Gama is a 71 year old female with hx of coronary artery disease, ischemic cardiomyopathy, hypertension, recurrent UTI and pyelonephritis, sleep apnea, depression, anxiety, ADHD, GERD, and C. difficile who has been hospitalized since 11/1 with fever, leukocytosis of 12.5K and dysuria and found to have E.coli bacteremia.    She has had spiking fevers up to 103 degrees with ARTURO Cr of 1..48 on admission. Her blood cs are growing E.coli. Urinalysis showed pyuria and bacteriuria and urine cx is pending. Patient is on Ceftriaxone as well as oral vancomycin and ID has been asked to assist with antibiotic management. She denies abdominal pain or flank pain, reports some congenital urological problems and has had reconstructive surgeries.    Past Medical History   I have reviewed this patient's  medical history and updated it with pertinent information if needed.   Past Medical History:   Diagnosis Date     ADHD (attention deficit hyperactivity disorder)      Anxiety      Arthritis     back, hands, knees, hips     Asthma      C. difficile diarrhea      Central sleep apnea      Cheyne-Rogers breathing 09/07/2022     Coronary artery disease involving native coronary artery of native heart without angina pectoris      Depression      Fever and chills 09/24/2021     Gastro-oesophageal reflux disease      Hypertension      Ischemic cardiomyopathy      Major depressive disorder, recurrent episode, moderate (H) 07/25/2013     Narcolepsy      Pituitary microadenoma (H) 2011    MRI 2011- There is a triangular-shaped area with delayed contrast enhancement at the left lateral portion of the pituitary gland posteriorly, measuring 2.5 x 4.3 mm. This is suggestive of a microadenoma, MRI 10/1/22 - Normal pituitary gland and whole brain MRI.     Pyelonephritis of right kidney 10/24/2021     Renal disease      S/P hip replacement 2004    Bilateral fall 2004 due to OA     Sleep apnea      Status post total knee replacement 12/29/2014     Urinary tract infection with hematuria, site unspecified 09/24/2021       Past Surgical History   I have reviewed this patient's surgical history and updated it with pertinent information if needed.  Past Surgical History:   Procedure Laterality Date     ARTHROPLASTY KNEE  07/09/2014    Procedure: ARTHROPLASTY KNEE;  Surgeon: Levi Johnson MD;  Location:  OR     ARTHROPLASTY KNEE Left 11/24/2014    Procedure: ARTHROPLASTY KNEE;  Surgeon: Levi Johnson MD;  Location:  OR     CV CORONARY ANGIOGRAM N/A 10/09/2019    Procedure: Coronary Angiogram;  Surgeon: Chiquita Chatterjee MD;  Location:  HEART CARDIAC CATH LAB     CV HEART CATHETERIZATION WITH POSSIBLE INTERVENTION N/A 10/10/2019    Procedure: Heart Catheterization with Possible Intervention;  Surgeon: Chin Garcia MD;   Location:  HEART CARDIAC CATH LAB     CV INTRA AORTIC BALLOON N/A 10/09/2019    Procedure: Intra-Aortic Balloon Pump Insertion;  Surgeon: Chiquita Chatterjee MD;  Location:  HEART CARDIAC CATH LAB     CV INTRA AORTIC BALLOON REMOVAL N/A 10/10/2019    Procedure: Intra-Aortic Balloon Pump Removal;  Surgeon: Chin Garcia MD;  Location:  HEART CARDIAC CATH LAB     CV LEFT HEART CATH N/A 12/11/2020    Procedure: Heart Catheterization with Possible Intervention;  Surgeon: Pilo Otoole MD;  Location:  HEART CARDIAC CATH LAB     CV PCI STENT DRUG ELUTING N/A 10/09/2019    Procedure: PCI Stent Drug Eluting;  Surgeon: Chiquita Chatterjee MD;  Location:  HEART CARDIAC CATH LAB     GENITOURINARY SURGERY  01/01/2008    Prolift     GENITOURINARY SURGERY  1973    In 1973, she had surgery to 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     ORTHOPEDIC SURGERY      bilat total hip     RECTOCELE REPAIR       ZZC TOTAL ABD HYSTERECTOMY+BLAD REPR  01/01/1992    Vaginal approach with oophrectomy     ZZC TOTAL KNEE ARTHROPLASTY         Prior to Admission Medications   Prior to Admission Medications   Prescriptions Last Dose Informant Patient Reported? Taking?   BREO ELLIPTA 200-25 MCG/INH Inhaler 10/31/2022 at am  Yes Yes   Sig: INHALE 1 PUFF INTO THE LUNGS DAILY   Melatonin 10 MG TABS tablet Past Week at prn Self Yes Yes   Sig: Take 10 mg by mouth nightly as needed for sleep   Prenatal Multivit-Min-Fe-FA (PRENATAL/IRON) TABS 10/31/2022 at am Self Yes Yes   Sig: Take 1 tablet by mouth daily    albuterol (PROAIR HFA/PROVENTIL HFA/VENTOLIN HFA) 108 (90 Base) MCG/ACT inhaler  at pn  No No   Sig: Inhale 1-2 puffs into the lungs every 4 hours as needed for shortness of breath / dyspnea or wheezing   amoxicillin (AMOXIL)  500 MG capsule  Self Yes No   Sig: Take 2,000 mg by mouth once as needed Take as directed before dental work   aspirin (ASA) 81 MG EC tablet 10/31/2022 at am Self No Yes   Sig: Take 1 tablet (81 mg) by mouth daily   atorvastatin (LIPITOR) 40 MG tablet 10/31/2022 at am  No Yes   Sig: Take 1 tablet (40 mg) by mouth daily   carvedilol (COREG) 3.125 MG tablet 10/31/2022 at am  No Yes   Sig: TAKE 1 TABLET(3.125 MG) BY MOUTH TWICE DAILY WITH MEALS   cholestyramine (QUESTRAN) 4 g packet   No No   Sig: Take 1 packet (4 g) by mouth 2 times daily (with meals)   Patient taking differently: Take 1 packet by mouth 2 times daily as needed   clopidogrel (PLAVIX) 75 MG tablet 10/31/2022 at am  No Yes   Sig: Take 1 tablet (75 mg) by mouth daily   isosorbide mononitrate (IMDUR) 30 MG 24 hr tablet 10/31/2022 at am  No Yes   Sig: Take 1 tablet (30 mg) by mouth daily   loratadine (CLARITIN) 10 MG tablet 10/30/2022 at hs  Yes No   Sig: Take 10 mg by mouth At Bedtime   magnesium oxide (MAG-OX) 400 MG tablet 10/31/2022 at am  Yes Yes   Sig: Take 400 mg by mouth daily   mirabegron (MYRBETRIQ) 50 MG 24 hr tablet 10/31/2022 at am  No Yes   Sig: Take 1 tablet (50 mg) by mouth daily   nitroGLYcerin (NITROSTAT) 0.4 MG sublingual tablet  at prn  No No   Sig: Place 1 tablet (0.4 mg) under the tongue every 5 minutes as needed for chest pain   pantoprazole (PROTONIX) 40 MG EC tablet 10/31/2022 at am  No Yes   Sig: Take 1 tablet (40 mg) by mouth daily   pramipexole (MIRAPEX) 0.125 MG tablet   No No   Sig: Take 1-2 tablets (0.125-0.25 mg) by mouth At Bedtime Take 1 hour before bedtime   saccharomyces boulardii (FLORASTOR) 250 MG capsule 10/31/2022 at am  No Yes   Sig: Take 1 capsule (250 mg) by mouth 2 times daily   sertraline (ZOLOFT) 100 MG tablet 10/31/2022 at am  No Yes   Sig: Take 1 tablet (100 mg) by mouth daily   tiotropium (SPIRIVA RESPIMAT) 2.5 MCG/ACT inhaler 10/31/2022  No Yes   Sig: Inhale 2 puffs into the lungs daily for 30 days    ursodiol (ACTIGALL) 300 MG capsule 10/31/2022 at am  No Yes   Sig: TAKE 1 CAPSULE(300 MG) BY MOUTH TWICE DAILY   valsartan (DIOVAN) 40 MG tablet 10/31/2022 at am  No Yes   Sig: Take 1 tablet (40 mg) by mouth daily   vitamin D3 (CHOLECALCIFEROL) 2000 units (50 mcg) tablet 10/31/2022 at am Self No Yes   Sig: Take 1 tablet (2,000 Units) by mouth daily      Facility-Administered Medications: None     Allergies   Allergies   Allergen Reactions     Latex Other (See Comments) and Shortness Of Breath     Runny nose  PN: LW Reaction: DIFFICULTY BREATHING       Flagyl [Metronidazole Hcl] Anaphylaxis and Rash     Food      PN: LW FI1: nka LW FI2:     Hydrochlorothiazide W/Triamterene      PN: LW Reaction: skin rash     No Clinical Screening - See Comments      PN: LW Other1: -Adhesive Tape     Seasonal Allergies      sneezing     Sulfa Drugs Anaphylaxis, Hives and Itching     PN: LW Reaction: HIVES, Swelling     Tape [Adhesive Tape] Hives     Metoprolol Itching and Rash     Metronidazole Hives and Rash     PN: LW Reaction: HIVES         Immunization History   Immunization History   Administered Date(s) Administered     COVID-19,PF,Moderna 02/27/2021, 03/27/2021     COVID-19,PF,Pfizer (12+ Yrs) 12/01/2021     COVID-19,PF,Pfizer 12+ Yrs (2022 and After) 03/24/2022     Influenza (High Dose) 3 valent vaccine 11/27/2015, 09/25/2016, 02/19/2018, 09/27/2018, 10/12/2019     Influenza (IIV3) PF 08/26/2014     Influenza, Quad, High Dose, Pf, 65yr+ (Fluzone HD) 10/15/2020, 10/14/2021     Pneumo Conj 13-V (2010&after) 02/19/2018     Pneumococcal 20 valent Conjugate (Prevnar 20) 08/26/2022     Pneumococcal 23 valent 09/27/2018     TDAP Vaccine (Adacel) 08/28/2013     Zoster vaccine recombinant adjuvanted (SHINGRIX) 10/15/2020, 12/17/2020     Zoster vaccine, live 08/28/2013       Social History   I have reviewed this patient's social history and updated it with pertinent information if needed. Gem Gama  reports that she has never  smoked. She has never used smokeless tobacco. She reports that she does not drink alcohol and does not use drugs.    Family History   I have reviewed this patient's family history and updated it with pertinent information if needed.   Family History   Problem Relation Age of Onset     Hypertension Mother      Arthritis Mother      Cerebrovascular Disease Mother      Cerebrovascular Disease Father         Three Strokes     Diabetes Father         and brother     Kidney Disease Father      Diabetes Brother      Birth defects Brother      Thyroid Disease Sister         Hypothyroid     Sjogren's Daughter      Diabetes Daughter      Cerebrovascular Disease Daughter      Diabetes Son        Review of Systems   The 10 point Review of Systems is negative other than noted in the HPI or here.     Physical Exam   Temp: (!) 101.1  F (38.4  C) Temp src: Axillary BP: 109/46 Pulse: 78   Resp: 18 SpO2: 95 % O2 Device: None (Room air) Oxygen Delivery: 1 LPM  Vital Signs with Ranges  Temp:  [97.9  F (36.6  C)-102.9  F (39.4  C)] 101.1  F (38.4  C)  Pulse:  [78-87] 78  Resp:  [16-18] 18  BP: (104-124)/(46-60) 109/46  SpO2:  [90 %-95 %] 95 %  0 lbs 0 oz  There is no height or weight on file to calculate BMI.    GENERAL APPEARANCE:  awake  EYES: Eyes grossly normal to inspection  NECK: no adenopathy  RESP: lungs clear   CV: regular rates and rhythm  LYMPHATICS: normal ant/post cervical and supraclavicular nodes  ABDOMEN: soft, nontender  MS: extremities normal  SKIN: no suspicious lesions or rashes        Data   All laboratory data reviewed  Component      Latest Ref Rng & Units 11/2/2022   WBC      4.0 - 11.0 10e3/uL 10.7   RBC Count      3.80 - 5.20 10e6/uL 3.74 (L)   Hemoglobin      11.7 - 15.7 g/dL 11.6 (L)   Hematocrit      35.0 - 47.0 % 34.5 (L)   MCV      78 - 100 fL 92   MCH      26.5 - 33.0 pg 31.0   MCHC      31.5 - 36.5 g/dL 33.6   RDW      10.0 - 15.0 % 12.8   Platelet Count      150 - 450 10e3/uL 87 (L)     Component       Latest Ref Rng & Units 11/2/2022   Sodium      133 - 144 mmol/L 135   Potassium      3.4 - 5.3 mmol/L 3.9   Chloride      94 - 109 mmol/L 110 (H)   Carbon Dioxide      20 - 32 mmol/L 19 (L)   Anion Gap      3 - 14 mmol/L 6   Urea Nitrogen      7 - 30 mg/dL 18   Creatinine      0.52 - 1.04 mg/dL 1.12 (H)   Calcium      8.5 - 10.1 mg/dL 7.5 (L)   Glucose      70 - 99 mg/dL 130 (H)   Alkaline Phosphatase      40 - 150 U/L 73   AST      0 - 45 U/L 38   ALT      0 - 50 U/L 22   Protein Total      6.8 - 8.8 g/dL 6.1 (L)   Albumin      3.4 - 5.0 g/dL 2.3 (L)   Bilirubin Total      0.2 - 1.3 mg/dL 0.8   GFR Estimate      >60 mL/min/1.73m2 52 (L)      Microbiology  11/1 urine cx pending  11/1 blood cx   Peripheral Blood    0 Result Notes  Culture Positive on the 1st day of incubation Abnormal        Gram negative bacilli Panic     1 of 2 bottles          Peripheral Blood    0 Result Notes   Component Ref Range & Units 1 d ago     Acinetobacter species Not Detected Not Detected     Citrobacter species Not Detected Not Detected     Enterobacter species Not Detected Not Detected     Proteus species Not Detected Not Detected     Escherichia coli Not Detected Detected Abnormal     Comment: Positive for Escherichia coli by Verigene multiplex nucleic acid test. Final identification and antimicrobial susceptibility testing will be verified by standard methods. Verigene test will not distinguish E. coli from Shigella species including Shigella dysenteriae, Shigella flexneri, Shigella boydii, and Shigella sonnei. Specimens containing Shigella species or E. coli will be reported as positive for E. coli.    Klebsiella pneumoniae Not Detected Not Detected     Klebsiella oxytoca Not Detected Not Detected     Pseudomonas aeruginosa Not Detected Not Detected     CTX-M Not Detected, NA Not Detected     KPC Not Detected, NA Not Detected     NDM Not Detected, NA Not Detected     VIM Not Detected, NA Not Detected     IMP Not Detected, NA Not  Detected     OXA Not Detected, NA Not Detected

## 2022-11-02 NOTE — PROVIDER NOTIFICATION
MD Notification    Notified Person: MD    Notified Person Name: Marianna    Notification Date/Time: 11/1/22 1905    Notification Interaction: Text Page    Purpose of Notification: 6776     Received call from Lab, critical value from blood culture drawn early in day.  Testing + for E Coli.  Thanks Vick OLGUIN RN *[phone]    Orders Received:    Comments:

## 2022-11-02 NOTE — PROGRESS NOTES
Shift Summary [id number]    Admitting Diagnosis: Pyelonephritis [N12]  Sepsis, due to unspecified organism, unspecified whether acute organ dysfunction present.    Patient A&Ox4. VSS, ex of elevated temp of 101.1 tylenol given. MD paged, see MAR for Tylenol orders.Temp rechecked after 1hr 103.1 Cold wash cloth applied, rechecked after 1hr 97.8. PIV NS infusing @ 100 mL/hr, On 1L O2 sat 95%. Denied pain, with ex of headache when experiencing fever. I&O. Assist of 1GB/W. Voiding in the BR, pure wick at bedtime. Will continue to monitor.

## 2022-11-02 NOTE — PROVIDER NOTIFICATION
MD Notification    Notified Person: MD    Notified Person Name:Jeet Mckenzie MD    Notification Date/Time: 11/1/22 0917    Notification Interaction: page    Purpose of Notification: Elevated temp of 101.1    Orders Received: Not yet but PRN Tylenol was administer. Awaiting  back/order

## 2022-11-02 NOTE — PROVIDER NOTIFICATION
MD Notification    Notified Person: MD    Notified Person Name: Piedad     Notification Date/Time: 11/1/22 2031    Notification Interaction: Text Page    Purpose of Notification: 2404     Call from lab with critical blood culture, showing Gram - Bacilli, thanks Vick OLGUIN RN *[phone]    Orders Received:    Comments:

## 2022-11-02 NOTE — PROVIDER NOTIFICATION
Did amor REESE after Temp rechecked 10.3.1.   Subjective:       Patient ID: Fernie Maldonado Dr. is a 70 y.o. male.    Chief Complaint: Follow-up    From Friday 10/21 :  Pt lost another 2 pounds. Wife and brother getting him to eat protein and one good meal a day. He continues to complain of loss of taste and smell as everything tastes like cardboard.  On Friday he gave a urine specimen and has a urinary tract infection and final sensitivities and specificities today show Enterococcus only sensitive to vancomycin, Macrodantin and ampicillin.  Patient just got off of doxycycline for about a month being treated by an outside urologist chronic prostatitis.  He has a history of an elevated PSA and a few weeks ago had an MRI of his prostate exam it did not show any isolated nodules or concern for malignancy.  This MRI is in care everywhere as it was done at the [facility].   He has been on Cipro for few days but does not notice any change in frequency of urination.  He does not have dysuria or fever or chills but does have some mild perineal discomfort.  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  Patient did sleep about 5-6 hours over the weekend each night.    The main goal is to get in with Psychiatry and see if he needs therapy and medication management.  He is only been on sertraline 50 mg for about 2 weeks now.  Takes trazodone 100 mg at night for over a month now.      From Friday 10/21 note:  In August the patient went to [place] and had stem cell injections subcutaneously  throughout all of his trigger points  (knees, shoulders and hands) with Ocean's jelly.  He did a billion units.  Then when they got back he his wife from [place] he contracted COVID. again a few months prior to that was changed from armour thyroid 120 mg to levothyroxine 100 mcg and then in 3 weeks time was increased to 150 mcg and I had seen him initially on 10/7 and he was indeed hyperthyroid.   Since August he has lost over 20 lb.  She does have a decreased appetite.  He feels he has lost his sense of taste and smell as well since having COVID.  There is question of whether this is some component of lung COVID.    Review of Systems   Constitutional:  Positive for appetite change. Negative for activity change, diaphoresis, fatigue and fever.   HENT:  Negative for nasal congestion, ear pain, postnasal drip, sinus pressure/congestion, sore throat and trouble swallowing.         Loss of smell and taste - noticed this week   Eyes:  Negative for pain.   Respiratory:  Negative for cough, chest tightness, shortness of breath and wheezing.    Cardiovascular:  Negative for chest pain, palpitations and leg swelling.   Gastrointestinal:  Negative for abdominal distention, abdominal pain, blood in stool, constipation and diarrhea.   Endocrine: Negative for cold intolerance and heat intolerance.   Genitourinary:  Negative for decreased urine volume, difficulty urinating, dysuria, flank pain, frequency and hematuria.   Musculoskeletal:  Positive for gait problem. Negative for arthralgias, back pain, joint swelling, myalgias and neck pain.   Integumentary:  Negative for pallor, rash and wound.   Neurological:  Negative for tremors, syncope, weakness, light-headedness, numbness and headaches.   Hematological:  Negative for adenopathy.   Psychiatric/Behavioral:  Positive for agitation and dysphoric mood. Negative for confusion, decreased concentration, hallucinations, self-injury, sleep disturbance and suicidal ideas. The  patient is not nervous/anxious.        Objective:      Physical Exam  Constitutional:       General: He is not in acute distress.     Appearance: Normal appearance.   HENT:      Head: Normocephalic and atraumatic.   Eyes:      Conjunctiva/sclera: Conjunctivae normal.   Cardiovascular:      Rate and Rhythm: Normal rate and regular rhythm.      Heart sounds: Normal heart sounds.   Pulmonary:      Effort: Pulmonary effort is normal.      Breath sounds: Normal breath sounds.   Abdominal:      Palpations: Abdomen is soft.      Tenderness: There is abdominal tenderness in the periumbilical area.      Comments: mild   Musculoskeletal:         General: Normal range of motion.      Cervical back: Normal range of motion and neck supple.   Skin:     General: Skin is warm and dry.   Neurological:      General: No focal deficit present.      Mental Status: He is alert.   Psychiatric:         Mood and Affect: Mood normal.         Behavior: Behavior normal.         Thought Content: Thought content normal.         Judgment: Judgment normal.       Assessment:       Acute cystitis without hematuria  -     Ambulatory referral/consult to Urology; Future; Expected date: 10/31/2022           Component Ref Range & Units 3 d ago    RBC, UA 0 - 4 /hpf 8 High     WBC, UA 0 - 5 /hpf >100 High     WBC Clumps, UA None-Rare Moderate Abnormal     Bacteria None-Occ /hpf Moderate Abnormal     Squam Epithel, UA /hpf 0       Susceptibility    10/21/2022 urinalysis  Enterococcus faecalis     CULTURE, URINE     Ampicillin <=2 mcg/mL Sensitive     Nitrofurantoin <=32 mcg/mL Sensitive     Vancomycin 2 mcg/mL Sensitive              Benign prostatic hyperplasia with incomplete bladder emptying  -     amoxicillin (AMOXIL) 875 MG tablet; Take 1 tablet (875 mg total) by mouth 2 (two) times daily.  Dispense: 20 tablet; Refill: 0  -     Ambulatory referral/consult to Urology; Future; Expected date: 10/31/2022    Abnormal weight loss  -     HIV 1/2 Ag/Ab (4th  Gen); Future; Expected date: 10/24/2022  -     MRI Brain Without Contrast; Future; Expected date: 10/24/2022    Primary hypertension   With a spike over the weekend up to 180 systolic - pt should continue to monitor and take telmisartan - could increase     Tremor- not entirely with movement ; some resting tremor   -     MRI Brain Without Contrast; Future; Expected date: 10/24/2022    Confusion  -     MRI Brain Without Contrast; Future; Expected date: 10/24/2022

## 2022-11-02 NOTE — PROGRESS NOTES
Paged for E coli bacteremia, Ceftriaxone continued as we continue to monitor her very closely overnight.

## 2022-11-02 NOTE — PROGRESS NOTES
"Vascular Access Services:  Vascular Access consult ordered by ED access. Requesting a Midline catheter but no order for a Midline. Patient has 1PIV right upper arm medial aspect above the elbow that is currently infusing IV fluids but is infiltrated. Patient also had a PIV in the left upper arm , same area that infiltrated.    I won't place a Midline tonight in order to allow th. Placed a 20ga x 1.75\" PIV x 1 w/ ultrasound. This PIV is in a better place on the right lateral lower forearm. No midline needed at this time.  Charge RN updated.    Chris Giraldo RN [facility]  Vascular Access Services        "

## 2022-11-02 NOTE — SIGNIFICANT EVENT
Significant Event Note    Time of event: 9:10 PM November 1, 2022    Description of event:    Call from lab with critical blood culture, showing Gram - Bacilli    Plan:    Already on  Appropriate abx with ceftriaxone    Discussed with: bedside nurse    Armando Herbert MD

## 2022-11-02 NOTE — PROGRESS NOTES
[facility]    Medicine Progress Note - Hospitalist Service    Date of Admission:  11/1/2022    Assessment & Plan     Gem Gama is a 71 year old female with history of coronary artery disease, ischemic cardiomyopathy, hypertension, recurrent UTI and pyelonephritis, sleep apnea, depression, anxiety, ADHD, GERD, and C. difficile who presented to the ER with a fever and dysuria.  After evaluation in the ER there was concern for sepsis due to pyelonephritis.  She was started on IV Rocephin and given IV fluids.  The hospitalist service was contacted to admit her for further evaluation and management.     Sepsis due to urinary tract infection with right pyelonephritis with gram-negative bacteremia  - On admission Presented with fevers and dysuria for 2 to 3 days.  -  She had elevated white count of 12.5 on admission, and UA showed changes consistent with infection  -Patient had blood cultures done and was started on IV fluids along with ceftriaxone and in the past she had E. coli which is pan sensitive  -Her blood cultures have shown E. coli and very gene panel is consistent with E. coli also  -Of note patient is known to have multiple urinary tract infections and will follow up with urology in the near future  - discussed with ID and we will get c scan of the abdomen and pelvis w/w/o contrast  --We will continue the patient with IV fluids, IV ceftriaxone and await for final sensitivities and IV fluid      Acute kidney injury-improved  -Creatinine on admission was 1.48 with baseline around 0.9-1.2 and this is most likely prerenal due to underlying infection and she was started on IV fluids and her renal function is improving and creatinine this morning is 1.12 which is near baseline       Thrombocytopenia-worsen  -She had platelet count of 135 on admission and this most likely due to underlying infection and platelet count is trended down to 87 with no signs of any bleeding and we will  "continue to monitor     Elevated bilirubin-resolved  -She only had elevated bilirubin at 2 yesterday on admission and the total bilirubin is normal today at 0.8     History of C. Difficile  -Diagnosed with C. difficile in September 2022.  States she completed a course of treatment and her symptoms improved, although she does occasionally have some \"slimy stools.\"  -She was started on  vancomycin 125 mg daily for prophylaxis , continue until 1 week after she completes her course of antibiotics for UTI/pyelonephritis/sepsis.     Coronary artery disease  Ischemic cardiomyopathy  Prior history of STEMI and October 2019, underwent EDWIN x2 to proximal LAD at that time.  LVEF was 35 to 40% at that time.  Most recent echocardiogram showed an LVEF of 55 to 60% with grade 1 diastolic dysfunction and mild to moderate mitral valve regurgitation.  Follows with Dr. Colon in cardiology clinic.  -Continue PTA aspirin, Plavix, atorvastatin, carvedilol, and Imdur and her valsartan was held because of acute kidney injury and low blood pressure       Hypertension  -She was started on Coreg on admission and her valsartan was held and blood pressure this morning is relatively stable and we will continue the same and monitor closely     Sleep apnea  -Has not tolerated CPAP in the past.     Depression  Anxiety  ADHD  -Continue PTA sertraline.     GERD  -Continue PTA pantoprazole.      Obesity: Estimated body mass index is 31.41 kg/m  as calculated from the following:    Height as of 10/21/22: 1.626 m (5' 4\").    Weight as of 10/21/22: 83 kg (183 lb).          Diet: Combination Diet Regular Diet Adult    DVT Prophylaxis: Pneumatic Compression Devices  Greene Catheter: Not present  Central Lines: None  Cardiac Monitoring: None  Code Status: No CPR- Do NOT Intubate      Disposition Plan      Expected Discharge Date: 11/05/2022      Destination: home          The patient's care was discussed with the Bedside Nurse, Patient and ID  " "Consultant.    Jeet Mckenzie MD  Hospitalist Service  [facility]  Securely message with the [employer] Web Console (learn more here)  Text page via [employer] Paging/Directory         Clinically Significant Risk Factors Present on Admission         # Hyponatremia: Lowest Na = 135 mmol/L (Ref range: 136-145) in last 2 days, will monitor as appropriate   # Hypercalcemia: corrected calcium is >10.1, will monitor as appropriate    # Hypoalbuminemia: Lowest albumin = 2.3 g/dL (Ref range: 3.5-5.2) at 11/2/2022  6:41 AM, will monitor as appropriate   # Drug Induced Platelet Defect: home medication list includes an antiplatelet medication   # Hypertension: home medication list includes antihypertensive(s)     # Obesity: Estimated body mass index is 31.41 kg/m  as calculated from the following:    Height as of 10/21/22: 1.626 m (5' 4\").    Weight as of 10/21/22: 83 kg (183 lb).             Time spent in taking care of the patient is 35 minutes and more than 50% of the time was spent in coordination of care for management of sepsis with bacteremia due to E. coli, thrombocytopenia and elevated creatinine and I discussed the plan of care with the infectious disease team and reviewed her labs and further work-up was ordered  ______________________________________________________________________    Interval History       Seen today and said she has been having fever for the past 2-3 days and had some right sided abdominal pain  No sob or chest   Has cough which is chronic and dry  Discussed plan of care     Spoke with RN and ID     Data reviewed today: I reviewed all medications, new labs and imaging results over the last 24 hours. I personally reviewed no images or EKG's today.    Physical Exam   Vital Signs: Temp: (!) 101.1  F (38.4  C) Temp src: Axillary BP: 109/46 Pulse: 78   Resp: 18 SpO2: 95 % O2 Device: None (Room air) Oxygen Delivery: 1 LPM  Weight: 0 lbs 0 oz        General: Patient appears comfortable " and in no acute distress.  HEENT: Head is atraumatic, normocephalic.  Pupils are equal, round and reactive to light.  No scleral icterus. Oral mucosa is moist   Neck: Neck is supple and No Lymphadenopathy   Respiratory: Lungs are clear to auscultation bilaterally with no wheeze or crackles   Cardiovascular: Regular rate , S1 and S2 normal with no murmer or rubs or gallops  Abdomen:   soft , non tender , non distended and bowel sound present   Skin: No skin rashes or lesions to inspection or palpation.  Neurologic: Higher functions are within normal limits. No obvious defects in speech, language and memory. No facial droop  Musculoskeletal: Normal Range of motion over upper and lower extremities bilaterally   Psychiatric: cooperative     Data   Recent Labs   Lab 11/02/22  0641 11/01/22  0544   WBC 10.7 12.5*   HGB 11.6* 14.5   MCV 92 90   PLT 87* 135*    136   POTASSIUM 3.9 3.8   CHLORIDE 110* 100   CO2 19* 27   BUN 18 18   CR 1.12* 1.48*   ANIONGAP 6 9   FRACISCO 7.5* 9.2   * 133*   ALBUMIN 2.3* 3.9   PROTTOTAL 6.1* 8.1   BILITOTAL 0.8 2.0*   ALKPHOS 73 90   ALT 22 27   AST 38 33     No results found for this or any previous visit (from the past 24 hour(s)).  Medications     sodium chloride 100 mL/hr at 11/02/22 0446       aspirin  81 mg Oral Daily     atorvastatin  40 mg Oral Daily     carvedilol  3.125 mg Oral BID w/meals     cefTRIAXone  1 g Intravenous Q24H     clopidogrel  75 mg Oral Daily     fluticasone-vilanterol  1 puff Inhalation Daily     isosorbide mononitrate  30 mg Oral Daily     loratadine  10 mg Oral At Bedtime     magnesium oxide  400 mg Oral Daily     mirabegron  50 mg Oral Daily     pantoprazole  40 mg Oral Daily     saccharomyces boulardii  250 mg Oral BID     sertraline  100 mg Oral Daily     sodium chloride (PF)  3 mL Intracatheter Q8H     umeclidinium  2 puff Inhalation Daily     ursodiol  300 mg Oral BID     vancomycin  125 mg Oral Daily

## 2022-11-02 NOTE — PROVIDER NOTIFICATION
Provider notified regarding of 102.9. Tylenol given, room temperature turned down, cool wash cloth given. Will continue to monitor and notify provider.

## 2022-11-02 NOTE — PLAN OF CARE
Goal Outcome Evaluation:           Patient is alert and oriented X4. VSS on 1 LPM via NC. Up with assist of 1 with walker/SBA. Takes pills whole with thin liquids. IV NS infusing 100ml/hr. Patient denies pain. CT completed. Continent of B& B, voiding adequately in bathroom with minimum output.  Patient had loose watery stool X2 this shift.Patient request for pure wick at bedtime. Resting comfortable in bed. Continue to monitor

## 2022-11-03 LAB
ANION GAP SERPL CALCULATED.3IONS-SCNC: 5 MMOL/L (ref 3–14)
BACTERIA BLD CULT: ABNORMAL
BACTERIA BLD CULT: ABNORMAL
BUN SERPL-MCNC: 13 MG/DL (ref 7–30)
CALCIUM SERPL-MCNC: 7.9 MG/DL (ref 8.5–10.1)
CHLORIDE BLD-SCNC: 112 MMOL/L (ref 94–109)
CO2 SERPL-SCNC: 25 MMOL/L (ref 20–32)
CREAT SERPL-MCNC: 0.97 MG/DL (ref 0.52–1.04)
ERYTHROCYTE [DISTWIDTH] IN BLOOD BY AUTOMATED COUNT: 13 % (ref 10–15)
GFR SERPL CREATININE-BSD FRML MDRD: 61 ML/MIN/1.73M2
GLUCOSE BLD-MCNC: 121 MG/DL (ref 70–99)
HCT VFR BLD AUTO: 31.2 % (ref 35–47)
HGB BLD-MCNC: 10.6 G/DL (ref 11.7–15.7)
HGB BLD-MCNC: 9.7 G/DL (ref 11.7–15.7)
LACTATE SERPL-SCNC: 1.4 MMOL/L (ref 0.7–2)
LDH SERPL L TO P-CCNC: 229 U/L (ref 81–234)
MCH RBC QN AUTO: 30.9 PG (ref 26.5–33)
MCHC RBC AUTO-ENTMCNC: 34 G/DL (ref 31.5–36.5)
MCV RBC AUTO: 91 FL (ref 78–100)
PLATELET # BLD AUTO: 88 10E3/UL (ref 150–450)
POTASSIUM BLD-SCNC: 3.6 MMOL/L (ref 3.4–5.3)
RBC # BLD AUTO: 3.43 10E6/UL (ref 3.8–5.2)
SODIUM SERPL-SCNC: 142 MMOL/L (ref 133–144)
WBC # BLD AUTO: 9.1 10E3/UL (ref 4–11)

## 2022-11-03 PROCEDURE — 250N000013 HC RX MED GY IP 250 OP 250 PS 637: Performed by: HOSPITALIST

## 2022-11-03 PROCEDURE — 85048 AUTOMATED LEUKOCYTE COUNT: CPT | Performed by: HOSPITALIST

## 2022-11-03 PROCEDURE — 36415 COLL VENOUS BLD VENIPUNCTURE: CPT | Performed by: INTERNAL MEDICINE

## 2022-11-03 PROCEDURE — 99232 SBSQ HOSP IP/OBS MODERATE 35: CPT | Performed by: SPECIALIST

## 2022-11-03 PROCEDURE — 99232 SBSQ HOSP IP/OBS MODERATE 35: CPT | Performed by: HOSPITALIST

## 2022-11-03 PROCEDURE — 120N000001 HC R&B MED SURG/OB

## 2022-11-03 PROCEDURE — 85018 HEMOGLOBIN: CPT | Performed by: HOSPITALIST

## 2022-11-03 PROCEDURE — 83615 LACTATE (LD) (LDH) ENZYME: CPT | Performed by: HOSPITALIST

## 2022-11-03 PROCEDURE — 83605 ASSAY OF LACTIC ACID: CPT | Performed by: INTERNAL MEDICINE

## 2022-11-03 PROCEDURE — 250N000013 HC RX MED GY IP 250 OP 250 PS 637: Performed by: SPECIALIST

## 2022-11-03 PROCEDURE — 36415 COLL VENOUS BLD VENIPUNCTURE: CPT | Performed by: HOSPITALIST

## 2022-11-03 PROCEDURE — 82310 ASSAY OF CALCIUM: CPT | Performed by: HOSPITALIST

## 2022-11-03 PROCEDURE — 250N000011 HC RX IP 250 OP 636: Performed by: HOSPITALIST

## 2022-11-03 PROCEDURE — 83010 ASSAY OF HAPTOGLOBIN QUANT: CPT | Performed by: HOSPITALIST

## 2022-11-03 RX ORDER — VANCOMYCIN HYDROCHLORIDE 125 MG/1
125 CAPSULE ORAL 4 TIMES DAILY
Status: DISCONTINUED | OUTPATIENT
Start: 2022-11-03 | End: 2022-11-08 | Stop reason: HOSPADM

## 2022-11-03 RX ADMIN — ATORVASTATIN CALCIUM 40 MG: 40 TABLET, FILM COATED ORAL at 09:17

## 2022-11-03 RX ADMIN — Medication 250 MG: at 20:06

## 2022-11-03 RX ADMIN — URSODIOL 300 MG: 300 CAPSULE ORAL at 09:16

## 2022-11-03 RX ADMIN — Medication 400 MG: at 09:16

## 2022-11-03 RX ADMIN — VANCOMYCIN HYDROCHLORIDE 125 MG: 125 CAPSULE ORAL at 09:16

## 2022-11-03 RX ADMIN — CEFTRIAXONE SODIUM 2 G: 2 INJECTION, POWDER, FOR SOLUTION INTRAMUSCULAR; INTRAVENOUS at 12:40

## 2022-11-03 RX ADMIN — ISOSORBIDE MONONITRATE 30 MG: 30 TABLET, EXTENDED RELEASE ORAL at 09:17

## 2022-11-03 RX ADMIN — FLUTICASONE FUROATE AND VILANTEROL TRIFENATATE 1 PUFF: 200; 25 POWDER RESPIRATORY (INHALATION) at 09:19

## 2022-11-03 RX ADMIN — LORATADINE 10 MG: 10 TABLET ORAL at 21:47

## 2022-11-03 RX ADMIN — VANCOMYCIN HYDROCHLORIDE 125 MG: 125 CAPSULE ORAL at 12:40

## 2022-11-03 RX ADMIN — ASPIRIN 81 MG: 81 TABLET, COATED ORAL at 09:17

## 2022-11-03 RX ADMIN — PANTOPRAZOLE SODIUM 40 MG: 40 TABLET, DELAYED RELEASE ORAL at 09:17

## 2022-11-03 RX ADMIN — CARVEDILOL 3.12 MG: 3.12 TABLET, FILM COATED ORAL at 09:16

## 2022-11-03 RX ADMIN — GUAIFENESIN AND DEXTROMETHORPHAN 10 ML: 100; 10 SYRUP ORAL at 01:36

## 2022-11-03 RX ADMIN — VANCOMYCIN HYDROCHLORIDE 125 MG: 125 CAPSULE ORAL at 21:47

## 2022-11-03 RX ADMIN — UMECLIDINIUM 2 PUFF: 62.5 AEROSOL, POWDER ORAL at 09:19

## 2022-11-03 RX ADMIN — VANCOMYCIN HYDROCHLORIDE 125 MG: 125 CAPSULE ORAL at 17:51

## 2022-11-03 RX ADMIN — MIRABEGRON 50 MG: 50 TABLET, FILM COATED, EXTENDED RELEASE ORAL at 09:17

## 2022-11-03 RX ADMIN — CLOPIDOGREL BISULFATE 75 MG: 75 TABLET ORAL at 09:16

## 2022-11-03 RX ADMIN — ACETAMINOPHEN 650 MG: 325 TABLET ORAL at 21:47

## 2022-11-03 RX ADMIN — ACETAMINOPHEN 650 MG: 325 TABLET ORAL at 14:59

## 2022-11-03 RX ADMIN — CARVEDILOL 3.12 MG: 3.12 TABLET, FILM COATED ORAL at 17:51

## 2022-11-03 RX ADMIN — ACETAMINOPHEN 650 MG: 325 TABLET ORAL at 05:50

## 2022-11-03 RX ADMIN — Medication 250 MG: at 09:16

## 2022-11-03 RX ADMIN — GUAIFENESIN AND DEXTROMETHORPHAN 10 ML: 100; 10 SYRUP ORAL at 05:51

## 2022-11-03 RX ADMIN — SERTRALINE HYDROCHLORIDE 100 MG: 100 TABLET ORAL at 09:16

## 2022-11-03 RX ADMIN — GUAIFENESIN AND DEXTROMETHORPHAN 10 ML: 100; 10 SYRUP ORAL at 20:06

## 2022-11-03 RX ADMIN — URSODIOL 300 MG: 300 CAPSULE ORAL at 20:06

## 2022-11-03 ASSESSMENT — ACTIVITIES OF DAILY LIVING (ADL)
VISION_MANAGEMENT: GLASSES
ADLS_ACUITY_SCORE: 27
FALL_HISTORY_WITHIN_LAST_SIX_MONTHS: NO
ADLS_ACUITY_SCORE: 26
ADLS_ACUITY_SCORE: 26
ADLS_ACUITY_SCORE: 27
DIFFICULTY_COMMUNICATING: NO
TOILETING_ISSUES: NO
CONCENTRATING,_REMEMBERING_OR_MAKING_DECISIONS_DIFFICULTY: NO
DIFFICULTY_EATING/SWALLOWING: NO
WALKING_OR_CLIMBING_STAIRS_DIFFICULTY: NO
ADLS_ACUITY_SCORE: 26
ADLS_ACUITY_SCORE: 27
DOING_ERRANDS_INDEPENDENTLY_DIFFICULTY: NO
DRESSING/BATHING_DIFFICULTY: NO
ADLS_ACUITY_SCORE: 26
ADLS_ACUITY_SCORE: 25
CHANGE_IN_FUNCTIONAL_STATUS_SINCE_ONSET_OF_CURRENT_ILLNESS/INJURY: NO
ADLS_ACUITY_SCORE: 26
WEAR_GLASSES_OR_BLIND: YES
HEARING_DIFFICULTY_OR_DEAF: NO
ADLS_ACUITY_SCORE: 26

## 2022-11-03 NOTE — PLAN OF CARE
Goal Outcome Evaluation:    Admitting Diagnosis: Pyelonephritis.  Sepsis, due to unspecified organism, unspecified whether acute organ dysfunction present.    Patient A&Ox4. VSS, febrile Tylenol given, rechecked temp 98.8. On 1L O2 sat 95%. PIV SL. I&O. Assist of 1 GB/. Voiding in the BR with adequate output, Incontinent of urine. Loose stool on this shift. Will continue to monitor.

## 2022-11-03 NOTE — PROGRESS NOTES
[facility]    Infectious Disease Progress Note    Date of Service : 11/3/2022     Assessment:  71 YF with recurrent UTIs and recent c.diff colitis who has been hospitalized with E.coli sepsis related to right sided pyelonephritis. No obstruction, stones or abscess noted on imaging.      -E.coli bacteremia  -ARTURO improving  -Recent C.diff colitis  -Hx of congenital renal abnormality and surgeries on right kidney with recurrent UTIs  -Cholilithiasis  -Chronic medical conditions- coronary artery disease, ischemic cardiomyopathy, hypertension,  sleep apnea, depression, anxiety, ADHD, GERD,     Recommendations  1. Continue Ceftriaxone and oral vancomycin. increase dose of vancomycin to QID  2. Oral ciprofloxacin at discharge.      Umu Mckeon MD    Interval History   Continues to feel poorly, ongoing right sided abdominal pain and bloating, afebrile now with resolved leukocytosis    Physical Exam   Temp: 97.9  F (36.6  C) Temp src: Oral BP: 110/60 Pulse: 75   Resp: 15 SpO2: 97 % O2 Device: Nasal cannula Oxygen Delivery: 1 LPM  There were no vitals filed for this visit.  Vital Signs with Ranges  Temp:  [97.8  F (36.6  C)-103.1  F (39.5  C)] 97.9  F (36.6  C)  Pulse:  [70-87] 75  Resp:  [15-18] 15  BP: ()/(46-60) 110/60  SpO2:  [94 %-97 %] 97 %    Constitutional: Awake, alert, cooperative, no apparent distress  Lungs: Clear to auscultation bilaterally, no crackles or wheezing  Cardiovascular: Regular rate and rhythm, normal S1 and S2, and no murmur noted  Abdomen: Normal bowel sounds, soft, non-distended, non-tender  Skin: No rashes, no cyanosis, no edema  Other:    Medications     sodium chloride 100 mL/hr at 11/02/22 1328       aspirin  81 mg Oral Daily     atorvastatin  40 mg Oral Daily     carvedilol  3.125 mg Oral BID w/meals     cefTRIAXone  2 g Intravenous Q24H     clopidogrel  75 mg Oral Daily     fluticasone-vilanterol  1 puff Inhalation Daily     isosorbide mononitrate  30 mg Oral  Daily     loratadine  10 mg Oral At Bedtime     magnesium oxide  400 mg Oral Daily     mirabegron  50 mg Oral Daily     pantoprazole  40 mg Oral Daily     saccharomyces boulardii  250 mg Oral BID     sertraline  100 mg Oral Daily     sodium chloride (PF)  3 mL Intracatheter Q8H     umeclidinium  2 puff Inhalation Daily     ursodiol  300 mg Oral BID     vancomycin  125 mg Oral BID       Data   All microbiology laboratory data reviewed.  Recent Labs   Lab Test 11/03/22  0703 11/02/22  0641 11/01/22  0544   WBC 9.1 10.7 12.5*   HGB 10.6* 11.6* 14.5   HCT 31.2* 34.5* 42.6   MCV 91 92 90   PLT 88* 87* 135*     Recent Labs   Lab Test 11/03/22  0703 11/02/22  0641 11/01/22  0544   CR 0.97 1.12* 1.48*     Microbiology  11/1 Blood cx  Peripheral Blood    0 Result Notes  Culture Positive on the 1st day of incubation Abnormal        Escherichia coli Panic     1 of 2 bottles        Resulting Agency: [facility]     Susceptibility     Escherichia coli     DAKOTA     Ampicillin <=2 ug/mL Susceptible     Ampicillin/ Sulbactam <=2 ug/mL Susceptible     Cefepime <=1 ug/mL Susceptible     Ceftazidime <=1 ug/mL Susceptible     Ceftriaxone <=1 ug/mL Susceptible     Ciprofloxacin <=0.25 ug/mL Susceptible     Gentamicin <=1 ug/mL Susceptible     Levofloxacin <=0.12 ug/mL Susceptible     Meropenem <=0.25 ug/mL Susceptible     Piperacillin/Tazobactam <=4 ug/mL Susceptible     Tobramycin <=1 ug/mL Susceptible     Trimethoprim/Sulfamethoxazole <=1/19 ug/mL Susceptible                 11/1 urine cx  Urine, Midstream    0 Result Notes  Culture 10,000-50,000 CFU/mL Escherichia coli Abnormal             Resulting Agency: [facility]     Susceptibility     Escherichia coli     DAKOTA     Ampicillin <=2 ug/mL Susceptible     Ampicillin/ Sulbactam <=2 ug/mL Susceptible     Cefazolin <=4 ug/mL Susceptible 1     Cefepime <=1 ug/mL Susceptible     Cefoxitin <=4 ug/mL Susceptible     Ceftazidime <=1 ug/mL Susceptible     Ceftriaxone <=1 ug/mL Susceptible     Ciprofloxacin  <=0.25 ug/mL Susceptible     Gentamicin <=1 ug/mL Susceptible     Levofloxacin <=0.12 ug/mL Susceptible     Nitrofurantoin <=16 ug/mL Susceptible     Piperacillin/Tazobactam <=4 ug/mL Susceptible     Tobramycin <=1 ug/mL Susceptible     Trimethoprim/Sulfamethoxazole <=1/19 ug/mL Susceptible                 Imaging  11/2 CT abdomen pelvis   EXAM: CT ABDOMEN PELVIS W CONTRAST  LOCATION: [facility]  DATE/TIME: 11/2/2022 8:34 PM     INDICATION: bacteremia with multiple renal procedure  COMPARISON: 02/03/2022  TECHNIQUE: CT scan of the abdomen and pelvis was performed following injection of IV contrast. Multiplanar reformats were obtained. Dose reduction techniques were used.  CONTRAST: 68 mL Isovue 370     FINDINGS:   LOWER CHEST: Tiny right pleural effusion.     HEPATOBILIARY: Normal contour with no significant mass. No bile duct dilatation. Calcified gallstones.     PANCREAS: Normal.     SPLEEN: Normal.     ADRENAL GLANDS: Normal.     KIDNEYS/BLADDER: Urothelial thickening and enhancement involving the right renal pelvis and proximal ureter, as well as some patchy hypoenhancement of the interpolar right kidney and right perinephric fat stranding. No obstructing stone or lesion. Left   renal cysts, which do not require further workup. Bladder is partially obscured by streak artifact, but normal where visualized.     BOWEL: No obstruction or inflammatory change.     LYMPH NODES: Normal.     VASCULATURE: Mild atherosclerotic calcification of the abdominal aorta, visceral branches, and iliofemoral arteries. No aneurysm.     PELVIC ORGANS: Hysterectomy.     MUSCULOSKELETAL: Mild degenerative changes in the spine. Bilateral hip prosthesis. No aggressive or destructive lesions.                                                                      IMPRESSION:   1.  Constellation of findings consistent with right pyelonephritis. No perinephric fluid collection to suggest abscess.     2.   Cholelithiasis.

## 2022-11-03 NOTE — PLAN OF CARE
Goal Outcome Evaluation:         Patient is alert and oriented X4. VSS on 1 LPM via NC. No fever or low body temperature noted. Up with assist of 1 with walker/SBA. Takes pills whole with thin liquids. IV NS infusing 100ml/hr. Patient reports pain, prn acetaminophen was given with effective results . Robitussin was given for frequent coughing. CT completed. Continent of B& B, voiding adequately in bathroom with minimum output.  Patient had loose watery stool X2 this shift. Patient request for pure wick at bedtime. Resting comfortable in bed. Continue to monitor

## 2022-11-03 NOTE — PROGRESS NOTES
[facility]    Medicine Progress Note - Hospitalist Service    Date of Admission:  11/1/2022    Assessment & Plan     Gem Gama is a 71 year old female with history of coronary artery disease, ischemic cardiomyopathy, hypertension, recurrent UTI and pyelonephritis, sleep apnea, depression, anxiety, ADHD, GERD, and C. difficile who presented to the ER with a fever and dysuria.  After evaluation in the ER there was concern for sepsis due to pyelonephritis.  She was started on IV Rocephin and given IV fluids.  The hospitalist service was contacted to admit her for further evaluation and management.     Sepsis -Resolveddue to urinary tract infection with right pyelonephritis with E coli  bacteremia  - On admission Presented with fevers and dysuria for 2 to 3 days.  -  She had elevated white count of 12.5 on admission, and UA showed changes consistent with infection  -Blood cultures done on 11/1/2022 and urine cultures they are consistent with E. coli which is pansensitive  -She had CT scan of the abdomen and pelvis done with and without contrast which shows changes consistent with right pyelonephritis with no perinephric fluid collection to suggest abscess, cholelithiasis and tiny right pleural effusion  -We will continue the patient with IV antibiotics and ID recommendations are pending and her last fever was yesterday at 11:40 AM and she has been afebrile after that and as she is eating and drinking we will DC the IV fluids  -ID on board and continue with current antibiotics      Acute kidney injury-Resolved  -Creatinine on admission was 1.48 with baseline around 0.9-1.2 and this is most likely prerenal due to underlying infection and she was started on IV fluids and her renal function is improved back to normal we will continue to monitor       Thrombocytopenia-worsen  -She had platelet count of 135 on admission and this most likely due to underlying infection and platelet count is  "trended down to 88 and has been stable since yesterday with no signs of any bleeding and we will continue to monitor    Acute anemia  -Patient presented with hemoglobin of 14.5 on admission and I reviewed her past hemoglobin in August and June 2022 and it has been between 14 and 15  -Patient might have been dry on admission and her hemoglobin has trended down to 11.6 and 10.6 today  -She denies any melena, hematemesis, hematochezia and we will continue to monitor  -Most likely explanation is sepsis  -We will also check her hemoglobin every 12 hours and this can be all because of underlying sepsis given her drop in platelet count and hemoglobin and LDH is nromal and haptoglobin is pending     Elevated bilirubin-resolved  -She only had elevated bilirubin at 2 yesterday on admission and the total bilirubin is normal today at 0.8     History of C. Difficile  -Diagnosed with C. difficile in September 2022.  States she completed a course of treatment and her symptoms improved, although she does occasionally have some \"slimy stools.\"  -She was started on  vancomycin 125 mg daily for prophylaxis  And ID did change the dose to QID     Coronary artery disease  Ischemic cardiomyopathy  Prior history of STEMI and October 2019, underwent EDWIN x2 to proximal LAD at that time.  LVEF was 35 to 40% at that time.  Most recent echocardiogram showed an LVEF of 55 to 60% with grade 1 diastolic dysfunction and mild to moderate mitral valve regurgitation.  Follows with Dr. Colon in cardiology clinic.  -Continue PTA aspirin, Plavix, atorvastatin, carvedilol, and Imdur and her valsartan was held because of acute kidney injury        Hypertension  -She was started on Coreg on admission and initially her valsartan was held because of low pressures and acute kidney injury but her blood pressures are relatively stable and her renal function is back to baseline and we  Hold her valsartan again    Sleep apnea  -Has not tolerated CPAP in the " "past.     Depression  Anxiety  ADHD  -Continue PTA sertraline.     GERD  -Continue PTA pantoprazole.      Obesity: Estimated body mass index is 31.41 kg/m  as calculated from the following:    Height as of 10/21/22: 1.626 m (5' 4\").    Weight as of 10/21/22: 83 kg (183 lb).          Diet: Combination Diet Regular Diet Adult    DVT Prophylaxis: Pneumatic Compression Devices  Greene Catheter: Not present  Central Lines: None  Cardiac Monitoring: None  Code Status:   SPECIAL CODE .  I had discussed with the patient and she only wants \" CODE drugs and shock but no CPR no intubation    Disposition Plan     Expected Discharge Date: 11/05/2022      Destination: home          The patient's care was discussed with the Bedside Nurse, Patient and ID  Consultant.    Jeet Mckenzie MD  Hospitalist Service  [facility]  Securely message with the Vocera Web Console (learn more here)  Text page via [employer] Paging/Directory         Clinically Significant Risk Factors         # Hyponatremia: Lowest Na = 135 mmol/L (Ref range: 136-145) in last 2 days, will monitor as appropriate      # Hypoalbuminemia: Lowest albumin = 2.3 g/dL (Ref range: 3.5-5.2) at 11/2/2022  6:41 AM, will monitor as appropriate   # Thrombocytopenia: Lowest platelets = 87 (Ref range: 150-450) in last 2 days, will monitor for bleeding         # Obesity: Estimated body mass index is 31.41 kg/m  as calculated from the following:    Height as of 10/21/22: 1.626 m (5' 4\").    Weight as of 10/21/22: 83 kg (183 lb)., PRESENT ON ADMISSION           _____    Interval History       I saw the patient today and she mentioned that she does not have any chest pain and she mentioned that after the CT scan she did not feel well.  She denied any nausea, vomiting and is eating and drinking.  She denies any melena, hematemesis or hematochezia.  I updated the patient on results of her labs and spoke with the nurse after lactic acid fired this morning which was " normal.    Data reviewed today: I reviewed all medications, new labs and imaging results over the last 24 hours. I personally reviewed no images or EKG's today.    Physical Exam   Vital Signs: Temp: 98.9  F (37.2  C) Temp src: Oral BP: (!) 145/71 Pulse: 96   Resp: 18 SpO2: 94 % O2 Device: Nasal cannula Oxygen Delivery: 2 LPM  Weight: 0 lbs 0 oz        General: Patient appears comfortable and in no acute distress.  HEENT: Head is atraumatic, normocephalic.  Moist oral mucosa  Neck: Neck is supple   Respiratory: CTLA with no wheeze or crackles but she was needing some oxygen  Cardiovascular: Regular rate , S1 and S2 normal with no murmer or rubs or gallops  Abdomen:   soft , non tender , non distended and bowel sound present   Skin: No skin rashes   Neurologic: No underlying facial droop  Musculoskeletal: Normal Range of motion over upper and lower extremities bilaterally   Psychiatric: cooperative     Data   Recent Labs   Lab 11/03/22  0703 11/02/22  0641 11/01/22  0544   WBC 9.1 10.7 12.5*   HGB 10.6* 11.6* 14.5   MCV 91 92 90   PLT 88* 87* 135*    135 136   POTASSIUM 3.6 3.9 3.8   CHLORIDE 112* 110* 100   CO2 25 19* 27   BUN 13 18 18   CR 0.97 1.12* 1.48*   ANIONGAP 5 6 9   FRACISCO 7.9* 7.5* 9.2   * 130* 133*   ALBUMIN  --  2.3* 3.9   PROTTOTAL  --  6.1* 8.1   BILITOTAL  --  0.8 2.0*   ALKPHOS  --  73 90   ALT  --  22 27   AST  --  38 33     Recent Results (from the past 24 hour(s))   CT Abdomen Pelvis w Contrast    Narrative    EXAM: CT ABDOMEN PELVIS W CONTRAST  LOCATION: [facility]  DATE/TIME: 11/2/2022 8:34 PM    INDICATION: bacteremia with multiple renal procedure  COMPARISON: 02/03/2022  TECHNIQUE: CT scan of the abdomen and pelvis was performed following injection of IV contrast. Multiplanar reformats were obtained. Dose reduction techniques were used.  CONTRAST: 68 mL Isovue 370    FINDINGS:   LOWER CHEST: Tiny right pleural effusion.    HEPATOBILIARY: Normal contour with  no significant mass. No bile duct dilatation. Calcified gallstones.    PANCREAS: Normal.    SPLEEN: Normal.    ADRENAL GLANDS: Normal.    KIDNEYS/BLADDER: Urothelial thickening and enhancement involving the right renal pelvis and proximal ureter, as well as some patchy hypoenhancement of the interpolar right kidney and right perinephric fat stranding. No obstructing stone or lesion. Left   renal cysts, which do not require further workup. Bladder is partially obscured by streak artifact, but normal where visualized.    BOWEL: No obstruction or inflammatory change.    LYMPH NODES: Normal.    VASCULATURE: Mild atherosclerotic calcification of the abdominal aorta, visceral branches, and iliofemoral arteries. No aneurysm.    PELVIC ORGANS: Hysterectomy.    MUSCULOSKELETAL: Mild degenerative changes in the spine. Bilateral hip prosthesis. No aggressive or destructive lesions.      Impression    IMPRESSION:   1.  Constellation of findings consistent with right pyelonephritis. No perinephric fluid collection to suggest abscess.    2.  Cholelithiasis.    3.  Tiny right pleural effusion.     Medications     sodium chloride 100 mL/hr at 11/02/22 1328       aspirin  81 mg Oral Daily     atorvastatin  40 mg Oral Daily     carvedilol  3.125 mg Oral BID w/meals     cefTRIAXone  2 g Intravenous Q24H     clopidogrel  75 mg Oral Daily     fluticasone-vilanterol  1 puff Inhalation Daily     isosorbide mononitrate  30 mg Oral Daily     loratadine  10 mg Oral At Bedtime     magnesium oxide  400 mg Oral Daily     mirabegron  50 mg Oral Daily     pantoprazole  40 mg Oral Daily     saccharomyces boulardii  250 mg Oral BID     sertraline  100 mg Oral Daily     sodium chloride (PF)  3 mL Intracatheter Q8H     umeclidinium  2 puff Inhalation Daily     ursodiol  300 mg Oral BID     vancomycin  125 mg Oral BID

## 2022-11-03 NOTE — PLAN OF CARE
Goal Outcome Evaluation:     Alert and oriented X4, VSS on 2L, assist of one with walker and gait belt.  IV NS infusing 100 ml/hr. Robitussin prn given for cough.  Ambulate to bathroom multiple time, viding adequate urine output.

## 2022-11-04 LAB
ANION GAP SERPL CALCULATED.3IONS-SCNC: 5 MMOL/L (ref 3–14)
BUN SERPL-MCNC: 11 MG/DL (ref 7–30)
CALCIUM SERPL-MCNC: 8.4 MG/DL (ref 8.5–10.1)
CHLORIDE BLD-SCNC: 109 MMOL/L (ref 94–109)
CO2 SERPL-SCNC: 27 MMOL/L (ref 20–32)
CREAT SERPL-MCNC: 0.89 MG/DL (ref 0.52–1.04)
ERYTHROCYTE [DISTWIDTH] IN BLOOD BY AUTOMATED COUNT: 13 % (ref 10–15)
GFR SERPL CREATININE-BSD FRML MDRD: 68 ML/MIN/1.73M2
GLUCOSE BLD-MCNC: 119 MG/DL (ref 70–99)
HAPTOGLOB SERPL-MCNC: 216 MG/DL (ref 32–197)
HCT VFR BLD AUTO: 31.9 % (ref 35–47)
HGB BLD-MCNC: 10.9 G/DL (ref 11.7–15.7)
LACTATE SERPL-SCNC: 0.5 MMOL/L (ref 0.7–2)
LACTATE SERPL-SCNC: 1.2 MMOL/L (ref 0.7–2)
MCH RBC QN AUTO: 30.8 PG (ref 26.5–33)
MCHC RBC AUTO-ENTMCNC: 34.2 G/DL (ref 31.5–36.5)
MCV RBC AUTO: 90 FL (ref 78–100)
PLATELET # BLD AUTO: 104 10E3/UL (ref 150–450)
POTASSIUM BLD-SCNC: 3.5 MMOL/L (ref 3.4–5.3)
RBC # BLD AUTO: 3.54 10E6/UL (ref 3.8–5.2)
SODIUM SERPL-SCNC: 141 MMOL/L (ref 133–144)
WBC # BLD AUTO: 7.4 10E3/UL (ref 4–11)

## 2022-11-04 PROCEDURE — 93005 ELECTROCARDIOGRAM TRACING: CPT

## 2022-11-04 PROCEDURE — 83605 ASSAY OF LACTIC ACID: CPT | Performed by: HOSPITALIST

## 2022-11-04 PROCEDURE — 85027 COMPLETE CBC AUTOMATED: CPT | Performed by: HOSPITALIST

## 2022-11-04 PROCEDURE — 36415 COLL VENOUS BLD VENIPUNCTURE: CPT | Performed by: HOSPITALIST

## 2022-11-04 PROCEDURE — 82310 ASSAY OF CALCIUM: CPT | Performed by: HOSPITALIST

## 2022-11-04 PROCEDURE — 250N000013 HC RX MED GY IP 250 OP 250 PS 637: Performed by: HOSPITALIST

## 2022-11-04 PROCEDURE — 99233 SBSQ HOSP IP/OBS HIGH 50: CPT | Performed by: SPECIALIST

## 2022-11-04 PROCEDURE — 250N000013 HC RX MED GY IP 250 OP 250 PS 637: Performed by: SPECIALIST

## 2022-11-04 PROCEDURE — 999N000040 HC STATISTIC CONSULT NO CHARGE VASC ACCESS

## 2022-11-04 PROCEDURE — 99232 SBSQ HOSP IP/OBS MODERATE 35: CPT | Performed by: HOSPITALIST

## 2022-11-04 PROCEDURE — 120N000001 HC R&B MED SURG/OB

## 2022-11-04 PROCEDURE — 93010 ELECTROCARDIOGRAM REPORT: CPT | Performed by: INTERNAL MEDICINE

## 2022-11-04 PROCEDURE — 250N000011 HC RX IP 250 OP 636: Performed by: HOSPITALIST

## 2022-11-04 PROCEDURE — 250N000013 HC RX MED GY IP 250 OP 250 PS 637: Performed by: INTERNAL MEDICINE

## 2022-11-04 PROCEDURE — 83605 ASSAY OF LACTIC ACID: CPT | Performed by: SPECIALIST

## 2022-11-04 RX ORDER — BENZONATATE 100 MG/1
100 CAPSULE ORAL 3 TIMES DAILY PRN
Status: DISCONTINUED | OUTPATIENT
Start: 2022-11-04 | End: 2022-11-08 | Stop reason: HOSPADM

## 2022-11-04 RX ADMIN — ASPIRIN 81 MG: 81 TABLET, COATED ORAL at 08:54

## 2022-11-04 RX ADMIN — ATORVASTATIN CALCIUM 40 MG: 40 TABLET, FILM COATED ORAL at 08:55

## 2022-11-04 RX ADMIN — VANCOMYCIN HYDROCHLORIDE 125 MG: 125 CAPSULE ORAL at 21:58

## 2022-11-04 RX ADMIN — CARVEDILOL 3.12 MG: 3.12 TABLET, FILM COATED ORAL at 10:40

## 2022-11-04 RX ADMIN — GUAIFENESIN AND DEXTROMETHORPHAN 10 ML: 100; 10 SYRUP ORAL at 14:55

## 2022-11-04 RX ADMIN — VANCOMYCIN HYDROCHLORIDE 125 MG: 125 CAPSULE ORAL at 18:02

## 2022-11-04 RX ADMIN — URSODIOL 300 MG: 300 CAPSULE ORAL at 08:54

## 2022-11-04 RX ADMIN — URSODIOL 300 MG: 300 CAPSULE ORAL at 20:49

## 2022-11-04 RX ADMIN — VANCOMYCIN HYDROCHLORIDE 125 MG: 125 CAPSULE ORAL at 08:54

## 2022-11-04 RX ADMIN — UMECLIDINIUM 2 PUFF: 62.5 AEROSOL, POWDER ORAL at 08:55

## 2022-11-04 RX ADMIN — LORATADINE 10 MG: 10 TABLET ORAL at 21:58

## 2022-11-04 RX ADMIN — BENZONATATE 100 MG: 100 CAPSULE ORAL at 10:39

## 2022-11-04 RX ADMIN — PANTOPRAZOLE SODIUM 40 MG: 40 TABLET, DELAYED RELEASE ORAL at 08:54

## 2022-11-04 RX ADMIN — CLOPIDOGREL BISULFATE 75 MG: 75 TABLET ORAL at 08:54

## 2022-11-04 RX ADMIN — Medication 250 MG: at 20:49

## 2022-11-04 RX ADMIN — GUAIFENESIN AND DEXTROMETHORPHAN 10 ML: 100; 10 SYRUP ORAL at 00:18

## 2022-11-04 RX ADMIN — MIRABEGRON 50 MG: 50 TABLET, FILM COATED, EXTENDED RELEASE ORAL at 08:54

## 2022-11-04 RX ADMIN — Medication 400 MG: at 08:54

## 2022-11-04 RX ADMIN — VANCOMYCIN HYDROCHLORIDE 125 MG: 125 CAPSULE ORAL at 12:36

## 2022-11-04 RX ADMIN — CARVEDILOL 3.12 MG: 3.12 TABLET, FILM COATED ORAL at 18:02

## 2022-11-04 RX ADMIN — ISOSORBIDE MONONITRATE 30 MG: 30 TABLET, EXTENDED RELEASE ORAL at 08:54

## 2022-11-04 RX ADMIN — GUAIFENESIN AND DEXTROMETHORPHAN 10 ML: 100; 10 SYRUP ORAL at 09:14

## 2022-11-04 RX ADMIN — ACETAMINOPHEN 650 MG: 325 TABLET ORAL at 03:47

## 2022-11-04 RX ADMIN — FLUTICASONE FUROATE AND VILANTEROL TRIFENATATE 1 PUFF: 200; 25 POWDER RESPIRATORY (INHALATION) at 08:55

## 2022-11-04 RX ADMIN — Medication 250 MG: at 08:54

## 2022-11-04 RX ADMIN — GUAIFENESIN AND DEXTROMETHORPHAN 10 ML: 100; 10 SYRUP ORAL at 04:36

## 2022-11-04 RX ADMIN — SERTRALINE HYDROCHLORIDE 100 MG: 100 TABLET ORAL at 08:54

## 2022-11-04 ASSESSMENT — ACTIVITIES OF DAILY LIVING (ADL)
ADLS_ACUITY_SCORE: 33
ADLS_ACUITY_SCORE: 27
ADLS_ACUITY_SCORE: 29
ADLS_ACUITY_SCORE: 30
ADLS_ACUITY_SCORE: 33
ADLS_ACUITY_SCORE: 27
ADLS_ACUITY_SCORE: 29

## 2022-11-04 NOTE — PLAN OF CARE
Goal Outcome Evaluation:       Patient is alert and oriented X4. VSS on 1 LPM via NC. No fever or low body temperature noted. Up with assist of 1 with walker and gait belt . Takes pills whole with thin liquids. PIV saline locked.  Patient reports pain, prn acetaminophen was given with effective results . Robitussin was given for frequent coughing. Continent of B& B, voiding adequately in bathroom with minimum output.  Patient had  Watery loose  stool X3  this shift. . Resting comfortable in bed. Continue to monitor

## 2022-11-04 NOTE — PLAN OF CARE
Goal Outcome Evaluation:  A&Ox4. Up SBA with walker. A few stools today light brown/soft, 2 incon. Incon bladder at times. Soft BP earlier this morning, recheck elevated so Coreg given. Temp elevated this shift. PRN guaifenesin and tessalon given for cough- effective. PIV SL to R hand in between Rocephin doses. EKG completed. Tolerating low sodium diet (per pt request). Discharge pending improvement.

## 2022-11-04 NOTE — PROVIDER NOTIFICATION
"Writer paged Hospitalist on patient behalf \"Pt requesting a low sodium diet, could you order this or discuss with her during rounds? Thanks. [phone]\"  "

## 2022-11-04 NOTE — PROGRESS NOTES
Charge RN updated, previous PIV extravasated, puffy immediately distal to site, painful when antibiotic infusing. Recommend pharmacy consult.

## 2022-11-04 NOTE — PROGRESS NOTES
[facility]    Infectious Disease Progress Note    Date of Service : 11/04/2022   Assessment:  71 YF with recurrent UTIs and recent c.diff colitis who has been hospitalized with E.coli sepsis related to right sided pyelonephritis. No obstruction, stones or abscess noted on imaging. Improvement can be slow, but is on appropriate therapy and is improving slowly. Maintained on oral vancomycin prophylaxis due to recent C.diff     -E.coli bacteremia  -ARTURO improving  -Recent C.diff colitis  -Hx of congenital renal abnormality and surgeries on right kidney with recurrent UTIs  -Cholilithiasis  -Chronic medical conditions- coronary artery disease, ischemic cardiomyopathy, hypertension,  sleep apnea, depression, anxiety, ADHD, GERD,     Recommendations  1. Can discharge once afebrile for 24 hours  2. Check QTc   3. Plan oral Ciprofloxacin at discharge , treat for a total of 14 days  4. C.diff prophylaxis to continue until 5 days following antibiotic discontinuation  Recommendations were discussed with the hospitalist service  ID will follow peripherally  Call if questions arise over the weekend      Umu Mckeon MD    Interval History   Slow improvement, still having fever but fever pattern improving. On appropriate antibiotics based on cx -E.coli sensitive is pan sensitive. Flank pain has improved, leukocytosis has resolved, CT did not show any abscess, complains of a cough    Physical Exam   Temp: 98.1  F (36.7  C) Temp src: Oral BP: 133/68 Pulse: 83   Resp: 16 SpO2: 96 % O2 Device: Nasal cannula Oxygen Delivery: 1 LPM  There were no vitals filed for this visit.  Vital Signs with Ranges  Temp:  [98  F (36.7  C)-102.8  F (39.3  C)] 98.1  F (36.7  C)  Pulse:  [72-99] 83  Resp:  [11-18] 16  BP: (112-150)/(50-71) 133/68  SpO2:  [92 %-98 %] 96 %    Constitutional: Awake, alert, cooperative, no apparent distress  Lungs: Clear to auscultation bilaterally, no crackles or wheezing  Cardiovascular: Regular  rate and rhythm, normal S1 and S2, and no murmur noted  Abdomen: Normal bowel sounds, soft, non-distended, non-tender  Skin: No rashes, no cyanosis, no edema  Other:    Medications       aspirin  81 mg Oral Daily     atorvastatin  40 mg Oral Daily     carvedilol  3.125 mg Oral BID w/meals     cefTRIAXone  2 g Intravenous Q24H     clopidogrel  75 mg Oral Daily     fluticasone-vilanterol  1 puff Inhalation Daily     isosorbide mononitrate  30 mg Oral Daily     loratadine  10 mg Oral At Bedtime     magnesium oxide  400 mg Oral Daily     mirabegron  50 mg Oral Daily     pantoprazole  40 mg Oral Daily     saccharomyces boulardii  250 mg Oral BID     sertraline  100 mg Oral Daily     sodium chloride (PF)  3 mL Intracatheter Q8H     umeclidinium  2 puff Inhalation Daily     ursodiol  300 mg Oral BID     vancomycin  125 mg Oral 4x Daily       Data   All microbiology laboratory data reviewed.  Recent Labs   Lab Test 11/04/22  0448 11/03/22  2037 11/03/22  0703 11/02/22  0641   WBC 7.4  --  9.1 10.7   HGB 10.9* 9.7* 10.6* 11.6*   HCT 31.9*  --  31.2* 34.5*   MCV 90  --  91 92   *  --  88* 87*     Recent Labs   Lab Test 11/03/22  0703 11/02/22  0641 11/01/22  0544   CR 0.97 1.12* 1.48*     Microbiology  11/1 blood cx  Peripheral Blood    0 Result Notes  Culture Positive on the 1st day of incubation Abnormal        Escherichia coli Panic     1 of 2 bottles        Resulting Agency: [facility]     Susceptibility     Escherichia coli     DAKOTA     Ampicillin <=2 ug/mL Susceptible     Ampicillin/ Sulbactam <=2 ug/mL Susceptible     Cefepime <=1 ug/mL Susceptible     Ceftazidime <=1 ug/mL Susceptible     Ceftriaxone <=1 ug/mL Susceptible     Ciprofloxacin <=0.25 ug/mL Susceptible     Gentamicin <=1 ug/mL Susceptible     Levofloxacin <=0.12 ug/mL Susceptible     Meropenem <=0.25 ug/mL Susceptible     Piperacillin/Tazobactam <=4 ug/mL Susceptible     Tobramycin <=1 ug/mL Susceptible     Trimethoprim/Sulfamethoxazole <=1/19 ug/mL  Susceptible                 11/1 urine cx  Urine, Midstream    0 Result Notes  Culture 10,000-50,000 CFU/mL Escherichia coli Abnormal             Resulting Agency: [facility]     Susceptibility     Escherichia coli     DAKOTA     Ampicillin <=2 ug/mL Susceptible     Ampicillin/ Sulbactam <=2 ug/mL Susceptible     Cefazolin <=4 ug/mL Susceptible 1     Cefepime <=1 ug/mL Susceptible     Cefoxitin <=4 ug/mL Susceptible     Ceftazidime <=1 ug/mL Susceptible     Ceftriaxone <=1 ug/mL Susceptible     Ciprofloxacin <=0.25 ug/mL Susceptible     Gentamicin <=1 ug/mL Susceptible     Levofloxacin <=0.12 ug/mL Susceptible     Nitrofurantoin <=16 ug/mL Susceptible     Piperacillin/Tazobactam <=4 ug/mL Susceptible     Tobramycin <=1 ug/mL Susceptible     Trimethoprim/Sulfamethoxazole <=1/19 ug/mL Susceptible                    Imaging  11/2 CT abdomen pelvis   EXAM: CT ABDOMEN PELVIS W CONTRAST  LOCATION: [facility]  DATE/TIME: 11/2/2022 8:34 PM     INDICATION: bacteremia with multiple renal procedure  COMPARISON: 02/03/2022  TECHNIQUE: CT scan of the abdomen and pelvis was performed following injection of IV contrast. Multiplanar reformats were obtained. Dose reduction techniques were used.  CONTRAST: 68 mL Isovue 370     FINDINGS:   LOWER CHEST: Tiny right pleural effusion.     HEPATOBILIARY: Normal contour with no significant mass. No bile duct dilatation. Calcified gallstones.     PANCREAS: Normal.     SPLEEN: Normal.     ADRENAL GLANDS: Normal.     KIDNEYS/BLADDER: Urothelial thickening and enhancement involving the right renal pelvis and proximal ureter, as well as some patchy hypoenhancement of the interpolar right kidney and right perinephric fat stranding. No obstructing stone or lesion. Left   renal cysts, which do not require further workup. Bladder is partially obscured by streak artifact, but normal where visualized.     BOWEL: No obstruction or inflammatory change.     LYMPH NODES:  Normal.     VASCULATURE: Mild atherosclerotic calcification of the abdominal aorta, visceral branches, and iliofemoral arteries. No aneurysm.     PELVIC ORGANS: Hysterectomy.     MUSCULOSKELETAL: Mild degenerative changes in the spine. Bilateral hip prosthesis. No aggressive or destructive lesions.                                                                      IMPRESSION:   1.  Constellation of findings consistent with right pyelonephritis. No perinephric fluid collection to suggest abscess.     2.  Cholelithiasis.

## 2022-11-04 NOTE — PROVIDER NOTIFICATION
.MD Notification    Notified Person: MD    Notified Person Name: Dr. Winslow    Notification Date/Time: 11/4 @0455    Notification Interaction: Web based paging    Purpose of Notification: Temp is 101.4 - order to notify provider greater than 100.4 - Tylenol given     Orders Received: No orders    Comments:

## 2022-11-04 NOTE — PROGRESS NOTES
[facility]    Medicine Progress Note - Hospitalist Service    Date of Admission:  11/1/2022    Assessment & Plan     Gme Gama is a 71 year old female with history of coronary artery disease, ischemic cardiomyopathy, hypertension, recurrent UTI and pyelonephritis, sleep apnea, depression, anxiety, ADHD, GERD, and C. difficile who presented to the ER with a fever and dysuria.  After evaluation in the ER there was concern for sepsis due to pyelonephritis.  She was started on IV Rocephin and given IV fluids.  The hospitalist service was contacted to admit her for further evaluation and management.     Sepsis -Resolved due to urinary tract infection with right pyelonephritis with E coli  bacteremia  - On admission Presented with fevers and dysuria for 2 to 3 days.  -  She had elevated white count of 12.5 on admission, and UA showed changes consistent with infection  -Blood cultures done on 11/1/2022 and urine cultures they are consistent with E. coli which is pansensitive  -She had CT scan of the abdomen and pelvis done with and without contrast which shows changes consistent with right pyelonephritis with no perinephric fluid collection to suggest abscess, cholelithiasis and tiny right pleural effusion  -Patient has been started on ceftriaxone and her fever has been getting better and had t fever yesterday and this morning also.  -We will continue the patient with ceftriaxone at this time and she can be discharged home on oral ciprofloxacin when she is afebrile for at least 24 hours and total duration of antibiotics is 14 days ( day one is 11/1/2022) and also continue with vancomycin oral QID for C. difficile prophylaxis to continue until 5 days following antibiotic discontinuation  -Discussed with Dr. Mckeon and appreciate input      Acute kidney injury-Resolved  -Creatinine on admission was 1.48 with baseline around 0.9-1.2 and this is most likely prerenal due to underlying infection and  "she was started on IV fluids and her renal function is improved back to normal and  she is eating and drinking and we will continue to monitor      Thrombocytopenia-improving  -She had platelet count of 135 on admission and this most likely due to underlying infection and platelet count is trended down to 88 and has been trending upwards and is 104 today    Acute anemia  -Patient presented with hemoglobin of 14.5 on admission and I reviewed her past hemoglobin in August and June 2022 and it has been between 14 and 15  -Patient might have been dry on admission and her hemoglobin has trended down to 10.6 and serum LDH was checked which was normal and haptoglobin is mildly elevated and her hemoglobin this morning is better at 9.9 and we will continue to monitor  -This is most likely due to sepsis and she has no other signs of bleeding including melena, hematemesis or hematuria or hemoptysis     Elevated bilirubin-resolved  -She only had elevated bilirubin at 2 on admission and the total bilirubin is normal today at 0.8     History of C. Difficile  -Diagnosed with C. difficile in September 2022.  States she completed a course of treatment and her symptoms improved, although she does occasionally have some \"slimy stools.\"  -She is on vancomycin QID prophylaxis and to continue until 5 days following antibiotic discontinuation     Coronary artery disease  Ischemic cardiomyopathy  Prior history of STEMI and October 2019, underwent EDWIN x2 to proximal LAD at that time.  LVEF was 35 to 40% at that time.  Most recent echocardiogram showed an LVEF of 55 to 60% with grade 1 diastolic dysfunction and mild to moderate mitral valve regurgitation.  Follows with Dr. Colon in cardiology clinic.  -Continue PTA aspirin, Plavix, atorvastatin, carvedilol, and Imdur and her valsartan was held because of acute kidney injury        Hypertension  -She was started on Coreg on admission and initially her valsartan was held because of low " "pressures and acute kidney injury and her blood pressure does fluctuate and we will continue to hold valsartan    Sleep apnea  -Has not tolerated CPAP in the past.     Depression  Anxiety  ADHD  -Continue PTA sertraline.     GERD  -Continue PTA pantoprazole.      Obesity: Estimated body mass index is 31.41 kg/m  as calculated from the following:    Height as of 10/21/22: 1.626 m (5' 4\").    Weight as of 10/21/22: 83 kg (183 lb).          Diet: Combination Diet Regular Diet Adult; 2 gm NA Diet    DVT Prophylaxis: Pneumatic Compression Devices  Greene Catheter: Not present  Central Lines: None  Cardiac Monitoring: None  Code Status:   SPECIAL CODE .  I had discussed with the patient and she only wants \" CODE drugs and shock but no CPR no intubation    Disposition Plan      Expected Discharge Date: 11/05/2022, 12:00 PM    Destination: home          The patient's care was discussed with the Bedside Nurse, Patient and ID  Consultant.  Patient does update her family members herself    Jeet Mckenzie MD  Hospitalist Service  [facility]  Securely message with the Vocera Web Console (learn more here)  Text page via [employer] Paging/Directory         Clinically Significant Risk Factors              # Hypoalbuminemia: Lowest albumin = 2.3 g/dL (Ref range: 3.5-5.2) at 11/2/2022  6:41 AM, will monitor as appropriate   # Thrombocytopenia: Lowest platelets = 88 (Ref range: 150-450) in last 2 days, will monitor for bleeding         # Obesity: Estimated body mass index is 31.41 kg/m  as calculated from the following:    Height as of 10/21/22: 1.626 m (5' 4\").    Weight as of 10/21/22: 83 kg (183 lb)., PRESENT ON ADMISSION       I am off service tomorrow morning and her care will be taken over by hospital medicine team    _____    Interval History       I saw the patient today and he had fever this morning and denied any shortness of breath and was on room air.  She wanted low-sodium diet as she did not like the " enchilada and feeling that caused her stomach upset.  She had low-grade fever and I told her that we will continue her with IV antibiotics at this time as per my discussion with infectious disease team    Continue to have dry cough which is chronic and we are using prn meds including tessalon and robitussin    Patient seen with RN and plan of care discussed    Data reviewed today: I reviewed all medications, new labs and imaging results over the last 24 hours. I personally reviewed no images or EKG's today.    Physical Exam   Vital Signs: Temp: (!) 100.8  F (38.2  C) Temp src: Oral BP: (!) 149/69 Pulse: 94   Resp: 17 SpO2: 94 % O2 Device: None (Room air) Oxygen Delivery: 1 LPM  Weight: 0 lbs 0 oz        General: Patient appears comfortable and in no acute distress.  HEENT: Head is atraumatic, normocephalic.  Moist oral mucosa  Neck: Neck is supple   Respiratory: CTLA with no wheeze or crackles but she was needing some oxygen  Cardiovascular: Regular rate , S1 and S2 normal with no murmer or rubs or gallops  Abdomen:   soft , non tender , non distended and bowel sound present   Skin: No skin rashes   Neurologic: No underlying facial droop  Musculoskeletal: Normal Range of motion over upper and lower extremities bilaterally   Psychiatric: cooperative     Data   Recent Labs   Lab 11/04/22 0448 11/04/22 0447 11/03/22 2037 11/03/22  0703 11/02/22  0641 11/01/22  0544   WBC 7.4  --   --  9.1 10.7 12.5*   HGB 10.9*  --  9.7* 10.6* 11.6* 14.5   MCV 90  --   --  91 92 90   *  --   --  88* 87* 135*   NA  --  141  --  142 135 136   POTASSIUM  --  3.5  --  3.6 3.9 3.8   CHLORIDE  --  109  --  112* 110* 100   CO2  --  27  --  25 19* 27   BUN  --  11  --  13 18 18   CR  --  0.89  --  0.97 1.12* 1.48*   ANIONGAP  --  5  --  5 6 9   FRACISCO  --  8.4*  --  7.9* 7.5* 9.2   GLC  --  119*  --  121* 130* 133*   ALBUMIN  --   --   --   --  2.3* 3.9   PROTTOTAL  --   --   --   --  6.1* 8.1   BILITOTAL  --   --   --   --  0.8 2.0*    ALKPHOS  --   --   --   --  73 90   ALT  --   --   --   --  22 27   AST  --   --   --   --  38 33     No results found for this or any previous visit (from the past 24 hour(s)).  Medications       aspirin  81 mg Oral Daily     atorvastatin  40 mg Oral Daily     carvedilol  3.125 mg Oral BID w/meals     cefTRIAXone  2 g Intravenous Q24H     clopidogrel  75 mg Oral Daily     fluticasone-vilanterol  1 puff Inhalation Daily     isosorbide mononitrate  30 mg Oral Daily     loratadine  10 mg Oral At Bedtime     magnesium oxide  400 mg Oral Daily     mirabegron  50 mg Oral Daily     pantoprazole  40 mg Oral Daily     saccharomyces boulardii  250 mg Oral BID     sertraline  100 mg Oral Daily     sodium chloride (PF)  3 mL Intracatheter Q8H     umeclidinium  2 puff Inhalation Daily     ursodiol  300 mg Oral BID     vancomycin  125 mg Oral 4x Daily

## 2022-11-04 NOTE — PLAN OF CARE
Goal Outcome Evaluation:    Pt. Alert and oriented x4. Vital signs stable on RA except for Fever during the night. 99.5, 101.4 - Tylenol given. Assist of 1 SBA. Tolerating regular diet. Lung sounds dim. Bowel sounds +, passing flatus. BM x2 small light brown/brown loose BM's, adequate incontinet urine output due to urgency. Pain: Denies. Denies nausea.  Lactic Acid Protocol fired: LA = 0.5

## 2022-11-04 NOTE — PROVIDER NOTIFICATION
MD Notification    Notified Person: MD    Notified Person Name: Dr. Winslow     Notification Date/Time: 11/4 @ 0355    Notification Interaction: web based paging     Purpose of Notification: Pt. has a frequent non-productive cough that is not being relieved with the ordered Robitussin. Could I get an order for something else or more for her cough?     Orders Received: No orders received     Comments:

## 2022-11-04 NOTE — PROGRESS NOTES
Writer and witness Janna Ignacio RN assessed PIV site where Rocephin had initially been started. No redness or puffiness noted to old PIV site. Bruising around site noted, and was present prior to IV infusion. Writer was present in the room, when patient reported some mild discomfort to IV site. Writer immediately stopped infusion (less than 30 seconds of infusion occurred), attempted to redress IV site and flushed with NS without pain or irritation. At this time patient was requesting a new IV be placed d/t her history of infiltrations and requesting IV team to place new one as patient reported difficult she is a difficult stick requiring ultrasound technology.    Vascular consult placed to assess and place new IV.     Writer followed up after restarted Rocephin infusion in new IV. PIV site without changes or pain reported or noted. Oncoming nurse & Charge nurse aware.

## 2022-11-05 LAB
C DIFF TOX B STL QL: NEGATIVE
ERYTHROCYTE [DISTWIDTH] IN BLOOD BY AUTOMATED COUNT: 12.8 % (ref 10–15)
HCT VFR BLD AUTO: 33.9 % (ref 35–47)
HGB BLD-MCNC: 11.6 G/DL (ref 11.7–15.7)
MCH RBC QN AUTO: 30.4 PG (ref 26.5–33)
MCHC RBC AUTO-ENTMCNC: 34.2 G/DL (ref 31.5–36.5)
MCV RBC AUTO: 89 FL (ref 78–100)
PLATELET # BLD AUTO: 136 10E3/UL (ref 150–450)
RBC # BLD AUTO: 3.82 10E6/UL (ref 3.8–5.2)
WBC # BLD AUTO: 6.1 10E3/UL (ref 4–11)

## 2022-11-05 PROCEDURE — 250N000011 HC RX IP 250 OP 636: Performed by: HOSPITALIST

## 2022-11-05 PROCEDURE — 87493 C DIFF AMPLIFIED PROBE: CPT | Performed by: HOSPITALIST

## 2022-11-05 PROCEDURE — 250N000013 HC RX MED GY IP 250 OP 250 PS 637: Performed by: SPECIALIST

## 2022-11-05 PROCEDURE — 250N000013 HC RX MED GY IP 250 OP 250 PS 637: Performed by: HOSPITALIST

## 2022-11-05 PROCEDURE — 99232 SBSQ HOSP IP/OBS MODERATE 35: CPT | Performed by: HOSPITALIST

## 2022-11-05 PROCEDURE — 120N000001 HC R&B MED SURG/OB

## 2022-11-05 PROCEDURE — 36415 COLL VENOUS BLD VENIPUNCTURE: CPT | Performed by: HOSPITALIST

## 2022-11-05 PROCEDURE — 85027 COMPLETE CBC AUTOMATED: CPT | Performed by: HOSPITALIST

## 2022-11-05 RX ADMIN — Medication 250 MG: at 08:46

## 2022-11-05 RX ADMIN — GUAIFENESIN AND DEXTROMETHORPHAN 10 ML: 100; 10 SYRUP ORAL at 00:20

## 2022-11-05 RX ADMIN — CARVEDILOL 3.12 MG: 3.12 TABLET, FILM COATED ORAL at 08:46

## 2022-11-05 RX ADMIN — ATORVASTATIN CALCIUM 40 MG: 40 TABLET, FILM COATED ORAL at 08:46

## 2022-11-05 RX ADMIN — CLOPIDOGREL BISULFATE 75 MG: 75 TABLET ORAL at 08:46

## 2022-11-05 RX ADMIN — ISOSORBIDE MONONITRATE 30 MG: 30 TABLET, EXTENDED RELEASE ORAL at 08:46

## 2022-11-05 RX ADMIN — Medication 250 MG: at 19:06

## 2022-11-05 RX ADMIN — FLUTICASONE FUROATE AND VILANTEROL TRIFENATATE 1 PUFF: 200; 25 POWDER RESPIRATORY (INHALATION) at 10:13

## 2022-11-05 RX ADMIN — VANCOMYCIN HYDROCHLORIDE 125 MG: 125 CAPSULE ORAL at 08:46

## 2022-11-05 RX ADMIN — LORATADINE 10 MG: 10 TABLET ORAL at 22:39

## 2022-11-05 RX ADMIN — ASPIRIN 81 MG: 81 TABLET, COATED ORAL at 08:46

## 2022-11-05 RX ADMIN — SERTRALINE HYDROCHLORIDE 100 MG: 100 TABLET ORAL at 08:46

## 2022-11-05 RX ADMIN — VANCOMYCIN HYDROCHLORIDE 125 MG: 125 CAPSULE ORAL at 22:39

## 2022-11-05 RX ADMIN — Medication 400 MG: at 08:46

## 2022-11-05 RX ADMIN — PANTOPRAZOLE SODIUM 40 MG: 40 TABLET, DELAYED RELEASE ORAL at 08:46

## 2022-11-05 RX ADMIN — VANCOMYCIN HYDROCHLORIDE 125 MG: 125 CAPSULE ORAL at 19:00

## 2022-11-05 RX ADMIN — VANCOMYCIN HYDROCHLORIDE 125 MG: 125 CAPSULE ORAL at 12:13

## 2022-11-05 RX ADMIN — MIRABEGRON 50 MG: 50 TABLET, FILM COATED, EXTENDED RELEASE ORAL at 08:46

## 2022-11-05 RX ADMIN — CARVEDILOL 3.12 MG: 3.12 TABLET, FILM COATED ORAL at 19:00

## 2022-11-05 RX ADMIN — CEFTRIAXONE SODIUM 2 G: 2 INJECTION, POWDER, FOR SOLUTION INTRAMUSCULAR; INTRAVENOUS at 12:13

## 2022-11-05 RX ADMIN — URSODIOL 300 MG: 300 CAPSULE ORAL at 19:06

## 2022-11-05 RX ADMIN — URSODIOL 300 MG: 300 CAPSULE ORAL at 08:46

## 2022-11-05 RX ADMIN — GUAIFENESIN AND DEXTROMETHORPHAN 10 ML: 100; 10 SYRUP ORAL at 19:06

## 2022-11-05 ASSESSMENT — ACTIVITIES OF DAILY LIVING (ADL)
ADLS_ACUITY_SCORE: 29
ADLS_ACUITY_SCORE: 29
ADLS_ACUITY_SCORE: 37
ADLS_ACUITY_SCORE: 33
ADLS_ACUITY_SCORE: 33
ADLS_ACUITY_SCORE: 29
ADLS_ACUITY_SCORE: 33
ADLS_ACUITY_SCORE: 33
ADLS_ACUITY_SCORE: 37
ADLS_ACUITY_SCORE: 29
ADLS_ACUITY_SCORE: 33
ADLS_ACUITY_SCORE: 29

## 2022-11-05 NOTE — PLAN OF CARE
"Pt A&Ox4; VSS on RA except elevated temp. Ambulated to BR this shift a few times ; voiding well. SBA with walker. Denies pain or SOB this shift. Pt requested \"something for cough\", PRN guaifenesin given per order; effective. SL. Bruises on arms continue. Slept most of this shift. Discharge pending.                        "

## 2022-11-05 NOTE — PLAN OF CARE
Goal Outcome Evaluation:       A&OX4. VSS on room air. Denies pain. Has occasional nonproductive cough. Up SBA to the BR with walker. IV SL. Lactic fired x1 during shift prompted orders were placed. lactic acid returned WNL. Discharge pending.

## 2022-11-05 NOTE — PROGRESS NOTES
[facility]    Medicine Progress Note - Hospitalist Service    Date of Admission:  11/1/2022    Assessment & Plan     Gem Gama is a 71 year old female with history of coronary artery disease, ischemic cardiomyopathy, hypertension, recurrent UTI and pyelonephritis, sleep apnea, depression, anxiety, ADHD, GERD, and C. difficile who presented to the ER with a fever and dysuria.  After evaluation in the ER there was concern for sepsis due to pyelonephritis.  She was started on IV Rocephin and given IV fluids.  The hospitalist service was contacted to admit her for further evaluation and management.     Sepsis -Resolved due to urinary tract infection with right pyelonephritis with E coli  bacteremia  On admission Presented with fevers and dysuria for 2 to 3 days.  She had elevated white count of 12.5 on admission, and UA showed changes consistent with infection  Blood cultures done on 11/1/2022 and urine cultures they are consistent with E. coli which is pansensitive  She had CT scan of the abdomen and pelvis done with and without contrast which shows changes consistent with right pyelonephritis with no perinephric fluid collection to suggest abscess, cholelithiasis and tiny right pleural effusion  Patient has been started on ceftriaxone and her fever has been getting better and had t fever yesterday and this morning also.  -We will continue the patient with ceftriaxone at this time and she can be discharged home on oral ciprofloxacin when she is afebrile for at least 24 hours and total duration of antibiotics is 14 days ( day one is 11/1/2022) and also continue with vancomycin oral QID for C. difficile prophylaxis to continue until 5 days following antibiotic discontinuation  -Discussed with Dr. Mckeon and appreciate input      Acute kidney injury-Resolved  -Creatinine on admission was 1.48 with baseline around 0.9-1.2 and this is most likely prerenal due to underlying infection and she was  "started on IV fluids and her renal function is improved back to normal and  she is eating and drinking and we will continue to monitor      Thrombocytopenia-improving  -She had platelet count of 135 on admission and this most likely due to underlying infection and platelet count is trended down to 88 and has been trending upwards and is 104 today    Acute anemia  -Patient presented with hemoglobin of 14.5 on admission and I reviewed her past hemoglobin in August and June 2022 and it has been between 14 and 15  -Patient might have been dry on admission and her hemoglobin has trended down to 10.6 and serum LDH was checked which was normal and haptoglobin is mildly elevated and her hemoglobin this morning is better at 9.9 and we will continue to monitor  -This is most likely due to sepsis and she has no other signs of bleeding including melena, hematemesis or hematuria or hemoptysis     Elevated bilirubin-resolved  -She only had elevated bilirubin at 2 on admission and the total bilirubin is normal today at 0.8     Acute diarrhea  History of C. Difficile  Diagnosed with C. difficile in September 2022.  States she completed a course of treatment and her symptoms improved, although she does occasionally have some \"slimy stools.\"  plan  -She is on vancomycin QID prophylaxis and to continue until 5 days following antibiotic discontinuation  - continues to have frequent stools. Will recheck for C diff today. If negative, may need to consider alternative reasons for diarrhea such as antibiotic associated diarrhea. Would also free up for some imodium if she tests negative which would help her from a symptom standpoint     Coronary artery disease  Ischemic cardiomyopathy  Prior history of STEMI and October 2019, underwent EDWIN x2 to proximal LAD at that time.  LVEF was 35 to 40% at that time.  Most recent echocardiogram showed an LVEF of 55 to 60% with grade 1 diastolic dysfunction and mild to moderate mitral valve " "regurgitation.  Follows with Dr. Colon in cardiology clinic.  -Continue PTA aspirin, Plavix, atorvastatin, carvedilol, and Imdur and her valsartan was held because of acute kidney injury        Hypertension  -She was started on Coreg on admission and initially her valsartan was held because of low pressures and acute kidney injury and her blood pressure does fluctuate and we will continue to hold valsartan    Sleep apnea  -Has not tolerated CPAP in the past.     Depression  Anxiety  ADHD  -Continue PTA sertraline.     GERD  -Continue PTA pantoprazole.      Obesity: Estimated body mass index is 31.41 kg/m  as calculated from the following:    Height as of 10/21/22: 1.626 m (5' 4\").    Weight as of 10/21/22: 83 kg (183 lb).          Diet: Combination Diet Regular Diet Adult; 2 gm NA Diet    DVT Prophylaxis: Pneumatic Compression Devices  Greene Catheter: Not present  Central Lines: None  Cardiac Monitoring: None  Code Status:   SPECIAL CODE .  I had discussed with the patient and she only wants \" CODE drugs and shock but no CPR no intubation    Disposition Plan     Expected Discharge Date: 11/05/2022, 12:00 PM    Destination: home          The patient's care was discussed with the Bedside Nurse, Patient and ID  Consultant.  Patient does update her family members herself    Mickey Yeboah, DO  Hospitalist Service  [facility]  Securely message with the Vocera Web Console (learn more here)  Text page via [employer] Paging/Directory         Clinically Significant Risk Factors              # Hypoalbuminemia: Lowest albumin = 2.3 g/dL (Ref range: 3.5-5.2) at 11/2/2022  6:41 AM, will monitor as appropriate   # Thrombocytopenia: Lowest platelets = 104 (Ref range: 150-450) in last 2 days, will monitor for bleeding         # Obesity: Estimated body mass index is 30.69 kg/m  as calculated from the following:    Height as of 10/21/22: 1.626 m (5' 4\").    Weight as of this encounter: 81.1 kg (178 lb 12.7 " oz)., PRESENT ON ADMISSION       I am off service tomorrow morning and her care will be taken over by hospital medicine team    _____    Interval History   Fever of 100.3 early this AM. Reports very frequent episodes of diarrhea, and she is worried that her c diff is getting worse. She notes improvement of her diarrhea after she completed a 10 day course of tid oral vancomycin in September, but has not improved. She is tolerating the oral vancomycin. She has a normal white count. She is concerned about taking the cipro that ID recommends because of her history of association of c diff for that.     Data reviewed today: I reviewed all medications, new labs and imaging results over the last 24 hours. I personally reviewed no images or EKG's today.    Physical Exam   Vital Signs: Temp: 98.3  F (36.8  C) Temp src: Oral BP: (!) 147/76 Pulse: 74   Resp: 16 SpO2: 94 % O2 Device: None (Room air)    Weight: 178 lbs 12.69 oz        General: Patient appears tearful and anxious  HEENT: Head is atraumatic, normocephalic.  Moist oral mucosa  Neck: Neck is supple   Respiratory: CTLA with no wheeze or crackles but she was needing some oxygen  Cardiovascular: Regular rate , S1 and S2 normal with no murmer or rubs or gallops  Abdomen:   soft , non tender , non distended and bowel sound present   Skin: No skin rashes   Neurologic: No underlying facial droop  Musculoskeletal: Normal Range of motion over upper and lower extremities bilaterally   Psychiatric: cooperative     Data   Recent Labs   Lab 11/05/22  0732 11/04/22  0448 11/04/22  0447 11/03/22  2037 11/03/22  0703 11/02/22  0641 11/01/22  0544   WBC 6.1 7.4  --   --  9.1 10.7 12.5*   HGB 11.6* 10.9*  --  9.7* 10.6* 11.6* 14.5   MCV 89 90  --   --  91 92 90   * 104*  --   --  88* 87* 135*   NA  --   --  141  --  142 135 136   POTASSIUM  --   --  3.5  --  3.6 3.9 3.8   CHLORIDE  --   --  109  --  112* 110* 100   CO2  --   --  27  --  25 19* 27   BUN  --   --  11  --  13 18  18   CR  --   --  0.89  --  0.97 1.12* 1.48*   ANIONGAP  --   --  5  --  5 6 9   FRACISCO  --   --  8.4*  --  7.9* 7.5* 9.2   GLC  --   --  119*  --  121* 130* 133*   ALBUMIN  --   --   --   --   --  2.3* 3.9   PROTTOTAL  --   --   --   --   --  6.1* 8.1   BILITOTAL  --   --   --   --   --  0.8 2.0*   ALKPHOS  --   --   --   --   --  73 90   ALT  --   --   --   --   --  22 27   AST  --   --   --   --   --  38 33     No results found for this or any previous visit (from the past 24 hour(s)).  Medications       aspirin  81 mg Oral Daily     atorvastatin  40 mg Oral Daily     carvedilol  3.125 mg Oral BID w/meals     cefTRIAXone  2 g Intravenous Q24H     clopidogrel  75 mg Oral Daily     fluticasone-vilanterol  1 puff Inhalation Daily     isosorbide mononitrate  30 mg Oral Daily     loratadine  10 mg Oral At Bedtime     magnesium oxide  400 mg Oral Daily     mirabegron  50 mg Oral Daily     pantoprazole  40 mg Oral Daily     saccharomyces boulardii  250 mg Oral BID     sertraline  100 mg Oral Daily     sodium chloride (PF)  3 mL Intracatheter Q8H     umeclidinium  2 puff Inhalation Daily     ursodiol  300 mg Oral BID     vancomycin  125 mg Oral 4x Daily

## 2022-11-05 NOTE — PLAN OF CARE
Goal Outcome Evaluation:       A&OX4. VSS on room air. Denies pain. Has occasional nonproductive cough. Up SBA to the BR with walker. IV SL in between antibiotics. Continues to have diarrhea. Stool sample collected and sent. Discharge pending.

## 2022-11-06 LAB
ANION GAP SERPL CALCULATED.3IONS-SCNC: 6 MMOL/L (ref 3–14)
BACTERIA BLD CULT: NO GROWTH
BUN SERPL-MCNC: 15 MG/DL (ref 7–30)
CALCIUM SERPL-MCNC: 8.5 MG/DL (ref 8.5–10.1)
CHLORIDE BLD-SCNC: 107 MMOL/L (ref 94–109)
CO2 SERPL-SCNC: 24 MMOL/L (ref 20–32)
CREAT SERPL-MCNC: 0.75 MG/DL (ref 0.52–1.04)
ERYTHROCYTE [DISTWIDTH] IN BLOOD BY AUTOMATED COUNT: 12.6 % (ref 10–15)
GFR SERPL CREATININE-BSD FRML MDRD: 84 ML/MIN/1.73M2
GLUCOSE BLD-MCNC: 122 MG/DL (ref 70–99)
HCT VFR BLD AUTO: 31.7 % (ref 35–47)
HGB BLD-MCNC: 11.4 G/DL (ref 11.7–15.7)
MCH RBC QN AUTO: 30.9 PG (ref 26.5–33)
MCHC RBC AUTO-ENTMCNC: 36 G/DL (ref 31.5–36.5)
MCV RBC AUTO: 86 FL (ref 78–100)
PLATELET # BLD AUTO: 148 10E3/UL (ref 150–450)
POTASSIUM BLD-SCNC: 3.4 MMOL/L (ref 3.4–5.3)
PROCALCITONIN SERPL-MCNC: 0.06 NG/ML
RBC # BLD AUTO: 3.69 10E6/UL (ref 3.8–5.2)
SODIUM SERPL-SCNC: 137 MMOL/L (ref 133–144)
WBC # BLD AUTO: 7 10E3/UL (ref 4–11)

## 2022-11-06 PROCEDURE — 250N000013 HC RX MED GY IP 250 OP 250 PS 637: Performed by: SPECIALIST

## 2022-11-06 PROCEDURE — 250N000011 HC RX IP 250 OP 636: Performed by: HOSPITALIST

## 2022-11-06 PROCEDURE — 250N000013 HC RX MED GY IP 250 OP 250 PS 637: Performed by: HOSPITALIST

## 2022-11-06 PROCEDURE — 120N000001 HC R&B MED SURG/OB

## 2022-11-06 PROCEDURE — 85027 COMPLETE CBC AUTOMATED: CPT | Performed by: HOSPITALIST

## 2022-11-06 PROCEDURE — 84145 PROCALCITONIN (PCT): CPT | Performed by: HOSPITALIST

## 2022-11-06 PROCEDURE — 99232 SBSQ HOSP IP/OBS MODERATE 35: CPT | Performed by: HOSPITALIST

## 2022-11-06 PROCEDURE — 36415 COLL VENOUS BLD VENIPUNCTURE: CPT | Performed by: HOSPITALIST

## 2022-11-06 PROCEDURE — 82310 ASSAY OF CALCIUM: CPT | Performed by: HOSPITALIST

## 2022-11-06 RX ADMIN — ISOSORBIDE MONONITRATE 30 MG: 30 TABLET, EXTENDED RELEASE ORAL at 09:02

## 2022-11-06 RX ADMIN — URSODIOL 300 MG: 300 CAPSULE ORAL at 21:21

## 2022-11-06 RX ADMIN — LORATADINE 10 MG: 10 TABLET ORAL at 21:21

## 2022-11-06 RX ADMIN — ONDANSETRON 4 MG: 4 TABLET, ORALLY DISINTEGRATING ORAL at 09:02

## 2022-11-06 RX ADMIN — VANCOMYCIN HYDROCHLORIDE 125 MG: 125 CAPSULE ORAL at 21:21

## 2022-11-06 RX ADMIN — URSODIOL 300 MG: 300 CAPSULE ORAL at 09:02

## 2022-11-06 RX ADMIN — CEFTRIAXONE SODIUM 2 G: 2 INJECTION, POWDER, FOR SOLUTION INTRAMUSCULAR; INTRAVENOUS at 12:52

## 2022-11-06 RX ADMIN — VANCOMYCIN HYDROCHLORIDE 125 MG: 125 CAPSULE ORAL at 18:04

## 2022-11-06 RX ADMIN — Medication 250 MG: at 09:02

## 2022-11-06 RX ADMIN — PANTOPRAZOLE SODIUM 40 MG: 40 TABLET, DELAYED RELEASE ORAL at 09:02

## 2022-11-06 RX ADMIN — VANCOMYCIN HYDROCHLORIDE 125 MG: 125 CAPSULE ORAL at 09:02

## 2022-11-06 RX ADMIN — MIRABEGRON 50 MG: 50 TABLET, FILM COATED, EXTENDED RELEASE ORAL at 09:02

## 2022-11-06 RX ADMIN — CLOPIDOGREL BISULFATE 75 MG: 75 TABLET ORAL at 09:02

## 2022-11-06 RX ADMIN — FLUTICASONE FUROATE AND VILANTEROL TRIFENATATE 1 PUFF: 200; 25 POWDER RESPIRATORY (INHALATION) at 09:06

## 2022-11-06 RX ADMIN — ASPIRIN 81 MG: 81 TABLET, COATED ORAL at 09:02

## 2022-11-06 RX ADMIN — CARVEDILOL 3.12 MG: 3.12 TABLET, FILM COATED ORAL at 18:04

## 2022-11-06 RX ADMIN — Medication 250 MG: at 21:21

## 2022-11-06 RX ADMIN — CARVEDILOL 3.12 MG: 3.12 TABLET, FILM COATED ORAL at 09:02

## 2022-11-06 RX ADMIN — Medication 400 MG: at 09:02

## 2022-11-06 RX ADMIN — VANCOMYCIN HYDROCHLORIDE 125 MG: 125 CAPSULE ORAL at 12:52

## 2022-11-06 RX ADMIN — ATORVASTATIN CALCIUM 40 MG: 40 TABLET, FILM COATED ORAL at 09:02

## 2022-11-06 RX ADMIN — SERTRALINE HYDROCHLORIDE 100 MG: 100 TABLET ORAL at 09:02

## 2022-11-06 ASSESSMENT — ACTIVITIES OF DAILY LIVING (ADL)
ADLS_ACUITY_SCORE: 31
ADLS_ACUITY_SCORE: 37
ADLS_ACUITY_SCORE: 31
ADLS_ACUITY_SCORE: 31
ADLS_ACUITY_SCORE: 30
ADLS_ACUITY_SCORE: 37
ADLS_ACUITY_SCORE: 31
ADLS_ACUITY_SCORE: 31
ADLS_ACUITY_SCORE: 30
ADLS_ACUITY_SCORE: 37

## 2022-11-06 NOTE — PLAN OF CARE
Goal Outcome Evaluation:       A&OX4. VSS on room air. Denies pain. Has occasional nonproductive cough. Up SBA to the BR with walker. IV SL in between antibiotics. No Diarrhea noted this am. Discharge pending.

## 2022-11-06 NOTE — PLAN OF CARE
Goal Outcome Evaluation:  A&Ox4. Cough improved today. C diff ruled out, incon loose stools this evening. Shower given. VSS on RA aside from temp, T trending down. PIV SL. Tolerating diet, improved appetite today compared to yesterday by this writer. Increased PO fluid intake as well. Discharge possibly tomorrow.

## 2022-11-06 NOTE — PROGRESS NOTES
Pt. Alert and oriented X4. VSS on RA. Up SBA with walker. Voiding well per bathroom, no BM. Denies pain. PIV SL. Temp was well controlled. Awaiting home discharge

## 2022-11-06 NOTE — PROGRESS NOTES
[facility]    Medicine Progress Note - Hospitalist Service    Date of Admission:  11/1/2022    Assessment & Plan     Gem Gama is a 71 year old female with history of coronary artery disease, ischemic cardiomyopathy, hypertension, recurrent UTI and pyelonephritis, sleep apnea, depression, anxiety, ADHD, GERD, and C. difficile who presented to the ER with a fever and dysuria.  After evaluation in the ER there was concern for sepsis due to pyelonephritis.  She was started on IV Rocephin and given IV fluids.  The hospitalist service was contacted to admit her for further evaluation and management.     Sepsis -Resolved due to urinary tract infection with right pyelonephritis with E coli  bacteremia  On admission Presented with fevers and dysuria for 2 to 3 days.  She had elevated white count of 12.5 on admission, and UA showed changes consistent with infection  Blood cultures done on 11/1/2022 and urine cultures they are consistent with E. coli which is pansensitive  She had CT scan of the abdomen and pelvis done with and without contrast which shows changes consistent with right pyelonephritis with no perinephric fluid collection to suggest abscess, cholelithiasis and tiny right pleural effusion  Fever is better but the patient has had significant issues with loose bowel movements  Her c diff has been negative, but she has been on ppx vancomycin  plan  - continue the patient with ceftriaxone at this time and she can be discharged home on oral ciprofloxacin when she is afebrile for at least 24 hours and total duration of antibiotics is 14 days ( day one is 11/1/2022) and also continue with vancomycin oral QID for C. difficile prophylaxis to continue until 5 days following antibiotic discontinuation  - patient asking to speaking to ID again tomorrow, will ask them to stop by  - could try imodium prn  - still nausea and not eating with poor oral intake      Acute kidney  "injury-Resolved  -Creatinine on admission was 1.48 with baseline around 0.9-1.2 and this is most likely prerenal due to underlying infection and she was started on IV fluids and her renal function is improved back to normal and  she is eating and drinking and we will continue to monitor      Thrombocytopenia-improving  -She had platelet count of 135 on admission and this most likely due to underlying infection and platelet count is trended down to 88 and has been trending upwards and is 104 today    Acute anemia  -Patient presented with hemoglobin of 14.5 on admission and I reviewed her past hemoglobin in August and June 2022 and it has been between 14 and 15  -Patient might have been dry on admission and her hemoglobin has trended down to 10.6 and serum LDH was checked which was normal and haptoglobin is mildly elevated and her hemoglobin this morning is better at 9.9 and we will continue to monitor  -This is most likely due to sepsis and she has no other signs of bleeding including melena, hematemesis or hematuria or hemoptysis     Elevated bilirubin-resolved  -She only had elevated bilirubin at 2 on admission and the total bilirubin is normal today at 0.8     Acute diarrhea  History of C. Difficile  Diagnosed with C. difficile in September 2022.  States she completed a course of treatment and her symptoms improved, although she does occasionally have some \"slimy stools.\"  plan  -She is on vancomycin QID prophylaxis and to continue until 5 days following antibiotic discontinuation  - c diff negative, but still very nauseous with loose stools  - given negative c diff, and her continued treatment, will start imodium if she wants to take it       Coronary artery disease  Ischemic cardiomyopathy  Prior history of STEMI and October 2019, underwent EDWIN x2 to proximal LAD at that time.  LVEF was 35 to 40% at that time.  Most recent echocardiogram showed an LVEF of 55 to 60% with grade 1 diastolic dysfunction and mild to " "moderate mitral valve regurgitation.  Follows with Dr. Colon in cardiology clinic.  -Continue PTA aspirin, Plavix, atorvastatin, carvedilol, and Imdur and her valsartan was held because of acute kidney injury        Hypertension  -She was started on Coreg on admission and initially her valsartan was held because of low pressures and acute kidney injury and her blood pressure does fluctuate and we will continue to hold valsartan    Sleep apnea  -Has not tolerated CPAP in the past.     Depression  Anxiety  ADHD  -Continue PTA sertraline.     GERD  -Continue PTA pantoprazole.      Obesity: Estimated body mass index is 31.41 kg/m  as calculated from the following:    Height as of 10/21/22: 1.626 m (5' 4\").    Weight as of 10/21/22: 83 kg (183 lb).          Diet: Combination Diet Regular Diet Adult; 2 gm NA Diet    DVT Prophylaxis: Pneumatic Compression Devices  Greene Catheter: Not present  Central Lines: None  Cardiac Monitoring: None  Code Status:   SPECIAL CODE .  I had discussed with the patient and she only wants \" CODE drugs and shock but no CPR no intubation    Disposition Plan      Expected Discharge Date: 11/07/2022, 12:00 PM    Destination: home          The patient's care was discussed with the patient     Mickey Yeboah DO  Hospitalist Service  [facility]  Securely message with the Vocera Web Console (learn more here)  Text page via [employer] Paging/Directory         Clinically Significant Risk Factors              # Hypoalbuminemia: Lowest albumin = 2.3 g/dL (Ref range: 3.5-5.2) at 11/2/2022  6:41 AM, will monitor as appropriate           # Obesity: Estimated body mass index is 30.69 kg/m  as calculated from the following:    Height as of 10/21/22: 1.626 m (5' 4\").    Weight as of this encounter: 81.1 kg (178 lb 12.7 oz).        I am off service tomorrow morning and her care will be taken over by hospital medicine team    _____    Interval History   Still feeling ill and " nauseous, not eating well. Having frequent loose stools. Fever curve has improved     Data reviewed today: I reviewed all medications, new labs and imaging results over the last 24 hours. I personally reviewed no images or EKG's today.    Physical Exam   Vital Signs: Temp: 99.5  F (37.5  C) Temp src: Oral BP: (!) 158/81 Pulse: 81   Resp: 18 SpO2: 97 % O2 Device: None (Room air)    Weight: 178 lbs 12.69 oz        General: Patient appears tearful and anxious  HEENT: Head is atraumatic, normocephalic.  Moist oral mucosa  Neck: Neck is supple   Respiratory: CTLA with no wheeze or crackles but she was needing some oxygen  Cardiovascular: Regular rate , S1 and S2 normal with no murmer or rubs or gallops  Abdomen:   soft , non tender , non distended and bowel sound present   Skin: No skin rashes   Neurologic: No underlying facial droop  Musculoskeletal: Normal Range of motion over upper and lower extremities bilaterally   Psychiatric: cooperative     Data   Recent Labs   Lab 11/06/22  0632 11/05/22  0732 11/04/22  0448 11/04/22  0447 11/03/22 2037 11/03/22  0703 11/02/22  0641 11/01/22  0544   WBC 7.0 6.1 7.4  --   --  9.1 10.7 12.5*   HGB 11.4* 11.6* 10.9*  --    < > 10.6* 11.6* 14.5   MCV 86 89 90  --   --  91 92 90   * 136* 104*  --   --  88* 87* 135*     --   --  141  --  142 135 136   POTASSIUM 3.4  --   --  3.5  --  3.6 3.9 3.8   CHLORIDE 107  --   --  109  --  112* 110* 100   CO2 24  --   --  27  --  25 19* 27   BUN 15  --   --  11  --  13 18 18   CR 0.75  --   --  0.89  --  0.97 1.12* 1.48*   ANIONGAP 6  --   --  5  --  5 6 9   FRACISCO 8.5  --   --  8.4*  --  7.9* 7.5* 9.2   *  --   --  119*  --  121* 130* 133*   ALBUMIN  --   --   --   --   --   --  2.3* 3.9   PROTTOTAL  --   --   --   --   --   --  6.1* 8.1   BILITOTAL  --   --   --   --   --   --  0.8 2.0*   ALKPHOS  --   --   --   --   --   --  73 90   ALT  --   --   --   --   --   --  22 27   AST  --   --   --   --   --   --  38 33    < > =  values in this interval not displayed.     No results found for this or any previous visit (from the past 24 hour(s)).  Medications       aspirin  81 mg Oral Daily     atorvastatin  40 mg Oral Daily     carvedilol  3.125 mg Oral BID w/meals     cefTRIAXone  2 g Intravenous Q24H     clopidogrel  75 mg Oral Daily     fluticasone-vilanterol  1 puff Inhalation Daily     isosorbide mononitrate  30 mg Oral Daily     loratadine  10 mg Oral At Bedtime     magnesium oxide  400 mg Oral Daily     mirabegron  50 mg Oral Daily     pantoprazole  40 mg Oral Daily     saccharomyces boulardii  250 mg Oral BID     sertraline  100 mg Oral Daily     sodium chloride (PF)  3 mL Intracatheter Q8H     umeclidinium  2 puff Inhalation Daily     ursodiol  300 mg Oral BID     vancomycin  125 mg Oral 4x Daily

## 2022-11-07 ENCOUNTER — HOME INFUSION (PRE-WILLOW HOME INFUSION) (OUTPATIENT)
Dept: PHARMACY | Facility: CLINIC | Age: 72
End: 2022-11-07

## 2022-11-07 LAB
MAGNESIUM SERPL-MCNC: 2.1 MG/DL (ref 1.6–2.3)
POTASSIUM BLD-SCNC: 3.7 MMOL/L (ref 3.4–5.3)

## 2022-11-07 PROCEDURE — 250N000013 HC RX MED GY IP 250 OP 250 PS 637: Performed by: SPECIALIST

## 2022-11-07 PROCEDURE — 93010 ELECTROCARDIOGRAM REPORT: CPT | Performed by: INTERNAL MEDICINE

## 2022-11-07 PROCEDURE — 250N000013 HC RX MED GY IP 250 OP 250 PS 637: Performed by: HOSPITALIST

## 2022-11-07 PROCEDURE — 120N000001 HC R&B MED SURG/OB

## 2022-11-07 PROCEDURE — 99233 SBSQ HOSP IP/OBS HIGH 50: CPT | Performed by: SPECIALIST

## 2022-11-07 PROCEDURE — 84132 ASSAY OF SERUM POTASSIUM: CPT | Performed by: INTERNAL MEDICINE

## 2022-11-07 PROCEDURE — 93005 ELECTROCARDIOGRAM TRACING: CPT

## 2022-11-07 PROCEDURE — 36415 COLL VENOUS BLD VENIPUNCTURE: CPT | Performed by: INTERNAL MEDICINE

## 2022-11-07 PROCEDURE — 83735 ASSAY OF MAGNESIUM: CPT | Performed by: INTERNAL MEDICINE

## 2022-11-07 PROCEDURE — 250N000013 HC RX MED GY IP 250 OP 250 PS 637: Performed by: INTERNAL MEDICINE

## 2022-11-07 PROCEDURE — 250N000011 HC RX IP 250 OP 636: Performed by: HOSPITALIST

## 2022-11-07 PROCEDURE — 99233 SBSQ HOSP IP/OBS HIGH 50: CPT | Performed by: INTERNAL MEDICINE

## 2022-11-07 PROCEDURE — 250N000009 HC RX 250: Performed by: INTERNAL MEDICINE

## 2022-11-07 RX ORDER — POTASSIUM CHLORIDE 1500 MG/1
20 TABLET, EXTENDED RELEASE ORAL ONCE
Status: COMPLETED | OUTPATIENT
Start: 2022-11-07 | End: 2022-11-07

## 2022-11-07 RX ORDER — CEFTRIAXONE 2 G/1
2 INJECTION, POWDER, FOR SOLUTION INTRAMUSCULAR; INTRAVENOUS EVERY 24 HOURS
Qty: 120 ML | Refills: 0 | DISCHARGE
Start: 2022-11-08 | End: 2022-11-08

## 2022-11-07 RX ADMIN — FLUTICASONE FUROATE AND VILANTEROL TRIFENATATE 1 PUFF: 200; 25 POWDER RESPIRATORY (INHALATION) at 09:00

## 2022-11-07 RX ADMIN — SERTRALINE HYDROCHLORIDE 100 MG: 100 TABLET ORAL at 08:56

## 2022-11-07 RX ADMIN — CARVEDILOL 3.12 MG: 3.12 TABLET, FILM COATED ORAL at 08:57

## 2022-11-07 RX ADMIN — CARVEDILOL 3.12 MG: 3.12 TABLET, FILM COATED ORAL at 18:09

## 2022-11-07 RX ADMIN — CEFTRIAXONE SODIUM 2 G: 2 INJECTION, POWDER, FOR SOLUTION INTRAMUSCULAR; INTRAVENOUS at 12:07

## 2022-11-07 RX ADMIN — MIRABEGRON 50 MG: 50 TABLET, FILM COATED, EXTENDED RELEASE ORAL at 08:57

## 2022-11-07 RX ADMIN — PANTOPRAZOLE SODIUM 40 MG: 40 TABLET, DELAYED RELEASE ORAL at 08:56

## 2022-11-07 RX ADMIN — LORATADINE 10 MG: 10 TABLET ORAL at 22:39

## 2022-11-07 RX ADMIN — VANCOMYCIN HYDROCHLORIDE 125 MG: 125 CAPSULE ORAL at 12:13

## 2022-11-07 RX ADMIN — Medication 250 MG: at 08:57

## 2022-11-07 RX ADMIN — URSODIOL 300 MG: 300 CAPSULE ORAL at 08:57

## 2022-11-07 RX ADMIN — URSODIOL 300 MG: 300 CAPSULE ORAL at 20:57

## 2022-11-07 RX ADMIN — VANCOMYCIN HYDROCHLORIDE 125 MG: 125 CAPSULE ORAL at 08:57

## 2022-11-07 RX ADMIN — ASPIRIN 81 MG: 81 TABLET, COATED ORAL at 08:57

## 2022-11-07 RX ADMIN — VANCOMYCIN HYDROCHLORIDE 125 MG: 125 CAPSULE ORAL at 22:39

## 2022-11-07 RX ADMIN — POTASSIUM CHLORIDE 20 MEQ: 1500 TABLET, EXTENDED RELEASE ORAL at 16:55

## 2022-11-07 RX ADMIN — GUAIFENESIN AND DEXTROMETHORPHAN 10 ML: 100; 10 SYRUP ORAL at 09:13

## 2022-11-07 RX ADMIN — UMECLIDINIUM 2 PUFF: 62.5 AEROSOL, POWDER ORAL at 09:00

## 2022-11-07 RX ADMIN — LIDOCAINE HYDROCHLORIDE ANHYDROUS 1 ML: 10 INJECTION, SOLUTION INFILTRATION at 16:49

## 2022-11-07 RX ADMIN — Medication 250 MG: at 20:57

## 2022-11-07 RX ADMIN — ATORVASTATIN CALCIUM 40 MG: 40 TABLET, FILM COATED ORAL at 08:57

## 2022-11-07 RX ADMIN — ISOSORBIDE MONONITRATE 30 MG: 30 TABLET, EXTENDED RELEASE ORAL at 08:57

## 2022-11-07 RX ADMIN — VANCOMYCIN HYDROCHLORIDE 125 MG: 125 CAPSULE ORAL at 18:09

## 2022-11-07 RX ADMIN — Medication 400 MG: at 08:56

## 2022-11-07 RX ADMIN — CLOPIDOGREL BISULFATE 75 MG: 75 TABLET ORAL at 08:56

## 2022-11-07 ASSESSMENT — ACTIVITIES OF DAILY LIVING (ADL)
ADLS_ACUITY_SCORE: 30

## 2022-11-07 NOTE — PROGRESS NOTES
[facility]    Infectious Disease Progress Note    Date of Service: 11/07/2022     Assessment:  71 YF with recurrent UTIs and recent c.diff colitis who has been hospitalized with E.coli sepsis related to right sided pyelonephritis. No obstruction, stones or abscess noted on imaging.  Maintained on oral vancomycin prophylaxis due to recent C.diff. Give slow defervescence, and inability to use Quinolones due to QTc prolongation, recommend IV antibiotic therapy for another week     -Right sided pyelonephritis  -E.coli bacteremia  -ARTURO improving  -Recent C.diff colitis  -Hx of congenital renal abnormality and surgeries on right kidney with recurrent UTIs  -Cholilithiasis  -Chronic medical conditions- coronary artery disease, ischemic cardiomyopathy, hypertension,  sleep apnea, depression, anxiety, ADHD, GERD,     Recommendations  1. QTc is prolonged therefore cannot receive quinolone therapy.  2. Given pyelonephritis with bacteremia and slow defervescence, recommend IV Ceftriaxone at discharge for another week to complete 2 week treatment course  3.C.diff prophylaxis to continue until 5 days following antibiotic discontinuation  Recommendations were discussed with the hospitalist service  Orders for midline and IV antibiotics placed in Epic. Midline removal upon completion of antibiotics    Can discharge from the ID stand point once antibiotics are arranged    Recommendations were discussed with the Hospitalist service. ID will sign off, please call with questions PRN.      Umu Mckeon MD    Interval History   Feels better, has remained afebrile now, flank pain has resolved, denies urinary symptoms, tolerating antibiotics without side effects     Physical Exam   Temp: 97.7  F (36.5  C) Temp src: Oral BP: 127/72 Pulse: 71   Resp: 16 SpO2: 95 % O2 Device: None (Room air)    Vitals:    11/05/22 0605 11/07/22 0557   Weight: 81.1 kg (178 lb 12.7 oz) 81.2 kg (179 lb 0.2 oz)     Vital Signs with  Ranges  Temp:  [97.7  F (36.5  C)-98.5  F (36.9  C)] 97.7  F (36.5  C)  Pulse:  [71-77] 71  Resp:  [14-16] 16  BP: (115-129)/(62-72) 127/72  SpO2:  [95 %-96 %] 95 %    Constitutional: Awake, alert, cooperative, no apparent distress  Lungs: Clear to auscultation bilaterally, no crackles or wheezing  Cardiovascular: Regular rate and rhythm, normal S1 and S2, and no murmur noted  Abdomen: Normal bowel sounds, soft, non-distended, non-tender  Skin: No rashes, no cyanosis, no edema  Other:    Medications       aspirin  81 mg Oral Daily     atorvastatin  40 mg Oral Daily     carvedilol  3.125 mg Oral BID w/meals     cefTRIAXone  2 g Intravenous Q24H     clopidogrel  75 mg Oral Daily     fluticasone-vilanterol  1 puff Inhalation Daily     isosorbide mononitrate  30 mg Oral Daily     loratadine  10 mg Oral At Bedtime     magnesium oxide  400 mg Oral Daily     mirabegron  50 mg Oral Daily     pantoprazole  40 mg Oral Daily     potassium chloride  20 mEq Oral Once     saccharomyces boulardii  250 mg Oral BID     sertraline  100 mg Oral Daily     sodium chloride (PF)  10 mL Intracatheter Q8H     sodium chloride (PF)  3 mL Intracatheter Q8H     umeclidinium  2 puff Inhalation Daily     ursodiol  300 mg Oral BID     vancomycin  125 mg Oral 4x Daily       Data   All microbiology laboratory data reviewed.  Recent Labs   Lab Test 11/06/22  0632 11/05/22 0732 11/04/22  0448   WBC 7.0 6.1 7.4   HGB 11.4* 11.6* 10.9*   HCT 31.7* 33.9* 31.9*   MCV 86 89 90   * 136* 104*     Recent Labs   Lab Test 11/06/22  0632 11/04/22  0447 11/03/22  0703   CR 0.75 0.89 0.97

## 2022-11-07 NOTE — PROGRESS NOTES
A/O X4, stable overnight, no episodes of bowel or bladder incontinence. Occasional productive cough with clear lung sounds, no SOB. Denies pain and discomfort, no fever or chills, no new concerns at this time. Waiting for discharge this evening.

## 2022-11-07 NOTE — PROGRESS NOTES
Pt does not have coverage for iv abx with their Medicare and [facility] Supplement plan. We are not contracted with the part D plan so pt would have to go to a preferred provider.   Let me know if you would like me to reach out to [place] and/or [facility].       Please contact Intake with any questions, [phone] or In Basket pool, [facility] ([phone]).

## 2022-11-07 NOTE — PROGRESS NOTES
Care Management    Notified by bedside RN that pt will require IV abx at discharge (original plan was to discharge with PO abx). Midline expected to be placed. Referral sent to [facility]. Will follow-up tomorrow.    Laura Solano, RN, BSN, PHN, CMSRN  Care Coordination  [facility]

## 2022-11-07 NOTE — PROCEDURES
[facility]    Single Lumen Midline Placement    Date/Time: 11/7/2022 5:19 PM  Performed by: Adrienne Garzon RN  Authorized by: Evangelist Lee MD   Indications: vascular access      UNIVERSAL PROTOCOL   Site Marked: Yes  Prior Images Obtained and Reviewed:  Yes  Required items: Required blood products, implants, devices and special equipment available    Patient identity confirmed:  Verbally with patient, provided demographic data and arm band  NA - No sedation, light sedation, or local anesthesia  Confirmation Checklist:  Patient's identity using two indicators, relevant allergies, procedure was appropriate and matched the consent or emergent situation and correct equipment/implants were available  Time out: Immediately prior to the procedure a time out was called    Universal Protocol: the Joint Commission Universal Protocol was followed    Preparation: Patient was prepped and draped in usual sterile fashion       ANESTHESIA    Anesthesia: See MAR for details  Local Anesthetic:  Lidocaine 1% without epinephrine  Anesthetic Total (mL):  1      SEDATION    Patient Sedated: No        Preparation: skin prepped with 2% chlorhexidine  Skin prep agent: skin prep agent completely dried prior to procedure  Sterile barriers: maximum sterile barriers were used: cap, mask, sterile gown, sterile gloves, and large sterile sheet  Hand hygiene: hand hygiene performed prior to central venous catheter insertion  Type of line used: Midline  Catheter type: single lumen  Lumen type: non-valved  Catheter size: 4 Fr  Brand: Bard  Lot number: [id number]  Placement method: MST and ultrasound  Number of attempts: 2  Difficulty threading catheter: no  Successful placement: yes  Orientation: right    Location: brachial vein (medial)  Arm circumference: adults 10 cm  Extremity circumference: 32  Visible catheter length: 0  Internal length: 15 cm  Total catheter length: 15  Dressing and securement: alcohol  impregnated caps, chlorhexidine disc applied, sterile dressing applied and securement device  Post procedure assessment: blood return through all ports

## 2022-11-07 NOTE — PLAN OF CARE
A&Ox4. VSS on RA, afebrile this shift. PIV SL. Tolerating diet, great appetite for supper. No bowel movements this shift, incon void. Discharge planned for tomorrow.

## 2022-11-07 NOTE — PROGRESS NOTES
[facility]    Hospitalist Progress Note    Assessment & Plan   Date of Admission:  11/1/2022        Assessment & Plan        Gem Gama is a 71 year old female with history of coronary artery disease, ischemic cardiomyopathy, hypertension, recurrent UTI and pyelonephritis, sleep apnea, depression, anxiety, ADHD, GERD, and C. difficile who presented to the ER with a fever and dysuria.  After evaluation in the ER there was concern for sepsis due to pyelonephritis.  She was started on IV Rocephin and given IV fluids.  The hospitalist service was contacted to admit her for further evaluation and management.     Sepsis -Resolved due to urinary tract infection with right pyelonephritis with E coli  bacteremia  On admission Presented with fevers and dysuria for 2 to 3 days.  She had elevated white count of 12.5 on admission, and UA showed changes consistent with infection  Blood cultures done on 11/1/2022 and urine cultures they are consistent with E. coli which is pansensitive  She had CT scan of the abdomen and pelvis done with and without contrast which shows changes consistent with right pyelonephritis with no perinephric fluid collection to suggest abscess, cholelithiasis and tiny right pleural effusion  Fever is better but the patient has had significant issues with loose bowel movements  Her c diff has been negative, but she has been on ppx vancomycin  plan  - continue the patient with ceftriaxone at this time and she can be discharged home on oral ciprofloxacin when she is afebrile for at least 24 hours and total duration of antibiotics is 14 days ( day one is 11/1/2022) and also continue with vancomycin oral QID for C. difficile prophylaxis to continue until 5 days following antibiotic discontinuation  - patient asking to speaking to ID again tomorrow, will ask them to stop by  - could try imodium prn  - still nausea and not eating with poor oral intake  -long qtc at 503 precludes  "Cipro. Discussed with ID. Will need midline and 7 days Rocephin. Pt agrees. picc line RN consult  - likely home tomorrow. Feeling better today    - [facility] consult outpatient regarding structural Renal dz as potential predisposition to infection.   - ID to see  -care coodinator consult. Cont Rocephine at discharge for 7 days        Acute kidney injury-Resolved  -Creatinine on admission was 1.48 with baseline around 0.9-1.2 and this is most likely prerenal due to underlying infection and she was started on IV fluids and her renal function is improved back to normal and  she is eating and drinking and we will continue to monitor      Thrombocytopenia-improving  -She had platelet count of 135 on admission and this most likely due to underlying infection and platelet count is trended down to 88 and has been trending upwards and is 104 ---> up to 148 yest  Am labs     Acute anemia  -Patient presented with hemoglobin of 14.5 on admission and I reviewed her past hemoglobin in August and June 2022 and it has been between 14 and 15  -Patient might have been dry on admission and her hemoglobin has trended down to 10.6 and serum LDH was checked which was normal and haptoglobin is mildly elevated and her hemoglobin this morning is better at 9.9 and we will continue to monitor  -This is most likely due to sepsis and she has no other signs of bleeding including melena, hematemesis or hematuria or hemoptysis  -no bleeding. Hb stable in 11 range     Elevated bilirubin-resolved  -She only had elevated bilirubin at 2 on admission and the total bilirubin is normal today at 0.8     Acute diarrhea  History of C. Difficile  Diagnosed with C. difficile in September 2022.  States she completed a course of treatment and her symptoms improved, although she does occasionally have some \"slimy stools.\"  plan  -She is on vancomycin QID prophylaxis and to continue until 5 days following antibiotic discontinuation  - c diff negative, but still " "very nauseous with loose stools  - given negative c diff, and her continued treatment, will start imodium if she wants to take it  -no abd pain. Still with loose stools. K wnl  - follow. Po vanco until after 5 days of completing iv abx for pyelonephritis        Coronary artery disease  Ischemic cardiomyopathy  Prior history of STEMI and October 2019, underwent EDWIN x2 to proximal LAD at that time.  LVEF was 35 to 40% at that time.  Most recent echocardiogram showed an LVEF of 55 to 60% with grade 1 diastolic dysfunction and mild to moderate mitral valve regurgitation.  Follows with Dr. Colon in cardiology clinic.  -Continue PTA aspirin, Plavix, atorvastatin, carvedilol, and Imdur and her valsartan was held because of acute kidney injury         Hypertension  -She was started on Coreg on admission and initially her valsartan was held because of low pressures and acute kidney injury and her blood pressure does fluctuate and we will continue to hold valsartan. Normotensive   Hold valsartan until discharge. Am K     Sleep apnea  -Has not tolerated CPAP in the past.     Depression  Anxiety  ADHD  -Continue PTA sertraline.     GERD  -Continue PTA pantoprazole.        Obesity: Estimated body mass index is 31.41 kg/m  as calculated from the following:    Height as of 10/21/22: 1.626 m (5' 4\").    Weight as of 10/21/22: 83 kg (183 lb).              Diet: Combination Diet Regular Diet Adult; 2 gm NA Diet    DVT Prophylaxis: Pneumatic Compression Devices  Greene Catheter: Not present  Central Lines: None  Cardiac Monitoring: None  Code Status:   SPECIAL CODE .  I had discussed with the patient and she only wants \" CODE drugs and shock but no CPR no intubation        Disposition Plan- mid line, then discharge home tomorrow likely.         Expected Discharge Date- tomorrow  Care coordinator consult for infusion care    Evangelist Lee MD, MD  Text Page  (7am to 6pm)  Interval History    Feeling better  4-5 soft stools " today  No abd pain  Eating ok.   No new issues.     -Data reviewed today: I reviewed all new labs and imaging results over the last 24 hours. I personally reviewed labs last 24 hours    Physical Exam   Temp: 97.7  F (36.5  C) Temp src: Oral BP: 127/72 Pulse: 71   Resp: 16 SpO2: 95 % O2 Device: None (Room air)    Vitals:    11/05/22 0605 11/07/22 0557   Weight: 81.1 kg (178 lb 12.7 oz) 81.2 kg (179 lb 0.2 oz)     Vital Signs with Ranges  Temp:  [97.7  F (36.5  C)-98.5  F (36.9  C)] 97.7  F (36.5  C)  Pulse:  [71-77] 71  Resp:  [14-16] 16  BP: (115-129)/(62-72) 127/72  SpO2:  [95 %-96 %] 95 %  No intake/output data recorded.    Constitutional: Slightly cough, nad  Respiratory: CTAB  Cardiovascular: RRR no r/g/m  GI: soft, nd nt  Skin/Integumen: no rash or edema  Neuro - nl speech and mentation  Psych: nl affect      Medications       aspirin  81 mg Oral Daily     atorvastatin  40 mg Oral Daily     carvedilol  3.125 mg Oral BID w/meals     cefTRIAXone  2 g Intravenous Q24H     clopidogrel  75 mg Oral Daily     fluticasone-vilanterol  1 puff Inhalation Daily     isosorbide mononitrate  30 mg Oral Daily     loratadine  10 mg Oral At Bedtime     magnesium oxide  400 mg Oral Daily     mirabegron  50 mg Oral Daily     pantoprazole  40 mg Oral Daily     potassium chloride  20 mEq Oral Once     saccharomyces boulardii  250 mg Oral BID     sertraline  100 mg Oral Daily     sodium chloride (PF)  3 mL Intracatheter Q8H     umeclidinium  2 puff Inhalation Daily     ursodiol  300 mg Oral BID     vancomycin  125 mg Oral 4x Daily       Data   Recent Labs   Lab 11/07/22  0738 11/06/22  0632 11/05/22  0732 11/04/22  0448 11/04/22  0447 11/03/22 2037 11/03/22  0703 11/02/22  0641 11/01/22  0544   WBC  --  7.0 6.1 7.4  --   --  9.1 10.7 12.5*   HGB  --  11.4* 11.6* 10.9*  --    < > 10.6* 11.6* 14.5   MCV  --  86 89 90  --   --  91 92 90   PLT  --  148* 136* 104*  --   --  88* 87* 135*   NA  --  137  --   --  141  --  142 135 136    POTASSIUM 3.7 3.4  --   --  3.5  --  3.6 3.9 3.8   CHLORIDE  --  107  --   --  109  --  112* 110* 100   CO2  --  24  --   --  27  --  25 19* 27   BUN  --  15  --   --  11  --  13 18 18   CR  --  0.75  --   --  0.89  --  0.97 1.12* 1.48*   ANIONGAP  --  6  --   --  5  --  5 6 9   FRACISCO  --  8.5  --   --  8.4*  --  7.9* 7.5* 9.2   GLC  --  122*  --   --  119*  --  121* 130* 133*   ALBUMIN  --   --   --   --   --   --   --  2.3* 3.9   PROTTOTAL  --   --   --   --   --   --   --  6.1* 8.1   BILITOTAL  --   --   --   --   --   --   --  0.8 2.0*   ALKPHOS  --   --   --   --   --   --   --  73 90   ALT  --   --   --   --   --   --   --  22 27   AST  --   --   --   --   --   --   --  38 33    < > = values in this interval not displayed.       Imaging:   No results found for this or any previous visit (from the past 24 hour(s)).

## 2022-11-08 VITALS
DIASTOLIC BLOOD PRESSURE: 69 MMHG | SYSTOLIC BLOOD PRESSURE: 135 MMHG | BODY MASS INDEX: 30.8 KG/M2 | TEMPERATURE: 97.9 F | OXYGEN SATURATION: 95 % | WEIGHT: 179.45 LBS | HEART RATE: 84 BPM | RESPIRATION RATE: 18 BRPM

## 2022-11-08 LAB
ATRIAL RATE - MUSE: 91 BPM
DIASTOLIC BLOOD PRESSURE - MUSE: NORMAL MMHG
ERYTHROCYTE [DISTWIDTH] IN BLOOD BY AUTOMATED COUNT: 13 % (ref 10–15)
HCT VFR BLD AUTO: 34.5 % (ref 35–47)
HGB BLD-MCNC: 12 G/DL (ref 11.7–15.7)
INTERPRETATION ECG - MUSE: NORMAL
MCH RBC QN AUTO: 30.9 PG (ref 26.5–33)
MCHC RBC AUTO-ENTMCNC: 34.8 G/DL (ref 31.5–36.5)
MCV RBC AUTO: 89 FL (ref 78–100)
P AXIS - MUSE: 4 DEGREES
PLATELET # BLD AUTO: 211 10E3/UL (ref 150–450)
POTASSIUM BLD-SCNC: 4.2 MMOL/L (ref 3.4–5.3)
PR INTERVAL - MUSE: 130 MS
QRS DURATION - MUSE: 80 MS
QT - MUSE: 382 MS
QTC - MUSE: 469 MS
R AXIS - MUSE: 21 DEGREES
RBC # BLD AUTO: 3.88 10E6/UL (ref 3.8–5.2)
SYSTOLIC BLOOD PRESSURE - MUSE: NORMAL MMHG
T AXIS - MUSE: 62 DEGREES
VENTRICULAR RATE- MUSE: 91 BPM
WBC # BLD AUTO: 7.7 10E3/UL (ref 4–11)

## 2022-11-08 PROCEDURE — 250N000011 HC RX IP 250 OP 636: Performed by: HOSPITALIST

## 2022-11-08 PROCEDURE — 250N000013 HC RX MED GY IP 250 OP 250 PS 637: Performed by: SPECIALIST

## 2022-11-08 PROCEDURE — 84132 ASSAY OF SERUM POTASSIUM: CPT | Performed by: INTERNAL MEDICINE

## 2022-11-08 PROCEDURE — 36415 COLL VENOUS BLD VENIPUNCTURE: CPT | Performed by: INTERNAL MEDICINE

## 2022-11-08 PROCEDURE — 250N000013 HC RX MED GY IP 250 OP 250 PS 637: Performed by: HOSPITALIST

## 2022-11-08 PROCEDURE — 250N000013 HC RX MED GY IP 250 OP 250 PS 637: Performed by: INTERNAL MEDICINE

## 2022-11-08 PROCEDURE — 85027 COMPLETE CBC AUTOMATED: CPT | Performed by: INTERNAL MEDICINE

## 2022-11-08 PROCEDURE — 99239 HOSP IP/OBS DSCHRG MGMT >30: CPT | Performed by: INTERNAL MEDICINE

## 2022-11-08 RX ORDER — POTASSIUM CHLORIDE 1500 MG/1
20 TABLET, EXTENDED RELEASE ORAL DAILY
Qty: 5 TABLET | Refills: 0 | Status: SHIPPED | OUTPATIENT
Start: 2022-11-09 | End: 2022-11-08

## 2022-11-08 RX ORDER — LOPERAMIDE HCL 2 MG
2 CAPSULE ORAL 3 TIMES DAILY PRN
Status: DISCONTINUED | OUTPATIENT
Start: 2022-11-08 | End: 2022-11-08 | Stop reason: HOSPADM

## 2022-11-08 RX ORDER — CEFTRIAXONE 2 G/1
2 INJECTION, POWDER, FOR SOLUTION INTRAMUSCULAR; INTRAVENOUS EVERY 24 HOURS
Qty: 120 ML | Refills: 0 | Status: SHIPPED | OUTPATIENT
Start: 2022-11-08 | End: 2022-11-14

## 2022-11-08 RX ORDER — VANCOMYCIN HYDROCHLORIDE 125 MG/1
125 CAPSULE ORAL 4 TIMES DAILY
Qty: 44 CAPSULE | Refills: 0 | Status: SHIPPED | OUTPATIENT
Start: 2022-11-08 | End: 2022-11-19

## 2022-11-08 RX ORDER — LOPERAMIDE HCL 2 MG
2 CAPSULE ORAL 3 TIMES DAILY PRN
Qty: 10 CAPSULE | Refills: 0 | Status: SHIPPED | OUTPATIENT
Start: 2022-11-08 | End: 2022-11-22

## 2022-11-08 RX ADMIN — CLOPIDOGREL BISULFATE 75 MG: 75 TABLET ORAL at 09:24

## 2022-11-08 RX ADMIN — CARVEDILOL 3.12 MG: 3.12 TABLET, FILM COATED ORAL at 09:25

## 2022-11-08 RX ADMIN — SERTRALINE HYDROCHLORIDE 100 MG: 100 TABLET ORAL at 09:25

## 2022-11-08 RX ADMIN — CEFTRIAXONE SODIUM 2 G: 2 INJECTION, POWDER, FOR SOLUTION INTRAMUSCULAR; INTRAVENOUS at 12:06

## 2022-11-08 RX ADMIN — Medication 250 MG: at 09:24

## 2022-11-08 RX ADMIN — VANCOMYCIN HYDROCHLORIDE 125 MG: 125 CAPSULE ORAL at 13:41

## 2022-11-08 RX ADMIN — URSODIOL 300 MG: 300 CAPSULE ORAL at 09:25

## 2022-11-08 RX ADMIN — Medication 400 MG: at 09:23

## 2022-11-08 RX ADMIN — MIRABEGRON 50 MG: 50 TABLET, FILM COATED, EXTENDED RELEASE ORAL at 09:24

## 2022-11-08 RX ADMIN — FLUTICASONE FUROATE AND VILANTEROL TRIFENATATE 1 PUFF: 200; 25 POWDER RESPIRATORY (INHALATION) at 09:22

## 2022-11-08 RX ADMIN — VANCOMYCIN HYDROCHLORIDE 125 MG: 125 CAPSULE ORAL at 09:23

## 2022-11-08 RX ADMIN — ATORVASTATIN CALCIUM 40 MG: 40 TABLET, FILM COATED ORAL at 09:25

## 2022-11-08 RX ADMIN — PANTOPRAZOLE SODIUM 40 MG: 40 TABLET, DELAYED RELEASE ORAL at 09:24

## 2022-11-08 RX ADMIN — ASPIRIN 81 MG: 81 TABLET, COATED ORAL at 09:24

## 2022-11-08 RX ADMIN — LOPERAMIDE HYDROCHLORIDE 2 MG: 2 CAPSULE ORAL at 16:59

## 2022-11-08 RX ADMIN — ISOSORBIDE MONONITRATE 30 MG: 30 TABLET, EXTENDED RELEASE ORAL at 09:25

## 2022-11-08 ASSESSMENT — ACTIVITIES OF DAILY LIVING (ADL)
DEPENDENT_IADLS:: INDEPENDENT
ADLS_ACUITY_SCORE: 31
ADLS_ACUITY_SCORE: 30
ADLS_ACUITY_SCORE: 30
ADLS_ACUITY_SCORE: 31
ADLS_ACUITY_SCORE: 30
ADLS_ACUITY_SCORE: 31
ADLS_ACUITY_SCORE: 31

## 2022-11-08 NOTE — PROGRESS NOTES
Care Management Discharge Note    Discharge Date: 11/08/2022  Discharge Disposition: Home Infusion  Discharge Services: None  Discharge DME:  None  Discharge Transportation: family or friend will provide  Private pay costs discussed: insurance costs out of pocket expenses  Patient/family educated on Medicare website which has current facility and service quality ratings:    Education Provided on the Discharge Plan:  yes  Persons Notified of Discharge Plans: pt, bedside RN  Patient/Family in Agreement with the Plan: yes  Handoff Referral Completed: Yes    Additional Information:  Pt has decided to proceed with [facility] for home infusion services.   Em from [facility] ([phone]) confirms they will be in touch with her to arrange education and medication delivery for first dose tomorrow.   Writer confirmed [facility] has son's address; pt plans to stay there for duration of IV antibiotics.  Family will provide transportation home at discharge. PCP follow-up arranged for 11/18.     Laura Solano, RN, BSN, PHN, CMSRN  Care Coordination  [facility] [place]

## 2022-11-08 NOTE — PROGRESS NOTES
[facility]    Received request for benefit check should pt require home IV abx. Pt does not have coverage for IV abx with their Medicare and [facility] Supplement plan. Additionally, [facility] is not contracted with the part D plan so pt would have to go to a preferred provider.    [facility] and [facility] are in network with pt's part D plan. [facility] has sent referrals to them for comparative pricing.    [facility]: The daily cost for medication and supplies is $15.86 per day. Nursing is $90/visit.    [facility]: Cost is $15.73 per day for drug and supplies. Nursing is $130 for the first two hours and $65 for each after after.     Pt has chosen to proceed with home infusion after initially preferring infusion center. She would like to proceed with [facility] ([phone])  for home infusion needs. [facility] has updated [facility] of the pt's preference - they will reach out to the pt and the care coordinator. [facility] will sign off at this time.    Thank you    Ivy Robison RN  [facility] Liaison  [phone] (Mon thru Fri 8am - 5pm)  [phone] Office

## 2022-11-08 NOTE — PROGRESS NOTES
Alert and oriented x4. VSS on RA. Up SBA. Voiding well per bathroom. Denies pain. Awaiting home discharge

## 2022-11-08 NOTE — CARE PLAN
Pt is A & O x 4. Lungs sound clear, bowel sounds active, had few loose BMs today, on imodium and vancomycin. On IV antibiotics. Up with SBA and walker. Discharge instructions and meds were reviewed with and given to pt was discharged home.

## 2022-11-08 NOTE — DISCHARGE SUMMARY
"[facility]    Discharge Summary  Hospitalist    Date of Admission:  11/1/2022  Date of Discharge:  11/8/2022  Discharging Provider: Evangelist Lee MD, MD    Discharge Diagnoses   Sepsis -Resolved due to urinary tract infection with right pyelonephritis with E coli  bacteremia  -ARTURO- resolved.   - Thrombocytopenia - improving.,   -Acute anemia 2/2 infection  -Diarrhea. Cdiff pcr negative. Hx of recurrent Cdiff        History of Present Illness   Gem Gama is a 71 year old female with history of coronary artery disease, ischemic cardiomyopathy, hypertension, recurrent UTI and pyelonephritis, sleep apnea, depression, anxiety, ADHD, GERD, and C. difficile who presented to the ER with a fever and dysuria.  Her symptoms started a couple days ago.  She initially noticed fever and some chills.  She also developed dysuria.  Denies any hematuria or urinary frequency.  No abdominal pain or flank pain.  Has had some nausea and has thrown up several times.  Denies any hematemesis.  Denies any chest pain or shortness of breath.  Has occasionally had some \"slimy stools.\"  Due to the ongoing symptoms she came into the ER today for evaluation.     In the ER she was evaluated by Dr. Russell.  Vitals showed a temperature of 102.7, otherwise okay.  Labs significant for creatinine of 1.48, bilirubin 2.0, platelets 135, and WBC 12.5.  UA was significantly abnormal and suggestive of UTI.  Due to concern for pyelonephritis she was started on IV Rocephin.  The hospitalist service was contacted to admit her for further evaluation and management.     She reports a history of multiple UTIs in the past.  States she gets kidney infections once or twice a year.  Denied flank pain when I spoke to her but did have some right CVA tenderness.  She was diagnosed with C. difficile colitis in early September 2022.  She states she was treated but never completely got over it and still occasionally has some \"slimy " "stools.\"  Denies any melena or hematochezia.       Hospital Course   Gem Gama was admitted on 11/1/2022.  The following problems were addressed during her hospitalization:    Principal Problem:    Sepsis, due to unspecified organism, unspecified whether acute organ dysfunction present (H)  Active Problems:    Pyelonephritis    Date of Admission:  11/1/2022        Assessment & Plan        Gem Gama is a 71 year old female with history of coronary artery disease, ischemic cardiomyopathy, hypertension, recurrent UTI and pyelonephritis, sleep apnea, depression, anxiety, ADHD, GERD, and C. difficile who presented to the ER with a fever and dysuria.  After evaluation in the ER there was concern for sepsis due to pyelonephritis.  She was started on IV Rocephin and given IV fluids.  The hospitalist service was contacted to admit her for further evaluation and management.     Sepsis -Resolved due to urinary tract infection with right pyelonephritis with E coli  bacteremia  On admission Presented with fevers and dysuria for 2 to 3 days.  She had elevated white count of 12.5 on admission, and UA showed changes consistent with infection  Blood cultures done on 11/1/2022 and urine cultures they are consistent with E. coli which is pansensitive  She had CT scan of the abdomen and pelvis done with and without contrast which shows changes consistent with right pyelonephritis with no perinephric fluid collection to suggest abscess, cholelithiasis and tiny right pleural effusion  Fever is better but the patient has had significant issues with loose bowel movements  Her c diff has been negative, but she has been on ppx vancomycin  plan  - continue the patient with ceftriaxone with total duration of antibiotics is 14 days ( day one is 11/1/2022) and also continue with vancomycin oral QID for C. difficile prophylaxis to continue until 5 days following antibiotic discontinuation, pt has 6 more days of Rocephin at time of " discharge.    -long qtc at 503 precludes Cipro. Discussed with ID. placed midline and 7 days Rocephin. Pt agrees. picc line RN consulted and midline placed.   - feeling steadily better  - no abd pain. Afebrile. Taking po. Up independently.    - [facility] consult outpatient regarding structural Renal dz as potential predisposition to infection.   -care coodinator consult. Cont Rocephine at discharge for 7 days  -mid line placed. [facility]  - pcp follow up arranged.           Acute kidney injury-Resolved  -Creatinine on admission was 1.48 with baseline around 0.9-1.2 and this is most likely prerenal due to underlying infection and she was started on IV fluids and her renal function is improved back to normal and  she is eating and drinking and we will continue to monitor      Thrombocytopenia-improving  -She had platelet count of 135 on admission and this most likely due to underlying infection and platelet count is trended down to 88 and has been trending upwards and is 104 ---> up to 148 --> 211 wnl on day of discharge.      Acute anemia  -Patient presented with hemoglobin of 14.5 on admission and I reviewed her past hemoglobin in August and June 2022 and it has been between 14 and 15  -Patient might have been dry on admission and her hemoglobin has trended down to 10.6 and serum LDH was checked which was normal and haptoglobin is mildly elevated and her hemoglobin this morning is better at 9.9 and we will continue to monitor  -This is most likely due to sepsis and she has no other signs of bleeding including melena, hematemesis or hematuria or hemoptysis  -no bleeding. Hb stable in 11 range  -Hb up to 12 on day of discharge     Elevated bilirubin-resolved  -She only had elevated bilirubin at 2 on admission and the total bilirubin is normal today at 0.8     Acute diarrhea  History of C. Difficile  Diagnosed with C. difficile in September 2022.  States she completed a course of treatment and her  "symptoms improved, although she does occasionally have some \"slimy stools.\"  plan  -She is on vancomycin QID prophylaxis and to continue until 5 days following antibiotic discontinuation  - c diff negative, but still very nauseous with loose stools  - given negative c diff, and her continued treatment, will start imodium if she wants to take it  -no abd pain. Still with loose stools. K wnl, had 4 stools day of discharge  - Po vanco until after 5 days of completing iv abx for pyelonephritis  -nl K on day of discharge.   - po vanco ordered per ID        Coronary artery disease  Ischemic cardiomyopathy  Prior history of STEMI and October 2019, underwent EDWIN x2 to proximal LAD at that time.  LVEF was 35 to 40% at that time.  Most recent echocardiogram showed an LVEF of 55 to 60% with grade 1 diastolic dysfunction and mild to moderate mitral valve regurgitation.  Follows with Dr. Colon in cardiology clinic.  -Continue PTA aspirin, Plavix, atorvastatin, carvedilol, and Imdur and her valsartan was held because of acute kidney injury         Hypertension  -She was started on Coreg on admission and initially her valsartan was held because of low pressures and acute kidney injury and her blood pressure does fluctuate and we will continue to hold valsartan. Normotensive   Hold valsartan until discharge.  K nl on day of discharge     Sleep apnea  -Has not tolerated CPAP in the past.     Depression  Anxiety  ADHD  -Continue PTA sertraline.     GERD  -Continue PTA pantoprazole.        Obesity: Estimated body mass index is 31.41 kg/m  as calculated from the following:    Height as of 10/21/22: 1.626 m (5' 4\").    Weight as of 10/21/22: 83 kg (183 lb).              Diet: Combination Diet Regular Diet Adult; 2 gm NA Diet    DVT Prophylaxis: Pneumatic Compression Devices  Greene Catheter: Not present  Central Lines: None  Cardiac Monitoring: None  Code Status:   SPECIAL CODE .  I had discussed with the patient and she only wants \" " CODE drugs and shock but no CPR no intubation        Disposition Plan- mid line placed, home with son to his home temporarily with home infusion care. [facility].   They will see pt tomorrow  Has pcp appt follow up scheduled.      Evangelist Lee MD, MD    Significant Results and Procedures   See hospital course    Pending Results   none  Unresulted Labs Ordered in the Past 30 Days of this Admission     No orders found from 10/2/2022 to 11/2/2022.          Code Status   Special code, no to intubation, cpr, but yes to defibrillate and code drugs. This needs further discussion with PCP on follow up        Primary Care Physician   Danny Balbuena Vocal    Physical Exam   Temp: 97.9  F (36.6  C) Temp src: Oral BP: 135/69 Pulse: 84   Resp: 18 SpO2: 95 % O2 Device: None (Room air)    Vitals:    11/05/22 0605 11/07/22 0557 11/08/22 0500   Weight: 81.1 kg (178 lb 12.7 oz) 81.2 kg (179 lb 0.2 oz) 81.4 kg (179 lb 7.3 oz)     Vital Signs with Ranges  Temp:  [97.9  F (36.6  C)-98.2  F (36.8  C)] 97.9  F (36.6  C)  Pulse:  [77-84] 84  Resp:  [16-18] 18  BP: (129-135)/(69-80) 135/69  SpO2:  [95 %-96 %] 95 %  No intake/output data recorded.  Constitutional: Slightly cough, nad  Respiratory:     CTAB  Cardiovascular: RRR no r/g/m  GI: soft, nd nt  Skin/Integumen: no rash or edema  Neuro - nl speech and mentation  Psych: nl affect            Discharge Disposition   Discharged to home  Condition at discharge: Good    Consultations This Hospital Stay   VASCULAR ACCESS ADULT IP CONSULT  INFECTIOUS DISEASES IP CONSULT  VASCULAR ACCESS ADULT IP CONSULT  VASCULAR ACCESS ADULT IP CONSULT  CARE MANAGEMENT / SOCIAL WORK IP CONSULT  VASCULAR ACCESS ADULT IP CONSULT  VASCULAR ACCESS ADULT IP CONSULT    Time Spent on this Encounter   IEvangelist MD, personally saw the patient today and spent greater than 30 minutes discharging this patient.    Discharge Orders      Adult Urology  Referral      Home Infusion Referral       Primary Care - Care Coordination Referral      Reason for your hospital stay    Right pyelonephritis (kidney infection)  Acute kidney injury from infection and dehydration- resolved.     Follow-up and recommended labs and tests     1. Follow up with primary care doctor  2. Home care for IV antibiotics and midline management  3. I placed a referral to [facility] to follow up your congenital kidney disease.     Activity    Your activity upon discharge: activity as tolerated     Patient care order    Try to limit imodium to 1-2 tablets per day at most since diarrhea may be related to Cdiff (treating you for this with oral vancomycin)     Special Code     Diet    Follow this diet upon discharge: Orders Placed This Encounter      Combination Diet Regular Diet Adult; 2 gm NA Diet     Discharge Medications   Current Discharge Medication List      START taking these medications    Details   cefTRIAXone (ROCEPHIN) 2 GM vial Inject 2 g into the vein every 24 hours for 6 days  Qty: 120 mL, Refills: 0    Associated Diagnoses: Pyelonephritis; Sepsis, due to unspecified organism, unspecified whether acute organ dysfunction present (H)      loperamide (IMODIUM) 2 MG capsule Take 1 capsule (2 mg) by mouth 3 times daily as needed for diarrhea  Qty: 10 capsule, Refills: 0    Comments: Future refills by PCP Dr. Danny Conteh with phone number [phone].  Associated Diagnoses: Clostridium difficile infection      vancomycin (VANCOCIN) 125 MG capsule Take 1 capsule (125 mg) by mouth 4 times daily for 11 days  Qty: 44 capsule, Refills: 0    Associated Diagnoses: Clostridium difficile infection         CONTINUE these medications which have NOT CHANGED    Details   aspirin (ASA) 81 MG EC tablet Take 1 tablet (81 mg) by mouth daily  Qty: 90 tablet, Refills: 0    Comments: Future refills by PCP Dr. Danny Conteh with phone number [phone].  Associated Diagnoses: ST elevation myocardial infarction involving left  anterior descending (LAD) coronary artery (H)      atorvastatin (LIPITOR) 40 MG tablet Take 1 tablet (40 mg) by mouth daily  Qty: 90 tablet, Refills: 3    Associated Diagnoses: Hyperlipidemia LDL goal <70; Coronary artery disease of autologous bypass graft with stable angina pectoris (H)      BREO ELLIPTA 200-25 MCG/INH Inhaler INHALE 1 PUFF INTO THE LUNGS DAILY      carvedilol (COREG) 3.125 MG tablet TAKE 1 TABLET(3.125 MG) BY MOUTH TWICE DAILY WITH MEALS  Qty: 180 tablet, Refills: 3    Associated Diagnoses: Chronic systolic heart failure (H)      clopidogrel (PLAVIX) 75 MG tablet Take 1 tablet (75 mg) by mouth daily  Qty: 90 tablet, Refills: 3    Associated Diagnoses: Coronary artery disease of autologous bypass graft with stable angina pectoris (H)      isosorbide mononitrate (IMDUR) 30 MG 24 hr tablet Take 1 tablet (30 mg) by mouth daily  Qty: 90 tablet, Refills: 3    Comments: New dose.  Associated Diagnoses: Coronary artery disease of autologous bypass graft with stable angina pectoris (H)      magnesium oxide (MAG-OX) 400 MG tablet Take 400 mg by mouth daily      Melatonin 10 MG TABS tablet Take 10 mg by mouth nightly as needed for sleep      mirabegron (MYRBETRIQ) 50 MG 24 hr tablet Take 1 tablet (50 mg) by mouth daily  Qty: 90 tablet, Refills: 1    Associated Diagnoses: Urge incontinence      pantoprazole (PROTONIX) 40 MG EC tablet Take 1 tablet (40 mg) by mouth daily  Qty: 90 tablet, Refills: 3    Associated Diagnoses: Gastroesophageal reflux disease without esophagitis      Prenatal Multivit-Min-Fe-FA (PRENATAL/IRON) TABS Take 1 tablet by mouth daily       saccharomyces boulardii (FLORASTOR) 250 MG capsule Take 1 capsule (250 mg) by mouth 2 times daily  Qty: 14 capsule, Refills: 0    Associated Diagnoses: Preventive measure      sertraline (ZOLOFT) 100 MG tablet Take 1 tablet (100 mg) by mouth daily  Qty: 90 tablet, Refills: 3    Associated Diagnoses: PTSD (post-traumatic stress disorder)      tiotropium  (SPIRIVA RESPIMAT) 2.5 MCG/ACT inhaler Inhale 2 puffs into the lungs daily for 30 days  Qty: 4 g, Refills: 11    Associated Diagnoses: Mild intermittent asthma without complication      ursodiol (ACTIGALL) 300 MG capsule TAKE 1 CAPSULE(300 MG) BY MOUTH TWICE DAILY  Qty: 180 capsule, Refills: 3    Associated Diagnoses: Cholelithiasis without cholecystitis      valsartan (DIOVAN) 40 MG tablet Take 1 tablet (40 mg) by mouth daily  Qty: 90 tablet, Refills: 3    Associated Diagnoses: Coronary artery disease of autologous bypass graft with stable angina pectoris (H); Chronic systolic heart failure (H)      vitamin D3 (CHOLECALCIFEROL) 2000 units (50 mcg) tablet Take 1 tablet (2,000 Units) by mouth daily  Qty: 90 tablet, Refills: 3    Associated Diagnoses: Vitamin D deficiency      albuterol (PROAIR HFA/PROVENTIL HFA/VENTOLIN HFA) 108 (90 Base) MCG/ACT inhaler Inhale 1-2 puffs into the lungs every 4 hours as needed for shortness of breath / dyspnea or wheezing  Qty: 18 g, Refills: 11    Comments: Pharmacy may dispense brand covered by insurance (Proair, or proventil or ventolin or generic albuterol inhaler)  Associated Diagnoses: Mild persistent asthma without complication      amoxicillin (AMOXIL) 500 MG capsule Take 2,000 mg by mouth once as needed Take as directed before dental work      cholestyramine (QUESTRAN) 4 g packet Take 1 packet (4 g) by mouth 2 times daily (with meals)  Qty: 60 packet, Refills: 5    Associated Diagnoses: Clostridium difficile infection      loratadine (CLARITIN) 10 MG tablet Take 10 mg by mouth At Bedtime      nitroGLYcerin (NITROSTAT) 0.4 MG sublingual tablet Place 1 tablet (0.4 mg) under the tongue every 5 minutes as needed for chest pain  Qty: 25 tablet, Refills: 11    Associated Diagnoses: Coronary artery disease of autologous bypass graft with stable angina pectoris (H)      pramipexole (MIRAPEX) 0.125 MG tablet Take 1-2 tablets (0.125-0.25 mg) by mouth At Bedtime Take 1 hour before  bedtime  Qty: 60 tablet, Refills: 2           Allergies   Allergies   Allergen Reactions     Latex Other (See Comments) and Shortness Of Breath     Runny nose  PN: LW Reaction: DIFFICULTY BREATHING       Flagyl [Metronidazole Hcl] Anaphylaxis and Rash     Food      PN: LW FI1: nka LW FI2:     Hydrochlorothiazide W/Triamterene      PN: LW Reaction: skin rash     No Clinical Screening - See Comments      PN: LW Other1: -Adhesive Tape     Seasonal Allergies      sneezing     Sulfa Drugs Anaphylaxis, Hives and Itching     PN: LW Reaction: HIVES, Swelling     Tape [Adhesive Tape] Hives     Metoprolol Itching and Rash     Metronidazole Hives and Rash     PN: LW Reaction: HIVES       Data   Most Recent 3 CBC's:  Recent Labs   Lab Test 11/08/22  0659 11/06/22  0632 11/05/22  0732   WBC 7.7 7.0 6.1   HGB 12.0 11.4* 11.6*   MCV 89 86 89    148* 136*      Most Recent 3 BMP's:  Recent Labs   Lab Test 11/08/22  0659 11/07/22  0738 11/06/22  0632 11/04/22  0447 11/03/22  0703   NA  --   --  137 141 142   POTASSIUM 4.2 3.7 3.4 3.5 3.6   CHLORIDE  --   --  107 109 112*   CO2  --   --  24 27 25   BUN  --   --  15 11 13   CR  --   --  0.75 0.89 0.97   ANIONGAP  --   --  6 5 5   FRACISCO  --   --  8.5 8.4* 7.9*   GLC  --   --  122* 119* 121*     Most Recent 2 LFT's:  Recent Labs   Lab Test 11/02/22  0641 11/01/22  0544   AST 38 33   ALT 22 27   ALKPHOS 73 90   BILITOTAL 0.8 2.0*     Most Recent INR's and Anticoagulation Dosing History:  Anticoagulation Dose History     Recent Dosing and Labs Latest Ref Rng & Units 7/11/2014 7/12/2014 11/11/2014 11/24/2014 11/25/2014 11/26/2014 11/27/2014    Warfarin 3 mg - - - - - 3 mg - -    Warfarin 4 mg - 4 mg - - - - - -    Warfarin 5 mg - - - - 5 mg - 5 mg -    INR 0.86 - 1.14 1.88(H) 1.86(H) 1.10 1.11 1.61(H) 1.76(H) 1.69(H)        Most Recent 3 Troponin's:  Recent Labs   Lab Test 09/23/21  2241 09/23/21  1832 12/10/20  0557 12/09/20  1231 12/09/20  1031   TROPI  --   --  <0.015 <0.015 <0.015    TROPONIN <0.015 <0.015  --   --   --      Most Recent Cholesterol Panel:  Recent Labs   Lab Test 08/26/22  0918   CHOL 141   LDL 70   HDL 60   TRIG 55     Most Recent 6 Bacteria Isolates From Any Culture (See EPIC Reports for Culture Details):  Recent Labs   Lab Test 06/12/21  1244 09/06/20  1658 12/17/16  1339 07/31/15  1732 11/22/14  1323   CULT No growth >100,000 colonies/mL  Escherichia coli  * >100,000 colonies/mL Escherichia coli* No growth >100,000 colonies/mL Escherichia coli*     Most Recent TSH, T4 and A1c Labs:  Recent Labs   Lab Test 09/21/22  0820 12/17/20  1403   TSH 2.02  --    T4 1.03  --    A1C  --  5.2     Results for orders placed or performed during the hospital encounter of 11/01/22   CT Abdomen Pelvis w Contrast    Narrative    EXAM: CT ABDOMEN PELVIS W CONTRAST  LOCATION: [facility]  DATE/TIME: 11/2/2022 8:34 PM    INDICATION: bacteremia with multiple renal procedure  COMPARISON: 02/03/2022  TECHNIQUE: CT scan of the abdomen and pelvis was performed following injection of IV contrast. Multiplanar reformats were obtained. Dose reduction techniques were used.  CONTRAST: 68 mL Isovue 370    FINDINGS:   LOWER CHEST: Tiny right pleural effusion.    HEPATOBILIARY: Normal contour with no significant mass. No bile duct dilatation. Calcified gallstones.    PANCREAS: Normal.    SPLEEN: Normal.    ADRENAL GLANDS: Normal.    KIDNEYS/BLADDER: Urothelial thickening and enhancement involving the right renal pelvis and proximal ureter, as well as some patchy hypoenhancement of the interpolar right kidney and right perinephric fat stranding. No obstructing stone or lesion. Left   renal cysts, which do not require further workup. Bladder is partially obscured by streak artifact, but normal where visualized.    BOWEL: No obstruction or inflammatory change.    LYMPH NODES: Normal.    VASCULATURE: Mild atherosclerotic calcification of the abdominal aorta, visceral branches, and  iliofemoral arteries. No aneurysm.    PELVIC ORGANS: Hysterectomy.    MUSCULOSKELETAL: Mild degenerative changes in the spine. Bilateral hip prosthesis. No aggressive or destructive lesions.      Impression    IMPRESSION:   1.  Constellation of findings consistent with right pyelonephritis. No perinephric fluid collection to suggest abscess.    2.  Cholelithiasis.    3.  Tiny right pleural effusion.

## 2022-11-08 NOTE — PROGRESS NOTES
A/O x4. VSS on RA. Up to BR 1/w. Denies pain. PIV SL. Mid-line placed today on L arm. Will discharge tomorrow.

## 2022-11-08 NOTE — CONSULTS
Care Management Initial Consult    General Information  Assessment completed with: Patient,    Type of CM/SW Visit: Initial Assessment  Primary Care Provider verified and updated as needed: Yes   Readmission within the last 30 days: no previous admission in last 30 days      Reason for Consult: discharge planning  Communication Assessment  Patient's communication style: spoken language (English or Bilingual)    Hearing Difficulty or Deaf: no   Wear Glasses or Blind: yes  Cognitive  Cognitive/Neuro/Behavioral: WDL  Level of Consciousness: alert  Arousal Level: opens eyes spontaneously  Orientation: oriented x 4  Mood/Behavior: calm, cooperative  Best Language: 0 - No aphasia  Speech: clear  Living Environment:   People in home: alone     Current living Arrangements: house      Able to return to prior arrangements: yes  Family/Social Support:  Care provided by: self  Provides care for: no one  Children          Description of Support System:      Current Resources:   Patient receiving home care services: No  Community Resources: None  Equipment currently used at home: shower chair, raised toilet seat  Supplies currently used at home:    Employment/Financial:  Employment Status: retired     Financial Concerns: No concerns identified   Referral to Financial Worker: No  Functional Status:  Prior to admission patient needed assistance:   Dependent ADLs:: Independent  Dependent IADLs:: Independent  Mental Health Status:  Mental Health Status: No Current Concerns     Chemical Dependency Status:  Chemical Dependency Status: No Current Concerns     Values/Beliefs:  Spiritual, Cultural Beliefs, Moravian Practices, Values that affect care: no               Additional Information:  Pt in need of IV Ceftriaxone 2g Q24hrs x7 days at discharge. Midline placed last evening. Lives alone. Plans to discharge to tahira June's home ([address], [place]). 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  Both are teachable parties for IV infusion.     [facility] not in network. Awaiting response from [place] and [facility].    Laura Solano, RN, BSN, PHN, CMSRN  Care Coordination  [facility]

## 2022-11-10 ENCOUNTER — PATIENT OUTREACH (OUTPATIENT)
Dept: CARE COORDINATION | Facility: CLINIC | Age: 72
End: 2022-11-10

## 2022-11-10 LAB
ATRIAL RATE - MUSE: 68 BPM
DIASTOLIC BLOOD PRESSURE - MUSE: NORMAL MMHG
INTERPRETATION ECG - MUSE: NORMAL
P AXIS - MUSE: 7 DEGREES
PR INTERVAL - MUSE: 150 MS
QRS DURATION - MUSE: 80 MS
QT - MUSE: 474 MS
QTC - MUSE: 504 MS
R AXIS - MUSE: -15 DEGREES
SYSTOLIC BLOOD PRESSURE - MUSE: NORMAL MMHG
T AXIS - MUSE: 47 DEGREES
VENTRICULAR RATE- MUSE: 68 BPM

## 2022-11-10 NOTE — PROGRESS NOTES
[facility] Contact  [facility]/Voicemail     Clinical Data: Transitional Care Management Outreach     Outreach attempted x 2.  Left message on patient's voicemail, providing [facility]'s 24/7 scheduling and nurse triage phone number [phone] ([phone]) for questions/concerns and/or to schedule an appt with an [facility] provider, if they do not have a PCP.      Plan:  [facility] will do no further outreaches at this time.       Genny Julien  Community Health Worker  [facility], [facility]  [phone]      *Connected Care Resource Team does NOT follow patient ongoing. Referrals are identified based on internal discharge reports and the outreach is to ensure patient has an understanding of their discharge instructions.

## 2022-11-15 ENCOUNTER — TELEPHONE (OUTPATIENT)
Dept: SLEEP MEDICINE | Facility: CLINIC | Age: 72
End: 2022-11-15

## 2022-11-15 ENCOUNTER — TELEPHONE (OUTPATIENT)
Dept: FAMILY MEDICINE | Facility: CLINIC | Age: 72
End: 2022-11-15

## 2022-11-15 ENCOUNTER — LAB (OUTPATIENT)
Dept: URGENT CARE | Facility: URGENT CARE | Age: 72
End: 2022-11-15
Attending: INTERNAL MEDICINE
Payer: MEDICARE

## 2022-11-15 DIAGNOSIS — F51.04 CHRONIC INSOMNIA: Primary | ICD-10-CM

## 2022-11-15 DIAGNOSIS — Z20.822 COVID-19 RULED OUT: ICD-10-CM

## 2022-11-15 DIAGNOSIS — A49.8 CLOSTRIDIUM DIFFICILE INFECTION: ICD-10-CM

## 2022-11-15 DIAGNOSIS — R06.3 CHEYNE-STOKES BREATHING DISORDER: Chronic | ICD-10-CM

## 2022-11-15 PROCEDURE — U0003 INFECTIOUS AGENT DETECTION BY NUCLEIC ACID (DNA OR RNA); SEVERE ACUTE RESPIRATORY SYNDROME CORONAVIRUS 2 (SARS-COV-2) (CORONAVIRUS DISEASE [COVID-19]), AMPLIFIED PROBE TECHNIQUE, MAKING USE OF HIGH THROUGHPUT TECHNOLOGIES AS DESCRIBED BY CMS-2020-01-R: HCPCS

## 2022-11-15 PROCEDURE — U0005 INFEC AGEN DETEC AMPLI PROBE: HCPCS

## 2022-11-15 NOTE — TELEPHONE ENCOUNTER
Dr. Hadley,  Patient brought her machine in for a download!  She has not used it since 2014.  DL scanned into Media. Patient is ready for her sleep study that is upcoming.  Tomasa Quarles, CMA

## 2022-11-15 NOTE — TELEPHONE ENCOUNTER
Current Outpatient Medications   Medication     albuterol (PROAIR HFA/PROVENTIL HFA/VENTOLIN HFA) 108 (90 Base) MCG/ACT inhaler     amoxicillin (AMOXIL) 500 MG capsule     aspirin (ASA) 81 MG EC tablet     atorvastatin (LIPITOR) 40 MG tablet     BREO ELLIPTA 200-25 MCG/INH Inhaler     carvedilol (COREG) 3.125 MG tablet     cholestyramine (QUESTRAN) 4 g packet     clopidogrel (PLAVIX) 75 MG tablet     isosorbide mononitrate (IMDUR) 30 MG 24 hr tablet     loperamide (IMODIUM) 2 MG capsule     loratadine (CLARITIN) 10 MG tablet     magnesium oxide (MAG-OX) 400 MG tablet     Melatonin 10 MG TABS tablet     mirabegron (MYRBETRIQ) 50 MG 24 hr tablet     nitroGLYcerin (NITROSTAT) 0.4 MG sublingual tablet     pantoprazole (PROTONIX) 40 MG EC tablet     pramipexole (MIRAPEX) 0.125 MG tablet     Prenatal Multivit-Min-Fe-FA (PRENATAL/IRON) TABS     saccharomyces boulardii (FLORASTOR) 250 MG capsule     sertraline (ZOLOFT) 100 MG tablet     tiotropium (SPIRIVA RESPIMAT) 2.5 MCG/ACT inhaler     ursodiol (ACTIGALL) 300 MG capsule     valsartan (DIOVAN) 40 MG tablet     vancomycin (VANCOCIN) 125 MG capsule     vitamin D3 (CHOLECALCIFEROL) 2000 units (50 mcg) tablet     No current facility-administered medications for this visit.     Not taking:   Ceftriaxone 2g: finished 11/14. Not listed on medication list above, but listed in discharge instructions from [facility].     Taking:   All medications listed above.   Imodium 2mg - listed in discharge instructions. Per pt, she is taking 1 capsule daily.     Pt has hospital f/u 11/18 with Dr. Conteh.     [place], RN    PROVIDERS:   Click create new note and enter <.>MEDRECACO.  Review medication documentation from RN.  Click blue hyperlink and select appropriate option and refresh the note.  If changes recommended, route back to RN team with noted to update patient.  If no changes, sign/close encounter.

## 2022-11-16 LAB — SARS-COV-2 RNA RESP QL NAA+PROBE: NEGATIVE

## 2022-11-18 ENCOUNTER — THERAPY VISIT (OUTPATIENT)
Dept: SLEEP MEDICINE | Facility: CLINIC | Age: 72
End: 2022-11-18
Payer: MEDICARE

## 2022-11-18 DIAGNOSIS — F51.04 CHRONIC INSOMNIA: ICD-10-CM

## 2022-11-18 DIAGNOSIS — Z86.79 HISTORY OF ISCHEMIC CARDIOMYOPATHY: Chronic | ICD-10-CM

## 2022-11-18 DIAGNOSIS — R06.3 CHEYNE-STOKES BREATHING DISORDER: ICD-10-CM

## 2022-11-18 DIAGNOSIS — I42.0 DILATED CARDIOMYOPATHY (H): ICD-10-CM

## 2022-11-18 DIAGNOSIS — G47.61 PLMD (PERIODIC LIMB MOVEMENT DISORDER): ICD-10-CM

## 2022-11-18 PROCEDURE — 95811 POLYSOM 6/>YRS CPAP 4/> PARM: CPT | Performed by: INTERNAL MEDICINE

## 2022-11-18 NOTE — Clinical Note
Good effort, sorry I missed the later sleep times. Did I miss something, since when is ASV default settings include a PS min of 3?

## 2022-11-19 NOTE — PATIENT INSTRUCTIONS
Completed a all night titration PSG per provider order.    Preliminary AHI ?.  A final therapeutic PAP pressure was not achieved.    Supine REM was not seen on therapeutic pressure.    Patient reports feeling not refreshed in AM.

## 2022-11-21 ENCOUNTER — IMMUNIZATION (OUTPATIENT)
Dept: NURSING | Facility: CLINIC | Age: 72
End: 2022-11-21
Payer: MEDICARE

## 2022-11-21 PROCEDURE — 0124A COVID-19,PF,PFIZER BOOSTER BIVALENT: CPT

## 2022-11-21 PROCEDURE — 91312 COVID-19,PF,PFIZER BOOSTER BIVALENT: CPT

## 2022-11-22 RX ORDER — LOPERAMIDE HCL 2 MG
2 CAPSULE ORAL DAILY
Qty: 30 CAPSULE | Refills: 0
Start: 2022-11-22

## 2022-11-22 NOTE — TELEPHONE ENCOUNTER
MED REC REQUIRED  Post Medication Reconciliation Status: discharge medications reconciled, continue medications without change

## 2022-11-28 ENCOUNTER — LAB (OUTPATIENT)
Dept: LAB | Facility: CLINIC | Age: 72
End: 2022-11-28
Payer: MEDICARE

## 2022-11-28 ENCOUNTER — VIRTUAL VISIT (OUTPATIENT)
Dept: FAMILY MEDICINE | Facility: CLINIC | Age: 72
End: 2022-11-28
Payer: MEDICARE

## 2022-11-28 ENCOUNTER — MYC MEDICAL ADVICE (OUTPATIENT)
Dept: FAMILY MEDICINE | Facility: CLINIC | Age: 72
End: 2022-11-28

## 2022-11-28 DIAGNOSIS — N18.31 STAGE 3A CHRONIC KIDNEY DISEASE (H): Primary | ICD-10-CM

## 2022-11-28 DIAGNOSIS — Z86.19 HISTORY OF SEPSIS: ICD-10-CM

## 2022-11-28 PROBLEM — G47.8 ABNORMAL REM SLEEP: Status: ACTIVE | Noted: 2022-11-28

## 2022-11-28 PROBLEM — G47.61 PLMD (PERIODIC LIMB MOVEMENT DISORDER): Status: ACTIVE | Noted: 2022-11-28

## 2022-11-28 LAB
ANION GAP SERPL CALCULATED.3IONS-SCNC: 5 MMOL/L (ref 3–14)
BASOPHILS # BLD AUTO: 0 10E3/UL (ref 0–0.2)
BASOPHILS NFR BLD AUTO: 0 %
BUN SERPL-MCNC: 9 MG/DL (ref 7–30)
CALCIUM SERPL-MCNC: 8.2 MG/DL (ref 8.5–10.1)
CHLORIDE BLD-SCNC: 106 MMOL/L (ref 94–109)
CO2 SERPL-SCNC: 30 MMOL/L (ref 20–32)
CREAT SERPL-MCNC: 0.87 MG/DL (ref 0.52–1.04)
EOSINOPHIL # BLD AUTO: 0.2 10E3/UL (ref 0–0.7)
EOSINOPHIL NFR BLD AUTO: 4 %
ERYTHROCYTE [DISTWIDTH] IN BLOOD BY AUTOMATED COUNT: 13.2 % (ref 10–15)
GFR SERPL CREATININE-BSD FRML MDRD: 70 ML/MIN/1.73M2
GLUCOSE BLD-MCNC: 140 MG/DL (ref 70–99)
HCT VFR BLD AUTO: 41.8 % (ref 35–47)
HGB BLD-MCNC: 14 G/DL (ref 11.7–15.7)
IMM GRANULOCYTES # BLD: 0 10E3/UL
IMM GRANULOCYTES NFR BLD: 0 %
LYMPHOCYTES # BLD AUTO: 1.3 10E3/UL (ref 0.8–5.3)
LYMPHOCYTES NFR BLD AUTO: 27 %
MCH RBC QN AUTO: 30.3 PG (ref 26.5–33)
MCHC RBC AUTO-ENTMCNC: 33.5 G/DL (ref 31.5–36.5)
MCV RBC AUTO: 91 FL (ref 78–100)
MONOCYTES # BLD AUTO: 0.3 10E3/UL (ref 0–1.3)
MONOCYTES NFR BLD AUTO: 6 %
NEUTROPHILS # BLD AUTO: 3.2 10E3/UL (ref 1.6–8.3)
NEUTROPHILS NFR BLD AUTO: 63 %
PLATELET # BLD AUTO: 164 10E3/UL (ref 150–450)
POTASSIUM BLD-SCNC: 4.1 MMOL/L (ref 3.4–5.3)
RBC # BLD AUTO: 4.62 10E6/UL (ref 3.8–5.2)
SODIUM SERPL-SCNC: 141 MMOL/L (ref 133–144)
WBC # BLD AUTO: 5 10E3/UL (ref 4–11)

## 2022-11-28 PROCEDURE — 85025 COMPLETE CBC W/AUTO DIFF WBC: CPT

## 2022-11-28 PROCEDURE — 36415 COLL VENOUS BLD VENIPUNCTURE: CPT

## 2022-11-28 PROCEDURE — 80048 BASIC METABOLIC PNL TOTAL CA: CPT

## 2022-11-28 PROCEDURE — 99213 OFFICE O/P EST LOW 20 MIN: CPT | Mod: 95 | Performed by: INTERNAL MEDICINE

## 2022-11-28 PROCEDURE — 82043 UR ALBUMIN QUANTITATIVE: CPT

## 2022-11-28 NOTE — PROGRESS NOTES
Heidy is a 71 year old female with hx of Cheyne-Rogers breathing, who is being evaluated via a billable video visit.      How would you like to obtain your AVS? MyChart  If the video visit is dropped, the invitation should be resent by: Text to cell phone: [phone]  Will anyone else be joining your video visit? [facility] Internal Medicine Progress Note           Assessment and Plan:     Stage 3a chronic kidney disease (H)  - REVIEW OF HEALTH MAINTENANCE PROTOCOL ORDERS  - Basic metabolic panel  (Ca, Cl, CO2, Creat, Gluc, K, Na, BUN); Future    History of sepsis  - CBC with platelets and differential; Future           Interval History:     Hospital Follow-up Visit:    Hospital/Nursing Home/IP Rehab Facility: [facility]  Date of Admission: 11/1/22  Date of Discharge: 11/8/22  Reason(s) for Admission:  Sepsis -Resolved due to urinary tract infection with right pyelonephritis with E coli  bacteremia  -ARTURO- resolved.   - Thrombocytopenia - improving.,   -Acute anemia 2/2 infection  -Diarrhea. Cdiff pcr negative. Hx of recurrent Clostridium difficile  -oxygen reduces to 80 % night (Chenye-Rogers breathing).    Was your hospitalization related to COVID-19? No   Problems taking medications regularly:  None  Medication changes since discharge: Given cough syrup and taking Mucinex for congestion intermittently  Problems adhering to non-medication therapy:  None    Summary of hospitalization:  [facility] discharge summary reviewed  Diagnostic Tests/Treatments reviewed.  Follow up needed: Urology  Other Healthcare Providers Involved in Patient s Care:         Homecare and Specialist appointment - Urology  Update since discharge: improved. No diarrhea nor dysuria. Taking Cholestyramine and Loperamide.  Plan of care communicated with patient                 Significant Problems:   Patient Active Problem List   Diagnosis     TMJ (temporomandibular joint syndrome)      Congenital ureteric stenosis     Lacrimal duct stenosis     Chronic rhinitis     Intermittent asthma     Advanced directives, counseling/discussion     Major depressive disorder, recurrent episode, moderate (H)     Osteoarthritis of right knee     Esophageal reflux (GERD)     Primary narcolepsy without cataplexy     Vitamin D deficiency     Stage 3a chronic kidney disease (H)     Incontinence of feces with fecal urgency     Overactive bladder     Fatigue, unspecified type     History of ischemic cardiomyopathy     Hyperlipidemia LDL goal <70     Cholelithiasis without cholecystitis     Coronary artery disease with angina pectoris, unspecified vessel or lesion type, unspecified whether native or transplanted heart (H)     Environmental allergies     Class 1 obesity due to excess calories with serious comorbidity and body mass index (BMI) of 30.0 to 30.9 in adult     Chronic insomnia     Hiatal hernia     Cheyne-Rogers breathing disorder     Clostridium difficile infection     Pyelonephritis     Sepsis, due to unspecified organism, unspecified whether acute organ dysfunction present (H)     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     Possible PLMD (periodic limb movement disorder)              Review of Systems:   CONSTITUTIONAL: NEGATIVE for fever, chills, change in weight  INTEGUMENTARY/SKIN: NEGATIVE for worrisome rashes, moles or lesions  EYES: NEGATIVE for vision changes or irritation  ENT/MOUTH: NEGATIVE for ear, mouth and throat problems  RESP: NEGATIVE for significant cough or SOB  BREAST: NEGATIVE for masses, tenderness or discharge  CV: NEGATIVE for chest pain, palpitations or peripheral edema  GI: NEGATIVE for nausea, abdominal pain, heartburn, or change in bowel habits   female: POSITIVE for chronic kidney disease.  MUSCULOSKELETAL:NEGATIVE for significant arthralgias or myalgia  NEURO: NEGATIVE for weakness, dizziness or paresthesias  ENDOCRINE: NEGATIVE for temperature intolerance, skin/hair changes  HEME:  NEGATIVE for bleeding problems  PSYCHIATRIC: POSITIVE forHx anxiety and Hx depression            Medications:     Current Outpatient Medications   Medication Sig     amoxicillin (AMOXIL) 500 MG capsule Take 2,000 mg by mouth once as needed Take as directed before dental work     aspirin (ASA) 81 MG EC tablet Take 1 tablet (81 mg) by mouth daily     atorvastatin (LIPITOR) 40 MG tablet Take 1 tablet (40 mg) by mouth daily     BREO ELLIPTA 200-25 MCG/INH Inhaler INHALE 1 PUFF INTO THE LUNGS DAILY     carvedilol (COREG) 3.125 MG tablet TAKE 1 TABLET(3.125 MG) BY MOUTH TWICE DAILY WITH MEALS     cholestyramine (QUESTRAN) 4 g packet Take 1 packet (4 g) by mouth 2 times daily (with meals)     clopidogrel (PLAVIX) 75 MG tablet Take 1 tablet (75 mg) by mouth daily     isosorbide mononitrate (IMDUR) 30 MG 24 hr tablet Take 1 tablet (30 mg) by mouth daily     loperamide (IMODIUM) 2 MG capsule Take 1 capsule (2 mg) by mouth daily     loratadine (CLARITIN) 10 MG tablet Take 10 mg by mouth At Bedtime     magnesium oxide (MAG-OX) 400 MG tablet Take 400 mg by mouth daily     Melatonin 10 MG TABS tablet Take 10 mg by mouth nightly as needed for sleep     mirabegron (MYRBETRIQ) 50 MG 24 hr tablet Take 1 tablet (50 mg) by mouth daily     nitroGLYcerin (NITROSTAT) 0.4 MG sublingual tablet Place 1 tablet (0.4 mg) under the tongue every 5 minutes as needed for chest pain     pantoprazole (PROTONIX) 40 MG EC tablet Take 1 tablet (40 mg) by mouth daily     Prenatal Multivit-Min-Fe-FA (PRENATAL/IRON) TABS Take 1 tablet by mouth daily      saccharomyces boulardii (FLORASTOR) 250 MG capsule Take 1 capsule (250 mg) by mouth 2 times daily     sertraline (ZOLOFT) 100 MG tablet Take 1 tablet (100 mg) by mouth daily     ursodiol (ACTIGALL) 300 MG capsule TAKE 1 CAPSULE(300 MG) BY MOUTH TWICE DAILY     valsartan (DIOVAN) 40 MG tablet Take 1 tablet (40 mg) by mouth daily     vitamin D3 (CHOLECALCIFEROL) 2000 units (50 mcg) tablet Take 1 tablet  (2,000 Units) by mouth daily     albuterol (PROAIR HFA/PROVENTIL HFA/VENTOLIN HFA) 108 (90 Base) MCG/ACT inhaler Inhale 1-2 puffs into the lungs every 4 hours as needed for shortness of breath / dyspnea or wheezing (Patient not taking: Reported on 2022)     pramipexole (MIRAPEX) 0.125 MG tablet Take 1-2 tablets (0.125-0.25 mg) by mouth At Bedtime Take 1 hour before bedtime (Patient not taking: Reported on 2022)     tiotropium (SPIRIVA RESPIMAT) 2.5 MCG/ACT inhaler Inhale 2 puffs into the lungs daily for 30 days     No current facility-administered medications for this visit.             Physical Exam:   Vital signs not obtained due to nature of visit.    Constitutional: Awake, alert, cooperative, no apparent distress, and appears stated age.  Eyes: extra-ocular muscles intact and sclera clear  ENT: normocepalic, without obvious abnormality  Lungs: no increased work of breathing and no retractions  Neurologic: Mental Status Exam:  Level of Alertness:   awake  Orientation:   person, place, time  Memory:   normal  Neuropsychiatric: Affect: normal          Data:      [facility]chocardiography [facility]  [address]  [place] [address]  Phone [phone]  Fax [phone]     Name: INDER SMITH  MRN: [id number]  : 1950  Study Date: 07/15/2022 01:02 PM  Age: 71 yrs  Gender: Female  Patient [facility]  Reason For Study: Dyspnea on exertion  Ordering Physician: JESSE UNDERWOOD  Referring Physician: Danny Conteh  Performed By: Sharona Saunders     BSA: 1.9 m2  Height: 64 in  Weight: 182 lb  HR: 67  BP: 100/60 mmHg  ______________________________________________________________________________  Procedure  Complete Echo Adult.     ______________________________________________________________________________  Interpretation Summary     Left ventricular systolic function is normal.  The visual ejection  fraction is 55-60%.  Grade I or early diastolic dysfunction.  No regional wall motion abnormalities noted.  The right ventricle is normal in size and function.  There is mild to moderate mitral valve regurgitation.  The left atrium is mildly dilated.  There is trace tricuspid valve regurgitation.  No hemodynamically significant valvular aortic stenosis.  The inferior vena cava is normal.     Compared to the previous study dated 10/28/2021, wall motion abnormalities  have resolved.     ______________________________________________________________________________  Left Ventricle  The left ventricle is normal in size. There is normal left ventricular wall  thickness. Left ventricular systolic function is normal. The visual ejection  fraction is 55-60%. Grade I or early diastolic dysfunction. No regional wall  motion abnormalities noted.     Right Ventricle  The right ventricle is normal in size and function.     Atria  The left atrium is mildly dilated. Right atrial size is normal. There is no  color Doppler evidence of an atrial shunt.     Mitral Valve  The mitral valve leaflets are mildly thickened. There is mild to moderate (1-  2+) mitral regurgitation.     Tricuspid Valve  Normal tricuspid valve. There is trace tricuspid regurgitation. The right  ventricular systolic pressure is approximated at 27.7 mmHg plus the right  atrial pressure.     Aortic Valve  The aortic valve is trileaflet with aortic valve sclerosis. There is trace  aortic regurgitation. No hemodynamically significant valvular aortic stenosis.     Pulmonic Valve  The pulmonic valve is not well seen, but is grossly normal. There is trace  pulmonic valvular regurgitation.     Vessels  The aortic root is normal size. Normal size ascending aorta. The inferior vena  cava is normal.     Pericardium  There is no pericardial effusion.     Rhythm  Sinus rhythm was noted.      ______________________________________________________________________________  MMode/2D Measurements & Calculations  IVSd: 0.91 cm  LVIDd: 4.8 cm  LVIDs: 3.5 cm  LVPWd: 0.73 cm  FS: 27.4 %  LV mass(C)d: 130.6 grams  LV mass(C)dI: 69.5 grams/m2  Ao root diam: 3.4 cm  LA dimension: 4.2 cm     asc Aorta Diam: 3.2 cm  LA/Ao: 1.2  LVOT diam: 2.0 cm  LVOT area: 3.3 cm2  RWT: 0.31     Doppler Measurements & Calculations  MV E max yariel: 57.6 cm/sec  MV A max yariel: 82.5 cm/sec  MV E/A: 0.70  MV dec slope: 343.1 cm/sec2  PA acc time: 0.07 sec  TR max yariel: 263.0 cm/sec  TR max P.7 mmHg  E/E' av.2  Lateral E/e': 8.7  Medial E/e': 9.7      Component Ref Range & Units 2 wk ago   (22) 3 wk ago   (22) 3 wk ago   (22) 3 wk ago   (22) 3 wk ago   (11/3/22) 3 wk ago   (11/3/22) 3 wk ago   (22)    WBC Count 4.0 - 11.0 10e3/uL 7.7  7.0  6.1  7.4   9.1  10.7     RBC Count 3.80 - 5.20 10e6/uL 3.88  3.69 Low   3.82  3.54 Low    3.43 Low   3.74 Low      Hemoglobin 11.7 - 15.7 g/dL 12.0  11.4 Low   11.6 Low   10.9 Low   9.7 Low   10.6 Low   11.6 Low      Hematocrit 35.0 - 47.0 % 34.5 Low   31.7 Low   33.9 Low   31.9 Low    31.2 Low   34.5 Low      MCV 78 - 100 fL 89  86  89  90   91  92     MCH 26.5 - 33.0 pg 30.9  30.9  30.4  30.8   30.9  31.0     MCHC 31.5 - 36.5 g/dL 34.8  36.0  34.2  34.2   34.0  33.6     RDW 10.0 - 15.0 % 13.0  12.6  12.8  13.0   13.0  12.8     Platelet Count 150 - 450 10e3/uL 211  148 Low   136 Low   104 Low    88 Low   87 Low     Resulting Agency  [facility]              Specimen Collected: 22  6:59 AM Last Resulted: 22  8:33 AM                 Disposition:  Follow-up in 4 weeks.    Danny Conteh MD  Internal Medicine  [facility] Team       Video-Visit Details     Type of service:  Video Visit     Video Start Time: 11:20 AM  Video End Time: 11:53 AM    Originating Location (pt. Location): Home     Distant Location (provider  location):  [facility]      Platform used for Video Visit: Mary

## 2022-11-28 NOTE — PATIENT INSTRUCTIONS
At [facility], we strive to deliver an exceptional experience to you, every time we see you. If you receive a survey, please complete it as we do value your feedback.  If you have MyChart, you can expect to receive results automatically within 24 hours of their completion.  Your provider will send a note interpreting your results as well.   If you do not have MyChart, you should receive your results in about a week by mail.    Your care team:                            Family Medicine Internal Medicine   MD Danny Christian MD Shantel Branch-Fleming, MD Srinivasa Vaka, MD Katya Belousova, PAKATE Smiley CNP, MD ([facility]   Mustapha Suresh, MD Yelena Garcia MD Amelia Massimini APRN DEVON Huitron APRN MD Letty Webster MD          Clinic hours: Monday - Thursday 7 am-6 pm; Fridays 7 am-5 pm.   Urgent care: Monday - Friday 10 am- 8 pm; Saturday and Sunday 9 am-5 pm.    Clinic: [phone]       [facility]: Monday - Thursday 8 am - 7 pm; Friday 8 am - 6 pm  [facility]: [phone]

## 2022-11-29 LAB
CREAT UR-MCNC: 181 MG/DL
MICROALBUMIN UR-MCNC: 9 MG/L
MICROALBUMIN/CREAT UR: 4.97 MG/G CR (ref 0–25)

## 2022-11-29 NOTE — PROCEDURES
" SLEEP STUDY INTERPRETATION  TITRATION STUDY      Patient: INDER SMITH  YOB: 1950  Study Date: 11/18/2022  MRN: [id number]  Referring Provider: -  Ordering Provider: Kashif Hadley MD    Indications for Polysomnography: The patient is a 71 year old Female who is 5' 4\" and weighs 179.0 lbs. Her BMI is 30.9, Dakota sleepiness scale 14/24 and neck circumference is 39 cm. A polysomnogram with PAP titration was performed to correct for Cheyne lopez breathing with desaturations and arousals with AHI of 40 0n diagnostic study 9/2022    Polysomnogram Data: A full night polysomnogram recorded the standard physiologic parameters including EEG, EOG, EMG, ECG, nasal and oral airflow. Respiratory parameters of chest and abdominal movements were recorded with respiratory inductance plethysmography. Oxygen saturation was recorded by pulse oximetry. Hypopnea scoring rule used: 1B 4%.        Treatment PSG  Sleep Architecture:   The total recording time of the polysomnogram was 407.8 minutes. The total sleep time was 317.5 minutes. Sleep latency was normal at 18.5 minutes without the use of a sleep aid. REM latency was n/a. Arousal index was increased at 54.4 arousals per hour. Sleep efficiency was decreased at 77.9%. Wake after sleep onset was 71.5 minutes. The patient spent 11.3% of total sleep time in Stage N1, 46.9% in Stage N2, 41.7% in Stage N3, and 0.0% in REM.         Respiration:     The patient was titrated at pressures ranging from ASV EPAP min 5, PS min 3, max PS 15 cmH2O up to ASV 13/3/15 cmH2O. The final pressure achieved was ASV 13/3/15 cmH2O with a residual AHI of 6.2 events per hour. Time in REM supine on final pressure was 0 minutes.     This titration was considered adequate (residual AHI with 75% decrease or above constraints without REM supine sleep at final pressure).    Sustained Sleep Associated Hypoventilation - Transcutaneous carbon dioxide monitoring was not used, however significant " hypoventilation was not suggested by oximetry     Sleep Associated Hypoxemia - (Greater than 5 minutes O2 sat at or below 88%)  Baseline oxygen saturation was 92.8%. Lowest oxygen saturation was 85.0%. Time spent less than or equal to 88% was 2.5 minutes. Time spent less than or equal to 89% was 6.0 minutes. Hypoxemia resolved with pressures above EPAP 7 cmH20.     Movement Activity:     Periodic Limb Activity - There were 12 PLMs during the entire study. The PLM index was 2.3 movements per hour. The PLM Arousal Index was 0.2 per hour.    REM EMG Activity - REM  was not present.    Nocturnal Behavior - Abnormal sleep related behaviors were not noted     Bruxism - None apparent.        Cardiac Summary:   The average pulse rate was 67.8 bpm. The minimum pulse rate was 57.0 bpm while the maximum pulse rate was 94.0 bpm. Arrhythmias were not noted.          Assessment:     Adequate titration. Unusual in that titration of EPAP was done for predominantly central events, and initial seetings were with a PS min of 3 for unclear reasons.     Improvement in events and hypoxemia, but persistent arousals    Periodic limb movements markedly reduce from diagnostic study 9/3022    No REM sleep achieved. Patient noted that they would normally initiate sleep at 0100 and sleep well into the morning hours, study time did not reflect normal sleep period.      Patient had frequent violent coughing spells, which seemed to fragment sleep.         Recommendations:    Treatment with ASV at EPAP 7, PS min 0, PS max 15 cmH2O. Recommend clinical follow up with sleep management team, for coaching and review of effectiveness and compliance measures.      Diagnostic Codes:   Cheyne?Rogers Breathing Pattern R06.3        _____________________________________   Electronically Signed By: Kashif Hadley MD 11/28/22         Range(%) Time in range (min)   0.0 - 89.0 6.0   0.0 - 88.0 2.5         Stage Min(mm Hg) Max(mm Hg)   Wake - -   NREM(1+2+3) - -   REM  - -         Range(mmHg) Time in range (min)   55.0 - 100.0 -   Excluded data <20.0 & >65.0 408.5

## 2022-11-30 LAB — SLPCOMP: NORMAL

## 2022-12-07 ENCOUNTER — TELEPHONE (OUTPATIENT)
Dept: UROLOGY | Facility: CLINIC | Age: 72
End: 2022-12-07

## 2022-12-07 NOTE — TELEPHONE ENCOUNTER
LVM and callback to let us know if she wants to change providers to Jennifer or stay with Chu and reschedule.

## 2022-12-07 NOTE — TELEPHONE ENCOUNTER
Heidy is calling back stating that she just wants to see Dr. Rodriguez, she doesn't remember seeing Dr. Chu and that she does not want to see the PA anymore. Please call her back with questions, thanks.

## 2022-12-08 ENCOUNTER — OFFICE VISIT (OUTPATIENT)
Dept: UROLOGY | Facility: CLINIC | Age: 72
End: 2022-12-08
Attending: INTERNAL MEDICINE
Payer: MEDICARE

## 2022-12-08 ENCOUNTER — OFFICE VISIT (OUTPATIENT)
Dept: SLEEP MEDICINE | Facility: CLINIC | Age: 72
End: 2022-12-08
Payer: MEDICARE

## 2022-12-08 VITALS
HEART RATE: 71 BPM | WEIGHT: 178.8 LBS | BODY MASS INDEX: 30.52 KG/M2 | SYSTOLIC BLOOD PRESSURE: 131 MMHG | DIASTOLIC BLOOD PRESSURE: 75 MMHG | HEIGHT: 64 IN | OXYGEN SATURATION: 97 %

## 2022-12-08 DIAGNOSIS — F51.04 CHRONIC INSOMNIA: ICD-10-CM

## 2022-12-08 DIAGNOSIS — Z98.890 HISTORY OF PYELOPLASTY: ICD-10-CM

## 2022-12-08 DIAGNOSIS — Z87.448 HISTORY OF PYELONEPHRITIS: Primary | ICD-10-CM

## 2022-12-08 DIAGNOSIS — G47.31 CENTRAL SLEEP APNEA: ICD-10-CM

## 2022-12-08 DIAGNOSIS — G47.8 ABNORMAL REM SLEEP: ICD-10-CM

## 2022-12-08 DIAGNOSIS — G47.61 PLMD (PERIODIC LIMB MOVEMENT DISORDER): ICD-10-CM

## 2022-12-08 DIAGNOSIS — R06.3 CHEYNE-STOKES BREATHING DISORDER: Primary | Chronic | ICD-10-CM

## 2022-12-08 DIAGNOSIS — Z87.448 HISTORY OF PYELOPLASTY: ICD-10-CM

## 2022-12-08 PROCEDURE — 99214 OFFICE O/P EST MOD 30 MIN: CPT | Mod: GC | Performed by: UROLOGY

## 2022-12-08 PROCEDURE — 99215 OFFICE O/P EST HI 40 MIN: CPT | Performed by: INTERNAL MEDICINE

## 2022-12-08 ASSESSMENT — SLEEP AND FATIGUE QUESTIONNAIRES
HOW LIKELY ARE YOU TO NOD OFF OR FALL ASLEEP WHILE SITTING AND READING: MODERATE CHANCE OF DOZING
HOW LIKELY ARE YOU TO NOD OFF OR FALL ASLEEP WHILE WATCHING TV: HIGH CHANCE OF DOZING
HOW LIKELY ARE YOU TO NOD OFF OR FALL ASLEEP WHILE SITTING INACTIVE IN A PUBLIC PLACE: HIGH CHANCE OF DOZING
HOW LIKELY ARE YOU TO NOD OFF OR FALL ASLEEP WHEN YOU ARE A PASSENGER IN A CAR FOR AN HOUR WITHOUT A BREAK: HIGH CHANCE OF DOZING
HOW LIKELY ARE YOU TO NOD OFF OR FALL ASLEEP IN A CAR, WHILE STOPPED FOR A FEW MINUTES IN TRAFFIC: WOULD NEVER DOZE
HOW LIKELY ARE YOU TO NOD OFF OR FALL ASLEEP WHILE LYING DOWN TO REST IN THE AFTERNOON WHEN CIRCUMSTANCES PERMIT: MODERATE CHANCE OF DOZING
HOW LIKELY ARE YOU TO NOD OFF OR FALL ASLEEP WHILE SITTING AND TALKING TO SOMEONE: SLIGHT CHANCE OF DOZING
HOW LIKELY ARE YOU TO NOD OFF OR FALL ASLEEP WHILE SITTING QUIETLY AFTER LUNCH WITHOUT ALCOHOL: WOULD NEVER DOZE

## 2022-12-08 NOTE — NURSING NOTE
Chief Complaint   Patient presents with     Follow Up     UTI on and off since the beginning of September, no signs or symptoms of UTI today       not currently breastfeeding. There is no height or weight on file to calculate BMI.    Patient Active Problem List   Diagnosis     TMJ (temporomandibular joint syndrome)     Congenital ureteric stenosis     Lacrimal duct stenosis     Chronic rhinitis     Intermittent asthma     Advanced directives, counseling/discussion     Major depressive disorder, recurrent episode, moderate (H)     Osteoarthritis of right knee     Esophageal reflux (GERD)     Primary narcolepsy without cataplexy     Vitamin D deficiency     Stage 3a chronic kidney disease (H)     Incontinence of feces with fecal urgency     Overactive bladder     Fatigue, unspecified type     History of ischemic cardiomyopathy     Hyperlipidemia LDL goal <70     Cholelithiasis without cholecystitis     Coronary artery disease with angina pectoris, unspecified vessel or lesion type, unspecified whether native or transplanted heart (H)     Environmental allergies     Class 1 obesity due to excess calories with serious comorbidity and body mass index (BMI) of 30.0 to 30.9 in adult     Chronic insomnia     Hiatal hernia     Cheyne-Rogers breathing disorder     Clostridium difficile infection     Pyelonephritis     Sepsis, due to unspecified organism, unspecified whether acute organ dysfunction present (H)     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     Possible PLMD (periodic limb movement disorder)       Allergies   Allergen Reactions     Latex Other (See Comments) and Shortness Of Breath     Runny nose  PN: LW Reaction: DIFFICULTY BREATHING       Flagyl [Metronidazole Hcl] Anaphylaxis and Rash     Food      PN: LW FI1: nka LW FI2:     Hydrochlorothiazide W/Triamterene      PN: LW Reaction: skin rash     No Clinical Screening - See Comments      PN: LW Other1: -Adhesive Tape     Seasonal Allergies      sneezing     Sulfa Drugs  Anaphylaxis, Hives and Itching     PN: LW Reaction: HIVES, Swelling     Tape [Adhesive Tape] Hives     Metoprolol Itching and Rash     Metronidazole Hives and Rash     PN: LW Reaction: HIVES         Current Outpatient Medications   Medication Sig Dispense Refill     albuterol (PROAIR HFA/PROVENTIL HFA/VENTOLIN HFA) 108 (90 Base) MCG/ACT inhaler Inhale 1-2 puffs into the lungs every 4 hours as needed for shortness of breath / dyspnea or wheezing 18 g 11     amoxicillin (AMOXIL) 500 MG capsule Take 2,000 mg by mouth once as needed Take as directed before dental work       aspirin (ASA) 81 MG EC tablet Take 1 tablet (81 mg) by mouth daily 90 tablet 0     atorvastatin (LIPITOR) 40 MG tablet Take 1 tablet (40 mg) by mouth daily 90 tablet 3     BREO ELLIPTA 200-25 MCG/INH Inhaler INHALE 1 PUFF INTO THE LUNGS DAILY       carvedilol (COREG) 3.125 MG tablet TAKE 1 TABLET(3.125 MG) BY MOUTH TWICE DAILY WITH MEALS 180 tablet 3     cholestyramine (QUESTRAN) 4 g packet Take 1 packet (4 g) by mouth 2 times daily (with meals) 60 packet 5     clopidogrel (PLAVIX) 75 MG tablet Take 1 tablet (75 mg) by mouth daily 90 tablet 3     isosorbide mononitrate (IMDUR) 30 MG 24 hr tablet Take 1 tablet (30 mg) by mouth daily 90 tablet 3     loperamide (IMODIUM) 2 MG capsule Take 1 capsule (2 mg) by mouth daily 30 capsule 0     loratadine (CLARITIN) 10 MG tablet Take 10 mg by mouth At Bedtime       magnesium oxide (MAG-OX) 400 MG tablet Take 400 mg by mouth daily       Melatonin 10 MG TABS tablet Take 10 mg by mouth nightly as needed for sleep       mirabegron (MYRBETRIQ) 50 MG 24 hr tablet Take 1 tablet (50 mg) by mouth daily 90 tablet 1     nitroGLYcerin (NITROSTAT) 0.4 MG sublingual tablet Place 1 tablet (0.4 mg) under the tongue every 5 minutes as needed for chest pain 25 tablet 11     pantoprazole (PROTONIX) 40 MG EC tablet Take 1 tablet (40 mg) by mouth daily 90 tablet 3     Prenatal Multivit-Min-Fe-FA (PRENATAL/IRON) TABS Take 1 tablet  by mouth daily        saccharomyces boulardii (FLORASTOR) 250 MG capsule Take 1 capsule (250 mg) by mouth 2 times daily 14 capsule 0     sertraline (ZOLOFT) 100 MG tablet Take 1 tablet (100 mg) by mouth daily 90 tablet 3     ursodiol (ACTIGALL) 300 MG capsule TAKE 1 CAPSULE(300 MG) BY MOUTH TWICE DAILY 180 capsule 3     valsartan (DIOVAN) 40 MG tablet Take 1 tablet (40 mg) by mouth daily 90 tablet 3     vitamin D3 (CHOLECALCIFEROL) 2000 units (50 mcg) tablet Take 1 tablet (2,000 Units) by mouth daily 90 tablet 3       Social History     Tobacco Use     Smoking status: Never     Smokeless tobacco: Never   Vaping Use     Vaping Use: Never used   Substance Use Topics     Alcohol use: No     Drug use: No       Dinahpili Malhotra  12/8/2022  2:01 PM

## 2022-12-08 NOTE — PROGRESS NOTES
Sleep Study Follow-Up Visit:    Date on this visit: 2022    Gem Gama comes in today for follow-up of her sleep study done at the [facility].    She has been sleepy since . Symptoms started after her   in . Fatigue also started about then. She states her first sleep study was in the . At that time she was diagnosed with obstructive sleep apnea, though diagnosis uncertain and no records are available.     She also carries a diagnosis of narcolepsy which she says was diagnosed with nap testing at [facility] between  and . She was treated with stimulant therapy until her heart attack in 2019    She has been on zoloft or other antidepressants off an on since     Sleep study MSI 13 - OAHI 18, LAZARO 5.6, RDI 23, low O2 88%. PLMI 34, PLMAI 4.4    Sleep study [facility] 2013 (200#) - CPAP titrated to 14 cmh20. Cheyne-Rogers respirations were present and persisted. AHI 65, RDI 66. Central AHI 56. Low O2 88%. PLMI 10    Titration study MSI 13 - Bilevel, ASV titration. ASV EPAP min 8. EPAP max 15 Min PS 1, Max PS 15, auto was 'optimal'.   - PLMI 39, PLMAI 3.8    She was on an ASV machine in     She stopped using PAP approximately 2017 because it did not seem to help her feel better    She presented to [facility] 2022 for fatigue and sleepiness (ESS 14) with long sleep times and additional symptoms of night sweats, nocturnal enuresis, headaches in morning. Comorbid coronary artery disease.  - Sleep onset difficulties (LEIA 14). Multifactorial: psychophysiologic insomnia comorbid to depression, sleep disordered breathing, narcolepsy, periodic limb movements, delayed sleep phase may all play a role. Referred for cognitive behavioral training   - Nightmare disorder. Referred for possible dream rehearsal therapy    Echo 2022- Left ventricular systolic function is normal. The visual ejection fraction is 55-60%.    She  shira Gurrola 8/2022, 10/2022.    Ferritin 102 on 9/21/2022    Study Date: 9/6/2022 (186.0 lbs)   - The polysomnogram revealed a presence of 14 obstructive, 144 central, and 24 mixed apneas resulting in an apnea index of 32.6 events per hour. There were 8 obstructive hypopneas and 35 central hypopneas resulting in an obstructive hypopnea index of 1.4 and central hypopnea index of 6.3 events per hour.   - The combined apnea/hypopnea index was 40.3 events per hour (central apnea/hypopnea index was 32.1 events per hour). The REM AHI was 21.5 events per hour. The supine AHI was 61.0 events per hour.  RDI was 41.4 events per hour.  - Respiratory rate and pattern was notable for Cheyne-Rogers respiratory rate and pattern.   - Lowest oxygen saturation was 74.0%. Time spent less than or equal to 88% was 33.1 minutes. Time spent less than or equal to 89% was 46.8 minutes. There was a prolonged desaturation late in the study lasting 22 minutes that is suspicious for artifact   - PLM index was 86.5 movements per hour. The PLM Arousal Index was 13.1 per hour.  - Excessive transient/sustained muscle activity was present in 32/78 epochs of REM      MRI 10/1/22 - Normal pituitary gland and whole brain MRI.    11/18/2022 [facility] Sleep Study (179.0 lbs)   -  Titrated from ASV EPAP min 5, PS min 3, max PS 15 cmH2O up to ASV 13/3/15 cmH2O. The final pressure achieved was ASV 13/3/15 cmH2O with a residual AHI of 6.2 events per hour. Time in REM supine on final pressure was 0 minutes.   - Hypoxemia resolved with pressures above EPAP 7 cmH20.  - PLM index was 2.3 movements per hour. The PLM Arousal Index was 0.2 per hour.       These findings were reviewed with patient.     Past medical/surgical history, family history, social history, medications and allergies were reviewed.      Problem List:  Patient Active Problem List    Diagnosis Date Noted     Coronary artery disease with angina pectoris, unspecified vessel or lesion  type, unspecified whether native or transplanted heart (H) 12/09/2020     Priority: High     Hospitalized 10/9/2019-10/13/2019 due to STEMI and underwent EDWIN x 2 to proximal LAD. Coronary angiogram showed severe single vessel obstructive CAD of LAD (100%) as well as moderate non-obstructive CAD in OM1 and OM2 (60%, 50%). Patient initially requiring intraaortic balloon pump and pressor support. Echocardiogram showed EF 35-40%.         Pyelonephritis 11/01/2022     Priority: Medium     Sepsis, due to unspecified organism, unspecified whether acute organ dysfunction present (H) 11/01/2022     Priority: Medium     Cheyne-Rogers breathing disorder 09/09/2022     Priority: Medium     9/6/2022 [facility] (186.0 lbs) - Primary breathing pattern is Cheyne rogers breathing with desaturations and arousals. AHI 41.6, RDI 42.6, Supine AHI 67.7, REM AHI 24.6, Low O2 74.0%, Time Spent ?88% 33.1 minutes / Time Spent ?89% 46.8 minutes. There was a prolonged desaturation late in the study lasting 22 minutes that is suspicious for artifact. PLM index was 86.5 movements per hour. The PLM Arousal Index was 13.1 per hour.       Clostridium difficile infection 09/04/2022     Priority: Medium     Hyperlipidemia LDL goal <70 10/15/2020     Priority: Medium     History of ischemic cardiomyopathy 10/25/2019     Priority: Medium     Hospitalized 10/9/2019-10/13/2019 due to STEMI. Echocardiogram showed EF 35-40%. Later echocardiogram with improved EF 55-60%       Stage 3a chronic kidney disease (H) 12/18/2017     Priority: Medium     Primary narcolepsy without cataplexy 02/09/2017     Priority: Medium     Diagnosis of unclear veracity       Major depressive disorder, recurrent episode, moderate (H) 07/25/2013     Priority: Medium     Intermittent asthma 06/18/1990     Priority: Medium     PFTS 12/2021 - FEV1- 1.83 (86%), ratio 0.74, 14% change with bronchodilators,  %, %, DLCO 90%        REM sleep without  atonia 11/28/2022     Priority: Low     Possible PLMD (periodic limb movement disorder) 11/28/2022     Priority: Low     Hiatal hernia 08/26/2022     Priority: Low     Chronic insomnia 06/02/2022     Priority: Low     Class 1 obesity due to excess calories with serious comorbidity and body mass index (BMI) of 30.0 to 30.9 in adult 06/01/2022     Priority: Low     Cholelithiasis without cholecystitis 10/15/2020     Priority: Low     Overactive bladder 09/27/2018     Priority: Low     Fatigue, unspecified type 09/27/2018     Priority: Low     Incontinence of feces with fecal urgency 03/15/2018     Priority: Low     Vitamin D deficiency 02/23/2017     Priority: Low     Esophageal reflux (GERD) 08/10/2014     Priority: Low     Osteoarthritis of right knee 06/24/2014     Priority: Low     Advanced directives, counseling/discussion 06/18/2012     Priority: Low     Discussed advance care planning with patient; information given to patient to review. 6/18/2012   Erin Hahn MA        Environmental allergies 07/13/2011     Priority: Low     Overview:   Dust, mold, cats , dogs, trees , dustmite and weeds.       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           Impression/Plan:    Cheyne lopez breathing with desaturations and arousals  Central sleep apnea or periodic breathing   Probable concomitant obstructive sleep apnea   Trial of reatment with ASVauto at EPAP 7-13, PS min 0, Pressure max 25 cmH2O. Rx for new device done  - [facility] referral   - She has a pate device from about 2012 does not know if its a recalled device, we could use if there is long wait until new device if it is not a recalled device. See patient  instructions   - She will need follow-up oximetry at some point.       Periodic limb movements of sleep (PLMI 66 on study 9/2022)  PLMI markedly reduced on titration study 9/2022 (2.2)  Trial of mirapex recommended, but she decided not to because of concerns of impulse/bhavior concerns (shopping)    Insomnia  Planning EMDR this month.   Seeing Dr. Sloan, f/u 1/27/23    She will follow up with me in about 2 month(s) after changing therapy.     I spent 30 minutes with patient including counseling, and 10 minutes with chart review, and documentation

## 2022-12-08 NOTE — PATIENT INSTRUCTIONS
Your sleep apnea treatment may be affected by device recall    Our records show that you may have a Parker Respironics CPAP for the treatment of sleep apnea. Many of these devices have been recalled* by the  for replacement. [facility] recommends:     1) If you are using a Resmed device, continue using the device.  2) If you have a Parker Respironics device, register your device for confirmation of type of device and repair of the device at [email or web address]://[email or web address]/healthcare/e/sleep/communications/src-update -if you cannot use link, call [phone].  The website will assist you in obtaining the serial number for registration.   3) If you are using a Parker Respironics CPAP or Bilevel PAP device and you do not have immediate breathing, driving or cardiovascular risks without the device, consider stopping use of the device after verification that is has been recalled. Discuss this decision with your medical provider if you are uncertain about your medical risks.  4) If you are not using Respironics CPAP but are using a Respironics advanced device for breathing support (AVAPS, ASV, Bilevel PAP), continue using the device and review 5 and 6 below).     5) If you continue the device, do not include ozone generating  connected to PAP devices.  6) Bacterial filters to reduce exposure to particulates are sometimes cumbersome to use and are not currently recommended by the .    ?       You may also choose discuss with your provider alternative approaches to treatment.      *[employer] is voluntarily recalling the below devices due to two (2) issues related to the polyester-based polyurethane (PE-PUR) sound abatement foam used in Parker Continuous and Non-Continuous Ventilators: 1) PE-PUR foam may degrade into particles which may enter the device's the air pathway and be ingested or inhaled by the user, and 2) the PE-PUR foam may off-gas certain chemicals.  The foam degradation may be exacerbated by use of unapproved cleaning methods, such as ozone (see FDA safety communication on use of Ozone ), and off-gassing may occur during initial operation and may possibly continue throughout the device's useful life.   These issues can result in serious injury which can be life-threatening, cause permanent impairment, and/or require medical intervention to preclude permanent impairment. To date, [facility] has received several complaints regarding the presence of black debris/particles within the airpath circuit (extending from the device outlet, humidifier, tubing, and mask). Parker also has received reports of headache, upper airway irritation, cough, chest pressure and sinus infection. The potential risks of particulate exposure include: Irritation (skin, eye, and respiratory tract), inflammatory response, headache, asthma, adverse effects to other organs (e.g. kidneys and liver) and toxic carcinogenic affects. The potential risks of chemical exposure due to off-gassing include: headache/dizziness, irritation (eyes, nose, respiratory tract, skin), hypersensitivity, nausea/vomiting, toxic and carcinogenic effects. There have been no reports of death as a result of these issues.    Actions to be taken:  Discontinue the use of your device.  Do not continue to use ozone  with the device.     [employer] affected devices on the recall website, www.[employer].com/SRC-update    i. The website provides current information on the status of the recall and how  to receive permanent corrective action to address the two issues.    ii. The website also provides instructions on how to locate an affected device  Serial Number and will guide users through the registration process.    iii. In the , call [phone] Service Hotline if you cannot visit the website

## 2022-12-08 NOTE — NURSING NOTE
DME orders have been automatically faxed to [facility]. Patient would like to call back to schedule follow-up.  Tomasa Quarles, CMA

## 2022-12-08 NOTE — PATIENT INSTRUCTIONS
Websites with free information:    American Urogynec[employer] Society patient website: www.voicesforpfd.org    Total Control Program: www.totalcontrolprogram.com    Supplements to prevent UTI to consider  -Probiotics  -Cranberry (for these products let them know a doctor is recommending them)   Ellura: www.myellura.TipHive   Theracran HP by [employer] [facility] [address]  -d-mannose 2gm daily  -Vitamin C 500-1000mg twice a day    Please do the lasix renal scan and then follow up afterwards    If you think you have a UTI please contact the clinic [phone]    It was a pleasure meeting with you today.  Thank you for allowing me and my team the privilege of caring for you today.  YOU are the reason we are here, and I truly hope we provided you with the excellent service you deserve.  Please let us know if there is anything else we can do for you so that we can be sure you are leaving completely satisfied with your care experience.

## 2022-12-08 NOTE — LETTER
12/8/2022       RE: Gem Gama  [address]  [place] [address]     Dear Colleague,    Thank you for referring your patient, Gem Gama, to the [facility] [place] at [facility]. Please see a copy of my visit note below.    December 14, 2022    Gem was seen today for follow up.    Diagnoses and all orders for this visit:    History of pyelonephritis  -     Adult Urology  Referral  -     NM Renogram with Lasix; Future    History of pyeloplasty  -     NM Renogram with Lasix; Future    Given history of pyeloplasty and prior renogram with partial obstruction, will obtain Lasix renogram study to reassess right-sided kidney function. Can also help inform whether right-sided obstruction is contributing to recent infections.     Addendum:    The patient was seen and evaluated with the resident.  The plan was formulated in conjunction with me and I agree with the note with changes made as necessary.    30 minutes were spent today on the date of the encounter in reviewing the EMR including reviewing prior records including recent hospitalization, urine culture, prior lasix renal scan, direct patient care including ordering lasix renogram, coordination of care, and documentation.    Mel Rodriguez MD MPH  (she/her/hers)   of Urology  [facility]      CC  Patient Care Team:  Danny Conteh MD as PCP - General (Internal Medicine)  Danny Conteh MD as Assigned PCP  Aubree Butts PA as Assigned Surgical Provider  Shade Chu MD as Assigned OBGYN Provider  Luis A Moody MD as Assigned Pulmonology Provider  Mel Dennison MD as Assigned Heart and Vascular Provider  Sabino Lewis EP as Cardiac Rehabilitation Therapist  Chandu Gurrola PsyD as Assigned Sleep Provider  Evangelist Lee MD as Hospitalist (Internal Medicine)  Mel Rodriguez MD as MD  (Urology)  BILL GONZALEZ      Subjective  71yoM with hx of ureteral stricture disease (reported a stricture resection and uretero-ureteral anastomosis in her 20's and a dismembered pyeloplasty in 1996 at [facility]), previously seen by urology for urinary urgency/UUI and started on Myrbetriq.     Here today after recent urinary tract infection requiring hospitalization at [facility] last month (11/1/22 in Epic). Of note, also had a UTI requiring hospitalization in September 2021. Patient states that she did not really have a prodrome of urinary symptoms prior to her admission, and her presenting symptoms were nausea and emesis. Found to have elevated Cr and mild leukocytosis. UCx with 10-50K E. Coli. Treated for pyelonephritis and discharged on 11/8/22.     Last underwent NM renogram in 2013 -   FINDINGS:   Angiographic phase is symmetric and normal.  Function is computed as 31% right and 69% left.  Right functional curve is abnormal.  Radiotracer count rate continues to rise at 28 minutes.   Left functional curve is normal without evidence of obstruction.    IMPRESSION:   1.  Findings are most compatible with a normal left side and a partially obstructed right side.  The morphology of the right renal pelvis on the CT scan appears abnormal as the ureter exits the pelvis quite low.  Findings raise the possibility of a remote pyeloplasty.  We were not able to administer Lasix for this examination.  The degree of partial right-sided obstruction is likely low.    Denies current urinary symptoms, flank pain, fevers.    LMP  (LMP Unknown)   GENERAL: healthy, alert and no distress  EYES: Eyes grossly normal to inspection, conjunctivae and sclerae normal  HENT: normal cephalic/atraumatic.  External ears, nose and mouth without ulcers or lesions.  RESP: no audible wheeze, cough, or visible cyanosis.  No visible retractions or increased work of breathing.  Able to speak fully in complete sentences.  NEURO: Cranial  nerves grossly intact, mentation intact and speech normal  PSYCH: mentation appears normal, affect normal/bright, judgement and insight intact, normal speech and appearance well-groomed    Labs/imaging/pathology  Reviewed prior urine, blood cultures from recent hospitalizations   Reviewed prior imaging in [facility]  Reviewed notes from recent hospitalization and op notes from [facility]    CC  Patient Care Team:  Danny Conteh MD as PCP - General (Internal Medicine)  Danny Conteh MD as Assigned PCP  Aubree Butts PA as Assigned Surgical Provider  Shade Chu MD as Assigned OBGYN Provider  Luis A Moody MD as Assigned Pulmonology Provider  Mel Dennison MD as Assigned Heart and Vascular Provider  Sabino Lewis EP as Cardiac Rehabilitation Therapist  Chandu Gurrola PsyD as Assigned Sleep Provider  Bill Gonzalez MD as Hospitalist (Internal Medicine)  Mel Rodriguez MD as MD (Urology)  BILL GONZALEZ

## 2022-12-08 NOTE — NURSING NOTE
"Chief Complaint   Patient presents with     Study Results       Initial /75   Pulse 71   Ht 1.626 m (5' 4\")   Wt 81.1 kg (178 lb 12.8 oz)   LMP  (LMP Unknown)   SpO2 97%   BMI 30.69 kg/m   Estimated body mass index is 30.69 kg/m  as calculated from the following:    Height as of this encounter: 1.626 m (5' 4\").    Weight as of this encounter: 81.1 kg (178 lb 12.8 oz).    Medication Reconciliation: complete  Delisa Valle CMA on 12/8/2022 at 8:07 AM     "

## 2022-12-09 DIAGNOSIS — I25.728 CORONARY ARTERY DISEASE OF AUTOLOGOUS BYPASS GRAFT WITH STABLE ANGINA PECTORIS (H): ICD-10-CM

## 2022-12-09 RX ORDER — ISOSORBIDE MONONITRATE 30 MG/1
TABLET, EXTENDED RELEASE ORAL
Qty: 90 TABLET | Refills: 3 | Status: SHIPPED | OUTPATIENT
Start: 2022-12-09 | End: 2023-08-20

## 2022-12-12 DIAGNOSIS — N39.41 URGE INCONTINENCE: ICD-10-CM

## 2022-12-13 NOTE — TELEPHONE ENCOUNTER
mirabegron (MYRBETRIQ) 50 MG 24 hr tablet      Last Written Prescription Date:  6-9-22  Last Fill Quantity: 90,   # refills: 1  Last Office Visit : 12-8-22  Future Office visit:  2-1-23    Routing refill request to provider for review/approval because:  Last fill by [facility] - no longer [facility] provider  Last clinic note unsigned

## 2022-12-14 RX ORDER — MIRABEGRON 50 MG/1
50 TABLET, EXTENDED RELEASE ORAL DAILY
Qty: 90 TABLET | Refills: 1 | Status: SHIPPED | OUTPATIENT
Start: 2022-12-14 | End: 2023-05-11

## 2022-12-14 NOTE — PROGRESS NOTES
December 14, 2022    Gem was seen today for follow up.    Diagnoses and all orders for this visit:    History of pyelonephritis  -     Adult Urology  Referral  -     NM Renogram with Lasix; Future    History of pyeloplasty  -     NM Renogram with Lasix; Future    Given history of pyeloplasty and prior renogram with partial obstruction, will obtain Lasix renogram study to reassess right-sided kidney function. Can also help inform whether right-sided obstruction is contributing to recent infections.     Addendum:    The patient was seen and evaluated with the resident.  The plan was formulated in conjunction with me and I agree with the note with changes made as necessary.    30 minutes were spent today on the date of the encounter in reviewing the EMR including reviewing prior records including recent hospitalization, urine culture, prior lasix renal scan, direct patient care including ordering lasix renogram, coordination of care, and documentation.    Mel Rodriguez MD MPH  (she/her/hers)   of Urology  [facility]  Patient Care Team:  Danny Conteh MD as PCP - General (Internal Medicine)  Danny Conteh MD as Assigned PCP  Aubree Butts PA as Assigned Surgical Provider  Shade Chu MD as Assigned OBGYN Provider  Luis A Moody MD as Assigned Pulmonology Provider  Mel Dennison MD as Assigned Heart and Vascular Provider  Sabino Lewis EP as Cardiac Rehabilitation Therapist  Chandu Gurrola PsyD as Assigned Sleep Provider  Bill Gonzalez MD as Hospitalist (Internal Medicine)  Mel Rodriguez MD as MD (Urology)  BILL GONZALEZ      Subjective  71yoM with hx of ureteral stricture disease (reported a stricture resection and uretero-ureteral anastomosis in her 20's and a dismembered pyeloplasty in 1996 at [place]), previously seen by urology for urinary urgency/UUI and started on Myrbetriq.     Here today after recent  urinary tract infection requiring hospitalization at [facility] last month (11/1/22 in Epic). Of note, also had a UTI requiring hospitalization in September 2021. Patient states that she did not really have a prodrome of urinary symptoms prior to her admission, and her presenting symptoms were nausea and emesis. Found to have elevated Cr and mild leukocytosis. UCx with 10-50K E. Coli. Treated for pyelonephritis and discharged on 11/8/22.     Last underwent NM renogram in 2013 -   FINDINGS:   Angiographic phase is symmetric and normal.  Function is computed as 31% right and 69% left.  Right functional curve is abnormal.  Radiotracer count rate continues to rise at 28 minutes.   Left functional curve is normal without evidence of obstruction.    IMPRESSION:   1.  Findings are most compatible with a normal left side and a partially obstructed right side.  The morphology of the right renal pelvis on the CT scan appears abnormal as the ureter exits the pelvis quite low.  Findings raise the possibility of a remote pyeloplasty.  We were not able to administer Lasix for this examination.  The degree of partial right-sided obstruction is likely low.    Denies current urinary symptoms, flank pain, fevers.    LMP  (LMP Unknown)   GENERAL: healthy, alert and no distress  EYES: Eyes grossly normal to inspection, conjunctivae and sclerae normal  HENT: normal cephalic/atraumatic.  External ears, nose and mouth without ulcers or lesions.  RESP: no audible wheeze, cough, or visible cyanosis.  No visible retractions or increased work of breathing.  Able to speak fully in complete sentences.  NEURO: Cranial nerves grossly intact, mentation intact and speech normal  PSYCH: mentation appears normal, affect normal/bright, judgement and insight intact, normal speech and appearance well-groomed    Labs/imaging/pathology  Reviewed prior urine, blood cultures from recent hospitalizations   Reviewed prior imaging in  [facility]  Reviewed notes from recent hospitalization and op notes from [facility]    CC  Patient Care Team:  Danny Conteh MD as PCP - General (Internal Medicine)  Danny Conteh MD as Assigned PCP  Aubree Butts PA as Assigned Surgical Provider  Shade Chu MD as Assigned OBGYN Provider  Luis A Moody MD as Assigned Pulmonology Provider  Mel Dennison MD as Assigned Heart and Vascular Provider  Sabino Lewis EP as Cardiac Rehabilitation Therapist  Chandu Gurrola PsyD as Assigned Sleep Provider  Bill Gonzalez MD as Hospitalist (Internal Medicine)  Mel Rodriguez MD as MD (Urology)  BILL GONZALEZ

## 2022-12-20 ENCOUNTER — LAB (OUTPATIENT)
Dept: LAB | Facility: CLINIC | Age: 72
End: 2022-12-20
Payer: MEDICARE

## 2022-12-20 ENCOUNTER — OFFICE VISIT (OUTPATIENT)
Dept: UROLOGY | Facility: CLINIC | Age: 72
End: 2022-12-20
Payer: MEDICARE

## 2022-12-20 ENCOUNTER — DOCUMENTATION ONLY (OUTPATIENT)
Dept: RESPIRATORY THERAPY | Facility: CLINIC | Age: 72
End: 2022-12-20

## 2022-12-20 ENCOUNTER — TELEPHONE (OUTPATIENT)
Dept: PULMONOLOGY | Facility: CLINIC | Age: 72
End: 2022-12-20

## 2022-12-20 ENCOUNTER — TELEPHONE (OUTPATIENT)
Dept: UROLOGY | Facility: CLINIC | Age: 72
End: 2022-12-20

## 2022-12-20 ENCOUNTER — MYC MEDICAL ADVICE (OUTPATIENT)
Dept: UROLOGY | Facility: CLINIC | Age: 72
End: 2022-12-20

## 2022-12-20 DIAGNOSIS — N39.0 RECURRENT UTI: Primary | ICD-10-CM

## 2022-12-20 DIAGNOSIS — N39.0 RECURRENT UTI: ICD-10-CM

## 2022-12-20 LAB
ALBUMIN UR-MCNC: 10 MG/DL
APPEARANCE UR: ABNORMAL
BACTERIA #/AREA URNS HPF: ABNORMAL /HPF
BILIRUB UR QL STRIP: NEGATIVE
COLOR UR AUTO: YELLOW
GLUCOSE UR STRIP-MCNC: NEGATIVE MG/DL
HGB UR QL STRIP: ABNORMAL
HYALINE CASTS: 19 /LPF
KETONES UR STRIP-MCNC: NEGATIVE MG/DL
LEUKOCYTE ESTERASE UR QL STRIP: ABNORMAL
NITRATE UR QL: POSITIVE
PH UR STRIP: 5.5 [PH] (ref 5–7)
RBC URINE: 7 /HPF
SP GR UR STRIP: 1.01 (ref 1–1.03)
UROBILINOGEN UR STRIP-MCNC: NORMAL MG/DL
WBC CLUMPS #/AREA URNS HPF: PRESENT /HPF
WBC URINE: >182 /HPF

## 2022-12-20 PROCEDURE — P9612 CATHETERIZE FOR URINE SPEC: HCPCS

## 2022-12-20 PROCEDURE — 87086 URINE CULTURE/COLONY COUNT: CPT | Performed by: UROLOGY

## 2022-12-20 PROCEDURE — 81001 URINALYSIS AUTO W/SCOPE: CPT | Performed by: PATHOLOGY

## 2022-12-20 NOTE — TELEPHONE ENCOUNTER
Lab called with pt. Pt cannot void for sample, writer spoke to Felecia, pt will come up to 4th floor and Felecia will collect a sample.

## 2022-12-20 NOTE — PROGRESS NOTES
TREATMENT PLAN AND GOALS:  PATIENT INSTRUCTED ON USE AND CARE OF THE PAP EQUIPMENT IN ACCORDANCE WITH THE [facility] PRACTICE GUIDELINES.  MACHINE MODEL AND MODE: ASV   PRESSURE SETTING OF:  EPAP MIN 7, EPAP MAX-13, PS MIN-0, PS MAX-25. RATE AUTO  PSG DATE: 2022  PSG SCORIN%  AHI: 41.4  RDI:  DIAGNOSIS: CSA   MASK TYPE ON RX: NASAL, PATIENT WANTED FULL    Is patient on home oxygen? No  If yes:  DME Vendor:  Liter Flow:    Is home oxygen being ordered? No  If yes:  Complete O2 Setup Note (if patient chooses [facility])  DME Vendor (if not ME):  Liter flow:    **QUALIFYING CRITERIA**        CSA OR COMPSA  1) DX OF CSA OR COMPSA; AHI GREATER THAN OR EQUAL TO 5; CSA IS >50% TOTAL APNEAS & HYPOPNEAS  2) CPAP CONSIDERED AND RULED OUT                  ECHO DATE:    ECHO EF RATIO:  50-60%    2022  1) BASED ON PSG ON 2022, PATIENT HAD 1194 TOTAL APNEAS AND HYPOPNEAS, 161 WERE FROM A CENTRAL EVENTS. THIS IS GREATER THAN 50% OF TOTAL.   2) THIS A REPLACEMENT DEVICE AND CPAP HAS BEEN TRIALED AND RULED.       PATIENT WAS OFFERED CHOICE OF VENDOR AND CHOSE [facility].  PATIENT TO FOLLOW MEDICARE [employer] STANDARDS FOR USE REQUIREMENTS AND INSTRUCTED TO FOLLOW UP WITH THEIR PHYSICIAN WITHIN THESE GUIDELINES.  PATIENT INSTRUCTED TO CALL ONE OF OUR LOCATIONS WITH QUESTIONS AND CONCERNS.  PATIENT WILL BE FOLLOWED UP WITH ACCORDING [facility] PROTOCOL.    RX SIGNED BY: SARAH  REFERRAL SOURCE: 2022  DATE RX SIGNED: SARAH  [id number]    DATE PATIENT WAS SETUP ON: 2022  LOCATION OF SETUP: [facility] SHOWROOM   SETUP BY: NADIRA PRITCHETT     2022- PATIENT ARRIVED TO SHOWROOM ON TIME. EDUCATED PATIENT ON DEVICE IN RELATIONS TO HER CENTRAL SLEEP APNEA SLEEP DISORDER. PATIENT HAS WORE A SLEEP DEVICE IN THE PAST.  INSTRUCTED PATIENT ON OUTER WORKINGS OF DEVICE. INSTRUCTED PATIENT ON ASSEMBLY OF TUBING, MASK, FILTERS, AND POWER SUPPLY.  PATIENT PLACED MASK AND APPLIED ASV THERAPY. PATIENT TOLERATED DEVICE FOR < 5MINS. REVIEWED CLEANING AND  REORDING OF SUPPLIES. REVIEWED COMPLIANCE WITH ISSURANCE. PATIENT STATED SHE HAS WORE NASAL MASK IN THE PAST AND HAD LEAKAGE PROBLEMS AND TRIED CHIN STRAP. PATIENT WOULD PREFER FF MASK. PATIENT HAD NO MORE QUESTIONS AND CONCERNS. WILL FOLLOWUP WITH PATIENT ON 12/23/2022, 1/3/2023, 1/20/2023.     1ST MASK CHOICE: F20 SMALL FF AIRTOUCH

## 2022-12-20 NOTE — PROGRESS NOTES
Gem Gama comes into clinic today at the request of Dr. Mel Rodriguez with the diagnosis of dysuria for a catheterized urine.    Orders verified    Using sterile technique I catheterized Gem Gama using a 14 fr straight tipped catheter without difficulty. I removed the catheter without difficulty after obtaining a urine sample and draining her bladder.    This was done under the supervision of Dr. Pilo Cordero, who was available if needed.        Yamini Monson CMA  12/20/2022  5:00 PM

## 2022-12-20 NOTE — TELEPHONE ENCOUNTER
Patient calling to say she has UTI symptoms and would like to come in today for UA/UC   Patient has burning with urination, urinary frequency, and slight flank pain. She was told to call if she had symptoms  Patient will be in the neighborhood today around 4 pm  Patient scheduled.    Felecia Shanks RN, BSN  Care Coordinator Urology

## 2022-12-20 NOTE — TELEPHONE ENCOUNTER
Prior Authorization Retail Medication Request    Medication/Dose: BREO ELLIPTA 200-25 MCG/INH Inhaler    ICD code (if different than what is on RX):    Previously Tried and Failed:    Rationale:      Insurance Name:    Insurance ID:        Pharmacy Information (if different than what is on RX)  Name:    Phone:

## 2022-12-21 LAB — BACTERIA UR CULT: ABNORMAL

## 2022-12-21 RX ORDER — CIPROFLOXACIN 500 MG/1
500 TABLET, FILM COATED ORAL 2 TIMES DAILY
Qty: 10 TABLET | Refills: 0 | Status: SHIPPED | OUTPATIENT
Start: 2022-12-21 | End: 2022-12-21 | Stop reason: ALTCHOICE

## 2022-12-21 RX ORDER — AMOXICILLIN 500 MG/1
500 CAPSULE ORAL 2 TIMES DAILY
Qty: 14 CAPSULE | Refills: 0 | Status: SHIPPED | OUTPATIENT
Start: 2022-12-21 | End: 2023-02-01

## 2022-12-21 NOTE — TELEPHONE ENCOUNTER
Central Prior Authorization Team - Phone: [phone]     Prior Authorization Not Needed per Insurance    Medication: BREO ELLIPTA 200-25 MCG/INH Inhaler- PA not needed per insurance  Insurance Company:    Expected CoPay:      Pharmacy Filling the Rx: [id number] [id number] AT [facility] [address]  Pharmacy Notified: Yes  Patient Notified: YesComment:  pharmacy will notify when ready

## 2023-01-04 ENCOUNTER — VIRTUAL VISIT (OUTPATIENT)
Dept: PSYCHOLOGY | Facility: CLINIC | Age: 73
End: 2023-01-04
Payer: MEDICARE

## 2023-01-04 DIAGNOSIS — G47.8 ABNORMAL REM SLEEP: ICD-10-CM

## 2023-01-04 DIAGNOSIS — G47.31 PRIMARY CENTRAL SLEEP APNEA: Primary | ICD-10-CM

## 2023-01-04 DIAGNOSIS — F33.1 MAJOR DEPRESSIVE DISORDER, RECURRENT EPISODE, MODERATE (H): Primary | ICD-10-CM

## 2023-01-04 DIAGNOSIS — F43.22 ADJUSTMENT DISORDER WITH ANXIETY: ICD-10-CM

## 2023-01-04 DIAGNOSIS — G47.61 PLMD (PERIODIC LIMB MOVEMENT DISORDER): ICD-10-CM

## 2023-01-04 DIAGNOSIS — R06.3 CHEYNE-STOKES BREATHING DISORDER: Chronic | ICD-10-CM

## 2023-01-04 DIAGNOSIS — F51.04 CHRONIC INSOMNIA: ICD-10-CM

## 2023-01-04 PROCEDURE — 90834 PSYTX W PT 45 MINUTES: CPT | Mod: 95 | Performed by: SOCIAL WORKER

## 2023-01-04 ASSESSMENT — ANXIETY QUESTIONNAIRES
6. BECOMING EASILY ANNOYED OR IRRITABLE: NOT AT ALL
1. FEELING NERVOUS, ANXIOUS, OR ON EDGE: SEVERAL DAYS
5. BEING SO RESTLESS THAT IT IS HARD TO SIT STILL: NOT AT ALL
3. WORRYING TOO MUCH ABOUT DIFFERENT THINGS: NOT AT ALL
7. FEELING AFRAID AS IF SOMETHING AWFUL MIGHT HAPPEN: NOT AT ALL
GAD7 TOTAL SCORE: 3
GAD7 TOTAL SCORE: 3
IF YOU CHECKED OFF ANY PROBLEMS ON THIS QUESTIONNAIRE, HOW DIFFICULT HAVE THESE PROBLEMS MADE IT FOR YOU TO DO YOUR WORK, TAKE CARE OF THINGS AT HOME, OR GET ALONG WITH OTHER PEOPLE: SOMEWHAT DIFFICULT
2. NOT BEING ABLE TO STOP OR CONTROL WORRYING: MORE THAN HALF THE DAYS

## 2023-01-04 ASSESSMENT — COLUMBIA-SUICIDE SEVERITY RATING SCALE - C-SSRS
TOTAL  NUMBER OF ABORTED OR SELF INTERRUPTED ATTEMPTS LIFETIME: NO
1. HAVE YOU WISHED YOU WERE DEAD OR WISHED YOU COULD GO TO SLEEP AND NOT WAKE UP?: YES
6. HAVE YOU EVER DONE ANYTHING, STARTED TO DO ANYTHING, OR PREPARED TO DO ANYTHING TO END YOUR LIFE?: NO
2. HAVE YOU ACTUALLY HAD ANY THOUGHTS OF KILLING YOURSELF?: NO
REASONS FOR IDEATION LIFETIME: MOSTLY TO END OR STOP THE PAIN (YOU COULDN'T GO ON LIVING WITH THE PAIN OR HOW YOU WERE FEELING)
ATTEMPT LIFETIME: NO
1. IN THE PAST MONTH, HAVE YOU WISHED YOU WERE DEAD OR WISHED YOU COULD GO TO SLEEP AND NOT WAKE UP?: NO
REASONS FOR IDEATION PAST MONTH: DOES NOT APPLY
TOTAL  NUMBER OF INTERRUPTED ATTEMPTS LIFETIME: NO

## 2023-01-04 ASSESSMENT — PATIENT HEALTH QUESTIONNAIRE - PHQ9
SUM OF ALL RESPONSES TO PHQ QUESTIONS 1-9: 12
10. IF YOU CHECKED OFF ANY PROBLEMS, HOW DIFFICULT HAVE THESE PROBLEMS MADE IT FOR YOU TO DO YOUR WORK, TAKE CARE OF THINGS AT HOME, OR GET ALONG WITH OTHER PEOPLE: VERY DIFFICULT
SUM OF ALL RESPONSES TO PHQ QUESTIONS 1-9: 12
5. POOR APPETITE OR OVEREATING: NOT AT ALL
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
## 2023-01-09 ENCOUNTER — TELEPHONE (OUTPATIENT)
Dept: UROLOGY | Facility: CLINIC | Age: 73
End: 2023-01-09

## 2023-01-09 ENCOUNTER — ALLIED HEALTH/NURSE VISIT (OUTPATIENT)
Dept: UROLOGY | Facility: CLINIC | Age: 73
End: 2023-01-09
Payer: MEDICARE

## 2023-01-09 DIAGNOSIS — N39.0 RECURRENT UTI: Primary | ICD-10-CM

## 2023-01-09 DIAGNOSIS — N39.0 RECURRENT UTI: ICD-10-CM

## 2023-01-09 LAB
ALBUMIN UR-MCNC: NEGATIVE MG/DL
APPEARANCE UR: CLEAR
BACTERIA #/AREA URNS HPF: ABNORMAL /HPF
BILIRUB UR QL STRIP: NEGATIVE
COLOR UR AUTO: ABNORMAL
GLUCOSE UR STRIP-MCNC: NEGATIVE MG/DL
HGB UR QL STRIP: NEGATIVE
KETONES UR STRIP-MCNC: NEGATIVE MG/DL
LEUKOCYTE ESTERASE UR QL STRIP: ABNORMAL
NITRATE UR QL: NEGATIVE
PH UR STRIP: 5 [PH] (ref 5–7)
RBC URINE: <1 /HPF
SP GR UR STRIP: 1 (ref 1–1.03)
UROBILINOGEN UR STRIP-MCNC: NORMAL MG/DL
WBC CLUMPS #/AREA URNS HPF: PRESENT /HPF
WBC URINE: 17 /HPF

## 2023-01-09 PROCEDURE — 87186 SC STD MICRODIL/AGAR DIL: CPT | Performed by: UROLOGY

## 2023-01-09 PROCEDURE — 51701 INSERT BLADDER CATHETER: CPT

## 2023-01-09 PROCEDURE — 81001 URINALYSIS AUTO W/SCOPE: CPT | Performed by: PATHOLOGY

## 2023-01-09 NOTE — TELEPHONE ENCOUNTER
Patient of Dr Rodriguez calling today to let us know she has urinary urgency with burning/pain when she urinates. Patient would like to come to the clinic for specimen in case she is unable to void like last time. Patient denies fever/chills at this time.  Okay to catheterize patient if she needs it.  Scheduled an appointment for today     Felecia Shanks RN, BSN  Care Coordinator Urology

## 2023-01-09 NOTE — PROGRESS NOTES
Chief Complaint   Patient presents with     Allied Health Visit     UA/UC       Patient Active Problem List   Diagnosis     TMJ (temporomandibular joint syndrome)     Congenital ureteric stenosis     Lacrimal duct stenosis     Chronic rhinitis     Intermittent asthma     Advanced directives, counseling/discussion     Major depressive disorder, recurrent episode, moderate (H)     Osteoarthritis of right knee     Esophageal reflux (GERD)     Primary narcolepsy without cataplexy     Vitamin D deficiency     Stage 3a chronic kidney disease (H)     Incontinence of feces with fecal urgency     Overactive bladder     Fatigue, unspecified type     History of ischemic cardiomyopathy     Hyperlipidemia LDL goal <70     Cholelithiasis without cholecystitis     Coronary artery disease with angina pectoris, unspecified vessel or lesion type, unspecified whether native or transplanted heart (H)     Environmental allergies     Class 1 obesity due to excess calories with serious comorbidity and body mass index (BMI) of 30.0 to 30.9 in adult     Chronic insomnia     Hiatal hernia     Cheyne-Rogers breathing disorder     Clostridium difficile infection     Pyelonephritis     Sepsis, due to unspecified organism, unspecified whether acute organ dysfunction present (H)     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     Possible PLMD (periodic limb movement disorder)     Adjustment disorder with anxiety       Allergies   Allergen Reactions     Latex Other (See Comments) and Shortness Of Breath     Runny nose  PN: LW Reaction: DIFFICULTY BREATHING       Flagyl [Metronidazole Hcl] Anaphylaxis and Rash     Food      PN: LW FI1: nka LW FI2:     Hydrochlorothiazide W/Triamterene      PN: LW Reaction: skin rash     No Clinical Screening - See Comments      PN: LW Other1: -Adhesive Tape     Seasonal Allergies      sneezing     Sulfa Drugs Anaphylaxis, Hives and Itching     PN: LW Reaction: HIVES, Swelling     Tape [Adhesive Tape] Hives     Metoprolol Itching and  Rash     Metronidazole Hives and Rash     PN: LW Reaction: HIVES         Current Outpatient Medications   Medication Sig Dispense Refill     albuterol (PROAIR HFA/PROVENTIL HFA/VENTOLIN HFA) 108 (90 Base) MCG/ACT inhaler Inhale 1-2 puffs into the lungs every 4 hours as needed for shortness of breath / dyspnea or wheezing 18 g 11     amoxicillin (AMOXIL) 500 MG capsule Take 1 capsule (500 mg) by mouth 2 times daily 14 capsule 0     amoxicillin (AMOXIL) 500 MG capsule Take 2,000 mg by mouth once as needed Take as directed before dental work       aspirin (ASA) 81 MG EC tablet Take 1 tablet (81 mg) by mouth daily 90 tablet 0     atorvastatin (LIPITOR) 40 MG tablet Take 1 tablet (40 mg) by mouth daily 90 tablet 3     BREO ELLIPTA 200-25 MCG/INH Inhaler INHALE 1 PUFF INTO THE LUNGS DAILY       carvedilol (COREG) 3.125 MG tablet TAKE 1 TABLET(3.125 MG) BY MOUTH TWICE DAILY WITH MEALS 180 tablet 3     cholestyramine (QUESTRAN) 4 g packet Take 1 packet (4 g) by mouth 2 times daily (with meals) 60 packet 5     clopidogrel (PLAVIX) 75 MG tablet Take 1 tablet (75 mg) by mouth daily 90 tablet 3     isosorbide mononitrate (IMDUR) 30 MG 24 hr tablet TAKE 1 TABLET(30 MG) BY MOUTH DAILY 90 tablet 3     loperamide (IMODIUM) 2 MG capsule Take 1 capsule (2 mg) by mouth daily 30 capsule 0     loratadine (CLARITIN) 10 MG tablet Take 10 mg by mouth At Bedtime       magnesium oxide (MAG-OX) 400 MG tablet Take 400 mg by mouth daily       Melatonin 10 MG TABS tablet Take 10 mg by mouth nightly as needed for sleep       mirabegron (MYRBETRIQ) 50 MG 24 hr tablet Take 1 tablet (50 mg) by mouth daily 90 tablet 1     nitroGLYcerin (NITROSTAT) 0.4 MG sublingual tablet Place 1 tablet (0.4 mg) under the tongue every 5 minutes as needed for chest pain 25 tablet 11     pantoprazole (PROTONIX) 40 MG EC tablet Take 1 tablet (40 mg) by mouth daily 90 tablet 3     Prenatal Multivit-Min-Fe-FA (PRENATAL/IRON) TABS Take 1 tablet by mouth daily         saccharomyces boulardii (FLORASTOR) 250 MG capsule Take 1 capsule (250 mg) by mouth 2 times daily 14 capsule 0     sertraline (ZOLOFT) 100 MG tablet Take 1 tablet (100 mg) by mouth daily 90 tablet 3     ursodiol (ACTIGALL) 300 MG capsule TAKE 1 CAPSULE(300 MG) BY MOUTH TWICE DAILY 180 capsule 3     valsartan (DIOVAN) 40 MG tablet Take 1 tablet (40 mg) by mouth daily 90 tablet 3     vitamin D3 (CHOLECALCIFEROL) 2000 units (50 mcg) tablet Take 1 tablet (2,000 Units) by mouth daily 90 tablet 3       Social History     Tobacco Use     Smoking status: Never     Smokeless tobacco: Never   Vaping Use     Vaping Use: Never used   Substance Use Topics     Alcohol use: No     Drug use: No       Gem Gama comes into clinic today at the request of Dr. Mel Rodriguez for catheterized urine sample for UA/UC.    Patient diagnosis: Allison    This service provided today was under the direct supervision of Dr. Lewis, who was available if needed.    Gem Gama presented to clinic today to leave urine sample via catheterization. Patient was positioned and prepped in standard sterile fashion. Patient was catheterized using the normal sterile technique. Urine obtained and sent to lab for UA/UC. Patient tolerated procedure without complications noted. Patient will be contacted with results.    Darrell Contrears EMT  1/9/2023  3:41 PM

## 2023-01-10 ENCOUNTER — MYC MEDICAL ADVICE (OUTPATIENT)
Dept: UROLOGY | Facility: CLINIC | Age: 73
End: 2023-01-10

## 2023-01-10 DIAGNOSIS — N39.0 RECURRENT UTI: Primary | ICD-10-CM

## 2023-01-10 DIAGNOSIS — N39.0 UTI (URINARY TRACT INFECTION): Primary | ICD-10-CM

## 2023-01-10 LAB — BACTERIA UR CULT: ABNORMAL

## 2023-01-10 RX ORDER — AMOXICILLIN 500 MG/1
500 CAPSULE ORAL 2 TIMES DAILY
Qty: 14 CAPSULE | Refills: 0 | Status: SHIPPED | OUTPATIENT
Start: 2023-01-10 | End: 2023-01-17

## 2023-01-10 RX ORDER — NITROFURANTOIN 25; 75 MG/1; MG/1
100 CAPSULE ORAL 2 TIMES DAILY
Qty: 10 CAPSULE | Refills: 0 | Status: SHIPPED | OUTPATIENT
Start: 2023-01-10 | End: 2023-02-01

## 2023-01-10 NOTE — TELEPHONE ENCOUNTER
Signed Prescriptions:                        Disp   Refills    nitroFURantoin macrocrystal-monohydrate (M*10 cap*0        Sig: Take 1 capsule (100 mg) by mouth 2 times daily  Authorizing Provider: JESSE MATAMOROS  Ordering User: ANDREA BAILEY

## 2023-01-13 ENCOUNTER — VIRTUAL VISIT (OUTPATIENT)
Dept: PSYCHOLOGY | Facility: CLINIC | Age: 73
End: 2023-01-13
Payer: MEDICARE

## 2023-01-13 DIAGNOSIS — F41.1 GENERALIZED ANXIETY DISORDER: ICD-10-CM

## 2023-01-13 DIAGNOSIS — F33.0 MAJOR DEPRESSIVE DISORDER, RECURRENT EPISODE, MILD (H): Primary | ICD-10-CM

## 2023-01-13 PROCEDURE — 90834 PSYTX W PT 45 MINUTES: CPT | Mod: 95 | Performed by: SOCIAL WORKER

## 2023-01-13 ASSESSMENT — PATIENT HEALTH QUESTIONNAIRE - PHQ9
SUM OF ALL RESPONSES TO PHQ QUESTIONS 1-9: 8
10. IF YOU CHECKED OFF ANY PROBLEMS, HOW DIFFICULT HAVE THESE PROBLEMS MADE IT FOR YOU TO DO YOUR WORK, TAKE CARE OF THINGS AT HOME, OR GET ALONG WITH OTHER PEOPLE: VERY DIFFICULT
SUM OF ALL RESPONSES TO PHQ QUESTIONS 1-9: 8
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
## 2023-01-23 ENCOUNTER — HOSPITAL ENCOUNTER (OUTPATIENT)
Dept: NUCLEAR MEDICINE | Facility: CLINIC | Age: 73
Setting detail: NUCLEAR MEDICINE
Discharge: HOME | End: 2023-01-23
Attending: UROLOGY
Payer: MEDICARE

## 2023-01-23 DIAGNOSIS — Z87.448 HISTORY OF PYELONEPHRITIS: ICD-10-CM

## 2023-01-23 DIAGNOSIS — Z98.890 HISTORY OF PYELOPLASTY: ICD-10-CM

## 2023-01-23 DIAGNOSIS — Z87.448 HISTORY OF PYELOPLASTY: ICD-10-CM

## 2023-01-23 PROCEDURE — G1010 CDSM STANSON: HCPCS

## 2023-01-23 PROCEDURE — 343N000001 HC RX 343: Performed by: UROLOGY

## 2023-01-23 PROCEDURE — 78708 K FLOW/FUNCT IMAGE W/DRUG: CPT | Mod: 26 | Performed by: RADIOLOGY

## 2023-01-23 PROCEDURE — A9562 TC99M MERTIATIDE: HCPCS | Performed by: UROLOGY

## 2023-01-23 PROCEDURE — G1010 CDSM STANSON: HCPCS | Mod: GC | Performed by: RADIOLOGY

## 2023-01-23 PROCEDURE — 250N000011 HC RX IP 250 OP 636: Performed by: UROLOGY

## 2023-01-23 RX ORDER — FUROSEMIDE 10 MG/ML
20 INJECTION INTRAMUSCULAR; INTRAVENOUS ONCE
Status: COMPLETED | OUTPATIENT
Start: 2023-01-23 | End: 2023-01-23

## 2023-01-23 RX ADMIN — FUROSEMIDE 20 MG: 10 INJECTION, SOLUTION INTRAVENOUS at 12:34

## 2023-01-23 RX ADMIN — TECHNESCAN TC 99M MERTIATIDE 10.9 MCI.: 1 INJECTION, POWDER, LYOPHILIZED, FOR SOLUTION INTRAVENOUS at 12:13

## 2023-01-24 ENCOUNTER — VIRTUAL VISIT (OUTPATIENT)
Dept: PSYCHOLOGY | Facility: CLINIC | Age: 73
End: 2023-01-24
Payer: MEDICARE

## 2023-01-24 DIAGNOSIS — F41.1 GENERALIZED ANXIETY DISORDER: ICD-10-CM

## 2023-01-24 DIAGNOSIS — F33.0 MAJOR DEPRESSIVE DISORDER, RECURRENT EPISODE, MILD (H): Primary | ICD-10-CM

## 2023-01-24 PROCEDURE — 90834 PSYTX W PT 45 MINUTES: CPT | Mod: 95 | Performed by: SOCIAL WORKER

## 2023-01-24 ASSESSMENT — ANXIETY QUESTIONNAIRES
7. FEELING AFRAID AS IF SOMETHING AWFUL MIGHT HAPPEN: NOT AT ALL
6. BECOMING EASILY ANNOYED OR IRRITABLE: NOT AT ALL
2. NOT BEING ABLE TO STOP OR CONTROL WORRYING: NOT AT ALL
GAD7 TOTAL SCORE: 2
GAD7 TOTAL SCORE: 2
3. WORRYING TOO MUCH ABOUT DIFFERENT THINGS: NOT AT ALL
1. FEELING NERVOUS, ANXIOUS, OR ON EDGE: MORE THAN HALF THE DAYS
5. BEING SO RESTLESS THAT IT IS HARD TO SIT STILL: NOT AT ALL
IF YOU CHECKED OFF ANY PROBLEMS ON THIS QUESTIONNAIRE, HOW DIFFICULT HAVE THESE PROBLEMS MADE IT FOR YOU TO DO YOUR WORK, TAKE CARE OF THINGS AT HOME, OR GET ALONG WITH OTHER PEOPLE: SOMEWHAT DIFFICULT

## 2023-01-24 ASSESSMENT — PATIENT HEALTH QUESTIONNAIRE - PHQ9
SUM OF ALL RESPONSES TO PHQ QUESTIONS 1-9: 9
5. POOR APPETITE OR OVEREATING: NOT AT ALL
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
## 2023-01-27 ENCOUNTER — VIRTUAL VISIT (OUTPATIENT)
Dept: SLEEP MEDICINE | Facility: CLINIC | Age: 73
End: 2023-01-27
Payer: MEDICARE

## 2023-01-27 ENCOUNTER — MYC MEDICAL ADVICE (OUTPATIENT)
Dept: SLEEP MEDICINE | Facility: CLINIC | Age: 73
End: 2023-01-27

## 2023-01-27 VITALS — BODY MASS INDEX: 30.9 KG/M2 | HEIGHT: 64 IN | WEIGHT: 181 LBS

## 2023-01-27 DIAGNOSIS — F51.04 CHRONIC INSOMNIA: Primary | ICD-10-CM

## 2023-01-27 DIAGNOSIS — R06.3 CHEYNE-STOKES BREATHING DISORDER: Primary | Chronic | ICD-10-CM

## 2023-01-27 DIAGNOSIS — G47.61 PLMD (PERIODIC LIMB MOVEMENT DISORDER): ICD-10-CM

## 2023-01-27 PROCEDURE — 90832 PSYTX W PT 30 MINUTES: CPT | Mod: 95 | Performed by: PSYCHOLOGIST

## 2023-01-27 ASSESSMENT — SLEEP AND FATIGUE QUESTIONNAIRES
HOW LIKELY ARE YOU TO NOD OFF OR FALL ASLEEP WHILE LYING DOWN TO REST IN THE AFTERNOON WHEN CIRCUMSTANCES PERMIT: MODERATE CHANCE OF DOZING
HOW LIKELY ARE YOU TO NOD OFF OR FALL ASLEEP WHILE WATCHING TV: HIGH CHANCE OF DOZING
HOW LIKELY ARE YOU TO NOD OFF OR FALL ASLEEP WHEN YOU ARE A PASSENGER IN A CAR FOR AN HOUR WITHOUT A BREAK: HIGH CHANCE OF DOZING
HOW LIKELY ARE YOU TO NOD OFF OR FALL ASLEEP WHILE SITTING AND READING: MODERATE CHANCE OF DOZING
HOW LIKELY ARE YOU TO NOD OFF OR FALL ASLEEP WHILE SITTING QUIETLY AFTER LUNCH WITHOUT ALCOHOL: SLIGHT CHANCE OF DOZING
HOW LIKELY ARE YOU TO NOD OFF OR FALL ASLEEP WHILE SITTING AND TALKING TO SOMEONE: WOULD NEVER DOZE
HOW LIKELY ARE YOU TO NOD OFF OR FALL ASLEEP IN A CAR, WHILE STOPPED FOR A FEW MINUTES IN TRAFFIC: WOULD NEVER DOZE
HOW LIKELY ARE YOU TO NOD OFF OR FALL ASLEEP WHILE SITTING INACTIVE IN A PUBLIC PLACE: MODERATE CHANCE OF DOZING

## 2023-01-27 ASSESSMENT — PAIN SCALES - GENERAL: PAINLEVEL: NO PAIN (0)

## 2023-01-27 NOTE — PROGRESS NOTES
Heidy is a 72 year old who is being evaluated via a billable video visit.      How would you like to obtain your AVS? MyChart  If the video visit is dropped, the invitation should be resent by: Text to cell phone: [phone]  Will anyone else be joining your video visit? Leeann  Thaddeus Guajardo      Video-Visit Details    Type of service:  Video Visit   Video Start Time: 3:33 PM  Video End Time:3:59 PM    Originating Location (pt. Location): Home  Distant Location (provider location):  Off-site  Platform used for Video Visit: [facility]     SLEEP MEDICINE VIRTUAL VIDEO FOLLOW-UP VISIT  Sleep Psychology    Patient Name: Gem Gama  MRN:  [id number]  Date of Service: Jan 27, 2023       Subjective Report     Gem Gama  returns for a telehealth video visit to discuss progress in implementing behavioral strategies for the management of insomnia.  Patient consent for initiation of video visit was obtained and documented prior to initiation of visit.     Gem reports she is using CPAP nightly and is finding she has more energy during the day.  .She has been [id number] track of her sleep using a wearable device and became concerned that despite using her CPAP her device-reported oxygen levels have routinely been in the low 80's%.     Her sleep habits have been somewhat compromised.  She cites stress as a factor in not getting to bed on time and not sleeping in her bedroom.  She has been sleeping on her sofa.      Discussed working on establishing a consisttent sleep schedule.  Patient ins concidering resuming bright light therapy in the morning.  Discussed how this application may help with improving sleep.     .     Sleep Data:     Source of Sleep Estimates:  Verbal Self-report      EPWORTH SLEEPINESS SCALE    Sitting and reading 2   Watching TV 3   Sitting, inactive in a public place (theatre or mtg.) 2    As a passenger in a car 3   Lying down to rest in the afternoon when circumstance permit 2   Sitting and talking  "to someone 0   Sitting quietly after lunch without alcohol 1   In a car, while stopped for a few minutes in traffic 0   TOTAL SCORE (nl <11) 13     INSOMNIA SEVERITY INDEX     Difficulty falling asleep 1   Difficult staying asleep 0   Problems waking up to early 0   How SATISFIED/DISSATISFIED are you with your CURRENT sleep pattern? 2   How NOTICEABLE to others do you think your sleep pattern is in terms of your quality of life? 0   How WORRIED/DISTRESSED are you about your current sleep pattern? 2   To what extent do you consider your sleep problem to INTERFERE with your daily fuctioning(e.g. daytime fatigue, mood, ability to function at work/daily chores, concentration, mood,etc.) CURRENTLY? 2   INSOMNIA SEVERITY INDEX TOTAL SCORE 7    Absence of insomnia (0-7); sub-threshold insomnia (8-14); moderate insomnia (15-21); and severe insomnia (22-28)       Interventions     Strategies and recommendations including Stimulus control therapy, Depression and sleep and PAP adherence were reviewed and discussed today.     Services provided are compliant with the requirements of [place] Statute SS 256B.0625 Subd. 3b and paragraph (b)       Vital Signs     Ht 1.626 m (5' 4\")   Wt 82.1 kg (181 lb)   LMP  (LMP Unknown)   BMI 31.07 kg/m       Mental Status     Orientation:  X3  Mood:  normal  Affect:  Congruent with mood  Speech/Language:  Normal  Thought Process: Intact  Associations:  Normal  Thought Content: Normal  Patient does not report any suicidal ideation, intention or plan.    Diagnostic Impressions and Plan       Chronic insomnia  PLMD (periodic limb movement disorder)  LEA    There has been substantial improvement in sleep quality and insomnia symptoms are now within the normal range.  She continues to report mild degree of peritoneal movements of sleep.  She continues to work on treatment of sleep apnea and will follow-up with medical equipment and/or sleep therapy management for any issues related to mask fit " and pressure.    Plan:  Continue current sleep schedule and plan    Follow-up: as needed      Chandu Gurrola PsyD, LP, [facility]  Diplomate, Behavioral Sleep Medicine  [facility]      Note: This dictation was created using voice recognition software. This document may contain an error not identified before finalizing the document. If the error changes the accuracy of the document, I would appreciate it being brought to my attention.

## 2023-01-31 NOTE — TELEPHONE ENCOUNTER
Resmed  ASV-PAP EPAP 7-13, PS 0-13 cm download:  Average use 8 hours 2 minutes/day.  16 total days of use.  14 nonuse days. 16 days with >4 hours use.  Median Leak 0 L/min. 95%ile Leak 0 L/min. EPAP 90% pressure 12 cm, Mean 9. IPAP 90% 16, Mean 11.  AHI 3.0    Download looks good  Check WatchPAT on PAP orders placed

## 2023-02-01 ENCOUNTER — TELEPHONE (OUTPATIENT)
Dept: SLEEP MEDICINE | Facility: CLINIC | Age: 73
End: 2023-02-01
Payer: MEDICARE

## 2023-02-01 ENCOUNTER — OFFICE VISIT (OUTPATIENT)
Dept: UROLOGY | Facility: CLINIC | Age: 73
End: 2023-02-01
Attending: UROLOGY
Payer: MEDICARE

## 2023-02-01 VITALS
HEART RATE: 66 BPM | SYSTOLIC BLOOD PRESSURE: 134 MMHG | WEIGHT: 183 LBS | BODY MASS INDEX: 31.41 KG/M2 | DIASTOLIC BLOOD PRESSURE: 61 MMHG

## 2023-02-01 DIAGNOSIS — Z98.890 HISTORY OF PYELOPLASTY: ICD-10-CM

## 2023-02-01 DIAGNOSIS — Z87.448 HISTORY OF PYELONEPHRITIS: Primary | ICD-10-CM

## 2023-02-01 DIAGNOSIS — N39.0 RECURRENT UTI: ICD-10-CM

## 2023-02-01 DIAGNOSIS — Z87.448 HISTORY OF PYELOPLASTY: ICD-10-CM

## 2023-02-01 PROCEDURE — G0463 HOSPITAL OUTPT CLINIC VISIT: HCPCS | Performed by: UROLOGY

## 2023-02-01 PROCEDURE — 99214 OFFICE O/P EST MOD 30 MIN: CPT | Performed by: UROLOGY

## 2023-02-01 RX ORDER — ESTRADIOL 0.1 MG/G
CREAM VAGINAL
Qty: 42.5 G | Refills: 11 | Status: SHIPPED | OUTPATIENT
Start: 2023-02-01 | End: 2023-04-12

## 2023-02-01 RX ORDER — PRAMIPEXOLE DIHYDROCHLORIDE 0.12 MG/1
TABLET ORAL
COMMUNITY
Start: 2023-01-10 | End: 2023-02-06

## 2023-02-01 ASSESSMENT — ANXIETY QUESTIONNAIRES
6. BECOMING EASILY ANNOYED OR IRRITABLE: NOT AT ALL
1. FEELING NERVOUS, ANXIOUS, OR ON EDGE: NOT AT ALL
5. BEING SO RESTLESS THAT IT IS HARD TO SIT STILL: NOT AT ALL
GAD7 TOTAL SCORE: 0
2. NOT BEING ABLE TO STOP OR CONTROL WORRYING: NOT AT ALL
3. WORRYING TOO MUCH ABOUT DIFFERENT THINGS: NOT AT ALL
7. FEELING AFRAID AS IF SOMETHING AWFUL MIGHT HAPPEN: NOT AT ALL
GAD7 TOTAL SCORE: 0

## 2023-02-01 ASSESSMENT — PATIENT HEALTH QUESTIONNAIRE - PHQ9
5. POOR APPETITE OR OVEREATING: NOT AT ALL
SUM OF ALL RESPONSES TO PHQ QUESTIONS 1-9: 6

## 2023-02-01 NOTE — TELEPHONE ENCOUNTER
Reason for Call:  Other appointment, call back and prescription    Detailed comments: Patient needs a new order for WatchPAt [facility], order expires in July 2023 there is no availability before then.     Phone Number Patient can be reached at: Home number on file [phone] (home) or Cell number on file:    Telephone Information:   Mobile [phone]       Best Time: n/a    Can we leave a detailed message on this number? YES    Call taken on 2/1/2023 at 10:20 AM by Alyssa Wilson

## 2023-02-01 NOTE — LETTER
2/1/2023       RE: Gem Gama  [address]  [facility] [address]     Dear Colleague,    Thank you for referring your patient, Gem Gama, to the [facility] [place] [facility]. Please see a copy of my visit note below.    February 1, 2023    Gem was seen today for recheck.    Diagnoses and all orders for this visit:    History of pyelonephritis    History of pyeloplasty    Recurrent UTI  -     estradiol (ESTRACE) 0.1 MG/GM vaginal cream; Please place a large blueberry size amount in the vagina nightly for two weeks and then three times a week at night thereafter       Discussed unclear if significant obstruction or appearance is just post surgical.  At this time discuss use of the vaginal estrogen cream and perineal hygiene.    RTC in 3 months, sooner if needed    IF continues to have pyelonephritis will consider referral to one of my colleagues to discuss evaluation for need for further reconstruction    10 minutes were spent today on the day of the encounter in reviewing the EMR including recent renogram and urine cultures, direct patient care including prescription medications, coordination of care and documentation    Mel Rodriguez MD MPH  (she/her/hers)   of Urology  [facility]      Subjective    Here today for follow up. Last seen 12/8/22.  Has had a couple of urine cultures since then.  She denies any changes in health since last visit    /61   Pulse 66   Wt 83 kg (183 lb)   LMP  (LMP Unknown)   BMI 31.41 kg/m    GENERAL: healthy, alert and no distress  EYES: Eyes grossly normal to inspection, conjunctivae and sclerae normal  HENT: normal cephalic/atraumatic.  External ears, nose and mouth without ulcers or lesions.  RESP: no audible wheeze, cough, or visible cyanosis.  No visible retractions or increased work of breathing.  Able to speak fully in complete  sentences.  NEURO: Cranial nerves grossly intact, mentation intact and speech normal  PSYCH: mentation appears normal, affect normal/bright, judgement and insight intact, normal speech and appearance well-groomed    Renogram 1/23/23  Impression:  1. Bilateral hydronephrosis without response to lasix.   2. Right side collecting system retains contrast post void suggesting  greater obstruction with CT 11/2/2022 when the patient had a  pyelonephritis.    3. Both sides are consistent with partial obstruction although it  should be noted excretion curves can be abnormal post pyeloplasty.    Split function L/R 46%/54%    Urine culture 1/8/23 E coli    CC  Patient Care Team:  Danny Conteh MD as PCP - General (Internal Medicine)  Danny Conteh MD as Assigned PCP  Aubree Butts PA as Assigned Surgical Provider  Shade Chu MD as Assigned OBGYN Provider  Luis A Moody MD as Assigned Pulmonology Provider  Mel Dennison MD as Assigned Heart and Vascular Provider  Sabino Lewis EP as Cardiac Rehabilitation Therapist  Evangelist Lee MD as Hospitalist (Internal Medicine)  Mel Rodriguez MD as MD (Urology)  Kashif Hadley MD as Assigned Sleep Provider  SELF, REFERRED

## 2023-02-01 NOTE — PROGRESS NOTES
February 1, 2023    Gem was seen today for recheck.    Diagnoses and all orders for this visit:    History of pyelonephritis    History of pyeloplasty    Recurrent UTI  -     estradiol (ESTRACE) 0.1 MG/GM vaginal cream; Please place a large blueberry size amount in the vagina nightly for two weeks and then three times a week at night thereafter       Discussed unclear if significant obstruction or appearance is just post surgical.  At this time discuss use of the vaginal estrogen cream and perineal hygiene.    RTC in 3 months, sooner if needed    IF continues to have pyelonephritis will consider referral to one of my colleagues to discuss evaluation for need for further reconstruction    10 minutes were spent today on the day of the encounter in reviewing the EMR including recent renogram and urine cultures, direct patient care including prescription medications, coordination of care and documentation    Mel Rodriguez MD MPH  (she/her/hers)   of Urology  [facility]      Subjective    Here today for follow up. Last seen 12/8/22.  Has had a couple of urine cultures since then.  She denies any changes in health since last visit    /61   Pulse 66   Wt 83 kg (183 lb)   LMP  (LMP Unknown)   BMI 31.41 kg/m    GENERAL: healthy, alert and no distress  EYES: Eyes grossly normal to inspection, conjunctivae and sclerae normal  HENT: normal cephalic/atraumatic.  External ears, nose and mouth without ulcers or lesions.  RESP: no audible wheeze, cough, or visible cyanosis.  No visible retractions or increased work of breathing.  Able to speak fully in complete sentences.  NEURO: Cranial nerves grossly intact, mentation intact and speech normal  PSYCH: mentation appears normal, affect normal/bright, judgement and insight intact, normal speech and appearance well-groomed    Renogram 1/23/23  Impression:  1. Bilateral hydronephrosis without response to lasix.   2. Right side collecting  system retains contrast post void suggesting  greater obstruction with CT 11/2/2022 when the patient had a  pyelonephritis.    3. Both sides are consistent with partial obstruction although it  should be noted excretion curves can be abnormal post pyeloplasty.    Split function L/R 46%/54%    Urine culture 1/8/23 E coli    CC  Patient Care Team:  Danny Conteh MD as PCP - General (Internal Medicine)  Danny Conteh MD as Assigned PCP  Aubree Butts PA as Assigned Surgical Provider  Shade Chu MD as Assigned OBGYN Provider  Luis A Moody MD as Assigned Pulmonology Provider  Mel Dennison MD as Assigned Heart and Vascular Provider  Sabino Lewis EP as Cardiac Rehabilitation Therapist  Evangelist Lee MD as Hospitalist (Internal Medicine)  Mel Rodriguez MD as MD (Urology)  Kashif Hadley MD as Assigned Sleep Provider  SELF, REFERRED

## 2023-02-01 NOTE — PATIENT INSTRUCTIONS
Thank you for trusting us with your care!     If you need to contact us for questions about:  Symptoms, Scheduling & Medical Questions; Non-urgent (2-3 day response) [place] message, Urgent (needing response today) [phone] (if after 3:30pm next day response)   Prescriptions: Please call your Pharmacy   Billing: [facility] [phone] or [facility]:[phone]     Use a blueberry size amount to the vagina 3x a week at night    Avoid using baby wipes and wet wipes    Supplements to prevent UTI to consider  -Probiotics  -Cranberry (for these products let them know a doctor is recommending them)   Ellura: www.myellura.Invaluable   Theracran HP by [employer] [address]  -d-mannose 2gm daily  -Vitamin C 500-1000mg twice a day    It was a pleasure meeting with you today.  Thank you for allowing me and my team the privilege of caring for you today.  YOU are the reason we are here, and I truly hope we provided you with the excellent service you deserve.  Please let us know if there is anything else we can do for you so that we can be sure you are leaving completely satisfied with your care experience.

## 2023-02-02 ENCOUNTER — TELEPHONE (OUTPATIENT)
Dept: SLEEP MEDICINE | Facility: CLINIC | Age: 73
End: 2023-02-02
Payer: MEDICARE

## 2023-02-02 NOTE — TELEPHONE ENCOUNTER
Watchpat is to be scheduled in February or March, before patients follow up appointment in April.  Watchpat order doesn't  until .

## 2023-02-06 ENCOUNTER — OFFICE VISIT (OUTPATIENT)
Dept: PULMONOLOGY | Facility: CLINIC | Age: 73
End: 2023-02-06
Payer: MEDICARE

## 2023-02-06 ENCOUNTER — VIRTUAL VISIT (OUTPATIENT)
Dept: PSYCHOLOGY | Facility: CLINIC | Age: 73
End: 2023-02-06
Payer: MEDICARE

## 2023-02-06 ENCOUNTER — TELEPHONE (OUTPATIENT)
Dept: PULMONOLOGY | Facility: CLINIC | Age: 73
End: 2023-02-06

## 2023-02-06 VITALS
HEART RATE: 76 BPM | SYSTOLIC BLOOD PRESSURE: 124 MMHG | RESPIRATION RATE: 16 BRPM | HEIGHT: 64 IN | WEIGHT: 183 LBS | DIASTOLIC BLOOD PRESSURE: 69 MMHG | BODY MASS INDEX: 31.24 KG/M2 | OXYGEN SATURATION: 97 %

## 2023-02-06 DIAGNOSIS — F41.1 GENERALIZED ANXIETY DISORDER: ICD-10-CM

## 2023-02-06 DIAGNOSIS — J45.30 MILD PERSISTENT ASTHMA WITHOUT COMPLICATION: ICD-10-CM

## 2023-02-06 DIAGNOSIS — J45.40 MODERATE PERSISTENT ASTHMA, UNSPECIFIED WHETHER COMPLICATED: Primary | ICD-10-CM

## 2023-02-06 DIAGNOSIS — F33.0 MAJOR DEPRESSIVE DISORDER, RECURRENT EPISODE, MILD (H): Primary | ICD-10-CM

## 2023-02-06 PROCEDURE — 90834 PSYTX W PT 45 MINUTES: CPT | Mod: 95 | Performed by: SOCIAL WORKER

## 2023-02-06 PROCEDURE — 99215 OFFICE O/P EST HI 40 MIN: CPT | Performed by: INTERNAL MEDICINE

## 2023-02-06 RX ORDER — ALBUTEROL SULFATE 90 UG/1
1-2 AEROSOL, METERED RESPIRATORY (INHALATION) EVERY 4 HOURS PRN
Qty: 18 G | Refills: 11 | Status: SHIPPED | OUTPATIENT
Start: 2023-02-06 | End: 2024-02-26

## 2023-02-06 RX ORDER — FLUTICASONE FUROATE AND VILANTEROL 200; 25 UG/1; UG/1
1 POWDER RESPIRATORY (INHALATION) DAILY
Qty: 3 EACH | Refills: 3 | Status: SHIPPED | OUTPATIENT
Start: 2023-02-06 | End: 2024-02-26

## 2023-02-06 ASSESSMENT — ASTHMA QUESTIONNAIRES
QUESTION_2 LAST FOUR WEEKS HOW OFTEN HAVE YOU HAD SHORTNESS OF BREATH: THREE TO SIX TIMES A WEEK
QUESTION_1 LAST FOUR WEEKS HOW MUCH OF THE TIME DID YOUR ASTHMA KEEP YOU FROM GETTING AS MUCH DONE AT WORK, SCHOOL OR AT HOME: SOME OF THE TIME
ACT_TOTALSCORE: 18
ACT_TOTALSCORE: 18
ACUTE_EXACERBATION_TODAY: NO
QUESTION_4 LAST FOUR WEEKS HOW OFTEN HAVE YOU USED YOUR RESCUE INHALER OR NEBULIZER MEDICATION (SUCH AS ALBUTEROL): ONCE A WEEK OR LESS
QUESTION_3 LAST FOUR WEEKS HOW OFTEN DID YOUR ASTHMA SYMPTOMS (WHEEZING, COUGHING, SHORTNESS OF BREATH, CHEST TIGHTNESS OR PAIN) WAKE YOU UP AT NIGHT OR EARLIER THAN USUAL IN THE MORNING: ONCE OR TWICE
QUESTION_5 LAST FOUR WEEKS HOW WOULD YOU RATE YOUR ASTHMA CONTROL: WELL CONTROLLED

## 2023-02-06 ASSESSMENT — ANXIETY QUESTIONNAIRES
6. BECOMING EASILY ANNOYED OR IRRITABLE: SEVERAL DAYS
2. NOT BEING ABLE TO STOP OR CONTROL WORRYING: SEVERAL DAYS
GAD7 TOTAL SCORE: 3
1. FEELING NERVOUS, ANXIOUS, OR ON EDGE: SEVERAL DAYS
GAD7 TOTAL SCORE: 3
7. FEELING AFRAID AS IF SOMETHING AWFUL MIGHT HAPPEN: NOT AT ALL
IF YOU CHECKED OFF ANY PROBLEMS ON THIS QUESTIONNAIRE, HOW DIFFICULT HAVE THESE PROBLEMS MADE IT FOR YOU TO DO YOUR WORK, TAKE CARE OF THINGS AT HOME, OR GET ALONG WITH OTHER PEOPLE: SOMEWHAT DIFFICULT
3. WORRYING TOO MUCH ABOUT DIFFERENT THINGS: NOT AT ALL
5. BEING SO RESTLESS THAT IT IS HARD TO SIT STILL: NOT AT ALL

## 2023-02-06 ASSESSMENT — PAIN SCALES - GENERAL: PAINLEVEL: NO PAIN (0)

## 2023-02-06 ASSESSMENT — PATIENT HEALTH QUESTIONNAIRE - PHQ9
5. POOR APPETITE OR OVEREATING: NOT AT ALL
SUM OF ALL RESPONSES TO PHQ QUESTIONS 1-9: 8
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
## 2023-02-06 NOTE — PROGRESS NOTES
Pulmonary Clinic Return Patient Visit  Reason for Visit: SOB/Asthma  History of Present Illness  Gem Gama is a 70 year old female with a history of CAD status post stents, ischemic cardiomyopathy with now improved EF to 55%, CKD stage III, and LEA previously on CPAP who presents to clinic for management of same.  I last saw her in clinic on 08/22  To briefly review, the patient was hospitalized 10/9/2019-10/13/2019 due to STEMI and underwent EDWIN x 2 to proximal LAD.  She did have some ischemic cardiomyopathy that showed interval improvement on subsequent echocardiogram.   She did have some SOB which was very suggestive of asthma and her PFTs did show mild obstruction. She was started on high dose Breo with good results and has not had any flares or pneumonia since the last clinic visit.   Today, she is doing a lot better.  Less shortness of breath and she denies any chest tightness. She is not reliant on her rescue inhalers. She denies any overt chest pain.  No hospitalizations for pneumonia nor asthma flares. 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  Never smoked. 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 Does not keep any pets and has a heated furnace with filters changed regularly. No family hx of chronic lung diseases or lung cancer.    Review of Systems:  10 of 14 systems reviewed and are negative unless otherwise stated in HPI.    Past Medical History:   Diagnosis Date     ADHD (attention deficit hyperactivity disorder)      Anxiety      Arthritis     back, hands, knees, hips     Asthma      C. difficile diarrhea      Central sleep apnea      Cheyne-Rogers breathing 09/07/2022     Coronary artery disease involving native coronary artery of native heart without angina pectoris      Depression      Fever and chills 09/24/2021     Gastro-oesophageal reflux disease      Hypertension      Ischemic cardiomyopathy      Major  depressive disorder, recurrent episode, moderate (H) 07/25/2013     Narcolepsy      Pituitary microadenoma (H) 2011    MRI 2011- There is a triangular-shaped area with delayed contrast enhancement at the left lateral portion of the pituitary gland posteriorly, measuring 2.5 x 4.3 mm. This is suggestive of a microadenoma, MRI 10/1/22 - Normal pituitary gland and whole brain MRI.     Pyelonephritis of right kidney 10/24/2021     Renal disease      S/P hip replacement 2004    Bilateral fall 2004 due to OA     Sleep apnea      Status post total knee replacement 12/29/2014     Urinary tract infection with hematuria, site unspecified 09/24/2021       Past Surgical History:   Procedure Laterality Date     ARTHROPLASTY KNEE  07/09/2014    Procedure: ARTHROPLASTY KNEE;  Surgeon: Levi Johnson MD;  Location:  OR     ARTHROPLASTY KNEE Left 11/24/2014    Procedure: ARTHROPLASTY KNEE;  Surgeon: Levi Johnson MD;  Location:  OR     CV CORONARY ANGIOGRAM N/A 10/09/2019    Procedure: Coronary Angiogram;  Surgeon: Chiquita Chatterjee MD;  Location:  HEART CARDIAC CATH LAB     CV HEART CATHETERIZATION WITH POSSIBLE INTERVENTION N/A 10/10/2019    Procedure: Heart Catheterization with Possible Intervention;  Surgeon: Chin Garcia MD;  Location:  HEART CARDIAC CATH LAB     CV INTRA AORTIC BALLOON N/A 10/09/2019    Procedure: Intra-Aortic Balloon Pump Insertion;  Surgeon: Chiquita Chatterjee MD;  Location:  HEART CARDIAC CATH LAB     CV INTRA AORTIC BALLOON REMOVAL N/A 10/10/2019    Procedure: Intra-Aortic Balloon Pump Removal;  Surgeon: Chin Garcia MD;  Location:  HEART CARDIAC CATH LAB     CV LEFT HEART CATH N/A 12/11/2020    Procedure: Heart Catheterization with Possible Intervention;  Surgeon: Pilo Otoole MD;  Location:  HEART CARDIAC CATH LAB     CV PCI STENT DRUG ELUTING N/A 10/09/2019    Procedure: PCI Stent Drug Eluting;  Surgeon: Chiquita Chatterjee MD;  Location: [facility]  CARDIAC CATH LAB     GENITOURINARY SURGERY  01/01/2008    Prolift     GENITOURINARY SURGERY  1973    In 1973, she had surgery to correct congenital narrowing in the ureter at its connection to the right kidney     GENITOURINARY SURGERY  1997 1997 pt went to [facility] and had surgery to remove the scar tissue, rebuild the bladder from previous ureter surgery.     ORTHOPEDIC SURGERY      bilat total hip     RECTOCELE REPAIR       ZZC TOTAL ABD HYSTERECTOMY+BLAD REPR  01/01/1992    Vaginal approach with oophrectomy     ZZC TOTAL KNEE ARTHROPLASTY         Family History   Problem Relation Age of Onset     Hypertension Mother      Arthritis Mother      Cerebrovascular Disease Mother      Cerebrovascular Disease Father         Three Strokes     Diabetes Father         and brother     Kidney Disease Father      Diabetes Brother      Birth defects Brother      Thyroid Disease Sister         Hypothyroid     Sjogren's Daughter      Diabetes Daughter      Cerebrovascular Disease Daughter      Diabetes Son        Social History     Socioeconomic History     Marital status:      Spouse name: Not on file     Number of children: Not on file     Years of education: Not on file     Highest education level: Not on file   Occupational History     Not on file   Tobacco Use     Smoking status: Never Smoker     Smokeless tobacco: Never Used   Substance and Sexual Activity     Alcohol use: No     Drug use: No     Sexual activity: Not Currently     Partners: Male     Birth control/protection: None   Other Topics Concern     Parent/sibling w/ CABG, MI or angioplasty before 65F 55M? No      Service No     Blood Transfusions No     Caffeine Concern No     Occupational Exposure No     Hobby Hazards No     Sleep Concern Yes     Comment: Uses CPAP     Stress Concern No     Weight Concern Yes     Special Diet No     Back Care No     Exercise No     Bike Helmet No     Comment:       Seat Belt Yes     Self-Exams Yes   Social  History Narrative     Not on file     Social Determinants of Health     Financial Resource Strain: Not on file   Food Insecurity: Not on file   Transportation Needs: Not on file   Physical Activity: Not on file   Stress: Not on file   Social Connections: Not on file   Intimate Partner Violence: Not on file   Housing Stability: Not on file         Allergies   Allergen Reactions     Dust Mites Cough, Headache, Other (See Comments) and Shortness Of Breath     Latex Other (See Comments) and Shortness Of Breath     Runny nose  PN: LW Reaction: DIFFICULTY BREATHING       Flagyl [Metronidazole Hcl] Anaphylaxis and Rash     Food      PN: LW FI1: nka LW FI2:     Hydrochlorothiazide W/Triamterene      PN: LW Reaction: skin rash     No Clinical Screening - See Comments      PN: LW Other1: -Adhesive Tape     Seasonal Allergies      sneezing     Sulfa Drugs Anaphylaxis, Hives and Itching     PN: LW Reaction: HIVES, Swelling     Tape [Adhesive Tape] Hives     Metoprolol Itching and Rash     Metronidazole Hives and Rash     PN: LW Reaction: HIVES           Current Outpatient Medications:      albuterol (PROAIR HFA/PROVENTIL HFA/VENTOLIN HFA) 108 (90 Base) MCG/ACT inhaler, Inhale 1-2 puffs into the lungs every 4 hours as needed for shortness of breath / dyspnea or wheezing, Disp: 18 g, Rfl: 11     amoxicillin (AMOXIL) 500 MG capsule, Take 2,000 mg by mouth once as needed Take as directed before dental work, Disp: , Rfl:      aspirin (ASA) 81 MG EC tablet, Take 1 tablet (81 mg) by mouth daily, Disp: 90 tablet, Rfl: 0     atorvastatin (LIPITOR) 40 MG tablet, Take 1 tablet (40 mg) by mouth daily, Disp: 90 tablet, Rfl: 3     BREO ELLIPTA 200-25 MCG/INH Inhaler, INHALE 1 PUFF INTO THE LUNGS DAILY, Disp: , Rfl:      carvedilol (COREG) 3.125 MG tablet, TAKE 1 TABLET(3.125 MG) BY MOUTH TWICE DAILY WITH MEALS, Disp: 180 tablet, Rfl: 3     cholestyramine (QUESTRAN) 4 g packet, Take 1 packet (4 g) by mouth 2 times daily (with meals), Disp: 60  packet, Rfl: 5     clopidogrel (PLAVIX) 75 MG tablet, Take 1 tablet (75 mg) by mouth daily, Disp: 90 tablet, Rfl: 3     estradiol (ESTRACE) 0.1 MG/GM vaginal cream, Please place a large blueberry size amount in the vagina nightly for two weeks and then three times a week at night thereafter, Disp: 42.5 g, Rfl: 11     isosorbide mononitrate (IMDUR) 30 MG 24 hr tablet, TAKE 1 TABLET(30 MG) BY MOUTH DAILY, Disp: 90 tablet, Rfl: 3     loperamide (IMODIUM) 2 MG capsule, Take 1 capsule (2 mg) by mouth daily, Disp: 30 capsule, Rfl: 0     loratadine (CLARITIN) 10 MG tablet, Take 10 mg by mouth At Bedtime, Disp: , Rfl:      magnesium oxide (MAG-OX) 400 MG tablet, Take 400 mg by mouth daily, Disp: , Rfl:      Melatonin 10 MG TABS tablet, Take 10 mg by mouth nightly as needed for sleep, Disp: , Rfl:      mirabegron (MYRBETRIQ) 50 MG 24 hr tablet, Take 1 tablet (50 mg) by mouth daily, Disp: 90 tablet, Rfl: 1     nitroGLYcerin (NITROSTAT) 0.4 MG sublingual tablet, Place 1 tablet (0.4 mg) under the tongue every 5 minutes as needed for chest pain, Disp: 25 tablet, Rfl: 11     pantoprazole (PROTONIX) 40 MG EC tablet, Take 1 tablet (40 mg) by mouth daily, Disp: 90 tablet, Rfl: 3     Prenatal Multivit-Min-Fe-FA (PRENATAL/IRON) TABS, Take 1 tablet by mouth daily , Disp: , Rfl:      saccharomyces boulardii (FLORASTOR) 250 MG capsule, Take 1 capsule (250 mg) by mouth 2 times daily, Disp: 14 capsule, Rfl: 0     sertraline (ZOLOFT) 100 MG tablet, Take 1 tablet (100 mg) by mouth daily, Disp: 90 tablet, Rfl: 3     ursodiol (ACTIGALL) 300 MG capsule, TAKE 1 CAPSULE(300 MG) BY MOUTH TWICE DAILY, Disp: 180 capsule, Rfl: 3     valsartan (DIOVAN) 40 MG tablet, Take 1 tablet (40 mg) by mouth daily, Disp: 90 tablet, Rfl: 3     vitamin D3 (CHOLECALCIFEROL) 2000 units (50 mcg) tablet, Take 1 tablet (2,000 Units) by mouth daily, Disp: 90 tablet, Rfl: 3     pramipexole (MIRAPEX) 0.125 MG tablet, , Disp: , Rfl:       Physical Exam:  /69   Pulse  "76   Resp 16   Ht 1.626 m (5' 4.02\")   Wt 83 kg (183 lb)   LMP  (LMP Unknown)   SpO2 97%   BMI 31.40 kg/m    GENERAL: Well developed, well nourished, alert, and in no apparent distress.  HEENT: Normocephalic, atraumatic. PERRL, EOMI. Oral mucosa is moist. No perioral cyanosis.  NECK: supple, no masses, no thyromegaly.  RESP:  Normal respiratory effort.  CTAB.  No rales, wheezes, rhonchi.  No cyanosis or clubbing.  CV: Normal S1, S2, regular rhythm, normal rate. No murmur.  No LE edema.   ABDOMEN:  Soft, non-tender, non-distended.   SKIN: warm and dry. No rash.  NEURO: AAOx3.  Normal gait.  Fluent speech.  PSYCH: mentation appears normal.   Results:  PFTs: Discussed and reviewed with patient - normal spirometry with positive bronchodilator response  Most Recent [facility] Pulmonary Function Testing    FVC-Pred   Date Value Ref Range Status   12/06/2021 2.74 L      FVC-Pre   Date Value Ref Range Status   12/06/2021 2.48 L      FVC-%Pred-Pre   Date Value Ref Range Status   12/06/2021 90 %      FEV1-Pre   Date Value Ref Range Status   12/06/2021 1.83 L      FEV1-%Pred-Pre   Date Value Ref Range Status   12/06/2021 86 %      FEV1FVC-Pred   Date Value Ref Range Status   12/06/2021 78 %      FEV1FVC-Pre   Date Value Ref Range Status   12/06/2021 74 %      No results found for: 20029  FEFMax-Pred   Date Value Ref Range Status   12/06/2021 5.42 L/sec      FEFMax-Pre   Date Value Ref Range Status   12/06/2021 4.51 L/sec      FEFMax-%Pred-Pre   Date Value Ref Range Status   12/06/2021 83 %      ExpTime-Pre   Date Value Ref Range Status   12/06/2021 7.46 sec      FIFMax-Pre   Date Value Ref Range Status   12/06/2021 2.63 L/sec      FEV1FEV6-Pred   Date Value Ref Range Status   12/06/2021 79 %      FEV1FEV6-Pre   Date Value Ref Range Status   12/06/2021 73 %      No results found for: 20055  Imaging (personally reviewed in clinic today): CT chest without contrast 02/21/2022  IMPRESSION: Osteopenia with extensive degenerative " changes in the spine. No CT evidence of advanced interstitial lung disease. Atherosclerosis. Air trapping. Extensive cholelithiasis.  Echocardiogram  Interpretation Summary  Left ventricular visual ejection fraction is 50-55%.  Hypokinesis of the mid anteroseptal, inferoseptal and distal septal segments.  The right ventricle is normal in size and function.  Mild aortic insufficiency.  The inferior vena cava is normal.   This study was compared with the study from 10/2019, the LV function and wall motion abnormalities have improved substantially    Assessment and Plan:   Moderate Persistent Asthma  ACT score today of 18 suggestive of fair asthma control and better improved from before. She has derived significant benefit with higher dose Breo and we will continue her current regimen. Due to her history of congenital obstructive uropathy, we should avoid LAMA type inhalers such as Spiriva, Incruse and Trelegy.   We discussed trigger avoidance and an asthma action plan was formulated in clinic.  LEA  Continue to follow with her sleep physician and at the [place] [facility] [place]  Questions and concerns were answered to the patient's satisfaction.  she was provided with my contact information should new questions or concerns arise in the interim.  she should return to clinic in 12 months  Up to date on vaccinations  I spent a total of 40 minutes face to face with Gem Gama during today's office visit. Over 50% of this time was spent counseling the patient and/or coordinating care regarding their pulmonary disease.    Luis A Moody MD  Pulmonary, Critical Care and Sleep Medicine  [facility]  Pager: [phone]        The above note was dictated using voice recognition software and may include typographical errors. Please contact the author for any clarifications.

## 2023-02-06 NOTE — TELEPHONE ENCOUNTER
Prior Authorization Retail Medication Request    Medication/Dose: fluticasone-vilanterol (BREO ELLIPTA) 200-25 MCG/ACT inhaler    ICD code (if different than what is on RX):    Previously Tried and Failed:    Rationale:      Insurance Name:    Insurance ID:        Pharmacy Information (if different than what is on RX)  Name:    Phone:

## 2023-02-06 NOTE — PROGRESS NOTES
"Gem Juan Antonio Natan's goals for this visit include: Return  She requests these members of her care team be copied on today's visit information: PCP    PCP: Danny Conteh    Referring Provider:  No referring provider defined for this encounter.    /69   Pulse 76   Resp 16   Ht 1.626 m (5' 4.02\")   Wt 83 kg (183 lb)   LMP  (LMP Unknown)   SpO2 97%   BMI 31.40 kg/m      Do you need any medication refills at today's visit? SHANTAL Lara LPN  Pulmonary Medicine:  [facility]  Phone: [phone] Fax: [phone]      "

## 2023-02-09 NOTE — TELEPHONE ENCOUNTER
Prior Authorization Not Needed per Insurance        Medication: fluticasone-vilanterol (BREO ELLIPTA) 200-25 MCG/ACT inhaler-PA NOT NEEDED   Insurance Company: [employer] - Phone [phone] Fax [phone]  Expected CoPay:      Pharmacy Filling the Rx: [id number] - [facility] [address] [facility]  [address]  Pharmacy Notified: Yes  Patient Notified: No      Called pharmacy and pharmacy stated that PA is Not Needed and medication is covered. **Instructed pharmacy to notify patient when script is ready to /ship.** Pharmacy stated that they have a paid claim on medication and will notify the patient when medication is ready for . Insurance also stated that PA is Not Needed and medication is covered.

## 2023-02-14 ENCOUNTER — HOSPITAL ENCOUNTER (EMERGENCY)
Facility: CLINIC | Age: 73
Discharge: HOME | End: 2023-02-14
Attending: PHYSICIAN ASSISTANT | Admitting: PHYSICIAN ASSISTANT
Payer: MEDICARE

## 2023-02-14 ENCOUNTER — APPOINTMENT (OUTPATIENT)
Dept: GENERAL RADIOLOGY | Facility: CLINIC | Age: 73
End: 2023-02-14
Attending: PHYSICIAN ASSISTANT
Payer: MEDICARE

## 2023-02-14 ENCOUNTER — TELEPHONE (OUTPATIENT)
Dept: UROLOGY | Facility: CLINIC | Age: 73
End: 2023-02-14
Payer: MEDICARE

## 2023-02-14 VITALS
SYSTOLIC BLOOD PRESSURE: 122 MMHG | HEART RATE: 82 BPM | TEMPERATURE: 99.9 F | HEIGHT: 64 IN | WEIGHT: 186 LBS | OXYGEN SATURATION: 93 % | BODY MASS INDEX: 31.76 KG/M2 | RESPIRATION RATE: 16 BRPM | DIASTOLIC BLOOD PRESSURE: 81 MMHG

## 2023-02-14 DIAGNOSIS — U07.1 COVID-19: ICD-10-CM

## 2023-02-14 DIAGNOSIS — N39.0 UTI (URINARY TRACT INFECTION): Primary | ICD-10-CM

## 2023-02-14 DIAGNOSIS — R79.89 ELEVATED BRAIN NATRIURETIC PEPTIDE (BNP) LEVEL: ICD-10-CM

## 2023-02-14 LAB
ALBUMIN UR-MCNC: 10 MG/DL
ANION GAP SERPL CALCULATED.3IONS-SCNC: 12 MMOL/L (ref 7–15)
APPEARANCE UR: CLEAR
BASOPHILS # BLD AUTO: 0 10E3/UL (ref 0–0.2)
BASOPHILS NFR BLD AUTO: 0 %
BILIRUB UR QL STRIP: NEGATIVE
BUN SERPL-MCNC: 9.6 MG/DL (ref 8–23)
CALCIUM SERPL-MCNC: 8.5 MG/DL (ref 8.8–10.2)
CHLORIDE SERPL-SCNC: 102 MMOL/L (ref 98–107)
COLOR UR AUTO: ABNORMAL
CREAT SERPL-MCNC: 1.07 MG/DL (ref 0.51–0.95)
DEPRECATED HCO3 PLAS-SCNC: 25 MMOL/L (ref 22–29)
EOSINOPHIL # BLD AUTO: 0 10E3/UL (ref 0–0.7)
EOSINOPHIL NFR BLD AUTO: 1 %
ERYTHROCYTE [DISTWIDTH] IN BLOOD BY AUTOMATED COUNT: 14.1 % (ref 10–15)
GFR SERPL CREATININE-BSD FRML MDRD: 54 ML/MIN/1.73M2
GLUCOSE SERPL-MCNC: 94 MG/DL (ref 70–99)
GLUCOSE UR STRIP-MCNC: NEGATIVE MG/DL
HCT VFR BLD AUTO: 36.9 % (ref 35–47)
HGB BLD-MCNC: 13 G/DL (ref 11.7–15.7)
HGB UR QL STRIP: NEGATIVE
HOLD SPECIMEN: NORMAL
IMM GRANULOCYTES # BLD: 0 10E3/UL
IMM GRANULOCYTES NFR BLD: 0 %
KETONES UR STRIP-MCNC: NEGATIVE MG/DL
LACTATE SERPL-SCNC: 0.9 MMOL/L (ref 0.7–2)
LEUKOCYTE ESTERASE UR QL STRIP: NEGATIVE
LYMPHOCYTES # BLD AUTO: 0.5 10E3/UL (ref 0.8–5.3)
LYMPHOCYTES NFR BLD AUTO: 11 %
MCH RBC QN AUTO: 30.2 PG (ref 26.5–33)
MCHC RBC AUTO-ENTMCNC: 35.2 G/DL (ref 31.5–36.5)
MCV RBC AUTO: 86 FL (ref 78–100)
MONOCYTES # BLD AUTO: 0.7 10E3/UL (ref 0–1.3)
MONOCYTES NFR BLD AUTO: 14 %
MUCOUS THREADS #/AREA URNS LPF: PRESENT /LPF
NEUTROPHILS # BLD AUTO: 3.7 10E3/UL (ref 1.6–8.3)
NEUTROPHILS NFR BLD AUTO: 74 %
NITRATE UR QL: NEGATIVE
NRBC # BLD AUTO: 0 10E3/UL
NRBC BLD AUTO-RTO: 0 /100
NT-PROBNP SERPL-MCNC: 1402 PG/ML (ref 0–900)
PH UR STRIP: 8 [PH] (ref 5–7)
PLATELET # BLD AUTO: 102 10E3/UL (ref 150–450)
POTASSIUM SERPL-SCNC: 4 MMOL/L (ref 3.4–5.3)
RBC # BLD AUTO: 4.3 10E6/UL (ref 3.8–5.2)
RBC URINE: 2 /HPF
SARS-COV-2 RNA RESP QL NAA+PROBE: POSITIVE
SODIUM SERPL-SCNC: 139 MMOL/L (ref 136–145)
SP GR UR STRIP: 1.01 (ref 1–1.03)
SQUAMOUS EPITHELIAL: <1 /HPF
UROBILINOGEN UR STRIP-MCNC: NORMAL MG/DL
WBC # BLD AUTO: 5 10E3/UL (ref 4–11)
WBC URINE: <1 /HPF

## 2023-02-14 PROCEDURE — 36415 COLL VENOUS BLD VENIPUNCTURE: CPT | Performed by: PHYSICIAN ASSISTANT

## 2023-02-14 PROCEDURE — 71046 X-RAY EXAM CHEST 2 VIEWS: CPT

## 2023-02-14 PROCEDURE — 82947 ASSAY GLUCOSE BLOOD QUANT: CPT | Performed by: PHYSICIAN ASSISTANT

## 2023-02-14 PROCEDURE — 82374 ASSAY BLOOD CARBON DIOXIDE: CPT | Performed by: PHYSICIAN ASSISTANT

## 2023-02-14 PROCEDURE — 81001 URINALYSIS AUTO W/SCOPE: CPT | Performed by: PHYSICIAN ASSISTANT

## 2023-02-14 PROCEDURE — 83880 ASSAY OF NATRIURETIC PEPTIDE: CPT | Performed by: PHYSICIAN ASSISTANT

## 2023-02-14 PROCEDURE — 85025 COMPLETE CBC W/AUTO DIFF WBC: CPT | Performed by: PHYSICIAN ASSISTANT

## 2023-02-14 PROCEDURE — U0005 INFEC AGEN DETEC AMPLI PROBE: HCPCS | Performed by: PHYSICIAN ASSISTANT

## 2023-02-14 PROCEDURE — 250N000013 HC RX MED GY IP 250 OP 250 PS 637: Performed by: PHYSICIAN ASSISTANT

## 2023-02-14 PROCEDURE — 82310 ASSAY OF CALCIUM: CPT | Performed by: PHYSICIAN ASSISTANT

## 2023-02-14 PROCEDURE — 83605 ASSAY OF LACTIC ACID: CPT | Performed by: PHYSICIAN ASSISTANT

## 2023-02-14 PROCEDURE — C9803 HOPD COVID-19 SPEC COLLECT: HCPCS

## 2023-02-14 PROCEDURE — 99284 EMERGENCY DEPT VISIT MOD MDM: CPT | Mod: CS,25

## 2023-02-14 RX ORDER — ACETAMINOPHEN 500 MG
1000 TABLET ORAL ONCE
Status: COMPLETED | OUTPATIENT
Start: 2023-02-14 | End: 2023-02-14

## 2023-02-14 RX ORDER — FUROSEMIDE 20 MG
20 TABLET ORAL DAILY
Qty: 5 TABLET | Refills: 0 | Status: SHIPPED | OUTPATIENT
Start: 2023-02-14 | End: 2023-03-20

## 2023-02-14 RX ADMIN — ACETAMINOPHEN 1000 MG: 500 TABLET ORAL at 17:24

## 2023-02-14 ASSESSMENT — ENCOUNTER SYMPTOMS
SHORTNESS OF BREATH: 0
SORE THROAT: 0
RHINORRHEA: 1
FEVER: 1
NAUSEA: 0
ABDOMINAL PAIN: 0
CHILLS: 1
HEADACHES: 1
BACK PAIN: 1
UNEXPECTED WEIGHT CHANGE: 1
COUGH: 1
DIARRHEA: 0
DYSURIA: 1
VOMITING: 1
FLANK PAIN: 1

## 2023-02-14 ASSESSMENT — ACTIVITIES OF DAILY LIVING (ADL)
ADLS_ACUITY_SCORE: 37
ADLS_ACUITY_SCORE: 37

## 2023-02-14 NOTE — ED NOTES
Bed: ED05  Expected date:   Expected time:   Means of arrival:   Comments:  [place] - 72 F kidney infection eta 5107

## 2023-02-14 NOTE — TELEPHONE ENCOUNTER
Patient calling to request UA/UC due to she has increased frequency of clear light yellow urine. Patient has cough, raspy voice, headache, body-ache, flank pain, stuffy nose, feels fuzzy in the head, not thinking clearly, fever of 102.6 orally, chills, no appetite, and just feels awful.   Asked patient if she check a Covid test? She did not because the test she had . Patient rates her flank pain 1/10 today but last evening 7/10  Advised patient to go to ER to be evaluated now. Patient replied if the decide to observe me medicare does not pay and I will have to pay.   This writer compromised explain this information would be presented to Dr Rodriguez to see what she would advise she do. Patient agreed with that.  Presented everything to Dr oRdriguez and she would like Dr Rodriguez to be evaluated at the ER because she has no idea where to start treat her because there more going on then her urine. Notified the patient and she will call her son to take her now   Felecia Shanks, MAICON, RN  Care Coordinator Urology  [phone]

## 2023-02-14 NOTE — ED TRIAGE NOTES
pt has cough that started yesterday and fever last night    Pt has kidney issues     PT was brought in by [facility]     bgl- [phone]

## 2023-02-14 NOTE — ED PROVIDER NOTES
History     Chief Complaint:  Cough     HPI   Gem Gama is a 72 year old female with a history of hypertension, CAD, ischemic cardiomyopathy, myocardial infarction, CHF, and stage 3 CKD who presents via EMS with bilateral flank pain and flu-like symptoms, worsening over the past couple of days. Two days ago, the patient developed a new headache, productive cough, rhinorrhea, cough, and post-nasal drip. This was ongoing into the day yesterday and last night, she took Tylenol, NyQuil, and melatonin before falling asleep. Throughout the night however, the patient awoke several times with worsening chills, bilateral back/flank pain, slight dysuria, and increasing incontinence (is incontinent at baseline). After awaking today, the patient measured a fever of 102.6F, and with ongoing symptoms without improvement grew concerned and presented to ED at this time. She did have an episode of vomiting today but otherwise no nausea, diarrhea, or abdominal pain. Denies chest pain, shortness of breath. No sore throat. She does have history of frequent UTIs along with stage 3 CKD, and follows with nephrology (GFR averaging 40's). Of note, the patient does report recent weight gain of approximately 8 lbs; she normally weighs in the mid 180's but this weekend weighed herself in the 190's. She is unsure of cause of this.    Independent Historian:   None - Patient Only    Review of External Notes:   Urology phone triage consultation today:  Patient calling to request UA/UC due to she has increased frequency of clear light yellow urine. Patient has cough, raspy voice, headache, body-ache, flank pain, stuffy nose, feels fuzzy in the head, not thinking clearly, fever of 102.6 orally, chills, no appetite, and just feels awful.   Asked patient if she check a Covid test? She did not because the test she had . Patient rates her flank pain 1/10 today but last evening 7/10  Advised patient to go to ER to be evaluated now.  Patient replied if the decide to observe me medicare does not pay and I will have to pay.   This writer compromised explain this information would be presented to Dr Rodriguez to see what she would advise she do. Patient agreed with that.  Presented everything to Dr Rodriguez and she would like Dr Rodriguez to be evaluated at the ER because she has no idea where to start treat her because there more going on then her urine. Notified the patient and she will call her son to take her now     ROS:  Review of Systems   Constitutional: Positive for chills, fever and unexpected weight change.   HENT: Positive for congestion, postnasal drip and rhinorrhea. Negative for sore throat.    Respiratory: Positive for cough. Negative for shortness of breath.    Cardiovascular: Negative for chest pain.   Gastrointestinal: Positive for vomiting (x1). Negative for abdominal pain, diarrhea and nausea.   Genitourinary: Positive for dysuria, enuresis and flank pain (bilateral).   Musculoskeletal: Positive for back pain.   Neurological: Positive for headaches.   All other systems reviewed and are negative.    Allergies:  Dust Mites  Latex  Flagyl [Metronidazole Hcl]  Food  Hydrochlorothiazide W/Triamterene  Seasonal Allergies  Sulfa Drugs  Tape [Adhesive Tape]  Metoprolol  Metronidazole     Medications:    Albuterol  Amoxicillin  Aspirin  Atorvastatin  Carvedilol  Cholestyramine  Plavix  Estradiol  Breo Ellipta  Imdur  Imodium  Loratadine  Magnesium oxide  Mirabegron  Mometasone  Nitroglycerin  Pantoprazole  Florastor  Sertraline  Ursodiol  Valsartan  Cholecalciferol    Past Medical History:    ADHD  Anxiety  Arthritis  Asthma  Central sleep apnea  Cheyne-Rogers breathing  Stage 3 CKD  Congenital hydronephrosis  CAD  Depression  GERD  Hypertension  CHF  Ischemic cardiomyopathy  Osteoarthritis  Allergic rhinitis  Major depressive disorder, recurrent episode, moderate  Narcolepsy  UTI  Pituitary microadenoma  Pyelonephritis of right kidney  Renal  "disease    Past Surgical History:    Arthroplasty knee, left  Coronary angiogram  Heart catheterization with possible intervention  Intra aortic balloon  HANS and BSO  Intra aortic balloon removal  Left heart cath  PCI stent drug eluting  Bladder repair  Genitourinary surgery, x3  Bilateral total hip replacements  Rectocele repair    Family History:    Arthritis  Birth defects  Cerebrovascular disease  Diabetes mellitus  Hypertension  Kidney disease  Sjogren's  Thyroid disease    Social History:  The patient presented alone.  The patient arrived via EMS.  PCP: Danny Conteh     Physical Exam     Patient Vitals for the past 24 hrs:   BP Temp Temp src Pulse Resp SpO2 Height Weight   02/14/23 1830 122/81 99.9  F (37.7  C) Oral -- -- 93 % -- --   02/14/23 1444 (!) 147/102 (!) 101.9  F (38.8  C) Oral 82 16 98 % 1.626 m (5' 4\") 84.4 kg (186 lb)      Physical Exam  Constitutional: Pleasant. Cooperative.   Eyes: Pupils equally round and reactive  HENT: Head is normal in appearance. Oropharynx is normal with moist mucus membranes.  Cardiovascular: Regular rate and rhythm and without murmurs.  Respiratory: Normal respiratory effort, lungs are clear bilaterally.  GI: Abdomen is soft, non-tender, non-distended. No guarding, rebound, or rigidity.  Musculoskeletal: No asymmetry of the lower extremities, no tenderness to palpation. No CVA TTP.  Skin: Normal, without rash.  Neurologic: Cranial nerves grossly intact, normal cognition, no focal deficits. Alert and oriented x 3.   Psychiatric: Normal affect.  Nursing notes and vital signs reviewed.    Emergency Department Course   Imaging:  XR Chest 2 Views   Final Result   IMPRESSION: There are no acute infiltrates. The cardiac silhouette is   not enlarged. Pulmonary vasculature is unremarkable.      RUTH BUSH MD            SYSTEM ID:  [id number]         Report per radiology    Laboratory:  Labs Ordered and Resulted from Time of ED Arrival to Time of ED Departure   BASIC " METABOLIC PANEL - Abnormal       Result Value    Sodium 139      Potassium 4.0      Chloride 102      Carbon Dioxide (CO2) 25      Anion Gap 12      Urea Nitrogen 9.6      Creatinine 1.07 (*)     Calcium 8.5 (*)     Glucose 94      GFR Estimate 54 (*)    CBC WITH PLATELETS AND DIFFERENTIAL - Abnormal    WBC Count 5.0      RBC Count 4.30      Hemoglobin 13.0      Hematocrit 36.9      MCV 86      MCH 30.2      MCHC 35.2      RDW 14.1      Platelet Count 102 (*)     % Neutrophils 74      % Lymphocytes 11      % Monocytes 14      % Eosinophils 1      % Basophils 0      % Immature Granulocytes 0      NRBCs per 100 WBC 0      Absolute Neutrophils 3.7      Absolute Lymphocytes 0.5 (*)     Absolute Monocytes 0.7      Absolute Eosinophils 0.0      Absolute Basophils 0.0      Absolute Immature Granulocytes 0.0      Absolute NRBCs 0.0     COVID-19 VIRUS (CORONAVIRUS) BY PCR - Abnormal    SARS CoV2 PCR Positive (*)    NT PROBNP INPATIENT - Abnormal    N terminal Pro BNP Inpatient 1,402 (*)    ROUTINE UA WITH MICROSCOPIC REFLEX TO CULTURE - Abnormal    Color Urine Light Yellow      Appearance Urine Clear      Glucose Urine Negative      Bilirubin Urine Negative      Ketones Urine Negative      Specific Gravity Urine 1.013      Blood Urine Negative      pH Urine 8.0 (*)     Protein Albumin Urine 10 (*)     Urobilinogen Urine Normal      Nitrite Urine Negative      Leukocyte Esterase Urine Negative      Mucus Urine Present (*)     RBC Urine 2      WBC Urine <1      Squamous Epithelials Urine <1     LACTIC ACID WHOLE BLOOD - Normal    Lactic Acid 0.9     URINE CULTURE      Emergency Department Course & Assessments:     Interventions:  Medications   acetaminophen (TYLENOL) tablet 1,000 mg (1,000 mg Oral Given 2/14/23 1724)      Independent Interpretation (X-rays, CTs, rhythm strip):  I independently reviewed the patient's CXR, no obvious infiltrate.    Social Determinants of Health affecting care:   None    Assessments:  1539 I  obtained history and examined the patient as noted above.  1701 I rechecked the patient and explained findings.  1801 I rechecked the patient and explained findings.  1817 I rechecked the patient. I believe that they are safe for discharge at this time.    Disposition:  The patient was discharged to home.     Impression & Plan      Medical Decision Making:  Gem Gama is a 72 year old female who presents to the ED for evaluation of upper respiratory symptoms, fever, as well as flank pain.  See HPI as above for additional details.  Vitals and physical exam as above.  Differential was broad and included viral URI, pneumonia including COVID, strep, intra-abdominal pathology such as appendicitis, pyelonephritis, UTI, shingles, amongst others.  Work-up obtained as above reveals evidence for COVID diagnosis.  Patient denies abdominal pain and has a benign abdomen.  No CVA tenderness.  UA without suggestion for UTI.  Patient denies any rash.  Chest x-ray without suggestion for focal infiltrate to suggest for bacterial pneumonia.  Given patient's description of of weight gain, mildly elevated BNP, and the fact that she is not currently on a diuretic however has historically been so, will provide her short course of diuresis and have her follow-up with her PCP with respect to this and continue to monitor her weight. Did not feel that admission was indicated at this time given reassuring O2 sats, no jodie lower extremity edema, no suggestion for fluids on lungs. Paxlovid was prescribed and medications were adjusted.  She is to follow-up with her PCP in the next few days.  Discussed low threshold to return with any worsening symptoms. Discussed reasons to return. All questions answered. Patient discharged to home in stable condition.    Diagnosis:    ICD-10-CM    1. COVID-19  U07.1       2. Elevated brain natriuretic peptide (BNP) level  R79.89          Discharge Medications:  Discharge Medication List as of 2/14/2023   6:30 PM      START taking these medications    Details   furosemide (LASIX) 20 MG tablet Take 1 tablet (20 mg) by mouth daily for 5 days, Disp-5 tablet, R-0, E-Prescribe      nirmatrelvir and ritonavir (PAXLOVID) therapy pack Take 2 tablets by mouth 2 times daily for 5 days, Disp-20 tablet, R-0, E-PrescribeDate of symptom onset: 2/13/23; Risk criteria met: Yes; Positive Covid-19 test: Yes; Weight >40 kg Yes; Renal fxn: abnormal-dose adjusted;  Drug-Drug interactions reviewed  & addressed: Yes            Scribe Disclosure:  I, Jaja Eason, am serving as a scribe at 3:39 PM on 2/14/2023 to document services personally performed by Estuardo Mays PA-C based on my observations and the provider's statements to me.     2/14/2023   Estuardo Mays PA-C     This record was created at least in part using electronic voice recognition software, so please excuse any typographical errors.       Estuardo Mays PA-C  02/14/23 2008

## 2023-02-15 NOTE — DISCHARGE INSTRUCTIONS
Do not take Atorvastatin for the next 5 days.  Do not take Mirabegron for the next 5 days.  Valsartan - Given the short duration of Paxlovid, no dose adjustment is recommended, but consider stopping valsartan for the remainder of the duration of Paxlovid treatment if symptomatic hypotension occurs  Colestyramine - can reduce the gastrointestinal absorption of some drugs, colestyramine should be administered at least 1 hour before or 4-6 hours after Paxlovid to minimize the risk of reduced absorption.    You may take the remainder of your medications as prescribed.        Discharge Instructions  COVID-19    COVID-19 is the disease caused by a new coronavirus. The virus spreads from person-to-person primarily by droplets when an infected person coughs or sneezes and the droplets are then breathed in by another person.    Symptoms of COVID-19  Many people have no symptoms or mild symptoms.  Symptoms usually appear within a few days, but up to 14-days, after contact with a person with COVID-19.    A mild COVID-19 illness is like a cold and can have fever, cough, sneezing, sore throat, tiredness, headache, and muscle pain.    A moderate COVID-19 illness might include shortness of breath or pneumonia on a chest x-ray.    A severe COVID-19 illness causes significant breathing problems such as low oxygen levels or more serious pneumonia.  Some patients experience loss of taste or smell which is somewhat unique to COVID-19.      Isolation and Quarantine  Testing is recommended for any person with symptoms that could be COVID-19 and often for those exposed to COVID-19. The best way to stop the spread of the virus is to avoid contact with others.    A close contact exposure is being within 6 feet of someone with COVID for 15 minutes.    Isolation refers to sick people staying away from people who are not sick.    A person in quarantine is limiting activity because they were exposed and are waiting to see if they might become  sick.    If you test positive for COVID and have no symptoms, you should stay home (isolation) for 5 full days after the day of the test. You should then wear a mask when around others for another 5 days.    If you test positive for COVID and have mild symptoms, you should stay home (isolation) for at least 5 days after your symptoms began. You can return to normal activities at that time, wearing a mask when around others, for another 5 days as long as your symptoms are improving/resolving and you have been without a fever for 24 hours (without using fever-reducing medicine).    If you test positive for COVID and have more than mild symptoms, you should stay home (isolation) for at least 10 days after your symptoms began. You can return to normal activities at that time as long as your symptoms are improving and you have been without a fever for 24 hours (without using fever-reducing medicine).  For example, if you have a fever and cough for 6 days, you need to stay home 4 more days with no fever for a total of 10 days. Or, if you have a fever and cough for 10 days, you need to stay home one more day with no fever for a total of 11 days.    If you were exposed to COVID and are not vaccinated (or it has been more than six months from your Pfizer or Moderna vaccine or two months from J&J vaccine), you should stay home (quarantine) for 5 days and then wear a mask around others for 5 additional days. A COVID test at day 5 is recommended.    If you were exposed to COVID and are vaccinated (had a booster, had two shots of Pfizer or Moderna vaccine in the last five months, or had J&J vaccine within two months), you do not need to quarantine but should wear a mask around others for 10 days and get a COVID test on day 5.    If you have symptoms but a negative test, you should stay at home until you have mild/improving symptoms and are without fever for 24 hours, using the same judgment you would for when it is safe to  return to work/school from strep throat, influenza, or the common cold. If you worsen, you should consider being re-evaluated.    If you are being tested for COVID because of symptoms and your test is pending, you should stay home until you know your test result.  More details on isolation and quarantine can be found on this website from the CDC:  https://www.cdc.gov/coronavirus/2019-ncov/your-health/quarantine-isolation.html    If I have COVID, how should I protect myself and others?    Do not go to work or school. Have a friend or relative do your shopping. Do not use public transportation (bus, train) or ridesharing (Lyft, Uber).    Separate yourself from other people in your home. As much as possible, you should stay in one room and away from other people in your home. Also, use a separate bathroom, if possible. Avoid handling pets or other animals while sick.     Wear a facemask if you need to be around other people and cover your mouth and nose with a tissue when you cough or sneeze.     Avoid sharing personal household items. You should not share dishes, drinking glasses, forks/knives/spoons, towels, or bedding with other people in your home. After using these items, they should be washed with soap and water. Clean parts of your home that are touched often (doorknobs, faucets, countertops, etc.) daily.     Wash your hands often with soap and water for at least 20 seconds or use an alcohol-based hand  containing at least 60% alcohol.     Avoid touching your face.    Treat your symptoms. You can take Acetaminophen (Tylenol) to treat body aches and fever as needed for comfort. Ibuprofen (Advil or Motrin) can be used as well if you still have symptoms after taking Tylenol. Drink fluids. Rest.    Watch for worsening symptoms such as shortness of breath/difficulty breathing or very severe weakness.    Employers/workplaces are being asked by the Centers for Disease Control (CDC) to not request  notes/documentation for you to return to work or prove that you were ill. You may choose to show your employer this paperwork. Also, repeat testing should not be required to return to work.    Exercise/Sports in rare cases, COVID could affect your heart in a way that makes exercise or participation in sports dangerous.  If you have a mild COVID illness (fever, cough, sore throat, and similar symptoms but no difficulty breathing or abnormalities of the lung): After your COVID symptoms have resolved, wait 14-days before returning to activity.  If you have more than a mild illness (meaning that you have problems with your breathing or lungs) or if you participate in competitive or strenuous activity or have a history of heart disease: Please see your primary doctor/provider prior to return to activity/competition.    COVID treatments such as antiviral and antibody medications are available. They are recommended for those patients who have a risk for developing more severe COVID illness. Importantly, the treatments must be started early in the illness (within 5-7 days, depending on which treatment). These treatments may have been considered today during your visit. If you have other questions, contact your primary doctor/clinic.     You can learn more about COVID treatments from the [facility]:  https://www.health.[facility].[place]/diseases/coronavirus/meds.html    Return to the Emergency Department if:    If you are developing worsening breathing, shortness of breath, or feel worse you should seek medical attention.  If you are uncertain, contact your health care provider/clinic. If you need emergency medical attention, call 911 and tell them you have been ill.

## 2023-02-22 ENCOUNTER — VIRTUAL VISIT (OUTPATIENT)
Dept: PSYCHOLOGY | Facility: CLINIC | Age: 73
End: 2023-02-22
Payer: MEDICARE

## 2023-02-22 DIAGNOSIS — F33.1 MAJOR DEPRESSIVE DISORDER, RECURRENT EPISODE, MODERATE (H): Primary | ICD-10-CM

## 2023-02-22 DIAGNOSIS — F41.1 GENERALIZED ANXIETY DISORDER: ICD-10-CM

## 2023-02-22 PROCEDURE — 90834 PSYTX W PT 45 MINUTES: CPT | Mod: VID | Performed by: SOCIAL WORKER

## 2023-02-22 ASSESSMENT — PATIENT HEALTH QUESTIONNAIRE - PHQ9
SUM OF ALL RESPONSES TO PHQ QUESTIONS 1-9: 11
SUM OF ALL RESPONSES TO PHQ QUESTIONS 1-9: 11
10. IF YOU CHECKED OFF ANY PROBLEMS, HOW DIFFICULT HAVE THESE PROBLEMS MADE IT FOR YOU TO DO YOUR WORK, TAKE CARE OF THINGS AT HOME, OR GET ALONG WITH OTHER PEOPLE: VERY DIFFICULT

## 2023-02-28 ASSESSMENT — PATIENT HEALTH QUESTIONNAIRE - PHQ9
SUM OF ALL RESPONSES TO PHQ QUESTIONS 1-9: 9
SUM OF ALL RESPONSES TO PHQ QUESTIONS 1-9: 9
10. IF YOU CHECKED OFF ANY PROBLEMS, HOW DIFFICULT HAVE THESE PROBLEMS MADE IT FOR YOU TO DO YOUR WORK, TAKE CARE OF THINGS AT HOME, OR GET ALONG WITH OTHER PEOPLE: VERY DIFFICULT

## 2023-03-01 ENCOUNTER — VIRTUAL VISIT (OUTPATIENT)
Dept: PSYCHOLOGY | Facility: CLINIC | Age: 73
End: 2023-03-01
Payer: MEDICARE

## 2023-03-01 DIAGNOSIS — F33.0 MAJOR DEPRESSIVE DISORDER, RECURRENT EPISODE, MILD (H): Primary | ICD-10-CM

## 2023-03-01 PROCEDURE — 90834 PSYTX W PT 45 MINUTES: CPT | Mod: VID | Performed by: SOCIAL WORKER

## 2023-03-01 ASSESSMENT — ANXIETY QUESTIONNAIRES
3. WORRYING TOO MUCH ABOUT DIFFERENT THINGS: NOT AT ALL
GAD7 TOTAL SCORE: 2
7. FEELING AFRAID AS IF SOMETHING AWFUL MIGHT HAPPEN: NOT AT ALL
GAD7 TOTAL SCORE: 2
5. BEING SO RESTLESS THAT IT IS HARD TO SIT STILL: NOT AT ALL
2. NOT BEING ABLE TO STOP OR CONTROL WORRYING: SEVERAL DAYS
6. BECOMING EASILY ANNOYED OR IRRITABLE: NOT AT ALL
1. FEELING NERVOUS, ANXIOUS, OR ON EDGE: SEVERAL DAYS
IF YOU CHECKED OFF ANY PROBLEMS ON THIS QUESTIONNAIRE, HOW DIFFICULT HAVE THESE PROBLEMS MADE IT FOR YOU TO DO YOUR WORK, TAKE CARE OF THINGS AT HOME, OR GET ALONG WITH OTHER PEOPLE: SOMEWHAT DIFFICULT

## 2023-03-01 ASSESSMENT — PATIENT HEALTH QUESTIONNAIRE - PHQ9: 5. POOR APPETITE OR OVEREATING: NOT AT ALL
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
## 2023-03-09 ENCOUNTER — NURSE TRIAGE (OUTPATIENT)
Dept: NURSING | Facility: CLINIC | Age: 73
End: 2023-03-09
Payer: MEDICARE

## 2023-03-09 NOTE — TELEPHONE ENCOUNTER
Triage Call:    Patient calling to report jaw pain. She reports history of TMJ.  She applied ice pack and took acetaminophen without relief from severe pain.  She denies radiating pain to neck.  Patient denies difficulty breathing.  She is unable to open or close her mouth as usual.    According to the protocol, patient should go to ED now.  Care advice given. Patient verbalizes understanding and agrees with plan of care. Patient plans to go to [place] ED.    Ivy Ramirez RN  03/09/23 12:52 AM  [facility] Nurse Advisor    Reason for Disposition    Can't open or close the mouth fully    Additional Information    Negative: Gaping cut of OUTER LIP  (or length > 1/4 inch or 6 mm)    Negative: Gaping cut through border of the LIP where it meets the skin  (or length > 1/4 inch or 6 mm)    Negative: Cut of side or tip of TONGUE that gapes open (split tongue)    Negative: Cut on surface of TONGUE > 1 inch (24 mm) and that gapes open    Negative: [1] Bleeding AND [2] won't stop after 10 minutes of direct pressure (using correct technique)    Negative: Head injury is main concern    Negative: Neck injury is main concern    Negative: Main injury is to the teeth    Negative: [1] Major bleeding (e.g., actively dripping or spurting) AND [2] can't be stopped    Negative: Fainted or too weak to stand following large blood loss    Negative: Knocked out (unconscious) > 1 minute    Negative: Difficult to awaken or acting confused (e.g., disoriented, slurred speech)    Negative: Severe neck pain    Negative: Difficulty breathing    Negative: Sounds like a life-threatening emergency to the triager    Protocols used: MOUTH INJURY-A-AH

## 2023-03-14 ENCOUNTER — VIRTUAL VISIT (OUTPATIENT)
Dept: PSYCHOLOGY | Facility: CLINIC | Age: 73
End: 2023-03-14
Payer: MEDICARE

## 2023-03-14 DIAGNOSIS — F33.0 MAJOR DEPRESSIVE DISORDER, RECURRENT EPISODE, MILD (H): Primary | ICD-10-CM

## 2023-03-14 PROCEDURE — 90834 PSYTX W PT 45 MINUTES: CPT | Mod: VID | Performed by: SOCIAL WORKER

## 2023-03-14 ASSESSMENT — PATIENT HEALTH QUESTIONNAIRE - PHQ9
SUM OF ALL RESPONSES TO PHQ QUESTIONS 1-9: 6
10. IF YOU CHECKED OFF ANY PROBLEMS, HOW DIFFICULT HAVE THESE PROBLEMS MADE IT FOR YOU TO DO YOUR WORK, TAKE CARE OF THINGS AT HOME, OR GET ALONG WITH OTHER PEOPLE: SOMEWHAT DIFFICULT
SUM OF ALL RESPONSES TO PHQ QUESTIONS 1-9: 6
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
## 2023-03-17 ENCOUNTER — TELEPHONE (OUTPATIENT)
Dept: FAMILY MEDICINE | Facility: CLINIC | Age: 73
End: 2023-03-17
Payer: MEDICARE

## 2023-03-17 ENCOUNTER — TELEPHONE (OUTPATIENT)
Dept: SLEEP MEDICINE | Facility: CLINIC | Age: 73
End: 2023-03-17
Payer: MEDICARE

## 2023-03-17 NOTE — TELEPHONE ENCOUNTER
Patient calling in looking to schedule appointment regarding TMJ pain.     Patient states that she was in ED 3/9/23 in  ED and was dx with TMJ issues, and was told to f/u with a dentist for possible botox injections and/or see PCP. Patient states that she prefers to see a provider at her PCP's clinic, since she is worried her insurance won't cover a dentist's visit for botox injections, and she cannot afford to not have an appt covered. Patient looking for referral to TMJ specialist. Informed patient that we can get her in with another provider here, but PCP is all booked up next week and then on vacation the week after. Patient verbalized understanding, OK with seeing another provider. Appt made for next week. Red flag symptoms reviewed with patient that would necessitate a trip back to the ED, patient aware. No further questions or concerns.      Elizabeth John, MAICON, RN  [facility]

## 2023-03-17 NOTE — TELEPHONE ENCOUNTER
General Call    Contacts       Type Contact Phone/Fax    03/17/2023 03:48 PM CDT Phone (Incoming) Heidy Gama (Self) [phone] (M)        Reason for Call:  Appointment    What are your questions or concerns:  Pt calling to schedule watchpat before visit with Dr. Hadley 4/12. Soonest available is 3/31 for mail out. I told her that probably wouldn't be enough time before appointment to get results. She wanted to keep appointment and ask Dr. Hadley if he thinks its possible.     Date of last appointment with provider: N/A    Could we send this information to you in [place] or would you prefer to receive a phone call?:   No preference   Okay to leave a detailed message?: Yes at Cell number on file:    Telephone Information:   Mobile [phone]

## 2023-03-20 ENCOUNTER — OFFICE VISIT (OUTPATIENT)
Dept: FAMILY MEDICINE | Facility: CLINIC | Age: 73
End: 2023-03-20
Payer: MEDICARE

## 2023-03-20 VITALS
BODY MASS INDEX: 30.59 KG/M2 | TEMPERATURE: 97.6 F | OXYGEN SATURATION: 98 % | HEIGHT: 64 IN | RESPIRATION RATE: 24 BRPM | WEIGHT: 179.2 LBS | SYSTOLIC BLOOD PRESSURE: 137 MMHG | DIASTOLIC BLOOD PRESSURE: 78 MMHG | HEART RATE: 82 BPM

## 2023-03-20 DIAGNOSIS — R68.84 JAW PAIN: Primary | ICD-10-CM

## 2023-03-20 DIAGNOSIS — Z12.31 ENCOUNTER FOR SCREENING MAMMOGRAM FOR BREAST CANCER: ICD-10-CM

## 2023-03-20 PROCEDURE — 99213 OFFICE O/P EST LOW 20 MIN: CPT | Performed by: NURSE PRACTITIONER

## 2023-03-20 RX ORDER — HYDROCODONE BITARTRATE AND ACETAMINOPHEN 5; 325 MG/1; MG/1
1-2 TABLET ORAL
COMMUNITY
Start: 2023-03-09 | End: 2023-03-20

## 2023-03-20 ASSESSMENT — ASTHMA QUESTIONNAIRES
QUESTION_3 LAST FOUR WEEKS HOW OFTEN DID YOUR ASTHMA SYMPTOMS (WHEEZING, COUGHING, SHORTNESS OF BREATH, CHEST TIGHTNESS OR PAIN) WAKE YOU UP AT NIGHT OR EARLIER THAN USUAL IN THE MORNING: ONCE OR TWICE
QUESTION_2 LAST FOUR WEEKS HOW OFTEN HAVE YOU HAD SHORTNESS OF BREATH: MORE THAN ONCE A DAY
QUESTION_5 LAST FOUR WEEKS HOW WOULD YOU RATE YOUR ASTHMA CONTROL: SOMEWHAT CONTROLLED
ACT_TOTALSCORE: 17
ACT_TOTALSCORE: 17
QUESTION_4 LAST FOUR WEEKS HOW OFTEN HAVE YOU USED YOUR RESCUE INHALER OR NEBULIZER MEDICATION (SUCH AS ALBUTEROL): NOT AT ALL
QUESTION_1 LAST FOUR WEEKS HOW MUCH OF THE TIME DID YOUR ASTHMA KEEP YOU FROM GETTING AS MUCH DONE AT WORK, SCHOOL OR AT HOME: A LITTLE OF THE TIME

## 2023-03-20 ASSESSMENT — PAIN SCALES - GENERAL: PAINLEVEL: SEVERE PAIN (7)

## 2023-03-20 NOTE — PATIENT INSTRUCTIONS
At [facility], we strive to deliver an exceptional experience to you, every time we see you. If you receive a survey, please complete it as we do value your feedback.  If you have MyChart, you can expect to receive results automatically within 24 hours of their completion.  Your provider will send a note interpreting your results as well.   If you do not have MyChart, you should receive your results in about a week by mail.    Your care team:                            Family Medicine Internal Medicine   MD Danny Christian MD Shantel Branch-Fleming, MD Srinivasa Vaka, MD Katya Belousova, PAKATE Smiley CNP, MD ([facility]   Mustapha Suresh, MD Yelena Garcia MD Amelia Massimini APRN DEVON Huitron APRN MD Letty Webster MD          Clinic hours: Monday - Thursday 7 am-6 pm; Fridays 7 am-5 pm.   Urgent care: Monday - Friday 10 am- 8 pm; Saturday and Sunday 9 am-5 pm.    Clinic: [phone]       [facility]: Monday - Thursday 8 am - 7 pm; Friday 8 am - 6 pm  [facility]: [phone]

## 2023-03-20 NOTE — PROGRESS NOTES
"  Assessment & Plan     Jaw pain  Suspect TMJ as was the dx in the emergency department. Refer to therapy as well as otolaryngology (ENT). Patient only able to eat small amounts of soft foods d/t symptoms.  - Adult ENT  Referral; Future  - Physical Therapy Referral; Future    Encounter for screening mammogram for breast cancer  - MA SCREENING DIGITAL BILAT - Future  (s+30); Future         BMI:   Estimated body mass index is 30.76 kg/m  as calculated from the following:    Height as of this encounter: 1.626 m (5' 4\").    Weight as of this encounter: 81.3 kg (179 lb 3.2 oz).       See Patient Instructions    Return in about 4 weeks (around 4/17/2023), or if symptoms worsen or fail to improve.     Return precautions discussed, including when to seek urgent/emergent care.    Patient verbalizes understanding and agrees with plan of care. Patient stable for discharge.      KATE ROSARIO [facility]    Naila Moody is a 72 year old, presenting for the following health issues:  Jaw Pain      History of Present Illness       Reason for visit:  Tmj pain and ear pain    She eats 2-3 servings of fruits and vegetables daily.She consumes 0 sweetened beverage(s) daily.She exercises with enough effort to increase her heart rate 9 or less minutes per day.  She exercises with enough effort to increase her heart rate 5 days per week.   She is taking medications regularly.       ED/ Followup:    Facility:  [facility]  Date of visit: 3/9/23  Reason for visit: Jaw Pain   Current Status: symptoms are the same         Had covid a little over a month ago - took paxlovid  Now with left ear pain, TMJ  Glands feel swollen  Had yellow thick mucus. Used nasal spray. Doesn't use antibiotics often d/t c diff easily  Hx several sinus infections.  Felt like nipped it  Can only open mouth/jaw so far before pain starts  eating mashed potatoes, yogurt, mac and cheese and watery " "oatmeal      Review of Systems   Constitutional, HEENT, cardiovascular, pulmonary, gi and gu systems are negative, except as otherwise noted.      Objective    /78 (BP Location: Left arm, Patient Position: Sitting, Cuff Size: Adult Large)   Pulse 82   Temp 97.6  F (36.4  C) (Oral)   Resp 24   Ht 1.626 m (5' 4\")   Wt 81.3 kg (179 lb 3.2 oz)   LMP  (LMP Unknown)   SpO2 98%   BMI 30.76 kg/m    Body mass index is 30.76 kg/m .  Physical Exam   GENERAL: healthy, alert and no distress  EYES: Eyes grossly normal to inspection, PERRL and conjunctivae and sclerae normal  HENT: ear canals and TM's normal, nose and mouth without ulcers or lesions. Only able to open mouth limited amount  NECK: no adenopathy  RESP: lungs clear to auscultation - no rales, rhonchi or wheezes  CV: regular rate and rhythm, normal S1 S2, no S3 or S4, no murmur, click or rub  PSYCH: mentation appears normal, affect normal/bright                    "

## 2023-03-24 ENCOUNTER — VIRTUAL VISIT (OUTPATIENT)
Dept: SLEEP MEDICINE | Facility: CLINIC | Age: 73
End: 2023-03-24
Attending: INTERNAL MEDICINE
Payer: MEDICARE

## 2023-03-24 DIAGNOSIS — R06.3 CHEYNE-STOKES BREATHING DISORDER: Chronic | ICD-10-CM

## 2023-03-24 PROCEDURE — 95800 SLP STDY UNATTENDED: CPT | Performed by: INTERNAL MEDICINE

## 2023-03-24 NOTE — PROGRESS NOTES
Device has been registered and shipped via [employer] on 3/24/2023. Patient was notified that package was mailed out.

## 2023-03-28 ENCOUNTER — THERAPY VISIT (OUTPATIENT)
Dept: PHYSICAL THERAPY | Facility: CLINIC | Age: 73
End: 2023-03-28
Payer: MEDICARE

## 2023-03-28 ENCOUNTER — MYC MEDICAL ADVICE (OUTPATIENT)
Dept: UROLOGY | Facility: CLINIC | Age: 73
End: 2023-03-28

## 2023-03-28 DIAGNOSIS — R30.0 DYSURIA: ICD-10-CM

## 2023-03-28 DIAGNOSIS — M26.609 TMJ (TEMPOROMANDIBULAR JOINT SYNDROME): Primary | ICD-10-CM

## 2023-03-28 DIAGNOSIS — R82.90 URINE MALODOR: Primary | ICD-10-CM

## 2023-03-28 PROCEDURE — 97110 THERAPEUTIC EXERCISES: CPT | Mod: GP | Performed by: PHYSICAL THERAPIST

## 2023-03-28 PROCEDURE — 97161 PT EVAL LOW COMPLEX 20 MIN: CPT | Mod: GP | Performed by: PHYSICAL THERAPIST

## 2023-03-28 NOTE — TELEPHONE ENCOUNTER
Orders Placed This Encounter   Procedures     Routine UA with microscopic - No culture     Standing Status:   Future     Standing Expiration Date:   3/28/2024     Urine Culture Aerobic Bacterial     Standing Status:   Future     Standing Expiration Date:   3/28/2024

## 2023-03-28 NOTE — PROGRESS NOTES
Physical Therapy Initial Evaluation  Subjective:  The history is provided by the patient. No  was used.   Therapist Generated HPI Evaluation  Problem details: Pt reports chronic jaw pain for up to 30 years. About one month ago (Feb 2023) the symptoms increased without known cause. Notes that her stress levels have increased since Sep 2022 (UTIs, sepsis, water leak after large snowfall). .         Type of problem:  TMJ left.    This is a chronic condition.  Condition occurred with:  Insidious onset.  Where condition occurred: for unknown reasons.    Pain is described as other, aching and sharp (throbs in ear) and is intermittent.  Pain radiates to:  Ear. Pain is the same all the time.  Since onset symptoms are unchanged.  Associated symptoms:  Bite feels off, difficulty swallowing, ear plugging, joint noise, muscle tightness, stress, cheek pain, ear ache and stiffness/limited opening. Symptoms are exacerbated by other, chewing, clenching, talking, stress, dental appointments, bushing/flossing, opening and yawning (closing mouth)  and relieved by ice, heat and NSAID's (rest position of tongue).  Special tests included:  X-ray (negative for fracture or dislocation).      Parafunctional Habits:    Bruxism:  Yes        Caffeine:  Yes    Aerobic Exercise:  No  Sleep Quality:  Poor (central apnea)  Patient Health History         Pain is reported as 8/10 on pain scale.  General health as reported by patient is fair.  Pertinent medical history includes: osteoarthritis, overweight, high blood pressure, heart problems, depression, implanted device, asthma, kidney disease, sleep disorder/apnea, concussions/dizziness and other (cetnral apnea, sepsis Nov 2022 following UTI/c-diff, hiatal hernia, past concussions).   Red flags:  None as reported by patient.  Medical allergies: latex, adhesives and other. Other medical allergies details: sulfa.   Surgeries include:  Other and heart surgery. Other surgery history  details: 2 stents and LDA (descending aorta).    Current medications:  Cardiac medication, sleep medication, anti-depressants and high blood pressure medication. Other medications details: [place], Inhaler.    Current occupation is retired.      Other job/home tasks details: mostly sedentary.                                  Objective:  Standing Alignment:    Cervical/Thoracic:  Forward head                                                            TMJ Evaluation  ROM:       AROM TMJ:  Openin mm     Left Laterotrusion:  9    Right Laterotrusion:  9          Associated Findings: Headaches (tmj): frontal.    Previous Interventions:      Dental/Orthotics:  Partial frontal dental implant, causes exaggerated overjet  Palpation:    Left side tenderness present at:  Superficial Masseter  Right side tenderness present at:  Superficial Masseter    Movement Characteristics:    Click:  None        Early Translation:  No                    Madeline Cervical Evaluation    Posture:  Sitting: fair            Movement Loss:    Flexion (Flex): nil  Retraction (RET): min  Extension (EXT): mod  Lateral Flexion Right (LF R): mod  Lateral Flexion Left (LF L): mod  Rotation Right (ROT R): min  Rotation Left (ROT L): min                                                 ROS    Assessment/Plan:    Patient is a 72 year old female with left side TMJ complaints.    Patient has the following significant findings with corresponding treatment plan.                Diagnosis 1:  L TMD  Pain -  manual therapy, self management, education, directional preference exercise and home program  Decreased ROM/flexibility - manual therapy, therapeutic exercise, therapeutic activity and home program  Decreased joint mobility - manual therapy, therapeutic exercise, therapeutic activity and home program  Decreased strength - therapeutic exercise, therapeutic activities and home program  Inflammation - self management/home program  Decreased function -  therapeutic activities and home program  Impaired posture - neuro re-education, therapeutic activities and home program    T  1) Decision-Making    Low complexity using standardized patient assessment instrument and/or measureable assessment of functional outcome.  Cumulative Therapy Evaluation is: Low complexity.    Previous and current functional limitations:  (See Goal Flow Sheet for this information)    Short term and Long term goals: (See Goal Flow Sheet for this information)     Communication ability:  Patient appears to be able to clearly communicate and understand verbal and written communication and follow directions correctly.  Treatment Explanation - The following has been discussed with the patient:   RX ordered/plan of care  Anticipated outcomes  Possible risks and side effects  This patient would benefit from PT intervention to resume normal activities.   Rehab potential is excellent.    Frequency:  1 X week, once daily  Duration:  for 12 weeks  Discharge Plan:  Achieve all LTG.  Independent in home treatment program.  Reach maximal therapeutic benefit.    Please refer to the daily flowsheet for treatment today, total treatment time and time spent performing 1:1 timed codes.

## 2023-03-28 NOTE — PROGRESS NOTES
[facility]    OUTPATIENT Physical Therapy ORTHOPEDIC EVALUATION  PLAN OF TREATMENT FOR OUTPATIENT REHABILITATION  (COMPLETE FOR INITIAL CLAIMS ONLY)  Patient's Last Name, First Name, M.I.  YOB: 1950  Gem Gama    Provider s Name:  [facility]   Medical Record No.  [id number]   Start of Care Date:  03/28/23   Onset Date:   03/20/23   Treatment Diagnosis:  L TMD Medical Diagnosis:  TMJ (temporomandibular joint syndrome)       Goals:     03/28/23 0700   Chewing   Previous Functional Level No restrictions   Current Functional Level Chew soft foods without pain   STG Target Performance Chew medium foods without pain   Rationale for healthy eating   Due Date 03/28/23   LTG Target Performance Chew all foods without pain   Rationale for healthy eating   Due Date 06/28/23         Therapy Frequency:  1x/wk  Predicted Duration of Therapy Intervention:  12 wks    Nelson Lai, PT                 I CERTIFY THE NEED FOR THESE SERVICES FURNISHED UNDER        THIS PLAN OF TREATMENT AND WHILE UNDER MY CARE     (Physician co-signature of this document indicates review and certification of the therapy plan).                     Certification Date From:  03/28/23   Certification Date To:  06/25/23    Referring Provider:  Arabella Tenorio    Initial Assessment        See Epic Evaluation SOC Date: 03/28/23

## 2023-03-29 ENCOUNTER — LAB (OUTPATIENT)
Dept: LAB | Facility: CLINIC | Age: 73
End: 2023-03-29
Payer: MEDICARE

## 2023-03-29 DIAGNOSIS — R82.90 URINE MALODOR: ICD-10-CM

## 2023-03-29 DIAGNOSIS — R30.0 DYSURIA: ICD-10-CM

## 2023-03-29 LAB
ALBUMIN UR-MCNC: 30 MG/DL
APPEARANCE UR: CLEAR
BACTERIA #/AREA URNS HPF: ABNORMAL /HPF
BILIRUB UR QL STRIP: NEGATIVE
COLOR UR AUTO: YELLOW
GLUCOSE UR STRIP-MCNC: NEGATIVE MG/DL
HGB UR QL STRIP: ABNORMAL
KETONES UR STRIP-MCNC: NEGATIVE MG/DL
LEUKOCYTE ESTERASE UR QL STRIP: ABNORMAL
NITRATE UR QL: NEGATIVE
PH UR STRIP: 7 [PH] (ref 5–7)
RBC #/AREA URNS AUTO: ABNORMAL /HPF
SP GR UR STRIP: 1.01 (ref 1–1.03)
SQUAMOUS #/AREA URNS AUTO: ABNORMAL /LPF
UROBILINOGEN UR STRIP-ACNC: 0.2 E.U./DL
WBC #/AREA URNS AUTO: >100 /HPF
WBC CLUMPS #/AREA URNS HPF: PRESENT /HPF

## 2023-03-29 PROCEDURE — 81001 URINALYSIS AUTO W/SCOPE: CPT

## 2023-03-29 PROCEDURE — 87086 URINE CULTURE/COLONY COUNT: CPT

## 2023-03-29 NOTE — PROGRESS NOTES
Watch Pat has been scored using rule 1B, 4%.  Patient to follow up with provider to determine appropriate therapy.    PAT AHI: 4.4    Ordering Provider: Kashif Hadley MD

## 2023-03-29 NOTE — PROCEDURES
"WatchPAT - HOME SLEEP STUDY INTERPRETATION    Patient: Gem Gama  MRN: [id number]  YOB: 1950  Study Date: 3/29/2023  Referring Provider: Danny Conteh;  Ordering Provider: Kashif Hadley MD    Chain of custody patient verification was not enabled.      Indications for Home Study: Gem Gama is a 72 year old female with a history of Cheyne-Rogers respirations who presents for re-evaluation of hypoxemia on adaptive-servo ventilation .    Estimated body mass index is 30.76 kg/m  as calculated from the following:    Height as of 3/20/23: 1.626 m (5' 4\").    Weight as of 3/20/23: 81.3 kg (179 lb 3.2 oz).  Total score - [place]: 13 (1/27/2023  3:12 PM)      Data: A full night home sleep study was performed recording the standard physiologic parameters including peripheral arterial tonometry (PAT), sound/snoring, body position,  movement, sound, and oxygen saturation by pulse oximetry. Pulse rate was estimated by oximetry recording. Sleep staging (wake, REM, light, and deep sleep) was derived from PAT signal.  This study was considered adequate based on > 4 hours of quality oximetry and respiratory recording. As specified by the AASM Manual for the Scoring of Sleep and Associated events, version 2.3, Rule VIII.D 1B, 4% oxygen desaturation scoring for hypopneas is used as a standard of care on all home sleep apnea testing.    Total Recording Time: 7 hrs, 56 min  Total Sleep Time: 6 hrs, 46 min  % of Sleep Time REM: 7.5%    Respiratory:  Respiratory events: The PAT respiratory disturbance index [pRDI] was 6 events per hour.  The PAT apnea/hypopnea index [pAHI] was 4 events per hour.  LISETH was 3 events per hour.  During REM sleep the pAHI was 4.  Sleep Associated Hypoxemia: sustained hypoxemia was not present. Mean oxygen saturation was 95%.  Minimum was 88%.  Time with saturation less than 88% was 0 minutes.    Heart Rate: By pulse oximetry normal rate was noted.     Position: Percent of " time spent: supine - 20%, prone - 0%, on right - 68%, on left - <1%     Assessment:   Sleep associated hypoxemia was not present.        Diagnosis Code(s): Hypoxemia G47.36, Central Sleep Apnea G47.31    Kashif Hadley MD, March 29, 2023   Diplomate, American Board of Internal Medicine, Sleep Medicine

## 2023-03-30 ENCOUNTER — OFFICE VISIT (OUTPATIENT)
Dept: UROLOGY | Facility: CLINIC | Age: 73
End: 2023-03-30
Payer: MEDICARE

## 2023-03-30 VITALS
TEMPERATURE: 98.1 F | WEIGHT: 178 LBS | HEART RATE: 84 BPM | BODY MASS INDEX: 30.39 KG/M2 | SYSTOLIC BLOOD PRESSURE: 92 MMHG | HEIGHT: 64 IN | DIASTOLIC BLOOD PRESSURE: 54 MMHG

## 2023-03-30 DIAGNOSIS — Z98.890 HISTORY OF PYELOPLASTY: ICD-10-CM

## 2023-03-30 DIAGNOSIS — N39.0 RECURRENT UTI: ICD-10-CM

## 2023-03-30 DIAGNOSIS — N95.2 VAGINAL ATROPHY: ICD-10-CM

## 2023-03-30 DIAGNOSIS — Z87.448 HISTORY OF PYELONEPHRITIS: ICD-10-CM

## 2023-03-30 DIAGNOSIS — Z87.440 HISTORY OF UTI: Primary | ICD-10-CM

## 2023-03-30 DIAGNOSIS — R30.0 DYSURIA: ICD-10-CM

## 2023-03-30 DIAGNOSIS — Z87.448 HISTORY OF PYELOPLASTY: ICD-10-CM

## 2023-03-30 PROCEDURE — 99214 OFFICE O/P EST MOD 30 MIN: CPT | Mod: 25 | Performed by: UROLOGY

## 2023-03-30 PROCEDURE — 96372 THER/PROPH/DIAG INJ SC/IM: CPT | Performed by: UROLOGY

## 2023-03-30 RX ORDER — GENTAMICIN 40 MG/ML
160 INJECTION, SOLUTION INTRAMUSCULAR; INTRAVENOUS ONCE
Status: COMPLETED | OUTPATIENT
Start: 2023-03-30 | End: 2023-03-30

## 2023-03-30 RX ADMIN — GENTAMICIN 160 MG: 40 INJECTION, SOLUTION INTRAMUSCULAR; INTRAVENOUS at 11:12

## 2023-03-30 ASSESSMENT — PAIN SCALES - GENERAL: PAINLEVEL: NO PAIN (0)

## 2023-03-30 NOTE — LETTER
3/30/2023       RE: Gem Gama  [address]  [place] [address]     Dear Colleague,    Thank you for referring your patient, Gem Gama, to the [facility] [place] at [facility]. Please see a copy of my visit note below.    March 30, 2023    Gem was seen today for follow up.    Diagnoses and all orders for this visit:    UTI (urinary tract infection)  -     gentamicin (GARAMYCIN) injection 160 mg  -     INTRAMUSCULAR/SUB-Q INJECTION    History of pyeloplasty  -     gentamicin (GARAMYCIN) injection 160 mg    History of pyelonephritis  -     estradiol (ESTRING) 2 MG vaginal ring; Place 1 each vaginally every 3 months  -     gentamicin (GARAMYCIN) injection 160 mg    Dysuria  -     estradiol (ESTRING) 2 MG vaginal ring; Place 1 each vaginally every 3 months  -     gentamicin (GARAMYCIN) injection 160 mg    Recurrent UTI  -     estradiol (ESTRING) 2 MG vaginal ring; Place 1 each vaginally every 3 months  -     gentamicin (GARAMYCIN) injection 160 mg    Vaginal atrophy  -     estradiol (ESTRING) 2 MG vaginal ring; Place 1 each vaginally every 3 months    Will try vaginal estrogen ring     Given her history of C diff, history of pyelo will empirically treat given the significant pyuria on urinalysis with symptoms    RTC tomorrow for estring placement and second dose of IM gentamicin     RTC 3 months for recheck.  If UTIs continue will need her to see Dr Bangura for another opinion    8 minutes were spent today on the day of the encounter in reviewing the EMR including the urinalysis, direct patient care including prescription medications, coordination of care and documentation    Mel Rodriguez MD MPH  (she/her/hers)   of Urology  [facility]      Subjective    Not using the estrogen cream as often as she should    Having UTI symptoms    She denies any changes in health since last visit    BP  "92/54   Pulse 84   Temp 98.1  F (36.7  C)   Ht 1.626 m (5' 4\")   Wt 80.7 kg (178 lb)   LMP  (LMP Unknown)   BMI 30.55 kg/m    GENERAL: healthy, alert and no distress  EYES: Eyes grossly normal to inspection, conjunctivae and sclerae normal  HENT: normal cephalic/atraumatic.  External ears, nose and mouth without ulcers or lesions.  RESP: no audible wheeze, cough, or visible cyanosis.  No visible retractions or increased work of breathing.  Able to speak fully in complete sentences.  NEURO: Cranial nerves grossly intact, mentation intact and speech normal  PSYCH: mentation appears normal, affect normal/bright, judgement and insight intact, normal speech and appearance well-groomed    Urinalysis was reviewed 5-10 RBC, > 100 WBC, WBC clumps    CC  Patient Care Team:  Danny Conteh MD as PCP - General (Internal Medicine)  Danny Conteh MD as Assigned PCP  Aubree Butts PA as Assigned Surgical Provider  Shade Chu MD as Assigned OBGYN Provider  Luis A Moody MD as Assigned Pulmonology Provider  Mel Dennison MD as Assigned Heart and Vascular Provider  Sabino Lewis EP as Cardiac Rehabilitation Therapist  Evangelist Lee MD as Hospitalist (Internal Medicine)  Mel Rodriguez MD as MD (Urology)  Chandu Gurrola PsyD as Assigned Sleep Provider  "

## 2023-03-30 NOTE — PROGRESS NOTES
Subjective:  HPI  Physical Exam                    Objective:  System    Physical Exam    General     ROS    Assessment/Plan:    SUBJECTIVE  Subjective changes as noted by pt:  Feels improvement. The ear doesn't hurt at all. Can close her teeth all the way and eating slightly tougher foods. Has been working at trying to keep the tongue on the roof of her mouth. Feels the exercises are helpful.        Current pain level: 3/10     Changes in function:  Yes (See Goal flowsheet attached for changes in current functional level)     Adverse reaction to treatment or activity:  None    OBJECTIVE  Changes in objective findings:  Yes, jaw opening 30 mm.         ASSESSMENT  Gem continues to require intervention to meet STG and LTG's: PT  Patient's symptoms are resolving.  Patient is progressing as expected.  Response to therapy has shown an improvement in  pain level  Progress made towards STG/LTG?  Yes (See Goal flowsheet attached for updates on achievement of STG and LTG)    PLAN  Current treatment program is being advanced to more complex exercises.  Continue current treatment until MD allows progression of the treatment plan.    PTA/ATC plan:  N/A    Please refer to the daily flowsheet for treatment today, total treatment time and time spent performing 1:1 timed codes.

## 2023-03-30 NOTE — NURSING NOTE
The following medication was given:     MEDICATION:  Gentamicin   ROUTE: IM  SITE: Ventrogluteal - Left  DOSE: 80 mg/ 2 mL  LOT #: [id number]  : Weebly  EXPIRATION DATE: 04/24  NDC#: 81648-501-23   Was there drug waste? No     MEDICATION:  Gentamicin   ROUTE: IM  SITE: Ventrogluteal - Right  DOSE: 80 mg/ 2 mL  LOT #: [id number]  : Weebly  EXPIRATION DATE: 04/24  NDC#: 54843-566-02   Was there drug waste? No    Prior to injection, verified patient identity using patient's name and date of birth.  Due to injection administration, patient instructed to remain in clinic for 15 minutes  afterwards, and to report any adverse reaction to me immediately.      Drug Amount Wasted:  None.  Vial/Syringe: Single dose vial    Yamini Monson CMA  March 30, 2023  11:25 AM

## 2023-03-30 NOTE — PROGRESS NOTES
"March 30, 2023    Gem was seen today for follow up.    Diagnoses and all orders for this visit:    UTI (urinary tract infection)  -     gentamicin (GARAMYCIN) injection 160 mg  -     INTRAMUSCULAR/SUB-Q INJECTION    History of pyeloplasty  -     gentamicin (GARAMYCIN) injection 160 mg    History of pyelonephritis  -     estradiol (ESTRING) 2 MG vaginal ring; Place 1 each vaginally every 3 months  -     gentamicin (GARAMYCIN) injection 160 mg    Dysuria  -     estradiol (ESTRING) 2 MG vaginal ring; Place 1 each vaginally every 3 months  -     gentamicin (GARAMYCIN) injection 160 mg    Recurrent UTI  -     estradiol (ESTRING) 2 MG vaginal ring; Place 1 each vaginally every 3 months  -     gentamicin (GARAMYCIN) injection 160 mg    Vaginal atrophy  -     estradiol (ESTRING) 2 MG vaginal ring; Place 1 each vaginally every 3 months    Will try vaginal estrogen ring     Given her history of C diff, history of pyelo will empirically treat given the significant pyuria on urinalysis with symptoms    RTC tomorrow for estring placement and second dose of IM gentamicin     RTC 3 months for recheck.  If UTIs continue will need her to see Dr Bangura for another opinion    8 minutes were spent today on the day of the encounter in reviewing the EMR including the urinalysis, direct patient care including prescription medications, coordination of care and documentation    Mel Rodriguez MD MPH  (she/her/hers)   of Urology  [facility]      Subjective    Not using the estrogen cream as often as she should    Having UTI symptoms    She denies any changes in health since last visit    BP 92/54   Pulse 84   Temp 98.1  F (36.7  C)   Ht 1.626 m (5' 4\")   Wt 80.7 kg (178 lb)   LMP  (LMP Unknown)   BMI 30.55 kg/m    GENERAL: healthy, alert and no distress  EYES: Eyes grossly normal to inspection, conjunctivae and sclerae normal  HENT: normal cephalic/atraumatic.  External ears, nose and mouth without " ulcers or lesions.  RESP: no audible wheeze, cough, or visible cyanosis.  No visible retractions or increased work of breathing.  Able to speak fully in complete sentences.  NEURO: Cranial nerves grossly intact, mentation intact and speech normal  PSYCH: mentation appears normal, affect normal/bright, judgement and insight intact, normal speech and appearance well-groomed    Urinalysis was reviewed 5-10 RBC, > 100 WBC, WBC clumps      CC  Patient Care Team:  Danny Conteh MD as PCP - General (Internal Medicine)  Danny Conteh MD as Assigned PCP  Aubree Butts PA as Assigned Surgical Provider  Shade Chu MD as Assigned OBGYN Provider  Luis A Moody MD as Assigned Pulmonology Provider  Mel Dennison MD as Assigned Heart and Vascular Provider  Sabino Lewis EP as Cardiac Rehabilitation Therapist  Evangelist Lee MD as Hospitalist (Internal Medicine)  Mel Rodriguez MD as MD (Urology)  Chandu Gurrola PsyD as Assigned Sleep Provider

## 2023-03-30 NOTE — PATIENT INSTRUCTIONS
Websites with free information:    American [employer]ro[employer] patient website: www.voicesforpfd.org    Total Control Program: www.totalcontrolprogram.com     the estrogen ring and bring in tomorrow    Complete the gentamicin    Supplements to prevent UTI to consider  -Probiotics  -Cranberry (for these products let them know a doctor is recommending them)   Ellura: www.myellura.[employer]   Theracran HP by [employer] [facility] [id number]  -d-mannose 2gm daily  -Vitamin C 500-1000mg twice a day    Follow up in 3 months, sooner if needed    It was a pleasure meeting with you today.  Thank you for allowing me and my team the privilege of caring for you today.  YOU are the reason we are here, and I truly hope we provided you with the excellent service you deserve.  Please let us know if there is anything else we can do for you so that we can be sure you are leaving completely satisfied with your care experience.

## 2023-03-31 ENCOUNTER — OFFICE VISIT (OUTPATIENT)
Dept: UROLOGY | Facility: CLINIC | Age: 73
End: 2023-03-31
Payer: MEDICARE

## 2023-03-31 ENCOUNTER — TELEPHONE (OUTPATIENT)
Dept: UROLOGY | Facility: CLINIC | Age: 73
End: 2023-03-31

## 2023-03-31 DIAGNOSIS — N39.0 RECURRENT UTI: Primary | ICD-10-CM

## 2023-03-31 LAB — BACTERIA UR CULT: NO GROWTH

## 2023-03-31 PROCEDURE — 96372 THER/PROPH/DIAG INJ SC/IM: CPT | Performed by: UROLOGY

## 2023-03-31 PROCEDURE — 99207 PR NO BILLABLE SERVICE THIS VISIT: CPT

## 2023-03-31 RX ORDER — GENTAMICIN 40 MG/ML
80 INJECTION, SOLUTION INTRAMUSCULAR; INTRAVENOUS ONCE
Status: CANCELLED | OUTPATIENT
Start: 2023-03-31 | End: 2023-03-31

## 2023-03-31 RX ORDER — GENTAMICIN 40 MG/ML
80 INJECTION, SOLUTION INTRAMUSCULAR; INTRAVENOUS ONCE
Status: COMPLETED | OUTPATIENT
Start: 2023-03-31 | End: 2023-03-31

## 2023-03-31 RX ADMIN — GENTAMICIN 80 MG: 40 INJECTION, SOLUTION INTRAMUSCULAR; INTRAVENOUS at 14:17

## 2023-03-31 NOTE — TELEPHONE ENCOUNTER
Pt called and notified that her UC came back and shows no growth. Per provider she does not need her next dose of Gentamicin. Pt expressed understanding and will cancel her appointment if she does not get her e-string.     Direct line given to patient.    Yamini Monson CMA  03/31/23  3:07 PM

## 2023-03-31 NOTE — PROGRESS NOTES
Gem Gama comes into clinic today at the request of Dr. Mel Rodriguez with the diagnosis of UTI for Gentamicin injection.        The following medication was given:     MEDICATION:  Gentamicin  ROUTE: IM  SITE: Ventrogluteal - Right  DOSE: 80mg  LOT #: [id number]  : Navut  EXPIRATION DATE: 04/24  NDC#: 86973-042-62   Was there drug waste? No      Pt tolerated injection well.        This service provided today was under the direct supervision of Dr. Chaparro German, who was available if needed.        Regino Butler EMT-P  3/31/2023  2:08 PM

## 2023-03-31 NOTE — NURSING NOTE
Gem Gama comes into clinic today at the request of Dr. Mel Rodriguez with the diagnosis of UTI for Gentamicin injection.      The following medication was given:     MEDICATION:  Gentamicin  ROUTE: IM  SITE: Ventrogluteal - Right  DOSE: 80mg  LOT #: [id number]  : Fannect  EXPIRATION DATE: 04/24  NDC#: 37865-657-67   Was there drug waste? No      Pt tolerated injection well.      This service provided today was under the direct supervision of Dr. Chaparro German, who was available if needed.      Regino Butler EMT-P  3/31/2023  2:08 PM

## 2023-04-02 ENCOUNTER — HEALTH MAINTENANCE LETTER (OUTPATIENT)
Age: 73
End: 2023-04-02

## 2023-04-04 ENCOUNTER — THERAPY VISIT (OUTPATIENT)
Dept: PHYSICAL THERAPY | Facility: CLINIC | Age: 73
End: 2023-04-04
Attending: NURSE PRACTITIONER
Payer: MEDICARE

## 2023-04-04 DIAGNOSIS — R68.84 JAW PAIN: ICD-10-CM

## 2023-04-04 PROCEDURE — 97140 MANUAL THERAPY 1/> REGIONS: CPT | Mod: GP | Performed by: PHYSICAL THERAPIST

## 2023-04-04 PROCEDURE — 97110 THERAPEUTIC EXERCISES: CPT | Mod: GP | Performed by: PHYSICAL THERAPIST

## 2023-04-10 ENCOUNTER — THERAPY VISIT (OUTPATIENT)
Dept: PHYSICAL THERAPY | Facility: CLINIC | Age: 73
End: 2023-04-10
Payer: MEDICARE

## 2023-04-10 DIAGNOSIS — R68.84 JAW PAIN: Primary | ICD-10-CM

## 2023-04-10 DIAGNOSIS — M26.609 TMJ (TEMPOROMANDIBULAR JOINT SYNDROME): ICD-10-CM

## 2023-04-10 PROCEDURE — 97140 MANUAL THERAPY 1/> REGIONS: CPT | Mod: GP | Performed by: PHYSICAL THERAPIST

## 2023-04-10 PROCEDURE — 97110 THERAPEUTIC EXERCISES: CPT | Mod: GP | Performed by: PHYSICAL THERAPIST

## 2023-04-10 NOTE — PROGRESS NOTES
Subjective:  HPI  Physical Exam                    Objective:  System    Physical Exam    General     ROS    Assessment/Plan:    SUBJECTIVE  Subjective changes as noted by pt:  About the same as last week, but still overall better. Fell at home this week and has been focusing on that more and a little less on her PT exercises.      Current pain level: 1/10     Changes in function:  Yes (See Goal flowsheet attached for changes in current functional level)     Adverse reaction to treatment or activity:  None    OBJECTIVE  Changes in objective findings:  Yes, cues necessary for correct technique on controlled rotation with tongue cluck.         ASSESSMENT  Gem continues to require intervention to meet STG and LTG's: PT  Patient is progressing as expected.  Response to therapy has shown lack of progress in  pain level  Progress made towards STG/LTG?  Yes (See Goal flowsheet attached for updates on achievement of STG and LTG)    PLAN  Continue current treatment plan until patient demonstrates readiness to progress to higher level exercises.    PTA/ATC plan:  N/A    Please refer to the daily flowsheet for treatment today, total treatment time and time spent performing 1:1 timed codes.

## 2023-04-12 ENCOUNTER — OFFICE VISIT (OUTPATIENT)
Dept: SLEEP MEDICINE | Facility: CLINIC | Age: 73
End: 2023-04-12
Payer: MEDICARE

## 2023-04-12 VITALS
HEIGHT: 64 IN | OXYGEN SATURATION: 96 % | BODY MASS INDEX: 30.39 KG/M2 | DIASTOLIC BLOOD PRESSURE: 63 MMHG | WEIGHT: 178 LBS | SYSTOLIC BLOOD PRESSURE: 116 MMHG | HEART RATE: 76 BPM

## 2023-04-12 DIAGNOSIS — G47.8 ABNORMAL REM SLEEP: ICD-10-CM

## 2023-04-12 DIAGNOSIS — G47.61 PLMD (PERIODIC LIMB MOVEMENT DISORDER): ICD-10-CM

## 2023-04-12 DIAGNOSIS — F51.04 CHRONIC INSOMNIA: ICD-10-CM

## 2023-04-12 DIAGNOSIS — R06.3 CHEYNE-STOKES BREATHING DISORDER: Primary | Chronic | ICD-10-CM

## 2023-04-12 PROCEDURE — 99215 OFFICE O/P EST HI 40 MIN: CPT | Performed by: INTERNAL MEDICINE

## 2023-04-12 RX ORDER — ZINC GLUCONATE 50 MG
50 TABLET ORAL EVERY MORNING
COMMUNITY

## 2023-04-12 ASSESSMENT — SLEEP AND FATIGUE QUESTIONNAIRES
HOW LIKELY ARE YOU TO NOD OFF OR FALL ASLEEP IN A CAR, WHILE STOPPED FOR A FEW MINUTES IN TRAFFIC: WOULD NEVER DOZE
HOW LIKELY ARE YOU TO NOD OFF OR FALL ASLEEP WHILE LYING DOWN TO REST IN THE AFTERNOON WHEN CIRCUMSTANCES PERMIT: HIGH CHANCE OF DOZING
HOW LIKELY ARE YOU TO NOD OFF OR FALL ASLEEP WHILE SITTING AND TALKING TO SOMEONE: SLIGHT CHANCE OF DOZING
HOW LIKELY ARE YOU TO NOD OFF OR FALL ASLEEP WHILE WATCHING TV: MODERATE CHANCE OF DOZING
HOW LIKELY ARE YOU TO NOD OFF OR FALL ASLEEP WHILE SITTING QUIETLY AFTER LUNCH WITHOUT ALCOHOL: MODERATE CHANCE OF DOZING
HOW LIKELY ARE YOU TO NOD OFF OR FALL ASLEEP WHEN YOU ARE A PASSENGER IN A CAR FOR AN HOUR WITHOUT A BREAK: HIGH CHANCE OF DOZING
HOW LIKELY ARE YOU TO NOD OFF OR FALL ASLEEP WHILE SITTING AND READING: HIGH CHANCE OF DOZING
HOW LIKELY ARE YOU TO NOD OFF OR FALL ASLEEP WHILE SITTING INACTIVE IN A PUBLIC PLACE: MODERATE CHANCE OF DOZING

## 2023-04-12 NOTE — PROGRESS NOTES
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  Daytime symptoms are improved. Tolerance complicated by TMJ symptoms   - Continue PT, consider mandibular advancement device as an adjunct though has poor lower dentitia  - Consider chin strap, trial of nasal mask      Persistent excessive daytime sleepiness  Poor candidate for stimulant therapy due to coronary artery disease   - Consider hypersomnia workup, for other narcolepsy treatments (pitolisant, soriamfetol, e.g.)    Insomnia  Quiescent       Gem Gama will follow up in about 6 month(s).       I spent 40 minutes with patient including counseling, and 5 minutes with chart review, and documentation       History of Present Illness:  Chief Complaint   Patient presents with     CPAP Follow Up       DME [facility]    She has been sleepy since . Symptoms started after her   in . Fatigue also started about then. She states her first sleep study was in the . At that time she was diagnosed with obstructive sleep apnea, though diagnosis uncertain and no records are available.     She also carries a diagnosis of narcolepsy which she says was diagnosed with nap testing at [facility] between  and . She was treated with stimulant therapy until her heart attack in 2019    She has been on zoloft or other antidepressants off an on since     Sleep study [facility] 13 - OAHI 18, LAZARO 5.6, RDI 23, low O2 88%. PLMI 34, PLMAI 4.4    Sleep study [facility] 2013 (200#) - CPAP titrated to 14 cmh20. Cheyne-Rogers respirations were present and persisted. AHI 65, RDI 66. Central AHI 56. Low O2 88%. PLMI 10    Titration study [facility] 13 - Bilevel, ASV titration. ASV EPAP min 8. EPAP max 15 Min PS 1, Max PS 15, auto was 'optimal'.   - PLMI 39, PLMAI 3.8    She was on an ASV  machine in 2014    She stopped using PAP approximately 2017 because it did not seem to help her feel better    She presented to [facility] 6/2022 for fatigue and sleepiness (ESS 14) with long sleep times and additional symptoms of night sweats, nocturnal enuresis, headaches in morning. Comorbid coronary artery disease.  - Sleep onset difficulties (LEIA 14). Multifactorial: psychophysiologic insomnia comorbid to depression, sleep disordered breathing, narcolepsy, periodic limb movements, delayed sleep phase may all play a role. Referred for cognitive behavioral training   - Nightmare disorder. Referred for possible dream rehearsal therapy    Echo 7/2022- Left ventricular systolic function is normal. The visual ejection fraction is 55-60%.    She saw Dr. Gurrola 8/2022, 10/2022.    Ferritin 102 on 9/21/2022    Study Date: 9/6/2022 (186.0 lbs)   - Polysomnogram revealed a presence of 14 obstructive, 144 central, and 24 mixed apneas resulting in an apnea index of 32 events per hour. There were 8 obstructive hypopneas and 35 central hypopneas resulting in an obstructive hypopnea index of 1 and central hypopnea index of 6 events per hour.   - AHI 40.3 (central apnea/hypopnea index 32 events per hour). REM AHI 21, supine AHI 61. RDI 41.4 events per hour.  - Respiratory rate and pattern was notable for Cheyne-Rogers respiratory rate and pattern.   - Lowest oxygen saturation 74.0%. Time <= 88% was 33.1 minutes. Time <= 89% was 46.8 minutes. There was a prolonged desaturation late in the study lasting 22 minutes that is suspicious for artifact   - Arousal index was increased at 44.1  - PLM index 86 movements per hour. PLM Arousal Index was 13 per hour.  - Excessive transient/sustained muscle activity was present in 32/78 epochs of REM      MRI 10/1/22 - Normal pituitary gland and whole brain MRI.    11/18/2022 [place] [facility] Titration Sleep Study (179.0 lbs)   -  Titrated from ASV EPAP min 5, PS min  3, max PS 15 cmH2O up to ASV 13/3/15 cmH2O. The final pressure achieved was ASV 13/3/15 cmH2O with a residual AHI of 6 events per hour. Time in REM supine on final pressure was 0 minutes.   - Hypoxemia resolved with pressures above EPAP 7 cmH20.  - Arousal index 54 arousals per hour  - PLM index 2.3 movements per hour. PLM Arousal Index 0.2 per hour.    Patient had a upper respiratory illness with a lot of coughing the night of the titration study.    Patient started ASVauto at EPAP 7-13, PS min 0, Pressure max 25 cmH2O    WatchPAT 3/2023 on ASV without hypoxemia    Do you use a CPAP Machine at home: Yes  Overall, on a scale of 0-10 how would you rate your CPAP (0 poor, 10 great): 8    What type of mask do you use: full-face mask   Is your mask comfortable: Yes    Is your mask leaking: No    Do you notice snoring with mask on: Yes  Do you notice gasping arousals with mask on: No  Are you having significant oral or nasal dryness: Yes  Is the pressure setting comfortable: Yes    What is your typical bedtime: 2 to3am  How long does it take you to go to sleep on PAP therapy: 20 min  What time do you typically get out of bed for the day: noon  How many hours on average per night are you using PAP therapy: dont know average...eight to ten hours when used  Do you feel well rested in the morning: No      ResMed   ASVauto Min EPAP 7, max EPAP 13, PS min 0, PS max 18 cmH2O 30 day usage data:  25% of days with > 4 hours of use. 5/30 days with no use.   Average use 8' 4 minutes per day.   95%ile Leak 0.1 L/min.   AHI 2 events per hour.   IPAP median 10, 95% IPAP 15  Median EPAP 8, 95% EPAP 11    She had Covid in February  She has been doing EMDR and waking more often with dreams   She has some jaw pain. She has seen a TMJ specialist at  and  which is helping, but still worsens overnight. She has poor lower dentitia    Her fatigue and sleepiness (ESS 14) are better with PAP but not resolved  Night sweats, headaches in  morning are improved  She has persistent nocturnal enuresis      EPWORTH SLEEPINESS SCALE         4/12/2023     2:10 PM    Bethany Sleepiness Scale ( PATRICE Dimas  1990-1997[facility] [place]/English - Final version - 21 Nov 07 - [facility] [place].)   Sitting and reading High chance of dozing   Watching TV Moderate chance of dozing   Sitting, inactive in a public place (e.g. a theatre or a meeting) Moderate chance of dozing   As a passenger in a car for an hour without a break High chance of dozing   Lying down to rest in the afternoon when circumstances permit High chance of dozing   Sitting and talking to someone Slight chance of dozing   Sitting quietly after a lunch without alcohol Moderate chance of dozing   In a car, while stopped for a few minutes in traffic Would never doze   Bethany Score (MC) 16   Bethany Score (Sleep) 16       INSOMNIA SEVERITY INDEX (LEIA)          4/12/2023     2:06 PM   Insomnia Severity Index (LIEA)   Difficulty falling asleep 3   Difficulty staying asleep 0   Problems waking up too early 0   How SATISFIED/DISSATISFIED are you with your CURRENT sleep pattern? 3   How NOTICEABLE to others do you think your sleep problem is in terms of impairing the quality of your life? 4   How WORRIED/DISTRESSED are you about your current sleep problem? 2   To what extent do you consider your sleep problem to INTERFERE with your daily functioning (e.g. daytime fatigue, mood, ability to function at work/daily chores, concentration, memory, mood, etc.) CURRENTLY? 2   LEIA Total Score 14       Guidelines for Scoring/Interpretation:  Total score categories:  0-7 = No clinically significant insomnia   8-14 = Subthreshold insomnia   15-21 = Clinical insomnia (moderate severity)  22-28 = Clinical insomnia (severe)  Used via courtesy of www.myhealth.va.gov with permission from Gerald Aguilar PhD., [facility]        Past medical/surgical history, family history, social history, medications and allergies were  reviewed.        Problem List:  Patient Active Problem List    Diagnosis Date Noted     Coronary artery disease with angina pectoris, unspecified vessel or lesion type, unspecified whether native or transplanted heart (H) 12/09/2020     Priority: High     Hospitalized 10/9/2019-10/13/2019 due to STEMI and underwent EDWIN x 2 to proximal LAD. Coronary angiogram showed severe single vessel obstructive CAD of LAD (100%) as well as moderate non-obstructive CAD in OM1 and OM2 (60%, 50%). Patient initially requiring intraaortic balloon pump and pressor support. Echocardiogram showed EF 35-40%.         Major depressive disorder, recurrent episode, mild (H) 01/24/2023     Priority: Medium     Generalized anxiety disorder 01/24/2023     Priority: Medium     Adjustment disorder with anxiety 01/04/2023     Priority: Medium     Pyelonephritis 11/01/2022     Priority: Medium     Sepsis, due to unspecified organism, unspecified whether acute organ dysfunction present (H) 11/01/2022     Priority: Medium     Cheyne-Rogers breathing disorder 09/09/2022     Priority: Medium     9/6/2022 [facility] Study (186.0 lbs) - Primary breathing pattern is Cheyne rogers breathing with desaturations and arousals. AHI 41.6, RDI 42.6, Supine AHI 67.7, REM AHI 24.6, Low O2 74.0%, Time Spent ?88% 33.1 minutes / Time Spent ?89% 46.8 minutes. There was a prolonged desaturation late in the study lasting 22 minutes that is suspicious for artifact. PLM index was 86.5 movements per hour. The PLM Arousal Index was 13.1 per hour.       Clostridium difficile infection 09/04/2022     Priority: Medium     Hyperlipidemia LDL goal <70 10/15/2020     Priority: Medium     History of ischemic cardiomyopathy 10/25/2019     Priority: Medium     Hospitalized 10/9/2019-10/13/2019 due to STEMI. Echocardiogram showed EF 35-40%. Later echocardiogram with improved EF 55-60%       Stage 3a chronic kidney disease (H) 12/18/2017     Priority: Medium     Primary  "narcolepsy without cataplexy 02/09/2017     Priority: Medium     Diagnosis of unclear veracity       Major depressive disorder, recurrent episode, moderate (H) 07/25/2013     Priority: Medium     Intermittent asthma 06/18/1990     Priority: Medium     PFTS 12/2021 - FEV1- 1.83 (86%), ratio 0.74, 14% change with bronchodilators,  %, %, DLCO 90%        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 11/28/2022     Priority: Low     Possible PLMD (periodic limb movement disorder) 11/28/2022     Priority: Low     Hiatal hernia 08/26/2022     Priority: Low     Chronic insomnia 06/02/2022     Priority: Low     Class 1 obesity due to excess calories with serious comorbidity and body mass index (BMI) of 30.0 to 30.9 in adult 06/01/2022     Priority: Low     Cholelithiasis without cholecystitis 10/15/2020     Priority: Low     Overactive bladder 09/27/2018     Priority: Low     Fatigue, unspecified type 09/27/2018     Priority: Low     Incontinence of feces with fecal urgency 03/15/2018     Priority: Low     Vitamin D deficiency 02/23/2017     Priority: Low     Esophageal reflux (GERD) 08/10/2014     Priority: Low     Osteoarthritis of right knee 06/24/2014     Priority: Low     Advanced directives, counseling/discussion 06/18/2012     Priority: Low     Discussed advance care planning with patient; information given to patient to review. 6/18/2012   Erin Hahn MA        Environmental allergies 07/13/2011     Priority: Low     Overview:   Dust, mold, cats , dogs, trees , dustmite and weeds.       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           /63   Pulse 76   Ht 1.626 m (5' 4\")   Wt 80.7 kg (178 lb)   LMP  (LMP " Unknown)   SpO2 96%   BMI 30.55 kg/m

## 2023-04-12 NOTE — NURSING NOTE
Writer scheduled patient for 6 months follow up in person with Dr. Josias ELIZALDE via [employer]

## 2023-04-12 NOTE — NURSING NOTE
"Chief Complaint   Patient presents with     CPAP Follow Up       Initial /63   Pulse 76   Ht 1.626 m (5' 4\")   Wt 80.7 kg (178 lb)   LMP  (LMP Unknown)   SpO2 96%   BMI 30.55 kg/m   Estimated body mass index is 30.55 kg/m  as calculated from the following:    Height as of this encounter: 1.626 m (5' 4\").    Weight as of this encounter: 80.7 kg (178 lb).    Medication Reconciliation: complete    Neck circumference: 15 inches / 39 centimeters.    DME: Raymond Valle CMA on 4/12/2023 at 2:20 PM     "

## 2023-04-18 ENCOUNTER — THERAPY VISIT (OUTPATIENT)
Dept: PHYSICAL THERAPY | Facility: CLINIC | Age: 73
End: 2023-04-18
Payer: MEDICARE

## 2023-04-18 DIAGNOSIS — R68.84 JAW PAIN: Primary | ICD-10-CM

## 2023-04-18 DIAGNOSIS — M26.609 TMJ (TEMPOROMANDIBULAR JOINT SYNDROME): ICD-10-CM

## 2023-04-18 PROCEDURE — 97110 THERAPEUTIC EXERCISES: CPT | Mod: GP | Performed by: PHYSICAL THERAPIST

## 2023-04-18 PROCEDURE — 97140 MANUAL THERAPY 1/> REGIONS: CPT | Mod: GP | Performed by: PHYSICAL THERAPIST

## 2023-04-18 NOTE — PROGRESS NOTES
Subjective:  HPI  Physical Exam                    Objective:  System    Physical Exam    General     ROS    Assessment/Plan:    SUBJECTIVE  Subjective changes as noted by pt:  Most of the time feels better, mobility is better in the jaw. Doesn't feel like she'd be able to eat a regular sandwhich.        Current pain level: 1/10     Changes in function:  Yes (See Goal flowsheet attached for changes in current functional level)     Adverse reaction to treatment or activity:  None    OBJECTIVE  Changes in objective findings:  Yes, jaw opening 35 mm. Tender upon palpation of L medial pterygoid.         ASSESSMENT  Gem continues to require intervention to meet STG and LTG's: PT  Patient is progressing as expected.  Response to therapy has shown an improvement in  pain level  Progress made towards STG/LTG?  Yes (See Goal flowsheet attached for updates on achievement of STG and LTG)    PLAN  Continue current treatment plan until patient demonstrates readiness to progress to higher level exercises.    PTA/ATC plan:  N/A    Please refer to the daily flowsheet for treatment today, total treatment time and time spent performing 1:1 timed codes.

## 2023-04-19 ENCOUNTER — VIRTUAL VISIT (OUTPATIENT)
Dept: PSYCHOLOGY | Facility: CLINIC | Age: 73
End: 2023-04-19
Payer: MEDICARE

## 2023-04-19 DIAGNOSIS — F33.0 MAJOR DEPRESSIVE DISORDER, RECURRENT EPISODE, MILD (H): Primary | ICD-10-CM

## 2023-04-19 DIAGNOSIS — F43.22 ADJUSTMENT DISORDER WITH ANXIETY: ICD-10-CM

## 2023-04-19 PROCEDURE — 90834 PSYTX W PT 45 MINUTES: CPT | Mod: VID | Performed by: SOCIAL WORKER

## 2023-04-19 ASSESSMENT — COLUMBIA-SUICIDE SEVERITY RATING SCALE - C-SSRS
2. HAVE YOU ACTUALLY HAD ANY THOUGHTS OF KILLING YOURSELF?: NO
TOTAL  NUMBER OF INTERRUPTED ATTEMPTS SINCE LAST CONTACT: NO
ATTEMPT SINCE LAST CONTACT: NO
TOTAL  NUMBER OF ABORTED OR SELF INTERRUPTED ATTEMPTS SINCE LAST CONTACT: NO
SUICIDE, SINCE LAST CONTACT: NO
6. HAVE YOU EVER DONE ANYTHING, STARTED TO DO ANYTHING, OR PREPARED TO DO ANYTHING TO END YOUR LIFE?: NO
1. SINCE LAST CONTACT, HAVE YOU WISHED YOU WERE DEAD OR WISHED YOU COULD GO TO SLEEP AND NOT WAKE UP?: NO

## 2023-04-19 ASSESSMENT — ANXIETY QUESTIONNAIRES
2. NOT BEING ABLE TO STOP OR CONTROL WORRYING: SEVERAL DAYS
1. FEELING NERVOUS, ANXIOUS, OR ON EDGE: SEVERAL DAYS
IF YOU CHECKED OFF ANY PROBLEMS ON THIS QUESTIONNAIRE, HOW DIFFICULT HAVE THESE PROBLEMS MADE IT FOR YOU TO DO YOUR WORK, TAKE CARE OF THINGS AT HOME, OR GET ALONG WITH OTHER PEOPLE: SOMEWHAT DIFFICULT
GAD7 TOTAL SCORE: 3
GAD7 TOTAL SCORE: 3
5. BEING SO RESTLESS THAT IT IS HARD TO SIT STILL: NOT AT ALL
7. FEELING AFRAID AS IF SOMETHING AWFUL MIGHT HAPPEN: NOT AT ALL
6. BECOMING EASILY ANNOYED OR IRRITABLE: NOT AT ALL
3. WORRYING TOO MUCH ABOUT DIFFERENT THINGS: SEVERAL DAYS

## 2023-04-19 ASSESSMENT — PATIENT HEALTH QUESTIONNAIRE - PHQ9: 5. POOR APPETITE OR OVEREATING: NOT AT ALL
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
## 2023-04-21 ENCOUNTER — ANCILLARY PROCEDURE (OUTPATIENT)
Dept: MAMMOGRAPHY | Facility: CLINIC | Age: 73
End: 2023-04-21
Attending: NURSE PRACTITIONER
Payer: MEDICARE

## 2023-04-21 DIAGNOSIS — Z12.31 ENCOUNTER FOR SCREENING MAMMOGRAM FOR BREAST CANCER: ICD-10-CM

## 2023-04-21 PROCEDURE — 77067 SCR MAMMO BI INCL CAD: CPT | Mod: TC | Performed by: RADIOLOGY

## 2023-04-21 PROCEDURE — 77063 BREAST TOMOSYNTHESIS BI: CPT | Mod: TC | Performed by: RADIOLOGY

## 2023-04-23 NOTE — PROGRESS NOTES
Subjective:  HPI  Physical Exam                    Objective:  System    Physical Exam    General     ROS    Assessment/Plan:    SUBJECTIVE  Subjective changes as noted by pt:  Heidy reports that she has more soreness this week from adding more food. Ate some pizza, had a roma sandwich. The pain increase is reportedly not nearly what it was before and that she feels the food addition is appropriate. Feels the improvement is a combination of her HEP and the manual work.        Current pain level: 3/10     Changes in function:  Yes (See Goal flowsheet attached for changes in current functional level)     Adverse reaction to treatment or activity:  None    OBJECTIVE  Changes in objective findings:  Yes, pain with min loss L lateraltrusion.         ASSESSMENT  Gem continues to require intervention to meet STG and LTG's: PT  Patient is progressing as expected.  Response to therapy has shown an improvement in  function  Progress made towards STG/LTG?  Yes (See Goal flowsheet attached for updates on achievement of STG and LTG)    PLAN  Continue current treatment plan until patient demonstrates readiness to progress to higher level exercises.    PTA/ATC plan:  N/A    Please refer to the daily flowsheet for treatment today, total treatment time and time spent performing 1:1 timed codes.

## 2023-04-25 ENCOUNTER — THERAPY VISIT (OUTPATIENT)
Dept: PHYSICAL THERAPY | Facility: CLINIC | Age: 73
End: 2023-04-25
Payer: MEDICARE

## 2023-04-25 ENCOUNTER — VIRTUAL VISIT (OUTPATIENT)
Dept: FAMILY MEDICINE | Facility: CLINIC | Age: 73
End: 2023-04-25
Payer: MEDICARE

## 2023-04-25 DIAGNOSIS — M26.609 TMJ (TEMPOROMANDIBULAR JOINT SYNDROME): Primary | ICD-10-CM

## 2023-04-25 DIAGNOSIS — I42.0 DILATED CARDIOMYOPATHY (H): ICD-10-CM

## 2023-04-25 DIAGNOSIS — M62.830 BACK MUSCLE SPASM: ICD-10-CM

## 2023-04-25 DIAGNOSIS — J84.112 IDIOPATHIC INTERSTITIAL FIBROSIS (H): ICD-10-CM

## 2023-04-25 DIAGNOSIS — N18.31 STAGE 3A CHRONIC KIDNEY DISEASE (H): ICD-10-CM

## 2023-04-25 DIAGNOSIS — H81.10 BENIGN PAROXYSMAL POSITIONAL VERTIGO, UNSPECIFIED LATERALITY: ICD-10-CM

## 2023-04-25 DIAGNOSIS — F41.8 SITUATIONAL ANXIETY: Primary | ICD-10-CM

## 2023-04-25 DIAGNOSIS — I25.728 CORONARY ARTERY DISEASE OF AUTOLOGOUS BYPASS GRAFT WITH STABLE ANGINA PECTORIS (H): ICD-10-CM

## 2023-04-25 PROCEDURE — 97110 THERAPEUTIC EXERCISES: CPT | Mod: GP | Performed by: PHYSICAL THERAPIST

## 2023-04-25 PROCEDURE — 99442 PR PHYSICIAN TELEPHONE EVALUATION 11-20 MIN: CPT | Mod: 95 | Performed by: INTERNAL MEDICINE

## 2023-04-25 PROCEDURE — 97140 MANUAL THERAPY 1/> REGIONS: CPT | Mod: GP | Performed by: PHYSICAL THERAPIST

## 2023-04-25 RX ORDER — DIAZEPAM 2 MG
1-2 TABLET ORAL 2 TIMES DAILY PRN
Qty: 30 TABLET | Refills: 0 | Status: SHIPPED | OUTPATIENT
Start: 2023-04-25

## 2023-04-25 NOTE — PATIENT INSTRUCTIONS
At [facility], we strive to deliver an exceptional experience to you, every time we see you. If you receive a survey, please complete it as we do value your feedback.  If you have MyChart, you can expect to receive results automatically within 24 hours of their completion.  Your provider will send a note interpreting your results as well.   If you do not have MyChart, you should receive your results in about a week by mail.    Your care team:                            Family Medicine Internal Medicine   MD Danny Christian MD Shantel Branch-Fleming, MD Srinivasa Vaka, MD Katya Belousova, PAKATE Smiley CNP, MD ([facility]   Mustapha Suresh, MD Yelena Garcia MD Amelia Massimini APRN DEVON Huitron APRN MD Letty Webster MD          Clinic hours: Monday - Thursday 7 am-6 pm; Fridays 7 am-5 pm.   Urgent care: Monday - Friday 10 am- 8 pm; Saturday and Sunday 9 am-5 pm.    Clinic: [phone]       [facility]: Monday - Thursday 8 am - 7 pm; Friday 8 am - 6 pm  [facility]: [phone]

## 2023-05-01 ENCOUNTER — THERAPY VISIT (OUTPATIENT)
Dept: PHYSICAL THERAPY | Facility: CLINIC | Age: 73
End: 2023-05-01
Payer: MEDICARE

## 2023-05-01 DIAGNOSIS — M26.609 TMJ (TEMPOROMANDIBULAR JOINT SYNDROME): Primary | ICD-10-CM

## 2023-05-01 PROCEDURE — 97140 MANUAL THERAPY 1/> REGIONS: CPT | Mod: GP | Performed by: PHYSICAL THERAPIST

## 2023-05-01 PROCEDURE — 97110 THERAPEUTIC EXERCISES: CPT | Mod: GP | Performed by: PHYSICAL THERAPIST

## 2023-05-01 NOTE — PROGRESS NOTES
Subjective:  HPI  Physical Exam                    Objective:  System    Physical Exam    General     ROS    Assessment/Plan:    SUBJECTIVE  Subjective changes as noted by pt:  Ate some harder foods this week and also had some more pain. The heating pad does help when she's sore. Overall feels the PT is helping. The L ear hasn't hurt all week.        Current pain level: 2/10     Changes in function:  Yes (See Goal flowsheet attached for changes in current functional level)     Adverse reaction to treatment or activity:  None    OBJECTIVE  Changes in objective findings:  Yes, D/NB with repeated L Lateraltrusion.         ASSESSMENT  Gem continues to require intervention to meet STG and LTG's: PT  Patient's symptoms slightly aggravated.  Response to therapy has shown an improvement in  pain level  Progress made towards STG/LTG?  Yes (See Goal flowsheet attached for updates on achievement of STG and LTG)    PLAN  Continue current treatment plan until patient demonstrates readiness to progress to higher level exercises.    PTA/ATC plan:  N/A    Please refer to the daily flowsheet for treatment today, total treatment time and time spent performing 1:1 timed codes.

## 2023-05-03 ENCOUNTER — VIRTUAL VISIT (OUTPATIENT)
Dept: PSYCHOLOGY | Facility: CLINIC | Age: 73
End: 2023-05-03
Payer: MEDICARE

## 2023-05-03 DIAGNOSIS — F41.1 GENERALIZED ANXIETY DISORDER: ICD-10-CM

## 2023-05-03 DIAGNOSIS — F33.0 MAJOR DEPRESSIVE DISORDER, RECURRENT EPISODE, MILD (H): Primary | ICD-10-CM

## 2023-05-03 PROCEDURE — 90834 PSYTX W PT 45 MINUTES: CPT | Mod: VID | Performed by: SOCIAL WORKER

## 2023-05-03 ASSESSMENT — PATIENT HEALTH QUESTIONNAIRE - PHQ9
SUM OF ALL RESPONSES TO PHQ QUESTIONS 1-9: 13
SUM OF ALL RESPONSES TO PHQ QUESTIONS 1-9: 13
10. IF YOU CHECKED OFF ANY PROBLEMS, HOW DIFFICULT HAVE THESE PROBLEMS MADE IT FOR YOU TO DO YOUR WORK, TAKE CARE OF THINGS AT HOME, OR GET ALONG WITH OTHER PEOPLE: VERY DIFFICULT
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
## 2023-05-04 PROBLEM — M62.830 BACK MUSCLE SPASM: Status: ACTIVE | Noted: 2023-05-04

## 2023-05-04 PROBLEM — H81.10 BENIGN PAROXYSMAL POSITIONAL VERTIGO, UNSPECIFIED LATERALITY: Status: ACTIVE | Noted: 2023-05-04

## 2023-05-08 DIAGNOSIS — N39.41 URGE INCONTINENCE: ICD-10-CM

## 2023-05-09 ENCOUNTER — THERAPY VISIT (OUTPATIENT)
Dept: PHYSICAL THERAPY | Facility: CLINIC | Age: 73
End: 2023-05-09
Payer: MEDICARE

## 2023-05-09 DIAGNOSIS — M26.609 TMJ (TEMPOROMANDIBULAR JOINT SYNDROME): Primary | ICD-10-CM

## 2023-05-09 PROCEDURE — 97140 MANUAL THERAPY 1/> REGIONS: CPT | Mod: GP | Performed by: PHYSICAL THERAPIST

## 2023-05-09 PROCEDURE — 97110 THERAPEUTIC EXERCISES: CPT | Mod: GP | Performed by: PHYSICAL THERAPIST

## 2023-05-09 NOTE — PROGRESS NOTES
Subjective:  HPI  Physical Exam                    Objective:  System    Physical Exam    General     ROS    Assessment/Plan:    SUBJECTIVE  Subjective changes as noted by pt:  Heidy reports that the lateraltrusion did not help, possibly worse so stopped. The jaw has generally been bugging her off/on all week. Not sure why, possibly a little stress. Has appt next Monday with MD/dental provider. The massaging in PT seems to help the jaw pain.        Current pain level: 3/10     Changes in function:  Yes (See Goal flowsheet attached for changes in current functional level)     Adverse reaction to treatment or activity:  None    OBJECTIVE  Changes in objective findings:  Yes, demonstrates correct technique of tongue cluck, tender L masseter.         ASSESSMENT  Gem continues to require intervention to meet STG and LTG's: PT  Patient has experienced an exacerbation of symptoms.  Response to therapy has shown a worsening of  pain level  Progress made towards STG/LTG?  Yes (See Goal flowsheet attached for updates on achievement of STG and LTG)    PLAN  Continue current treatment plan until patient demonstrates readiness to progress to higher level exercises.    PTA/ATC plan:  N/A    Please refer to the daily flowsheet for treatment today, total treatment time and time spent performing 1:1 timed codes.

## 2023-05-09 NOTE — PROGRESS NOTES
Subjective:  HPI  Physical Exam                    Objective:  System    Physical Exam    General     ROS    Assessment/Plan:    PROGRESS  REPORT    Progress reporting period is from 3/28/23 to 5/16/23.       SUBJECTIVE  Subjective changes noted by patient:  Has ups and downs with jaw soreness. L TMJ feels bruised. Saw TMJ provider at the  yesterday, thought it looked a lot better than it did prior. Referred to dentist's office at [place] to take steps to correct bite, likely with dentures eventually. In regard to PT, reports that she's learned a lot. Feels the exercises have helped, opening is better, manual work has helped.           Current pain level is 3/10  .     Previous pain level was  8/10  .   Changes in function:  Yes (See Goal flowsheet attached for changes in current functional level)  Adverse reaction to treatment or activity: None    OBJECTIVE  Changes noted in objective findings:  Yes, 36 mm opening, tender upon palpation of L TMJ        ASSESSMENT/PLAN  Updated problem list and treatment plan: Diagnosis 1:  L TMD    Pain -  manual therapy, self management, education, directional preference exercise and home program  Decreased ROM/flexibility - manual therapy, therapeutic exercise, therapeutic activity and home program  Decreased joint mobility - manual therapy, therapeutic exercise, therapeutic activity and home program  Decreased strength - therapeutic exercise, therapeutic activities and home program  Inflammation - self management/home program  Decreased function - therapeutic activities and home program  Impaired posture - neuro re-education, therapeutic activities and home program  STG/LTGs have been met or progress has been made towards goals:  Yes (See Goal flow sheet completed today.)  Assessment of Progress: Patient is meeting short term goals and is progressing towards long term goals.  Self Management Plans:  Patient has been instructed in a home treatment program.  Patient  has been  instructed in self management of symptoms.  I have re-evaluated this patient and find that the nature, scope, duration and intensity of the therapy is appropriate for the medical condition of the patient.  Gem continues to require the following intervention to meet STG and LTG's:  PT    Recommendations:  Self management for next 3-4 wks, return if unable to manage symptoms/phone follow up as needed.     Please refer to the daily flowsheet for treatment today, total treatment time and time spent performing 1:1 timed codes.

## 2023-05-11 RX ORDER — MIRABEGRON 50 MG/1
50 TABLET, EXTENDED RELEASE ORAL DAILY
Qty: 90 TABLET | Refills: 1 | Status: SHIPPED | OUTPATIENT
Start: 2023-05-11 | End: 2023-08-31

## 2023-05-11 NOTE — TELEPHONE ENCOUNTER
mirabegron (MYRBETRIQ) 50 MG 24 hr tablet  Last Written Prescription Date:   12/14/2022  Last Fill Quantity: 90,   # refills: 1  Last Office Visit :  3/31/2023  Future Office visit:  None  90 Tabs, 1 refill sent to dimitrios Renner RN  Central Triage Red Flags/Med Refills

## 2023-05-11 NOTE — NURSING NOTE
Prior to immunization administration, verified patients identity using patient s name and date of birth. Please see Immunization Activity for additional information.     Screening Questionnaire for Adult Immunization    Are you sick today?   No   Do you have allergies to medications, food, a vaccine component or latex?   No   Have you ever had a serious reaction after receiving a vaccination?   No   Do you have a long-term health problem with heart, lung, kidney, or metabolic disease (e.g., diabetes), asthma, a blood disorder, no spleen, complement component deficiency, a cochlear implant, or a spinal fluid leak?  Are you on long-term aspirin therapy?   Yes   Do you have cancer, leukemia, HIV/AIDS, or any other immune system problem?   No   Do you have a parent, brother, or sister with an immune system problem?   No   In the past 3 months, have you taken medications that affect  your immune system, such as prednisone, other steroids, or anticancer drugs; drugs for the treatment of rheumatoid arthritis, Crohn s disease, or psoriasis; or have you had radiation treatments?   No   Have you had a seizure, or a brain or other nervous system problem?   No   During the past year, have you received a transfusion of blood or blood    products, or been given immune (gamma) globulin or antiviral drug?   No   For women: Are you pregnant or is there a chance you could become       pregnant during the next month?   No   Have you received any vaccinations in the past 4 weeks?   No     Immunization questionnaire was positive for at least one answer.  Notified known health problem.        Per orders of Dr. Conteh, injection of Shingrix and Flu Shot (High Dose) given by Ronny Braun MA. Patient instructed to remain in clinic for 15 minutes afterwards, and to report any adverse reaction to me immediately.       Screening performed by Ronny Braun MA on 10/15/2020   Colchicine Counseling:  Patient counseled regarding adverse effects including but not limited to stomach upset (nausea, vomiting, stomach pain, or diarrhea).  Patient instructed to limit alcohol consumption while taking this medication.  Colchicine may reduce blood counts especially with prolonged use.  The patient understands that monitoring of kidney function and blood counts may be required, especially at baseline. The patient verbalized understanding of the proper use and possible adverse effects of colchicine.  All of the patient's questions and concerns were addressed.

## 2023-05-12 ENCOUNTER — VIRTUAL VISIT (OUTPATIENT)
Dept: PSYCHOLOGY | Facility: CLINIC | Age: 73
End: 2023-05-12
Payer: MEDICARE

## 2023-05-12 DIAGNOSIS — F41.1 GENERALIZED ANXIETY DISORDER: ICD-10-CM

## 2023-05-12 DIAGNOSIS — F33.1 MAJOR DEPRESSIVE DISORDER, RECURRENT EPISODE, MODERATE (H): Primary | ICD-10-CM

## 2023-05-12 PROCEDURE — 90834 PSYTX W PT 45 MINUTES: CPT | Mod: 95 | Performed by: SOCIAL WORKER

## 2023-05-12 ASSESSMENT — PATIENT HEALTH QUESTIONNAIRE - PHQ9: SUM OF ALL RESPONSES TO PHQ QUESTIONS 1-9: 12
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
## 2023-05-16 ENCOUNTER — THERAPY VISIT (OUTPATIENT)
Dept: PHYSICAL THERAPY | Facility: CLINIC | Age: 73
End: 2023-05-16
Payer: MEDICARE

## 2023-05-16 DIAGNOSIS — M26.609 TMJ (TEMPOROMANDIBULAR JOINT SYNDROME): Primary | ICD-10-CM

## 2023-05-16 PROCEDURE — 97110 THERAPEUTIC EXERCISES: CPT | Mod: GP | Performed by: PHYSICAL THERAPIST

## 2023-05-16 PROCEDURE — 97140 MANUAL THERAPY 1/> REGIONS: CPT | Mod: GP | Performed by: PHYSICAL THERAPIST

## 2023-05-17 ENCOUNTER — VIRTUAL VISIT (OUTPATIENT)
Dept: PSYCHOLOGY | Facility: CLINIC | Age: 73
End: 2023-05-17
Payer: MEDICARE

## 2023-05-17 DIAGNOSIS — F41.1 GENERALIZED ANXIETY DISORDER: ICD-10-CM

## 2023-05-17 DIAGNOSIS — F33.1 MAJOR DEPRESSIVE DISORDER, RECURRENT EPISODE, MODERATE (H): Primary | ICD-10-CM

## 2023-05-17 PROCEDURE — 90834 PSYTX W PT 45 MINUTES: CPT | Mod: VID | Performed by: SOCIAL WORKER
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
## 2023-05-24 ENCOUNTER — VIRTUAL VISIT (OUTPATIENT)
Dept: PSYCHOLOGY | Facility: CLINIC | Age: 73
End: 2023-05-24
Payer: MEDICARE

## 2023-05-24 DIAGNOSIS — F33.0 MAJOR DEPRESSIVE DISORDER, RECURRENT EPISODE, MILD (H): Primary | ICD-10-CM

## 2023-05-24 DIAGNOSIS — F43.22 ADJUSTMENT DISORDER WITH ANXIETY: ICD-10-CM

## 2023-05-24 PROCEDURE — 90834 PSYTX W PT 45 MINUTES: CPT | Mod: VID | Performed by: SOCIAL WORKER

## 2023-05-24 ASSESSMENT — ANXIETY QUESTIONNAIRES
1. FEELING NERVOUS, ANXIOUS, OR ON EDGE: NOT AT ALL
GAD7 TOTAL SCORE: 3
7. FEELING AFRAID AS IF SOMETHING AWFUL MIGHT HAPPEN: SEVERAL DAYS
2. NOT BEING ABLE TO STOP OR CONTROL WORRYING: NOT AT ALL
3. WORRYING TOO MUCH ABOUT DIFFERENT THINGS: SEVERAL DAYS
5. BEING SO RESTLESS THAT IT IS HARD TO SIT STILL: NOT AT ALL
IF YOU CHECKED OFF ANY PROBLEMS ON THIS QUESTIONNAIRE, HOW DIFFICULT HAVE THESE PROBLEMS MADE IT FOR YOU TO DO YOUR WORK, TAKE CARE OF THINGS AT HOME, OR GET ALONG WITH OTHER PEOPLE: SOMEWHAT DIFFICULT
6. BECOMING EASILY ANNOYED OR IRRITABLE: SEVERAL DAYS
GAD7 TOTAL SCORE: 3

## 2023-05-24 ASSESSMENT — PATIENT HEALTH QUESTIONNAIRE - PHQ9
5. POOR APPETITE OR OVEREATING: NOT AT ALL
SUM OF ALL RESPONSES TO PHQ QUESTIONS 1-9: 7
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
## 2023-06-02 ENCOUNTER — VIRTUAL VISIT (OUTPATIENT)
Dept: PSYCHOLOGY | Facility: CLINIC | Age: 73
End: 2023-06-02
Payer: MEDICARE

## 2023-06-02 DIAGNOSIS — F33.0 MAJOR DEPRESSIVE DISORDER, RECURRENT EPISODE, MILD (H): Primary | ICD-10-CM

## 2023-06-02 DIAGNOSIS — F41.1 GENERALIZED ANXIETY DISORDER: ICD-10-CM

## 2023-06-02 PROCEDURE — 90834 PSYTX W PT 45 MINUTES: CPT | Mod: VID | Performed by: SOCIAL WORKER
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
## 2023-06-07 ASSESSMENT — ANXIETY QUESTIONNAIRES
8. IF YOU CHECKED OFF ANY PROBLEMS, HOW DIFFICULT HAVE THESE MADE IT FOR YOU TO DO YOUR WORK, TAKE CARE OF THINGS AT HOME, OR GET ALONG WITH OTHER PEOPLE?: VERY DIFFICULT
3. WORRYING TOO MUCH ABOUT DIFFERENT THINGS: SEVERAL DAYS
7. FEELING AFRAID AS IF SOMETHING AWFUL MIGHT HAPPEN: NOT AT ALL
GAD7 TOTAL SCORE: 5
7. FEELING AFRAID AS IF SOMETHING AWFUL MIGHT HAPPEN: NOT AT ALL
6. BECOMING EASILY ANNOYED OR IRRITABLE: NOT AT ALL
GAD7 TOTAL SCORE: 5
4. TROUBLE RELAXING: SEVERAL DAYS
GAD7 TOTAL SCORE: 5
1. FEELING NERVOUS, ANXIOUS, OR ON EDGE: MORE THAN HALF THE DAYS
5. BEING SO RESTLESS THAT IT IS HARD TO SIT STILL: NOT AT ALL
2. NOT BEING ABLE TO STOP OR CONTROL WORRYING: SEVERAL DAYS
IF YOU CHECKED OFF ANY PROBLEMS ON THIS QUESTIONNAIRE, HOW DIFFICULT HAVE THESE PROBLEMS MADE IT FOR YOU TO DO YOUR WORK, TAKE CARE OF THINGS AT HOME, OR GET ALONG WITH OTHER PEOPLE: VERY DIFFICULT

## 2023-06-14 ENCOUNTER — NURSE TRIAGE (OUTPATIENT)
Dept: NURSING | Facility: CLINIC | Age: 73
End: 2023-06-14
Payer: MEDICARE

## 2023-06-14 ENCOUNTER — HOSPITAL ENCOUNTER (EMERGENCY)
Facility: CLINIC | Age: 73
Discharge: HOME | End: 2023-06-15
Attending: EMERGENCY MEDICINE | Admitting: EMERGENCY MEDICINE
Payer: MEDICARE

## 2023-06-14 ENCOUNTER — VIRTUAL VISIT (OUTPATIENT)
Dept: PSYCHOLOGY | Facility: CLINIC | Age: 73
End: 2023-06-14
Payer: MEDICARE

## 2023-06-14 DIAGNOSIS — S32.030A CLOSED COMPRESSION FRACTURE OF L3 LUMBAR VERTEBRA, INITIAL ENCOUNTER (H): ICD-10-CM

## 2023-06-14 DIAGNOSIS — F33.0 MAJOR DEPRESSIVE DISORDER, RECURRENT EPISODE, MILD (H): Primary | ICD-10-CM

## 2023-06-14 DIAGNOSIS — R10.9 FLANK PAIN: ICD-10-CM

## 2023-06-14 DIAGNOSIS — F41.1 GENERALIZED ANXIETY DISORDER: ICD-10-CM

## 2023-06-14 PROCEDURE — 36415 COLL VENOUS BLD VENIPUNCTURE: CPT | Performed by: EMERGENCY MEDICINE

## 2023-06-14 PROCEDURE — 93005 ELECTROCARDIOGRAM TRACING: CPT

## 2023-06-14 PROCEDURE — 83690 ASSAY OF LIPASE: CPT | Performed by: EMERGENCY MEDICINE

## 2023-06-14 PROCEDURE — 80053 COMPREHEN METABOLIC PANEL: CPT | Performed by: EMERGENCY MEDICINE

## 2023-06-14 PROCEDURE — 85025 COMPLETE CBC W/AUTO DIFF WBC: CPT | Performed by: EMERGENCY MEDICINE

## 2023-06-14 PROCEDURE — 84484 ASSAY OF TROPONIN QUANT: CPT | Performed by: EMERGENCY MEDICINE

## 2023-06-14 PROCEDURE — 99285 EMERGENCY DEPT VISIT HI MDM: CPT | Mod: 25

## 2023-06-14 PROCEDURE — 90834 PSYTX W PT 45 MINUTES: CPT | Mod: 95 | Performed by: SOCIAL WORKER

## 2023-06-14 ASSESSMENT — ANXIETY QUESTIONNAIRES: GAD7 TOTAL SCORE: 5
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
## 2023-06-15 ENCOUNTER — TELEPHONE (OUTPATIENT)
Dept: NEUROSURGERY | Facility: CLINIC | Age: 73
End: 2023-06-15
Payer: MEDICARE

## 2023-06-15 ENCOUNTER — APPOINTMENT (OUTPATIENT)
Dept: CT IMAGING | Facility: CLINIC | Age: 73
End: 2023-06-15
Attending: EMERGENCY MEDICINE
Payer: MEDICARE

## 2023-06-15 ENCOUNTER — APPOINTMENT (OUTPATIENT)
Dept: ULTRASOUND IMAGING | Facility: CLINIC | Age: 73
End: 2023-06-15
Attending: EMERGENCY MEDICINE
Payer: MEDICARE

## 2023-06-15 VITALS
OXYGEN SATURATION: 94 % | WEIGHT: 180 LBS | DIASTOLIC BLOOD PRESSURE: 62 MMHG | SYSTOLIC BLOOD PRESSURE: 109 MMHG | HEART RATE: 65 BPM | HEIGHT: 64 IN | RESPIRATION RATE: 16 BRPM | TEMPERATURE: 98.8 F | BODY MASS INDEX: 30.73 KG/M2

## 2023-06-15 LAB
ALBUMIN SERPL BCG-MCNC: 4.2 G/DL (ref 3.5–5.2)
ALBUMIN UR-MCNC: NEGATIVE MG/DL
ALP SERPL-CCNC: 133 U/L (ref 35–104)
ALT SERPL W P-5'-P-CCNC: 135 U/L (ref 0–50)
ANION GAP SERPL CALCULATED.3IONS-SCNC: 12 MMOL/L (ref 7–15)
APPEARANCE UR: CLEAR
AST SERPL W P-5'-P-CCNC: 267 U/L (ref 0–45)
ATRIAL RATE - MUSE: 70 BPM
BASOPHILS # BLD AUTO: 0 10E3/UL (ref 0–0.2)
BASOPHILS NFR BLD AUTO: 0 %
BILIRUB SERPL-MCNC: 1.5 MG/DL
BILIRUB UR QL STRIP: NEGATIVE
BUN SERPL-MCNC: 14.7 MG/DL (ref 8–23)
CALCIUM SERPL-MCNC: 8.6 MG/DL (ref 8.8–10.2)
CHLORIDE SERPL-SCNC: 100 MMOL/L (ref 98–107)
COLOR UR AUTO: YELLOW
CREAT SERPL-MCNC: 1.06 MG/DL (ref 0.51–0.95)
DEPRECATED HCO3 PLAS-SCNC: 25 MMOL/L (ref 22–29)
DIASTOLIC BLOOD PRESSURE - MUSE: NORMAL MMHG
EOSINOPHIL # BLD AUTO: 0.1 10E3/UL (ref 0–0.7)
EOSINOPHIL NFR BLD AUTO: 1 %
ERYTHROCYTE [DISTWIDTH] IN BLOOD BY AUTOMATED COUNT: 12.8 % (ref 10–15)
GFR SERPL CREATININE-BSD FRML MDRD: 55 ML/MIN/1.73M2
GLUCOSE SERPL-MCNC: 150 MG/DL (ref 70–99)
GLUCOSE UR STRIP-MCNC: NEGATIVE MG/DL
HCT VFR BLD AUTO: 39.9 % (ref 35–47)
HGB BLD-MCNC: 13.6 G/DL (ref 11.7–15.7)
HGB UR QL STRIP: NEGATIVE
HYALINE CASTS: 1 /LPF
IMM GRANULOCYTES # BLD: 0 10E3/UL
IMM GRANULOCYTES NFR BLD: 0 %
INTERPRETATION ECG - MUSE: NORMAL
KETONES UR STRIP-MCNC: NEGATIVE MG/DL
LEUKOCYTE ESTERASE UR QL STRIP: NEGATIVE
LIPASE SERPL-CCNC: 35 U/L (ref 13–60)
LYMPHOCYTES # BLD AUTO: 0.5 10E3/UL (ref 0.8–5.3)
LYMPHOCYTES NFR BLD AUTO: 8 %
MCH RBC QN AUTO: 29.6 PG (ref 26.5–33)
MCHC RBC AUTO-ENTMCNC: 34.1 G/DL (ref 31.5–36.5)
MCV RBC AUTO: 87 FL (ref 78–100)
MONOCYTES # BLD AUTO: 0.4 10E3/UL (ref 0–1.3)
MONOCYTES NFR BLD AUTO: 6 %
NEUTROPHILS # BLD AUTO: 5.7 10E3/UL (ref 1.6–8.3)
NEUTROPHILS NFR BLD AUTO: 85 %
NITRATE UR QL: NEGATIVE
NRBC # BLD AUTO: 0 10E3/UL
NRBC BLD AUTO-RTO: 0 /100
P AXIS - MUSE: -12 DEGREES
PH UR STRIP: 5.5 [PH] (ref 5–7)
PLATELET # BLD AUTO: 121 10E3/UL (ref 150–450)
POTASSIUM SERPL-SCNC: 3.6 MMOL/L (ref 3.4–5.3)
PR INTERVAL - MUSE: 160 MS
PROT SERPL-MCNC: 6.6 G/DL (ref 6.4–8.3)
QRS DURATION - MUSE: 82 MS
QT - MUSE: 414 MS
QTC - MUSE: 447 MS
R AXIS - MUSE: -30 DEGREES
RBC # BLD AUTO: 4.6 10E6/UL (ref 3.8–5.2)
RBC URINE: <1 /HPF
SODIUM SERPL-SCNC: 137 MMOL/L (ref 136–145)
SP GR UR STRIP: 1.02 (ref 1–1.03)
SYSTOLIC BLOOD PRESSURE - MUSE: NORMAL MMHG
T AXIS - MUSE: 20 DEGREES
TROPONIN T SERPL HS-MCNC: 14 NG/L
UROBILINOGEN UR STRIP-MCNC: NORMAL MG/DL
VENTRICULAR RATE- MUSE: 70 BPM
WBC # BLD AUTO: 6.7 10E3/UL (ref 4–11)
WBC URINE: <1 /HPF

## 2023-06-15 PROCEDURE — 74177 CT ABD & PELVIS W/CONTRAST: CPT | Mod: MA

## 2023-06-15 PROCEDURE — 76705 ECHO EXAM OF ABDOMEN: CPT

## 2023-06-15 PROCEDURE — 81001 URINALYSIS AUTO W/SCOPE: CPT | Performed by: EMERGENCY MEDICINE

## 2023-06-15 PROCEDURE — 250N000009 HC RX 250: Performed by: EMERGENCY MEDICINE

## 2023-06-15 PROCEDURE — 250N000011 HC RX IP 250 OP 636: Performed by: EMERGENCY MEDICINE

## 2023-06-15 RX ORDER — IOPAMIDOL 755 MG/ML
91 INJECTION, SOLUTION INTRAVASCULAR ONCE
Status: COMPLETED | OUTPATIENT
Start: 2023-06-15 | End: 2023-06-15

## 2023-06-15 RX ADMIN — SODIUM CHLORIDE 66 ML: 9 INJECTION, SOLUTION INTRAVENOUS at 01:57

## 2023-06-15 RX ADMIN — IOPAMIDOL 91 ML: 755 INJECTION, SOLUTION INTRAVENOUS at 01:56

## 2023-06-15 ASSESSMENT — ACTIVITIES OF DAILY LIVING (ADL)
ADLS_ACUITY_SCORE: 37

## 2023-06-15 NOTE — TELEPHONE ENCOUNTER
1st attempt at workque referral. I called and left a voice mail.    Referred by Brennen Motta MD  Closed compression fracture of L3 lumbar vertebra, initial encounter (H) [S32.03]  MRI  PT  Inj  Surgeries

## 2023-06-15 NOTE — TELEPHONE ENCOUNTER
"Gem calls and says that she has middle-upper abdominal pain. Pt. Says that the pain is on her right side. Pt. Says that she has been urinating less today. Pt. Says that she has the diarrhea. Pt. Says that she has had pain on her sides- on and off for days. Pt. Says that she is nauseated. Pt. Says that she has been vomiting. Pt. Says that she will have someone take her to an ER or will go by ambulance.    Reason for Disposition    Pain is mainly in upper abdomen  (if needed ask: \"is it mainly above the belly button?\")    [1] Pain lasts > 10 minutes AND [2] age > 50    Additional Information    Negative: Shock suspected (e.g., cold/pale/clammy skin, too weak to stand, low BP, rapid pulse)    Negative: Difficult to awaken or acting confused (e.g., disoriented, slurred speech)    Negative: Passed out (i.e., lost consciousness, collapsed and was not responding)    Negative: Sounds like a life-threatening emergency to the triager    Negative: Chest pain    Negative: SEVERE difficulty breathing (e.g., struggling for each breath, speaks in single words)    Negative: Shock suspected (e.g., cold/pale/clammy skin, too weak to stand, low BP, rapid pulse)    Negative: Difficult to awaken or acting confused (e.g., disoriented, slurred speech)    Negative: Passed out (i.e., lost consciousness, collapsed and was not responding)    Negative: Visible sweat on face or sweat dripping down face    Negative: Sounds like a life-threatening emergency to the triager    Negative: Followed an abdomen (stomach) injury    Negative: Chest pain    Negative: [1] SEVERE pain (e.g., excruciating) AND [2] present > 1 hour    Protocols used: ABDOMINAL PAIN - FEMALE-A-AH, ABDOMINAL PAIN - UPPER-A-AH      "

## 2023-06-15 NOTE — ED TRIAGE NOTES
BIBA from home. Pt c/o back pain x 2 days Denied recent trauma. Today developed N/V/D. /65,  and Sats 95% RA. Zofran 4 mg IM and  Hot Pack given. Hx: MI in 2019, kidney and gallbladder issues.

## 2023-06-15 NOTE — ED PROVIDER NOTES
History     Chief Complaint:  Flank Pain     HPI   Gem Gama is a 72 year old female with a history of chronic kidney disease, MI, CAD, cardiomyopathy, hypertension, and hyperlipidemia who presents with flank pain. The patient reports having recent intermittent bilateral flank pain associated with nausea, four episodes of emesis yesterday, and several episodes of diarrhea since 1600 yesterday. She also has some pain in her right upper quadrant. The flank pain is worse with exertion, and she feels somewhat improved here. No dysuria, and she is not overly concerned for a UTI. EMS administered 4 mg IM Zofran and a heat pack.     Independent Historian:   None - Patient Only    Review of External Notes:   Nurse telephone triage note from yesterday reviewed where the patient was calling for upper mid abdominal pain, nausea.  She was advised to go to the ED.    Medications:    Aspirin 81 mg  Albuterol inhaler  Atorvastatin  Amoxicillin   Carvedilol  Questran  Diazepam  Imdur  Loperamide  Myrbetriq   Nitroglycerin  Pantoprazole  Florastor  Sertraline  Ursodiol  Valsartan  Loratadine  Magnesium oxide  Zinc gluconate    Past Medical History:    CAD  Pituitary microadenoma  Stage 3 chronic kidney disease  Congenital ureteric stenosis  Hyperlipidemia   Hypertension   MI  Ischemic cardiomyopathy  Narcolepsy  Asthma  Cheyne-Rogers breathing  ADHD  Anxiety  Arthritis  Central sleep apnea  GERD  Major depressive disorder  TMJ syndrome  Hiatal hernia  Chronic insomnia  C. Difficile  Possible PLMD  Idiopathic interstitial fibrosis   BPPV  Sepsis   Overactive bladder  Incontinence of feces with fecal urgency    Past Surgical History:     Left knee arthroplasty  CV coronary angiogram and PCI stent drug eluting  CV intra aortic balloon placement and removal  CV left heart cath  GI surgery for congenital narrowing in left ureter  Bilateral hip arthroplasty  Rectocele repair  Hysterectomy with bilateral oophorectomy and bladder  "repair   Right endopyelotomy  Right dismembered pyeloplasty     Physical Exam     Patient Vitals for the past 24 hrs:   BP Temp Temp src Pulse Resp SpO2 Height Weight   06/15/23 0543 -- -- -- -- -- 94 % -- --   06/15/23 0453 -- -- -- -- -- 91 % -- --   06/15/23 0427 -- -- -- -- -- 92 % -- --   06/15/23 0423 -- -- -- -- -- 93 % -- --   06/15/23 0412 -- -- -- -- -- 93 % -- --   06/15/23 0358 -- -- -- -- -- 92 % -- --   06/15/23 0357 109/62 -- -- 65 -- -- -- --   06/15/23 0319 -- -- -- -- -- 93 % -- --   06/14/23 2333 -- -- -- -- 16 -- -- --   06/14/23 2332 -- 98.8  F (37.1  C) Temporal -- -- -- -- --   06/14/23 2331 -- -- -- -- -- 96 % 1.626 m (5' 4\") 81.6 kg (180 lb)   06/14/23 2330 -- -- -- -- -- 94 % -- --   06/14/23 2329 -- -- -- -- -- 94 % -- --   06/14/23 2328 120/67 -- -- 77 -- -- -- --        Physical Exam  General: Laying on the ED bed, no distress  HEENT: Normocephalic, atraumatic  Cardiac: Warm and well perfused, regular rate and rhythm  Pulm: Breathing comfortably, no accessory muscle usage, no conversational dyspnea, and lungs clear bilaterally  GI: Abdomen soft, nontender, no rigidity or guarding  MSK: No deformities, bilateral CVAT  Skin: Warm and dry  Neuro: Moves all extremities  Psych: Normal mood and affect      Emergency Department Course   ECG  ECG taken at 0129, ECG read at 0134  Normal sinus rhythm; left axis deviation; cannot rule out anterior infarct, age undetermined   No significant changes as compared to prior, dated 11/7/22.  Rate 70 bpm. FL interval 160 ms. QRS duration 82 ms. QT/QTc 414/447 ms. P-R-T axes -12 -30 20.     Imaging:  US Abdomen Limited (RUQ)   Final Result   IMPRESSION:   1.  Cholelithiasis. No specific ultrasound imaging features of acute cholecystitis. Clinical correlation recommended.   2.  Prominent extra renal pelvis versus mild hydronephrosis of the right kidney.            CT Abdomen Pelvis w Contrast   Final Result   IMPRESSION:    1.  The bilateral kidneys enhance " relatively symmetrically. Interval development of mild to moderate bilateral hydronephrosis or prominent extrarenal pelvis bilaterally, greater on the right. Findings may represent bilateral ureteropelvic junction    narrowing and/or stenosis. Postsurgical changes are seen adjacent to the right ureteropelvic junction. Clinical correlation recommended. Bilateral hip arthroplasties obscure some details of the distal ureters and urinary bladder.      2.  Nonspecific nonobstructive bowel gas pattern. Redundancy of the cecum. Appendix not visualized.      3.  Numerous calcified gallstones within the gallbladder. Question mild gallbladder wall thickening. Clinical correlation for any associated symptomatology. No intra or extrahepatic biliary dilatation.       4.  Mild to moderate interval anterior compression deformity of the superior endplate of L3. Clinical correlation recommended. Bilateral hip arthroplasties with associated streak artifact obscuring details of the mid and lower pelvis.         Report per radiology    Laboratory:  Labs Ordered and Resulted from Time of ED Arrival to Time of ED Departure   COMPREHENSIVE METABOLIC PANEL - Abnormal       Result Value    Sodium 137      Potassium 3.6      Chloride 100      Carbon Dioxide (CO2) 25      Anion Gap 12      Urea Nitrogen 14.7      Creatinine 1.06 (*)     Calcium 8.6 (*)     Glucose 150 (*)     Alkaline Phosphatase 133 (*)      (*)      (*)     Protein Total 6.6      Albumin 4.2      Bilirubin Total 1.5 (*)     GFR Estimate 55 (*)    CBC WITH PLATELETS AND DIFFERENTIAL - Abnormal    WBC Count 6.7      RBC Count 4.60      Hemoglobin 13.6      Hematocrit 39.9      MCV 87      MCH 29.6      MCHC 34.1      RDW 12.8      Platelet Count 121 (*)     % Neutrophils 85      % Lymphocytes 8      % Monocytes 6      % Eosinophils 1      % Basophils 0      % Immature Granulocytes 0      NRBCs per 100 WBC 0      Absolute Neutrophils 5.7      Absolute Lymphocytes  "0.5 (*)     Absolute Monocytes 0.4      Absolute Eosinophils 0.1      Absolute Basophils 0.0      Absolute Immature Granulocytes 0.0      Absolute NRBCs 0.0     ROUTINE UA WITH MICROSCOPIC REFLEX TO CULTURE - Normal    Color Urine Yellow      Appearance Urine Clear      Glucose Urine Negative      Bilirubin Urine Negative      Ketones Urine Negative      Specific Gravity Urine 1.016      Blood Urine Negative      pH Urine 5.5      Protein Albumin Urine Negative      Urobilinogen Urine Normal      Nitrite Urine Negative      Leukocyte Esterase Urine Negative      RBC Urine <1      WBC Urine <1      Hyaline Casts Urine 1     LIPASE - Normal    Lipase 35     TROPONIN T, HIGH SENSITIVITY - Normal    Troponin T, High Sensitivity 14          Emergency Department Course & Assessments:     Interventions:  Medications   iopamidol (ISOVUE-370) solution 91 mL (91 mLs Intravenous $Given 6/15/23 0156)   Saline Flush (66 mLs Intravenous $Given 6/15/23 0157)        Assessments:  0006 I obtained history and examined the patient as noted above.   0523 Rechecked and updated on all findings. Patient is agreeable for discharge.     Independent Interpretation (X-rays, CTs, rhythm strip):  None    Consultations/Discussion of Management or Tests:  None      Social Determinants of Health affecting care:   None    Disposition:  The patient was discharged to home.     Impression & Plan      Medical Decision Makin-year-old female presents with multiple complaints including flank pain and abdominal pain as described above.  She describes vague symptoms of \"kidney fullness \"that are difficult to localize on history.  She does have CVAT on examination.  Lab work shows mild transaminitis and T. bili elevation.  Given her flank pain and abdominal pain, CT of the abdomen/pelvis was obtained and shows cholelithiasis with possible cholecystitis, bilateral hydronephrosis, and an L3 compression fracture.  The patient states she fell 1 month ago " and had back pain after that fall which has since resolved.  She had no midline L-spine pain on exam, no further work-up for the compression fracture indicated from the ED but referring to spine for further care needs.  Patient does have a history of urologic issues and she will follow-up with the urologist regarding the hydronephrosis.  Given the patient's abdominal symptoms and the questionable biliary findings on CT, a right upper quadrant ultrasound was ordered and showed no signs of cholecystitis.  The patient was furthermore asymptomatic and feeling back at baseline upon reevaluation.  Plan is for discharge home with PCP follow-up.    Diagnosis:    ICD-10-CM    1. Flank pain  R10.9       2. Closed compression fracture of L3 lumbar vertebra, initial encounter (H)  S32.030A Neurosurgery Referral            Scribe Disclosure:  Imelda CASTANEDA, am serving as a scribe at 12:00 AM on 6/15/2023 to document services personally performed by Brennen Motta MD based on my observations and the provider's statements to me.      6/15/2023   Brennen Motta MD King, Colin, MD  06/15/23 0660

## 2023-06-16 ENCOUNTER — TELEPHONE (OUTPATIENT)
Dept: FAMILY MEDICINE | Facility: CLINIC | Age: 73
End: 2023-06-16
Payer: MEDICARE

## 2023-06-19 ENCOUNTER — OFFICE VISIT (OUTPATIENT)
Dept: FAMILY MEDICINE | Facility: CLINIC | Age: 73
End: 2023-06-19
Payer: MEDICARE

## 2023-06-19 VITALS
SYSTOLIC BLOOD PRESSURE: 111 MMHG | HEIGHT: 64 IN | WEIGHT: 178.2 LBS | DIASTOLIC BLOOD PRESSURE: 72 MMHG | BODY MASS INDEX: 30.42 KG/M2 | TEMPERATURE: 98.1 F | OXYGEN SATURATION: 98 % | RESPIRATION RATE: 20 BRPM | HEART RATE: 78 BPM

## 2023-06-19 DIAGNOSIS — N13.30 BILATERAL HYDRONEPHROSIS: ICD-10-CM

## 2023-06-19 DIAGNOSIS — K80.20 CALCULUS OF GALLBLADDER WITHOUT CHOLECYSTITIS WITHOUT OBSTRUCTION: Primary | ICD-10-CM

## 2023-06-19 DIAGNOSIS — Z29.89 NEED FOR SBE (SUBACUTE BACTERIAL ENDOCARDITIS) PROPHYLAXIS: ICD-10-CM

## 2023-06-19 DIAGNOSIS — K82.8 BILIARY DYSKINESIA: ICD-10-CM

## 2023-06-19 DIAGNOSIS — S32.030S CLOSED COMPRESSION FRACTURE OF L3 LUMBAR VERTEBRA, SEQUELA: ICD-10-CM

## 2023-06-19 DIAGNOSIS — R10.11 ABDOMINAL PAIN, RIGHT UPPER QUADRANT: ICD-10-CM

## 2023-06-19 PROCEDURE — 80053 COMPREHEN METABOLIC PANEL: CPT | Performed by: INTERNAL MEDICINE

## 2023-06-19 PROCEDURE — 99213 OFFICE O/P EST LOW 20 MIN: CPT | Performed by: INTERNAL MEDICINE

## 2023-06-19 PROCEDURE — 36415 COLL VENOUS BLD VENIPUNCTURE: CPT | Performed by: INTERNAL MEDICINE

## 2023-06-19 RX ORDER — AMOXICILLIN 500 MG/1
2000 CAPSULE ORAL ONCE
Qty: 4 CAPSULE | Refills: 4 | Status: SHIPPED | OUTPATIENT
Start: 2023-06-19 | End: 2023-06-19

## 2023-06-19 ASSESSMENT — PAIN SCALES - GENERAL: PAINLEVEL: NO PAIN (0)

## 2023-06-19 ASSESSMENT — ASTHMA QUESTIONNAIRES: ACT_TOTALSCORE: 23

## 2023-06-19 NOTE — TELEPHONE ENCOUNTER
Notify patient that she is scheduled for a follow-up appointment with this provider today at 2:30 PM (in-person).

## 2023-06-19 NOTE — PATIENT INSTRUCTIONS
At [facility], we strive to deliver an exceptional experience to you, every time we see you. If you receive a survey, please complete it as we do value your feedback.  If you have MyChart, you can expect to receive results automatically within 24 hours of their completion.  Your provider will send a note interpreting your results as well.   If you do not have MyChart, you should receive your results in about a week by mail.    Your care team:                            Family Medicine Internal Medicine   MD Danny Christian MD Shantel Branch-Fleming, MD Srinivasa Vaka, MD Katya Belousova, PAKATE Smiley CNP, MD ([facility]   Mustapha Suresh, MD Yelena Garcia MD Amelia Massimini APRN DEVON Huitron APRN MD Letty Webster MD          Clinic hours: Monday - Thursday 7 am-6 pm; Fridays 7 am-5 pm.   Urgent care: Monday - Friday 10 am- 8 pm; Saturday and Sunday 9 am-5 pm.    Clinic: [phone]       [facility]: Monday - Thursday 8 am - 7 pm; Friday 8 am - 6 pm  [facility]: [phone]

## 2023-06-19 NOTE — PROGRESS NOTES
" MYLES Moody is a 72 year old, presenting for the following health issues:    ED/UC Followup:    Facility:  [facility]  Date of visit: 6/14/23  Reason for visit: flank pain  Current Status: Closed compression fracture of L3. Uses heating pad on back.  Description: Presented to ER with RUQ pain. Given anti-emetics in the ambulance.  Precipitating factor: Fell last April 2023 when she was reaching down.    Other concerns:  -Dental prophylaxis with Amoxicillin. Hx of Clostridium difficile.  -Abnormal liver function tests.  -Bladder is not emptying and it \"might've backed up to kidneys\".     REVIEW OF SYSTEMS  CONSTITUTIONAL: NEGATIVE for fever, chills, change in weight  INTEGUMENTARY/SKIN: NEGATIVE for worrisome rashes, moles or lesions  EYES: NEGATIVE for vision changes or irritation  ENT/MOUTH: NEGATIVE for ear, mouth and throat problems  RESP: NEGATIVE for significant cough or SOB  BREAST: NEGATIVE for masses, tenderness or discharge  CV: NEGATIVE for chest pain, palpitations or peripheral edema  GI: NEGATIVE for hematemesis, hematochezia, jaundice and melena  : NEGATIVE for frequency, dysuria, or hematuria  MUSCULOSKELETAL: NEGATIVE for significant arthralgias or myalgia  NEURO: NEGATIVE for weakness, dizziness or paresthesias  ENDOCRINE: NEGATIVE for temperature intolerance, skin/hair changes  HEME: NEGATIVE for bleeding problems  PSYCHIATRIC: NEGATIVE for changes in mood or affect      OBJECTIVE   /72 (BP Location: Left arm, Patient Position: Sitting, Cuff Size: Adult Large)   Pulse 78   Temp 98.1  F (36.7  C) (Tympanic)   Resp 20   Ht 1.626 m (5' 4\")   Wt 80.8 kg (178 lb 3.2 oz)   LMP  (LMP Unknown)   SpO2 98%   BMI 30.59 kg/m    Body mass index is 30.59 kg/m .  Physical Exam   GENERAL: healthy, alert and no distress  EYES: Eyes grossly normal to inspection and conjunctivae and sclerae normal  HENT: normal cephalic/atraumatic  RESP: lungs clear to auscultation - no rales, rhonchi " or wheezes  CV: regular rates and rhythm, peripheral pulses strong and no peripheral edema  ABDOMEN: tenderness RUQ and bowel sounds normal  MS: no gross musculoskeletal defects noted, no edema  NEURO: Normal strength and tone, mentation intact and speech normal  PSYCH: mentation appears normal, affect normal/bright      DIAGNOSTICS     EXAM: CT ABDOMEN PELVIS W CONTRAST  LOCATION: [facility]  DATE/TIME: 11/2/2022 8:34 PM     INDICATION: bacteremia with multiple renal procedure  COMPARISON: 02/03/2022  TECHNIQUE: CT scan of the abdomen and pelvis was performed following injection of IV contrast. Multiplanar reformats were obtained. Dose reduction techniques were used.  CONTRAST: 68 mL Isovue 370     FINDINGS:   LOWER CHEST: Tiny right pleural effusion.     HEPATOBILIARY: Normal contour with no significant mass. No bile duct dilatation. Calcified gallstones.     PANCREAS: Normal.     SPLEEN: Normal.     ADRENAL GLANDS: Normal.     KIDNEYS/BLADDER: Urothelial thickening and enhancement involving the right renal pelvis and proximal ureter, as well as some patchy hypoenhancement of the interpolar right kidney and right perinephric fat stranding. No obstructing stone or lesion. Left   renal cysts, which do not require further workup. Bladder is partially obscured by streak artifact, but normal where visualized.     BOWEL: No obstruction or inflammatory change.     LYMPH NODES: Normal.     VASCULATURE: Mild atherosclerotic calcification of the abdominal aorta, visceral branches, and iliofemoral arteries. No aneurysm.     PELVIC ORGANS: Hysterectomy.     MUSCULOSKELETAL: Mild degenerative changes in the spine. Bilateral hip prosthesis. No aggressive or destructive lesions.                                                                      IMPRESSION:   1.  Constellation of findings consistent with right pyelonephritis. No perinephric fluid collection to suggest abscess.     2.   Cholelithiasis.     3.  Tiny right pleural effusion.    Component Ref Range & Units 5 d ago  (6/14/23) 4 mo ago  (2/14/23) 6 mo ago  (11/28/22) 7 mo ago  (11/6/22) 7 mo ago  (11/4/22) 7 mo ago  (11/3/22) 7 mo ago  (11/2/22)     Sodium 136 - 145 mmol/L 137  139  141 R  137 R  141 R  142 R  135 R     Potassium 3.4 - 5.3 mmol/L 3.6  4.0          Chloride 98 - 107 mmol/L 100  102          Carbon Dioxide (CO2) 22 - 29 mmol/L 25  25          Anion Gap 7 - 15 mmol/L 12  12  5 R  6 R  5 R  5 R  6 R     Urea Nitrogen 8.0 - 23.0 mg/dL 14.7  9.6          Creatinine 0.51 - 0.95 mg/dL 1.06 High   1.07 High   0.87 R  0.75 R  0.89 R  0.97 R  1.12 High  R     Calcium 8.8 - 10.2 mg/dL 8.6 Low   8.5 Low   8.2 Low  R  8.5 R  8.4 Low  R  7.9 Low  R  7.5 Low  R     Glucose 70 - 99 mg/dL 150 High   94          Alkaline Phosphatase 35 - 104 U/L 133 High        73 R     AST 0 - 45 U/L 267 High        38 CM    Comment: Reference intervals for this test were updated on 6/12/2023 to more accurately reflect our healthy population. There may be differences in the flagging of prior results with similar values performed with this method. Interpretation of those prior results can be made in the context of the updated reference intervals.    ALT 0 - 50 U/L 135 High        22    Comment: Reference intervals for this test were updated on 6/12/2023 to more accurately reflect our healthy population. There may be differences in the flagging of prior results with similar values performed with this method. Interpretation of those prior results can be made in the context of the updated reference intervals.      Protein Total 6.4 - 8.3 g/dL 6.6       6.1 Low  R     Albumin 3.5 - 5.2 g/dL 4.2           Bilirubin Total <=1.2 mg/dL 1.5 High        0.8 R     GFR Estimate >60 mL/min/1.73m2 55 Low   54 Low  CM  70 CM  84 CM  68 CM  61 CM  52 Low  CM    Comment: eGFR calculated using 2021 CKD-EPI equation.   Resulting Agency  [facility]              Specimen Collected: 06/14/23 11:40 PM Last Resulted: 06/15/23 12:21 AM             EXAM: CT ABDOMEN PELVIS W CONTRAST  LOCATION: [facility]  DATE: 6/15/2023     INDICATION: Abdominal pain and bilateral flank pain.  COMPARISON: 11/02/2022.  TECHNIQUE: CT scan of the abdomen and pelvis was performed following injection of IV contrast. Multiplanar reformats were obtained. Dose reduction techniques were used.  CONTRAST: 91 mL Isovue 370.     FINDINGS:   LOWER CHEST: Mild to moderate elevation right hemidiaphragm. Dependent atelectasis in the posterior lung bases.     HEPATOBILIARY: Numerous calcified gallstones within the gallbladder. Question mild gallbladder wall thickening. Clinical correlation for any associated symptomatology. No intra or extrahepatic biliary dilatation. No liver masses.     PANCREAS: Normal.     SPLEEN: Normal.     ADRENAL GLANDS: Normal.     KIDNEYS/BLADDER: The bilateral kidneys enhance relatively symmetrically. Interval development of mild to moderate bilateral hydronephrosis or prominent extrarenal pelvis bilaterally, greater on the right. Findings may represent bilateral ureteropelvic   junction narrowing and/or stenosis. Postsurgical changes are seen adjacent to the right ureteropelvic junction. Clinical correlation recommended. Bilateral hip arthroplasties obscure some details of the distal ureters and urinary bladder.     BOWEL: Nonspecific nonobstructive bowel gas pattern. Redundancy of the cecum. Appendix not visualized.     LYMPH NODES: No retroperitoneal or mesenteric adenopathy. No inguinal adenopathy or hernia.     VASCULATURE: No abdominal aortic aneurysm.     PELVIC ORGANS: No pelvic masses. Bilateral hip arthroplasties with associated streak artifact obscuring details of the mid and lower pelvis.     MUSCULOSKELETAL: Mild to moderate interval anterior compression deformity of the superior endplate of L3. Clinical  correlation recommended. Bilateral hip arthroplasties with associated streak artifact obscuring details of the mid and lower pelvis.                                                                      IMPRESSION:   1.  The bilateral kidneys enhance relatively symmetrically. Interval development of mild to moderate bilateral hydronephrosis or prominent extrarenal pelvis bilaterally, greater on the right. Findings may represent bilateral ureteropelvic junction   narrowing and/or stenosis. Postsurgical changes are seen adjacent to the right ureteropelvic junction. Clinical correlation recommended. Bilateral hip arthroplasties obscure some details of the distal ureters and urinary bladder.     2.  Nonspecific nonobstructive bowel gas pattern. Redundancy of the cecum. Appendix not visualized.     3.  Numerous calcified gallstones within the gallbladder. Question mild gallbladder wall thickening. Clinical correlation for any associated symptomatology. No intra or extrahepatic biliary dilatation.      4.  Mild to moderate interval anterior compression deformity of the superior endplate of L3. Clinical correlation recommended. Bilateral hip arthroplasties with associated streak artifact obscuring details of the mid and lower pelvis.    Component Ref Range & Units 5 d ago  (6/14/23) 4 mo ago  (2/14/23) 6 mo ago  (11/28/22) 7 mo ago  (11/6/22) 7 mo ago  (11/4/22) 7 mo ago  (11/3/22) 7 mo ago  (11/2/22)     Sodium 136 - 145 mmol/L 137  139  141 R  137 R  141 R  142 R  135 R     Potassium 3.4 - 5.3 mmol/L 3.6  4.0          Chloride 98 - 107 mmol/L 100  102          Carbon Dioxide (CO2) 22 - 29 mmol/L 25  25          Anion Gap 7 - 15 mmol/L 12  12  5 R  6 R  5 R  5 R  6 R     Urea Nitrogen 8.0 - 23.0 mg/dL 14.7  9.6          Creatinine 0.51 - 0.95 mg/dL 1.06 High   1.07 High   0.87 R  0.75 R  0.89 R  0.97 R  1.12 High  R     Calcium 8.8 - 10.2 mg/dL 8.6 Low   8.5 Low   8.2 Low  R  8.5 R  8.4 Low  R  7.9 Low  R  7.5 Low  R      Glucose 70 - 99 mg/dL 150 High   94          Alkaline Phosphatase 35 - 104 U/L 133 High        73 R     AST 0 - 45 U/L 267 High        38 CM    Comment: Reference intervals for this test were updated on 6/12/2023 to more accurately reflect our healthy population. There may be differences in the flagging of prior results with similar values performed with this method. Interpretation of those prior results can be made in the context of the updated reference intervals.    ALT 0 - 50 U/L 135 High        22    Comment: Reference intervals for this test were updated on 6/12/2023 to more accurately reflect our healthy population. There may be differences in the flagging of prior results with similar values performed with this method. Interpretation of those prior results can be made in the context of the updated reference intervals.      Protein Total 6.4 - 8.3 g/dL 6.6       6.1 Low  R     Albumin 3.5 - 5.2 g/dL 4.2           Bilirubin Total <=1.2 mg/dL 1.5 High        0.8 R     GFR Estimate >60 mL/min/1.73m2 55 Low   54 Low  CM  70 CM  84 CM  68 CM  61 CM  52 Low  CM    Comment: eGFR calculated using 2021 CKD-EPI equation.   Resulting Agency  [facility]              Specimen Collected: 06/14/23 11:40 PM Last Resulted: 06/15/23 12:21 AM               ASSESSMENT/PLAN  Calculus of gallbladder without cholecystitis without obstruction  - NM Hepatobiliary Scan w GB EF; Future  - Comprehensive metabolic panel    Bilateral hydronephrosis  - UA with Microscopic reflex to Culture - lab collect; Standing    Need for SBE (subacute bacterial endocarditis) prophylaxis  - amoxicillin (AMOXIL) 500 MG capsule; Take 4 capsules (2,000 mg) by mouth once for 1 dose Take 30 minutes before any dental procedure.    Biliary dyskinesia  - Adult General Surg Referral; Future    Abdominal pain, right upper quadrant  - Adult General Surg Referral; Future    Disposition:  Follow-up in 4 weeks or as  needed.    Danny Conteh MD  Internal Medicine

## 2023-06-19 NOTE — TELEPHONE ENCOUNTER
Called pt and lvm to call back about this appt request. Pt is scheduled to arrive at 2:10pm today for a 2:30 appt with Dr. Conteh

## 2023-06-20 DIAGNOSIS — S32.030S CLOSED COMPRESSION FRACTURE OF L3 LUMBAR VERTEBRA, SEQUELA: Primary | ICD-10-CM

## 2023-06-20 LAB
ALBUMIN SERPL BCG-MCNC: 4.5 G/DL (ref 3.5–5.2)
ALP SERPL-CCNC: 121 U/L (ref 35–104)
ALT SERPL W P-5'-P-CCNC: 74 U/L (ref 0–50)
ANION GAP SERPL CALCULATED.3IONS-SCNC: 17 MMOL/L (ref 7–15)
AST SERPL W P-5'-P-CCNC: 46 U/L (ref 0–45)
BILIRUB SERPL-MCNC: 0.7 MG/DL
BUN SERPL-MCNC: 8.1 MG/DL (ref 8–23)
CALCIUM SERPL-MCNC: 9.5 MG/DL (ref 8.8–10.2)
CHLORIDE SERPL-SCNC: 102 MMOL/L (ref 98–107)
CREAT SERPL-MCNC: 0.96 MG/DL (ref 0.51–0.95)
DEPRECATED HCO3 PLAS-SCNC: 21 MMOL/L (ref 22–29)
GFR SERPL CREATININE-BSD FRML MDRD: 62 ML/MIN/1.73M2
GLUCOSE SERPL-MCNC: 99 MG/DL (ref 70–99)
POTASSIUM SERPL-SCNC: 4.3 MMOL/L (ref 3.4–5.3)
PROT SERPL-MCNC: 7.3 G/DL (ref 6.4–8.3)
SODIUM SERPL-SCNC: 140 MMOL/L (ref 136–145)

## 2023-06-20 ASSESSMENT — PATIENT HEALTH QUESTIONNAIRE - PHQ9
10. IF YOU CHECKED OFF ANY PROBLEMS, HOW DIFFICULT HAVE THESE PROBLEMS MADE IT FOR YOU TO DO YOUR WORK, TAKE CARE OF THINGS AT HOME, OR GET ALONG WITH OTHER PEOPLE: VERY DIFFICULT
SUM OF ALL RESPONSES TO PHQ QUESTIONS 1-9: 10
SUM OF ALL RESPONSES TO PHQ QUESTIONS 1-9: 10

## 2023-06-21 ENCOUNTER — VIRTUAL VISIT (OUTPATIENT)
Dept: PSYCHOLOGY | Facility: CLINIC | Age: 73
End: 2023-06-21
Payer: MEDICARE

## 2023-06-21 DIAGNOSIS — F33.0 MAJOR DEPRESSIVE DISORDER, RECURRENT EPISODE, MILD (H): Primary | ICD-10-CM

## 2023-06-21 DIAGNOSIS — F41.1 GENERALIZED ANXIETY DISORDER: ICD-10-CM

## 2023-06-21 PROCEDURE — 90834 PSYTX W PT 45 MINUTES: CPT | Mod: VID | Performed by: SOCIAL WORKER
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
## 2023-06-22 ENCOUNTER — ANCILLARY PROCEDURE (OUTPATIENT)
Dept: GENERAL RADIOLOGY | Facility: CLINIC | Age: 73
End: 2023-06-22
Attending: PHYSICIAN ASSISTANT
Payer: MEDICARE

## 2023-06-22 ENCOUNTER — OFFICE VISIT (OUTPATIENT)
Dept: NEUROSURGERY | Facility: CLINIC | Age: 73
End: 2023-06-22
Attending: EMERGENCY MEDICINE
Payer: MEDICARE

## 2023-06-22 VITALS — OXYGEN SATURATION: 97 % | DIASTOLIC BLOOD PRESSURE: 65 MMHG | HEART RATE: 68 BPM | SYSTOLIC BLOOD PRESSURE: 110 MMHG

## 2023-06-22 DIAGNOSIS — S32.030A CLOSED COMPRESSION FRACTURE OF L3 LUMBAR VERTEBRA, INITIAL ENCOUNTER (H): ICD-10-CM

## 2023-06-22 DIAGNOSIS — S32.030A CLOSED COMPRESSION FRACTURE OF L3 LUMBAR VERTEBRA, INITIAL ENCOUNTER (H): Primary | ICD-10-CM

## 2023-06-22 DIAGNOSIS — S32.030S CLOSED COMPRESSION FRACTURE OF L3 LUMBAR VERTEBRA, SEQUELA: ICD-10-CM

## 2023-06-22 PROCEDURE — 72100 X-RAY EXAM L-S SPINE 2/3 VWS: CPT | Mod: TC | Performed by: RADIOLOGY

## 2023-06-22 PROCEDURE — 99203 OFFICE O/P NEW LOW 30 MIN: CPT | Performed by: PHYSICIAN ASSISTANT

## 2023-06-22 PROCEDURE — G0463 HOSPITAL OUTPT CLINIC VISIT: HCPCS | Performed by: PHYSICIAN ASSISTANT

## 2023-06-22 ASSESSMENT — PAIN SCALES - GENERAL: PAINLEVEL: MILD PAIN (2)

## 2023-06-22 NOTE — LETTER
6/22/2023         RE: Gem Gama  [address]  [place] [address]        Dear Colleague,    Thank you for referring your patient, Gem Gama, to the [facility] [place]. Please see a copy of my visit note below.    NEUROSURGERY CLINIC CONSULT NOTE     DATE OF VISIT: 6/22/2023     SUBJECTIVE:     Gem Gama is a pleasant 72 year old female who presents to the clinic today for consultation on a L3 compression fracture. She is referred to the Neurosurgery Clinic by Dr. Motta in Primary Care.   Today, she reports a two-month history of symptoms. She tells me that she presented to the ED one week ago for a separate issue when the L3 fracture was discovered. She describes a daily with fluctuating intensity, sharp, aching pain that initiates in the midline lumbar region and does not radiate distally nor is this pain accompanied by paresthesia, numbness or perceived weakness. Lifting seems to aggravate the symptoms, while alleviation is obtained by rest. There are no bowel or bladder changes. She denies saddle anesthesia.       Current Outpatient Medications:      albuterol (PROAIR HFA/PROVENTIL HFA/VENTOLIN HFA) 108 (90 Base) MCG/ACT inhaler, Inhale 1-2 puffs into the lungs every 4 hours as needed for shortness of breath or wheezing, Disp: 18 g, Rfl: 11     amoxicillin (AMOXIL) 500 MG capsule, Take 2,000 mg by mouth once as needed Take as directed before dental work (Patient not taking: Reported on 6/19/2023), Disp: , Rfl:      aspirin (ASA) 81 MG EC tablet, Take 1 tablet (81 mg) by mouth daily, Disp: 90 tablet, Rfl: 0     atorvastatin (LIPITOR) 40 MG tablet, Take 1 tablet (40 mg) by mouth daily, Disp: 90 tablet, Rfl: 3     carvedilol (COREG) 3.125 MG tablet, TAKE 1 TABLET(3.125 MG) BY MOUTH TWICE DAILY WITH MEALS, Disp: 180 tablet, Rfl: 3     cholestyramine (QUESTRAN) 4 g packet, Take 1 packet (4 g) by mouth 2 times daily (with meals), Disp: 60 packet,  Rfl: 5     diazepam (VALIUM) 2 MG tablet, Take 0.5-1 tablets (1-2 mg) by mouth 2 times daily as needed for anxiety or muscle spasms, Disp: 30 tablet, Rfl: 0     diclofenac (VOLTAREN) 1 % topical gel, Apply topically 4 times daily For Jaw pain, Disp: , Rfl:      estradiol (ESTRING) 2 MG vaginal ring, Place 1 each vaginally every 3 months, Disp: 1 each, Rfl: 3     fluticasone-vilanterol (BREO ELLIPTA) 200-25 MCG/ACT inhaler, Inhale 1 puff into the lungs daily, Disp: 3 each, Rfl: 3     isosorbide mononitrate (IMDUR) 30 MG 24 hr tablet, TAKE 1 TABLET(30 MG) BY MOUTH DAILY, Disp: 90 tablet, Rfl: 3     loperamide (IMODIUM) 2 MG capsule, Take 1 capsule (2 mg) by mouth daily, Disp: 30 capsule, Rfl: 0     loratadine (CLARITIN) 10 MG tablet, Take 10 mg by mouth At Bedtime, Disp: , Rfl:      magnesium oxide (MAG-OX) 400 MG tablet, Take 400 mg by mouth daily, Disp: , Rfl:      Melatonin 10 MG TABS tablet, Take 10 mg by mouth nightly as needed for sleep, Disp: , Rfl:      mirabegron (MYRBETRIQ) 50 MG 24 hr tablet, Take 1 tablet (50 mg) by mouth daily, Disp: 90 tablet, Rfl: 1     nitroGLYcerin (NITROSTAT) 0.4 MG sublingual tablet, Place 1 tablet (0.4 mg) under the tongue every 5 minutes as needed for chest pain, Disp: 25 tablet, Rfl: 11     pantoprazole (PROTONIX) 40 MG EC tablet, TAKE 1 TABLET(40 MG) BY MOUTH DAILY, Disp: 90 tablet, Rfl: 0     Prenatal Multivit-Min-Fe-FA (PRENATAL/IRON) TABS, Take 1 tablet by mouth daily , Disp: , Rfl:      saccharomyces boulardii (FLORASTOR) 250 MG capsule, Take 1 capsule (250 mg) by mouth 2 times daily, Disp: 14 capsule, Rfl: 0     sertraline (ZOLOFT) 100 MG tablet, Take 1 tablet (100 mg) by mouth daily, Disp: 90 tablet, Rfl: 3     UNABLE TO FIND, MEDICATION NAME: Uqora complete regimen, Disp: , Rfl:      ursodiol (ACTIGALL) 300 MG capsule, TAKE 1 CAPSULE(300 MG) BY MOUTH TWICE DAILY, Disp: 180 capsule, Rfl: 3     valsartan (DIOVAN) 40 MG tablet, Take 1 tablet (40 mg) by mouth daily, Disp: 90  tablet, Rfl: 3     vitamin D3 (CHOLECALCIFEROL) 2000 units (50 mcg) tablet, Take 1 tablet (2,000 Units) by mouth daily, Disp: 90 tablet, Rfl: 3     zinc gluconate 50 MG tablet, , Disp: , Rfl:      Allergies   Allergen Reactions     Dust Mites Cough, Headache, Other (See Comments) and Shortness Of Breath     Latex Other (See Comments) and Shortness Of Breath     Runny nose  PN: LW Reaction: DIFFICULTY BREATHING       Sulfa Antibiotics Anaphylaxis, Hives and Itching     PN: LW Reaction: HIVES, Swelling  PN: LW Reaction: HIVES, Swelling     Flagyl [Metronidazole Hcl] Anaphylaxis and Rash     Food      PN: LW FI1: nka LW FI2:     Hydrochlorothiazide W-Triamterene      PN: LW Reaction: skin rash  PN: LW Reaction: skin rash     No Clinical Screening - See Comments      PN: LW Other1: -Adhesive Tape     Seasonal Allergies      sneezing     Tape [Adhesive Tape] Hives     Metoprolol Itching and Rash     Metronidazole Hives and Rash     PN: LW Reaction: HIVES          Past Medical History:   Diagnosis Date     ADHD (attention deficit hyperactivity disorder)      Anxiety      Arthritis     back, hands, knees, hips     Asthma      C. difficile diarrhea      Central sleep apnea      Cheyne-Rogers breathing 09/07/2022     Coronary artery disease involving native coronary artery of native heart without angina pectoris      Depression      Fever and chills 09/24/2021     Gastro-oesophageal reflux disease      Hypertension      Ischemic cardiomyopathy      Major depressive disorder, recurrent episode, moderate (H) 07/25/2013     Narcolepsy      Pituitary microadenoma (H) 2011    MRI 2011- There is a triangular-shaped area with delayed contrast enhancement at the left lateral portion of the pituitary gland posteriorly, measuring 2.5 x 4.3 mm. This is suggestive of a microadenoma, MRI 10/1/22 - Normal pituitary gland and whole brain MRI.     Pyelonephritis of right kidney 10/24/2021     Renal disease      S/P hip replacement 2004     Bilateral fall 2004 due to OA     Sleep apnea      Status post total knee replacement 12/29/2014     Urinary tract infection with hematuria, site unspecified 09/24/2021        ROS: 10 point review of symptoms are negative other than the symptoms noted above in the HPI.     Family History has been reviewed with the patient, there are no pertinent findings to presenting concern.     Past Surgical History:   Procedure Laterality Date     ARTHROPLASTY KNEE  07/09/2014    Procedure: ARTHROPLASTY KNEE;  Surgeon: Levi Johnson MD;  Location:  OR     ARTHROPLASTY KNEE Left 11/24/2014    Procedure: ARTHROPLASTY KNEE;  Surgeon: Levi Johnson MD;  Location:  OR     CV CORONARY ANGIOGRAM N/A 10/09/2019    Procedure: Coronary Angiogram;  Surgeon: Chiquita Chatterjee MD;  Location:  HEART CARDIAC CATH LAB     CV HEART CATHETERIZATION WITH POSSIBLE INTERVENTION N/A 10/10/2019    Procedure: Heart Catheterization with Possible Intervention;  Surgeon: Chin Garcia MD;  Location:  HEART CARDIAC CATH LAB     CV INTRA AORTIC BALLOON N/A 10/09/2019    Procedure: Intra-Aortic Balloon Pump Insertion;  Surgeon: Chiquita Chatterjee MD;  Location:  HEART CARDIAC CATH LAB     CV INTRA AORTIC BALLOON REMOVAL N/A 10/10/2019    Procedure: Intra-Aortic Balloon Pump Removal;  Surgeon: Chin Garcia MD;  Location:  HEART CARDIAC CATH LAB     CV LEFT HEART CATH N/A 12/11/2020    Procedure: Heart Catheterization with Possible Intervention;  Surgeon: Pilo Otoole MD;  Location:  HEART CARDIAC CATH LAB     CV PCI STENT DRUG ELUTING N/A 10/09/2019    Procedure: PCI Stent Drug Eluting;  Surgeon: Chiquita Chatterjee MD;  Location:  HEART CARDIAC CATH LAB     GENITOURINARY SURGERY  01/01/2008    Prolift     GENITOURINARY SURGERY  1973    In 1973, 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     ORTHOPEDIC SURGERY      bilat total hip     RECTOCELE REPAIR       ZZC TOTAL ABD HYSTERECTOMY+BLAD REPR  01/01/1992    Vaginal approach with oophrectomy     ZZC TOTAL KNEE ARTHROPLASTY          Social History     Tobacco Use     Smoking status: Never     Passive exposure: Never     Smokeless tobacco: Never   Vaping Use     Vaping Use: Never used   Substance Use Topics     Alcohol use: No     Drug use: No        OBJECTIVE:   /65   Pulse 68   LMP  (LMP Unknown)   SpO2 97%    There is no height or weight on file to calculate BMI.     Imaging:     Updated imaging obtained today.     Full radiological report in chart. Imaging was reviewed with with patient today.     Exam:     Patient appears comfortable, conversational, and in no apparent distress.   Head: Normocephalic, without obvious abnormality, atraumatic, no facial asymmetry.   Eyes: conjunctivae/corneas clear. PERRL, EOM's intact.   Throat: lips, mucosa, and tongue normal; teeth and gums normal.   Neck: supple, symmetrical, trachea midline, no adenopathy and thyroid: not enlarged, symmetric, no tenderness/mass/nodules.   Lungs: clear to auscultation bilaterally.   Heart: regular rate and rhythm.   Abdomen: soft, non-tender; bowel sounds normal; no masses, no organomegaly.   Pulses: 2+ and symmetric.   Skin: Skin color, texture, turgor normal. No rashes or lesions.     CN II-XII grossly intact, alert and appropriate with conversation and following commands.   Gait is non-antalgic. Able to tandem walk. Able to walk on toes and heels without difficulty.   Cervical spine is non tender to palpation. Appropriate range of motion of neck, not concerning for lhermitte's phenomenon.   Bilateral bicep 2/4 and tricep reflexes 1/4. Sensation intact throughout upper extremities.     UE muscle strength  Right  Left    Deltoid  5/5  5/5    Biceps  5/5  5/5    Triceps  5/5   5/5    Hand intrinsics  5/5  5/5    Hand grasp  5/5  5/5    Sal signs  neg  neg      Lumbar spine is tender to palpation.  Intact sensation throughout lower extremities.   Bilateral patellar 2/4 and achilles reflex 1/4. Negative for pain with straight leg raise.     LE muscle strength  Right  Left    Iliopsoas (hip flexion)  5/5  5/5    Quad (knee extension)  5/5  5/5    Hamstring (knee flexion)  5/5  5/5    Gastrocnemius (PF)  5/5  5/5    Tibialis Ant. (DF)  5/5  5/5    EHL  5/5  5/5      Negative Babinski bilaterally. Negative for clonus.   Calves are soft and non-tender bilaterally.       ASSESSMENT/PLAN:     Gem Gama is a 72 year old female who presents to the clinic for consultation on a L3 compression fracture that she thinks occurred three weeks ago but was discovered one week ago. The patient's most recent imaging was reviewed with her today. On exam, she is noted to have appropriate strength, sensation and range of motion.      Based on her physical exam, imaging review, and past treatments, we feel that it would be in her best interest to try a conservative approach by participating in a physical therapy program. We also discussed obtaining an epidural steroid injection which she would like to avoid at this time.     We would like to see her back in 4-6 weeks with updated imaging. At that time we will  further discuss possible surgical interventions or other conservative therapies. In the event that patient's symptoms worsen or change we would like to see her sooner. We also discussed signs of a worsening problem that she should seek being evaluated.        Respectfully,     ISIS Rowland, PATTY  [facility] Neurosurgery  [facility]  Tel: [phone]      Exam, imaging, and plan reviewed by Dr. Padilla.       Again, thank you for allowing me to participate in the care of your patient.        Sincerely,        Blu Lopez,  PATTY

## 2023-06-22 NOTE — NURSING NOTE
"Gem Gama is a 72 year old female who presents for:  Chief Complaint   Patient presents with     Back Pain        Vitals:    Vitals:    06/22/23 0955   BP: 110/65   Pulse: 68   SpO2: 97%       BMI:  Estimated body mass index is 30.59 kg/m  as calculated from the following:    Height as of 6/19/23: 5' 4\" (1.626 m).    Weight as of 6/19/23: 178 lb 3.2 oz (80.8 kg).    Pain Score:  Mild Pain (2)        Amendo Phorn      "

## 2023-06-22 NOTE — PROGRESS NOTES
NEUROSURGERY CLINIC CONSULT NOTE     DATE OF VISIT: 6/22/2023     SUBJECTIVE:     Gem Gama is a pleasant 72 year old female who presents to the clinic today for consultation on a L3 compression fracture. She is referred to the Neurosurgery Clinic by Dr. Motta in Primary Care.   Today, she reports a two-month history of symptoms. She tells me that she presented to the ED one week ago for a separate issue when the L3 fracture was discovered. She describes a daily with fluctuating intensity, sharp, aching pain that initiates in the midline lumbar region and does not radiate distally nor is this pain accompanied by paresthesia, numbness or perceived weakness. Lifting seems to aggravate the symptoms, while alleviation is obtained by rest. There are no bowel or bladder changes. She denies saddle anesthesia.       Current Outpatient Medications:      albuterol (PROAIR HFA/PROVENTIL HFA/VENTOLIN HFA) 108 (90 Base) MCG/ACT inhaler, Inhale 1-2 puffs into the lungs every 4 hours as needed for shortness of breath or wheezing, Disp: 18 g, Rfl: 11     amoxicillin (AMOXIL) 500 MG capsule, Take 2,000 mg by mouth once as needed Take as directed before dental work (Patient not taking: Reported on 6/19/2023), Disp: , Rfl:      aspirin (ASA) 81 MG EC tablet, Take 1 tablet (81 mg) by mouth daily, Disp: 90 tablet, Rfl: 0     atorvastatin (LIPITOR) 40 MG tablet, Take 1 tablet (40 mg) by mouth daily, Disp: 90 tablet, Rfl: 3     carvedilol (COREG) 3.125 MG tablet, TAKE 1 TABLET(3.125 MG) BY MOUTH TWICE DAILY WITH MEALS, Disp: 180 tablet, Rfl: 3     cholestyramine (QUESTRAN) 4 g packet, Take 1 packet (4 g) by mouth 2 times daily (with meals), Disp: 60 packet, Rfl: 5     diazepam (VALIUM) 2 MG tablet, Take 0.5-1 tablets (1-2 mg) by mouth 2 times daily as needed for anxiety or muscle spasms, Disp: 30 tablet, Rfl: 0     diclofenac (VOLTAREN) 1 % topical gel, Apply topically 4 times daily For Jaw pain, Disp: , Rfl:      estradiol  (ESTRING) 2 MG vaginal ring, Place 1 each vaginally every 3 months, Disp: 1 each, Rfl: 3     fluticasone-vilanterol (BREO ELLIPTA) 200-25 MCG/ACT inhaler, Inhale 1 puff into the lungs daily, Disp: 3 each, Rfl: 3     isosorbide mononitrate (IMDUR) 30 MG 24 hr tablet, TAKE 1 TABLET(30 MG) BY MOUTH DAILY, Disp: 90 tablet, Rfl: 3     loperamide (IMODIUM) 2 MG capsule, Take 1 capsule (2 mg) by mouth daily, Disp: 30 capsule, Rfl: 0     loratadine (CLARITIN) 10 MG tablet, Take 10 mg by mouth At Bedtime, Disp: , Rfl:      magnesium oxide (MAG-OX) 400 MG tablet, Take 400 mg by mouth daily, Disp: , Rfl:      Melatonin 10 MG TABS tablet, Take 10 mg by mouth nightly as needed for sleep, Disp: , Rfl:      mirabegron (MYRBETRIQ) 50 MG 24 hr tablet, Take 1 tablet (50 mg) by mouth daily, Disp: 90 tablet, Rfl: 1     nitroGLYcerin (NITROSTAT) 0.4 MG sublingual tablet, Place 1 tablet (0.4 mg) under the tongue every 5 minutes as needed for chest pain, Disp: 25 tablet, Rfl: 11     pantoprazole (PROTONIX) 40 MG EC tablet, TAKE 1 TABLET(40 MG) BY MOUTH DAILY, Disp: 90 tablet, Rfl: 0     Prenatal Multivit-Min-Fe-FA (PRENATAL/IRON) TABS, Take 1 tablet by mouth daily , Disp: , Rfl:      saccharomyces boulardii (FLORASTOR) 250 MG capsule, Take 1 capsule (250 mg) by mouth 2 times daily, Disp: 14 capsule, Rfl: 0     sertraline (ZOLOFT) 100 MG tablet, Take 1 tablet (100 mg) by mouth daily, Disp: 90 tablet, Rfl: 3     UNABLE TO FIND, MEDICATION NAME: Uqora complete regimen, Disp: , Rfl:      ursodiol (ACTIGALL) 300 MG capsule, TAKE 1 CAPSULE(300 MG) BY MOUTH TWICE DAILY, Disp: 180 capsule, Rfl: 3     valsartan (DIOVAN) 40 MG tablet, Take 1 tablet (40 mg) by mouth daily, Disp: 90 tablet, Rfl: 3     vitamin D3 (CHOLECALCIFEROL) 2000 units (50 mcg) tablet, Take 1 tablet (2,000 Units) by mouth daily, Disp: 90 tablet, Rfl: 3     zinc gluconate 50 MG tablet, , Disp: , Rfl:      Allergies   Allergen Reactions     Dust Mites Cough, Headache, Other (See  Comments) and Shortness Of Breath     Latex Other (See Comments) and Shortness Of Breath     Runny nose  PN: LW Reaction: DIFFICULTY BREATHING       Sulfa Antibiotics Anaphylaxis, Hives and Itching     PN: LW Reaction: HIVES, Swelling  PN: LW Reaction: HIVES, Swelling     Flagyl [Metronidazole Hcl] Anaphylaxis and Rash     Food      PN: LW FI1: nka LW FI2:     Hydrochlorothiazide W-Triamterene      PN: LW Reaction: skin rash  PN: LW Reaction: skin rash     No Clinical Screening - See Comments      PN: LW Other1: -Adhesive Tape     Seasonal Allergies      sneezing     Tape [Adhesive Tape] Hives     Metoprolol Itching and Rash     Metronidazole Hives and Rash     PN: LW Reaction: HIVES          Past Medical History:   Diagnosis Date     ADHD (attention deficit hyperactivity disorder)      Anxiety      Arthritis     back, hands, knees, hips     Asthma      C. difficile diarrhea      Central sleep apnea      Cheyne-Rogers breathing 09/07/2022     Coronary artery disease involving native coronary artery of native heart without angina pectoris      Depression      Fever and chills 09/24/2021     Gastro-oesophageal reflux disease      Hypertension      Ischemic cardiomyopathy      Major depressive disorder, recurrent episode, moderate (H) 07/25/2013     Narcolepsy      Pituitary microadenoma (H) 2011    MRI 2011- There is a triangular-shaped area with delayed contrast enhancement at the left lateral portion of the pituitary gland posteriorly, measuring 2.5 x 4.3 mm. This is suggestive of a microadenoma, MRI 10/1/22 - Normal pituitary gland and whole brain MRI.     Pyelonephritis of right kidney 10/24/2021     Renal disease      S/P hip replacement 2004    Bilateral fall 2004 due to OA     Sleep apnea      Status post total knee replacement 12/29/2014     Urinary tract infection with hematuria, site unspecified 09/24/2021        ROS: 10 point review of symptoms are negative other than the symptoms noted above in the HPI.      Family History has been reviewed with the patient, there are no pertinent findings to presenting concern.     Past Surgical History:   Procedure Laterality Date     ARTHROPLASTY KNEE  07/09/2014    Procedure: ARTHROPLASTY KNEE;  Surgeon: Levi Johnson MD;  Location:  OR     ARTHROPLASTY KNEE Left 11/24/2014    Procedure: ARTHROPLASTY KNEE;  Surgeon: Levi Johnson MD;  Location:  OR     CV CORONARY ANGIOGRAM N/A 10/09/2019    Procedure: Coronary Angiogram;  Surgeon: Chiquita Chatterjee MD;  Location:  HEART CARDIAC CATH LAB     CV HEART CATHETERIZATION WITH POSSIBLE INTERVENTION N/A 10/10/2019    Procedure: Heart Catheterization with Possible Intervention;  Surgeon: Chin Garcia MD;  Location:  HEART CARDIAC CATH LAB     CV INTRA AORTIC BALLOON N/A 10/09/2019    Procedure: Intra-Aortic Balloon Pump Insertion;  Surgeon: Chiquita Chatterjee MD;  Location:  HEART CARDIAC CATH LAB     CV INTRA AORTIC BALLOON REMOVAL N/A 10/10/2019    Procedure: Intra-Aortic Balloon Pump Removal;  Surgeon: Chin Garcia MD;  Location:  HEART CARDIAC CATH LAB     CV LEFT HEART CATH N/A 12/11/2020    Procedure: Heart Catheterization with Possible Intervention;  Surgeon: Pilo Otoole MD;  Location:  HEART CARDIAC CATH LAB     CV PCI STENT DRUG ELUTING N/A 10/09/2019    Procedure: PCI Stent Drug Eluting;  Surgeon: Chiquita Chatterjee MD;  Location:  HEART CARDIAC CATH LAB     GENITOURINARY SURGERY  01/01/2008    Prolift     GENITOURINARY SURGERY  1973    In 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     ORTHOPEDIC SURGERY      bilat total hip     RECTOCELE REPAIR       ZZC TOTAL ABD HYSTERECTOMY+BLAD REPR  01/01/1992    Vaginal approach with oophrectomy     ZZC TOTAL KNEE  ARTHROPLASTY          Social History     Tobacco Use     Smoking status: Never     Passive exposure: Never     Smokeless tobacco: Never   Vaping Use     Vaping Use: Never used   Substance Use Topics     Alcohol use: No     Drug use: No        OBJECTIVE:   /65   Pulse 68   LMP  (LMP Unknown)   SpO2 97%    There is no height or weight on file to calculate BMI.     Imaging:     Updated imaging obtained today.     Full radiological report in chart. Imaging was reviewed with with patient today.     Exam:     Patient appears comfortable, conversational, and in no apparent distress.   Head: Normocephalic, without obvious abnormality, atraumatic, no facial asymmetry.   Eyes: conjunctivae/corneas clear. PERRL, EOM's intact.   Throat: lips, mucosa, and tongue normal; teeth and gums normal.   Neck: supple, symmetrical, trachea midline, no adenopathy and thyroid: not enlarged, symmetric, no tenderness/mass/nodules.   Lungs: clear to auscultation bilaterally.   Heart: regular rate and rhythm.   Abdomen: soft, non-tender; bowel sounds normal; no masses, no organomegaly.   Pulses: 2+ and symmetric.   Skin: Skin color, texture, turgor normal. No rashes or lesions.     CN II-XII grossly intact, alert and appropriate with conversation and following commands.   Gait is non-antalgic. Able to tandem walk. Able to walk on toes and heels without difficulty.   Cervical spine is non tender to palpation. Appropriate range of motion of neck, not concerning for lhermitte's phenomenon.   Bilateral bicep 2/4 and tricep reflexes 1/4. Sensation intact throughout upper extremities.     UE muscle strength  Right  Left    Deltoid  5/5  5/5    Biceps  5/5  5/5    Triceps  5/5  5/5    Hand intrinsics  5/5  5/5    Hand grasp  5/5  5/5    Sal signs  neg  neg      Lumbar spine is tender to palpation.  Intact sensation throughout lower extremities.   Bilateral patellar 2/4 and achilles reflex 1/4. Negative for pain with straight leg raise.      LE muscle strength  Right  Left    Iliopsoas (hip flexion)  5/5  5/5    Quad (knee extension)  5/5  5/5    Hamstring (knee flexion)  5/5  5/5    Gastrocnemius (PF)  5/5  5/5    Tibialis Ant. (DF)  5/5  5/5    EHL  5/5  5/5      Negative Babinski bilaterally. Negative for clonus.   Calves are soft and non-tender bilaterally.       ASSESSMENT/PLAN:     Gem Gama is a 72 year old female who presents to the clinic for consultation on a L3 compression fracture that she thinks occurred three weeks ago but was discovered one week ago. The patient's most recent imaging was reviewed with her today. On exam, she is noted to have appropriate strength, sensation and range of motion.      Based on her physical exam, imaging review, and past treatments, we feel that it would be in her best interest to try a conservative approach by participating in a physical therapy program. We also discussed obtaining an epidural steroid injection which she would like to avoid at this time.     We would like to see her back in 4-6 weeks with updated imaging. At that time we will  further discuss possible surgical interventions or other conservative therapies. In the event that patient's symptoms worsen or change we would like to see her sooner. We also discussed signs of a worsening problem that she should seek being evaluated.        Respectfully,     ISIS Rowland, PATTY  [facility] Neurosurgery  [facility]  Tel: [phone]      Exam, imaging, and plan reviewed by Dr. Padilla.

## 2023-06-27 ENCOUNTER — VIRTUAL VISIT (OUTPATIENT)
Dept: UROLOGY | Facility: CLINIC | Age: 73
End: 2023-06-27
Payer: MEDICARE

## 2023-06-27 VITALS — WEIGHT: 178 LBS | BODY MASS INDEX: 30.39 KG/M2 | HEIGHT: 64 IN

## 2023-06-27 DIAGNOSIS — N13.30 BILATERAL HYDRONEPHROSIS: ICD-10-CM

## 2023-06-27 DIAGNOSIS — Z87.448 HISTORY OF PYELONEPHRITIS: ICD-10-CM

## 2023-06-27 DIAGNOSIS — N39.0 RECURRENT UTI: Primary | ICD-10-CM

## 2023-06-27 DIAGNOSIS — N95.2 VAGINAL ATROPHY: ICD-10-CM

## 2023-06-27 DIAGNOSIS — Z87.448 HISTORY OF PYELOPLASTY: ICD-10-CM

## 2023-06-27 DIAGNOSIS — Z98.890 HISTORY OF PYELOPLASTY: ICD-10-CM

## 2023-06-27 PROCEDURE — 99214 OFFICE O/P EST MOD 30 MIN: CPT | Mod: VID | Performed by: UROLOGY

## 2023-06-27 ASSESSMENT — PAIN SCALES - GENERAL: PAINLEVEL: NO PAIN (0)

## 2023-06-27 NOTE — PROGRESS NOTES
"[phone]  Send link to cell phone  Ready when you are    Heidy is a 72 year old who is being evaluated via a billable video visit.      How would you like to obtain your AVS? MyChart  If the video visit is dropped, the invitation should be resent by: Text to cell phone: [phone]  Will anyone else be joining your video visit? No        Video-Visit Details    Type of service:  Video Visit   Video Start Time: 10:39 AM  Video End Time:10:56 AM    Originating Location (pt. Location): Home    Distant Location (provider location):  On-site  Platform used for Video Visit: [facility]    June 27, 2023    Gem was seen today for other.    Diagnoses and all orders for this visit:    Recurrent UTI    History of pyeloplasty    Vaginal atrophy    History of pyelonephritis     At this time no obvious UTI but the bilateral hydro resolved after catheterizing so will have her start with VCUG to evaluate for reflux.     Estring for UTI prevention    RTC for estring placement after VCUG    Pending VCUG results may consider UDS    17 minutes were spent today on the day of the encounter in reviewing the EMR including CT, renal US, labs, direct patient care including ordering VCUG and discussion of prescription medications, coordination of care and documentation    Mel Rodriguez MD MPH  (she/her/hers)   of Urology  [facility]    Subjective    No UTIs in the last 3 months.  Estring in place.  Using Uqora    ED a couple weeks ago for abdominal pain and back pain.  Had hydronephrosis seen but also difficulty with urination.  The hydronephrosis    She denies any changes in health since last visit    Ht 1.626 m (5' 4\")   Wt 80.7 kg (178 lb)   LMP  (LMP Unknown)   BMI 30.55 kg/m    GENERAL: healthy, alert and no distress  EYES: Eyes grossly normal to inspection, conjunctivae and sclerae normal  HENT: normal cephalic/atraumatic.  External ears, nose and mouth without ulcers or lesions.  RESP: no audible " wheeze, cough, or visible cyanosis.  No visible retractions or increased work of breathing.  Able to speak fully in complete sentences.  NEURO: Cranial nerves grossly intact, mentation intact and speech normal  PSYCH: mentation appears normal, affect normal/bright, judgement and insight intact, normal speech and appearance well-groomed    Labs/imaging/pathology  CT 6/15/23 shows bilateral hydronephrosis    Abd US 6/15/23 after patient was catheterized showed improvement in her right sided hydronephrosis    Urinalysis 6/15/23 < 1 RBC, < 1 WBC    CMP 6/15/23 BUN/Cr 14.7/1.06    CC  Patient Care Team:  Danny Conteh MD as PCP - General (Internal Medicine)  Danny Conteh MD as Assigned PCP  Shade Chu MD as Assigned OBGYN Provider  Luis A Moody MD as Assigned Pulmonology Provider  Mel Dennison MD as Assigned Heart and Vascular Provider  Evangelist Lee MD as Hospitalist (Internal Medicine)  Mel Rodriguez MD as MD (Urology)  Mel Rodriguez MD as Assigned Surgical Provider  Kashif Hadley MD as Assigned Sleep Provider  SELF, REFERRED

## 2023-06-27 NOTE — PATIENT INSTRUCTIONS
VCUG    Please bring the estring with you to your follow up appointment with     Websites with free information:    [employer] patient website: www.voicesforpfd.org    Total Control Program: www.totalcontrolprogram.com    Supplements to prevent UTI to consider  -Probiotics  -Cranberry (for these products let them know a doctor is recommending them)   Ellura: www.myellura.[employer]   Theracran HP by [employer] [facility] [address]  -d-mannose 2gm daily  -Vitamin C 500-1000mg twice a day    It was a pleasure meeting with you today.  Thank you for allowing me and my team the privilege of caring for you today.  YOU are the reason we are here, and I truly hope we provided you with the excellent service you deserve.  Please let us know if there is anything else we can do for you so that we can be sure you are leaving completely satisfied with your care experience.

## 2023-06-27 NOTE — LETTER
6/27/2023       RE: Gem Gama  [address]  [place] [address]     Dear Colleague,    Thank you for referring your patient, Gem Gama, to the [facility] at [facility]. Please see a copy of my visit note below.    [phone]  Send link to cell phone  Ready when you are    Heidy is a 72 year old who is being evaluated via a billable video visit.      How would you like to obtain your AVS? MyChart  If the video visit is dropped, the invitation should be resent by: Text to cell phone: [phone]  Will anyone else be joining your video visit? No        Video-Visit Details    Type of service:  Video Visit   Video Start Time: 10:39 AM  Video End Time:10:56 AM    Originating Location (pt. Location): Home    Distant Location (provider location):  On-site  Platform used for Video Visit: [facility]    June 27, 2023    Gem was seen today for other.    Diagnoses and all orders for this visit:    Recurrent UTI    History of pyeloplasty    Vaginal atrophy    History of pyelonephritis     At this time no obvious UTI but the bilateral hydro resolved after catheterizing so will have her start with VCUG to evaluate for reflux.     Estring for UTI prevention    RTC for estring placement after VCUG    Pending VCUG results may consider UDS    17 minutes were spent today on the day of the encounter in reviewing the EMR including CT, renal US, labs, direct patient care including ordering VCUG and discussion of prescription medications, coordination of care and documentation    Mel Rodriguez MD MPH  (she/her/hers)   of Urology  [facility]    Subjective    No UTIs in the last 3 months.  Estring in place.  Using Uqora    ED a couple weeks ago for abdominal pain and back pain.  Had hydronephrosis seen but also difficulty with urination.  The hydronephrosis    She denies any changes in health since last  "visit    Ht 1.626 m (5' 4\")   Wt 80.7 kg (178 lb)   LMP  (LMP Unknown)   BMI 30.55 kg/m    GENERAL: healthy, alert and no distress  EYES: Eyes grossly normal to inspection, conjunctivae and sclerae normal  HENT: normal cephalic/atraumatic.  External ears, nose and mouth without ulcers or lesions.  RESP: no audible wheeze, cough, or visible cyanosis.  No visible retractions or increased work of breathing.  Able to speak fully in complete sentences.  NEURO: Cranial nerves grossly intact, mentation intact and speech normal  PSYCH: mentation appears normal, affect normal/bright, judgement and insight intact, normal speech and appearance well-groomed    Labs/imaging/pathology  CT 6/15/23 shows bilateral hydronephrosis    Abd US 6/15/23 after patient was catheterized showed improvement in her right sided hydronephrosis    Urinalysis 6/15/23 < 1 RBC, < 1 WBC    CMP 6/15/23 BUN/Cr 14.7/1.06    CC  Patient Care Team:  Danny Conteh MD as PCP - General (Internal Medicine)  Danny Conteh MD as Assigned PCP  Shade Chu MD as Assigned OBGYN Provider  Luis A Moody MD as Assigned Pulmonology Provider  Mel Dennison MD as Assigned Heart and Vascular Provider  Evangelist Lee MD as Hospitalist (Internal Medicine)  Mel Rodriguez MD as MD (Urology)  Mel Rodriguez MD as Assigned Surgical Provider  Kashif Hadley MD as Assigned Sleep Provider  SELF, REFERRED    "

## 2023-06-28 ENCOUNTER — VIRTUAL VISIT (OUTPATIENT)
Dept: PSYCHOLOGY | Facility: CLINIC | Age: 73
End: 2023-06-28
Payer: MEDICARE

## 2023-06-28 DIAGNOSIS — F41.1 GENERALIZED ANXIETY DISORDER: ICD-10-CM

## 2023-06-28 DIAGNOSIS — F33.0 MAJOR DEPRESSIVE DISORDER, RECURRENT EPISODE, MILD (H): Primary | ICD-10-CM

## 2023-06-28 PROCEDURE — 90834 PSYTX W PT 45 MINUTES: CPT | Mod: VID | Performed by: SOCIAL WORKER
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
## 2023-06-29 ENCOUNTER — HOSPITAL ENCOUNTER (OUTPATIENT)
Dept: GENERAL RADIOLOGY | Facility: CLINIC | Age: 73
Discharge: HOME | End: 2023-06-29
Attending: UROLOGY | Admitting: UROLOGY
Payer: MEDICARE

## 2023-06-29 DIAGNOSIS — Z87.448 HISTORY OF PYELOPLASTY: ICD-10-CM

## 2023-06-29 DIAGNOSIS — N13.30 BILATERAL HYDRONEPHROSIS: ICD-10-CM

## 2023-06-29 DIAGNOSIS — Z98.890 HISTORY OF PYELOPLASTY: ICD-10-CM

## 2023-06-29 DIAGNOSIS — N39.0 RECURRENT UTI: ICD-10-CM

## 2023-06-29 PROCEDURE — 74455 X-RAY URETHRA/BLADDER: CPT | Mod: 26 | Performed by: STUDENT IN AN ORGANIZED HEALTH CARE EDUCATION/TRAINING PROGRAM

## 2023-06-29 PROCEDURE — 255N000002 HC RX 255 OP 636: Performed by: STUDENT IN AN ORGANIZED HEALTH CARE EDUCATION/TRAINING PROGRAM

## 2023-06-29 PROCEDURE — 51600 INJECTION FOR BLADDER X-RAY: CPT

## 2023-06-29 RX ADMIN — DIATRIZOATE MEGLUMINE 600 ML: 180 INJECTION, SOLUTION INTRAVESICAL at 14:19

## 2023-06-29 NOTE — PROGRESS NOTES
Valenzuela catheter placed for xray procedure. 5 mls normal saline instilled into valenzuela balloon.    Luis Saeed RN

## 2023-06-30 ENCOUNTER — THERAPY VISIT (OUTPATIENT)
Dept: PHYSICAL THERAPY | Facility: CLINIC | Age: 73
End: 2023-06-30
Attending: PHYSICIAN ASSISTANT
Payer: MEDICARE

## 2023-06-30 DIAGNOSIS — S32.030A CLOSED COMPRESSION FRACTURE OF L3 LUMBAR VERTEBRA, INITIAL ENCOUNTER (H): ICD-10-CM

## 2023-06-30 DIAGNOSIS — M54.50 ACUTE BILATERAL LOW BACK PAIN WITHOUT SCIATICA: Primary | ICD-10-CM

## 2023-06-30 PROCEDURE — 97110 THERAPEUTIC EXERCISES: CPT | Mod: GP | Performed by: PHYSICAL THERAPIST

## 2023-06-30 PROCEDURE — 97161 PT EVAL LOW COMPLEX 20 MIN: CPT | Mod: GP | Performed by: PHYSICAL THERAPIST

## 2023-06-30 NOTE — PROGRESS NOTES
PHYSICAL THERAPY EVALUATION  Type of Visit: Evaluation    See electronic medical record for Abuse and Falls Screening details.    Subjective      Presenting condition or subjective complaint: Lower back pain. Went to doctor for flank pain where xrays showed compression fracture at L3. She thinks it was from accidentally slipping onto her back side in April but she is not certain.  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 y 3    Prior diagnostic imaging/testing results: CT scan; X-ray   (recent xray shows delayed healing of the fracture site)  Prior therapy history for the same diagnosis, illness or injury: No      Prior Level of Function   Transfers:   Ambulation:   ADL:   IADL:     Living Environment  Social support: Alone   Type of home: House; Multi-level; Basement   Stairs to enter the home: Yes 2 Is there a railing: No   Ramp: No   Stairs inside the home: Yes 10 Is there a railing: Yes   Help at home: None  Equipment owned: Straight Cane; Four-point cane; Walker; Walker with wheels; Crutches; Commode; Raised toilet seat     Employment: No    Hobbies/Interests: Reading, crocheting,  grandkids    Patient goals for therapy: Bend forward and lift things, sit in chair, walk for a mile w/o pain srarting.    Pain assessment: See objective evaluation for additional pain details     Objective   LUMBAR SPINE EVALUATION  PAIN: Pain Level at Rest:  "0/10  Pain Level with Use: \"intense\"  Pain Location: central low back  Pain Quality: Aching and Sharp  Pain Frequency: intermittent  Pain is Worst: daytime  Pain is Exacerbated By: bending, lifting, twisting, prolonged activity  Pain is Relieved By: heating pad, rest  Pain Progression: Improved  Pain is midline lumbar region and does not radiate distally nor is this pain accompanied by paresthesia, numbness or perceived weakness    INTEGUMENTARY (edema, incisions):   POSTURE:   GAIT:   Weightbearing Status:   Assistive Device(s):   Gait Deviations:   BALANCE/PROPRIOCEPTION:   WEIGHTBEARING ALIGNMENT:   NON-WEIGHTBEARING ALIGNMENT:    ROM: extension to neutral, did flex forward to tie shoes sitting in chair  PELVIC/SI SCREEN:   STRENGTH: 4/5 HF, KE, KF, hip ABD BLE; PF/DF/GTE all 5/5    MYOTOMES: see strength  DTR S:   CORD SIGNS:   DERMATOMES: WNL  NEURAL TENSION: Lumbar WNL (-SLR)  FLEXIBILITY:   LUMBAR/HIP Special Tests:    PELVIS/SI SPECIAL TESTS:   FUNCTIONAL TESTS:   PALPATION: QL and ES tightness  SPINAL SEGMENTAL CONCLUSIONS: not assessed      Assessment & Plan   CLINICAL IMPRESSIONS   Medical Diagnosis: Closed compression fracture of L3 lumbar vertebra    Treatment Diagnosis: Low back pain   Impression/Assessment: Patient is a 72 year old female with low back complaints.  The following significant findings have been identified: Pain, Decreased ROM/flexibility, Decreased joint mobility, Decreased strength, Impaired balance, Impaired muscle performance, Decreased activity tolerance and Impaired posture. These impairments interfere with their ability to perform self care tasks, recreational activities, household chores, driving  and community mobility as compared to previous level of function.     Clinical Decision Making (Complexity):   Clinical Presentation: Stable/Uncomplicated  Clinical Presentation Rationale: based on medical and personal factors listed in PT evaluation  Clinical Decision Making " (Complexity): Low complexity    PLAN OF CARE  Treatment Interventions:  Interventions: Manual Therapy, Neuromuscular Re-education, Therapeutic Activity, Therapeutic Exercise, Self-Care/Home Management    Long Term Goals            Frequency of Treatment: 1x/week  Duration of Treatment: 4 weeks then 2x/month for 2 months    Recommended Referrals to Other Professionals: none  Education Assessment:   Learner/Method: No Barriers to Learning;Pictures/Video;Demonstration;Reading;Listening;Patient    Risks and benefits of evaluation/treatment have been explained.   Patient/Family/caregiver agrees with Plan of Care.     Evaluation Time:            Signing Clinician: Estuardo Alcala, PT      [facility]                                                                                   OUTPATIENT PHYSICAL THERAPY      PLAN OF TREATMENT FOR OUTPATIENT REHABILITATION   Patient's Last Name, First Name, Mahendra Gaytanmoraima Romano YOB: 1950   Provider's Name   [facility]   Medical Record No.  [id number]     Onset Date: 04/08/23  Start of Care Date: 06/30/23     Medical Diagnosis:  Closed compression fracture of L3 lumbar vertebra      PT Treatment Diagnosis:  Low back pain Plan of Treatment  Frequency/Duration: 1x/week/ 4 weeks then 2x/month for 2 months    Certification date from   to           See note for plan of treatment details and functional goals     Estuardo Alcala, PT                         I CERTIFY THE NEED FOR THESE SERVICES FURNISHED UNDER        THIS PLAN OF TREATMENT AND WHILE UNDER MY CARE     (Physician attestation of this document indicates review and certification of the therapy plan).                  Referring Provider:  Blu Lopez      Initial Assessment  See Epic Evaluation- Start of Care Date: 06/30/23

## 2023-07-04 ENCOUNTER — MYC MEDICAL ADVICE (OUTPATIENT)
Dept: UROLOGY | Facility: CLINIC | Age: 73
End: 2023-07-04

## 2023-07-05 ENCOUNTER — PRE VISIT (OUTPATIENT)
Dept: UROLOGY | Facility: CLINIC | Age: 73
End: 2023-07-05
Payer: MEDICARE

## 2023-07-05 NOTE — TELEPHONE ENCOUNTER
Reason for visit: Estring placement and review imaging    Relevant information: Allison, history of pyeloplasty, vaginal atrophy    Records/imaging/labs/orders: all records available    Pt called: no need for a call    At Rooming: undress for exam      Yamini Monson CMA  7/5/2023  4:20 PM

## 2023-07-06 ENCOUNTER — MYC MEDICAL ADVICE (OUTPATIENT)
Dept: FAMILY MEDICINE | Facility: CLINIC | Age: 73
End: 2023-07-06

## 2023-07-06 ENCOUNTER — ANCILLARY PROCEDURE (OUTPATIENT)
Dept: NUCLEAR MEDICINE | Facility: CLINIC | Age: 73
End: 2023-07-06
Attending: INTERNAL MEDICINE
Payer: MEDICARE

## 2023-07-06 DIAGNOSIS — K80.20 CALCULUS OF GALLBLADDER WITHOUT CHOLECYSTITIS WITHOUT OBSTRUCTION: ICD-10-CM

## 2023-07-06 PROCEDURE — G1010 CDSM STANSON: HCPCS | Performed by: RADIOLOGY

## 2023-07-06 PROCEDURE — 78227 HEPATOBIL SYST IMAGE W/DRUG: CPT | Mod: MG | Performed by: RADIOLOGY

## 2023-07-06 PROCEDURE — A9537 TC99M MEBROFENIN: HCPCS | Performed by: RADIOLOGY

## 2023-07-06 RX ORDER — KIT FOR THE PREPARATION OF TECHNETIUM TC 99M MEBROFENIN 45 MG/10ML
4.8-7.2 INJECTION, POWDER, LYOPHILIZED, FOR SOLUTION INTRAVENOUS ONCE
Status: COMPLETED | OUTPATIENT
Start: 2023-07-06 | End: 2023-07-06

## 2023-07-06 RX ADMIN — KIT FOR THE PREPARATION OF TECHNETIUM TC 99M MEBROFENIN 6 MILLICURIE: 45 INJECTION, POWDER, LYOPHILIZED, FOR SOLUTION INTRAVENOUS at 08:45

## 2023-07-06 ASSESSMENT — PATIENT HEALTH QUESTIONNAIRE - PHQ9
SUM OF ALL RESPONSES TO PHQ QUESTIONS 1-9: 10
10. IF YOU CHECKED OFF ANY PROBLEMS, HOW DIFFICULT HAVE THESE PROBLEMS MADE IT FOR YOU TO DO YOUR WORK, TAKE CARE OF THINGS AT HOME, OR GET ALONG WITH OTHER PEOPLE: VERY DIFFICULT
SUM OF ALL RESPONSES TO PHQ QUESTIONS 1-9: 10

## 2023-07-07 ENCOUNTER — VIRTUAL VISIT (OUTPATIENT)
Dept: PSYCHOLOGY | Facility: CLINIC | Age: 73
End: 2023-07-07
Payer: MEDICARE

## 2023-07-07 DIAGNOSIS — F43.10 POSTTRAUMATIC STRESS DISORDER: ICD-10-CM

## 2023-07-07 DIAGNOSIS — F33.0 MAJOR DEPRESSIVE DISORDER, RECURRENT EPISODE, MILD (H): Primary | ICD-10-CM

## 2023-07-07 DIAGNOSIS — F41.1 GENERALIZED ANXIETY DISORDER: ICD-10-CM

## 2023-07-07 PROCEDURE — 90834 PSYTX W PT 45 MINUTES: CPT | Mod: VID | Performed by: SOCIAL WORKER
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
## 2023-07-07 NOTE — TELEPHONE ENCOUNTER
Routing to provider to interpret results from imaging done 7/6.      Reba Templeton RN  [facility]

## 2023-07-12 ENCOUNTER — VIRTUAL VISIT (OUTPATIENT)
Dept: PSYCHOLOGY | Facility: CLINIC | Age: 73
End: 2023-07-12
Payer: MEDICARE

## 2023-07-12 DIAGNOSIS — F33.0 MAJOR DEPRESSIVE DISORDER, RECURRENT EPISODE, MILD (H): Primary | ICD-10-CM

## 2023-07-12 DIAGNOSIS — F43.10 POSTTRAUMATIC STRESS DISORDER: ICD-10-CM

## 2023-07-12 DIAGNOSIS — F41.1 GENERALIZED ANXIETY DISORDER: ICD-10-CM

## 2023-07-12 PROCEDURE — 90834 PSYTX W PT 45 MINUTES: CPT | Mod: VID | Performed by: SOCIAL WORKER

## 2023-07-12 ASSESSMENT — ANXIETY QUESTIONNAIRES
6. BECOMING EASILY ANNOYED OR IRRITABLE: SEVERAL DAYS
1. FEELING NERVOUS, ANXIOUS, OR ON EDGE: NOT AT ALL
7. FEELING AFRAID AS IF SOMETHING AWFUL MIGHT HAPPEN: SEVERAL DAYS
GAD7 TOTAL SCORE: 3
IF YOU CHECKED OFF ANY PROBLEMS ON THIS QUESTIONNAIRE, HOW DIFFICULT HAVE THESE PROBLEMS MADE IT FOR YOU TO DO YOUR WORK, TAKE CARE OF THINGS AT HOME, OR GET ALONG WITH OTHER PEOPLE: SOMEWHAT DIFFICULT
GAD7 TOTAL SCORE: 3
2. NOT BEING ABLE TO STOP OR CONTROL WORRYING: SEVERAL DAYS
3. WORRYING TOO MUCH ABOUT DIFFERENT THINGS: NOT AT ALL
5. BEING SO RESTLESS THAT IT IS HARD TO SIT STILL: NOT AT ALL

## 2023-07-12 ASSESSMENT — PATIENT HEALTH QUESTIONNAIRE - PHQ9: 5. POOR APPETITE OR OVEREATING: NOT AT ALL
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
## 2023-07-13 NOTE — PROGRESS NOTES
Patient did not return for further treatment and no additional progress was noted.  Please refer to the progress note and goal flowsheet completed on 05/16/23 for discharge information.

## 2023-07-17 ENCOUNTER — TELEPHONE (OUTPATIENT)
Dept: FAMILY MEDICINE | Facility: CLINIC | Age: 73
End: 2023-07-17
Payer: MEDICARE

## 2023-07-17 DIAGNOSIS — I25.728 CORONARY ARTERY DISEASE OF AUTOLOGOUS BYPASS GRAFT WITH STABLE ANGINA PECTORIS (H): ICD-10-CM

## 2023-07-17 RX ORDER — NITROGLYCERIN 0.4 MG/1
0.4 TABLET SUBLINGUAL EVERY 5 MIN PRN
Qty: 25 TABLET | Refills: 11 | Status: SHIPPED | OUTPATIENT
Start: 2023-07-17

## 2023-07-17 NOTE — TELEPHONE ENCOUNTER
Reason for Call:  Appointment Request    Patient requesting this type of appt:  Hospital/ED Follow-Up     Requested provider: Danny Conteh    Reason patient unable to be scheduled: Not within requested timeframe    When does patient want to be seen/preferred time: 3-7 days    Comments: Patient is scheduled for 08/11/23, but is requesting something sooner. Please check and advise,    Could we send this information to you in [place] or would you prefer to receive a phone call?:   Patient would prefer a phone call   Okay to leave a detailed message?: Yes at Cell number on file:    Telephone Information:   Mobile [phone]       Call taken on 7/17/2023 at 12:46 PM by Cornelia Cummings

## 2023-07-17 NOTE — TELEPHONE ENCOUNTER
Notify patient that she is scheduled for a follow-up appointment with this provider on July 21, 2023 at 10:00 AM.

## 2023-07-17 NOTE — TELEPHONE ENCOUNTER
Called and spoke to the patient and gave her Dr Conteh's message. Patient understands.  Stephy Freeman MA  [facility]   Primary Care

## 2023-07-20 ENCOUNTER — OFFICE VISIT (OUTPATIENT)
Dept: UROLOGY | Facility: CLINIC | Age: 73
End: 2023-07-20
Payer: MEDICARE

## 2023-07-20 VITALS
DIASTOLIC BLOOD PRESSURE: 77 MMHG | WEIGHT: 178 LBS | HEIGHT: 64 IN | HEART RATE: 69 BPM | BODY MASS INDEX: 30.39 KG/M2 | SYSTOLIC BLOOD PRESSURE: 125 MMHG

## 2023-07-20 DIAGNOSIS — Z87.448 HISTORY OF PYELOPLASTY: ICD-10-CM

## 2023-07-20 DIAGNOSIS — N95.2 VAGINAL ATROPHY: ICD-10-CM

## 2023-07-20 DIAGNOSIS — Z98.890 HISTORY OF PYELOPLASTY: ICD-10-CM

## 2023-07-20 DIAGNOSIS — N39.8 VOIDING DYSFUNCTION: ICD-10-CM

## 2023-07-20 DIAGNOSIS — N39.0 RECURRENT UTI: Primary | ICD-10-CM

## 2023-07-20 PROCEDURE — 99214 OFFICE O/P EST MOD 30 MIN: CPT | Mod: GC | Performed by: UROLOGY

## 2023-07-20 ASSESSMENT — PAIN SCALES - GENERAL: PAINLEVEL: NO PAIN (0)

## 2023-07-20 NOTE — NURSING NOTE
"Chief Complaint   Patient presents with     Follow Up     Discuss imaging and estring        Blood pressure 125/77, pulse 69, height 1.626 m (5' 4\"), weight 80.7 kg (178 lb), not currently breastfeeding. Body mass index is 30.55 kg/m .    Patient Active Problem List   Diagnosis     TMJ (temporomandibular joint syndrome)     Congenital ureteric stenosis     Lacrimal duct stenosis     Chronic rhinitis     Intermittent asthma     Advanced directives, counseling/discussion     Major depressive disorder, recurrent episode, moderate (H)     Osteoarthritis of right knee     Esophageal reflux (GERD)     Primary narcolepsy without cataplexy     Vitamin D deficiency     Stage 3a chronic kidney disease (H)     Incontinence of feces with fecal urgency     Overactive bladder     Fatigue, unspecified type     History of ischemic cardiomyopathy     Hyperlipidemia LDL goal <70     Calculus of gallbladder without cholecystitis without obstruction     Coronary artery disease with angina pectoris, unspecified vessel or lesion type, unspecified whether native or transplanted heart (H)     Environmental allergies     Class 1 obesity due to excess calories with serious comorbidity and body mass index (BMI) of 30.0 to 30.9 in adult     Chronic insomnia     Hiatal hernia     Cheyne-Rogers breathing disorder     Clostridium difficile infection     Pyelonephritis     Sepsis, due to unspecified organism, unspecified whether acute organ dysfunction present (H)     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     Possible PLMD (periodic limb movement disorder)     Adjustment disorder with anxiety     Major depressive disorder, recurrent episode, mild (H)     Generalized anxiety disorder     Dilated cardiomyopathy (H)     Idiopathic interstitial fibrosis (H)     Back muscle spasm     Benign paroxysmal positional vertigo, unspecified laterality     Closed compression fracture of L3 lumbar vertebra, sequela     Bilateral hydronephrosis     Acute bilateral low back " pain without sciatica     Posttraumatic stress disorder       Allergies   Allergen Reactions     Dust Mites Cough, Headache, Other (See Comments) and Shortness Of Breath     Latex Other (See Comments) and Shortness Of Breath     Runny nose  PN: LW Reaction: DIFFICULTY BREATHING       Sulfa Antibiotics Anaphylaxis, Hives and Itching     PN: LW Reaction: HIVES, Swelling  PN: LW Reaction: HIVES, Swelling     Flagyl [Metronidazole Hcl] Anaphylaxis and Rash     Food      PN: LW FI1: nka LW FI2:     Hydrochlorothiazide W-Triamterene      PN: LW Reaction: skin rash  PN: LW Reaction: skin rash     No Clinical Screening - See Comments      PN: LW Other1: -Adhesive Tape     Seasonal Allergies      sneezing     Tape [Adhesive Tape] Hives     Metoprolol Itching and Rash     Metronidazole Hives and Rash     PN: LW Reaction: HIVES         Current Outpatient Medications   Medication Sig Dispense Refill     albuterol (PROAIR HFA/PROVENTIL HFA/VENTOLIN HFA) 108 (90 Base) MCG/ACT inhaler Inhale 1-2 puffs into the lungs every 4 hours as needed for shortness of breath or wheezing 18 g 11     amoxicillin (AMOXIL) 500 MG capsule Take 2,000 mg by mouth once as needed Take as directed before dental work (Patient not taking: Reported on 6/19/2023)       aspirin (ASA) 81 MG EC tablet Take 1 tablet (81 mg) by mouth daily 90 tablet 0     atorvastatin (LIPITOR) 40 MG tablet Take 1 tablet (40 mg) by mouth daily 90 tablet 3     carvedilol (COREG) 3.125 MG tablet TAKE 1 TABLET(3.125 MG) BY MOUTH TWICE DAILY WITH MEALS 180 tablet 3     cholestyramine (QUESTRAN) 4 g packet Take 1 packet (4 g) by mouth 2 times daily (with meals) 60 packet 5     diazepam (VALIUM) 2 MG tablet Take 0.5-1 tablets (1-2 mg) by mouth 2 times daily as needed for anxiety or muscle spasms 30 tablet 0     diclofenac (VOLTAREN) 1 % topical gel Apply topically 4 times daily For Jaw pain       estradiol (ESTRING) 2 MG vaginal ring Place 1 each vaginally every 3 months 1 each 3      fluticasone-vilanterol (BREO ELLIPTA) 200-25 MCG/ACT inhaler Inhale 1 puff into the lungs daily 3 each 3     isosorbide mononitrate (IMDUR) 30 MG 24 hr tablet TAKE 1 TABLET(30 MG) BY MOUTH DAILY 90 tablet 3     loperamide (IMODIUM) 2 MG capsule Take 1 capsule (2 mg) by mouth daily 30 capsule 0     loratadine (CLARITIN) 10 MG tablet Take 10 mg by mouth At Bedtime       magnesium oxide (MAG-OX) 400 MG tablet Take 400 mg by mouth daily       Melatonin 10 MG TABS tablet Take 10 mg by mouth nightly as needed for sleep       mirabegron (MYRBETRIQ) 50 MG 24 hr tablet Take 1 tablet (50 mg) by mouth daily 90 tablet 1     nitroGLYcerin (NITROSTAT) 0.4 MG sublingual tablet Place 1 tablet (0.4 mg) under the tongue every 5 minutes as needed for chest pain 25 tablet 11     pantoprazole (PROTONIX) 40 MG EC tablet TAKE 1 TABLET(40 MG) BY MOUTH DAILY 90 tablet 0     Prenatal Multivit-Min-Fe-FA (PRENATAL/IRON) TABS Take 1 tablet by mouth daily        saccharomyces boulardii (FLORASTOR) 250 MG capsule Take 1 capsule (250 mg) by mouth 2 times daily 14 capsule 0     sertraline (ZOLOFT) 100 MG tablet Take 1 tablet (100 mg) by mouth daily 90 tablet 3     UNABLE TO FIND MEDICATION NAME: Uqora complete regimen       ursodiol (ACTIGALL) 300 MG capsule TAKE 1 CAPSULE(300 MG) BY MOUTH TWICE DAILY 180 capsule 3     valsartan (DIOVAN) 40 MG tablet Take 1 tablet (40 mg) by mouth daily 90 tablet 3     vitamin D3 (CHOLECALCIFEROL) 2000 units (50 mcg) tablet Take 1 tablet (2,000 Units) by mouth daily 90 tablet 3     zinc gluconate 50 MG tablet          Social History     Tobacco Use     Smoking status: Never     Passive exposure: Never     Smokeless tobacco: Never   Vaping Use     Vaping Use: Never used   Substance Use Topics     Alcohol use: No     Drug use: Leeann Oconnor  7/20/2023  2:17 PM

## 2023-07-20 NOTE — LETTER
"7/20/2023       RE: Gem Gama  [address]  [place] [address]     Dear Colleague,    Thank you for referring your patient, Gem Gama, to the [facility] [place] at [facility]. Please see a copy of my visit note below.    July 20, 2023    There are no diagnoses linked to this encounter.     Recurrent UTI     History of pyeloplasty     Vaginal atrophy     History of pyelonephritis    Doing well with the Estring, no more UTI since last visit. Had some problem with the last estring that she put in herself and would like assistance with putting a new one in today  Scheduled for UDS, follow up after UDS    Addendum:    The patient was seen and evaluated with the resident.  The plan was formulated in conjunction with me and I agree with the note with changes made as necessary.    Continue estring    Follow up after UDS    10 minutes were spent today on the date of the encounter in reviewing the EMR including CT and VCUG results, direct patient care including discussion of prescription medications, coordination of care, and documentation.    Mel Rodriguez MD MPH  (she/her/hers)   of Urology  [facility]      Subjective    She is doing well with estring, she has no more fever chills dysuria. She continues to have voiding dysfunction including urgency, and incontinence, she urinates without command, and when she tries to go she cannot void. These symptoms have been stable. She has no hematuria.  She denies any changes in health since last visit    /77   Pulse 69   Ht 1.626 m (5' 4\")   Wt 80.7 kg (178 lb)   LMP  (LMP Unknown)   BMI 30.55 kg/m    GENERAL: healthy, alert and no distress  EYES: Eyes grossly normal to inspection, conjunctivae and sclerae normal  HENT: normal cephalic/atraumatic.  External ears, nose and mouth without ulcers or lesions.  RESP: no audible wheeze, cough, or " visible cyanosis.  No visible retractions or increased work of breathing.  Able to speak fully in complete sentences.  NEURO: Cranial nerves grossly intact, mentation intact and speech normal  PSYCH: mentation appears normal, affect normal/bright, judgement and insight intact, normal speech and appearance well-groomed    : Normal female external genitalia, old estring visualized in vagina, removed and replaced with new estring intravaginally    Labs/imaging/pathology  Recent Labs   Lab Test 06/19/23  1552 06/14/23  2340 02/14/23  1450 11/28/22  1400   WBC  --  6.7 5.0 5.0   HGB  --  13.6 13.0 14.0   CR 0.96* 1.06* 1.07* 0.87       CT Abdomen Pelvis w Contrast 06/15/2023    Narrative  EXAM: CT ABDOMEN PELVIS W CONTRAST  LOCATION: [facility]  DATE: 6/15/2023    INDICATION: Abdominal pain and bilateral flank pain.  COMPARISON: 11/02/2022.  TECHNIQUE: CT scan of the abdomen and pelvis was performed following injection of IV contrast. Multiplanar reformats were obtained. Dose reduction techniques were used.  CONTRAST: 91 mL Isovue 370.    FINDINGS:  LOWER CHEST: Mild to moderate elevation right hemidiaphragm. Dependent atelectasis in the posterior lung bases.    HEPATOBILIARY: Numerous calcified gallstones within the gallbladder. Question mild gallbladder wall thickening. Clinical correlation for any associated symptomatology. No intra or extrahepatic biliary dilatation. No liver masses.    PANCREAS: Normal.    SPLEEN: Normal.    ADRENAL GLANDS: Normal.    KIDNEYS/BLADDER: The bilateral kidneys enhance relatively symmetrically. Interval development of mild to moderate bilateral hydronephrosis or prominent extrarenal pelvis bilaterally, greater on the right. Findings may represent bilateral ureteropelvic  junction narrowing and/or stenosis. Postsurgical changes are seen adjacent to the right ureteropelvic junction. Clinical correlation recommended. Bilateral hip arthroplasties obscure some  details of the distal ureters and urinary bladder.    BOWEL: Nonspecific nonobstructive bowel gas pattern. Redundancy of the cecum. Appendix not visualized.    LYMPH NODES: No retroperitoneal or mesenteric adenopathy. No inguinal adenopathy or hernia.    VASCULATURE: No abdominal aortic aneurysm.    PELVIC ORGANS: No pelvic masses. Bilateral hip arthroplasties with associated streak artifact obscuring details of the mid and lower pelvis.    MUSCULOSKELETAL: Mild to moderate interval anterior compression deformity of the superior endplate of L3. Clinical correlation recommended. Bilateral hip arthroplasties with associated streak artifact obscuring details of the mid and lower pelvis.    Impression  IMPRESSION:  1.  The bilateral kidneys enhance relatively symmetrically. Interval development of mild to moderate bilateral hydronephrosis or prominent extrarenal pelvis bilaterally, greater on the right. Findings may represent bilateral ureteropelvic junction  narrowing and/or stenosis. Postsurgical changes are seen adjacent to the right ureteropelvic junction. Clinical correlation recommended. Bilateral hip arthroplasties obscure some details of the distal ureters and urinary bladder.    2.  Nonspecific nonobstructive bowel gas pattern. Redundancy of the cecum. Appendix not visualized.    3.  Numerous calcified gallstones within the gallbladder. Question mild gallbladder wall thickening. Clinical correlation for any associated symptomatology. No intra or extrahepatic biliary dilatation.    4.  Mild to moderate interval anterior compression deformity of the superior endplate of L3. Clinical correlation recommended. Bilateral hip arthroplasties with associated streak artifact obscuring details of the mid and lower pelvis.        [facility] 6/29/2023  IMPRESSION: No vesico ureteric reflux demonstrated.        CC  Patient Care Team:  Danny Conteh MD as PCP - General (Internal Medicine)  Danny Conteh MD as  Assigned PCP  Shade Chu MD as Assigned OBGYN Provider  Luis A Moody MD as Assigned Pulmonology Provider  Mel Dennison MD as Assigned Heart and Vascular Provider  Evangelist Lee MD as Hospitalist (Internal Medicine)  Mel Rodriguez MD as MD (Urology)  Mel Rodriguez MD as Assigned Surgical Provider  Kashif Hadley MD as Assigned Sleep Provider  Agus Segura MD as MD (Surgery)

## 2023-07-20 NOTE — PROGRESS NOTES
"July 20, 2023    There are no diagnoses linked to this encounter.     Recurrent UTI     History of pyeloplasty     Vaginal atrophy     History of pyelonephritis    Doing well with the Estring, no more UTI since last visit. Had some problem with the last estring that she put in herself and would like assistance with putting a new one in today  Scheduled for UDS, follow up after UDS    Addendum:    The patient was seen and evaluated with the resident.  The plan was formulated in conjunction with me and I agree with the note with changes made as necessary.    Continue estring    Follow up after UDS    10 minutes were spent today on the date of the encounter in reviewing the EMR including CT and VCUG results, direct patient care including discussion of prescription medications, coordination of care, and documentation.    Mel Rodriguez MD MPH  (she/her/hers)   of Urology  [facility]      Subjective    She is doing well with estring, she has no more fever chills dysuria. She continues to have voiding dysfunction including urgency, and incontinence, she urinates without command, and when she tries to go she cannot void. These symptoms have been stable. She has no hematuria.  She denies any changes in health since last visit    /77   Pulse 69   Ht 1.626 m (5' 4\")   Wt 80.7 kg (178 lb)   LMP  (LMP Unknown)   BMI 30.55 kg/m    GENERAL: healthy, alert and no distress  EYES: Eyes grossly normal to inspection, conjunctivae and sclerae normal  HENT: normal cephalic/atraumatic.  External ears, nose and mouth without ulcers or lesions.  RESP: no audible wheeze, cough, or visible cyanosis.  No visible retractions or increased work of breathing.  Able to speak fully in complete sentences.  NEURO: Cranial nerves grossly intact, mentation intact and speech normal  PSYCH: mentation appears normal, affect normal/bright, judgement and insight intact, normal speech and appearance " well-groomed    : Normal female external genitalia, old estring visualized in vagina, removed and replaced with new estring intravaginally    Labs/imaging/pathology  Recent Labs   Lab Test 06/19/23  1552 06/14/23  2340 02/14/23  1450 11/28/22  1400   WBC  --  6.7 5.0 5.0   HGB  --  13.6 13.0 14.0   CR 0.96* 1.06* 1.07* 0.87       CT Abdomen Pelvis w Contrast 06/15/2023    Narrative  EXAM: CT ABDOMEN PELVIS W CONTRAST  LOCATION: [facility]  DATE: 6/15/2023    INDICATION: Abdominal pain and bilateral flank pain.  COMPARISON: 11/02/2022.  TECHNIQUE: CT scan of the abdomen and pelvis was performed following injection of IV contrast. Multiplanar reformats were obtained. Dose reduction techniques were used.  CONTRAST: 91 mL Isovue 370.    FINDINGS:  LOWER CHEST: Mild to moderate elevation right hemidiaphragm. Dependent atelectasis in the posterior lung bases.    HEPATOBILIARY: Numerous calcified gallstones within the gallbladder. Question mild gallbladder wall thickening. Clinical correlation for any associated symptomatology. No intra or extrahepatic biliary dilatation. No liver masses.    PANCREAS: Normal.    SPLEEN: Normal.    ADRENAL GLANDS: Normal.    KIDNEYS/BLADDER: The bilateral kidneys enhance relatively symmetrically. Interval development of mild to moderate bilateral hydronephrosis or prominent extrarenal pelvis bilaterally, greater on the right. Findings may represent bilateral ureteropelvic  junction narrowing and/or stenosis. Postsurgical changes are seen adjacent to the right ureteropelvic junction. Clinical correlation recommended. Bilateral hip arthroplasties obscure some details of the distal ureters and urinary bladder.    BOWEL: Nonspecific nonobstructive bowel gas pattern. Redundancy of the cecum. Appendix not visualized.    LYMPH NODES: No retroperitoneal or mesenteric adenopathy. No inguinal adenopathy or hernia.    VASCULATURE: No abdominal aortic aneurysm.    PELVIC  ORGANS: No pelvic masses. Bilateral hip arthroplasties with associated streak artifact obscuring details of the mid and lower pelvis.    MUSCULOSKELETAL: Mild to moderate interval anterior compression deformity of the superior endplate of L3. Clinical correlation recommended. Bilateral hip arthroplasties with associated streak artifact obscuring details of the mid and lower pelvis.    Impression  IMPRESSION:  1.  The bilateral kidneys enhance relatively symmetrically. Interval development of mild to moderate bilateral hydronephrosis or prominent extrarenal pelvis bilaterally, greater on the right. Findings may represent bilateral ureteropelvic junction  narrowing and/or stenosis. Postsurgical changes are seen adjacent to the right ureteropelvic junction. Clinical correlation recommended. Bilateral hip arthroplasties obscure some details of the distal ureters and urinary bladder.    2.  Nonspecific nonobstructive bowel gas pattern. Redundancy of the cecum. Appendix not visualized.    3.  Numerous calcified gallstones within the gallbladder. Question mild gallbladder wall thickening. Clinical correlation for any associated symptomatology. No intra or extrahepatic biliary dilatation.    4.  Mild to moderate interval anterior compression deformity of the superior endplate of L3. Clinical correlation recommended. Bilateral hip arthroplasties with associated streak artifact obscuring details of the mid and lower pelvis.        [facility] 6/29/2023  IMPRESSION: No vesico ureteric reflux demonstrated.        CC  Patient Care Team:  Danny Conteh MD as PCP - General (Internal Medicine)  Danny Conteh MD as Assigned PCP  Shade Chu MD as Assigned OBGYN Provider  Luis A Moody MD as Assigned Pulmonology Provider  Mel Dennison MD as Assigned Heart and Vascular Provider  Evangelist Lee MD as Hospitalist (Internal Medicine)  Mel Rodriguez MD as MD (Urology)  Mel Rodriguez MD as  Assigned Surgical Provider  Kashif Hadley MD as Assigned Sleep Provider  Agus Segura MD as MD (Surgery)

## 2023-07-21 ENCOUNTER — TELEPHONE (OUTPATIENT)
Dept: FAMILY MEDICINE | Facility: CLINIC | Age: 73
End: 2023-07-21

## 2023-07-21 ENCOUNTER — VIRTUAL VISIT (OUTPATIENT)
Dept: PSYCHOLOGY | Facility: CLINIC | Age: 73
End: 2023-07-21
Payer: MEDICARE

## 2023-07-21 DIAGNOSIS — F41.1 GENERALIZED ANXIETY DISORDER: ICD-10-CM

## 2023-07-21 DIAGNOSIS — F33.1 MAJOR DEPRESSIVE DISORDER, RECURRENT EPISODE, MODERATE (H): Primary | ICD-10-CM

## 2023-07-21 DIAGNOSIS — F43.10 POSTTRAUMATIC STRESS DISORDER: ICD-10-CM

## 2023-07-21 PROCEDURE — 90834 PSYTX W PT 45 MINUTES: CPT | Mod: VID | Performed by: SOCIAL WORKER

## 2023-07-21 ASSESSMENT — ANXIETY QUESTIONNAIRES
1. FEELING NERVOUS, ANXIOUS, OR ON EDGE: SEVERAL DAYS
IF YOU CHECKED OFF ANY PROBLEMS ON THIS QUESTIONNAIRE, HOW DIFFICULT HAVE THESE PROBLEMS MADE IT FOR YOU TO DO YOUR WORK, TAKE CARE OF THINGS AT HOME, OR GET ALONG WITH OTHER PEOPLE: SOMEWHAT DIFFICULT
5. BEING SO RESTLESS THAT IT IS HARD TO SIT STILL: NOT AT ALL
2. NOT BEING ABLE TO STOP OR CONTROL WORRYING: SEVERAL DAYS
7. FEELING AFRAID AS IF SOMETHING AWFUL MIGHT HAPPEN: NOT AT ALL
GAD7 TOTAL SCORE: 4
6. BECOMING EASILY ANNOYED OR IRRITABLE: SEVERAL DAYS
3. WORRYING TOO MUCH ABOUT DIFFERENT THINGS: SEVERAL DAYS
GAD7 TOTAL SCORE: 4

## 2023-07-21 ASSESSMENT — COLUMBIA-SUICIDE SEVERITY RATING SCALE - C-SSRS
1. SINCE LAST CONTACT, HAVE YOU WISHED YOU WERE DEAD OR WISHED YOU COULD GO TO SLEEP AND NOT WAKE UP?: NO
6. HAVE YOU EVER DONE ANYTHING, STARTED TO DO ANYTHING, OR PREPARED TO DO ANYTHING TO END YOUR LIFE?: NO
2. HAVE YOU ACTUALLY HAD ANY THOUGHTS OF KILLING YOURSELF?: NO
ATTEMPT SINCE LAST CONTACT: NO
SUICIDE, SINCE LAST CONTACT: NO
TOTAL  NUMBER OF INTERRUPTED ATTEMPTS SINCE LAST CONTACT: NO
TOTAL  NUMBER OF ABORTED OR SELF INTERRUPTED ATTEMPTS SINCE LAST CONTACT: NO

## 2023-07-21 ASSESSMENT — PATIENT HEALTH QUESTIONNAIRE - PHQ9
10. IF YOU CHECKED OFF ANY PROBLEMS, HOW DIFFICULT HAVE THESE PROBLEMS MADE IT FOR YOU TO DO YOUR WORK, TAKE CARE OF THINGS AT HOME, OR GET ALONG WITH OTHER PEOPLE: VERY DIFFICULT
5. POOR APPETITE OR OVEREATING: NOT AT ALL
SUM OF ALL RESPONSES TO PHQ QUESTIONS 1-9: 11
SUM OF ALL RESPONSES TO PHQ QUESTIONS 1-9: 11

## 2023-07-21 NOTE — TELEPHONE ENCOUNTER
Patient calling to see when she can get reschedule for her hospital follow up appointment that was cancelled today. I do not see any same days available for next week. Please advise.  Stephy Freeman [facility]   Primary Care
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
## 2023-07-22 ENCOUNTER — NURSE TRIAGE (OUTPATIENT)
Dept: NURSING | Facility: CLINIC | Age: 73
End: 2023-07-22
Payer: MEDICARE

## 2023-07-22 ENCOUNTER — APPOINTMENT (OUTPATIENT)
Dept: GENERAL RADIOLOGY | Facility: CLINIC | Age: 73
End: 2023-07-22
Attending: EMERGENCY MEDICINE
Payer: MEDICARE

## 2023-07-22 ENCOUNTER — HOSPITAL ENCOUNTER (EMERGENCY)
Facility: CLINIC | Age: 73
Discharge: HOME | End: 2023-07-22
Attending: EMERGENCY MEDICINE | Admitting: EMERGENCY MEDICINE
Payer: MEDICARE

## 2023-07-22 VITALS
SYSTOLIC BLOOD PRESSURE: 98 MMHG | TEMPERATURE: 98.4 F | DIASTOLIC BLOOD PRESSURE: 55 MMHG | HEIGHT: 64 IN | BODY MASS INDEX: 30.39 KG/M2 | HEART RATE: 68 BPM | OXYGEN SATURATION: 96 % | RESPIRATION RATE: 24 BRPM | WEIGHT: 178 LBS

## 2023-07-22 DIAGNOSIS — R07.9 CHEST PAIN, UNSPECIFIED TYPE: ICD-10-CM

## 2023-07-22 LAB
ALBUMIN SERPL BCG-MCNC: 4 G/DL (ref 3.5–5.2)
ALBUMIN UR-MCNC: NEGATIVE MG/DL
ALP SERPL-CCNC: 130 U/L (ref 35–104)
ALT SERPL W P-5'-P-CCNC: 16 U/L (ref 0–50)
ANION GAP SERPL CALCULATED.3IONS-SCNC: 11 MMOL/L (ref 7–15)
APPEARANCE UR: ABNORMAL
AST SERPL W P-5'-P-CCNC: 27 U/L (ref 0–45)
BASOPHILS # BLD AUTO: 0 10E3/UL (ref 0–0.2)
BASOPHILS NFR BLD AUTO: 1 %
BILIRUB SERPL-MCNC: 0.4 MG/DL
BILIRUB UR QL STRIP: NEGATIVE
BUN SERPL-MCNC: 12.6 MG/DL (ref 8–23)
CALCIUM SERPL-MCNC: 9.1 MG/DL (ref 8.8–10.2)
CHLORIDE SERPL-SCNC: 108 MMOL/L (ref 98–107)
COLOR UR AUTO: ABNORMAL
CREAT SERPL-MCNC: 0.99 MG/DL (ref 0.51–0.95)
DEPRECATED HCO3 PLAS-SCNC: 25 MMOL/L (ref 22–29)
EOSINOPHIL # BLD AUTO: 0.2 10E3/UL (ref 0–0.7)
EOSINOPHIL NFR BLD AUTO: 4 %
ERYTHROCYTE [DISTWIDTH] IN BLOOD BY AUTOMATED COUNT: 13.1 % (ref 10–15)
GFR SERPL CREATININE-BSD FRML MDRD: 60 ML/MIN/1.73M2
GLUCOSE SERPL-MCNC: 113 MG/DL (ref 70–99)
GLUCOSE UR STRIP-MCNC: NEGATIVE MG/DL
HCT VFR BLD AUTO: 41.2 % (ref 35–47)
HGB BLD-MCNC: 14.2 G/DL (ref 11.7–15.7)
HGB UR QL STRIP: NEGATIVE
HYALINE CASTS: 3 /LPF
IMM GRANULOCYTES # BLD: 0 10E3/UL
IMM GRANULOCYTES NFR BLD: 0 %
KETONES UR STRIP-MCNC: NEGATIVE MG/DL
LEUKOCYTE ESTERASE UR QL STRIP: NEGATIVE
LIPASE SERPL-CCNC: 39 U/L (ref 13–60)
LYMPHOCYTES # BLD AUTO: 1.5 10E3/UL (ref 0.8–5.3)
LYMPHOCYTES NFR BLD AUTO: 30 %
MCH RBC QN AUTO: 29.5 PG (ref 26.5–33)
MCHC RBC AUTO-ENTMCNC: 34.5 G/DL (ref 31.5–36.5)
MCV RBC AUTO: 86 FL (ref 78–100)
MONOCYTES # BLD AUTO: 0.4 10E3/UL (ref 0–1.3)
MONOCYTES NFR BLD AUTO: 8 %
MUCOUS THREADS #/AREA URNS LPF: PRESENT /LPF
NEUTROPHILS # BLD AUTO: 2.9 10E3/UL (ref 1.6–8.3)
NEUTROPHILS NFR BLD AUTO: 57 %
NITRATE UR QL: NEGATIVE
NRBC # BLD AUTO: 0 10E3/UL
NRBC BLD AUTO-RTO: 0 /100
PH UR STRIP: 5.5 [PH] (ref 5–7)
PLATELET # BLD AUTO: 132 10E3/UL (ref 150–450)
POTASSIUM SERPL-SCNC: 3.9 MMOL/L (ref 3.4–5.3)
PROT SERPL-MCNC: 6.5 G/DL (ref 6.4–8.3)
RBC # BLD AUTO: 4.82 10E6/UL (ref 3.8–5.2)
RBC URINE: <1 /HPF
SODIUM SERPL-SCNC: 144 MMOL/L (ref 136–145)
SP GR UR STRIP: 1.01 (ref 1–1.03)
SQUAMOUS EPITHELIAL: 14 /HPF
TROPONIN T SERPL HS-MCNC: 12 NG/L
TROPONIN T SERPL HS-MCNC: 13 NG/L
UROBILINOGEN UR STRIP-MCNC: NORMAL MG/DL
WBC # BLD AUTO: 5 10E3/UL (ref 4–11)
WBC URINE: 2 /HPF

## 2023-07-22 PROCEDURE — 99285 EMERGENCY DEPT VISIT HI MDM: CPT | Mod: 25

## 2023-07-22 PROCEDURE — 80053 COMPREHEN METABOLIC PANEL: CPT | Performed by: EMERGENCY MEDICINE

## 2023-07-22 PROCEDURE — 36415 COLL VENOUS BLD VENIPUNCTURE: CPT | Performed by: EMERGENCY MEDICINE

## 2023-07-22 PROCEDURE — 83690 ASSAY OF LIPASE: CPT | Performed by: EMERGENCY MEDICINE

## 2023-07-22 PROCEDURE — 71046 X-RAY EXAM CHEST 2 VIEWS: CPT

## 2023-07-22 PROCEDURE — 93005 ELECTROCARDIOGRAM TRACING: CPT

## 2023-07-22 PROCEDURE — 84484 ASSAY OF TROPONIN QUANT: CPT | Performed by: EMERGENCY MEDICINE

## 2023-07-22 PROCEDURE — 85025 COMPLETE CBC W/AUTO DIFF WBC: CPT | Performed by: EMERGENCY MEDICINE

## 2023-07-22 PROCEDURE — 81001 URINALYSIS AUTO W/SCOPE: CPT | Performed by: EMERGENCY MEDICINE

## 2023-07-22 ASSESSMENT — ACTIVITIES OF DAILY LIVING (ADL)
ADLS_ACUITY_SCORE: 37
ADLS_ACUITY_SCORE: 37

## 2023-07-22 NOTE — ED NOTES
Bed: ED02  Expected date:   Expected time:   Means of arrival:   Comments:  [place] [phone] chest pain heart hx here now

## 2023-07-22 NOTE — ED TRIAGE NOTES
"EMS report: had chest pressure last night - patient took own nitroglycerin x3 which completely resolved pain. Patient took own aspirin 324 mg. Watch was reading \"a.fib\". EMS EKG demonstrated sinus rhythm.     Triage Assessment     Row Name 07/22/23 0722       Triage Assessment (Adult)    Airway WDL WDL       Respiratory WDL    Respiratory WDL WDL       Skin Circulation/Temperature WDL    Skin Circulation/Temperature WDL WDL       Cardiac WDL    Cardiac WDL X  Had chest pressure 2/10, took aspirin 324 mg and nitro x3 which relieved pain       Peripheral/Neurovascular WDL    Peripheral Neurovascular WDL WDL       Cognitive/Neuro/Behavioral WDL    Cognitive/Neuro/Behavioral WDL WDL              "

## 2023-07-22 NOTE — DISCHARGE INSTRUCTIONS
Return to the ER for worsening pain or any new concerns.    Please contact your cardiology clinic on Monday to be seen as soon as possible.    Avoid eating late at night and avoid spicy and greasy foods as acid reflux is a common cause of chest pain.

## 2023-07-22 NOTE — TELEPHONE ENCOUNTER
Patient states that her watch has been saying A-Fib for the last day or two.  Patient is feeling a dull ache under left arm and tight in the chest.  Patient took her nitroglycerin x3 and is still having discomfort chest pain that is lasting greater than 5 minutes.  FNA advised to phone 911 and patient agrees.      Reason for Disposition   Chest pain lasting longer than 5 minutes and ANY of the following:* Over 44 years old* Over 30 years old and at least one cardiac risk factor (e.g., diabetes mellitus, high blood pressure, high cholesterol, smoker, or strong family history of heart disease)* History of heart disease (i.e., angina, heart attack, heart failure, bypass surgery, takes nitroglycerin)* Pain is crushing, pressure-like, or heavy    Additional Information   Negative: SEVERE difficulty breathing (e.g., struggling for each breath, speaks in single words)   Negative: Passed out (i.e., fainted, collapsed and was not responding)   Negative: Difficult to awaken or acting confused (e.g., disoriented, slurred speech)   Negative: Shock suspected (e.g., cold/pale/clammy skin, too weak to stand, low BP, rapid pulse)    Protocols used: Chest Pain-A-OH

## 2023-07-22 NOTE — ED PROVIDER NOTES
"  History     Chief Complaint:  Chest Pain       HPI   Gem Gama is a 72 year old female arriving via EMS, status post cardiac bypass and 2 stent placements with a history of CAD and hypertension, presenting for chest pain. Patient states that she felt a pressure in her chest that woke her up overnight. Patient states her smart watch noted an irregular rhythm although EMS did not feel that she was in A-fib.  Reportedly she had occasional PACs and PVCs. Patient states the pressure is in the center of her chest and rated it a 2/10 in severity when it first began. At 0300 patient took 3 nitros and 1 aspirin.  Despite this her pain waxed and waned and she was referred in by her nurse line. Patient has no pain or pressure in her chest right now rating pain 0/10.  Patient was in the ER in [place] last Saturday (7/15) for a \"line of pain\" down her chest. An ECG and labs were unremarkable. Patient reports feeling generally fatigued the past couple of days. Patient denies any recent illnesses, fever, abdominal pain, or swelling in the legs. Patient follows with a cardiologist and there have been no recent changes to her treatments recently.    She does have a history of frequent urinary tract infections although no recent dysuria or hematuria.  No fever, no cough or congestion.  No trauma or fall.    Independent Historian:   EMS provide additional history in terms of the patient's presentation today    Review of External Notes:   ED notes from [facility] reviewed from July 15 and the patient also had work-up for chest pain that was unremarkable.    Medications:    Albuterol inhaler   MCG/ACT inhaler  Aspirin (ASA)  Atorvastatin   Carvedilol  Cholestyramine   Diazepam   Estradiol   Fluticasone-vilanterol   Isosorbide mononitrate   Loperamide   Loratadine   Magnesium oxide   mirabegron   Nitroglycerin  Pantoprazole   Florastor   Zoloft   Actigall   Valsartan   Zinc gluconate     Past Medical History:    ADHD  Anxiety " "  Arthritis   Asthma   C. Difficile diarrhea   Central sleep apnea   Cheyne-lopez breathing   CAD involving native coronary artery of native heart without angina pectoris   Depression   GERD  Hypertension   Ischemic cardiomyopathy  Major depressive disorder   Narcolepsy   Pituitary microadenoma   Pyelonephritis of right kidney   Renal disease   S/P hip replacement   Sleep apnea   Status post total knee replacement   UTI with hematuria     Past Surgical History:    Arthroplasty knee   Coronary angiogram   CV heart catheterization with possible intervention   CV intra aortic balloon   CV intra aortic balloon removal   CV left heart cath   CV PCI stent drug eluting  Genitourinary surgery x 3  Orthopedic surgery, bilateral total hip   Rectocele repair  Total ABD hysterectomy + blad repr, vaginal approach with oophorectomy  Total knee arthroplasty        Physical Exam     Patient Vitals for the past 24 hrs:   BP Temp Temp src Pulse Resp SpO2 Height Weight   07/22/23 1119 -- -- -- 68 24 -- -- --   07/22/23 0930 98/55 -- -- 56 11 96 % -- --   07/22/23 0900 108/83 -- -- 61 13 94 % -- --   07/22/23 0830 110/82 -- -- 57 15 96 % -- --   07/22/23 0741 -- -- -- 60 18 93 % -- --   07/22/23 0725 126/87 98.4  F (36.9  C) Oral 67 17 94 % 1.626 m (5' 4\") 80.7 kg (178 lb)        Physical Exam  Constitutional:       General: She is not in acute distress.     Appearance: Normal appearance. She is not diaphoretic.   HENT:      Head: Atraumatic.      Right Ear: External ear normal.      Left Ear: External ear normal.      Mouth/Throat:      Mouth: Mucous membranes are moist.   Eyes:      General: No scleral icterus.     Conjunctiva/sclera: Conjunctivae normal.   Cardiovascular:      Rate and Rhythm: Normal rate and regular rhythm.      Heart sounds: Normal heart sounds.      Comments: No chest wall tenderness.  Pulmonary:      Effort: No respiratory distress.      Breath sounds: Normal breath sounds.   Abdominal:      General: Abdomen is " flat. There is no distension.      Tenderness: There is no abdominal tenderness.   Musculoskeletal:      Cervical back: Neck supple.      Right lower leg: No edema.      Left lower leg: No edema.   Skin:     General: Skin is warm.      Capillary Refill: Capillary refill takes less than 2 seconds.      Findings: No rash.   Neurological:      General: No focal deficit present.      Mental Status: She is alert and oriented to person, place, and time.   Psychiatric:         Mood and Affect: Mood normal.         Behavior: Behavior normal.         Emergency Department Course   ECG  ECG taken at 0729, ECG read at 0735  Normal sinus rhythm with sinus arrhythmia   Left axis deviation   Nonspecific ST and T wave abnormality   Abnormal ECG   No changes as compared to prior, dated 6/15/23.  Rate 65 bpm. NC interval 156 ms. QRS duration 80 ms. QT/QTc 438/455 ms. P-R-T axes 65 -36 73.     Imaging:  XR Chest 2 Views   Final Result   IMPRESSION: Heart is normal in size. Lungs are clear.         Report per radiology    Laboratory:  Labs Ordered and Resulted from Time of ED Arrival to Time of ED Departure   COMPREHENSIVE METABOLIC PANEL - Abnormal       Result Value    Sodium 144      Potassium 3.9      Chloride 108 (*)     Carbon Dioxide (CO2) 25      Anion Gap 11      Urea Nitrogen 12.6      Creatinine 0.99 (*)     Calcium 9.1      Glucose 113 (*)     Alkaline Phosphatase 130 (*)     AST 27      ALT 16      Protein Total 6.5      Albumin 4.0      Bilirubin Total 0.4      GFR Estimate 60 (*)    ROUTINE UA WITH MICROSCOPIC REFLEX TO CULTURE - Abnormal    Color Urine Light Yellow      Appearance Urine Slightly Cloudy (*)     Glucose Urine Negative      Bilirubin Urine Negative      Ketones Urine Negative      Specific Gravity Urine 1.010      Blood Urine Negative      pH Urine 5.5      Protein Albumin Urine Negative      Urobilinogen Urine Normal      Nitrite Urine Negative      Leukocyte Esterase Urine Negative      Mucus Urine  Present (*)     RBC Urine <1      WBC Urine 2      Squamous Epithelials Urine 14 (*)     Hyaline Casts Urine 3 (*)    CBC WITH PLATELETS AND DIFFERENTIAL - Abnormal    WBC Count 5.0      RBC Count 4.82      Hemoglobin 14.2      Hematocrit 41.2      MCV 86      MCH 29.5      MCHC 34.5      RDW 13.1      Platelet Count 132 (*)     % Neutrophils 57      % Lymphocytes 30      % Monocytes 8      % Eosinophils 4      % Basophils 1      % Immature Granulocytes 0      NRBCs per 100 WBC 0      Absolute Neutrophils 2.9      Absolute Lymphocytes 1.5      Absolute Monocytes 0.4      Absolute Eosinophils 0.2      Absolute Basophils 0.0      Absolute Immature Granulocytes 0.0      Absolute NRBCs 0.0     LIPASE - Normal    Lipase 39     TROPONIN T, HIGH SENSITIVITY - Normal    Troponin T, High Sensitivity 13     TROPONIN T, HIGH SENSITIVITY - Normal    Troponin T, High Sensitivity 12          Emergency Department Course & Assessments:    Independent Interpretation (X-rays, CTs, rhythm strip):  Chest x-ray independently reviewed.  No pneumothorax.    Consultations/Discussion of Management or Tests:  ED Course as of 07/22/23 1137   Sat Jul 22, 2023   0725 I examined the patient and obtained history as noted above.     1132 I rechecked and updated the patient.       Disposition:  The patient was discharged to home.     Impression & Plan      Medical Decision Making:  This patient is a 72-year-old woman who presents to the emergency department for evaluation of atypical chest discomfort.  She has remained pain-free throughout her ED stay.  She was concerned about an arrhythmia based on her watch.  However, she has remained in normal sinus rhythm.  EKG does not show ischemic changes and 2 troponin measurements are negative.  In combination with her work-up last week this is overall reassuring.  She has an upcoming appointment with her cardiologist in 1 month and will try to get in to be seen as soon as possible.    We discussed other  potential causes of work-up aside from a cardiac etiology.  Chest x-ray is clear.  Acid reflux would be a consideration and we discussed risk factor modification.  No chest wall tenderness.  No concern for pulmonary embolism or aortic dissection.      Diagnosis:    ICD-10-CM    1. Chest pain, unspecified type  R07.9              Scribe Disclosure:  I, Ashtyn Kaiser, am serving as a scribe at 7:41 AM on 7/22/2023 to document services personally performed by Sánchez Gu MD based on my observations and the provider's statements to me.   7/22/2023   Sánchez Gu MD McRoberts, Sean Edward, MD  07/22/23 1138

## 2023-07-24 ENCOUNTER — ANCILLARY PROCEDURE (OUTPATIENT)
Dept: GENERAL RADIOLOGY | Facility: CLINIC | Age: 73
End: 2023-07-24
Attending: PHYSICIAN ASSISTANT
Payer: MEDICARE

## 2023-07-24 DIAGNOSIS — S32.030A CLOSED COMPRESSION FRACTURE OF L3 LUMBAR VERTEBRA, INITIAL ENCOUNTER (H): ICD-10-CM

## 2023-07-24 PROCEDURE — 72100 X-RAY EXAM L-S SPINE 2/3 VWS: CPT | Performed by: RADIOLOGY

## 2023-07-24 NOTE — TELEPHONE ENCOUNTER
Patient calling about ED follow-up appointment that was cancelled. She was able to get an appointment scheduled for 8/11 with PCP. Patient inquiring if provider would like to see her sooner than 8/11. If so, please advise if same day is approved and route back to staff to assist.    BLANKA Anglin  [facility] Triage

## 2023-07-26 ENCOUNTER — VIRTUAL VISIT (OUTPATIENT)
Dept: PSYCHOLOGY | Facility: CLINIC | Age: 73
End: 2023-07-26
Payer: MEDICARE

## 2023-07-26 DIAGNOSIS — F43.10 POSTTRAUMATIC STRESS DISORDER: ICD-10-CM

## 2023-07-26 DIAGNOSIS — F33.0 MAJOR DEPRESSIVE DISORDER, RECURRENT EPISODE, MILD (H): Primary | ICD-10-CM

## 2023-07-26 DIAGNOSIS — F41.1 GENERALIZED ANXIETY DISORDER: ICD-10-CM

## 2023-07-26 PROCEDURE — 90834 PSYTX W PT 45 MINUTES: CPT | Mod: VID | Performed by: SOCIAL WORKER

## 2023-07-26 NOTE — TELEPHONE ENCOUNTER
Notify patient that she is scheduled for an earlier appointment with this provider on August 3, 2023 at 9:30 AM.
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
## 2023-07-27 ENCOUNTER — PATIENT OUTREACH (OUTPATIENT)
Dept: CARE COORDINATION | Facility: CLINIC | Age: 73
End: 2023-07-27
Payer: MEDICARE

## 2023-07-28 ENCOUNTER — OFFICE VISIT (OUTPATIENT)
Dept: NEUROSURGERY | Facility: CLINIC | Age: 73
End: 2023-07-28
Payer: MEDICARE

## 2023-07-28 VITALS
BODY MASS INDEX: 30.39 KG/M2 | WEIGHT: 178 LBS | HEIGHT: 64 IN | DIASTOLIC BLOOD PRESSURE: 84 MMHG | SYSTOLIC BLOOD PRESSURE: 138 MMHG | HEART RATE: 82 BPM

## 2023-07-28 DIAGNOSIS — S32.030S CLOSED COMPRESSION FRACTURE OF L3 LUMBAR VERTEBRA, SEQUELA: Primary | ICD-10-CM

## 2023-07-28 PROCEDURE — 99213 OFFICE O/P EST LOW 20 MIN: CPT | Performed by: PHYSICIAN ASSISTANT

## 2023-07-28 ASSESSMENT — PAIN SCALES - GENERAL: PAINLEVEL: MILD PAIN (2)

## 2023-07-28 NOTE — NURSING NOTE
"Gem Gama's goals for this visit include:   Chief Complaint   Patient presents with    RECHECK     Follow up compression fracture// from April 8th        She requests these members of her care team be copied on today's visit information: yes    PCP: Danny Conteh    Referring Provider:  No referring provider defined for this encounter.    /84   Pulse 82   Ht 1.626 m (5' 4\")   Wt 80.7 kg (178 lb)   LMP  (LMP Unknown)   BMI 30.55 kg/m      Do you need any medication refills at today's visit? No  HOWARD Chairez, ALEXANDER (AAMA)      "

## 2023-07-28 NOTE — LETTER
7/28/2023         RE: Gem Gama  [address]  [facility] [address]        Dear Colleague,    Thank you for referring your patient, Gem Gama, to the [facility] [place]. Please see a copy of my visit note below.    Neurosurgery follow-up    Ms. Gama is a 72 old female who returns for 6-week follow-up regarding L3 compression fracture.  She states she is having minimal back pain.  Denies any radicular symptoms.  She had a lumbar x-ray few days ago.    Exam    B/P: 138/84, T: Data Unavailable, P: 82, R: Data Unavailable     Alert and oriented no acute distress  Lumbar spine with mild tenderness to palpation in the lower lumbar region around L4-5.  Bilateral lower extremities appropriate strength  Gait is normal    Imaging    Lumbar x-ray demonstrated lumbar compression fracture at L3 with approximately 30% vertebral height loss, unchanged from prior.    Assessment    L3 compression fracture      Plan    Follow-up in 6 weeks if not improved, we will repeat x-rays at that time if patient still having pain.  Patient has already followed-up with her primary care provider regarding bone health.          Again, thank you for allowing me to participate in the care of your patient.        Sincerely,        Kristie Bell PA-C

## 2023-07-28 NOTE — PROGRESS NOTES
Neurosurgery follow-up    Ms. Gama is a 72 old female who returns for 6-week follow-up regarding L3 compression fracture.  She states she is having minimal back pain.  Denies any radicular symptoms.  She had a lumbar x-ray few days ago.    Exam    B/P: 138/84, T: Data Unavailable, P: 82, R: Data Unavailable     Alert and oriented no acute distress  Lumbar spine with mild tenderness to palpation in the lower lumbar region around L4-5.  Bilateral lower extremities appropriate strength  Gait is normal    Imaging    Lumbar x-ray demonstrated lumbar compression fracture at L3 with approximately 30% vertebral height loss, unchanged from prior.    Assessment    L3 compression fracture      Plan    Follow-up in 6 weeks if not improved, we will repeat x-rays at that time if patient still having pain.  Patient has already followed-up with her primary care provider regarding bone health.

## 2023-08-03 ENCOUNTER — OFFICE VISIT (OUTPATIENT)
Dept: FAMILY MEDICINE | Facility: CLINIC | Age: 73
End: 2023-08-03
Payer: MEDICARE

## 2023-08-03 VITALS
RESPIRATION RATE: 18 BRPM | DIASTOLIC BLOOD PRESSURE: 69 MMHG | TEMPERATURE: 96.9 F | HEIGHT: 64 IN | WEIGHT: 183 LBS | BODY MASS INDEX: 31.24 KG/M2 | SYSTOLIC BLOOD PRESSURE: 136 MMHG | OXYGEN SATURATION: 98 % | HEART RATE: 74 BPM

## 2023-08-03 DIAGNOSIS — I25.119 CORONARY ARTERY DISEASE WITH ANGINA PECTORIS, UNSPECIFIED VESSEL OR LESION TYPE, UNSPECIFIED WHETHER NATIVE OR TRANSPLANTED HEART (H): Primary | ICD-10-CM

## 2023-08-03 DIAGNOSIS — I25.728 CORONARY ARTERY DISEASE OF AUTOLOGOUS BYPASS GRAFT WITH STABLE ANGINA PECTORIS (H): ICD-10-CM

## 2023-08-03 DIAGNOSIS — E78.5 HYPERLIPIDEMIA LDL GOAL <70: ICD-10-CM

## 2023-08-03 PROCEDURE — 99214 OFFICE O/P EST MOD 30 MIN: CPT | Performed by: INTERNAL MEDICINE

## 2023-08-03 ASSESSMENT — PAIN SCALES - GENERAL: PAINLEVEL: NO PAIN (0)

## 2023-08-03 NOTE — PATIENT INSTRUCTIONS
At [facility], we strive to deliver an exceptional experience to you, every time we see you. If you receive a survey, please complete it as we do value your feedback.  If you have MyChart, you can expect to receive results automatically within 24 hours of their completion.  Your provider will send a note interpreting your results as well.   If you do not have MyChart, you should receive your results in about a week by mail.    Your care team:                            Family Medicine Internal Medicine   MD Danny Christian MD Shantel Branch-Fleming, MD Srinivasa Vaka, MD Katya Belousova, PAKATE Smiley CNP, MD ([facility]   Mustapha Suresh, MD Yelena Garcia MD Amelia Massimini APRN DEVON Huitron APRN MD Letty Webster MD          Clinic hours: Monday - Thursday 7 am-6 pm; Fridays 7 am-5 pm.   Urgent care: Monday - Friday 10 am- 8 pm; Saturday and Sunday 9 am-5 pm.    Clinic: [phone]       [facility]: Monday - Thursday 8 am - 7 pm; Friday 8 am - 6 pm  [facility]: [phone]

## 2023-08-03 NOTE — PROGRESS NOTES
"MYLES Moody is a 72 year old, presenting for the following health issues:    Hospital Follow-up Visit:    Hospital/Nursing Home/IP Rehab Facility: [facility]   Date of Admission: 23  Date of Discharge: 23  Reason(s) for Admission: chest pain     Was your hospitalization related to COVID-19? No   Problems taking medications regularly:  None  Medication changes since discharge: None  Problems adhering to non-medication therapy:  None    Summary of hospitalization:  [facility] hospital discharge summary reviewed  Diagnostic Tests/Treatments reviewed.  Follow up needed: Yes.  Other Healthcare Providers Involved in Patient s Care:         None  Update since discharge: stable.     Patient woke up last 7/15/2023 with substernal pain x 5 minutes. The following day, the patient went out-of-town to attend a . While driving, Heidy felt lightheaded, as if she will pass out. The patient tried to cough, in order to prevent passing out while driving. She pulled over and took two tabs of sublingual nitroglycerin, followed by another two tabs five minutes later. Symptoms resolved. Patient reached her destination safely. Heidy recalls that she experienced left-sided jaw pain prior to this event. She decided not to drive home and asked her relatives to call . Patient was discharged without definitive diagnosis. Gem later developed another episode of substernal chest pain radiating to her left axilla, which led to another ER visit. EKG reportedly shows \"septal infarction\".     Plan of care communicated with patient     REVIEW OF SYSTEMS    CONSTITUTIONAL: NEGATIVE for fever, chills, change in weight  INTEGUMENTARY/SKIN: NEGATIVE for worrisome rashes, moles or lesions  EYES: NEGATIVE for vision changes or irritation  ENT/MOUTH: NEGATIVE for ear, mouth and throat problems  RESP: NEGATIVE for significant cough or SOB  BREAST: NEGATIVE for masses, tenderness or " "discharge  CV: NEGATIVE for orthopnea, palpitations, and paroxysmal nocturnal dyspnea  GI: NEGATIVE for nausea, abdominal pain, heartburn, or change in bowel habits  : NEGATIVE for frequency, dysuria, or hematuria  MUSCULOSKELETAL: NEGATIVE for significant arthralgias or myalgia  NEURO: NEGATIVE for weakness, dizziness or paresthesias  ENDOCRINE: POSITIVE  for HYPERLIPIDEMIA.  HEME: NEGATIVE for bleeding problems  PSYCHIATRIC: NEGATIVE for changes in mood or affect      OBJECTIVE   /69 (BP Location: Left arm, Patient Position: Chair, Cuff Size: Adult Regular)   Pulse 74   Temp 96.9  F (36.1  C) (Tympanic)   Resp 18   Ht 1.626 m (5' 4\")   Wt 83 kg (183 lb)   LMP  (LMP Unknown)   SpO2 98%   BMI 31.41 kg/m    Body mass index is 31.41 kg/m .  Physical Exam   GENERAL: healthy, alert and no distress  HENT: normal cephalic/atraumatic and oral mucous membranes moist  NECK: no adenopathy and thyroid normal to palpation  RESP: lungs clear to auscultation - no rales, rhonchi or wheezes  CV: regular rates and rhythm and no peripheral edema  MS: no gross musculoskeletal defects noted, no edema  NEURO: Normal strength and tone, mentation intact and speech normal  PSYCH: mentation appears normal, affect normal/bright    DIAGNOSTICS  Epic reviewed.    ASSESSMENT/PLAN  Coronary artery disease with angina pectoris, unspecified vessel or lesion type, unspecified whether native or transplanted heart (H)  - Lipid panel reflex to direct LDL Fasting; Standing  - Adult Leadless EKG Monitor 3 to 7 Days; Future  - NM Lexiscan stress test; Future  - CBC with platelets and differential; Future  - Comprehensive metabolic panel; Future    Hyperlipidemia LDL goal <70  - Lipid panel reflex to direct LDL Fasting; Standing       Disposition:  Follow-up in 4 weeks or as needed.    Danny Conteh MD  Internal Medicine        "

## 2023-08-04 ENCOUNTER — VIRTUAL VISIT (OUTPATIENT)
Dept: PSYCHOLOGY | Facility: CLINIC | Age: 73
End: 2023-08-04
Payer: MEDICARE

## 2023-08-04 ENCOUNTER — MYC MEDICAL ADVICE (OUTPATIENT)
Dept: FAMILY MEDICINE | Facility: CLINIC | Age: 73
End: 2023-08-04
Payer: MEDICARE

## 2023-08-04 ENCOUNTER — PRE VISIT (OUTPATIENT)
Dept: UROLOGY | Facility: CLINIC | Age: 73
End: 2023-08-04
Payer: MEDICARE

## 2023-08-04 DIAGNOSIS — F41.1 GENERALIZED ANXIETY DISORDER: ICD-10-CM

## 2023-08-04 DIAGNOSIS — F33.0 MAJOR DEPRESSIVE DISORDER, RECURRENT EPISODE, MILD (H): Primary | ICD-10-CM

## 2023-08-04 DIAGNOSIS — F43.10 POSTTRAUMATIC STRESS DISORDER: ICD-10-CM

## 2023-08-04 PROCEDURE — 90834 PSYTX W PT 45 MINUTES: CPT | Mod: VID | Performed by: SOCIAL WORKER

## 2023-08-04 ASSESSMENT — PATIENT HEALTH QUESTIONNAIRE - PHQ9
SUM OF ALL RESPONSES TO PHQ QUESTIONS 1-9: 5
SUM OF ALL RESPONSES TO PHQ QUESTIONS 1-9: 5
10. IF YOU CHECKED OFF ANY PROBLEMS, HOW DIFFICULT HAVE THESE PROBLEMS MADE IT FOR YOU TO DO YOUR WORK, TAKE CARE OF THINGS AT HOME, OR GET ALONG WITH OTHER PEOPLE: SOMEWHAT DIFFICULT
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
## 2023-08-05 ENCOUNTER — LAB (OUTPATIENT)
Dept: LAB | Facility: CLINIC | Age: 73
End: 2023-08-05
Payer: MEDICARE

## 2023-08-05 DIAGNOSIS — E78.5 HYPERLIPIDEMIA LDL GOAL <70: ICD-10-CM

## 2023-08-05 DIAGNOSIS — I25.119 CORONARY ARTERY DISEASE WITH ANGINA PECTORIS, UNSPECIFIED VESSEL OR LESION TYPE, UNSPECIFIED WHETHER NATIVE OR TRANSPLANTED HEART (H): ICD-10-CM

## 2023-08-05 LAB
ALBUMIN SERPL BCG-MCNC: 4.4 G/DL (ref 3.5–5.2)
ALP SERPL-CCNC: 102 U/L (ref 35–104)
ALT SERPL W P-5'-P-CCNC: 19 U/L (ref 0–50)
ANION GAP SERPL CALCULATED.3IONS-SCNC: 14 MMOL/L (ref 7–15)
AST SERPL W P-5'-P-CCNC: 40 U/L (ref 0–45)
BASOPHILS # BLD AUTO: 0 10E3/UL (ref 0–0.2)
BASOPHILS NFR BLD AUTO: 0 %
BILIRUB SERPL-MCNC: 0.8 MG/DL
BUN SERPL-MCNC: 10.2 MG/DL (ref 8–23)
CALCIUM SERPL-MCNC: 9.3 MG/DL (ref 8.8–10.2)
CHLORIDE SERPL-SCNC: 106 MMOL/L (ref 98–107)
CHOLEST SERPL-MCNC: 136 MG/DL
CREAT SERPL-MCNC: 1.02 MG/DL (ref 0.51–0.95)
DEPRECATED HCO3 PLAS-SCNC: 25 MMOL/L (ref 22–29)
EOSINOPHIL # BLD AUTO: 0.1 10E3/UL (ref 0–0.7)
EOSINOPHIL NFR BLD AUTO: 3 %
ERYTHROCYTE [DISTWIDTH] IN BLOOD BY AUTOMATED COUNT: 13.8 % (ref 10–15)
GFR SERPL CREATININE-BSD FRML MDRD: 57 ML/MIN/1.73M2
GLUCOSE SERPL-MCNC: 102 MG/DL (ref 70–99)
HCT VFR BLD AUTO: 41.7 % (ref 35–47)
HDLC SERPL-MCNC: 62 MG/DL
HGB BLD-MCNC: 14.3 G/DL (ref 11.7–15.7)
IMM GRANULOCYTES # BLD: 0 10E3/UL
IMM GRANULOCYTES NFR BLD: 0 %
LDLC SERPL CALC-MCNC: 56 MG/DL
LYMPHOCYTES # BLD AUTO: 1.1 10E3/UL (ref 0.8–5.3)
LYMPHOCYTES NFR BLD AUTO: 23 %
MCH RBC QN AUTO: 29.7 PG (ref 26.5–33)
MCHC RBC AUTO-ENTMCNC: 34.3 G/DL (ref 31.5–36.5)
MCV RBC AUTO: 87 FL (ref 78–100)
MONOCYTES # BLD AUTO: 0.3 10E3/UL (ref 0–1.3)
MONOCYTES NFR BLD AUTO: 6 %
NEUTROPHILS # BLD AUTO: 3.2 10E3/UL (ref 1.6–8.3)
NEUTROPHILS NFR BLD AUTO: 68 %
NONHDLC SERPL-MCNC: 74 MG/DL
PLATELET # BLD AUTO: 154 10E3/UL (ref 150–450)
POTASSIUM SERPL-SCNC: 4.8 MMOL/L (ref 3.4–5.3)
PROT SERPL-MCNC: 7.2 G/DL (ref 6.4–8.3)
RBC # BLD AUTO: 4.81 10E6/UL (ref 3.8–5.2)
SODIUM SERPL-SCNC: 145 MMOL/L (ref 136–145)
TRIGL SERPL-MCNC: 88 MG/DL
WBC # BLD AUTO: 4.7 10E3/UL (ref 4–11)

## 2023-08-05 PROCEDURE — 80061 LIPID PANEL: CPT

## 2023-08-05 PROCEDURE — 36415 COLL VENOUS BLD VENIPUNCTURE: CPT

## 2023-08-05 PROCEDURE — 80053 COMPREHEN METABOLIC PANEL: CPT

## 2023-08-05 PROCEDURE — 85025 COMPLETE CBC W/AUTO DIFF WBC: CPT

## 2023-08-10 ENCOUNTER — ANCILLARY PROCEDURE (OUTPATIENT)
Dept: CARDIOLOGY | Facility: CLINIC | Age: 73
End: 2023-08-10
Attending: INTERNAL MEDICINE
Payer: MEDICARE

## 2023-08-10 ENCOUNTER — MYC MEDICAL ADVICE (OUTPATIENT)
Dept: FAMILY MEDICINE | Facility: CLINIC | Age: 73
End: 2023-08-10

## 2023-08-10 DIAGNOSIS — I25.119 CORONARY ARTERY DISEASE WITH ANGINA PECTORIS, UNSPECIFIED VESSEL OR LESION TYPE, UNSPECIFIED WHETHER NATIVE OR TRANSPLANTED HEART (H): ICD-10-CM

## 2023-08-10 PROCEDURE — 93242 EXT ECG>48HR<7D RECORDING: CPT | Performed by: INTERNAL MEDICINE

## 2023-08-10 PROCEDURE — 93244 EXT ECG>48HR<7D REV&INTERPJ: CPT | Performed by: INTERNAL MEDICINE

## 2023-08-10 ASSESSMENT — PATIENT HEALTH QUESTIONNAIRE - PHQ9
SUM OF ALL RESPONSES TO PHQ QUESTIONS 1-9: 10
10. IF YOU CHECKED OFF ANY PROBLEMS, HOW DIFFICULT HAVE THESE PROBLEMS MADE IT FOR YOU TO DO YOUR WORK, TAKE CARE OF THINGS AT HOME, OR GET ALONG WITH OTHER PEOPLE: SOMEWHAT DIFFICULT
SUM OF ALL RESPONSES TO PHQ QUESTIONS 1-9: 10

## 2023-08-10 NOTE — TELEPHONE ENCOUNTER
Patient calling about [employer] message. Inquiring if provider would prefer her to reschedule her stress test on 8/14 to a later time. That way it will give PCP enough time to review results from holter monitor.     Okay to send [employer] message back to patient.    BLANKA Anglin  [facility] Primary Care Triage

## 2023-08-11 ENCOUNTER — VIRTUAL VISIT (OUTPATIENT)
Dept: PSYCHOLOGY | Facility: CLINIC | Age: 73
End: 2023-08-11
Payer: MEDICARE

## 2023-08-11 DIAGNOSIS — F43.10 POSTTRAUMATIC STRESS DISORDER: ICD-10-CM

## 2023-08-11 DIAGNOSIS — F33.0 MAJOR DEPRESSIVE DISORDER, RECURRENT EPISODE, MILD (H): Primary | ICD-10-CM

## 2023-08-11 PROCEDURE — 90834 PSYTX W PT 45 MINUTES: CPT | Mod: VID | Performed by: SOCIAL WORKER
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
## 2023-08-13 DIAGNOSIS — K21.9 GASTROESOPHAGEAL REFLUX DISEASE WITHOUT ESOPHAGITIS: ICD-10-CM

## 2023-08-14 ENCOUNTER — HOSPITAL ENCOUNTER (OUTPATIENT)
Dept: NUCLEAR MEDICINE | Facility: CLINIC | Age: 73
Setting detail: NUCLEAR MEDICINE
Discharge: HOME | End: 2023-08-14
Attending: INTERNAL MEDICINE
Payer: MEDICARE

## 2023-08-14 ENCOUNTER — ALLIED HEALTH/NURSE VISIT (OUTPATIENT)
Dept: UROLOGY | Facility: CLINIC | Age: 73
End: 2023-08-14
Payer: MEDICARE

## 2023-08-14 ENCOUNTER — HOSPITAL ENCOUNTER (OUTPATIENT)
Dept: CARDIOLOGY | Facility: CLINIC | Age: 73
Discharge: HOME | End: 2023-08-14
Attending: INTERNAL MEDICINE
Payer: MEDICARE

## 2023-08-14 ENCOUNTER — ANCILLARY PROCEDURE (OUTPATIENT)
Dept: RADIOLOGY | Facility: AMBULATORY SURGERY CENTER | Age: 73
End: 2023-08-14
Attending: NURSE PRACTITIONER
Payer: MEDICARE

## 2023-08-14 VITALS
DIASTOLIC BLOOD PRESSURE: 105 MMHG | HEIGHT: 64 IN | BODY MASS INDEX: 31.24 KG/M2 | SYSTOLIC BLOOD PRESSURE: 129 MMHG | WEIGHT: 183 LBS | HEART RATE: 76 BPM

## 2023-08-14 VITALS — HEART RATE: 65 BPM | DIASTOLIC BLOOD PRESSURE: 69 MMHG | SYSTOLIC BLOOD PRESSURE: 121 MMHG

## 2023-08-14 DIAGNOSIS — R39.89 URINARY PROBLEM: ICD-10-CM

## 2023-08-14 DIAGNOSIS — I25.119 CORONARY ARTERY DISEASE WITH ANGINA PECTORIS, UNSPECIFIED VESSEL OR LESION TYPE, UNSPECIFIED WHETHER NATIVE OR TRANSPLANTED HEART (H): ICD-10-CM

## 2023-08-14 DIAGNOSIS — N39.41 URGE INCONTINENCE: Primary | ICD-10-CM

## 2023-08-14 LAB
ALBUMIN UR-MCNC: NEGATIVE MG/DL
APPEARANCE UR: CLEAR
BILIRUB UR QL STRIP: NEGATIVE
COLOR UR AUTO: YELLOW
CV STRESS MAX HR HE: 90
GLUCOSE UR STRIP-MCNC: NEGATIVE MG/DL
HGB UR QL STRIP: NEGATIVE
KETONES UR STRIP-MCNC: NEGATIVE MG/DL
LEUKOCYTE ESTERASE UR QL STRIP: NEGATIVE
NITRATE UR QL: NEGATIVE
PH UR STRIP: 7.5 [PH] (ref 5–8)
RATE PRESSURE PRODUCT: 9180
SP GR UR STRIP: 1.01 (ref 1–1.03)
STRESS ECHO BASELINE DIASTOLIC HE: 69
STRESS ECHO BASELINE HR: 67 BPM
STRESS ECHO BASELINE SYSTOLIC BP: 121
STRESS ECHO CALCULATED PERCENT HR: 62 %
STRESS ECHO LAST STRESS DIASTOLIC BP: 47
STRESS ECHO LAST STRESS SYSTOLIC BP: 102
STRESS ECHO TARGET HR: 146
UROBILINOGEN UR STRIP-ACNC: 0.2 E.U./DL

## 2023-08-14 PROCEDURE — 74455 X-RAY URETHRA/BLADDER: CPT | Performed by: NURSE PRACTITIONER

## 2023-08-14 PROCEDURE — 99207 PR NO CHARGE INJECTABLE MED/DRUG: CPT | Performed by: NURSE PRACTITIONER

## 2023-08-14 PROCEDURE — G1010 CDSM STANSON: HCPCS | Performed by: INTERNAL MEDICINE

## 2023-08-14 PROCEDURE — 93017 CV STRESS TEST TRACING ONLY: CPT

## 2023-08-14 PROCEDURE — 51784 ANAL/URINARY MUSCLE STUDY: CPT | Performed by: NURSE PRACTITIONER

## 2023-08-14 PROCEDURE — 51600 INJECTION FOR BLADDER X-RAY: CPT | Performed by: NURSE PRACTITIONER

## 2023-08-14 PROCEDURE — 78452 HT MUSCLE IMAGE SPECT MULT: CPT | Mod: ME

## 2023-08-14 PROCEDURE — A9502 TC99M TETROFOSMIN: HCPCS | Performed by: INTERNAL MEDICINE

## 2023-08-14 PROCEDURE — 78452 HT MUSCLE IMAGE SPECT MULT: CPT | Mod: 26 | Performed by: INTERNAL MEDICINE

## 2023-08-14 PROCEDURE — 250N000011 HC RX IP 250 OP 636: Mod: JZ | Performed by: INTERNAL MEDICINE

## 2023-08-14 PROCEDURE — G1010 CDSM STANSON: HCPCS

## 2023-08-14 PROCEDURE — 343N000001 HC RX 343: Performed by: INTERNAL MEDICINE

## 2023-08-14 PROCEDURE — 93016 CV STRESS TEST SUPVJ ONLY: CPT | Performed by: INTERNAL MEDICINE

## 2023-08-14 PROCEDURE — 51797 INTRAABDOMINAL PRESSURE TEST: CPT | Performed by: NURSE PRACTITIONER

## 2023-08-14 PROCEDURE — 81003 URINALYSIS AUTO W/O SCOPE: CPT | Performed by: PATHOLOGY

## 2023-08-14 PROCEDURE — 93018 CV STRESS TEST I&R ONLY: CPT | Performed by: INTERNAL MEDICINE

## 2023-08-14 PROCEDURE — 51728 CYSTOMETROGRAM W/VP: CPT | Performed by: NURSE PRACTITIONER

## 2023-08-14 PROCEDURE — 51741 ELECTRO-UROFLOWMETRY FIRST: CPT | Performed by: NURSE PRACTITIONER

## 2023-08-14 RX ORDER — REGADENOSON 0.08 MG/ML
0.4 INJECTION, SOLUTION INTRAVENOUS ONCE
Status: COMPLETED | OUTPATIENT
Start: 2023-08-14 | End: 2023-08-14

## 2023-08-14 RX ORDER — CEPHALEXIN 500 MG/1
500 CAPSULE ORAL ONCE
Status: DISCONTINUED | OUTPATIENT
Start: 2023-08-14 | End: 2023-08-14

## 2023-08-14 RX ORDER — ACYCLOVIR 200 MG/1
0-1 CAPSULE ORAL
Status: DISCONTINUED | OUTPATIENT
Start: 2023-08-14 | End: 2023-08-15 | Stop reason: HOSPADM

## 2023-08-14 RX ORDER — PANTOPRAZOLE SODIUM 40 MG/1
TABLET, DELAYED RELEASE ORAL
Qty: 90 TABLET | Refills: 3 | Status: SHIPPED | OUTPATIENT
Start: 2023-08-14 | End: 2024-08-21

## 2023-08-14 RX ORDER — ALBUTEROL SULFATE 90 UG/1
2 AEROSOL, METERED RESPIRATORY (INHALATION) EVERY 5 MIN PRN
Status: DISCONTINUED | OUTPATIENT
Start: 2023-08-14 | End: 2023-08-15 | Stop reason: HOSPADM

## 2023-08-14 RX ORDER — AMINOPHYLLINE 25 MG/ML
50-100 INJECTION, SOLUTION INTRAVENOUS
Status: DISCONTINUED | OUTPATIENT
Start: 2023-08-14 | End: 2023-08-15 | Stop reason: HOSPADM

## 2023-08-14 RX ORDER — CAFFEINE CITRATE 20 MG/ML
60 SOLUTION INTRAVENOUS
Status: DISCONTINUED | OUTPATIENT
Start: 2023-08-14 | End: 2023-08-15 | Stop reason: HOSPADM

## 2023-08-14 RX ORDER — NITROFURANTOIN 25; 75 MG/1; MG/1
100 CAPSULE ORAL ONCE
Status: COMPLETED | OUTPATIENT
Start: 2023-08-14 | End: 2023-08-14

## 2023-08-14 RX ADMIN — REGADENOSON 0.4 MG: 0.08 INJECTION, SOLUTION INTRAVENOUS at 12:35

## 2023-08-14 RX ADMIN — TETROFOSMIN 10 MILLICURIE: 1.38 INJECTION, POWDER, LYOPHILIZED, FOR SOLUTION INTRAVENOUS at 11:33

## 2023-08-14 RX ADMIN — NITROFURANTOIN 100 MG: 25; 75 CAPSULE ORAL at 15:51

## 2023-08-14 RX ADMIN — TETROFOSMIN 39.4 MILLICURIE: 1.38 INJECTION, POWDER, LYOPHILIZED, FOR SOLUTION INTRAVENOUS at 12:37

## 2023-08-14 ASSESSMENT — PAIN SCALES - GENERAL: PAINLEVEL: NO PAIN (0)

## 2023-08-14 NOTE — PATIENT INSTRUCTIONS
UROLOGY CLINIC VISIT PATIENT INSTRUCTIONS    -Follow up with Dr. Rodriguez, as scheduled.     If you have any issues, questions or concerns in the meantime, do not hesitate to contact us at [phone] or via [employer].     Jazmin Michael, CNP  Department of Urology

## 2023-08-14 NOTE — PROGRESS NOTES
PREPROCEDURE DIAGNOSES:    1. LUTS (urinary urgency and incontinence)    POSTPROCEDURE DIAGNOSES:  -Normal bladder capacity (629 mL) with relatively normal filling sensations.  -Multiple episodes of DO, with Pdet ranging from 10 to ~34 cm H2O. She leaks during a few of these; DLPP ~18 cm H2O.  -Good bladder compliance between episodes of DO.  -No reproducible TON.  -Moderate detrusor contraction during voiding to max Pdet ~30 cm H2O, which she supplements with Valsalva effort.  -Brisk flow rate (Qmax 41 mL/s) with a fluctuating, bell-shaped flow curve and complete bladder emptying (final PVR 43 mL).  -EMG increased during DOI and during volitional voiding.  -Fluoroscopy reveals a mildly-trabeculated bladder wall without diverticulae or cellules.  No vesicoureteral reflux was observed.  The bladder neck appears slightly open during filling and is open during voiding.     PROCEDURE:    -Sterile urethral catheterization for measurement of postvoid residual urine volume.  -Complex filling cystometrogram with measurement of bladder and rectal pressures.  -Complex voiding cystometrogram with measurement of bladder and rectal pressures.  -Electromyography of the pelvic floor during urodynamics.  -Fluoroscopic imaging of the bladder during urodynamics, at least 3 views.    -Interpretation of urodynamics and flouroscopic imaging.      INDICATIONS FOR PROCEDURE:  Ms. Gem Gama is a pleasant 72 year old female with LUTS (urinary urgency and incontinence). Video urodynamic assessment is requested today by Dr. Rodriguez to better characterize Ms. Gem Gmaa's voiding dysfunction.      VOIDING DIARY:  Not completed.    DESCRIPTION OF PROCEDURE:  Risks, benefits, and alternatives to urodynamics were discussed with the patient and she wished to proceed.  Urodynamics are planned to better assess the primary etiology for Ms. Gama's urologic dysfunction. After informed consent was obtained, the patient was taken to the  procedure room where the study was initiated. Findings below.     Next a 7F double-lumen urodynamics catheter was inserted into the bladder under sterile technique via the urethra.  A 7F abdominal manometry catheter was placed in the rectum.  EMG pads were placed on both sides of the anal verge.  The bladder was filled with 100 mL of Omnipaque at 30 mL/minute and serial pressures were recorded.  With coughing there was an appropriate rise in vesical and abdominal pressures with no change in detrusor pressure, confirming good study catheter placement.    DURING THE FILLING PHASE:  First sensation: 200 mL.  First Desire: 336 mL.  Maximum Capacity: 629 mL.    Uninhibited detrusor contractions: Multiple episodes of DO, with Pdet ranging from 10 to ~34 cm H2O. She leaks during a few of these; DLPP ~18 cm H2O.  Compliance: Good. PDet=16 cmH20 at capacity. Compliance ratio of 39.  Continence: DOI, as described above. No leak with cough.   EMG: Quiet during filling, though increased during leakage.     DURING THE VOIDING PHASE:  Maximum detrusor contraction with void: ~30 cm of H2O pressure, which she supplements with Valsalva effort.  Voided volume: 393 mL.  Maximum flow rate: 41 mL/sec.  Average flow rate: 15 mL/sec.  Postvoid Residual: 43 mL.  EMG activity: Increased with voiding.  Character of voiding curve: Fluctuating bell curve.  BOOI: -54.5 (suggesting no obstruction - see key below)  [obstructed (WEBB index [BOOI] ? 40); equivocal (no definite   obstruction; BOOI 20-40); and no obstruction (BOOI ? 20)]    FLUOROSCOPIC IMAGING OF THE BLADDER DURING URODYNAMICS:  Please note, image numbers on UDS tracings correlate with iSite series numbers on PACS images. Fluoroscopy during today's procedure demonstrated a mildly-trabeculated bladder wall without diverticulae or cellules.  No vesicoureteral reflux was observed.  The bladder neck appears slightly open during filling and is open during voiding.  At the completion of  the study, all catheters were removed and the patient was brought back into the consultation room to further discuss today's study results.      ASSESSMENT/PLAN:  Ms. Gem Gama is a pleasant 72 year old female with LUTS (urinary urgency and incontinence) who demonstrated the following findings today on urodynamic evaluation:    -Normal bladder capacity (629 mL) with relatively normal filling sensations.  -Multiple episodes of DO, with Pdet ranging from 10 to ~34 cm H2O. She leaks during a few of these; DLPP ~18 cm H2O.  -Good bladder compliance between episodes of DO.  -No reproducible TON.  -Moderate detrusor contraction during voiding to max Pdet ~30 cm H2O, which she supplements with Valsalva effort.  -Brisk flow rate (Qmax 41 mL/s) with a fluctuating, bell-shaped flow curve and complete bladder emptying (final PVR 43 mL).  -EMG increased during DOI and during volitional voiding.  -Fluoroscopy reveals a mildly-trabeculated bladder wall without diverticulae or cellules.  No vesicoureteral reflux was observed.  The bladder neck appears slightly open during filling and is open during voiding.     She previously took Myrbetriq but stopped this due to concern for interactions with her other meds. She will follow up as scheduled with Dr. Rodriguez to further discuss today's study results and make plans for how best to proceed.      - A single Macrobid was provided for UTI prophylaxis following completion of today's study per department protocol.  The risk of UTI with VUDS is low at ~2.5-3%.      Thank you for allowing me to participate in the care of Ms. Gem Gama and please don't hesitate to contact me with any questions or concerns.      This procedure was performed under a collaborative agreement with Dr. Sánchez Lewis, Professor and  of Urology, [facility].    KATE Montano, CNP  Department of Urology

## 2023-08-14 NOTE — NURSING NOTE
"  Chief Complaint   Patient presents with    Urodynamics Study     Urinary urgency/incontinence       Blood pressure (!) 129/105, pulse 76, height 1.626 m (5' 4\"), weight 83 kg (183 lb), not currently breastfeeding. Body mass index is 31.41 kg/m .    Patient Active Problem List   Diagnosis    TMJ (temporomandibular joint syndrome)    Congenital ureteric stenosis    Lacrimal duct stenosis    Chronic rhinitis    Intermittent asthma    Advanced directives, counseling/discussion    Major depressive disorder, recurrent episode, moderate (H)    Osteoarthritis of right knee    Esophageal reflux (GERD)    Primary narcolepsy without cataplexy    Vitamin D deficiency    Stage 3a chronic kidney disease (H)    Incontinence of feces with fecal urgency    Overactive bladder    Fatigue, unspecified type    History of ischemic cardiomyopathy    Hyperlipidemia LDL goal <70    Calculus of gallbladder without cholecystitis without obstruction    Coronary artery disease with angina pectoris, unspecified vessel or lesion type, unspecified whether native or transplanted heart (H)    Environmental allergies    Class 1 obesity due to excess calories with serious comorbidity and body mass index (BMI) of 30.0 to 30.9 in adult    Chronic insomnia    Hiatal hernia    Cheyne-Rogers breathing disorder    Clostridium difficile infection    Pyelonephritis    Sepsis, due to unspecified organism, unspecified whether acute organ dysfunction present (H)    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    Possible PLMD (periodic limb movement disorder)    Adjustment disorder with anxiety    Major depressive disorder, recurrent episode, mild (H)    Generalized anxiety disorder    Dilated cardiomyopathy (H)    Idiopathic interstitial fibrosis (H)    Back muscle spasm    Benign paroxysmal positional vertigo, unspecified laterality    Closed compression fracture of L3 lumbar vertebra, sequela    Bilateral hydronephrosis    Acute bilateral low back pain without sciatica    " Posttraumatic stress disorder       Allergies   Allergen Reactions    Dust Mites Cough, Headache, Other (See Comments) and Shortness Of Breath    Latex Other (See Comments) and Shortness Of Breath     Runny nose  PN: LW Reaction: DIFFICULTY BREATHING      Sulfa Antibiotics Anaphylaxis, Hives and Itching     PN: LW Reaction: HIVES, Swelling  PN: LW Reaction: HIVES, Swelling    Flagyl [Metronidazole Hcl] Anaphylaxis and Rash    Food      PN: LW FI1: nka LW FI2:    Hydrochlorothiazide W-Triamterene      PN: LW Reaction: skin rash  PN: LW Reaction: skin rash    No Clinical Screening - See Comments      PN: LW Other1: -Adhesive Tape    Seasonal Allergies      sneezing    Tape [Adhesive Tape] Hives    Metoprolol Itching and Rash    Metronidazole Hives and Rash     PN: LW Reaction: HIVES         Current Outpatient Medications   Medication Sig Dispense Refill    albuterol (PROAIR HFA/PROVENTIL HFA/VENTOLIN HFA) 108 (90 Base) MCG/ACT inhaler Inhale 1-2 puffs into the lungs every 4 hours as needed for shortness of breath or wheezing 18 g 11    aspirin (ASA) 81 MG EC tablet Take 1 tablet (81 mg) by mouth daily 90 tablet 0    atorvastatin (LIPITOR) 40 MG tablet Take 1 tablet (40 mg) by mouth daily 90 tablet 3    carvedilol (COREG) 3.125 MG tablet TAKE 1 TABLET(3.125 MG) BY MOUTH TWICE DAILY WITH MEALS 180 tablet 3    cholestyramine (QUESTRAN) 4 g packet Take 1 packet (4 g) by mouth 2 times daily (with meals) 60 packet 5    diazepam (VALIUM) 2 MG tablet Take 0.5-1 tablets (1-2 mg) by mouth 2 times daily as needed for anxiety or muscle spasms 30 tablet 0    diclofenac (VOLTAREN) 1 % topical gel Apply topically 4 times daily For Jaw pain      estradiol (ESTRING) 2 MG vaginal ring Place 1 each vaginally every 3 months 1 each 3    fluticasone-vilanterol (BREO ELLIPTA) 200-25 MCG/ACT inhaler Inhale 1 puff into the lungs daily 3 each 3    isosorbide mononitrate (IMDUR) 30 MG 24 hr tablet TAKE 1 TABLET(30 MG) BY MOUTH DAILY 90 tablet 3     loperamide (IMODIUM) 2 MG capsule Take 1 capsule (2 mg) by mouth daily 30 capsule 0    loratadine (CLARITIN) 10 MG tablet Take 10 mg by mouth At Bedtime      magnesium oxide (MAG-OX) 400 MG tablet Take 400 mg by mouth daily      Melatonin 10 MG TABS tablet Take 10 mg by mouth nightly as needed for sleep      nitroGLYcerin (NITROSTAT) 0.4 MG sublingual tablet Place 1 tablet (0.4 mg) under the tongue every 5 minutes as needed for chest pain 25 tablet 11    pantoprazole (PROTONIX) 40 MG EC tablet TAKE 1 TABLET(40 MG) BY MOUTH DAILY 90 tablet 3    Prenatal Multivit-Min-Fe-FA (PRENATAL/IRON) TABS Take 1 tablet by mouth daily       saccharomyces boulardii (FLORASTOR) 250 MG capsule Take 1 capsule (250 mg) by mouth 2 times daily 14 capsule 0    sertraline (ZOLOFT) 100 MG tablet Take 1 tablet (100 mg) by mouth daily 90 tablet 3    UNABLE TO FIND MEDICATION NAME: Uqora complete regimen      ursodiol (ACTIGALL) 300 MG capsule TAKE 1 CAPSULE(300 MG) BY MOUTH TWICE DAILY 180 capsule 3    valsartan (DIOVAN) 40 MG tablet Take 1 tablet (40 mg) by mouth daily 90 tablet 3    vitamin D3 (CHOLECALCIFEROL) 2000 units (50 mcg) tablet Take 1 tablet (2,000 Units) by mouth daily 90 tablet 3    zinc gluconate 50 MG tablet       amoxicillin (AMOXIL) 500 MG capsule Take 2,000 mg by mouth once as needed Take as directed before dental work (Patient not taking: Reported on 8/3/2023)      mirabegron (MYRBETRIQ) 50 MG 24 hr tablet Take 1 tablet (50 mg) by mouth daily (Patient not taking: Reported on 2023) 90 tablet 1       Social History     Tobacco Use    Smoking status: Never     Passive exposure: Never    Smokeless tobacco: Never   Vaping Use    Vaping Use: Never used   Substance Use Topics    Alcohol use: No    Drug use: No       Invasive Procedure Safety Checklist:    Procedure: Urodynamics    Action: Complete sections and checkboxes as appropriate.    Pre-procedure:    1. Patient ID Verified with 2 identifiers (Brittani and  or MRN) :  YES    2. Procedure and site verified with patient/designee (when able) : YES    3. Accurate consent documentation in medical record : YES    4. H&P (or appropriate assessment) documented in medical record : N/A    H&P must be up to 30 days prior to procedure an updated within 24 hours of Procedure as applicable.     5. Relevant diagnostic and radiology test results appropriately labeled and displayed as applicable : YES    6. Blood products, implants, devices, and/or special equipment available for the procedure as applicable : YES    7. Procedure site(s) marked with provider initials [Exclusions: none] : NO    8. Marking not required. Reason : Yes  Procedure does not require site marking    Time Out:     Time-Out performed immediately prior to starting procedure, including verbal and active participation of all team members addressing: YES    1. Correct patient identity.  2. Confirmed that the correct side and site are marked.  3. An accurate procedure to be done.  4. Agreement on the procedure to be done.  5. Correct patient position.  6. Relevant images and results are properly labeled and appropriately displayed.  7. The need to administer antibiotics or fluids for irrigation purposes during the procedure as applicable.  8. Safety precautions based on patient history or medication use.    During Procedure: Verification of correct person, site, and procedure occurs any time the responsibility for care of the patient is transferred to another member of the care team.          The following medication was given:     MEDICATION:  Macrobid  ROUTE: PO  SITE: Medication was given orally  DOSE: 100 mg  LOT #: [id number]  : [employer]   EXPIRATION DATE: 08/2023  NDC#:  [id number]   Was there drug waste? No      The following medication was given:     MEDICATION:  Omnipaque (Iohexol Injection) (240mgI/mL)  ROUTE: Provider Administered  SITE: Provider Administered via catheter  DOSE: 100mL  LOT  #: [id number]  : [employer]  EXPIRATION DATE: 12/23/2025  NDC#: 6368-6438-47  Was there drug waste? No        Kathrin Ralph CMA  8/14/2023  3:03 PM

## 2023-08-14 NOTE — PROGRESS NOTES
Pt here for Lexiscan nuclear stress test. Medication and side effects reviewed with patient. Lung sounds clear to auscultation bilaterally. Patient used her albuterol inhaler just prior to stress test. Denied caffeine use. Patient tolerated Lexiscan dose without any adverse reactions. VSS. Monitored post injection and then taken to the gold waiting room and instructed to wait there for nuclear medicine tech for follow up imaging.

## 2023-08-16 NOTE — TELEPHONE ENCOUNTER
Patient calling for her LexiTest results. Please advise.  Stephy Freeman MA  [facility]   Primary Care

## 2023-08-20 RX ORDER — ISOSORBIDE MONONITRATE 60 MG/1
60 TABLET, EXTENDED RELEASE ORAL DAILY
Qty: 90 TABLET | Refills: 1 | Status: SHIPPED | OUTPATIENT
Start: 2023-08-20 | End: 2023-09-08

## 2023-08-21 ENCOUNTER — MYC MEDICAL ADVICE (OUTPATIENT)
Dept: FAMILY MEDICINE | Facility: CLINIC | Age: 73
End: 2023-08-21
Payer: MEDICARE

## 2023-08-25 ENCOUNTER — VIRTUAL VISIT (OUTPATIENT)
Dept: PSYCHOLOGY | Facility: CLINIC | Age: 73
End: 2023-08-25
Payer: MEDICARE

## 2023-08-25 DIAGNOSIS — F41.1 GENERALIZED ANXIETY DISORDER: ICD-10-CM

## 2023-08-25 DIAGNOSIS — F43.10 POSTTRAUMATIC STRESS DISORDER: ICD-10-CM

## 2023-08-25 DIAGNOSIS — F33.0 MAJOR DEPRESSIVE DISORDER, RECURRENT EPISODE, MILD (H): Primary | ICD-10-CM

## 2023-08-25 PROCEDURE — 90834 PSYTX W PT 45 MINUTES: CPT | Mod: VID | Performed by: SOCIAL WORKER

## 2023-08-25 ASSESSMENT — PATIENT HEALTH QUESTIONNAIRE - PHQ9
SUM OF ALL RESPONSES TO PHQ QUESTIONS 1-9: 4
SUM OF ALL RESPONSES TO PHQ QUESTIONS 1-9: 4
10. IF YOU CHECKED OFF ANY PROBLEMS, HOW DIFFICULT HAVE THESE PROBLEMS MADE IT FOR YOU TO DO YOUR WORK, TAKE CARE OF THINGS AT HOME, OR GET ALONG WITH OTHER PEOPLE: SOMEWHAT DIFFICULT
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
## 2023-08-28 ENCOUNTER — TELEPHONE (OUTPATIENT)
Dept: FAMILY MEDICINE | Facility: CLINIC | Age: 73
End: 2023-08-28
Payer: MEDICARE

## 2023-08-28 DIAGNOSIS — I25.119 CORONARY ARTERY DISEASE WITH ANGINA PECTORIS, UNSPECIFIED VESSEL OR LESION TYPE, UNSPECIFIED WHETHER NATIVE OR TRANSPLANTED HEART (H): ICD-10-CM

## 2023-08-28 NOTE — TELEPHONE ENCOUNTER
"Patient calling to discuss her ongoing symptoms. Last week her Imdur was increased.  She continues to have the lightheadedness/ weakness episodes and is having 3-5 of these daily. They last less than 5 mins each time. She denies chest pain and vision changes when these occur. Sometimes she has some shortness of breath with this. Admits she has had the lightheadedness/ SOB episodes \"for years\".    Last week on the 97 degree day she went to gas station and the heat got to her. She got very weak and lightheaded and had to sit down when she got into the gas station. She felt faint and took 1 SL NTG and eventually started to feel better. She said she has had a bunch of heart testing done and as told she has \"a ventricle septum\". Drinks 2-3 liters of water daily.     Continues to be frustrated with these episodes and became teary on the phone while describing these symptoms.   Patient is wondering what to do next.  Today she is completely asymptomatic.     Routing to provider to advise.  Darcy NINAN, RN      "

## 2023-08-28 NOTE — TELEPHONE ENCOUNTER
Notify patient to reduce Imdur from 60 mg/day to 30 mg/day (take 1/2 tab once a day) and hold Losartan.

## 2023-08-28 NOTE — TELEPHONE ENCOUNTER
Patient updated on the below, no questions at this time, verbalized understanding.    Virginia Kyle RN

## 2023-08-29 DIAGNOSIS — K80.20 CHOLELITHIASIS WITHOUT CHOLECYSTITIS: ICD-10-CM

## 2023-08-29 RX ORDER — URSODIOL 300 MG/1
CAPSULE ORAL
Qty: 180 CAPSULE | Refills: 3 | Status: SHIPPED | OUTPATIENT
Start: 2023-08-29 | End: 2024-04-02

## 2023-08-29 NOTE — PROGRESS NOTES
OP Physical Therapy Discharge Note      DISCHARGE  Reason for Discharge: Patient has failed to schedule further appointments.    Equipment Issued: none    Discharge Plan: Patient to continue home program.    Referring Provider:  Blu Lopez

## 2023-08-30 ENCOUNTER — OFFICE VISIT (OUTPATIENT)
Dept: SURGERY | Facility: CLINIC | Age: 73
End: 2023-08-30
Attending: INTERNAL MEDICINE
Payer: MEDICARE

## 2023-08-30 VITALS
BODY MASS INDEX: 31.07 KG/M2 | HEART RATE: 61 BPM | DIASTOLIC BLOOD PRESSURE: 76 MMHG | HEIGHT: 64 IN | SYSTOLIC BLOOD PRESSURE: 150 MMHG | WEIGHT: 182 LBS

## 2023-08-30 DIAGNOSIS — I25.728 CORONARY ARTERY DISEASE OF AUTOLOGOUS BYPASS GRAFT WITH STABLE ANGINA PECTORIS (H): ICD-10-CM

## 2023-08-30 DIAGNOSIS — R10.11 ABDOMINAL PAIN, RIGHT UPPER QUADRANT: ICD-10-CM

## 2023-08-30 DIAGNOSIS — F43.10 PTSD (POST-TRAUMATIC STRESS DISORDER): ICD-10-CM

## 2023-08-30 DIAGNOSIS — E78.5 HYPERLIPIDEMIA LDL GOAL <70: ICD-10-CM

## 2023-08-30 DIAGNOSIS — K82.8 BILIARY DYSKINESIA: ICD-10-CM

## 2023-08-30 DIAGNOSIS — I50.22 CHRONIC SYSTOLIC HEART FAILURE (H): ICD-10-CM

## 2023-08-30 DIAGNOSIS — K80.20 CALCULUS OF GALLBLADDER WITHOUT CHOLECYSTITIS WITHOUT OBSTRUCTION: Primary | ICD-10-CM

## 2023-08-30 PROCEDURE — 99204 OFFICE O/P NEW MOD 45 MIN: CPT | Performed by: SURGERY

## 2023-08-30 RX ORDER — SERTRALINE HYDROCHLORIDE 100 MG/1
100 TABLET, FILM COATED ORAL DAILY
Qty: 90 TABLET | Refills: 1 | Status: SHIPPED | OUTPATIENT
Start: 2023-08-30 | End: 2024-03-15

## 2023-08-30 RX ORDER — ATORVASTATIN CALCIUM 40 MG/1
40 TABLET, FILM COATED ORAL DAILY
Qty: 90 TABLET | Refills: 3 | Status: SHIPPED | OUTPATIENT
Start: 2023-08-30

## 2023-08-30 RX ORDER — CARVEDILOL 3.12 MG/1
TABLET ORAL
Qty: 180 TABLET | Refills: 1 | Status: SHIPPED | OUTPATIENT
Start: 2023-08-30 | End: 2024-03-01

## 2023-08-30 NOTE — PROGRESS NOTES
"Patient seen in consultation for gallbladder by Danny Conteh    HPI:  Patient is a 72 year old female  with complaints of RUQ abdominal pain  Notices if eats \"the wrong thing\" most recently was some ice cream. Previously has been meat, eggs  The patient noticed the symptoms for years.  Gets the pain maybe less than once a month.  Some association with diarrhea  Changing diet makes the episode better. Also has been using ursodiol which has seemed to help. Cholestyramine in the past as well  Patient has family history of gallbladder problems in various members    Review Of Systems    Skin: negative  Ears/Nose/Throat: negative  Respiratory: cheyne-rogers, central apnea, asthma uses CPAP  Cardiovascular: previous MI s/p stenting, normal EF. No arrhythmia  Gastrointestinal: as above  Genitourinary: CKD, had pyelonephritis and gets some backup and can feel on the right more than the left  Musculoskeletal: negative  Neurologic: syncope and lightheadedness  Hematologic/Lymphatic/Immunologic: negative  Endocrine: negative      Past Medical History:   Diagnosis Date    ADHD (attention deficit hyperactivity disorder)     Anxiety     Arthritis     back, hands, knees, hips    Asthma     C. difficile diarrhea     Central sleep apnea     Cheyne-Rogers breathing 09/07/2022    Coronary artery disease involving native coronary artery of native heart without angina pectoris     Depression     Fever and chills 09/24/2021    Gastro-oesophageal reflux disease     Hypertension     Ischemic cardiomyopathy     Major depressive disorder, recurrent episode, moderate (H) 07/25/2013    Narcolepsy     Pituitary microadenoma (H) 2011    MRI 2011- There is a triangular-shaped area with delayed contrast enhancement at the left lateral portion of the pituitary gland posteriorly, measuring 2.5 x 4.3 mm. This is suggestive of a microadenoma, MRI 10/1/22 - Normal pituitary gland and whole brain MRI.    Pyelonephritis of right kidney 10/24/2021 "    Renal disease     S/P hip replacement 2004    Bilateral fall 2004 due to OA    Sleep apnea     Status post total knee replacement 12/29/2014    Urinary tract infection with hematuria, site unspecified 09/24/2021       Past Surgical History:   Procedure Laterality Date    ARTHROPLASTY KNEE  07/09/2014    Procedure: ARTHROPLASTY KNEE;  Surgeon: Levi Johnson MD;  Location:  OR    ARTHROPLASTY KNEE Left 11/24/2014    Procedure: ARTHROPLASTY KNEE;  Surgeon: Levi Johnson MD;  Location:  OR    CV CORONARY ANGIOGRAM N/A 10/09/2019    Procedure: Coronary Angiogram;  Surgeon: Chiquita Chatterjee MD;  Location:  HEART CARDIAC CATH LAB    CV HEART CATHETERIZATION WITH POSSIBLE INTERVENTION N/A 10/10/2019    Procedure: Heart Catheterization with Possible Intervention;  Surgeon: Chin Garcia MD;  Location:  HEART CARDIAC CATH LAB    CV INTRA AORTIC BALLOON N/A 10/09/2019    Procedure: Intra-Aortic Balloon Pump Insertion;  Surgeon: Chiquita Chatterjee MD;  Location:  HEART CARDIAC CATH LAB    CV INTRA AORTIC BALLOON REMOVAL N/A 10/10/2019    Procedure: Intra-Aortic Balloon Pump Removal;  Surgeon: Cihn Garcia MD;  Location:  HEART CARDIAC CATH LAB    CV LEFT HEART CATH N/A 12/11/2020    Procedure: Heart Catheterization with Possible Intervention;  Surgeon: Pilo Otoole MD;  Location:  HEART CARDIAC CATH LAB    CV PCI STENT DRUG ELUTING N/A 10/09/2019    Procedure: PCI Stent Drug Eluting;  Surgeon: Chiquita Chatterjee MD;  Location:  HEART CARDIAC CATH LAB    GENITOURINARY SURGERY  01/01/2008    Prolift    GENITOURINARY SURGERY  1973    In 1973, 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    ORTHOPEDIC SURGERY      bilat total hip    RECTOCELE REPAIR      ZZC TOTAL ABD  HYSTERECTOMY+BLAD REPR  01/01/1992    Vaginal approach with oophrectomy    ZZC TOTAL KNEE ARTHROPLASTY         Social History     Socioeconomic History    Marital status:      Spouse name: Not on file    Number of children: Not on file    Years of education: Not on file    Highest education level: Not on file   Occupational History    Occupation: Retired    Tobacco Use    Smoking status: Never     Passive exposure: Never    Smokeless tobacco: Never   Vaping Use    Vaping Use: Never used   Substance and Sexual Activity    Alcohol use: No    Drug use: No    Sexual activity: Not Currently     Partners: Male     Birth control/protection: None   Other Topics Concern    Parent/sibling w/ CABG, MI or angioplasty before 65F 55M? No     Service No    Blood Transfusions No    Caffeine Concern No    Occupational Exposure No    Hobby Hazards No    Sleep Concern Yes     Comment: Uses CPAP    Stress Concern No    Weight Concern Yes    Special Diet No    Back Care No    Exercise No    Bike Helmet No     Comment:      Seat Belt Yes    Self-Exams Yes   Social History Narrative    Not on file     Social Determinants of Health     Financial Resource Strain: Not on file   Food Insecurity: Not on file   Transportation Needs: Not on file   Physical Activity: Not on file   Stress: Not on file   Social Connections: Not on file   Intimate Partner Violence: Not on file   Housing Stability: Not on file       Current Outpatient Medications   Medication Sig Dispense Refill    albuterol (PROAIR HFA/PROVENTIL HFA/VENTOLIN HFA) 108 (90 Base) MCG/ACT inhaler Inhale 1-2 puffs into the lungs every 4 hours as needed for shortness of breath or wheezing 18 g 11    amoxicillin (AMOXIL) 500 MG capsule Take 2,000 mg by mouth once as needed Take as directed before dental work (Patient not taking: Reported on 8/3/2023)      aspirin (ASA) 81 MG EC tablet Take 1 tablet (81 mg) by mouth daily 90 tablet 0    atorvastatin (LIPITOR) 40  MG tablet TAKE 1 TABLET(40 MG) BY MOUTH DAILY 90 tablet 3    carvedilol (COREG) 3.125 MG tablet TAKE 1 TABLET(3.125 MG) BY MOUTH TWICE DAILY WITH MEALS 180 tablet 3    cholestyramine (QUESTRAN) 4 g packet Take 1 packet (4 g) by mouth 2 times daily (with meals) 60 packet 5    diazepam (VALIUM) 2 MG tablet Take 0.5-1 tablets (1-2 mg) by mouth 2 times daily as needed for anxiety or muscle spasms 30 tablet 0    diclofenac (VOLTAREN) 1 % topical gel Apply topically 4 times daily For Jaw pain      estradiol (ESTRING) 2 MG vaginal ring Place 1 each vaginally every 3 months 1 each 3    fluticasone-vilanterol (BREO ELLIPTA) 200-25 MCG/ACT inhaler Inhale 1 puff into the lungs daily 3 each 3    isosorbide mononitrate (IMDUR) 60 MG 24 hr tablet Take 1 tablet (60 mg) by mouth daily 90 tablet 1    loperamide (IMODIUM) 2 MG capsule Take 1 capsule (2 mg) by mouth daily 30 capsule 0    loratadine (CLARITIN) 10 MG tablet Take 10 mg by mouth At Bedtime      magnesium oxide (MAG-OX) 400 MG tablet Take 400 mg by mouth daily      Melatonin 10 MG TABS tablet Take 10 mg by mouth nightly as needed for sleep      mirabegron (MYRBETRIQ) 50 MG 24 hr tablet Take 1 tablet (50 mg) by mouth daily (Patient not taking: Reported on 8/14/2023) 90 tablet 1    nitroGLYcerin (NITROSTAT) 0.4 MG sublingual tablet Place 1 tablet (0.4 mg) under the tongue every 5 minutes as needed for chest pain 25 tablet 11    pantoprazole (PROTONIX) 40 MG EC tablet TAKE 1 TABLET(40 MG) BY MOUTH DAILY 90 tablet 3    Prenatal Multivit-Min-Fe-FA (PRENATAL/IRON) TABS Take 1 tablet by mouth daily       saccharomyces boulardii (FLORASTOR) 250 MG capsule Take 1 capsule (250 mg) by mouth 2 times daily 14 capsule 0    sertraline (ZOLOFT) 100 MG tablet TAKE 1 TABLET(100 MG) BY MOUTH DAILY 90 tablet 1    UNABLE TO FIND MEDICATION NAME: Uqora complete regimen      ursodiol (ACTIGALL) 300 MG capsule TAKE 1 CAPSULE(300 MG) BY MOUTH TWICE DAILY 180 capsule 3    valsartan (DIOVAN) 40 MG  "tablet Take 1 tablet (40 mg) by mouth daily 90 tablet 3    vitamin D3 (CHOLECALCIFEROL) 2000 units (50 mcg) tablet Take 1 tablet (2,000 Units) by mouth daily 90 tablet 3    zinc gluconate 50 MG tablet          Medications and history reviewed    Physical exam:  Vitals: BP (!) 150/76   Pulse 61   Ht 1.626 m (5' 4\")   Wt 82.6 kg (182 lb)   LMP  (LMP Unknown)   BMI 31.24 kg/m    BMI= Body mass index is 31.24 kg/m .    Constitutional: healthy, alert, and no distress  Head: Normocephalic. No masses, lesions, tenderness or abnormalities  Gastrointestinal: Abdomen soft, non-tender. BS normal. No masses, organomegaly. Obese  : Deferred  Musculoskeletal: extremities normal- no gross deformities noted, gait normal, and normal muscle tone  Skin: no suspicious lesions or rashes  Psychiatric: mentation appears normal and affect normal/bright      Imaging shows:  Examination:  NM HEPATOBILIARY SCAN WITH GB EF       Date: 7/6/2023 11:25 AM.     Indication:   Calculus of gallbladder without cholecystitis without  obstruction      Additional Information: none     Technique:     The patient received 6 mCi of Tc-99m Choletec intravenously. Images  were obtained out through 60 minutes.   At 60 minutes the patient  drank boost with 28 g of fat. An additional 45 minutes of images were  obtained after the gall bladder contraction intervention.     Findings:     There is prompt clearance of the radionuclide from the blood pool into  the liver. By 10 minutes there is clear visualization of the  intrahepatic ducts as well as the upper common bile duct. By 15  minutes there is visualization of the gallbladder. At 60 minutes there  is emptying from the common bile duct into the small bowel.     After ingestion of high fat boost, ejection fraction was measured at  12%.  The normal gallbladder EF based on a 45 minute infusion is >40%.     Enterogastric reflux was  present.                                                                    "   Impression:     1. No scintigraphic evidence of acute cholecystitis.     2. Reduced ejection fraction of 12% consistent with biliary  hypokinesis.     3. Enterogastric reflux is present.     EXAM: US ABDOMEN LIMITED  LOCATION: [facility]  DATE: 6/15/2023     INDICATION: RUQ pain  COMPARISON: CT abdomen pelvis 06/15/2023.  TECHNIQUE: Limited abdominal ultrasound.     FINDINGS:     GALLBLADDER: Cholelithiasis. No gallbladder wall thickening or pericholecystic fluid.     BILE DUCTS: No biliary dilatation. The common duct measures 4 mm.     LIVER: Normal parenchyma with smooth contour. No focal mass.     RIGHT KIDNEY: No hydronephrosis.     PANCREAS: The visualized portions are normal.     No ascites.                                                                      IMPRESSION:  1.  Cholelithiasis. No specific ultrasound imaging features of acute cholecystitis. Clinical correlation recommended.  2.  Prominent extra renal pelvis versus mild hydronephrosis of the right kidney.      Assessment:     ICD-10-CM    1. Calculus of gallbladder without cholecystitis without obstruction  K80.20       2. Biliary dyskinesia  K82.8 Adult General Surg Referral      3. Abdominal pain, right upper quadrant  R10.11 Adult General Surg Referral        Plan: Discussed imaging findings and what to expect with surgery. Does have symptomatic cholelithiasis and indication for surgery.  Her heart and other medical issues are not severe enough that it would preclude the operation and I think she would still qualify for our ambulatory surgery center.  She would like to think more on if she wants to proceed and can let me know if so.  She has had previous work done at the [facility] or [facility] and may actually look into that location as another option.  Follow-up in clinic as needed.    Agus Segura MD

## 2023-08-30 NOTE — LETTER
"    8/30/2023         RE: Gem Gama  [address]  [facility] [address]        Dear Colleague,    Thank you for referring your patient, Gem Gama, to the [facility]. Please see a copy of my visit note below.    Patient seen in consultation for gallbladder by Danny Conteh    HPI:  Patient is a 72 year old female  with complaints of RUQ abdominal pain  Notices if eats \"the wrong thing\" most recently was some ice cream. Previously has been meat, eggs  The patient noticed the symptoms for years.  Gets the pain maybe less than once a month.  Some association with diarrhea  Changing diet makes the episode better. Also has been using ursodiol which has seemed to help. Cholestyramine in the past as well  Patient has family history of gallbladder problems in various members    Review Of Systems    Skin: negative  Ears/Nose/Throat: negative  Respiratory: cheyne-rogers, central apnea, asthma uses CPAP  Cardiovascular: previous MI s/p stenting, normal EF. No arrhythmia  Gastrointestinal: as above  Genitourinary: CKD, had pyelonephritis and gets some backup and can feel on the right more than the left  Musculoskeletal: negative  Neurologic: syncope and lightheadedness  Hematologic/Lymphatic/Immunologic: negative  Endocrine: negative      Past Medical History:   Diagnosis Date     ADHD (attention deficit hyperactivity disorder)      Anxiety      Arthritis     back, hands, knees, hips     Asthma      C. difficile diarrhea      Central sleep apnea      Cheyne-Rogers breathing 09/07/2022     Coronary artery disease involving native coronary artery of native heart without angina pectoris      Depression      Fever and chills 09/24/2021     Gastro-oesophageal reflux disease      Hypertension      Ischemic cardiomyopathy      Major depressive disorder, recurrent episode, moderate (H) 07/25/2013     Narcolepsy      Pituitary microadenoma (H) 2011    MRI 2011- There is a " triangular-shaped area with delayed contrast enhancement at the left lateral portion of the pituitary gland posteriorly, measuring 2.5 x 4.3 mm. This is suggestive of a microadenoma, MRI 10/1/22 - Normal pituitary gland and whole brain MRI.     Pyelonephritis of right kidney 10/24/2021     Renal disease      S/P hip replacement 2004    Bilateral fall 2004 due to OA     Sleep apnea      Status post total knee replacement 12/29/2014     Urinary tract infection with hematuria, site unspecified 09/24/2021       Past Surgical History:   Procedure Laterality Date     ARTHROPLASTY KNEE  07/09/2014    Procedure: ARTHROPLASTY KNEE;  Surgeon: Levi Johnson MD;  Location:  OR     ARTHROPLASTY KNEE Left 11/24/2014    Procedure: ARTHROPLASTY KNEE;  Surgeon: Levi Johnson MD;  Location:  OR     CV CORONARY ANGIOGRAM N/A 10/09/2019    Procedure: Coronary Angiogram;  Surgeon: Chiquita Chatterjee MD;  Location:  HEART CARDIAC CATH LAB     CV HEART CATHETERIZATION WITH POSSIBLE INTERVENTION N/A 10/10/2019    Procedure: Heart Catheterization with Possible Intervention;  Surgeon: Chin Garcia MD;  Location:  HEART CARDIAC CATH LAB     CV INTRA AORTIC BALLOON N/A 10/09/2019    Procedure: Intra-Aortic Balloon Pump Insertion;  Surgeon: Chiquita Chatterjee MD;  Location:  HEART CARDIAC CATH LAB     CV INTRA AORTIC BALLOON REMOVAL N/A 10/10/2019    Procedure: Intra-Aortic Balloon Pump Removal;  Surgeon: Chin Garcia MD;  Location:  HEART CARDIAC CATH LAB     CV LEFT HEART CATH N/A 12/11/2020    Procedure: Heart Catheterization with Possible Intervention;  Surgeon: Pilo Otoole MD;  Location:  HEART CARDIAC CATH LAB     CV PCI STENT DRUG ELUTING N/A 10/09/2019    Procedure: PCI Stent Drug Eluting;  Surgeon: Chiquita Chatterjee MD;  Location:  HEART CARDIAC CATH LAB     GENITOURINARY SURGERY  01/01/2008    Prolift     GENITOURINARY SURGERY  1973    In 1973, she had surgery to correct  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     ORTHOPEDIC SURGERY      bilat total hip     RECTOCELE REPAIR       ZZC TOTAL ABD HYSTERECTOMY+BLAD REPR  01/01/1992    Vaginal approach with oophrectomy     ZZC TOTAL KNEE ARTHROPLASTY         Social History     Socioeconomic History     Marital status:      Spouse name: Not on file     Number of children: Not on file     Years of education: Not on file     Highest education level: Not on file   Occupational History     Occupation: Retired    Tobacco Use     Smoking status: Never     Passive exposure: Never     Smokeless tobacco: Never   Vaping Use     Vaping Use: Never used   Substance and Sexual Activity     Alcohol use: No     Drug use: No     Sexual activity: Not Currently     Partners: Male     Birth control/protection: None   Other Topics Concern     Parent/sibling w/ CABG, MI or angioplasty before 65F 55M? No      Service No     Blood Transfusions No     Caffeine Concern No     Occupational Exposure No     Hobby Hazards No     Sleep Concern Yes     Comment: Uses CPAP     Stress Concern No     Weight Concern Yes     Special Diet No     Back Care No     Exercise No     Bike Helmet No     Comment:       Seat Belt Yes     Self-Exams Yes   Social History Narrative     Not on file     Social Determinants of Health     Financial Resource Strain: Not on file   Food Insecurity: Not on file   Transportation Needs: Not on file   Physical Activity: Not on file   Stress: Not on file   Social Connections: Not on file   Intimate Partner Violence: Not on file   Housing Stability: Not on file       Current Outpatient Medications   Medication Sig Dispense Refill     albuterol (PROAIR HFA/PROVENTIL HFA/VENTOLIN HFA) 108 (90 Base) MCG/ACT inhaler Inhale 1-2 puffs into the  lungs every 4 hours as needed for shortness of breath or wheezing 18 g 11     amoxicillin (AMOXIL) 500 MG capsule Take 2,000 mg by mouth once as needed Take as directed before dental work (Patient not taking: Reported on 8/3/2023)       aspirin (ASA) 81 MG EC tablet Take 1 tablet (81 mg) by mouth daily 90 tablet 0     atorvastatin (LIPITOR) 40 MG tablet TAKE 1 TABLET(40 MG) BY MOUTH DAILY 90 tablet 3     carvedilol (COREG) 3.125 MG tablet TAKE 1 TABLET(3.125 MG) BY MOUTH TWICE DAILY WITH MEALS 180 tablet 3     cholestyramine (QUESTRAN) 4 g packet Take 1 packet (4 g) by mouth 2 times daily (with meals) 60 packet 5     diazepam (VALIUM) 2 MG tablet Take 0.5-1 tablets (1-2 mg) by mouth 2 times daily as needed for anxiety or muscle spasms 30 tablet 0     diclofenac (VOLTAREN) 1 % topical gel Apply topically 4 times daily For Jaw pain       estradiol (ESTRING) 2 MG vaginal ring Place 1 each vaginally every 3 months 1 each 3     fluticasone-vilanterol (BREO ELLIPTA) 200-25 MCG/ACT inhaler Inhale 1 puff into the lungs daily 3 each 3     isosorbide mononitrate (IMDUR) 60 MG 24 hr tablet Take 1 tablet (60 mg) by mouth daily 90 tablet 1     loperamide (IMODIUM) 2 MG capsule Take 1 capsule (2 mg) by mouth daily 30 capsule 0     loratadine (CLARITIN) 10 MG tablet Take 10 mg by mouth At Bedtime       magnesium oxide (MAG-OX) 400 MG tablet Take 400 mg by mouth daily       Melatonin 10 MG TABS tablet Take 10 mg by mouth nightly as needed for sleep       mirabegron (MYRBETRIQ) 50 MG 24 hr tablet Take 1 tablet (50 mg) by mouth daily (Patient not taking: Reported on 8/14/2023) 90 tablet 1     nitroGLYcerin (NITROSTAT) 0.4 MG sublingual tablet Place 1 tablet (0.4 mg) under the tongue every 5 minutes as needed for chest pain 25 tablet 11     pantoprazole (PROTONIX) 40 MG EC tablet TAKE 1 TABLET(40 MG) BY MOUTH DAILY 90 tablet 3     Prenatal Multivit-Min-Fe-FA (PRENATAL/IRON) TABS Take 1 tablet by mouth daily        saccharomyces  "boulardii (FLORASTOR) 250 MG capsule Take 1 capsule (250 mg) by mouth 2 times daily 14 capsule 0     sertraline (ZOLOFT) 100 MG tablet TAKE 1 TABLET(100 MG) BY MOUTH DAILY 90 tablet 1     UNABLE TO FIND MEDICATION NAME: [place] complete regimen       ursodiol (ACTIGALL) 300 MG capsule TAKE 1 CAPSULE(300 MG) BY MOUTH TWICE DAILY 180 capsule 3     valsartan (DIOVAN) 40 MG tablet Take 1 tablet (40 mg) by mouth daily 90 tablet 3     vitamin D3 (CHOLECALCIFEROL) 2000 units (50 mcg) tablet Take 1 tablet (2,000 Units) by mouth daily 90 tablet 3     zinc gluconate 50 MG tablet          Medications and history reviewed    Physical exam:  Vitals: BP (!) 150/76   Pulse 61   Ht 1.626 m (5' 4\")   Wt 82.6 kg (182 lb)   LMP  (LMP Unknown)   BMI 31.24 kg/m    BMI= Body mass index is 31.24 kg/m .    Constitutional: healthy, alert, and no distress  Head: Normocephalic. No masses, lesions, tenderness or abnormalities  Gastrointestinal: Abdomen soft, non-tender. BS normal. No masses, organomegaly. Obese  : Deferred  Musculoskeletal: extremities normal- no gross deformities noted, gait normal, and normal muscle tone  Skin: no suspicious lesions or rashes  Psychiatric: mentation appears normal and affect normal/bright      Imaging shows:  Examination:  NM HEPATOBILIARY SCAN WITH GB EF       Date: 7/6/2023 11:25 AM.     Indication:   Calculus of gallbladder without cholecystitis without  obstruction      Additional Information: none     Technique:     The patient received 6 mCi of Tc-99m Choletec intravenously. Images  were obtained out through 60 minutes.   At 60 minutes the patient  drank boost with 28 g of fat. An additional 45 minutes of images were  obtained after the gall bladder contraction intervention.     Findings:     There is prompt clearance of the radionuclide from the blood pool into  the liver. By 10 minutes there is clear visualization of the  intrahepatic ducts as well as the upper common bile duct. By 15  minutes " there is visualization of the gallbladder. At 60 minutes there  is emptying from the common bile duct into the small bowel.     After ingestion of high fat boost, ejection fraction was measured at  12%.  The normal gallbladder EF based on a 45 minute infusion is >40%.     Enterogastric reflux was  present.                                                                      Impression:     1. No scintigraphic evidence of acute cholecystitis.     2. Reduced ejection fraction of 12% consistent with biliary  hypokinesis.     3. Enterogastric reflux is present.     EXAM: US ABDOMEN LIMITED  LOCATION: [facility]  DATE: 6/15/2023     INDICATION: RUQ pain  COMPARISON: CT abdomen pelvis 06/15/2023.  TECHNIQUE: Limited abdominal ultrasound.     FINDINGS:     GALLBLADDER: Cholelithiasis. No gallbladder wall thickening or pericholecystic fluid.     BILE DUCTS: No biliary dilatation. The common duct measures 4 mm.     LIVER: Normal parenchyma with smooth contour. No focal mass.     RIGHT KIDNEY: No hydronephrosis.     PANCREAS: The visualized portions are normal.     No ascites.                                                                      IMPRESSION:  1.  Cholelithiasis. No specific ultrasound imaging features of acute cholecystitis. Clinical correlation recommended.  2.  Prominent extra renal pelvis versus mild hydronephrosis of the right kidney.      Assessment:     ICD-10-CM    1. Calculus of gallbladder without cholecystitis without obstruction  K80.20       2. Biliary dyskinesia  K82.8 Adult General Surg Referral      3. Abdominal pain, right upper quadrant  R10.11 Adult General Surg Referral        Plan: Discussed imaging findings and what to expect with surgery. Does have symptomatic cholelithiasis and indication for surgery.  Her heart and other medical issues are not severe enough that it would preclude the operation and I think she would still qualify for our ambulatory surgery  [place].  She would like to think more on if she wants to proceed and can let me know if so.  She has had previous work done at the [place] or [facility] and may actually look into that location as another option.  Follow-up in clinic as needed.    Agus Segura MD      Again, thank you for allowing me to participate in the care of your patient.        Sincerely,        Agus Segura MD

## 2023-08-31 ENCOUNTER — OFFICE VISIT (OUTPATIENT)
Dept: UROLOGY | Facility: CLINIC | Age: 73
End: 2023-08-31
Payer: MEDICARE

## 2023-08-31 ENCOUNTER — MEDICAL CORRESPONDENCE (OUTPATIENT)
Dept: HEALTH INFORMATION MANAGEMENT | Facility: CLINIC | Age: 73
End: 2023-08-31

## 2023-08-31 VITALS
BODY MASS INDEX: 30.73 KG/M2 | HEART RATE: 75 BPM | HEIGHT: 64 IN | SYSTOLIC BLOOD PRESSURE: 127 MMHG | DIASTOLIC BLOOD PRESSURE: 65 MMHG | WEIGHT: 180 LBS

## 2023-08-31 DIAGNOSIS — N39.8 VOIDING DYSFUNCTION: ICD-10-CM

## 2023-08-31 DIAGNOSIS — Z87.448 HISTORY OF PYELONEPHRITIS: Primary | ICD-10-CM

## 2023-08-31 DIAGNOSIS — K80.20 CALCULUS OF GALLBLADDER WITHOUT CHOLECYSTITIS WITHOUT OBSTRUCTION: ICD-10-CM

## 2023-08-31 DIAGNOSIS — N39.41 URGE INCONTINENCE: ICD-10-CM

## 2023-08-31 DIAGNOSIS — Z87.440 HISTORY OF UTI: ICD-10-CM

## 2023-08-31 DIAGNOSIS — N95.2 VAGINAL ATROPHY: ICD-10-CM

## 2023-08-31 PROCEDURE — 99214 OFFICE O/P EST MOD 30 MIN: CPT | Performed by: UROLOGY

## 2023-08-31 ASSESSMENT — PAIN SCALES - GENERAL: PAINLEVEL: NO PAIN (0)

## 2023-08-31 ASSESSMENT — PATIENT HEALTH QUESTIONNAIRE - PHQ9
10. IF YOU CHECKED OFF ANY PROBLEMS, HOW DIFFICULT HAVE THESE PROBLEMS MADE IT FOR YOU TO DO YOUR WORK, TAKE CARE OF THINGS AT HOME, OR GET ALONG WITH OTHER PEOPLE: SOMEWHAT DIFFICULT
SUM OF ALL RESPONSES TO PHQ QUESTIONS 1-9: 6
SUM OF ALL RESPONSES TO PHQ QUESTIONS 1-9: 6

## 2023-08-31 NOTE — NURSING NOTE
"Chief Complaint   Patient presents with    Follow Up       Blood pressure 127/65, pulse 75, height 1.626 m (5' 4\"), weight 81.6 kg (180 lb), not currently breastfeeding. Body mass index is 30.9 kg/m .    Patient Active Problem List   Diagnosis    TMJ (temporomandibular joint syndrome)    Congenital ureteric stenosis    Lacrimal duct stenosis    Chronic rhinitis    Intermittent asthma    Advanced directives, counseling/discussion    Major depressive disorder, recurrent episode, moderate (H)    Osteoarthritis of right knee    Esophageal reflux (GERD)    Primary narcolepsy without cataplexy    Vitamin D deficiency    Stage 3a chronic kidney disease (H)    Incontinence of feces with fecal urgency    Overactive bladder    Fatigue, unspecified type    History of ischemic cardiomyopathy    Hyperlipidemia LDL goal <70    Calculus of gallbladder without cholecystitis without obstruction    Coronary artery disease with angina pectoris, unspecified vessel or lesion type, unspecified whether native or transplanted heart (H)    Environmental allergies    Class 1 obesity due to excess calories with serious comorbidity and body mass index (BMI) of 30.0 to 30.9 in adult    Chronic insomnia    Hiatal hernia    Cheyne-Rogers breathing disorder    Clostridium difficile infection    Pyelonephritis    Sepsis, due to unspecified organism, unspecified whether acute organ dysfunction present (H)    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    Possible PLMD (periodic limb movement disorder)    Adjustment disorder with anxiety    Major depressive disorder, recurrent episode, mild (H)    Generalized anxiety disorder    Dilated cardiomyopathy (H)    Idiopathic interstitial fibrosis (H)    Back muscle spasm    Benign paroxysmal positional vertigo, unspecified laterality    Closed compression fracture of L3 lumbar vertebra, sequela    Bilateral hydronephrosis    Acute bilateral low back pain without sciatica    Posttraumatic stress disorder       Allergies "   Allergen Reactions    Dust Mites Cough, Headache, Other (See Comments) and Shortness Of Breath    Latex Other (See Comments) and Shortness Of Breath     Runny nose  PN: LW Reaction: DIFFICULTY BREATHING      Sulfa Antibiotics Anaphylaxis, Hives and Itching     PN: LW Reaction: HIVES, Swelling  PN: LW Reaction: HIVES, Swelling    Flagyl [Metronidazole Hcl] Anaphylaxis and Rash    Food      PN: LW FI1: nka LW FI2:    Hydrochlorothiazide W-Triamterene      PN: LW Reaction: skin rash  PN: LW Reaction: skin rash    No Clinical Screening - See Comments      PN: LW Other1: -Adhesive Tape    Seasonal Allergies      sneezing    Tape [Adhesive Tape] Hives    Metoprolol Itching and Rash    Metronidazole Hives and Rash     PN: LW Reaction: HIVES         Current Outpatient Medications   Medication Sig Dispense Refill    albuterol (PROAIR HFA/PROVENTIL HFA/VENTOLIN HFA) 108 (90 Base) MCG/ACT inhaler Inhale 1-2 puffs into the lungs every 4 hours as needed for shortness of breath or wheezing 18 g 11    amoxicillin (AMOXIL) 500 MG capsule Take 2,000 mg by mouth once as needed Take as directed before dental work (Patient not taking: Reported on 8/3/2023)      aspirin (ASA) 81 MG EC tablet Take 1 tablet (81 mg) by mouth daily 90 tablet 0    atorvastatin (LIPITOR) 40 MG tablet TAKE 1 TABLET(40 MG) BY MOUTH DAILY 90 tablet 3    carvedilol (COREG) 3.125 MG tablet TAKE 1 TABLET(3.125 MG) BY MOUTH TWICE DAILY WITH MEALS 180 tablet 1    cholestyramine (QUESTRAN) 4 g packet Take 1 packet (4 g) by mouth 2 times daily (with meals) 60 packet 5    diazepam (VALIUM) 2 MG tablet Take 0.5-1 tablets (1-2 mg) by mouth 2 times daily as needed for anxiety or muscle spasms 30 tablet 0    diclofenac (VOLTAREN) 1 % topical gel Apply topically 4 times daily For Jaw pain      estradiol (ESTRING) 2 MG vaginal ring Place 1 each vaginally every 3 months 1 each 3    fluticasone-vilanterol (BREO ELLIPTA) 200-25 MCG/ACT inhaler Inhale 1 puff into the lungs  daily 3 each 3    isosorbide mononitrate (IMDUR) 60 MG 24 hr tablet Take 1 tablet (60 mg) by mouth daily 90 tablet 1    loperamide (IMODIUM) 2 MG capsule Take 1 capsule (2 mg) by mouth daily 30 capsule 0    loratadine (CLARITIN) 10 MG tablet Take 10 mg by mouth At Bedtime      magnesium oxide (MAG-OX) 400 MG tablet Take 400 mg by mouth daily      Melatonin 10 MG TABS tablet Take 10 mg by mouth nightly as needed for sleep      mirabegron (MYRBETRIQ) 50 MG 24 hr tablet Take 1 tablet (50 mg) by mouth daily (Patient not taking: Reported on 8/14/2023) 90 tablet 1    nitroGLYcerin (NITROSTAT) 0.4 MG sublingual tablet Place 1 tablet (0.4 mg) under the tongue every 5 minutes as needed for chest pain 25 tablet 11    pantoprazole (PROTONIX) 40 MG EC tablet TAKE 1 TABLET(40 MG) BY MOUTH DAILY 90 tablet 3    Prenatal Multivit-Min-Fe-FA (PRENATAL/IRON) TABS Take 1 tablet by mouth daily       saccharomyces boulardii (FLORASTOR) 250 MG capsule Take 1 capsule (250 mg) by mouth 2 times daily 14 capsule 0    sertraline (ZOLOFT) 100 MG tablet TAKE 1 TABLET(100 MG) BY MOUTH DAILY 90 tablet 1    UNABLE TO FIND MEDICATION NAME: [place] complete regimen      ursodiol (ACTIGALL) 300 MG capsule TAKE 1 CAPSULE(300 MG) BY MOUTH TWICE DAILY 180 capsule 3    valsartan (DIOVAN) 40 MG tablet Take 1 tablet (40 mg) by mouth daily 90 tablet 3    vitamin D3 (CHOLECALCIFEROL) 2000 units (50 mcg) tablet Take 1 tablet (2,000 Units) by mouth daily 90 tablet 3    zinc gluconate 50 MG tablet          Social History     Tobacco Use    Smoking status: Never     Passive exposure: Never    Smokeless tobacco: Never   Vaping Use    Vaping Use: Never used   Substance Use Topics    Alcohol use: No    Drug use: No       Ayana Ratliff  8/31/2023  12:47 PM

## 2023-08-31 NOTE — PROGRESS NOTES
August 31, 2023    Gem was seen today for follow up.    Diagnoses and all orders for this visit:    History of pyelonephritis  -     Physical Therapy Referral; Future    Vaginal atrophy  -     Physical Therapy Referral; Future    History of UTI  -     Physical Therapy Referral; Future    Voiding dysfunction  -     Physical Therapy Referral; Future    Urge incontinence  -     Physical Therapy Referral; Future    Calculus of gallbladder without cholecystitis without obstruction  -     Adult General Surg Referral; Future    Referral to general surgery closer to here per patient's request    Continue estring for atrophy and UTI    Voiding dysfunction, recommend PT and discussed how this works, she is agreeable.  If not better can consider botox given unable to take the medications    RTC 6 months, sooner if needed    Claribel Espino, MS4    Attestation:  I agree with the PFSH and ROS as completed by the MS, except for changes made as needed.  The remainder of the encounter was performed by me and scribed by the MS.  The scribed note accurately reflects my personal services and the decisions made by me.    30 minutes were spent today on the day of the encounter in reviewing the EMR including UDS interpretation, direct patient care including prescription managment, coordination of care and documentation    Mel Rodriguez MD MPH  (she/her/hers)   of Urology  [facility]      Subjective    Patient continues to experience urgency incontinence. She is unable to urinate on command, but is able to urinate when she feels the sense of urgency. Patient typically urinates every 2 - 3 hours. Patient denies feelings of incomplete emptying. Her symptoms have been unchanged since her last visit. She has been having intermittent bilateral flank pain and was seen in the ED 6/15/2023. The patient had a recent CT scan showing bilateral mild to moderate hydronephrosis. She underwent UDS just prior  "to this visit and is here to discuss the results.    Patient currently uses Estring and changes every 3 months.     Patient has taken Myrbetriq in the past, but stopped taking this because it was interfering with one of her heart medications.    /65   Pulse 75   Ht 1.626 m (5' 4\")   Wt 81.6 kg (180 lb)   LMP  (LMP Unknown)   BMI 30.90 kg/m    GENERAL: healthy, alert and no distress  EYES: Eyes grossly normal to inspection, conjunctivae and sclerae normal  HENT: normal cephalic/atraumatic.  External ears, nose and mouth without ulcers or lesions.  RESP: no audible wheeze, cough, or visible cyanosis.  No visible retractions or increased work of breathing.  Able to speak fully in complete sentences.  NEURO: Cranial nerves grossly intact, mentation intact and speech normal  PSYCH: mentation appears normal, affect normal/bright, judgement and insight intact, normal speech and appearance well-groomed    Voiding diary 1.5 L soda (0.5 L of mountain dew in the AM), 1.5 L water, varied voids from 150-600mL    UDS from 8/14/23 reviewed.  On my interpretation shows a normal capacity of 629 mL with multiple episodes of DO/DOI.  No USI.  She is able to empty with some valsalva to augment    CC  Patient Care Team:  Danny Conteh MD as PCP - General (Internal Medicine)  Danny Conteh MD as Assigned PCP  Luis A Moody MD as Assigned Pulmonology Provider  Mel Dennison MD as Assigned Heart and Vascular Provider  Evangelist Lee MD as Hospitalist (Internal Medicine)  Mel Rodriguez MD as MD (Urology)  Mel Rodriguez MD as Assigned Surgical Provider  Kashif Hadley MD as Assigned Sleep Provider  Blu Lopez PA-C as Assigned Musculoskeletal Provider  Agus Segura MD as MD (Surgery)  Kristie Bell PA-C as Assigned Neuroscience Provider  Mickey Gallego MD as MD (Surgery)        "

## 2023-08-31 NOTE — PATIENT INSTRUCTIONS
Websites with free information:    [employer] patient website: www.voicesforpfd.org    Total Control Program: www.totalcontrolprogram.com    Please see one of the dedicated pelvic floor physical therapist. If you have not heard from the scheduling office within 2 business days, please call [phone] for [place]    Please return to see me in 6 months, sooner if needed    It was a pleasure meeting with you today.  Thank you for allowing me and my team the privilege of caring for you today.  YOU are the reason we are here, and I truly hope we provided you with the excellent service you deserve.  Please let us know if there is anything else we can do for you so that we can be sure you are leaving completely satisfied with your care experience.

## 2023-09-01 ENCOUNTER — VIRTUAL VISIT (OUTPATIENT)
Dept: PSYCHOLOGY | Facility: CLINIC | Age: 73
End: 2023-09-01
Payer: MEDICARE

## 2023-09-01 DIAGNOSIS — F33.0 MAJOR DEPRESSIVE DISORDER, RECURRENT EPISODE, MILD (H): Primary | ICD-10-CM

## 2023-09-01 DIAGNOSIS — F43.10 POSTTRAUMATIC STRESS DISORDER: ICD-10-CM

## 2023-09-01 DIAGNOSIS — F41.1 GENERALIZED ANXIETY DISORDER: ICD-10-CM

## 2023-09-01 PROCEDURE — 90834 PSYTX W PT 45 MINUTES: CPT | Mod: VID | Performed by: SOCIAL WORKER

## 2023-09-01 NOTE — TELEPHONE ENCOUNTER
REFERRAL INFORMATION:  Referring Provider:  Dr. Mel Rodriguez  Referring Clinic:  [facility]   Reason for Visit/Diagnosis: Calculus of gallbladder       FUTURE VISIT INFORMATION:  Appointment Date: 09-11-23  Appointment Time: @ 10:30am      NOTES RECORD STATUS  DETAILS   OFFICE NOTE from Referring Provider Internal 08-31-23 Dr. Mel Rodriguez   OFFICE NOTE from Other Specialists Internal 08-30-23 Dr. Agus Segura  06-19-23 Dr. Danny Balbuena [facility]  06-02-22  [facility] DISCHARGE SUMMARY/ ED VISITS  Internal 06-14-23 Dr. Brennen Motta   09-26-21 Dr. Tiara Leonard   09-23-21 Dr. Veronika Ramirez   12-12-20 Dr. Lorrie Light   OPERATIVE REPORT NO    ENDOSCOPY (EGD)  NO    PERTINENT LABS Internal CMP: 08-05-23, 07-22-23, 06-19-23, 06-14-23, 11-02-22  CBC/diff: 08-05-23, 07-22-23, 07-15-23, 06-14-23, 02-14-23  BMP: 07-15-23, 02-14-23, 11-28-22   PATHOLOGY REPORTS (RELATED) NO    IMAGING (CT, MRI, US, XR)  Internal US abd: 06-15-23, 02-23-22, 03-04-20  CT abd/pelvis: 06-15-23, 11-02-22
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
## 2023-09-08 ENCOUNTER — VIRTUAL VISIT (OUTPATIENT)
Dept: PSYCHOLOGY | Facility: CLINIC | Age: 73
End: 2023-09-08
Payer: MEDICARE

## 2023-09-08 DIAGNOSIS — F43.10 POSTTRAUMATIC STRESS DISORDER: ICD-10-CM

## 2023-09-08 DIAGNOSIS — F33.0 MAJOR DEPRESSIVE DISORDER, RECURRENT EPISODE, MILD (H): Primary | ICD-10-CM

## 2023-09-08 PROCEDURE — 90834 PSYTX W PT 45 MINUTES: CPT | Mod: VID | Performed by: SOCIAL WORKER

## 2023-09-08 RX ORDER — ISOSORBIDE MONONITRATE 30 MG/1
15 TABLET, EXTENDED RELEASE ORAL DAILY
Qty: 45 TABLET | Refills: 3 | Status: SHIPPED | OUTPATIENT
Start: 2023-09-08 | End: 2023-09-28

## 2023-09-08 ASSESSMENT — PATIENT HEALTH QUESTIONNAIRE - PHQ9
10. IF YOU CHECKED OFF ANY PROBLEMS, HOW DIFFICULT HAVE THESE PROBLEMS MADE IT FOR YOU TO DO YOUR WORK, TAKE CARE OF THINGS AT HOME, OR GET ALONG WITH OTHER PEOPLE: SOMEWHAT DIFFICULT
SUM OF ALL RESPONSES TO PHQ QUESTIONS 1-9: 5
SUM OF ALL RESPONSES TO PHQ QUESTIONS 1-9: 5

## 2023-09-08 NOTE — TELEPHONE ENCOUNTER
Patient calling back wanting to update provider of current symptoms. Reports that she reduced her Imdur dose to 30 mg daily and stopped taking Losartan. Reports that her lightheaded episodes have decreased to 1-2 times per day only lasting less than 5 minutes. Reports that she will start feeling lightheaded, will need to sit down, and then she will eventually feel better. Denies any chest pain and vision changes during these episodes.    Reports that her blood pressures have been running mostly above 130/80. Highest BP noted was 164/85 and lowest was 116/69. Pulse has been running between 55-72. Mostly running between 60-70. Patient concerned about elevated blood pressure given that both of her parents dying from a stroke.     Denies any chest pain. Reports that she does feel short of breath during some of these episodes. Reports feeling short of breath about once per day, however, this has been ongoing. Reports feeling nauseous randomly, however, reports that this has also been going ongoing for a while.     Reports that she has not notified her cardiologist of these episodes. Is wanting to find a new cardiologist.     Routing message to provider to advise on medication regimen given lightheaded episodes & blood pressure readings. Patient also wondering if provider has any recommendations for a new cardiologist.    Ayana Chamberlain, ARELIS, RN   [facility]
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
## 2023-09-08 NOTE — TELEPHONE ENCOUNTER
Reduce Imdur to 15 mg/day.    Rx for Imdur 30 mg tabs sent, with instructions to take 1/2 tab once daily.     There is no Imdur 15 mg tabs.    Will call patient about her query regarding a new cardiologist.

## 2023-09-11 ENCOUNTER — PRE VISIT (OUTPATIENT)
Dept: SURGERY | Facility: CLINIC | Age: 73
End: 2023-09-11

## 2023-09-11 ENCOUNTER — TELEPHONE (OUTPATIENT)
Dept: CARDIOLOGY | Facility: CLINIC | Age: 73
End: 2023-09-11

## 2023-09-11 ENCOUNTER — OFFICE VISIT (OUTPATIENT)
Dept: SURGERY | Facility: CLINIC | Age: 73
End: 2023-09-11
Attending: UROLOGY
Payer: MEDICARE

## 2023-09-11 VITALS
SYSTOLIC BLOOD PRESSURE: 159 MMHG | HEIGHT: 64 IN | BODY MASS INDEX: 30.58 KG/M2 | HEART RATE: 65 BPM | WEIGHT: 179.1 LBS | OXYGEN SATURATION: 98 % | DIASTOLIC BLOOD PRESSURE: 77 MMHG

## 2023-09-11 DIAGNOSIS — K80.20 CALCULUS OF GALLBLADDER WITHOUT CHOLECYSTITIS WITHOUT OBSTRUCTION: ICD-10-CM

## 2023-09-11 PROCEDURE — 99214 OFFICE O/P EST MOD 30 MIN: CPT | Performed by: SURGERY

## 2023-09-11 ASSESSMENT — ENCOUNTER SYMPTOMS
SPUTUM PRODUCTION: 0
HALLUCINATIONS: 0
JAUNDICE: 0
HEMATURIA: 0
SEIZURES: 0
DYSURIA: 0
LOSS OF CONSCIOUSNESS: 0
CONSTIPATION: 1
POLYPHAGIA: 0
SINUS PAIN: 0
DOUBLE VISION: 0
SNORES LOUDLY: 0
DECREASED APPETITE: 0
SLEEP DISTURBANCES DUE TO BREATHING: 0
RECTAL PAIN: 0
EXERCISE INTOLERANCE: 1
NERVOUS/ANXIOUS: 0
COUGH DISTURBING SLEEP: 0
EYE REDNESS: 0
WEIGHT GAIN: 0
SHORTNESS OF BREATH: 1
COUGH: 1
SPEECH CHANGE: 0
BLOOD IN STOOL: 0
TASTE DISTURBANCE: 0
DEPRESSION: 1
HOARSE VOICE: 0
CHILLS: 0
HEMOPTYSIS: 0
EYE WATERING: 0
EYE PAIN: 0
INCREASED ENERGY: 0
DIARRHEA: 1
WEAKNESS: 0
ABDOMINAL PAIN: 0
DIZZINESS: 1
PALPITATIONS: 1
ALTERED TEMPERATURE REGULATION: 0
SMELL DISTURBANCE: 0
WHEEZING: 0
LEG PAIN: 0
HEARTBURN: 0
WEIGHT LOSS: 0
NUMBNESS: 0
INSOMNIA: 0
FATIGUE: 1
PARALYSIS: 0
LIGHT-HEADEDNESS: 1
FEVER: 0
TREMORS: 0
SORE THROAT: 0
VOMITING: 0
DYSPNEA ON EXERTION: 1
EYE IRRITATION: 0
HYPERTENSION: 1
TINGLING: 1
SINUS CONGESTION: 0
DECREASED CONCENTRATION: 0
POSTURAL DYSPNEA: 0
MEMORY LOSS: 0
PANIC: 0
BOWEL INCONTINENCE: 1
TROUBLE SWALLOWING: 1
NIGHT SWEATS: 0
NAUSEA: 0
POLYDIPSIA: 0
NECK MASS: 0
DIFFICULTY URINATING: 0
ORTHOPNEA: 0
FLANK PAIN: 0
BLOATING: 0
DISTURBANCES IN COORDINATION: 0
SYNCOPE: 1
HEADACHES: 0

## 2023-09-11 ASSESSMENT — PAIN SCALES - GENERAL: PAINLEVEL: NO PAIN (0)

## 2023-09-11 NOTE — NURSING NOTE
"Chief Complaint   Patient presents with    New Patient     Calculus of gallbladder       Vitals:    09/11/23 1036   BP: (!) 159/77   BP Location: Left arm   Patient Position: Sitting   Cuff Size: Adult Regular   Pulse: 65   SpO2: 98%   Weight: 81.2 kg (179 lb 1.6 oz)   Height: 1.626 m (5' 4\")       Body mass index is 30.74 kg/m .                          Estuardo Saeed, EMT    "

## 2023-09-11 NOTE — PROGRESS NOTES
New General Surgery Consultation Note        Gem Gama  [id number]  1950 September 11, 2023     Requesting Provider: Mel Rodriguez     Dear Dr Conteh, Danny Balbuena,     I had the pleasure of seeing your patient, Gem Gama is a 72 year old female who presents to clinic today for the following health issues        CHIEF COMPLAINT:  Abdominal pain    Assessment & Plan   Problem List Items Addressed This Visit          Digestive    Calculus of gallbladder without cholecystitis without obstruction    Relevant Orders    PAC Visit Referral (For [facility] Only)    Case Request: CHOLECYSTECTOMY, ROBOT-ASSISTED, LAPAROSCOPIC, WITHOUT CHOLANGIOGRAM (Completed)     Pt with symptoms c/w symptomatic cholelithiasis and has indication for gallbladder removal, which can be done robot-assist. She will need cards eval first to assess safety of sugery, need for othr interventions first, and to determine whether surgery needs to be done at [place] (My assessment is ASC appropriate, but TBD). After her cards eval, she will MyChart me to begin the scheduling process.    Risk/benefit discussion held. Pt will call [facility] to schedule       Prior to surgery, I reviewed the risks of gallbladder removal with this patient.    The risk discussion included, but was not limited to, death, myocardial infarction, pneumonia, urinary tract infection, deep venous thrombosis with or without pulmonary embolus, abdominal infection from bowel injury or abscess, bowel obstruction, wound infection, and bleeding.    Specific risks related to cholecystectomy include bile duct injury (3-4/1000), bile leak (10/1000), retained common bile duct stone (10/1000), postcholecystectomy diarrhea (1-2%) and these complications may require additional treatment.    The potential that gallbladder removal will NOT be of benefit was also reviewed.    Furthermore, alternative therapies and the option for no therapy were also reviewed.    Postoperative  treatment and expected follow-up were also reviewed.    Additional risk specifically related to this patient's preoperative condition were also emphasized due to CAD.        Lab Results   Component Value Date    WBC 4.7 08/05/2023    WBC 5.6 06/12/2021     Lab Results   Component Value Date    RBC 4.81 08/05/2023    RBC 4.94 06/12/2021     Lab Results   Component Value Date    HGB 14.3 08/05/2023    HGB 14.8 06/12/2021     Lab Results   Component Value Date    HCT 41.7 08/05/2023    HCT 42.4 06/12/2021     No components found for: MCT  Lab Results   Component Value Date    MCV 87 08/05/2023    MCV 86 06/12/2021     Lab Results   Component Value Date    MCH 29.7 08/05/2023    MCH 30.0 06/12/2021     Lab Results   Component Value Date    MCHC 34.3 08/05/2023    MCHC 34.9 06/12/2021     Lab Results   Component Value Date    RDW 13.8 08/05/2023    RDW 12.8 06/12/2021     Lab Results   Component Value Date     08/05/2023     06/12/2021     Last Comprehensive Metabolic Panel:  Lab Results   Component Value Date     08/05/2023    POTASSIUM 4.8 08/05/2023    CHLORIDE 106 08/05/2023    CO2 25 08/05/2023    ANIONGAP 14 08/05/2023     (H) 08/05/2023    BUN 10.2 08/05/2023    CR 1.02 (H) 08/05/2023    GFRESTIMATED 57 (L) 08/05/2023    FRACISCO 9.3 08/05/2023         INR/Prothrombin Time  Liver Function Studies -   Recent Labs   Lab Test 08/05/23  1204   PROTTOTAL 7.2   ALBUMIN 4.4   BILITOTAL 0.8   ALKPHOS 102   AST 40   ALT 19     [unfilled]    30 minutes spent by me on the date of the encounter doing chart review, history and exam, documentation and further activities per the note      HISTORY OF PRESENT ILLNESS:  72F with monthly symptoms of postprandial RUQ pain, evaluated also by Dr Montalvo and deemed appropriate for cgholecystectomy.     Hx L heart PCI 2019  Abnormal stress test 2023      ROS    PAST MEDICAL HISTORY:  Past Medical History:   Diagnosis Date    ADHD (attention deficit hyperactivity  disorder)     Anxiety     Arthritis     back, hands, knees, hips    Asthma     C. difficile diarrhea     Central sleep apnea     Cheyne-Rogers breathing 09/07/2022    Coronary artery disease involving native coronary artery of native heart without angina pectoris     Depression     Fever and chills 09/24/2021    Gastro-oesophageal reflux disease     Hypertension     Ischemic cardiomyopathy     Major depressive disorder, recurrent episode, moderate (H) 07/25/2013    Narcolepsy     Pituitary microadenoma (H) 2011    MRI 2011- There is a triangular-shaped area with delayed contrast enhancement at the left lateral portion of the pituitary gland posteriorly, measuring 2.5 x 4.3 mm. This is suggestive of a microadenoma, MRI 10/1/22 - Normal pituitary gland and whole brain MRI.    Pyelonephritis of right kidney 10/24/2021    Renal disease     S/P hip replacement 2004    Bilateral fall 2004 due to OA    Sleep apnea     Status post total knee replacement 12/29/2014    Urinary tract infection with hematuria, site unspecified 09/24/2021        PAST SURGICAL HISTORY:  Past Surgical History:   Procedure Laterality Date    ARTHROPLASTY KNEE  07/09/2014    Procedure: ARTHROPLASTY KNEE;  Surgeon: Levi Johnson MD;  Location:  OR    ARTHROPLASTY KNEE Left 11/24/2014    Procedure: ARTHROPLASTY KNEE;  Surgeon: Levi Johnson MD;  Location:  OR    CV CORONARY ANGIOGRAM N/A 10/09/2019    Procedure: Coronary Angiogram;  Surgeon: Chiquita Chatterjee MD;  Location:  HEART CARDIAC CATH LAB    CV HEART CATHETERIZATION WITH POSSIBLE INTERVENTION N/A 10/10/2019    Procedure: Heart Catheterization with Possible Intervention;  Surgeon: Chin Garcia MD;  Location:  HEART CARDIAC CATH LAB    CV INTRA AORTIC BALLOON N/A 10/09/2019    Procedure: Intra-Aortic Balloon Pump Insertion;  Surgeon: Chiquita Chatterjee MD;  Location:  HEART CARDIAC CATH LAB    CV INTRA AORTIC BALLOON REMOVAL N/A 10/10/2019    Procedure: Intra-Aortic  Balloon Pump Removal;  Surgeon: Chin Garcia MD;  Location:  HEART CARDIAC CATH LAB    CV LEFT HEART CATH N/A 12/11/2020    Procedure: Heart Catheterization with Possible Intervention;  Surgeon: Pilo Otoole MD;  Location:  HEART CARDIAC CATH LAB    CV PCI STENT DRUG ELUTING N/A 10/09/2019    Procedure: PCI Stent Drug Eluting;  Surgeon: Chiquita Chatterjee MD;  Location:  HEART CARDIAC CATH LAB    GENITOURINARY SURGERY  01/01/2008    Prolift    GENITOURINARY SURGERY  1973    In 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    ORTHOPEDIC SURGERY      bilat total hip    RECTOCELE REPAIR      ZZC TOTAL ABD HYSTERECTOMY+BLAD REPR  01/01/1992    Vaginal approach with oophrectomy    ZZC TOTAL KNEE ARTHROPLASTY          MEDICATIONS:  Current Outpatient Medications   Medication    albuterol (PROAIR HFA/PROVENTIL HFA/VENTOLIN HFA) 108 (90 Base) MCG/ACT inhaler    aspirin (ASA) 81 MG EC tablet    atorvastatin (LIPITOR) 40 MG tablet    carvedilol (COREG) 3.125 MG tablet    cholestyramine (QUESTRAN) 4 g packet    diazepam (VALIUM) 2 MG tablet    diclofenac (VOLTAREN) 1 % topical gel    estradiol (ESTRING) 2 MG vaginal ring    fluticasone-vilanterol (BREO ELLIPTA) 200-25 MCG/ACT inhaler    isosorbide mononitrate (IMDUR) 30 MG 24 hr tablet    loperamide (IMODIUM) 2 MG capsule    loratadine (CLARITIN) 10 MG tablet    magnesium oxide (MAG-OX) 400 MG tablet    Melatonin 10 MG TABS tablet    nitroGLYcerin (NITROSTAT) 0.4 MG sublingual tablet    pantoprazole (PROTONIX) 40 MG EC tablet    Prenatal Multivit-Min-Fe-FA (PRENATAL/IRON) TABS    saccharomyces boulardii (FLORASTOR) 250 MG capsule    sertraline (ZOLOFT) 100 MG tablet    UNABLE TO FIND    ursodiol (ACTIGALL) 300 MG capsule    vitamin D3  (CHOLECALCIFEROL) 2000 units (50 mcg) tablet    zinc gluconate 50 MG tablet     No current facility-administered medications for this visit.        ALLERGIES:  Allergies   Allergen Reactions    Dust Mites Cough, Headache, Other (See Comments) and Shortness Of Breath    Latex Other (See Comments) and Shortness Of Breath     Runny nose  PN: LW Reaction: DIFFICULTY BREATHING      Sulfa Antibiotics Anaphylaxis, Hives and Itching     PN: LW Reaction: HIVES, Swelling  PN: LW Reaction: HIVES, Swelling    Flagyl [Metronidazole Hcl] Anaphylaxis and Rash    Food      PN: LW FI1: nka LW FI2:    Hydrochlorothiazide W-Triamterene      PN: LW Reaction: skin rash  PN: LW Reaction: skin rash    No Clinical Screening - See Comments      PN: LW Other1: -Adhesive Tape    Seasonal Allergies      sneezing    Tape [Adhesive Tape] Hives    Metoprolol Itching and Rash    Metronidazole Hives and Rash     PN: LW Reaction: HIVES          SOCIAL HISTORY:  Social History     Socioeconomic History    Marital status:    Occupational History    Occupation: Retired    Tobacco Use    Smoking status: Never     Passive exposure: Never    Smokeless tobacco: Never   Vaping Use    Vaping Use: Never used   Substance and Sexual Activity    Alcohol use: No    Drug use: No    Sexual activity: Not Currently     Partners: Male     Birth control/protection: None   Other Topics Concern    Parent/sibling w/ CABG, MI or angioplasty before 65F 55M? No     Service No    Blood Transfusions No    Caffeine Concern No    Occupational Exposure No    Hobby Hazards No    Sleep Concern Yes     Comment: Uses CPAP    Stress Concern No    Weight Concern Yes    Special Diet No    Back Care No    Exercise No    Bike Helmet No     Comment:      Seat Belt Yes    Self-Exams Yes       FAMILY HISTORY:  Family History   Problem Relation Age of Onset    Hypertension Mother     Arthritis Mother     Cerebrovascular Disease Mother     Cerebrovascular Disease  "Father         Three Strokes    Diabetes Father         and brother    Kidney Disease Father     Diabetes Brother     Birth defects Brother     Thyroid Disease Sister         Hypothyroid    Sjogren's Daughter     Diabetes Daughter     Cerebrovascular Disease Daughter     Diabetes Son       AUS- cholelithiasis  HIDA- 12% EF    PHYSICAL EXAM:  Objective    Ht 1.626 m (5' 4\")   LMP  (LMP Unknown)   BMI 30.90 kg/m    Ht 1.626 m (5' 4\")   LMP  (LMP Unknown)   BMI 30.90 kg/m    Body mass index is 30.9 kg/m .  Physical Exam   S/nt/nd  L posterior incisions     DISCUSSION OF RISKS:  Prior to surgery, I reviewed the risks of gallbladder removal with this patient.    The risk discussion included, but was not limited to, death, myocardial infarction, pneumonia, urinary tract infection, deep venous thrombosis with or without pulmonary embolus, abdominal infection from bowel injury or abscess, bowel obstruction, wound infection, and bleeding.    Specific risks related to cholecystectomy include bile duct injury (3-4/1000), bile leak (10/1000), retained common bile duct stone (10/1000), postcholecystectomy diarrhea (1-2%) and these complications may require additional treatment.    The potential that gallbladder removal will NOT be of benefit was also reviewed.    Furthermore, alternative therapies and the option for no therapy were also reviewed.    Postoperative treatment and expected follow-up were also reviewed.    Additional risk specifically related to this patient's preoperative condition were also emphasized due to CAD      Sincerely,     Mickey Gallego MD    "

## 2023-09-11 NOTE — PATIENT INSTRUCTIONS
You saw Dr Gallego and were recommended to have gallbladder removal surgery (laaproscopic cholecystectomy with robot-assist). To schedule surgery and a pre-anesthesia clinic appointment for a History and Physical, call [facility]avier at [phone]. Before you schedule surgery, you will need to have a full cardiologsit evaluation (ordered for you). Please Mychart Dr Gallego after this evaluation, to begin the surgery scheduling process.  ------    Prior to surgery, I reviewed the risks of gallbladder removal with this patient.    The risk discussion included, but was not limited to, death, myocardial infarction, pneumonia, urinary tract infection, deep venous thrombosis with or without pulmonary embolus, abdominal infection from bowel injury or abscess, bowel obstruction, wound infection, and bleeding.    Specific risks related to cholecystectomy include bile duct injury (3-4/1000), bile leak (10/1000), retained common bile duct stone (10/1000), postcholecystectomy diarrhea (1-2%) and these complications may require additional treatment.    The potential that gallbladder removal will NOT be of benefit was also reviewed.    Furthermore, alternative therapies and the option for no therapy were also reviewed.    Postoperative treatment and expected follow-up were also reviewed.

## 2023-09-11 NOTE — LETTER
9/11/2023       RE: Gem Gama  [address]  [facility] [address]       Dear Colleague,    Thank you for referring your patient, Gem Gama, to the [facility] [place] at [facility]. Please see a copy of my visit note below.    New General Surgery Consultation Note        Gem Gama  [id number]  1950 September 11, 2023     Requesting Provider: Mel Rodriguez     Dear Dr Conteh, Danny Balbuena,     I had the pleasure of seeing your patient, Gem Gama is a 72 year old female who presents to clinic today for the following health issues        CHIEF COMPLAINT:  Abdominal pain    Assessment & Plan  Problem List Items Addressed This Visit          Digestive    Calculus of gallbladder without cholecystitis without obstruction    Relevant Orders    PAC Visit Referral (For [facility] Only)    Case Request: CHOLECYSTECTOMY, ROBOT-ASSISTED, LAPAROSCOPIC, WITHOUT CHOLANGIOGRAM (Completed)     Pt with symptoms c/w symptomatic cholelithiasis and has indication for gallbladder removal, which can be done robot-assist. She will need cards eval first to assess safety of sugery, need for othr interventions first, and to determine whether surgery needs to be done at [place] (My assessment is [facility] appropriate, but TBD). After her cards eval, she will MyChart me to begin the scheduling process.    Risk/benefit discussion held. Pt will call [facility] to schedule       Prior to surgery, I reviewed the risks of gallbladder removal with this patient.    The risk discussion included, but was not limited to, death, myocardial infarction, pneumonia, urinary tract infection, deep venous thrombosis with or without pulmonary embolus, abdominal infection from bowel injury or abscess, bowel obstruction, wound infection, and bleeding.    Specific risks related to cholecystectomy include bile duct injury (3-4/1000), bile leak (10/1000),  retained common bile duct stone (10/1000), postcholecystectomy diarrhea (1-2%) and these complications may require additional treatment.    The potential that gallbladder removal will NOT be of benefit was also reviewed.    Furthermore, alternative therapies and the option for no therapy were also reviewed.    Postoperative treatment and expected follow-up were also reviewed.    Additional risk specifically related to this patient's preoperative condition were also emphasized due to CAD.        Lab Results   Component Value Date    WBC 4.7 08/05/2023    WBC 5.6 06/12/2021     Lab Results   Component Value Date    RBC 4.81 08/05/2023    RBC 4.94 06/12/2021     Lab Results   Component Value Date    HGB 14.3 08/05/2023    HGB 14.8 06/12/2021     Lab Results   Component Value Date    HCT 41.7 08/05/2023    HCT 42.4 06/12/2021     No components found for: MCT  Lab Results   Component Value Date    MCV 87 08/05/2023    MCV 86 06/12/2021     Lab Results   Component Value Date    MCH 29.7 08/05/2023    MCH 30.0 06/12/2021     Lab Results   Component Value Date    MCHC 34.3 08/05/2023    MCHC 34.9 06/12/2021     Lab Results   Component Value Date    RDW 13.8 08/05/2023    RDW 12.8 06/12/2021     Lab Results   Component Value Date     08/05/2023     06/12/2021     Last Comprehensive Metabolic Panel:  Lab Results   Component Value Date     08/05/2023    POTASSIUM 4.8 08/05/2023    CHLORIDE 106 08/05/2023    CO2 25 08/05/2023    ANIONGAP 14 08/05/2023     (H) 08/05/2023    BUN 10.2 08/05/2023    CR 1.02 (H) 08/05/2023    GFRESTIMATED 57 (L) 08/05/2023    FRACISCO 9.3 08/05/2023         INR/Prothrombin Time  Liver Function Studies -   Recent Labs   Lab Test 08/05/23  1204   PROTTOTAL 7.2   ALBUMIN 4.4   BILITOTAL 0.8   ALKPHOS 102   AST 40   ALT 19     [unfilled]    30 minutes spent by me on the date of the encounter doing chart review, history and exam, documentation and further activities per the  note      HISTORY OF PRESENT ILLNESS:  72F with monthly symptoms of postprandial RUQ pain, evaluated also by Dr Montalvo and deemed appropriate for cgholecystectomy.     Hx L heart PCI 2019  Abnormal stress test 2023      ROS    PAST MEDICAL HISTORY:  Past Medical History:   Diagnosis Date    ADHD (attention deficit hyperactivity disorder)     Anxiety     Arthritis     back, hands, knees, hips    Asthma     C. difficile diarrhea     Central sleep apnea     Cheyne-Rogers breathing 09/07/2022    Coronary artery disease involving native coronary artery of native heart without angina pectoris     Depression     Fever and chills 09/24/2021    Gastro-oesophageal reflux disease     Hypertension     Ischemic cardiomyopathy     Major depressive disorder, recurrent episode, moderate (H) 07/25/2013    Narcolepsy     Pituitary microadenoma (H) 2011    MRI 2011- There is a triangular-shaped area with delayed contrast enhancement at the left lateral portion of the pituitary gland posteriorly, measuring 2.5 x 4.3 mm. This is suggestive of a microadenoma, MRI 10/1/22 - Normal pituitary gland and whole brain MRI.    Pyelonephritis of right kidney 10/24/2021    Renal disease     S/P hip replacement 2004    Bilateral fall 2004 due to OA    Sleep apnea     Status post total knee replacement 12/29/2014    Urinary tract infection with hematuria, site unspecified 09/24/2021        PAST SURGICAL HISTORY:  Past Surgical History:   Procedure Laterality Date    ARTHROPLASTY KNEE  07/09/2014    Procedure: ARTHROPLASTY KNEE;  Surgeon: Levi Johnson MD;  Location:  OR    ARTHROPLASTY KNEE Left 11/24/2014    Procedure: ARTHROPLASTY KNEE;  Surgeon: Levi Johnson MD;  Location:  OR    CV CORONARY ANGIOGRAM N/A 10/09/2019    Procedure: Coronary Angiogram;  Surgeon: Chiquita Chatterjee MD;  Location:  HEART CARDIAC CATH LAB    CV HEART CATHETERIZATION WITH POSSIBLE INTERVENTION N/A 10/10/2019    Procedure: Heart Catheterization with  Possible Intervention;  Surgeon: Chin Garcia MD;  Location:  HEART CARDIAC CATH LAB    CV INTRA AORTIC BALLOON N/A 10/09/2019    Procedure: Intra-Aortic Balloon Pump Insertion;  Surgeon: Chiquita Chatterjee MD;  Location:  HEART CARDIAC CATH LAB    CV INTRA AORTIC BALLOON REMOVAL N/A 10/10/2019    Procedure: Intra-Aortic Balloon Pump Removal;  Surgeon: Chin Garcia MD;  Location:  HEART CARDIAC CATH LAB    CV LEFT HEART CATH N/A 12/11/2020    Procedure: Heart Catheterization with Possible Intervention;  Surgeon: Pilo Otoole MD;  Location:  HEART CARDIAC CATH LAB    CV PCI STENT DRUG ELUTING N/A 10/09/2019    Procedure: PCI Stent Drug Eluting;  Surgeon: Chiquita Chatterjee MD;  Location:  HEART CARDIAC CATH LAB    GENITOURINARY SURGERY  01/01/2008    Prolift    GENITOURINARY SURGERY  1973    In 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    ORTHOPEDIC SURGERY      bilat total hip    RECTOCELE REPAIR      ZZC TOTAL ABD HYSTERECTOMY+BLAD REPR  01/01/1992    Vaginal approach with oophrectomy    ZC TOTAL KNEE ARTHROPLASTY          MEDICATIONS:  Current Outpatient Medications   Medication    albuterol (PROAIR HFA/PROVENTIL HFA/VENTOLIN HFA) 108 (90 Base) MCG/ACT inhaler    aspirin (ASA) 81 MG EC tablet    atorvastatin (LIPITOR) 40 MG tablet    carvedilol (COREG) 3.125 MG tablet    cholestyramine (QUESTRAN) 4 g packet    diazepam (VALIUM) 2 MG tablet    diclofenac (VOLTAREN) 1 % topical gel    estradiol (ESTRING) 2 MG vaginal ring    fluticasone-vilanterol (BREO ELLIPTA) 200-25 MCG/ACT inhaler    isosorbide mononitrate (IMDUR) 30 MG 24 hr tablet    loperamide (IMODIUM) 2 MG capsule    loratadine (CLARITIN) 10 MG tablet    magnesium oxide (MAG-OX) 400 MG tablet     Melatonin 10 MG TABS tablet    nitroGLYcerin (NITROSTAT) 0.4 MG sublingual tablet    pantoprazole (PROTONIX) 40 MG EC tablet    Prenatal Multivit-Min-Fe-FA (PRENATAL/IRON) TABS    saccharomyces boulardii (FLORASTOR) 250 MG capsule    sertraline (ZOLOFT) 100 MG tablet    UNABLE TO FIND    ursodiol (ACTIGALL) 300 MG capsule    vitamin D3 (CHOLECALCIFEROL) 2000 units (50 mcg) tablet    zinc gluconate 50 MG tablet     No current facility-administered medications for this visit.        ALLERGIES:  Allergies   Allergen Reactions    Dust Mites Cough, Headache, Other (See Comments) and Shortness Of Breath    Latex Other (See Comments) and Shortness Of Breath     Runny nose  PN: LW Reaction: DIFFICULTY BREATHING      Sulfa Antibiotics Anaphylaxis, Hives and Itching     PN: LW Reaction: HIVES, Swelling  PN: LW Reaction: HIVES, Swelling    Flagyl [Metronidazole Hcl] Anaphylaxis and Rash    Food      PN: LW FI1: nka LW FI2:    Hydrochlorothiazide W-Triamterene      PN: LW Reaction: skin rash  PN: LW Reaction: skin rash    No Clinical Screening - See Comments      PN: LW Other1: -Adhesive Tape    Seasonal Allergies      sneezing    Tape [Adhesive Tape] Hives    Metoprolol Itching and Rash    Metronidazole Hives and Rash     PN: LW Reaction: HIVES          SOCIAL HISTORY:  Social History     Socioeconomic History    Marital status:    Occupational History    Occupation: Retired    Tobacco Use    Smoking status: Never     Passive exposure: Never    Smokeless tobacco: Never   Vaping Use    Vaping Use: Never used   Substance and Sexual Activity    Alcohol use: No    Drug use: No    Sexual activity: Not Currently     Partners: Male     Birth control/protection: None   Other Topics Concern    Parent/sibling w/ CABG, MI or angioplasty before 65F 55M? No     Service No    Blood Transfusions No    Caffeine Concern No    Occupational Exposure No    Hobby Hazards No    Sleep Concern Yes     Comment: Uses CPAP  "   Stress Concern No    Weight Concern Yes    Special Diet No    Back Care No    Exercise No    Bike Helmet No     Comment:      Seat Belt Yes    Self-Exams Yes       FAMILY HISTORY:  Family History   Problem Relation Age of Onset    Hypertension Mother     Arthritis Mother     Cerebrovascular Disease Mother     Cerebrovascular Disease Father         Three Strokes    Diabetes Father         and brother    Kidney Disease Father     Diabetes Brother     Birth defects Brother     Thyroid Disease Sister         Hypothyroid    Sjogren's Daughter     Diabetes Daughter     Cerebrovascular Disease Daughter     Diabetes Son       AUS- cholelithiasis  HIDA- 12% EF    PHYSICAL EXAM:  Objective   Ht 1.626 m (5' 4\")   LMP  (LMP Unknown)   BMI 30.90 kg/m    Ht 1.626 m (5' 4\")   LMP  (LMP Unknown)   BMI 30.90 kg/m    Body mass index is 30.9 kg/m .  Physical Exam   S/nt/nd  L posterior incisions     DISCUSSION OF RISKS:  Prior to surgery, I reviewed the risks of gallbladder removal with this patient.    The risk discussion included, but was not limited to, death, myocardial infarction, pneumonia, urinary tract infection, deep venous thrombosis with or without pulmonary embolus, abdominal infection from bowel injury or abscess, bowel obstruction, wound infection, and bleeding.    Specific risks related to cholecystectomy include bile duct injury (3-4/1000), bile leak (10/1000), retained common bile duct stone (10/1000), postcholecystectomy diarrhea (1-2%) and these complications may require additional treatment.    The potential that gallbladder removal will NOT be of benefit was also reviewed.    Furthermore, alternative therapies and the option for no therapy were also reviewed.    Postoperative treatment and expected follow-up were also reviewed.    Additional risk specifically related to this patient's preoperative condition were also emphasized due to CAD      Again, thank you for allowing me to participate in the care of " your patient.      Sincerely,    Mickey Gallego MD

## 2023-09-11 NOTE — TELEPHONE ENCOUNTER
[facility] Call Center    Phone Message    May a detailed message be left on voicemail: yes     Reason for Call: Appointment Intake    Referring Provider Name: Mickey Gallego MD   Diagnosis and/or Symptoms: Requesting cardiologist for evaluation of partial thickness ischemia on stress test. Hx of PCI in 2019 to LAD. Needs cholecystectomy, but preop optimization. Patient doesn't want to go back [facility] and requesting the . No appointments in 1-2 weeks. Please assist patient.     Action Taken: Message routed to:  [facility] Cardio    Travel Screening: Not Applicable

## 2023-09-13 ENCOUNTER — OFFICE VISIT (OUTPATIENT)
Dept: CARDIOLOGY | Facility: CLINIC | Age: 73
End: 2023-09-13
Attending: INTERNAL MEDICINE
Payer: MEDICARE

## 2023-09-13 VITALS
SYSTOLIC BLOOD PRESSURE: 144 MMHG | BODY MASS INDEX: 31.38 KG/M2 | HEIGHT: 64 IN | OXYGEN SATURATION: 98 % | HEART RATE: 74 BPM | WEIGHT: 183.8 LBS | DIASTOLIC BLOOD PRESSURE: 75 MMHG

## 2023-09-13 DIAGNOSIS — K80.20 CALCULUS OF GALLBLADDER WITHOUT CHOLECYSTITIS WITHOUT OBSTRUCTION: ICD-10-CM

## 2023-09-13 DIAGNOSIS — R42 DIZZINESS: Primary | ICD-10-CM

## 2023-09-13 DIAGNOSIS — I25.119 CORONARY ARTERY DISEASE WITH ANGINA PECTORIS, UNSPECIFIED VESSEL OR LESION TYPE, UNSPECIFIED WHETHER NATIVE OR TRANSPLANTED HEART (H): Chronic | ICD-10-CM

## 2023-09-13 PROCEDURE — G0463 HOSPITAL OUTPT CLINIC VISIT: HCPCS | Performed by: INTERNAL MEDICINE

## 2023-09-13 PROCEDURE — 99215 OFFICE O/P EST HI 40 MIN: CPT | Performed by: INTERNAL MEDICINE

## 2023-09-13 ASSESSMENT — PAIN SCALES - GENERAL: PAINLEVEL: NO PAIN (0)

## 2023-09-13 NOTE — LETTER
9/13/2023      RE: Gem Gama  [address]  [facility] [id number]       Dear Colleague,    Thank you for the opportunity to participate in the care of your patient, Gem Gama, at the [facility] [place] at [facility]. Please see a copy of my visit note below.    Dear Dr. Conteh,    We had the pleasure of seeing Ms. Gama for preop clearance in our cardiology clinic at [facility].  Although you are familiar with her history please allow me to summarize it for the purpose of her records.    Ms. Gama is a 72-year-old female with past medical history significant for hypertension, coronary artery disease, status post ST elevation MI requiring percutaneous coronary intervention of the LAD, and ischemic cardiomyopathy with recovered ejection fraction.  She also has history of chronic kidney disease secondary to congenital heart defect, asthma, depression, acid reflux disease, obstructive sleep apnea, and anxiety.    She is being followed by Dr. Mel Dennison at [facility].    He is currently being considered for elective cholecystectomy for cholelithiasis.  However the patient has been having significant lightheadedness and dizziness.  She is worried that this is cardiac related and wanted to have this evaluated prior to undergoing the surgery.  Thus she was referred to us for further evaluation.    She has been having significant lightheadedness and dizziness on and off for the last several weeks.  This does not happen on a regular basis.  It is episodic.  No specific aggravating or relieving factors.  She had an episode of significant lightheadedness while driving a month ago.  Since then she is afraid to drive alone.  She had a ZIO monitor that showed 6 beats of nonsustained VT, PVCs and PACs.  No sustained ventricular arrhythmia.  She also had a nuclear stress test that showed no evidence of  reversible ischemia.  She had a fixed transmural scar in the anteroseptal region consistent with her prior anterior wall MI.  Of note at her last echocardiogram was from a year ago that showed recovered ejection fraction from 50 to 60%.    She denies having any chest pain or chest pressure.  However she does have significant exertional shortness of breath.  She is able to do her activities of daily living.  She cannot walk more than a block or 2.  I would currently characterize as functional class II.  She has no lower extremity swelling or abdominal distention.  No syncope but recurrent lightheadedness or dizziness as described above.  She has not had any recent hospitalization or ER visits.    Past medical history  1.  Hypertension  2.  Acute ST elevation MI involving the LAD territory status post PCI complicated by cardiogenic shock requiring intra-aortic balloon pump in October 2019  3.  Ischemic cardiomyopathy with ejection fraction as low as 30 to 35% following acute ST elevation MI however now her ejection fraction has recovered to 55 to 60%  4.  Chronic kidney disease due to congenital kidney defect  5.  Asthma  6.  Depression  7.  Acid reflux disease  8.  Obstructive sleep apnea    Past surgical history  1.  Bilateral knee replacement  2.  Bilateral hip replacement  3.  Hysterectomy with bilateral salpingo-oophorectomy    Medications  Current Outpatient Medications   Medication Sig    albuterol (PROAIR HFA/PROVENTIL HFA/VENTOLIN HFA) 108 (90 Base) MCG/ACT inhaler Inhale 1-2 puffs into the lungs every 4 hours as needed for shortness of breath or wheezing    aspirin (ASA) 81 MG EC tablet Take 1 tablet (81 mg) by mouth daily    atorvastatin (LIPITOR) 40 MG tablet TAKE 1 TABLET(40 MG) BY MOUTH DAILY    carvedilol (COREG) 3.125 MG tablet TAKE 1 TABLET(3.125 MG) BY MOUTH TWICE DAILY WITH MEALS    cholestyramine (QUESTRAN) 4 g packet Take 1 packet (4 g) by mouth 2 times daily (with meals)    diazepam (VALIUM) 2 MG  tablet Take 0.5-1 tablets (1-2 mg) by mouth 2 times daily as needed for anxiety or muscle spasms    diclofenac (VOLTAREN) 1 % topical gel Apply topically 4 times daily For Jaw pain    estradiol (ESTRING) 2 MG vaginal ring Place 1 each vaginally every 3 months    fluticasone-vilanterol (BREO ELLIPTA) 200-25 MCG/ACT inhaler Inhale 1 puff into the lungs daily    isosorbide mononitrate (IMDUR) 30 MG 24 hr tablet Take 0.5 tablets (15 mg) by mouth daily    loperamide (IMODIUM) 2 MG capsule Take 1 capsule (2 mg) by mouth daily    loratadine (CLARITIN) 10 MG tablet Take 10 mg by mouth At Bedtime    magnesium oxide (MAG-OX) 400 MG tablet Take 400 mg by mouth daily    Melatonin 10 MG TABS tablet Take 10 mg by mouth nightly as needed for sleep    nitroGLYcerin (NITROSTAT) 0.4 MG sublingual tablet Place 1 tablet (0.4 mg) under the tongue every 5 minutes as needed for chest pain    pantoprazole (PROTONIX) 40 MG EC tablet TAKE 1 TABLET(40 MG) BY MOUTH DAILY    Prenatal Multivit-Min-Fe-FA (PRENATAL/IRON) TABS Take 1 tablet by mouth daily     saccharomyces boulardii (FLORASTOR) 250 MG capsule Take 1 capsule (250 mg) by mouth 2 times daily    sertraline (ZOLOFT) 100 MG tablet TAKE 1 TABLET(100 MG) BY MOUTH DAILY    UNABLE TO FIND MEDICATION NAME: Uqora complete regimen    ursodiol (ACTIGALL) 300 MG capsule TAKE 1 CAPSULE(300 MG) BY MOUTH TWICE DAILY    vitamin D3 (CHOLECALCIFEROL) 2000 units (50 mcg) tablet Take 1 tablet (2,000 Units) by mouth daily    zinc gluconate 50 MG tablet      No current facility-administered medications for this visit.     Review of system  A detailed 10 point review of system obtained as described in history of present was all other systems reviewed are negative.      Family history  History of coronary artery disease and hyperlipidemia on her paternal side of the family.  No history of premature coronary artery disease, sudden cardiac death or cardiomyopathy.    Social history  She is a retired high  .  She lives alone.  She does not smoke or drink alcohol.    Exam  She was awake, alert, oriented x3.  She was comfortable.  She was in no apparent distress.  She had no pallor, cyanosis or jaundice.  Her neck exam revealed no jugular venous distention.  Carotids were 2+ bilaterally.  Pulse was regular rhythm.  Cardiac auscultation revealed normal S1 and S2 with no murmur rub or gallop.  Auscultation of the lungs revealed equal air entry on both sides with no added sound.  Her abdomen was soft with normal also no tenderness no rigidity no guarding.  She had no focal neurological deficit.  Her extremities showed no edema.    EKG (07/2023)  Her electrocardiogram showed sinus rhythm with sinus arrhythmia and left axis deviation nonspecific ST and T wave abnormalities     Echocardiogram (10/2022)  Left ventricular systolic function is normal.  The visual ejection fraction is 55-60%.  Grade I or early diastolic dysfunction.  No regional wall motion abnormalities noted.  The right ventricle is normal in size and function.  There is mild to moderate mitral valve regurgitation.  The left atrium is mildly dilated.  There is trace tricuspid valve regurgitation.  No hemodynamically significant valvular aortic stenosis.  The inferior vena cava is normal.    Zio  (08/2023)  She had a 3-day ZIO monitor that showed 1 nonsustained ventricular tachycardia up to 7 beats.  She had 6 supraventricular tachycardias up to 11 beats.  She had rare PACs and PVCs.  Patient is symptoms were related to sinus rhythm with or without PACs and PVCs.  No significant bradyarrhythmias.    Nuclear Stress test (08/2023)   The nuclear stress test is abnormal.    There is a medium sized area of a moderate degree of nontransmural infarction in the apical and anteroseptal segment(s) of the left ventricle. This appears to be in the left anterior descending distribution.No ischemia identified.    Coronary angiogram (12/2020)  Left Main   The  vessel is moderate in size. There was 0% vessel disease.      Left Anterior Descending   Ost LAD to Prox LAD lesion is 40% stenosed. Not significantly changed from 10/2019 angiogram   Previously placed Prox LAD drug eluting stent is widely patent. Previous treatment took place 1-2 years ago. No thrombosis in the previous stent. No restenosis in the previous stent.      First Diagonal Branch   1st Diag lesion is 100% stenosed. The lesion is chronic total occlusion. The jailed, small-moderate sized D1 is completely closed of the ostium. This was closed at the end of the 10/9/2019 PCI, but open at the time of the 10/10/2019 repeat angiogram. As she had no evidence of myocardial necrosis during the present admission, I suspect that this vessel ultimately closed back in October 2019 due to the unstable nature of the plaque at that time. It is now a  and is unlikely to be responsible for her current discomfort.  PCI in the future for this relatively small vessel would not be advised.      Left Circumflex   Prox Cx lesion is 15% stenosed.      Right Coronary Artery   The vessel is small.   Prox RCA lesion is 40% stenosed. Very small barely codominant RCA mild narrowing vessel <2.5mm         Intervention         Recent Results (from the past 1008 hour(s))   Lipid panel reflex to direct LDL Fasting    Collection Time: 08/05/23 12:04 PM   Result Value Ref Range    Cholesterol 136 <200 mg/dL    Triglycerides 88 <150 mg/dL    Direct Measure HDL 62 >=50 mg/dL    LDL Cholesterol Calculated 56 <=100 mg/dL    Non HDL Cholesterol 74 <130 mg/dL   Comprehensive metabolic panel    Collection Time: 08/05/23 12:04 PM   Result Value Ref Range    Sodium 145 136 - 145 mmol/L    Potassium 4.8 3.4 - 5.3 mmol/L    Chloride 106 98 - 107 mmol/L    Carbon Dioxide (CO2) 25 22 - 29 mmol/L    Anion Gap 14 7 - 15 mmol/L    Urea Nitrogen 10.2 8.0 - 23.0 mg/dL    Creatinine 1.02 (H) 0.51 - 0.95 mg/dL    Calcium 9.3 8.8 - 10.2 mg/dL    Glucose 102  (H) 70 - 99 mg/dL    Alkaline Phosphatase 102 35 - 104 U/L    AST 40 0 - 45 U/L    ALT 19 0 - 50 U/L    Protein Total 7.2 6.4 - 8.3 g/dL    Albumin 4.4 3.5 - 5.2 g/dL    Bilirubin Total 0.8 <=1.2 mg/dL    GFR Estimate 57 (L) >60 mL/min/1.73m2   CBC with platelets and differential    Collection Time: 08/05/23 12:04 PM   Result Value Ref Range    WBC Count 4.7 4.0 - 11.0 10e3/uL    RBC Count 4.81 3.80 - 5.20 10e6/uL    Hemoglobin 14.3 11.7 - 15.7 g/dL    Hematocrit 41.7 35.0 - 47.0 %    MCV 87 78 - 100 fL    MCH 29.7 26.5 - 33.0 pg    MCHC 34.3 31.5 - 36.5 g/dL    RDW 13.8 10.0 - 15.0 %    Platelet Count 154 150 - 450 10e3/uL    % Neutrophils 68 %    % Lymphocytes 23 %    % Monocytes 6 %    % Eosinophils 3 %    % Basophils 0 %    % Immature Granulocytes 0 %    Absolute Neutrophils 3.2 1.6 - 8.3 10e3/uL    Absolute Lymphocytes 1.1 0.8 - 5.3 10e3/uL    Absolute Monocytes 0.3 0.0 - 1.3 10e3/uL    Absolute Eosinophils 0.1 0.0 - 0.7 10e3/uL    Absolute Basophils 0.0 0.0 - 0.2 10e3/uL    Absolute Immature Granulocytes 0.0 <=0.4 10e3/uL   NM Lexiscan stress test    Collection Time: 08/14/23  2:38 PM   Result Value Ref Range    Target      Baseline Systolic      Baseline Diastolic BP 69     Last Stress Systolic      Last Stress Diastolic BP 47     Baseline HR 67 bpm    Max HR  90     Max Predicted HR  62 %    Rate Pressure Product 9,180.0    Clinitek Urine Macroscopic POCT    Collection Time: 08/14/23  3:13 PM   Result Value Ref Range    BILIRUBIN, URINE POCT Negative Negative    GLUCOSE, URINE POCT Negative Negative mg/dL    KETONES, URINE POCT Negative Negative mg/dL mg/dL    NITRITES POCT Negative Negative    PH, URINE POCT 7.5 5.0 - 8.0    PROTEIN, URINE POCT Negative Negative mg/dL    SPECIFIC GRAVITY POCT 1.015 1.005 - 1.030    UROBILINOGEN, URINE POCT 0.2 0.2, 1.0 E.U./dL    COLOR, URINE POCT Yellow Colorless, Straw, Light Yellow, Yellow    CLARITY, URINE POCT Clear Clear    Blood, Urine POCT Negative  Negative    LEUK ESTERASE, POCT Negative Negative     Assessment and Plan:    Ms. Gama is a 72-year-old female with past medical history significant for hypertension, coronary artery disease, acute myocardial infarction involving the LAD territory, and ischemic cardiomyopathy with recovered ejection fraction who is seeing us today for evaluation of recurrent dizziness and lightheadedness of unclear etiology.    The etiology of her recurrent dizziness and lightheadedness is unclear.  While she had a ZIO monitor for 3 days that did not show any significant tacky or bradycardia arrhythmia, she states that during those 3 days she did not have this episode.  It is possible that she is having significant tachyarrhythmia that is not captured on the ZIO.  She does have a scar from her prior LAD-she is at risk for ventricular tachyarrhythmias.  We will repeat an echocardiogram to make sure that her EF is stable.  We will refer her to electrophysiology to consider electrophysiological study versus loop recorder.    She does not have symptoms concerning for angina.  Her most recent nuclear stress test showed no evidence of reversible ischemia.  Thus I do not we need to repeat a coronary angiogram unless she has a significant drop in ejection fraction on the repeat echocardiogram.  She is on aspirin, statin, low-dose beta-blocker and isosorbide mononitrate which we will continue for now.    She is at low risk for perioperative cardiovascular events for a cholecystectomy.  However the patient would like to wait and have the evaluation for her lightheadedness and dizziness first before proceeding to cholecystectomy as this is more of an elective procedure.  I think this is reasonable.    It was a pleasure meeting Ms. Gama in our cardiology clinic at [facility].  Thank you for involving us in in her care.    Total time today was 65 minutes reviewing notes, imaging, labs, patient visit, orders and  documentation.    Sincerely,  Bernadette Fleming MD   [facility]  Heart Failure, Transplant, and Mechanical Circulatory Support Cardiology   Cardiovascular Division  [facility]   [phone]

## 2023-09-13 NOTE — PATIENT INSTRUCTIONS
"You were seen today in the Cardiovascular Clinic at the [facility].       Cardiology Providers you saw during your visit: Dr. Fleming      Medication Changes:   1. No medication changes      Follow up Appointment Information:  1. Echocardiogram  2. Referral to see electrophysiology for further evaluation            [address] [facility] on [address]   [place] [id number]        Thank you for allowing us to be a part of your care here at the [facility]      If you have questions or concerns please contact us at:      Yaritza Delgado RN BSN   Cardiology Care Coordinator  [facility]   Questions and [phone]   press #1 to \"send a message to your care team\"     "

## 2023-09-13 NOTE — PROGRESS NOTES
Dear Dr. Conteh,    We had the pleasure of seeing Ms. Gama for preop clearance in our cardiology clinic at [facility].  Although you are familiar with her history please allow me to summarize it for the purpose of her records.    Ms. Gama is a 72-year-old female with past medical history significant for hypertension, coronary artery disease, status post ST elevation MI requiring percutaneous coronary intervention of the LAD, and ischemic cardiomyopathy with recovered ejection fraction.  She also has history of chronic kidney disease secondary to congenital heart defect, asthma, depression, acid reflux disease, obstructive sleep apnea, and anxiety.    She is being followed by Dr. Mel Dennison at [facility].    She is currently being considered for elective cholecystectomy for cholelithiasis.  However the patient has been having significant lightheadedness and dizziness.  She is worried that this is cardiac related and wanted to have this evaluated prior to undergoing the surgery.  Thus she was referred to us for further evaluation.    She has been having significant lightheadedness and dizziness on and off for the last several weeks.  This does not happen on a regular basis.  It is episodic.  No specific aggravating or relieving factors.  She had an episode of significant lightheadedness while driving a month ago.  Since then she is afraid to drive alone.  She had a ZIO monitor that showed 6 beats of nonsustained VT, PVCs and PACs.  No sustained ventricular arrhythmia.  She also had a nuclear stress test that showed no evidence of reversible ischemia.  She had a fixed transmural scar in the anteroseptal region consistent with her prior anterior wall MI.  Of note at her last echocardiogram was from a year ago that showed recovered ejection fraction from 50 to 60%.    She denies having any chest pain or chest pressure.  However she does have significant exertional shortness of  breath.  She is able to do her activities of daily living.  She cannot walk more than a block or 2.  I would currently characterize as functional class II.  She has no lower extremity swelling or abdominal distention.  No syncope but recurrent lightheadedness or dizziness as described above.  She has not had any recent hospitalization or ER visits.    Past medical history  1.  Hypertension  2.  Acute ST elevation MI involving the LAD territory status post PCI complicated by cardiogenic shock requiring intra-aortic balloon pump in October 2019  3.  Ischemic cardiomyopathy with ejection fraction as low as 30 to 35% following acute ST elevation MI however now her ejection fraction has recovered to 55 to 60%  4.  Chronic kidney disease due to congenital kidney defect  5.  Asthma  6.  Depression  7.  Acid reflux disease  8.  Obstructive sleep apnea    Past surgical history  1.  Bilateral knee replacement  2.  Bilateral hip replacement  3.  Hysterectomy with bilateral salpingo-oophorectomy  4. Bilateral renal surgery for congenital abnormality    Medications  Current Outpatient Medications   Medication Sig    albuterol (PROAIR HFA/PROVENTIL HFA/VENTOLIN HFA) 108 (90 Base) MCG/ACT inhaler Inhale 1-2 puffs into the lungs every 4 hours as needed for shortness of breath or wheezing    aspirin (ASA) 81 MG EC tablet Take 1 tablet (81 mg) by mouth daily    atorvastatin (LIPITOR) 40 MG tablet TAKE 1 TABLET(40 MG) BY MOUTH DAILY    carvedilol (COREG) 3.125 MG tablet TAKE 1 TABLET(3.125 MG) BY MOUTH TWICE DAILY WITH MEALS    cholestyramine (QUESTRAN) 4 g packet Take 1 packet (4 g) by mouth 2 times daily (with meals)    diazepam (VALIUM) 2 MG tablet Take 0.5-1 tablets (1-2 mg) by mouth 2 times daily as needed for anxiety or muscle spasms    diclofenac (VOLTAREN) 1 % topical gel Apply topically 4 times daily For Jaw pain    estradiol (ESTRING) 2 MG vaginal ring Place 1 each vaginally every 3 months    fluticasone-vilanterol (BREO  ELLIPTA) 200-25 MCG/ACT inhaler Inhale 1 puff into the lungs daily    isosorbide mononitrate (IMDUR) 30 MG 24 hr tablet Take 0.5 tablets (15 mg) by mouth daily    loperamide (IMODIUM) 2 MG capsule Take 1 capsule (2 mg) by mouth daily    loratadine (CLARITIN) 10 MG tablet Take 10 mg by mouth At Bedtime    magnesium oxide (MAG-OX) 400 MG tablet Take 400 mg by mouth daily    Melatonin 10 MG TABS tablet Take 10 mg by mouth nightly as needed for sleep    nitroGLYcerin (NITROSTAT) 0.4 MG sublingual tablet Place 1 tablet (0.4 mg) under the tongue every 5 minutes as needed for chest pain    pantoprazole (PROTONIX) 40 MG EC tablet TAKE 1 TABLET(40 MG) BY MOUTH DAILY    Prenatal Multivit-Min-Fe-FA (PRENATAL/IRON) TABS Take 1 tablet by mouth daily     saccharomyces boulardii (FLORASTOR) 250 MG capsule Take 1 capsule (250 mg) by mouth 2 times daily    sertraline (ZOLOFT) 100 MG tablet TAKE 1 TABLET(100 MG) BY MOUTH DAILY    UNABLE TO FIND MEDICATION NAME: Uqora complete regimen    ursodiol (ACTIGALL) 300 MG capsule TAKE 1 CAPSULE(300 MG) BY MOUTH TWICE DAILY    vitamin D3 (CHOLECALCIFEROL) 2000 units (50 mcg) tablet Take 1 tablet (2,000 Units) by mouth daily    zinc gluconate 50 MG tablet      No current facility-administered medications for this visit.     Review of system  A detailed 10 point review of system obtained as described in history of present was all other systems reviewed are negative.      Family history  History of coronary artery disease and hyperlipidemia on her paternal side of the family.  No history of premature coronary artery disease, sudden cardiac death or cardiomyopathy.    Social history  She is a retired .  She lives alone.  She does not smoke or drink alcohol.    Exam  She was awake, alert, oriented x3.  She was comfortable.  She was in no apparent distress.  She had no pallor, cyanosis or jaundice.  Her neck exam revealed no jugular venous distention.  Carotids were 2+  bilaterally.  Pulse was regular rhythm.  Cardiac auscultation revealed normal S1 and S2 with no murmur rub or gallop.  Auscultation of the lungs revealed equal air entry on both sides with no added sound.  Her abdomen was soft with normal also no tenderness no rigidity no guarding.  She had no focal neurological deficit.  Her extremities showed no edema.    EKG (07/2023)  Her electrocardiogram showed sinus rhythm with sinus arrhythmia and left axis deviation nonspecific ST and T wave abnormalities     Echocardiogram (10/2022)  Left ventricular systolic function is normal.  The visual ejection fraction is 55-60%.  Grade I or early diastolic dysfunction.  No regional wall motion abnormalities noted.  The right ventricle is normal in size and function.  There is mild to moderate mitral valve regurgitation.  The left atrium is mildly dilated.  There is trace tricuspid valve regurgitation.  No hemodynamically significant valvular aortic stenosis.  The inferior vena cava is normal.    Zio  (08/2023)  She had a 3-day ZIO monitor that showed 1 nonsustained ventricular tachycardia up to 7 beats.  She had 6 supraventricular tachycardias up to 11 beats.  She had rare PACs and PVCs.  Patient is symptoms were related to sinus rhythm with or without PACs and PVCs.  No significant bradyarrhythmias.    Nuclear Stress test (08/2023)   The nuclear stress test is abnormal.    There is a medium sized area of a moderate degree of nontransmural infarction in the apical and anteroseptal segment(s) of the left ventricle. This appears to be in the left anterior descending distribution.No ischemia identified.    Coronary angiogram (12/2020)  Left Main   The vessel is moderate in size. There was 0% vessel disease.      Left Anterior Descending   Ost LAD to Prox LAD lesion is 40% stenosed. Not significantly changed from 10/2019 angiogram   Previously placed Prox LAD drug eluting stent is widely patent. Previous treatment took place 1-2 years  ago. No thrombosis in the previous stent. No restenosis in the previous stent.      First Diagonal Branch   1st Diag lesion is 100% stenosed. The lesion is chronic total occlusion. The jailed, small-moderate sized D1 is completely closed of the ostium. This was closed at the end of the 10/9/2019 PCI, but open at the time of the 10/10/2019 repeat angiogram. As she had no evidence of myocardial necrosis during the present admission, I suspect that this vessel ultimately closed back in October 2019 due to the unstable nature of the plaque at that time. It is now a  and is unlikely to be responsible for her current discomfort.  PCI in the future for this relatively small vessel would not be advised.      Left Circumflex   Prox Cx lesion is 15% stenosed.      Right Coronary Artery   The vessel is small.   Prox RCA lesion is 40% stenosed. Very small barely codominant RCA mild narrowing vessel <2.5mm         Intervention         Recent Results (from the past 1008 hour(s))   Lipid panel reflex to direct LDL Fasting    Collection Time: 08/05/23 12:04 PM   Result Value Ref Range    Cholesterol 136 <200 mg/dL    Triglycerides 88 <150 mg/dL    Direct Measure HDL 62 >=50 mg/dL    LDL Cholesterol Calculated 56 <=100 mg/dL    Non HDL Cholesterol 74 <130 mg/dL   Comprehensive metabolic panel    Collection Time: 08/05/23 12:04 PM   Result Value Ref Range    Sodium 145 136 - 145 mmol/L    Potassium 4.8 3.4 - 5.3 mmol/L    Chloride 106 98 - 107 mmol/L    Carbon Dioxide (CO2) 25 22 - 29 mmol/L    Anion Gap 14 7 - 15 mmol/L    Urea Nitrogen 10.2 8.0 - 23.0 mg/dL    Creatinine 1.02 (H) 0.51 - 0.95 mg/dL    Calcium 9.3 8.8 - 10.2 mg/dL    Glucose 102 (H) 70 - 99 mg/dL    Alkaline Phosphatase 102 35 - 104 U/L    AST 40 0 - 45 U/L    ALT 19 0 - 50 U/L    Protein Total 7.2 6.4 - 8.3 g/dL    Albumin 4.4 3.5 - 5.2 g/dL    Bilirubin Total 0.8 <=1.2 mg/dL    GFR Estimate 57 (L) >60 mL/min/1.73m2   CBC with platelets and differential     Collection Time: 08/05/23 12:04 PM   Result Value Ref Range    WBC Count 4.7 4.0 - 11.0 10e3/uL    RBC Count 4.81 3.80 - 5.20 10e6/uL    Hemoglobin 14.3 11.7 - 15.7 g/dL    Hematocrit 41.7 35.0 - 47.0 %    MCV 87 78 - 100 fL    MCH 29.7 26.5 - 33.0 pg    MCHC 34.3 31.5 - 36.5 g/dL    RDW 13.8 10.0 - 15.0 %    Platelet Count 154 150 - 450 10e3/uL    % Neutrophils 68 %    % Lymphocytes 23 %    % Monocytes 6 %    % Eosinophils 3 %    % Basophils 0 %    % Immature Granulocytes 0 %    Absolute Neutrophils 3.2 1.6 - 8.3 10e3/uL    Absolute Lymphocytes 1.1 0.8 - 5.3 10e3/uL    Absolute Monocytes 0.3 0.0 - 1.3 10e3/uL    Absolute Eosinophils 0.1 0.0 - 0.7 10e3/uL    Absolute Basophils 0.0 0.0 - 0.2 10e3/uL    Absolute Immature Granulocytes 0.0 <=0.4 10e3/uL   NM Lexiscan stress test    Collection Time: 08/14/23  2:38 PM   Result Value Ref Range    Target      Baseline Systolic      Baseline Diastolic BP 69     Last Stress Systolic      Last Stress Diastolic BP 47     Baseline HR 67 bpm    Max HR  90     Max Predicted HR  62 %    Rate Pressure Product 9,180.0    Clinitek Urine Macroscopic POCT    Collection Time: 08/14/23  3:13 PM   Result Value Ref Range    BILIRUBIN, URINE POCT Negative Negative    GLUCOSE, URINE POCT Negative Negative mg/dL    KETONES, URINE POCT Negative Negative mg/dL mg/dL    NITRITES POCT Negative Negative    PH, URINE POCT 7.5 5.0 - 8.0    PROTEIN, URINE POCT Negative Negative mg/dL    SPECIFIC GRAVITY POCT 1.015 1.005 - 1.030    UROBILINOGEN, URINE POCT 0.2 0.2, 1.0 E.U./dL    COLOR, URINE POCT Yellow Colorless, Straw, Light Yellow, Yellow    CLARITY, URINE POCT Clear Clear    Blood, Urine POCT Negative Negative    LEUK ESTERASE, POCT Negative Negative     Assessment and Plan:    Ms. Gama is a 72-year-old female with past medical history significant for hypertension, coronary artery disease, acute myocardial infarction involving the LAD territory, and ischemic cardiomyopathy with  recovered ejection fraction who is seeing us today for evaluation of recurrent dizziness and lightheadedness of unclear etiology.    The etiology of her recurrent dizziness and lightheadedness is unclear.  While she had a ZIO monitor for 3 days that did not show any significant tacky or bradycardia arrhythmia, she states that during those 3 days she did not have this episode.  It is possible that she is having significant tachyarrhythmia that is not captured on the ZIO.  She does have a scar from her prior LAD-she is at risk for ventricular tachyarrhythmias.  We will repeat an echocardiogram to make sure that her EF is stable.  We will refer her to electrophysiology to consider electrophysiological study versus loop recorder.    She does not have symptoms concerning for angina.  Her most recent nuclear stress test showed no evidence of reversible ischemia.  Thus I do not we need to repeat a coronary angiogram unless she has a significant drop in ejection fraction on the repeat echocardiogram.  She is on aspirin, statin, low-dose beta-blocker and isosorbide mononitrate which we will continue for now.    She is at low risk for perioperative cardiovascular events for a cholecystectomy.  However the patient would like to wait and have the evaluation for her lightheadedness and dizziness first before proceeding to cholecystectomy as this is more of an elective procedure.  I think this is reasonable.    It was a pleasure meeting Ms. Gama in our cardiology clinic at [facility].  Thank you for involving us in in her care.    Total time today was 65 minutes reviewing notes, imaging, labs, patient visit, orders and documentation.    Sincerely,  Bernadette Fleming MD   [facility]  Heart Failure, Transplant, and Mechanical Circulatory Support Cardiology   Cardiovascular Division  [facility]   [phone]

## 2023-09-13 NOTE — NURSING NOTE
Chief Complaint   Patient presents with    New Patient     Pre-Op clearance for gallbladder removal       Vitals were taken, medications reconciled.    Rhiannon Louis, EMT   11:43 AM

## 2023-09-13 NOTE — NURSING NOTE
Med Reconcile: Reviewed and verified all current medications with the patient. The updated medication list was printed and given to the patient. No changes    Return Appointment: Patient given instructions regarding scheduling next clinic visit. Echocardiogram. Referral to EP.     Patient stated she understood all health information given and agreed to call with further questions or concerns.       Yaritza Delgado RN

## 2023-09-14 ENCOUNTER — VIRTUAL VISIT (OUTPATIENT)
Dept: PSYCHOLOGY | Facility: CLINIC | Age: 73
End: 2023-09-14
Payer: MEDICARE

## 2023-09-14 DIAGNOSIS — F33.0 MAJOR DEPRESSIVE DISORDER, RECURRENT EPISODE, MILD (H): Primary | ICD-10-CM

## 2023-09-14 DIAGNOSIS — F43.10 POSTTRAUMATIC STRESS DISORDER: ICD-10-CM

## 2023-09-14 DIAGNOSIS — F41.1 GENERALIZED ANXIETY DISORDER: ICD-10-CM

## 2023-09-14 PROCEDURE — 90834 PSYTX W PT 45 MINUTES: CPT | Mod: VID | Performed by: SOCIAL WORKER
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
## 2023-09-15 DIAGNOSIS — I50.22 CHRONIC SYSTOLIC HEART FAILURE (H): ICD-10-CM

## 2023-09-15 DIAGNOSIS — I25.728 CORONARY ARTERY DISEASE OF AUTOLOGOUS BYPASS GRAFT WITH STABLE ANGINA PECTORIS (H): ICD-10-CM

## 2023-09-15 RX ORDER — VALSARTAN 40 MG/1
40 TABLET ORAL DAILY
Qty: 30 TABLET | Refills: 0 | Status: SHIPPED | OUTPATIENT
Start: 2023-09-15 | End: 2023-09-28

## 2023-09-18 ENCOUNTER — ANCILLARY PROCEDURE (OUTPATIENT)
Dept: CARDIOLOGY | Facility: CLINIC | Age: 73
End: 2023-09-18
Attending: INTERNAL MEDICINE
Payer: MEDICARE

## 2023-09-18 DIAGNOSIS — R42 DIZZINESS: ICD-10-CM

## 2023-09-18 DIAGNOSIS — I25.119 CORONARY ARTERY DISEASE WITH ANGINA PECTORIS, UNSPECIFIED VESSEL OR LESION TYPE, UNSPECIFIED WHETHER NATIVE OR TRANSPLANTED HEART (H): Chronic | ICD-10-CM

## 2023-09-18 LAB — LVEF ECHO: NORMAL

## 2023-09-18 PROCEDURE — 93306 TTE W/DOPPLER COMPLETE: CPT | Performed by: INTERNAL MEDICINE

## 2023-09-18 RX ORDER — VALSARTAN 40 MG/1
40 TABLET ORAL DAILY
Qty: 90 TABLET | OUTPATIENT
Start: 2023-09-18

## 2023-09-22 ENCOUNTER — VIRTUAL VISIT (OUTPATIENT)
Dept: PSYCHOLOGY | Facility: CLINIC | Age: 73
End: 2023-09-22
Payer: MEDICARE

## 2023-09-22 DIAGNOSIS — F43.10 POSTTRAUMATIC STRESS DISORDER: ICD-10-CM

## 2023-09-22 DIAGNOSIS — F41.1 GENERALIZED ANXIETY DISORDER: ICD-10-CM

## 2023-09-22 DIAGNOSIS — F33.0 MAJOR DEPRESSIVE DISORDER, RECURRENT EPISODE, MILD (H): Primary | ICD-10-CM

## 2023-09-22 PROCEDURE — 90834 PSYTX W PT 45 MINUTES: CPT | Mod: VID | Performed by: SOCIAL WORKER

## 2023-09-22 ASSESSMENT — ENCOUNTER SYMPTOMS
SHORTNESS OF BREATH: 1
ABDOMINAL PAIN: 0
CONSTIPATION: 0
NERVOUS/ANXIOUS: 0
PARESTHESIAS: 0
HEADACHES: 1
BREAST MASS: 0
WEAKNESS: 0
EYE PAIN: 1
SORE THROAT: 0
NAUSEA: 0
FREQUENCY: 1
DYSURIA: 0
JOINT SWELLING: 0
DIARRHEA: 0
MYALGIAS: 0
HEMATURIA: 0
CHILLS: 0
HEMATOCHEZIA: 0
DIZZINESS: 1
HEARTBURN: 0
FEVER: 0
ARTHRALGIAS: 0
PALPITATIONS: 0
COUGH: 0

## 2023-09-22 ASSESSMENT — PATIENT HEALTH QUESTIONNAIRE - PHQ9
10. IF YOU CHECKED OFF ANY PROBLEMS, HOW DIFFICULT HAVE THESE PROBLEMS MADE IT FOR YOU TO DO YOUR WORK, TAKE CARE OF THINGS AT HOME, OR GET ALONG WITH OTHER PEOPLE: SOMEWHAT DIFFICULT
SUM OF ALL RESPONSES TO PHQ QUESTIONS 1-9: 7
SUM OF ALL RESPONSES TO PHQ QUESTIONS 1-9: 7

## 2023-09-22 ASSESSMENT — ACTIVITIES OF DAILY LIVING (ADL): CURRENT_FUNCTION: HOUSEWORK REQUIRES ASSISTANCE
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
## 2023-09-28 ENCOUNTER — OFFICE VISIT (OUTPATIENT)
Dept: FAMILY MEDICINE | Facility: CLINIC | Age: 73
End: 2023-09-28
Payer: MEDICARE

## 2023-09-28 ENCOUNTER — VIRTUAL VISIT (OUTPATIENT)
Dept: PSYCHOLOGY | Facility: CLINIC | Age: 73
End: 2023-09-28
Payer: MEDICARE

## 2023-09-28 VITALS
SYSTOLIC BLOOD PRESSURE: 110 MMHG | HEART RATE: 72 BPM | RESPIRATION RATE: 20 BRPM | WEIGHT: 178 LBS | DIASTOLIC BLOOD PRESSURE: 60 MMHG | HEIGHT: 65 IN | TEMPERATURE: 97.8 F | BODY MASS INDEX: 29.66 KG/M2 | OXYGEN SATURATION: 98 %

## 2023-09-28 DIAGNOSIS — I25.119 CORONARY ARTERY DISEASE WITH ANGINA PECTORIS, UNSPECIFIED VESSEL OR LESION TYPE, UNSPECIFIED WHETHER NATIVE OR TRANSPLANTED HEART (H): Chronic | ICD-10-CM

## 2023-09-28 DIAGNOSIS — F41.1 GENERALIZED ANXIETY DISORDER: ICD-10-CM

## 2023-09-28 DIAGNOSIS — F43.10 POSTTRAUMATIC STRESS DISORDER: ICD-10-CM

## 2023-09-28 DIAGNOSIS — N18.31 STAGE 3A CHRONIC KIDNEY DISEASE (CKD) (H): ICD-10-CM

## 2023-09-28 DIAGNOSIS — F33.0 MAJOR DEPRESSIVE DISORDER, RECURRENT EPISODE, MILD (H): Primary | ICD-10-CM

## 2023-09-28 DIAGNOSIS — Z00.00 ENCOUNTER FOR MEDICARE ANNUAL WELLNESS EXAM: Primary | ICD-10-CM

## 2023-09-28 DIAGNOSIS — Z23 NEED FOR PROPHYLACTIC VACCINATION AND INOCULATION AGAINST INFLUENZA: ICD-10-CM

## 2023-09-28 LAB
ALBUMIN SERPL BCG-MCNC: 4.4 G/DL (ref 3.5–5.2)
ALP SERPL-CCNC: 167 U/L (ref 35–104)
ALT SERPL W P-5'-P-CCNC: 118 U/L (ref 0–50)
ANION GAP SERPL CALCULATED.3IONS-SCNC: 12 MMOL/L (ref 7–15)
AST SERPL W P-5'-P-CCNC: 60 U/L (ref 0–45)
BILIRUB SERPL-MCNC: 0.8 MG/DL
BUN SERPL-MCNC: 11.9 MG/DL (ref 8–23)
CALCIUM SERPL-MCNC: 9.7 MG/DL (ref 8.8–10.2)
CHLORIDE SERPL-SCNC: 107 MMOL/L (ref 98–107)
CREAT SERPL-MCNC: 0.99 MG/DL (ref 0.51–0.95)
EGFRCR SERPLBLD CKD-EPI 2021: 60 ML/MIN/1.73M2
GLUCOSE SERPL-MCNC: 95 MG/DL (ref 70–99)
HCO3 SERPL-SCNC: 28 MMOL/L (ref 22–29)
POTASSIUM SERPL-SCNC: 4.9 MMOL/L (ref 3.4–5.3)
PROT SERPL-MCNC: 7.4 G/DL (ref 6.4–8.3)
SODIUM SERPL-SCNC: 147 MMOL/L (ref 135–145)

## 2023-09-28 PROCEDURE — 80053 COMPREHEN METABOLIC PANEL: CPT | Performed by: INTERNAL MEDICINE

## 2023-09-28 PROCEDURE — 36415 COLL VENOUS BLD VENIPUNCTURE: CPT | Performed by: INTERNAL MEDICINE

## 2023-09-28 PROCEDURE — G0008 ADMIN INFLUENZA VIRUS VAC: HCPCS | Performed by: INTERNAL MEDICINE

## 2023-09-28 PROCEDURE — 90834 PSYTX W PT 45 MINUTES: CPT | Mod: VID | Performed by: SOCIAL WORKER

## 2023-09-28 PROCEDURE — G0439 PPPS, SUBSEQ VISIT: HCPCS | Performed by: INTERNAL MEDICINE

## 2023-09-28 PROCEDURE — 90662 IIV NO PRSV INCREASED AG IM: CPT | Performed by: INTERNAL MEDICINE

## 2023-09-28 RX ORDER — ISOSORBIDE MONONITRATE 30 MG/1
30 TABLET, EXTENDED RELEASE ORAL EVERY EVENING
Qty: 90 TABLET | Refills: 3 | Status: SHIPPED | OUTPATIENT
Start: 2023-09-28

## 2023-09-28 ASSESSMENT — ENCOUNTER SYMPTOMS
COUGH: 0
ARTHRALGIAS: 0
CONSTIPATION: 0
HEADACHES: 1
DIARRHEA: 0
SHORTNESS OF BREATH: 1
DIZZINESS: 1
FEVER: 0
ABDOMINAL PAIN: 0
CHILLS: 0
WEAKNESS: 0
MYALGIAS: 0
BREAST MASS: 0
PARESTHESIAS: 0
PALPITATIONS: 0
HEMATOCHEZIA: 0
HEARTBURN: 0
DYSURIA: 0
FREQUENCY: 1
SORE THROAT: 0
HEMATURIA: 0
EYE PAIN: 1
JOINT SWELLING: 0
NAUSEA: 0
NERVOUS/ANXIOUS: 0

## 2023-09-28 ASSESSMENT — PATIENT HEALTH QUESTIONNAIRE - PHQ9
SUM OF ALL RESPONSES TO PHQ QUESTIONS 1-9: 8
10. IF YOU CHECKED OFF ANY PROBLEMS, HOW DIFFICULT HAVE THESE PROBLEMS MADE IT FOR YOU TO DO YOUR WORK, TAKE CARE OF THINGS AT HOME, OR GET ALONG WITH OTHER PEOPLE: SOMEWHAT DIFFICULT
SUM OF ALL RESPONSES TO PHQ QUESTIONS 1-9: 8

## 2023-09-28 ASSESSMENT — PAIN SCALES - GENERAL: PAINLEVEL: MILD PAIN (2)

## 2023-09-28 ASSESSMENT — ACTIVITIES OF DAILY LIVING (ADL): CURRENT_FUNCTION: HOUSEWORK REQUIRES ASSISTANCE

## 2023-09-28 NOTE — PATIENT INSTRUCTIONS
Patient Education   Personalized Prevention Plan  You are due for the preventive services outlined below.  Your care team is available to assist you in scheduling these services.  If you have already completed any of these items, please share that information with your care team to update in your medical record.  Health Maintenance Due   Topic Date Due     Heart Failure Action Plan  Never done     Asthma Action Plan - yearly  02/08/2023     Annual Wellness Visit  08/26/2023     Diptheria Tetanus Pertussis (DTAP/TDAP/TD) Vaccine (2 - Td or Tdap) 08/28/2023     Flu Vaccine (1) 09/01/2023     Learning About Depression Screening  What is depression screening?  Depression screening is a way to see if you have depression symptoms. It may be done by a doctor or counselor. It's often part of a routine checkup. That's because your mental health is just as important as your physical health.  Depression is a mental health condition that affects how you feel, think, and act. You may:  Have less energy.  Lose interest in your daily activities.  Feel sad and grouchy for a long time.  Depression is very common. It affects people of all ages.  Many things can lead to depression. Some people become depressed after they have a stroke or find out they have a major illness like cancer or heart disease. The death of a loved one or a breakup may lead to depression. It can run in families. Most experts believe that a combination of inherited genes and stressful life events can cause it.  What happens during screening?  You may be asked to fill out a form about your depression symptoms. You and the doctor will discuss your answers. The doctor may ask you more questions to learn more about how you think, act, and feel.  What happens after screening?  If you have symptoms of depression, your doctor will talk to you about your options.  Doctors usually treat depression with medicines or counseling. Often, combining the two works best. Many  "people don't get help because they think that they'll get over the depression on their own. But people with depression may not get better unless they get treatment.  The cause of depression is not well understood. There may be many factors involved. But if you have depression, it's not your fault.  A serious symptom of depression is thinking about death or suicide. If you or someone you care about talks about this or about feeling hopeless, get help right away.  It's important to know that depression can be treated. Medicine, counseling, and self-care may help.  Where can you learn more?  Go to https://www.[employer].net/patiented  Enter T185 in the search box to learn more about \"Learning About Depression Screening.\"  Current as of: October 20, 2022               Content Version: 13.7    9549-5867 [employer].   Care instructions adapted under license by your healthcare professional. If you have questions about a medical condition or this instruction, always ask your healthcare professional. [employer] disclaims any warranty or liability for your use of this information.         Patient Education   Personalized Prevention Plan  You are due for the preventive services outlined below.  Your care team is available to assist you in scheduling these services.  If you have already completed any of these items, please share that information with your care team to update in your medical record.  Health Maintenance Due   Topic Date Due     Heart Failure Action Plan  Never done     Asthma Action Plan - yearly  02/08/2023     Annual Wellness Visit  08/26/2023     Diptheria Tetanus Pertussis (DTAP/TDAP/TD) Vaccine (2 - Td or Tdap) 08/28/2023     Flu Vaccine (1) 09/01/2023     Learning About Depression Screening  What is depression screening?  Depression screening is a way to see if you have depression symptoms. It may be done by a doctor or counselor. It's often part of a routine checkup. That's " "because your mental health is just as important as your physical health.  Depression is a mental health condition that affects how you feel, think, and act. You may:  Have less energy.  Lose interest in your daily activities.  Feel sad and grouchy for a long time.  Depression is very common. It affects people of all ages.  Many things can lead to depression. Some people become depressed after they have a stroke or find out they have a major illness like cancer or heart disease. The death of a loved one or a breakup may lead to depression. It can run in families. Most experts believe that a combination of inherited genes and stressful life events can cause it.  What happens during screening?  You may be asked to fill out a form about your depression symptoms. You and the doctor will discuss your answers. The doctor may ask you more questions to learn more about how you think, act, and feel.  What happens after screening?  If you have symptoms of depression, your doctor will talk to you about your options.  Doctors usually treat depression with medicines or counseling. Often, combining the two works best. Many people don't get help because they think that they'll get over the depression on their own. But people with depression may not get better unless they get treatment.  The cause of depression is not well understood. There may be many factors involved. But if you have depression, it's not your fault.  A serious symptom of depression is thinking about death or suicide. If you or someone you care about talks about this or about feeling hopeless, get help right away.  It's important to know that depression can be treated. Medicine, counseling, and self-care may help.  Where can you learn more?  Go to https://www.[employer].net/patiented  Enter T185 in the search box to learn more about \"Learning About Depression Screening.\"  Current as of: October 20, 2022               Content Version: 13.7    2492-2914 [facility], " [employer].   Care instructions adapted under license by your healthcare professional. If you have questions about a medical condition or this instruction, always ask your healthcare professional. [employer] disclaims any warranty or liability for your use of this information.
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
## 2023-09-28 NOTE — PROGRESS NOTES
"  SUBJECTIVE:   Heidy is a 72 year old who presents for Preventive Visit.    Are you in the first 12 months of your Medicare coverage?  No    Healthy Habits:     In general, how would you rate your overall health?  Fair    Frequency of exercise:  1 day/week    Duration of exercise:  Less than 15 minutes    Do you usually eat at least 4 servings of fruit and vegetables a day, include whole grains    & fiber and avoid regularly eating high fat or \"junk\" foods?  Yes    Taking medications regularly:  Yes    Medication side effects:  Other    Ability to successfully perform activities of daily living:  Housework requires assistance    Home Safety:  No safety concerns identified    Hearing Impairment:  Find that men's voices are easier to understand than woman's    In the past 6 months, have you been bothered by leaking of urine? Yes    In general, how would you rate your overall mental or emotional health?  Fair    Additional concerns today:  No      Today's PHQ-9 Score:       9/28/2023    11:10 AM   PHQ-9 SCORE   PHQ-9 Total Score MyChart 8 (Mild depression)   PHQ-9 Total Score 8     Updates: Patient still experiences \"twinges\" on her chest after Imdur was reduced to 15 mg/day. Holter monitor shows nonsustained ventricular tachycardia.    Have you ever done Advance Care Planning? (For example, a Health Directive, POLST, or a discussion with a medical provider or your loved ones about your wishes): No, advance care planning information given to patient to review.  Patient declined advance care planning discussion at this time.       Fall risk  Fallen 2 or more times in the past year?: Yes  Any fall with injury in the past year?: Yes    Cognitive Screening   1) Repeat 3 items (Leader, Season, Table)    2) Clock draw: NORMAL  3) 3 item recall: Recalls 2 objects   Results: NORMAL clock, 1-2 items recalled: COGNITIVE IMPAIRMENT LESS LIKELY    Do you have sleep apnea, excessive snoring or daytime drowsiness? : yes    Reviewed " and updated as needed this visit by clinical staff    Allergies  Meds              Reviewed and updated as needed this visit by Provider                 Social History     Tobacco Use    Smoking status: Never     Passive exposure: Never    Smokeless tobacco: Never   Substance Use Topics    Alcohol use: No         9/22/2023     4:01 PM   Alcohol Use   Prescreen: >3 drinks/day or >7 drinks/week? Not Applicable     Do you have a current opioid prescription? No  Do you use any other controlled substances or medications that are not prescribed by a provider? None    Current providers sharing in care for this patient include:   Patient Care Team:  Danny Conteh MD as PCP - General (Internal Medicine)  Danny Conteh MD as Assigned PCP  Luis A Moody MD as Assigned Pulmonology Provider  Mel Dennison MD as Assigned Heart and Vascular Provider  Evangelist Lee MD as Hospitalist (Internal Medicine)  Mel Rodriguez MD as MD (Urology)  Mel Rodriguez MD as Assigned Surgical Provider  Kashif Hadley MD as Assigned Sleep Provider  Blu Lopez PA-C as Assigned Musculoskeletal Provider  Agus Segura MD as MD (Surgery)  Kristie Bell PA-C as Assigned Neuroscience Provider  Mickey Gallego MD as MD (Surgery)    The following health maintenance items are reviewed in Epic and correct as of today:  Health Maintenance   Topic Date Due    HF ACTION PLAN  Never done    ASTHMA ACTION PLAN  02/08/2023    MEDICARE ANNUAL WELLNESS VISIT  08/26/2023    DTAP/TDAP/TD IMMUNIZATION (2 - Td or Tdap) 08/28/2023    INFLUENZA VACCINE (1) 09/01/2023    MICROALBUMIN  11/28/2023    ASTHMA CONTROL TEST  12/19/2023    BMP  02/05/2024    PHQ-9  03/28/2024    ANNUAL REVIEW OF HM ORDERS  05/04/2024    ALT  08/05/2024    LIPID  08/05/2024    CBC  08/05/2024    HEMOGLOBIN  08/05/2024    FALL RISK ASSESSMENT  09/28/2024    MAMMO SCREENING  04/21/2025    ADVANCE CARE PLANNING  09/05/2027     COLORECTAL CANCER SCREENING  02/21/2028    DEXA  02/21/2037    TSH W/FREE T4 REFLEX  Completed    HEPATITIS C SCREENING  Completed    DEPRESSION ACTION PLAN  Completed    Pneumococcal Vaccine: 65+ Years  Completed    URINALYSIS  Completed    ZOSTER IMMUNIZATION  Completed    COVID-19 Vaccine  Completed    IPV IMMUNIZATION  Aged Out    HPV IMMUNIZATION  Aged Out    MENINGITIS IMMUNIZATION  Aged Out     Labs reviewed in EPIC  BP Readings from Last 3 Encounters:   10/12/23 (!) 140/38   10/05/23 (!) 167/86   09/28/23 110/60    Wt Readings from Last 3 Encounters:   10/12/23 80.8 kg (178 lb 2.1 oz)   10/05/23 81.9 kg (180 lb 8 oz)   09/28/23 80.7 kg (178 lb)                  Patient Active Problem List   Diagnosis    TMJ (temporomandibular joint syndrome)    Congenital ureteric stenosis    Lacrimal duct stenosis    Chronic rhinitis    Intermittent asthma    Advanced directives, counseling/discussion    Major depressive disorder, recurrent episode, moderate (H)    Osteoarthritis of right knee    Esophageal reflux (GERD)    Primary narcolepsy without cataplexy    Vitamin D deficiency    Stage 3a chronic kidney disease (H)    Incontinence of feces with fecal urgency    Overactive bladder    Fatigue, unspecified type    History of ischemic cardiomyopathy    Hyperlipidemia LDL goal <70    Calculus of gallbladder without cholecystitis without obstruction    Coronary artery disease with angina pectoris, unspecified vessel or lesion type, unspecified whether native or transplanted heart (H24)    Environmental allergies    Class 1 obesity due to excess calories with serious comorbidity and body mass index (BMI) of 30.0 to 30.9 in adult    Chronic insomnia    Hiatal hernia    Cheyne-Rogers breathing disorder    Clostridium difficile infection    Pyelonephritis    Sepsis, due to unspecified organism, unspecified whether acute organ dysfunction present (H)    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    Possible PLMD (periodic limb movement  disorder)    Adjustment disorder with anxiety    Major depressive disorder, recurrent episode, mild (H24)    Generalized anxiety disorder    Dilated cardiomyopathy (H)    Idiopathic interstitial fibrosis (H)    Back muscle spasm    Benign paroxysmal positional vertigo, unspecified laterality    Closed compression fracture of L3 lumbar vertebra, sequela    Bilateral hydronephrosis    Acute bilateral low back pain without sciatica    Posttraumatic stress disorder    Urinary incontinence    Somatic dysfunction of pelvic region    Shortness of breath    Chest pain, unspecified type    History of CAD (coronary artery disease)     Past Surgical History:   Procedure Laterality Date    ABDOMEN SURGERY  1974, 1996    Kidney reconstruct. Uterer stenosis    ARTHROPLASTY KNEE  07/09/2014    Procedure: ARTHROPLASTY KNEE;  Surgeon: Levi Johnson MD;  Location:  OR    ARTHROPLASTY KNEE Left 11/24/2014    Procedure: ARTHROPLASTY KNEE;  Surgeon: Levi Johnson MD;  Location:  OR    COLONOSCOPY      Several times dates ??    CV CORONARY ANGIOGRAM N/A 10/09/2019    Procedure: Coronary Angiogram;  Surgeon: Chiquita Chatterjee MD;  Location:  HEART CARDIAC CATH LAB    CV HEART CATHETERIZATION WITH POSSIBLE INTERVENTION N/A 10/10/2019    Procedure: Heart Catheterization with Possible Intervention;  Surgeon: Chin Garcia MD;  Location:  HEART CARDIAC CATH LAB    CV INTRA AORTIC BALLOON N/A 10/09/2019    Procedure: Intra-Aortic Balloon Pump Insertion;  Surgeon: Chiquita Chatterjee MD;  Location:  HEART CARDIAC CATH LAB    CV INTRA AORTIC BALLOON REMOVAL N/A 10/10/2019    Procedure: Intra-Aortic Balloon Pump Removal;  Surgeon: Chin Garcia MD;  Location:  HEART CARDIAC CATH LAB    CV LEFT HEART CATH N/A 12/11/2020    Procedure: Heart Catheterization with Possible Intervention;  Surgeon: Pilo Otoole MD;  Location:  HEART CARDIAC CATH LAB    CV PCI STENT DRUG ELUTING N/A 10/09/2019    Procedure:  PCI Stent Drug Eluting;  Surgeon: Chiquita Chatterjee MD;  Location:  HEART CARDIAC CATH LAB    EYE SURGERY  2011    Cataract surgery both eyes    GENITOURINARY SURGERY  01/01/2008    Prolift    GENITOURINARY SURGERY  1973    In 1973, she had surgery to correct congenital narrowing in the ureter at its connection to the right kidney    GENITOURINARY SURGERY  1997 1997 pt went to [facility] and had surgery to remove the scar tissue, rebuild the bladder from previous ureter surgery.    ORTHOPEDIC SURGERY      bilat total hip    RECTOCELE REPAIR      ZZC TOTAL ABD HYSTERECTOMY+BLAD REPR  01/01/1992    Vaginal approach with oophrectomy    ZZC TOTAL KNEE ARTHROPLASTY         Social History     Tobacco Use    Smoking status: Never     Passive exposure: Never    Smokeless tobacco: Never   Substance Use Topics    Alcohol use: No     Family History   Problem Relation Age of Onset    Hypertension Mother     Arthritis Mother     Cerebrovascular Disease Mother     Anesthesia Reaction Mother         Halucinates    Cerebrovascular Disease Father         Three Strokes    Diabetes Father         Type 2, after his 60's    Kidney Disease Father     Hypertension Father     Hyperlipidemia Father     Osteoporosis Father     Diabetes Brother         After recovered from aspiration pneumonia, developed diabetes from high carb diet from feeding tube    Birth defects Brother     Thyroid Disease Sister         Low thyroid    Anesthesia Reaction Sister     Sjogren's Daughter     Diabetes Daughter         Diagnosed after stroke    Cerebrovascular Disease Daughter     Hypertension Daughter         With diabetes and stroke    Hyperlipidemia Daughter     Depression Daughter         Does not acknowledge    Anxiety Disorder Daughter     Asthma Daughter         Anxiety, air quality, and exercise triggered    Obesity Daughter     Diabetes Son         On metformin.    Hypertension Son     Obesity Son     Coronary Artery Disease Maternal  Grandfather         Strokes, lived to 105    Substance Abuse Maternal Grandfather         Alcohol    Osteoporosis Maternal Grandfather         Old age    Osteoporosis Maternal Grandmother         Old age lived to 90    Coronary Artery Disease Paternal Grandfather     Coronary Artery Disease Paternal Grandmother     Osteoporosis Paternal Grandmother         Long term issue for her    Obesity Paternal Grandmother     Diabetes Daughter         Gestational and pre diabetic A1C    Anxiety Disorder Daughter     Asthma Daughter         Excursion induced    Thyroid Disease Daughter     Depression Son           ..from job    Mental Illness Other     Substance Abuse Other     Substance Abuse Other     Substance Abuse Other          Allergies   Allergen Reactions    Dust Mites Cough, Headache, Other (See Comments) and Shortness Of Breath    Latex Other (See Comments) and Shortness Of Breath     Runny nose  PN: LW Reaction: DIFFICULTY BREATHING      Sulfa Antibiotics Anaphylaxis, Hives and Itching     PN: LW Reaction: HIVES, Swelling  PN: LW Reaction: HIVES, Swelling    Flagyl [Metronidazole Hcl] Anaphylaxis and Rash    Food      PN: LW FI1: nka LW FI2:    Hydrochlorothiazide W-Triamterene      PN: LW Reaction: skin rash  PN: LW Reaction: skin rash    No Clinical Screening - See Comments      PN: LW Other1: -Adhesive Tape    Seasonal Allergies      sneezing    Tape [Adhesive Tape] Hives    Metoprolol Itching and Rash    Metronidazole Hives and Rash     PN: LW Reaction: HIVES       Recent Labs   Lab Test 10/12/23  0645 10/11/23  1756 09/28/23  1236 08/05/23  1204 07/22/23  0737 11/01/22  0544 09/21/22  0820 08/26/22  0918 06/21/22  0842 02/08/22  1407 09/23/21  1832 12/17/20  1403 12/10/20  0557 02/09/17  1230 02/02/16  1634   A1C  --   --   --   --   --   --   --   --   --   --   --  5.2  --   --   --    LDL  --   --   --  56  --   --   --  70  --  92  --  37  --    < > 126*   HDL  --   --   --  62  --   --   --   "60  --  54  --  51  --    < > 54   TRIG  --   --   --  88  --   --   --  55  --  234*  --  168*  --    < > 105   ALT  --   --  118* 19 16   < >  --  29  --   --    < > 26  --    < >  --    CR 1.11* 1.00* 0.99* 1.02* 0.99*   < >  --  0.92   < > 1.13*   < > 1.00 1.04   < > 1.06*   GFRESTIMATED 52* 59* 60* 57* 60*   < >  --  65   < > 51*   < > 56* 54*   < > 52*   GFRESTBLACK  --   --   --   --   --   --   --   --   --   --   --  65 62   < > 63   POTASSIUM 3.7 3.8 4.9 4.8 3.9   < >  --  4.8   < > 4.6   < > 3.9 4.3   < > 3.8   TSH  --   --   --   --   --   --  2.02  --   --   --   --   --   --   --  1.62    < > = values in this interval not displayed.     Review of Systems   Constitutional:  Negative for chills and fever.   HENT:  Positive for ear pain. Negative for congestion, hearing loss and sore throat.    Eyes:  Positive for pain and visual disturbance.   Respiratory:  Positive for shortness of breath. Negative for cough.    Cardiovascular:  Negative for chest pain, palpitations and peripheral edema.   Gastrointestinal:  Negative for abdominal pain, constipation, diarrhea, heartburn, hematochezia and nausea.   Breasts:  Negative for tenderness, breast mass and discharge.   Genitourinary:  Positive for frequency and urgency. Negative for dysuria, genital sores, hematuria, pelvic pain, vaginal bleeding and vaginal discharge.   Musculoskeletal:  Negative for arthralgias, joint swelling and myalgias.   Skin:  Negative for rash.   Neurological:  Positive for dizziness and headaches. Negative for weakness and paresthesias.   Psychiatric/Behavioral:  Negative for mood changes. The patient is not nervous/anxious.         OBJECTIVE:   /60 (BP Location: Right arm, Patient Position: Sitting, Cuff Size: Adult Large)   Pulse 72   Temp 97.8  F (36.6  C) (Oral)   Resp 20   Ht 1.638 m (5' 4.5\")   Wt 80.7 kg (178 lb)   LMP  (LMP Unknown)   SpO2 98%   BMI 30.08 kg/m      Estimated body mass index is 30.08 kg/m  as " "calculated from the following:    Height as of this encounter: 1.638 m (5' 4.5\").    Weight as of this encounter: 80.7 kg (178 lb).  Physical Exam  GENERAL: alert, no distress, and fatigued  EYES: Eyes grossly normal to inspection and conjunctivae and sclerae normal  HENT: normal cephalic/atraumatic and oral mucous membranes moist  NECK: no adenopathy and thyroid normal to palpation  RESP: lungs clear to auscultation - no rales, rhonchi or wheezes  CV: regular rates and rhythm and no peripheral edema  ABDOMEN: soft, nontender, no hepatosplenomegaly, no masses and bowel sounds normal  MS: no gross musculoskeletal defects noted, no edema  NEURO: Normal strength and tone, mentation intact and speech normal  PSYCH: mentation appears normal, affect normal/bright      Brain/ Pituitary MRI without and with contrast     History: cheyne rogers breathing, h/o pituitary tumor; Cheyne-Rogers  breathing disorder; Pituitary microadenoma (H).  ICD-10: Cheyne-Rogers breathing disorder; Pituitary microadenoma (H)     Comparison:  Head CT 9/23/2021. Brain MRI 4/29/2014      Technique: Axial diffusion and FLAIR images of the whole brain  obtained without intravenous contrast. Sagittal T1 and T2-weighted,  coronal T2-weighted, coronal T1-weighted images with focus on the  sella were obtained without intravenous contrast. Post intravenous  contrast using gadolinium coronal and sagittal T1-weighted images were  obtained focused on the sella. Dynamic postcontrast coronal  T1-weighted images were also obtained.     Contrast: 8.5 cc Gadavist IV.     Findings:    Mild motion degraded examination. Atrophic pituitary gland, within  normal limits for age. No sellar abnormality. Pituitary stalk is  midline. Normal optic chiasm. Normal major vascular intracranial  flow-voids.     No intracranial hemorrhage, mass, mass effect, or midline shift. No  hydrocephalus. No abnormal restricted diffusion. No abnormal FLAIR  signal. No abnormal " enhancement.     Mild paranasal sinus mucosal thickening.                                                                      Impression:  Normal pituitary gland and whole brain MRI.     I have personally reviewed the examination and initial interpretation  and I agree with the findings.     MAG CLINE MD     Name: INDER SMITH  MRN: [id number]  : 1950  Study Date: 2023 11:09 AM  Age: 72 yrs  Gender: Female  Patient [facility]: [facility]  Reason For Study: Dizziness, Coronary artery disease with angina pectoris,  unspeci  Ordering Physician: ALEXY TRACEY  Referring Physician: ALEXY TRACEY  Performed By: Nba Urias RDCS     BSA: 1.9 m2  Height: 64 in  Weight: 183 lb  HR: 58  BP: 133/63 mmHg  ______________________________________________________________________________  Procedure  Echocardiogram with two-dimensional, color and spectral Doppler performed.  ______________________________________________________________________________  Interpretation Summary  Global and regional left ventricular function is normal with an EF of 55-60%.  Right ventricular function, chamber size, wall motion, and thickness are  normal.  The inferior vena cava is normal.  No pericardial effusion is present.  No significant changes noted.  ______________________________________________________________________________  Left Ventricle  Global and regional left ventricular function is normal with an EF of 55-60%.  Left ventricular wall thickness is normal. Left ventricular size is normal.  Grade I or early diastolic dysfunction. No regional wall motion abnormalities  are seen.     Right Ventricle  Right ventricular function, chamber size, wall motion, and thickness are  normal.     Atria  Both atria appear normal.     Mitral Valve  The mitral valve is normal. Trace mitral insufficiency is present.     Aortic Valve  Aortic valve sclerosis is present. Trace aortic insufficiency is present.     Tricuspid  Valve  The tricuspid valve is normal. Trace tricuspid insufficiency is present.  Pulmonary artery systolic pressure cannot be assessed.     Pulmonic Valve  The valve leaflets are not well visualized. On Doppler interrogation, there is  no significant stenosis or regurgitation.     Vessels  The thoracic aorta is normal. The pulmonary artery and bifurcation cannot be  assessed. The inferior vena cava is normal.     Pericardium  No pericardial effusion is present.     Compared to Previous Study  No significant changes noted.  ______________________________________________________________________________  MMode/2D Measurements & Calculations  IVSd: 0.63 cm     LVIDd: 4.6 cm  LVIDs: 3.3 cm  LVPWd: 0.87 cm  FS: 27.4 %  LV mass(C)d: 108.7 grams  LV mass(C)dI: 57.7 grams/m2  Ao root diam: 2.8 cm  asc Aorta Diam: 3.4 cm  LVOT diam: 2.3 cm  LVOT area: 4.2 cm2  Ao root diam index Ht(cm/m): 1.7  Ao root diam index BSA (cm/m2): 1.5  Asc Ao diam index BSA (cm/m2): 1.8  Asc Ao diam index Ht(cm/m): 2.1  LA Volume (BP): 49.2 ml     LA Volume Index (BP): 26.2 ml/m2  RWT: 0.38  TAPSE: 2.5 cm     Doppler Measurements & Calculations  MV E max charlie: 52.3 cm/sec  MV A max charlie: 82.7 cm/sec  MV E/A: 0.63  MV dec slope: 166.8 cm/sec2  MV dec time: 0.31 sec  AI P1/2t: 762.9 msec  PA acc time: 0.12 sec  E/E' av.8  Lateral E/e': 5.4  Medial E/e': 8.3  RV S Charlie: 20.3 cm/sec     ______________________________________________________________________________  Report approved by: Jasson Llamas 2023 11:56 AM     ASSESSMENT / PLAN:     Encounter for Medicare annual wellness exam  - PRIMARY CARE FOLLOW-UP SCHEDULING; Future  - Comprehensive metabolic panel    Coronary artery disease with angina pectoris, unspecified vessel or lesion type, unspecified whether native or transplanted heart (H)  - isosorbide mononitrate (IMDUR) 30 MG 24 hr tablet; Take 1 tablet (30 mg) by mouth every evening Take with Carvedilol.    Need for prophylactic  "vaccination and inoculation against influenza  - INFLUENZA VACCINE 65+ (FLUZONE HD)    Stage 3a chronic kidney disease (CKD) (H)  - Comprehensive metabolic panel     Patient has been advised of split billing requirements and indicates understanding: Yes      COUNSELING:  Special attention given to:       Regular exercise       Healthy diet/nutrition       Immunizations  Vaccinated for: Influenza           The ASCVD Risk score (Pushpa ROSENTHAL, et al., 2019) failed to calculate for the following reasons:    The patient has a prior MI or stroke diagnosis      BMI:   Estimated body mass index is 30.08 kg/m  as calculated from the following:    Height as of this encounter: 1.638 m (5' 4.5\").    Weight as of this encounter: 80.7 kg (178 lb).   Weight management plan: diet and exercise.      She reports that she has never smoked. She has never been exposed to tobacco smoke. She has never used smokeless tobacco.      Appropriate preventive services were discussed with this patient, including applicable screening as appropriate for fall prevention, nutrition, physical activity, Tobacco-use cessation, weight loss and cognition.  Checklist reviewing preventive services available has been given to the patient.    Reviewed patients plan of care and provided an AVS. The Basic Care Plan (routine screening as documented in Health Maintenance) for Gem meets the Care Plan requirement. This Care Plan has been established and reviewed with the Patient.      Danny Conteh MD  [facility]    Identified Health Risks:  The patient's PHQ-9 score is consistent with mild depression. She was provided with information regarding depression and was advised to schedule a follow up appointment in 12 weeks to further address this issue.The patient's PHQ-9 score is consistent with mild depression. She was provided with information regarding depression and was advised to schedule a follow up appointment in 12 weeks to " further address this issue.

## 2023-10-02 ENCOUNTER — TELEPHONE (OUTPATIENT)
Dept: CARDIOLOGY | Facility: CLINIC | Age: 73
End: 2023-10-02
Payer: MEDICARE

## 2023-10-02 NOTE — TELEPHONE ENCOUNTER
[facility] Call Center    Phone Message    May a detailed message be left on voicemail: yes     Reason for Call: Other: Patient feels like she is going to faint couple times a day.  Patient does have appt on 11/22/23, but she does not know if something she should do until then. Patient is on the wait list.      Action Taken: Other: Cardiology     Travel Screening: Not Applicable    Thank you!  [facility]

## 2023-10-03 NOTE — TELEPHONE ENCOUNTER
RECORDS RECEIVED FROM:    DATE RECEIVED:    NOTES STATUS DETAILS   OFFICE NOTE from referring provider  Internal 9-13-23 [place]   OFFICE NOTE from other cardiologists  Internal 9-13-23 [place]an   RECORDS from hospital/ED N/A    MEDICATION LIST Internal    GENERAL CARDIO RECORDS   (ALL APPOINTMENT TYPES)     LABS (CBC,BMP,CMP, TSH) Internal    EKG (STRIPS & REPORTS) Internal 7-22-23   MONITORS (STRIPS & REPORTS) Internal 8-10-23   ECHOS (IMAGES AND REPORTS) Internal 9-18-23   STRESS TESTS (IMAGES AND REPORTS) Internal 8-14-23   cMRI (IMAGES AND REPORTS) N/A    CT/CTA (IMAGES AND REPORTS) Internal 2-21-22   NEW EP     ICD/PACEMAKER IMPLANT No    CARDIOVERSION N/A    TILT TABLE STUDIES N/A

## 2023-10-03 NOTE — TELEPHONE ENCOUNTER
Lvm to pt and sent a mychart as well as made the pt son aware that pt needs to be rescheduled per TT team.    Offered pt virtual appt wit  at 4 PM

## 2023-10-03 NOTE — TELEPHONE ENCOUNTER
Called Heidy earlier today to let her know we were working on this. She states she thinks she might feel a little better iwht a little higher blood pressure but still light headed a  few times a day which has become a significant stressor for her. Her PCP stopped her valsartan and increased her imdur. Will work on a sooner EP appointment for her.

## 2023-10-05 ENCOUNTER — PRE VISIT (OUTPATIENT)
Dept: CARDIOLOGY | Facility: CLINIC | Age: 73
End: 2023-10-05
Payer: MEDICARE

## 2023-10-05 ENCOUNTER — TELEPHONE (OUTPATIENT)
Dept: FAMILY MEDICINE | Facility: CLINIC | Age: 73
End: 2023-10-05

## 2023-10-05 ENCOUNTER — VIRTUAL VISIT (OUTPATIENT)
Dept: CARDIOLOGY | Facility: CLINIC | Age: 73
End: 2023-10-05
Attending: INTERNAL MEDICINE
Payer: MEDICARE

## 2023-10-05 VITALS
WEIGHT: 180.5 LBS | BODY MASS INDEX: 30.81 KG/M2 | HEART RATE: 75 BPM | HEIGHT: 64 IN | SYSTOLIC BLOOD PRESSURE: 167 MMHG | DIASTOLIC BLOOD PRESSURE: 86 MMHG

## 2023-10-05 DIAGNOSIS — R42 DIZZINESS: ICD-10-CM

## 2023-10-05 PROCEDURE — 99215 OFFICE O/P EST HI 40 MIN: CPT | Mod: 95 | Performed by: INTERNAL MEDICINE

## 2023-10-05 ASSESSMENT — PAIN SCALES - GENERAL: PAINLEVEL: NO PAIN (0)

## 2023-10-05 NOTE — LETTER
10/5/2023      RE: Gem Gama  [address]  [place] [address]       Dear Colleague,    Thank you for the opportunity to participate in the care of your patient, Gem Gama, at the [facility] [place] at [facility]. Please see a copy of my visit note below.    Virtual Visit Details    Type of service:  Video Visit   HPI:   Gem Gama is a 72-year-old woman who was referred by  for evaluation of complaints of dizziness.  Patient has had such symptoms for a long time but one episode while seated driving a car caused her specific concern .  There are no reported evident triggers for current symptoms.    Patient is known to have prior coronary artery disease and her last angiogram was in December 2020 which showed some  lesions.  A nuclear stress test in August of this year showed a moderate degree of nontransmural infarction at the apex in the left anterior descending distribution but no ongoing ischemia.    A Zio patch in August was undertaken for 3 days and it did show one 7 beat run of nonsustained VT along with supraventricular ectopy up to 11 beats in duration.  There were also rare PACs and PVCs.  Patient's symptoms however occurred during sinus rhythm.    An echocardiogram in October 2022 showed well preserved left ventricular ejection fraction with no regional wall motion abnormalities.    Today I spent about 30 minutes trying to obtain history of Mrs. Moses specific events.  It is very difficult to have her focus but a number of items seem to be the case.  First, the episodes seem to occur when she is seated or standing but not when she is lying down.  They do not appear to be associated with any specific trigger symptoms or warning symptoms.  Her complaint is primarily a sensation of swimming in her head but it does seem to improve if she sits down.  She did have a near faint last summer while driving and  ended up in the wrong rocco but promptly corrected.  Additionally she had an episode on a hot day while feeling upper car with gasoline.  Nobody has seen to have noticed anything particular with these episodes and she has no witness reports.    In terms of symptoms prior to the episode she does not complain of nausea or vomiting or feeling hot or cold.  She does not have tunneling of vision which she recalls having quite often when she was younger.  She does complain of feeling hot and sweaty on a random basis but not necessarily associated with spells.    She has been treated for hypertension and this past August her valsartan was stopped.  Her blood pressures are running higher now than they were then.  Prior to stopping valsartan she often experience blood pressures of 120/60 or even below 100.  Now she typically runs blood pressures of 145/80 and has even higher blood pressures documented during our visit today.  She has recorded pressures as high as 167/86 and 165/76.  Importantly, the number of episodes that she is experienced seems to have markedly diminished suggesting that the hypertensive treatment may have been contributing.    Mrs. Gama is concerned about her PVCs despite the normal ejection fraction.  The Zio patch did show a 7 beat run of apparent VT.  It is possible that a longer episode may cause her symptoms but so far none have been recorded.  Symptoms recorded on the Zio patch occurred during sinus rhythm.      Subcutaneous monitoring may be helpful and I indicated to her that while it seems low probability of that her symptoms are related to arrhythmia, there is no harm in assessing that.  We suggest to her that if she would like to have a subcutaneous monitor implanted we could make that arrangement.    Mrs. Gama did not seem pleased that I suggested that her transient episodes may have been related to her antihypertensive therapy.  While 1 cannot be sure, her symptoms are very atypical and a  convincing diagnosis is not related hand.  Autonomic/tilt table testing is unlikely to be helpful at this stage.  It may become a consideration later if the history becomes a little more clear..    PAST MEDICAL HISTORY:  Past Medical History:   Diagnosis Date    ADHD (attention deficit hyperactivity disorder)     Anxiety     Arthritis     back, hands, knees, hips    Asthma     C. difficile diarrhea     Central sleep apnea     Cheyne-Rogers breathing 09/07/2022    Congestive heart failure (H) Fall 2019    Right after STEMI, improved    Coronary artery disease involving native coronary artery of native heart without angina pectoris     Depression     Depressive disorder 1970s    Fever and chills 09/24/2021    Gastro-oesophageal reflux disease     Hypertension     Ischemic cardiomyopathy     Major depressive disorder, recurrent episode, moderate (H) 07/25/2013    Narcolepsy     Pituitary microadenoma (H) 2011    MRI 2011- There is a triangular-shaped area with delayed contrast enhancement at the left lateral portion of the pituitary gland posteriorly, measuring 2.5 x 4.3 mm. This is suggestive of a microadenoma, MRI 10/1/22 - Normal pituitary gland and whole brain MRI.    Pyelonephritis of right kidney 10/24/2021    Renal disease     S/P hip replacement 2004    Bilateral fall 2004 due to OA    Sleep apnea     Status post total knee replacement 12/29/2014    Urinary tract infection with hematuria, site unspecified 09/24/2021       CURRENT MEDICATIONS:  Current Outpatient Medications   Medication Sig Dispense Refill    albuterol (PROAIR HFA/PROVENTIL HFA/VENTOLIN HFA) 108 (90 Base) MCG/ACT inhaler Inhale 1-2 puffs into the lungs every 4 hours as needed for shortness of breath or wheezing 18 g 11    aspirin (ASA) 81 MG EC tablet Take 1 tablet (81 mg) by mouth daily 90 tablet 0    atorvastatin (LIPITOR) 40 MG tablet TAKE 1 TABLET(40 MG) BY MOUTH DAILY 90 tablet 3    carvedilol (COREG) 3.125 MG tablet TAKE 1 TABLET(3.125  MG) BY MOUTH TWICE DAILY WITH MEALS 180 tablet 1    cholestyramine (QUESTRAN) 4 g packet Take 1 packet (4 g) by mouth 2 times daily (with meals) 60 packet 5    diazepam (VALIUM) 2 MG tablet Take 0.5-1 tablets (1-2 mg) by mouth 2 times daily as needed for anxiety or muscle spasms 30 tablet 0    diclofenac (VOLTAREN) 1 % topical gel Apply topically 4 times daily For Jaw pain      estradiol (ESTRING) 2 MG vaginal ring Place 1 each vaginally every 3 months 1 each 3    fluticasone-vilanterol (BREO ELLIPTA) 200-25 MCG/ACT inhaler Inhale 1 puff into the lungs daily 3 each 3    isosorbide mononitrate (IMDUR) 30 MG 24 hr tablet Take 1 tablet (30 mg) by mouth every evening Take with Carvedilol. 90 tablet 3    loperamide (IMODIUM) 2 MG capsule Take 1 capsule (2 mg) by mouth daily 30 capsule 0    loratadine (CLARITIN) 10 MG tablet Take 10 mg by mouth At Bedtime      magnesium oxide (MAG-OX) 400 MG tablet Take 400 mg by mouth daily      Melatonin 10 MG TABS tablet Take 10 mg by mouth nightly as needed for sleep      nitroGLYcerin (NITROSTAT) 0.4 MG sublingual tablet Place 1 tablet (0.4 mg) under the tongue every 5 minutes as needed for chest pain 25 tablet 11    pantoprazole (PROTONIX) 40 MG EC tablet TAKE 1 TABLET(40 MG) BY MOUTH DAILY 90 tablet 3    Prenatal Multivit-Min-Fe-FA (PRENATAL/IRON) TABS Take 1 tablet by mouth daily       saccharomyces boulardii (FLORASTOR) 250 MG capsule Take 1 capsule (250 mg) by mouth 2 times daily 14 capsule 0    sertraline (ZOLOFT) 100 MG tablet TAKE 1 TABLET(100 MG) BY MOUTH DAILY 90 tablet 1    UNABLE TO FIND MEDICATION NAME: Uqora complete regimen      ursodiol (ACTIGALL) 300 MG capsule TAKE 1 CAPSULE(300 MG) BY MOUTH TWICE DAILY 180 capsule 3    vitamin D3 (CHOLECALCIFEROL) 2000 units (50 mcg) tablet Take 1 tablet (2,000 Units) by mouth daily 90 tablet 3    zinc gluconate 50 MG tablet          PAST SURGICAL HISTORY:  Past Surgical History:   Procedure Laterality Date    ABDOMEN SURGERY   1974, 1996    Kidney reconstruct. Uterer stenosis    ARTHROPLASTY KNEE  07/09/2014    Procedure: ARTHROPLASTY KNEE;  Surgeon: Levi Johnson MD;  Location:  OR    ARTHROPLASTY KNEE Left 11/24/2014    Procedure: ARTHROPLASTY KNEE;  Surgeon: Levi Johnson MD;  Location:  OR    COLONOSCOPY      Several times dates ??    CV CORONARY ANGIOGRAM N/A 10/09/2019    Procedure: Coronary Angiogram;  Surgeon: Chiquita Chatterjee MD;  Location:  HEART CARDIAC CATH LAB    CV HEART CATHETERIZATION WITH POSSIBLE INTERVENTION N/A 10/10/2019    Procedure: Heart Catheterization with Possible Intervention;  Surgeon: Chin Garcia MD;  Location:  HEART CARDIAC CATH LAB    CV INTRA AORTIC BALLOON N/A 10/09/2019    Procedure: Intra-Aortic Balloon Pump Insertion;  Surgeon: Chiquita Chatterjee MD;  Location:  HEART CARDIAC CATH LAB    CV INTRA AORTIC BALLOON REMOVAL N/A 10/10/2019    Procedure: Intra-Aortic Balloon Pump Removal;  Surgeon: Chin Garcia MD;  Location:  HEART CARDIAC CATH LAB    CV LEFT HEART CATH N/A 12/11/2020    Procedure: Heart Catheterization with Possible Intervention;  Surgeon: Pilo Otoole MD;  Location:  HEART CARDIAC CATH LAB    CV PCI STENT DRUG ELUTING N/A 10/09/2019    Procedure: PCI Stent Drug Eluting;  Surgeon: Chiquita Chatterjee MD;  Location:  HEART CARDIAC CATH LAB    EYE SURGERY  2011    Cataract surgery both eyes    GENITOURINARY SURGERY  01/01/2008    Prolift    GENITOURINARY SURGERY  1973    In 1973, 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    ORTHOPEDIC SURGERY      bilat total hip    RECTOCELE REPAIR      ZZC TOTAL ABD HYSTERECTOMY+BLAD REPR  01/01/1992    Vaginal approach with oophrectomy    ZZC TOTAL KNEE ARTHROPLASTY         ALLERGIES:      Allergies   Allergen Reactions    Dust Mites Cough, Headache, Other (See Comments) and Shortness Of Breath    Latex Other (See Comments) and Shortness Of Breath     Runny nose  PN: LW Reaction: DIFFICULTY BREATHING      Sulfa Antibiotics Anaphylaxis, Hives and Itching     PN: LW Reaction: HIVES, Swelling  PN: LW Reaction: HIVES, Swelling    Flagyl [Metronidazole Hcl] Anaphylaxis and Rash    Food      PN: LW FI1: nka LW FI2:    Hydrochlorothiazide W-Triamterene      PN: LW Reaction: skin rash  PN: LW Reaction: skin rash    No Clinical Screening - See Comments      PN: LW Other1: -Adhesive Tape    Seasonal Allergies      sneezing    Tape [Adhesive Tape] Hives    Metoprolol Itching and Rash    Metronidazole Hives and Rash     PN: LW Reaction: HIVES         FAMILY HISTORY:  Family History   Problem Relation Age of Onset    Hypertension Mother     Arthritis Mother     Cerebrovascular Disease Mother     Anesthesia Reaction Mother         Halucinates    Cerebrovascular Disease Father         Three Strokes    Diabetes Father         Type 2, after his 60's    Kidney Disease Father     Hypertension Father     Hyperlipidemia Father     Osteoporosis Father     Diabetes Brother         After recovered from aspiration pneumonia, developed diabetes from high carb diet from feeding tube    Birth defects Brother     Thyroid Disease Sister         Low thyroid    Anesthesia Reaction Sister     Sjogren's Daughter     Diabetes Daughter         Diagnosed after stroke    Cerebrovascular Disease Daughter     Hypertension Daughter         With diabetes and stroke    Hyperlipidemia Daughter     Depression Daughter         Does not acknowledge    Anxiety Disorder Daughter     Asthma Daughter         Anxiety, air quality, and exercise triggered    Obesity Daughter     Diabetes Son         On metformin.    Hypertension Son     Obesity Son     Coronary Artery Disease Maternal Grandfather         Strokes, lived to 105    Substance Abuse  "Maternal Grandfather         Alcohol    Osteoporosis Maternal Grandfather         Old age    Osteoporosis Maternal Grandmother         Old age lived to 90    Coronary Artery Disease Paternal Grandfather     Coronary Artery Disease Paternal Grandmother     Osteoporosis Paternal Grandmother         Long term issue for her    Obesity Paternal Grandmother     Diabetes Daughter         Gestational and pre diabetic A1C    Anxiety Disorder Daughter     Asthma Daughter         Excursion induced    Thyroid Disease Daughter     Depression Son           ..from job    Mental Illness Other     Substance Abuse Other     Substance Abuse Other     Substance Abuse Other        SOCIAL HISTORY:  Social History     Tobacco Use    Smoking status: Never     Passive exposure: Never    Smokeless tobacco: Never   Vaping Use    Vaping Use: Never used   Substance Use Topics    Alcohol use: No    Drug use: No       ROS:   Constitutional: No fever, chills, or sweats. Weight stable.   ENT: No visual disturbance,   Cardiovascular: As per HPI.  No palpitations reported but patient is concerned regarding PVCs  Respiratory: No cough, hemoptysis.    GI: No nausea, vomiting, : No hematuria.   Integument: Negative.   Psychiatric: Negative.   Hematologic:  no easy bleeding.  Neuro: Negative.   Endocrinology: No significant heat or cold intolerance associated with her symptoms, but otherwise to occur randomly.  Musculoskeletal: No myalgia.  Noted by patient    Exam:  BP (!) 167/86   Pulse 75   Ht 1.626 m (5' 4\")   Wt 81.9 kg (180 lb 8 oz)   LMP  (LMP Unknown)   BMI 30.98 kg/m    GENERAL APPEARANCE: healthy, alert and no distress  HEENT: no icterus, , no central cyanosis  NECK:  JVP not elevated  RESPIRATORY:  no rales, rhonchi or wheezes, no use of accessory muscles, no retractions, respirations are unlabored, normal respiratory rate  NEURO: alert and oriented to person/place/time, normal speech, gait and affect  SKIN: no ecchymoses, " no rashes    Labs:  CBC RESULTS:   Lab Results   Component Value Date    WBC 4.7 08/05/2023    WBC 5.6 06/12/2021    RBC 4.81 08/05/2023    RBC 4.94 06/12/2021    HGB 14.3 08/05/2023    HGB 14.8 06/12/2021    HCT 41.7 08/05/2023    HCT 42.4 06/12/2021    MCV 87 08/05/2023    MCV 86 06/12/2021    MCH 29.7 08/05/2023    MCH 30.0 06/12/2021    MCHC 34.3 08/05/2023    MCHC 34.9 06/12/2021    RDW 13.8 08/05/2023    RDW 12.8 06/12/2021     08/05/2023     06/12/2021       BMP RESULTS:  Lab Results   Component Value Date     (H) 09/28/2023     12/17/2020    POTASSIUM 4.9 09/28/2023    POTASSIUM 4.1 11/28/2022    POTASSIUM 3.9 12/17/2020    CHLORIDE 107 09/28/2023    CHLORIDE 106 11/28/2022    CHLORIDE 107 12/17/2020    CO2 28 09/28/2023    CO2 30 11/28/2022    CO2 30 12/17/2020    ANIONGAP 12 09/28/2023    ANIONGAP 5 11/28/2022    ANIONGAP 6 12/17/2020    GLC 95 09/28/2023     (H) 11/28/2022     (H) 12/17/2020    BUN 11.9 09/28/2023    BUN 9 11/28/2022    BUN 18 12/17/2020    CR 0.99 (H) 09/28/2023    CR 1.00 12/17/2020    GFRESTIMATED 60 (L) 09/28/2023    GFRESTIMATED 59 (L) 02/03/2022    GFRESTIMATED 56 (L) 12/17/2020    GFRESTBLACK 65 12/17/2020    FRACISCO 9.7 09/28/2023    FRACISCO 8.8 12/17/2020        INR RESULTS:  Lab Results   Component Value Date    INR 1.69 (H) 11/27/2014    INR 1.76 (H) 11/26/2014    INR 1.61 (H) 11/25/2014    INR 1.11 11/24/2014       Procedures:  PULMONARY FUNCTION TESTS:       Latest Ref Rng & Units 12/6/2021    12:10 PM   PFT   FVC L 2.48    FEV1 L 1.83    FVC% % 90    FEV1% % 86          ECHOCARDIOGRAM:   No results found for this or any previous visit (from the past 8760 hour(s)).      Assessment and Plan:   1.  Multiple brief spells of uncertain origin-history taking is difficult  2.  Patient is concerned regarding PVCs and subcutaneous monitoring may be useful  3.  Symptoms seem improved after elimination of valsartan.    Plan  1.  Please contact patient and  indicate that we can offer subcutaneous cardiac monitor implantation if she would like us to proceed in that direction.  2.  We will defer follow-up for the time being.    Total elapsed time today for chart review, clinic visit and documentation 60 minutes    Video on 4: 00; off 4: 35  Platform [employer]  Patient at home; clinic [facility] heart    I very much appreciated the opportunity to see and assess Gem Gama in the clinic today. Please do not hesitate to contact my office if you have any questions or concerns.            Please do not hesitate to contact me if you have any questions/concerns.     Sincerely,     Nadeem Jason MD

## 2023-10-05 NOTE — TELEPHONE ENCOUNTER
"Pt saw cardiologist, Dr. Jason, today but states Dr would not listen to her when she said she was very worried about PCVs and VT found on Zio Patch. Pt tearful upon initially speaking with RN. Pt states cardiology provider told her he was only worried about her blood pressure, BP at appointment was 167/86. Pt states she wants \"implanted heart monitor device\" and needs to see cardiologist who will listen to her about concerns for VT and PVCs. Pt repeatedly saying the she needs provider who is \"not afraid of health insurance companies\" or \"in the pocket of health insurance\" so she can get what she needs. Pt states she needs is \"implanted cardiac monitor\" and possible \"device that shocks my heart back into rhythm when it's in v-tach.\"    Pt is looking for PCP to refer to specific cardiologist and indicate what her next steps should be. Pt states she wants to speak with cardiologist who is willing to listen to her concerns and order tests she feels are necessary despite what the \"insurance companies think\". Per chart review, pt has seen several cardiologists, but when asked about these visits pt states she was not happy with any of the care received with [facility] cardiology due to a variety of reasons.    [place] RN  "

## 2023-10-05 NOTE — NURSING NOTE
Is the patient currently in the state of [place]? YES    Visit mode:VIDEO    If the visit is dropped, the patient can be reconnected by: VIDEO VISIT: Text to cell phone:   Telephone Information:   Mobile [phone]       Will anyone else be joining the visit? NO  (If patient encounters technical issues they should call [phone]    How would you like to obtain your AVS? MyChart    Are changes needed to the allergy or medication list? Pt stated no med changes    Reason for visit: Consult    No other vitals to report per pt    Ivy Bolaños VVF

## 2023-10-05 NOTE — PROGRESS NOTES
Virtual Visit Details    Type of service:  Video Visit   HPI:   Gem Gama is a 72-year-old woman who was referred by  for evaluation of complaints of dizziness.  Patient has had such symptoms for a long time but one episode while seated driving a car caused her specific concern .  There are no reported evident triggers for current symptoms.    Patient is known to have prior coronary artery disease and her last angiogram was in December 2020 which showed some  lesions.  A nuclear stress test in August of this year showed a moderate degree of nontransmural infarction at the apex in the left anterior descending distribution but no ongoing ischemia.    A Zio patch in August was undertaken for 3 days and it did show one 7 beat run of nonsustained VT along with supraventricular ectopy up to 11 beats in duration.  There were also rare PACs and PVCs.  Patient's symptoms however occurred during sinus rhythm.    An echocardiogram in October 2022 showed well preserved left ventricular ejection fraction with no regional wall motion abnormalities.    Today I spent about 30 minutes trying to obtain history of Mrs. Moses specific events.  It is very difficult to have her focus but a number of items seem to be the case.  First, the episodes seem to occur when she is seated or standing but not when she is lying down.  They do not appear to be associated with any specific trigger symptoms or warning symptoms.  Her complaint is primarily a sensation of swimming in her head but it does seem to improve if she sits down.  She did have a near faint last summer while driving and ended up in the wrong rocco but promptly corrected.  Additionally she had an episode on a hot day while feeling upper car with gasoline.  Nobody has seen to have noticed anything particular with these episodes and she has no witness reports.    In terms of symptoms prior to the episode she does not complain of nausea or vomiting or feeling hot or cold.   She does not have tunneling of vision which she recalls having quite often when she was younger.  She does complain of feeling hot and sweaty on a random basis but not necessarily associated with spells.    She has been treated for hypertension and this past August her valsartan was stopped.  Her blood pressures are running higher now than they were then.  Prior to stopping valsartan she often experience blood pressures of 120/60 or even below 100.  Now she typically runs blood pressures of 145/80 and has even higher blood pressures documented during our visit today.  She has recorded pressures as high as 167/86 and 165/76.  Importantly, the number of episodes that she is experienced seems to have markedly diminished suggesting that the hypertensive treatment may have been contributing.    Mrs. Gama is concerned about her PVCs despite the normal ejection fraction.  The Zio patch did show a 7 beat run of apparent VT.  It is possible that a longer episode may cause her symptoms but so far none have been recorded.  Symptoms recorded on the Zio patch occurred during sinus rhythm.      Subcutaneous monitoring may be helpful and I indicated to her that while it seems low probability of that her symptoms are related to arrhythmia, there is no harm in assessing that.  We suggest to her that if she would like to have a subcutaneous monitor implanted we could make that arrangement.    Mrs. Gama did not seem pleased that I suggested that her transient episodes may have been related to her antihypertensive therapy.  While 1 cannot be sure, her symptoms are very atypical and a convincing diagnosis is not related hand.  Autonomic/tilt table testing is unlikely to be helpful at this stage.  It may become a consideration later if the history becomes a little more clear..    PAST MEDICAL HISTORY:  Past Medical History:   Diagnosis Date    ADHD (attention deficit hyperactivity disorder)     Anxiety     Arthritis     back, hands,  knees, hips    Asthma     C. difficile diarrhea     Central sleep apnea     Cheyne-Rogers breathing 09/07/2022    Congestive heart failure (H) Fall 2019    Right after STEMI, improved    Coronary artery disease involving native coronary artery of native heart without angina pectoris     Depression     Depressive disorder 1970s    Fever and chills 09/24/2021    Gastro-oesophageal reflux disease     Hypertension     Ischemic cardiomyopathy     Major depressive disorder, recurrent episode, moderate (H) 07/25/2013    Narcolepsy     Pituitary microadenoma (H) 2011    MRI 2011- There is a triangular-shaped area with delayed contrast enhancement at the left lateral portion of the pituitary gland posteriorly, measuring 2.5 x 4.3 mm. This is suggestive of a microadenoma, MRI 10/1/22 - Normal pituitary gland and whole brain MRI.    Pyelonephritis of right kidney 10/24/2021    Renal disease     S/P hip replacement 2004    Bilateral fall 2004 due to OA    Sleep apnea     Status post total knee replacement 12/29/2014    Urinary tract infection with hematuria, site unspecified 09/24/2021       CURRENT MEDICATIONS:  Current Outpatient Medications   Medication Sig Dispense Refill    albuterol (PROAIR HFA/PROVENTIL HFA/VENTOLIN HFA) 108 (90 Base) MCG/ACT inhaler Inhale 1-2 puffs into the lungs every 4 hours as needed for shortness of breath or wheezing 18 g 11    aspirin (ASA) 81 MG EC tablet Take 1 tablet (81 mg) by mouth daily 90 tablet 0    atorvastatin (LIPITOR) 40 MG tablet TAKE 1 TABLET(40 MG) BY MOUTH DAILY 90 tablet 3    carvedilol (COREG) 3.125 MG tablet TAKE 1 TABLET(3.125 MG) BY MOUTH TWICE DAILY WITH MEALS 180 tablet 1    cholestyramine (QUESTRAN) 4 g packet Take 1 packet (4 g) by mouth 2 times daily (with meals) 60 packet 5    diazepam (VALIUM) 2 MG tablet Take 0.5-1 tablets (1-2 mg) by mouth 2 times daily as needed for anxiety or muscle spasms 30 tablet 0    diclofenac (VOLTAREN) 1 % topical gel Apply topically 4 times  daily For Jaw pain      estradiol (ESTRING) 2 MG vaginal ring Place 1 each vaginally every 3 months 1 each 3    fluticasone-vilanterol (BREO ELLIPTA) 200-25 MCG/ACT inhaler Inhale 1 puff into the lungs daily 3 each 3    isosorbide mononitrate (IMDUR) 30 MG 24 hr tablet Take 1 tablet (30 mg) by mouth every evening Take with Carvedilol. 90 tablet 3    loperamide (IMODIUM) 2 MG capsule Take 1 capsule (2 mg) by mouth daily 30 capsule 0    loratadine (CLARITIN) 10 MG tablet Take 10 mg by mouth At Bedtime      magnesium oxide (MAG-OX) 400 MG tablet Take 400 mg by mouth daily      Melatonin 10 MG TABS tablet Take 10 mg by mouth nightly as needed for sleep      nitroGLYcerin (NITROSTAT) 0.4 MG sublingual tablet Place 1 tablet (0.4 mg) under the tongue every 5 minutes as needed for chest pain 25 tablet 11    pantoprazole (PROTONIX) 40 MG EC tablet TAKE 1 TABLET(40 MG) BY MOUTH DAILY 90 tablet 3    Prenatal Multivit-Min-Fe-FA (PRENATAL/IRON) TABS Take 1 tablet by mouth daily       saccharomyces boulardii (FLORASTOR) 250 MG capsule Take 1 capsule (250 mg) by mouth 2 times daily 14 capsule 0    sertraline (ZOLOFT) 100 MG tablet TAKE 1 TABLET(100 MG) BY MOUTH DAILY 90 tablet 1    UNABLE TO FIND MEDICATION NAME: Uqora complete regimen      ursodiol (ACTIGALL) 300 MG capsule TAKE 1 CAPSULE(300 MG) BY MOUTH TWICE DAILY 180 capsule 3    vitamin D3 (CHOLECALCIFEROL) 2000 units (50 mcg) tablet Take 1 tablet (2,000 Units) by mouth daily 90 tablet 3    zinc gluconate 50 MG tablet          PAST SURGICAL HISTORY:  Past Surgical History:   Procedure Laterality Date    ABDOMEN SURGERY  1974, 1996    Kidney reconstruct. Uterer stenosis    ARTHROPLASTY KNEE  07/09/2014    Procedure: ARTHROPLASTY KNEE;  Surgeon: Levi Johnson MD;  Location: [facility] OR    ARTHROPLASTY KNEE Left 11/24/2014    Procedure: ARTHROPLASTY KNEE;  Surgeon: Levi Johnson MD;  Location:  OR    COLONOSCOPY      Several times dates ??    CV CORONARY ANGIOGRAM N/A  10/09/2019    Procedure: Coronary Angiogram;  Surgeon: Chiquita Chatterjee MD;  Location:  HEART CARDIAC CATH LAB    CV HEART CATHETERIZATION WITH POSSIBLE INTERVENTION N/A 10/10/2019    Procedure: Heart Catheterization with Possible Intervention;  Surgeon: Chin Garcia MD;  Location:  HEART CARDIAC CATH LAB    CV INTRA AORTIC BALLOON N/A 10/09/2019    Procedure: Intra-Aortic Balloon Pump Insertion;  Surgeon: Chiquita Chatterjee MD;  Location:  HEART CARDIAC CATH LAB    CV INTRA AORTIC BALLOON REMOVAL N/A 10/10/2019    Procedure: Intra-Aortic Balloon Pump Removal;  Surgeon: Chin Garcia MD;  Location:  HEART CARDIAC CATH LAB    CV LEFT HEART CATH N/A 12/11/2020    Procedure: Heart Catheterization with Possible Intervention;  Surgeon: Pilo Otoole MD;  Location:  HEART CARDIAC CATH LAB    CV PCI STENT DRUG ELUTING N/A 10/09/2019    Procedure: PCI Stent Drug Eluting;  Surgeon: Chiquita Chatterjee MD;  Location:  HEART CARDIAC CATH LAB    EYE SURGERY  2011    Cataract surgery both eyes    GENITOURINARY SURGERY  01/01/2008    Prolift    GENITOURINARY SURGERY  1973    In 1973, 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    ORTHOPEDIC SURGERY      bilat total hip    RECTOCELE REPAIR      [facility] TOTAL ABD HYSTERECTOMY+BLAD REPR  01/01/1992    Vaginal approach with oophrectomy    [facility] TOTAL KNEE ARTHROPLASTY         ALLERGIES:     Allergies   Allergen Reactions    Dust Mites Cough, Headache, Other (See Comments) and Shortness Of Breath    Latex Other (See Comments) and Shortness Of Breath     Runny nose  PN: LW Reaction: DIFFICULTY BREATHING      Sulfa Antibiotics Anaphylaxis, Hives and Itching     PN: LW Reaction: HIVES, Swelling  PN: LW Reaction: HIVES, Swelling    Flagyl  [Metronidazole Hcl] Anaphylaxis and Rash    Food      PN: LW FI1: nka LW FI2:    Hydrochlorothiazide W-Triamterene      PN: LW Reaction: skin rash  PN: LW Reaction: skin rash    No Clinical Screening - See Comments      PN: LW Other1: -Adhesive Tape    Seasonal Allergies      sneezing    Tape [Adhesive Tape] Hives    Metoprolol Itching and Rash    Metronidazole Hives and Rash     PN: LW Reaction: HIVES         FAMILY HISTORY:  Family History   Problem Relation Age of Onset    Hypertension Mother     Arthritis Mother     Cerebrovascular Disease Mother     Anesthesia Reaction Mother         Halucinates    Cerebrovascular Disease Father         Three Strokes    Diabetes Father         Type 2, after his 60's    Kidney Disease Father     Hypertension Father     Hyperlipidemia Father     Osteoporosis Father     Diabetes Brother         After recovered from aspiration pneumonia, developed diabetes from high carb diet from feeding tube    Birth defects Brother     Thyroid Disease Sister         Low thyroid    Anesthesia Reaction Sister     Sjogren's Daughter     Diabetes Daughter         Diagnosed after stroke    Cerebrovascular Disease Daughter     Hypertension Daughter         With diabetes and stroke    Hyperlipidemia Daughter     Depression Daughter         Does not acknowledge    Anxiety Disorder Daughter     Asthma Daughter         Anxiety, air quality, and exercise triggered    Obesity Daughter     Diabetes Son         On metformin.    Hypertension Son     Obesity Son     Coronary Artery Disease Maternal Grandfather         Strokes, lived to 105    Substance Abuse Maternal Grandfather         Alcohol    Osteoporosis Maternal Grandfather         Old age    Osteoporosis Maternal Grandmother         Old age lived to 90    Coronary Artery Disease Paternal Grandfather     Coronary Artery Disease Paternal Grandmother     Osteoporosis Paternal Grandmother         Long term issue for her    Obesity Paternal Grandmother      "Diabetes Daughter         Gestational and pre diabetic A1C    Anxiety Disorder Daughter     Asthma Daughter         Excursion induced    Thyroid Disease Daughter     Depression Son           ..from job    Mental Illness Other     Substance Abuse Other     Substance Abuse Other     Substance Abuse Other        SOCIAL HISTORY:  Social History     Tobacco Use    Smoking status: Never     Passive exposure: Never    Smokeless tobacco: Never   Vaping Use    Vaping Use: Never used   Substance Use Topics    Alcohol use: No    Drug use: No       ROS:   Constitutional: No fever, chills, or sweats. Weight stable.   ENT: No visual disturbance,   Cardiovascular: As per HPI.  No palpitations reported but patient is concerned regarding PVCs  Respiratory: No cough, hemoptysis.    GI: No nausea, vomiting, : No hematuria.   Integument: Negative.   Psychiatric: Negative.   Hematologic:  no easy bleeding.  Neuro: Negative.   Endocrinology: No significant heat or cold intolerance associated with her symptoms, but otherwise to occur randomly.  Musculoskeletal: No myalgia.  Noted by patient    Exam:  BP (!) 167/86   Pulse 75   Ht 1.626 m (5' 4\")   Wt 81.9 kg (180 lb 8 oz)   LMP  (LMP Unknown)   BMI 30.98 kg/m    GENERAL APPEARANCE: healthy, alert and no distress  HEENT: no icterus, , no central cyanosis  NECK:  JVP not elevated  RESPIRATORY:  no rales, rhonchi or wheezes, no use of accessory muscles, no retractions, respirations are unlabored, normal respiratory rate  NEURO: alert and oriented to person/place/time, normal speech, gait and affect  SKIN: no ecchymoses, no rashes    Labs:  CBC RESULTS:   Lab Results   Component Value Date    WBC 4.7 08/05/2023    WBC 5.6 06/12/2021    RBC 4.81 08/05/2023    RBC 4.94 06/12/2021    HGB 14.3 08/05/2023    HGB 14.8 06/12/2021    HCT 41.7 08/05/2023    HCT 42.4 06/12/2021    MCV 87 08/05/2023    MCV 86 06/12/2021    MCH 29.7 08/05/2023    MCH 30.0 06/12/2021    MCHC 34.3 " 08/05/2023    MCHC 34.9 06/12/2021    RDW 13.8 08/05/2023    RDW 12.8 06/12/2021     08/05/2023     06/12/2021       BMP RESULTS:  Lab Results   Component Value Date     (H) 09/28/2023     12/17/2020    POTASSIUM 4.9 09/28/2023    POTASSIUM 4.1 11/28/2022    POTASSIUM 3.9 12/17/2020    CHLORIDE 107 09/28/2023    CHLORIDE 106 11/28/2022    CHLORIDE 107 12/17/2020    CO2 28 09/28/2023    CO2 30 11/28/2022    CO2 30 12/17/2020    ANIONGAP 12 09/28/2023    ANIONGAP 5 11/28/2022    ANIONGAP 6 12/17/2020    GLC 95 09/28/2023     (H) 11/28/2022     (H) 12/17/2020    BUN 11.9 09/28/2023    BUN 9 11/28/2022    BUN 18 12/17/2020    CR 0.99 (H) 09/28/2023    CR 1.00 12/17/2020    GFRESTIMATED 60 (L) 09/28/2023    GFRESTIMATED 59 (L) 02/03/2022    GFRESTIMATED 56 (L) 12/17/2020    GFRESTBLACK 65 12/17/2020    FRACISCO 9.7 09/28/2023    FRACISCO 8.8 12/17/2020        INR RESULTS:  Lab Results   Component Value Date    INR 1.69 (H) 11/27/2014    INR 1.76 (H) 11/26/2014    INR 1.61 (H) 11/25/2014    INR 1.11 11/24/2014       Procedures:  PULMONARY FUNCTION TESTS:       Latest Ref Rng & Units 12/6/2021    12:10 PM   PFT   FVC L 2.48    FEV1 L 1.83    FVC% % 90    FEV1% % 86          ECHOCARDIOGRAM:   No results found for this or any previous visit (from the past 8760 hour(s)).      Assessment and Plan:   1.  Multiple brief spells of uncertain origin-history taking is difficult  2.  Patient is concerned regarding PVCs and subcutaneous monitoring may be useful  3.  Symptoms seem improved after elimination of valsartan.    Plan  1.  Please contact patient and indicate that we can offer subcutaneous cardiac monitor implantation if she would like us to proceed in that direction.  2.  We will defer follow-up for the time being.    Total elapsed time today for chart review, clinic visit and documentation 60 minutes    Video on 4: 00; off 4: 35  Platform [employer]  Patient at home; clinic [facility] heart    I very  much appreciated the opportunity to see and assess Gem Jean Natan in the clinic today. Please do not hesitate to contact my office if you have any questions or concerns.      Nadeem Jason MD  Cardiac Arrhythmia Service  [facility]  [phone]       CC  KAYLEE WILLAMS

## 2023-10-05 NOTE — PATIENT INSTRUCTIONS
You were seen in the Electrophysiology Clinic today by: Dr Jason    Plan:     Further Instructions:    Please let us know if you would like to proceed with an Implantable Loop Recorder      If you have further questions, please utilize [employer] to contact us.     Your Care Team:    [facility] Cardiology   Telephone Number     Nurse Line  Argentina Schultz, RN   Marilu Morales, RN  Stef Hernández, BLANKA   [phone]     For scheduling appointments:   Jules   [phone]   For procedure scheduling:    Claribel Ly     [phone]   For the Device Clinic (Pacemakers, ICDs, Loop Recorders)    During business hours: [phone]  After business hours:   [phone]- select option 4 and ask for job code [id number].       On-call cardiologist for after hours or on weekends:   [phone], option #4, and ask to speak to the on-call cardiologist.     Cardiovascular Clinic:   [address] [place] [address]. [place] [address]      As always, Thank you for trusting us with your health care needs!

## 2023-10-06 ENCOUNTER — TELEPHONE (OUTPATIENT)
Dept: CARDIOLOGY | Facility: CLINIC | Age: 73
End: 2023-10-06

## 2023-10-06 ENCOUNTER — VIRTUAL VISIT (OUTPATIENT)
Dept: PSYCHOLOGY | Facility: CLINIC | Age: 73
End: 2023-10-06
Payer: MEDICARE

## 2023-10-06 DIAGNOSIS — Z95.818 IMPLANTABLE LOOP RECORDER PRESENT: ICD-10-CM

## 2023-10-06 DIAGNOSIS — R42 DIZZINESS: Primary | ICD-10-CM

## 2023-10-06 DIAGNOSIS — F41.1 GENERALIZED ANXIETY DISORDER: ICD-10-CM

## 2023-10-06 DIAGNOSIS — F33.0 MAJOR DEPRESSIVE DISORDER, RECURRENT EPISODE, MILD (H): Primary | ICD-10-CM

## 2023-10-06 DIAGNOSIS — I49.3 PVC'S (PREMATURE VENTRICULAR CONTRACTIONS): ICD-10-CM

## 2023-10-06 DIAGNOSIS — F43.10 POSTTRAUMATIC STRESS DISORDER: ICD-10-CM

## 2023-10-06 PROCEDURE — 90834 PSYTX W PT 45 MINUTES: CPT | Mod: VID | Performed by: SOCIAL WORKER

## 2023-10-06 RX ORDER — CEFAZOLIN SODIUM 2 G/50ML
2 SOLUTION INTRAVENOUS
Status: CANCELLED | OUTPATIENT
Start: 2023-10-06

## 2023-10-06 RX ORDER — SODIUM CHLORIDE 9 MG/ML
INJECTION, SOLUTION INTRAVENOUS CONTINUOUS
Status: CANCELLED | OUTPATIENT
Start: 2023-10-06

## 2023-10-06 RX ORDER — LIDOCAINE 40 MG/G
CREAM TOPICAL
Status: CANCELLED | OUTPATIENT
Start: 2023-10-06

## 2023-10-06 ASSESSMENT — PATIENT HEALTH QUESTIONNAIRE - PHQ9
SUM OF ALL RESPONSES TO PHQ QUESTIONS 1-9: 7
10. IF YOU CHECKED OFF ANY PROBLEMS, HOW DIFFICULT HAVE THESE PROBLEMS MADE IT FOR YOU TO DO YOUR WORK, TAKE CARE OF THINGS AT HOME, OR GET ALONG WITH OTHER PEOPLE: SOMEWHAT DIFFICULT
SUM OF ALL RESPONSES TO PHQ QUESTIONS 1-9: 7

## 2023-10-06 ASSESSMENT — ANXIETY QUESTIONNAIRES
3. WORRYING TOO MUCH ABOUT DIFFERENT THINGS: MORE THAN HALF THE DAYS
6. BECOMING EASILY ANNOYED OR IRRITABLE: NOT AT ALL
IF YOU CHECKED OFF ANY PROBLEMS ON THIS QUESTIONNAIRE, HOW DIFFICULT HAVE THESE PROBLEMS MADE IT FOR YOU TO DO YOUR WORK, TAKE CARE OF THINGS AT HOME, OR GET ALONG WITH OTHER PEOPLE: SOMEWHAT DIFFICULT
5. BEING SO RESTLESS THAT IT IS HARD TO SIT STILL: NOT AT ALL
2. NOT BEING ABLE TO STOP OR CONTROL WORRYING: SEVERAL DAYS
1. FEELING NERVOUS, ANXIOUS, OR ON EDGE: SEVERAL DAYS
GAD7 TOTAL SCORE: 7
4. TROUBLE RELAXING: SEVERAL DAYS
GAD7 TOTAL SCORE: 7
7. FEELING AFRAID AS IF SOMETHING AWFUL MIGHT HAPPEN: MORE THAN HALF THE DAYS
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
## 2023-10-06 NOTE — TELEPHONE ENCOUNTER
Patient calling to schedule a loop recorder. Ep scheduling doesn't see a case request. Will send to the team.     Claribel Ly  Periop Electrophysiology   [phone]

## 2023-10-07 ENCOUNTER — THERAPY VISIT (OUTPATIENT)
Dept: PHYSICAL THERAPY | Facility: CLINIC | Age: 73
End: 2023-10-07
Attending: UROLOGY
Payer: MEDICARE

## 2023-10-07 ENCOUNTER — MYC MEDICAL ADVICE (OUTPATIENT)
Dept: CARDIOLOGY | Facility: CLINIC | Age: 73
End: 2023-10-07

## 2023-10-07 DIAGNOSIS — Z87.440 HISTORY OF UTI: ICD-10-CM

## 2023-10-07 DIAGNOSIS — Z87.448 HISTORY OF PYELONEPHRITIS: ICD-10-CM

## 2023-10-07 DIAGNOSIS — N95.2 VAGINAL ATROPHY: ICD-10-CM

## 2023-10-07 DIAGNOSIS — R32 URINARY INCONTINENCE: Primary | ICD-10-CM

## 2023-10-07 DIAGNOSIS — N39.8 VOIDING DYSFUNCTION: ICD-10-CM

## 2023-10-07 DIAGNOSIS — M99.05 SOMATIC DYSFUNCTION OF PELVIC REGION: ICD-10-CM

## 2023-10-07 DIAGNOSIS — N39.41 URGE INCONTINENCE: ICD-10-CM

## 2023-10-07 PROCEDURE — 97161 PT EVAL LOW COMPLEX 20 MIN: CPT | Mod: GP | Performed by: PHYSICAL THERAPIST

## 2023-10-07 PROCEDURE — 97530 THERAPEUTIC ACTIVITIES: CPT | Mod: GP | Performed by: PHYSICAL THERAPIST

## 2023-10-07 PROCEDURE — 97112 NEUROMUSCULAR REEDUCATION: CPT | Mod: GP | Performed by: PHYSICAL THERAPIST

## 2023-10-07 PROCEDURE — 97110 THERAPEUTIC EXERCISES: CPT | Mod: GP | Performed by: PHYSICAL THERAPIST

## 2023-10-07 NOTE — PROGRESS NOTES
PHYSICAL THERAPY EVALUATION  Type of Visit: Evaluation    See electronic medical record for Abuse and Falls Screening details.    Subjective       Presenting condition or subjective complaint: Constant Urinary incontinence with occasional fecal.incontenence  Date of onset: 08/31/23 (saw MD)    Relevant medical history: Asthma; Bladder or bowel problems; Chest pain; Concussions; Depression; Dizziness; Heart problems; High blood pressure; Implanted device; Incontinence; Kidney disease; Overweight; Severe dizziness; Severe headaches; Sleep disorder like apnea; Vision problems   Dates & types of surgery: 1974 kidney pyloplasy, 1994 complete hysterctomy with lift front ( with sling) and back, 1997 kidney pylopasty. 1998 rectocele with mesh, 2004 bilateral hips, 2014 bilateral knees,2019 LDA with two stents.    Prior diagnostic imaging/testing results: EMG     Prior therapy history for the same diagnosis, illness or injury: No      Prior Level of Function  Transfers: Independent  Ambulation: Independent  ADL: Independent  IADL:     Living Environment  Social support: Alone   Type of home: House; Multi-level; Basement   Stairs to enter the home: Yes 2 Is there a railing: No   Ramp: No   Stairs inside the home: Yes 10 Is there a railing: Yes   Help at home: Home and Yard maintenance tasks  Equipment owned: Straight Cane; Four-point cane; Walker; Walker with wheels; Crutches; Commode; Raised toilet seat; Bath bench; Dressing equipment     Employment: No    Hobbies/Interests: Reading,  knit, rodrigo, talking to friends, volunteer tutoring.    Patient goals for therapy: Go about life without being tied to carring pads and keeping close to restrooms    Pain assessment:  No pain present     Objective      PELVIC EVALUATION  ADDITIONAL HISTORY:  Sex assigned at birth: Female  Gender identity: Female    Pronouns: She/Her Hers      Bladder History:  Feels bladder filling: No  Triggers for feeling of inability to wait to go to the  bathroom: No    How long can you wait to urinate: Until i feel i have to. Often do not make it in time  Gets up at night to urinate: Yes 1  Can stop the flow of urine when urinating: Sometimes  Volume of urine usually released: Average   Other issues: Straining to pass urine; Slow or hesitant urine stream; Bladder infections  Number of bladder infections in last 12 months: 6  Fluid intake per day: 500 ml plain 150 ml flavored 400ml    Medications taken for bladder: No     Activities causing urine leak: Cough; Jump; Run; Hurrying to the bathroom due to a strong urge to urinate (pee)    Amount of urine typically leaked: Varies a lot.  Pads used to help with leaking: Yes I use this many pads per day: Varies 4-8 I use this type/brand: Poise level 8    Bowel History:  Frequency of bowel movement: One per day  Consistency of stool: Other All ofvthevabove  Ignores the urge to defecate: No  Other bowel issues: Loss of stool; Straining to have bowel movement  Length of time spent trying to have a bowel movement:      Sexual Function History:  Sexual orientation: Straight    Sexually active: No  Lubrication used:      Pelvic pain:      Pain or difficulty with orgasms/erection/ejaculation: Yes Cannot have orgasm, but can be aroused  State of menopause:    Hormone medications: Yes Estring    Are you currently pregnant: No, Number of previous pregnancies: 5, Number of deliveries: 4, If you have delivered before, did you have any of these issues during delivery: Tearing; Episiotomy; Vaginal delivery, Have you been diagnosed with pelvic prolapse or abdominal separation: Yes, Do you get regular exercise: No, Have you tried pelvic floor strengthening exercises for 4 weeks: No, Do you have any history of trauma that is relevant to your care that you d like to share: Yes, I d like to discuss it with my provider in person.    Discussed reason for referral regarding pelvic health needs and external/internal pelvic floor muscle  examination with patient/guardian.  Opportunity provided to ask questions and verbal consent for assessment and intervention was given.    PAIN:   POSTURE:   LUMBAR SCREEN:   HIP SCREEN:  Strength:   Pain: - none + mild ++ moderate +++ severe  Strength Scale: 0-5/5 Left Right   Hip Flexion 4- 4-   Hip Extension 4- 4-   Hip Abduction 4- 4-   Hip Adduction     Hip Internal Rotation     Hip External Rotation     Knee Flexion     Knee Extension        Functional Strength Testing:     PELVIC/SI SCREEN:     PAIN PROVOCATION TEST:   PELVIS/SI SPECIAL TESTS:   BREATHING SYMMETRY:     PELVIC EXAM  External Visual Inspection:  At rest: Distended perineal body, Large hemorrhoids present   With voluntary pelvic floor contraction: Perineal descent, Valsalva  Relaxation of PFM: Yes    Integumentary:   Introitus: Atrophy    External Digital Palpation per Perineum:   Ischiocavernosis: Unremarkable  Bulbo cavernosis: Unremarkable  Transverse perineal: Unremarkable  Levator ani: Unremarkable    Scar:   Location/Type:   Mobility:     Internal Digital Palpation:  Per Vagina:  Tenderness  Tone: high, anterior wall; surgical changes palpated assuming it is related to sling procedure; no pain present  Digital Muscle Performance: P (Power): 1/5 with pushing out vs mary  E (Endurance): Poor; pelvic muscles very dysfunctional  Compensations: Adductors, Gluts, Breath holding  Relaxation Post-Contraction: Partial/delayed relaxation    Per Rectum:        Pelvic Organ Prolapse:       ABDOMINAL ASSESSMENT  Diastasis Rectus Abdominis (MIKE):      Abdominal Activation/Strength:  Poor; Valsalva and abdominal bulging present    Scar:   Location/Type:   Mobility:     Fascial Tension/Restriction/Tone: Hypermobile, Anterior pelvic floor in relation with past surgical changes    BIOFEEDBACK:  Position: Supine  Surface Electrodes: Perineal    Abdominals:     Perianals:   Baseline muscle activity: 8.9uV microvolts  Endurance Hold-Average mean  amplitude/work average: 18.8uV, Rest average 11.3uV, W-R +7.20    DERMATOMES:   DTR S:     Assessment & Plan   CLINICAL IMPRESSIONS  Medical Diagnosis: Urinary incontinence    Treatment Diagnosis: Pelvic dysfunction   Impression/Assessment: Patient is a 72 year old female with Urinary incontinence complaints.  The following significant findings have been identified: Decreased strength, Impaired muscle performance, and Decreased activity tolerance. These impairments interfere with their ability to perform self care tasks and recreational activities as compared to previous level of function.     Clinical Decision Making (Complexity):  Clinical Presentation: Stable/Uncomplicated  Clinical Presentation Rationale: based on medical and personal factors listed in PT evaluation  Clinical Decision Making (Complexity): Low complexity    PLAN OF CARE  Treatment Interventions:  Modalities: Biofeedback  Interventions: Manual Therapy, Neuromuscular Re-education, Therapeutic Activity, Therapeutic Exercise    Long Term Goals     PT Goal 1  Goal Identifier: Urinary leaking  Goal Description: Able to decrease leaking to 3x/week  Rationale: to maximize safety and independence with performance of ADLs and functional tasks  Goal Progress: On average leaking 2-8x/day  Target Date: 12/30/23      Frequency of Treatment: 1x/week  Duration of Treatment: 12 weeks    Recommended Referrals to Other Professionals:   Education Assessment:        Risks and benefits of evaluation/treatment have been explained.   Patient/Family/caregiver agrees with Plan of Care.     Evaluation Time:     PT Eval, Low Complexity Minutes (55436): 40       Signing Clinician: Luisa Jose PT      [facility]                                                                                   OUTPATIENT PHYSICAL THERAPY      PLAN OF TREATMENT FOR OUTPATIENT REHABILITATION   Patient's Last Name, First Name, Gem Gaytan Date of  Birth:  1950   Provider's Name   [facility]   Medical Record No.  [id number]     Onset Date: 08/31/23 (shira REESE)  Start of Care Date: 10/07/23     Medical Diagnosis:  Urinary incontinence      PT Treatment Diagnosis:  Pelvic dysfunction Plan of Treatment  Frequency/Duration: 1x/week/ 12 weeks    Certification date from 10/07/23 to 01/04/24         See note for plan of treatment details and functional goals     Luisa Jose, PT                         I CERTIFY THE NEED FOR THESE SERVICES FURNISHED UNDER        THIS PLAN OF TREATMENT AND WHILE UNDER MY CARE     (Physician attestation of this document indicates review and certification of the therapy plan).                Referring Provider:  Mel Rodriguez      Initial Assessment  See Epic Evaluation- Start of Care Date: 10/07/23

## 2023-10-09 ENCOUNTER — MYC MEDICAL ADVICE (OUTPATIENT)
Dept: FAMILY MEDICINE | Facility: CLINIC | Age: 73
End: 2023-10-09
Payer: MEDICARE

## 2023-10-09 DIAGNOSIS — I10 ESSENTIAL HYPERTENSION WITH GOAL BLOOD PRESSURE LESS THAN 130/80: Primary | ICD-10-CM

## 2023-10-10 ENCOUNTER — TELEPHONE (OUTPATIENT)
Dept: CARDIOLOGY | Facility: CLINIC | Age: 73
End: 2023-10-10
Payer: MEDICARE

## 2023-10-10 RX ORDER — VALSARTAN 40 MG/1
40 TABLET ORAL 2 TIMES DAILY
Qty: 60 TABLET | Refills: 5 | Status: SHIPPED | OUTPATIENT
Start: 2023-10-10 | End: 2023-10-23

## 2023-10-10 NOTE — TELEPHONE ENCOUNTER
EP Scheduling called the patient to schedule ILR Implant. The number [phone] was left for the patient to return the call and schedule the procedure.    Claribel Ly  Periop Electrophysiology   [phone]

## 2023-10-11 ENCOUNTER — APPOINTMENT (OUTPATIENT)
Dept: CT IMAGING | Facility: CLINIC | Age: 73
End: 2023-10-11
Attending: EMERGENCY MEDICINE
Payer: MEDICARE

## 2023-10-11 ENCOUNTER — NURSE TRIAGE (OUTPATIENT)
Dept: FAMILY MEDICINE | Facility: CLINIC | Age: 73
End: 2023-10-11
Payer: MEDICARE

## 2023-10-11 ENCOUNTER — HOSPITAL ENCOUNTER (OUTPATIENT)
Facility: CLINIC | Age: 73
Setting detail: OBSERVATION
Discharge: HOME | End: 2023-10-12
Attending: EMERGENCY MEDICINE | Admitting: INTERNAL MEDICINE
Payer: MEDICARE

## 2023-10-11 DIAGNOSIS — R07.9 CHEST PAIN, UNSPECIFIED TYPE: ICD-10-CM

## 2023-10-11 DIAGNOSIS — Z86.79 HISTORY OF CAD (CORONARY ARTERY DISEASE): ICD-10-CM

## 2023-10-11 DIAGNOSIS — R42 DIZZINESS: Primary | ICD-10-CM

## 2023-10-11 DIAGNOSIS — R06.02 SHORTNESS OF BREATH: ICD-10-CM

## 2023-10-11 DIAGNOSIS — I10 ESSENTIAL HYPERTENSION WITH GOAL BLOOD PRESSURE LESS THAN 130/80: ICD-10-CM

## 2023-10-11 LAB
ANION GAP SERPL CALCULATED.3IONS-SCNC: 11 MMOL/L (ref 7–15)
ATRIAL RATE - MUSE: 58 BPM
BASE EXCESS BLDV CALC-SCNC: 2.1 MMOL/L (ref -7.7–1.9)
BASO+EOS+MONOS # BLD AUTO: ABNORMAL 10*3/UL
BASO+EOS+MONOS NFR BLD AUTO: ABNORMAL %
BASOPHILS # BLD AUTO: 0 10E3/UL (ref 0–0.2)
BASOPHILS NFR BLD AUTO: 1 %
BUN SERPL-MCNC: 9.9 MG/DL (ref 8–23)
CALCIUM SERPL-MCNC: 8.6 MG/DL (ref 8.8–10.2)
CHLORIDE SERPL-SCNC: 111 MMOL/L (ref 98–107)
CREAT SERPL-MCNC: 1 MG/DL (ref 0.51–0.95)
D DIMER PPP FEU-MCNC: 0.92 UG/ML FEU (ref 0–0.5)
DEPRECATED HCO3 PLAS-SCNC: 23 MMOL/L (ref 22–29)
DIASTOLIC BLOOD PRESSURE - MUSE: NORMAL MMHG
EGFRCR SERPLBLD CKD-EPI 2021: 59 ML/MIN/1.73M2
EOSINOPHIL # BLD AUTO: 0.2 10E3/UL (ref 0–0.7)
EOSINOPHIL NFR BLD AUTO: 3 %
ERYTHROCYTE [DISTWIDTH] IN BLOOD BY AUTOMATED COUNT: 13.4 % (ref 10–15)
GLUCOSE SERPL-MCNC: 89 MG/DL (ref 70–99)
HCO3 BLDV-SCNC: 27 MMOL/L (ref 21–28)
HCT VFR BLD AUTO: 38.4 % (ref 35–47)
HGB BLD-MCNC: 13.3 G/DL (ref 11.7–15.7)
IMM GRANULOCYTES # BLD: 0 10E3/UL
IMM GRANULOCYTES NFR BLD: 0 %
INTERPRETATION ECG - MUSE: NORMAL
LYMPHOCYTES # BLD AUTO: 1.2 10E3/UL (ref 0.8–5.3)
LYMPHOCYTES NFR BLD AUTO: 23 %
MCH RBC QN AUTO: 30.2 PG (ref 26.5–33)
MCHC RBC AUTO-ENTMCNC: 34.6 G/DL (ref 31.5–36.5)
MCV RBC AUTO: 87 FL (ref 78–100)
MONOCYTES # BLD AUTO: 0.3 10E3/UL (ref 0–1.3)
MONOCYTES NFR BLD AUTO: 6 %
NEUTROPHILS # BLD AUTO: 3.5 10E3/UL (ref 1.6–8.3)
NEUTROPHILS NFR BLD AUTO: 67 %
NRBC # BLD AUTO: 0 10E3/UL
NRBC BLD AUTO-RTO: 0 /100
NT-PROBNP SERPL-MCNC: 183 PG/ML (ref 0–900)
O2/TOTAL GAS SETTING VFR VENT: 96 %
OXYHGB MFR BLDV: 86 % (ref 70–75)
P AXIS - MUSE: 5 DEGREES
PCO2 BLDV: 41 MM HG (ref 40–50)
PH BLDV: 7.43 [PH] (ref 7.32–7.43)
PLATELET # BLD AUTO: 128 10E3/UL (ref 150–450)
PO2 BLDV: 52 MM HG (ref 25–47)
POTASSIUM SERPL-SCNC: 3.8 MMOL/L (ref 3.4–5.3)
PR INTERVAL - MUSE: 158 MS
QRS DURATION - MUSE: 82 MS
QT - MUSE: 452 MS
QTC - MUSE: 443 MS
R AXIS - MUSE: -16 DEGREES
RBC # BLD AUTO: 4.4 10E6/UL (ref 3.8–5.2)
SODIUM SERPL-SCNC: 145 MMOL/L (ref 135–145)
SYSTOLIC BLOOD PRESSURE - MUSE: NORMAL MMHG
T AXIS - MUSE: -31 DEGREES
TROPONIN T SERPL HS-MCNC: 17 NG/L
TROPONIN T SERPL HS-MCNC: 20 NG/L
VENTRICULAR RATE- MUSE: 58 BPM
WBC # BLD AUTO: 5.3 10E3/UL (ref 4–11)

## 2023-10-11 PROCEDURE — 85025 COMPLETE CBC W/AUTO DIFF WBC: CPT | Performed by: EMERGENCY MEDICINE

## 2023-10-11 PROCEDURE — 83880 ASSAY OF NATRIURETIC PEPTIDE: CPT | Performed by: EMERGENCY MEDICINE

## 2023-10-11 PROCEDURE — 93005 ELECTROCARDIOGRAM TRACING: CPT

## 2023-10-11 PROCEDURE — 84484 ASSAY OF TROPONIN QUANT: CPT | Performed by: EMERGENCY MEDICINE

## 2023-10-11 PROCEDURE — 82805 BLOOD GASES W/O2 SATURATION: CPT | Performed by: EMERGENCY MEDICINE

## 2023-10-11 PROCEDURE — 250N000011 HC RX IP 250 OP 636: Performed by: EMERGENCY MEDICINE

## 2023-10-11 PROCEDURE — 36415 COLL VENOUS BLD VENIPUNCTURE: CPT | Performed by: EMERGENCY MEDICINE

## 2023-10-11 PROCEDURE — 250N000013 HC RX MED GY IP 250 OP 250 PS 637: Performed by: EMERGENCY MEDICINE

## 2023-10-11 PROCEDURE — 99285 EMERGENCY DEPT VISIT HI MDM: CPT | Mod: 25

## 2023-10-11 PROCEDURE — G0378 HOSPITAL OBSERVATION PER HR: HCPCS

## 2023-10-11 PROCEDURE — 85379 FIBRIN DEGRADATION QUANT: CPT | Performed by: EMERGENCY MEDICINE

## 2023-10-11 PROCEDURE — 80048 BASIC METABOLIC PNL TOTAL CA: CPT | Performed by: EMERGENCY MEDICINE

## 2023-10-11 PROCEDURE — 71275 CT ANGIOGRAPHY CHEST: CPT | Mod: ME

## 2023-10-11 PROCEDURE — 250N000009 HC RX 250: Performed by: EMERGENCY MEDICINE

## 2023-10-11 RX ORDER — IOPAMIDOL 755 MG/ML
68 INJECTION, SOLUTION INTRAVASCULAR ONCE
Status: COMPLETED | OUTPATIENT
Start: 2023-10-11 | End: 2023-10-11

## 2023-10-11 RX ORDER — ASPIRIN 81 MG/1
243 TABLET, CHEWABLE ORAL ONCE
Status: COMPLETED | OUTPATIENT
Start: 2023-10-11 | End: 2023-10-11

## 2023-10-11 RX ADMIN — SODIUM CHLORIDE 92 ML: 9 INJECTION, SOLUTION INTRAVENOUS at 22:05

## 2023-10-11 RX ADMIN — ASPIRIN 81 MG 243 MG: 81 TABLET ORAL at 22:55

## 2023-10-11 RX ADMIN — IOPAMIDOL 68 ML: 755 INJECTION, SOLUTION INTRAVENOUS at 22:05

## 2023-10-11 ASSESSMENT — ACTIVITIES OF DAILY LIVING (ADL)
ADLS_ACUITY_SCORE: 37

## 2023-10-11 NOTE — TELEPHONE ENCOUNTER
Patient reports that she was put back on blood pressure medication recently. She was doing some chores today, draining the hot water heater, filing the softener salt and changing the furnace filter. She reports that she started to feel lightheaded and a little dizzy. She took her blood pressure and it was 158/83 P:107 at about 3:00 pm. She did an EKG on her watch and it flagged A.fib. She did another one and it stated inconclusive. When she tried to stand up she got lightheaded. Her last BP @ 3:15 pm and it was 120/69 P:86. She is still feeling lightheaded, sweaty, hot, nauseous and a little SOB.     Nursing advice:  Due to her current symptoms, her recent medical history and her past medical history and her age she is to hang up and call 911.  Patient was hesitant.  I asked if she would like to call 911 together.  She agreed.  911 was called and I stayed on the phone with the patient until first responders showed.  Patient verbalized good understanding, agrees with plan and needs no further support.  Thank you. Jeny Sullivan R.N.    Slade Gama (son) [phone] I did get verbal permission from Gem to speak with her son Slade.    At Gem's request I contacted Slade to inform him that we called 911 and what was going on.  He needed no further support.    Reason for Disposition   Sounds like a life-threatening emergency to the triager    Protocols used: Blood Pressure - High-A-OH

## 2023-10-11 NOTE — ED PROVIDER NOTES
History     Chief Complaint:  Shortness of Breath       HPI   Gem Gama is a 72 year old female who presents with hypertension, hyperlipidemia, CHF, and CAD who presents with shortness of breath and other symptoms. The patient reports that she was lifting a bag of salt and dumped it into her water softener when she suddenly felt shortness of breath, lightheadedness, and nausea. She states that after this happened she checked her blood pressure and heart monitor on her watch which said she had irregular heart rhythm. Gem denies chest pain and leg swelling. She is not anticoagulated. The patient has a history of double stent placement due to a blockage. She was taken off losartan recently because she was having hypotension and near-syncopal episodes while driving. The patient was placed on a Holter monitor which showed v tach and PVCs. She was placed back on losartan recently which has not dropped her blood pressure yet.    Independent Historian:   EMS Personnel - They report that the patient is likely a hoarder and mold in the house which is likely the cause of her shortness of breath. They add that her shortness of breath is more than likely chronic.    Review of External Notes:   I reviewed her Lexiscan from 8/14/2023, she had large area of infarct but no inducible ischemia.  She is due to have a loop recorder implanted in November.      Medications:    Estrace  Proair  Aspirin 81 mg  Lipitor  Coreg  Valium  Imdur  Imodium  Claritin  Nitrostat  Zoloft  Actigall  Diovan    Past Medical History:    ADHD  Anxiety  Arthritis  Asthma  C. Diff infection  Sleep apnea  Cheyne-Rogers breathing  CHF  CAD  Depression  GERD  HTN  Ischemic cardiomyopathy  Depression  Narcolepsy  Pituitary microadenoma  Pyelonephritis of right kidney  Sepsis  Anxiety  Idiopathic interstitial fibrosis  Paroxysmal vertigo    Past Surgical History:    Abdomen surgery  Knee arthroplasty x2  Colonoscopy  CV coronary angiogram  CV heart  "cath x2  CV intra aortic balloon  CV PCI stent drug elution  Cataract removal (B)   scar tissue removal ureter  Total hip replacement (B)  Hysterectomy    Physical Exam   Patient Vitals for the past 24 hrs:   BP Temp Temp src Pulse Resp SpO2 Height Weight   10/11/23 1720 (!) 148/70 98  F (36.7  C) Oral 61 16 95 % 1.626 m (5' 4\") 81.6 kg (180 lb)        Physical Exam  Constitutional: Alert, attentive, GCS 15  HENT:    Nose: Nose normal.    Mouth/Throat: Oropharynx is clear, mucous membranes are moist  Eyes: EOM are normal, anicteric, conjugate gaze  CV: regular rate and rhythm; no murmurs  Chest: Effort normal and breath sounds clear without wheezing or rales, symmetric bilaterally   GI:  non tender. No distension. No guarding or rebound.    MSK: No LE edema, no tenderness to palpation of BLE.  Neurological: Alert, attentive, moving all extremities equally.   Skin: Skin is warm and dry.    Emergency Department Course   ECG  ECG taken at 1725, ECG read at 1808  Sinus bradycardia   Septal infarct , age undetermined   Abnormal ECG    Rate 58 bpm. AR interval 158 ms. QRS duration 82 ms. QT/QTc 452/443 ms. P-R-T axes 5 -16 -31.     Imaging:  CT Chest Pulmonary Embolism w Contrast   Preliminary Result   IMPRESSION:   1.  No pulmonary emboli on either side. Mild diffuse thickening of the bronchi. Evidence of remote granulomatous disease.      2.  Mildly atherosclerotic thoracic aorta without aneurysm. Normal cardiac size. Coronary artery calcifications and stents.      3.  Cholelithiasis.           Laboratory:  Labs Ordered and Resulted from Time of ED Arrival to Time of ED Departure   BLOOD GAS VENOUS WITH OXYHEMOGLOBIN - Abnormal       Result Value    pH Venous 7.43      pCO2 Venous 41      pO2 Venous 52 (*)     Bicarbonate Venous 27      FIO2 96      Oxyhemoglobin Venous 86 (*)     Base Excess/Deficit 2.1 (*)    BASIC METABOLIC PANEL - Abnormal    Sodium 145      Potassium 3.8      Chloride 111 (*)     Carbon Dioxide " (CO2) 23      Anion Gap 11      Urea Nitrogen 9.9      Creatinine 1.00 (*)     GFR Estimate 59 (*)     Calcium 8.6 (*)     Glucose 89     TROPONIN T, HIGH SENSITIVITY - Abnormal    Troponin T, High Sensitivity 17 (*)    CBC WITH PLATELETS AND DIFFERENTIAL - Abnormal    WBC Count 5.3      RBC Count 4.40      Hemoglobin 13.3      Hematocrit 38.4      MCV 87      MCH 30.2      MCHC 34.6      RDW 13.4      Platelet Count 128 (*)     % Neutrophils 67      % Lymphocytes 23      % Monocytes 6      Mids % (Monos, Eos, Basos)        % Eosinophils 3      % Basophils 1      % Immature Granulocytes 0      NRBCs per 100 WBC 0      Absolute Neutrophils 3.5      Absolute Lymphocytes 1.2      Absolute Monocytes 0.3      Mids Abs (Monos, Eos, Basos)        Absolute Eosinophils 0.2      Absolute Basophils 0.0      Absolute Immature Granulocytes 0.0      Absolute NRBCs 0.0     D DIMER QUANTITATIVE - Abnormal    D-Dimer Quantitative 0.92 (*)    TROPONIN T, HIGH SENSITIVITY - Abnormal    Troponin T, High Sensitivity 20 (*)    NT PROBNP INPATIENT - Normal    N terminal Pro BNP Inpatient 183            Emergency Department Course & Assessments:      Interventions:  Medications   iopamidol (ISOVUE-370) solution 68 mL (68 mLs Intravenous $Given 10/11/23 2205)   Saline Flush (92 mLs Intravenous $Given 10/11/23 2205)        Assessments:   I obtained history and examined the patient as noted above.   I rechecked the patient and explained findings     I rechecked the patient    Independent Interpretation (X-rays, CTs, rhythm strip):  I personally viewed her chest CT, see no evidence of pneumothorax or large central PE.    Consultations/Discussion of Management or Tests:  Hospitalist, will plan for observation mission       Social Determinants of Health affecting care:   None    Disposition:  The patient was admitted to the hospital under the care of Dr. Stark.     Impression & Plan      Medical Decision Makin-year-old woman  past medical history significant for CAD, prior infarct who is found to have nonsustained V. tach on most recent event monitor, she is due to have a loop recorder implanted to help determine course of action presenting for evaluation of what sounds like a near syncopal event associated with some chest pain, nauseousness and sweating concerning for potential cardiac event.  EKG on arrival shows no definite ischemia, initial troponin was detectable at 17 however repeat this did increase slightly to 20.  Her D-dimer was elevated prompting CT PE study however this was negative.  She was given aspirin as a precaution.  Given her history, somewhat difficult to determine if she had a mild ischemic event versus potential dysrhythmia, and despite having a nuc med study 2 months ago that showed no inducible ischemia, doing a reasonable for her to remain in the hospital and cardiac telemetry for continued evaluation.      Diagnosis:    ICD-10-CM    1. Chest pain, unspecified type  R07.9       2. Shortness of breath  R06.02       3. History of CAD (coronary artery disease)  Z86.79            Shade Allen MD  [employer]  10:55 PM 10/11/23       Scribe Disclosure:  I, Zach Jones, am serving as a scribe at 5:36 PM on 10/11/2023 to document services personally performed by Shade Allen MD based on my observations and the provider's statements to me.   10/11/2023   Shade Allen MD Dunbar, John Forrest, MD  10/11/23 5739

## 2023-10-11 NOTE — ED TRIAGE NOTES
Pt arrives via EMS from home w c/o SOB. Pt states she was cleaning in her home today around 1430 when she became dizzy and SOB. Pt called EMS when it wasn't getting any better. Pt states she has been nauseous and hasn't had much of an appetite. BGL en route 116. Pt states she has a hx of dizzy and lightheadedness for the past 2 years but the episodes are becoming more frequent now. Pt in the past had to wear a 3 day holter monitor where it showed v tach and PVCs. Pt states she did start her volsartan again today after being off it for a while. Hx MI 4 years ago. Scheduled for a cardiac implant of some sore November 22. No c/o pain at this time. A&Ox4.     Triage Assessment (Adult)       Row Name 10/11/23 7234          Triage Assessment    Airway WDL WDL        Respiratory WDL    Respiratory WDL cough     Cough Frequency frequent     Cough Type dry        Peripheral/Neurovascular WDL    Peripheral Neurovascular WDL WDL

## 2023-10-12 ENCOUNTER — APPOINTMENT (OUTPATIENT)
Dept: PHYSICAL THERAPY | Facility: CLINIC | Age: 73
End: 2023-10-12
Attending: INTERNAL MEDICINE
Payer: MEDICARE

## 2023-10-12 VITALS
WEIGHT: 178.13 LBS | HEART RATE: 62 BPM | HEIGHT: 64 IN | BODY MASS INDEX: 30.41 KG/M2 | DIASTOLIC BLOOD PRESSURE: 38 MMHG | OXYGEN SATURATION: 97 % | SYSTOLIC BLOOD PRESSURE: 140 MMHG | RESPIRATION RATE: 20 BRPM | TEMPERATURE: 97.8 F

## 2023-10-12 LAB
ANION GAP SERPL CALCULATED.3IONS-SCNC: 11 MMOL/L (ref 7–15)
BUN SERPL-MCNC: 13.3 MG/DL (ref 8–23)
CALCIUM SERPL-MCNC: 8.7 MG/DL (ref 8.8–10.2)
CHLORIDE SERPL-SCNC: 107 MMOL/L (ref 98–107)
CREAT SERPL-MCNC: 1.11 MG/DL (ref 0.51–0.95)
DEPRECATED HCO3 PLAS-SCNC: 23 MMOL/L (ref 22–29)
EGFRCR SERPLBLD CKD-EPI 2021: 52 ML/MIN/1.73M2
GLUCOSE SERPL-MCNC: 101 MG/DL (ref 70–99)
POTASSIUM SERPL-SCNC: 3.7 MMOL/L (ref 3.4–5.3)
SODIUM SERPL-SCNC: 141 MMOL/L (ref 135–145)
TROPONIN T SERPL HS-MCNC: 17 NG/L

## 2023-10-12 PROCEDURE — G0378 HOSPITAL OBSERVATION PER HR: HCPCS

## 2023-10-12 PROCEDURE — 36415 COLL VENOUS BLD VENIPUNCTURE: CPT | Performed by: INTERNAL MEDICINE

## 2023-10-12 PROCEDURE — 99223 1ST HOSP IP/OBS HIGH 75: CPT | Performed by: INTERNAL MEDICINE

## 2023-10-12 PROCEDURE — 250N000013 HC RX MED GY IP 250 OP 250 PS 637: Performed by: INTERNAL MEDICINE

## 2023-10-12 PROCEDURE — 80048 BASIC METABOLIC PNL TOTAL CA: CPT | Performed by: INTERNAL MEDICINE

## 2023-10-12 PROCEDURE — 84484 ASSAY OF TROPONIN QUANT: CPT | Performed by: INTERNAL MEDICINE

## 2023-10-12 PROCEDURE — 99236 HOSP IP/OBS SAME DATE HI 85: CPT | Performed by: INTERNAL MEDICINE

## 2023-10-12 PROCEDURE — 99207 PR APP CREDIT; MD BILLING SHARED VISIT: CPT | Performed by: INTERNAL MEDICINE

## 2023-10-12 PROCEDURE — 97161 PT EVAL LOW COMPLEX 20 MIN: CPT | Mod: GP | Performed by: PHYSICAL THERAPIST

## 2023-10-12 RX ORDER — PROCHLORPERAZINE MALEATE 5 MG
5 TABLET ORAL EVERY 6 HOURS PRN
Status: DISCONTINUED | OUTPATIENT
Start: 2023-10-12 | End: 2023-10-12 | Stop reason: HOSPADM

## 2023-10-12 RX ORDER — SERTRALINE HYDROCHLORIDE 100 MG/1
100 TABLET, FILM COATED ORAL DAILY
Status: DISCONTINUED | OUTPATIENT
Start: 2023-10-12 | End: 2023-10-12 | Stop reason: HOSPADM

## 2023-10-12 RX ORDER — ACETAMINOPHEN 325 MG/1
650 TABLET ORAL EVERY 6 HOURS PRN
Status: DISCONTINUED | OUTPATIENT
Start: 2023-10-12 | End: 2023-10-12 | Stop reason: HOSPADM

## 2023-10-12 RX ORDER — PROCHLORPERAZINE 25 MG
12.5 SUPPOSITORY, RECTAL RECTAL EVERY 12 HOURS PRN
Status: DISCONTINUED | OUTPATIENT
Start: 2023-10-12 | End: 2023-10-12 | Stop reason: HOSPADM

## 2023-10-12 RX ORDER — ASPIRIN 81 MG/1
81 TABLET ORAL DAILY
Status: DISCONTINUED | OUTPATIENT
Start: 2023-10-12 | End: 2023-10-12 | Stop reason: HOSPADM

## 2023-10-12 RX ORDER — CARVEDILOL 3.12 MG/1
3.12 TABLET ORAL 2 TIMES DAILY WITH MEALS
Status: DISCONTINUED | OUTPATIENT
Start: 2023-10-12 | End: 2023-10-12 | Stop reason: HOSPADM

## 2023-10-12 RX ORDER — ACETAMINOPHEN 650 MG/1
650 SUPPOSITORY RECTAL EVERY 6 HOURS PRN
Status: DISCONTINUED | OUTPATIENT
Start: 2023-10-12 | End: 2023-10-12 | Stop reason: HOSPADM

## 2023-10-12 RX ORDER — ONDANSETRON 2 MG/ML
4 INJECTION INTRAMUSCULAR; INTRAVENOUS EVERY 6 HOURS PRN
Status: DISCONTINUED | OUTPATIENT
Start: 2023-10-12 | End: 2023-10-12 | Stop reason: HOSPADM

## 2023-10-12 RX ORDER — VALSARTAN 40 MG/1
40 TABLET ORAL 2 TIMES DAILY
Status: DISCONTINUED | OUTPATIENT
Start: 2023-10-12 | End: 2023-10-12 | Stop reason: HOSPADM

## 2023-10-12 RX ORDER — ISOSORBIDE MONONITRATE 30 MG/1
30 TABLET, EXTENDED RELEASE ORAL EVERY EVENING
Status: DISCONTINUED | OUTPATIENT
Start: 2023-10-12 | End: 2023-10-12 | Stop reason: HOSPADM

## 2023-10-12 RX ORDER — ONDANSETRON 4 MG/1
4 TABLET, ORALLY DISINTEGRATING ORAL EVERY 6 HOURS PRN
Status: DISCONTINUED | OUTPATIENT
Start: 2023-10-12 | End: 2023-10-12 | Stop reason: HOSPADM

## 2023-10-12 RX ADMIN — CARVEDILOL 3.12 MG: 3.12 TABLET, FILM COATED ORAL at 08:30

## 2023-10-12 RX ADMIN — SERTRALINE HYDROCHLORIDE 100 MG: 100 TABLET ORAL at 08:30

## 2023-10-12 RX ADMIN — VALSARTAN 40 MG: 40 TABLET, FILM COATED ORAL at 08:30

## 2023-10-12 RX ADMIN — ASPIRIN 81 MG: 81 TABLET, COATED ORAL at 08:30

## 2023-10-12 ASSESSMENT — ACTIVITIES OF DAILY LIVING (ADL)
ADLS_ACUITY_SCORE: 37

## 2023-10-12 NOTE — PLAN OF CARE
RECEIVING UNIT ED HANDOFF REVIEW    ED Nurse Handoff Report was reviewed by: Dakota Antoine RN on October 12, 2023 at 1:03 AM

## 2023-10-12 NOTE — ED NOTES
[facility]  ED Nurse Handoff Report    ED Chief complaint: Shortness of Breath      ED Diagnosis:   Final diagnoses:   Chest pain, unspecified type   Shortness of breath   History of CAD (coronary artery disease)       Code Status: to be addressed    Allergies:   Allergies   Allergen Reactions    Dust Mites Cough, Headache, Other (See Comments) and Shortness Of Breath    Latex Other (See Comments) and Shortness Of Breath     Runny nose  PN: LW Reaction: DIFFICULTY BREATHING      Sulfa Antibiotics Anaphylaxis, Hives and Itching     PN: LW Reaction: HIVES, Swelling  PN: LW Reaction: HIVES, Swelling    Flagyl [Metronidazole Hcl] Anaphylaxis and Rash    Food      PN: LW FI1: nka LW FI2:    Hydrochlorothiazide W-Triamterene      PN: LW Reaction: skin rash  PN: LW Reaction: skin rash    No Clinical Screening - See Comments      PN: LW Other1: -Adhesive Tape    Seasonal Allergies      sneezing    Tape [Adhesive Tape] Hives    Metoprolol Itching and Rash    Metronidazole Hives and Rash     PN: LW Reaction: HIVES         Patient Story: came today with EMS due to SOB and dizziness.Had MI previously and having multiple co morbidities..accordingly,this lightheadedness and nausea since 2 years experiencing it but more frequent this days.Mentioned too upon arrival that when she checked her HR was irregular on her watch.  Focused Assessment:  not distress,vitally stable,alert,denies SOB at present.hard stick,iv access  done by ultrasound.    Treatments and/or interventions provided: iv access,labs,trop repeated,D-dimer is elevated,CT   Patient's response to treatments and/or interventions: tolerated    To be done/followed up on inpatient unit:  as per admission order/needs cardiac work up as per EDMD    Does this patient have any cognitive concerns?:  none    Activity level - Baseline/Home:  Independent  Activity Level - Current:   Independent/needs assistance due to dizziness.    Patient's Preferred language:  English   Needed?: No    Isolation: None  Infection: Not Applicable  Patient tested for COVID 19 prior to admission: NO  Bariatric?: No    Vital Signs:   Vitals:    10/11/23 2000 10/11/23 2100 10/11/23 2200 10/11/23 2246   BP:    139/79   Pulse: 60 59 58 60   Resp: 12 10 14 27   Temp:       TempSrc:       SpO2: 96%   93%   Weight:       Height:           Cardiac Rhythm:     Was the PSS-3 completed:   Yes  What interventions are required if any?               Family Comments: none  OBS brochure/video discussed/provided to patient/family: Yes              Name of person given brochure if not patient: none              Relationship to patient: none    For the majority of the shift this patient's behavior was Green.   Behavioral interventions performed were none.    ED NURSE PHONE NUMBER: [phone]

## 2023-10-12 NOTE — PLAN OF CARE
PRIMARY Concern: SOB, Lightheaded & nauseous with excursion at home. Elevated trops in ED    SAFETY RISK Concerns (fall risk, behaviors, etc.): Fall risk.      Tests/Procedures for NEXT shift:   Consults? (Pending/following, signed-off?) Cardiology consult pending   Where is patient from? (Home, TCU, etc.): Home  Other Important info for NEXT shift:   Anticipated DC date & active delays: TBD  _____________________________________________________________________________  SUMMARY NOTE:  Orientation/Cognitive: A&Ox4  Observation Goals (Met/ Not Met): Not met  Mobility Level/Assist Equipment: SBA. Steady gait, but remains SBA due to symptoms of lightheadedness.  Antibiotics & Plan (IV/po, length of tx left): None  Pain Management: Denies  Tele/VS/O2: VSS on RA  ABNL Lab/BG: Trop 17>20>6AM Draw  Diet: Low NA/Low Fat  Bowel/Bladder: Reports julio can be incontinent at times. Requested a brief.   Skin Concerns: None  Drains/Devices: PIV SL  Patient Stated Goal for Today: To sleep

## 2023-10-12 NOTE — CONSULTS
"  Inpatient Cardiology Consultation.   [facility]  Date of Admission: 10/11/2023  Date of Consult: October 12, 2023    PRIMARY CARDIOLOGY TEAM:  Scooby ELAINE. [facility]    REASON FOR CONSULT:  Dizziness and question of arrhythmia.  Elevated troponin.    DIAGNOSES/ASSESSMENT:  1.  Chronic dizziness.  She has had this for years.  Extensively evaluated and deemed noncardiac.  Specifically, and recent heart monitor, reported symptoms of dizziness correlated with normal sinus rhythm.  Normal echocardiogram.  No additional inpatient testing.  2.  Question of arrhythmia.  Presenting ECG and recent Holter did not show A-fib.  In fact, she had no significant arrhythmia on her heart monitor.  No additional inpatient testing.  3.  Minimally elevated troponin with insignificant delta.  No chest pain, recent normal stress test, has a known occluded diagonal and nonobstructive residual coronary artery disease which can cause an insignificant troponin elevation.  No additional inpatient testing.  4.  Chronic coronary artery disease without angina status post anterior STEMI and LAD PCI in 2019 with multiple subsequent coronary angiograms done that did not show any disease progression.  LAD stents are patent.  Recent stress test negative for ischemia.  Besides, she has had not had any chest pain.  5.  Remote history of ischemic cardiomyopathy with recovered LVEF of 60%.  Echocardiogram normal.  6.  Chronic exertional dyspnea.  Deemed noncardiac.  After extensive evaluation.  Specifically, she has a normal NT proBNP on this admission.  Note patient has bronchial thickening on her chest CT.  This could be contributory.    PLAN:  1.  Prolonged visit with patient.  She went over her history dating back to several years and outlined multiple episodes of shortness of breath and dizziness.  She also expressed frustration or \"heart disease has not been diagnosed\".  Tearful.  I explained my impression in detail and and " reviewed all her test results, specifically the Zio patch, with the patient and her son.  Her chronic dizziness, chronic dyspnea have been extensively evaluated by her  cardiology teams in [facility] and [facility] and deemed noncardiac.  She does not have any documented clinically significant arrhythmias.  Minimally elevated troponin expected in clinical scenario (see above).  No additional inpatient or outpatient cardiac testing recommended.  2.  Correlation of bronchial thickening and episodic dyspnea per hospitalist team.  3.  Cardiology will sign off.  Patient will follow-up with primary cardiology team at [facility].  Thank for consulting us.      Pushpa Mendez MD, MD [facility]  Cardiology    Total time today 85 minutes.    CODE STATUS:  Full Code      HISTORY OF PRESENT ILLNESS:  Gem Gama is a 72 year old female with past medical history significant for hypertension, coronary artery disease, status post anterior STEMI with LAD PCI ST in 2019 (known chronic total occlusion of the first diagonal branch which is not amenable to PCI and mild to moderate nonobstructive disease elsewhere) elevation MI requiring percutaneous coronary intervention of the LAD, and prior history of ischemic cardiomyopathy with recovered ejection fraction to 60%.  She also has history of stage IIIa chronic renal disease due to congenital kidney defect, depression, acid reflux disease, obstructive sleep apnea, and anxiety, BMI 30 and chronic exertional dyspnea for many years that has been deemed noncardiac with negative stress test and normal echocardiogram for which she has completed a pulmonary rehabilitation exercise program and a chronic dizziness without any documented arrhythmias.     Patient presented to the emergency room yesterday with symptoms of shortness of breath and dizziness after she lifted a bag of salt and emptied into her water softener.  She reportedly checked her heart rhythm on her watch and was told it was regular.   No chest pain, lower extremity edema, orthopnea or any other symptoms of heart failure.  The ER BP was 148/70, sats were 95% on room air, normal respiration of 16, afebrile.  Pulmonary embolus ruled out on chest CT, she has diffuse bronchial thickening, normal cardiac size.  ECG showed sinus bradycardia with old septal infarct, rate 50 bpm, no ischemic changes, normal QTc of 440 ms, no evidence of arrhythmia.  High-sensitivity troponin is minimally elevated at 17 --20 and down trended back to 17.  NT-proBNP is normal at 183.  Renal labs are stable with a creatinine at baseline of 1.1, normal electrolytes.  Normal CBC with a hemoglobin of 13.3.    Recent transthoracic echocardiogram dated 9/18/2023 reviewed.  Normal LVEF of 60%, no regional wall motion abnormalities, grade 1 diastolic function with normal filling pressures.  Normal right ventricle systolic function.  Normal atrial size.  Normal cardiac valves.  Normal IVC.    Chest CT reviewed.  As documented above.    Recent nuclear stress test dated 8/14/2023.  No reversible ischemia.  Known nontransmural infarct in the apical and anteroseptal segments of the LV (she has a known occluded diagonal).    Recent heart monitor dated 8/10/2023 reviewed.  No significant arrhythmias.  No A-fib, single nonsustained VT run of 7 beats.  6 brief SVT episodes of 11 beats.  Patient reported multiple symptoms of dizziness which were all correlated with sinus rhythm.  Less than 1% atrial or ventricular ectopy.    REVIEW OF SYSTEMS:  A comprehensive 10 point review of systems was completed and the pertinent positives are documented in history of present illness.    FAMILY HISTORY:  Family History   Problem Relation Age of Onset    Hypertension Mother     Arthritis Mother     Cerebrovascular Disease Mother     Anesthesia Reaction Mother         Halucinates    Cerebrovascular Disease Father         Three Strokes    Diabetes Father         Type 2, after his 60's    Kidney Disease  Father     Hypertension Father     Hyperlipidemia Father     Osteoporosis Father     Diabetes Brother         After recovered from aspiration pneumonia, developed diabetes from high carb diet from feeding tube    Birth defects Brother     Thyroid Disease Sister         Low thyroid    Anesthesia Reaction Sister     Sjogren's Daughter     Diabetes Daughter         Diagnosed after stroke    Cerebrovascular Disease Daughter     Hypertension Daughter         With diabetes and stroke    Hyperlipidemia Daughter     Depression Daughter         Does not acknowledge    Anxiety Disorder Daughter     Asthma Daughter         Anxiety, air quality, and exercise triggered    Obesity Daughter     Diabetes Son         On metformin.    Hypertension Son     Obesity Son     Coronary Artery Disease Maternal Grandfather         Strokes, lived to 105    Substance Abuse Maternal Grandfather         Alcohol    Osteoporosis Maternal Grandfather         Old age    Osteoporosis Maternal Grandmother         Old age lived to 90    Coronary Artery Disease Paternal Grandfather     Coronary Artery Disease Paternal Grandmother     Osteoporosis Paternal Grandmother         Long term issue for her    Obesity Paternal Grandmother     Diabetes Daughter         Gestational and pre diabetic A1C    Anxiety Disorder Daughter     Asthma Daughter         Excursion induced    Thyroid Disease Daughter     Depression Son           ..from job    Mental Illness Other     Substance Abuse Other     Substance Abuse Other     Substance Abuse Other        MEDICATIONS:  Prior to Admission Medications   Prescriptions Last Dose Informant Patient Reported? Taking?   Melatonin 10 MG TABS tablet Past Week Self Yes Yes   Sig: Take 10 mg by mouth nightly as needed for sleep   Prenatal Multivit-Min-Fe-FA (PRENATAL/IRON) TABS 10/11/2023 Self Yes Yes   Sig: Take 1 tablet by mouth daily    UNABLE TO FIND 10/11/2023  Yes Yes   Sig: MEDICATION NAME: Uqora complete  regimen   albuterol (PROAIR HFA/PROVENTIL HFA/VENTOLIN HFA) 108 (90 Base) MCG/ACT inhaler  at prn  No Yes   Sig: Inhale 1-2 puffs into the lungs every 4 hours as needed for shortness of breath or wheezing   aspirin (ASA) 81 MG EC tablet 10/11/2023 at am Self No Yes   Sig: Take 1 tablet (81 mg) by mouth daily   atorvastatin (LIPITOR) 40 MG tablet 10/11/2023 at am  No Yes   Sig: TAKE 1 TABLET(40 MG) BY MOUTH DAILY   carvedilol (COREG) 3.125 MG tablet 10/11/2023 at am x1  No Yes   Sig: TAKE 1 TABLET(3.125 MG) BY MOUTH TWICE DAILY WITH MEALS   cholestyramine (QUESTRAN) 4 g packet Past Month  No Yes   Sig: Take 1 packet (4 g) by mouth 2 times daily (with meals)   Patient taking differently: Take 1 packet by mouth 2 times daily as needed   diazepam (VALIUM) 2 MG tablet  at prn  No Yes   Sig: Take 0.5-1 tablets (1-2 mg) by mouth 2 times daily as needed for anxiety or muscle spasms   diclofenac (VOLTAREN) 1 % topical gel  at prn  Yes Yes   Sig: Apply topically 4 times daily as needed for moderate pain For Jaw pain   estradiol (ESTRING) 2 MG vaginal ring  at in place  No Yes   Sig: Place 1 each vaginally every 3 months   fluticasone-vilanterol (BREO ELLIPTA) 200-25 MCG/ACT inhaler 10/10/2023  No Yes   Sig: Inhale 1 puff into the lungs daily   isosorbide mononitrate (IMDUR) 30 MG 24 hr tablet 10/10/2023 at pm  No Yes   Sig: Take 1 tablet (30 mg) by mouth every evening Take with Carvedilol.   loperamide (IMODIUM) 2 MG capsule 10/10/2023  No Yes   Sig: Take 1 capsule (2 mg) by mouth daily   Patient taking differently: Take 2 mg by mouth 4 times daily as needed for diarrhea   loratadine (CLARITIN) 10 MG tablet 10/10/2023  Yes Yes   Sig: Take 10 mg by mouth At Bedtime   magnesium oxide (MAG-OX) 400 MG tablet 10/11/2023  Yes Yes   Sig: Take 400 mg by mouth daily   nitroGLYcerin (NITROSTAT) 0.4 MG sublingual tablet  at prn  No Yes   Sig: Place 1 tablet (0.4 mg) under the tongue every 5 minutes as needed for chest pain    pantoprazole (PROTONIX) 40 MG EC tablet 10/11/2023  No Yes   Sig: TAKE 1 TABLET(40 MG) BY MOUTH DAILY   saccharomyces boulardii (FLORASTOR) 250 MG capsule 10/11/2023 at x1  No Yes   Sig: Take 1 capsule (250 mg) by mouth 2 times daily   sertraline (ZOLOFT) 100 MG tablet 10/11/2023  No Yes   Sig: TAKE 1 TABLET(100 MG) BY MOUTH DAILY   ursodiol (ACTIGALL) 300 MG capsule 10/11/2023 at x1  No Yes   Sig: TAKE 1 CAPSULE(300 MG) BY MOUTH TWICE DAILY   valsartan (DIOVAN) 40 MG tablet 10/11/2023  No Yes   Sig: Take 1 tablet (40 mg) by mouth 2 times daily   vitamin D3 (CHOLECALCIFEROL) 2000 units (50 mcg) tablet 10/11/2023 Self No Yes   Sig: Take 1 tablet (2,000 Units) by mouth daily   zinc gluconate 50 MG tablet 10/11/2023  Yes Yes   Sig: Take 50 mg by mouth daily      Facility-Administered Medications: None       HOME MEDICATIONS:  Prior to Admission Medications   Prescriptions Last Dose Informant Patient Reported? Taking?   Melatonin 10 MG TABS tablet Past Week Self Yes Yes   Sig: Take 10 mg by mouth nightly as needed for sleep   Prenatal Multivit-Min-Fe-FA (PRENATAL/IRON) TABS 10/11/2023 Self Yes Yes   Sig: Take 1 tablet by mouth daily    UNABLE TO FIND 10/11/2023  Yes Yes   Sig: MEDICATION NAME: Uqora complete regimen   albuterol (PROAIR HFA/PROVENTIL HFA/VENTOLIN HFA) 108 (90 Base) MCG/ACT inhaler  at prn  No Yes   Sig: Inhale 1-2 puffs into the lungs every 4 hours as needed for shortness of breath or wheezing   aspirin (ASA) 81 MG EC tablet 10/11/2023 at am Self No Yes   Sig: Take 1 tablet (81 mg) by mouth daily   atorvastatin (LIPITOR) 40 MG tablet 10/11/2023 at am  No Yes   Sig: TAKE 1 TABLET(40 MG) BY MOUTH DAILY   carvedilol (COREG) 3.125 MG tablet 10/11/2023 at am x1  No Yes   Sig: TAKE 1 TABLET(3.125 MG) BY MOUTH TWICE DAILY WITH MEALS   cholestyramine (QUESTRAN) 4 g packet Past Month  No Yes   Sig: Take 1 packet (4 g) by mouth 2 times daily (with meals)   Patient taking differently: Take 1 packet by mouth 2  times daily as needed   diazepam (VALIUM) 2 MG tablet  at prn  No Yes   Sig: Take 0.5-1 tablets (1-2 mg) by mouth 2 times daily as needed for anxiety or muscle spasms   diclofenac (VOLTAREN) 1 % topical gel  at prn  Yes Yes   Sig: Apply topically 4 times daily as needed for moderate pain For Jaw pain   estradiol (ESTRING) 2 MG vaginal ring  at in place  No Yes   Sig: Place 1 each vaginally every 3 months   fluticasone-vilanterol (BREO ELLIPTA) 200-25 MCG/ACT inhaler 10/10/2023  No Yes   Sig: Inhale 1 puff into the lungs daily   isosorbide mononitrate (IMDUR) 30 MG 24 hr tablet 10/10/2023 at pm  No Yes   Sig: Take 1 tablet (30 mg) by mouth every evening Take with Carvedilol.   loperamide (IMODIUM) 2 MG capsule 10/10/2023  No Yes   Sig: Take 1 capsule (2 mg) by mouth daily   Patient taking differently: Take 2 mg by mouth 4 times daily as needed for diarrhea   loratadine (CLARITIN) 10 MG tablet 10/10/2023  Yes Yes   Sig: Take 10 mg by mouth At Bedtime   magnesium oxide (MAG-OX) 400 MG tablet 10/11/2023  Yes Yes   Sig: Take 400 mg by mouth daily   nitroGLYcerin (NITROSTAT) 0.4 MG sublingual tablet  at prn  No Yes   Sig: Place 1 tablet (0.4 mg) under the tongue every 5 minutes as needed for chest pain   pantoprazole (PROTONIX) 40 MG EC tablet 10/11/2023  No Yes   Sig: TAKE 1 TABLET(40 MG) BY MOUTH DAILY   saccharomyces boulardii (FLORASTOR) 250 MG capsule 10/11/2023 at x1  No Yes   Sig: Take 1 capsule (250 mg) by mouth 2 times daily   sertraline (ZOLOFT) 100 MG tablet 10/11/2023  No Yes   Sig: TAKE 1 TABLET(100 MG) BY MOUTH DAILY   ursodiol (ACTIGALL) 300 MG capsule 10/11/2023 at x1  No Yes   Sig: TAKE 1 CAPSULE(300 MG) BY MOUTH TWICE DAILY   valsartan (DIOVAN) 40 MG tablet 10/11/2023  No Yes   Sig: Take 1 tablet (40 mg) by mouth 2 times daily   vitamin D3 (CHOLECALCIFEROL) 2000 units (50 mcg) tablet 10/11/2023 Self No Yes   Sig: Take 1 tablet (2,000 Units) by mouth daily   zinc gluconate 50 MG tablet 10/11/2023  Yes Yes    Sig: Take 50 mg by mouth daily      Facility-Administered Medications: None       ALLERGIES:  Allergies   Allergen Reactions    Dust Mites Cough, Headache, Other (See Comments) and Shortness Of Breath    Latex Other (See Comments) and Shortness Of Breath     Runny nose  PN: LW Reaction: DIFFICULTY BREATHING      Sulfa Antibiotics Anaphylaxis, Hives and Itching     PN: LW Reaction: HIVES, Swelling  PN: LW Reaction: HIVES, Swelling    Flagyl [Metronidazole Hcl] Anaphylaxis and Rash    Food      PN: LW FI1: nka LW FI2:    Hydrochlorothiazide W-Triamterene      PN: LW Reaction: skin rash  PN: LW Reaction: skin rash    No Clinical Screening - See Comments      PN: LW Other1: -Adhesive Tape    Seasonal Allergies      sneezing    Tape [Adhesive Tape] Hives    Metoprolol Itching and Rash    Metronidazole Hives and Rash     PN: LW Reaction: HIVES         PAST MEDICAL HISTORY:  Past Medical History:   Diagnosis Date    ADHD (attention deficit hyperactivity disorder)     Anxiety     Arthritis     back, hands, knees, hips    Asthma     C. difficile diarrhea     Central sleep apnea     Cheyne-Rogers breathing 09/07/2022    Congestive heart failure (H) Fall 2019    Right after STEMI, improved    Coronary artery disease involving native coronary artery of native heart without angina pectoris     Depression     Depressive disorder 1970s    Fever and chills 09/24/2021    Gastro-oesophageal reflux disease     Hypertension     Ischemic cardiomyopathy     Major depressive disorder, recurrent episode, moderate (H) 07/25/2013    Narcolepsy     Pituitary microadenoma (H) 2011    MRI 2011- There is a triangular-shaped area with delayed contrast enhancement at the left lateral portion of the pituitary gland posteriorly, measuring 2.5 x 4.3 mm. This is suggestive of a microadenoma, MRI 10/1/22 - Normal pituitary gland and whole brain MRI.    Pyelonephritis of right kidney 10/24/2021    Renal disease     S/P hip replacement 2004     Bilateral fall 2004 due to OA    Sleep apnea     Status post total knee replacement 12/29/2014    Urinary tract infection with hematuria, site unspecified 09/24/2021       PAST SURGICAL HISTORY:  Past Surgical History:   Procedure Laterality Date    ABDOMEN SURGERY  1974, 1996    Kidney reconstruct. Uterer stenosis    ARTHROPLASTY KNEE  07/09/2014    Procedure: ARTHROPLASTY KNEE;  Surgeon: Levi Johnson MD;  Location:  OR    ARTHROPLASTY KNEE Left 11/24/2014    Procedure: ARTHROPLASTY KNEE;  Surgeon: Levi Johnson MD;  Location:  OR    COLONOSCOPY      Several times dates ??    CV CORONARY ANGIOGRAM N/A 10/09/2019    Procedure: Coronary Angiogram;  Surgeon: Chiquita Chatterjee MD;  Location:  HEART CARDIAC CATH LAB    CV HEART CATHETERIZATION WITH POSSIBLE INTERVENTION N/A 10/10/2019    Procedure: Heart Catheterization with Possible Intervention;  Surgeon: Chin Garcia MD;  Location:  HEART CARDIAC CATH LAB    CV INTRA AORTIC BALLOON N/A 10/09/2019    Procedure: Intra-Aortic Balloon Pump Insertion;  Surgeon: Chiquita Chatterjee MD;  Location:  HEART CARDIAC CATH LAB    CV INTRA AORTIC BALLOON REMOVAL N/A 10/10/2019    Procedure: Intra-Aortic Balloon Pump Removal;  Surgeon: Chin Garcia MD;  Location:  HEART CARDIAC CATH LAB    CV LEFT HEART CATH N/A 12/11/2020    Procedure: Heart Catheterization with Possible Intervention;  Surgeon: Pilo Otoole MD;  Location:  HEART CARDIAC CATH LAB    CV PCI STENT DRUG ELUTING N/A 10/09/2019    Procedure: PCI Stent Drug Eluting;  Surgeon: Chiquita Chatterjee MD;  Location:  HEART CARDIAC CATH LAB    EYE SURGERY  2011    Cataract surgery both eyes    GENITOURINARY SURGERY  01/01/2008    Prolift    GENITOURINARY SURGERY  1973    In 1973, she had surgery to 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    ORTHOPEDIC SURGERY      bilat total hip    RECTOCELE REPAIR      ZZC TOTAL ABD HYSTERECTOMY+BLAD REPR  01/01/1992    Vaginal approach with oophrectomy    ZZC TOTAL KNEE ARTHROPLASTY         SOCIAL HISTORY:   Gem Gama  reports that she has never smoked. She has never been exposed to tobacco smoke. She has never used smokeless tobacco. She reports that she does not drink alcohol and does not use drugs.      PHYSICAL EXAMINATION:  Temp: 97.8  F (36.6  C) Temp src: Oral BP: (!) 140/38 Pulse: 62   Resp: 20 SpO2: 97 % O2 Device: None (Room air)    10/07 0700 - 10/12 0659  In: 200 [P.O.:200]  Out: -   Net: 200  Vitals:    10/11/23 1720 10/12/23 0222   Weight: 81.6 kg (180 lb) 80.8 kg (178 lb 2.1 oz)       Constitutional: Comfortable at rest. Cooperative, alert, well developed, well nourished. Normal BMI.   Eyes: Pupils reactive, no pallor or icterus.  ENT: No cyanosis or pallor.  Moist mucous membranes.  Cardiovascular: Normal jugular venous pulse and pressure.  Normal carotid pulse character and volume.  No carotid bruit.  Apical impulse not palpable due to body habitus.  Regular heart sounds.  No S3 or S4.  No murmur or friction rub.  Respiratory: Normal respiratory effort with symmetrical chest wall movements and no use of accessory muscles. Bilateral normal breath sounds. No rales or wheeze.  GI: Soft, nontender, active bowel sounds.  No hepatosplenomegaly, ascites or abdominal wall edema.  Skin: No erythema or ecchymosis. No pertinent skin findings.  Musculoskeletal: Normal muscle tone and strength.   Neuropsychiatric: Oriented to time place and person.  Affect normal.  No gross motor deficits.  Extremities: No edema or clubbing.    Clinically Significant Risk Factors Present on Admission                  # Drug Induced Platelet Defect: home medication list includes an antiplatelet medication     # Hypertension: Home medication list includes  "antihypertensive(s)      # Obesity: Estimated body mass index is 30.58 kg/m  as calculated from the following:    Height as of this encounter: 1.626 m (5' 4\").    Weight as of this encounter: 80.8 kg (178 lb 2.1 oz).       # Asthma: noted on problem list           Pushpa Mendez MD, MD    "

## 2023-10-12 NOTE — PROGRESS NOTES
Hospitalist update note    Patient admitted earlier this morning by Dr. Stark. Overall feels better, but still with mild dizziness and shortness of breath, as well as nausea. Cardiology recs reviewed - felt patient's symptoms are non-cardiac in etiology    - PT consulted for vestibular testing   - Monitor on telemetry    MANOJ Nation MD  Hospitalist  [phone]

## 2023-10-12 NOTE — H&P
"[facility]    History and Physical - Hospitalist Service       Date of Admission:  10/11/2023     Assessment & Plan      Gem Gama is a 72 year old female with a history of CAD with stent to the LAD, recurrent episodes of dizziness, anxiety, GERD, Htn who is admitted on 10/11/2023 with an episode of lightheadedness and slightly elevated troponin.     Recurrent episodes of dizziness  Etiology for this is still unclear but is undergoing cardiac work up. Zio patch did have short run of VTAC along with some PACs and PVCs but did not correspond to symptoms. With ongoing symptoms it is reasonable to see if cardiology as any other input.   -tele  -cardiology consult  -serial troponins  -consider tilt table testing  -plan was for loop recorder in November    CAD - previous stent to LAD  Hypertension  No chest pain. Troponin flat. She had a lexiscan in August showing moderate nontransmural infarct in the LAD territory but no active ischemia per cardiology note. TTE in September normal with EF of 55-60%. Doubt she has acute ischemia as the etiology for her symptoms above.   -cardiology consult  -continue PTA coreg, valsartan, lipitor, Imdur    GERD  -PPI    Depression  Anxiety  -Zolft, valium     Diet: Regular Diet Adult  DVT Prophylaxis: Ambulate every shift  Code Status: Full Code    Disposition: Obs for cardiology consult    Clinically Significant Risk Factors Present on Admission                  # Drug Induced Platelet Defect: home medication list includes an antiplatelet medication     # Hypertension: Home medication list includes antihypertensive(s)        # Obesity: Estimated body mass index is 30.58 kg/m  as calculated from the following:    Height as of this encounter: 1.626 m (5' 4\").    Weight as of this encounter: 80.8 kg (178 lb 2.1 oz).               Romie Stark, DO  Hospitalist Service  [facility]  Securely message with [email or web address] (more info)  Text " page via [facility] Paging/Directory     ______________________________________________________________________    Chief Complaint   dizziness    History is obtained from the patient and ED physician    History of Present Illness   Gem Gama is a 72 year old female who has a history of CAD, Depression, anxiety, GERD, Htn and recurrent issues with dizziness who presents to the ED with dizziness. The patient has had multiple episodes recently and was seen by cardiology on 10/5 in an attempt at diagnosis. In reading that note she will get episodes of lightheadedness when she is sitting or standing but now when she is lying still. She will feel faint and like her head is swimming. She had a 72 hour zio patch which showed a short run of vtac, short run of supraventricular ectopy and infrequent PVCs and PACs. She also had episodes of possible near syncope while driving which was attributed to hypotension so she losartan discontinued. Her blood pressure than increased substantially so losartan just restarted on Monday, she has taken 3 doses so far. Cardiology is unsure at this point the etiology for her episodes. At the visit last week there was a plan to consider a loop recorder which per report is scheduled to happen in November. Consideration was given toward tilt table testing but cardiology noted it was unclear if this would be beneficial at this time.     Today the patient was feeling a bit bitter so was doing a few tasks such as filling her water softener and changing the furnace filter. She completed those tasks fine but then felt nauseated, lightheaded but did not pass out. Her watch at that time noted she had an irregular heart beat. She presented to the ED where cardiac monitoring thus far has been negative. D-dimer was elevated so CT of the chest completed which was negative for PE but did show some mild bronchial wall thickening. Troponin elevated to 17 and up to 20 on repeat. Patient denies any chest pain  or shortness of breath. She got up to the bathroom and felt a bit dizzy but feels fine in bed currently. She does have a history of CAD with stents to the LAD previously. She had a lexiscan in August showing moderate nontransmural infarct in the LAD territory but no active ischemia per cardiology note. TTE in September normal with EF of 55-60%. Given her ongoing symptoms and mild troponin elevation obs is requested.        Past Medical History    Past Medical History:   Diagnosis Date    ADHD (attention deficit hyperactivity disorder)     Anxiety     Arthritis     back, hands, knees, hips    Asthma     C. difficile diarrhea     Central sleep apnea     Cheyne-Rogers breathing 09/07/2022    Congestive heart failure (H) Fall 2019    Right after STEMI, improved    Coronary artery disease involving native coronary artery of native heart without angina pectoris     Depression     Depressive disorder 1970s    Fever and chills 09/24/2021    Gastro-oesophageal reflux disease     Hypertension     Ischemic cardiomyopathy     Major depressive disorder, recurrent episode, moderate (H) 07/25/2013    Narcolepsy     Pituitary microadenoma (H) 2011    MRI 2011- There is a triangular-shaped area with delayed contrast enhancement at the left lateral portion of the pituitary gland posteriorly, measuring 2.5 x 4.3 mm. This is suggestive of a microadenoma, MRI 10/1/22 - Normal pituitary gland and whole brain MRI.    Pyelonephritis of right kidney 10/24/2021    Renal disease     S/P hip replacement 2004    Bilateral fall 2004 due to OA    Sleep apnea     Status post total knee replacement 12/29/2014    Urinary tract infection with hematuria, site unspecified 09/24/2021       Past Surgical History   Past Surgical History:   Procedure Laterality Date    ABDOMEN SURGERY  1974, 1996    Kidney reconstruct. Uterer stenosis    ARTHROPLASTY KNEE  07/09/2014    Procedure: ARTHROPLASTY KNEE;  Surgeon: Levi Johnson MD;  Location:  OR     ARTHROPLASTY KNEE Left 11/24/2014    Procedure: ARTHROPLASTY KNEE;  Surgeon: Levi Johnson MD;  Location:  OR    COLONOSCOPY      Several times dates ??    CV CORONARY ANGIOGRAM N/A 10/09/2019    Procedure: Coronary Angiogram;  Surgeon: Chiquita Chatterjee MD;  Location:  HEART CARDIAC CATH LAB    CV HEART CATHETERIZATION WITH POSSIBLE INTERVENTION N/A 10/10/2019    Procedure: Heart Catheterization with Possible Intervention;  Surgeon: Chin Garcia MD;  Location:  HEART CARDIAC CATH LAB    CV INTRA AORTIC BALLOON N/A 10/09/2019    Procedure: Intra-Aortic Balloon Pump Insertion;  Surgeon: Chiquita Chatterjee MD;  Location:  HEART CARDIAC CATH LAB    CV INTRA AORTIC BALLOON REMOVAL N/A 10/10/2019    Procedure: Intra-Aortic Balloon Pump Removal;  Surgeon: Chin Garcia MD;  Location:  HEART CARDIAC CATH LAB    CV LEFT HEART CATH N/A 12/11/2020    Procedure: Heart Catheterization with Possible Intervention;  Surgeon: Pilo Otoole MD;  Location:  HEART CARDIAC CATH LAB    CV PCI STENT DRUG ELUTING N/A 10/09/2019    Procedure: PCI Stent Drug Eluting;  Surgeon: Chiquita Chatterjee MD;  Location:  HEART CARDIAC CATH LAB    EYE SURGERY  2011    Cataract surgery both eyes    GENITOURINARY SURGERY  01/01/2008    Prolift    GENITOURINARY SURGERY  1973    In 1973, she had 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    ORTHOPEDIC SURGERY      bilat total hip    RECTOCELE REPAIR      ZZC TOTAL ABD HYSTERECTOMY+BLAD REPR  01/01/1992    Vaginal approach with oophrectomy    ZC TOTAL KNEE ARTHROPLASTY         Prior to Admission Medications   Prior to Admission Medications   Prescriptions Last Dose Informant Patient Reported? Taking?   Melatonin 10 MG TABS tablet Past Week Self Yes Yes    Sig: Take 10 mg by mouth nightly as needed for sleep   Prenatal Multivit-Min-Fe-FA (PRENATAL/IRON) TABS 10/11/2023 Self Yes Yes   Sig: Take 1 tablet by mouth daily    UNABLE TO FIND 10/11/2023  Yes Yes   Sig: MEDICATION NAME: [place] complete regimen   albuterol (PROAIR HFA/PROVENTIL HFA/VENTOLIN HFA) 108 (90 Base) MCG/ACT inhaler  at prn  No Yes   Sig: Inhale 1-2 puffs into the lungs every 4 hours as needed for shortness of breath or wheezing   aspirin (ASA) 81 MG EC tablet 10/11/2023 at am Self No Yes   Sig: Take 1 tablet (81 mg) by mouth daily   atorvastatin (LIPITOR) 40 MG tablet 10/11/2023 at am  No Yes   Sig: TAKE 1 TABLET(40 MG) BY MOUTH DAILY   carvedilol (COREG) 3.125 MG tablet 10/11/2023 at am x1  No Yes   Sig: TAKE 1 TABLET(3.125 MG) BY MOUTH TWICE DAILY WITH MEALS   cholestyramine (QUESTRAN) 4 g packet Past Month  No Yes   Sig: Take 1 packet (4 g) by mouth 2 times daily (with meals)   Patient taking differently: Take 1 packet by mouth 2 times daily as needed   diazepam (VALIUM) 2 MG tablet  at prn  No Yes   Sig: Take 0.5-1 tablets (1-2 mg) by mouth 2 times daily as needed for anxiety or muscle spasms   diclofenac (VOLTAREN) 1 % topical gel  at prn  Yes Yes   Sig: Apply topically 4 times daily as needed for moderate pain For Jaw pain   estradiol (ESTRING) 2 MG vaginal ring  at in place  No Yes   Sig: Place 1 each vaginally every 3 months   fluticasone-vilanterol (BREO ELLIPTA) 200-25 MCG/ACT inhaler 10/10/2023  No Yes   Sig: Inhale 1 puff into the lungs daily   isosorbide mononitrate (IMDUR) 30 MG 24 hr tablet 10/10/2023 at pm  No Yes   Sig: Take 1 tablet (30 mg) by mouth every evening Take with Carvedilol.   loperamide (IMODIUM) 2 MG capsule 10/10/2023  No Yes   Sig: Take 1 capsule (2 mg) by mouth daily   Patient taking differently: Take 2 mg by mouth 4 times daily as needed for diarrhea   loratadine (CLARITIN) 10 MG tablet 10/10/2023  Yes Yes   Sig: Take 10 mg by mouth At Bedtime   magnesium oxide  (MAG-OX) 400 MG tablet 10/11/2023  Yes Yes   Sig: Take 400 mg by mouth daily   nitroGLYcerin (NITROSTAT) 0.4 MG sublingual tablet  at prn  No Yes   Sig: Place 1 tablet (0.4 mg) under the tongue every 5 minutes as needed for chest pain   pantoprazole (PROTONIX) 40 MG EC tablet 10/11/2023  No Yes   Sig: TAKE 1 TABLET(40 MG) BY MOUTH DAILY   saccharomyces boulardii (FLORASTOR) 250 MG capsule 10/11/2023 at x1  No Yes   Sig: Take 1 capsule (250 mg) by mouth 2 times daily   sertraline (ZOLOFT) 100 MG tablet 10/11/2023  No Yes   Sig: TAKE 1 TABLET(100 MG) BY MOUTH DAILY   ursodiol (ACTIGALL) 300 MG capsule 10/11/2023 at x1  No Yes   Sig: TAKE 1 CAPSULE(300 MG) BY MOUTH TWICE DAILY   valsartan (DIOVAN) 40 MG tablet 10/11/2023  No Yes   Sig: Take 1 tablet (40 mg) by mouth 2 times daily   vitamin D3 (CHOLECALCIFEROL) 2000 units (50 mcg) tablet 10/11/2023 Self No Yes   Sig: Take 1 tablet (2,000 Units) by mouth daily   zinc gluconate 50 MG tablet 10/11/2023  Yes Yes   Sig: Take 50 mg by mouth daily      Facility-Administered Medications: None        Review of Systems    The 10 point Review of Systems is negative other than noted in the HPI or here.      Physical Exam   Vital Signs: Temp: 98.4  F (36.9  C) Temp src: Oral BP: (!) 153/73 Pulse: 61   Resp: 20 SpO2: 96 % O2 Device: None (Room air)    Weight: 178 lbs 2.11 oz    Gen: lying in bed, appears comfortable  CV: RRR, no m/r/g  Pulm: CTAB, no wheeze or rhonchi  GI: +BS, soft, NT/ND  Lymph: no edema    Medical Decision Making       60 MINUTES SPENT BY ME on the date of service doing chart review, history, exam, documentation & further activities per the note.      Data     I have personally reviewed the following data over the past 24 hrs:    5.3  \   13.3   / 128 (L)     145 111 (H) 9.9 /  89   3.8 23 1.00 (H) \     Trop: 20 (H) BNP: 183     INR:  N/A PTT:  N/A   D-dimer:  0.92 (H) Fibrinogen:  N/A       Imaging results reviewed over the past 24 hrs:   Recent Results (from  the past 24 hour(s))   CT Chest Pulmonary Embolism w Contrast    Narrative    EXAM: CT CHEST PULMONARY EMBOLISM W CONTRAST  LOCATION: [facility]  DATE: 10/11/2023    INDICATION: Chest pain. Elevated D-dimer.  COMPARISON: CT chest without IV contrast 02/21/2022.  TECHNIQUE: CT chest pulmonary angiogram during arterial phase injection of IV contrast. Multiplanar reformats and MIP reconstructions were performed. Dose reduction techniques were used.   CONTRAST: 68 mL Isovue 370.    FINDINGS:  ANGIOGRAM CHEST: No pulmonary emboli on either side. Mildly atherosclerotic thoracic aorta without aneurysm. No CT evidence of right heart strain.    LUNGS AND PLEURA: Mild diffuse thickening of the bronchi. Dependent atelectasis in the posterior lungs. Calcified granuloma right lower lobe. No pleural effusion on either side.    MEDIASTINUM/AXILLAE: Normal cardiac size. Coronary artery calcifications and stents. No pericardial effusion. No suspicious adenopathy. Trachea is midline.    CORONARY ARTERY CALCIFICATION: Previous intervention (stents or CABG).    UPPER ABDOMEN: Cholelithiasis. Vascular calcifications.    MUSCULOSKELETAL: Degenerative changes both shoulders and the thoracic spine.      Impression    IMPRESSION:  1.  No pulmonary emboli on either side. Mild diffuse thickening of the bronchi. Evidence of remote granulomatous disease.    2.  Mildly atherosclerotic thoracic aorta without aneurysm. Normal cardiac size. Coronary artery calcifications and stents.    3.  Cholelithiasis.
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
## 2023-10-12 NOTE — PLAN OF CARE
Observation Goals:     Cardiology consult complete: Not met  Trops stable: Met, elevated but stable  Vitals stable: Met  Able to ambulate independently: Partially met; Steady gait, but SBA due to dizziness & SOB symptoms.  Nurse to notify provider when observation goals have been met and patient is ready for discharge.

## 2023-10-12 NOTE — DISCHARGE SUMMARY
[facility]  Hospitalist Discharge Summary      Date of Admission:  10/11/2023  Date of Discharge:  10/12/2023  3:44 PM  Discharging Provider: Luisa Nation MD    Discharge Diagnoses   Recurrent spells of dizziness,lightheadedness - unclear etiology  CAD s/p PCI to LAD  Hypertension  GERD  Major recurrent depressive disorder with anxiety    Follow-ups Needed After Discharge   Follow-up Appointments     Follow-up and recommended labs and tests       Follow up with primary care provider, Danny Conteh, within 2 weeks   for hospital follow-up.          Discharge Disposition   Discharged to home  Condition at discharge: Stable    Hospital Course   Gem Gama is a 72 year old female with hx of CAD s/p PCI to LAD, HTN, and recurrent episodes of dizziness/lightheadedness who was admitted on 10/11/2023 for evaluation of recurrent dizziness/lightheadedness in setting of mildly elevated troponin.     Patient has chronic history of recurrent dizziness/lightheadedness for the past few years for which she has undergone an extensive cardiac work-up. She has a history of STEMI with PCI to the LAD in 2019 with multiple subsequent coronary angiograms that were negative for disease progression. Recent NM Lexiscan stress test 8/2023 was negative for ischemia. 2 weeks prior to admission, she had a 3-day ZIO patch that was negative for significant arrhythmia.     On this admission, she was mildly hypertensive to 140s-150s systolic though reports that her antihypertensive doses were recently decreased to see if that would help with her dizziness episodes. Serial troponins were mildly elevated from 17 to 20 to 17. Cardiology was consulted, and again - did not feel her symptoms were cardiac in etiology. Chest CT on arrival was unrevealing; showed mild diffuse bronchial wall thickening and evidence of remote granulomatous disease, as well as mild atherosclerosis. Physical Therapy was consulted  this admission for vestibular assessment, and while patient did not test positive for vestibular pathology, they felt it was worthwhile to trial outpatient vestibular treatments    Etiology for her dizziness episodes remain unclear at the time of discharge. Her symptoms improved prior to discharge, but the etiology for her dizziness remains unclear at the time of discharge. She will trial outpatient vestibular therapy with PT. Could also consider General Neurology  referral for evaluation of dizziness and/or Pulmonology referral for evaluation of bronchial wall thickening and prior granulomatous disease seen on CT      Consultations This Hospital Stay   CARDIOLOGY IP CONSULT  PHYSICAL THERAPY ADULT IP CONSULT    Code Status   Full Code    Time Spent on this Encounter   I, Luisa Nation MD, personally saw the patient today and spent 35 minutes discharging this patient.       Luisa Nation MD  [facility] EXTENDED RECOVERY AND SHORT STAY  [address] [address]  Phone: [phone]  ______________________________________________________________________    Physical Exam   Vital Signs: Temp: 97.8  F (36.6  C) Temp src: Oral BP: (!) 140/38 Pulse: 62   Resp: 20 SpO2: 97 % O2 Device: None (Room air)    Weight: 178 lbs 2.11 oz    Constitutional: Resting in bed, NAD  HEENT: Sclera white, conjunctiva clear, EOMI, MMM  Respiratory: Breathing non-labored. Lungs CTAB - no wheezes/crackles/rhonchi  Cardiovascular: Heart RRR, no m/r/g. No pedal edema.   GI: +BS. Abd soft/NT  Skin: Warm and dry. No rash.  Musculoskeletal: Normal muscle bulk and tone  Neurologic: Alert and appropriate. FALCON  Psychiatric: Calm and cooperative    Primary Care Physician   Danny Balbuena Vocal    Discharge Orders      Physical Therapy Referral      Reason for your hospital stay    Episodes of dizziness     Follow-up and recommended labs and tests     Follow up with primary care provider, Danny Balbuena  Vocal, within 2 weeks for hospital follow-up.     Activity    Your activity upon discharge: activity as tolerated     Diet    Follow this diet upon discharge: Renal Diet Low Saturated Fat Na <2400mg Diet       Significant Results and Procedures   Most Recent 3 CBC's:  Recent Labs   Lab Test 10/11/23  1756 08/05/23  1204 07/22/23  0737   WBC 5.3 4.7 5.0   HGB 13.3 14.3 14.2   MCV 87 87 86   * 154 132*     Most Recent 3 BMP's:  Recent Labs   Lab Test 10/12/23  0645 10/11/23  1756 09/28/23  1236    145 147*   POTASSIUM 3.7 3.8 4.9   CHLORIDE 107 111* 107   CO2 23 23 28   BUN 13.3 9.9 11.9   CR 1.11* 1.00* 0.99*   ANIONGAP 11 11 12   FRACISCO 8.7* 8.6* 9.7   * 89 95   ,   Results for orders placed or performed during the hospital encounter of 10/11/23   CT Chest Pulmonary Embolism w Contrast    Narrative    EXAM: CT CHEST PULMONARY EMBOLISM W CONTRAST  LOCATION: [facility]  DATE: 10/11/2023    INDICATION: Chest pain. Elevated D-dimer.  COMPARISON: CT chest without IV contrast 02/21/2022.  TECHNIQUE: CT chest pulmonary angiogram during arterial phase injection of IV contrast. Multiplanar reformats and MIP reconstructions were performed. Dose reduction techniques were used.   CONTRAST: 68 mL Isovue 370.    FINDINGS:  ANGIOGRAM CHEST: No pulmonary emboli on either side. Mildly atherosclerotic thoracic aorta without aneurysm. No CT evidence of right heart strain.    LUNGS AND PLEURA: Mild diffuse thickening of the bronchi. Dependent atelectasis in the posterior lungs. Calcified granuloma right lower lobe. No pleural effusion on either side.    MEDIASTINUM/AXILLAE: Normal cardiac size. Coronary artery calcifications and stents. No pericardial effusion. No suspicious adenopathy. Trachea is midline.    CORONARY ARTERY CALCIFICATION: Previous intervention (stents or CABG).    UPPER ABDOMEN: Cholelithiasis. Vascular calcifications.    MUSCULOSKELETAL: Degenerative changes both shoulders  and the thoracic spine.      Impression    IMPRESSION:  1.  No pulmonary emboli on either side. Mild diffuse thickening of the bronchi. Evidence of remote granulomatous disease.    2.  Mildly atherosclerotic thoracic aorta without aneurysm. Normal cardiac size. Coronary artery calcifications and stents.    3.  Cholelithiasis.       Discharge Medications   Discharge Medication List as of 10/12/2023  3:02 PM        CONTINUE these medications which have NOT CHANGED    Details   albuterol (PROAIR HFA/PROVENTIL HFA/VENTOLIN HFA) 108 (90 Base) MCG/ACT inhaler Inhale 1-2 puffs into the lungs every 4 hours as needed for shortness of breath or wheezing, Disp-18 g, R-11, E-PrescribePharmacy may dispense brand covered by insurance (Proair, or proventil or ventolin or generic albuterol inhaler)      aspirin (ASA) 81 MG EC tablet Take 1 tablet (81 mg) by mouth daily, Disp-90 tablet, R-0, E-PrescribeFuture refills by PCP Dr. Danny Conteh with phone number [phone].      atorvastatin (LIPITOR) 40 MG tablet TAKE 1 TABLET(40 MG) BY MOUTH DAILY, Disp-90 tablet, R-3, E-Prescribe      carvedilol (COREG) 3.125 MG tablet TAKE 1 TABLET(3.125 MG) BY MOUTH TWICE DAILY WITH MEALS, Disp-180 tablet, R-1, E-Prescribe      cholestyramine (QUESTRAN) 4 g packet Take 1 packet (4 g) by mouth 2 times daily (with meals), Disp-60 packet, R-5, E-Prescribe      diazepam (VALIUM) 2 MG tablet Take 0.5-1 tablets (1-2 mg) by mouth 2 times daily as needed for anxiety or muscle spasms, Disp-30 tablet, R-0, E-Prescribe      diclofenac (VOLTAREN) 1 % topical gel Apply topically 4 times daily as needed for moderate pain For Jaw pain, Historical      estradiol (ESTRING) 2 MG vaginal ring Place 1 each vaginally every 3 months, Disp-1 each, R-3, E-Prescribe      fluticasone-vilanterol (BREO ELLIPTA) 200-25 MCG/ACT inhaler Inhale 1 puff into the lungs daily, Disp-3 each, R-3, E-Prescribe3 inhalers with 3 refills      isosorbide mononitrate (IMDUR) 30 MG 24  hr tablet Take 1 tablet (30 mg) by mouth every evening Take with Carvedilol., Disp-90 tablet, R-3, E-PrescribeNew dose. Please file.      loperamide (IMODIUM) 2 MG capsule Take 1 capsule (2 mg) by mouth daily, Disp-30 capsule, R-0, No Print Out      loratadine (CLARITIN) 10 MG tablet Take 10 mg by mouth At Bedtime, Historical      magnesium oxide (MAG-OX) 400 MG tablet Take 400 mg by mouth daily, Historical      Melatonin 10 MG TABS tablet Take 10 mg by mouth nightly as needed for sleep, Historical      nitroGLYcerin (NITROSTAT) 0.4 MG sublingual tablet Place 1 tablet (0.4 mg) under the tongue every 5 minutes as needed for chest pain, Disp-25 tablet, R-11, E-Prescribe      pantoprazole (PROTONIX) 40 MG EC tablet TAKE 1 TABLET(40 MG) BY MOUTH DAILY, Disp-90 tablet, R-3, E-Prescribe      Prenatal Multivit-Min-Fe-FA (PRENATAL/IRON) TABS Take 1 tablet by mouth daily , Historical      saccharomyces boulardii (FLORASTOR) 250 MG capsule Take 1 capsule (250 mg) by mouth 2 times daily, Disp-14 capsule, R-0, Local Print      sertraline (ZOLOFT) 100 MG tablet TAKE 1 TABLET(100 MG) BY MOUTH DAILY, Disp-90 tablet, R-1, E-Prescribe      UNABLE TO FIND MEDICATION NAME: Uqora complete regimen, Historical      ursodiol (ACTIGALL) 300 MG capsule TAKE 1 CAPSULE(300 MG) BY MOUTH TWICE DAILY, Disp-180 capsule, R-3, E-Prescribe      valsartan (DIOVAN) 40 MG tablet Take 1 tablet (40 mg) by mouth 2 times daily, Disp-60 tablet, R-5, E-Prescribe      vitamin D3 (CHOLECALCIFEROL) 2000 units (50 mcg) tablet Take 1 tablet (2,000 Units) by mouth daily, Disp-90 tablet, R-3, E-Prescribe      zinc gluconate 50 MG tablet Take 50 mg by mouth daily, Historical           Allergies   Allergies   Allergen Reactions    Dust Mites Cough, Headache, Other (See Comments) and Shortness Of Breath    Latex Other (See Comments) and Shortness Of Breath     Runny nose  PN: LW Reaction: DIFFICULTY BREATHING      Sulfa Antibiotics Anaphylaxis, Hives and Itching      PN: LW Reaction: HIVES, Swelling  PN: LW Reaction: HIVES, Swelling    Flagyl [Metronidazole Hcl] Anaphylaxis and Rash    Food      PN: LW FI1: nka LW FI2:    Hydrochlorothiazide W-Triamterene      PN: LW Reaction: skin rash  PN: LW Reaction: skin rash    No Clinical Screening - See Comments      PN: LW Other1: -Adhesive Tape    Seasonal Allergies      sneezing    Tape [Adhesive Tape] Hives    Metoprolol Itching and Rash    Metronidazole Hives and Rash     PN: LW Reaction: HIVES

## 2023-10-12 NOTE — PLAN OF CARE
Observation Goals:     Cardiology consult complete: Met  Trops stable: Met, elevated but stable  Vitals stable: Met  Able to ambulate independently: Partially met; Steady gait, but SBA due to dizziness & SOB symptoms.  Nurse to notify provider when observation goals have been met and patient is ready for discharge.

## 2023-10-12 NOTE — ED NOTES
Patient able to go to the bathroom with assistance due to mild dizziness,otherwise moving all her extremities,no neurological deficit.

## 2023-10-12 NOTE — PLAN OF CARE
Observation Goals:     Cardiology consult complete: Not met  Trops stable: Not met; Pending AM draw  Vitals stable: Met  Able to ambulate independently: Partially met; Steady gait, but SBA due to dizziness & SOB symptoms.  Nurse to notify provider when observation goals have been met and patient is ready for discharge.

## 2023-10-12 NOTE — PHARMACY-ADMISSION MEDICATION HISTORY
Pharmacist Admission Medication History    Admission medication history is complete. The information provided in this note is only as accurate as the sources available at the time of the update.    Information Source(s): Patient and CareEverywhere/SureScripts via in-person    Pertinent Information: Patient notes that Myrbetriq and estradiol cream are currently on hold - they were not on patient's PTA med list and since she's not actively taking them I did not add them to list.     She restarted valsartan yesterday, took 2 doses yesterday and 1 today so far.     Changes made to PTA medication list:  Added: None  Deleted: None  Changed: diclofenac to PRN, cholestyramine to PRN     Allergies reviewed with patient and updates made in EHR: yes    Medication History Completed By: Maite Ly RP 10/11/2023 10:42 PM    PTA Med List   Medication Sig Last Dose    albuterol (PROAIR HFA/PROVENTIL HFA/VENTOLIN HFA) 108 (90 Base) MCG/ACT inhaler Inhale 1-2 puffs into the lungs every 4 hours as needed for shortness of breath or wheezing  at prn    aspirin (ASA) 81 MG EC tablet Take 1 tablet (81 mg) by mouth daily 10/11/2023 at am    atorvastatin (LIPITOR) 40 MG tablet TAKE 1 TABLET(40 MG) BY MOUTH DAILY 10/11/2023 at am    carvedilol (COREG) 3.125 MG tablet TAKE 1 TABLET(3.125 MG) BY MOUTH TWICE DAILY WITH MEALS 10/11/2023 at am x1    cholestyramine (QUESTRAN) 4 g packet Take 1 packet (4 g) by mouth 2 times daily (with meals) (Patient taking differently: Take 1 packet by mouth 2 times daily as needed) Past Month    diazepam (VALIUM) 2 MG tablet Take 0.5-1 tablets (1-2 mg) by mouth 2 times daily as needed for anxiety or muscle spasms  at prn    diclofenac (VOLTAREN) 1 % topical gel Apply topically 4 times daily as needed for moderate pain For Jaw pain  at prn    estradiol (ESTRING) 2 MG vaginal ring Place 1 each vaginally every 3 months  at in place    fluticasone-vilanterol (BREO ELLIPTA) 200-25 MCG/ACT inhaler Inhale 1  puff into the lungs daily 10/10/2023    isosorbide mononitrate (IMDUR) 30 MG 24 hr tablet Take 1 tablet (30 mg) by mouth every evening Take with Carvedilol. 10/10/2023 at pm    loperamide (IMODIUM) 2 MG capsule Take 1 capsule (2 mg) by mouth daily (Patient taking differently: Take 2 mg by mouth 4 times daily as needed for diarrhea) 10/10/2023    loratadine (CLARITIN) 10 MG tablet Take 10 mg by mouth At Bedtime 10/10/2023    magnesium oxide (MAG-OX) 400 MG tablet Take 400 mg by mouth daily 10/11/2023    Melatonin 10 MG TABS tablet Take 10 mg by mouth nightly as needed for sleep Past Week    nitroGLYcerin (NITROSTAT) 0.4 MG sublingual tablet Place 1 tablet (0.4 mg) under the tongue every 5 minutes as needed for chest pain  at prn    pantoprazole (PROTONIX) 40 MG EC tablet TAKE 1 TABLET(40 MG) BY MOUTH DAILY 10/11/2023    Prenatal Multivit-Min-Fe-FA (PRENATAL/IRON) TABS Take 1 tablet by mouth daily  10/11/2023    saccharomyces boulardii (FLORASTOR) 250 MG capsule Take 1 capsule (250 mg) by mouth 2 times daily 10/11/2023 at x1    sertraline (ZOLOFT) 100 MG tablet TAKE 1 TABLET(100 MG) BY MOUTH DAILY 10/11/2023    UNABLE TO FIND MEDICATION NAME: Uqora complete regimen 10/11/2023    ursodiol (ACTIGALL) 300 MG capsule TAKE 1 CAPSULE(300 MG) BY MOUTH TWICE DAILY 10/11/2023 at x1    valsartan (DIOVAN) 40 MG tablet Take 1 tablet (40 mg) by mouth 2 times daily 10/11/2023    vitamin D3 (CHOLECALCIFEROL) 2000 units (50 mcg) tablet Take 1 tablet (2,000 Units) by mouth daily 10/11/2023    zinc gluconate 50 MG tablet Take 50 mg by mouth daily 10/11/2023

## 2023-10-15 ENCOUNTER — PATIENT OUTREACH (OUTPATIENT)
Dept: CARE COORDINATION | Facility: CLINIC | Age: 73
End: 2023-10-15
Payer: MEDICARE

## 2023-10-15 RX ORDER — VALSARTAN 40 MG/1
40 TABLET ORAL 2 TIMES DAILY
Qty: 180 TABLET | OUTPATIENT
Start: 2023-10-15

## 2023-10-15 NOTE — PROGRESS NOTES
Connected Care Resource Center Contact  [facility]/Voicemail     Clinical Data: Post-Discharge Outreach     Outreach attempted x 2.  No answer.     Plan:  [facility] will do no further outreaches at this time.       Serene Murray MA  [facility] Care Resource [facility]    *Connected Care Resource Team does NOT follow patient ongoing. Referrals are identified based on internal discharge reports and the outreach is to ensure patient has an understanding of their discharge instructions.

## 2023-10-16 ENCOUNTER — THERAPY VISIT (OUTPATIENT)
Dept: PHYSICAL THERAPY | Facility: CLINIC | Age: 73
End: 2023-10-16
Attending: INTERNAL MEDICINE
Payer: MEDICARE

## 2023-10-16 DIAGNOSIS — Z91.81 RISK FOR FALLS: ICD-10-CM

## 2023-10-16 DIAGNOSIS — R42 DIZZINESS: Primary | ICD-10-CM

## 2023-10-16 DIAGNOSIS — R42 LIGHTHEADEDNESS: ICD-10-CM

## 2023-10-16 PROCEDURE — 97112 NEUROMUSCULAR REEDUCATION: CPT | Mod: GP | Performed by: PHYSICAL THERAPIST

## 2023-10-16 PROCEDURE — 97162 PT EVAL MOD COMPLEX 30 MIN: CPT | Mod: GP | Performed by: PHYSICAL THERAPIST

## 2023-10-16 NOTE — PROGRESS NOTES
PHYSICAL THERAPY EVALUATION  Type of Visit: Evaluation    See electronic medical record for Abuse and Falls Screening details.    Subjective       Presenting condition or subjective complaint: It gets to be almost fainting. There doesn't seem to be anything that is consistent. It mostly stopped after my heart attack in 2019. I became more concerned about it 2 years ago. Last spring went a big bday event and while up dancing I fell over backwards. I didn't see it coming. I felt off balance. I have had several falls. Some on slick ice. One of times reaching down to get something and landed on my back end, back was hurting like hell. Chipped L3. She can feel lightheaded while driving. Really hot day she almost passed out. Heat is bothersome. Ear ringing. My hearing is not as good as it was, right ear worse for hearing I think. Has more ringing on right ear. Has seen ENT/audiology. NO spinning and no symptoms laying down. Least likely for anything to happen when laying down.  Date of onset: 10/11/23 (order date after ER)    Relevant medical history: Asthma; Bladder or bowel problems; Chest pain; Concussions; Depression; Dizziness; Heart problems; High blood pressure; Implanted device; Incontinence; Kidney disease; Overweight; Severe dizziness; Severe headaches; Sleep disorder like apnea; Vision problems   Dates & types of surgery: 1974 kidney pyloplasy, 1994 complete hysterctomy with lift front ( with sling) and back, 1997 kidney pylopasty. 1998 rectocele with mesh, 2004 bilateral hips, 2014 bilateral knees,2019 LDA with two stents.    Prior diagnostic imaging/testing results: EMG     Prior therapy history for the same diagnosis, illness or injury: No      Prior Level of Function  Transfers: Independent  Ambulation: Independent  ADL: Independent  IADL: Driving, Finances, Housekeeping, Laundry, Meal preparation, Medication management. Hires for yard work. Doesn't drive far distances.     Living Environment  Social  support: Alone   Type of home: House; Multi-level; Basement   Stairs to enter the home: Yes 2 Is there a railing: No   Ramp: No   Stairs inside the home: Yes 10 Is there a railing: Yes   Help at home: Home and Yard maintenance tasks  Equipment owned: Straight Cane; Four-point cane; Walker; Walker with wheels; Crutches; Commode; Raised toilet seat; Bath bench; Dressing equipment     Employment: No    Hobbies/Interests: Reading,  knit, rodrigo, talking to friends, volunteer tutoring.    Patient goals for therapy: Go about life without being tied to carring pads and keeping close to restrooms (this was for pelvic floor PT)    Pain assessment:      Objective   Cognitive Status Examination  Orientation:    Level of Consciousness: Alert  Follows Commands and Answers Questions: 100% of the time  Personal Safety and Judgement: Intact  Memory: Intact    OBSERVATION: VITALs: seated BP 98/62 with HR  69 Bpms, Standing >  1 mintue later 112/69 HR  77 BPMS. No symptoms in standing.   INTEGUMENTARY:   POSTURE:   PALPATION:   RANGE OF MOTION: AROM cervical she has decreased rotation to the left, WFLs to the right. Extension/flexion are WNLs.   STRENGTH:   BED MOBILITY:   TRANSFERS: Independent    GAIT:   Level of [place]: Independent  Assistive Device(s): None  Gait Deviations: WNL  Gait Distance:   Stairs:       SPECIAL TESTS  Functional Gait Assessment (FGA) TOTAL SCORE: (MAXIMUM SCORE 30): 12    10 Meter Walk Test (Comfortable)  8.6 seconds and 15 steps   10 Meter Walk Test (Fast)  7.5 seconds and 13 steps.      Single Leg Stance Right (sec) 2   Single Leg Stance Left (sec) 1   Modified CTSIB Conditions (sec) Cond 1: 30  Cond 2: 30 however moderate amount of sway  Cond 4: 30 however lots of sway, arms semiguarded the entire time. Could not put arms down to her side  Cond 5 : did not do as she could barely do airex foam with eyes open, she did not want to try it.           30 Second Sit to Stand (reps/height) 8 reps,  alishale out of focus but not dizziness. I can hear her knees crinkle/cracking when doing this exercise.           SENSATION:   REFLEXES:   COORDINATION:   MUSCLE TONE:        VESTIBULAR EVALUATION  ADDITIONAL HISTORY:  Description of symptoms: Light-headedness  Dizzy attacks:   Start: Couple years.   Last attack: Last wednesday   Frequency of occurrences: Irregular   Length of attack: Short episodes. Most concerned if turns to faint  Difficulty hearing: Both ears  Noise in ears? Yes Ringing  Alleviates symptoms: Rest, lay down  Worsens symptoms: Heat, exercise,  Activities that bring on symptoms: Driving a car; Bending over; Turning while walking; Unstable surfaces; Walking in the dark    Pertinent visual history: glasses  Pertinent history of current vestibular problem: Migraines, many head injuries- smacked my head on the ground often  DHI: Total Score: 60      Infrared Goggle Exam Vestibular Suppressant in Last 24 Hours? No  Exam Completed With: Infrared goggles   Spontaneous Nystagmus Negative   Gaze Evoked Nystagmus Negative   Head Shake Horizontal Nystagmus Negative   Positional Testing    Supine Head-Hanging Test     Left Right   [place]-Hallpike Negative Negative   Sidelying Test     HSCC Supine Roll Test Negative Negative   HSCC Forward Roll Test     [place] and Lean Test -  Sitting Erect    [place] and Lean Test - Seated, Head Bent 60 Degrees Forward    [place] and Lean Test - Seated, Head Bent Backwards       BPPV Canal(s):  not BPPV  BPPV Type:       Dynamic Visual Acuity (DVA)    Static Acuity (LogMar) .1   Horizontal Head Movement at 1 Hz (LogMar) .3, a little weird but not lightheaded   Horizontal Head Movement at 2 Hz (LogMar) .7  a little weird, feels lightheaded   This is an abnormal test of her VOR (eye-head coordination)       Assessment & Plan   CLINICAL IMPRESSIONS  Medical Diagnosis: dizziness    Treatment Diagnosis: lightheadness, risk of falls and impaired vestibular function   Impression/Assessment:  Patient is a 72 year old female with lightheaded complaints that are chronic. She has had cardiac workup. A loop recorder planned later in November. Today her balance is significantly impaired, specifically the vestibular system.  She may benefit from further vestibular function testing to see if she has bilateral loss of function. I was able to recreate some lightheadedness with fast head motion while seated. We will see if training of head motion improves her symptoms.  The following significant findings have been identified: Decreased ROM/flexibility, Decreased strength, Impaired balance, Impaired gait, Decreased activity tolerance, and Lightheadeness. . These impairments interfere with their ability to perform self care tasks, recreational activities, household chores, driving , household mobility, and community mobility as compared to previous level of function.     Clinical Decision Making (Complexity):  Clinical Presentation: Stable/Uncomplicated  Clinical Presentation Rationale: based on medical and personal factors listed in PT evaluation  Clinical Decision Making (Complexity): Moderate complexity    PLAN OF CARE  Treatment Interventions:      Long Term Goals     PT Goal 2  Goal Identifier: gait with head motion  Goal Description: Seh will improve on gait with head motion both directions by one point on FGA (horizontal to 3/3, vertical to 2/3)  Rationale: to maximize safety and independence with performance of ADLs and functional tasks;to maximize safety and independence within the home;to maximize safety and independence within the community;to maximize safety and independence with self cares  Target Date: 01/13/24  PT Goal 3  Goal Identifier: DHI  Goal Description: improve on  DHI (self rating of symptoms) from 60/100 to 42/100  Rationale: to maximize safety and independence within the home;to maximize safety and independence with performance of ADLs and functional tasks;to maximize safety and  independence within the community;to maximize safety and independence with self cares  Target Date: 12/13/23      Frequency of Treatment: 1x every 1-2 weeks  Duration of Treatment: 12 weeks    Recommended Referrals to Other Professionals:     Education Assessment:   Learner/Method: Patient;Listening;Reading;Demonstration;Pictures/Video  Risks and benefits of evaluation/treatment have been explained.   Patient/Family/caregiver agrees with Plan of Care.     Evaluation Time:     PT Eval, Moderate Complexity Minutes (26958): 40       Signing Clinician: Madison Saucedo PT, NCS      [facility]                                                                                   OUTPATIENT PHYSICAL THERAPY      PLAN OF TREATMENT FOR OUTPATIENT REHABILITATION   Patient's Last Name, First Name, Gem Gaytan YOB: 1950   Provider's Name   [facility]   Medical Record No.  [id number]     Onset Date: 10/11/23 (order date after ER)  Start of Care Date: 10/16/23     Medical Diagnosis:  dizziness      PT Treatment Diagnosis:  lightheadness, risk of falls and impaired vestibular function Plan of Treatment  Frequency/Duration: 1x every 1-2 weeks/ 12 weeks    Certification date from 10/16/23 to 01/13/24         See note for plan of treatment details and functional goals     Madison Saucedo PT                         I CERTIFY THE NEED FOR THESE SERVICES FURNISHED UNDER        THIS PLAN OF TREATMENT AND WHILE UNDER MY CARE     (Physician attestation of this document indicates review and certification of the therapy plan).                Referring Provider:  \Dr Luisa Nation      Initial Assessment  See Epic Evaluation- Start of Care Date: 10/16/23

## 2023-10-17 ENCOUNTER — PATIENT OUTREACH (OUTPATIENT)
Dept: FAMILY MEDICINE | Facility: CLINIC | Age: 73
End: 2023-10-17
Payer: MEDICARE

## 2023-10-17 PROBLEM — R42 DIZZINESS: Status: ACTIVE | Noted: 2023-10-17

## 2023-10-17 PROBLEM — R42 DIZZINESS: Status: ACTIVE | Noted: 2023-10-11

## 2023-10-17 PROBLEM — R42 LIGHTHEADEDNESS: Status: ACTIVE | Noted: 2023-10-17

## 2023-10-17 PROBLEM — F41.1 GENERALIZED ANXIETY DISORDER: Chronic | Status: ACTIVE | Noted: 2023-01-24

## 2023-10-17 PROBLEM — F43.10 POSTTRAUMATIC STRESS DISORDER: Chronic | Status: ACTIVE | Noted: 2023-07-07

## 2023-10-17 NOTE — PROGRESS NOTES
10/16/23 1000   Signing Clinician's Name / Credentials   Signing clinician's name / credentials Nicole Saucedo DPT NCS   Functional Gait Assessment (JERICA Olvera., BECCA Dutton, et al. (2004))   1. GAIT LEVEL SURFACE 1  (8.6 second and 15 steps)   2. CHANGE IN GAIT SPEED 1  (7.5 second and 13 steps)   3. GAIT WITH HORIZONTAL HEAD TURNS 2  (slows down)   4. GAIT WITH VERTICAL HEAD TURNS 1  (really slows down and is off with head down)   5. GAIT AND PIVOT TURN 2   6. STEP OVER OBSTACLE 1   7. GAIT WITH NARROW BASE OF SUPPORT 0   8. GAIT WITH EYES CLOSED 0  (we only made it 10 feet)   9. AMBULATING BACKWARDS 2   10. STEPS 2   Total Functional Gait Assessment Score   TOTAL SCORE: (MAXIMUM SCORE 30) 12     Functional Gait Assessment (FGA): The FGA assesses postural stability during various walking tasks.   Gait assistive device used: None    Scores of <22 /30 have been correlated with predicting falls in community-dwelling older adults according to Wade & Jace 2010.   Scores of <18 /30 have been correlated with increased risk for falls in patients with Parkinsons Disease according to Kraig Juarez Zhou et al 2014.  Minimal Detectable Change for patients with acute/chronic stroke = 4.2 according to Thiphilip & Ritschel 2009  Minimal Detectable Change for patients with vestibular disorder = 8 according to Wade & Jace 2010    Assessment (rationale for performing, application to patient s function & care plan): at risk for falls with high level activity. She avoids many of these tasks.   (Minutes billed as physical performance test):  part of evaluation    Nicole Saucedo DPT, MPT, NCS  Physical Therapist   Board Certified Neurologic Clinical Specialist     [facility]   [address] [place] [address]   [email or web address]  [email or web address]   [phone]   Clinic: [phone] //   Fax: [phone]

## 2023-10-17 NOTE — TELEPHONE ENCOUNTER
Per chart review, pt is scheduled through cardiology to have Loop Recorder Implant procedure on 11/22.     Encounter closed.     Belle Strauss RN

## 2023-10-17 NOTE — TELEPHONE ENCOUNTER
Current Outpatient Medications   Medication    albuterol (PROAIR HFA/PROVENTIL HFA/VENTOLIN HFA) 108 (90 Base) MCG/ACT inhaler    aspirin (ASA) 81 MG EC tablet    atorvastatin (LIPITOR) 40 MG tablet    carvedilol (COREG) 3.125 MG tablet    cholestyramine (QUESTRAN) 4 g packet    diazepam (VALIUM) 2 MG tablet    diclofenac (VOLTAREN) 1 % topical gel    estradiol (ESTRING) 2 MG vaginal ring    fluticasone-vilanterol (BREO ELLIPTA) 200-25 MCG/ACT inhaler    isosorbide mononitrate (IMDUR) 30 MG 24 hr tablet    loperamide (IMODIUM) 2 MG capsule    loratadine (CLARITIN) 10 MG tablet    magnesium oxide (MAG-OX) 400 MG tablet    Melatonin 10 MG TABS tablet    nitroGLYcerin (NITROSTAT) 0.4 MG sublingual tablet    pantoprazole (PROTONIX) 40 MG EC tablet    Prenatal Multivit-Min-Fe-FA (PRENATAL/IRON) TABS    saccharomyces boulardii (FLORASTOR) 250 MG capsule    sertraline (ZOLOFT) 100 MG tablet    UNABLE TO FIND    ursodiol (ACTIGALL) 300 MG capsule    valsartan (DIOVAN) 40 MG tablet    vitamin D3 (CHOLECALCIFEROL) 2000 units (50 mcg) tablet    zinc gluconate 50 MG tablet     No current facility-administered medications for this visit.     Pt states she is taking all medications listed above.     Belle Strauss RN      PROVIDERS:   Click create new note and enter <.>MEDRECACO.  Review medication documentation from RN.  Click blue hyperlink and select appropriate option and refresh the note.  If changes recommended, route back to RN team with noted to update patient.  If no changes, sign/close encounter.

## 2023-10-18 ENCOUNTER — OFFICE VISIT (OUTPATIENT)
Dept: SLEEP MEDICINE | Facility: CLINIC | Age: 73
End: 2023-10-18
Payer: MEDICARE

## 2023-10-18 ENCOUNTER — LAB (OUTPATIENT)
Dept: LAB | Facility: CLINIC | Age: 73
End: 2023-10-18
Payer: MEDICARE

## 2023-10-18 VITALS
SYSTOLIC BLOOD PRESSURE: 111 MMHG | DIASTOLIC BLOOD PRESSURE: 68 MMHG | HEART RATE: 64 BPM | BODY MASS INDEX: 29.76 KG/M2 | OXYGEN SATURATION: 96 % | RESPIRATION RATE: 16 BRPM | WEIGHT: 178.6 LBS | HEIGHT: 65 IN

## 2023-10-18 DIAGNOSIS — R06.3 CHEYNE-STOKES BREATHING DISORDER: Primary | Chronic | ICD-10-CM

## 2023-10-18 DIAGNOSIS — N13.30 BILATERAL HYDRONEPHROSIS: ICD-10-CM

## 2023-10-18 DIAGNOSIS — G47.61 PLMD (PERIODIC LIMB MOVEMENT DISORDER): ICD-10-CM

## 2023-10-18 DIAGNOSIS — G47.8 ABNORMAL REM SLEEP: ICD-10-CM

## 2023-10-18 DIAGNOSIS — F51.04 CHRONIC INSOMNIA: ICD-10-CM

## 2023-10-18 LAB
ALBUMIN UR-MCNC: NEGATIVE MG/DL
APPEARANCE UR: CLEAR
BILIRUB UR QL STRIP: NEGATIVE
COLOR UR AUTO: YELLOW
GLUCOSE UR STRIP-MCNC: NEGATIVE MG/DL
HGB UR QL STRIP: NEGATIVE
KETONES UR STRIP-MCNC: NEGATIVE MG/DL
LEUKOCYTE ESTERASE UR QL STRIP: NEGATIVE
NITRATE UR QL: NEGATIVE
PH UR STRIP: 5 [PH] (ref 5–7)
RBC #/AREA URNS AUTO: NORMAL /HPF
SP GR UR STRIP: 1.01 (ref 1–1.03)
UROBILINOGEN UR STRIP-ACNC: 0.2 E.U./DL
WBC #/AREA URNS AUTO: NORMAL /HPF

## 2023-10-18 PROCEDURE — 99215 OFFICE O/P EST HI 40 MIN: CPT | Performed by: INTERNAL MEDICINE

## 2023-10-18 PROCEDURE — 81001 URINALYSIS AUTO W/SCOPE: CPT

## 2023-10-18 ASSESSMENT — SLEEP AND FATIGUE QUESTIONNAIRES
HOW LIKELY ARE YOU TO NOD OFF OR FALL ASLEEP WHILE SITTING AND TALKING TO SOMEONE: WOULD NEVER DOZE
HOW LIKELY ARE YOU TO NOD OFF OR FALL ASLEEP WHEN YOU ARE A PASSENGER IN A CAR FOR AN HOUR WITHOUT A BREAK: HIGH CHANCE OF DOZING
HOW LIKELY ARE YOU TO NOD OFF OR FALL ASLEEP WHILE WATCHING TV: MODERATE CHANCE OF DOZING
HOW LIKELY ARE YOU TO NOD OFF OR FALL ASLEEP WHILE SITTING QUIETLY AFTER LUNCH WITHOUT ALCOHOL: MODERATE CHANCE OF DOZING
HOW LIKELY ARE YOU TO NOD OFF OR FALL ASLEEP WHILE SITTING AND READING: MODERATE CHANCE OF DOZING
HOW LIKELY ARE YOU TO NOD OFF OR FALL ASLEEP IN A CAR, WHILE STOPPED FOR A FEW MINUTES IN TRAFFIC: WOULD NEVER DOZE
HOW LIKELY ARE YOU TO NOD OFF OR FALL ASLEEP WHILE SITTING INACTIVE IN A PUBLIC PLACE: WOULD NEVER DOZE
HOW LIKELY ARE YOU TO NOD OFF OR FALL ASLEEP WHILE LYING DOWN TO REST IN THE AFTERNOON WHEN CIRCUMSTANCES PERMIT: MODERATE CHANCE OF DOZING

## 2023-10-18 NOTE — NURSING NOTE
6 month follow-up appointment with Dr. Hadley scheduled.  Tomasa Quarles, [facility]

## 2023-10-18 NOTE — PROGRESS NOTES
Sleep Apnea - Follow-up Visit:    Impression/Plan:    Cheyne lopez breathing with desaturations and arousals  Central sleep apnea or periodic breathing   Probable concomitant obstructive sleep apnea   Poor adherence due to jaw problems. Daytime symptoms are improved with use  - Awaiting oral appliance from  sleep dentistry  - Trial of nasal mask , see DME  - Consider chin strap if needed  - Get download with at least 2 weeks on nasal mask when/if able     Persistent excessive daytime sleepiness  Poor candidate for stimulant therapy due to coronary artery disease   Zoloft and prn valium (rare) are only  potentially sedating medications on list   - Consider hypersomnia workup, for other narcolepsy treatments (pitolisant, soriamfetol, e.g.)     Insomnia  Intermittent, recent worsening. Seems worse after counseling, though counseling has helped.   We discussed stimulus control, regular wake times.   - Read the book Say Good Night To Insomnia           Gem Gama will follow up in about 6 month(s).       I spent 20 minutes with patient including counseling, and 20 minutes with chart review, and documentation         History of Present Illness:  Chief Complaint   Patient presents with    CPAP Follow Up       Gem Gama presents for follow-up of their central sleep apnea, managed with ASV.     [facility]    She has been sleepy since . Symptoms started after her   in . Fatigue also started about then. She states her first sleep study was in the . At that time she was diagnosed with obstructive sleep apnea, though diagnosis uncertain and no records are available.     She also carries a diagnosis of narcolepsy which she says was diagnosed with nap testing at [facility] between  and . She was treated with stimulant therapy until her heart attack in 2019    She has been on zoloft or other antidepressants off an on since     Sleep study [facility] 13 - OAHI 18, LAZARO  5.6, RDI 23, low O2 88%. PLMI 34, PLMAI 4.4    Sleep study [facility] 6/21/2013 (200#) - CPAP titrated to 14 cmh20. Cheyne-Rogers respirations were present and persisted. AHI 65, RDI 66. Central AHI 56. Low O2 88%. PLMI 10    Titration study [facility] 7/12/13 - Bilevel, ASV titration. ASV EPAP min 8. EPAP max 15 Min PS 1, Max PS 15, auto was 'optimal'.   - PLMI 39, PLMAI 3.8    She was on an ASV machine in 2014    She stopped using PAP approximately 2017 because it did not seem to help her feel better    She presented to [facility] 6/2022 for fatigue and sleepiness (ESS 14) with long sleep times and additional symptoms of night sweats, nocturnal enuresis, headaches in morning. Comorbid coronary artery disease.  - Sleep onset difficulties (LEIA 14). Multifactorial: psychophysiologic insomnia comorbid to depression, sleep disordered breathing, narcolepsy, periodic limb movements, delayed sleep phase may all play a role. Referred for cognitive behavioral training   - Nightmare disorder. Referred for possible dream rehearsal therapy    Echo 7/2022- Left ventricular systolic function is normal. The visual ejection fraction is 55-60%.    She saw Dr. Gurrola 8/2022, 10/2022.    Ferritin 102 on 9/21/2022    Study Date: 9/6/2022 (186.0 lbs)   - Polysomnogram revealed a presence of 14 obstructive, 144 central, and 24 mixed apneas resulting in an apnea index of 32 events per hour. There were 8 obstructive hypopneas and 35 central hypopneas resulting in an obstructive hypopnea index of 1 and central hypopnea index of 6 events per hour.   - AHI 40.3 (central apnea/hypopnea index 32 events per hour). REM AHI 21, supine AHI 61. RDI 41.4 events per hour.  - Respiratory rate and pattern was notable for Cheyne-Rogers respiratory rate and pattern.   - Lowest oxygen saturation 74.0%. Time <= 88% was 33.1 minutes. Time <= 89% was 46.8 minutes. There was a prolonged desaturation late in the study lasting 22 minutes that  is suspicious for artifact   - Arousal index was increased at 44.1  - PLM index 86 movements per hour. PLM Arousal Index was 13 per hour.  - Excessive transient/sustained muscle activity was present in 32/78 epochs of REM      MRI 10/1/22 - Normal pituitary gland and whole brain MRI.    11/18/2022 [place] Titration Sleep Study (179.0 lbs)   -  Titrated from ASV EPAP min 5, PS min 3, max PS 15 cmH2O up to ASV 13/3/15 cmH2O. The final pressure achieved was ASV 13/3/15 cmH2O with a residual AHI of 6 events per hour. Time in REM supine on final pressure was 0 minutes.   - Hypoxemia resolved with pressures above EPAP 7 cmH20.  - Arousal index 54 arousals per hour  - PLM index 2.3 movements per hour. PLM Arousal Index 0.2 per hour.    Patient had a upper respiratory illness with a lot of coughing the night of the titration study.    Patient started ASVauto at EPAP 7-13, PS min 0, Pressure max 25 cmH2O    Watch[facility] 3/2023 on ASV without hypoxemia      Do you use a CPAP Machine at home:   Yes    She is able to use the full-face mask about 1/2 the time   She remains limited by Jaw symptoms  She is no longer seeing PT but is doing jaw exercises  Symptoms are stable, not improving   She us using over-the-counter oral appliance   She has not had a follow-up with  dental clinic for a custom fitted device     She got a nasal mask OOP online, feels like pinching the sides of her nose.     She definitively feels better, but still has sleepiness when she uses her mask     What type of mask do you use:  full-face mask, nasal mask       ResMed   ASV Auto-PAP EPAP min 7, EPAP max 13, PS min 0, PS max 18 cmH2O 30 day usage data:    47% of days with > 4 hours of use. 14/30 days with no use.   Average use 7' 13 minutes per day.   95%ile Leak 14 L/min.   AHI 3.8 events per hour.     95% EPAP 11.5, median 8.8  95% IPAP 16, median 10.7      She has 3-4 nights/week of sleep onset difficulties due to an over-active mind    Bedtime 12 usually    She does not always get up at same, time. 10-12. Falls back asleep  Naps 1/week       EPWORTH SLEEPINESS SCALE         10/18/2023     1:00 PM    [place] Sleepiness Scale ( PATRICE Dimas  [id number]<br>ESS - [place]/English - Final version - 21 Nov 07 - [facility] [place].)   [place] Score (Sleep) 11       INSOMNIA SEVERITY INDEX (LEIA)          10/18/2023     1:00 PM   Insomnia Severity Index (LEIA)   Difficulty falling asleep 2   Difficulty staying asleep 0   Problems waking up too early 0   How SATISFIED/DISSATISFIED are you with your CURRENT sleep pattern? 2   How NOTICEABLE to others do you think your sleep problem is in terms of impairing the quality of your life? 1   How WORRIED/DISTRESSED are you about your current sleep problem? 2   To what extent do you consider your sleep problem to INTERFERE with your daily functioning (e.g. daytime fatigue, mood, ability to function at work/daily chores, concentration, memory, mood, etc.) CURRENTLY? 3   LEIA Total Score 10       Guidelines for Scoring/Interpretation:  Total score categories:  0-7 = No clinically significant insomnia   8-14 = Subthreshold insomnia   15-21 = Clinical insomnia (moderate severity)  22-28 = Clinical insomnia (severe)  Used via courtesy of www.[place].gov with permission from Gerald Aguilar PhD., [facility]        Past medical/surgical history, family history, social history, medications and allergies were reviewed.        Problem List:  Patient Active Problem List    Diagnosis Date Noted    Coronary artery disease with angina pectoris, unspecified vessel or lesion type, unspecified whether native or transplanted heart (H24) 12/09/2020     Priority: High     Hospitalized 10/9/2019-10/13/2019 due to STEMI and underwent EDWIN x 2 to proximal LAD. Coronary angiogram showed severe single vessel obstructive CAD of LAD (100%) as well as moderate non-obstructive CAD in OM1 and OM2 (60%, 50%). Patient initially requiring intraaortic balloon pump  and pressor support. Echocardiogram showed EF 35-40%.        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 AHI 41.6, RDI 42.6, Supine AHI 67.7, REM AHI 24.6, Low O2 74.0%, Time Spent ?88% 33.1 minutes / Time Spent ?89% 46.8 minutes. There was a prolonged desaturation late in the study lasting 22 minutes that is suspicious for artifact. PLM index was 86.5 movements per hour. The PLM Arousal Index was 13.1 per hour.      Hyperlipidemia LDL goal <70 10/15/2020     Priority: Medium    History of ischemic cardiomyopathy 10/25/2019     Priority: Medium     Hospitalized 10/9/2019-10/13/2019 due to STEMI. Echocardiogram showed EF 35-40%. Later echocardiogram with improved EF 55-60%      Stage 3a chronic kidney disease (H) 12/18/2017     Priority: Medium    Primary narcolepsy without cataplexy 02/09/2017     Priority: Medium     Diagnosis of unclear veracity      Major depressive disorder, recurrent episode, moderate (H) 07/25/2013     Priority: Medium    Intermittent asthma 06/18/1990     Priority: Medium     PFTS 12/2021 - FEV1- 1.83 (86%), ratio 0.74, 14% change with bronchodilators,  %, %, DLCO 90%       Urinary incontinence 10/07/2023     Priority: Low    Somatic dysfunction of pelvic region 10/07/2023     Priority: Low    Closed compression fracture of L3 lumbar vertebra, sequela 06/19/2023     Priority: Low    Bilateral hydronephrosis 06/19/2023     Priority: Low    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 11/28/2022     Priority: Low    Possible PLMD (periodic limb movement disorder) 11/28/2022     Priority: Low     "Hiatal hernia 08/26/2022     Priority: Low    Chronic insomnia 06/02/2022     Priority: Low    Class 1 obesity due to excess calories with serious comorbidity and body mass index (BMI) of 30.0 to 30.9 in adult 06/01/2022     Priority: Low    Calculus of gallbladder without cholecystitis without obstruction 10/15/2020     Priority: Low    Overactive bladder 09/27/2018     Priority: Low    Fatigue, unspecified type 09/27/2018     Priority: Low    Incontinence of feces with fecal urgency 03/15/2018     Priority: Low    Vitamin D deficiency 02/23/2017     Priority: Low    Esophageal reflux (GERD) 08/10/2014     Priority: Low    Osteoarthritis of right knee 06/24/2014     Priority: Low    Advanced directives, counseling/discussion 06/18/2012     Priority: Low     Discussed advance care planning with patient; information given to patient to review. 6/18/2012   Erin Hahn MA       Environmental allergies 07/13/2011     Priority: Low     Overview:   Dust, mold, cats , dogs, trees , dustmite and weeds.      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           /68   Pulse 64   Resp 16   Ht 1.638 m (5' 4.5\")   Wt 81 kg (178 lb 9.6 oz)   LMP  (LMP Unknown)   SpO2 96%   BMI 30.18 kg/m      "

## 2023-10-18 NOTE — NURSING NOTE
"Chief Complaint   Patient presents with    CPAP Follow Up       Initial /68   Pulse 64   Resp 16   Ht 1.638 m (5' 4.5\")   Wt 81 kg (178 lb 9.6 oz)   LMP  (LMP Unknown)   SpO2 96%   BMI 30.18 kg/m   Estimated body mass index is 30.18 kg/m  as calculated from the following:    Height as of this encounter: 1.638 m (5' 4.5\").    Weight as of this encounter: 81 kg (178 lb 9.6 oz).    Medication Reconciliation: complete  ESS: 11  Neck circumference: 15.50 inches / 39 centimeters.  DME: JAMI Quarles CMA    "

## 2023-10-19 ENCOUNTER — THERAPY VISIT (OUTPATIENT)
Dept: PHYSICAL THERAPY | Facility: CLINIC | Age: 73
End: 2023-10-19
Attending: UROLOGY
Payer: MEDICARE

## 2023-10-19 DIAGNOSIS — R32 URINARY INCONTINENCE: Primary | ICD-10-CM

## 2023-10-19 DIAGNOSIS — M99.05 SOMATIC DYSFUNCTION OF PELVIC REGION: ICD-10-CM

## 2023-10-19 PROCEDURE — 97110 THERAPEUTIC EXERCISES: CPT | Mod: GP | Performed by: PHYSICAL THERAPIST

## 2023-10-19 PROCEDURE — 97112 NEUROMUSCULAR REEDUCATION: CPT | Mod: GP | Performed by: PHYSICAL THERAPIST

## 2023-10-23 ENCOUNTER — VIRTUAL VISIT (OUTPATIENT)
Dept: PSYCHOLOGY | Facility: CLINIC | Age: 73
End: 2023-10-23
Payer: MEDICARE

## 2023-10-23 ENCOUNTER — NURSE TRIAGE (OUTPATIENT)
Dept: NURSING | Facility: CLINIC | Age: 73
End: 2023-10-23

## 2023-10-23 ENCOUNTER — OFFICE VISIT (OUTPATIENT)
Dept: FAMILY MEDICINE | Facility: CLINIC | Age: 73
End: 2023-10-23
Payer: MEDICARE

## 2023-10-23 VITALS
DIASTOLIC BLOOD PRESSURE: 69 MMHG | SYSTOLIC BLOOD PRESSURE: 113 MMHG | WEIGHT: 178 LBS | HEIGHT: 65 IN | BODY MASS INDEX: 29.66 KG/M2 | OXYGEN SATURATION: 97 % | RESPIRATION RATE: 11 BRPM | TEMPERATURE: 98.4 F | HEART RATE: 78 BPM

## 2023-10-23 DIAGNOSIS — I10 HYPERTENSION GOAL BP (BLOOD PRESSURE) < 140/90: ICD-10-CM

## 2023-10-23 DIAGNOSIS — F33.1 MAJOR DEPRESSIVE DISORDER, RECURRENT EPISODE, MODERATE (H): Primary | ICD-10-CM

## 2023-10-23 DIAGNOSIS — F41.1 GENERALIZED ANXIETY DISORDER: ICD-10-CM

## 2023-10-23 DIAGNOSIS — Z23 NEED FOR COVID-19 VACCINE: ICD-10-CM

## 2023-10-23 DIAGNOSIS — N18.32 STAGE 3B CHRONIC KIDNEY DISEASE (H): ICD-10-CM

## 2023-10-23 DIAGNOSIS — F43.10 POSTTRAUMATIC STRESS DISORDER: ICD-10-CM

## 2023-10-23 DIAGNOSIS — H81.90 VESTIBULAR DIZZINESS: Primary | ICD-10-CM

## 2023-10-23 PROCEDURE — 91320 SARSCV2 VAC 30MCG TRS-SUC IM: CPT | Performed by: INTERNAL MEDICINE

## 2023-10-23 PROCEDURE — 90834 PSYTX W PT 45 MINUTES: CPT | Mod: VID | Performed by: SOCIAL WORKER

## 2023-10-23 PROCEDURE — 99495 TRANSJ CARE MGMT MOD F2F 14D: CPT | Performed by: INTERNAL MEDICINE

## 2023-10-23 PROCEDURE — 90480 ADMN SARSCOV2 VAC 1/ONLY CMP: CPT | Performed by: INTERNAL MEDICINE

## 2023-10-23 RX ORDER — VALSARTAN 40 MG/1
40 TABLET ORAL DAILY
COMMUNITY
Start: 2023-10-23 | End: 2023-10-31

## 2023-10-23 RX ORDER — RESPIRATORY SYNCYTIAL VIRUS VACCINE 120MCG/0.5
0.5 KIT INTRAMUSCULAR ONCE
Qty: 1 EACH | Refills: 0 | Status: CANCELLED | OUTPATIENT
Start: 2023-10-23 | End: 2023-10-23

## 2023-10-23 ASSESSMENT — PAIN SCALES - GENERAL: PAINLEVEL: NO PAIN (0)

## 2023-10-23 ASSESSMENT — ASTHMA QUESTIONNAIRES: ACT_TOTALSCORE: 20

## 2023-10-23 NOTE — NURSING NOTE
Patient Quality Outreach    Patient is due for the following:   Asthma  -  ACT needed    Next Steps:   Patient was assigned appropriate questionnaire to complete  Done in clinic    Type of outreach:    Chart review performed, no outreach needed.      Questions for provider review:    None           Nadira Beatty MA

## 2023-10-23 NOTE — PROGRESS NOTES
[facility] INTERNAL MEDICINE NOTE    Gem Gama is a 72 year old female who presents to clinic today for the following health issues:    Hospital Follow-up Visit:    Hospital/Nursing Home/ Rehab Facility: [facility]  Date of Admission: 10/11/2023  Date of Discharge: 10/12/2023  Reason(s) for Admission: Dizziness and history of Coronary artery disease     Was your hospitalization related to COVID-19? No   Problems taking medications regularly:  None  Medication changes since discharge: None  Problems adhering to non-medication therapy:  None    Summary of hospitalization:  [facility] discharge summary reviewed  Diagnostic Tests/Treatments reviewed.  Follow up needed: Yes.  Other Healthcare Providers Involved in Patient s Care:         None  Update since discharge: improved.   Plan of care communicated with patient     Updates:  -Underwent therapy for vestibular dizziness.  - Sometimes off-balance dizziness and lightheaded. Ignored for a long-time, but she is near-syncope, then calls for assistance.  -A loop recorder will be implanted on November 22, 2023.  -Symptoms appear to start after previous hospitalization for sepsis.  -Last fall, patient fell while dancing, which leads to concerns about vestibular dizziness.    Patient Active Problem List   Diagnosis    TMJ (temporomandibular joint syndrome)    Congenital ureteric stenosis    Lacrimal duct stenosis    Chronic rhinitis    Intermittent asthma    Advanced directives, counseling/discussion    Major depressive disorder, recurrent episode, moderate (H)    Osteoarthritis of right knee    Esophageal reflux (GERD)    Primary narcolepsy without cataplexy    Vitamin D deficiency    Stage 3a chronic kidney disease (H)    Incontinence of feces with fecal urgency    Overactive bladder    Fatigue, unspecified type    History of ischemic cardiomyopathy     Hyperlipidemia LDL goal <70    Calculus of gallbladder without cholecystitis without obstruction    Coronary artery disease with angina pectoris, unspecified vessel or lesion type, unspecified whether native or transplanted heart (H24)    Environmental allergies    Class 1 obesity due to excess calories with serious comorbidity and body mass index (BMI) of 30.0 to 30.9 in adult    Chronic insomnia    Hiatal hernia    Cheyne-Rogers breathing disorder    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 Uterer stenosis    ARTHROPLASTY KNEE  07/09/2014    Procedure: ARTHROPLASTY KNEE;  Surgeon: Levi Johnson MD;  Location:  OR    ARTHROPLASTY KNEE Left 11/24/2014    Procedure: ARTHROPLASTY KNEE;  Surgeon: Levi Johnson MD;  Location:  OR    COLONOSCOPY      Several times dates ??    CV CORONARY ANGIOGRAM N/A 10/09/2019    Procedure: Coronary Angiogram;  Surgeon: Chiquita Chatterjee MD;  Location:  HEART CARDIAC CATH LAB    CV HEART CATHETERIZATION WITH POSSIBLE INTERVENTION N/A 10/10/2019    Procedure: Heart Catheterization with Possible Intervention;  Surgeon: Chin Garcia MD;  Location:  HEART CARDIAC CATH LAB    CV INTRA AORTIC BALLOON N/A 10/09/2019    Procedure: Intra-Aortic Balloon Pump Insertion;  Surgeon: Chiquita Chatterjee MD;  Location:  HEART CARDIAC CATH LAB    CV INTRA AORTIC BALLOON REMOVAL N/A 10/10/2019    Procedure: Intra-Aortic Balloon Pump Removal;  Surgeon: Chin Garcia MD;  Location:  HEART CARDIAC CATH LAB    CV LEFT HEART CATH N/A 12/11/2020    Procedure: Heart  Catheterization with Possible Intervention;  Surgeon: Pilo Otoole MD;  Location:  HEART CARDIAC CATH LAB    CV PCI STENT DRUG ELUTING N/A 10/09/2019    Procedure: PCI Stent Drug Eluting;  Surgeon: Chiquita Chatterjee MD;  Location:  HEART CARDIAC CATH LAB    EYE SURGERY  2011    Cataract surgery both eyes    GENITOURINARY SURGERY  01/01/2008    Prolift    GENITOURINARY SURGERY  1973    In 1973, she had surgery to correct congenital narrowing in the ureter at its connection to the right kidney    GENITOURINARY SURGERY  1997 1997 pt went to [facility] and had surgery to remove the scar tissue, rebuild the bladder from previous ureter surgery.    ORTHOPEDIC SURGERY      bilat total hip    RECTOCELE REPAIR      ZZC TOTAL ABD HYSTERECTOMY+BLAD REPR  01/01/1992    Vaginal approach with oophrectomy    ZC TOTAL KNEE ARTHROPLASTY         Social History     Tobacco Use    Smoking status: Never     Passive exposure: Never    Smokeless tobacco: Never   Substance Use Topics    Alcohol use: No     Family History   Problem Relation Age of Onset    Hypertension Mother     Arthritis Mother     Cerebrovascular Disease Mother     Anesthesia Reaction Mother         Halucinates    Cerebrovascular Disease Father         Three Strokes    Diabetes Father         Type 2, after his 60's    Kidney Disease Father     Hypertension Father     Hyperlipidemia Father     Osteoporosis Father     Diabetes Brother         After recovered from aspiration pneumonia, developed diabetes from high carb diet from feeding tube    Birth defects Brother     Thyroid Disease Sister         Low thyroid    Anesthesia Reaction Sister     Sjogren's Daughter     Diabetes Daughter         Diagnosed after stroke    Cerebrovascular Disease Daughter     Hypertension Daughter         With diabetes and stroke    Hyperlipidemia Daughter     Depression Daughter         Does not acknowledge    Anxiety Disorder Daughter     Asthma Daughter          Anxiety, air quality, and exercise triggered    Obesity Daughter     Diabetes Son         On metformin.    Hypertension Son     Obesity Son     Coronary Artery Disease Maternal Grandfather         Strokes, lived to 105    Substance Abuse Maternal Grandfather         Alcohol    Osteoporosis Maternal Grandfather         Old age    Osteoporosis Maternal Grandmother         Old age lived to 90    Coronary Artery Disease Paternal Grandfather     Coronary Artery Disease Paternal Grandmother     Osteoporosis Paternal Grandmother         Long term issue for her    Obesity Paternal Grandmother     Diabetes Daughter         Gestational and pre diabetic A1C    Anxiety Disorder Daughter     Asthma Daughter         Excursion induced    Thyroid Disease Daughter     Depression Son           ..from job    Mental Illness Other     Substance Abuse Other     Substance Abuse Other     Substance Abuse Other          Allergies   Allergen Reactions    Dust Mites Cough, Headache, Other (See Comments) and Shortness Of Breath    Latex Other (See Comments) and Shortness Of Breath     Runny nose  PN: LW Reaction: DIFFICULTY BREATHING      Sulfa Antibiotics Anaphylaxis, Hives and Itching     PN: LW Reaction: HIVES, Swelling  PN: LW Reaction: HIVES, Swelling    Flagyl [Metronidazole Hcl] Anaphylaxis and Rash    Food      PN: LW FI1: nka LW FI2:    Hydrochlorothiazide W-Triamterene      PN: LW Reaction: skin rash  PN: LW Reaction: skin rash    No Clinical Screening - See Comments      PN: LW Other1: -Adhesive Tape    Seasonal Allergies      sneezing    Tape [Adhesive Tape] Hives    Metoprolol Itching and Rash    Metronidazole Hives and Rash     PN: LW Reaction: HIVES       Recent Labs   Lab Test 10/12/23  0645 10/11/23  1756 09/28/23  1236 08/05/23  1204 07/22/23  0737 11/01/22  0544 09/21/22  0820 08/26/22  0918 06/21/22  0842 02/08/22  1407 09/23/21  1832 12/17/20  1403 12/10/20  0557 02/09/17  1230 02/02/16  1634   A1C  --   --    --   --   --   --   --   --   --   --   --  5.2  --   --   --    LDL  --   --   --  56  --   --   --  70  --  92  --  37  --    < > 126*   HDL  --   --   --  62  --   --   --  60  --  54  --  51  --    < > 54   TRIG  --   --   --  88  --   --   --  55  --  234*  --  168*  --    < > 105   ALT  --   --  118* 19 16   < >  --  29  --   --    < > 26  --    < >  --    CR 1.11* 1.00* 0.99* 1.02* 0.99*   < >  --  0.92   < > 1.13*   < > 1.00 1.04   < > 1.06*   GFRESTIMATED 52* 59* 60* 57* 60*   < >  --  65   < > 51*   < > 56* 54*   < > 52*   GFRESTBLACK  --   --   --   --   --   --   --   --   --   --   --  65 62   < > 63   POTASSIUM 3.7 3.8 4.9 4.8 3.9   < >  --  4.8   < > 4.6   < > 3.9 4.3   < > 3.8   TSH  --   --   --   --   --   --  2.02  --   --   --   --   --   --   --  1.62    < > = values in this interval not displayed.      BP Readings from Last 3 Encounters:   10/23/23 113/69   10/18/23 111/68   10/12/23 (!) 140/38    Wt Readings from Last 3 Encounters:   10/23/23 80.7 kg (178 lb)   10/18/23 81 kg (178 lb 9.6 oz)   10/12/23 80.8 kg (178 lb 2.1 oz)           ROS:  C: NEGATIVE for fever, chills, change in weight  I: NEGATIVE for worrisome rashes, moles or lesions  E: NEGATIVE for vision changes or irritation  E/M: NEGATIVE for ear, mouth and throat problems  R: NEGATIVE for significant cough or SOB  B: NEGATIVE for masses, tenderness or discharge  CV: NEGATIVE for chest pain, palpitations or peripheral edema  GI: NEGATIVE for nausea, abdominal pain, heartburn, or change in bowel habits  : NEGATIVE for frequency, dysuria, or hematuria  M: NEGATIVE for significant arthralgias or myalgia  N: NEGATIVE for weakness or paresthesias  E: NEGATIVE for temperature intolerance, skin/hair changes  H: NEGATIVE for bleeding problems  P: NEGATIVE for changes in mood or affect    OBJECTIVE:                                                    /69 (BP Location: Left arm, Patient Position: Sitting, Cuff Size: Adult Large)    "Pulse 78   Temp 98.4  F (36.9  C) (Oral)   Resp 11   Ht 1.638 m (5' 4.5\")   Wt 80.7 kg (178 lb)   LMP  (LMP Unknown)   SpO2 97%   BMI 30.08 kg/m    Body mass index is 30.08 kg/m .  GENERAL: healthy, alert and no distress  EYES: Eyes grossly normal to inspection and conjunctivae and sclerae normal  HENT: normal cephalic/atraumatic  RESP: lungs clear to auscultation - no rales, rhonchi or wheezes  CV: regular rates and rhythm and no peripheral edema  MS: no gross musculoskeletal defects noted, no edema  NEURO: Normal strength and tone, mentation intact and speech normal  PSYCH: mentation appears normal, affect normal/bright    Diagnostic Test Results:    Brain/ Pituitary MRI without and with contrast     History: cheyne rogers breathing, h/o pituitary tumor; Cheyne-Rogers  breathing disorder; Pituitary microadenoma (H).  ICD-10: Cheyne-Rogers breathing disorder; Pituitary microadenoma (H)     Comparison:  Head CT 9/23/2021. Brain MRI 4/29/2014      Technique: Axial diffusion and FLAIR images of the whole brain  obtained without intravenous contrast. Sagittal T1 and T2-weighted,  coronal T2-weighted, coronal T1-weighted images with focus on the  sella were obtained without intravenous contrast. Post intravenous  contrast using gadolinium coronal and sagittal T1-weighted images were  obtained focused on the sella. Dynamic postcontrast coronal  T1-weighted images were also obtained.     Contrast: 8.5 cc Gadavist IV.     Findings:    Mild motion degraded examination. Atrophic pituitary gland, within  normal limits for age. No sellar abnormality. Pituitary stalk is  midline. Normal optic chiasm. Normal major vascular intracranial  flow-voids.     No intracranial hemorrhage, mass, mass effect, or midline shift. No  hydrocephalus. No abnormal restricted diffusion. No abnormal FLAIR  signal. No abnormal enhancement.     Mild paranasal sinus mucosal thickening.                                                             "          Impression:  Normal pituitary gland and whole brain MRI.     I have personally reviewed the examination and initial interpretation  and I agree with the findings.     MAG CLINE MD            ASSESSMENT/PLAN:                                                      Vestibular dizziness  - Adult ENT  Referral; Future    Hypertension goal BP (blood pressure) < 140/90  - valsartan (DIOVAN) 40 MG tablet; Take 1 tablet (40 mg) by mouth daily    Need for COVID-19 vaccine  - COVID-19 12+ (2023-24) (PFIZER)    Stage 3b chronic kidney disease (H)  Monitor renal function closely.     Disposition:  Follow-up in 12 weeks.    Danny Conteh MD  [facility]
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
## 2023-10-24 NOTE — TELEPHONE ENCOUNTER
"Patient was in today to see provider.    Ate \"too much cholesterol.\"    Having diarrhea last day and a half.    Reporting nausea, dry heaving, diarrhea with bright red blood more than a few streaks, severe cramping and bladder spasm pain, feels warm, but not able to take temperature.    Disposition is to go to ED.  Patient verbalizes understanding and will find someone to  her to ED.    Damari Ram RN  [facility]      Reason for Disposition   [1] SEVERE abdominal pain AND [2] age > 60 years    Additional Information   Negative: Shock suspected (e.g., cold/pale/clammy skin, too weak to stand, low BP, rapid pulse)   Negative: Difficult to awaken or acting confused (e.g., disoriented, slurred speech)   Negative: Sounds like a life-threatening emergency to the triager   Negative: [1] SEVERE abdominal pain (e.g., excruciating) AND [2] present > 1 hour    Protocols used: Diarrhea-A-AH    "

## 2023-10-30 NOTE — TELEPHONE ENCOUNTER
FUTURE VISIT INFORMATION      SURGERY INFORMATION:  Date: 11/8/23  [facility]  Surgeon:  Mickey Gallego MD   Anesthesia Type:  general  Procedure: CHOLECYSTECTOMY, ROBOT-ASSISTED, LAPAROSCOPIC, WITHOUT CHOLANGIOGRAM     RECORDS REQUESTED FROM:       Primary Care Provider: Danny Conteh MD - [facility]    Most recent EKG+ Tracing: 10/11/23    Most recent ECHO: 9/18/23    Most recent Cardiac Stress Test: 8/14/23    Most recent PFT's: 12/6/21    Most recent Sleep Study:   11/18/22

## 2023-10-31 DIAGNOSIS — I10 HYPERTENSION GOAL BP (BLOOD PRESSURE) < 140/90: ICD-10-CM

## 2023-10-31 NOTE — TELEPHONE ENCOUNTER
FUTURE VISIT INFORMATION        SURGERY INFORMATION:  Date: 11/8/23  [facility]  Surgeon:  Mickey Gallego MD   Anesthesia Type:  general  Procedure: CHOLECYSTECTOMY, ROBOT-ASSISTED, LAPAROSCOPIC, WITHOUT CHOLANGIOGRAM      RECORDS REQUESTED FROM:         Primary Care Provider: Danny Conteh MD - [facility]     Most recent EKG+ Tracing: 10/11/23     Most recent ECHO: 9/18/23     Most recent Cardiac Stress Test: 8/14/23     Most recent PFT's: 12/6/21     Most recent Sleep Study:   11/18/22

## 2023-10-31 NOTE — TELEPHONE ENCOUNTER
Pt requesting prescription for valsartan (DIOVAN) 40 MG tablet be sent to [id number] - [place], [place] - [address] AT [facility]  [address]     Pt will be out of this medication tomorrow.     Routing to provider.     B[place] RN

## 2023-11-01 ENCOUNTER — PRE VISIT (OUTPATIENT)
Dept: SURGERY | Facility: CLINIC | Age: 73
End: 2023-11-01

## 2023-11-01 ENCOUNTER — THERAPY VISIT (OUTPATIENT)
Dept: PHYSICAL THERAPY | Facility: CLINIC | Age: 73
End: 2023-11-01
Attending: INTERNAL MEDICINE
Payer: MEDICARE

## 2023-11-01 DIAGNOSIS — R42 DIZZINESS: ICD-10-CM

## 2023-11-01 DIAGNOSIS — R42 LIGHTHEADEDNESS: Primary | ICD-10-CM

## 2023-11-01 PROCEDURE — 97116 GAIT TRAINING THERAPY: CPT | Mod: GP | Performed by: PHYSICAL THERAPIST

## 2023-11-01 PROCEDURE — 97112 NEUROMUSCULAR REEDUCATION: CPT | Mod: GP | Performed by: PHYSICAL THERAPIST

## 2023-11-01 RX ORDER — VALSARTAN 40 MG/1
40 TABLET ORAL DAILY
Qty: 90 TABLET | Refills: 1 | Status: SHIPPED | OUTPATIENT
Start: 2023-11-01 | End: 2024-05-10

## 2023-11-03 ENCOUNTER — PRE VISIT (OUTPATIENT)
Dept: SURGERY | Facility: CLINIC | Age: 73
End: 2023-11-03

## 2023-11-03 ENCOUNTER — MYC MEDICAL ADVICE (OUTPATIENT)
Dept: FAMILY MEDICINE | Facility: CLINIC | Age: 73
End: 2023-11-03

## 2023-11-03 ENCOUNTER — ANESTHESIA EVENT (OUTPATIENT)
Dept: SURGERY | Facility: AMBULATORY SURGERY CENTER | Age: 73
End: 2023-11-03
Payer: MEDICARE

## 2023-11-03 ENCOUNTER — OFFICE VISIT (OUTPATIENT)
Dept: SURGERY | Facility: CLINIC | Age: 73
End: 2023-11-03
Payer: MEDICARE

## 2023-11-03 VITALS
HEART RATE: 68 BPM | DIASTOLIC BLOOD PRESSURE: 61 MMHG | RESPIRATION RATE: 16 BRPM | OXYGEN SATURATION: 96 % | HEIGHT: 65 IN | SYSTOLIC BLOOD PRESSURE: 110 MMHG | TEMPERATURE: 97.7 F | BODY MASS INDEX: 29.99 KG/M2 | WEIGHT: 180 LBS

## 2023-11-03 DIAGNOSIS — Z01.818 PREOP EXAMINATION: Primary | ICD-10-CM

## 2023-11-03 DIAGNOSIS — K80.20 CALCULUS OF GALLBLADDER WITHOUT CHOLECYSTITIS WITHOUT OBSTRUCTION: ICD-10-CM

## 2023-11-03 PROCEDURE — 99215 OFFICE O/P EST HI 40 MIN: CPT

## 2023-11-03 ASSESSMENT — LIFESTYLE VARIABLES: TOBACCO_USE: 0

## 2023-11-03 ASSESSMENT — ENCOUNTER SYMPTOMS: ORTHOPNEA: 0

## 2023-11-03 ASSESSMENT — PAIN SCALES - GENERAL: PAINLEVEL: NO PAIN (0)

## 2023-11-03 NOTE — PATIENT INSTRUCTIONS
Preparing for Your Surgery      Name:  Gem Gama   MRN:  [id number]   :  1950   Today's Date:  11/3/2023         Arriving for surgery:  Surgery date:  23  Arrival time:  7.30AM    Restrictions due to COVID 19:    Please maintain social distance.  Masking is optional.      parking is available for anyone with mobility limitations or disabilities. (Monday- Friday 7 am- 5 pm)    Please come to:    [facility]  [address] [place] [id number]    Please check in on the 5th floor at the [facility]ulatory [facility].      What can I eat or drink?    -  You may eat and drink normally until 8 hours prior to arrival  time. (Until 11.30PM)  -  You may have clear liquids until 2 hours prior to arrival  time. (Until 5.30AM)    Examples of clear liquids:  Water  Clear broth  Juices (apple, white grape, white cranberry  and cider) without pulp  Noncarbonated, powder based beverages  (lemonade and Jorge-Aid)  Sodas (Sprite, 7-Up, ginger ale and seltzer)  Coffee or tea (without milk or cream)  Gatorade      Which medicines can I take?    Hold Aspirin for 7 days before surgery.   Hold Multivitamins for 7 days before surgery. (Vitamin D3  Hold Supplements for 7 days before surgery. (Max - Ox, Uqora, Zinc. Probiotic Florastor)  Hold Ibuprofen (Advil, Motrin) for 1 day before surgery--unless otherwise directed by surgeon.  Hold Naproxen (Aleve) for 4 days before surgery.    No alcohol or cannabis products for 24 hours prior to procedure.      -  DO NOT take the following medications the day of surgery:  Questran packet, Diclofenac gel, Imodium, Ursodial (Actigall), Valsartan (Diovan)    -  PLEASE TAKE the following medications the day of surgery:   AM medications per usual except for medications listed above.  Can continue to use inhaler as needed. Bring it with you to the hospital.    How do I prepare myself?  - Please take 2 showers (one the night prior to surgery and one the  morning of surgery) using Scrubcare or Hibiclens soap.    Use this soap only from the neck to your toes.     Leave the soap on your skin for one minute--then rinse thoroughly.      You may use your own shampoo and conditioner. No other hair products.   - Please remove all jewelry and body piercings.  - No lotions, deodorants or fragrance.  - No makeup or fingernail polish.   - Bring your ID and insurance card.    -If you have a Deep Brain Stimulator, a Spinal Cord Stimulator, or any implanted Neuro Device, you must bring the remote to the Surgery Center.         ALL PATIENTS ARE REQUIRED TO HAVE A RESPONSIBLE ADULT TO DRIVE AND BE IN ATTENDANCE WITH THEM FOR 24 HOURS FOLLOWING SURGERY.     Covid testing policy as of 12/06/2022  Your surgeon will notify and schedule you for a COVID test if one is needed before surgery--please direct any questions or COVID symptoms to your surgeon      Questions or Concerns:    -For questions regarding the day of surgery, please contact the Ambulatory Surgery Center at [phone].    -If you have health changes between today and your surgery, please contact your surgeon.     - For questions after surgery, please contact your surgeon's office.

## 2023-11-03 NOTE — H&P
Pre-Operative H & P     CC:  Preoperative exam to assess for increased cardiopulmonary risk while undergoing surgery and anesthesia.    Date of Encounter: 11/3/2023  Primary Care Physician:  Danny Conteh     Reason for visit:   Encounter Diagnoses   Name Primary?    Preop examination Yes    Calculus of gallbladder without cholecystitis without obstruction        HPI  Gem Gama is a 72 year old female who presents for pre-operative H & P in preparation for  Procedure Information       Case: [id number] Date/Time: 11/08/23 0900    Procedure: CHOLECYSTECTOMY, ROBOT-ASSISTED, LAPAROSCOPIC, WITHOUT CHOLANGIOGRAM (Abdomen)    Anesthesia type: General    Diagnosis: Calculus of gallbladder without cholecystitis without obstruction [K80.20]    Pre-op diagnosis: Calculus of gallbladder without cholecystitis without obstruction [K80.20]    Location: [place] [address] / [facility]    Providers: Mickey Gallego MD            Gem Gama  is a 71 y/o female with a PMH significant for HTN, CAD s/p PCI to LAD (2019), asthma, chronic mild thrombocytopenia, pituitary microadenoma, OA s/p bilateral knee replacements, GERD, CKD, depression, anxiety, and ADHD who has symptomatic cholelithiasis and the above surgery was recommended for further management.       Of note, the patient has had recurrent episodes of dizziness/lightheadedness over the summer. She recently had an extensive cardiac work-up with an unremarkable stress test and echo. Her zio patch showed one 7-beat run of nonsustained VT and rare PACs/PVCs.  She was subsequently referred to EP for further evaluation. At this visit, it was noted that there is a low probability that her symptoms are related to an arrhythmia, but there would be no harm to assess with a loop recorder.  She is now scheduled for a loop recorder implant on 11/22/23.    History is obtained from the patient and chart review    Hx of abnormal  bleeding or anti-platelet use: Aspirin 81 mg    Menstrual history: No LMP recorded (lmp unknown). 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     Chronic kidney disease     Clostridium difficile infection 09/04/2022    Congestive heart failure (H) 2019    Right after STEMI, improved Fall 2019    Coronary artery disease involving native coronary artery of native heart without angina pectoris     Depression     Fever and chills 09/24/2021    Gastro-oesophageal reflux disease     Hypertension     Ischemic cardiomyopathy 2019    Major depressive disorder, recurrent episode, moderate (H) 07/25/2013    Narcolepsy     Pituitary microadenoma (H) 2011    MRI 2011- There is a triangular-shaped area with delayed contrast enhancement at the left lateral portion of the pituitary gland posteriorly, measuring 2.5 x 4.3 mm. This is suggestive of a microadenoma, MRI 10/1/22 - Normal pituitary gland and whole brain MRI.    Pyelonephritis 11/01/2022    Pyelonephritis of right kidney 10/24/2021    S/P hip replacement 2004    Bilateral fall 2004 due to OA    Sepsis, due to unspecified organism, unspecified whether acute organ dysfunction present (H) 11/01/2022    Status post total knee replacement 12/29/2014    Urinary tract infection with hematuria, site unspecified 09/24/2021       Past Surgical History  Past Surgical History:   Procedure Laterality Date    ABDOMEN SURGERY  1974, 1996    Kidney reconstruct. Uterer stenosis    ARTHROPLASTY KNEE  07/09/2014    Procedure: ARTHROPLASTY KNEE;  Surgeon: Levi Johnson MD;  Location: [facility] OR    ARTHROPLASTY KNEE Left 11/24/2014    Procedure:  ARTHROPLASTY KNEE;  Surgeon: Levi Johnson MD;  Location:  OR    COLONOSCOPY      Several times dates ??    CV CORONARY ANGIOGRAM N/A 10/09/2019    Procedure: Coronary Angiogram;  Surgeon: Chiquita Chatterjee MD;  Location:  HEART CARDIAC CATH LAB    CV HEART CATHETERIZATION WITH POSSIBLE INTERVENTION N/A 10/10/2019    Procedure: Heart Catheterization with Possible Intervention;  Surgeon: Chin Garcia MD;  Location:  HEART CARDIAC CATH LAB    CV INTRA AORTIC BALLOON N/A 10/09/2019    Procedure: Intra-Aortic Balloon Pump Insertion;  Surgeon: Chiquita Chatterjee MD;  Location:  HEART CARDIAC CATH LAB    CV INTRA AORTIC BALLOON REMOVAL N/A 10/10/2019    Procedure: Intra-Aortic Balloon Pump Removal;  Surgeon: Chin Garcia MD;  Location:  HEART CARDIAC CATH LAB    CV LEFT HEART CATH N/A 12/11/2020    Procedure: Heart Catheterization with Possible Intervention;  Surgeon: Pilo Otoole MD;  Location:  HEART CARDIAC CATH LAB    CV PCI STENT DRUG ELUTING N/A 10/09/2019    Procedure: PCI Stent Drug Eluting;  Surgeon: Chiquita Chatterjee MD;  Location:  HEART CARDIAC CATH LAB    EYE SURGERY  2011    Cataract surgery both eyes    GENITOURINARY SURGERY  01/01/2008    Prolift    GENITOURINARY SURGERY  1973    In 1973, 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    ORTHOPEDIC SURGERY      bilat total hip    RECTOCELE REPAIR      ZZC TOTAL ABD HYSTERECTOMY+BLAD REPR  01/01/1992    Vaginal approach with oophrectomy    ZZC TOTAL KNEE ARTHROPLASTY         Prior to Admission Medications  Current Outpatient Medications   Medication Sig Dispense Refill    albuterol (PROAIR HFA/PROVENTIL HFA/VENTOLIN HFA) 108 (90 Base) MCG/ACT inhaler Inhale 1-2 puffs into the lungs every 4 hours as  needed for shortness of breath or wheezing 18 g 11    aspirin (ASA) 81 MG EC tablet Take 1 tablet (81 mg) by mouth daily (Patient taking differently: Take 81 mg by mouth every morning) 90 tablet 0    atorvastatin (LIPITOR) 40 MG tablet TAKE 1 TABLET(40 MG) BY MOUTH DAILY (Patient taking differently: Take 40 mg by mouth every morning) 90 tablet 3    carvedilol (COREG) 3.125 MG tablet TAKE 1 TABLET(3.125 MG) BY MOUTH TWICE DAILY WITH MEALS (Patient taking differently: Take 3.125 mg by mouth 2 times daily (with meals) TAKE 1 TABLET(3.125 MG) BY MOUTH TWICE DAILY WITH MEALS) 180 tablet 1    cholestyramine (QUESTRAN) 4 g packet Take 1 packet (4 g) by mouth 2 times daily (with meals) 60 packet 5    diclofenac (VOLTAREN) 1 % topical gel Apply topically 4 times daily as needed for moderate pain For Jaw pain      estradiol (ESTRING) 2 MG vaginal ring Place 1 each vaginally every 3 months 1 each 3    fluticasone-vilanterol (BREO ELLIPTA) 200-25 MCG/ACT inhaler Inhale 1 puff into the lungs daily (Patient taking differently: Inhale 1 puff into the lungs every morning) 3 each 3    isosorbide mononitrate (IMDUR) 30 MG 24 hr tablet Take 1 tablet (30 mg) by mouth every evening Take with Carvedilol. 90 tablet 3    loperamide (IMODIUM) 2 MG capsule Take 1 capsule (2 mg) by mouth daily 30 capsule 0    loratadine (CLARITIN) 10 MG tablet Take 10 mg by mouth At Bedtime      magnesium oxide (MAG-OX) 400 MG tablet Take 400 mg by mouth every morning      Melatonin 10 MG TABS tablet Take 10 mg by mouth nightly as needed for sleep      nitroGLYcerin (NITROSTAT) 0.4 MG sublingual tablet Place 1 tablet (0.4 mg) under the tongue every 5 minutes as needed for chest pain 25 tablet 11    pantoprazole (PROTONIX) 40 MG EC tablet TAKE 1 TABLET(40 MG) BY MOUTH DAILY (Patient taking differently: Take 40 mg by mouth every morning) 90 tablet 3    Prenatal Multivit-Min-Fe-FA (PRENATAL/IRON) TABS Take 1 tablet by mouth every morning      saccharomyces  boulardii (FLORASTOR) 250 MG capsule Take 1 capsule (250 mg) by mouth 2 times daily 14 capsule 0    sertraline (ZOLOFT) 100 MG tablet TAKE 1 TABLET(100 MG) BY MOUTH DAILY (Patient taking differently: Take 100 mg by mouth every morning) 90 tablet 1    UNABLE TO FIND Take 3 tablets by mouth daily MEDICATION NAME: Emerson complete regimen  1 TAB, AM - 2 TAB PM      ursodiol (ACTIGALL) 300 MG capsule TAKE 1 CAPSULE(300 MG) BY MOUTH TWICE DAILY (Patient taking differently: Take 300 mg by mouth 2 times daily TAKE 1 CAPSULE(300 MG) BY MOUTH TWICE DAILY) 180 capsule 3    valsartan (DIOVAN) 40 MG tablet Take 1 tablet (40 mg) by mouth daily (Patient taking differently: Take 40 mg by mouth every morning) 90 tablet 1    vitamin D3 (CHOLECALCIFEROL) 2000 units (50 mcg) tablet Take 1 tablet (2,000 Units) by mouth daily (Patient taking differently: Take 1 tablet by mouth every morning) 90 tablet 3    zinc gluconate 50 MG tablet Take 50 mg by mouth every morning      diazepam (VALIUM) 2 MG tablet Take 0.5-1 tablets (1-2 mg) by mouth 2 times daily as needed for anxiety or muscle spasms 30 tablet 0       Allergies  Allergies   Allergen Reactions    Dust Mites Cough, Headache, Other (See Comments) and Shortness Of Breath    Latex Other (See Comments) and Shortness Of Breath     Runny nose  PN: LW Reaction: DIFFICULTY BREATHING      Sulfa Antibiotics Anaphylaxis, Hives and Itching     PN: LW Reaction: HIVES, Swelling  PN: LW Reaction: HIVES, Swelling    Flagyl [Metronidazole Hcl] Anaphylaxis and Rash    Food      PN: LW FI1: nka LW FI2:    Hydrochlorothiazide W-Triamterene      PN: LW Reaction: skin rash  PN: LW Reaction: skin rash    No Clinical Screening - See Comments      PN: LW Other1: -Adhesive Tape    Seasonal Allergies      sneezing    Tape [Adhesive Tape] Hives    Metoprolol Itching and Rash    Metronidazole Hives and Rash     PN: LW Reaction: HIVES         Social History  Social History     Socioeconomic History    Marital  status:      Spouse name: Not on file    Number of children: Not on file    Years of education: Not on file    Highest education level: Not on file   Occupational History    Occupation: Retired    Tobacco Use    Smoking status: Never     Passive exposure: Never    Smokeless tobacco: Never   Vaping Use    Vaping Use: Never used   Substance and Sexual Activity    Alcohol use: No    Drug use: Never    Sexual activity: Not Currently     Partners: Male     Birth control/protection: Post-menopausal, None   Other Topics Concern    Parent/sibling w/ CABG, MI or angioplasty before 65F 55M? No     Service No    Blood Transfusions No    Caffeine Concern No    Occupational Exposure No    Hobby Hazards No    Sleep Concern Yes     Comment: Uses CPAP    Stress Concern No    Weight Concern Yes    Special Diet No    Back Care No    Exercise No    Bike Helmet No     Comment:      Seat Belt Yes    Self-Exams Yes   Social History Narrative    Not on file     Social Determinants of Health     Financial Resource Strain: Low Risk  (9/22/2023)    Financial Resource Strain     Within the past 12 months, have you or your family members you live with been unable to get utilities (heat, electricity) when it was really needed?: No   Food Insecurity: Low Risk  (9/22/2023)    Food Insecurity     Within the past 12 months, did you worry that your food would run out before you got money to buy more?: No     Within the past 12 months, did the food you bought just not last and you didn t have money to get more?: No   Transportation Needs: Low Risk  (9/22/2023)    Transportation Needs     Within the past 12 months, has lack of transportation kept you from medical appointments, getting your medicines, non-medical meetings or appointments, work, or from getting things that you need?: No   Physical Activity: Not on file   Stress: Not on file   Social Connections: Not on file   Interpersonal Safety: High Risk (9/28/2023)     Interpersonal Safety     Do you feel physically and emotionally safe where you currently live?: Yes     Within the past 12 months, have you been hit, slapped, kicked or otherwise physically hurt by someone?: No     Within the past 12 months, have you been humiliated or emotionally abused in other ways by your partner or ex-partner?: Yes   Housing Stability: Low Risk  (9/22/2023)    Housing Stability     Do you have housing? : Yes     Are you worried about losing your housing?: No       Family History  Family History   Problem Relation Age of Onset    Hypertension Mother     Arthritis Mother     Cerebrovascular Disease Mother     Anesthesia Reaction Mother         Halucinates    Venous thrombosis Father     Cerebrovascular Disease Father         Three Strokes    Diabetes Father         Type 2, after his 60's    Kidney Disease Father     Hypertension Father     Hyperlipidemia Father     Osteoporosis Father     Thyroid Disease Sister         Low thyroid    Anesthesia Reaction Sister     Diabetes Brother         After recovered from aspiration pneumonia, developed diabetes from high carb diet from feeding tube    Birth defects Brother     Osteoporosis Maternal Grandmother         Old age lived to 90    Coronary Artery Disease Maternal Grandfather         Strokes, lived to 105    Substance Abuse Maternal Grandfather         Alcohol    Osteoporosis Maternal Grandfather         Old age    Coronary Artery Disease Paternal Grandmother     Osteoporosis Paternal Grandmother         Long term issue for her    Obesity Paternal Grandmother     Coronary Artery Disease Paternal Grandfather     Sjogren's Daughter     Diabetes Daughter         Diagnosed after stroke    Cerebrovascular Disease Daughter     Hypertension Daughter         With diabetes and stroke    Hyperlipidemia Daughter     Depression Daughter         Does not acknowledge    Anxiety Disorder Daughter     Asthma Daughter         Anxiety, air quality, and exercise  triggered    Obesity Daughter     Diabetes Daughter         Gestational and pre diabetic A1C    Anxiety Disorder Daughter     Asthma Daughter         Excursion induced    Thyroid Disease Daughter     Diabetes Son         On metformin.    Hypertension Son     Obesity Son     Depression Son           ..from job    Mental Illness Other     Substance Abuse Other     Substance Abuse Other     Substance Abuse Other        Review of Systems  The complete review of systems is negative other than noted in the HPI or here.   Anesthesia Evaluation   Pt has had prior anesthetic. Type: General.    No history of anesthetic complications       ROS/MED HX  ENT/Pulmonary:     (+) sleep apnea, uses CPAP,                   asthma (Reports she has not needed the albuterol recently. Denies any recent wheezing, cough, or SOB.)  Treatment: Inhaler prn and Inhaler daily,              (-) tobacco use and recent URI   Neurologic: Comment: - Pituitary microadenoma (2011)      Cardiovascular: Comment: - Hx of ischemic CM following STEMI, with full recovery of EF in Fall of 2019    - Dizziness/lightheadedness. She is currently working with vestibular PT.     (+)  hypertension- -  CAD - past MI - stent-                                 Previous cardiac testing   Echo: Date: 9/18/23 Results:  Interpretation Summary  Global and regional left ventricular function is normal with an EF of 55-60%.  Right ventricular function, chamber size, wall motion, and thickness are  normal.  The inferior vena cava is normal.  No pericardial effusion is present.  No significant changes noted.    Stress Test:  Date: 8/14/23 Results:     The nuclear stress test is abnormal.     There is a medium sized area of a moderate degree of nontransmural infarction in the apical and anteroseptal segment(s) of the left ventricle. This appears to be in the left anterior descending distribution.No ischemia identified.    ECG Reviewed:  Date: 10/11/23  "Results:  SB  Cath:  Date: 12/11/20 Results:  1.  No acute coronary lesions to explain the patient's current presentation.  2.  There is a  at the ostium of the jailed, small-moderate D1 branch which would not be a good candidate for  PCI.  3.  Widely patent proximal-mid LAD stent.    4.  Mild-moderate nonobstructive CAD elsewhere.   (-) orthopnea/PND   METS/Exercise Tolerance: 4 - Raking leaves, gardening Comment: - Can ascend 1 full fight of stairs multiple times per day without exertional symptoms   - Can walk x 20 mins continuously while pushing her cart in the grocery store    Hematologic: Comments: - Chronic mild thrombocytopenia    (-) history of blood clots and history of blood transfusion   Musculoskeletal: Comment: - Unsteadiness on feet. Using walker.   - s/p bilateral knee replacements         GI/Hepatic:     (+) GERD, Asymptomatic on medication,    hiatal hernia,    cholecystitis/cholelithiasis,          Renal/Genitourinary:     (+) renal disease, type: CRI, Pt does not require dialysis,           Endo:     (+)               Obesity,    (-) Type II DM and thyroid disease   Psychiatric/Substance Use:     (+) psychiatric history anxiety, depression and other (comment) (ADHD)       Infectious Disease: Comment: - Hx of C. Diff. No current symptoms.    (-) Recent Fever   Malignancy:  - neg malignancy ROS  (-) malignancy   Other:            /61 (BP Location: Right arm, Patient Position: Sitting, Cuff Size: Adult Large)   Pulse 68   Temp 97.7  F (36.5  C) (Oral)   Resp 16   Ht 1.638 m (5' 4.5\")   Wt 81.6 kg (180 lb)   LMP  (LMP Unknown)   SpO2 96%   Breastfeeding No   BMI 30.42 kg/m      Physical Exam   Constitutional: Awake, alert, cooperative, no apparent distress, and appears stated age.  Eyes: Pupils equal, round and reactive to light, extra ocular muscles intact, sclera clear, conjunctiva normal.  HENT: Normocephalic, oral pharynx with moist mucus membranes, good dentition. No goiter " appreciated.   Respiratory: Clear to auscultation bilaterally, no crackles or wheezing.  Cardiovascular: Regular rate and rhythm, normal S1 and S2, and no murmur noted.  Carotids +2, no bruits. No edema. Palpable pulses to radial arteries.   GI: Normal bowel sounds, soft, non-distended, non-tender, no masses palpated, no hepatosplenomegaly.   Lymph/Hematologic: No cervical lymphadenopathy and no supraclavicular lymphadenopathy.  Genitourinary:  Deferred.   Skin: Warm and dry.    Musculoskeletal: Full ROM of neck. There is no redness, warmth, or swelling of the joints. Gross motor strength is normal.    Neurologic: Awake, alert, oriented to name, place and time. Cranial nerves II-XII are grossly intact.   Neuropsychiatric: Calm, cooperative. Normal affect.     Prior Labs/Diagnostic Studies   All labs and imaging personally reviewed     EKG/ stress test - if available please see in ROS above       Latest Ref Rng & Units 12/6/2021    12:10 PM   PFT   FVC L 2.48    FEV1 L 1.83    FVC% % 90    FEV1% % 86          The patient's records and results personally reviewed by this provider.     Outside records reviewed from: Care Everywhere    LAB/DIAGNOSTIC STUDIES TODAY:  None.     Assessment    Gem Gama is a 72 year old female seen as a PAC referral for risk assessment and optimization for anesthesia.    Plan/Recommendations  Pt will be optimized for the proposed procedure.  See below for details on the assessment, risk, and preoperative recommendations    NEUROLOGY  - No history of TIA, CVA or seizure  -Post Op delirium risk factors:  No risk identified    ENT  - No current airway concerns.  Will need to be reassessed day of surgery.  Mallampati: II  TM: > 3    CARDIAC  - No history of Afib  - CAD. Hx of STEMI s/p stent to LAD (2019). Hx of ischemic CM following STEMI, with full recovery of EF in Fall of 2019. Most recent echo on 9/18/23 showing EF of 55-60% with normal LV and RV function.   - Patient is able to  "achieve 4 METS and denies any exertional symptoms of CP, chest tightness, or SOB.  - Episodes of dizziness/lightheadedness. Patient was evaluated by Dr. Fleming in cardiology on 9/13/23. Stress test at this time showed no evidence of reversible ischemia. She had a fixed transmural scar in the anteroseptal region consisted with her prior MI. Normal echo. Zio patch showed one 6 beat run of non-sustained VT with rare PVCs/PACs. She was subsequently referred to Dr. Jason in EP on 10/5. Per this note,  \"it seems low probability of that her symptoms are related to arrhythmia, there is no harm in assessing that.  We suggest to her that if she would like to have a subcutaneous monitor implanted we could make that arrangement.\" Reviewed with Dr. Alfaro with recommendation to proceed with surgery as scheduled. No further cardiac testing indicated prior to surgery.   Hold aspirin x 7 days prior to surgery.  - Hypertension  Well controlled  Hold losartan on DOS.     - METS (Metabolic Equivalents)  Patient performs 4 or more METS exercise without symptoms            Total Score: 0      RCRI-Low risk: Class 2 0.9% complication rate            Total Score: 1    RCRI: CAD        PULMONARY  - Obstructive Sleep Apnea  LEA with home CPAP.  Patient will be instructed to bring their home CPAP device to the hospital with them.    - Asthma  Well controlled  Bring inhalers with on DOS.   - Tobacco History    History   Smoking Status    Never   Smokeless Tobacco    Never       GI  - GERD  Controlled on medications: Proton Pump Inhibitor  - Hiatal hernia  - Symptomatic cholelithiasis (see HPI) - surgery planned as above.   PONV High Risk  Total Score: 3           1 AN PONV: Pt is Female    1 AN PONV: Patient is not a current smoker    1 AN PONV: Intended Post Op Opioids        /RENAL  - CKD  - Baseline Creatinine  1.11    ENDOCRINE    - BMI: Estimated body mass index is 30.42 kg/m  as calculated from the following:    Height as of this " "encounter: 1.638 m (5' 4.5\").    Weight as of this encounter: 81.6 kg (180 lb).  Obesity (BMI >30)  - No history of Diabetes Mellitus    HEME  VTE Medium Risk 1.8%            Total Score: 5    VTE: Greater than 59 yrs old    VTE: Family Hx of VTE      - Platelet disfunction second to Aspirin (Rita, many others)  - Chronic mild thrombocytopenia with baseline plts 102 -211 over the past year    MSK  - Unsteadiness on feet. Using walker.   - s/p bilateral knee replacements    PSYCH  - Hx of depression, anxiety and ADHD managed on multiple medications.    ACCESS  -Difficult IV stick    Different anesthesia methods/types have been discussed with the patient, but they are aware that the final plan will be decided by the assigned anesthesia provider on the date of service.    Patient was discussed with Dr Alfaro    The patient is optimized for their procedure. AVS with information on surgery time/arrival time, meds and NPO status given by nursing staff. No further diagnostic testing indicated.      On the day of service:     Prep time: 20 minutes  Visit time: 22 minutes  Documentation time: 20 minutes  ------------------------------------------  Total time: 62 minutes      KATE Hawley CNP  Preoperative Assessment Center  [facility]  [facility]  Phone: [phone]  Fax: [phone]    "

## 2023-11-03 NOTE — TELEPHONE ENCOUNTER
MED REC REQUIRED  Post Medication Reconciliation Status:  Discharge medications reconciled, continue medications without change     Jahaira Wetzel MD

## 2023-11-06 ENCOUNTER — TRANSFERRED RECORDS (OUTPATIENT)
Dept: HEALTH INFORMATION MANAGEMENT | Facility: CLINIC | Age: 73
End: 2023-11-06
Payer: MEDICARE

## 2023-11-08 ENCOUNTER — HOSPITAL ENCOUNTER (OUTPATIENT)
Facility: AMBULATORY SURGERY CENTER | Age: 73
Discharge: HOME | End: 2023-11-08
Attending: SURGERY
Payer: MEDICARE

## 2023-11-08 ENCOUNTER — ANESTHESIA (OUTPATIENT)
Dept: SURGERY | Facility: AMBULATORY SURGERY CENTER | Age: 73
End: 2023-11-08
Payer: MEDICARE

## 2023-11-08 VITALS
TEMPERATURE: 98.7 F | BODY MASS INDEX: 29.99 KG/M2 | RESPIRATION RATE: 16 BRPM | SYSTOLIC BLOOD PRESSURE: 120 MMHG | HEIGHT: 65 IN | WEIGHT: 180 LBS | HEART RATE: 58 BPM | OXYGEN SATURATION: 95 % | DIASTOLIC BLOOD PRESSURE: 64 MMHG

## 2023-11-08 DIAGNOSIS — K80.20 CALCULUS OF GALLBLADDER WITHOUT CHOLECYSTITIS WITHOUT OBSTRUCTION: Primary | ICD-10-CM

## 2023-11-08 LAB
ATRIAL RATE - MUSE: 56 BPM
DIASTOLIC BLOOD PRESSURE - MUSE: NORMAL MMHG
INTERPRETATION ECG - MUSE: NORMAL
P AXIS - MUSE: -18 DEGREES
PR INTERVAL - MUSE: 150 MS
QRS DURATION - MUSE: 82 MS
QT - MUSE: 464 MS
QTC - MUSE: 447 MS
R AXIS - MUSE: -15 DEGREES
SYSTOLIC BLOOD PRESSURE - MUSE: NORMAL MMHG
T AXIS - MUSE: 45 DEGREES
VENTRICULAR RATE- MUSE: 56 BPM

## 2023-11-08 PROCEDURE — 47562 LAPAROSCOPIC CHOLECYSTECTOMY: CPT | Performed by: SURGERY

## 2023-11-08 PROCEDURE — 88304 TISSUE EXAM BY PATHOLOGIST: CPT | Mod: 26 | Performed by: PATHOLOGY

## 2023-11-08 PROCEDURE — 93010 ELECTROCARDIOGRAM REPORT: CPT | Performed by: INTERNAL MEDICINE

## 2023-11-08 PROCEDURE — 88304 TISSUE EXAM BY PATHOLOGIST: CPT | Mod: TC | Performed by: SURGERY

## 2023-11-08 RX ORDER — ONDANSETRON 2 MG/ML
INJECTION INTRAMUSCULAR; INTRAVENOUS PRN
Status: DISCONTINUED | OUTPATIENT
Start: 2023-11-08 | End: 2023-11-08

## 2023-11-08 RX ORDER — FENTANYL CITRATE 50 UG/ML
50 INJECTION, SOLUTION INTRAMUSCULAR; INTRAVENOUS EVERY 5 MIN PRN
Status: DISCONTINUED | OUTPATIENT
Start: 2023-11-08 | End: 2023-11-08 | Stop reason: HOSPADM

## 2023-11-08 RX ORDER — ACETAMINOPHEN 325 MG/1
975 TABLET ORAL ONCE
Status: DISCONTINUED | OUTPATIENT
Start: 2023-11-08 | End: 2023-11-09 | Stop reason: HOSPADM

## 2023-11-08 RX ORDER — FENTANYL CITRATE 50 UG/ML
25 INJECTION, SOLUTION INTRAMUSCULAR; INTRAVENOUS EVERY 5 MIN PRN
Status: DISCONTINUED | OUTPATIENT
Start: 2023-11-08 | End: 2023-11-08 | Stop reason: HOSPADM

## 2023-11-08 RX ORDER — ACETAMINOPHEN 325 MG/1
975 TABLET ORAL ONCE
Status: COMPLETED | OUTPATIENT
Start: 2023-11-08 | End: 2023-11-08

## 2023-11-08 RX ORDER — CEFAZOLIN SODIUM 2 G/50ML
2 SOLUTION INTRAVENOUS
Status: COMPLETED | OUTPATIENT
Start: 2023-11-08 | End: 2023-11-08

## 2023-11-08 RX ORDER — CEFAZOLIN SODIUM 2 G/50ML
2 SOLUTION INTRAVENOUS SEE ADMIN INSTRUCTIONS
Status: DISCONTINUED | OUTPATIENT
Start: 2023-11-08 | End: 2023-11-08 | Stop reason: HOSPADM

## 2023-11-08 RX ORDER — FENTANYL CITRATE 50 UG/ML
INJECTION, SOLUTION INTRAMUSCULAR; INTRAVENOUS PRN
Status: DISCONTINUED | OUTPATIENT
Start: 2023-11-08 | End: 2023-11-08

## 2023-11-08 RX ORDER — HYDROMORPHONE HYDROCHLORIDE 1 MG/ML
0.2 INJECTION, SOLUTION INTRAMUSCULAR; INTRAVENOUS; SUBCUTANEOUS EVERY 5 MIN PRN
Status: DISCONTINUED | OUTPATIENT
Start: 2023-11-08 | End: 2023-11-08 | Stop reason: HOSPADM

## 2023-11-08 RX ORDER — INDOCYANINE GREEN AND WATER 25 MG
2.5 KIT INJECTION ONCE
Status: COMPLETED | OUTPATIENT
Start: 2023-11-08 | End: 2023-11-08

## 2023-11-08 RX ORDER — DEXAMETHASONE SODIUM PHOSPHATE 4 MG/ML
INJECTION, SOLUTION INTRA-ARTICULAR; INTRALESIONAL; INTRAMUSCULAR; INTRAVENOUS; SOFT TISSUE PRN
Status: DISCONTINUED | OUTPATIENT
Start: 2023-11-08 | End: 2023-11-08

## 2023-11-08 RX ORDER — LIDOCAINE 40 MG/G
CREAM TOPICAL
Status: DISCONTINUED | OUTPATIENT
Start: 2023-11-08 | End: 2023-11-08 | Stop reason: HOSPADM

## 2023-11-08 RX ORDER — ONDANSETRON 4 MG/1
4 TABLET, ORALLY DISINTEGRATING ORAL EVERY 30 MIN PRN
Status: DISCONTINUED | OUTPATIENT
Start: 2023-11-08 | End: 2023-11-08 | Stop reason: HOSPADM

## 2023-11-08 RX ORDER — LIDOCAINE HYDROCHLORIDE 20 MG/ML
INJECTION, SOLUTION INFILTRATION; PERINEURAL PRN
Status: DISCONTINUED | OUTPATIENT
Start: 2023-11-08 | End: 2023-11-08

## 2023-11-08 RX ORDER — PROPOFOL 10 MG/ML
INJECTION, EMULSION INTRAVENOUS CONTINUOUS PRN
Status: DISCONTINUED | OUTPATIENT
Start: 2023-11-08 | End: 2023-11-08

## 2023-11-08 RX ORDER — ONDANSETRON 2 MG/ML
4 INJECTION INTRAMUSCULAR; INTRAVENOUS EVERY 30 MIN PRN
Status: DISCONTINUED | OUTPATIENT
Start: 2023-11-08 | End: 2023-11-08 | Stop reason: HOSPADM

## 2023-11-08 RX ORDER — ONDANSETRON 4 MG/1
4 TABLET, ORALLY DISINTEGRATING ORAL EVERY 30 MIN PRN
Status: DISCONTINUED | OUTPATIENT
Start: 2023-11-08 | End: 2023-11-09 | Stop reason: HOSPADM

## 2023-11-08 RX ORDER — ACETAMINOPHEN 325 MG/1
975 TABLET ORAL ONCE
Status: DISCONTINUED | OUTPATIENT
Start: 2023-11-08 | End: 2023-11-08 | Stop reason: HOSPADM

## 2023-11-08 RX ORDER — OXYCODONE HYDROCHLORIDE 5 MG/1
5 TABLET ORAL
Status: DISCONTINUED | OUTPATIENT
Start: 2023-11-08 | End: 2023-11-09 | Stop reason: HOSPADM

## 2023-11-08 RX ORDER — EPHEDRINE SULFATE 50 MG/ML
INJECTION, SOLUTION INTRAMUSCULAR; INTRAVENOUS; SUBCUTANEOUS PRN
Status: DISCONTINUED | OUTPATIENT
Start: 2023-11-08 | End: 2023-11-08

## 2023-11-08 RX ORDER — HYDROMORPHONE HYDROCHLORIDE 1 MG/ML
0.4 INJECTION, SOLUTION INTRAMUSCULAR; INTRAVENOUS; SUBCUTANEOUS EVERY 5 MIN PRN
Status: DISCONTINUED | OUTPATIENT
Start: 2023-11-08 | End: 2023-11-08 | Stop reason: HOSPADM

## 2023-11-08 RX ORDER — KETAMINE HYDROCHLORIDE 10 MG/ML
INJECTION INTRAMUSCULAR; INTRAVENOUS PRN
Status: DISCONTINUED | OUTPATIENT
Start: 2023-11-08 | End: 2023-11-08

## 2023-11-08 RX ORDER — SODIUM CHLORIDE, SODIUM LACTATE, POTASSIUM CHLORIDE, CALCIUM CHLORIDE 600; 310; 30; 20 MG/100ML; MG/100ML; MG/100ML; MG/100ML
INJECTION, SOLUTION INTRAVENOUS CONTINUOUS
Status: DISCONTINUED | OUTPATIENT
Start: 2023-11-08 | End: 2023-11-08 | Stop reason: HOSPADM

## 2023-11-08 RX ORDER — OXYCODONE HYDROCHLORIDE 5 MG/1
10 TABLET ORAL
Status: DISCONTINUED | OUTPATIENT
Start: 2023-11-08 | End: 2023-11-09 | Stop reason: HOSPADM

## 2023-11-08 RX ORDER — OXYCODONE HYDROCHLORIDE 5 MG/1
5-10 TABLET ORAL EVERY 4 HOURS PRN
Qty: 16 TABLET | Refills: 0 | Status: SHIPPED | OUTPATIENT
Start: 2023-11-08 | End: 2024-01-21

## 2023-11-08 RX ORDER — ONDANSETRON 2 MG/ML
4 INJECTION INTRAMUSCULAR; INTRAVENOUS EVERY 30 MIN PRN
Status: DISCONTINUED | OUTPATIENT
Start: 2023-11-08 | End: 2023-11-09 | Stop reason: HOSPADM

## 2023-11-08 RX ADMIN — KETAMINE HYDROCHLORIDE 10 MG: 10 INJECTION INTRAMUSCULAR; INTRAVENOUS at 09:46

## 2023-11-08 RX ADMIN — Medication 20 MG: at 10:04

## 2023-11-08 RX ADMIN — DEXAMETHASONE SODIUM PHOSPHATE 4 MG: 4 INJECTION, SOLUTION INTRA-ARTICULAR; INTRALESIONAL; INTRAMUSCULAR; INTRAVENOUS; SOFT TISSUE at 09:29

## 2023-11-08 RX ADMIN — ACETAMINOPHEN 975 MG: 325 TABLET ORAL at 08:08

## 2023-11-08 RX ADMIN — CEFAZOLIN SODIUM 2 G: 2 SOLUTION INTRAVENOUS at 09:23

## 2023-11-08 RX ADMIN — KETAMINE HYDROCHLORIDE 20 MG: 10 INJECTION INTRAMUSCULAR; INTRAVENOUS at 10:31

## 2023-11-08 RX ADMIN — Medication 100 MCG: at 10:10

## 2023-11-08 RX ADMIN — EPHEDRINE SULFATE 10 MG: 50 INJECTION, SOLUTION INTRAMUSCULAR; INTRAVENOUS; SUBCUTANEOUS at 10:06

## 2023-11-08 RX ADMIN — Medication 100 MCG: at 09:41

## 2023-11-08 RX ADMIN — ONDANSETRON 4 MG: 2 INJECTION INTRAMUSCULAR; INTRAVENOUS at 12:14

## 2023-11-08 RX ADMIN — PROPOFOL 100 MG: 10 INJECTION, EMULSION INTRAVENOUS at 09:29

## 2023-11-08 RX ADMIN — Medication 50 MG: at 09:29

## 2023-11-08 RX ADMIN — Medication 100 MCG: at 09:50

## 2023-11-08 RX ADMIN — LIDOCAINE HYDROCHLORIDE 3 ML: 20 INJECTION, SOLUTION INFILTRATION; PERINEURAL at 09:29

## 2023-11-08 RX ADMIN — FENTANYL CITRATE 25 MCG: 50 INJECTION, SOLUTION INTRAMUSCULAR; INTRAVENOUS at 12:06

## 2023-11-08 RX ADMIN — ONDANSETRON 4 MG: 2 INJECTION INTRAMUSCULAR; INTRAVENOUS at 10:38

## 2023-11-08 RX ADMIN — PROPOFOL 150 MCG/KG/MIN: 10 INJECTION, EMULSION INTRAVENOUS at 09:28

## 2023-11-08 RX ADMIN — INDOCYANINE GREEN AND WATER 2.5 MG: KIT at 09:06

## 2023-11-08 RX ADMIN — EPHEDRINE SULFATE 5 MG: 50 INJECTION, SOLUTION INTRAMUSCULAR; INTRAVENOUS; SUBCUTANEOUS at 10:10

## 2023-11-08 RX ADMIN — FENTANYL CITRATE 50 MCG: 50 INJECTION, SOLUTION INTRAMUSCULAR; INTRAVENOUS at 09:29

## 2023-11-08 RX ADMIN — SODIUM CHLORIDE, SODIUM LACTATE, POTASSIUM CHLORIDE, CALCIUM CHLORIDE: 600; 310; 30; 20 INJECTION, SOLUTION INTRAVENOUS at 09:23

## 2023-11-08 ASSESSMENT — LIFESTYLE VARIABLES: TOBACCO_USE: 0

## 2023-11-08 ASSESSMENT — ENCOUNTER SYMPTOMS: ORTHOPNEA: 0

## 2023-11-08 NOTE — ANESTHESIA CARE TRANSFER NOTE
Patient: Gem Gama    Procedure: Procedure(s):  CHOLECYSTECTOMY, ROBOT-ASSISTED, LAPAROSCOPIC, WITHOUT CHOLANGIOGRAM       Diagnosis: Calculus of gallbladder without cholecystitis without obstruction [K80.20]  Diagnosis Additional Information: No value filed.    Anesthesia Type:   General     Note:    Oropharynx: oropharynx clear of all foreign objects and spontaneously breathing  Level of Consciousness: drowsy  Oxygen Supplementation: nasal cannula  Level of Supplemental Oxygen (L/min / FiO2): 2  Independent Airway: airway patency satisfactory and stable  Dentition: dentition unchanged  Vital Signs Stable: post-procedure vital signs reviewed and stable  Report to RN Given: handoff report given  Patient transferred to: PACU    Handoff Report: Identifed the Patient, Identified the Reponsible Provider, Reviewed the pertinent medical history, Discussed the surgical course, Reviewed Intra-OP anesthesia mangement and issues during anesthesia, Set expectations for post-procedure period and Allowed opportunity for questions and acknowledgement of understanding      Vitals:  Vitals Value Taken Time   /78 11/08/23 1109   Temp 36.9  C (98.4  F) 11/08/23 1109   Pulse 65 11/08/23 1113   Resp 25 11/08/23 1113   SpO2 97 % 11/08/23 1113   Vitals shown include unfiled device data.    Electronically Signed By: KATE Terry CRNA  November 8, 2023  11:15 AM

## 2023-11-08 NOTE — DISCHARGE INSTRUCTIONS
"[facility]  Home Care Following Anesthesia  For 24 hours after surgery:  Get plenty of rest.  A responsible adult must stay with you for at least 24 hours after you leave the surgery center.  Do not drive or use heavy equipment.  If you have weakness or tingling, don't drive or use heavy equipment until this feeling goes away.   Do not drink alcohol.   Avoid strenuous or risky activities.  Ask for help when climbing stairs.  You may feel lightheaded.  IF so, sit for a few minutes before standing.  Have someone help you get up.   If you have nausea (feel sick to your stomach): Drink only clear liquids such as apple juice, ginger ale, broth or 7-Up.  Rest may also help.  Be sure to drink enough fluids.  Move to a regular diet as you feel able.   You may have a slight fever.  Call the doctor if your fever is over 100 F (37.7 C) (taken under the tongue) or lasts longer than 24 hours.  You may have a dry mouth, a sore throat, muscle aches or trouble sleeping. These should go away after 24 hours.  Do not make important or legal decisions.   It is recommended to avoid smoking.        Today you received a Marcaine or bupivacaine block to numb the nerves near your surgery site.  This is a block using local anesthetic or \"numbing\" medication injected around the nerves to anesthetize or \"numb\" the area supplied by those nerves.  This block is injected into the muscle layer near your surgical site.  The medication may numb the location where you had surgery for 6-18 hours, but may last up to 24 hours.  If your surgical site is an arm or leg you should be careful with your affected limb, since it is possible to injure your limb without being aware of it due to the numbing.  Until full feeling returns, you should guard against bumping or hitting your limb, and avoid extreme hot or cold temperatures on the skin.  As the block wears off, the feeling will return as a tingling or prickly " sensation near your surgical site.  You will experience more discomfort from your incision as the feeling returns.  You may want to take a pain pill (a narcotic or Tylenol if this was prescribed by your surgeon) when you start to experience mild pain before the pain beccomes more severe.  If your pain medications do not control your pain you should notifiy your surgeon.    Tips for taking pain medications  To get the best pain relief possible, remember these points:  Take pain medications as directed, before pain becomes severe.  Pain medication can upset your stomach: taking it with food may help.  Constipation is a common side effect of pain medication. Drink plenty of  fluids.  Eat foods high in fiber. Take a stool softener if recommended by your doctor or pharmacist.  Do not drink alcohol, drive or operate machinery while taking pain medications.  Ask about other ways to control pain, such as with heat, ice or relaxation.    Tylenol/Acetaminophen Consumption    If you feel your pain relief is insufficient, you may take Tylenol/Acetaminophen in addition to your narcotic pain medication.   Be careful not to exceed 4,000 mg of Tylenol/Acetaminophen in a 24 hour period from all sources.  If you are taking extra strength Tylenol/acetaminophen (500 mg), the maximum dose is 8 tablets in 24 hours.  If you are taking regular strength acetaminophen (325 mg), the maximum dose is 12 tablets in 24 hours.    Call a doctor for any of the following:  Signs of infection (fever, growing tenderness at the surgery site, a large amount of drainage or bleeding, severe pain, foul-smelling drainage, redness, swelling).  It has been over 8 to 10 hours since surgery and you are still not able to urinate (pass water).  Headache for over 24 hours.  Numbness, tingling or weakness the day after surgery (if you had spinal anesthesia).  Signs of Covid-19 infection (temperature over 100 degrees, shortness of breath, cough, loss of taste/smell,  generalized body aches, persistent headache, chills, sore throat, nausea/vomiting/diarrhea)  Your doctor is:       Dr. Mickey Gallego, General Surgery: [phone]               Or dial [phone] and ask for the resident on call for:  General Surgery  For emergency care, call the:  [facility]:  [phone] (TTY for hearing impaired: [phone])  Tylenol 975 mg given at 8 am.  Next available dose at 2 pm.                   How to Relieve Pain After Your Robotic or Laparoscopic Abdominal Surgery    After your surgery, you may have different types of pain.  It's normal to have pain near your incisions, but you may also feel bloated or have pain in different areas of your body, especially your shoulders.  This is from the gas that was put into your abdomen during your surgery.  The gas makes room for your surgeon to see, but it also puts pressure on the inside of your abdomen and can move to other areas.    The referred pain to your shoulders and bloating discomfort can improve with frequent short, non-strenuous walks (inside your home).  Try to start walking within 1 to 2 hours after surgery.  You can also place a hot pack or heating pad on the painful area (don't put it directly on your skin)  Lastly, the bloat and referred pain will dissipate with time as the body reabsorbs the gas that was placed in your abdomen.    If your pain isn't controlled after trying these things and taking pain medication, call your doctor.

## 2023-11-08 NOTE — PROGRESS NOTES
Prior to surgery, I reviewed the risks of gallbladder removal with this patient.    The risk discussion included, but was not limited to, death, myocardial infarction, pneumonia, urinary tract infection, deep venous thrombosis with or without pulmonary embolus, abdominal infection from bowel injury or abscess, bowel obstruction, wound infection, and bleeding.    Specific risks related to cholecystectomy include bile duct injury (3-4/1000), bile leak (10/1000), retained common bile duct stone (10/1000), postcholecystectomy diarrhea (1-2%) and these complications may require additional treatment.    The potential that gallbladder removal will NOT be of benefit was also reviewed.    Furthermore, alternative therapies and the option for no therapy were also reviewed.    Postoperative treatment and expected follow-up were also reviewed.

## 2023-11-08 NOTE — ANESTHESIA PREPROCEDURE EVALUATION
Anesthesia Pre-Procedure Evaluation    Patient: Gem Gama   MRN: [id number] : 1950        Procedure : Procedure(s):  CHOLECYSTECTOMY, ROBOT-ASSISTED, LAPAROSCOPIC, WITHOUT CHOLANGIOGRAM          Past Medical History:   Diagnosis Date    Acute bilateral low back pain without sciatica 2023    ADHD (attention deficit hyperactivity disorder)     Anxiety     Arthritis     back, hands, knees, hips    Asthma     Chest pain, unspecified type 10/11/2023    Cheyne-Rogers breathing 2022    Cheyne-Rogers breathing disorder     Chronic kidney disease     Clostridium difficile infection 2022    Congestive heart failure (H) 2019    Right after STEMI, improved Fall     Coronary artery disease involving native coronary artery of native heart without angina pectoris     Depression     Fever and chills 2021    Gastro-oesophageal reflux disease     Hypertension     Ischemic cardiomyopathy 2019    Major depressive disorder, recurrent episode, moderate (H) 2013    Narcolepsy     Pituitary microadenoma (H)     MRI - There is a triangular-shaped area with delayed contrast enhancement at the left lateral portion of the pituitary gland posteriorly, measuring 2.5 x 4.3 mm. This is suggestive of a microadenoma, MRI 10/1/22 - Normal pituitary gland and whole brain MRI.    Pyelonephritis 2022    Pyelonephritis of right kidney 10/24/2021    S/P hip replacement 2004    Bilateral fall  due to OA    Sepsis, due to unspecified organism, unspecified whether acute organ dysfunction present (H) 2022    Status post total knee replacement 2014    Urinary tract infection with hematuria, site unspecified 2021      Past Surgical History:   Procedure Laterality Date    ABDOMEN SURGERY  ,     Kidney reconstruct. Uterer stenosis    ARTHROPLASTY KNEE  2014    Procedure: ARTHROPLASTY KNEE;  Surgeon: Levi Johnson MD;  Location: [facility] OR    ARTHROPLASTY KNEE Left  11/24/2014    Procedure: ARTHROPLASTY KNEE;  Surgeon: Levi Johnson MD;  Location:  OR    COLONOSCOPY      Several times dates ??    CV CORONARY ANGIOGRAM N/A 10/09/2019    Procedure: Coronary Angiogram;  Surgeon: Chiquita Chatterjee MD;  Location:  HEART CARDIAC CATH LAB    CV HEART CATHETERIZATION WITH POSSIBLE INTERVENTION N/A 10/10/2019    Procedure: Heart Catheterization with Possible Intervention;  Surgeon: Chin Garcia MD;  Location:  HEART CARDIAC CATH LAB    CV INTRA AORTIC BALLOON N/A 10/09/2019    Procedure: Intra-Aortic Balloon Pump Insertion;  Surgeon: Chiquita Chatterjee MD;  Location:  HEART CARDIAC CATH LAB    CV INTRA AORTIC BALLOON REMOVAL N/A 10/10/2019    Procedure: Intra-Aortic Balloon Pump Removal;  Surgeon: Chin Garcia MD;  Location:  HEART CARDIAC CATH LAB    CV LEFT HEART CATH N/A 12/11/2020    Procedure: Heart Catheterization with Possible Intervention;  Surgeon: Pilo Otoole MD;  Location:  HEART CARDIAC CATH LAB    CV PCI STENT DRUG ELUTING N/A 10/09/2019    Procedure: PCI Stent Drug Eluting;  Surgeon: Chiquita Chatterjee MD;  Location:  HEART CARDIAC CATH LAB    EYE SURGERY  2011    Cataract surgery both eyes    GENITOURINARY SURGERY  01/01/2008    Prolift    GENITOURINARY SURGERY  1973    In 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    ORTHOPEDIC SURGERY      bilat total hip    RECTOCELE REPAIR      ZZC TOTAL ABD HYSTERECTOMY+BLAD REPR  01/01/1992    Vaginal approach with oophrectomy    ZZC TOTAL KNEE ARTHROPLASTY        Allergies   Allergen Reactions    Dust Mites Cough, Headache, Other (See Comments) and Shortness Of Breath    Latex Other (See Comments) and Shortness Of Breath     Runny nose  PN: LW Reaction: DIFFICULTY  BREATHING      Sulfa Antibiotics Anaphylaxis, Hives and Itching     PN: LW Reaction: HIVES, Swelling  PN: LW Reaction: HIVES, Swelling    Flagyl [Metronidazole Hcl] Anaphylaxis and Rash    Food      PN: LW FI1: nka LW FI2:    Hydrochlorothiazide W-Triamterene      PN: LW Reaction: skin rash  PN: LW Reaction: skin rash    No Clinical Screening - See Comments      PN: LW Other1: -Adhesive Tape    Seasonal Allergies      sneezing    Tape [Adhesive Tape] Hives    Metoprolol Itching and Rash    Metronidazole Hives and Rash     PN: LW Reaction: HIVES        Social History     Tobacco Use    Smoking status: Never     Passive exposure: Never    Smokeless tobacco: Never   Substance Use Topics    Alcohol use: No      Wt Readings from Last 1 Encounters:   11/08/23 81.6 kg (180 lb)        Anesthesia Evaluation   Pt has had prior anesthetic. Type: General.    No history of anesthetic complications       ROS/MED HX  ENT/Pulmonary:     (+) sleep apnea, uses CPAP,                   asthma (Reports she has not needed the albuterol recently. Denies any recent wheezing, cough, or SOB.)  Treatment: Inhaler prn and Inhaler daily,              (-) tobacco use and recent URI   Neurologic: Comment: - Pituitary microadenoma (2011)      Cardiovascular: Comment: - Hx of ischemic CM following STEMI, with full recovery of EF in Fall of 2019    - Dizziness/lightheadedness. She is currently working with vestibular PT.     (+)  hypertension- -  CAD - past MI - stent-                                 Previous cardiac testing   Echo: Date: 9/18/23 Results:  Interpretation Summary  Global and regional left ventricular function is normal with an EF of 55-60%.  Right ventricular function, chamber size, wall motion, and thickness are  normal.  The inferior vena cava is normal.  No pericardial effusion is present.  No significant changes noted.    Stress Test:  Date: 8/14/23 Results:     The nuclear stress test is abnormal.     There is a medium sized  area of a moderate degree of nontransmural infarction in the apical and anteroseptal segment(s) of the left ventricle. This appears to be in the left anterior descending distribution.No ischemia identified.    ECG Reviewed:  Date: 10/11/23 Results:  SB  Cath:  Date: 12/11/20 Results:  1.  No acute coronary lesions to explain the patient's current presentation.  2.  There is a  at the ostium of the jailed, small-moderate D1 branch which would not be a good candidate for  PCI.  3.  Widely patent proximal-mid LAD stent.    4.  Mild-moderate nonobstructive CAD elsewhere.   (-) orthopnea/PND   METS/Exercise Tolerance: 4 - Raking leaves, gardening Comment: - Can ascend 1 full fight of stairs multiple times per day without exertional symptoms   - Can walk x 20 mins continuously while pushing her cart in the grocery store    Hematologic: Comments: - Chronic mild thrombocytopenia    (-) history of blood clots and history of blood transfusion   Musculoskeletal: Comment: - Unsteadiness on feet. Using walker.   - s/p bilateral knee replacements         GI/Hepatic:     (+) GERD, Asymptomatic on medication,    hiatal hernia,    cholecystitis/cholelithiasis,          Renal/Genitourinary:     (+) renal disease, type: CRI, Pt does not require dialysis,           Endo:     (+)               Obesity,    (-) Type II DM and thyroid disease   Psychiatric/Substance Use:     (+) psychiatric history anxiety, depression and other (comment) (ADHD)       Infectious Disease: Comment: - Hx of C. Diff. No current symptoms.    (-) Recent Fever   Malignancy:  - neg malignancy ROS  (-) malignancy   Other:            Physical Exam    Airway        Mallampati: III   TM distance: > 3 FB   Neck ROM: full   Mouth opening: > 3 cm    Respiratory Devices and Support         Dental       (+) Modest Abnormalities - crowns, retainers, 1 or 2 missing teeth      Cardiovascular          Rhythm and rate: regular and normal     Pulmonary           breath  sounds clear to auscultation           OUTSIDE LABS:  CBC:   Lab Results   Component Value Date    WBC 5.3 10/11/2023    WBC 4.7 08/05/2023    HGB 13.3 10/11/2023    HGB 14.3 08/05/2023    HCT 38.4 10/11/2023    HCT 41.7 08/05/2023     (L) 10/11/2023     08/05/2023     BMP:   Lab Results   Component Value Date     10/12/2023     10/11/2023    POTASSIUM 3.7 10/12/2023    POTASSIUM 3.8 10/11/2023    CHLORIDE 107 10/12/2023    CHLORIDE 111 (H) 10/11/2023    CO2 23 10/12/2023    CO2 23 10/11/2023    BUN 13.3 10/12/2023    BUN 9.9 10/11/2023    CR 1.11 (H) 10/12/2023    CR 1.00 (H) 10/11/2023     (H) 10/12/2023    GLC 89 10/11/2023     COAGS:   Lab Results   Component Value Date    PTT 28 06/12/2021    INR 1.69 (H) 11/27/2014     POC:   Lab Results   Component Value Date     (H) 10/12/2019     HEPATIC:   Lab Results   Component Value Date    ALBUMIN 4.4 09/28/2023    PROTTOTAL 7.4 09/28/2023     (H) 09/28/2023    AST 60 (H) 09/28/2023    ALKPHOS 167 (H) 09/28/2023    BILITOTAL 0.8 09/28/2023     OTHER:   Lab Results   Component Value Date    LACT 0.9 02/14/2023    A1C 5.2 12/17/2020    FRACISCO 8.7 (L) 10/12/2023    PHOS 4.7 (H) 10/14/2021    MAG 2.1 11/07/2022    LIPASE 39 07/22/2023    TSH 2.02 09/21/2022    T4 1.03 09/21/2022       Anesthesia Plan    ASA Status:  3    NPO Status:  NPO Appropriate    Anesthesia Type: General.     - Airway: ETT   Induction: Intravenous.   Maintenance: TIVA.        Consents    Anesthesia Plan(s) and associated risks, benefits, and realistic alternatives discussed. Questions answered and patient/representative(s) expressed understanding.     - Discussed:     - Discussed with:  Patient       Use of blood products discussed: Yes.     - Discussed with: Patient.     - Consented: consented to blood products     Postoperative Care    Pain management: IV analgesics, Oral pain medications, Multi-modal analgesia.   PONV prophylaxis: Ondansetron (or other  5HT-3), Dexamethasone or Solumedrol, Background Propofol Infusion     Comments:    Other Comments: Discussed risks of general anesthesia, including aspiration pneumonia, sore throat/hoarse voice, abrasions/damage to lips/tongue/teeth, nausea, rare complications (including medication reactions, cardiac, pulmonary, hypoxia/low oxygen, recall). Ensured understanding, invited questions and all questions were answered. Patient wishes to proceed.                Darwin Awan MD

## 2023-11-08 NOTE — ANESTHESIA POSTPROCEDURE EVALUATION
Patient: Gem Gmaa    Procedure: Procedure(s):  CHOLECYSTECTOMY, ROBOT-ASSISTED, LAPAROSCOPIC, WITHOUT CHOLANGIOGRAM       Anesthesia Type:  General    Note:  Disposition: Outpatient   Postop Pain Control: Uneventful            Sign Out: Well controlled pain   PONV: No   Neuro/Psych: Uneventful            Sign Out: Acceptable/Baseline neuro status   Airway/Respiratory: Uneventful            Sign Out: Acceptable/Baseline resp. status   CV/Hemodynamics: Uneventful            Sign Out: Acceptable CV status; No obvious hypovolemia; No obvious fluid overload   Other NRE: NONE   DID A NON-ROUTINE EVENT OCCUR? No           Last vitals:  Vitals:    11/08/23 0740   BP: 123/64   Pulse: 56   Resp: 18   Temp: 36.1  C (96.9  F)   SpO2: 94%       Electronically Signed By: Darwin Awan MD  November 8, 2023  11:05 AM

## 2023-11-08 NOTE — OP NOTE
[facility]    Operative Note    Pre-operative diagnosis: Calculus of gallbladder without cholecystitis without obstruction [K80.20]; .   Chronic calculous cholecystitis    Procedure: Procedure(s):  CHOLECYSTECTOMY, ROBOT-ASSISTED, LAPAROSCOPIC, WITHOUT CHOLANGIOGRAM   Surgeon: Surgeon(s) and Role:     * Mickey Gallego MD - Primary   Anesthesia: General    Estimated blood loss: 20 ml   Drains: None   Specimens: ID Type Source Tests Collected by Time Destination   1 : Gallbladder and contents Tissue Gallbladder SURGICAL PATHOLOGY EXAM Mickey Gallego MD 11/8/2023 10:30 AM       Findings: Omental adhesions to the gallbladder; large gallbladder with stones; very thickened edematous gallbladder    Complications: None.   Implants: None.       OPERATIVE INDICATIONS:  Gem aGma is a 72 year old female with a history of RUQ abdominal pain associated with cholelithiasis on CT    After understanding the risks and benefits of proceeding with surgery, the patient has an indication for laparoscopic cholecystectomy with robot assist and consented to undergo surgery.      Prior to surgery, I reviewed the risks of gallbladder removal with this patient.    The risk discussion included, but was not limited to, death, myocardial infarction, pneumonia, urinary tract infection, deep venous thrombosis with or without pulmonary embolus, abdominal infection from bowel injury or abscess, bowel obstruction, wound infection, and bleeding.    Specific risks related to cholecystectomy include bile duct injury (3-4/1000), bile leak (10/1000), retained common bile duct stone (10/1000), postcholecystectomy diarrhea (1-2%) and these complications may require additional treatment.    The potential that gallbladder removal will NOT be of benefit was also reviewed.    Furthermore, alternative therapies and the option for no therapy were also reviewed.    Postoperative treatment and expected  follow-up were also reviewed.        OPERATIVE DETAILS:      The patient was brought to the operating room and prepared in a routine fashion.  A timeout was performed prior to surgery and documented by the nursing team. Patient was flexed and placed in Reverse Trendelenberg     Under the benefits of general anesthesia, a left upper quadrant Veress needle was inserted and pneumoperitoneum was established using carbon dioxide gas to a maximum pressure of 15 mmHg.  A total of 4 8mm ports were placed and the laparoscope was utilized from the umbilical port.     The patient was moved into a steep reverse Trendelenberg position.    Omental adhesions to the gallbladder were taken down with cautery dissection.     The gallbladder was grasped at its fundus and retracted cephalad.  It was also grasped at its infundibulum and retracted laterally.       Findings related to the gallbladder are as noted above.     A complete dissection starting on the gallbladder, identifying the cystic artery on the surface of the gallbladder, was performed.  The cystic artery in this manner was  away from the gallbladder. The infundibulum of the gallbladder and the junction of gallbladder and cystic duct were clearly and completely identified with cautery dissection.  All of this dissection was performed on the gallbladder wall and clearly above the triangle of Calot. ICG was used liberally to verify anatomy    The peritoneum on each side of gallbladder was divided at least one half of the way up towards the top of the gallbladder.     Next, a complete dissection of the upper aspect of the triangle of Calot was performed and the critical view of safety was achieved.  The cystic artery and cystic duct were then the ONLY two structures traversing from gallbladder towards the erin hepatis.     The cystic artery and cystic duct were clipped securelyx2 stay 1 go (duct), and 1 stay (artery) and divided between clips. Cystic artery distally  was taken with b ipolar. The gallbladder was then removed out of its fossa using electrocautery.  It was placed into an Endocatch bag. Robot was undocked       Complete hemostasis was achieved. I scrubbed back in. A piece of Surgicel was left in the GB fossa    Specimen removed from abdomen     The umbilical incision was closed using 0 Vicryl suture in figure of eight fashion.    Abdomen desufflated. 30 ml local placed near the trocar sites     The skin was closed using 4-0 monocryl suture and skin glue was applied.     I was present for all critical components of the operation, and all needle and sponge counts were correct x2 at the end of the procedure.    Mickey Gallego MD                Operative Report initially prepared by:

## 2023-11-09 ASSESSMENT — ANXIETY QUESTIONNAIRES
IF YOU CHECKED OFF ANY PROBLEMS ON THIS QUESTIONNAIRE, HOW DIFFICULT HAVE THESE PROBLEMS MADE IT FOR YOU TO DO YOUR WORK, TAKE CARE OF THINGS AT HOME, OR GET ALONG WITH OTHER PEOPLE: VERY DIFFICULT
GAD7 TOTAL SCORE: 9
6. BECOMING EASILY ANNOYED OR IRRITABLE: SEVERAL DAYS
1. FEELING NERVOUS, ANXIOUS, OR ON EDGE: SEVERAL DAYS
3. WORRYING TOO MUCH ABOUT DIFFERENT THINGS: MORE THAN HALF THE DAYS
5. BEING SO RESTLESS THAT IT IS HARD TO SIT STILL: SEVERAL DAYS
7. FEELING AFRAID AS IF SOMETHING AWFUL MIGHT HAPPEN: SEVERAL DAYS
4. TROUBLE RELAXING: MORE THAN HALF THE DAYS
2. NOT BEING ABLE TO STOP OR CONTROL WORRYING: SEVERAL DAYS
GAD7 TOTAL SCORE: 9

## 2023-11-10 ENCOUNTER — PATIENT OUTREACH (OUTPATIENT)
Dept: ENDOCRINOLOGY | Facility: CLINIC | Age: 73
End: 2023-11-10
Payer: MEDICARE

## 2023-11-10 ENCOUNTER — VIRTUAL VISIT (OUTPATIENT)
Dept: PSYCHOLOGY | Facility: CLINIC | Age: 73
End: 2023-11-10
Payer: MEDICARE

## 2023-11-10 DIAGNOSIS — F41.1 GENERALIZED ANXIETY DISORDER: ICD-10-CM

## 2023-11-10 DIAGNOSIS — F43.10 POSTTRAUMATIC STRESS DISORDER: ICD-10-CM

## 2023-11-10 DIAGNOSIS — F33.0 MAJOR DEPRESSIVE DISORDER, RECURRENT EPISODE, MILD (H): Primary | ICD-10-CM

## 2023-11-10 PROCEDURE — 90834 PSYTX W PT 45 MINUTES: CPT | Mod: VID | Performed by: SOCIAL WORKER

## 2023-11-10 ASSESSMENT — COLUMBIA-SUICIDE SEVERITY RATING SCALE - C-SSRS
ATTEMPT SINCE LAST CONTACT: NO
TOTAL  NUMBER OF ABORTED OR SELF INTERRUPTED ATTEMPTS SINCE LAST CONTACT: NO
SUICIDE, SINCE LAST CONTACT: NO
TOTAL  NUMBER OF INTERRUPTED ATTEMPTS SINCE LAST CONTACT: NO
1. SINCE LAST CONTACT, HAVE YOU WISHED YOU WERE DEAD OR WISHED YOU COULD GO TO SLEEP AND NOT WAKE UP?: NO
2. HAVE YOU ACTUALLY HAD ANY THOUGHTS OF KILLING YOURSELF?: NO
6. HAVE YOU EVER DONE ANYTHING, STARTED TO DO ANYTHING, OR PREPARED TO DO ANYTHING TO END YOUR LIFE?: NO
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
## 2023-11-10 NOTE — PROGRESS NOTES
RN Post-Op/Post-Discharge Care Coordination Note    Ms. Gem Gama is a 72 year old female who underwent Cholecystectomy, laparoscopic robot assisted on 11/10/23 with  Mickey Gallego MD.  Spoke with Patient.    Support  Patient able to care for self independently     Health Status  Fevers/chills: Patient denies any fever or chills.  Nausea/Vomiting: Patient denies nausea/vomiting.  Eating/drinking: Patient is able to eat and drink without any complaints.  Bowel habits: Patient reports having a normal bowel movement not passing gas.  Drains (ANDREI): N/A  Incisions: Patient denies any signs and symptoms of infection. and small amount of serosanguinous drainage from one incision .  Wound closure:  Skin Sealant  Pain: 4 on a scale of 0-10.  Using Tylenol Extra Strength for pain control during the day.  Oxycodone at night to help with sleep.  New Medications:  oxycodone    Activity/Restrictions  No lifting in excess of 15-20 pounds for 3-4 weeks    Equipment  None    Pathology reviewed with patient:  No, not yet available    Forms/Letters  No    Whom and When to Call  Patient acknowledges understanding of how to manage any medication changes and   when to seek medical care.     Patient advised that if after hour medical concerns arise to please call [phone] and choose option 4 to speak to the physician on call.

## 2023-11-13 LAB
PATH REPORT.COMMENTS IMP SPEC: NORMAL
PATH REPORT.COMMENTS IMP SPEC: NORMAL
PATH REPORT.FINAL DX SPEC: NORMAL
PATH REPORT.GROSS SPEC: NORMAL
PATH REPORT.MICROSCOPIC SPEC OTHER STN: NORMAL
PATH REPORT.RELEVANT HX SPEC: NORMAL
PHOTO IMAGE: NORMAL

## 2023-11-15 ENCOUNTER — VIRTUAL VISIT (OUTPATIENT)
Dept: PSYCHOLOGY | Facility: CLINIC | Age: 73
End: 2023-11-15
Payer: MEDICARE

## 2023-11-15 DIAGNOSIS — F41.1 GENERALIZED ANXIETY DISORDER: ICD-10-CM

## 2023-11-15 DIAGNOSIS — F33.0 MAJOR DEPRESSIVE DISORDER, RECURRENT EPISODE, MILD (H): Primary | ICD-10-CM

## 2023-11-15 DIAGNOSIS — F43.10 POSTTRAUMATIC STRESS DISORDER: ICD-10-CM

## 2023-11-15 PROCEDURE — 90834 PSYTX W PT 45 MINUTES: CPT | Mod: VID | Performed by: SOCIAL WORKER
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
## 2023-11-22 ENCOUNTER — APPOINTMENT (OUTPATIENT)
Dept: MEDSURG UNIT | Facility: CLINIC | Age: 73
End: 2023-11-22
Attending: INTERNAL MEDICINE
Payer: MEDICARE

## 2023-11-22 ENCOUNTER — HOSPITAL ENCOUNTER (OUTPATIENT)
Facility: CLINIC | Age: 73
Discharge: HOME | End: 2023-11-22
Attending: INTERNAL MEDICINE | Admitting: INTERNAL MEDICINE
Payer: MEDICARE

## 2023-11-22 VITALS
BODY MASS INDEX: 29.2 KG/M2 | HEART RATE: 58 BPM | OXYGEN SATURATION: 97 % | DIASTOLIC BLOOD PRESSURE: 64 MMHG | WEIGHT: 175.27 LBS | HEIGHT: 65 IN | RESPIRATION RATE: 16 BRPM | TEMPERATURE: 98.2 F | SYSTOLIC BLOOD PRESSURE: 121 MMHG

## 2023-11-22 DIAGNOSIS — R42 DIZZINESS: ICD-10-CM

## 2023-11-22 DIAGNOSIS — I49.3 PVC'S (PREMATURE VENTRICULAR CONTRACTIONS): ICD-10-CM

## 2023-11-22 LAB
ATRIAL RATE - MUSE: 58 BPM
DIASTOLIC BLOOD PRESSURE - MUSE: NORMAL MMHG
INTERPRETATION ECG - MUSE: NORMAL
P AXIS - MUSE: -26 DEGREES
PR INTERVAL - MUSE: 168 MS
QRS DURATION - MUSE: 84 MS
QT - MUSE: 456 MS
QTC - MUSE: 447 MS
R AXIS - MUSE: -25 DEGREES
SYSTOLIC BLOOD PRESSURE - MUSE: NORMAL MMHG
T AXIS - MUSE: 47 DEGREES
VENTRICULAR RATE- MUSE: 58 BPM

## 2023-11-22 PROCEDURE — 33285 INSJ SUBQ CAR RHYTHM MNTR: CPT | Performed by: INTERNAL MEDICINE

## 2023-11-22 PROCEDURE — 93010 ELECTROCARDIOGRAM REPORT: CPT | Mod: XU | Performed by: INTERNAL MEDICINE

## 2023-11-22 PROCEDURE — 999N000142 HC STATISTIC PROCEDURE PREP ONLY

## 2023-11-22 PROCEDURE — 999N000127 HC STATISTIC PERIPHERAL IV START W US GUIDANCE

## 2023-11-22 PROCEDURE — 93005 ELECTROCARDIOGRAM TRACING: CPT

## 2023-11-22 PROCEDURE — 250N000011 HC RX IP 250 OP 636: Performed by: INTERNAL MEDICINE

## 2023-11-22 PROCEDURE — 250N000009 HC RX 250: Performed by: INTERNAL MEDICINE

## 2023-11-22 PROCEDURE — C1764 EVENT RECORDER, CARDIAC: HCPCS | Performed by: INTERNAL MEDICINE

## 2023-11-22 PROCEDURE — 999N000054 HC STATISTIC EKG NON-CHARGEABLE

## 2023-11-22 PROCEDURE — 999N000132 HC STATISTIC PP CARE STAGE 1

## 2023-11-22 DEVICE — MONITOR CARDIAC LINQ II INSERTABLE LNQ22: Type: IMPLANTABLE DEVICE | Status: FUNCTIONAL

## 2023-11-22 RX ORDER — SODIUM CHLORIDE 9 MG/ML
INJECTION, SOLUTION INTRAVENOUS CONTINUOUS
Status: DISCONTINUED | OUTPATIENT
Start: 2023-11-22 | End: 2023-11-22 | Stop reason: HOSPADM

## 2023-11-22 RX ORDER — NALOXONE HYDROCHLORIDE 0.4 MG/ML
0.2 INJECTION, SOLUTION INTRAMUSCULAR; INTRAVENOUS; SUBCUTANEOUS
Status: CANCELLED | OUTPATIENT
Start: 2023-11-22

## 2023-11-22 RX ORDER — CEFAZOLIN SODIUM 2 G/100ML
2 INJECTION, SOLUTION INTRAVENOUS
Status: DISCONTINUED | OUTPATIENT
Start: 2023-11-22 | End: 2023-11-22 | Stop reason: HOSPADM

## 2023-11-22 RX ORDER — LIDOCAINE 40 MG/G
CREAM TOPICAL
Status: DISCONTINUED | OUTPATIENT
Start: 2023-11-22 | End: 2023-11-22 | Stop reason: HOSPADM

## 2023-11-22 RX ORDER — NALOXONE HYDROCHLORIDE 0.4 MG/ML
0.4 INJECTION, SOLUTION INTRAMUSCULAR; INTRAVENOUS; SUBCUTANEOUS
Status: CANCELLED | OUTPATIENT
Start: 2023-11-22

## 2023-11-22 RX ORDER — CEFAZOLIN SODIUM 2 G/100ML
INJECTION, SOLUTION INTRAVENOUS CONTINUOUS PRN
Status: DISCONTINUED | OUTPATIENT
Start: 2023-11-22 | End: 2023-11-22 | Stop reason: HOSPADM

## 2023-11-22 ASSESSMENT — ACTIVITIES OF DAILY LIVING (ADL)
ADLS_ACUITY_SCORE: 37
ADLS_ACUITY_SCORE: 37

## 2023-11-22 NOTE — PROGRESS NOTES
Pt arrived to 2A for loop recorder implant. AVSS except hypertensive, on recheck was normotensive. Prep complete,  IV placed .

## 2023-11-22 NOTE — PROGRESS NOTES
Pt returned to unit post loop recorder implant. AVSS, denies pain, L chest dressed with primapore CDI with no bleeding or hematoma.

## 2023-11-22 NOTE — PROGRESS NOTES
AVSS, pt eating, drinking, ambulated with this RN, denies pain. [employer] representative provided education at the bedside. Discharge instructions provided and all questions answered prior to discharge.

## 2023-11-22 NOTE — PROCEDURES
EP PROCEDURE NOTE    Procedures:  1. Implantable loop recorder implantation.    Attending: MCKENZIE Jason MD  EP Fellow: N/A  Procedure Date: 11/22/2023    Pre-operative Diagnosis:  Dizziness.  Post-operative diagnosis:   Successful implantation of ILR.  Complications: None.     Fluoroscopy time/dose: 0 minutes, 0 cGycm2.    Clinical Profile:  Gem Gama is a 72 year old female with hx of CAD s/p PCI to LAD, HTN, and recurrent episodes of dizziness/lightheadedness who was admitted on 10/11/2023 for evaluation of recurrent dizziness/lightheadedness in setting of mildly elevated troponin.      Patient has chronic history of recurrent dizziness/lightheadedness for the past few years for which she has undergone an extensive cardiac work-up. She has a history of STEMI with PCI to the LAD in 2019 with multiple subsequent coronary angiograms that were negative for disease progression. Recent NM Lexiscan stress test 8/2023 was negative for ischemia. 2 weeks prior to admission, she had a 3-day ZIO patch that was negative for significant arrhythmia but had 7 beat run NSVT    See [facility] note 10/5/23 .      PROCEDURE  The risks and benefits of the procedure were explained to the patient in full.  The risks of the procedure include, but are not limited to: pain, bleeding, blood transfusion reactions and infection. Informed Consent was obtained. The patient was brought to the EP lab in a fasting and hemodynamically stable condition. The patient was prepped and draped in a sterile fashion.   Sedation: This procedure was performed without sedation under the supervision of the the Staff Physician. The patient received 0 mg Versed and 0 mcg Fentanyl for a total procedural sedation time of 0 minutes. Heart rate, blood pressure, oxygen saturation, and patient responses were monitored throughout the procedure with the assistance of the RN.     The left and right chest wall was anesthetized with 2% Lidocaine.  An incision was  made around the left parasternal area at the 4th intercostal space 1cm lateral to the the sternal border. The implantable loop recorder was then injected with an introducer into the subcutaneous tissue. The device was then interrogated to ensure adequate sensing.    The pocket was then closed using Dermabond. Steri-Strips and a dressing were then applied over the incision site..     Dr. Jason was present throughout the entire procedure.     EQUIPMENT  1. The ILR is a Nevigo Linq II, Serial [id number].     PROGRAMMING  1. Tachy cut off rate = >158 bpm, 16 beats  2. Fast Tachy cut off = N/A bpm, N/A beats.  3. Boubacar cut off rate = <30 bpm, 4 beats  4. Asystole Duration = 3.0 sec.     PLAN:  1. Wound care.  2. Antibiotics: Ancef 2gm IV (single dose)    I appreciated the opportunity to see and assess Mrs Gama and direct the procedure described above . The above note summarizes my findings and current recommendations. Please do not hesitate to contact me if you have any questions or concerns.    Nadeem Jason MD [facility]   Pager [phone]  Office [phone] .

## 2023-11-22 NOTE — DISCHARGE INSTRUCTIONS
Home Care after a Loop Recorder Implant  Wound care:  Check for signs of infection each day.  These include increased redness, swelling, drainage or a fever over 101 F (38.3 C).  Call us immediately if you see any of these signs.  You may shower 24 hours after the procedure.  Do not submerge the incision (in a bath tub, hot tub, or swimming pool) until fully healed.  Do not apply powder or lotion to the incision for 14 days.     Pain:   You may have mild pain for 3 to 5 days.  Take acetaminophen (Tylenol) or ibuprofen (Advil) for the pain.  Call us if the pain is severe or lasts more than 5 days.    Activity:  After 24 hours, slowly return to your normal activities.    Avoid anything that may cause rough contact or a hard hit to your chest.  This includes football, hockey, and other contact sports.    Follow Up Visits:  Return to the clinic in 7 to 10 days to have your loop recorder and wound checked.      Telling others about your device:  Before you have any medical tests or treatments, tell the doctors, dentists, and other care providers about your device.  There are a few tests and treatments that may interfere with your device.  Your care team may need to take special steps to keep you safe.  Before you leave the hospital, you will receive a temporary ID card.  A permanent card will be mailed to you about 6 to 8 weeks later.  Always carry the ID card with you.  It has important details about your device.  Safety near electrical equipment:  All of these are safe to use when in good repair -  Microwaves  Radios  Cordless phone  Remote controls  Small electrical tools  Security ruggiero: It is okay to walk through security ruggiero at the airports and department stores. Tell airport security that you have an implanted loop recorder.  Full-body scanners are safe.    Questions?  Please call [facility].   Device Nurse:          Business Hours:  [phone]                         After Hours:   [phone]   Choose option 4, then ask for the on-call device nurse at job code [id number].    Your next device clinic appointment is scheduled on:     ___________________________ at _____________.          [facility] [address]  [address] [place] [address]  [phone]   yes

## 2023-11-29 ENCOUNTER — ANCILLARY PROCEDURE (OUTPATIENT)
Dept: CARDIOLOGY | Facility: CLINIC | Age: 73
End: 2023-11-29
Attending: INTERNAL MEDICINE
Payer: MEDICARE

## 2023-11-29 DIAGNOSIS — Z95.818 IMPLANTABLE LOOP RECORDER PRESENT: ICD-10-CM

## 2023-11-29 PROCEDURE — 93291 INTERROG DEV EVAL SCRMS IP: CPT | Performed by: INTERNAL MEDICINE

## 2023-11-29 NOTE — PATIENT INSTRUCTIONS
It was a pleasure to see you in clinic today, please do not hesitate to call us for questions or concerns.  Your next automatic remote loop recorder check from home is scheduled for 3/8/2024.    Em Marques RN    Electrophysiology Nurse Clinician  [facility]    During Business Hours Please Call:  [phone]  After Hours Please Call:  [phone] - select option #[phone] and ask for job code [id number]

## 2023-11-30 ENCOUNTER — THERAPY VISIT (OUTPATIENT)
Dept: PHYSICAL THERAPY | Facility: CLINIC | Age: 73
End: 2023-11-30
Attending: INTERNAL MEDICINE
Payer: MEDICARE

## 2023-11-30 DIAGNOSIS — R42 DIZZINESS: ICD-10-CM

## 2023-11-30 PROCEDURE — 97112 NEUROMUSCULAR REEDUCATION: CPT | Mod: GP

## 2023-11-30 PROCEDURE — 97110 THERAPEUTIC EXERCISES: CPT | Mod: GP

## 2023-12-01 ENCOUNTER — VIRTUAL VISIT (OUTPATIENT)
Dept: PSYCHOLOGY | Facility: CLINIC | Age: 73
End: 2023-12-01
Payer: MEDICARE

## 2023-12-01 DIAGNOSIS — F43.10 POSTTRAUMATIC STRESS DISORDER: ICD-10-CM

## 2023-12-01 DIAGNOSIS — F33.0 MAJOR DEPRESSIVE DISORDER, RECURRENT EPISODE, MILD (H): Primary | ICD-10-CM

## 2023-12-01 PROCEDURE — 90834 PSYTX W PT 45 MINUTES: CPT | Mod: VID | Performed by: SOCIAL WORKER
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
## 2023-12-02 LAB
MDC_IDC_MSMT_BATTERY_DTM: NORMAL
MDC_IDC_MSMT_BATTERY_STATUS: NORMAL
MDC_IDC_MSMT_CAP_CHARGE_TYPE: NORMAL
MDC_IDC_PG_IMPLANT_DTM: NORMAL
MDC_IDC_PG_MFG: NORMAL
MDC_IDC_PG_MODEL: NORMAL
MDC_IDC_PG_SERIAL: NORMAL
MDC_IDC_PG_TYPE: NORMAL
MDC_IDC_SESS_CLINIC_NAME: NORMAL
MDC_IDC_SESS_DTM: NORMAL
MDC_IDC_SESS_TYPE: NORMAL
MDC_IDC_SET_ZONE_TYPE: NORMAL
MDC_IDC_SET_ZONE_VENDOR_TYPE: NORMAL
MDC_IDC_STAT_AT_BURDEN_PERCENT: 0 %
MDC_IDC_STAT_EPISODE_RECENT_COUNT: 0
MDC_IDC_STAT_EPISODE_RECENT_COUNT_DTM_END: NORMAL
MDC_IDC_STAT_EPISODE_RECENT_COUNT_DTM_START: NORMAL
MDC_IDC_STAT_EPISODE_TYPE: NORMAL

## 2023-12-06 PROBLEM — R32 URINARY INCONTINENCE: Status: RESOLVED | Noted: 2023-10-07 | Resolved: 2023-12-06

## 2023-12-06 PROBLEM — M99.05 SOMATIC DYSFUNCTION OF PELVIC REGION: Status: RESOLVED | Noted: 2023-10-07 | Resolved: 2023-12-06

## 2023-12-06 NOTE — PROGRESS NOTES
"   10/19/23 0500   Appointment Info   Signing clinician's name / credentials Luisa Jose DPT   Total/Authorized Visits 12   Visits Used 2   Medical Diagnosis Urinary incontinence   PT Tx Diagnosis Pelvic dysfunction   Quick Adds Certification   Progress Note/Certification   Start of Care Date 10/07/23   Onset of illness/injury or Date of Surgery 08/31/23  (saw MD)   Therapy Frequency 1x/week   Predicted Duration 12 weeks   Certification date from 10/07/23   Certification date to 01/04/24   PT Goal 1   Goal Identifier Urinary leaking   Goal Description Able to decrease leaking to 3x/week   Rationale to maximize safety and independence with performance of ADLs and functional tasks   Goal Progress On average leaking 2-8x/day   Target Date 12/30/23   Subjective Report   Subjective Report Patient reports she has not been completing much of her exercises due to other health issues.  Came today with a dt Mt dew and we discussed this is not the best for her bladder.   Objective Measures   Objective Measures Objective Measure 1   Objective Measure 1   Objective Measure Improved musculare awareness though still has difficulty with relaxation.  Improved awareness of contraction in sitting   Treatment Interventions (PT)   Interventions Therapeutic Procedure/Exercise;Therapeutic Activity;Neuromuscular Re-education   Therapeutic Procedure/Exercise   Therapeutic Procedures: strength, endurance, ROM, flexibillity minutes (82665) 30   PTRx Ther Proc 1 Pelvic Floor Rest Position Seated   PTRx Ther Proc 1 - Details discussed and practiced specifically in sitting   PTRx Ther Proc 2 Supine Pelvic Floor Muscle Strengthening   PTRx Ther Proc 2 - Details Reviewed with all contractions   PTRx Ther Proc 3 Elimination Techniques-Female Assigned at Birth   PTRx Ther Proc 3 - Details No Notes   PTRx Ther Proc 4 Roll Ins   PTRx Ther Proc 4 - Details 5x 5\"   PTRx Ther Proc 5 Roll Outs   PTRx Ther Proc 5 - Details 5x RTB   Therapeutic Activity "   PTRx Ther Act 1 Diaphragmatic Breathing   PTRx Ther Act 1 - Details No Notes   PTRx Ther Act 2 Toileting Position   PTRx Ther Act 2 - Details Reviewed; got a squatty potty and has been having better BMs   PTRx Ther Act 3 Bladder Diary   PTRx Ther Act 3 - Details No Notes   Neuromuscular Re-education   Neuromuscular re-ed of mvmt, balance, coord, kinesthetic sense, posture, proprioception minutes (15859) 8   PTRx Neuro Re-ed 1 Abdominal Brace Transverse Abdominis   PTRx Neuro Re-ed 1 - Details 5x   Patient Response/Progress Improved mechanics   PTRx Neuro Re-ed 2 Bridging Pelvic Floor    PTRx Neuro Re-ed 2 - Details 5x    Intervention (Other)   PTRx Other  1 Urge Incontinence Suppression Techniques   PTRx Other 1 - Details No Notes   PTRx Other  2 General Bladder Information   PTRx Other 2 - Details No Notes   Total Session Time   Timed Code Treatment Minutes 38   Total Treatment Time (sum of timed and untimed services) 38         DISCHARGE  Reason for Discharge: Patient has failed to schedule further appointments.    Equipment Issued:     Discharge Plan: Patient to continue home program.    Referring Provider:  Mel Rodriguez

## 2023-12-07 ENCOUNTER — OFFICE VISIT (OUTPATIENT)
Dept: UROLOGY | Facility: CLINIC | Age: 73
End: 2023-12-07
Payer: MEDICARE

## 2023-12-07 ENCOUNTER — THERAPY VISIT (OUTPATIENT)
Dept: PHYSICAL THERAPY | Facility: CLINIC | Age: 73
End: 2023-12-07
Attending: INTERNAL MEDICINE
Payer: MEDICARE

## 2023-12-07 VITALS — HEART RATE: 66 BPM | SYSTOLIC BLOOD PRESSURE: 134 MMHG | DIASTOLIC BLOOD PRESSURE: 65 MMHG | OXYGEN SATURATION: 96 %

## 2023-12-07 DIAGNOSIS — R42 DIZZINESS: Primary | ICD-10-CM

## 2023-12-07 DIAGNOSIS — N39.41 URGE INCONTINENCE: ICD-10-CM

## 2023-12-07 DIAGNOSIS — N95.2 VAGINAL ATROPHY: ICD-10-CM

## 2023-12-07 DIAGNOSIS — Z87.440 HISTORY OF UTI: Primary | ICD-10-CM

## 2023-12-07 DIAGNOSIS — N39.8 VOIDING DYSFUNCTION: ICD-10-CM

## 2023-12-07 DIAGNOSIS — Z87.448 HISTORY OF PYELONEPHRITIS: ICD-10-CM

## 2023-12-07 LAB
ALBUMIN UR-MCNC: NEGATIVE MG/DL
APPEARANCE UR: CLEAR
BILIRUB UR QL STRIP: NEGATIVE
COLOR UR AUTO: YELLOW
GLUCOSE UR STRIP-MCNC: NEGATIVE MG/DL
HGB UR QL STRIP: ABNORMAL
KETONES UR STRIP-MCNC: NEGATIVE MG/DL
LEUKOCYTE ESTERASE UR QL STRIP: NEGATIVE
NITRATE UR QL: NEGATIVE
PH UR STRIP: 6 [PH] (ref 5–7)
SP GR UR STRIP: <=1.005 (ref 1–1.03)
UROBILINOGEN UR STRIP-ACNC: 0.2 E.U./DL

## 2023-12-07 PROCEDURE — 81003 URINALYSIS AUTO W/O SCOPE: CPT | Mod: QW | Performed by: PHYSICIAN ASSISTANT

## 2023-12-07 PROCEDURE — 97112 NEUROMUSCULAR REEDUCATION: CPT | Mod: GP

## 2023-12-07 PROCEDURE — 87086 URINE CULTURE/COLONY COUNT: CPT | Performed by: PHYSICIAN ASSISTANT

## 2023-12-07 PROCEDURE — 99213 OFFICE O/P EST LOW 20 MIN: CPT | Performed by: PHYSICIAN ASSISTANT

## 2023-12-07 NOTE — PROGRESS NOTES
"[facility]      Name: Gem Gama    MRN: [id number]   YOB: 1950  Accompanied at today's visit by:self                 Chief Complaint:   Estring placement          History of Present Illness:   December 7, 2023    HISTORY:   We have been following 72 year old Gem Gama for Vaginal atrophy, hx of UTI, hx of pyelonephritis, UUI, voiding dysfunction. Last seen by Dr. Rodriguez on 8/31/23 and referred to PFPT, and if did not see improvement recommended botox as unable to take medications. Here for estring placement for hx of UTIs and vaginal atrophy.  Feels like she has start of UTI with \"kidney pain\" pointing to her low back on the left side. Denies fevers/chills, gross hematuria or any other urinary symptoms. Following with PT. Has been under some stress recently as daughter had stroke. Patient voices no other concerns at this time.            Allergies:     Allergies   Allergen Reactions    Dust Mites Cough, Headache, Other (See Comments) and Shortness Of Breath    Latex Other (See Comments) and Shortness Of Breath     Runny nose  PN: LW Reaction: DIFFICULTY BREATHING      Sulfa Antibiotics Anaphylaxis, Hives and Itching     PN: LW Reaction: HIVES, Swelling  PN: LW Reaction: HIVES, Swelling    Flagyl [Metronidazole Hcl] Anaphylaxis and Rash    Food      PN: LW FI1: nka LW FI2:    Hydrochlorothiazide W-Triamterene      PN: LW Reaction: skin rash  PN: LW Reaction: skin rash    No Clinical Screening - See Comments      PN: LW Other1: -Adhesive Tape    Seasonal Allergies      sneezing    Tape [Adhesive Tape] Hives    Metoprolol Itching and Rash    Metronidazole Hives and Rash     PN: LW Reaction: HIVES              Medications:     Current Outpatient Medications   Medication Sig    albuterol (PROAIR HFA/PROVENTIL HFA/VENTOLIN HFA) 108 (90 Base) MCG/ACT inhaler Inhale 1-2 puffs into the lungs every 4 hours as needed for shortness of breath or wheezing    aspirin (ASA) 81 MG EC tablet Take 1 " tablet (81 mg) by mouth daily (Patient taking differently: Take 81 mg by mouth every morning)    atorvastatin (LIPITOR) 40 MG tablet TAKE 1 TABLET(40 MG) BY MOUTH DAILY (Patient taking differently: Take 40 mg by mouth every morning)    carvedilol (COREG) 3.125 MG tablet TAKE 1 TABLET(3.125 MG) BY MOUTH TWICE DAILY WITH MEALS (Patient taking differently: Take 3.125 mg by mouth 2 times daily (with meals) TAKE 1 TABLET(3.125 MG) BY MOUTH TWICE DAILY WITH MEALS)    cholestyramine (QUESTRAN) 4 g packet Take 1 packet (4 g) by mouth 2 times daily (with meals)    diazepam (VALIUM) 2 MG tablet Take 0.5-1 tablets (1-2 mg) by mouth 2 times daily as needed for anxiety or muscle spasms    diclofenac (VOLTAREN) 1 % topical gel Apply topically 4 times daily as needed for moderate pain For Jaw pain    estradiol (ESTRING) 2 MG vaginal ring Place 1 each vaginally every 3 months    fluticasone-vilanterol (BREO ELLIPTA) 200-25 MCG/ACT inhaler Inhale 1 puff into the lungs daily (Patient taking differently: Inhale 1 puff into the lungs every morning)    isosorbide mononitrate (IMDUR) 30 MG 24 hr tablet Take 1 tablet (30 mg) by mouth every evening Take with Carvedilol.    loperamide (IMODIUM) 2 MG capsule Take 1 capsule (2 mg) by mouth daily    loratadine (CLARITIN) 10 MG tablet Take 10 mg by mouth At Bedtime    magnesium oxide (MAG-OX) 400 MG tablet Take 400 mg by mouth every morning    Melatonin 10 MG TABS tablet Take 10 mg by mouth nightly as needed for sleep    nitroGLYcerin (NITROSTAT) 0.4 MG sublingual tablet Place 1 tablet (0.4 mg) under the tongue every 5 minutes as needed for chest pain    pantoprazole (PROTONIX) 40 MG EC tablet TAKE 1 TABLET(40 MG) BY MOUTH DAILY (Patient taking differently: Take 40 mg by mouth every morning)    Prenatal Multivit-Min-Fe-FA (PRENATAL/IRON) TABS Take 1 tablet by mouth every morning    saccharomyces boulardii (FLORASTOR) 250 MG capsule Take 1 capsule (250 mg) by mouth 2 times daily    sertraline  (ZOLOFT) 100 MG tablet TAKE 1 TABLET(100 MG) BY MOUTH DAILY (Patient taking differently: Take 100 mg by mouth every morning)    UNABLE TO FIND Take 3 tablets by mouth daily MEDICATION NAME: Uqora complete regimen  1 TAB, AM - 2 TAB PM    ursodiol (ACTIGALL) 300 MG capsule TAKE 1 CAPSULE(300 MG) BY MOUTH TWICE DAILY (Patient taking differently: Take 300 mg by mouth 2 times daily TAKE 1 CAPSULE(300 MG) BY MOUTH TWICE DAILY)    valsartan (DIOVAN) 40 MG tablet Take 1 tablet (40 mg) by mouth daily (Patient taking differently: Take 40 mg by mouth every morning)    vitamin D3 (CHOLECALCIFEROL) 2000 units (50 mcg) tablet Take 1 tablet (2,000 Units) by mouth daily (Patient taking differently: Take 1 tablet by mouth every morning)    zinc gluconate 50 MG tablet Take 50 mg by mouth every morning    oxyCODONE (ROXICODONE) 5 MG tablet Take 1-2 tablets (5-10 mg) by mouth every 4 hours as needed for moderate to severe pain     No current facility-administered medications for this visit.               Past  Surgical History:     Past Surgical History:   Procedure Laterality Date    ABDOMEN SURGERY  1974, 1996    Kidney reconstruct. Uterer stenosis    ARTHROPLASTY KNEE  07/09/2014    Procedure: ARTHROPLASTY KNEE;  Surgeon: Levi Johnson MD;  Location:  OR    ARTHROPLASTY KNEE Left 11/24/2014    Procedure: ARTHROPLASTY KNEE;  Surgeon: Levi Johnson MD;  Location:  OR    COLONOSCOPY      Several times dates ??    CV CORONARY ANGIOGRAM N/A 10/09/2019    Procedure: Coronary Angiogram;  Surgeon: Chiquita Chatterjee MD;  Location:  HEART CARDI[facility] CATH LAB    CV HEART CATHETERIZATION WITH POSSIBLE INTERVENTION N/A 10/10/2019    Procedure: Heart Catheterization with Possible Intervention;  Surgeon: Chin Garcia MD;  Location:  HE[facility]ARD[facility] CATH LAB    CV INTRA AORTIC BALLOON N/A 10/09/2019    Procedure: Intra-Aortic Balloon Pump Insertion;  Surgeon: Chiquita Chatterjee MD;  Location:  HEART CARDIAC CATH LAB    CV  INTRA AORTIC BALLOON REMOVAL N/A 10/10/2019    Procedure: Intra-Aortic Balloon Pump Removal;  Surgeon: Chin Garcia MD;  Location:  HEART CARDIAC CATH LAB    CV LEFT HEART CATH N/A 12/11/2020    Procedure: Heart Catheterization with Possible Intervention;  Surgeon: Pilo Otoole MD;  Location:  HEART CARDIAC CATH LAB    CV PCI STENT DRUG ELUTING N/A 10/09/2019    Procedure: PCI Stent Drug Eluting;  Surgeon: Chiquita Chatterjee MD;  Location:  HEART CARDIAC CATH LAB    DAVINCI LAPAROSCOPIC CHOLECYSTECTOMY WITHOUT GRAMS N/A 11/8/2023    Procedure: CHOLECYSTECTOMY, ROBOT-ASSISTED, LAPAROSCOPIC, WITHOUT CHOLANGIOGRAM;  Surgeon: Mickey Gallego MD;  Location: [facility] OR    EP LOOP RECORDER IMPLANT N/A 11/22/2023    Procedure: Loop Recorder Implant;  Surgeon: Nadeem Jason MD;  Location:  HEART CARDIAC CATH LAB    EYE SURGERY  2011    Cataract surgery both eyes    GENITOURINARY SURGERY  01/01/2008    Prolift    GENITOURINARY SURGERY  1973    In 1973, 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    ORTHOPEDIC SURGERY      bilat total hip    RECTOCELE REPAIR      ZZC TOTAL ABD HYSTERECTOMY+BLAD REPR  01/01/1992    Vaginal approach with oophrectomy    Z TOTAL KNEE ARTHROPLASTY               Physical Exam:     Vitals:    12/07/23 1455   BP: 134/65   Pulse: 66   SpO2: 96%     PSYCH: NAD  EYES: EOMI  NEURO: AAO x3  Back: No CVA tenderness bilaterally. Does have muscular tension/tenderness along left Paraspinous muscles and SI joint.   . Vulva normal. Estring removed without issues. No obvious bleeding, abnormal discharge, lesions noted on exam. New estring placed without issues.     LABS:   Creatinine   Date Value Ref Range Status   10/12/2023 1.11 (H) 0.51 - 0.95 mg/dL Final    12/17/2020 1.00 0.52 - 1.04 mg/dL Final            Assessment and Plan:   72 year old is a pleasant female who has Vaginal atrophy, hx of UTI, hx of pyelonephritis, UUI, voiding dysfunction    Plan:  -  continue estring  - follow-up with Dr. Rodriguez in February as planned and can have estring replaced at that time. Otherwise can follow-up with me in 3 months as well for estring.   - continue with PFPT.  - cathed UA/UC obtained today. Will treat based on results. If develops fevers/chills, gross hematuria or true flank pain to go to ED.   - After discussing the assessment and plan with patient, patient verbalizes understanding and agrees to the above plan. All questions answered.     Other orders as below:  Orders Placed This Encounter   Procedures    UA without Microscopic [[id number]]     12 minutes spent on the date of the encounter doing chart review, review of Dr. Rodriguez's note review of test results, interpretation of tests, patient visit and documentation.      Fadumo Hays PA-C  Urology  December 7, 2023      Patient Care Team:  Danny Conteh MD as PCP - General (Internal Medicine)  Danny Conteh MD as Assigned PCP  Luis A Moody MD as Assigned Pulmonology Provider  Jesse Dennison MD as Assigned Heart and Vascular Provider  Evangelist Lee MD as Hospitalist (Internal Medicine)  Jesse Rodriguez MD as MD (Urology)  Jesse Rodriguez MD as Assigned Surgical Provider  Kashif Hadley MD as Assigned Sleep Provider  Blu Lopez PA-C as Assigned Musculoskeletal Provider  Agus Segura MD as MD (Surgery)  Kristie Bell PA-C as Assigned Neuroscience Provider  Mickey Gallego MD as MD (Surgery)  Ramya Keller MD as MD (Otolaryngology)  Boyd Saunders AuD (Audiology)  Ramya Keller MD as MD (Otolaryngology)  JESSE RODRIGUEZ

## 2023-12-07 NOTE — NURSING NOTE
Orders from PA  to  obtain a cathed  sample for culture . Procedure explained to patient. Patient was prepped with Betadine  and a 14 fr catheter inserted  with ease for 40 ml clear urine . Culture sent informed patient 48 to 72 hours  for results

## 2023-12-07 NOTE — LETTER
"12/7/2023       RE: Gem Gama  [address]  [place] [address]     Dear Colleague,    Thank you for referring your patient, Gem Gama, to the [facility] at [facility]. Please see a copy of my visit note below.    Urology Clinic      Name: Gem Gama    MRN: [id number]   YOB: 1950  Accompanied at today's visit by:self                 Chief Complaint:   Estring placement          History of Present Illness:   December 7, 2023    HISTORY:   We have been following 72 year old Gem Gama for Vaginal atrophy, hx of UTI, hx of pyelonephritis, UUI, voiding dysfunction. Last seen by Dr. Rodriguez on 8/31/23 and referred to PFPT, and if did not see improvement recommended botox as unable to take medications. Here for estring placement for hx of UTIs and vaginal atrophy.  Feels like she has start of UTI with \"kidney pain\" pointing to her low back on the left side. Denies fevers/chills, gross hematuria or any other urinary symptoms. Following with PT. Has been under some stress recently as daughter had stroke. Patient voices no other concerns at this time.            Allergies:     Allergies   Allergen Reactions    Dust Mites Cough, Headache, Other (See Comments) and Shortness Of Breath    Latex Other (See Comments) and Shortness Of Breath     Runny nose  PN: LW Reaction: DIFFICULTY BREATHING      Sulfa Antibiotics Anaphylaxis, Hives and Itching     PN: LW Reaction: HIVES, Swelling  PN: LW Reaction: HIVES, Swelling    Flagyl [Metronidazole Hcl] Anaphylaxis and Rash    Food      PN: LW FI1: nka LW FI2:    Hydrochlorothiazide W-Triamterene      PN: LW Reaction: skin rash  PN: LW Reaction: skin rash    No Clinical Screening - See Comments      PN: LW Other1: -Adhesive Tape    Seasonal Allergies      sneezing    Tape [Adhesive Tape] Hives    Metoprolol Itching and Rash    Metronidazole Hives and Rash     PN: " LW Reaction: HIVES              Medications:     Current Outpatient Medications   Medication Sig    albuterol (PROAIR HFA/PROVENTIL HFA/VENTOLIN HFA) 108 (90 Base) MCG/ACT inhaler Inhale 1-2 puffs into the lungs every 4 hours as needed for shortness of breath or wheezing    aspirin (ASA) 81 MG EC tablet Take 1 tablet (81 mg) by mouth daily (Patient taking differently: Take 81 mg by mouth every morning)    atorvastatin (LIPITOR) 40 MG tablet TAKE 1 TABLET(40 MG) BY MOUTH DAILY (Patient taking differently: Take 40 mg by mouth every morning)    carvedilol (COREG) 3.125 MG tablet TAKE 1 TABLET(3.125 MG) BY MOUTH TWICE DAILY WITH MEALS (Patient taking differently: Take 3.125 mg by mouth 2 times daily (with meals) TAKE 1 TABLET(3.125 MG) BY MOUTH TWICE DAILY WITH MEALS)    cholestyramine (QUESTRAN) 4 g packet Take 1 packet (4 g) by mouth 2 times daily (with meals)    diazepam (VALIUM) 2 MG tablet Take 0.5-1 tablets (1-2 mg) by mouth 2 times daily as needed for anxiety or muscle spasms    diclofenac (VOLTAREN) 1 % topical gel Apply topically 4 times daily as needed for moderate pain For Jaw pain    estradiol (ESTRING) 2 MG vaginal ring Place 1 each vaginally every 3 months    fluticasone-vilanterol (BREO ELLIPTA) 200-25 MCG/ACT inhaler Inhale 1 puff into the lungs daily (Patient taking differently: Inhale 1 puff into the lungs every morning)    isosorbide mononitrate (IMDUR) 30 MG 24 hr tablet Take 1 tablet (30 mg) by mouth every evening Take with Carvedilol.    loperamide (IMODIUM) 2 MG capsule Take 1 capsule (2 mg) by mouth daily    loratadine (CLARITIN) 10 MG tablet Take 10 mg by mouth At Bedtime    magnesium oxide (MAG-OX) 400 MG tablet Take 400 mg by mouth every morning    Melatonin 10 MG TABS tablet Take 10 mg by mouth nightly as needed for sleep    nitroGLYcerin (NITROSTAT) 0.4 MG sublingual tablet Place 1 tablet (0.4 mg) under the tongue every 5 minutes as needed for chest pain    pantoprazole (PROTONIX) 40 MG EC  tablet TAKE 1 TABLET(40 MG) BY MOUTH DAILY (Patient taking differently: Take 40 mg by mouth every morning)    Prenatal Multivit-Min-Fe-FA (PRENATAL/IRON) TABS Take 1 tablet by mouth every morning    saccharomyces boulardii (FLORASTOR) 250 MG capsule Take 1 capsule (250 mg) by mouth 2 times daily    sertraline (ZOLOFT) 100 MG tablet TAKE 1 TABLET(100 MG) BY MOUTH DAILY (Patient taking differently: Take 100 mg by mouth every morning)    UNABLE TO FIND Take 3 tablets by mouth daily MEDICATION NAME: Uqora complete regimen  1 TAB, AM - 2 TAB PM    ursodiol (ACTIGALL) 300 MG capsule TAKE 1 CAPSULE(300 MG) BY MOUTH TWICE DAILY (Patient taking differently: Take 300 mg by mouth 2 times daily TAKE 1 CAPSULE(300 MG) BY MOUTH TWICE DAILY)    valsartan (DIOVAN) 40 MG tablet Take 1 tablet (40 mg) by mouth daily (Patient taking differently: Take 40 mg by mouth every morning)    vitamin D3 (CHOLECALCIFEROL) 2000 units (50 mcg) tablet Take 1 tablet (2,000 Units) by mouth daily (Patient taking differently: Take 1 tablet by mouth every morning)    zinc gluconate 50 MG tablet Take 50 mg by mouth every morning    oxyCODONE (ROXICODONE) 5 MG tablet Take 1-2 tablets (5-10 mg) by mouth every 4 hours as needed for moderate to severe pain     No current facility-administered medications for this visit.               Past  Surgical History:     Past Surgical History:   Procedure Laterality Date    ABDOMEN SURGERY  1974, 1996    Kidney reconstruct. Uterer stenosis    ARTHROPLASTY KNEE  07/09/2014    Procedure: ARTHROPLASTY KNEE;  Surgeon: Levi Johnson MD;  Location:  OR    ARTHROPLASTY KNEE Left 11/24/2014    Procedure: ARTHROPLASTY KNEE;  Surgeon: Levi Johnson MD;  Location:  OR    COLONOSCOPY      Several times dates ??    CV CORONARY ANGIOGRAM N/A 10/09/2019    Procedure: Coronary Angiogram;  Surgeon: Chiquita Chatterjee MD;  Location:  HEART CARDIAC CATH LAB    CV HEART CATHETERIZATION WITH POSSIBLE INTERVENTION N/A  10/10/2019    Procedure: Heart Catheterization with Possible Intervention;  Surgeon: Chin Garcia MD;  Location:  HEART CARDIAC CATH LAB    CV INTRA AORTIC BALLOON N/A 10/09/2019    Procedure: Intra-Aortic Balloon Pump Insertion;  Surgeon: Chiquita Chatterjee MD;  Location:  HEART CARDIAC CATH LAB    CV INTRA AORTIC BALLOON REMOVAL N/A 10/10/2019    Procedure: Intra-Aortic Balloon Pump Removal;  Surgeon: Chin Garcia MD;  Location:  HEART CARDIAC CATH LAB    CV LEFT HEART CATH N/A 12/11/2020    Procedure: Heart Catheterization with Possible Intervention;  Surgeon: Pilo Otoole MD;  Location:  HEART CARDIAC CATH LAB    CV PCI STENT DRUG ELUTING N/A 10/09/2019    Procedure: PCI Stent Drug Eluting;  Surgeon: Chiquita Chatterjee MD;  Location:  HEART CARDIAC CATH LAB    DAVINCI LAPAROSCOPIC CHOLECYSTECTOMY WITHOUT GRAMS N/A 11/8/2023    Procedure: CHOLECYSTECTOMY, ROBOT-ASSISTED, LAPAROSCOPIC, WITHOUT CHOLANGIOGRAM;  Surgeon: Mickey Gallego MD;  Location: [facility] OR    EP LOOP RECORDER IMPLANT N/A 11/22/2023    Procedure: Loop Recorder Implant;  Surgeon: Nadeem Jason MD;  Location:  HEART CARDIAC CATH LAB    EYE SURGERY  2011    Cataract surgery both eyes    GENITOURINARY SURGERY  01/01/2008    Prolift    GENITOURINARY SURGERY  1973    In 1973, 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    ORTHOPEDIC SURGERY      bilat total hip    RECTOCELE REPAIR      ZZC TOTAL ABD HYSTERECTOMY+BLAD REPR  01/01/1992    Vaginal approach with oophrectomy    ZZC TOTAL KNEE ARTHROPLASTY               Physical Exam:     Vitals:    12/07/23 1455   BP: 134/65   Pulse: 66   SpO2: 96%     PSYCH: NAD  EYES: EOMI  NEURO: AAO x3  Back: No CVA tenderness bilaterally. Does have muscular  tension/tenderness along left Paraspinous muscles and SI joint.   . Vulva normal. Estring removed without issues. No obvious bleeding, abnormal discharge, lesions noted on exam. New estring placed without issues.     LABS:   Creatinine   Date Value Ref Range Status   10/12/2023 1.11 (H) 0.51 - 0.95 mg/dL Final   12/17/2020 1.00 0.52 - 1.04 mg/dL Final            Assessment and Plan:   72 year old is a pleasant female who has Vaginal atrophy, hx of UTI, hx of pyelonephritis, UUI, voiding dysfunction    Plan:  -  continue estring  - follow-up with Dr. Rodriguez in February as planned and can have estring replaced at that time. Otherwise can follow-up with me in 3 months as well for estring.   - continue with PFPT.  - cathed UA/UC obtained today. Will treat based on results. If develops fevers/chills, gross hematuria or true flank pain to go to ED.   - After discussing the assessment and plan with patient, patient verbalizes understanding and agrees to the above plan. All questions answered.     Other orders as below:  Orders Placed This Encounter   Procedures    UA without Microscopic [[id number]]     12 minutes spent on the date of the encounter doing chart review, review of Dr. Rodriguez's note review of test results, interpretation of tests, patient visit and documentation.      Fadumo Hays PA-C  Urology  December 7, 2023      Patient Care Team:  Danny Conteh MD as PCP - General (Internal Medicine)  Danny Conteh MD as Assigned PCP  Luis A Moody MD as Assigned Pulmonology Provider  Mel Dennison MD as Assigned Heart and Vascular Provider  Evangelist Lee MD as Hospitalist (Internal Medicine)  Mel Rodriguez MD as MD (Urology)  Mel Rodriguez MD as Assigned Surgical Provider  Kashif Hadley MD as Assigned Sleep Provider  Blu Lopez PA-C as Assigned Musculoskeletal Provider  Agus Segura MD as MD (Surgery)  Kristie Bell PA-C as Assigned Neuroscience  Provider  Gallego, Mickey Alvarenga MD as MD (Surgery)  Ramya Keller MD as MD (Otolaryngology)  Boyd Saunders AuD (Audiology)  Ramya Keller MD as MD (Otolaryngology)  JESSE MATAMOROS

## 2023-12-09 LAB — BACTERIA UR CULT: NO GROWTH

## 2023-12-12 ENCOUNTER — VIRTUAL VISIT (OUTPATIENT)
Dept: PSYCHOLOGY | Facility: CLINIC | Age: 73
End: 2023-12-12
Payer: MEDICARE

## 2023-12-12 DIAGNOSIS — F41.1 GENERALIZED ANXIETY DISORDER: ICD-10-CM

## 2023-12-12 DIAGNOSIS — F43.10 POSTTRAUMATIC STRESS DISORDER: ICD-10-CM

## 2023-12-12 DIAGNOSIS — F33.0 MAJOR DEPRESSIVE DISORDER, RECURRENT EPISODE, MILD (H): Primary | ICD-10-CM

## 2023-12-12 PROCEDURE — 90834 PSYTX W PT 45 MINUTES: CPT | Mod: 95 | Performed by: SOCIAL WORKER

## 2023-12-12 ASSESSMENT — ANXIETY QUESTIONNAIRES
GAD7 TOTAL SCORE: 4
GAD7 TOTAL SCORE: 4
3. WORRYING TOO MUCH ABOUT DIFFERENT THINGS: NOT AT ALL
IF YOU CHECKED OFF ANY PROBLEMS ON THIS QUESTIONNAIRE, HOW DIFFICULT HAVE THESE PROBLEMS MADE IT FOR YOU TO DO YOUR WORK, TAKE CARE OF THINGS AT HOME, OR GET ALONG WITH OTHER PEOPLE: SOMEWHAT DIFFICULT
2. NOT BEING ABLE TO STOP OR CONTROL WORRYING: SEVERAL DAYS
1. FEELING NERVOUS, ANXIOUS, OR ON EDGE: SEVERAL DAYS
5. BEING SO RESTLESS THAT IT IS HARD TO SIT STILL: NOT AT ALL
6. BECOMING EASILY ANNOYED OR IRRITABLE: SEVERAL DAYS
7. FEELING AFRAID AS IF SOMETHING AWFUL MIGHT HAPPEN: NOT AT ALL

## 2023-12-12 ASSESSMENT — PATIENT HEALTH QUESTIONNAIRE - PHQ9
SUM OF ALL RESPONSES TO PHQ QUESTIONS 1-9: 8
5. POOR APPETITE OR OVEREATING: SEVERAL DAYS
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
## 2023-12-14 ENCOUNTER — THERAPY VISIT (OUTPATIENT)
Dept: PHYSICAL THERAPY | Facility: CLINIC | Age: 73
End: 2023-12-14
Attending: INTERNAL MEDICINE
Payer: MEDICARE

## 2023-12-14 DIAGNOSIS — R42 DIZZINESS: Primary | ICD-10-CM

## 2023-12-14 PROCEDURE — 97750 PHYSICAL PERFORMANCE TEST: CPT | Mod: GP | Performed by: PHYSICAL THERAPIST

## 2023-12-15 NOTE — PROGRESS NOTES
"   12/14/23 1000   Signing Clinician's Name / Credentials   Signing clinician's name / credentials Nicole Saucedo DPT NCS   Dynamic Gait Index (Rios and Mcdonough Matti, 1995)   Gait Level Surface 3   Change in Gait Speed 3   Gait and Horizontal Head Turns 2  (almost a 3, just weaves a tiny bit, \"way better\")   Gait with Vertical Head Turns 2  (feels odd, not going to fall but not secure)     4-Item Dynamic Gait Index: Is a measure of gait responses to changing task demands in people with balance and vestibular disorders. (gait on level, w/ lateral & vertical head turns, and w/ change of speed)    Gait assistive device used: none     Patient score: 10/12  Scores <9/12 are correlated with increased risk of falling according to Nato & Phyllis 2006.     Assessment (rationale for performing, application to patient s function & care plan): low risk of falls      Timed Up and Go (TUG): TUG is a test of basic mobility skills. It is used to screen individuals prone to falls.  Gait assistive device used: none     Patient score TUG with no AD 9.56 seconds and 10.59 seconds. Cognitive TUG with no AD, (days of week backwards) 13.75 seconds. (months of year backwards) 13.78 seconds. NO balance issue since slower.     ?13.5 seconds indicate at risk for falls in older adults according to Javy-Sushil et al 2000.  ?30 seconds - indicates dependency in most ADL and mobility skills according to Posiavanessao & Azevedo 1991  >11.5 seconds indicate at risk for falls in adults with Parkinson's Disease    Minimal Detectable Change for patients with Alzheimer s = 4.09 sec   Minimal Detectable Change for patients with Parkinson s Disease = 3.5 sec   according to Zeny & Carie Rodriguez 2011    Normative Data from Alvin KS, Charles D, Antonino M, Shaun E, Antonio. 2017  Age                 Average Time (in seconds)  20-39                     5.9-7.4         40-59                     6.3-7.8   60-69                     " 7.1-9.0  70-79                     8.2-10.2  80-99                    10.0-12.7            Assessment (rationale for performing, application to patient s function & care plan):   (Minutes billed as physical performance test)                  Minutes billed as physical performance test: 15    Nicole Saucedo DPT, MPT, NCS  Physical Therapist   Board Certified Neurologic Clinical Specialist     [facility]   [address] [place] [address]   [email or web address]  [email or web address]   [phone]   Clinic: [phone] //   Fax: [phone]

## 2023-12-21 ENCOUNTER — VIRTUAL VISIT (OUTPATIENT)
Dept: PSYCHOLOGY | Facility: CLINIC | Age: 73
End: 2023-12-21
Payer: MEDICARE

## 2023-12-21 DIAGNOSIS — F33.0 MAJOR DEPRESSIVE DISORDER, RECURRENT EPISODE, MILD (H): Primary | ICD-10-CM

## 2023-12-21 DIAGNOSIS — F43.22 ADJUSTMENT DISORDER WITH ANXIETY: ICD-10-CM

## 2023-12-21 PROCEDURE — 90834 PSYTX W PT 45 MINUTES: CPT | Mod: 95 | Performed by: SOCIAL WORKER

## 2023-12-29 ENCOUNTER — THERAPY VISIT (OUTPATIENT)
Dept: PHYSICAL THERAPY | Facility: CLINIC | Age: 73
End: 2023-12-29
Attending: INTERNAL MEDICINE
Payer: MEDICARE

## 2023-12-29 DIAGNOSIS — R42 DIZZINESS: Primary | ICD-10-CM

## 2023-12-29 PROCEDURE — 97110 THERAPEUTIC EXERCISES: CPT | Mod: GP | Performed by: PHYSICAL THERAPIST

## 2023-12-29 PROCEDURE — 97112 NEUROMUSCULAR REEDUCATION: CPT | Mod: GP | Performed by: PHYSICAL THERAPIST

## 2023-12-29 PROCEDURE — 97116 GAIT TRAINING THERAPY: CPT | Mod: GP | Performed by: PHYSICAL THERAPIST
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
## 2024-01-04 ASSESSMENT — ANXIETY QUESTIONNAIRES
7. FEELING AFRAID AS IF SOMETHING AWFUL MIGHT HAPPEN: MORE THAN HALF THE DAYS
6. BECOMING EASILY ANNOYED OR IRRITABLE: NEARLY EVERY DAY
GAD7 TOTAL SCORE: 13
8. IF YOU CHECKED OFF ANY PROBLEMS, HOW DIFFICULT HAVE THESE MADE IT FOR YOU TO DO YOUR WORK, TAKE CARE OF THINGS AT HOME, OR GET ALONG WITH OTHER PEOPLE?: VERY DIFFICULT
GAD7 TOTAL SCORE: 13
3. WORRYING TOO MUCH ABOUT DIFFERENT THINGS: MORE THAN HALF THE DAYS
2. NOT BEING ABLE TO STOP OR CONTROL WORRYING: MORE THAN HALF THE DAYS
5. BEING SO RESTLESS THAT IT IS HARD TO SIT STILL: NOT AT ALL
4. TROUBLE RELAXING: MORE THAN HALF THE DAYS
GAD7 TOTAL SCORE: 13
1. FEELING NERVOUS, ANXIOUS, OR ON EDGE: MORE THAN HALF THE DAYS
7. FEELING AFRAID AS IF SOMETHING AWFUL MIGHT HAPPEN: MORE THAN HALF THE DAYS
IF YOU CHECKED OFF ANY PROBLEMS ON THIS QUESTIONNAIRE, HOW DIFFICULT HAVE THESE PROBLEMS MADE IT FOR YOU TO DO YOUR WORK, TAKE CARE OF THINGS AT HOME, OR GET ALONG WITH OTHER PEOPLE: VERY DIFFICULT

## 2024-01-05 ENCOUNTER — VIRTUAL VISIT (OUTPATIENT)
Dept: PSYCHOLOGY | Facility: CLINIC | Age: 74
End: 2024-01-05
Payer: MEDICARE

## 2024-01-05 DIAGNOSIS — F43.10 POSTTRAUMATIC STRESS DISORDER: ICD-10-CM

## 2024-01-05 DIAGNOSIS — F33.0 MAJOR DEPRESSIVE DISORDER, RECURRENT EPISODE, MILD (H): Primary | ICD-10-CM

## 2024-01-05 DIAGNOSIS — F41.1 GENERALIZED ANXIETY DISORDER: ICD-10-CM

## 2024-01-05 PROCEDURE — G2012 BRIEF CHECK IN BY MD/QHP: HCPCS | Mod: 93 | Performed by: SOCIAL WORKER

## 2024-01-05 ASSESSMENT — PATIENT HEALTH QUESTIONNAIRE - PHQ9: SUM OF ALL RESPONSES TO PHQ QUESTIONS 1-9: 7
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
## 2024-01-06 ENCOUNTER — ANCILLARY PROCEDURE (OUTPATIENT)
Dept: CARDIOLOGY | Facility: CLINIC | Age: 74
End: 2024-01-06
Attending: INTERNAL MEDICINE
Payer: MEDICARE

## 2024-01-06 PROCEDURE — 93297 REM INTERROG DEV EVAL ICPMS: CPT | Mod: 26 | Performed by: INTERNAL MEDICINE

## 2024-01-06 PROCEDURE — 93297 REM INTERROG DEV EVAL ICPMS: CPT

## 2024-01-10 ENCOUNTER — THERAPY VISIT (OUTPATIENT)
Dept: PHYSICAL THERAPY | Facility: CLINIC | Age: 74
End: 2024-01-10
Attending: PHYSICIAN ASSISTANT
Payer: MEDICARE

## 2024-01-10 DIAGNOSIS — R42 DIZZINESS: Primary | ICD-10-CM

## 2024-01-10 PROCEDURE — 97535 SELF CARE MNGMENT TRAINING: CPT | Mod: GP | Performed by: PHYSICAL THERAPIST

## 2024-01-10 PROCEDURE — 97750 PHYSICAL PERFORMANCE TEST: CPT | Mod: GP | Performed by: PHYSICAL THERAPIST

## 2024-01-10 NOTE — PROGRESS NOTES
"   01/10/24 1400   Signing Clinician's Name / Credentials   Signing clinician's name / credentials Nicole Saucedo DPT NCS   Functional Gait Assessment (JERICA Olvera, BECCA Dutton, et al. (2004))   1. GAIT LEVEL SURFACE 1  (8.19 seconds and 13 steps, she is swinging her arms)   2. CHANGE IN GAIT SPEED 3  (5.71 seconds and 11 seconds)   3. GAIT WITH HORIZONTAL HEAD TURNS 3  (\"not as wonky\")   4. GAIT WITH VERTICAL HEAD TURNS 2  (unsteady, wonky sensation. Slows)   5. GAIT AND PIVOT TURN 3  (multiple times during session)   6. STEP OVER OBSTACLE 2  (even did larger (2 boxes) but has to slow down/concentrate)   7. GAIT WITH NARROW BASE OF SUPPORT 0   8. GAIT WITH EYES CLOSED 1  (13.75 seconds, arms guarded)   9. AMBULATING BACKWARDS 2  (14.22 seconds and 23 steps)   10. STEPS 2   Total Functional Gait Assessment Score   TOTAL SCORE: (MAXIMUM SCORE 30) 19     Functional Gait Assessment (FGA): The FGA assesses postural stability during various walking tasks.   Gait assistive device used: None    Scores of <22 /30 have been correlated with predicting falls in community-dwelling older adults according to Wade & Jace 2010.   Scores of <18 /30 have been correlated with increased risk for falls in patients with Parkinsons Disease according to JuarezKraig, Lemos et al 2014.  Minimal Detectable Change for patients with acute/chronic stroke = 4.2 according to Ameya & Ritschel 2009  Minimal Detectable Change for patients with vestibular disorder = 8 according to Wade & Jace 2010    Assessment (rationale for performing, application to patient s function & care plan): still at risk for falls with high level mobility/gait BUT it has improved quite nicely. She has potential to reach the goal of 20/30.       Gait speed was .9 meters per second, indicating decreased speed and pt at risk for falls. 6 minute walk test 1048 feet, she is talking while walking. I can hear mild SOB. Arms are swinging nicely. (Reference scale: > 1.2 " m/sec: independent in all activities and crossing street, 0.8-1.0 m/sec: community ambulator, household activities.  May need intervention to reduce falls risk, 0.6-0.8 m/sec: performs self care, limited community ambulator. May need intervention to reduce falls risk, <0.6 m/sec: dependent in ADLs, household ambulator.  Needs intervention to reduce falls risk)    Norm 1.2 to 1.4 meters/sec for 20-70 year-old  Minimal Detectable Change for preferred gait (PD) = 0.18 meters/sec &   Minimal Detectable Change for fast gait (PD) = 0.25 meters/sec according to Alberto & Vinicio 2008    Assessment (rationale for performing, application to patient s function & care plan): This is exactly the same for gait speed on 6 minute test as 2022 but worse then 3/12/2022 (1350 feet). Her speed is below normas for her age but it was safe today and she was not reporting lightheaded sensation.      (Minutes billed as physical performance test):  25 between the two tests/vitals.    Nicole Saucedo DPT, MPT, NCS  Physical Therapist   Board Certified Neurologic Clinical Specialist     [facility]   [address] [place] [address]   [email or web address]  [email or web address]   [phone]   Clinic: [phone] //   Fax: [phone]

## 2024-01-12 ENCOUNTER — VIRTUAL VISIT (OUTPATIENT)
Dept: PSYCHOLOGY | Facility: CLINIC | Age: 74
End: 2024-01-12
Payer: MEDICARE

## 2024-01-12 DIAGNOSIS — F33.0 MAJOR DEPRESSIVE DISORDER, RECURRENT EPISODE, MILD (H): Primary | ICD-10-CM

## 2024-01-12 DIAGNOSIS — F41.1 GENERALIZED ANXIETY DISORDER: ICD-10-CM

## 2024-01-12 DIAGNOSIS — F43.10 POSTTRAUMATIC STRESS DISORDER: ICD-10-CM

## 2024-01-12 PROCEDURE — 90834 PSYTX W PT 45 MINUTES: CPT | Mod: 93 | Performed by: SOCIAL WORKER
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
## 2024-01-15 PROBLEM — N18.32 STAGE 3B CHRONIC KIDNEY DISEASE (H): Chronic | Status: ACTIVE | Noted: 2023-10-23

## 2024-01-16 ENCOUNTER — VIRTUAL VISIT (OUTPATIENT)
Dept: PSYCHOLOGY | Facility: CLINIC | Age: 74
End: 2024-01-16
Payer: MEDICARE

## 2024-01-16 DIAGNOSIS — F41.1 GENERALIZED ANXIETY DISORDER: ICD-10-CM

## 2024-01-16 DIAGNOSIS — F33.1 MAJOR DEPRESSIVE DISORDER, RECURRENT EPISODE, MODERATE (H): Primary | ICD-10-CM

## 2024-01-16 PROCEDURE — 90834 PSYTX W PT 45 MINUTES: CPT | Mod: 95 | Performed by: SOCIAL WORKER

## 2024-01-16 ASSESSMENT — ANXIETY QUESTIONNAIRES
7. FEELING AFRAID AS IF SOMETHING AWFUL MIGHT HAPPEN: NOT AT ALL
6. BECOMING EASILY ANNOYED OR IRRITABLE: MORE THAN HALF THE DAYS
IF YOU CHECKED OFF ANY PROBLEMS ON THIS QUESTIONNAIRE, HOW DIFFICULT HAVE THESE PROBLEMS MADE IT FOR YOU TO DO YOUR WORK, TAKE CARE OF THINGS AT HOME, OR GET ALONG WITH OTHER PEOPLE: SOMEWHAT DIFFICULT
1. FEELING NERVOUS, ANXIOUS, OR ON EDGE: MORE THAN HALF THE DAYS
5. BEING SO RESTLESS THAT IT IS HARD TO SIT STILL: NOT AT ALL
3. WORRYING TOO MUCH ABOUT DIFFERENT THINGS: MORE THAN HALF THE DAYS
2. NOT BEING ABLE TO STOP OR CONTROL WORRYING: SEVERAL DAYS
GAD7 TOTAL SCORE: 7
GAD7 TOTAL SCORE: 7

## 2024-01-16 ASSESSMENT — PATIENT HEALTH QUESTIONNAIRE - PHQ9: 5. POOR APPETITE OR OVEREATING: NOT AT ALL
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
## 2024-01-19 ENCOUNTER — OFFICE VISIT (OUTPATIENT)
Dept: URGENT CARE | Facility: URGENT CARE | Age: 74
End: 2024-01-19
Payer: MEDICARE

## 2024-01-19 VITALS
BODY MASS INDEX: 29.81 KG/M2 | TEMPERATURE: 99.8 F | SYSTOLIC BLOOD PRESSURE: 151 MMHG | HEART RATE: 92 BPM | DIASTOLIC BLOOD PRESSURE: 68 MMHG | RESPIRATION RATE: 20 BRPM | OXYGEN SATURATION: 97 % | WEIGHT: 176.4 LBS

## 2024-01-19 DIAGNOSIS — R05.1 ACUTE COUGH: Primary | ICD-10-CM

## 2024-01-19 LAB
DEPRECATED S PYO AG THROAT QL EIA: NEGATIVE
FLUAV AG SPEC QL IA: NEGATIVE
FLUBV AG SPEC QL IA: NEGATIVE
GROUP A STREP BY PCR: NOT DETECTED

## 2024-01-19 PROCEDURE — 99213 OFFICE O/P EST LOW 20 MIN: CPT | Performed by: NURSE PRACTITIONER

## 2024-01-19 PROCEDURE — 87651 STREP A DNA AMP PROBE: CPT | Performed by: NURSE PRACTITIONER

## 2024-01-19 PROCEDURE — 87804 INFLUENZA ASSAY W/OPTIC: CPT | Performed by: NURSE PRACTITIONER

## 2024-01-19 PROCEDURE — 87635 SARS-COV-2 COVID-19 AMP PRB: CPT | Performed by: NURSE PRACTITIONER

## 2024-01-19 NOTE — PROGRESS NOTES
SUBJECTIVE:   Gem Gama is a 73 year old female presenting with a chief complaint of flu like symptoms.   Onset of symptoms was 3 day(s) ago.  Course of illness is waxing and waning.    Severity moderate  Current and Associated symptoms: fever headache clear runny nose pressure  Treatment measures tried include Fluids and Rest.  Predisposing factors include None.    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     Chronic kidney disease     Clostridium difficile infection 09/04/2022    Congestive heart failure (H) 2019    Right after STEMI, improved Fall 2019    Coronary artery disease involving native coronary artery of native heart without angina pectoris     Depression     Fever and chills 09/24/2021    Gastro-oesophageal reflux disease     Hypertension     Ischemic cardiomyopathy 2019    Major depressive disorder, recurrent episode, moderate (H) 07/25/2013    Narcolepsy     Pituitary microadenoma (H) 2011    MRI 2011- There is a triangular-shaped area with delayed contrast enhancement at the left lateral portion of the pituitary gland posteriorly, measuring 2.5 x 4.3 mm. This is suggestive of a microadenoma, MRI 10/1/22 - Normal pituitary gland and whole brain MRI.    Pyelonephritis 11/01/2022    Pyelonephritis of right kidney 10/24/2021    S/P hip replacement 2004    Bilateral fall 2004 due to OA    Sepsis, due to unspecified organism, unspecified whether acute organ dysfunction present (H) 11/01/2022    Status post total knee replacement 12/29/2014    Urinary tract infection with hematuria, site unspecified 09/24/2021     Current Outpatient Medications   Medication Sig Dispense Refill    albuterol (PROAIR HFA/PROVENTIL  HFA/VENTOLIN HFA) 108 (90 Base) MCG/ACT inhaler Inhale 1-2 puffs into the lungs every 4 hours as needed for shortness of breath or wheezing 18 g 11    aspirin (ASA) 81 MG EC tablet Take 1 tablet (81 mg) by mouth daily (Patient taking differently: Take 81 mg by mouth every morning) 90 tablet 0    atorvastatin (LIPITOR) 40 MG tablet TAKE 1 TABLET(40 MG) BY MOUTH DAILY (Patient taking differently: Take 40 mg by mouth every morning) 90 tablet 3    carvedilol (COREG) 3.125 MG tablet TAKE 1 TABLET(3.125 MG) BY MOUTH TWICE DAILY WITH MEALS (Patient taking differently: Take 3.125 mg by mouth 2 times daily (with meals) TAKE 1 TABLET(3.125 MG) BY MOUTH TWICE DAILY WITH MEALS) 180 tablet 1    cholestyramine (QUESTRAN) 4 g packet Take 1 packet (4 g) by mouth 2 times daily (with meals) 60 packet 5    diazepam (VALIUM) 2 MG tablet Take 0.5-1 tablets (1-2 mg) by mouth 2 times daily as needed for anxiety or muscle spasms 30 tablet 0    diclofenac (VOLTAREN) 1 % topical gel Apply topically 4 times daily as needed for moderate pain For Jaw pain      estradiol (ESTRING) 2 MG vaginal ring Place 1 each vaginally every 3 months 1 each 3    fluticasone-vilanterol (BREO ELLIPTA) 200-25 MCG/ACT inhaler Inhale 1 puff into the lungs daily (Patient taking differently: Inhale 1 puff into the lungs every morning) 3 each 3    isosorbide mononitrate (IMDUR) 30 MG 24 hr tablet Take 1 tablet (30 mg) by mouth every evening Take with Carvedilol. 90 tablet 3    loperamide (IMODIUM) 2 MG capsule Take 1 capsule (2 mg) by mouth daily 30 capsule 0    loratadine (CLARITIN) 10 MG tablet Take 10 mg by mouth At Bedtime      magnesium oxide (MAG-OX) 400 MG tablet Take 400 mg by mouth every morning      Melatonin 10 MG TABS tablet Take 10 mg by mouth nightly as needed for sleep      nitroGLYcerin (NITROSTAT) 0.4 MG sublingual tablet Place 1 tablet (0.4 mg) under the tongue every 5 minutes as needed for chest pain 25 tablet 11    oxyCODONE (ROXICODONE) 5 MG  tablet Take 1-2 tablets (5-10 mg) by mouth every 4 hours as needed for moderate to severe pain 16 tablet 0    pantoprazole (PROTONIX) 40 MG EC tablet TAKE 1 TABLET(40 MG) BY MOUTH DAILY (Patient taking differently: Take 40 mg by mouth every morning) 90 tablet 3    Prenatal Multivit-Min-Fe-FA (PRENATAL/IRON) TABS Take 1 tablet by mouth every morning      saccharomyces boulardii (FLORASTOR) 250 MG capsule Take 1 capsule (250 mg) by mouth 2 times daily 14 capsule 0    sertraline (ZOLOFT) 100 MG tablet TAKE 1 TABLET(100 MG) BY MOUTH DAILY (Patient taking differently: Take 100 mg by mouth every morning) 90 tablet 1    UNABLE TO FIND Take 3 tablets by mouth daily MEDICATION NAME: U[place]ora complete regimen  1 TAB, AM - 2 TAB PM      ursodiol (ACTIGALL) 300 MG capsule TAKE 1 CAPSULE(300 MG) BY MOUTH TWICE DAILY (Patient taking differently: Take 300 mg by mouth 2 times daily TAKE 1 CAPSULE(300 MG) BY MOUTH TWICE DAILY) 180 capsule 3    valsartan (DIOVAN) 40 MG tablet Take 1 tablet (40 mg) by mouth daily (Patient taking differently: Take 40 mg by mouth every morning) 90 tablet 1    vitamin D3 (CHOLECALCIFEROL) 2000 units (50 mcg) tablet Take 1 tablet (2,000 Units) by mouth daily (Patient taking differently: Take 1 tablet by mouth every morning) 90 tablet 3    zinc gluconate 50 MG tablet Take 50 mg by mouth every morning       Social History     Tobacco Use    Smoking status: Never     Passive exposure: Never    Smokeless tobacco: Never   Substance Use Topics    Alcohol use: No       ROS:  Review of systems negative except as stated above.    OBJECTIVE:  BP (!) 151/68   Pulse 92   Temp 99.8  F (37.7  C) (Tympanic)   Resp 20   Wt 80 kg (176 lb 6.4 oz)   LMP  (LMP Unknown)   SpO2 97%   BMI 29.81 kg/m    GENERAL APPEARANCE: Alert and no distress  EYES: EOMI,  PERRL, conjunctiva clear  HENT: ear canals and TM's normal.  Nose and mouth without ulcers, erythema or lesions  NECK: supple, nontender, no lymphadenopathy  RESP: lungs  clear to auscultation - no rales, rhonchi or wheezes  CV: regular rates and rhythm, normal S1 S2, no murmur noted  SKIN: no suspicious lesions or rashes    ASSESSMENT:  (R05.1) Acute cough  (primary encounter diagnosis)    Plan:   Symptomatic COVID-19 Virus (Coronavirus) by PCR Nose  Influenza A & B Antigen - Clinic Collect,  Streptococcus A Rapid Screen w/Reflex to PCR -  Clinic Collect  Labs pending will notify    Discussed fluids rest home treat and monitor sx call if new or worsening  Heidy to follow up with Primary Care provider regarding elevated blood pressure.        Beatriz Rg, APRN CNP

## 2024-01-20 ENCOUNTER — TELEPHONE (OUTPATIENT)
Dept: NURSING | Facility: CLINIC | Age: 74
End: 2024-01-20
Payer: MEDICARE

## 2024-01-20 ENCOUNTER — MYC MEDICAL ADVICE (OUTPATIENT)
Dept: FAMILY MEDICINE | Facility: CLINIC | Age: 74
End: 2024-01-20
Payer: MEDICARE

## 2024-01-20 LAB — SARS-COV-2 RNA RESP QL NAA+PROBE: POSITIVE

## 2024-01-20 ASSESSMENT — ANXIETY QUESTIONNAIRES
7. FEELING AFRAID AS IF SOMETHING AWFUL MIGHT HAPPEN: SEVERAL DAYS
4. TROUBLE RELAXING: SEVERAL DAYS
3. WORRYING TOO MUCH ABOUT DIFFERENT THINGS: MORE THAN HALF THE DAYS
1. FEELING NERVOUS, ANXIOUS, OR ON EDGE: MORE THAN HALF THE DAYS
5. BEING SO RESTLESS THAT IT IS HARD TO SIT STILL: NOT AT ALL
IF YOU CHECKED OFF ANY PROBLEMS ON THIS QUESTIONNAIRE, HOW DIFFICULT HAVE THESE PROBLEMS MADE IT FOR YOU TO DO YOUR WORK, TAKE CARE OF THINGS AT HOME, OR GET ALONG WITH OTHER PEOPLE: VERY DIFFICULT
2. NOT BEING ABLE TO STOP OR CONTROL WORRYING: MORE THAN HALF THE DAYS
6. BECOMING EASILY ANNOYED OR IRRITABLE: SEVERAL DAYS
GAD7 TOTAL SCORE: 9

## 2024-01-20 NOTE — TELEPHONE ENCOUNTER
Coronavirus (COVID-19) Notification    Caller Name (Patient, parent, daughter/son, grandparent, etc)  patient    Reason for call  Notify of Positive Coronavirus (COVID-19) lab results, assess symptoms,  review [facility] recommendations    Lab Result    Lab test:  2019-nCoV rRt-PCR or SARS-CoV-2 PCR    Oropharyngeal AND/OR nasopharyngeal swabs is POSITIVE for 2019-nCoV RNA/SARS-COV-2 PCR (COVID-19 virus)    Gather patient reported symptoms   Assessment   Current Symptoms at time of phone call, reported by patient: (if no symptoms, document: No symptoms] yes   Date of symptom(s) onset (if applicable) 1.17.23     If at time of call, Patients symptoms have worsened, the Patient should contact 911 or have someone drive them to Emergency Dept promptly:    If Patient calling 911, inform 911 personal that you have tested positive for the Coronavirus (COVID-19).  Place mask on and await 911 to arrive.  If Emergency Dept, If possible, please have another adult drive you to the Emergency Dept but you need to wear mask when in contact with other people.      Treatment Options:   Is patient interested in discussing COVID treatment? Yes.   Is this a [place] or [place] Patient? No:     Are you an agent trained to scheduling? Yes.        Review information with Patient    Your result was positive. This means you have COVID-19 (coronavirus).    How can I protect others?    These guidelines are for isolating before returning to work, school or .    If you DO have symptoms  Stay home and away from others   For at least 5 days after your symptoms started, AND  You are fever free for 24 hours (with no medicine that reduces fever), AND  Your other symptoms are better  Wear a mask for 10 full days anytime you are around others    If you DON'T have symptoms  Stay home and away from others for at least 5 days after your positive test  Wear a mask for 10 full days anytime you are around others    There may be different  guidelines for healthcare facilities.  Please check with the specific sites before arriving.    If you have been told by a doctor that you were severely ill with COVID-19 or are immunocompromised, you should isolate for at least 10 days.    You should not go back to work until you meet the guidelines above for ending your home isolation. You don't need to be retested for COVID-19 before going back to work--studies show that you won't spread the virus if it's been at least 10 days since your symptoms started (or 20 days, if you have a weak immune system).    Employers, schools, and daycares: This is an official notice for this person's medical guidelines for returning in-person.  They must meet the above guidelines before going back to work, school or  in person.    You will receive a positive COVID-19 letter via [employer] or the mail soon with additional self-care information.    Would you like me to review some of that information with you now?  No    If you were tested for an upcoming procedure, please contact your provider for next steps.    Bernarda Salas

## 2024-01-21 ENCOUNTER — VIRTUAL VISIT (OUTPATIENT)
Dept: URGENT CARE | Facility: CLINIC | Age: 74
End: 2024-01-21
Payer: MEDICARE

## 2024-01-21 DIAGNOSIS — U07.1 COVID-19: Primary | ICD-10-CM

## 2024-01-21 PROCEDURE — 99443 PR PHYSICIAN TELEPHONE EVALUATION 21-30 MIN: CPT | Mod: 93

## 2024-01-21 NOTE — PROGRESS NOTES
Heidy is a 73 year old who is being evaluated via a billable telephone visit.      What phone number would you like to be contacted at? [phone]  How would you like to obtain your AVS? Dany    Distant Location (provider location):  Off-site    Assessment & Plan   (U07.1) COVID-19  (primary encounter diagnosis)    Plan: nirmatrelvir and ritonavir (PAXLOVID) 150         mg/100 mg therapy pack  Rx dose adjdusted paxlovid gfr 50's  Hold lipitor 8 days  Monitor with other medications    Beatriz Rg, APRN CNP  Subjective   Heidy is a 73 year old, presenting for the following health issues:  COVID    HPI   Dx covid yesterday  Sx started Thursday and consist of cough congestion diarrhea fatigue  Hydrated  No sob wheezing chest pain      Objective           Vitals:  No vitals were obtained today due to virtual visit.    Physical Exam   General: Alert and no distress //Respiratory: No audible wheeze, cough, or shortness of breath // Psychiatric:  Appropriate affect, tone, and pace of words      Phone call duration: 25 minutes  Signed Electronically by: [facility]

## 2024-01-23 ASSESSMENT — ANXIETY QUESTIONNAIRES
GAD7 TOTAL SCORE: 9
8. IF YOU CHECKED OFF ANY PROBLEMS, HOW DIFFICULT HAVE THESE MADE IT FOR YOU TO DO YOUR WORK, TAKE CARE OF THINGS AT HOME, OR GET ALONG WITH OTHER PEOPLE?: VERY DIFFICULT
7. FEELING AFRAID AS IF SOMETHING AWFUL MIGHT HAPPEN: SEVERAL DAYS
GAD7 TOTAL SCORE: 9

## 2024-01-26 ENCOUNTER — VIRTUAL VISIT (OUTPATIENT)
Dept: PSYCHOLOGY | Facility: CLINIC | Age: 74
End: 2024-01-26
Payer: MEDICARE

## 2024-01-26 DIAGNOSIS — F41.1 GENERALIZED ANXIETY DISORDER: ICD-10-CM

## 2024-01-26 DIAGNOSIS — F33.0 MAJOR DEPRESSIVE DISORDER, RECURRENT EPISODE, MILD (H): Primary | ICD-10-CM

## 2024-01-26 PROCEDURE — 90832 PSYTX W PT 30 MINUTES: CPT | Mod: 93 | Performed by: SOCIAL WORKER
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
## 2024-01-30 ENCOUNTER — VIRTUAL VISIT (OUTPATIENT)
Dept: PSYCHOLOGY | Facility: CLINIC | Age: 74
End: 2024-01-30
Payer: MEDICARE

## 2024-01-30 DIAGNOSIS — F41.1 GENERALIZED ANXIETY DISORDER: ICD-10-CM

## 2024-01-30 DIAGNOSIS — F33.0 MAJOR DEPRESSIVE DISORDER, RECURRENT EPISODE, MILD (H): Primary | ICD-10-CM

## 2024-01-30 PROCEDURE — 90834 PSYTX W PT 45 MINUTES: CPT | Mod: 93 | Performed by: SOCIAL WORKER
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
## 2024-02-01 ENCOUNTER — VIRTUAL VISIT (OUTPATIENT)
Dept: PSYCHOLOGY | Facility: CLINIC | Age: 74
End: 2024-02-01
Payer: MEDICARE

## 2024-02-01 DIAGNOSIS — F43.10 POSTTRAUMATIC STRESS DISORDER: ICD-10-CM

## 2024-02-01 DIAGNOSIS — F33.1 MAJOR DEPRESSIVE DISORDER, RECURRENT EPISODE, MODERATE (H): Primary | ICD-10-CM

## 2024-02-01 DIAGNOSIS — F41.1 GENERALIZED ANXIETY DISORDER: ICD-10-CM

## 2024-02-01 PROCEDURE — 90834 PSYTX W PT 45 MINUTES: CPT | Mod: 93 | Performed by: SOCIAL WORKER
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
## 2024-02-05 ENCOUNTER — THERAPY VISIT (OUTPATIENT)
Dept: PHYSICAL THERAPY | Facility: CLINIC | Age: 74
End: 2024-02-05
Attending: INTERNAL MEDICINE
Payer: MEDICARE

## 2024-02-05 DIAGNOSIS — R42 DIZZINESS: Primary | ICD-10-CM

## 2024-02-05 PROCEDURE — 97535 SELF CARE MNGMENT TRAINING: CPT | Mod: GP | Performed by: PHYSICAL THERAPIST

## 2024-02-05 PROCEDURE — 97110 THERAPEUTIC EXERCISES: CPT | Mod: GP | Performed by: PHYSICAL THERAPIST
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
## 2024-02-05 NOTE — PROGRESS NOTES
[facility]                                                                                   OUTPATIENT PHYSICAL THERAPY    PLAN OF TREATMENT FOR OUTPATIENT REHABILITATION   Patient's Last Name, First Name, Gem Gaytan YOB: 1950   Provider's Name   [facility]   Medical Record No.  [id number]     Onset Date: 10/11/23 (order date after ER)  Start of Care Date: 10/16/23     Medical Diagnosis:  dizziness      PT Treatment Diagnosis:  lightheadness, risk of falls and impaired vestibular function Plan of Treatment  Frequency/Duration: 1x every 1-2 weeks/ 12 weeks    Certification date from 01/14/24 to 04/07/24         See note for plan of treatment details and functional goals     Madison Saucedo, PT                         I CERTIFY THE NEED FOR THESE SERVICES FURNISHED UNDER        THIS PLAN OF TREATMENT AND WHILE UNDER MY CARE     (Physician attestation of this document indicates review and certification of the therapy plan).              Referring Provider:  Luisa Nation    Initial Assessment  See Epic Evaluation- Start of Care Date: 10/16/23

## 2024-02-06 ENCOUNTER — VIRTUAL VISIT (OUTPATIENT)
Dept: PSYCHOLOGY | Facility: CLINIC | Age: 74
End: 2024-02-06
Payer: MEDICARE

## 2024-02-06 DIAGNOSIS — F43.10 POSTTRAUMATIC STRESS DISORDER: ICD-10-CM

## 2024-02-06 DIAGNOSIS — F41.1 GENERALIZED ANXIETY DISORDER: ICD-10-CM

## 2024-02-06 DIAGNOSIS — F33.1 MAJOR DEPRESSIVE DISORDER, RECURRENT EPISODE, MODERATE (H): Primary | ICD-10-CM

## 2024-02-06 PROCEDURE — 90834 PSYTX W PT 45 MINUTES: CPT | Mod: 93 | Performed by: SOCIAL WORKER

## 2024-02-06 ASSESSMENT — COLUMBIA-SUICIDE SEVERITY RATING SCALE - C-SSRS
1. SINCE LAST CONTACT, HAVE YOU WISHED YOU WERE DEAD OR WISHED YOU COULD GO TO SLEEP AND NOT WAKE UP?: YES
SUICIDE, SINCE LAST CONTACT: NO
REASONS FOR IDEATION SINCE LAST CONTACT: MOSTLY TO END OR STOP THE PAIN (YOU COULDN'T GO ON LIVING WITH THE PAIN OR HOW YOU WERE FEELING)
ATTEMPT SINCE LAST CONTACT: NO
TOTAL  NUMBER OF INTERRUPTED ATTEMPTS SINCE LAST CONTACT: NO
6. HAVE YOU EVER DONE ANYTHING, STARTED TO DO ANYTHING, OR PREPARED TO DO ANYTHING TO END YOUR LIFE?: NO
2. HAVE YOU ACTUALLY HAD ANY THOUGHTS OF KILLING YOURSELF?: NO
TOTAL  NUMBER OF ABORTED OR SELF INTERRUPTED ATTEMPTS SINCE LAST CONTACT: NO

## 2024-02-06 ASSESSMENT — ANXIETY QUESTIONNAIRES
6. BECOMING EASILY ANNOYED OR IRRITABLE: SEVERAL DAYS
IF YOU CHECKED OFF ANY PROBLEMS ON THIS QUESTIONNAIRE, HOW DIFFICULT HAVE THESE PROBLEMS MADE IT FOR YOU TO DO YOUR WORK, TAKE CARE OF THINGS AT HOME, OR GET ALONG WITH OTHER PEOPLE: VERY DIFFICULT
5. BEING SO RESTLESS THAT IT IS HARD TO SIT STILL: SEVERAL DAYS
GAD7 TOTAL SCORE: 11
GAD7 TOTAL SCORE: 11
7. FEELING AFRAID AS IF SOMETHING AWFUL MIGHT HAPPEN: SEVERAL DAYS
1. FEELING NERVOUS, ANXIOUS, OR ON EDGE: NEARLY EVERY DAY
2. NOT BEING ABLE TO STOP OR CONTROL WORRYING: SEVERAL DAYS
3. WORRYING TOO MUCH ABOUT DIFFERENT THINGS: MORE THAN HALF THE DAYS

## 2024-02-06 ASSESSMENT — PATIENT HEALTH QUESTIONNAIRE - PHQ9
SUM OF ALL RESPONSES TO PHQ QUESTIONS 1-9: 18
5. POOR APPETITE OR OVEREATING: MORE THAN HALF THE DAYS
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
## 2024-02-07 ENCOUNTER — OFFICE VISIT (OUTPATIENT)
Dept: AUDIOLOGY | Facility: CLINIC | Age: 74
End: 2024-02-07
Attending: INTERNAL MEDICINE
Payer: MEDICARE

## 2024-02-07 ENCOUNTER — PRE VISIT (OUTPATIENT)
Dept: UROLOGY | Facility: CLINIC | Age: 74
End: 2024-02-07

## 2024-02-07 ENCOUNTER — OFFICE VISIT (OUTPATIENT)
Dept: OTOLARYNGOLOGY | Facility: CLINIC | Age: 74
End: 2024-02-07
Attending: INTERNAL MEDICINE
Payer: MEDICARE

## 2024-02-07 VITALS — SYSTOLIC BLOOD PRESSURE: 125 MMHG | DIASTOLIC BLOOD PRESSURE: 85 MMHG

## 2024-02-07 DIAGNOSIS — H90.3 SNHL (SENSORY-NEURAL HEARING LOSS), ASYMMETRICAL: Primary | ICD-10-CM

## 2024-02-07 DIAGNOSIS — H81.90 VESTIBULAR DIZZINESS: ICD-10-CM

## 2024-02-07 PROCEDURE — 92550 TYMPANOMETRY & REFLEX THRESH: CPT | Performed by: AUDIOLOGIST

## 2024-02-07 PROCEDURE — 99204 OFFICE O/P NEW MOD 45 MIN: CPT | Performed by: OTOLARYNGOLOGY

## 2024-02-07 PROCEDURE — 92557 COMPREHENSIVE HEARING TEST: CPT | Performed by: AUDIOLOGIST

## 2024-02-07 ASSESSMENT — ENCOUNTER SYMPTOMS
COUGH: 1
CONSTITUTIONAL NEGATIVE: 1
DIZZINESS: 1
WEAKNESS: 1
TINGLING: 1
GASTROINTESTINAL NEGATIVE: 1
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
## 2024-02-07 NOTE — PROGRESS NOTES
AUDIOLOGY REPORT    SUMMARY: Audiology visit completed. See audiogram for results.    RECOMMENDATIONS: Follow-up with ENT.    Nancy Li  Doctor of Audiology  [place] License # [id number]

## 2024-02-07 NOTE — NURSING NOTE
Gem Curranan Natan's chief complaint for this visit includes:  Chief Complaint   Patient presents with    New Patient     Vestibular dizziness     PCP: Danny Conteh    Referring Provider:  Danny Conteh MD  [id number] [place] [address]    /85 (BP Location: Right arm, Patient Position: Sitting, Cuff Size: Adult Regular)   LMP  (LMP Unknown)   Data Unavailable        Allergies   Allergen Reactions    Dust Mites Cough, Headache, Other (See Comments) and Shortness Of Breath    Latex Other (See Comments) and Shortness Of Breath     Runny nose  PN: LW Reaction: DIFFICULTY BREATHING      Sulfa Antibiotics Anaphylaxis, Hives and Itching     PN: LW Reaction: HIVES, Swelling  PN: LW Reaction: HIVES, Swelling    Flagyl [Metronidazole Hcl] Anaphylaxis and Rash    Food      PN: LW FI1: nka LW FI2:    Hydrochlorothiazide W-Triamterene      PN: LW Reaction: skin rash  PN: LW Reaction: skin rash    No Clinical Screening - See Comments      PN: LW Other1: -Adhesive Tape    Seasonal Allergies      sneezing    Tape [Adhesive Tape] Hives    Metoprolol Itching and Rash    Metronidazole Hives and Rash     PN: LW Reaction: HIVES           Do you need any medication refills at today's visit? No    Sushma vergara Clinic Assistant- Surgical Specialties

## 2024-02-07 NOTE — TELEPHONE ENCOUNTER
Reason for visit: 6 month followup     Relevant information: seen on 12/7/23 for Estring placement. Hx of UTI, hx of pyelonephritis, UUI, voiding dysfunction     Records/imaging/labs/orders: all records available    Pt called: no need for a call    At Rooming: have pt empty bladder/pvr, Document PVR      Jeremy Potter  2/7/2024  2:39 PM
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
## 2024-02-12 ENCOUNTER — THERAPY VISIT (OUTPATIENT)
Dept: PHYSICAL THERAPY | Facility: CLINIC | Age: 74
End: 2024-02-12
Attending: INTERNAL MEDICINE
Payer: MEDICARE

## 2024-02-12 DIAGNOSIS — R42 DIZZINESS: Primary | ICD-10-CM

## 2024-02-12 PROCEDURE — 97112 NEUROMUSCULAR REEDUCATION: CPT | Mod: GP | Performed by: PHYSICAL THERAPIST

## 2024-02-19 ASSESSMENT — PATIENT HEALTH QUESTIONNAIRE - PHQ9: SUM OF ALL RESPONSES TO PHQ QUESTIONS 1-9: 9

## 2024-02-20 ENCOUNTER — VIRTUAL VISIT (OUTPATIENT)
Dept: PSYCHOLOGY | Facility: CLINIC | Age: 74
End: 2024-02-20
Payer: MEDICARE

## 2024-02-20 DIAGNOSIS — F41.1 GENERALIZED ANXIETY DISORDER: ICD-10-CM

## 2024-02-20 DIAGNOSIS — F33.0 MAJOR DEPRESSIVE DISORDER, RECURRENT EPISODE, MILD (H): Primary | ICD-10-CM

## 2024-02-20 DIAGNOSIS — F43.10 POSTTRAUMATIC STRESS DISORDER: ICD-10-CM

## 2024-02-20 PROCEDURE — 90834 PSYTX W PT 45 MINUTES: CPT | Mod: 95 | Performed by: SOCIAL WORKER

## 2024-02-20 ASSESSMENT — PATIENT HEALTH QUESTIONNAIRE - PHQ9
SUM OF ALL RESPONSES TO PHQ QUESTIONS 1-9: 9
10. IF YOU CHECKED OFF ANY PROBLEMS, HOW DIFFICULT HAVE THESE PROBLEMS MADE IT FOR YOU TO DO YOUR WORK, TAKE CARE OF THINGS AT HOME, OR GET ALONG WITH OTHER PEOPLE: VERY DIFFICULT
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
## 2024-02-23 ENCOUNTER — NURSE TRIAGE (OUTPATIENT)
Dept: FAMILY MEDICINE | Facility: CLINIC | Age: 74
End: 2024-02-23
Payer: MEDICARE

## 2024-02-23 ENCOUNTER — TELEPHONE (OUTPATIENT)
Dept: FAMILY MEDICINE | Facility: CLINIC | Age: 74
End: 2024-02-23
Payer: MEDICARE

## 2024-02-23 NOTE — TELEPHONE ENCOUNTER
"Nurse Triage SBAR    Is this a 2nd Level Triage? NO    Situation: Patient calling about multiple symptoms that have occurred within the past months. Calling to request appointment with PCP only to further discuss symptoms.    Background: Patient reports her daughter had a stroke in October. She reports having other chronic symptoms that she is trying to treat at this time. Patient also reported having a fall sometime last week while visiting her daughter. Stated she had tripped and hit the back side of her head. She initially had a headache and soreness in her neck area.    Assessment: Patient reports a few months ago she felt like she had a numb spot near her mouth, but then said that it wasn't numb. Then several weeks ago she woke up in the middle of the night when she felt what was like a thump on her head. She could not find what could have caused that. The other night she reported waking up in the middle of the night feeling confused \"however, still oriented at the same time\". She was unsure what to make of these \"weird symptoms\".     Patient denied any fever, dizziness, headache, chest pain, breathing concerns, vision changes, slurred speech, confusion, palpitations, or weakness.    Protocol Recommended Disposition:   See in Office Within 3 Days    Recommendation: Patient declined schedule a visit with another provider for further evaluation as she only wanted to see her PCP. Red flag symptoms were reviewed with patient to be seen sooner in ED or call 911. Patient was scheduled for office visit with Dr. Conteh on 3/1/2024.    BLANKA Anglin  [facility] Primary Care Triage    Reason for Disposition   Numbness or tingling in one or both hands is a chronic symptom (recurrent or ongoing problem lasting > 4 weeks)    Answer Assessment - Initial Assessment Questions  1. SYMPTOM: \"What is the main symptom you are concerned about?\" (e.g., weakness, numbness)      \"Weird symptoms\", numbness, head concerns  2. ONSET: " "\"When did this start?\" (minutes, hours, days; while sleeping)      Occurring the last couple of months  3. LAST NORMAL: \"When was the last time you (the patient) were normal (no symptoms)?\"      Unsure, has other issues going on as well  4. PATTERN \"Does this come and go, or has it been constant since it started?\"  \"Is it present now?\"      Intermittent, no symptoms right now  5. CARDIAC SYMPTOMS: \"Have you had any of the following symptoms: chest pain, difficulty breathing, palpitations?\"      No  6. NEUROLOGIC SYMPTOMS: \"Have you had any of the following symptoms: headache, dizziness, vision loss, double vision, changes in speech, unsteady on your feet?\"      No  7. OTHER SYMPTOMS: \"Do you have any other symptoms?\"      No  8. PREGNANCY: \"Is there any chance you are pregnant?\" \"When was your last menstrual period?\"      No    Protocols used: Neurologic Deficit-A-[place]    "

## 2024-02-23 NOTE — TELEPHONE ENCOUNTER
Please disregard erroneous encounter.    Linnette, RN  [facility] Primary Care Triage

## 2024-02-24 ASSESSMENT — ANXIETY QUESTIONNAIRES
4. TROUBLE RELAXING: SEVERAL DAYS
GAD7 TOTAL SCORE: 7
3. WORRYING TOO MUCH ABOUT DIFFERENT THINGS: SEVERAL DAYS
7. FEELING AFRAID AS IF SOMETHING AWFUL MIGHT HAPPEN: SEVERAL DAYS
6. BECOMING EASILY ANNOYED OR IRRITABLE: SEVERAL DAYS
8. IF YOU CHECKED OFF ANY PROBLEMS, HOW DIFFICULT HAVE THESE MADE IT FOR YOU TO DO YOUR WORK, TAKE CARE OF THINGS AT HOME, OR GET ALONG WITH OTHER PEOPLE?: VERY DIFFICULT
7. FEELING AFRAID AS IF SOMETHING AWFUL MIGHT HAPPEN: SEVERAL DAYS
2. NOT BEING ABLE TO STOP OR CONTROL WORRYING: SEVERAL DAYS
5. BEING SO RESTLESS THAT IT IS HARD TO SIT STILL: NOT AT ALL
GAD7 TOTAL SCORE: 7
IF YOU CHECKED OFF ANY PROBLEMS ON THIS QUESTIONNAIRE, HOW DIFFICULT HAVE THESE PROBLEMS MADE IT FOR YOU TO DO YOUR WORK, TAKE CARE OF THINGS AT HOME, OR GET ALONG WITH OTHER PEOPLE: VERY DIFFICULT
GAD7 TOTAL SCORE: 7
1. FEELING NERVOUS, ANXIOUS, OR ON EDGE: MORE THAN HALF THE DAYS

## 2024-02-26 ENCOUNTER — THERAPY VISIT (OUTPATIENT)
Dept: PHYSICAL THERAPY | Facility: CLINIC | Age: 74
End: 2024-02-26
Attending: INTERNAL MEDICINE
Payer: MEDICARE

## 2024-02-26 ENCOUNTER — OFFICE VISIT (OUTPATIENT)
Dept: PULMONOLOGY | Facility: CLINIC | Age: 74
End: 2024-02-26
Attending: INTERNAL MEDICINE
Payer: MEDICARE

## 2024-02-26 VITALS
WEIGHT: 175 LBS | DIASTOLIC BLOOD PRESSURE: 66 MMHG | HEART RATE: 69 BPM | OXYGEN SATURATION: 96 % | SYSTOLIC BLOOD PRESSURE: 111 MMHG | BODY MASS INDEX: 29.57 KG/M2

## 2024-02-26 DIAGNOSIS — R42 DIZZINESS: Primary | ICD-10-CM

## 2024-02-26 DIAGNOSIS — J45.30 MILD PERSISTENT ASTHMA WITHOUT COMPLICATION: ICD-10-CM

## 2024-02-26 DIAGNOSIS — J45.40 MODERATE PERSISTENT ASTHMA, UNSPECIFIED WHETHER COMPLICATED: ICD-10-CM

## 2024-02-26 PROCEDURE — 97110 THERAPEUTIC EXERCISES: CPT | Mod: GP | Performed by: PHYSICAL THERAPIST

## 2024-02-26 PROCEDURE — 97116 GAIT TRAINING THERAPY: CPT | Mod: GP | Performed by: PHYSICAL THERAPIST

## 2024-02-26 PROCEDURE — 97112 NEUROMUSCULAR REEDUCATION: CPT | Mod: GP | Performed by: PHYSICAL THERAPIST

## 2024-02-26 PROCEDURE — 99214 OFFICE O/P EST MOD 30 MIN: CPT | Performed by: PHYSICIAN ASSISTANT

## 2024-02-26 PROCEDURE — 97750 PHYSICAL PERFORMANCE TEST: CPT | Mod: GP | Performed by: PHYSICAL THERAPIST

## 2024-02-26 RX ORDER — ALBUTEROL SULFATE 90 UG/1
1-2 AEROSOL, METERED RESPIRATORY (INHALATION) EVERY 4 HOURS PRN
Qty: 18 G | Refills: 11 | Status: SHIPPED | OUTPATIENT
Start: 2024-02-26

## 2024-02-26 RX ORDER — FLUTICASONE FUROATE AND VILANTEROL 200; 25 UG/1; UG/1
1 POWDER RESPIRATORY (INHALATION) DAILY
Qty: 3 EACH | Refills: 3 | Status: SHIPPED | OUTPATIENT
Start: 2024-02-26

## 2024-02-26 ASSESSMENT — ASTHMA QUESTIONNAIRES: ACT_TOTALSCORE: 21

## 2024-02-26 NOTE — PROGRESS NOTES
"Pulmonary Clinic Return Patient Visit  Reason for Visit: SOB/Asthma  History of Present Illness  Gem Gama is a 73 year old female with a history of CAD status post stents, ischemic cardiomyopathy with now improved EF to 55%, CKD stage III, and LEA previously on CPAP who presents to clinic for management of same.  Last seen in clinic with Dr. Moody 2/2023  Per Dr. Moody's last clinic note \"To briefly review, the patient was hospitalized 10/9/2019-10/13/2019 due to STEMI and underwent EDWIN x 2 to proximal LAD.  She did have e/o ischemic cardiomyopathy that showed interval improvement on subsequent echocardiogram.She did have some SOB which was very suggestive of asthma and her PFTs did show mild obstruction. She was started on high dose Breo with good results. \"    Today, she is doing a lot better and feels Breo is working well for her.  No hospitalizations for pneumonia nor asthma flares, hasn't needed prednisone in a long time (can't remember when).  Less shortness of breath and she denies any chest tightness or frequent wheezing. Occasional cough, mostly nonproductive but recently she has had a little more phlegm. No nasal congestion. She was diagnosed with a hiatal hernia, not GERD. Takes protonix daily and eats small frequent meals. She is not reliant on her rescue inhalers. Diagnosed with COVID 1 month ago which was mild and treated with Paxlovid. Had COVID 1/2023. During exercise her oxygen went down to 88% on room air at home. No chest pain or palpitations.   Never smoked. 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 Does not keep any pets and has a heated furnace with filters changed regularly. No family hx of chronic lung diseases or lung cancer.  Her daughter had a second stroke recently and is now non-verbal.     Review of Systems:  10 of 14 systems reviewed and are negative unless otherwise stated in HPI.    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    Cheyne-Rogers breathing disorder     Chronic kidney disease     Clostridium difficile infection 09/04/2022    Congestive heart failure (H) 2019    Right after STEMI, improved Fall 2019    Coronary artery disease involving native coronary artery of native heart without angina pectoris     Depression     Fever and chills 09/24/2021    Gastro-oesophageal reflux disease     Hypertension     Ischemic cardiomyopathy 2019    Major depressive disorder, recurrent episode, moderate (H) 07/25/2013    Narcolepsy     Pituitary microadenoma (H) 2011    MRI 2011- There is a triangular-shaped area with delayed contrast enhancement at the left lateral portion of the pituitary gland posteriorly, measuring 2.5 x 4.3 mm. This is suggestive of a microadenoma, MRI 10/1/22 - Normal pituitary gland and whole brain MRI.    Pyelonephritis 11/01/2022    Pyelonephritis of right kidney 10/24/2021    S/P hip replacement 2004    Bilateral fall 2004 due to OA    Sepsis, due to unspecified organism, unspecified whether acute organ dysfunction present (H) 11/01/2022    Status post total knee replacement 12/29/2014    Urinary tract infection with hematuria, site unspecified 09/24/2021       Past Surgical History:   Procedure Laterality Date    ABDOMEN SURGERY  1974, 1996    Kidney reconstruct. Uterer stenosis    ARTHROPLASTY KNEE  07/09/2014    Procedure: ARTHROPLASTY KNEE;  Surgeon: Levi Johnson MD;  Location:  OR    ARTHROPLASTY KNEE Left 11/24/2014    Procedure: ARTHROPLASTY KNEE;  Surgeon: Levi Johnson MD;  Location:  OR    COLONOSCOPY      Several times dates ??    CV CORONARY ANGIOGRAM N/A 10/09/2019    Procedure: Coronary Angiogram;  Surgeon: Chiquita Chatterjee MD;  Location:  HEART CARDIAC  CATH LAB    CV HEART CATHETERIZATION WITH POSSIBLE INTERVENTION N/A 10/10/2019    Procedure: Heart Catheterization with Possible Intervention;  Surgeon: Chin Garcia MD;  Location:  HEART CARDIAC CATH LAB    CV INTRA AORTIC BALLOON N/A 10/09/2019    Procedure: Intra-Aortic Balloon Pump Insertion;  Surgeon: Chiquita Chatterjee MD;  Location:  HEART CARDIAC CATH LAB    CV INTRA AORTIC BALLOON REMOVAL N/A 10/10/2019    Procedure: Intra-Aortic Balloon Pump Removal;  Surgeon: Chin Garcia MD;  Location:  HEART CARDIAC CATH LAB    CV LEFT HEART CATH N/A 12/11/2020    Procedure: Heart Catheterization with Possible Intervention;  Surgeon: Pilo Ootole MD;  Location:  HEART CARDIAC CATH LAB    CV PCI STENT DRUG ELUTING N/A 10/09/2019    Procedure: PCI Stent Drug Eluting;  Surgeon: Chiquita Chatterjee MD;  Location:  HEART CARDIAC CATH LAB    DAVINCI LAPAROSCOPIC CHOLECYSTECTOMY WITHOUT GRAMS N/A 11/8/2023    Procedure: CHOLECYSTECTOMY, ROBOT-ASSISTED, LAPAROSCOPIC, WITHOUT CHOLANGIOGRAM;  Surgeon: Mickey Gallego MD;  Location: [facility] OR    EP LOOP RECORDER IMPLANT N/A 11/22/2023    Procedure: Loop Recorder Implant;  Surgeon: Nadeem Jason MD;  Location:  HEART CARDIAC CATH LAB    EYE SURGERY  2011    Cataract surgery both eyes    GENITOURINARY SURGERY  01/01/2008    Prolift    GENITOURINARY SURGERY  1973    In 1973, she had surgery to 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 and had surgery to remove the scar tissue, rebuild the bladder from previous ureter surgery.    ORTHOPEDIC SURGERY      bilat total hip    RECTOCELE REPAIR      ZZC TOTAL ABD HYSTERECTOMY+BLAD REPR  01/01/1992    Vaginal approach with oophrectomy    ZZC TOTAL KNEE ARTHROPLASTY         Family History   Problem Relation Age of Onset    Hypertension Mother     Arthritis Mother     Cerebrovascular Disease  Mother     Anesthesia Reaction Mother         Halucinates    Venous thrombosis Father     Cerebrovascular Disease Father         Three Strokes    Diabetes Father         Type 2, after his 60's    Kidney Disease Father     Hypertension Father     Hyperlipidemia Father     Osteoporosis Father     Thyroid Disease Sister         Low thyroid    Anesthesia Reaction Sister     Diabetes Brother         After recovered from aspiration pneumonia, developed diabetes from high carb diet from feeding tube    Birth defects Brother     Osteoporosis Maternal Grandmother         Old age lived to 90    Coronary Artery Disease Maternal Grandfather         Strokes, lived to 105    Substance Abuse Maternal Grandfather         Alcohol    Osteoporosis Maternal Grandfather         Old age    Coronary Artery Disease Paternal Grandmother     Osteoporosis Paternal Grandmother         Long term issue for her    Obesity Paternal Grandmother     Coronary Artery Disease Paternal Grandfather     Sjogren's Daughter     Diabetes Daughter         Diagnosed after stroke    Cerebrovascular Disease Daughter     Hypertension Daughter         With diabetes and stroke    Hyperlipidemia Daughter     Depression Daughter         Does not acknowledge    Anxiety Disorder Daughter     Asthma Daughter         Anxiety, air quality, and exercise triggered    Obesity Daughter     Diabetes Daughter         Gestational and pre diabetic A1C    Anxiety Disorder Daughter     Asthma Daughter         Excursion induced    Thyroid Disease Daughter     Diabetes Son         On metformin.    Hypertension Son     Obesity Son     Depression Son           ..from job    Mental Illness Other     Substance Abuse Other     Substance Abuse Other     Substance Abuse Other        Social History     Socioeconomic History    Marital status:      Spouse name: Not on file    Number of children: Not on file    Years of education: Not on file    Highest education level:  Not on file   Occupational History    Not on file   Tobacco Use    Smoking status: Never Smoker    Smokeless tobacco: Never Used   Substance and Sexual Activity    Alcohol use: No    Drug use: No    Sexual activity: Not Currently     Partners: Male     Birth control/protection: None   Other Topics Concern    Parent/sibling w/ CABG, MI or angioplasty before 65F 55M? No     Service No    Blood Transfusions No    Caffeine Concern No    Occupational Exposure No    Hobby Hazards No    Sleep Concern Yes     Comment: Uses CPAP    Stress Concern No    Weight Concern Yes    Special Diet No    Back Care No    Exercise No    Bike Helmet No     Comment:      Seat Belt Yes    Self-Exams Yes   Social History Narrative    Not on file     Social Determinants of Health     Financial Resource Strain: Not on file   Food Insecurity: Not on file   Transportation Needs: Not on file   Physical Activity: Not on file   Stress: Not on file   Social Connections: Not on file   Intimate Partner Violence: Not on file   Housing Stability: Not on file         Allergies   Allergen Reactions    Dust Mites Cough, Headache, Other (See Comments) and Shortness Of Breath    Latex Other (See Comments) and Shortness Of Breath     Runny nose  PN: LW Reaction: DIFFICULTY BREATHING      Sulfa Antibiotics Anaphylaxis, Hives and Itching     PN: LW Reaction: HIVES, Swelling  PN: LW Reaction: HIVES, Swelling    Flagyl [Metronidazole Hcl] Anaphylaxis and Rash    Food      PN: LW FI1: nka LW FI2:    Hydrochlorothiazide W-Triamterene      PN: LW Reaction: skin rash  PN: LW Reaction: skin rash    No Clinical Screening - See Comments      PN: LW Other1: -Adhesive Tape    Seasonal Allergies      sneezing    Tape [Adhesive Tape] Hives    Metoprolol Itching and Rash    Metronidazole Hives and Rash     PN: LW Reaction: HIVES           Current Outpatient Medications:     albuterol (PROAIR HFA/PROVENTIL HFA/VENTOLIN HFA) 108 (90 Base) MCG/ACT inhaler, Inhale 1-2  puffs into the lungs every 4 hours as needed for shortness of breath or wheezing, Disp: 18 g, Rfl: 11    aspirin (ASA) 81 MG EC tablet, Take 1 tablet (81 mg) by mouth daily (Patient taking differently: Take 81 mg by mouth every morning), Disp: 90 tablet, Rfl: 0    atorvastatin (LIPITOR) 40 MG tablet, TAKE 1 TABLET(40 MG) BY MOUTH DAILY (Patient taking differently: Take 40 mg by mouth every morning), Disp: 90 tablet, Rfl: 3    carvedilol (COREG) 3.125 MG tablet, TAKE 1 TABLET(3.125 MG) BY MOUTH TWICE DAILY WITH MEALS (Patient taking differently: Take 3.125 mg by mouth 2 times daily (with meals) TAKE 1 TABLET(3.125 MG) BY MOUTH TWICE DAILY WITH MEALS), Disp: 180 tablet, Rfl: 1    cholestyramine (QUESTRAN) 4 g packet, Take 1 packet (4 g) by mouth 2 times daily (with meals), Disp: 60 packet, Rfl: 5    diazepam (VALIUM) 2 MG tablet, Take 0.5-1 tablets (1-2 mg) by mouth 2 times daily as needed for anxiety or muscle spasms, Disp: 30 tablet, Rfl: 0    diclofenac (VOLTAREN) 1 % topical gel, Apply topically 4 times daily as needed for moderate pain For Jaw pain, Disp: , Rfl:     estradiol (ESTRING) 2 MG vaginal ring, Place 1 each vaginally every 3 months, Disp: 1 each, Rfl: 3    fluticasone-vilanterol (BREO ELLIPTA) 200-25 MCG/ACT inhaler, Inhale 1 puff into the lungs daily (Patient taking differently: Inhale 1 puff into the lungs every morning), Disp: 3 each, Rfl: 3    isosorbide mononitrate (IMDUR) 30 MG 24 hr tablet, Take 1 tablet (30 mg) by mouth every evening Take with Carvedilol., Disp: 90 tablet, Rfl: 3    loperamide (IMODIUM) 2 MG capsule, Take 1 capsule (2 mg) by mouth daily, Disp: 30 capsule, Rfl: 0    loratadine (CLARITIN) 10 MG tablet, Take 10 mg by mouth At Bedtime, Disp: , Rfl:     magnesium oxide (MAG-OX) 400 MG tablet, Take 400 mg by mouth every morning, Disp: , Rfl:     Melatonin 10 MG TABS tablet, Take 10 mg by mouth nightly as needed for sleep, Disp: , Rfl:     nitroGLYcerin (NITROSTAT) 0.4 MG sublingual  tablet, Place 1 tablet (0.4 mg) under the tongue every 5 minutes as needed for chest pain, Disp: 25 tablet, Rfl: 11    pantoprazole (PROTONIX) 40 MG EC tablet, TAKE 1 TABLET(40 MG) BY MOUTH DAILY (Patient taking differently: Take 40 mg by mouth every morning), Disp: 90 tablet, Rfl: 3    Prenatal Multivit-Min-Fe-FA (PRENATAL/IRON) TABS, Take 1 tablet by mouth every morning, Disp: , Rfl:     saccharomyces boulardii (FLORASTOR) 250 MG capsule, Take 1 capsule (250 mg) by mouth 2 times daily, Disp: 14 capsule, Rfl: 0    sertraline (ZOLOFT) 100 MG tablet, TAKE 1 TABLET(100 MG) BY MOUTH DAILY (Patient taking differently: Take 100 mg by mouth every morning), Disp: 90 tablet, Rfl: 1    UNABLE TO FIND, Take 3 tablets by mouth daily MEDICATION NAME: Uqora complete regimen 1 TAB, AM - 2 TAB PM, Disp: , Rfl:     ursodiol (ACTIGALL) 300 MG capsule, TAKE 1 CAPSULE(300 MG) BY MOUTH TWICE DAILY (Patient taking differently: Take 300 mg by mouth 2 times daily TAKE 1 CAPSULE(300 MG) BY MOUTH TWICE DAILY), Disp: 180 capsule, Rfl: 3    valsartan (DIOVAN) 40 MG tablet, Take 1 tablet (40 mg) by mouth daily (Patient taking differently: Take 40 mg by mouth every morning), Disp: 90 tablet, Rfl: 1    vitamin D3 (CHOLECALCIFEROL) 2000 units (50 mcg) tablet, Take 1 tablet (2,000 Units) by mouth daily (Patient taking differently: Take 1 tablet by mouth every morning), Disp: 90 tablet, Rfl: 3    zinc gluconate 50 MG tablet, Take 50 mg by mouth every morning, Disp: , Rfl:       Physical Exam:  /66 (BP Location: Left arm, Patient Position: Sitting, Cuff Size: Adult Large)   Pulse 69   Wt 79.4 kg (175 lb)   LMP  (LMP Unknown)   SpO2 96%   BMI 29.57 kg/m    GENERAL: Well developed, well nourished, alert, and in no apparent distress.  RESP:  Normal respiratory effort.  CTAB.  No rales, wheezes, rhonchi.  No cyanosis or clubbing.  CV: Normal S1, S2, regular rhythm, normal rate. No murmur.  No LE edema.   SKIN: warm and dry. No rash.  PSYCH:  "mentation appears normal.   Results:  PFTs: Discussed and reviewed with patient - normal spirometry with positive bronchodilator response  Most Recent [facility] Pulmonary Function Testing    FVC-Pred   Date Value Ref Range Status   12/06/2021 2.74 L      FVC-Pre   Date Value Ref Range Status   12/06/2021 2.48 L      FVC-%Pred-Pre   Date Value Ref Range Status   12/06/2021 90 %      FEV1-Pre   Date Value Ref Range Status   12/06/2021 1.83 L      FEV1-%Pred-Pre   Date Value Ref Range Status   12/06/2021 86 %      FEV1FVC-Pred   Date Value Ref Range Status   12/06/2021 78 %      FEV1FVC-Pre   Date Value Ref Range Status   12/06/2021 74 %      No results found for: \"67124\"  FEFMax-Pred   Date Value Ref Range Status   12/06/2021 5.42 L/sec      FEFMax-Pre   Date Value Ref Range Status   12/06/2021 4.51 L/sec      FEFMax-%Pred-Pre   Date Value Ref Range Status   12/06/2021 83 %      ExpTime-Pre   Date Value Ref Range Status   12/06/2021 7.46 sec      FIFMax-Pre   Date Value Ref Range Status   12/06/2021 2.63 L/sec      FEV1FEV6-Pred   Date Value Ref Range Status   12/06/2021 79 %      FEV1FEV6-Pre   Date Value Ref Range Status   12/06/2021 73 %      No results found for: \"20055\"  Imaging (personally reviewed in clinic today): CT chest without contrast 02/21/2022  IMPRESSION: Osteopenia with extensive degenerative changes in the spine. No CT evidence of advanced interstitial lung disease. Atherosclerosis. Air trapping. Extensive cholelithiasis.  Echocardiogram  Interpretation Summary  Left ventricular visual ejection fraction is 50-55%.  Hypokinesis of the mid anteroseptal, inferoseptal and distal septal segments.  The right ventricle is normal in size and function.  Mild aortic insufficiency.  The inferior vena cava is normal.   This study was compared with the study from 10/2019, the LV function and wall motion abnormalities have improved substantially    Assessment and Plan:   Moderate Persistent Asthma   Continue ICS-LABA " (Breo 200). Due to her history of congenital obstructive uropathy, we should avoid LAMA type inhalers such as Spiriva, Incruse and Trelegy.   Today she reports that her oxygen was dropping to 88% on room air at home with exercise. 6 minute walk test ordered.   LEA  Continue to follow with her sleep physician and at the [place] [facility] [place]  Questions and concerns were answered to the patient's satisfaction.  she was provided with my contact information should new questions or concerns arise in the interim.  she should return to clinic in 12 months  Up to date on vaccinations  I spent a total of 40 minutes face to face with Gem Gama during today's office visit. Over 50% of this time was spent counseling the patient and/or coordinating care regarding their pulmonary disease.    Toña Melvin PA-C  Pulmonary, Critical Care and Sleep Medicine  [facility]

## 2024-02-26 NOTE — PROGRESS NOTES
"   02/26/24 1300   Signing Clinician's Name / Credentials   Signing clinician's name / credentials Nicole Saucedo DPT NCS   Functional Gait Assessment (JERICA Olvera., BECCA Dutton, et al. (2004))   1. GAIT LEVEL SURFACE 1  (8.69 seconds and 14 steps. 7.63 secondsa dn 14 steps.)   2. CHANGE IN GAIT SPEED 3  (5.75 seconds and 11 steps. Repeat was exactly the same)   3. GAIT WITH HORIZONTAL HEAD TURNS 2  ('tiny but weird, she weaves)   4. GAIT WITH VERTICAL HEAD TURNS 3   5. GAIT AND PIVOT TURN 3   6. STEP OVER OBSTACLE 2  (she can do it but she slows down and concentrates)   7. GAIT WITH NARROW BASE OF SUPPORT 0   8. GAIT WITH EYES CLOSED 1  (13.22 arms semiguarded and moves cautiously, \"I dont like that\")   9. AMBULATING BACKWARDS 2  (16.22 seconds adn 21 steps)   10. STEPS 2   Total Functional Gait Assessment Score   TOTAL SCORE: (MAXIMUM SCORE 30) 19     Functional Gait Assessment (FGA): The FGA assesses postural stability during various walking tasks.   Gait assistive device used: None    Scores of <22 /30 have been correlated with predicting falls in community-dwelling older adults according to Wade & Jace 2010.   Scores of <18 /30 have been correlated with increased risk for falls in patients with Parkinsons Disease according to Kraig Juarez, Lemos et al 2014.  Minimal Detectable Change for patients with acute/chronic stroke = 4.2 according to Thieme & Ritschel 2009  Minimal Detectable Change for patients with vestibular disorder = 8 according to Wade & Mccormick 2010    Assessment (rationale for performing, application to patient s function & care plan): at risk for falls with high level activity. She did have a fall in the past week when walking outside. She was 12/30 on 10/16/2023 and 19/30 on 1/10/2024. I dont think it is going to improve any further.   (Minutes billed as physical performance test):  15    Nicole Saucedo DPT, MPT, NCS  Physical Therapist   Board Certified Neurologic Clinical Specialist "     [facility]   [address] [place] [address]   [email or web address]  [email or web address]   [phone]   Clinic: [phone] //   Fax: [phone]

## 2024-02-27 ENCOUNTER — VIRTUAL VISIT (OUTPATIENT)
Dept: PSYCHOLOGY | Facility: CLINIC | Age: 74
End: 2024-02-27
Payer: MEDICARE

## 2024-02-27 DIAGNOSIS — F41.1 GENERALIZED ANXIETY DISORDER: ICD-10-CM

## 2024-02-27 DIAGNOSIS — F43.10 POSTTRAUMATIC STRESS DISORDER: ICD-10-CM

## 2024-02-27 DIAGNOSIS — F33.0 MAJOR DEPRESSIVE DISORDER, RECURRENT EPISODE, MILD (H): Primary | ICD-10-CM

## 2024-02-27 PROCEDURE — 90834 PSYTX W PT 45 MINUTES: CPT | Mod: 95 | Performed by: SOCIAL WORKER
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
## 2024-02-29 ENCOUNTER — OFFICE VISIT (OUTPATIENT)
Dept: UROLOGY | Facility: CLINIC | Age: 74
End: 2024-02-29
Payer: MEDICARE

## 2024-02-29 VITALS
SYSTOLIC BLOOD PRESSURE: 116 MMHG | HEIGHT: 65 IN | DIASTOLIC BLOOD PRESSURE: 59 MMHG | WEIGHT: 180 LBS | BODY MASS INDEX: 29.99 KG/M2 | HEART RATE: 93 BPM

## 2024-02-29 DIAGNOSIS — N39.8 VOIDING DYSFUNCTION: ICD-10-CM

## 2024-02-29 DIAGNOSIS — N39.41 URGE INCONTINENCE: ICD-10-CM

## 2024-02-29 DIAGNOSIS — N95.2 VAGINAL ATROPHY: ICD-10-CM

## 2024-02-29 DIAGNOSIS — R39.15 URINARY URGENCY: Primary | ICD-10-CM

## 2024-02-29 PROCEDURE — 51798 US URINE CAPACITY MEASURE: CPT | Mod: GC | Performed by: UROLOGY

## 2024-02-29 PROCEDURE — 99214 OFFICE O/P EST MOD 30 MIN: CPT | Mod: 25 | Performed by: UROLOGY

## 2024-02-29 ASSESSMENT — PAIN SCALES - GENERAL: PAINLEVEL: NO PAIN (0)

## 2024-02-29 NOTE — PROGRESS NOTES
"CHIEF COMPLAINT   It was my pleasure to see Gem Gama who is a 73 year old female for follow-up of urinary urgency and urge incontinence.      HPI  Gem Gama is a very pleasant 73 year old female who presents with a history of urinary urgency and urge incontinence    Wearing pads for continued leakage. PFPT somewhat helpful, but still having issues with leakage.    States that leakage can happen with or without activity. Can occur with urgency. Coughing and laughing not triggers for leakage.    Had urethral sling in the 1990s.    UTIs much less frequent after estring use. Discontinued myrbetriq for interaction with cardiac beta blocker. Interested in botox potentially if that will help with the urinary urgency.    PHYSICAL EXAM  Patient is a 73 year old  female   Vitals: Blood pressure 116/59, pulse 93, height 1.638 m (5' 4.5\"), weight 81.6 kg (180 lb), not currently breastfeeding.  General Appearance Adult: Body mass index is 30.42 kg/m .  Alert, no acute distress, oriented  HENT: throat/mouth:normal, good dentition  Lungs: no respiratory distress, or pursed lip breathing  Heart: No obvious jugular venous distension present  Abdomen: soft, nontender, no organomegaly or masses  Musculoskeltal: extremities normal, no peripheral edema  Skin: no suspicious lesions or rashes  Neuro: Alert, oriented, speech and mentation normal  Psych: affect and mood normal  Gait: Normal      PLAN  - continue estring    Morris Pemberton MD  PGY2   Urology      Addendum:    The patient was seen and evaluated with the resident.  The plan was formulated in conjunction with me and I agree with the note with changes made as necessary.    Patient with chronic urinary urgency, urgency incontinence ,voiding dysfunction, vaginal atrophy and history of UTIs    Recommend continuing estring (she is due for exchange)    Unable to take medications for the urinary urgency, urgency incontinence so discussed options of returning to PT or 3rd line " options    At this time she wishes to return to PT but was provided with some information about the 3rd line options    5 minutes were spent today on the date of the encounter in reviewing the EMR, direct patient care including discussion of prescription medications, coordination of care, and documentation.    Mel Rodriguez MD MPH  (she/her/hers)   of Urology  [facility]  Patient Care Team:  Kaylee Conteh MD as PCP - General (Internal Medicine)  Kaylee Conteh MD as Assigned PCP  Luis A Moody MD as Assigned Pulmonology Provider  Evangelist Lee MD as Hospitalist (Internal Medicine)  Mel Rodriguez MD as MD (Urology)  Mel Rodriguez MD as Assigned Surgical Provider  Kashif Hadley MD as Assigned Sleep Provider  Agus Segura MD as MD (Surgery)  Kristie Bell PA-C as Assigned Neuroscience Provider  Mickey Gallego MD as MD (Surgery)  Ramya Keller MD as MD (Otolaryngology)  Boyd Saunders AuD (Audiology)  Ramya Keller MD as MD (Otolaryngology)  Nadeem Jason MD as Assigned Heart and Vascular Provider  KAYLEE CONTEH

## 2024-02-29 NOTE — PATIENT INSTRUCTIONS
Websites with free information:    [employer] patient website: www.[email or web address]    Total Control Program: www.totalcontrolprogram.com    Continue estring change    Please see one of the dedicated pelvic floor physical therapist. If you have not heard from the scheduling office within 2 business days, please call [phone] for [facility]    It was a pleasure meeting with you today.  Thank you for allowing me and my team the privilege of caring for you today.  YOU are the reason we are here, and I truly hope we provided you with the excellent service you deserve.  Please let us know if there is anything else we can do for you so that we can be sure you are leaving completely satisfied with your care experience.

## 2024-02-29 NOTE — NURSING NOTE
"Gem Gama is a 73 year old female patient that presents today in clinic for the following:    Chief Complaint   Patient presents with    Follow Up     Symptom check       The patient's allergies and medications were reviewed as noted. A set of vitals were recorded as noted without incident. The patient does not have any other questions for the provider.    Blood pressure 116/59, pulse 93, height 1.638 m (5' 4.5\"), weight 81.6 kg (180 lb), not currently breastfeeding. Body mass index is 30.42 kg/m .    Patient Active Problem List   Diagnosis    TMJ (temporomandibular joint syndrome)    Congenital ureteric stenosis    Lacrimal duct stenosis    Chronic rhinitis    Intermittent asthma    Advanced directives, counseling/discussion    Major depressive disorder, recurrent episode, moderate (H)    Osteoarthritis of right knee    Primary narcolepsy without cataplexy    Vitamin D deficiency    Incontinence of feces with fecal urgency    Overactive bladder    Fatigue, unspecified type    History of ischemic cardiomyopathy    Hyperlipidemia LDL goal <70    Calculus of gallbladder without cholecystitis without obstruction    Coronary artery disease with angina pectoris, unspecified vessel or lesion type, unspecified whether native or transplanted heart (H24)    Environmental allergies    Class 1 obesity due to excess calories with serious comorbidity and body mass index (BMI) of 30.0 to 30.9 in adult    Chronic insomnia    Hiatal hernia    Cheyne-Rogers breathing disorder    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    Possible PLMD (periodic limb movement disorder)    Generalized anxiety disorder    Closed compression fracture of L3 lumbar vertebra, sequela    Bilateral hydronephrosis    Posttraumatic stress disorder    Dizziness    Stage 3b chronic kidney disease (H)    Adjustment disorder with anxiety    Major depressive disorder, recurrent episode, mild (H24)       Allergies   Allergen Reactions    Dust Mites Cough, Headache, Other " (See Comments) and Shortness Of Breath    Latex Other (See Comments) and Shortness Of Breath     Runny nose  PN: LW Reaction: DIFFICULTY BREATHING      Sulfa Antibiotics Anaphylaxis, Hives and Itching     PN: LW Reaction: HIVES, Swelling  PN: LW Reaction: HIVES, Swelling    Flagyl [Metronidazole Hcl] Anaphylaxis and Rash    Food      PN: LW FI1: nka LW FI2:    Hydrochlorothiazide W-Triamterene      PN: LW Reaction: skin rash  PN: LW Reaction: skin rash    No Clinical Screening - See Comments      PN: LW Other1: -Adhesive Tape    Seasonal Allergies      sneezing    Tape [Adhesive Tape] Hives    Metoprolol Itching and Rash    Metronidazole Hives and Rash     PN: LW Reaction: HIVES         Current Outpatient Medications   Medication Sig Dispense Refill    albuterol (PROAIR HFA/PROVENTIL HFA/VENTOLIN HFA) 108 (90 Base) MCG/ACT inhaler Inhale 1-2 puffs into the lungs every 4 hours as needed for shortness of breath or wheezing 18 g 11    aspirin (ASA) 81 MG EC tablet Take 1 tablet (81 mg) by mouth daily (Patient taking differently: Take 81 mg by mouth every morning) 90 tablet 0    atorvastatin (LIPITOR) 40 MG tablet TAKE 1 TABLET(40 MG) BY MOUTH DAILY (Patient taking differently: Take 40 mg by mouth every morning) 90 tablet 3    carvedilol (COREG) 3.125 MG tablet TAKE 1 TABLET(3.125 MG) BY MOUTH TWICE DAILY WITH MEALS (Patient taking differently: Take 3.125 mg by mouth 2 times daily (with meals) TAKE 1 TABLET(3.125 MG) BY MOUTH TWICE DAILY WITH MEALS) 180 tablet 1    cholestyramine (QUESTRAN) 4 g packet Take 1 packet (4 g) by mouth 2 times daily (with meals) 60 packet 5    diazepam (VALIUM) 2 MG tablet Take 0.5-1 tablets (1-2 mg) by mouth 2 times daily as needed for anxiety or muscle spasms 30 tablet 0    diclofenac (VOLTAREN) 1 % topical gel Apply topically 4 times daily as needed for moderate pain For Jaw pain      estradiol (ESTRING) 2 MG vaginal ring Place 1 each vaginally every 3 months 1 each 3     fluticasone-vilanterol (BREO ELLIPTA) 200-25 MCG/ACT inhaler Inhale 1 puff into the lungs daily 3 each 3    isosorbide mononitrate (IMDUR) 30 MG 24 hr tablet Take 1 tablet (30 mg) by mouth every evening Take with Carvedilol. 90 tablet 3    loperamide (IMODIUM) 2 MG capsule Take 1 capsule (2 mg) by mouth daily 30 capsule 0    loratadine (CLARITIN) 10 MG tablet Take 10 mg by mouth At Bedtime      magnesium oxide (MAG-OX) 400 MG tablet Take 400 mg by mouth every morning      Melatonin 10 MG TABS tablet Take 10 mg by mouth nightly as needed for sleep      nitroGLYcerin (NITROSTAT) 0.4 MG sublingual tablet Place 1 tablet (0.4 mg) under the tongue every 5 minutes as needed for chest pain 25 tablet 11    pantoprazole (PROTONIX) 40 MG EC tablet TAKE 1 TABLET(40 MG) BY MOUTH DAILY (Patient taking differently: Take 40 mg by mouth every morning) 90 tablet 3    Prenatal Multivit-Min-Fe-FA (PRENATAL/IRON) TABS Take 1 tablet by mouth every morning      saccharomyces boulardii (FLORASTOR) 250 MG capsule Take 1 capsule (250 mg) by mouth 2 times daily 14 capsule 0    sertraline (ZOLOFT) 100 MG tablet TAKE 1 TABLET(100 MG) BY MOUTH DAILY (Patient taking differently: Take 100 mg by mouth every morning) 90 tablet 1    UNABLE TO FIND Take 3 tablets by mouth daily MEDICATION NAME: Uqora complete regimen  1 TAB, AM - 2 TAB PM      ursodiol (ACTIGALL) 300 MG capsule TAKE 1 CAPSULE(300 MG) BY MOUTH TWICE DAILY (Patient taking differently: Take 300 mg by mouth 2 times daily TAKE 1 CAPSULE(300 MG) BY MOUTH TWICE DAILY) 180 capsule 3    valsartan (DIOVAN) 40 MG tablet Take 1 tablet (40 mg) by mouth daily (Patient taking differently: Take 40 mg by mouth every morning) 90 tablet 1    vitamin D3 (CHOLECALCIFEROL) 2000 units (50 mcg) tablet Take 1 tablet (2,000 Units) by mouth daily (Patient taking differently: Take 1 tablet by mouth every morning) 90 tablet 3    zinc gluconate 50 MG tablet Take 50 mg by mouth every morning         Social History      Tobacco Use    Smoking status: Never     Passive exposure: Never    Smokeless tobacco: Never   Vaping Use    Vaping Use: Never used   Substance Use Topics    Alcohol use: No    Drug use: Never       Dinahpili Malhotra  2/29/2024  2:17 PM

## 2024-02-29 NOTE — LETTER
"2/29/2024       RE: Gem Gama  [address]  [place] [address]     Dear Colleague,    Thank you for referring your patient, Gem Gama, to the [facility] [place] at [facility]. Please see a copy of my visit note below.    CHIEF COMPLAINT   It was my pleasure to see Gem Gama who is a 73 year old female for follow-up of urinary urgency and urge incontinence.      HPI  Gem Gama is a very pleasant 73 year old female who presents with a history of urinary urgency and urge incontinence    Wearing pads for continued leakage. PFPT somewhat helpful, but still having issues with leakage.    States that leakage can happen with or without activity. Can occur with urgency. Coughing and laughing not triggers for leakage.    Had urethral sling in the 1990s.    UTIs much less frequent after estring use. Discontinued myrbetriq for interaction with cardiac beta blocker. Interested in botox potentially if that will help with the urinary urgency.    PHYSICAL EXAM  Patient is a 73 year old  female   Vitals: Blood pressure 116/59, pulse 93, height 1.638 m (5' 4.5\"), weight 81.6 kg (180 lb), not currently breastfeeding.  General Appearance Adult: Body mass index is 30.42 kg/m .  Alert, no acute distress, oriented  HENT: throat/mouth:normal, good dentition  Lungs: no respiratory distress, or pursed lip breathing  Heart: No obvious jugular venous distension present  Abdomen: soft, nontender, no organomegaly or masses  Musculoskeltal: extremities normal, no peripheral edema  Skin: no suspicious lesions or rashes  Neuro: Alert, oriented, speech and mentation normal  Psych: affect and mood normal  Gait: Normal      PLAN  - continue estring    Morris Pemberton MD  PGY2   Urology      Addendum:    The patient was seen and evaluated with the resident.  The plan was formulated in conjunction with me and I agree with the note with changes " made as necessary.    Patient with chronic urinary urgency, urgency incontinence ,voiding dysfunction, vaginal atrophy and history of UTIs    Recommend continuing estring (she is due for exchange)    Unable to take medications for the urinary urgency, urgency incontinence so discussed options of returning to PT or 3rd line options    At this time she wishes to return to PT but was provided with some information about the 3rd line options    5 minutes were spent today on the date of the encounter in reviewing the EMR, direct patient care including discussion of prescription medications, coordination of care, and documentation.    Mel Rodriguez MD MPH  (she/her/hers)   of Urology  [facility]  Patient Care Team:  Danny Conteh MD as PCP - General (Internal Medicine)  Danny Conteh MD as Assigned PCP  Luis A Moody MD as Assigned Pulmonology Provider  Evangelist Lee MD as Hospitalist (Internal Medicine)  Mel Rodriguez MD as MD (Urology)  Mel Rodriguez MD as Assigned Surgical Provider  Kashif Hadley MD as Assigned Sleep Provider  Agus Segura MD as MD (Surgery)  Kristie Bell PA-C as Assigned Neuroscience Provider

## 2024-03-01 ENCOUNTER — OFFICE VISIT (OUTPATIENT)
Dept: FAMILY MEDICINE | Facility: CLINIC | Age: 74
End: 2024-03-01
Payer: MEDICARE

## 2024-03-01 VITALS
TEMPERATURE: 97.5 F | OXYGEN SATURATION: 98 % | BODY MASS INDEX: 29.82 KG/M2 | HEART RATE: 56 BPM | WEIGHT: 179 LBS | RESPIRATION RATE: 18 BRPM | SYSTOLIC BLOOD PRESSURE: 121 MMHG | HEIGHT: 65 IN | DIASTOLIC BLOOD PRESSURE: 71 MMHG

## 2024-03-01 DIAGNOSIS — I47.20 VENTRICULAR TACHYCARDIA, UNSPECIFIED (H): ICD-10-CM

## 2024-03-01 DIAGNOSIS — N18.32 STAGE 3B CHRONIC KIDNEY DISEASE (H): ICD-10-CM

## 2024-03-01 DIAGNOSIS — I25.119 CORONARY ARTERY DISEASE WITH ANGINA PECTORIS, UNSPECIFIED VESSEL OR LESION TYPE, UNSPECIFIED WHETHER NATIVE OR TRANSPLANTED HEART (H): ICD-10-CM

## 2024-03-01 DIAGNOSIS — G45.9 TIA (TRANSIENT ISCHEMIC ATTACK): Primary | ICD-10-CM

## 2024-03-01 PROCEDURE — 99214 OFFICE O/P EST MOD 30 MIN: CPT | Performed by: INTERNAL MEDICINE

## 2024-03-01 RX ORDER — NEBIVOLOL 2.5 MG/1
2.5 TABLET ORAL DAILY
Qty: 90 TABLET | Refills: 1 | Status: SHIPPED | OUTPATIENT
Start: 2024-03-02 | End: 2024-03-01

## 2024-03-01 RX ORDER — NEBIVOLOL 2.5 MG/1
2.5 TABLET ORAL EVERY EVENING
Qty: 90 TABLET | Refills: 1 | Status: SHIPPED | OUTPATIENT
Start: 2024-03-01 | End: 2024-04-02

## 2024-03-01 RX ORDER — RESPIRATORY SYNCYTIAL VIRUS VACCINE 120MCG/0.5
0.5 KIT INTRAMUSCULAR ONCE
Qty: 1 EACH | Refills: 0 | Status: CANCELLED | OUTPATIENT
Start: 2024-03-01 | End: 2024-03-01

## 2024-03-01 ASSESSMENT — PAIN SCALES - GENERAL: PAINLEVEL: NO PAIN (1)

## 2024-03-01 NOTE — PATIENT INSTRUCTIONS
At [facility], we strive to deliver an exceptional experience to you, every time we see you. If you receive a survey, please complete it as we do value your feedback.  If you have MyChart, you can expect to receive results automatically within 24 hours of their completion.  Your provider will send a note interpreting your results as well.   If you do not have MyChart, you should receive your results in about a week by mail.    Your care team:                            Family Medicine Internal Medicine   MD Danny Christian, MD Jahaira Olivas, MD Sirisha Regan, PA-C    Sd Wagner, MD Pediatrics   Mustapha Suresh, PA-C  Beulah Bateman, MD Maria E Prieto APRKATE Luke CNP, CNP, MD Letty Garcia, MD Elizabeth Choi, CNP  Same-Day (No follow up visit)   Kimani Núñze, DEVON Bass, PA-C    Anjali Arenas PA-C     Clinic hours: Monday - Thursday 7 am-6 pm; Fridays 7 am-5 pm.   Urgent care: Monday - Friday 10 am- 8 pm; Saturday and Sunday 9 am-5 pm.    Clinic: [phone]       [facility]: Monday - Thursday 8 am - 7 pm; Friday 8 am - 6 pm  [facility]: [phone]

## 2024-03-01 NOTE — PROGRESS NOTES
[facility] INTERNAL MEDICINE NOTE    Gem Gama is a 73 year old female who presents to clinic today for the following health issues:    Reason for visit:  Odd symptoms, such as wake up with feeling like hot on head one night. Another night i woke up and was all upset i could not remember the name of med. Later i woke again an it was clear. Also seem to be having time with names or words. I get lslower  Symptom onset:  1-2 weeks ago  Symptoms include:  Varied and odd, no room here talked to nurse  Symptom intensity:  Mild  Symptom progression:  Staying the same  Had these symptoms before:  No  What makes it worse:  Random  What makes it better:  Dont know  -Feels lightheaded after standing and out-of-breath.  -Wakes up and does not even remember if she took her medications.    Patient Active Problem List   Diagnosis    TMJ (temporomandibular joint syndrome)    Congenital ureteric stenosis    Lacrimal duct stenosis    Chronic rhinitis    Intermittent asthma    Advanced directives, counseling/discussion    Major depressive disorder, recurrent episode, moderate (H)    Osteoarthritis of right knee    Primary narcolepsy without cataplexy    Vitamin D deficiency    Incontinence of feces with fecal urgency    Overactive bladder    Fatigue, unspecified type    History of ischemic cardiomyopathy    Hyperlipidemia LDL goal <70    Calculus of gallbladder without cholecystitis without obstruction    Coronary artery disease with angina pectoris, unspecified vessel or lesion type, unspecified whether native or transplanted heart (H24)    Environmental allergies    Class 1 obesity due to excess calories with serious comorbidity and body mass index (BMI) of 30.0 to 30.9 in adult    Chronic insomnia    Hiatal hernia    Cheyne-Rogers breathing disorder    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 Uterer stenosis    ARTHROPLASTY KNEE  07/09/2014    Procedure: ARTHROPLASTY KNEE;  Surgeon: Levi Johnson MD;  Location:  OR    ARTHROPLASTY KNEE Left 11/24/2014    Procedure: ARTHROPLASTY KNEE;  Surgeon: Levi Johnson MD;  Location:  OR    COLONOSCOPY      Several times dates ??    CV CORONARY ANGIOGRAM N/A 10/09/2019    Procedure: Coronary Angiogram;  Surgeon: Chiquita Chatterjee MD;  Location:  HEART CARDIAC CATH LAB    CV HEART CATHETERIZATION WITH POSSIBLE INTERVENTION N/A 10/10/2019    Procedure: Heart Catheterization with Possible Intervention;  Surgeon: Chin Garcia MD;  Location:  HEART CARDIAC CATH LAB    CV INTRA AORTIC BALLOON N/A 10/09/2019    Procedure: Intra-Aortic Balloon Pump Insertion;  Surgeon: Chiquita Chatterjee MD;  Location:  HEART CARDIAC CATH LAB    CV INTRA AORTIC BALLOON REMOVAL N/A 10/10/2019    Procedure: Intra-Aortic Balloon Pump Removal;  Surgeon: Chin Garcia MD;  Location:  HEART CARDIAC CATH LAB    CV LEFT HEART CATH N/A 12/11/2020    Procedure: Heart Catheterization with Possible Intervention;  Surgeon: Pilo Otoole MD;  Location:  HEART CARDIAC CATH LAB    CV PCI STENT DRUG ELUTING N/A 10/09/2019    Procedure: PCI Stent Drug Eluting;  Surgeon: Chiquita Chatterjee MD;  Location:  HEART CARDIAC CATH LAB    DAVINCI LAPAROSCOPIC CHOLECYSTECTOMY WITHOUT GRAMS N/A 11/8/2023    Procedure: CHOLECYSTECTOMY, ROBOT-ASSISTED, LAPAROSCOPIC, WITHOUT CHOLANGIOGRAM;  Surgeon: Mickey Gallego MD;  Location: [facility]  RECORDER IMPLANT N/A 11/22/2023    Procedure: Loop Recorder Implant;  Surgeon: Naedem Jason MD;  Location:  HEART CARDIAC CATH LAB    EYE SURGERY  2011    Cataract surgery both eyes    GENITOURINARY SURGERY  01/01/2008    Prolift    GENITOURINARY SURGERY  1973    In 1973, she had surgery to correct congenital narrowing in the ureter at its connection to the right kidney    GENITOURINARY SURGERY  1997 1997 pt went to [facility] and had surgery to remove the scar tissue, rebuild the bladder from previous ureter surgery.    ORTHOPEDIC SURGERY      bilat total hip    RECTOCELE REPAIR      ZZC TOTAL ABD HYSTERECTOMY+BLAD REPR  01/01/1992    Vaginal approach with oophrectomy    ZZC TOTAL KNEE ARTHROPLASTY         Social History     Tobacco Use    Smoking status: Never     Passive exposure: Never    Smokeless tobacco: Never   Substance Use Topics    Alcohol use: No     Family History   Problem Relation Age of Onset    Hypertension Mother     Arthritis Mother     Cerebrovascular Disease Mother     Anesthesia Reaction Mother         Halucinates    Venous thrombosis Father     Cerebrovascular Disease Father         Three Strokes    Diabetes Father         Type 2, after his 60's    Kidney Disease Father     Hypertension Father     Hyperlipidemia Father     Osteoporosis Father     Thyroid Disease Sister         Low thyroid    Anesthesia Reaction Sister     Diabetes Brother         After recovered from aspiration pneumonia, developed diabetes from high carb diet from feeding tube    Birth defects Brother     Osteoporosis Maternal Grandmother         Old age lived to 90    Coronary Artery Disease Maternal Grandfather         Strokes, lived to 105    Substance Abuse Maternal Grandfather         Alcohol    Osteoporosis Maternal Grandfather         Old age    Coronary Artery Disease Paternal Grandmother     Osteoporosis Paternal Grandmother         Long term issue for her    Obesity Paternal Grandmother     Coronary  Artery Disease Paternal Grandfather     Sjogren's Daughter     Diabetes Daughter         Diagnosed after stroke    Cerebrovascular Disease Daughter     Hypertension Daughter         With diabetes and stroke    Hyperlipidemia Daughter     Depression Daughter         Does not acknowledge    Anxiety Disorder Daughter     Asthma Daughter         Anxiety, air quality, and exercise triggered    Obesity Daughter     Diabetes Daughter         Gestational and pre diabetic A1C    Anxiety Disorder Daughter     Asthma Daughter         Excursion induced    Thyroid Disease Daughter     Diabetes Son         On metformin.    Hypertension Son     Obesity Son     Depression Son           ..from job    Mental Illness Other     Substance Abuse Other     Substance Abuse Other     Substance Abuse Other          Allergies   Allergen Reactions    Dust Mites Cough, Headache, Other (See Comments) and Shortness Of Breath    Latex Other (See Comments) and Shortness Of Breath     Runny nose  PN: LW Reaction: DIFFICULTY BREATHING      Sulfa Antibiotics Anaphylaxis, Hives and Itching     PN: LW Reaction: HIVES, Swelling  PN: LW Reaction: HIVES, Swelling    Flagyl [Metronidazole Hcl] Anaphylaxis and Rash    Food      PN: LW FI1: nka LW FI2:    Hydrochlorothiazide W-Triamterene      PN: LW Reaction: skin rash  PN: LW Reaction: skin rash    No Clinical Screening - See Comments      PN: LW Other1: -Adhesive Tape    Seasonal Allergies      sneezing    Tape [Adhesive Tape] Hives    Metoprolol Itching and Rash    Metronidazole Hives and Rash     PN: LW Reaction: HIVES       Recent Labs   Lab Test 10/12/23  0645 10/11/23  1756 09/28/23  1236 08/05/23  1204 07/22/23  0737 11/01/22  0544 09/21/22  0820 08/26/22  0918 06/21/22  0842 02/08/22  1407 09/23/21  1832 12/17/20  1403 12/10/20  0557 02/09/17  1230 02/02/16  1634   A1C  --   --   --   --   --   --   --   --   --   --   --  5.2  --   --   --    LDL  --   --   --  56  --   --   --  70   "--  92  --  37  --    < > 126*   HDL  --   --   --  62  --   --   --  60  --  54  --  51  --    < > 54   TRIG  --   --   --  88  --   --   --  55  --  234*  --  168*  --    < > 105   ALT  --   --  118* 19 16   < >  --  29  --   --    < > 26  --    < >  --    CR 1.11* 1.00* 0.99* 1.02* 0.99*   < >  --  0.92   < > 1.13*   < > 1.00 1.04   < > 1.06*   GFRESTIMATED 52* 59* 60* 57* 60*   < >  --  65   < > 51*   < > 56* 54*   < > 52*   GFRESTBLACK  --   --   --   --   --   --   --   --   --   --   --  65 62   < > 63   POTASSIUM 3.7 3.8 4.9 4.8 3.9   < >  --  4.8   < > 4.6   < > 3.9 4.3   < > 3.8   TSH  --   --   --   --   --   --  2.02  --   --   --   --   --   --   --  1.62    < > = values in this interval not displayed.      BP Readings from Last 3 Encounters:   03/01/24 121/71   02/29/24 116/59   02/26/24 111/66    Wt Readings from Last 3 Encounters:   03/01/24 81.2 kg (179 lb)   02/29/24 81.6 kg (180 lb)   02/26/24 79.4 kg (175 lb)           ROS:  C: NEGATIVE for fever, chills, change in weight  I: NEGATIVE for worrisome rashes, moles or lesions  E: NEGATIVE for vision changes or irritation  E/M: NEGATIVE for ear, mouth and throat problems  R: NEGATIVE for significant cough or SOB  B: NEGATIVE for masses, tenderness or discharge  CV: NEGATIVE for chest pain, palpitations or peripheral edema  GI: NEGATIVE for nausea, abdominal pain, heartburn, or change in bowel habits  : NEGATIVE for frequency, dysuria, or hematuria  M: NEGATIVE for significant arthralgias or myalgia  N: NEGATIVE for weakness, dizziness or paresthesias  E: NEGATIVE for temperature intolerance, skin/hair changes  H: NEGATIVE for bleeding problems  P: NEGATIVE for changes in mood or affect    OBJECTIVE:                                                    /71 (BP Location: Left arm, Patient Position: Chair, Cuff Size: Adult Regular)   Pulse 56   Temp 97.5  F (36.4  C) (Tympanic)   Resp 18   Ht 1.638 m (5' 4.5\")   Wt 81.2 kg (179 lb)   LMP  " (LMP Unknown)   SpO2 98%   BMI 30.25 kg/m    Body mass index is 30.25 kg/m .  GENERAL: alert and no distress  EYES: Eyes grossly normal to inspection and conjunctivae and sclerae normal  HENT: normal cephalic/atraumatic and oral mucous membranes moist  NECK: no adenopathy and thyroid normal to palpation  RESP: lungs clear to auscultation - no rales, rhonchi or wheezes  CV: regular rates and rhythm and no peripheral edema  MS: no gross musculoskeletal defects noted, no edema  NEURO: Normal strength and tone, mentation intact and speech normal  PSYCH: mentation appears normal, affect normal/bright    Diagnostic Test Results:    [facility]  Diagnostic and Treatment-3rd Floor  [address] [place] [address]     Name: INDER SMITH  MRN: [id number]  : 1950  Study Date: 2023 11:09 AM  Age: 72 yrs  Gender: Female  Patient Location: [facility]  Reason For Study: Dizziness, Coronary artery disease with angina pectoris,  unspeci  Ordering Physician: ALEXY TRACEY  Referring Physician: ALEXY TRACEY  Performed By: Nba Urias RDCS     BSA: 1.9 m2  Height: 64 in  Weight: 183 lb  HR: 58  BP: 133/63 mmHg  ______________________________________________________________________________  Procedure  Echocardiogram with two-dimensional, color and spectral Doppler performed.  ______________________________________________________________________________  Interpretation Summary  Global and regional left ventricular function is normal with an EF of 55-60%.  Right ventricular function, chamber size, wall motion, and thickness are  normal.  The inferior vena cava is normal.  No pericardial effusion is present.  No significant changes noted.  ______________________________________________________________________________  Left Ventricle  Global and regional left ventricular function is normal with an EF of 55-60%.  Left ventricular wall thickness is  normal. Left ventricular size is normal.  Grade I or early diastolic dysfunction. No regional wall motion abnormalities  are seen.     Right Ventricle  Right ventricular function, chamber size, wall motion, and thickness are  normal.     Atria  Both atria appear normal.     Mitral Valve  The mitral valve is normal. Trace mitral insufficiency is present.     Aortic Valve  Aortic valve sclerosis is present. Trace aortic insufficiency is present.     Tricuspid Valve  The tricuspid valve is normal. Trace tricuspid insufficiency is present.  Pulmonary artery systolic pressure cannot be assessed.     Pulmonic Valve  The valve leaflets are not well visualized. On Doppler interrogation, there is  no significant stenosis or regurgitation.     Vessels  The thoracic aorta is normal. The pulmonary artery and bifurcation cannot be  assessed. The inferior vena cava is normal.     Pericardium  No pericardial effusion is present.     Compared to Previous Study  No significant changes noted.      EXAM: CT CHEST PULMONARY EMBOLISM W CONTRAST  LOCATION: [facility]  DATE: 10/11/2023     INDICATION: Chest pain. Elevated D-dimer.  COMPARISON: CT chest without IV contrast 02/21/2022.  TECHNIQUE: CT chest pulmonary angiogram during arterial phase injection of IV contrast. Multiplanar reformats and MIP reconstructions were performed. Dose reduction techniques were used.   CONTRAST: 68 mL Isovue 370.     FINDINGS:  ANGIOGRAM CHEST: No pulmonary emboli on either side. Mildly atherosclerotic thoracic aorta without aneurysm. No CT evidence of right heart strain.     LUNGS AND PLEURA: Mild diffuse thickening of the bronchi. Dependent atelectasis in the posterior lungs. Calcified granuloma right lower lobe. No pleural effusion on either side.     MEDIASTINUM/AXILLAE: Normal cardiac size. Coronary artery calcifications and stents. No pericardial effusion. No suspicious adenopathy. Trachea is midline.     CORONARY ARTERY  CALCIFICATION: Previous intervention (stents or CABG).     UPPER ABDOMEN: Cholelithiasis. Vascular calcifications.     MUSCULOSKELETAL: Degenerative changes both shoulders and the thoracic spine.                                                                      IMPRESSION:  1.  No pulmonary emboli on either side. Mild diffuse thickening of the bronchi. Evidence of remote granulomatous disease.     2.  Mildly atherosclerotic thoracic aorta without aneurysm. Normal    cardiac size. Coronary artery calcifications and stents.     3.  Cholelithiasis.     ASSESSMENT/PLAN:                                                      TIA (transient ischemic attack)  Suspected of patient's complaints of transient neurological changes.    Stage 3b chronic kidney disease (H)  - Albumin Random Urine Quantitative with Creat Ratio    Ventricular tachycardia, unspecified (H)  - nebivolol (BYSTOLIC) 2.5 MG tablet; Take 1 tablet (2.5 mg) by mouth every evening    Coronary artery disease with angina pectoris, unspecified vessel or lesion type, unspecified whether native or transplanted heart (H24)  - nebivolol (BYSTOLIC) 2.5 MG tablet; Take 1 tablet (2.5 mg) by mouth every evening     Disposition:  Follow-up in 8 weeks or as needed.    Danny Conteh MD  [facility]

## 2024-03-04 ENCOUNTER — OFFICE VISIT (OUTPATIENT)
Dept: UROLOGY | Facility: CLINIC | Age: 74
End: 2024-03-04
Payer: MEDICARE

## 2024-03-04 DIAGNOSIS — N39.0 RECURRENT UTI: Primary | ICD-10-CM

## 2024-03-04 DIAGNOSIS — N95.2 VAGINAL ATROPHY: ICD-10-CM

## 2024-03-04 DIAGNOSIS — R39.15 URINARY URGENCY: ICD-10-CM

## 2024-03-04 DIAGNOSIS — N39.41 URGE INCONTINENCE: ICD-10-CM

## 2024-03-04 DIAGNOSIS — R15.9 INCONTINENCE OF FECES, UNSPECIFIED FECAL INCONTINENCE TYPE: ICD-10-CM

## 2024-03-04 PROCEDURE — 99214 OFFICE O/P EST MOD 30 MIN: CPT | Performed by: PHYSICIAN ASSISTANT

## 2024-03-04 RX ORDER — ESTRADIOL 2 MG/1
1 RING VAGINAL
Qty: 1 EACH | Refills: 3 | Status: SHIPPED | OUTPATIENT
Start: 2024-03-04

## 2024-03-04 NOTE — NURSING NOTE
Gem Gama's goals for this visit include:   Chief Complaint   Patient presents with    RECHECK     Follow up per Dr. Bennie holland       She requests these members of her care team be copied on today's visit information:       PCP: Danny Conteh    Referring Provider:  Mel Rodriguez MD  [address]  [place] [address]    LMP  (LMP Unknown)     Do you need any medication refills at today's visit?     Crystal Salgado LPN on 3/4/2024 at 11:09 AM

## 2024-03-04 NOTE — PROGRESS NOTES
Urology Clinic      Name: Gem Gama    MRN: [id number]   YOB: 1950  Accompanied at today's visit by:self                 Chief Complaint:   UUI          History of Present Illness:   March 4, 2024    HISTORY:   We have been following 73 year old Gem Gama for vaginal atrophy, hx of UTI, hx of pyelonephritis, UUI, voiding dysfunction. Last seen by Dr. Rodriguez on 2/29/24 and thought PFPT was somewhat helpful in the past but still had some leakage of urine. Last encounter discussed returning to PFPT vs third line options and patient opted for PFPT.  Patient here for followup and estring exchange. Is pleased that she hast not had any recent UTIs. Does have UUI and occasional FI. Planning to go back to PFPT soon as had to take a break from PFPT as her daughter had a stroke and she has been caring for her daughter. Hx of urethral sing in 1990s. Patient voices no other concerns at this time.            Allergies:     Allergies   Allergen Reactions    Dust Mites Cough, Headache, Other (See Comments) and Shortness Of Breath    Latex Other (See Comments) and Shortness Of Breath     Runny nose  PN: LW Reaction: DIFFICULTY BREATHING      Sulfa Antibiotics Anaphylaxis, Hives and Itching     PN: LW Reaction: HIVES, Swelling  PN: LW Reaction: HIVES, Swelling    Flagyl [Metronidazole Hcl] Anaphylaxis and Rash    Food      PN: LW FI1: nka LW FI2:    Hydrochlorothiazide W-Triamterene      PN: LW Reaction: skin rash  PN: LW Reaction: skin rash    No Clinical Screening - See Comments      PN: LW Other1: -Adhesive Tape    Seasonal Allergies      sneezing    Tape [Adhesive Tape] Hives    Metoprolol Itching and Rash    Metronidazole Hives and Rash     PN: LW Reaction: HIVES              Medications:     Current Outpatient Medications   Medication Sig    albuterol (PROAIR HFA/PROVENTIL HFA/VENTOLIN HFA) 108 (90 Base) MCG/ACT inhaler Inhale 1-2 puffs into the lungs every 4 hours as needed for shortness of breath or  wheezing    aspirin (ASA) 81 MG EC tablet Take 1 tablet (81 mg) by mouth daily    atorvastatin (LIPITOR) 40 MG tablet TAKE 1 TABLET(40 MG) BY MOUTH DAILY    cholestyramine (QUESTRAN) 4 g packet Take 1 packet (4 g) by mouth 2 times daily (with meals)    diazepam (VALIUM) 2 MG tablet Take 0.5-1 tablets (1-2 mg) by mouth 2 times daily as needed for anxiety or muscle spasms    diclofenac (VOLTAREN) 1 % topical gel Apply topically 4 times daily as needed for moderate pain For Jaw pain    estradiol (ESTRING) 2 MG vaginal ring Place 1 each vaginally every 3 months    fluticasone-vilanterol (BREO ELLIPTA) 200-25 MCG/ACT inhaler Inhale 1 puff into the lungs daily    isosorbide mononitrate (IMDUR) 30 MG 24 hr tablet Take 1 tablet (30 mg) by mouth every evening Take with Carvedilol.    loperamide (IMODIUM) 2 MG capsule Take 1 capsule (2 mg) by mouth daily    loratadine (CLARITIN) 10 MG tablet Take 10 mg by mouth At Bedtime    magnesium oxide (MAG-OX) 400 MG tablet Take 400 mg by mouth every morning    Melatonin 10 MG TABS tablet Take 10 mg by mouth nightly as needed for sleep    nebivolol (BYSTOLIC) 2.5 MG tablet Take 1 tablet (2.5 mg) by mouth every evening    nitroGLYcerin (NITROSTAT) 0.4 MG sublingual tablet Place 1 tablet (0.4 mg) under the tongue every 5 minutes as needed for chest pain    pantoprazole (PROTONIX) 40 MG EC tablet TAKE 1 TABLET(40 MG) BY MOUTH DAILY (Patient taking differently: Take 40 mg by mouth every morning)    Prenatal Multivit-Min-Fe-FA (PRENATAL/IRON) TABS Take 1 tablet by mouth every morning    saccharomyces boulardii (FLORASTOR) 250 MG capsule Take 1 capsule (250 mg) by mouth 2 times daily    sertraline (ZOLOFT) 100 MG tablet TAKE 1 TABLET(100 MG) BY MOUTH DAILY (Patient taking differently: Take 100 mg by mouth every morning)    UNABLE TO FIND Take 3 tablets by mouth daily MEDICATION NAME: Uqora complete regimen  1 TAB, AM - 2 TAB PM    ursodiol (ACTIGALL) 300 MG capsule TAKE 1 CAPSULE(300 MG) BY  MOUTH TWICE DAILY (Patient taking differently: Take 300 mg by mouth 2 times daily TAKE 1 CAPSULE(300 MG) BY MOUTH TWICE DAILY)    valsartan (DIOVAN) 40 MG tablet Take 1 tablet (40 mg) by mouth daily (Patient taking differently: Take 40 mg by mouth every morning)    vitamin D3 (CHOLECALCIFEROL) 2000 units (50 mcg) tablet Take 1 tablet (2,000 Units) by mouth daily (Patient taking differently: Take 1 tablet by mouth every morning)    zinc gluconate 50 MG tablet Take 50 mg by mouth every morning     No current facility-administered medications for this visit.               Past  Surgical History:     Past Surgical History:   Procedure Laterality Date    ABDOMEN SURGERY  1974, 1996    Kidney reconstruct. Uterer stenosis    ARTHROPLASTY KNEE  07/09/2014    Procedure: ARTHROPLASTY KNEE;  Surgeon: Levi Johnson MD;  Location:  OR    ARTHROPLASTY KNEE Left 11/24/2014    Procedure: ARTHROPLASTY KNEE;  Surgeon: Levi Johnson MD;  Location:  OR    COLONOSCOPY      Several times dates ??    CV CORONARY ANGIOGRAM N/A 10/09/2019    Procedure: Coronary Angiogram;  Surgeon: Chiquita Chatterjee MD;  Location:  [facility]    CV HEART CATHETERIZATION WITH POSSIBLE INTERVENTION N/A 10/10/2019    Procedure: Heart Catheterization with Possible Intervention;  Surgeon: Chin Garcia MD;  Location:  [facility]    CV INTRA AORTIC BALLOON N/A 10/09/2019    Procedure: Intra-Aortic Balloon Pump Insertion;  Surgeon: Chiquita Chatterjee MD;  Location:  [facility]ATH [facility]    CV INTRA AORTIC BALLOON REMOVAL N/A 10/10/2019    Procedure: Intra-Aortic Balloon Pump Removal;  Surgeon: Chin Garcia MD;  Location:  [facility]ATH [facility]    CV LEFT HEART CATH N/A 12/11/2020    Procedure: Heart Catheterization with Possible Intervention;  Surgeon: Pilo Otoole MD;  Location:  [facility]ATH [facility]    CV PCI STENT DRUG ELUTING N/A 10/09/2019    Procedure: PCI Stent Drug Eluting;   Surgeon: Chiquita Chatterjee MD;  Location:  [facility]ATH [facility]    DAVINCI LAPAROSCOPIC CHOLECYSTECTOMY WITHOUT GRAMS N/A 11/8/2023    Procedure: CHOLECYSTECTOMY, ROBOT-ASSISTED, LAPAROSCOPIC, WITHOUT CHOLANGIOGRAM;  Surgeon: Mickey Gallego MD;  Location: [facility] OR    EP LOOP RECORDER IMPLANT N/A 11/22/2023    Procedure: Loop Recorder Implant;  Surgeon: Nadeem Jason MD;  Location:  HEART CARDI[facility] CATH LAB    EYE SURGERY  2011    Cataract surgery both eyes    GENITOURINARY SURGERY  01/01/2008    Prolift    GENITOURINARY SURGERY  1973    In 1973, she had surgery to correct congenital narrowing in the ureter at its connection to the right kidney    GENITOURINARY SURGERY  1997 1997 pt went to [facility] and had surgery to remove the scar tissue, rebuild the bladder from previous ureter surgery.    ORTHOPEDIC SURGERY      bilat total hip    RECTOCELE REPAIR      ZZC TOTAL ABD HYSTERECTOMY+BLAD REPR  01/01/1992    Vaginal approach with oophrectomy    ZC TOTAL KNEE ARTHROPLASTY               Physical Exam:   There were no vitals filed for this visit.  PSYCH: NAD  EYES: EOMI  NEURO: AAO x3  : stool noted along perineum and around anus. Cleaned with sterile cause and warm water. No other abnormalities along vulva. Vaginal mucosa atrophic. Estring removed without issues. No abnormal discharge or vaginal pain/bleeding. New estring placed vaginally without issues.     LABS:   Creatinine   Date Value Ref Range Status   10/12/2023 1.11 (H) 0.51 - 0.95 mg/dL Final   12/17/2020 1.00 0.52 - 1.04 mg/dL Final            Assessment and Plan:   73 year old is a pleasant female who has aginal atrophy, hx of UTI, hx of pyelonephritis, UUI, voiding dysfunction, FI.    Plan:  -  continue estringe exchanges every 3 months; renewed today  - continue with PFPT.  - discussed how FI can increase risk of UTIs and to keep vulva clean.   - consider third line options if fails PFPT.   - After discussing the assessment and  plan with patient, patient verbalizes understanding and agrees to the above plan. All questions answered.       12 minutes spent on the date of the encounter doing chart review, review of outside records, review of test results, interpretation of tests, patient visit and documentation.      Fadumo Hays PA-C  Urology  March 4, 2024      Patient Care Team:  Danny Conteh MD as PCP - General (Internal Medicine)  Danny Conteh MD as Assigned PCP  Luis A Moody MD as Assigned Pulmonology Provider  Evangelist Lee MD as Hospitalist (Internal Medicine)  Jesse Rodriguez MD as MD (Urology)  Jesse Rodriguez MD as Assigned Surgical Provider  Kashif Hadley MD as Assigned Sleep Provider  Agus Segura MD as MD (Surgery)  Kristie Bell PA-C as Assigned Neuroscience Provider  Mickey Gallego MD as MD (Surgery)  Ramya Keller MD as MD (Otolaryngology)  Boyd Saunders AuD (Audiology)  Ramya Keller MD as MD (Otolaryngology)  Nadeem Jason MD as Assigned Heart and Vascular Provider  JESSE RODRIGUEZ

## 2024-03-05 ASSESSMENT — ENCOUNTER SYMPTOMS: NEUROLOGIC COMPLAINT: 1

## 2024-03-07 ENCOUNTER — VIRTUAL VISIT (OUTPATIENT)
Dept: PSYCHOLOGY | Facility: CLINIC | Age: 74
End: 2024-03-07
Payer: MEDICARE

## 2024-03-07 DIAGNOSIS — F33.1 MAJOR DEPRESSIVE DISORDER, RECURRENT EPISODE, MODERATE (H): Primary | ICD-10-CM

## 2024-03-07 DIAGNOSIS — F41.1 GENERALIZED ANXIETY DISORDER: ICD-10-CM

## 2024-03-07 DIAGNOSIS — F43.10 POSTTRAUMATIC STRESS DISORDER: ICD-10-CM

## 2024-03-07 PROCEDURE — 90834 PSYTX W PT 45 MINUTES: CPT | Mod: 93 | Performed by: SOCIAL WORKER
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
## 2024-03-11 ENCOUNTER — THERAPY VISIT (OUTPATIENT)
Dept: PHYSICAL THERAPY | Facility: CLINIC | Age: 74
End: 2024-03-11
Attending: INTERNAL MEDICINE
Payer: MEDICARE

## 2024-03-11 DIAGNOSIS — R42 DIZZINESS: Primary | ICD-10-CM

## 2024-03-11 PROCEDURE — 97110 THERAPEUTIC EXERCISES: CPT | Mod: GP | Performed by: PHYSICAL THERAPIST

## 2024-03-13 ENCOUNTER — VIRTUAL VISIT (OUTPATIENT)
Dept: PSYCHOLOGY | Facility: CLINIC | Age: 74
End: 2024-03-13
Payer: MEDICARE

## 2024-03-13 DIAGNOSIS — F41.1 GENERALIZED ANXIETY DISORDER: ICD-10-CM

## 2024-03-13 DIAGNOSIS — F33.0 MAJOR DEPRESSIVE DISORDER, RECURRENT EPISODE, MILD (H): Primary | ICD-10-CM

## 2024-03-13 DIAGNOSIS — F43.10 POSTTRAUMATIC STRESS DISORDER: ICD-10-CM

## 2024-03-13 PROCEDURE — 90834 PSYTX W PT 45 MINUTES: CPT | Mod: 93 | Performed by: SOCIAL WORKER

## 2024-03-13 ASSESSMENT — ANXIETY QUESTIONNAIRES
GAD7 TOTAL SCORE: 8
2. NOT BEING ABLE TO STOP OR CONTROL WORRYING: SEVERAL DAYS
7. FEELING AFRAID AS IF SOMETHING AWFUL MIGHT HAPPEN: NEARLY EVERY DAY
GAD7 TOTAL SCORE: 8
5. BEING SO RESTLESS THAT IT IS HARD TO SIT STILL: NOT AT ALL
IF YOU CHECKED OFF ANY PROBLEMS ON THIS QUESTIONNAIRE, HOW DIFFICULT HAVE THESE PROBLEMS MADE IT FOR YOU TO DO YOUR WORK, TAKE CARE OF THINGS AT HOME, OR GET ALONG WITH OTHER PEOPLE: VERY DIFFICULT
1. FEELING NERVOUS, ANXIOUS, OR ON EDGE: MORE THAN HALF THE DAYS
3. WORRYING TOO MUCH ABOUT DIFFERENT THINGS: NOT AT ALL
6. BECOMING EASILY ANNOYED OR IRRITABLE: SEVERAL DAYS

## 2024-03-13 ASSESSMENT — PATIENT HEALTH QUESTIONNAIRE - PHQ9
SUM OF ALL RESPONSES TO PHQ QUESTIONS 1-9: 5
5. POOR APPETITE OR OVEREATING: SEVERAL DAYS
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
## 2024-03-14 DIAGNOSIS — F43.10 PTSD (POST-TRAUMATIC STRESS DISORDER): ICD-10-CM

## 2024-03-15 RX ORDER — SERTRALINE HYDROCHLORIDE 100 MG/1
100 TABLET, FILM COATED ORAL DAILY
Qty: 90 TABLET | Refills: 0 | Status: SHIPPED | OUTPATIENT
Start: 2024-03-15 | End: 2024-05-28

## 2024-03-20 ENCOUNTER — VIRTUAL VISIT (OUTPATIENT)
Dept: PSYCHOLOGY | Facility: CLINIC | Age: 74
End: 2024-03-20
Payer: MEDICARE

## 2024-03-20 DIAGNOSIS — F33.0 MAJOR DEPRESSIVE DISORDER, RECURRENT EPISODE, MILD (H): Primary | ICD-10-CM

## 2024-03-20 DIAGNOSIS — F41.1 GENERALIZED ANXIETY DISORDER: ICD-10-CM

## 2024-03-20 DIAGNOSIS — F43.10 POSTTRAUMATIC STRESS DISORDER: ICD-10-CM

## 2024-03-20 PROCEDURE — 90834 PSYTX W PT 45 MINUTES: CPT | Mod: 95 | Performed by: SOCIAL WORKER

## 2024-03-20 ASSESSMENT — PATIENT HEALTH QUESTIONNAIRE - PHQ9
10. IF YOU CHECKED OFF ANY PROBLEMS, HOW DIFFICULT HAVE THESE PROBLEMS MADE IT FOR YOU TO DO YOUR WORK, TAKE CARE OF THINGS AT HOME, OR GET ALONG WITH OTHER PEOPLE: VERY DIFFICULT
SUM OF ALL RESPONSES TO PHQ QUESTIONS 1-9: 8
SUM OF ALL RESPONSES TO PHQ QUESTIONS 1-9: 8
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
## 2024-03-28 ENCOUNTER — VIRTUAL VISIT (OUTPATIENT)
Dept: PSYCHOLOGY | Facility: CLINIC | Age: 74
End: 2024-03-28
Payer: MEDICARE

## 2024-03-28 DIAGNOSIS — F41.1 GENERALIZED ANXIETY DISORDER: ICD-10-CM

## 2024-03-28 DIAGNOSIS — F33.0 MAJOR DEPRESSIVE DISORDER, RECURRENT EPISODE, MILD (H): Primary | ICD-10-CM

## 2024-03-28 PROCEDURE — 90834 PSYTX W PT 45 MINUTES: CPT | Mod: 93 | Performed by: SOCIAL WORKER
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
## 2024-03-29 ENCOUNTER — OFFICE VISIT (OUTPATIENT)
Dept: URGENT CARE | Facility: URGENT CARE | Age: 74
End: 2024-03-29
Payer: MEDICARE

## 2024-03-29 ENCOUNTER — ANCILLARY PROCEDURE (OUTPATIENT)
Dept: GENERAL RADIOLOGY | Facility: CLINIC | Age: 74
End: 2024-03-29
Attending: PHYSICIAN ASSISTANT
Payer: MEDICARE

## 2024-03-29 VITALS
SYSTOLIC BLOOD PRESSURE: 135 MMHG | OXYGEN SATURATION: 98 % | HEART RATE: 62 BPM | DIASTOLIC BLOOD PRESSURE: 76 MMHG | WEIGHT: 180.7 LBS | TEMPERATURE: 97.7 F | BODY MASS INDEX: 30.54 KG/M2

## 2024-03-29 DIAGNOSIS — M79.642 PAIN OF LEFT HAND: Primary | ICD-10-CM

## 2024-03-29 DIAGNOSIS — M79.642 PAIN OF LEFT HAND: ICD-10-CM

## 2024-03-29 PROCEDURE — 99213 OFFICE O/P EST LOW 20 MIN: CPT | Performed by: PHYSICIAN ASSISTANT

## 2024-03-29 PROCEDURE — 73130 X-RAY EXAM OF HAND: CPT | Mod: TC | Performed by: RADIOLOGY

## 2024-03-29 ASSESSMENT — ENCOUNTER SYMPTOMS
LIGHT-HEADEDNESS: 0
EYES NEGATIVE: 1
NECK STIFFNESS: 0
WEAKNESS: 0
PALPITATIONS: 0
DIZZINESS: 0
JOINT SWELLING: 0
DIARRHEA: 0
HEADACHES: 0
RESPIRATORY NEGATIVE: 1
FEVER: 0
COUGH: 0
BACK PAIN: 0
SHORTNESS OF BREATH: 0
MYALGIAS: 0
WOUND: 0
ARTHRALGIAS: 1
ALLERGIC/IMMUNOLOGIC NEGATIVE: 1
NAUSEA: 0
SORE THROAT: 0
NECK PAIN: 0
RHINORRHEA: 0
VOMITING: 0
CARDIOVASCULAR NEGATIVE: 1
CHILLS: 0
ENDOCRINE NEGATIVE: 1

## 2024-04-02 ENCOUNTER — OFFICE VISIT (OUTPATIENT)
Dept: FAMILY MEDICINE | Facility: CLINIC | Age: 74
End: 2024-04-02
Attending: INTERNAL MEDICINE
Payer: MEDICARE

## 2024-04-02 ENCOUNTER — IMMUNIZATION (OUTPATIENT)
Dept: NURSING | Facility: CLINIC | Age: 74
End: 2024-04-02
Payer: MEDICARE

## 2024-04-02 VITALS
WEIGHT: 183.6 LBS | HEIGHT: 64 IN | SYSTOLIC BLOOD PRESSURE: 128 MMHG | BODY MASS INDEX: 31.34 KG/M2 | HEART RATE: 68 BPM | RESPIRATION RATE: 16 BRPM | DIASTOLIC BLOOD PRESSURE: 60 MMHG | OXYGEN SATURATION: 98 % | TEMPERATURE: 97.1 F

## 2024-04-02 DIAGNOSIS — F33.0 MILD RECURRENT MAJOR DEPRESSION (H): ICD-10-CM

## 2024-04-02 DIAGNOSIS — N18.32 STAGE 3B CHRONIC KIDNEY DISEASE (H): ICD-10-CM

## 2024-04-02 DIAGNOSIS — I25.119 CORONARY ARTERY DISEASE WITH ANGINA PECTORIS, UNSPECIFIED VESSEL OR LESION TYPE, UNSPECIFIED WHETHER NATIVE OR TRANSPLANTED HEART (H): ICD-10-CM

## 2024-04-02 DIAGNOSIS — I10 HYPERTENSION GOAL BP (BLOOD PRESSURE) < 130/80: Primary | ICD-10-CM

## 2024-04-02 DIAGNOSIS — K90.89 BILE SALT-INDUCED DIARRHEA: ICD-10-CM

## 2024-04-02 DIAGNOSIS — I47.29 PAROXYSMAL VENTRICULAR TACHYCARDIA (H): ICD-10-CM

## 2024-04-02 PROCEDURE — 82043 UR ALBUMIN QUANTITATIVE: CPT | Performed by: INTERNAL MEDICINE

## 2024-04-02 PROCEDURE — 90471 IMMUNIZATION ADMIN: CPT

## 2024-04-02 PROCEDURE — 82570 ASSAY OF URINE CREATININE: CPT | Performed by: INTERNAL MEDICINE

## 2024-04-02 PROCEDURE — 96127 BRIEF EMOTIONAL/BEHAV ASSMT: CPT | Performed by: INTERNAL MEDICINE

## 2024-04-02 PROCEDURE — 80048 BASIC METABOLIC PNL TOTAL CA: CPT | Performed by: INTERNAL MEDICINE

## 2024-04-02 PROCEDURE — 90678 RSV VACC PREF BIVALENT IM: CPT

## 2024-04-02 PROCEDURE — 91320 SARSCV2 VAC 30MCG TRS-SUC IM: CPT

## 2024-04-02 PROCEDURE — 90480 ADMN SARSCOV2 VAC 1/ONLY CMP: CPT

## 2024-04-02 PROCEDURE — 99214 OFFICE O/P EST MOD 30 MIN: CPT | Performed by: INTERNAL MEDICINE

## 2024-04-02 PROCEDURE — 36415 COLL VENOUS BLD VENIPUNCTURE: CPT | Performed by: INTERNAL MEDICINE

## 2024-04-02 RX ORDER — RESPIRATORY SYNCYTIAL VIRUS VACCINE 120MCG/0.5
0.5 KIT INTRAMUSCULAR ONCE
Qty: 1 EACH | Refills: 0 | Status: CANCELLED | OUTPATIENT
Start: 2024-04-02 | End: 2024-04-02

## 2024-04-02 RX ORDER — NEBIVOLOL 5 MG/1
5 TABLET ORAL EVERY EVENING
Qty: 90 TABLET | Refills: 1 | Status: SHIPPED | OUTPATIENT
Start: 2024-04-02 | End: 2024-10-01

## 2024-04-02 ASSESSMENT — ANXIETY QUESTIONNAIRES
1. FEELING NERVOUS, ANXIOUS, OR ON EDGE: SEVERAL DAYS
5. BEING SO RESTLESS THAT IT IS HARD TO SIT STILL: NOT AT ALL
2. NOT BEING ABLE TO STOP OR CONTROL WORRYING: SEVERAL DAYS
6. BECOMING EASILY ANNOYED OR IRRITABLE: NOT AT ALL
4. TROUBLE RELAXING: SEVERAL DAYS
7. FEELING AFRAID AS IF SOMETHING AWFUL MIGHT HAPPEN: MORE THAN HALF THE DAYS
GAD7 TOTAL SCORE: 6
3. WORRYING TOO MUCH ABOUT DIFFERENT THINGS: SEVERAL DAYS
GAD7 TOTAL SCORE: 6
IF YOU CHECKED OFF ANY PROBLEMS ON THIS QUESTIONNAIRE, HOW DIFFICULT HAVE THESE PROBLEMS MADE IT FOR YOU TO DO YOUR WORK, TAKE CARE OF THINGS AT HOME, OR GET ALONG WITH OTHER PEOPLE: SOMEWHAT DIFFICULT
8. IF YOU CHECKED OFF ANY PROBLEMS, HOW DIFFICULT HAVE THESE MADE IT FOR YOU TO DO YOUR WORK, TAKE CARE OF THINGS AT HOME, OR GET ALONG WITH OTHER PEOPLE?: SOMEWHAT DIFFICULT
7. FEELING AFRAID AS IF SOMETHING AWFUL MIGHT HAPPEN: MORE THAN HALF THE DAYS

## 2024-04-02 ASSESSMENT — PAIN SCALES - GENERAL: PAINLEVEL: NO PAIN (0)

## 2024-04-02 ASSESSMENT — PATIENT HEALTH QUESTIONNAIRE - PHQ9: SUM OF ALL RESPONSES TO PHQ QUESTIONS 1-9: 4

## 2024-04-02 NOTE — PROGRESS NOTES
[facility] INTERNAL MEDICINE NOTE    Gem Gama is a 73 year old female who presents to clinic today for the following health issues:    Hypertension follow-up   Do you check your blood pressure regularly outside of the clinic? No   Are you following a low salt diet? No  Are your blood pressures ever more than 140 on the top number (systolic) OR more   than 90 on the bottom number (diastolic), for example 140/90? No  How many servings of fruits and vegetables do you eat daily?  0-1  On average, how many sweetened beverages do you drink each day (Examples: soda, juice, sweet tea, etc.  Do NOT count diet or artificially sweetened beverages)?   0  How many days per week do you exercise enough to make your heart beat faster? 3 or less  How many minutes a day do you exercise enough to make your heart beat faster? 9 or less  How many days per week do you miss taking your medication? 1  What makes it hard for you to take your medications?  remembering to take      Mental Health Follow-up:  Patient presents to follow-up on Depression & Anxiety.Patient's depression since last visit has been:  Medium  The patient is not having other symptoms associated with depression.  Patient's anxiety since last visit has been:  Medium  The patient is not having other symptoms associated with anxiety.  Any significant life events: health concerns and other  Patient is feeling anxious or having panic attacks.  Patient has no concerns about alcohol or drug use.    Answers submitted by the patient for this visit:  Provider Visit on 4/2/2024  2:30 PM with Danny Conteh  LORETTA-7 (Submitted on 4/2/2024)  LORETTA 7 TOTAL SCORE: 6    Patient Active Problem List   Diagnosis    TMJ (temporomandibular joint syndrome)    Congenital ureteric stenosis    Lacrimal duct stenosis    Chronic rhinitis    Intermittent asthma    Advanced directives, counseling/discussion    Major  depressive disorder, recurrent episode, moderate (H)    Hypertension goal BP (blood pressure) < 140/90    Osteoarthritis of right knee    Primary narcolepsy without cataplexy    Vitamin D deficiency    Incontinence of feces with fecal urgency    Overactive bladder    Fatigue, unspecified type    History of ischemic cardiomyopathy    Hyperlipidemia LDL goal <70    Calculus of gallbladder without cholecystitis without obstruction    Coronary artery disease with angina pectoris, unspecified vessel or lesion type, unspecified whether native or transplanted heart (H24)    Environmental allergies    Class 1 obesity due to excess calories with serious comorbidity and body mass index (BMI) of 30.0 to 30.9 in adult    Chronic insomnia    Hiatal hernia    Cheyne-Rogers breathing disorder    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    Possible PLMD (periodic limb movement disorder)    Generalized anxiety disorder    Closed compression fracture of L3 lumbar vertebra, sequela    Bilateral hydronephrosis    Posttraumatic stress disorder    Dizziness    Stage 3b chronic kidney disease (H)    Adjustment disorder with anxiety    Major depressive disorder, recurrent episode, mild (H24)    Paroxysmal ventricular tachycardia (H)    Bile salt-induced diarrhea     Past Surgical History:   Procedure Laterality Date    ABDOMEN SURGERY  1974, 1996    Kidney reconstruct. Uterer stenosis    ARTHROPLASTY KNEE  07/09/2014    Procedure: ARTHROPLASTY KNEE;  Surgeon: Levi Johnson MD;  Location:  OR    ARTHROPLASTY KNEE Left 11/24/2014    Procedure: ARTHROPLASTY KNEE;  Surgeon: Levi Johnson MD;  Location:  OR    COLONOSCOPY      Several times dates ??    CV CORONARY ANGIOGRAM N/A 10/09/2019    Procedure: Coronary Angiogram;  Surgeon: Chiquita Chatterjee MD;  Location:  HEART CARDIAC CATH LAB    CV HEART CATHETERIZATION WITH POSSIBLE INTERVENTION N/A 10/10/2019    Procedure: Heart Catheterization with Possible Intervention;  Surgeon: Chin Garcia  MD ANDRÉS;  Location:  HEART CARDIAC CATH LAB    CV INTRA AORTIC BALLOON N/A 10/09/2019    Procedure: Intra-Aortic Balloon Pump Insertion;  Surgeon: Chiquita Chatterjee MD;  Location:  HEART CARDIAC CATH LAB    CV INTRA AORTIC BALLOON REMOVAL N/A 10/10/2019    Procedure: Intra-Aortic Balloon Pump Removal;  Surgeon: Chin Garcia MD;  Location:  HEART CARDIAC CATH LAB    CV LEFT HEART CATH N/A 12/11/2020    Procedure: Heart Catheterization with Possible Intervention;  Surgeon: Pilo Otoole MD;  Location:  HEART CARDIAC CATH LAB    CV PCI STENT DRUG ELUTING N/A 10/09/2019    Procedure: PCI Stent Drug Eluting;  Surgeon: Chiquita Chatterjee MD;  Location:  HEART CARDIAC CATH LAB    DAVINCI LAPAROSCOPIC CHOLECYSTECTOMY WITHOUT GRAMS N/A 11/8/2023    Procedure: CHOLECYSTECTOMY, ROBOT-ASSISTED, LAPAROSCOPIC, WITHOUT CHOLANGIOGRAM;  Surgeon: Mickey Gallego MD;  Location: [facility] OR    EP LOOP RECORDER IMPLANT N/A 11/22/2023    Procedure: Loop Recorder Implant;  Surgeon: Nadeem Jason MD;  Location: [facility] CARDIAC CATH LAB    EYE SURGERY  2011    Cataract surgery both eyes    GENITOURINARY SURGERY  01/01/2008    Prolift    GENITOURINARY SURGERY  1973    In 1973, she had surgery to 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 and had surgery to remove the scar tissue, rebuild the bladder from previous ureter surgery.    ORTHOPEDIC SURGERY      bilat total hip    RECTOCELE REPAIR      ZZC TOTAL ABD HYSTERECTOMY+BLAD REPR  01/01/1992    Vaginal approach with oophrectomy    ZC TOTAL KNEE ARTHROPLASTY         Social History     Tobacco Use    Smoking status: Never     Passive exposure: Never    Smokeless tobacco: Never   Substance Use Topics    Alcohol use: No     Family History   Problem Relation Age of Onset    Hypertension Mother     Arthritis Mother     Cerebrovascular Disease Mother      Anesthesia Reaction Mother         Halucinates    Venous thrombosis Father     Cerebrovascular Disease Father         Three Strokes    Diabetes Father         Type 2, after his 60's    Kidney Disease Father     Hypertension Father     Hyperlipidemia Father     Osteoporosis Father     Thyroid Disease Sister         Low thyroid    Anesthesia Reaction Sister     Diabetes Brother         After recovered from aspiration pneumonia, developed diabetes from high carb diet from feeding tube    Birth defects Brother     Osteoporosis Maternal Grandmother         Old age lived to 90    Coronary Artery Disease Maternal Grandfather         Strokes, lived to 105    Substance Abuse Maternal Grandfather         Alcohol    Osteoporosis Maternal Grandfather         Old age    Coronary Artery Disease Paternal Grandmother     Osteoporosis Paternal Grandmother         Long term issue for her    Obesity Paternal Grandmother     Coronary Artery Disease Paternal Grandfather     Sjogren's Daughter     Diabetes Daughter         Diagnosed after stroke    Cerebrovascular Disease Daughter     Hypertension Daughter         With diabetes and stroke    Hyperlipidemia Daughter     Depression Daughter         Does not acknowledge    Anxiety Disorder Daughter     Asthma Daughter         Anxiety, air quality, and exercise triggered    Obesity Daughter     Diabetes Daughter         Gestational and pre diabetic A1C    Anxiety Disorder Daughter     Asthma Daughter         Excursion induced    Thyroid Disease Daughter     Diabetes Son         On metformin.    Hypertension Son     Obesity Son     Depression Son           ..from job    Mental Illness Other     Substance Abuse Other     Substance Abuse Other     Substance Abuse Other          Allergies   Allergen Reactions    Dust Mites Cough, Headache, Other (See Comments) and Shortness Of Breath    Latex Other (See Comments) and Shortness Of Breath     Runny nose  PN: LW Reaction: DIFFICULTY  BREATHING      Sulfa Antibiotics Anaphylaxis, Hives and Itching     PN: LW Reaction: HIVES, Swelling  PN: LW Reaction: HIVES, Swelling    Flagyl [Metronidazole Hcl] Anaphylaxis and Rash    Food      PN: LW FI1: nka LW FI2:    Hydrochlorothiazide W-Triamterene      PN: LW Reaction: skin rash  PN: LW Reaction: skin rash    No Clinical Screening - See Comments      PN: LW Other1: -Adhesive Tape    Seasonal Allergies      sneezing    Tape [Adhesive Tape] Hives    Metoprolol Itching and Rash    Metronidazole Hives and Rash     PN: LW Reaction: HIVES       Recent Labs   Lab Test 04/02/24  1525 10/12/23  0645 10/11/23  1756 09/28/23  1236 08/05/23  1204 07/22/23  0737 11/01/22  0544 09/21/22  0820 08/26/22  0918 06/21/22  0842 02/08/22  1407 09/23/21  1832 12/17/20  1403 12/10/20  0557 02/09/17  1230 02/02/16  1634   A1C  --   --   --   --   --   --   --   --   --   --   --   --  5.2  --   --   --    LDL  --   --   --   --  56  --   --   --  70  --  92  --  37  --    < > 126*   HDL  --   --   --   --  62  --   --   --  60  --  54  --  51  --    < > 54   TRIG  --   --   --   --  88  --   --   --  55  --  234*  --  168*  --    < > 105   ALT  --   --   --  118* 19 16   < >  --  29  --   --    < > 26  --    < >  --    CR 1.08* 1.11*   < > 0.99* 1.02* 0.99*   < >  --  0.92   < > 1.13*   < > 1.00 1.04   < > 1.06*   GFRESTIMATED 53* 52*   < > 60* 57* 60*   < >  --  65   < > 51*   < > 56* 54*   < > 52*   GFRESTBLACK  --   --   --   --   --   --   --   --   --   --   --   --  65 62   < > 63   POTASSIUM 4.2 3.7   < > 4.9 4.8 3.9   < >  --  4.8   < > 4.6   < > 3.9 4.3   < > 3.8   TSH  --   --   --   --   --   --   --  2.02  --   --   --   --   --   --   --  1.62    < > = values in this interval not displayed.      BP Readings from Last 3 Encounters:   04/02/24 128/60   03/29/24 135/76   03/01/24 121/71    Wt Readings from Last 3 Encounters:   04/02/24 83.3 kg (183 lb 9.6 oz)   03/29/24 82 kg (180 lb 11.2 oz)   03/01/24 81.2 kg (179  "lb)           ROS:  C: NEGATIVE for fever, chills, change in weight  I: NEGATIVE for worrisome rashes, moles or lesions  E: NEGATIVE for vision changes or irritation  E/M: NEGATIVE for ear, mouth and throat problems  R: NEGATIVE for significant cough or SOB  B: NEGATIVE for masses, tenderness or discharge  CV: NEGATIVE for chest pain, palpitations or peripheral edema. POSITIVE for coronary artery disease and paroxysmal ventricular tachycardia.   GI: NEGATIVE for nausea, abdominal pain, heartburn. POSITIVE for diarrhea.  : NEGATIVE for frequency, dysuria, or hematuria. POSITIVE for chronic kidney disease.   M: NEGATIVE for significant arthralgias or myalgia  N: NEGATIVE for weakness, dizziness or paresthesias  E: NEGATIVE for temperature intolerance, skin/hair changes  H: NEGATIVE for bleeding problems  P: POSITIVE for depression.    OBJECTIVE:                                                    /60   Pulse 68   Temp 97.1  F (36.2  C) (Tympanic)   Resp 16   Ht 1.638 m (5' 4.49\")   Wt 83.3 kg (183 lb 9.6 oz)   LMP  (LMP Unknown)   SpO2 98%   BMI 31.04 kg/m    Body mass index is 31.04 kg/m .  GENERAL: alert and no distress  EYES: Eyes grossly normal to inspection and conjunctivae and sclerae normal  HENT: normal cephalic/atraumatic and oral mucous membranes moist  RESP: lungs clear to auscultation - no rales, rhonchi or wheezes  CV: regular rates and rhythm and no peripheral edema  ABDOMEN: soft, nontender and bowel sounds normal  MS: no gross musculoskeletal defects noted, no edema  NEURO: Normal strength and tone, mentation intact and speech normal  PSYCH: mentation appears normal, affect normal/bright    Diagnostic Test Results:  Results for orders placed or performed in visit on 04/02/24   BASIC METABOLIC PANEL     Status: Abnormal   Result Value Ref Range    Sodium 145 135 - 145 mmol/L    Potassium 4.2 3.4 - 5.3 mmol/L    Chloride 108 (H) 98 - 107 mmol/L    Carbon Dioxide (CO2) 25 22 - 29 mmol/L    " Anion Gap 12 7 - 15 mmol/L    Urea Nitrogen 15.0 8.0 - 23.0 mg/dL    Creatinine 1.08 (H) 0.51 - 0.95 mg/dL    GFR Estimate 53 (L) >60 mL/min/1.73m2    Calcium 9.3 8.8 - 10.2 mg/dL    Glucose 87 70 - 99 mg/dL        ASSESSMENT/PLAN:                                                      Hypertension goal BP (blood pressure) < 130/80  - BASIC METABOLIC PANEL  - nebivolol (BYSTOLIC) 5 MG tablet; Take 1 tablet (5 mg) by mouth every evening  - PRIMARY CARE FOLLOW-UP SCHEDULING; Future    Bile salt-induced diarrhea  - BASIC METABOLIC PANEL    Stage 3b chronic kidney disease (H)  - BASIC METABOLIC PANEL  - PRIMARY CARE FOLLOW-UP SCHEDULING; Future    Paroxysmal ventricular tachycardia (H)  - nebivolol (BYSTOLIC) 5 MG tablet; Take 1 tablet (5 mg) by mouth every evening    Mild recurrent major depression (H24)  - PRIMARY CARE FOLLOW-UP SCHEDULING    Coronary artery disease with angina pectoris, unspecified vessel or lesion type, unspecified whether native or transplanted heart (H24)  - nebivolol (BYSTOLIC) 5 MG tablet; Take 1 tablet (5 mg) by mouth every evening     Disposition:  Follow-up In 12 weeks or as needed.    Danny Conteh MD  [facility]

## 2024-04-03 ENCOUNTER — VIRTUAL VISIT (OUTPATIENT)
Dept: PSYCHOLOGY | Facility: CLINIC | Age: 74
End: 2024-04-03
Payer: MEDICARE

## 2024-04-03 DIAGNOSIS — F33.0 MAJOR DEPRESSIVE DISORDER, RECURRENT EPISODE, MILD (H): Primary | ICD-10-CM

## 2024-04-03 DIAGNOSIS — F43.10 POSTTRAUMATIC STRESS DISORDER: ICD-10-CM

## 2024-04-03 DIAGNOSIS — F41.1 GENERALIZED ANXIETY DISORDER: ICD-10-CM

## 2024-04-03 LAB
ANION GAP SERPL CALCULATED.3IONS-SCNC: 12 MMOL/L (ref 7–15)
BUN SERPL-MCNC: 15 MG/DL (ref 8–23)
CALCIUM SERPL-MCNC: 9.3 MG/DL (ref 8.8–10.2)
CHLORIDE SERPL-SCNC: 108 MMOL/L (ref 98–107)
CREAT SERPL-MCNC: 1.08 MG/DL (ref 0.51–0.95)
CREAT UR-MCNC: 48.2 MG/DL
DEPRECATED HCO3 PLAS-SCNC: 25 MMOL/L (ref 22–29)
EGFRCR SERPLBLD CKD-EPI 2021: 53 ML/MIN/1.73M2
GLUCOSE SERPL-MCNC: 87 MG/DL (ref 70–99)
MICROALBUMIN UR-MCNC: <12 MG/L
MICROALBUMIN/CREAT UR: NORMAL MG/G{CREAT}
POTASSIUM SERPL-SCNC: 4.2 MMOL/L (ref 3.4–5.3)
SODIUM SERPL-SCNC: 145 MMOL/L (ref 135–145)

## 2024-04-03 PROCEDURE — 90834 PSYTX W PT 45 MINUTES: CPT | Mod: 93 | Performed by: SOCIAL WORKER

## 2024-04-03 ASSESSMENT — PATIENT HEALTH QUESTIONNAIRE - PHQ9
10. IF YOU CHECKED OFF ANY PROBLEMS, HOW DIFFICULT HAVE THESE PROBLEMS MADE IT FOR YOU TO DO YOUR WORK, TAKE CARE OF THINGS AT HOME, OR GET ALONG WITH OTHER PEOPLE: SOMEWHAT DIFFICULT
SUM OF ALL RESPONSES TO PHQ QUESTIONS 1-9: 4
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
## 2024-04-08 PROBLEM — R42 DIZZINESS: Status: RESOLVED | Noted: 2023-10-11 | Resolved: 2024-04-08

## 2024-04-08 NOTE — PROGRESS NOTES
04/08/24 0500   PT Goal 2   Goal Identifier gait with head motion   Goal Description Althea will improve on gait with head motion both directions by one point on FGA (horizontal to 3/3, vertical to 2/3)   Rationale to maximize safety and independence with performance of ADLs and functional tasks;to maximize safety and independence within the home;to maximize safety and independence within the community;to maximize safety and independence with self cares   Goal Progress MET. I dont think it will improve further at this point.   Target Date 01/13/24   Date Met 02/26/24   PT Goal 3   Goal Identifier DHI   Goal Description improve on  DHI (self rating of symptoms) from 60/100 to 42/100   Rationale to maximize safety and independence within the home;to maximize safety and independence with performance of ADLs and functional tasks;to maximize safety and independence within the community;to maximize safety and independence with self cares   Goal Progress Score today 58/100 so this is not improving and she knows this.   Target Date 03/13/24   Date Met 02/26/24   PT Goal 4   Goal Identifier FGA   Goal Description Heidy will score a 20 or > on FGA from a 12 at [facility] in order to show improved balance needed to decrease her risk of falls.   Rationale to maximize safety and independence with performance of ADLs and functional tasks;to maximize safety and independence within the community;to maximize safety and independence with self cares   Goal Progress almost met, it si 19/30 today and was on 1/10/2024 19/30. I am not sure if it will improve 1 more point   Target Date 03/13/23   Date Met 02/26/24     She had a final appt scheduled for today but she did not show. I called and spoke to her for 10 minutes.   Discharge note. She was seen for 11 appts 10/16/2023 to 3/11/2024. She made some progress with her balance. Dizziness was inconsistent until recent changes in her BP meds have helped her to feel less dizziness. She feels she  is walking more now also since she feels better. She is aware to call us if her balance gets worse or has any falls. No further PT planned at his time.     Nicole Saucedo DPT, MPT, NCS  Physical Therapist   Board Certified Neurologic Clinical Specialist     [facility]   [address] [place] [address]   [email or web address]   [phone]   Clinic: [phone] //   Fax: [phone]

## 2024-04-09 ASSESSMENT — PATIENT HEALTH QUESTIONNAIRE - PHQ9: SUM OF ALL RESPONSES TO PHQ QUESTIONS 1-9: 6

## 2024-04-10 ENCOUNTER — VIRTUAL VISIT (OUTPATIENT)
Dept: PSYCHOLOGY | Facility: CLINIC | Age: 74
End: 2024-04-10
Payer: MEDICARE

## 2024-04-10 DIAGNOSIS — F33.0 MAJOR DEPRESSIVE DISORDER, RECURRENT EPISODE, MILD (H): Primary | ICD-10-CM

## 2024-04-10 DIAGNOSIS — F41.1 GENERALIZED ANXIETY DISORDER: ICD-10-CM

## 2024-04-10 DIAGNOSIS — F43.10 POSTTRAUMATIC STRESS DISORDER: ICD-10-CM

## 2024-04-10 PROCEDURE — 90834 PSYTX W PT 45 MINUTES: CPT | Mod: 93 | Performed by: SOCIAL WORKER

## 2024-04-10 ASSESSMENT — PATIENT HEALTH QUESTIONNAIRE - PHQ9: SUM OF ALL RESPONSES TO PHQ QUESTIONS 1-9: 6
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
## 2024-04-11 ENCOUNTER — ANCILLARY PROCEDURE (OUTPATIENT)
Dept: CARDIOLOGY | Facility: CLINIC | Age: 74
End: 2024-04-11
Attending: INTERNAL MEDICINE
Payer: MEDICARE

## 2024-04-11 DIAGNOSIS — Z95.818 IMPLANTABLE LOOP RECORDER PRESENT: ICD-10-CM

## 2024-04-11 PROCEDURE — 93298 REM INTERROG DEV EVAL SCRMS: CPT | Mod: 26 | Performed by: INTERNAL MEDICINE

## 2024-04-11 PROCEDURE — 93298 REM INTERROG DEV EVAL SCRMS: CPT

## 2024-04-17 ENCOUNTER — VIRTUAL VISIT (OUTPATIENT)
Dept: PSYCHOLOGY | Facility: CLINIC | Age: 74
End: 2024-04-17
Payer: MEDICARE

## 2024-04-17 DIAGNOSIS — F43.10 POSTTRAUMATIC STRESS DISORDER: ICD-10-CM

## 2024-04-17 DIAGNOSIS — F33.0 MAJOR DEPRESSIVE DISORDER, RECURRENT EPISODE, MILD (H): Primary | ICD-10-CM

## 2024-04-17 DIAGNOSIS — F41.1 GENERALIZED ANXIETY DISORDER: ICD-10-CM

## 2024-04-17 PROCEDURE — 90834 PSYTX W PT 45 MINUTES: CPT | Mod: 93 | Performed by: SOCIAL WORKER
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
## 2024-04-19 NOTE — PLAN OF CARE
Goal Outcome Evaluation:       Patient has been discharged to home October 12, 2023 3:44 PM. Discharge instructions and education reviewed, medication schedule and follow up appts discussed. Questions answered. PIV removed. All belongings and medications sent with pt.  for transport. Left by wheelchair to door 6.                   LABS:                        10.6   7.48  )-----------( 235      ( 18 Apr 2024 21:30 )             32.0     04-18    142  |  102  |  19  ----------------------------<  146<H>  3.6   |  30  |  1.6<H>    Ca    8.0<L>      18 Apr 2024 21:30    TPro  6.1  /  Alb  3.5  /  TBili  0.4  /  DBili  x   /  AST  66<H>  /  ALT  58<H>  /  AlkPhos  151<H>  04-18    LIVER FUNCTIONS - ( 18 Apr 2024 21:30 )  Alb: 3.5 g/dL / Pro: 6.1 g/dL / ALK PHOS: 151 U/L / ALT: 58 U/L / AST: 66 U/L / GGT: x             Urinalysis Basic - ( 18 Apr 2024 21:30 )    Color: x / Appearance: x / SG: x / pH: x  Gluc: 146 mg/dL / Ketone: x  / Bili: x / Urobili: x   Blood: x / Protein: x / Nitrite: x   Leuk Esterase: x / RBC: x / WBC x   Sq Epi: x / Non Sq Epi: x / Bacteria: x  +++++++++++++++++++++++++++++++++++++++++++++++++++++++++++++++++++++++++++++  < from: Xray Kidney Ureter Bladder (04.15.24 @ 11:32) >    Findings/  impression:    Study is limited by obliquity.    Partially imaged pacer wires noted.. Nonobstructive bowel gas pattern   with moderate fecal retention.    Scoliosis with degenerative changes. Calcifications of the aorta.    --- End of Report ---    ASIA FRANKEL; Attending Radiologist  This document has been electronically signed. Apr 15 2024 12:04PM    < end of copied text >

## 2024-04-22 LAB
MDC_IDC_EPISODE_DTM: NORMAL
MDC_IDC_EPISODE_ID: 1
MDC_IDC_EPISODE_ID: 2
MDC_IDC_EPISODE_ID: 3
MDC_IDC_EPISODE_ID: 4
MDC_IDC_EPISODE_ID: 5
MDC_IDC_EPISODE_ID: 6
MDC_IDC_EPISODE_TYPE: NORMAL
MDC_IDC_MSMT_BATTERY_DTM: NORMAL
MDC_IDC_MSMT_BATTERY_STATUS: NORMAL
MDC_IDC_PG_IMPLANT_DTM: NORMAL
MDC_IDC_PG_MFG: NORMAL
MDC_IDC_PG_MODEL: NORMAL
MDC_IDC_PG_SERIAL: NORMAL
MDC_IDC_PG_TYPE: NORMAL
MDC_IDC_SESS_CLINIC_NAME: NORMAL
MDC_IDC_SESS_DTM: NORMAL
MDC_IDC_SESS_TYPE: NORMAL
MDC_IDC_STAT_AT_BURDEN_PERCENT: 0 %
MDC_IDC_STAT_AT_MODE_SW_PERCENT_TIME: 0 %
MDC_IDC_STAT_EPISODE_RECENT_COUNT: 0
MDC_IDC_STAT_EPISODE_RECENT_COUNT: 0
MDC_IDC_STAT_EPISODE_RECENT_COUNT: 6
MDC_IDC_STAT_EPISODE_RECENT_COUNT_DTM_END: NORMAL
MDC_IDC_STAT_EPISODE_RECENT_COUNT_DTM_START: NORMAL
MDC_IDC_STAT_EPISODE_TYPE: NORMAL

## 2024-04-23 DIAGNOSIS — A49.8 CLOSTRIDIUM DIFFICILE INFECTION: ICD-10-CM

## 2024-04-23 PROBLEM — I47.20 PAROXYSMAL VENTRICULAR TACHYCARDIA (H): Chronic | Status: ACTIVE | Noted: 2024-03-01

## 2024-04-23 PROBLEM — I47.29 PAROXYSMAL VENTRICULAR TACHYCARDIA (H): Chronic | Status: ACTIVE | Noted: 2024-03-01

## 2024-04-23 PROBLEM — Z86.0100 HISTORY OF COLONIC POLYPS: Status: ACTIVE | Noted: 2024-04-23

## 2024-04-23 RX ORDER — CHOLESTYRAMINE 4 G/9G
POWDER, FOR SUSPENSION ORAL
Qty: 60 PACKET | Refills: 5 | Status: SHIPPED | OUTPATIENT
Start: 2024-04-23

## 2024-04-24 ENCOUNTER — OFFICE VISIT (OUTPATIENT)
Dept: SLEEP MEDICINE | Facility: CLINIC | Age: 74
End: 2024-04-24
Payer: MEDICARE

## 2024-04-24 ENCOUNTER — VIRTUAL VISIT (OUTPATIENT)
Dept: PSYCHOLOGY | Facility: CLINIC | Age: 74
End: 2024-04-24
Payer: MEDICARE

## 2024-04-24 ENCOUNTER — PATIENT OUTREACH (OUTPATIENT)
Dept: GASTROENTEROLOGY | Facility: CLINIC | Age: 74
End: 2024-04-24

## 2024-04-24 VITALS
HEIGHT: 64 IN | OXYGEN SATURATION: 96 % | HEART RATE: 53 BPM | SYSTOLIC BLOOD PRESSURE: 119 MMHG | DIASTOLIC BLOOD PRESSURE: 78 MMHG | WEIGHT: 180 LBS | BODY MASS INDEX: 30.73 KG/M2

## 2024-04-24 DIAGNOSIS — F43.10 POSTTRAUMATIC STRESS DISORDER: ICD-10-CM

## 2024-04-24 DIAGNOSIS — G47.61 PLMD (PERIODIC LIMB MOVEMENT DISORDER): ICD-10-CM

## 2024-04-24 DIAGNOSIS — F33.0 MAJOR DEPRESSIVE DISORDER, RECURRENT EPISODE, MILD (H): Primary | ICD-10-CM

## 2024-04-24 DIAGNOSIS — F51.04 CHRONIC INSOMNIA: ICD-10-CM

## 2024-04-24 DIAGNOSIS — G47.8 ABNORMAL REM SLEEP: ICD-10-CM

## 2024-04-24 DIAGNOSIS — R06.3 CHEYNE-STOKES BREATHING DISORDER: Primary | Chronic | ICD-10-CM

## 2024-04-24 DIAGNOSIS — F41.1 GENERALIZED ANXIETY DISORDER: ICD-10-CM

## 2024-04-24 DIAGNOSIS — Z12.11 SPECIAL SCREENING FOR MALIGNANT NEOPLASMS, COLON: Primary | ICD-10-CM

## 2024-04-24 PROCEDURE — 90834 PSYTX W PT 45 MINUTES: CPT | Mod: 95 | Performed by: SOCIAL WORKER

## 2024-04-24 PROCEDURE — 99214 OFFICE O/P EST MOD 30 MIN: CPT | Performed by: INTERNAL MEDICINE

## 2024-04-24 RX ORDER — CHOLESTYRAMINE 4 G/9G
POWDER, FOR SUSPENSION ORAL
OUTPATIENT
Start: 2024-04-24

## 2024-04-24 ASSESSMENT — SLEEP AND FATIGUE QUESTIONNAIRES
HOW LIKELY ARE YOU TO NOD OFF OR FALL ASLEEP WHILE SITTING QUIETLY AFTER LUNCH WITHOUT ALCOHOL: SLIGHT CHANCE OF DOZING
HOW LIKELY ARE YOU TO NOD OFF OR FALL ASLEEP WHEN YOU ARE A PASSENGER IN A CAR FOR AN HOUR WITHOUT A BREAK: HIGH CHANCE OF DOZING
HOW LIKELY ARE YOU TO NOD OFF OR FALL ASLEEP WHILE WATCHING TV: MODERATE CHANCE OF DOZING
HOW LIKELY ARE YOU TO NOD OFF OR FALL ASLEEP WHILE SITTING AND READING: MODERATE CHANCE OF DOZING
HOW LIKELY ARE YOU TO NOD OFF OR FALL ASLEEP WHILE SITTING INACTIVE IN A PUBLIC PLACE: SLIGHT CHANCE OF DOZING
HOW LIKELY ARE YOU TO NOD OFF OR FALL ASLEEP IN A CAR, WHILE STOPPED FOR A FEW MINUTES IN TRAFFIC: WOULD NEVER DOZE
HOW LIKELY ARE YOU TO NOD OFF OR FALL ASLEEP WHILE SITTING AND TALKING TO SOMEONE: WOULD NEVER DOZE
HOW LIKELY ARE YOU TO NOD OFF OR FALL ASLEEP WHILE LYING DOWN TO REST IN THE AFTERNOON WHEN CIRCUMSTANCES PERMIT: MODERATE CHANCE OF DOZING
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
## 2024-04-24 NOTE — TELEPHONE ENCOUNTER
"[facility] Screening Colonoscopy Referral Review    Patient meets the inclusion criteria for screening colonoscopy standing order.    Ordering/Referring Provider:  Danny Conteh    BMI: Estimated body mass index is 31.04 kg/m  as calculated from the following:    Height as of 4/2/24: 1.638 m (5' 4.49\").    Weight as of 4/2/24: 83.3 kg (183 lb 9.6 oz).     Sedation:  Does patient have any of the following conditions affecting sedation?  No medical conditions affecting sedation.    Previous Scopes:  Any previous recommendations or follow up needs based on previous scope?  na / No recommendations.    Medical Concerns to Postpone Order:  Does patient have any of the following medical concerns that should postpone/delay colonoscopy referral?  No medical conditions affecting colonoscopy referral.    Final Referral Details:  Based on patient's medical history patient is appropriate for referral order with moderate sedation. If patient's BMI > 50 do not schedule in [facility].   "

## 2024-04-24 NOTE — NURSING NOTE
1 year appointment reminder message was created and will be sent out 2 - 3 months prior to next appointment due date. Via [employer]

## 2024-04-24 NOTE — PROGRESS NOTES
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 Awaiting oral appliance from  dentistry  Daytime symptoms are improved with use  - Recommend using PAP at hospital when she seleeps there   - Use nasal mask with chin strap when having jaw pain.       Persistent excessive daytime sleepiness  History of narcolepsy diagnosis, uncertain veracity. Poor candidate for stimulant therapy due to coronary artery disease   - Consider hypersomnia workup, for other narcolepsy treatments (pitolisant, soriamfetol, e.g.)     Insomnia  Intermittent, recent doing better  We discussed regular wake times.   - Read the book Say Good Night To Insomnia           Gem Gama will follow up in about 1 year(s).     I spent 20 minutes with patient including counseling, and 15 minutes with chart review, and documentation         History of Present Illness:  Chief Complaint   Patient presents with    CPAP Follow Up       Gem Gama presents for follow-up of their central sleep apnea, managed with ASV.     [facility]    She has been sleepy since . Symptoms started after her   in . Fatigue also started about then. She states her first sleep study was in the . At that time she was diagnosed with obstructive sleep apnea, though diagnosis uncertain and no records are available.     She also carries a diagnosis of narcolepsy which she says was diagnosed with nap testing at [facility] between  and . She was treated with stimulant therapy until her heart attack in 2019    She has been on zoloft or other antidepressants off an on since     Sleep study [facility] 13 - OAHI 18, LAZARO 5.6, RDI 23, low O2 88%. PLMI 34, PLMAI 4.4    Sleep study [facility] 2013 (200#) - CPAP titrated to 14 cmh20. Cheyne-Rogers  respirations were present and persisted. AHI 65, RDI 66. Central AHI 56. Low O2 88%. PLMI 10    Titration study [facility] 7/12/13 - Bilevel, ASV titration. ASV EPAP min 8. EPAP max 15 Min PS 1, Max PS 15, auto was 'optimal'.   - PLMI 39, PLMAI 3.8    She was on an ASV machine in 2014    She stopped using PAP approximately 2017 because it did not seem to help her feel better    She presented to [facility] 6/2022 for fatigue and sleepiness (ESS 14) with long sleep times and additional symptoms of night sweats, nocturnal enuresis, headaches in morning. Comorbid coronary artery disease.  - Sleep onset difficulties (LEIA 14). Multifactorial: psychophysiologic insomnia comorbid to depression, sleep disordered breathing, narcolepsy, periodic limb movements, delayed sleep phase may all play a role. Referred for cognitive behavioral training   - Nightmare disorder. Referred for possible dream rehearsal therapy    Echo 7/2022- Left ventricular systolic function is normal. The visual ejection fraction is 55-60%.    She saw Dr. Gurrola 8/2022, 10/2022.    Ferritin 102 on 9/21/2022    Study Date: 9/6/2022 (186.0 lbs)   - Polysomnogram revealed a presence of 14 obstructive, 144 central, and 24 mixed apneas resulting in an apnea index of 32 events per hour. There were 8 obstructive hypopneas and 35 central hypopneas resulting in an obstructive hypopnea index of 1 and central hypopnea index of 6 events per hour.   - AHI 40.3 (central apnea/hypopnea index 32 events per hour). REM AHI 21, supine AHI 61. RDI 41.4 events per hour.  - Respiratory rate and pattern was notable for Cheyne-Rogers respiratory rate and pattern.   - Lowest oxygen saturation 74.0%. Time <= 88% was 33.1 minutes. Time <= 89% was 46.8 minutes. There was a prolonged desaturation late in the study lasting 22 minutes that is suspicious for artifact   - Arousal index was increased at 44.1  - PLM index 86 movements per hour. PLM Arousal Index  was 13 per hour.  - Excessive transient/sustained muscle activity was present in 32/78 epochs of REM      MRI 10/1/22 - Normal pituitary gland and whole brain MRI.    11/18/2022 [place] Titration Sleep Study (179.0 lbs)   -  Titrated from ASV EPAP min 5, PS min 3, max PS 15 cmH2O up to ASV 13/3/15 cmH2O. The final pressure achieved was ASV 13/3/15 cmH2O with a residual AHI of 6 events per hour. Time in REM supine on final pressure was 0 minutes.   - Hypoxemia resolved with pressures above EPAP 7 cmH20.  - Arousal index 54 arousals per hour  - PLM index 2.3 movements per hour. PLM Arousal Index 0.2 per hour.    Patient had a upper respiratory illness with a lot of coughing the night of the titration study.    Patient started ASVauto at EPAP 7-13, PS min 0, Pressure max 25 cmH2O    [facility] 3/2023 on ASV without hypoxemia    Patient arrived >60 minutes late for her appointment. She had the wrong time.     She has not been using her PAP because she has been sleeping with her daughter at [facility] who is aphasic after having a stroke     Off CPAP she feels more tired, but hard to compare     Trialed nasal mask, likes full-face mask better     Poor adherence due to jaw problems in past, but reports it better, careful about what she eats and doing her excercises  We were awaiting oral appliance of some sort from  dentistry but has still not gotten it, needs to have some teeth pulled     She did not read the book Say Good Night To Insomnia, has not found it     What type of mask do you use: Full Face Mask  Is your mask comfortable: Yes    Is your mask leaking: No    Do you notice snoring with mask on: No  Do you notice gasping arousals with mask on: No  Are you having significant oral or nasal dryness: Yes  Is the pressure setting comfortable: Yes    What is your typical bedtime: 12am, 10-11 at hospital   How long does it take you to go to sleep on PAP therapy: fifteen min  What time do you typically get out of bed for  the day: 10am, 8-9 at hospital   How many hours on average per night are you using PAP therapy: eight whenat home  How many hours are you sleeping per night: eight toten  Do you feel well rested in the morning: Yes      [employer]   ASV Auto-PAP EPAP min 7, EPAP max 13, PS min 0, PS max 18 cmH2O 30 day usage data 2/25-3/25/24:  80% of days with > 4 hours of use. 5/30 days with no use.   Average use 8' 58 minutes per day.   95%ile Leak 1.7 L/min.   AHI 2.3 events per hour.           EPWORTH SLEEPINESS SCALE         4/24/2024    11:42 AM    [place] Sleepiness Scale ( PATRICE Dimas  9308-1147<br>ESS - [place]/English - Final version - 21 Nov 07 - [facility] [employer].)   Sitting and reading Moderate chance of dozing   Watching TV Moderate chance of dozing   Sitting, inactive in a public place (e.g. a theatre or a meeting) Slight chance of dozing   As a passenger in a car for an hour without a break High chance of dozing   Lying down to rest in the afternoon when circumstances permit Moderate chance of dozing   Sitting and talking to someone Would never doze   Sitting quietly after a lunch without alcohol Slight chance of dozing   In a car, while stopped for a few minutes in traffic Would never doze   [place] Score (MC) 11   [place] Score (Sleep) 11       INSOMNIA SEVERITY INDEX (LEIA)          4/24/2024    11:38 AM   Insomnia Severity Index (LEIA)   Difficulty falling asleep 1   Difficulty staying asleep 0   Problems waking up too early 0   How SATISFIED/DISSATISFIED are you with your CURRENT sleep pattern? 2   How NOTICEABLE to others do you think your sleep problem is in terms of impairing the quality of your life? 1   How WORRIED/DISTRESSED are you about your current sleep problem? 2   To what extent do you consider your sleep problem to INTERFERE with your daily functioning (e.g. daytime fatigue, mood, ability to function at work/daily chores, concentration, memory, mood, etc.) CURRENTLY? 2   LEIA Total Score 8        Guidelines for Scoring/Interpretation:  Total score categories:  0-7 = No clinically significant insomnia   8-14 = Subthreshold insomnia   15-21 = Clinical insomnia (moderate severity)  22-28 = Clinical insomnia (severe)  Used via courtesy of www.[place]health.[place].gov with permission from Gerald Aguilar PhD., [facility]        Past medical/surgical history, family history, social history, medications and allergies were reviewed.        Problem List:  Patient Active Problem List    Diagnosis Date Noted    Coronary artery disease with angina pectoris, unspecified vessel or lesion type, unspecified whether native or transplanted heart (H24) 12/09/2020     Priority: High     Hospitalized 10/9/2019-10/13/2019 due to STEMI and underwent EDWIN x 2 to proximal LAD. Coronary angiogram showed severe single vessel obstructive CAD of LAD (100%) as well as moderate non-obstructive CAD in OM1 and OM2 (60%, 50%). Patient initially requiring intraaortic balloon pump and pressor support. Echocardiogram showed EF 35-40%.        Paroxysmal ventricular tachycardia (H) 03/01/2024     Priority: Medium      Zio patch 8/2023 for 3 days - one 7 beat run of nonsustained VT along with supraventricular ectopy up to 11 beats in duration. There were also rare PACs and PVCs. Patient's symptoms however occurred during sinus rhythm.       Adjustment disorder with anxiety 12/21/2023     Priority: Medium    Stage 3b chronic kidney disease (H) 10/23/2023     Priority: Medium    Posttraumatic stress disorder 07/07/2023     Priority: Medium    Generalized anxiety disorder 01/24/2023     Priority: Medium    Cheyne-Rogers breathing disorder 09/09/2022     Priority: Medium     9/6/2022 [place] [facility] Study (186.0 lbs) - Primary breathing pattern is Cheyne rogers breathing with desaturations and arousals. AHI 41.6, RDI 42.6, Supine AHI 67.7, REM AHI 24.6, Low O2 74.0%, Time Spent ?88% 33.1 minutes / Time Spent ?89% 46.8 minutes. There was a  prolonged desaturation late in the study lasting 22 minutes that is suspicious for artifact. PLM index was 86.5 movements per hour. The PLM Arousal Index was 13.1 per hour.      Hyperlipidemia LDL goal <70 10/15/2020     Priority: Medium    History of ischemic cardiomyopathy 10/25/2019     Priority: Medium     Hospitalized 10/9/2019-10/13/2019 due to STEMI. Echocardiogram showed EF 35-40%. Later echocardiogram with improved EF 55-60%      Primary narcolepsy without cataplexy 02/09/2017     Priority: Medium     Diagnosis of unclear veracity      Hypertension goal BP (blood pressure) < 140/90 04/07/2014     Priority: Medium    Major depressive disorder 07/25/2013     Priority: Medium    Intermittent asthma 06/18/1990     Priority: Medium     PFTS 12/2021 - FEV1- 1.83 (86%), ratio 0.74, 14% change with bronchodilators,  %, %, DLCO 90%       History of colonic polyps 04/23/2024     Priority: Low    Bile salt-induced diarrhea 04/02/2024     Priority: Low    Closed compression fracture of L3 lumbar vertebra, sequela 06/19/2023     Priority: Low    Bilateral hydronephrosis 06/19/2023     Priority: Low    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 11/28/2022     Priority: Low    Possible PLMD (periodic limb movement disorder) 11/28/2022     Priority: Low    Hiatal hernia 08/26/2022     Priority: Low    Chronic insomnia 06/02/2022     Priority: Low    Class 1 obesity due to excess calories with serious comorbidity and body mass index (BMI) of 30.0 to 30.9 in adult 06/01/2022     Priority: Low    Calculus of gallbladder without cholecystitis without obstruction 10/15/2020     Priority: Low    Overactive bladder 09/27/2018     Priority: Low    Fatigue, unspecified type 09/27/2018     Priority: Low    Incontinence of feces with fecal urgency 03/15/2018     Priority: Low    Vitamin D deficiency 02/23/2017     Priority: Low    Osteoarthritis of right knee 06/24/2014     Priority: Low    Advanced directives, counseling/discussion  "06/18/2012     Priority: Low     Discussed advance care planning with patient; information given to patient to review. 6/18/2012   Erin Hahn MA       Environmental allergies 07/13/2011     Priority: Low     Overview:   Dust, mold, cats , dogs, trees , dustmite and weeds.      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           /78   Pulse 53   Ht 1.638 m (5' 4.49\")   Wt 81.6 kg (180 lb)   LMP  (LMP Unknown)   SpO2 96%   BMI 30.43 kg/m      "

## 2024-04-24 NOTE — NURSING NOTE
"Chief Complaint   Patient presents with    CPAP Follow Up       Initial BP (!) 150/76   Pulse 53   Ht 1.638 m (5' 4.49\")   Wt 81.6 kg (180 lb)   LMP  (LMP Unknown)   SpO2 96%   BMI 30.43 kg/m   Estimated body mass index is 30.43 kg/m  as calculated from the following:    Height as of this encounter: 1.638 m (5' 4.49\").    Weight as of this encounter: 81.6 kg (180 lb).    Medication Reconciliation: complete    Neck circumference: 15.35 inches / 39 centimeters.    DME: Raymond Valle CMA on 4/24/2024 at 11:49 AM       "

## 2024-05-01 ENCOUNTER — VIRTUAL VISIT (OUTPATIENT)
Dept: PSYCHOLOGY | Facility: CLINIC | Age: 74
End: 2024-05-01
Payer: MEDICARE

## 2024-05-01 DIAGNOSIS — F43.10 POSTTRAUMATIC STRESS DISORDER: ICD-10-CM

## 2024-05-01 DIAGNOSIS — F41.1 GENERALIZED ANXIETY DISORDER: ICD-10-CM

## 2024-05-01 DIAGNOSIS — F33.0 MAJOR DEPRESSIVE DISORDER, RECURRENT EPISODE, MILD (H): Primary | ICD-10-CM

## 2024-05-01 PROCEDURE — 90834 PSYTX W PT 45 MINUTES: CPT | Mod: 95 | Performed by: SOCIAL WORKER

## 2024-05-01 ASSESSMENT — PATIENT HEALTH QUESTIONNAIRE - PHQ9
SUM OF ALL RESPONSES TO PHQ QUESTIONS 1-9: 6
SUM OF ALL RESPONSES TO PHQ QUESTIONS 1-9: 6
10. IF YOU CHECKED OFF ANY PROBLEMS, HOW DIFFICULT HAVE THESE PROBLEMS MADE IT FOR YOU TO DO YOUR WORK, TAKE CARE OF THINGS AT HOME, OR GET ALONG WITH OTHER PEOPLE: SOMEWHAT DIFFICULT
5. POOR APPETITE OR OVEREATING: MORE THAN HALF THE DAYS

## 2024-05-01 ASSESSMENT — ANXIETY QUESTIONNAIRES
GAD7 TOTAL SCORE: 6
7. FEELING AFRAID AS IF SOMETHING AWFUL MIGHT HAPPEN: SEVERAL DAYS
IF YOU CHECKED OFF ANY PROBLEMS ON THIS QUESTIONNAIRE, HOW DIFFICULT HAVE THESE PROBLEMS MADE IT FOR YOU TO DO YOUR WORK, TAKE CARE OF THINGS AT HOME, OR GET ALONG WITH OTHER PEOPLE: SOMEWHAT DIFFICULT
2. NOT BEING ABLE TO STOP OR CONTROL WORRYING: SEVERAL DAYS
5. BEING SO RESTLESS THAT IT IS HARD TO SIT STILL: NOT AT ALL
1. FEELING NERVOUS, ANXIOUS, OR ON EDGE: SEVERAL DAYS
GAD7 TOTAL SCORE: 6
3. WORRYING TOO MUCH ABOUT DIFFERENT THINGS: NOT AT ALL
6. BECOMING EASILY ANNOYED OR IRRITABLE: SEVERAL DAYS
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
## 2024-05-08 DIAGNOSIS — F43.10 PTSD (POST-TRAUMATIC STRESS DISORDER): ICD-10-CM

## 2024-05-08 RX ORDER — SERTRALINE HYDROCHLORIDE 100 MG/1
100 TABLET, FILM COATED ORAL DAILY
Qty: 90 TABLET | Refills: 0 | OUTPATIENT
Start: 2024-05-08

## 2024-05-10 DIAGNOSIS — I10 HYPERTENSION GOAL BP (BLOOD PRESSURE) < 140/90: ICD-10-CM

## 2024-05-10 RX ORDER — VALSARTAN 40 MG/1
40 TABLET ORAL EVERY MORNING
Qty: 90 TABLET | Refills: 1 | Status: SHIPPED | OUTPATIENT
Start: 2024-05-10

## 2024-05-15 ENCOUNTER — VIRTUAL VISIT (OUTPATIENT)
Dept: PSYCHOLOGY | Facility: CLINIC | Age: 74
End: 2024-05-15
Payer: MEDICARE

## 2024-05-15 DIAGNOSIS — F41.1 GENERALIZED ANXIETY DISORDER: ICD-10-CM

## 2024-05-15 DIAGNOSIS — F43.10 POSTTRAUMATIC STRESS DISORDER: ICD-10-CM

## 2024-05-15 DIAGNOSIS — F33.0 MAJOR DEPRESSIVE DISORDER, RECURRENT EPISODE, MILD (H): Primary | ICD-10-CM

## 2024-05-15 PROCEDURE — 90834 PSYTX W PT 45 MINUTES: CPT | Mod: 93 | Performed by: SOCIAL WORKER

## 2024-05-15 ASSESSMENT — PATIENT HEALTH QUESTIONNAIRE - PHQ9
SUM OF ALL RESPONSES TO PHQ QUESTIONS 1-9: 5
10. IF YOU CHECKED OFF ANY PROBLEMS, HOW DIFFICULT HAVE THESE PROBLEMS MADE IT FOR YOU TO DO YOUR WORK, TAKE CARE OF THINGS AT HOME, OR GET ALONG WITH OTHER PEOPLE: SOMEWHAT DIFFICULT
SUM OF ALL RESPONSES TO PHQ QUESTIONS 1-9: 5
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
## 2024-05-17 ENCOUNTER — MYC MEDICAL ADVICE (OUTPATIENT)
Dept: FAMILY MEDICINE | Facility: CLINIC | Age: 74
End: 2024-05-17
Payer: MEDICARE

## 2024-05-22 ENCOUNTER — VIRTUAL VISIT (OUTPATIENT)
Dept: PSYCHOLOGY | Facility: CLINIC | Age: 74
End: 2024-05-22
Payer: MEDICARE

## 2024-05-22 DIAGNOSIS — F33.0 MAJOR DEPRESSIVE DISORDER, RECURRENT EPISODE, MILD (H): Primary | ICD-10-CM

## 2024-05-22 DIAGNOSIS — F41.1 GENERALIZED ANXIETY DISORDER: ICD-10-CM

## 2024-05-22 DIAGNOSIS — F43.10 POSTTRAUMATIC STRESS DISORDER: ICD-10-CM

## 2024-05-22 PROCEDURE — 90834 PSYTX W PT 45 MINUTES: CPT | Mod: 93 | Performed by: SOCIAL WORKER
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
## 2024-05-28 ENCOUNTER — ANCILLARY PROCEDURE (OUTPATIENT)
Dept: GENERAL RADIOLOGY | Facility: CLINIC | Age: 74
End: 2024-05-28
Attending: PHYSICIAN ASSISTANT
Payer: MEDICARE

## 2024-05-28 ENCOUNTER — OFFICE VISIT (OUTPATIENT)
Dept: URGENT CARE | Facility: URGENT CARE | Age: 74
End: 2024-05-28
Payer: MEDICARE

## 2024-05-28 VITALS
DIASTOLIC BLOOD PRESSURE: 66 MMHG | TEMPERATURE: 98.2 F | OXYGEN SATURATION: 97 % | BODY MASS INDEX: 31.11 KG/M2 | HEART RATE: 60 BPM | WEIGHT: 184 LBS | SYSTOLIC BLOOD PRESSURE: 105 MMHG | RESPIRATION RATE: 18 BRPM

## 2024-05-28 DIAGNOSIS — R07.89 CHEST TIGHTNESS: Primary | ICD-10-CM

## 2024-05-28 DIAGNOSIS — R09.89 CHEST CONGESTION: ICD-10-CM

## 2024-05-28 DIAGNOSIS — F43.10 PTSD (POST-TRAUMATIC STRESS DISORDER): ICD-10-CM

## 2024-05-28 DIAGNOSIS — I25.2 HISTORY OF HEART ATTACK: ICD-10-CM

## 2024-05-28 DIAGNOSIS — B96.89 ACUTE BACTERIAL BRONCHITIS: ICD-10-CM

## 2024-05-28 DIAGNOSIS — J20.8 ACUTE BACTERIAL BRONCHITIS: ICD-10-CM

## 2024-05-28 LAB
ANION GAP SERPL CALCULATED.3IONS-SCNC: 9 MMOL/L (ref 3–14)
BASOPHILS # BLD AUTO: 0 10E3/UL (ref 0–0.2)
BASOPHILS NFR BLD AUTO: 1 %
BUN SERPL-MCNC: 17 MG/DL (ref 7–30)
CALCIUM SERPL-MCNC: 8.8 MG/DL (ref 8.5–10.1)
CHLORIDE BLD-SCNC: 111 MMOL/L (ref 94–109)
CO2 SERPL-SCNC: 27 MMOL/L (ref 20–32)
CREAT SERPL-MCNC: 1.1 MG/DL (ref 0.52–1.04)
EGFRCR SERPLBLD CKD-EPI 2021: 52 ML/MIN/1.73M2
EOSINOPHIL # BLD AUTO: 0.3 10E3/UL (ref 0–0.7)
EOSINOPHIL NFR BLD AUTO: 5 %
ERYTHROCYTE [DISTWIDTH] IN BLOOD BY AUTOMATED COUNT: 13 % (ref 10–15)
GLUCOSE BLD-MCNC: 100 MG/DL (ref 70–99)
HCT VFR BLD AUTO: 40 % (ref 35–47)
HGB BLD-MCNC: 13.9 G/DL (ref 11.7–15.7)
IMM GRANULOCYTES # BLD: 0 10E3/UL
IMM GRANULOCYTES NFR BLD: 0 %
LYMPHOCYTES # BLD AUTO: 1.6 10E3/UL (ref 0.8–5.3)
LYMPHOCYTES NFR BLD AUTO: 24 %
MCH RBC QN AUTO: 30.2 PG (ref 26.5–33)
MCHC RBC AUTO-ENTMCNC: 34.8 G/DL (ref 31.5–36.5)
MCV RBC AUTO: 87 FL (ref 78–100)
MONOCYTES # BLD AUTO: 0.5 10E3/UL (ref 0–1.3)
MONOCYTES NFR BLD AUTO: 7 %
NEUTROPHILS # BLD AUTO: 4.3 10E3/UL (ref 1.6–8.3)
NEUTROPHILS NFR BLD AUTO: 64 %
PLATELET # BLD AUTO: 154 10E3/UL (ref 150–450)
POTASSIUM BLD-SCNC: 4.5 MMOL/L (ref 3.4–5.3)
RBC # BLD AUTO: 4.6 10E6/UL (ref 3.8–5.2)
SODIUM SERPL-SCNC: 147 MMOL/L (ref 135–145)
TROPONIN T SERPL HS-MCNC: 20 NG/L
WBC # BLD AUTO: 6.7 10E3/UL (ref 4–11)

## 2024-05-28 PROCEDURE — 36415 COLL VENOUS BLD VENIPUNCTURE: CPT | Performed by: PHYSICIAN ASSISTANT

## 2024-05-28 PROCEDURE — 85025 COMPLETE CBC W/AUTO DIFF WBC: CPT | Performed by: PHYSICIAN ASSISTANT

## 2024-05-28 PROCEDURE — 80048 BASIC METABOLIC PNL TOTAL CA: CPT | Performed by: PHYSICIAN ASSISTANT

## 2024-05-28 PROCEDURE — 84484 ASSAY OF TROPONIN QUANT: CPT | Performed by: PHYSICIAN ASSISTANT

## 2024-05-28 PROCEDURE — 71046 X-RAY EXAM CHEST 2 VIEWS: CPT | Mod: TC | Performed by: RADIOLOGY

## 2024-05-28 PROCEDURE — 99215 OFFICE O/P EST HI 40 MIN: CPT | Mod: 25 | Performed by: PHYSICIAN ASSISTANT

## 2024-05-28 PROCEDURE — 93000 ELECTROCARDIOGRAM COMPLETE: CPT | Performed by: PHYSICIAN ASSISTANT

## 2024-05-28 RX ORDER — SERTRALINE HYDROCHLORIDE 100 MG/1
100 TABLET, FILM COATED ORAL DAILY
Qty: 90 TABLET | Refills: 2 | Status: SHIPPED | OUTPATIENT
Start: 2024-05-28

## 2024-05-28 RX ORDER — PREDNISONE 20 MG/1
20 TABLET ORAL 2 TIMES DAILY
Qty: 10 TABLET | Refills: 0 | Status: SHIPPED | OUTPATIENT
Start: 2024-05-28

## 2024-05-28 RX ORDER — ALBUTEROL SULFATE 90 UG/1
2 AEROSOL, METERED RESPIRATORY (INHALATION) EVERY 6 HOURS
Qty: 8.5 G | Refills: 0 | Status: SHIPPED | OUTPATIENT
Start: 2024-05-28

## 2024-05-28 RX ORDER — DOXYCYCLINE HYCLATE 100 MG
100 TABLET ORAL 2 TIMES DAILY
Qty: 20 TABLET | Refills: 0 | Status: SHIPPED | OUTPATIENT
Start: 2024-05-28 | End: 2024-06-07

## 2024-05-28 ASSESSMENT — PAIN SCALES - GENERAL: PAINLEVEL: MILD PAIN (2)

## 2024-05-28 NOTE — PROGRESS NOTES
Assessment & Plan     Chest tightness    EKG shows mild bradycardia  Chest xray Negative for acute findings, read by Mj VERNON at time of visit.    Will use albuterol for chest tightness  Will start pt on prednisone for chest inflammation and due to allergies flaring up    - predniSONE (DELTASONE) 20 MG tablet; Take 1 tablet (20 mg) by mouth 2 times daily  - albuterol (PROVENTIL HFA) 108 (90 Base) MCG/ACT inhaler; Inhale 2 puffs into the lungs every 6 hours    Chest congestion    Chest xray Negative for acute findings, read by Mj VERNON at time of visit.  CBC normal    Will start pt on doxy    History of heart attack    Patient is concerned about her heart  She has a hx of MI and 2 stents  I have informed her that we cannot rule out heart problems in the UC that this would need to be done in an ED  She is asking to not go to the ED, or wont go, cause she doesn't have time for this  She has asked for me to do a troponin and EKG  I have ordered these stat with the understanding that she will need to go to the ED if her troponin is abnormal  She is of this understanding    EKG shows bradycardia  Troponin is Elevated    Due to having chest tightness, 2 stent hx with MI, and having having chest tightness I have sent her to the ED for further follow up of elevated troponin.  Will need to follow up cardiology.  May need to have angiogram to evaluate cardiac stents     Acute bacterial bronchitis    Bronchitis is inflammation of the bronchial tubes, which carry air to the lungs. The tubes swell and produce mucus, or phlegm. The mucus and inflamed bronchial tubes make you cough. You may have trouble breathing.  Most cases of bronchitis are caused by viruses but in your case we are more concerned about a bacterial infection. . Antibiotics usually are helpful in this situation to help you clear this bacterial infection.  Bronchitis usually develops rapidly and lasts about 2 to 3 weeks in otherwise  "healthy people.    - doxycycline hyclate (VIBRA-TABS) 100 MG tablet; Take 1 tablet (100 mg) by mouth 2 times daily for 10 days      BMI  Estimated body mass index is 31.11 kg/m  as calculated from the following:    Height as of 4/24/24: 1.638 m (5' 4.49\").    Weight as of this encounter: 83.5 kg (184 lb).       At today's visit with Gem Gama , we discussed results, diagnosis, medications and formulated a plan.  We also discussed red flags for immediate return to clinic/ER, as well as indications for follow up with PCP if not improved in 3 days. Patient understood and agreed to plan. Gem Gama was discharged with stable vitals and has no further questions.       No follow-ups on file.    Naila Moody is a 73 year old, presenting for the following health issues:  Cough (Cough with chest tightness, cough started 4/5 days, tightness was off and on and now is more prevalent, been cleaning out basement and is allergic to dust and was \"exposed to black mold')    HPI     Review of Systems  Constitutional, neuro, ENT, endocrine, pulmonary, cardiac, gastrointestinal, genitourinary, musculoskeletal, integument and psychiatric systems are negative, except as otherwise noted.      Objective    /66 (BP Location: Left arm, Patient Position: Sitting, Cuff Size: Adult Regular)   Pulse 60   Temp 98.2  F (36.8  C) (Oral)   Resp 18   Wt 83.5 kg (184 lb)   LMP  (LMP Unknown)   SpO2 97%   BMI 31.11 kg/m    Body mass index is 31.11 kg/m .  Physical Exam   GENERAL: alert and no distress  EYES: Eyes grossly normal to inspection, PERRL and conjunctivae and sclerae normal  HENT: ear canals and TM's normal, nose and mouth without ulcers or lesions  NECK: no adenopathy, no asymmetry, masses, or scars  RESP: pos for mild chest congestion  CV: regular rate and rhythm, normal S1 S2, no S3 or S4, no murmur, click or rub, no peripheral edema  MS: no gross musculoskeletal defects noted, no edema  SKIN: no suspicious " lesions or rashes  NEURO: Normal strength and tone, mentation intact and speech normal  PSYCH: mentation appears normal, affect normal/bright    Chest xra y Negative for acute findings, read by Mj VERNON at time of visit.]          Signed Electronically by: JANEEN Black, PATTY

## 2024-05-31 NOTE — Clinical Note
6/4/2024    No ref. provider found         RE: Yessenia Rodríguez   Missed Appointment       []  Has been rescheduled on.     []  Has NOT been rescheduled.  Our attempts to contact this patient by             telephone have been unsuccessful.  A letter will be sent to the patient.      According to the clinic’s Missed Appointment Policy, if a patient misses two or more scheduled appointments and fails to contact the clinic at least two (2) hours in advance, he/she may be asked to seek treatment from a different provider, group of providers or clinic.    For your convenience, most appointments may also be cancelled or rescheduled by logging on to [employer] at [employer] at any time of day or night.  To register for [employer] today, call [phone] and a  will assist you.    Please feel free to contact our office at [phone]. if you have any questions or concerns about this letter or the clinic’s Missed Appointment Policy.  We value the opportunity to provide services to you and your patients and we look forward to a continued relationship with you.     Sincerely,    Deangelo Frances MD  Oncology & Hematology  [address] [facility], [address] [address]   Tish, The interp report says final AHI is 9.2, but the report says 6.2, can you find out why something is not working? Or does anyone else know? Also, I think most of the events had no snoring, paradox or flow limitations and most should be changed to central hyponeas

## 2024-06-04 ENCOUNTER — OFFICE VISIT (OUTPATIENT)
Dept: UROLOGY | Facility: CLINIC | Age: 74
End: 2024-06-04
Payer: MEDICARE

## 2024-06-04 VITALS
SYSTOLIC BLOOD PRESSURE: 113 MMHG | WEIGHT: 178 LBS | HEART RATE: 55 BPM | HEIGHT: 65 IN | OXYGEN SATURATION: 96 % | DIASTOLIC BLOOD PRESSURE: 70 MMHG | BODY MASS INDEX: 29.66 KG/M2

## 2024-06-04 DIAGNOSIS — N39.8 VOIDING DYSFUNCTION: ICD-10-CM

## 2024-06-04 DIAGNOSIS — R39.15 URINARY URGENCY: ICD-10-CM

## 2024-06-04 DIAGNOSIS — N39.0 RECURRENT UTI: ICD-10-CM

## 2024-06-04 DIAGNOSIS — N95.2 VAGINAL ATROPHY: Primary | ICD-10-CM

## 2024-06-04 DIAGNOSIS — R15.9 INCONTINENCE OF FECES, UNSPECIFIED FECAL INCONTINENCE TYPE: ICD-10-CM

## 2024-06-04 DIAGNOSIS — N39.41 URGE INCONTINENCE: ICD-10-CM

## 2024-06-04 PROCEDURE — 99214 OFFICE O/P EST MOD 30 MIN: CPT | Performed by: PHYSICIAN ASSISTANT

## 2024-06-04 ASSESSMENT — PAIN SCALES - GENERAL: PAINLEVEL: NO PAIN (0)

## 2024-06-04 ASSESSMENT — ANXIETY QUESTIONNAIRES
1. FEELING NERVOUS, ANXIOUS, OR ON EDGE: SEVERAL DAYS
IF YOU CHECKED OFF ANY PROBLEMS ON THIS QUESTIONNAIRE, HOW DIFFICULT HAVE THESE PROBLEMS MADE IT FOR YOU TO DO YOUR WORK, TAKE CARE OF THINGS AT HOME, OR GET ALONG WITH OTHER PEOPLE: SOMEWHAT DIFFICULT
6. BECOMING EASILY ANNOYED OR IRRITABLE: SEVERAL DAYS
3. WORRYING TOO MUCH ABOUT DIFFERENT THINGS: SEVERAL DAYS
7. FEELING AFRAID AS IF SOMETHING AWFUL MIGHT HAPPEN: SEVERAL DAYS
8. IF YOU CHECKED OFF ANY PROBLEMS, HOW DIFFICULT HAVE THESE MADE IT FOR YOU TO DO YOUR WORK, TAKE CARE OF THINGS AT HOME, OR GET ALONG WITH OTHER PEOPLE?: SOMEWHAT DIFFICULT
GAD7 TOTAL SCORE: 6
5. BEING SO RESTLESS THAT IT IS HARD TO SIT STILL: NOT AT ALL
GAD7 TOTAL SCORE: 6
4. TROUBLE RELAXING: SEVERAL DAYS
7. FEELING AFRAID AS IF SOMETHING AWFUL MIGHT HAPPEN: SEVERAL DAYS
2. NOT BEING ABLE TO STOP OR CONTROL WORRYING: SEVERAL DAYS

## 2024-06-04 ASSESSMENT — PATIENT HEALTH QUESTIONNAIRE - PHQ9: SUM OF ALL RESPONSES TO PHQ QUESTIONS 1-9: 7

## 2024-06-04 NOTE — LETTER
6/4/2024       RE: Gem Gama  [address]  [place] [address]     Dear Colleague,    Thank you for referring your patient, Gem Gama, to the [facility] at [facility]. Please see a copy of my visit note below.    Urology Clinic      Name: Gem Gama    MRN: [id number]   YOB: 1950  Accompanied at today's visit by:self                 Chief Complaint:   Estring eschagne          History of Present Illness:   June 4, 2024    HISTORY:   We have been following 73 year old Gem Gama for vaginal atrophy, hx of UTI, hx of pyelonephritis, UUI, voiding dysfunction. Has been to PFPT in the past which was somewhat helpful for her leakage of urine. Has failed multiple OAB medications in the past. Patient here for followup and estring exchange. Is pleased that she has not had any recent UTIs. Does have UUI and occasional FI. Have discussed at previous encounter to keep vulva clean as FI can increase risk of UTIs. Has not had any UTIs noted in remote past per EMR. Have discussed going back to PFPT vs third line options and patient opted for PFPT. However, patient has not gone to PFPT yet, as has been caring for her daughter who suffered a stroke. However plans to go to PFPT once able to in the future. Hx of urethral sing in 1990s. Patient voices no other concerns at this time.            Allergies:     Allergies   Allergen Reactions    Dust Mites Cough, Headache, Other (See Comments) and Shortness Of Breath    Latex Other (See Comments) and Shortness Of Breath     Runny nose  PN: LW Reaction: DIFFICULTY BREATHING      Sulfa Antibiotics Anaphylaxis, Hives and Itching     PN: LW Reaction: HIVES, Swelling  PN: LW Reaction: HIVES, Swelling    Flagyl [Metronidazole Hcl] Anaphylaxis and Rash    Food      PN: LW FI1: nka LW FI2:    Hydrochlorothiazide W-Triamterene      PN: LW Reaction: skin rash  PN: LW  Reaction: skin rash    No Clinical Screening - See Comments      PN: LW Other1: -Adhesive Tape    Seasonal Allergies      sneezing    Tape [Adhesive Tape] Hives    Metoprolol Itching and Rash    Metronidazole Hives and Rash     PN: LW Reaction: HIVES              Medications:     Current Outpatient Medications   Medication Sig Dispense Refill    albuterol (PROAIR HFA/PROVENTIL HFA/VENTOLIN HFA) 108 (90 Base) MCG/ACT inhaler Inhale 1-2 puffs into the lungs every 4 hours as needed for shortness of breath or wheezing 18 g 11    albuterol (PROVENTIL HFA) 108 (90 Base) MCG/ACT inhaler Inhale 2 puffs into the lungs every 6 hours 8.5 g 0    aspirin (ASA) 81 MG EC tablet Take 1 tablet (81 mg) by mouth daily 90 tablet 0    atorvastatin (LIPITOR) 40 MG tablet TAKE 1 TABLET(40 MG) BY MOUTH DAILY 90 tablet 3    cholestyramine (QUESTRAN) 4 g packet MIX AND DRINK 1 PACKET(4 GRAMS) BY MOUTH TWICE DAILY WITH MEALS 60 packet 5    diazepam (VALIUM) 2 MG tablet Take 0.5-1 tablets (1-2 mg) by mouth 2 times daily as needed for anxiety or muscle spasms 30 tablet 0    diclofenac (VOLTAREN) 1 % topical gel Apply topically 4 times daily as needed for moderate pain For Jaw pain      doxycycline hyclate (VIBRA-TABS) 100 MG tablet Take 1 tablet (100 mg) by mouth 2 times daily for 10 days 20 tablet 0    estradiol (ESTRING) 7.5 MCG/24HR vaginal ring Place 1 each vaginally every 3 months 1 each 3    fluticasone-vilanterol (BREO ELLIPTA) 200-25 MCG/ACT inhaler Inhale 1 puff into the lungs daily 3 each 3    isosorbide mononitrate (IMDUR) 30 MG 24 hr tablet Take 1 tablet (30 mg) by mouth every evening Take with Carvedilol. 90 tablet 3    loperamide (IMODIUM) 2 MG capsule Take 1 capsule (2 mg) by mouth daily 30 capsule 0    loratadine (CLARITIN) 10 MG tablet Take 10 mg by mouth At Bedtime      magnesium oxide (MAG-OX) 400 MG tablet Take 400 mg by mouth every morning      Melatonin 10 MG TABS tablet Take 10 mg by mouth nightly as needed for sleep       nebivolol (BYSTOLIC) 5 MG tablet Take 1 tablet (5 mg) by mouth every evening 90 tablet 1    nitroGLYcerin (NITROSTAT) 0.4 MG sublingual tablet Place 1 tablet (0.4 mg) under the tongue every 5 minutes as needed for chest pain 25 tablet 11    pantoprazole (PROTONIX) 40 MG EC tablet TAKE 1 TABLET(40 MG) BY MOUTH DAILY (Patient taking differently: Take 40 mg by mouth every morning) 90 tablet 3    predniSONE (DELTASONE) 20 MG tablet Take 1 tablet (20 mg) by mouth 2 times daily 10 tablet 0    Prenatal Multivit-Min-Fe-FA (PRENATAL/IRON) TABS Take 1 tablet by mouth every morning      saccharomyces boulardii (FLORASTOR) 250 MG capsule Take 1 capsule (250 mg) by mouth 2 times daily 14 capsule 0    sertraline (ZOLOFT) 100 MG tablet TAKE 1 TABLET(100 MG) BY MOUTH DAILY 90 tablet 2    UNABLE TO FIND Take 3 tablets by mouth daily MEDICATION NAME: Uqora complete regimen  1 TAB, AM - 2 TAB PM      valsartan (DIOVAN) 40 MG tablet Take 1 tablet (40 mg) by mouth every morning 90 tablet 1    vitamin D3 (CHOLECALCIFEROL) 2000 units (50 mcg) tablet Take 1 tablet (2,000 Units) by mouth daily (Patient taking differently: Take 1 tablet by mouth every morning) 90 tablet 3    zinc gluconate 50 MG tablet Take 50 mg by mouth every morning       No current facility-administered medications for this visit.               Past  Surgical History:     Past Surgical History:   Procedure Laterality Date    ABDOMEN SURGERY  1974, 1996    Kidney reconstruct. Uterer stenosis    ARTHROPLASTY KNEE  07/09/2014    Procedure: ARTHROPLASTY KNEE;  Surgeon: Levi Johnson MD;  Location:  OR    ARTHROPLASTY KNEE Left 11/24/2014    Procedure: ARTHROPLASTY KNEE;  Surgeon: Levi Johnson MD;  Location:  OR    COLONOSCOPY      Several times dates ??    CV CORONARY ANGIOGRAM N/A 10/09/2019    Procedure: Coronary Angiogram;  Surgeon: Chiquita Chatterjee MD;  Location:  HEART CARDIAC CATH LAB    CV HEART CATHETERIZATION WITH POSSIBLE INTERVENTION N/A  10/10/2019    Procedure: Heart Catheterization with Possible Intervention;  Surgeon: Chin Garcia MD;  Location:  HEART CARDIAC CATH LAB    CV INTRA AORTIC BALLOON N/A 10/09/2019    Procedure: Intra-Aortic Balloon Pump Insertion;  Surgeon: Chiquita Chatterjee MD;  Location:  HEART CARDIAC CATH LAB    CV INTRA AORTIC BALLOON REMOVAL N/A 10/10/2019    Procedure: Intra-Aortic Balloon Pump Removal;  Surgeon: Chin Garcia MD;  Location:  HEART CARDIAC CATH LAB    CV LEFT HEART CATH N/A 12/11/2020    Procedure: Heart Catheterization with Possible Intervention;  Surgeon: Pilo Otoole MD;  Location:  HEART CARDIAC CATH LAB    CV PCI STENT DRUG ELUTING N/A 10/09/2019    Procedure: PCI Stent Drug Eluting;  Surgeon: Chiquita Chatterjee MD;  Location:  HEART CARDIAC CATH LAB    DAVINCI LAPAROSCOPIC CHOLECYSTECTOMY WITHOUT GRAMS N/A 11/8/2023    Procedure: CHOLECYSTECTOMY, ROBOT-ASSISTED, LAPAROSCOPIC, WITHOUT CHOLANGIOGRAM;  Surgeon: Mickey Gallego MD;  [place] [facility] [place]    EP LOOP RECORDER IMPLANT N/A 11/22/2023    Procedure: Loop Recorder Implant;  Surgeon: Nadeem Jason MD;  Location:  HEART CARDIAC CATH LAB    EYE SURGERY  2011    Cataract surgery both eyes    GENITOURINARY SURGERY  01/01/2008    Prolift    GENITOURINARY SURGERY  1973    In 1973, 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 and had surgery to remove the scar tissue, rebuild the bladder from previous ureter surgery.    ORTHOPEDIC SURGERY      bilat total hip    RECTOCELE REPAIR      ZZC TOTAL ABD HYSTERECTOMY+BLAD REPR  01/01/1992    Vaginal approach with oophrectomy    ZZC TOTAL KNEE ARTHROPLASTY               Physical Exam:     Vitals:    06/04/24 1419   BP: 113/70   BP Location: Right arm   Patient Position: Sitting   Pulse: 55   SpO2: 96%   Weight: 80.7 kg (178 lb)   Height: 1.638 m (5'  "4.5\")     PSYCH: NAD  EYES: EOMI  NEURO: AAO x3  : vulva unremarkable.  Vaginal mucosa atrophic. Estring removed without issues. No abnormal discharge or vaginal pain/bleeding. New estring placed vaginally without issues.     LABS:   Creatinine   Date Value Ref Range Status   05/28/2024 1.10 (H) 0.52 - 1.04 mg/dL Final   12/17/2020 1.00 0.52 - 1.04 mg/dL Final            Assessment and Plan:   73 year old is a pleasant female who has vaginal atrophy, hx of UTI, hx of pyelonephritis, UUI, voiding dysfunction, FI.     Plan:  -  continue estringe exchanges every 3 months with me.   -  Encourage going to PFPT when able to if possible.  - Keep appointment with Dr. Rodriguez as planned to discuss third line options.    - contact clinic if develops s/s of UTI in the future.   - After discussing the assessment and plan with patient, patient verbalizes understanding and agrees to the above plan. All questions answered.     30 minutes spent on the date of the encounter doing chart review, review of labs, exam/estring exchange, review of test results, interpretation of tests, patient visit and documentation.      Fadumo Hays PA-C  Urology  June 4, 2024      Patient Care Team:  Danny Conteh MD as PCP - General (Internal Medicine)  Danny Conteh MD as Assigned PCP  Luis A Moody MD as Assigned Pulmonology Provider  Evangelist Lee MD as Hospitalist (Internal Medicine)  Mel Rodriguez MD as MD (Urology)  Mel Rodriguez MD as Assigned Surgical Provider  Kashif Hadley MD as Assigned Sleep Provider  Agus Segura MD as MD (Surgery)  Kristie Bell PA-C as Assigned Neuroscience Provider       "

## 2024-06-04 NOTE — PROGRESS NOTES
Urology Clinic      Name: Gem Gama    MRN: [id number]   YOB: 1950  Accompanied at today's visit by:self                 Chief Complaint:   Estring eschagne          History of Present Illness:   June 4, 2024    HISTORY:   We have been following 73 year old Gem Gama for vaginal atrophy, hx of UTI, hx of pyelonephritis, UUI, voiding dysfunction. Has been to PFPT in the past which was somewhat helpful for her leakage of urine. Has failed multiple OAB medications in the past. Patient here for followup and estring exchange. Is pleased that she has not had any recent UTIs. Does have UUI and occasional FI. Have discussed at previous encounter to keep vulva clean as FI can increase risk of UTIs. Has not had any UTIs noted in remote past per EMR. Have discussed going back to PFPT vs third line options and patient opted for PFPT. However, patient has not gone to PFPT yet, as has been caring for her daughter who suffered a stroke. However plans to go to PFPT once able to in the future. Hx of urethral sing in 1990s. Patient voices no other concerns at this time.            Allergies:     Allergies   Allergen Reactions    Dust Mites Cough, Headache, Other (See Comments) and Shortness Of Breath    Latex Other (See Comments) and Shortness Of Breath     Runny nose  PN: LW Reaction: DIFFICULTY BREATHING      Sulfa Antibiotics Anaphylaxis, Hives and Itching     PN: LW Reaction: HIVES, Swelling  PN: LW Reaction: HIVES, Swelling    Flagyl [Metronidazole Hcl] Anaphylaxis and Rash    Food      PN: LW FI1: nka LW FI2:    Hydrochlorothiazide W-Triamterene      PN: LW Reaction: skin rash  PN: LW Reaction: skin rash    No Clinical Screening - See Comments      PN: LW Other1: -Adhesive Tape    Seasonal Allergies      sneezing    Tape [Adhesive Tape] Hives    Metoprolol Itching and Rash    Metronidazole Hives and Rash     PN: LW Reaction: HIVES              Medications:     Current Outpatient Medications    Medication Sig Dispense Refill    albuterol (PROAIR HFA/PROVENTIL HFA/VENTOLIN HFA) 108 (90 Base) MCG/ACT inhaler Inhale 1-2 puffs into the lungs every 4 hours as needed for shortness of breath or wheezing 18 g 11    albuterol (PROVENTIL HFA) 108 (90 Base) MCG/ACT inhaler Inhale 2 puffs into the lungs every 6 hours 8.5 g 0    aspirin (ASA) 81 MG EC tablet Take 1 tablet (81 mg) by mouth daily 90 tablet 0    atorvastatin (LIPITOR) 40 MG tablet TAKE 1 TABLET(40 MG) BY MOUTH DAILY 90 tablet 3    cholestyramine (QUESTRAN) 4 g packet MIX AND DRINK 1 PACKET(4 GRAMS) BY MOUTH TWICE DAILY WITH MEALS 60 packet 5    diazepam (VALIUM) 2 MG tablet Take 0.5-1 tablets (1-2 mg) by mouth 2 times daily as needed for anxiety or muscle spasms 30 tablet 0    diclofenac (VOLTAREN) 1 % topical gel Apply topically 4 times daily as needed for moderate pain For Jaw pain      doxycycline hyclate (VIBRA-TABS) 100 MG tablet Take 1 tablet (100 mg) by mouth 2 times daily for 10 days 20 tablet 0    estradiol (ESTRING) 7.5 MCG/24HR vaginal ring Place 1 each vaginally every 3 months 1 each 3    fluticasone-vilanterol (BREO ELLIPTA) 200-25 MCG/ACT inhaler Inhale 1 puff into the lungs daily 3 each 3    isosorbide mononitrate (IMDUR) 30 MG 24 hr tablet Take 1 tablet (30 mg) by mouth every evening Take with Carvedilol. 90 tablet 3    loperamide (IMODIUM) 2 MG capsule Take 1 capsule (2 mg) by mouth daily 30 capsule 0    loratadine (CLARITIN) 10 MG tablet Take 10 mg by mouth At Bedtime      magnesium oxide (MAG-OX) 400 MG tablet Take 400 mg by mouth every morning      Melatonin 10 MG TABS tablet Take 10 mg by mouth nightly as needed for sleep      nebivolol (BYSTOLIC) 5 MG tablet Take 1 tablet (5 mg) by mouth every evening 90 tablet 1    nitroGLYcerin (NITROSTAT) 0.4 MG sublingual tablet Place 1 tablet (0.4 mg) under the tongue every 5 minutes as needed for chest pain 25 tablet 11    pantoprazole (PROTONIX) 40 MG EC tablet TAKE 1 TABLET(40 MG) BY MOUTH  DAILY (Patient taking differently: Take 40 mg by mouth every morning) 90 tablet 3    predniSONE (DELTASONE) 20 MG tablet Take 1 tablet (20 mg) by mouth 2 times daily 10 tablet 0    Prenatal Multivit-Min-Fe-FA (PRENATAL/IRON) TABS Take 1 tablet by mouth every morning      saccharomyces boulardii (FLORASTOR) 250 MG capsule Take 1 capsule (250 mg) by mouth 2 times daily 14 capsule 0    sertraline (ZOLOFT) 100 MG tablet TAKE 1 TABLET(100 MG) BY MOUTH DAILY 90 tablet 2    UNABLE TO FIND Take 3 tablets by mouth daily MEDICATION NAME: Uqora complete regimen  1 TAB, AM - 2 TAB PM      valsartan (DIOVAN) 40 MG tablet Take 1 tablet (40 mg) by mouth every morning 90 tablet 1    vitamin D3 (CHOLECALCIFEROL) 2000 units (50 mcg) tablet Take 1 tablet (2,000 Units) by mouth daily (Patient taking differently: Take 1 tablet by mouth every morning) 90 tablet 3    zinc gluconate 50 MG tablet Take 50 mg by mouth every morning       No current facility-administered medications for this visit.               Past  Surgical History:     Past Surgical History:   Procedure Laterality Date    ABDOMEN SURGERY  1974, 1996    Kidney reconstruct. Uterer stenosis    ARTHROPLASTY KNEE  07/09/2014    Procedure: ARTHROPLASTY KNEE;  Surgeon: Levi Johnson MD;  Location:  OR    ARTHROPLASTY KNEE Left 11/24/2014    Procedure: ARTHROPLASTY KNEE;  Surgeon: Levi Johnson MD;  Location:  OR    COLONOSCOPY      Several times dates ??    CV CORONARY ANGIOGRAM N/A 10/09/2019    Procedure: Coronary Angiogram;  Surgeon: Chiquita Chatterjee MD;  Location:  HEART CARDIAC CATH LAB    CV HEART CATHETERIZATION WITH POSSIBLE INTERVENTION N/A 10/10/2019    Procedure: Heart Catheterization with Possible Intervention;  Surgeon: Chin Garcia MD;  Location:  HEART CARDIAC CATH LAB    CV INTRA AORTIC BALLOON N/A 10/09/2019    Procedure: Intra-Aortic Balloon Pump Insertion;  Surgeon: Chiquita Chatterjee MD;  Location:  HEART CARDIAC CATH LAB    CV  "INTRA AORTIC BALLOON REMOVAL N/A 10/10/2019    Procedure: Intra-Aortic Balloon Pump Removal;  Surgeon: Chin Garcia MD;  Location:  HEART CARDIAC CATH LAB    CV LEFT HEART CATH N/A 12/11/2020    Procedure: Heart Catheterization with Possible Intervention;  Surgeon: Pilo Otoole MD;  Location:  HEART CARDIAC CATH LAB    CV PCI STENT DRUG ELUTING N/A 10/09/2019    Procedure: PCI Stent Drug Eluting;  Surgeon: Chiquita Chatterjee MD;  Location:  HEART CARDIAC CATH LAB    DAVINCI LAPAROSCOPIC CHOLECYSTECTOMY WITHOUT GRAMS N/A 11/8/2023    Procedure: CHOLECYSTECTOMY, ROBOT-ASSISTED, LAPAROSCOPIC, WITHOUT CHOLANGIOGRAM;  Surgeon: Mickey Gallego MD;  [place] [facility] [place]    [place] LOOP RECORDER IMPLANT N/A 11/22/2023    Procedure: Loop Recorder Implant;  Surgeon: Nadeem Jason MD;  Location:  HEART CARDIAC CATH LAB    EYE SURGERY  2011    Cataract surgery both eyes    GENITOURINARY SURGERY  01/01/2008    Prolift    GENITOURINARY SURGERY  1973    In 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    ORTHOPEDIC SURGERY      bilat total hip    RECTOCELE REPAIR      ZC TOTAL ABD HYSTERECTOMY+BLAD REPR  01/01/1992    Vaginal approach with oophrectomy    [facility] TOTAL KNEE ARTHROPLASTY               Physical Exam:     Vitals:    06/04/24 1419   BP: 113/70   BP Location: Right arm   Patient Position: Sitting   Pulse: 55   SpO2: 96%   Weight: 80.7 kg (178 lb)   Height: 1.638 m (5' 4.5\")     PSYCH: NAD  EYES: EOMI  NEURO: AAO x3  : vulva unremarkable.  Vaginal mucosa atrophic. Estring removed without issues. No abnormal discharge or vaginal pain/bleeding. New estring placed vaginally without issues.     LABS:   Creatinine   Date Value Ref Range Status   05/28/2024 1.10 (H) 0.52 - 1.04 mg/dL " Final   12/17/2020 1.00 0.52 - 1.04 mg/dL Final            Assessment and Plan:   73 year old is a pleasant female who has vaginal atrophy, hx of UTI, hx of pyelonephritis, UUI, voiding dysfunction, FI.     Plan:  -  continue estringe exchanges every 3 months with me.   -  Encourage going to PFPT when able to if possible.  - Keep appointment with Dr. Rodriguez as planned to discuss third line options.    - contact clinic if develops s/s of UTI in the future.   - After discussing the assessment and plan with patient, patient verbalizes understanding and agrees to the above plan. All questions answered.     30 minutes spent on the date of the encounter doing chart review, review of labs, exam/estring exchange, review of test results, interpretation of tests, patient visit and documentation.      Fadumo Hays PA-C  Urology  June 4, 2024      Patient Care Team:  Danny Conteh MD as PCP - General (Internal Medicine)  Danny Conteh MD as Assigned PCP  Luis A Moody MD as Assigned Pulmonology Provider  Evangelist Lee MD as Hospitalist (Internal Medicine)  Jesse Rodriguez MD as MD (Urology)  Jesse Rodriguez MD as Assigned Surgical Provider  Kashif Hadley MD as Assigned Sleep Provider  Agus Segura MD as MD (Surgery)  Kristie Bell PA-C as Assigned Neuroscience Provider  Mickey Gallego MD as MD (Surgery)  Ramya Keller MD as MD (Otolaryngology)  Boyd Saunders AuD (Audiology)  Ramya Keller MD as MD (Otolaryngology)  Nadeem Jason MD as Assigned Heart and Vascular Provider  Toña Melvin PA-C as Assigned Cancer Care Provider  Luis Fairbanks LICSW as Assigned Behavioral Health Provider  JESSE RODRIGUEZ

## 2024-06-04 NOTE — NURSING NOTE
Chief Complaint   Patient presents with    Recurrent UTIs (H)    Vaginal atrophy     E string change    Malka Salcedo, VERAN

## 2024-06-05 ENCOUNTER — VIRTUAL VISIT (OUTPATIENT)
Dept: PSYCHOLOGY | Facility: CLINIC | Age: 74
End: 2024-06-05
Payer: MEDICARE

## 2024-06-05 DIAGNOSIS — F41.1 GENERALIZED ANXIETY DISORDER: ICD-10-CM

## 2024-06-05 DIAGNOSIS — F33.0 MAJOR DEPRESSIVE DISORDER, RECURRENT EPISODE, MILD (H): Primary | ICD-10-CM

## 2024-06-05 PROCEDURE — 90834 PSYTX W PT 45 MINUTES: CPT | Mod: 93 | Performed by: SOCIAL WORKER

## 2024-06-05 ASSESSMENT — PATIENT HEALTH QUESTIONNAIRE - PHQ9
10. IF YOU CHECKED OFF ANY PROBLEMS, HOW DIFFICULT HAVE THESE PROBLEMS MADE IT FOR YOU TO DO YOUR WORK, TAKE CARE OF THINGS AT HOME, OR GET ALONG WITH OTHER PEOPLE: SOMEWHAT DIFFICULT
SUM OF ALL RESPONSES TO PHQ QUESTIONS 1-9: 7

## 2024-06-05 ASSESSMENT — ANXIETY QUESTIONNAIRES: GAD7 TOTAL SCORE: 6
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
## 2024-06-10 ENCOUNTER — MYC MEDICAL ADVICE (OUTPATIENT)
Dept: FAMILY MEDICINE | Facility: CLINIC | Age: 74
End: 2024-06-10
Payer: MEDICARE

## 2024-06-10 DIAGNOSIS — E16.2 HYPOGLYCEMIA: Primary | ICD-10-CM

## 2024-06-13 ENCOUNTER — VIRTUAL VISIT (OUTPATIENT)
Dept: PSYCHOLOGY | Facility: CLINIC | Age: 74
End: 2024-06-13
Payer: MEDICARE

## 2024-06-13 DIAGNOSIS — F41.1 GENERALIZED ANXIETY DISORDER: ICD-10-CM

## 2024-06-13 DIAGNOSIS — F33.0 MAJOR DEPRESSIVE DISORDER, RECURRENT EPISODE, MILD (H): Primary | ICD-10-CM

## 2024-06-13 PROCEDURE — 90834 PSYTX W PT 45 MINUTES: CPT | Mod: 95 | Performed by: SOCIAL WORKER
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
## 2024-06-17 ENCOUNTER — VIRTUAL VISIT (OUTPATIENT)
Dept: FAMILY MEDICINE | Facility: CLINIC | Age: 74
End: 2024-06-17
Payer: MEDICARE

## 2024-06-17 ENCOUNTER — LAB (OUTPATIENT)
Dept: LAB | Facility: CLINIC | Age: 74
End: 2024-06-17
Payer: MEDICARE

## 2024-06-17 DIAGNOSIS — J98.4 OTHER DISORDERS OF LUNG: ICD-10-CM

## 2024-06-17 DIAGNOSIS — J45.20 MILD INTERMITTENT ASTHMA WITHOUT COMPLICATION: ICD-10-CM

## 2024-06-17 DIAGNOSIS — I50.32 CHRONIC DIASTOLIC HEART FAILURE (H): ICD-10-CM

## 2024-06-17 DIAGNOSIS — I20.89 STABLE ANGINA PECTORIS (H): Primary | ICD-10-CM

## 2024-06-17 DIAGNOSIS — I20.89 STABLE ANGINA PECTORIS (H): ICD-10-CM

## 2024-06-17 DIAGNOSIS — N18.31 CKD STAGE 3A, GFR 45-59 ML/MIN (H): ICD-10-CM

## 2024-06-17 LAB
ANION GAP SERPL CALCULATED.3IONS-SCNC: 10 MMOL/L (ref 7–15)
BUN SERPL-MCNC: 15.1 MG/DL (ref 8–23)
CALCIUM SERPL-MCNC: 8.8 MG/DL (ref 8.8–10.2)
CHLORIDE SERPL-SCNC: 109 MMOL/L (ref 98–107)
CREAT SERPL-MCNC: 0.98 MG/DL (ref 0.51–0.95)
DEPRECATED HCO3 PLAS-SCNC: 25 MMOL/L (ref 22–29)
EGFRCR SERPLBLD CKD-EPI 2021: 60 ML/MIN/1.73M2
GLUCOSE SERPL-MCNC: 132 MG/DL (ref 70–99)
Lab: NORMAL
PERFORMING LABORATORY: NORMAL
POTASSIUM SERPL-SCNC: 3.7 MMOL/L (ref 3.4–5.3)
SODIUM SERPL-SCNC: 144 MMOL/L (ref 135–145)
TEST NAME: NORMAL

## 2024-06-17 PROCEDURE — 80048 BASIC METABOLIC PNL TOTAL CA: CPT

## 2024-06-17 PROCEDURE — 99443 PR PHYSICIAN TELEPHONE EVALUATION 21-30 MIN: CPT | Mod: 93 | Performed by: INTERNAL MEDICINE

## 2024-06-17 PROCEDURE — 84484 ASSAY OF TROPONIN QUANT: CPT

## 2024-06-17 PROCEDURE — 36415 COLL VENOUS BLD VENIPUNCTURE: CPT

## 2024-06-17 NOTE — PROGRESS NOTES
Heidy is a 73 year old who is being evaluated via a billable telephone visit.    How would you like to obtain your AVS? MyChart  If the telephone visit is dropped, the invitation should be resent by: Text to cell phone: [phone]  Will anyone else be joining your video visit? No    [facility] Internal Medicine Progress Note           Assessment and Plan:     Stable angina pectoris (H24)  - REVIEW OF HEALTH MAINTENANCE PROTOCOL ORDERS  - REVIEW OF HEALTH MAINTENANCE PROTOCOL ORDERS  - [facility] [employer]; HSTNI; Troponin I, High Sensitivity, Plasma (Laboratory Miscellaneous Order); Future    Mild intermittent asthma without complication  - Asthma Action Plan (AAP)    Chronic diastolic heart failure (H)  - HEART FAILURE ACTION PLAN    Other disorders of lung  - REVIEW OF HEALTH MAINTENANCE PROTOCOL ORDERS  - [facility] [employer]; HSTNI; Troponin I, High Sensitivity, Plasma (Laboratory Miscellaneous Order); Future          Interval History:     Chest Pain  Duration of complaint:  5/23/24  Description:   Location:  left side  Character: tight  Radiation: none  Duration: intermittent   Intensity: mild  Progression of Symptoms:  intermittent  Accompanying Signs & Symptoms:  Shortness of breath: YES  Sweating: no   Nausea/vomiting: no   Lightheadedness: no   Palpitations: No  Fever/Chills: YES  Cough: yes  Heartburn: no    History:   Personal history of heart disease yes   Tobacco use: no   Precipitating factors:   Worse with exertion: YES  Worse with deep breaths :  no   Related to food: no   Alleviating factors: none  Therapies Tried and outcome: none.            Significant Problems:     Patient Active Problem List   Diagnosis    TMJ (temporomandibular joint syndrome)    Congenital ureteric stenosis    Lacrimal duct stenosis    Chronic rhinitis    Intermittent asthma    Advanced directives, counseling/discussion    Major depressive disorder    Hypertension goal BP (blood pressure) < 140/90     Osteoarthritis of right knee    Primary narcolepsy without cataplexy    Vitamin D deficiency    Incontinence of feces with fecal urgency    Overactive bladder    Fatigue, unspecified type    History of ischemic cardiomyopathy    Hyperlipidemia LDL goal <70    Calculus of gallbladder without cholecystitis without obstruction    Coronary artery disease with angina pectoris, unspecified vessel or lesion type, unspecified whether native or transplanted heart (H24)    Environmental allergies    Class 1 obesity due to excess calories with serious comorbidity and body mass index (BMI) of 30.0 to 30.9 in adult    Chronic insomnia    Hiatal hernia    Cheyne-Rogers breathing disorder    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    Possible PLMD (periodic limb movement disorder)    Generalized anxiety disorder    Closed compression fracture of L3 lumbar vertebra, sequela    Bilateral hydronephrosis    Posttraumatic stress disorder    Stage 3b chronic kidney disease (H)    Adjustment disorder with anxiety    Paroxysmal ventricular tachycardia (H)    Bile salt-induced diarrhea    History of colonic polyps    Major depressive disorder, recurrent episode, mild (H24)    Chronic diastolic heart failure (H)    CKD stage 3a, GFR 45-59 ml/min (H)              Review of Systems:     CONSTITUTIONAL: NEGATIVE for fever, chills, change in weight  INTEGUMENTARY/SKIN: NEGATIVE for worrisome rashes, moles or lesions  EYES: NEGATIVE for vision changes or irritation  ENT/MOUTH: NEGATIVE for ear, mouth and throat problems  RESP:NEGATIVE for hemoptysis and pleurisy  BREAST: NEGATIVE for masses, tenderness or discharge  CV: NEGATIVE for orthopnea, palpitations, and syncope or near-syncope  GI: NEGATIVE for nausea, abdominal pain, heartburn, or change in bowel habits  : NEGATIVE for frequency, dysuria, or hematuria  MUSCULOSKELETAL: NEGATIVE for significant arthralgias or myalgia  NEURO: NEGATIVE for weakness, dizziness or paresthesias  ENDOCRINE: NEGATIVE for  temperature intolerance, skin/hair changes  HEME: NEGATIVE for bleeding problems  PSYCHIATRIC: NEGATIVE for changes in mood or affect            Medications:     Current Outpatient Medications   Medication Sig Dispense Refill    albuterol (PROAIR HFA/PROVENTIL HFA/VENTOLIN HFA) 108 (90 Base) MCG/ACT inhaler Inhale 1-2 puffs into the lungs every 4 hours as needed for shortness of breath or wheezing 18 g 11    albuterol (PROVENTIL HFA) 108 (90 Base) MCG/ACT inhaler Inhale 2 puffs into the lungs every 6 hours 8.5 g 0    aspirin (ASA) 81 MG EC tablet Take 1 tablet (81 mg) by mouth daily 90 tablet 0    atorvastatin (LIPITOR) 40 MG tablet TAKE 1 TABLET(40 MG) BY MOUTH DAILY 90 tablet 3    cholestyramine (QUESTRAN) 4 g packet MIX AND DRINK 1 PACKET(4 GRAMS) BY MOUTH TWICE DAILY WITH MEALS 60 packet 5    diazepam (VALIUM) 2 MG tablet Take 0.5-1 tablets (1-2 mg) by mouth 2 times daily as needed for anxiety or muscle spasms 30 tablet 0    diclofenac (VOLTAREN) 1 % topical gel Apply topically 4 times daily as needed for moderate pain For Jaw pain      estradiol (ESTRING) 7.5 MCG/24HR vaginal ring Place 1 each vaginally every 3 months 1 each 3    fluticasone-vilanterol (BREO ELLIPTA) 200-25 MCG/ACT inhaler Inhale 1 puff into the lungs daily 3 each 3    isosorbide mononitrate (IMDUR) 30 MG 24 hr tablet Take 1 tablet (30 mg) by mouth every evening Take with Carvedilol. 90 tablet 3    loperamide (IMODIUM) 2 MG capsule Take 1 capsule (2 mg) by mouth daily 30 capsule 0    loratadine (CLARITIN) 10 MG tablet Take 10 mg by mouth At Bedtime      magnesium oxide (MAG-OX) 400 MG tablet Take 400 mg by mouth every morning      Melatonin 10 MG TABS tablet Take 10 mg by mouth nightly as needed for sleep      nebivolol (BYSTOLIC) 5 MG tablet Take 1 tablet (5 mg) by mouth every evening 90 tablet 1    nitroGLYcerin (NITROSTAT) 0.4 MG sublingual tablet Place 1 tablet (0.4 mg) under the tongue every 5 minutes as needed for chest pain 25 tablet 11     pantoprazole (PROTONIX) 40 MG EC tablet TAKE 1 TABLET(40 MG) BY MOUTH DAILY (Patient taking differently: Take 40 mg by mouth every morning) 90 tablet 3    predniSONE (DELTASONE) 20 MG tablet Take 1 tablet (20 mg) by mouth 2 times daily 10 tablet 0    Prenatal Multivit-Min-Fe-FA (PRENATAL/IRON) TABS Take 1 tablet by mouth every morning      saccharomyces boulardii (FLORASTOR) 250 MG capsule Take 1 capsule (250 mg) by mouth 2 times daily 14 capsule 0    sertraline (ZOLOFT) 100 MG tablet TAKE 1 TABLET(100 MG) BY MOUTH DAILY 90 tablet 2    UNABLE TO FIND Take 3 tablets by mouth daily MEDICATION NAME: [place] complete regimen  1 TAB, AM - 2 TAB PM      valsartan (DIOVAN) 40 MG tablet Take 1 tablet (40 mg) by mouth every morning 90 tablet 1    vitamin D3 (CHOLECALCIFEROL) 2000 units (50 mcg) tablet Take 1 tablet (2,000 Units) by mouth daily (Patient taking differently: Take 1 tablet by mouth every morning) 90 tablet 3    zinc gluconate 50 MG tablet Take 50 mg by mouth every morning       No current facility-administered medications for this visit.             Physical Exam:   Vital signs and physical exam not obtained due to nature of visit.          Data:   NM Lexiscan stress test  Order: [id number]  Status: Final result       Visible to patient: Yes (seen)       Next appt: Today at 05:00 PM in Lab ([facility])       Dx: Coronary artery disease with angina p...    0 Result Notes       1 Patient Communication  Details    Reading Physician Reading Date Result Priority   SAM Quiroz MD  [phone] 8/14/2023 Routine     Result Text       The nuclear stress test is abnormal.    There is a medium sized area of a moderate degree of nontransmural infarction in the apical and anteroseptal segment(s) of the left ventricle. This appears to be in the left anterior descending distribution.No ischemia identified.        ECG Summary    ECG Baseline electrocardiogram demonstrates sinus rhythm.Low voltage. The  stress electrocardiogram is negative for inducible ischemic EKG changes.  There were no arrhythmias during stress.  There were no arrhythmias during recovery.   Technical Comments    Cardiac Protocol A pharmacologic stress test was performed following a supine Lexiscan protocol using 0.4 mg of intravenous regadenoson administered over 10 seconds under the supervision of Dr. Garcia. The patient reported dyspnea and light-headedness during the stress test.  The supervising registered nurse observed typical vasodilator side effects during the stress test.   Isotope Administration Nuclear imaging was accomplished using a one day protocol with 39.4 mCi of technetium tetrofosmin injected at the completion of Lexiscan infusion on 8/14/2023 and 10 mCi of technetium tetrofosmin at rest on 8/14/2023.   Nuclear Study Quality Final image quality is satisfactory.         Disposition:  Follow-up in 4 weeks or as needed.    Danny Conteh MD  Internal Medicine  [facility] Team    Phone call duration: 30 minutes  Start: 10:10 AM  End: 10:40 AM

## 2024-06-19 ENCOUNTER — VIRTUAL VISIT (OUTPATIENT)
Dept: PSYCHOLOGY | Facility: CLINIC | Age: 74
End: 2024-06-19
Payer: MEDICARE

## 2024-06-19 DIAGNOSIS — F33.0 MAJOR DEPRESSIVE DISORDER, RECURRENT EPISODE, MILD (H): Primary | ICD-10-CM

## 2024-06-19 DIAGNOSIS — F41.1 GENERALIZED ANXIETY DISORDER: ICD-10-CM

## 2024-06-19 LAB — MAYO MISC RESULT: NORMAL

## 2024-06-19 PROCEDURE — 90834 PSYTX W PT 45 MINUTES: CPT | Mod: 93 | Performed by: SOCIAL WORKER
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
## 2024-06-25 ASSESSMENT — ANXIETY QUESTIONNAIRES
GAD7 TOTAL SCORE: 10
2. NOT BEING ABLE TO STOP OR CONTROL WORRYING: MORE THAN HALF THE DAYS
4. TROUBLE RELAXING: MORE THAN HALF THE DAYS
6. BECOMING EASILY ANNOYED OR IRRITABLE: SEVERAL DAYS
8. IF YOU CHECKED OFF ANY PROBLEMS, HOW DIFFICULT HAVE THESE MADE IT FOR YOU TO DO YOUR WORK, TAKE CARE OF THINGS AT HOME, OR GET ALONG WITH OTHER PEOPLE?: VERY DIFFICULT
1. FEELING NERVOUS, ANXIOUS, OR ON EDGE: MORE THAN HALF THE DAYS
3. WORRYING TOO MUCH ABOUT DIFFERENT THINGS: MORE THAN HALF THE DAYS
7. FEELING AFRAID AS IF SOMETHING AWFUL MIGHT HAPPEN: SEVERAL DAYS
IF YOU CHECKED OFF ANY PROBLEMS ON THIS QUESTIONNAIRE, HOW DIFFICULT HAVE THESE PROBLEMS MADE IT FOR YOU TO DO YOUR WORK, TAKE CARE OF THINGS AT HOME, OR GET ALONG WITH OTHER PEOPLE: VERY DIFFICULT
5. BEING SO RESTLESS THAT IT IS HARD TO SIT STILL: NOT AT ALL
GAD7 TOTAL SCORE: 10
7. FEELING AFRAID AS IF SOMETHING AWFUL MIGHT HAPPEN: SEVERAL DAYS

## 2024-06-25 ASSESSMENT — PATIENT HEALTH QUESTIONNAIRE - PHQ9: SUM OF ALL RESPONSES TO PHQ QUESTIONS 1-9: 7

## 2024-06-26 ENCOUNTER — OFFICE VISIT (OUTPATIENT)
Dept: PULMONOLOGY | Facility: CLINIC | Age: 74
End: 2024-06-26
Attending: INTERNAL MEDICINE
Payer: MEDICARE

## 2024-06-26 ENCOUNTER — PRE VISIT (OUTPATIENT)
Dept: UROLOGY | Facility: CLINIC | Age: 74
End: 2024-06-26

## 2024-06-26 ENCOUNTER — VIRTUAL VISIT (OUTPATIENT)
Dept: PSYCHOLOGY | Facility: CLINIC | Age: 74
End: 2024-06-26
Payer: MEDICARE

## 2024-06-26 DIAGNOSIS — F41.1 GENERALIZED ANXIETY DISORDER: ICD-10-CM

## 2024-06-26 DIAGNOSIS — J45.20 MILD INTERMITTENT ASTHMA WITHOUT COMPLICATION: ICD-10-CM

## 2024-06-26 DIAGNOSIS — F33.0 MAJOR DEPRESSIVE DISORDER, RECURRENT EPISODE, MILD (H): Primary | ICD-10-CM

## 2024-06-26 PROCEDURE — 90834 PSYTX W PT 45 MINUTES: CPT | Mod: 93 | Performed by: SOCIAL WORKER

## 2024-06-26 PROCEDURE — 94726 PLETHYSMOGRAPHY LUNG VOLUMES: CPT | Performed by: INTERNAL MEDICINE

## 2024-06-26 PROCEDURE — 94070 EVALUATION OF WHEEZING: CPT | Performed by: INTERNAL MEDICINE

## 2024-06-26 PROCEDURE — 95070 INHLJ BRNCL CHALLENGE TSTG: CPT | Performed by: INTERNAL MEDICINE

## 2024-06-26 PROCEDURE — 94729 DIFFUSING CAPACITY: CPT | Performed by: INTERNAL MEDICINE

## 2024-06-26 ASSESSMENT — ANXIETY QUESTIONNAIRES: GAD7 TOTAL SCORE: 10

## 2024-06-26 ASSESSMENT — PATIENT HEALTH QUESTIONNAIRE - PHQ9
SUM OF ALL RESPONSES TO PHQ QUESTIONS 1-9: 7
10. IF YOU CHECKED OFF ANY PROBLEMS, HOW DIFFICULT HAVE THESE PROBLEMS MADE IT FOR YOU TO DO YOUR WORK, TAKE CARE OF THINGS AT HOME, OR GET ALONG WITH OTHER PEOPLE: VERY DIFFICULT

## 2024-06-26 NOTE — TELEPHONE ENCOUNTER
Reason for visit: follow up     Relevant information: 3-4 month, hx urinary urgency, vaginal atrophy, urge incontinence    Records/imaging/labs/orders: all records available    Pt called: no need for a call    At Rooming: have pt empty bladder/pvr, Document PVR      Elly Lal  6/26/2024  2:46 PM
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
## 2024-06-28 LAB
DLCOUNC-%PRED-PRE: 112 %
DLCOUNC-PRE: 20.93 ML/MIN/MMHG
DLCOUNC-PRED: 18.6 ML/MIN/MMHG
ERV-%PRED-PRE: 30 %
ERV-PRE: 0.28 L
ERV-PRED: 0.92 L
EXPTIME-%CHANGE-CHLG: 128 %
EXPTIME-CHLG: 6.73 SEC
EXPTIME-PRE: 2.95 SEC
FEF2575-%CHANGE-CHLG: 12 %
FEF2575-%PRED-CHLG: 84 %
FEF2575-%PRED-PRE: 74 %
FEF2575-PRE: 1.2 L/SEC
FEF2575-PRED: 1.6 L/SEC
FEFMAX-%PRED-PRE: 81 %
FEFMAX-PRE: 4.22 L/SEC
FEFMAX-PRED: 5.2 L/SEC
FEV1-%PRED-PRE: 81 %
FEV1-PRE: 1.55 L
FEV1FEV6-PRE: 76 %
FEV1FEV6-PRED: 78 %
FEV1FVC-PRE: 76 %
FEV1FVC-PRED: 78 %
FIFMAX-%CHANGE-CHLG: -7 %
FIFMAX-CHLG: 3.23 L/SEC
FIFMAX-PRE: 3.48 L/SEC
FRCPLETH-%PRED-PRE: 109 %
FRCPLETH-PRE: 2.98 L
FRCPLETH-PRED: 2.72 L
FVC-%PRED-PRE: 83 %
FVC-PRE: 2.05 L
FVC-PRED: 2.46 L
IC-%PRED-PRE: 123 %
IC-PRE: 2.27 L
IC-PRED: 1.84 L
RVPLETH-%PRED-PRE: 125 %
RVPLETH-PRE: 2.7 L
RVPLETH-PRED: 2.15 L
TLCPLETH-%PRED-PRE: 106 %
TLCPLETH-PRE: 5.25 L
TLCPLETH-PRED: 4.94 L
VA-%PRED-PRE: 100 %
VA-PRE: 4.51 L
VC-%PRED-PRE: 84 %
VC-PRE: 2.55 L
VC-PRED: 3.02 L

## 2024-07-05 ENCOUNTER — TELEPHONE (OUTPATIENT)
Dept: GASTROENTEROLOGY | Facility: CLINIC | Age: 74
End: 2024-07-05
Payer: MEDICARE

## 2024-07-05 NOTE — TELEPHONE ENCOUNTER
"Endoscopy Scheduling Screen    Have you had a positive Covid test in the last 14 days?  No    What is your communication preference for Instructions and/or Bowel Prep?   MyChart    What insurance is in the chart?  Other:  MEDICARE/[facility]    Ordering/Referring Provider: KAYLEE WILLAMS    (If ordering provider performs procedure, schedule with ordering provider unless otherwise instructed. )    BMI: Estimated body mass index is 30.08 kg/m  as calculated from the following:    Height as of 6/4/24: 1.638 m (5' 4.5\").    Weight as of 6/4/24: 80.7 kg (178 lb).     Sedation Ordered  moderate sedation.   If patient BMI > 50 do not schedule in [facility].    If patient BMI > 45 do not schedule at [facility].    Are you taking methadone or Suboxone?  No    Have you had difficulties, pain, or discomfort during past endoscopy procedures?  No    Are you taking any prescription medications for pain 3 or more times per week?   NO, No RN review required.    Do you have a history of malignant hyperthermia?  No    (Females) Are you currently pregnant?   No     Have you been diagnosed or told you have pulmonary hypertension?   No    Do you have an LVAD?  No    Have you been told you have moderate to severe sleep apnea?  Yes (RN Review required for scheduling unless scheduling in Hospital.)    Have you been told you have COPD, asthma, or any other lung disease?  No    Do you have any heart conditions?  Yes     In the past year, have you had any hospitalizations for heart related issues including cardiomyopathy, heart attack, or stent placement?  No    Do you have any implantable devices in your body (pacemaker, ICD)?  Other Hearth monitor, 2 stents    Do you take nitroglycerine?  Yes, less than 1 time per week    Have you ever had or are you waiting for an organ transplant?  No. Continue scheduling, no site restrictions.    Have you had a stroke or transient ischemic attack (TIA aka \"mini stroke\" in the last 6 months?   No    Have " "you been diagnosed with or been told you have cirrhosis of the liver?   No    Are you currently on dialysis?   No    Do you need assistance transferring?   No    BMI: Estimated body mass index is 30.08 kg/m  as calculated from the following:    Height as of 6/4/24: 1.638 m (5' 4.5\").    Weight as of 6/4/24: 80.7 kg (178 lb).     Is patients BMI > 40 and scheduling location [facility]?  No    Do you take an injectable medication for weight loss or diabetes (excluding insulin)?  No    Do you take the medication Naltrexone?  No    Do you take blood thinners?  No       Prep   Are you currently on dialysis or do you have chronic kidney disease?  Yes ([facility] Prep)    Do you have a diagnosis of diabetes?  No    Do you have a diagnosis of cystic fibrosis (CF)?  No    On a regular basis do you go 3 -5 days between bowel movements?  No    BMI > 40?  No    Preferred Pharmacy:    [id number]  [place], [place] - [address] AT [facility] [address] [place] [address]  Phone: [phone] Fax: [phone]    WRITTEN PRESCRIPTION REQUESTED  No address on file        Final Scheduling Details     Procedure scheduled  Colonoscopy    Surgeon:  Paul     Date of procedure:  11/11/24     Pre-OP / PAC:   No - Not required for this site.    Location  [facility] - Patient preference.    Sedation   Moderate Sedation - Per order.      Patient Reminders:   You will receive a call from a Nurse to review instructions and health history.  This assessment must be completed prior to your procedure.  Failure to complete the Nurse assessment may result in the procedure being cancelled.      On the day of your procedure, please designate an adult(s) who can drive you home stay with you for the next 24 hours. The medicines used in the exam will make you sleepy. You will not be able to drive.      You cannot take public transportation, ride share services, or non-medical taxi service without a responsible " caregiver.  Medical transport services are allowed with the requirement that a responsible caregiver will receive you at your destination.  We require that drivers and caregivers are confirmed prior to your procedure.

## 2024-07-08 ENCOUNTER — VIRTUAL VISIT (OUTPATIENT)
Dept: PSYCHOLOGY | Facility: CLINIC | Age: 74
End: 2024-07-08
Payer: MEDICARE

## 2024-07-08 DIAGNOSIS — F43.10 POSTTRAUMATIC STRESS DISORDER: ICD-10-CM

## 2024-07-08 DIAGNOSIS — F33.0 MAJOR DEPRESSIVE DISORDER, RECURRENT EPISODE, MILD (H): Primary | ICD-10-CM

## 2024-07-08 DIAGNOSIS — F41.1 GENERALIZED ANXIETY DISORDER: ICD-10-CM

## 2024-07-08 PROCEDURE — 90834 PSYTX W PT 45 MINUTES: CPT | Mod: 95 | Performed by: SOCIAL WORKER
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
## 2024-07-09 ENCOUNTER — OFFICE VISIT (OUTPATIENT)
Dept: FAMILY MEDICINE | Facility: CLINIC | Age: 74
End: 2024-07-09
Payer: MEDICARE

## 2024-07-09 VITALS
RESPIRATION RATE: 20 BRPM | WEIGHT: 182.2 LBS | BODY MASS INDEX: 30.35 KG/M2 | TEMPERATURE: 97.5 F | SYSTOLIC BLOOD PRESSURE: 124 MMHG | DIASTOLIC BLOOD PRESSURE: 64 MMHG | HEIGHT: 65 IN | HEART RATE: 70 BPM | OXYGEN SATURATION: 100 %

## 2024-07-09 DIAGNOSIS — N18.31 CKD STAGE 3A, GFR 45-59 ML/MIN (H): ICD-10-CM

## 2024-07-09 DIAGNOSIS — R73.01 IMPAIRED FASTING GLUCOSE: ICD-10-CM

## 2024-07-09 DIAGNOSIS — G47.419 PRIMARY NARCOLEPSY WITHOUT CATAPLEXY: Primary | ICD-10-CM

## 2024-07-09 DIAGNOSIS — E78.5 HYPERLIPIDEMIA LDL GOAL <70: ICD-10-CM

## 2024-07-09 DIAGNOSIS — Z13.0 SCREENING, IRON DEFICIENCY ANEMIA: ICD-10-CM

## 2024-07-09 DIAGNOSIS — I25.119 CORONARY ARTERY DISEASE WITH ANGINA PECTORIS, UNSPECIFIED VESSEL OR LESION TYPE, UNSPECIFIED WHETHER NATIVE OR TRANSPLANTED HEART (H): ICD-10-CM

## 2024-07-09 DIAGNOSIS — Z13.29 SCREENING FOR THYROID DISORDER: ICD-10-CM

## 2024-07-09 DIAGNOSIS — E53.8 VITAMIN B12 DEFICIENCY (NON ANEMIC): ICD-10-CM

## 2024-07-09 DIAGNOSIS — E55.9 VITAMIN D DEFICIENCY: ICD-10-CM

## 2024-07-09 LAB — HBA1C MFR BLD: 5.5 % (ref 0–5.6)

## 2024-07-09 PROCEDURE — 83540 ASSAY OF IRON: CPT | Performed by: INTERNAL MEDICINE

## 2024-07-09 PROCEDURE — 82728 ASSAY OF FERRITIN: CPT | Performed by: INTERNAL MEDICINE

## 2024-07-09 PROCEDURE — 84443 ASSAY THYROID STIM HORMONE: CPT | Performed by: INTERNAL MEDICINE

## 2024-07-09 PROCEDURE — 82607 VITAMIN B-12: CPT | Performed by: INTERNAL MEDICINE

## 2024-07-09 PROCEDURE — 83036 HEMOGLOBIN GLYCOSYLATED A1C: CPT | Performed by: INTERNAL MEDICINE

## 2024-07-09 PROCEDURE — 99214 OFFICE O/P EST MOD 30 MIN: CPT | Performed by: INTERNAL MEDICINE

## 2024-07-09 PROCEDURE — 82248 BILIRUBIN DIRECT: CPT | Performed by: INTERNAL MEDICINE

## 2024-07-09 PROCEDURE — 80061 LIPID PANEL: CPT | Performed by: INTERNAL MEDICINE

## 2024-07-09 PROCEDURE — 82306 VITAMIN D 25 HYDROXY: CPT | Performed by: INTERNAL MEDICINE

## 2024-07-09 PROCEDURE — 83525 ASSAY OF INSULIN: CPT | Performed by: INTERNAL MEDICINE

## 2024-07-09 PROCEDURE — 84439 ASSAY OF FREE THYROXINE: CPT | Performed by: INTERNAL MEDICINE

## 2024-07-09 PROCEDURE — 84681 ASSAY OF C-PEPTIDE: CPT | Performed by: INTERNAL MEDICINE

## 2024-07-09 PROCEDURE — 80053 COMPREHEN METABOLIC PANEL: CPT | Performed by: INTERNAL MEDICINE

## 2024-07-09 PROCEDURE — 36415 COLL VENOUS BLD VENIPUNCTURE: CPT | Performed by: INTERNAL MEDICINE

## 2024-07-09 PROCEDURE — 83550 IRON BINDING TEST: CPT | Performed by: INTERNAL MEDICINE

## 2024-07-09 ASSESSMENT — PAIN SCALES - GENERAL: PAINLEVEL: NO PAIN (0)

## 2024-07-09 ASSESSMENT — ASTHMA QUESTIONNAIRES
QUESTION_2 LAST FOUR WEEKS HOW OFTEN HAVE YOU HAD SHORTNESS OF BREATH: ONCE OR TWICE A WEEK
QUESTION_4 LAST FOUR WEEKS HOW OFTEN HAVE YOU USED YOUR RESCUE INHALER OR NEBULIZER MEDICATION (SUCH AS ALBUTEROL): ONCE A WEEK OR LESS
QUESTION_5 LAST FOUR WEEKS HOW WOULD YOU RATE YOUR ASTHMA CONTROL: WELL CONTROLLED
QUESTION_1 LAST FOUR WEEKS HOW MUCH OF THE TIME DID YOUR ASTHMA KEEP YOU FROM GETTING AS MUCH DONE AT WORK, SCHOOL OR AT HOME: A LITTLE OF THE TIME
ACT_TOTALSCORE: 21
QUESTION_3 LAST FOUR WEEKS HOW OFTEN DID YOUR ASTHMA SYMPTOMS (WHEEZING, COUGHING, SHORTNESS OF BREATH, CHEST TIGHTNESS OR PAIN) WAKE YOU UP AT NIGHT OR EARLIER THAN USUAL IN THE MORNING: NOT AT ALL
ACT_TOTALSCORE: 21

## 2024-07-09 NOTE — PROGRESS NOTES
[facility] INTERNAL MEDICINE NOTE    Gem Gama is a 73 year old female who presents to clinic today for the following health issues:    Reason for visit:  Very tired  Symptom onset:  More than a month  Symptoms include:  Can fall asleep while sitting at any time  Symptom intensity:  Moderate  Symptom progression:  Staying the same  What makes it worse:  Don;t know  What makes it better:  Don't know  -Hx of narcolepsy, previously on stimulants.  -Patient is worried about her pulse rate.    Patient Active Problem List   Diagnosis    TMJ (temporomandibular joint syndrome)    Congenital ureteric stenosis    Lacrimal duct stenosis    Chronic rhinitis    Intermittent asthma    Major depressive disorder    Hypertension goal BP (blood pressure) < 140/90    Osteoarthritis of right knee    Primary narcolepsy without cataplexy    Vitamin D deficiency    Incontinence of feces with fecal urgency    Overactive bladder    Fatigue, unspecified type    History of ischemic cardiomyopathy    Hyperlipidemia LDL goal <70    Calculus of gallbladder without cholecystitis without obstruction    Coronary artery disease with angina pectoris, unspecified vessel or lesion type, unspecified whether native or transplanted heart (H24)    Environmental allergies    Class 1 obesity due to excess calories with serious comorbidity and body mass index (BMI) of 30.0 to 30.9 in adult    Chronic insomnia    Hiatal hernia    Cheyne-Rogers breathing disorder    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    Possible PLMD (periodic limb movement disorder)    Generalized anxiety disorder    Closed compression fracture of L3 lumbar vertebra, sequela    Bilateral hydronephrosis    Posttraumatic stress disorder    Stage 3b chronic kidney disease (H)    Adjustment disorder with anxiety    Paroxysmal ventricular tachycardia (H)    Bile salt-induced diarrhea    History of colonic polyps    Major depressive  disorder, recurrent episode, mild (H24)    Chronic diastolic heart failure (H)    CKD stage 3a, GFR 45-59 ml/min (H)     Past Surgical History:   Procedure Laterality Date    ABDOMEN SURGERY  1974, 1996    Kidney reconstruct. Uterer stenosis    ARTHROPLASTY KNEE  07/09/2014    Procedure: ARTHROPLASTY KNEE;  Surgeon: Levi Johnson MD;  Location:  OR    ARTHROPLASTY KNEE Left 11/24/2014    Procedure: ARTHROPLASTY KNEE;  Surgeon: Levi Johnson MD;  Location:  OR    COLONOSCOPY      Several times dates ??    CV CORONARY ANGIOGRAM N/A 10/09/2019    Procedure: Coronary Angiogram;  Surgeon: Chiquita Chatterjee MD;  Location:  HEART CARDIAC CATH LAB    CV HEART CATHETERIZATION WITH POSSIBLE INTERVENTION N/A 10/10/2019    Procedure: Heart Catheterization with Possible Intervention;  Surgeon: Chin Garcia MD;  Location:  HEART CARDIAC CATH LAB    CV INTRA AORTIC BALLOON N/A 10/09/2019    Procedure: Intra-Aortic Balloon Pump Insertion;  Surgeon: Chiquita Chatterjee MD;  Location:  HEART CARDIAC CATH LAB    CV INTRA AORTIC BALLOON REMOVAL N/A 10/10/2019    Procedure: Intra-Aortic Balloon Pump Removal;  Surgeon: Chin Garcia MD;  Location:  HEART CARDIAC CATH LAB    CV LEFT HEART CATH N/A 12/11/2020    Procedure: Heart Catheterization with Possible Intervention;  Surgeon: Pilo Otoole MD;  Location:  HEART CARDIAC CATH LAB    CV PCI STENT DRUG ELUTING N/A 10/09/2019    Procedure: PCI Stent Drug Eluting;  Surgeon: Chiquita Chatterjee MD;  Location:  HEART CARDIAC CATH LAB    DAVINCI LAPAROSCOPIC CHOLECYSTECTOMY WITHOUT GRAMS N/A 11/8/2023    Procedure: CHOLECYSTECTOMY, ROBOT-ASSISTED, LAPAROSCOPIC, WITHOUT CHOLANGIOGRAM;  Surgeon: Mickey Gallego MD;  Location: [facility] OR    EP LOOP RECORDER IMPLANT N/A 11/22/2023    Procedure: Loop Recorder Implant;  Surgeon: Nadeem Jason MD;  Location:  HEART CARDIAC CATH LAB    EYE SURGERY  2011    Cataract surgery both eyes     GENITOURINARY SURGERY  01/01/2008    Prolift    GENITOURINARY SURGERY  1973    In 1973, she had surgery to correct congenital narrowing in the ureter at its connection to the right kidney    GENITOURINARY SURGERY  1997 1997 pt went to [facility] and had surgery to remove the scar tissue, rebuild the bladder from previous ureter surgery.    ORTHOPEDIC SURGERY      bilat total hip    RECTOCELE REPAIR      ZZC TOTAL ABD HYSTERECTOMY+BLAD REPR  01/01/1992    Vaginal approach with oophrectomy    ZZC TOTAL KNEE ARTHROPLASTY         Social History     Tobacco Use    Smoking status: Never     Passive exposure: Never    Smokeless tobacco: Never   Substance Use Topics    Alcohol use: No     Family History   Problem Relation Age of Onset    Hypertension Mother     Arthritis Mother     Cerebrovascular Disease Mother     Anesthesia Reaction Mother         Halucinates    Venous thrombosis Father     Cerebrovascular Disease Father         Three Strokes    Diabetes Father         Type 2, after his 60's    Kidney Disease Father     Hypertension Father     Hyperlipidemia Father     Osteoporosis Father     Thyroid Disease Sister         Low thyroid    Anesthesia Reaction Sister     Diabetes Brother         After recovered from aspiration pneumonia, developed diabetes from high carb diet from feeding tube    Birth defects Brother     Osteoporosis Maternal Grandmother         Old age lived to 90    Coronary Artery Disease Maternal Grandfather         Strokes, lived to 105    Substance Abuse Maternal Grandfather         Alcohol    Osteoporosis Maternal Grandfather         Old age    Coronary Artery Disease Paternal Grandmother     Osteoporosis Paternal Grandmother         Long term issue for her    Obesity Paternal Grandmother     Coronary Artery Disease Paternal Grandfather     Sjogren's Daughter     Diabetes Daughter         Diagnosed after stroke    Cerebrovascular Disease Daughter     Hypertension Daughter         With diabetes  and stroke    Hyperlipidemia Daughter     Depression Daughter         Does not acknowledge    Anxiety Disorder Daughter     Asthma Daughter         Anxiety, air quality, and exercise triggered    Obesity Daughter     Diabetes Daughter         Gestational and pre diabetic A1C    Anxiety Disorder Daughter     Asthma Daughter         Excursion induced    Thyroid Disease Daughter     Diabetes Son         On metformin.    Hypertension Son     Obesity Son     Depression Son           ..from job    Mental Illness Other     Substance Abuse Other     Substance Abuse Other     Substance Abuse Other          Allergies   Allergen Reactions    Dust Mites Cough, Headache, Other (See Comments) and Shortness Of Breath    Latex Other (See Comments) and Shortness Of Breath     Runny nose  PN: LW Reaction: DIFFICULTY BREATHING      Sulfa Antibiotics Anaphylaxis, Hives and Itching     PN: LW Reaction: HIVES, Swelling  PN: LW Reaction: HIVES, Swelling    Flagyl [Metronidazole Hcl] Anaphylaxis and Rash    Food      PN: LW FI1: nka LW FI2:    Hydrochlorothiazide W-Triamterene      PN: LW Reaction: skin rash  PN: LW Reaction: skin rash    No Clinical Screening - See Comments      PN: LW Other1: -Adhesive Tape    Seasonal Allergies      sneezing    Tape [Adhesive Tape] Hives    Metoprolol Itching and Rash    Metronidazole Hives and Rash     PN: LW Reaction: HIVES       Recent Labs   Lab Test 07/09/24  1218 06/17/24  1718 10/11/23  1756 09/28/23  1236 08/05/23  1204 11/01/22  0544 09/21/22  0820 08/26/22  0918 09/23/21  1832 12/17/20  1403 12/10/20  0557   A1C 5.5  --   --   --   --   --   --   --   --  5.2  --    LDL 66  --   --   --  56  --   --  70   < > 37  --    HDL 66  --   --   --  62  --   --  60   < > 51  --    TRIG 75  --   --   --  88  --   --  55   < > 168*  --    ALT 25  --   --  118* 19   < >  --  29   < > 26  --    CR 1.04* 0.98*   < > 0.99* 1.02*   < >  --  0.92   < > 1.00 1.04   GFRESTIMATED 56* 60*   < > 60*  "57*   < >  --  65   < > 56* 54*   GFRESTBLACK  --   --   --   --   --   --   --   --   --  65 62   POTASSIUM 4.7 3.7   < > 4.9 4.8   < >  --  4.8   < > 3.9 4.3   TSH 0.73  --   --   --   --   --  2.02  --   --   --   --     < > = values in this interval not displayed.      BP Readings from Last 3 Encounters:   07/09/24 124/64   06/04/24 113/70   05/28/24 105/66    Wt Readings from Last 3 Encounters:   07/09/24 82.6 kg (182 lb 3.2 oz)   06/04/24 80.7 kg (178 lb)   05/28/24 83.5 kg (184 lb)             ROS:  C: NEGATIVE for fever, chills, change in weight  I: NEGATIVE for worrisome rashes, moles or lesions  E: NEGATIVE for vision changes or irritation  E/M: NEGATIVE for ear, mouth and throat problems  R: NEGATIVE for significant cough or SOB  B: NEGATIVE for masses, tenderness or discharge  CV: NEGATIVE for chest pain, palpitations or peripheral edema  GI: NEGATIVE for nausea, abdominal pain, heartburn, or change in bowel habits  : NEGATIVE for frequency, dysuria, or hematuria  M: NEGATIVE for significant arthralgias or myalgia  N: NEGATIVE for weakness, dizziness or paresthesias  E: NEGATIVE for temperature intolerance, skin/hair changes  H: NEGATIVE for bleeding problems  P: NEGATIVE for changes in mood or affect    OBJECTIVE:                                                    /64 (BP Location: Left arm, Patient Position: Sitting, Cuff Size: Adult Regular)   Pulse 70   Temp 97.5  F (36.4  C) (Temporal)   Resp 20   Ht 1.638 m (5' 4.5\")   Wt 82.6 kg (182 lb 3.2 oz)   LMP  (LMP Unknown)   SpO2 100%   BMI 30.79 kg/m    Body mass index is 30.79 kg/m .  GENERAL: alert and no distress  EYES: Eyes grossly normal to inspection and conjunctivae and sclerae normal  HENT: normal cephalic/atraumatic and oral mucous membranes moist  RESP: lungs clear to auscultation - no rales, rhonchi or wheezes  CV: regular rates and rhythm  MS: no gross musculoskeletal defects noted, no edema  NEURO: Normal strength and tone, " mentation intact and speech normal  PSYCH: mentation appears normal, affect normal/bright    Diagnostic Test Results:  Results for orders placed or performed in visit on 07/09/24   Hemoglobin A1c     Status: Normal   Result Value Ref Range    Hemoglobin A1C 5.5 0.0 - 5.6 %   Basic metabolic panel     Status: Abnormal   Result Value Ref Range    Sodium 143 135 - 145 mmol/L    Potassium 4.7 3.4 - 5.3 mmol/L    Chloride 107 98 - 107 mmol/L    Carbon Dioxide (CO2) 26 22 - 29 mmol/L    Anion Gap 10 7 - 15 mmol/L    Urea Nitrogen 14.3 8.0 - 23.0 mg/dL    Creatinine 1.04 (H) 0.51 - 0.95 mg/dL    GFR Estimate 56 (L) >60 mL/min/1.73m2    Calcium 9.3 8.8 - 10.2 mg/dL    Glucose 91 70 - 99 mg/dL    Patient Fasting > 8hrs? Yes    Insulin level     Status: Normal   Result Value Ref Range    Insulin 8.6 2.6 - 24.9 uU/mL   C-peptide     Status: Normal   Result Value Ref Range    C Peptide 2.7 0.9 - 6.9 ng/mL   Lipid panel reflex to direct LDL Fasting     Status: None   Result Value Ref Range    Cholesterol 147 <200 mg/dL    Triglycerides 75 <150 mg/dL    Direct Measure HDL 66 >=50 mg/dL    LDL Cholesterol Calculated 66 <=100 mg/dL    Non HDL Cholesterol 81 <130 mg/dL    Patient Fasting > 8hrs? Yes     Narrative    Cholesterol  Desirable:  <200 mg/dL    Triglycerides  Normal:  Less than 150 mg/dL  Borderline High:  150-199 mg/dL  High:  200-499 mg/dL  Very High:  Greater than or equal to 500 mg/dL    Direct Measure HDL  Female:  Greater than or equal to 50 mg/dL   Male:  Greater than or equal to 40 mg/dL    LDL Cholesterol  Desirable:  <100mg/dL  Above Desirable:  100-129 mg/dL   Borderline High:  130-159 mg/dL   High:  160-189 mg/dL   Very High:  >= 190 mg/dL    Non HDL Cholesterol  Desirable:  130 mg/dL  Above Desirable:  130-159 mg/dL  Borderline High:  160-189 mg/dL  High:  190-219 mg/dL  Very High:  Greater than or equal to 220 mg/dL   Hepatic panel (Albumin, ALT, AST, Bili, Alk Phos, TP)     Status: Normal   Result Value Ref  Range    Protein Total 7.2 6.4 - 8.3 g/dL    Albumin 4.4 3.5 - 5.2 g/dL    Bilirubin Total 1.0 <=1.2 mg/dL    Alkaline Phosphatase 94 40 - 150 U/L    AST 37 0 - 45 U/L    ALT 25 0 - 50 U/L    Bilirubin Direct 0.25 0.00 - 0.30 mg/dL   TSH     Status: Normal   Result Value Ref Range    TSH 0.73 0.30 - 4.20 uIU/mL   T4, free     Status: Normal   Result Value Ref Range    Free T4 0.98 0.90 - 1.70 ng/dL   Ferritin     Status: Normal   Result Value Ref Range    Ferritin 65 11 - 328 ng/mL   Iron and iron binding capacity     Status: Normal   Result Value Ref Range    Iron 125 37 - 145 ug/dL    Iron Binding Capacity 368 240 - 430 ug/dL    Iron Sat Index 34 15 - 46 %   Vitamin B12     Status: Normal   Result Value Ref Range    Vitamin B12 609 232 - 1,245 pg/mL   Vitamin D Deficiency     Status: Abnormal   Result Value Ref Range    Vitamin D, Total (25-Hydroxy) 57 (H) 20 - 50 ng/mL    Narrative    Season, race, dietary intake, and treatment affect the concentration of 25-hydroxy-Vitamin D. Values may decrease during winter months and increase during summer months.    Vitamin D determination is routinely performed by an immunoassay specific for 25 hydroxyvitamin D3.  If an individual is on vitamin D2(ergocalciferol) supplementation, please specify 25 OH vitamin D2 and D3 level determination by LCMSMS test [id number].          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 Review of treatment options discussed with the patient. Unfortunately, majority of such drugs are contraindicated.    Coronary artery disease with angina pectoris, unspecified vessel or lesion type, unspecified whether native or transplanted heart (H24)  - Lipid panel reflex to direct LDL Fasting    Impaired fasting glucose  - Hemoglobin A1c  - Basic metabolic panel  - Insulin level  - C-peptide    CKD stage 3a, GFR 45-59 ml/min (H)  - Basic metabolic  panel    Hyperlipidemia LDL goal <70  - Lipid panel reflex to direct LDL Fasting  - Hepatic panel (Albumin, ALT, AST, Bili, Alk Phos, TP)    Screening for thyroid disorder  - TSH  - T4, free    Screening, iron deficiency anemia  - Ferritin  - Iron and iron binding capacity    Vitamin B12 deficiency (non anemic)  - Vitamin B12    Vitamin D deficiency  - Vitamin D Deficiency     Disposition:  Follow-up in four weeks or as needed.    Danny Conteh MD  [facility]

## 2024-07-10 LAB
ALBUMIN SERPL BCG-MCNC: 4.4 G/DL (ref 3.5–5.2)
ALP SERPL-CCNC: 94 U/L (ref 40–150)
ALT SERPL W P-5'-P-CCNC: 25 U/L (ref 0–50)
ANION GAP SERPL CALCULATED.3IONS-SCNC: 10 MMOL/L (ref 7–15)
AST SERPL W P-5'-P-CCNC: 37 U/L (ref 0–45)
BILIRUB DIRECT SERPL-MCNC: 0.25 MG/DL (ref 0–0.3)
BILIRUB SERPL-MCNC: 1 MG/DL
BUN SERPL-MCNC: 14.3 MG/DL (ref 8–23)
C PEPTIDE SERPL-MCNC: 2.7 NG/ML (ref 0.9–6.9)
CALCIUM SERPL-MCNC: 9.3 MG/DL (ref 8.8–10.2)
CHLORIDE SERPL-SCNC: 107 MMOL/L (ref 98–107)
CHOLEST SERPL-MCNC: 147 MG/DL
CREAT SERPL-MCNC: 1.04 MG/DL (ref 0.51–0.95)
DEPRECATED HCO3 PLAS-SCNC: 26 MMOL/L (ref 22–29)
EGFRCR SERPLBLD CKD-EPI 2021: 56 ML/MIN/1.73M2
FASTING STATUS PATIENT QL REPORTED: YES
FASTING STATUS PATIENT QL REPORTED: YES
FERRITIN SERPL-MCNC: 65 NG/ML (ref 11–328)
GLUCOSE SERPL-MCNC: 91 MG/DL (ref 70–99)
HDLC SERPL-MCNC: 66 MG/DL
INSULIN SERPL-ACNC: 8.6 UU/ML (ref 2.6–24.9)
IRON BINDING CAPACITY (ROCHE): 368 UG/DL (ref 240–430)
IRON SATN MFR SERPL: 34 % (ref 15–46)
IRON SERPL-MCNC: 125 UG/DL (ref 37–145)
LDLC SERPL CALC-MCNC: 66 MG/DL
NONHDLC SERPL-MCNC: 81 MG/DL
POTASSIUM SERPL-SCNC: 4.7 MMOL/L (ref 3.4–5.3)
PROT SERPL-MCNC: 7.2 G/DL (ref 6.4–8.3)
SODIUM SERPL-SCNC: 143 MMOL/L (ref 135–145)
T4 FREE SERPL-MCNC: 0.98 NG/DL (ref 0.9–1.7)
TRIGL SERPL-MCNC: 75 MG/DL
TSH SERPL DL<=0.005 MIU/L-ACNC: 0.73 UIU/ML (ref 0.3–4.2)
VIT B12 SERPL-MCNC: 609 PG/ML (ref 232–1245)
VIT D+METAB SERPL-MCNC: 57 NG/ML (ref 20–50)

## 2024-07-11 ENCOUNTER — ANCILLARY PROCEDURE (OUTPATIENT)
Dept: CARDIOLOGY | Facility: CLINIC | Age: 74
End: 2024-07-11
Attending: INTERNAL MEDICINE
Payer: MEDICARE

## 2024-07-11 DIAGNOSIS — Z95.818 IMPLANTABLE LOOP RECORDER PRESENT: ICD-10-CM

## 2024-07-11 PROCEDURE — 93298 REM INTERROG DEV EVAL SCRMS: CPT | Mod: 26 | Performed by: INTERNAL MEDICINE

## 2024-07-11 PROCEDURE — 93298 REM INTERROG DEV EVAL SCRMS: CPT

## 2024-07-17 ENCOUNTER — VIRTUAL VISIT (OUTPATIENT)
Dept: PSYCHOLOGY | Facility: CLINIC | Age: 74
End: 2024-07-17
Payer: MEDICARE

## 2024-07-17 DIAGNOSIS — F33.0 MAJOR DEPRESSIVE DISORDER, RECURRENT EPISODE, MILD (H): Primary | ICD-10-CM

## 2024-07-17 DIAGNOSIS — F41.1 GENERALIZED ANXIETY DISORDER: ICD-10-CM

## 2024-07-17 DIAGNOSIS — F43.10 POSTTRAUMATIC STRESS DISORDER: ICD-10-CM

## 2024-07-17 PROCEDURE — 90834 PSYTX W PT 45 MINUTES: CPT | Mod: 93 | Performed by: SOCIAL WORKER

## 2024-07-17 ASSESSMENT — PATIENT HEALTH QUESTIONNAIRE - PHQ9
SUM OF ALL RESPONSES TO PHQ QUESTIONS 1-9: 6
SUM OF ALL RESPONSES TO PHQ QUESTIONS 1-9: 6
10. IF YOU CHECKED OFF ANY PROBLEMS, HOW DIFFICULT HAVE THESE PROBLEMS MADE IT FOR YOU TO DO YOUR WORK, TAKE CARE OF THINGS AT HOME, OR GET ALONG WITH OTHER PEOPLE: SOMEWHAT DIFFICULT
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
## 2024-07-18 ENCOUNTER — OFFICE VISIT (OUTPATIENT)
Dept: UROLOGY | Facility: CLINIC | Age: 74
End: 2024-07-18
Payer: MEDICARE

## 2024-07-18 VITALS
SYSTOLIC BLOOD PRESSURE: 123 MMHG | OXYGEN SATURATION: 97 % | WEIGHT: 180 LBS | HEART RATE: 50 BPM | HEIGHT: 65 IN | DIASTOLIC BLOOD PRESSURE: 60 MMHG | BODY MASS INDEX: 29.99 KG/M2

## 2024-07-18 DIAGNOSIS — N39.0 RECURRENT UTI: Primary | ICD-10-CM

## 2024-07-18 DIAGNOSIS — N95.2 VAGINAL ATROPHY: ICD-10-CM

## 2024-07-18 DIAGNOSIS — N39.41 URGE INCONTINENCE: ICD-10-CM

## 2024-07-18 DIAGNOSIS — R15.9 INCONTINENCE OF FECES, UNSPECIFIED FECAL INCONTINENCE TYPE: ICD-10-CM

## 2024-07-18 DIAGNOSIS — N39.8 VOIDING DYSFUNCTION: ICD-10-CM

## 2024-07-18 PROCEDURE — 99214 OFFICE O/P EST MOD 30 MIN: CPT | Mod: GC | Performed by: UROLOGY

## 2024-07-18 ASSESSMENT — PAIN SCALES - GENERAL: PAINLEVEL: NO PAIN (0)

## 2024-07-18 NOTE — PROGRESS NOTES
"CHIEF COMPLAINT  It was my pleasure to see Gem Gama who is a 73 year old female for follow-up of urinary urgency and urge incontinence.       HPI  Gem Gama is a very pleasant 73 year old female with a hx of UUI and recurrent UTIs resolved with Estring who presents for repositioned of Estring.    No change in symptoms since last visit, see below. No complaints. Would just like Estring placed in more comfortable position.    Previous note:  States that leakage can happen with or without activity. Can occur with urgency. Wears 4-5 pads per day. Coughing and laughing not triggers for leakage.     Had urethral sling in the 1990s.     UTIs much less frequent after estring use. Discontinued myrbetriq for interaction with cardiac beta blocker. Interested in botox potentially if that will help with the urinary urgency.    States that her fecal incontinence is much better since she had her cholecystectomy.  Continues to be busy caring for her daughter    PHYSICAL EXAM  Patient is a 73 year old  female   Vitals: Blood pressure 123/60, pulse 50, height 1.638 m (5' 4.5\"), weight 81.6 kg (180 lb), SpO2 97%, not currently breastfeeding.  General Appearance Adult: Body mass index is 30.42 kg/m .  Alert, no acute distress, oriented  Lungs: no respiratory distress, or pursed lip breathing  Heart: No obvious jugular venous distension present  Musculoskeltal: extremities normal, no peripheral edema  Skin: no suspicious lesions or rashes  Neuro: Alert, oriented, speech and mentation normal  Psych: affect and mood normal  Gait: Normal  : Estring repositioned, grade 1 vaginal prolpase    All pertinent imaging reviewed:    All imaging studies reviewed by me.    ASSESSMENT and PLAN  Gem Gama is a very pleasant 73 year old female with a hx of UUI and recurrent UTIs resolved with Estring who presents for repositioned of Estring, history of voiding dysfunction, urgency incontinence, fecal incontinence    - Estring " repositioned  - Follow up PRN for UUI management  - Discussed 3rd line treatment options for UUI     Madison King MD   PGY 2 Urology    Addendum:    The patient was seen and evaluated with the resident.  The plan was formulated in conjunction with me and I agree with the note with changes made as necessary.    Continue to see Shital Q3 months for estring    Return to see me when ready to disucss 3rd line UUI treatments otherwise she can return to PFPT when she is able    10 minutes were spent today on the date of the encounter in reviewing the EMR, direct patient care including discussion of prescription medications, coordination of care, and documentation.    Jesse Rodriguez MD MPH  (she/her/hers)   of Urology  [facility]  Patient Care Team:  Danny Conteh MD as PCP - General (Internal Medicine)  Danny Conteh MD as Assigned PCP  Luis A Moody MD as Assigned Pulmonology Provider  Evangelist Lee MD as Hospitalist (Internal Medicine)  Jesse Rodriguez MD as MD (Urology)  Jesse Rodriguez MD as Assigned Surgical Provider  Kashif Hadley MD as Assigned Sleep Provider  Agus Segura MD as MD (Surgery)  Kristie Bell PA-C as Assigned Neuroscience Provider  Mickey Gallego MD as MD (Surgery)  Ramya Keller MD as MD (Otolaryngology)  Boyd Saunders AuD (Audiology)  Ramya Keller MD as MD (Otolaryngology)  Nadeem Jason MD as Assigned Heart and Vascular Provider  Toña Melvin PA-C as Assigned Cancer Care Provider  [facility] as Assigned [facility]al Health Provider  JESSE RODRIGUEZ

## 2024-07-18 NOTE — LETTER
"7/18/2024       RE: Gem Gama  [address]  [place] [facility] [place] [facility] [id number]     Dear Colleague,    Thank you for referring your patient, Gem Gama, to the [facility] [place] [facility]. Please see a copy of my visit note below.    CHIEF COMPLAINT  It was my pleasure to see Gem Gama who is a 73 year old female for follow-up of urinary urgency and urge incontinence.       HPI  Gem Gama is a very pleasant 73 year old female with a hx of UUI and recurrent UTIs resolved with Estring who presents for repositioned of Estring.    No change in symptoms since last visit, see below. No complaints. Would just like Estring placed in more comfortable position.    Previous note:  States that leakage can happen with or without activity. Can occur with urgency. Wears 4-5 pads per day. Coughing and laughing not triggers for leakage.     Had urethral sling in the 1990s.     UTIs much less frequent after estring use. Discontinued myrbetriq for interaction with cardiac beta blocker. Interested in botox potentially if that will help with the urinary urgency.    States that her fecal incontinence is much better since she had her cholecystectomy.  Continues to be busy caring for her daughter    PHYSICAL EXAM  Patient is a 73 year old  female   Vitals: Blood pressure 123/60, pulse 50, height 1.638 m (5' 4.5\"), weight 81.6 kg (180 lb), SpO2 97%, not currently breastfeeding.  General Appearance Adult: Body mass index is 30.42 kg/m .  Alert, no acute distress, oriented  Lungs: no respiratory distress, or pursed lip breathing  Heart: No obvious jugular venous distension present  Musculoskeltal: extremities normal, no peripheral edema  Skin: no suspicious lesions or rashes  Neuro: Alert, oriented, speech and mentation normal  Psych: affect and mood normal  Gait: Normal  : Estring repositioned, grade 1 vaginal prolpase    All pertinent " imaging reviewed:    All imaging studies reviewed by me.    ASSESSMENT and PLAN  Gem Gama is a very pleasant 73 year old female with a hx of UUI and recurrent UTIs resolved with Estring who presents for repositioned of Estring, history of voiding dysfunction, urgency incontinence, fecal incontinence    - Estring repositioned  - Follow up PRN for UUI management  - Discussed 3rd line treatment options for UUI     Madison King MD   PGY 2 Urology    Addendum:    The patient was seen and evaluated with the resident.  The plan was formulated in conjunction with me and I agree with the note with changes made as necessary.    Continue to see Shital Q3 months for estring    Return to see me when ready to disucss 3rd line UUI treatments otherwise she can return to PFPT when she is able    10 minutes were spent today on the date of the encounter in reviewing the EMR, direct patient care including discussion of prescription medications, coordination of care, and documentation.    Mel Rodriguez MD MPH  (she/her/hers)   of Urology  [facility]  Patient Care Team:  Danny Conteh MD as PCP - General (Internal Medicine)  Danny Conteh MD as Assigned PCP  Luis A Moody MD as Assigned Pulmonology Provider  Evangelist Lee MD as Hospitalist (Internal Medicine)  Mel Rodriguez MD as MD (Urology)  Mel Rodriguez MD as Assigned Surgical Provider  Kashif Hadley MD as Assigned Sleep Provider  Agus Segura MD as MD (Surgery)  Kristie Bell PA-C as Assigned Neuroscience Provider  Mickey Gallego MD as MD (Surgery)  Ramya Keller MD as MD (Otolaryngology)  Boyd Saunders AuD (Audiology)  Ramya Keller MD as MD (Otolaryngology)  Nadeem Jason MD as Assigned Heart and Vascular Provider  Toña Melvin PA-C as Assigned Cancer Care Provider  Luis Fairbanks LICSW as Assigned Behavioral Health Provider  SHILOH  JESSE SEE YAYO

## 2024-07-22 LAB
MDC_IDC_MSMT_BATTERY_DTM: NORMAL
MDC_IDC_MSMT_BATTERY_STATUS: NORMAL
MDC_IDC_PG_IMPLANT_DTM: NORMAL
MDC_IDC_PG_MFG: NORMAL
MDC_IDC_PG_MODEL: NORMAL
MDC_IDC_PG_SERIAL: NORMAL
MDC_IDC_PG_TYPE: NORMAL
MDC_IDC_SESS_CLINIC_NAME: NORMAL
MDC_IDC_SESS_DTM: NORMAL
MDC_IDC_SESS_TYPE: NORMAL
MDC_IDC_STAT_AT_BURDEN_PERCENT: 0 %
MDC_IDC_STAT_EPISODE_RECENT_COUNT: 0
MDC_IDC_STAT_EPISODE_RECENT_COUNT_DTM_END: NORMAL
MDC_IDC_STAT_EPISODE_RECENT_COUNT_DTM_START: NORMAL
MDC_IDC_STAT_EPISODE_TYPE: NORMAL

## 2024-07-24 ENCOUNTER — VIRTUAL VISIT (OUTPATIENT)
Dept: PSYCHOLOGY | Facility: CLINIC | Age: 74
End: 2024-07-24
Payer: MEDICARE

## 2024-07-24 DIAGNOSIS — F43.10 POSTTRAUMATIC STRESS DISORDER: ICD-10-CM

## 2024-07-24 DIAGNOSIS — F41.1 GENERALIZED ANXIETY DISORDER: ICD-10-CM

## 2024-07-24 DIAGNOSIS — F33.0 MAJOR DEPRESSIVE DISORDER, RECURRENT EPISODE, MILD (H): Primary | ICD-10-CM

## 2024-07-24 PROCEDURE — 90834 PSYTX W PT 45 MINUTES: CPT | Mod: 93 | Performed by: SOCIAL WORKER

## 2024-07-24 ASSESSMENT — PATIENT HEALTH QUESTIONNAIRE - PHQ9
SUM OF ALL RESPONSES TO PHQ QUESTIONS 1-9: 8
SUM OF ALL RESPONSES TO PHQ QUESTIONS 1-9: 8
10. IF YOU CHECKED OFF ANY PROBLEMS, HOW DIFFICULT HAVE THESE PROBLEMS MADE IT FOR YOU TO DO YOUR WORK, TAKE CARE OF THINGS AT HOME, OR GET ALONG WITH OTHER PEOPLE: VERY DIFFICULT
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
## 2024-07-30 ASSESSMENT — PATIENT HEALTH QUESTIONNAIRE - PHQ9: SUM OF ALL RESPONSES TO PHQ QUESTIONS 1-9: 8

## 2024-07-31 ENCOUNTER — VIRTUAL VISIT (OUTPATIENT)
Dept: PSYCHOLOGY | Facility: CLINIC | Age: 74
End: 2024-07-31
Payer: MEDICARE

## 2024-07-31 DIAGNOSIS — F33.0 MAJOR DEPRESSIVE DISORDER, RECURRENT EPISODE, MILD (H): Primary | ICD-10-CM

## 2024-07-31 DIAGNOSIS — F41.1 GENERALIZED ANXIETY DISORDER: ICD-10-CM

## 2024-07-31 PROCEDURE — 90834 PSYTX W PT 45 MINUTES: CPT | Mod: 93 | Performed by: SOCIAL WORKER

## 2024-07-31 ASSESSMENT — ANXIETY QUESTIONNAIRES
3. WORRYING TOO MUCH ABOUT DIFFERENT THINGS: NOT AT ALL
7. FEELING AFRAID AS IF SOMETHING AWFUL MIGHT HAPPEN: SEVERAL DAYS
6. BECOMING EASILY ANNOYED OR IRRITABLE: SEVERAL DAYS
GAD7 TOTAL SCORE: 8
GAD7 TOTAL SCORE: 8
1. FEELING NERVOUS, ANXIOUS, OR ON EDGE: MORE THAN HALF THE DAYS
2. NOT BEING ABLE TO STOP OR CONTROL WORRYING: MORE THAN HALF THE DAYS
5. BEING SO RESTLESS THAT IT IS HARD TO SIT STILL: SEVERAL DAYS

## 2024-07-31 ASSESSMENT — PATIENT HEALTH QUESTIONNAIRE - PHQ9
10. IF YOU CHECKED OFF ANY PROBLEMS, HOW DIFFICULT HAVE THESE PROBLEMS MADE IT FOR YOU TO DO YOUR WORK, TAKE CARE OF THINGS AT HOME, OR GET ALONG WITH OTHER PEOPLE: SOMEWHAT DIFFICULT
SUM OF ALL RESPONSES TO PHQ QUESTIONS 1-9: 8
5. POOR APPETITE OR OVEREATING: SEVERAL DAYS
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
## 2024-08-07 ENCOUNTER — VIRTUAL VISIT (OUTPATIENT)
Dept: PSYCHOLOGY | Facility: CLINIC | Age: 74
End: 2024-08-07
Payer: MEDICARE

## 2024-08-07 DIAGNOSIS — F41.1 GENERALIZED ANXIETY DISORDER: ICD-10-CM

## 2024-08-07 DIAGNOSIS — F43.10 POSTTRAUMATIC STRESS DISORDER: ICD-10-CM

## 2024-08-07 DIAGNOSIS — F33.0 MAJOR DEPRESSIVE DISORDER, RECURRENT EPISODE, MILD (H): Primary | ICD-10-CM

## 2024-08-07 PROCEDURE — 90834 PSYTX W PT 45 MINUTES: CPT | Mod: 93 | Performed by: SOCIAL WORKER

## 2024-08-07 ASSESSMENT — PATIENT HEALTH QUESTIONNAIRE - PHQ9
SUM OF ALL RESPONSES TO PHQ QUESTIONS 1-9: 7
SUM OF ALL RESPONSES TO PHQ QUESTIONS 1-9: 7
10. IF YOU CHECKED OFF ANY PROBLEMS, HOW DIFFICULT HAVE THESE PROBLEMS MADE IT FOR YOU TO DO YOUR WORK, TAKE CARE OF THINGS AT HOME, OR GET ALONG WITH OTHER PEOPLE: VERY DIFFICULT
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
## 2024-08-14 ENCOUNTER — VIRTUAL VISIT (OUTPATIENT)
Dept: PSYCHOLOGY | Facility: CLINIC | Age: 74
End: 2024-08-14
Payer: MEDICARE

## 2024-08-14 DIAGNOSIS — F41.1 GENERALIZED ANXIETY DISORDER: ICD-10-CM

## 2024-08-14 DIAGNOSIS — F33.0 MAJOR DEPRESSIVE DISORDER, RECURRENT EPISODE, MILD (H): Primary | ICD-10-CM

## 2024-08-14 PROCEDURE — 90834 PSYTX W PT 45 MINUTES: CPT | Mod: 93 | Performed by: SOCIAL WORKER
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
## 2024-08-20 ASSESSMENT — PATIENT HEALTH QUESTIONNAIRE - PHQ9: SUM OF ALL RESPONSES TO PHQ QUESTIONS 1-9: 5

## 2024-08-21 ENCOUNTER — VIRTUAL VISIT (OUTPATIENT)
Dept: PSYCHOLOGY | Facility: CLINIC | Age: 74
End: 2024-08-21
Payer: MEDICARE

## 2024-08-21 DIAGNOSIS — K21.9 GASTROESOPHAGEAL REFLUX DISEASE WITHOUT ESOPHAGITIS: ICD-10-CM

## 2024-08-21 DIAGNOSIS — F43.10 POSTTRAUMATIC STRESS DISORDER: ICD-10-CM

## 2024-08-21 DIAGNOSIS — F41.1 GENERALIZED ANXIETY DISORDER: ICD-10-CM

## 2024-08-21 DIAGNOSIS — F33.0 MAJOR DEPRESSIVE DISORDER, RECURRENT EPISODE, MILD (H): Primary | ICD-10-CM

## 2024-08-21 PROCEDURE — 90834 PSYTX W PT 45 MINUTES: CPT | Mod: 93 | Performed by: SOCIAL WORKER

## 2024-08-21 RX ORDER — PANTOPRAZOLE SODIUM 40 MG/1
TABLET, DELAYED RELEASE ORAL
Qty: 90 TABLET | Refills: 0 | Status: SHIPPED | OUTPATIENT
Start: 2024-08-21

## 2024-08-21 ASSESSMENT — PATIENT HEALTH QUESTIONNAIRE - PHQ9
SUM OF ALL RESPONSES TO PHQ QUESTIONS 1-9: 5
10. IF YOU CHECKED OFF ANY PROBLEMS, HOW DIFFICULT HAVE THESE PROBLEMS MADE IT FOR YOU TO DO YOUR WORK, TAKE CARE OF THINGS AT HOME, OR GET ALONG WITH OTHER PEOPLE: SOMEWHAT DIFFICULT
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
## 2024-08-22 ENCOUNTER — OFFICE VISIT (OUTPATIENT)
Dept: FAMILY MEDICINE | Facility: CLINIC | Age: 74
End: 2024-08-22
Attending: INTERNAL MEDICINE
Payer: MEDICARE

## 2024-08-22 VITALS
DIASTOLIC BLOOD PRESSURE: 70 MMHG | SYSTOLIC BLOOD PRESSURE: 121 MMHG | OXYGEN SATURATION: 95 % | BODY MASS INDEX: 30.78 KG/M2 | RESPIRATION RATE: 22 BRPM | WEIGHT: 182.13 LBS | TEMPERATURE: 98.5 F | HEART RATE: 69 BPM

## 2024-08-22 DIAGNOSIS — I50.32 CHRONIC DIASTOLIC HEART FAILURE (H): ICD-10-CM

## 2024-08-22 DIAGNOSIS — E87.0 HYPERNATREMIA: ICD-10-CM

## 2024-08-22 DIAGNOSIS — I10 HYPERTENSION GOAL BP (BLOOD PRESSURE) < 130/80: Primary | ICD-10-CM

## 2024-08-22 DIAGNOSIS — N18.32 STAGE 3B CHRONIC KIDNEY DISEASE (H): ICD-10-CM

## 2024-08-22 DIAGNOSIS — R73.01 IMPAIRED FASTING GLUCOSE: ICD-10-CM

## 2024-08-22 PROCEDURE — G2211 COMPLEX E/M VISIT ADD ON: HCPCS | Performed by: INTERNAL MEDICINE

## 2024-08-22 PROCEDURE — 99214 OFFICE O/P EST MOD 30 MIN: CPT | Performed by: INTERNAL MEDICINE

## 2024-08-22 ASSESSMENT — PAIN SCALES - GENERAL: PAINLEVEL: NO PAIN (0)

## 2024-08-22 NOTE — PROGRESS NOTES
[facility] Progress Note           Assessment and Plan:     Hypertension goal BP (blood pressure) < 130/80  - PRIMARY CARE FOLLOW-UP SCHEDULING  - Albumin Random Urine Quantitative with Creat Ratio; Standing    Stage 3b chronic kidney disease (H)  - PRIMARY CARE FOLLOW-UP SCHEDULING  - Albumin Random Urine Quantitative with Creat Ratio; Standing    Chronic diastolic heart failure (H)  - Basic metabolic panel  (Ca, Cl, CO2, Creat, Gluc, K, Na, BUN); Standing  - Sodium random urine; Standing    Hypernatremia  - Basic metabolic panel  (Ca, Cl, CO2, Creat, Gluc, K, Na, BUN); Standing  - Sodium random urine; Standing    Impaired fasting glucose  - Fructosamine; Standing  - Hemoglobin A1c; Standing          Interval History:     Updates:  -Lower protein and sodium intake, monitoring sodium of each packaged meals.  -Higher fiber intake in the morning (oatmeal) but it also leads to higher glucose levels.             Significant Problems:     Patient Active Problem List   Diagnosis    TMJ (temporomandibular joint syndrome)    Congenital ureteric stenosis    Lacrimal duct stenosis    Chronic rhinitis    Intermittent asthma    Major depressive disorder    Hypertension goal BP (blood pressure) < 140/90    Osteoarthritis of right knee    Primary narcolepsy without cataplexy    Vitamin D deficiency    Incontinence of feces with fecal urgency    Overactive bladder    Fatigue, unspecified type    History of ischemic cardiomyopathy    Hyperlipidemia LDL goal <70    Calculus of gallbladder without cholecystitis without obstruction    Coronary artery disease with angina pectoris, unspecified vessel or lesion type, unspecified whether native or transplanted heart (H24)    Environmental allergies    Class 1 obesity due to excess calories with serious comorbidity and body mass index (BMI) of 30.0 to 30.9 in adult    Chronic insomnia    Hiatal hernia    Cheyne-Rogers breathing disorder    REM sleep without  atonia    Possible PLMD (periodic limb movement disorder)    Generalized anxiety disorder    Closed compression fracture of L3 lumbar vertebra, sequela    Bilateral hydronephrosis    Posttraumatic stress disorder    Stage 3b chronic kidney disease (H)    Adjustment disorder with anxiety    Paroxysmal ventricular tachycardia (H)    Bile salt-induced diarrhea    History of colonic polyps    Major depressive disorder, recurrent episode, mild (H24)    Chronic diastolic heart failure (H)    CKD stage 3a, GFR 45-59 ml/min (H)              Review of Systems:     CONSTITUTIONAL: NEGATIVE for fever, chills, change in weight  INTEGUMENTARY/SKIN: NEGATIVE for worrisome rashes, moles or lesions  EYES: NEGATIVE for vision changes or irritation  ENT/MOUTH: NEGATIVE for ear, mouth and throat problems  RESP: NEGATIVE for significant cough or SOB  CV: NEGATIVE for chest pain, palpitations or peripheral edema  GI: NEGATIVE for nausea, abdominal pain, heartburn, or change in bowel habits  MUSCULOSKELETAL: NEGATIVE for significant arthralgias or myalgia  NEURO: NEGATIVE for weakness, dizziness or paresthesias  ENDOCRINE: POSITIVE  for hypernatremia.  HEME/ALLERGY/IMMUNE: NEGATIVE for bleeding problems  PSYCHIATRIC: NEGATIVE for changes in mood or affect             Physical Exam:   /70 (BP Location: Left arm, Patient Position: Sitting, Cuff Size: Adult Regular)   Pulse 69   Temp 98.5  F (36.9  C) (Oral)   Resp 22   Wt 82.6 kg (182 lb 2 oz)   LMP  (LMP Unknown)   SpO2 95%   BMI 30.78 kg/m     Constitutional:   fatigued, alert, cooperative, no apparent distress, and appears stated age     Eyes:   extra-ocular muscles intact and sclera clear     ENT:   normocephalic, without obvious abnormality     Lungs:   no increased work of breathing and no retractions     Cardiovascular:   regular rate and rhythm     Neurologic:   Motor Exam:  moves all extremities well and symmetrically     Neuropsychiatric:   Affect: normal              Data:   Epic reviewed.     Disposition:  Follow-up in 12 weeks or as needed.    Danny Conteh MD  Internal Medicine  [facility] Team

## 2024-08-22 NOTE — PATIENT INSTRUCTIONS
At [facility], we strive to deliver an exceptional experience to you, every time we see you. If you receive a survey, please let us know what we are doing well and/or what we could improve upon, as we do value your feedback.  If you have MyChart, you can expect to receive results automatically within 24 hours of their completion.  Your provider will send a note interpreting your results as well.   If you do not have MyChart, you should receive your results in about a week by mail.    Your care team:                            Family Medicine Internal Medicine   MD Danny Christian, MD Jahaira Olivas, MD Lee Mason, MD Sirisha Lindquist, PAKarliC    Sd Wagner, MD Pediatrics   Beulah Bateman, MD Yelena Jack, MD Sharona Huitron, APRN CNP Maria E Vallejo APRN CNP   MD Raissa Callahan, MD Elizabeth Choi, CNP     Kimani Núñez, CNP Same-Day Provider (No follow-up visits)   KATE Zepeda, DNP Robyn Bass, KATE Woods, FNP, BC ESTELLA GaribayC     Clinic hours: Monday - Thursday 7 am-6 pm; Fridays 7 am-5 pm.   Urgent care: Monday - Friday 10 am- 8 pm; Saturday and Sunday 9 am-5 pm.    Clinic: [phone]       [facility]: Monday - Thursday 8 am - 7 pm; Friday 8 am - 6 pm  [facility]: [phone]

## 2024-08-25 ENCOUNTER — LAB (OUTPATIENT)
Dept: LAB | Facility: CLINIC | Age: 74
End: 2024-08-25
Payer: MEDICARE

## 2024-08-25 DIAGNOSIS — E87.0 HYPERNATREMIA: ICD-10-CM

## 2024-08-25 DIAGNOSIS — I10 HYPERTENSION GOAL BP (BLOOD PRESSURE) < 130/80: ICD-10-CM

## 2024-08-25 DIAGNOSIS — R73.01 IMPAIRED FASTING GLUCOSE: ICD-10-CM

## 2024-08-25 DIAGNOSIS — N18.32 STAGE 3B CHRONIC KIDNEY DISEASE (H): ICD-10-CM

## 2024-08-25 LAB
ANION GAP SERPL CALCULATED.3IONS-SCNC: 11 MMOL/L (ref 7–15)
BUN SERPL-MCNC: 20.6 MG/DL (ref 8–23)
CALCIUM SERPL-MCNC: 9.2 MG/DL (ref 8.8–10.4)
CHLORIDE SERPL-SCNC: 107 MMOL/L (ref 98–107)
CREAT SERPL-MCNC: 1.15 MG/DL (ref 0.51–0.95)
CREAT UR-MCNC: 67.3 MG/DL
EGFRCR SERPLBLD CKD-EPI 2021: 49 ML/MIN/1.73M2
GLUCOSE SERPL-MCNC: 94 MG/DL (ref 70–99)
HBA1C MFR BLD: 5.6 % (ref 0–5.6)
HCO3 SERPL-SCNC: 25 MMOL/L (ref 22–29)
MICROALBUMIN UR-MCNC: <12 MG/L
MICROALBUMIN/CREAT UR: NORMAL MG/G{CREAT}
POTASSIUM SERPL-SCNC: 4.5 MMOL/L (ref 3.4–5.3)
SODIUM SERPL-SCNC: 143 MMOL/L (ref 135–145)
SODIUM UR-SCNC: 46 MMOL/L

## 2024-08-25 PROCEDURE — 82985 ASSAY OF GLYCATED PROTEIN: CPT | Mod: 90

## 2024-08-25 PROCEDURE — 80048 BASIC METABOLIC PNL TOTAL CA: CPT

## 2024-08-25 PROCEDURE — 82043 UR ALBUMIN QUANTITATIVE: CPT

## 2024-08-25 PROCEDURE — 36415 COLL VENOUS BLD VENIPUNCTURE: CPT

## 2024-08-25 PROCEDURE — 82570 ASSAY OF URINE CREATININE: CPT

## 2024-08-25 PROCEDURE — 83036 HEMOGLOBIN GLYCOSYLATED A1C: CPT

## 2024-08-25 PROCEDURE — 84300 ASSAY OF URINE SODIUM: CPT

## 2024-08-25 PROCEDURE — 99000 SPECIMEN HANDLING OFFICE-LAB: CPT

## 2024-08-27 ASSESSMENT — PATIENT HEALTH QUESTIONNAIRE - PHQ9: SUM OF ALL RESPONSES TO PHQ QUESTIONS 1-9: 5

## 2024-08-28 ENCOUNTER — VIRTUAL VISIT (OUTPATIENT)
Dept: PSYCHOLOGY | Facility: CLINIC | Age: 74
End: 2024-08-28
Payer: MEDICARE

## 2024-08-28 DIAGNOSIS — F33.0 MAJOR DEPRESSIVE DISORDER, RECURRENT EPISODE, MILD (H): Primary | ICD-10-CM

## 2024-08-28 DIAGNOSIS — F41.1 GENERALIZED ANXIETY DISORDER: ICD-10-CM

## 2024-08-28 LAB — FRUCTOSAMINE SERPL-SCNC: 266 UMOL/L

## 2024-08-28 PROCEDURE — 90834 PSYTX W PT 45 MINUTES: CPT | Mod: 93 | Performed by: SOCIAL WORKER
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
## 2024-08-29 ENCOUNTER — PATIENT OUTREACH (OUTPATIENT)
Dept: CARE COORDINATION | Facility: CLINIC | Age: 74
End: 2024-08-29
Payer: MEDICARE

## 2024-08-30 ENCOUNTER — OFFICE VISIT (OUTPATIENT)
Dept: UROLOGY | Facility: CLINIC | Age: 74
End: 2024-08-30
Payer: MEDICARE

## 2024-08-30 VITALS
WEIGHT: 180 LBS | OXYGEN SATURATION: 97 % | HEIGHT: 65 IN | BODY MASS INDEX: 29.99 KG/M2 | HEART RATE: 51 BPM | SYSTOLIC BLOOD PRESSURE: 122 MMHG | DIASTOLIC BLOOD PRESSURE: 70 MMHG

## 2024-08-30 DIAGNOSIS — R15.9 INCONTINENCE OF FECES, UNSPECIFIED FECAL INCONTINENCE TYPE: ICD-10-CM

## 2024-08-30 DIAGNOSIS — N39.8 VOIDING DYSFUNCTION: ICD-10-CM

## 2024-08-30 DIAGNOSIS — N39.0 RECURRENT UTI: Primary | ICD-10-CM

## 2024-08-30 DIAGNOSIS — N39.41 URGE INCONTINENCE: ICD-10-CM

## 2024-08-30 DIAGNOSIS — N95.2 VAGINAL ATROPHY: ICD-10-CM

## 2024-08-30 PROCEDURE — 99214 OFFICE O/P EST MOD 30 MIN: CPT | Performed by: PHYSICIAN ASSISTANT

## 2024-08-30 ASSESSMENT — PAIN SCALES - GENERAL: PAINLEVEL: NO PAIN (0)

## 2024-08-30 NOTE — PROGRESS NOTES
Urology Clinic      Name: Gem Gama    MRN: [id number]   YOB: 1950  Accompanied at today's visit by:self                 Chief Complaint:   Follow-up          History of Present Illness:   August 30, 2024    HISTORY:   We have been following 73 year old Gem Gama for Vaginal atrophy, hx of UTI hx of pyelonephritis (stable with estring), UUI, voiding dysfunction. Hx of urethral sling in 1990s. Has been to PFPT in the past which was somewhat helpful for her leakage of urine. Has failed multiple antimuscarinics. Discontinued myrbetriq for interaction with cardiac beta blocker. Fecal incontinence is much better since she had her cholecystectomy. Last seen by Dr. Rodriguez on 7/18/24 discussed third line options for UUI and patient was not interested in options at that time. Here today for follow-up. Continues to be UTI free. Here for estring exchange. Planing to decide on a third line option once her daughter gets placement to a living facility.  Patient voices no other concerns at this time.            Allergies:     Allergies   Allergen Reactions    Dust Mites Cough, Headache, Other (See Comments) and Shortness Of Breath    Latex Other (See Comments) and Shortness Of Breath     Runny nose  PN: LW Reaction: DIFFICULTY BREATHING      Sulfa Antibiotics Anaphylaxis, Hives and Itching     PN: LW Reaction: HIVES, Swelling  PN: LW Reaction: HIVES, Swelling    Flagyl [Metronidazole Hcl] Anaphylaxis and Rash    Food      PN: LW FI1: nka LW FI2:    Hydrochlorothiazide W-Triamterene      PN: LW Reaction: skin rash  PN: LW Reaction: skin rash    No Clinical Screening - See Comments      PN: LW Other1: -Adhesive Tape    Seasonal Allergies      sneezing    Tape [Adhesive Tape] Hives    Metoprolol Itching and Rash    Metronidazole Hives and Rash     PN: LW Reaction: HIVES              Medications:     Current Outpatient Medications   Medication Sig Dispense Refill    albuterol (PROAIR HFA/PROVENTIL  HFA/VENTOLIN HFA) 108 (90 Base) MCG/ACT inhaler Inhale 1-2 puffs into the lungs every 4 hours as needed for shortness of breath or wheezing 18 g 11    albuterol (PROVENTIL HFA) 108 (90 Base) MCG/ACT inhaler Inhale 2 puffs into the lungs every 6 hours 8.5 g 0    aspirin (ASA) 81 MG EC tablet Take 1 tablet (81 mg) by mouth daily 90 tablet 0    atorvastatin (LIPITOR) 40 MG tablet TAKE 1 TABLET(40 MG) BY MOUTH DAILY 90 tablet 3    blood glucose (NO BRAND SPECIFIED) test strip Use to test blood sugar once a day. 100 strip 3    blood glucose monitoring (NO BRAND SPECIFIED) meter device kit Use to test blood sugar once a day. 1 kit 0    cholestyramine (QUESTRAN) 4 g packet MIX AND DRINK 1 PACKET(4 GRAMS) BY MOUTH TWICE DAILY WITH MEALS 60 packet 5    diazepam (VALIUM) 2 MG tablet Take 0.5-1 tablets (1-2 mg) by mouth 2 times daily as needed for anxiety or muscle spasms 30 tablet 0    diclofenac (VOLTAREN) 1 % topical gel Apply topically 4 times daily as needed for moderate pain For Jaw pain      estradiol (ESTRING) 7.5 MCG/24HR vaginal ring Place 1 each vaginally every 3 months 1 each 3    fluticasone-vilanterol (BREO ELLIPTA) 200-25 MCG/ACT inhaler Inhale 1 puff into the lungs daily 3 each 3    isosorbide mononitrate (IMDUR) 30 MG 24 hr tablet Take 1 tablet (30 mg) by mouth every evening Take with Carvedilol. 90 tablet 3    loperamide (IMODIUM) 2 MG capsule Take 1 capsule (2 mg) by mouth daily 30 capsule 0    loratadine (CLARITIN) 10 MG tablet Take 10 mg by mouth At Bedtime      magnesium oxide (MAG-OX) 400 MG tablet Take 400 mg by mouth every morning      Melatonin 10 MG TABS tablet Take 10 mg by mouth nightly as needed for sleep      nebivolol (BYSTOLIC) 5 MG tablet Take 1 tablet (5 mg) by mouth every evening 90 tablet 1    nitroGLYcerin (NITROSTAT) 0.4 MG sublingual tablet Place 1 tablet (0.4 mg) under the tongue every 5 minutes as needed for chest pain 25 tablet 11    pantoprazole (PROTONIX) 40 MG EC tablet TAKE 1  TABLET(40 MG) BY MOUTH DAILY 90 tablet 0    predniSONE (DELTASONE) 20 MG tablet Take 1 tablet (20 mg) by mouth 2 times daily 10 tablet 0    Prenatal Multivit-Min-Fe-FA (PRENATAL/IRON) TABS Take 1 tablet by mouth every morning      saccharomyces boulardii (FLORASTOR) 250 MG capsule Take 1 capsule (250 mg) by mouth 2 times daily 14 capsule 0    sertraline (ZOLOFT) 100 MG tablet TAKE 1 TABLET(100 MG) BY MOUTH DAILY 90 tablet 2    UNABLE TO FIND Take 3 tablets by mouth daily MEDICATION NAME: Uqora complete regimen  1 TAB, AM - 2 TAB PM      valsartan (DIOVAN) 40 MG tablet Take 1 tablet (40 mg) by mouth every morning 90 tablet 1    vitamin D3 (CHOLECALCIFEROL) 2000 units (50 mcg) tablet Take 1 tablet (2,000 Units) by mouth daily (Patient taking differently: Take 1 tablet by mouth every morning.) 90 tablet 3    zinc gluconate 50 MG tablet Take 50 mg by mouth every morning       No current facility-administered medications for this visit.               Past  Surgical History:     Past Surgical History:   Procedure Laterality Date    ABDOMEN SURGERY  1974, 1996    Kidney reconstruct. Uterer stenosis    ARTHROPLASTY KNEE  07/09/2014    Procedure: ARTHROPLASTY KNEE;  Surgeon: Levi Johnson MD;  Location:  OR    ARTHROPLASTY KNEE Left 11/24/2014    Procedure: ARTHROPLASTY KNEE;  Surgeon: Levi Johnson MD;  Location:  OR    COLONOSCOPY      Several times dates ??    CV CORONARY ANGIOGRAM N/A 10/09/2019    Procedure: Coronary Angiogram;  Surgeon: Chiquita Chatterjee MD;  Location:  HEART CARDIAC CATH LAB    CV HEART CATHETERIZATION WITH POSSIBLE INTERVENTION N/A 10/10/2019    Procedure: Heart Catheterization with Possible Intervention;  Surgeon: Chin Garcia MD;  Location:  HEART CARDIAC CATH LAB    CV INTRA AORTIC BALLOON N/A 10/09/2019    Procedure: Intra-Aortic Balloon Pump Insertion;  Surgeon: Chiquita Chatterjee MD;  Location:  HEART CARDIAC CATH LAB    CV INTRA AORTIC BALLOON REMOVAL N/A  "10/10/2019    Procedure: Intra-Aortic Balloon Pump Removal;  Surgeon: Chin Garcia MD;  Location:  HEART CARDIAC CATH LAB    CV LEFT HEART CATH N/A 12/11/2020    Procedure: Heart Catheterization with Possible Intervention;  Surgeon: Pilo Otoole MD;  Location:  HEART CARDIAC CATH LAB    CV PCI STENT DRUG ELUTING N/A 10/09/2019    Procedure: PCI Stent Drug Eluting;  Surgeon: Chiquita Chatterjee MD;  Location:  HEART CARDIAC CATH LAB    DAVINCI LAPAROSCOPIC CHOLECYSTECTOMY WITHOUT GRAMS N/A 11/8/2023    Procedure: CHOLECYSTECTOMY, ROBOT-ASSISTED, LAPAROSCOPIC, WITHOUT CHOLANGIOGRAM;  Surgeon: Mickey Gallego MD;  Location: [facility] OR    EP LOOP RECORDER IMPLANT N/A 11/22/2023    Procedure: Loop Recorder Implant;  Surgeon: Nadeem Jason MD;  Location:  HEART CARDIAC CATH LAB    EYE SURGERY  2011    Cataract surgery both eyes    GENITOURINARY SURGERY  01/01/2008    Prolift    GENITOURINARY SURGERY  1973    In 1973, 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    ORTHOPEDIC SURGERY      bilat total hip    RECTOCELE REPAIR      ZZC TOTAL ABD HYSTERECTOMY+BLAD REPR  01/01/1992    Vaginal approach with oophrectomy    ZZC TOTAL KNEE ARTHROPLASTY               Physical Exam:     Vitals:    08/30/24 1546   BP: 122/70   BP Location: Left arm   Patient Position: Sitting   Cuff Size: Adult Regular   Pulse: 51   SpO2: 97%   Weight: 81.6 kg (180 lb)   Height: 1.638 m (5' 4.5\")     PSYCH: NAD  EYES: EOMI  NEURO: AAO x3  : vulva unremarkable.  Vaginal mucosa atrophic. Estring removed without issues. No abnormal discharge or vaginal pain/bleeding, ulcers noted on speculum exam. New estring placed vaginally without issues.     LABS:   Creatinine   Date Value Ref Range Status   08/25/2024 1.15 " (H) 0.51 - 0.95 mg/dL Final   12/17/2020 1.00 0.52 - 1.04 mg/dL Final            Assessment and Plan:   73 year old is a pleasant female who has Vaginal atrophy, hx of UTI (stable with estring), UUI, voiding dysfunction.     Plan:  -  continue estringe exchanges every 3 months.  -  Encourage going to PFPT when able to if possible.  - contact clinic if decides on a third line option.   - contact clinic if develops s/s of UTI in the future.   - After discussing the assessment and plan with patient, patient verbalizes understanding and agrees to the above plan. All questions answered.     17 minutes spent on the date of the encounter doing chart review, exam/estring exchange, review of Dr. Rodriguez's note, review of test results, interpretation of tests, patient visit and documentation.      Fadumo Hays PA-C  Urology  August 30, 2024      Patient Care Team:  Danny Conteh MD as PCP - General (Internal Medicine)  Danny Conteh MD as Assigned PCP  Evangelist Lee MD as Hospitalist (Internal Medicine)  Mel Rodriguez MD as MD (Urology)  Mel Rodriguez MD as Assigned Surgical Provider  Kashif Hadley MD as Assigned Sleep Provider  Agus Segura MD as MD (Surgery)  Kristie Bell PA-C as Assigned Neuroscience Provider  Mickey Gallego MD as MD (Surgery)  Ramya Keller MD as MD (Otolaryngology)  Boyd Saunders, Christine (Audiology)  Ramya Keller MD as MD (Otolaryngology)  Nadeem Jason MD as Assigned Heart and Vascular Provider  Toña Melvin PA-C as Assigned Cancer Care Provider  [facility] as Assigned [facility] Provider

## 2024-08-30 NOTE — LETTER
8/30/2024       RE: Gem Gama  [address]  [place] [address]     Dear Colleague,    Thank you for referring your patient, Gem Gama, to the [facility] at [facility]. Please see a copy of my visit note below.    Urology Clinic      Name: Gem Gama    MRN: [id number]   YOB: 1950  Accompanied at today's visit by:self                 Chief Complaint:   Follow-up          History of Present Illness:   August 30, 2024    HISTORY:   We have been following 73 year old Gem Gama for Vaginal atrophy, hx of UTI hx of pyelonephritis (stable with estring), UUI, voiding dysfunction. Hx of urethral sling in 1990s. Has been to PFPT in the past which was somewhat helpful for her leakage of urine. Has failed multiple antimuscarinics. Discontinued myrbetriq for interaction with cardiac beta blocker. Fecal incontinence is much better since she had her cholecystectomy. Last seen by Dr. Rodriguez on 7/18/24 discussed third line options for UUI and patient was not interested in options at that time. Here today for follow-up. Continues to be UTI free. Here for estring exchange. Planing to decide on a third line option once her daughter gets placement to a living facility.  Patient voices no other concerns at this time.            Allergies:     Allergies   Allergen Reactions     Dust Mites Cough, Headache, Other (See Comments) and Shortness Of Breath     Latex Other (See Comments) and Shortness Of Breath     Runny nose  PN: LW Reaction: DIFFICULTY BREATHING       Sulfa Antibiotics Anaphylaxis, Hives and Itching     PN: LW Reaction: HIVES, Swelling  PN: LW Reaction: HIVES, Swelling     Flagyl [Metronidazole Hcl] Anaphylaxis and Rash     Food      PN: LW FI1: nka LW FI2:     Hydrochlorothiazide W-Triamterene      PN: LW Reaction: skin rash  PN: LW Reaction: skin rash     No Clinical Screening - See Comments      PN:  LW Other1: -Adhesive Tape     Seasonal Allergies      sneezing     Tape [Adhesive Tape] Hives     Metoprolol Itching and Rash     Metronidazole Hives and Rash     PN: LW Reaction: HIVES              Medications:     Current Outpatient Medications   Medication Sig Dispense Refill     albuterol (PROAIR HFA/PROVENTIL HFA/VENTOLIN HFA) 108 (90 Base) MCG/ACT inhaler Inhale 1-2 puffs into the lungs every 4 hours as needed for shortness of breath or wheezing 18 g 11     albuterol (PROVENTIL HFA) 108 (90 Base) MCG/ACT inhaler Inhale 2 puffs into the lungs every 6 hours 8.5 g 0     aspirin (ASA) 81 MG EC tablet Take 1 tablet (81 mg) by mouth daily 90 tablet 0     atorvastatin (LIPITOR) 40 MG tablet TAKE 1 TABLET(40 MG) BY MOUTH DAILY 90 tablet 3     blood glucose (NO BRAND SPECIFIED) test strip Use to test blood sugar once a day. 100 strip 3     blood glucose monitoring (NO BRAND SPECIFIED) meter device kit Use to test blood sugar once a day. 1 kit 0     cholestyramine (QUESTRAN) 4 g packet MIX AND DRINK 1 PACKET(4 GRAMS) BY MOUTH TWICE DAILY WITH MEALS 60 packet 5     diazepam (VALIUM) 2 MG tablet Take 0.5-1 tablets (1-2 mg) by mouth 2 times daily as needed for anxiety or muscle spasms 30 tablet 0     diclofenac (VOLTAREN) 1 % topical gel Apply topically 4 times daily as needed for moderate pain For Jaw pain       estradiol (ESTRING) 7.5 MCG/24HR vaginal ring Place 1 each vaginally every 3 months 1 each 3     fluticasone-vilanterol (BREO ELLIPTA) 200-25 MCG/ACT inhaler Inhale 1 puff into the lungs daily 3 each 3     isosorbide mononitrate (IMDUR) 30 MG 24 hr tablet Take 1 tablet (30 mg) by mouth every evening Take with Carvedilol. 90 tablet 3     loperamide (IMODIUM) 2 MG capsule Take 1 capsule (2 mg) by mouth daily 30 capsule 0     loratadine (CLARITIN) 10 MG tablet Take 10 mg by mouth At Bedtime       magnesium oxide (MAG-OX) 400 MG tablet Take 400 mg by mouth every morning       Melatonin 10 MG TABS tablet Take 10 mg by  mouth nightly as needed for sleep       nebivolol (BYSTOLIC) 5 MG tablet Take 1 tablet (5 mg) by mouth every evening 90 tablet 1     nitroGLYcerin (NITROSTAT) 0.4 MG sublingual tablet Place 1 tablet (0.4 mg) under the tongue every 5 minutes as needed for chest pain 25 tablet 11     pantoprazole (PROTONIX) 40 MG EC tablet TAKE 1 TABLET(40 MG) BY MOUTH DAILY 90 tablet 0     predniSONE (DELTASONE) 20 MG tablet Take 1 tablet (20 mg) by mouth 2 times daily 10 tablet 0     Prenatal Multivit-Min-Fe-FA (PRENATAL/IRON) TABS Take 1 tablet by mouth every morning       saccharomyces boulardii (FLORASTOR) 250 MG capsule Take 1 capsule (250 mg) by mouth 2 times daily 14 capsule 0     sertraline (ZOLOFT) 100 MG tablet TAKE 1 TABLET(100 MG) BY MOUTH DAILY 90 tablet 2     UNABLE TO FIND Take 3 tablets by mouth daily MEDICATION NAME: Uqora complete regimen  1 TAB, AM - 2 TAB PM       valsartan (DIOVAN) 40 MG tablet Take 1 tablet (40 mg) by mouth every morning 90 tablet 1     vitamin D3 (CHOLECALCIFEROL) 2000 units (50 mcg) tablet Take 1 tablet (2,000 Units) by mouth daily (Patient taking differently: Take 1 tablet by mouth every morning.) 90 tablet 3     zinc gluconate 50 MG tablet Take 50 mg by mouth every morning       No current facility-administered medications for this visit.               Past  Surgical History:     Past Surgical History:   Procedure Laterality Date     ABDOMEN SURGERY  1974, 1996    Kidney reconstruct. Uterer stenosis     ARTHROPLASTY KNEE  07/09/2014    Procedure: ARTHROPLASTY KNEE;  Surgeon: Levi Johnson MD;  Location:  OR     ARTHROPLASTY KNEE Left 11/24/2014    Procedure: ARTHROPLASTY KNEE;  Surgeon: Levi Johnson MD;  Location:  OR     COLONOSCOPY      Several times dates ??     CV CORONARY ANGIOGRAM N/A 10/09/2019    Procedure: Coronary Angiogram;  Surgeon: Chiquita Chatterjee MD;  Location:  HEART CARDIAC CATH LAB     CV HEART CATHETERIZATION WITH POSSIBLE INTERVENTION N/A 10/10/2019     Procedure: Heart Catheterization with Possible Intervention;  Surgeon: Chin Garcia MD;  Location:  HEART CARDIAC CATH LAB     CV INTRA AORTIC BALLOON N/A 10/09/2019    Procedure: Intra-Aortic Balloon Pump Insertion;  Surgeon: Chiquita Chatterjee MD;  Location:  HEART CARDIAC CATH LAB     CV INTRA AORTIC BALLOON REMOVAL N/A 10/10/2019    Procedure: Intra-Aortic Balloon Pump Removal;  Surgeon: Chin Garcia MD;  Location:  HEART CARDIAC CATH LAB     CV LEFT HEART CATH N/A 12/11/2020    Procedure: Heart Catheterization with Possible Intervention;  Surgeon: Pilo Otoole MD;  Location:  HEART CARDIAC CATH LAB     CV PCI STENT DRUG ELUTING N/A 10/09/2019    Procedure: PCI Stent Drug Eluting;  Surgeon: Chiquita Chatterjee MD;  Location:  HEART CARDIAC CATH LAB     DAVINCI LAPAROSCOPIC CHOLECYSTECTOMY WITHOUT GRAMS N/A 11/8/2023    Procedure: CHOLECYSTECTOMY, ROBOT-ASSISTED, LAPAROSCOPIC, WITHOUT CHOLANGIOGRAM;  Surgeon: Mickey Gallego MD;  Location: [facility] OR     EP LOOP RECORDER IMPLANT N/A 11/22/2023    Procedure: Loop Recorder Implant;  Surgeon: Nadeem Jason MD;  Location:  HEART CARDIAC CATH LAB     EYE SURGERY  2011    Cataract surgery both eyes     GENITOURINARY SURGERY  01/01/2008    Prolift     GENITOURINARY SURGERY  1973    In 1973, 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     ORTHOPEDIC SURGERY      bilat total hip     RECTOCELE REPAIR       ZZC TOTAL ABD HYSTERECTOMY+BLAD REPR  01/01/1992    Vaginal approach with oophrectomy     ZZC TOTAL KNEE ARTHROPLASTY               Physical Exam:     Vitals:    08/30/24 1546   BP: 122/70   BP Location: Left arm   Patient Position: Sitting   Cuff Size: Adult Regular   Pulse: 51   SpO2: 97%   Weight: 81.6 kg (180  "lb)   Height: 1.638 m (5' 4.5\")     PSYCH: NAD  EYES: EOMI  NEURO: AAO x3  : vulva unremarkable.  Vaginal mucosa atrophic. Estring removed without issues. No abnormal discharge or vaginal pain/bleeding, ulcers noted on speculum exam. New estring placed vaginally without issues.     LABS:   Creatinine   Date Value Ref Range Status   08/25/2024 1.15 (H) 0.51 - 0.95 mg/dL Final   12/17/2020 1.00 0.52 - 1.04 mg/dL Final            Assessment and Plan:   73 year old is a pleasant female who has Vaginal atrophy, hx of UTI (stable with estring), UUI, voiding dysfunction.     Plan:  -  continue estringe exchanges every 3 months.  -  Encourage going to PFPT when able to if possible.  - contact clinic if decides on a third line option.   - contact clinic if develops s/s of UTI in the future.   - After discussing the assessment and plan with patient, patient verbalizes understanding and agrees to the above plan. All questions answered.     17 minutes spent on the date of the encounter doing chart review, exam/estring exchange, review of Dr. Rodriguez's note, review of test results, interpretation of tests, patient visit and documentation.      Fadumo Hays PA-C  Urology  August 30, 2024      Patient Care Team:  Danny Conteh MD as PCP - General (Internal Medicine)  Danny Conteh MD as Assigned PCP  Evangelist Lee MD as Hospitalist (Internal Medicine)  Mel Rodriguez MD as MD (Urology)  Mel Rodriguez MD as Assigned Surgical Provider  Kashif Hadley MD as Assigned Sleep Provider  Agus Segura MD as MD (Surgery)  Kristie Bell PA-C as Assigned Neuroscience Provider  Mickey Gallego MD as MD (Surgery)  Ramya Keller MD as MD (Otolaryngology)  Boyd Saunders AuD (Audiology)  Ramya Keller MD as MD (Otolaryngology)  Nadeem Jason MD as Assigned Heart and Vascular Provider  Toña Melvin PA-C as Assigned Cancer Care Provider  Luis Fairbanks " CLEMENTINE OLGUIN as Assigned Behavioral Health Provider           Again, thank you for allowing me to participate in the care of your patient.      Sincerely,    RAY SCHULER PA-C

## 2024-09-04 ENCOUNTER — VIRTUAL VISIT (OUTPATIENT)
Dept: PSYCHOLOGY | Facility: CLINIC | Age: 74
End: 2024-09-04
Payer: MEDICARE

## 2024-09-04 DIAGNOSIS — F41.1 GENERALIZED ANXIETY DISORDER: ICD-10-CM

## 2024-09-04 DIAGNOSIS — F33.0 MAJOR DEPRESSIVE DISORDER, RECURRENT EPISODE, MILD (H): Primary | ICD-10-CM

## 2024-09-04 DIAGNOSIS — F43.10 POSTTRAUMATIC STRESS DISORDER: ICD-10-CM

## 2024-09-04 PROCEDURE — 90834 PSYTX W PT 45 MINUTES: CPT | Mod: 95 | Performed by: SOCIAL WORKER
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
## 2024-09-10 ASSESSMENT — ANXIETY QUESTIONNAIRES
7. FEELING AFRAID AS IF SOMETHING AWFUL MIGHT HAPPEN: SEVERAL DAYS
5. BEING SO RESTLESS THAT IT IS HARD TO SIT STILL: NOT AT ALL
6. BECOMING EASILY ANNOYED OR IRRITABLE: SEVERAL DAYS
GAD7 TOTAL SCORE: 3
IF YOU CHECKED OFF ANY PROBLEMS ON THIS QUESTIONNAIRE, HOW DIFFICULT HAVE THESE PROBLEMS MADE IT FOR YOU TO DO YOUR WORK, TAKE CARE OF THINGS AT HOME, OR GET ALONG WITH OTHER PEOPLE: SOMEWHAT DIFFICULT
3. WORRYING TOO MUCH ABOUT DIFFERENT THINGS: NOT AT ALL
1. FEELING NERVOUS, ANXIOUS, OR ON EDGE: SEVERAL DAYS
2. NOT BEING ABLE TO STOP OR CONTROL WORRYING: NOT AT ALL

## 2024-09-10 ASSESSMENT — PATIENT HEALTH QUESTIONNAIRE - PHQ9
SUM OF ALL RESPONSES TO PHQ QUESTIONS 1-9: 5
5. POOR APPETITE OR OVEREATING: NOT AT ALL

## 2024-09-11 ENCOUNTER — VIRTUAL VISIT (OUTPATIENT)
Dept: PSYCHOLOGY | Facility: CLINIC | Age: 74
End: 2024-09-11
Payer: MEDICARE

## 2024-09-11 DIAGNOSIS — F43.10 POSTTRAUMATIC STRESS DISORDER: ICD-10-CM

## 2024-09-11 DIAGNOSIS — F33.0 MAJOR DEPRESSIVE DISORDER, RECURRENT EPISODE, MILD (H): Primary | ICD-10-CM

## 2024-09-11 PROCEDURE — 90834 PSYTX W PT 45 MINUTES: CPT | Mod: 93 | Performed by: SOCIAL WORKER

## 2024-09-11 ASSESSMENT — ANXIETY QUESTIONNAIRES: GAD7 TOTAL SCORE: 3

## 2024-09-11 ASSESSMENT — PATIENT HEALTH QUESTIONNAIRE - PHQ9
10. IF YOU CHECKED OFF ANY PROBLEMS, HOW DIFFICULT HAVE THESE PROBLEMS MADE IT FOR YOU TO DO YOUR WORK, TAKE CARE OF THINGS AT HOME, OR GET ALONG WITH OTHER PEOPLE: SOMEWHAT DIFFICULT
SUM OF ALL RESPONSES TO PHQ QUESTIONS 1-9: 5
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
## 2024-09-12 ENCOUNTER — PATIENT OUTREACH (OUTPATIENT)
Dept: CARE COORDINATION | Facility: CLINIC | Age: 74
End: 2024-09-12
Payer: MEDICARE

## 2024-09-17 ASSESSMENT — PATIENT HEALTH QUESTIONNAIRE - PHQ9: SUM OF ALL RESPONSES TO PHQ QUESTIONS 1-9: 6

## 2024-09-18 ENCOUNTER — VIRTUAL VISIT (OUTPATIENT)
Dept: PSYCHOLOGY | Facility: CLINIC | Age: 74
End: 2024-09-18
Payer: MEDICARE

## 2024-09-18 DIAGNOSIS — F43.10 POSTTRAUMATIC STRESS DISORDER: ICD-10-CM

## 2024-09-18 DIAGNOSIS — F41.1 GENERALIZED ANXIETY DISORDER: ICD-10-CM

## 2024-09-18 DIAGNOSIS — F33.0 MAJOR DEPRESSIVE DISORDER, RECURRENT EPISODE, MILD (H): Primary | ICD-10-CM

## 2024-09-18 PROCEDURE — 90834 PSYTX W PT 45 MINUTES: CPT | Mod: 93 | Performed by: SOCIAL WORKER

## 2024-09-18 ASSESSMENT — PATIENT HEALTH QUESTIONNAIRE - PHQ9
10. IF YOU CHECKED OFF ANY PROBLEMS, HOW DIFFICULT HAVE THESE PROBLEMS MADE IT FOR YOU TO DO YOUR WORK, TAKE CARE OF THINGS AT HOME, OR GET ALONG WITH OTHER PEOPLE: SOMEWHAT DIFFICULT
SUM OF ALL RESPONSES TO PHQ QUESTIONS 1-9: 6
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
## 2024-09-25 ENCOUNTER — VIRTUAL VISIT (OUTPATIENT)
Dept: PSYCHOLOGY | Facility: CLINIC | Age: 74
End: 2024-09-25
Payer: MEDICARE

## 2024-09-25 DIAGNOSIS — F43.10 POSTTRAUMATIC STRESS DISORDER: ICD-10-CM

## 2024-09-25 DIAGNOSIS — F33.0 MAJOR DEPRESSIVE DISORDER, RECURRENT EPISODE, MILD (H): Primary | ICD-10-CM

## 2024-09-25 DIAGNOSIS — F41.1 GENERALIZED ANXIETY DISORDER: ICD-10-CM

## 2024-09-25 PROCEDURE — 90834 PSYTX W PT 45 MINUTES: CPT | Mod: 93 | Performed by: SOCIAL WORKER
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
## 2024-09-30 DIAGNOSIS — I10 HYPERTENSION GOAL BP (BLOOD PRESSURE) < 130/80: ICD-10-CM

## 2024-09-30 DIAGNOSIS — I47.29 PAROXYSMAL VENTRICULAR TACHYCARDIA (H): ICD-10-CM

## 2024-09-30 DIAGNOSIS — I25.119 CORONARY ARTERY DISEASE WITH ANGINA PECTORIS, UNSPECIFIED VESSEL OR LESION TYPE, UNSPECIFIED WHETHER NATIVE OR TRANSPLANTED HEART (H): ICD-10-CM

## 2024-10-01 RX ORDER — NEBIVOLOL 5 MG/1
5 TABLET ORAL EVERY EVENING
Qty: 90 TABLET | Refills: 2 | Status: SHIPPED | OUTPATIENT
Start: 2024-10-01

## 2024-10-07 ENCOUNTER — VIRTUAL VISIT (OUTPATIENT)
Dept: PSYCHOLOGY | Facility: CLINIC | Age: 74
End: 2024-10-07
Payer: MEDICARE

## 2024-10-07 DIAGNOSIS — F41.1 GENERALIZED ANXIETY DISORDER: ICD-10-CM

## 2024-10-07 DIAGNOSIS — F43.10 POSTTRAUMATIC STRESS DISORDER: ICD-10-CM

## 2024-10-07 DIAGNOSIS — F33.0 MAJOR DEPRESSIVE DISORDER, RECURRENT EPISODE, MILD (H): Primary | ICD-10-CM

## 2024-10-07 PROCEDURE — 90834 PSYTX W PT 45 MINUTES: CPT | Mod: 93 | Performed by: SOCIAL WORKER
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
## 2024-10-11 ENCOUNTER — ANCILLARY PROCEDURE (OUTPATIENT)
Dept: CARDIOLOGY | Facility: CLINIC | Age: 74
End: 2024-10-11
Attending: INTERNAL MEDICINE
Payer: MEDICARE

## 2024-10-11 DIAGNOSIS — Z95.818 IMPLANTABLE LOOP RECORDER PRESENT: ICD-10-CM

## 2024-10-11 PROCEDURE — 93298 REM INTERROG DEV EVAL SCRMS: CPT | Mod: 26 | Performed by: INTERNAL MEDICINE

## 2024-10-11 PROCEDURE — 93298 REM INTERROG DEV EVAL SCRMS: CPT

## 2024-10-16 ENCOUNTER — VIRTUAL VISIT (OUTPATIENT)
Dept: PSYCHOLOGY | Facility: CLINIC | Age: 74
End: 2024-10-16
Payer: MEDICARE

## 2024-10-16 DIAGNOSIS — F41.1 GENERALIZED ANXIETY DISORDER: ICD-10-CM

## 2024-10-16 DIAGNOSIS — F43.10 POSTTRAUMATIC STRESS DISORDER: ICD-10-CM

## 2024-10-16 DIAGNOSIS — F33.0 MAJOR DEPRESSIVE DISORDER, RECURRENT EPISODE, MILD (H): Primary | ICD-10-CM

## 2024-10-16 PROCEDURE — 90834 PSYTX W PT 45 MINUTES: CPT | Mod: 93 | Performed by: SOCIAL WORKER
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
## 2024-10-21 LAB
MDC_IDC_PG_IMPLANT_DTM: NORMAL
MDC_IDC_PG_MFG: NORMAL
MDC_IDC_PG_MODEL: NORMAL
MDC_IDC_PG_SERIAL: NORMAL
MDC_IDC_PG_TYPE: NORMAL
MDC_IDC_SESS_CLINIC_NAME: NORMAL
MDC_IDC_SESS_DTM: NORMAL
MDC_IDC_SESS_TYPE: NORMAL
MDC_IDC_STAT_AT_BURDEN_PERCENT: 0 %
MDC_IDC_STAT_EPISODE_RECENT_COUNT: 0
MDC_IDC_STAT_EPISODE_TYPE: NORMAL

## 2024-10-22 DIAGNOSIS — A49.8 CLOSTRIDIUM DIFFICILE INFECTION: ICD-10-CM

## 2024-10-22 RX ORDER — CHOLESTYRAMINE 4 G/9G
POWDER, FOR SUSPENSION ORAL
Qty: 180 PACKET | Refills: 1 | Status: SHIPPED | OUTPATIENT
Start: 2024-10-22

## 2024-10-23 ENCOUNTER — VIRTUAL VISIT (OUTPATIENT)
Dept: PSYCHOLOGY | Facility: CLINIC | Age: 74
End: 2024-10-23
Payer: MEDICARE

## 2024-10-23 DIAGNOSIS — F41.1 GENERALIZED ANXIETY DISORDER: ICD-10-CM

## 2024-10-23 DIAGNOSIS — F43.10 POSTTRAUMATIC STRESS DISORDER: ICD-10-CM

## 2024-10-23 DIAGNOSIS — F33.0 MAJOR DEPRESSIVE DISORDER, RECURRENT EPISODE, MILD (H): Primary | ICD-10-CM

## 2024-10-23 PROCEDURE — 90834 PSYTX W PT 45 MINUTES: CPT | Mod: 95 | Performed by: SOCIAL WORKER
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
## 2024-10-24 ENCOUNTER — TELEPHONE (OUTPATIENT)
Dept: GASTROENTEROLOGY | Facility: CLINIC | Age: 74
End: 2024-10-24
Payer: MEDICARE

## 2024-10-24 NOTE — TELEPHONE ENCOUNTER
Call placed to patient regarding a question sent to staff messages to discuss bowel prep for upcoming colonoscopy.    Patient went into detail about her history of kidney disease and wants to be sure that her bowel prep is safe for her kidneys.     Standard golytely was sent to patient's pharmacy. Writer discussed that this prep is what is dispensed as a standard for kidney disease of all stages. Patient then asked questions regarding how much of the solution goes through the kidneys.     Writer encouraged patient to reach out to the provider who manages her kidney disease for additional questions.    Patient verbalized understanding and in agreement with plan.    Carolyn Espino RN  Endoscopy Procedure Pre Assessment RN  [phone] option 4

## 2024-10-25 NOTE — TELEPHONE ENCOUNTER
Pre visit planning completed.      Procedure details:    Patient scheduled for Colonoscopy on 11.11.24.     Arrival time: 1230. Procedure time 1330    Facility location: [facility]; [address], [address], [place] [address]. Check in location: Main entrance at registration desk.    Sedation type: Conscious sedation     Pre op exam needed? No.    Indication for procedure: screening      Chart review:     Electronic implanted devices? No    Recent diagnosis of diverticulitis within the last 6 weeks? No      Medication review:    Diabetic? No    Anticoagulants? No    Weight loss medication/injectable? No.    Other medication HOLDING recommendations:  N/A      Prep for procedure:     Bowel prep recommendation: Standard Golytely. Bowel prep prescription sent to    [id number] - [place], [place] - [address] AT [facility]  [address]    Due to: CKD noted.     Prep instructions sent via [employer]         Carolyn Espino RN  Endoscopy Procedure Pre Assessment RN  [phone] option 2

## 2024-10-28 ENCOUNTER — VIRTUAL VISIT (OUTPATIENT)
Dept: PSYCHOLOGY | Facility: CLINIC | Age: 74
End: 2024-10-28
Payer: MEDICARE

## 2024-10-28 ENCOUNTER — TELEPHONE (OUTPATIENT)
Dept: GASTROENTEROLOGY | Facility: CLINIC | Age: 74
End: 2024-10-28
Payer: MEDICARE

## 2024-10-28 DIAGNOSIS — F33.0 MAJOR DEPRESSIVE DISORDER, RECURRENT EPISODE, MILD (H): Primary | ICD-10-CM

## 2024-10-28 DIAGNOSIS — F41.1 GENERALIZED ANXIETY DISORDER: ICD-10-CM

## 2024-10-28 DIAGNOSIS — F43.10 POSTTRAUMATIC STRESS DISORDER: ICD-10-CM

## 2024-10-28 PROCEDURE — 90834 PSYTX W PT 45 MINUTES: CPT | Mod: 93 | Performed by: SOCIAL WORKER

## 2024-10-28 NOTE — TELEPHONE ENCOUNTER
Caller: Gem Gama      Reason for Reschedule/Cancellation   (please be detailed, any staff messages or encounters to note?): Patient request to reschedule, conflict with personal schedule, wants to delay into Jan 2025      Prior to reschedule please review:  Ordering Provider: Danny Conteh MD in [facility]  Sedation Determined: MODERATE  Does patient have any ASC Exclusions, please identify?: YES, LEA      Notes on Cancelled Procedure:  Procedure: Lower Endoscopy [Colonoscopy]   Date: 11/11  Location: [facility]; [address], [address] [place] [address]   Surgeon: GEORGE      Rescheduled: Yes,   Procedure: Lower Endoscopy [Colonoscopy]    Date: 1/8   Location: [facility]; [address], [address] [place] [address]    Surgeon: COURT   Sedation Level Scheduled  MODERATE ,  Reason for Sedation Level PER ORDER   Instructions updated and sent: MYCHART     Does patient need PAC or Pre -Op Rescheduled? : NO       Did you cancel or rescheduled an EUS procedure? No.
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
## 2024-10-28 NOTE — TELEPHONE ENCOUNTER
Pt would like to cancel upcoming colonoscopy on 11/11/2024. Pt transferred to scheduling. Tomasa Langley RN

## 2024-11-04 DIAGNOSIS — I25.728 CORONARY ARTERY DISEASE OF AUTOLOGOUS BYPASS GRAFT WITH STABLE ANGINA PECTORIS (H): ICD-10-CM

## 2024-11-04 DIAGNOSIS — E78.5 HYPERLIPIDEMIA LDL GOAL <70: ICD-10-CM

## 2024-11-04 RX ORDER — ATORVASTATIN CALCIUM 40 MG/1
40 TABLET, FILM COATED ORAL DAILY
Qty: 90 TABLET | Refills: 0 | Status: SHIPPED | OUTPATIENT
Start: 2024-11-04 | End: 2024-11-14

## 2024-11-05 ASSESSMENT — PATIENT HEALTH QUESTIONNAIRE - PHQ9: SUM OF ALL RESPONSES TO PHQ QUESTIONS 1-9: 6

## 2024-11-06 ENCOUNTER — VIRTUAL VISIT (OUTPATIENT)
Dept: PSYCHOLOGY | Facility: CLINIC | Age: 74
End: 2024-11-06
Payer: MEDICARE

## 2024-11-06 DIAGNOSIS — F33.0 MAJOR DEPRESSIVE DISORDER, RECURRENT EPISODE, MILD (H): Primary | ICD-10-CM

## 2024-11-06 PROCEDURE — 90834 PSYTX W PT 45 MINUTES: CPT | Mod: 93 | Performed by: SOCIAL WORKER
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
## 2024-11-08 NOTE — ED TRIAGE NOTES
Pt reports fever and burning with urination since Saturday. Has been taking pyridium with relief of burning, but still having generalized weakness and fevers. Hx of CKD. Reports taking tylenol at 1800.        Patient

## 2024-11-09 DIAGNOSIS — I10 HYPERTENSION GOAL BP (BLOOD PRESSURE) < 140/90: ICD-10-CM

## 2024-11-11 RX ORDER — VALSARTAN 40 MG/1
40 TABLET ORAL EVERY MORNING
Qty: 90 TABLET | Refills: 1 | Status: SHIPPED | OUTPATIENT
Start: 2024-11-11

## 2024-11-13 SDOH — HEALTH STABILITY: PHYSICAL HEALTH: ON AVERAGE, HOW MANY DAYS PER WEEK DO YOU ENGAGE IN MODERATE TO STRENUOUS EXERCISE (LIKE A BRISK WALK)?: 4 DAYS

## 2024-11-13 SDOH — HEALTH STABILITY: PHYSICAL HEALTH: ON AVERAGE, HOW MANY MINUTES DO YOU ENGAGE IN EXERCISE AT THIS LEVEL?: 20 MIN

## 2024-11-13 ASSESSMENT — SOCIAL DETERMINANTS OF HEALTH (SDOH): HOW OFTEN DO YOU GET TOGETHER WITH FRIENDS OR RELATIVES?: NEVER

## 2024-11-14 ENCOUNTER — OFFICE VISIT (OUTPATIENT)
Dept: FAMILY MEDICINE | Facility: CLINIC | Age: 74
End: 2024-11-14
Payer: MEDICARE

## 2024-11-14 VITALS
RESPIRATION RATE: 20 BRPM | HEART RATE: 51 BPM | TEMPERATURE: 96.9 F | OXYGEN SATURATION: 97 % | DIASTOLIC BLOOD PRESSURE: 72 MMHG | WEIGHT: 174 LBS | HEIGHT: 64 IN | BODY MASS INDEX: 29.71 KG/M2 | SYSTOLIC BLOOD PRESSURE: 131 MMHG

## 2024-11-14 DIAGNOSIS — E67.3 HYPERVITAMINOSIS D: ICD-10-CM

## 2024-11-14 DIAGNOSIS — I10 HYPERTENSION GOAL BP (BLOOD PRESSURE) < 140/90: Primary | ICD-10-CM

## 2024-11-14 DIAGNOSIS — F33.1 MAJOR DEPRESSIVE DISORDER, RECURRENT EPISODE, MODERATE (H): ICD-10-CM

## 2024-11-14 DIAGNOSIS — M16.0 PRIMARY OSTEOARTHRITIS OF BOTH HIPS: ICD-10-CM

## 2024-11-14 DIAGNOSIS — L98.9 FACIAL LESION: ICD-10-CM

## 2024-11-14 DIAGNOSIS — K21.9 GASTROESOPHAGEAL REFLUX DISEASE WITHOUT ESOPHAGITIS: ICD-10-CM

## 2024-11-14 DIAGNOSIS — E78.5 HYPERLIPIDEMIA LDL GOAL <70: ICD-10-CM

## 2024-11-14 DIAGNOSIS — M62.830 BACK MUSCLE SPASM: ICD-10-CM

## 2024-11-14 DIAGNOSIS — E87.0 HYPERNATREMIA: ICD-10-CM

## 2024-11-14 DIAGNOSIS — I25.119 CORONARY ARTERY DISEASE WITH ANGINA PECTORIS, UNSPECIFIED VESSEL OR LESION TYPE, UNSPECIFIED WHETHER NATIVE OR TRANSPLANTED HEART (H): Chronic | ICD-10-CM

## 2024-11-14 DIAGNOSIS — F41.8 SITUATIONAL ANXIETY: ICD-10-CM

## 2024-11-14 DIAGNOSIS — N18.31 CHRONIC KIDNEY DISEASE, STAGE 3A (H): ICD-10-CM

## 2024-11-14 DIAGNOSIS — F43.10 PTSD (POST-TRAUMATIC STRESS DISORDER): ICD-10-CM

## 2024-11-14 DIAGNOSIS — I25.728 CORONARY ARTERY DISEASE OF AUTOLOGOUS BYPASS GRAFT WITH STABLE ANGINA PECTORIS (H): ICD-10-CM

## 2024-11-14 DIAGNOSIS — I10 HYPERTENSION GOAL BP (BLOOD PRESSURE) < 140/90: ICD-10-CM

## 2024-11-14 DIAGNOSIS — Z00.00 ENCOUNTER FOR ANNUAL WELLNESS EXAM IN MEDICARE PATIENT: Primary | ICD-10-CM

## 2024-11-14 PROCEDURE — 82043 UR ALBUMIN QUANTITATIVE: CPT | Performed by: INTERNAL MEDICINE

## 2024-11-14 PROCEDURE — G0439 PPPS, SUBSEQ VISIT: HCPCS | Performed by: INTERNAL MEDICINE

## 2024-11-14 PROCEDURE — 84100 ASSAY OF PHOSPHORUS: CPT | Performed by: INTERNAL MEDICINE

## 2024-11-14 PROCEDURE — 83970 ASSAY OF PARATHORMONE: CPT | Performed by: INTERNAL MEDICINE

## 2024-11-14 PROCEDURE — 83735 ASSAY OF MAGNESIUM: CPT | Performed by: INTERNAL MEDICINE

## 2024-11-14 PROCEDURE — 82570 ASSAY OF URINE CREATININE: CPT | Performed by: INTERNAL MEDICINE

## 2024-11-14 PROCEDURE — 82306 VITAMIN D 25 HYDROXY: CPT | Performed by: INTERNAL MEDICINE

## 2024-11-14 PROCEDURE — 36415 COLL VENOUS BLD VENIPUNCTURE: CPT | Performed by: INTERNAL MEDICINE

## 2024-11-14 PROCEDURE — 80048 BASIC METABOLIC PNL TOTAL CA: CPT | Performed by: INTERNAL MEDICINE

## 2024-11-14 PROCEDURE — 84300 ASSAY OF URINE SODIUM: CPT | Performed by: INTERNAL MEDICINE

## 2024-11-14 RX ORDER — SERTRALINE HYDROCHLORIDE 100 MG/1
100 TABLET, FILM COATED ORAL DAILY
Qty: 90 TABLET | Refills: 3 | Status: SHIPPED | OUTPATIENT
Start: 2024-11-14

## 2024-11-14 RX ORDER — DIAZEPAM 2 MG/1
1-2 TABLET ORAL 2 TIMES DAILY PRN
Qty: 30 TABLET | Refills: 0 | Status: SHIPPED | OUTPATIENT
Start: 2024-11-14

## 2024-11-14 RX ORDER — PANTOPRAZOLE SODIUM 40 MG/1
40 TABLET, DELAYED RELEASE ORAL DAILY
Qty: 90 TABLET | Refills: 3 | Status: SHIPPED | OUTPATIENT
Start: 2024-11-14

## 2024-11-14 RX ORDER — ATORVASTATIN CALCIUM 40 MG/1
40 TABLET, FILM COATED ORAL DAILY
Qty: 90 TABLET | Refills: 3 | Status: SHIPPED | OUTPATIENT
Start: 2024-11-14

## 2024-11-14 RX ORDER — ISOSORBIDE MONONITRATE 30 MG/1
30 TABLET, EXTENDED RELEASE ORAL EVERY EVENING
Qty: 90 TABLET | Refills: 3 | Status: SHIPPED | OUTPATIENT
Start: 2024-11-14

## 2024-11-14 ASSESSMENT — PAIN SCALES - GENERAL: PAINLEVEL_OUTOF10: MILD PAIN (2)

## 2024-11-14 NOTE — PROGRESS NOTES
Health Care Directive  Patient does not have a Health Care Directive: Not discussed during today's visit.      11/13/2024   General Health   How would you rate your overall physical health? (!) FAIR   Feel stress (tense, anxious, or unable to sleep) Rather much      Updates:  -Delayed colonoscopy due to prep.  -Concerned about lower GFR.       (!) STRESS CONCERN      11/13/2024   Nutrition   Diet: Carbohydrate counting    Other   If other, please elaborate: Heart and kidney, watch cholesterol, sodium, protein, potassium and phosphorus       Multiple values from one day are sorted in reverse-chronological order         11/13/2024   Exercise   Days per week of moderate/strenous exercise 4 days   Average minutes spent exercising at this level 20 min            11/13/2024   Social Factors   Frequency of gathering with friends or relatives Never   Worry food won't last until get money to buy more No   Food not last or not have enough money for food? No   Do you have housing? (Housing is defined as stable permanent housing and does not include staying ouside in a car, in a tent, in an abandoned building, in an overnight shelter, or couch-surfing.) Yes   Are you worried about losing your housing? No   Lack of transportation? No   Unable to get utilities (heat,electricity)? No      (!) SOCIAL CONNECTIONS CONCERN      11/14/2024   Fall Risk   Gait Speed Test (Document in seconds) 4.53   Gait Speed Test Interpretation Less than or equal to 5.00 seconds - PASS             11/13/2024   Activities of Daily Living- Home Safety   Needs help with the following daily activites None of the above   Safety concerns in the home None of the above            11/13/2024   Dental   Dentist two times every year? (!) NO            11/13/2024   Hearing Screening   Hearing concerns? (!) IT'S HARD TO FOLLOW A CONVERSATION IN A NOISY RESTAURANT OR CROWDED ROOM.    (!) TROUBLE UNDERSTANDING SOFT OR WHISPERED SPEECH.    (!) TROUBLE UNDERSTANDING  SPEECH ON THE TELEPHONE       Multiple values from one day are sorted in reverse-chronological order         11/13/2024   Driving Risk Screening   Patient/family members have concerns about driving No            11/13/2024   General Alertness/Fatigue Screening   Have you been more tired than usual lately? No            11/13/2024   Urinary Incontinence Screening   Bothered by leaking urine in past 6 months Yes            11/13/2024   TB Screening   Were you born outside of the [place]? No          Today's PHQ-9 Score:       11/13/2024     6:46 PM   PHQ-9 SCORE   PHQ-9 Total Score MyChart 6 (Mild depression)   PHQ-9 Total Score 6        Patient-reported         11/13/2024   Substance Use   Alcohol more than 3/day or more than 7/wk Not Applicable   Do you have a current opioid prescription? No   How severe/bad is pain from 1 to 10? 2/10   Do you use any other substances recreationally? No        Social History     Tobacco Use    Smoking status: Never     Passive exposure: Never    Smokeless tobacco: Never   Vaping Use    Vaping status: Never Used   Substance Use Topics    Alcohol use: No    Drug use: No     {Provider  If there are gaps in the social history shown above, please follow the link to update and then refresh the note Link to Social and Substance History :568905}      4/21/2023   LAST FHS-7 RESULTS   1st degree relative breast or ovarian cancer No   Any relative bilateral breast cancer No   Any male have breast cancer No   Any ONE woman have BOTH breast AND ovarian cancer No   Any woman with breast cancer before 50yrs No   2 or more relatives with breast AND/OR ovarian cancer No   2 or more relatives with breast AND/OR bowel cancer No        {If any of the questions to the FHS7 are answered yes, consider referral for genetic counseling.    Additional indications for genetic referral include personal history of breast or ovarian cancer, genetic mutation in 1st degree relative which increases risk of breast cancer  including BRCA1, BRCA2, SILVERIO, PALB 2, TP53, CHEK2, PTEN, CDH1, STK11 (per ACS) and/or 1st degree relative with history of pancreatic or high-risk prostate cancer (per NCCN):380027}   {Mammogram Decision Support (Optional):271104}    ASCVD Risk   The ASCVD Risk score (Pushpa ROSENTHAL, et al., 2019) failed to calculate for the following reasons:    Risk score cannot be calculated because patient has a medical history suggesting prior/existing ASCVD    {Link to Fracture Risk Assessment Tool (Optional):834771}    {Provider  REQUIRED FOR AWV Use the storyboard to review patient history, after sections have been marked as reviewed, refresh note to capture documentation:614201}  {Provider   REQUIRED AWV use this link to review and update sexual activity history  after section has been marked as reviewed, refresh note to capture documentation:831892}  Reviewed and updated as needed this visit by Provider                    {HISTORY OPTIONS (Optional):463708}  Current providers sharing in care for this patient include:  Patient Care Team:  Danny Conteh MD as PCP - General (Internal Medicine)  Danny Conteh MD as Assigned PCP  Evangelist Lee MD as Hospitalist (Internal Medicine)  Mel Rodriguez MD as MD (Urology)  Mel Rodriguez MD as Assigned Surgical Provider  Kashif Hadley MD as Assigned Sleep Provider  Agus Segura MD as MD (Surgery)  Kristie Bell PA-C as Assigned Neuroscience Provider  Mickey Gallego MD as MD (Surgery)  Ramya Keller MD as MD (Otolaryngology)  Boyd Saunders AuD (Audiology)  Ramya Keller MD as MD (Otolaryngology)  Nadeem Jason MD as Assigned Heart and Vascular Provider  Toña Melvin PA-C as Assigned Cancer Care Provider  Luis Fairbanks LICSW as Assigned Behavioral Health Provider    The following health maintenance items are reviewed in Epic and correct as of today:  Health Maintenance   Topic Date  "Due    COLORECTAL CANCER SCREENING  02/21/2023    MEDICARE ANNUAL WELLNESS VISIT  09/28/2024    COVID-19 Vaccine (10 - 2024-25 season) 11/22/2024    ASTHMA CONTROL TEST  01/09/2025    BMP  02/25/2025    MICROALBUMIN  02/25/2025    PHQ-9  05/14/2025    CBC  05/28/2025    HEMOGLOBIN  05/28/2025    ANNUAL REVIEW OF HM ORDERS  06/17/2025    ASTHMA ACTION PLAN  06/17/2025    ALT  07/09/2025    LIPID  07/09/2025    FALL RISK ASSESSMENT  11/14/2025    HF ACTION PLAN  06/17/2027    GLUCOSE  08/25/2027    ADVANCE CARE PLANNING  10/15/2028    DTAP/TDAP/TD IMMUNIZATION (3 - Td or Tdap) 04/24/2034    DEXA  02/21/2037    PARATHYROID  Completed    PHOSPHORUS  Completed    TSH W/FREE T4 REFLEX  Completed    HEPATITIS C SCREENING  Completed    DEPRESSION ACTION PLAN  Completed    INFLUENZA VACCINE  Completed    Pneumococcal Vaccine: 65+ Years  Completed    URINALYSIS  Completed    ALK PHOS  Completed    ZOSTER IMMUNIZATION  Completed    RSV VACCINE  Completed    HPV IMMUNIZATION  Aged Out    MENINGITIS IMMUNIZATION  Aged Out    RSV MONOCLONAL ANTIBODY  Aged Out    MAMMO SCREENING  Discontinued       {ROS Picklists (Optional):355283}     Objective    Exam  /72 (BP Location: Left arm, Patient Position: Chair, Cuff Size: Adult Regular)   Pulse 51   Temp 96.9  F (36.1  C) (Tympanic)   Resp 20   Ht 1.626 m (5' 4\")   Wt 78.9 kg (174 lb)   LMP  (LMP Unknown)   SpO2 97%   BMI 29.87 kg/m     Estimated body mass index is 29.87 kg/m  as calculated from the following:    Height as of this encounter: 1.626 m (5' 4\").    Weight as of this encounter: 78.9 kg (174 lb).    Physical Exam  {Exam Choices (Optional):073909}  {Provider  The Mini-Cog is incomplete, use link to complete and refresh note Link to Mini-Cog :384062}       No data to display              {A Mini-Cog total score of 0-2 suggests the possibility of dementia, score of 3-5 suggests no dementia:463624}         Signed Electronically by: Danny Conteh MD  {Email " feedback regarding this note to [email or web address]   :032597}  Answers submitted by the patient for this visit:  Patient Health Questionnaire (Submitted on 11/13/2024)  If you checked off any problems, how difficult have these problems made it for you to do your work, take care of things at home, or get along with other people?: Somewhat difficult  PHQ9 TOTAL SCORE: 6

## 2024-11-15 LAB
ANION GAP SERPL CALCULATED.3IONS-SCNC: 9 MMOL/L (ref 7–15)
BUN SERPL-MCNC: 12.1 MG/DL (ref 8–23)
CALCIUM SERPL-MCNC: 9.4 MG/DL (ref 8.8–10.4)
CHLORIDE SERPL-SCNC: 104 MMOL/L (ref 98–107)
CREAT SERPL-MCNC: 1.07 MG/DL (ref 0.51–0.95)
CREAT UR-MCNC: 38.5 MG/DL
EGFRCR SERPLBLD CKD-EPI 2021: 54 ML/MIN/1.73M2
GLUCOSE SERPL-MCNC: 95 MG/DL (ref 70–99)
HCO3 SERPL-SCNC: 26 MMOL/L (ref 22–29)
MAGNESIUM SERPL-MCNC: 2.2 MG/DL (ref 1.7–2.3)
MICROALBUMIN UR-MCNC: <12 MG/L
MICROALBUMIN/CREAT UR: NORMAL MG/G{CREAT}
PHOSPHATE SERPL-MCNC: 4.9 MG/DL (ref 2.5–4.5)
POTASSIUM SERPL-SCNC: 5.1 MMOL/L (ref 3.4–5.3)
PTH-INTACT SERPL-MCNC: 25 PG/ML (ref 15–65)
SODIUM SERPL-SCNC: 139 MMOL/L (ref 135–145)
SODIUM UR-SCNC: 56 MMOL/L
VIT D+METAB SERPL-MCNC: 54 NG/ML (ref 20–50)

## 2024-11-19 ENCOUNTER — ANCILLARY PROCEDURE (OUTPATIENT)
Dept: GENERAL RADIOLOGY | Facility: CLINIC | Age: 74
End: 2024-11-19
Attending: INTERNAL MEDICINE
Payer: MEDICARE

## 2024-11-19 PROCEDURE — 73522 X-RAY EXAM HIPS BI 3-4 VIEWS: CPT | Mod: TC | Performed by: RADIOLOGY

## 2024-11-20 ENCOUNTER — VIRTUAL VISIT (OUTPATIENT)
Dept: PSYCHOLOGY | Facility: CLINIC | Age: 74
End: 2024-11-20
Payer: MEDICARE

## 2024-11-20 DIAGNOSIS — F41.1 GENERALIZED ANXIETY DISORDER: ICD-10-CM

## 2024-11-20 DIAGNOSIS — F43.10 POSTTRAUMATIC STRESS DISORDER: ICD-10-CM

## 2024-11-20 DIAGNOSIS — F33.1 MAJOR DEPRESSIVE DISORDER, RECURRENT EPISODE, MODERATE (H): Primary | ICD-10-CM

## 2024-11-20 ASSESSMENT — ANXIETY QUESTIONNAIRES
1. FEELING NERVOUS, ANXIOUS, OR ON EDGE: SEVERAL DAYS
6. BECOMING EASILY ANNOYED OR IRRITABLE: SEVERAL DAYS
7. FEELING AFRAID AS IF SOMETHING AWFUL MIGHT HAPPEN: SEVERAL DAYS
GAD7 TOTAL SCORE: 7
IF YOU CHECKED OFF ANY PROBLEMS ON THIS QUESTIONNAIRE, HOW DIFFICULT HAVE THESE PROBLEMS MADE IT FOR YOU TO DO YOUR WORK, TAKE CARE OF THINGS AT HOME, OR GET ALONG WITH OTHER PEOPLE: SOMEWHAT DIFFICULT
3. WORRYING TOO MUCH ABOUT DIFFERENT THINGS: SEVERAL DAYS
GAD7 TOTAL SCORE: 7
5. BEING SO RESTLESS THAT IT IS HARD TO SIT STILL: SEVERAL DAYS
2. NOT BEING ABLE TO STOP OR CONTROL WORRYING: SEVERAL DAYS

## 2024-11-20 ASSESSMENT — PATIENT HEALTH QUESTIONNAIRE - PHQ9
10. IF YOU CHECKED OFF ANY PROBLEMS, HOW DIFFICULT HAVE THESE PROBLEMS MADE IT FOR YOU TO DO YOUR WORK, TAKE CARE OF THINGS AT HOME, OR GET ALONG WITH OTHER PEOPLE: SOMEWHAT DIFFICULT
SUM OF ALL RESPONSES TO PHQ QUESTIONS 1-9: 7
5. POOR APPETITE OR OVEREATING: SEVERAL DAYS
SUM OF ALL RESPONSES TO PHQ QUESTIONS 1-9: 7
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
## 2024-11-24 NOTE — PROGRESS NOTES
SUBJECTIVE:   Heidy is a 73 year old, presenting for the following:  Physical    Health Care Directive  Patient does not have a Health Care Directive: Not discussed during today's visit.      11/13/2024   General Health   How would you rate your overall physical health? (!) FAIR   Feel stress (tense, anxious, or unable to sleep) Rather much      Updates:  -Delayed colonoscopy due to prep.  -Concerned about lower GFR.       (!) STRESS CONCERN      11/13/2024   Nutrition   Diet: Carbohydrate counting    Other   If other, please elaborate: Heart and kidney, watch cholesterol, sodium, protein, potassium and phosphorus       Multiple values from one day are sorted in reverse-chronological order         11/13/2024   Exercise   Days per week of moderate/strenous exercise 4 days   Average minutes spent exercising at this level 20 min            11/13/2024   Social Factors   Frequency of gathering with friends or relatives Never   Worry food won't last until get money to buy more No   Food not last or not have enough money for food? No   Do you have housing? (Housing is defined as stable permanent housing and does not include staying ouside in a car, in a tent, in an abandoned building, in an overnight shelter, or couch-surfing.) Yes   Are you worried about losing your housing? No   Lack of transportation? No   Unable to get utilities (heat,electricity)? No      (!) SOCIAL CONNECTIONS CONCERN      11/14/2024   Fall Risk   Gait Speed Test (Document in seconds) 4.53   Gait Speed Test Interpretation Less than or equal to 5.00 seconds - PASS             11/13/2024   Activities of Daily Living- Home Safety   Needs help with the following daily activites None of the above   Safety concerns in the home None of the above            11/13/2024   Dental   Dentist two times every year? (!) NO            11/13/2024   Hearing Screening   Hearing concerns? (!) IT'S HARD TO FOLLOW A CONVERSATION IN A NOISY RESTAURANT OR CROWDED ROOM.     (!) TROUBLE UNDERSTANDING SOFT OR WHISPERED SPEECH.    (!) TROUBLE UNDERSTANDING SPEECH ON THE TELEPHONE       Multiple values from one day are sorted in reverse-chronological order         11/13/2024   Driving Risk Screening   Patient/family members have concerns about driving No            11/13/2024   General Alertness/Fatigue Screening   Have you been more tired than usual lately? No            11/13/2024   Urinary Incontinence Screening   Bothered by leaking urine in past 6 months Yes            11/13/2024   TB Screening   Were you born outside of the [place]? No          Today's PHQ-9 Score:       11/13/2024     6:46 PM   PHQ-9 SCORE   PHQ-9 Total Score MyChart 6 (Mild depression)   PHQ-9 Total Score 6        Patient-reported         11/13/2024   Substance Use   Alcohol more than 3/day or more than 7/wk Not Applicable   Do you have a current opioid prescription? No   How severe/bad is pain from 1 to 10? 2/10   Do you use any other substances recreationally? No        Social History     Tobacco Use    Smoking status: Never     Passive exposure: Never    Smokeless tobacco: Never   Vaping Use    Vaping status: Never Used   Substance Use Topics    Alcohol use: No    Drug use: No           4/21/2023   LAST FHS-7 RESULTS   1st degree relative breast or ovarian cancer No   Any relative bilateral breast cancer No   Any male have breast cancer No   Any ONE woman have BOTH breast AND ovarian cancer No   Any woman with breast cancer before 50yrs No   2 or more relatives with breast AND/OR ovarian cancer No   2 or more relatives with breast AND/OR bowel cancer No      Mammogram Screening - After age 74- determine frequency with patient based on health status, life expectancy and patient goals       Labs reviewed in EPIC  BP Readings from Last 3 Encounters:   11/14/24 131/72   08/30/24 122/70   08/22/24 121/70    Wt Readings from Last 3 Encounters:   11/14/24 78.9 kg (174 lb)   08/30/24 81.6 kg (180 lb)   08/22/24 82.6 kg (182  lb 2 oz)                  Patient Active Problem List   Diagnosis    TMJ (temporomandibular joint syndrome)    Congenital ureteric stenosis    Lacrimal duct stenosis    Chronic rhinitis    Intermittent asthma    Major depressive disorder    Hypertension goal BP (blood pressure) < 140/90    Osteoarthritis of right knee    Primary narcolepsy without cataplexy    Vitamin D deficiency    Incontinence of feces with fecal urgency    Overactive bladder    Fatigue, unspecified type    History of ischemic cardiomyopathy    Hyperlipidemia LDL goal <70    Calculus of gallbladder without cholecystitis without obstruction    Coronary artery disease with angina pectoris, unspecified vessel or lesion type, unspecified whether native or transplanted heart (H)    Environmental allergies    Class 1 obesity due to excess calories with serious comorbidity and body mass index (BMI) of 30.0 to 30.9 in adult    Chronic insomnia    Hiatal hernia    Cheyne-Rogers breathing disorder    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    Possible PLMD (periodic limb movement disorder)    Generalized anxiety disorder    Closed compression fracture of L3 lumbar vertebra, sequela    Bilateral hydronephrosis    Posttraumatic stress disorder    Stage 3b chronic kidney disease (H)    Adjustment disorder with anxiety    Paroxysmal ventricular tachycardia (H)    Bile salt-induced diarrhea    History of colonic polyps    Major depressive disorder, recurrent episode, mild (H)    Chronic diastolic heart failure (H)    CKD stage 3a, GFR 45-59 ml/min (H)    Major depressive disorder, recurrent episode, moderate (H)     Past Surgical History:   Procedure Laterality Date    ABDOMEN SURGERY  1974, 1996    Kidney reconstruct. Uterer stenosis    ARTHROPLASTY KNEE  07/09/2014    Procedure: ARTHROPLASTY KNEE;  Surgeon: Levi Johnson MD;  Location:  OR    ARTHROPLASTY KNEE Left 11/24/2014    Procedure: ARTHROPLASTY KNEE;  Surgeon: Levi Johnson MD;  Location:  OR    COLONOSCOPY       Several times dates ??    CV CORONARY ANGIOGRAM N/A 10/09/2019    Procedure: Coronary Angiogram;  Surgeon: Chiquita Chatterjee MD;  Location:  HEART CARDIAC CATH LAB    CV HEART CATHETERIZATION WITH POSSIBLE INTERVENTION N/A 10/10/2019    Procedure: Heart Catheterization with Possible Intervention;  Surgeon: Chin Garcia MD;  Location:  HEART CARDIAC CATH LAB    CV INTRA AORTIC BALLOON N/A 10/09/2019    Procedure: Intra-Aortic Balloon Pump Insertion;  Surgeon: Chiquita Chatterjee MD;  Location:  HEART CARDIAC CATH LAB    CV INTRA AORTIC BALLOON REMOVAL N/A 10/10/2019    Procedure: Intra-Aortic Balloon Pump Removal;  Surgeon: Chin Garcia MD;  Location:  HEART CARDIAC CATH LAB    CV LEFT HEART CATH N/A 12/11/2020    Procedure: Heart Catheterization with Possible Intervention;  Surgeon: Pilo Otoole MD;  Location:  HEART CARDIAC CATH LAB    CV PCI STENT DRUG ELUTING N/A 10/09/2019    Procedure: PCI Stent Drug Eluting;  Surgeon: Chiquita Chatterjee MD;  Location:  HEART CARDIAC CATH LAB    DAVINCI LAPAROSCOPIC CHOLECYSTECTOMY WITHOUT GRAMS N/A 11/8/2023    Procedure: CHOLECYSTECTOMY, ROBOT-ASSISTED, LAPAROSCOPIC, WITHOUT CHOLANGIOGRAM;  Surgeon: Mickey Gallego MD;  Location: [facility] OR    EP LOOP RECORDER IMPLANT N/A 11/22/2023    Procedure: Loop Recorder Implant;  Surgeon: Nadeem Jason MD;  Location:  HEART CARDIAC CATH LAB    EYE SURGERY  2011    Cataract surgery both eyes    GENITOURINARY SURGERY  01/01/2008    Prolift    GENITOURINARY SURGERY  1973    In 1973, she had surgery to correct congenital narrowing in the ureter at its connection to the right kidney    GENITOURINARY SURGERY  1997 1997 pt went to [facility] and had surgery to remove the scar tissue, rebuild the bladder from previous ureter surgery.    ORTHOPEDIC SURGERY      bilat total hip    RECTOCELE REPAIR      ZZC TOTAL ABD HYSTERECTOMY+BLAD REPR  01/01/1992    Vaginal approach with  oophrectomy    ZZC TOTAL KNEE ARTHROPLASTY         Social History     Tobacco Use    Smoking status: Never     Passive exposure: Never    Smokeless tobacco: Never   Substance Use Topics    Alcohol use: No     Family History   Problem Relation Age of Onset    Hypertension Mother     Arthritis Mother     Cerebrovascular Disease Mother     Anesthesia Reaction Mother         Halucinates    Venous thrombosis Father     Cerebrovascular Disease Father         Three Strokes    Diabetes Father         Type 2, after his 60's    Kidney Disease Father     Hypertension Father     Hyperlipidemia Father     Osteoporosis Father     Thyroid Disease Sister         Low thyroid    Anesthesia Reaction Sister         Hallucinates    Diabetes Brother         After recovered from aspiration pneumonia, developed diabetes from high carb diet from feeding tube    Birth defects Brother     Osteoporosis Maternal Grandmother         Old age lived to 90    Coronary Artery Disease Maternal Grandfather         Strokes, lived to 105    Substance Abuse Maternal Grandfather         Alcohol    Osteoporosis Maternal Grandfather         Old age    Coronary Artery Disease Paternal Grandmother     Osteoporosis Paternal Grandmother         Long term issue for her    Obesity Paternal Grandmother     Coronary Artery Disease Paternal Grandfather     Sjogren's Daughter     Diabetes Daughter         Diagnosed after stroke    Cerebrovascular Disease Daughter     Hypertension Daughter         With diabetes and stroke    Hyperlipidemia Daughter     Depression Daughter         Does not acknowledge    Anxiety Disorder Daughter     Asthma Daughter         Anxiety, air quality, and exercise triggered    Obesity Daughter     Diabetes Daughter         Gestational and pre diabetic A1C    Anxiety Disorder Daughter     Asthma Daughter         Excursion induced    Thyroid Disease Daughter     Diabetes Son         On metformin.    Hypertension Son     Obesity Son      Depression Son           ..from job    Mental Illness Other     Substance Abuse Other     Substance Abuse Other     Substance Abuse Other          Allergies   Allergen Reactions    Dust Mites Cough, Headache, Other (See Comments) and Shortness Of Breath    Latex Other (See Comments) and Shortness Of Breath     Runny nose  PN: LW Reaction: DIFFICULTY BREATHING      Sulfa Antibiotics Anaphylaxis, Hives and Itching     PN: LW Reaction: HIVES, Swelling  PN: LW Reaction: HIVES, Swelling    Flagyl [Metronidazole Hcl] Anaphylaxis and Rash    Food      PN: LW FI1: nka LW FI2:    Hydrochlorothiazide W-Triamterene      PN: LW Reaction: skin rash  PN: LW Reaction: skin rash    No Clinical Screening - See Comments      PN: LW Other1: -Adhesive Tape    Seasonal Allergies      sneezing    Tape [Adhesive Tape] Hives    Metoprolol Itching and Rash    Metronidazole Hives and Rash     PN: LW Reaction: HIVES         Do you have a current opioid prescription? no  Do you use any other controlled substances or medications that are not prescribed by a provider? none    Current providers sharing in care for this patient include:  Patient Care Team:  Danny Conteh MD as PCP - General (Internal Medicine)  Danny Conteh MD as Assigned PCP  Evangelist Lee MD as Hospitalist (Internal Medicine)  Mel Rodriguez MD as MD (Urology)  Mel Rodriguez MD as Assigned Surgical Provider  Kashif Hadley MD as Assigned Sleep Provider  Agus Segura MD as MD (Surgery)  Kristie Bell PA-C as Assigned Neuroscience Provider  Mickey Gallego MD as MD (Surgery)  Ramya Keller MD as MD (Otolaryngology)  Boyd Saunders AuD (Audiology)  Ramya Keller MD as MD (Otolaryngology)  Nadeem Jason MD as Assigned Heart and Vascular Provider  Toña Melvin PA-C as Assigned Cancer Care Provider  Luis Fairbanks LICSW as Assigned Behavioral Health Provider    The  following health maintenance items are reviewed in Epic and correct as of today:  Health Maintenance   Topic Date Due    COLORECTAL CANCER SCREENING  02/21/2023    MEDICARE ANNUAL WELLNESS VISIT  09/28/2024    COVID-19 Vaccine (10 - 2024-25 season) 11/22/2024    ASTHMA CONTROL TEST  01/09/2025    BMP  02/25/2025    MICROALBUMIN  02/25/2025    PHQ-9  05/14/2025    CBC  05/28/2025    HEMOGLOBIN  05/28/2025    ANNUAL REVIEW OF HM ORDERS  06/17/2025    ASTHMA ACTION PLAN  06/17/2025    ALT  07/09/2025    LIPID  07/09/2025    FALL RISK ASSESSMENT  11/14/2025    HF ACTION PLAN  06/17/2027    GLUCOSE  08/25/2027    ADVANCE CARE PLANNING  10/15/2028    DTAP/TDAP/TD IMMUNIZATION (3 - Td or Tdap) 04/24/2034    DEXA  02/21/2037    PARATHYROID  Completed    PHOSPHORUS  Completed    TSH W/FREE T4 REFLEX  Completed    HEPATITIS C SCREENING  Completed    DEPRESSION ACTION PLAN  Completed    INFLUENZA VACCINE  Completed    Pneumococcal Vaccine: 65+ Years  Completed    URINALYSIS  Completed    ALK PHOS  Completed    ZOSTER IMMUNIZATION  Completed    RSV VACCINE  Completed    HPV IMMUNIZATION  Aged Out    MENINGITIS IMMUNIZATION  Aged Out    RSV MONOCLONAL ANTIBODY  Aged Out    MAMMO SCREENING  Discontinued       Reviewed and updated as needed this visit by clinical staff    Allergies  Meds              Reviewed and updated as needed this visit by Provider     Meds              REVIEW OF SYSTEMS  CONSTITUTIONAL: NEGATIVE for fever, chills, change in weight  INTEGUMENTARY/SKIN: NEGATIVE for worrisome rashes, moles or lesions  EYES: NEGATIVE for vision changes or irritation  ENT/MOUTH: NEGATIVE for ear, mouth and throat problems  RESP: NEGATIVE for significant cough or SOB  BREAST: NEGATIVE for masses, tenderness or discharge  CV: NEGATIVE for chest pain, palpitations or peripheral edema  GI: NEGATIVE for nausea, abdominal pain, heartburn, or change in bowel habits  : NEGATIVE for frequency, dysuria, or hematuria  MUSCULOSKELETAL:  "NEGATIVE for significant arthralgias or myalgia  NEURO: NEGATIVE for weakness, dizziness or paresthesias  ENDOCRINE: NEGATIVE for temperature intolerance, skin/hair changes  HEME: NEGATIVE for bleeding problems  PSYCHIATRIC: NEGATIVE for changes in mood or affect    OBJECTIVE:   /72 (BP Location: Left arm, Patient Position: Chair, Cuff Size: Adult Regular)   Pulse 51   Temp 96.9  F (36.1  C) (Tympanic)   Resp 20   Ht 1.626 m (5' 4\")   Wt 78.9 kg (174 lb)   LMP  (LMP Unknown)   SpO2 97%   BMI 29.87 kg/m     Estimated body mass index is 29.87 kg/m  as calculated from the following:    Height as of this encounter: 1.626 m (5' 4\").    Weight as of this encounter: 78.9 kg (174 lb).  Physical Exam  GENERAL: alert and no distress  EYES: Eyes grossly normal to inspection and conjunctivae and sclerae normal  HENT: normal cephalic/atraumatic and oral mucous membranes moist  NECK: no adenopathy and thyroid normal to palpation  RESP: lungs clear to auscultation - no rales, rhonchi or wheezes  CV: regular rates and rhythm and no peripheral edema  ABDOMEN: soft, nontender and bowel sounds normal  MS: no gross musculoskeletal defects noted, no edema  NEURO: Normal strength and tone, mentation intact and speech normal  PSYCH: mentation appears normal, affect normal/bright    Diagnostic Test Results:  Results for orders placed or performed in visit on 11/14/24   XR Pelvis and Hip Bilateral 2 Views     Status: None    Narrative    EXAM: XR PELVIS AND HIP BILATERAL 2 VIEWS  DATE/TIME: 11/19/2024 1:02 PM    INDICATION: Primary osteoarthritis of both hips  COMPARISON: None available.       Impression    IMPRESSION: Bilateral total hip arthroplasties. No evidence of  loosening or periprosthetic fracture. Lower lumbar facet arthropathy  and degenerative interspace narrowing.    SAMIRA JAIMES DO         SYSTEM ID:  [id number]   Sodium random urine     Status: None   Result Value Ref Range    Sodium Urine mmol/L 56 mmol/L "   Albumin Random Urine Quantitative with Creat Ratio     Status: None   Result Value Ref Range    Creatinine Urine mg/dL 38.5 mg/dL    Albumin Urine mg/L <12.0 mg/L    Albumin Urine mg/g Cr     Phosphorus     Status: Abnormal   Result Value Ref Range    Phosphorus 4.9 (H) 2.5 - 4.5 mg/dL   Parathyroid Hormone Intact     Status: Normal   Result Value Ref Range    Parathyroid Hormone Intact 25 15 - 65 pg/mL    Narrative    This result was obtained with the Roche Elecsys PTH STAT assay.   This reference range differs from PTH assays used in other [facility] laboratories.   Basic metabolic panel  (Ca, Cl, CO2, Creat, Gluc, K, Na, BUN)     Status: Abnormal   Result Value Ref Range    Sodium 139 135 - 145 mmol/L    Potassium 5.1 3.4 - 5.3 mmol/L    Chloride 104 98 - 107 mmol/L    Carbon Dioxide (CO2) 26 22 - 29 mmol/L    Anion Gap 9 7 - 15 mmol/L    Urea Nitrogen 12.1 8.0 - 23.0 mg/dL    Creatinine 1.07 (H) 0.51 - 0.95 mg/dL    GFR Estimate 54 (L) >60 mL/min/1.73m2    Calcium 9.4 8.8 - 10.4 mg/dL    Glucose 95 70 - 99 mg/dL   Magnesium     Status: Normal   Result Value Ref Range    Magnesium 2.2 1.7 - 2.3 mg/dL   Vitamin D Deficiency     Status: Abnormal   Result Value Ref Range    Vitamin D, Total (25-Hydroxy) 54 (H) 20 - 50 ng/mL    Narrative    Season, race, dietary intake, and treatment affect the concentration of 25-hydroxy-Vitamin D. Values may decrease during winter months and increase during summer months.    Vitamin D determination is routinely performed by an immunoassay specific for 25 hydroxyvitamin D3.  If an individual is on vitamin D2(ergocalciferol) supplementation, please specify 25 OH vitamin D2 and D3 level determination by LCMSMS test [id number].         ASSESSMENT/PLAN:     Encounter for annual wellness exam in Medicare patient  - Basic metabolic panel  (Ca, Cl, CO2, Creat, Gluc, K, Na, BUN)  - sertraline (ZOLOFT) 100 MG tablet; Take 1 tablet (100 mg) by mouth daily.    Hypertension goal BP (blood  pressure) < 140/90  - Albumin Random Urine Quantitative with Creat Ratio  - Basic metabolic panel  (Ca, Cl, CO2, Creat, Gluc, K, Na, BUN)    Hypernatremia  - Sodium random urine    Facial lesion  - Adult Dermatology  Referral; Future    Chronic kidney disease, stage 3a (H)  - Phosphorus  - Parathyroid Hormone Intact  - Magnesium  - Adult Nutrition  Referral    Hypervitaminosis D  - Vitamin D Deficiency    Primary osteoarthritis of both hips  - XR Pelvis and Hip Bilateral 2 Views    Major depressive disorder, recurrent episode, moderate (H)  - sertraline (ZOLOFT) 100 MG tablet; Take 1 tablet (100 mg) by mouth daily.    Hyperlipidemia LDL goal <70  - atorvastatin (LIPITOR) 40 MG tablet; Take 1 tablet (40 mg) by mouth daily.    Coronary artery disease of autologous bypass graft with stable angina pectoris (H)  - atorvastatin (LIPITOR) 40 MG tablet; Take 1 tablet (40 mg) by mouth daily.  - isosorbide mononitrate (IMDUR) 30 MG 24 hr tablet; Take 1 tablet (30 mg) by mouth every evening.    Back muscle spasm  - diazepam (VALIUM) 2 MG tablet; Take 0.5-1 tablets (1-2 mg) by mouth 2 times daily as needed for anxiety or muscle spasms.    Situational anxiety  - diazepam (VALIUM) 2 MG tablet; Take 0.5-1 tablets (1-2 mg) by mouth 2 times daily as needed for anxiety or muscle spasms.    Gastroesophageal reflux disease without esophagitis  - pantoprazole (PROTONIX) 40 MG EC tablet; Take 1 tablet (40 mg) by mouth daily.    PTSD (post-traumatic stress disorder)  - sertraline (ZOLOFT) 100 MG tablet; Take 1 tablet (100 mg) by mouth daily.     Patient has been advised of split billing requirements and indicates understanding: Yes      Counseling  Reviewed preventive health counseling, as reflected in patient instructions  Special attention given to:        The ASCVD Risk score (Pushpa DK, et al., 2019) failed to calculate for the following reasons:    Risk score cannot be calculated because patient has a medical  "history suggesting prior/existing ASCVD      BMI  Estimated body mass index is 29.87 kg/m  as calculated from the following:    Height as of this encounter: 1.626 m (5' 4\").    Weight as of this encounter: 78.9 kg (174 lb).   Weight management plan: diet and exercise.      She reports that she has never smoked. She has never been exposed to tobacco smoke. She has never used smokeless tobacco.          Signed Electronically by: Danny Conteh MD    Identified Health Risks:  At higher risk of myocardial ischemia.  "

## 2024-12-05 ENCOUNTER — VIRTUAL VISIT (OUTPATIENT)
Dept: PSYCHOLOGY | Facility: CLINIC | Age: 74
End: 2024-12-05
Payer: MEDICARE

## 2024-12-05 DIAGNOSIS — F43.10 POSTTRAUMATIC STRESS DISORDER: ICD-10-CM

## 2024-12-05 DIAGNOSIS — F41.1 GENERALIZED ANXIETY DISORDER: ICD-10-CM

## 2024-12-05 DIAGNOSIS — F33.0 MAJOR DEPRESSIVE DISORDER, RECURRENT EPISODE, MILD (H): Primary | ICD-10-CM

## 2024-12-05 ASSESSMENT — PATIENT HEALTH QUESTIONNAIRE - PHQ9: SUM OF ALL RESPONSES TO PHQ QUESTIONS 1-9: 6
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
## 2024-12-09 ENCOUNTER — PATIENT OUTREACH (OUTPATIENT)
Dept: CARE COORDINATION | Facility: CLINIC | Age: 74
End: 2024-12-09
Payer: MEDICARE

## 2024-12-11 ENCOUNTER — VIRTUAL VISIT (OUTPATIENT)
Dept: PSYCHOLOGY | Facility: CLINIC | Age: 74
End: 2024-12-11
Payer: MEDICARE

## 2024-12-11 DIAGNOSIS — F43.10 POSTTRAUMATIC STRESS DISORDER: ICD-10-CM

## 2024-12-11 DIAGNOSIS — F41.1 GENERALIZED ANXIETY DISORDER: ICD-10-CM

## 2024-12-11 DIAGNOSIS — F33.1 MAJOR DEPRESSIVE DISORDER, RECURRENT EPISODE, MODERATE (H): Primary | ICD-10-CM

## 2024-12-11 ASSESSMENT — ANXIETY QUESTIONNAIRES
7. FEELING AFRAID AS IF SOMETHING AWFUL MIGHT HAPPEN: MORE THAN HALF THE DAYS
3. WORRYING TOO MUCH ABOUT DIFFERENT THINGS: SEVERAL DAYS
6. BECOMING EASILY ANNOYED OR IRRITABLE: SEVERAL DAYS
8. IF YOU CHECKED OFF ANY PROBLEMS, HOW DIFFICULT HAVE THESE MADE IT FOR YOU TO DO YOUR WORK, TAKE CARE OF THINGS AT HOME, OR GET ALONG WITH OTHER PEOPLE?: VERY DIFFICULT
IF YOU CHECKED OFF ANY PROBLEMS ON THIS QUESTIONNAIRE, HOW DIFFICULT HAVE THESE PROBLEMS MADE IT FOR YOU TO DO YOUR WORK, TAKE CARE OF THINGS AT HOME, OR GET ALONG WITH OTHER PEOPLE: VERY DIFFICULT
7. FEELING AFRAID AS IF SOMETHING AWFUL MIGHT HAPPEN: MORE THAN HALF THE DAYS
1. FEELING NERVOUS, ANXIOUS, OR ON EDGE: MORE THAN HALF THE DAYS
GAD7 TOTAL SCORE: 8
2. NOT BEING ABLE TO STOP OR CONTROL WORRYING: SEVERAL DAYS
2. NOT BEING ABLE TO STOP OR CONTROL WORRYING: SEVERAL DAYS
4. TROUBLE RELAXING: SEVERAL DAYS
7. FEELING AFRAID AS IF SOMETHING AWFUL MIGHT HAPPEN: MORE THAN HALF THE DAYS
1. FEELING NERVOUS, ANXIOUS, OR ON EDGE: MORE THAN HALF THE DAYS
IF YOU CHECKED OFF ANY PROBLEMS ON THIS QUESTIONNAIRE, HOW DIFFICULT HAVE THESE PROBLEMS MADE IT FOR YOU TO DO YOUR WORK, TAKE CARE OF THINGS AT HOME, OR GET ALONG WITH OTHER PEOPLE: VERY DIFFICULT
5. BEING SO RESTLESS THAT IT IS HARD TO SIT STILL: NOT AT ALL
8. IF YOU CHECKED OFF ANY PROBLEMS, HOW DIFFICULT HAVE THESE MADE IT FOR YOU TO DO YOUR WORK, TAKE CARE OF THINGS AT HOME, OR GET ALONG WITH OTHER PEOPLE?: VERY DIFFICULT
GAD7 TOTAL SCORE: 8
4. TROUBLE RELAXING: SEVERAL DAYS
GAD7 TOTAL SCORE: 8
5. BEING SO RESTLESS THAT IT IS HARD TO SIT STILL: NOT AT ALL
6. BECOMING EASILY ANNOYED OR IRRITABLE: SEVERAL DAYS
3. WORRYING TOO MUCH ABOUT DIFFERENT THINGS: SEVERAL DAYS
GAD7 TOTAL SCORE: 8
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
## 2024-12-16 ENCOUNTER — OFFICE VISIT (OUTPATIENT)
Dept: UROLOGY | Facility: CLINIC | Age: 74
End: 2024-12-16
Payer: MEDICARE

## 2024-12-16 DIAGNOSIS — N39.41 URGE INCONTINENCE: ICD-10-CM

## 2024-12-16 DIAGNOSIS — N39.8 VOIDING DYSFUNCTION: ICD-10-CM

## 2024-12-16 DIAGNOSIS — Z87.440 HISTORY OF UTI: Primary | ICD-10-CM

## 2024-12-16 DIAGNOSIS — N95.2 VAGINAL ATROPHY: ICD-10-CM

## 2024-12-16 DIAGNOSIS — R15.9 INCONTINENCE OF FECES, UNSPECIFIED FECAL INCONTINENCE TYPE: ICD-10-CM

## 2024-12-16 DIAGNOSIS — R39.15 URINARY URGENCY: ICD-10-CM

## 2024-12-16 NOTE — PROGRESS NOTES
Urology Clinic      Name: Gem Gama    MRN: [id number]   YOB: 1950  Accompanied at today's visit by:self                 Chief Complaint:   Estring exchange          History of Present Illness:   December 16, 2024    HISTORY:   We have been following 73 year old Gem Gama for Vaginal atrophy, hx of UTI with hx of pyelonephritis (stable with estring), UUI, voiding dysfunction. Hx of urethral sling in 1990s. Has been to PFPT in the past which was somewhat helpful for her leakage of urine. Has failed multiple antimuscarinics. Discontinued myrbetriq for interaction with cardiac beta blocker. Fecal incontinence is much better since she had her cholecystectomy. Was seen by Dr. Rodriguez on 7/18/24 discussed third line options for UUI and patient was not interested in options at that time.as she is  planing to decide on a third line option once her daughter gets placement to a living facility. Her daughter has since had placement at a facility but patient has not decided on a third line option. She is considering going back to PFPT however in the future. Last seen for estring exchange on 8/30/23 and doing well. Continues to be UTI free. Here today for estring exchange. Patient voices no other concerns at this time.            Allergies:     Allergies   Allergen Reactions    Dust Mites Cough, Headache, Other (See Comments) and Shortness Of Breath    Latex Other (See Comments) and Shortness Of Breath     Runny nose  PN: LW Reaction: DIFFICULTY BREATHING      Sulfa Antibiotics Anaphylaxis, Hives and Itching     PN: LW Reaction: HIVES, Swelling  PN: LW Reaction: HIVES, Swelling    Flagyl [Metronidazole Hcl] Anaphylaxis and Rash    Food      PN: LW FI1: nka LW FI2:    Hydrochlorothiazide W-Triamterene      PN: LW Reaction: skin rash  PN: LW Reaction: skin rash    No Clinical Screening - See Comments      PN: LW Other1: -Adhesive Tape    Seasonal Allergies      sneezing    Tape [Adhesive Tape] Hives     Metoprolol Itching and Rash    Metronidazole Hives and Rash     PN: LW Reaction: HIVES              Medications:     Current Outpatient Medications   Medication Sig Dispense Refill    albuterol (PROVENTIL HFA) 108 (90 Base) MCG/ACT inhaler Inhale 2 puffs into the lungs every 6 hours 8.5 g 0    aspirin (ASA) 81 MG EC tablet Take 1 tablet (81 mg) by mouth daily 90 tablet 0    atorvastatin (LIPITOR) 40 MG tablet Take 1 tablet (40 mg) by mouth daily. 90 tablet 3    bisacodyl (DULCOLAX) 5 MG EC tablet Take 2 tablets at 3 pm the day before your procedure. If your procedure is before 11 am, take 2 additional tablets at 11 pm. If your procedure is after 11 am, take 2 additional tablets at 6 am. For additional instructions refer to your colonoscopy prep instructions. 4 tablet 0    blood glucose (NO BRAND SPECIFIED) test strip Use to test blood sugar once a day. 100 strip 3    blood glucose monitoring (NO BRAND SPECIFIED) meter device kit Use to test blood sugar once a day. 1 kit 0    diazepam (VALIUM) 2 MG tablet Take 0.5-1 tablets (1-2 mg) by mouth 2 times daily as needed for anxiety or muscle spasms. 30 tablet 0    diclofenac (VOLTAREN) 1 % topical gel Apply topically 4 times daily as needed for moderate pain For Jaw pain      estradiol (ESTRING) 7.5 MCG/24HR vaginal ring Place 1 each vaginally every 3 months 1 each 3    fluticasone-vilanterol (BREO ELLIPTA) 200-25 MCG/ACT inhaler Inhale 1 puff into the lungs daily 3 each 3    isosorbide mononitrate (IMDUR) 30 MG 24 hr tablet Take 1 tablet (30 mg) by mouth every evening. 90 tablet 3    loratadine (CLARITIN) 10 MG tablet Take 10 mg by mouth At Bedtime      Melatonin 10 MG TABS tablet Take 10 mg by mouth nightly as needed for sleep      nebivolol (BYSTOLIC) 5 MG tablet TAKE 1 TABLET(5 MG) BY MOUTH EVERY EVENING 90 tablet 2    nitroGLYcerin (NITROSTAT) 0.4 MG sublingual tablet Place 1 tablet (0.4 mg) under the tongue every 5 minutes as needed for chest pain 25 tablet 11     pantoprazole (PROTONIX) 40 MG EC tablet Take 1 tablet (40 mg) by mouth daily. 90 tablet 3    polyethylene glycol (GOLYTELY) 236 g suspension The night before the exam at 6 pm drink an 8-ounce glass every 15 minutes until the jug is half empty. If you arrive before 11 AM: Drink the other half of the Golytely jug at 11 PM night before procedure. If you arrive after 11 AM: Drink the other half of the Golytely jug at 6 AM day of procedure. For additional instructions refer to your colonoscopy prep instructions. 4000 mL 0    Prenatal Multivit-Min-Fe-FA (PRENATAL/IRON) TABS Take 1 tablet by mouth every morning      saccharomyces boulardii (FLORASTOR) 250 MG capsule Take 1 capsule (250 mg) by mouth 2 times daily 14 capsule 0    sertraline (ZOLOFT) 100 MG tablet Take 1 tablet (100 mg) by mouth daily. 90 tablet 3    UNABLE TO FIND Take 3 tablets by mouth daily MEDICATION NAME: Uqora complete regimen  1 TAB, AM - 2 TAB PM      valsartan (DIOVAN) 40 MG tablet TAKE 1 TABLET(40 MG) BY MOUTH EVERY MORNING 90 tablet 1    vitamin D3 (CHOLECALCIFEROL) 2000 units (50 mcg) tablet Take 1 tablet (2,000 Units) by mouth daily 90 tablet 3    zinc gluconate 50 MG tablet Take 50 mg by mouth every morning       No current facility-administered medications for this visit.            Past  Surgical History:     Past Surgical History:   Procedure Laterality Date    ABDOMEN SURGERY  1974, 1996    Kidney reconstruct. Uterer stenosis    ARTHROPLASTY KNEE  07/09/2014    Procedure: ARTHROPLASTY KNEE;  Surgeon: Levi Johnson MD;  Location:  OR    ARTHROPLASTY KNEE Left 11/24/2014    Procedure: ARTHROPLASTY KNEE;  Surgeon: Levi Johnson MD;  Location:  OR    COLONOSCOPY      Several times dates ??    CV CORONARY ANGIOGRAM N/A 10/09/2019    Procedure: Coronary Angiogram;  Surgeon: Chiquita Chatterjee MD;  Location:  HEART CARDIAC CATH LAB    CV HEART CATHETERIZATION WITH POSSIBLE INTERVENTION N/A 10/10/2019    Procedure: Heart  Catheterization with Possible Intervention;  Surgeon: Chin Garcia MD;  Location:  HEART CARDIAC CATH LAB    CV INTRA AORTIC BALLOON N/A 10/09/2019    Procedure: Intra-Aortic Balloon Pump Insertion;  Surgeon: Chiquita Chatterjee MD;  Location:  HEART CARDIAC CATH LAB    CV INTRA AORTIC BALLOON REMOVAL N/A 10/10/2019    Procedure: Intra-Aortic Balloon Pump Removal;  Surgeon: Chin Garcia MD;  Location:  HEART CARDIAC CATH LAB    CV LEFT HEART CATH N/A 12/11/2020    Procedure: Heart Catheterization with Possible Intervention;  Surgeon: Pilo Otoole MD;  Location:  HEART CARDIAC CATH LAB    CV PCI STENT DRUG ELUTING N/A 10/09/2019    Procedure: PCI Stent Drug Eluting;  Surgeon: Chiquita Chatterjee MD;  Location:  HEART CARDIAC CATH LAB    DAVINCI LAPAROSCOPIC CHOLECYSTECTOMY WITHOUT GRAMS N/A 11/8/2023    Procedure: CHOLECYSTECTOMY, ROBOT-ASSISTED, LAPAROSCOPIC, WITHOUT CHOLANGIOGRAM;  Surgeon: Mickey Gallego MD;  Location: [facility] OR    EP LOOP RECORDER IMPLANT N/A 11/22/2023    Procedure: Loop Recorder Implant;  Surgeon: Nadeem Jason MD;  Location: [facility] CARDIAC CATH LAB    EYE SURGERY  2011    Cataract surgery both eyes    GENITOURINARY SURGERY  01/01/2008    Prolift    GENITOURINARY SURGERY  1973    In 1973, 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    ORTHOPEDIC SURGERY      bilat total hip    RECTOCELE REPAIR      Z TOTAL ABD HYSTERECTOMY+BLAD REPR  01/01/1992    Vaginal approach with oophrectomy    [facility] TOTAL KNEE ARTHROPLASTY               Physical Exam:   There were no vitals filed for this visit.  PSYCH: NAD  EYES: EOMI  NEURO: AAO x3  : vulva unremarkable.  Vaginal mucosa atrophic. Estring removed without issues. No abnormal discharge or vaginal  pain/bleeding, ulcers noted on speculum exam. New estring placed vaginally without issues.     LABS:   Creatinine   Date Value Ref Range Status   11/14/2024 1.07 (H) 0.51 - 0.95 mg/dL Final   12/17/2020 1.00 0.52 - 1.04 mg/dL Final            Assessment and Plan:   73 year old is a pleasant female who has  Vaginal atrophy, hx of UTI (stable with estring), UUI, voiding dysfunction.        Plan:  -  continue estringe exchanges every 3 months.  -  Patient is considering going back to PFPT in the future before deciding on a third line option.   - contact clinic if decides on a third line option.   - contact clinic if develops s/s of UTI in the future.   - After discussing the assessment and plan with patient, patient verbalizes understanding and agrees to the above plan. All questions answered.     17 minutes spent on the date of the encounter doing chart review, review of labs, exam/estring exchange, review of test results, interpretation of tests, patient visit and documentation.      Fadumo Hays PA-C  Urology  December 16, 2024      Patient Care Team:  Danny Conteh MD as PCP - General (Internal Medicine)  Danny Conteh MD as Assigned PCP  Evangelist Lee MD as Hospitalist (Internal Medicine)  Mel Rodriguez MD as MD (Urology)  Mel Rodriguez MD as Assigned Surgical Provider  Kashif Hadley MD as Assigned Sleep Provider  Agus Segura MD as MD (Surgery)  Kristie Bell PA-C as Assigned Neuroscience Provider  Mickey Gallego MD as MD (Surgery)  Ramya Keller MD as MD (Otolaryngology)  Boyd Saunders AuD (Audiology)  Ramya Keller MD as MD (Otolaryngology)  Nadeem Jason MD as Assigned Heart and Vascular Provider  Toña Melvin PA-C as Assigned Cancer Care Provider  [facility] Assigned [facility]  Hortencia Joel PA-C as Physician Assistant (Dermatology)  SELF, REFERRED

## 2024-12-16 NOTE — NURSING NOTE
Gem Gama's goals for this visit include:   Chief Complaint   Patient presents with    RECHECK     Estring placement          She requests these members of her care team be copied on today's visit information:         PCP: Danny Conteh    Referring Provider:  Referred Self, MD  No address on file    LMP  (LMP Unknown)     Do you need any medication refills at today's visit?     Crystal Salgado LPN on 12/16/2024 at 9:26 AM

## 2024-12-17 ASSESSMENT — PATIENT HEALTH QUESTIONNAIRE - PHQ9: SUM OF ALL RESPONSES TO PHQ QUESTIONS 1-9: 14

## 2024-12-18 ENCOUNTER — VIRTUAL VISIT (OUTPATIENT)
Dept: PSYCHOLOGY | Facility: CLINIC | Age: 74
End: 2024-12-18
Payer: MEDICARE

## 2024-12-18 DIAGNOSIS — F33.1 MAJOR DEPRESSIVE DISORDER, RECURRENT EPISODE, MODERATE (H): Primary | ICD-10-CM

## 2024-12-18 DIAGNOSIS — F43.10 POSTTRAUMATIC STRESS DISORDER: ICD-10-CM

## 2024-12-18 DIAGNOSIS — F41.1 GENERALIZED ANXIETY DISORDER: ICD-10-CM

## 2024-12-18 ASSESSMENT — PATIENT HEALTH QUESTIONNAIRE - PHQ9
SUM OF ALL RESPONSES TO PHQ QUESTIONS 1-9: 14
10. IF YOU CHECKED OFF ANY PROBLEMS, HOW DIFFICULT HAVE THESE PROBLEMS MADE IT FOR YOU TO DO YOUR WORK, TAKE CARE OF THINGS AT HOME, OR GET ALONG WITH OTHER PEOPLE: VERY DIFFICULT

## 2024-12-18 ASSESSMENT — ANXIETY QUESTIONNAIRES: GAD7 TOTAL SCORE: 8
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
## 2024-12-31 ENCOUNTER — VIRTUAL VISIT (OUTPATIENT)
Dept: PSYCHOLOGY | Facility: CLINIC | Age: 74
End: 2024-12-31
Payer: MEDICARE

## 2024-12-31 DIAGNOSIS — F43.10 POSTTRAUMATIC STRESS DISORDER: ICD-10-CM

## 2024-12-31 DIAGNOSIS — F33.1 MAJOR DEPRESSIVE DISORDER, RECURRENT EPISODE, MODERATE (H): Primary | ICD-10-CM

## 2024-12-31 DIAGNOSIS — F41.1 GENERALIZED ANXIETY DISORDER: ICD-10-CM
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
## 2025-01-09 ENCOUNTER — VIRTUAL VISIT (OUTPATIENT)
Dept: PSYCHOLOGY | Facility: CLINIC | Age: 75
End: 2025-01-09
Payer: MEDICARE

## 2025-01-09 DIAGNOSIS — F33.1 MAJOR DEPRESSIVE DISORDER, RECURRENT EPISODE, MODERATE (H): Primary | ICD-10-CM

## 2025-01-09 DIAGNOSIS — F41.1 GENERALIZED ANXIETY DISORDER: ICD-10-CM

## 2025-01-09 DIAGNOSIS — F43.10 POSTTRAUMATIC STRESS DISORDER: ICD-10-CM
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
## 2025-01-14 ENCOUNTER — ANCILLARY PROCEDURE (OUTPATIENT)
Dept: CARDIOLOGY | Facility: CLINIC | Age: 75
End: 2025-01-14
Attending: INTERNAL MEDICINE
Payer: MEDICARE

## 2025-01-14 DIAGNOSIS — Z95.818 IMPLANTABLE LOOP RECORDER PRESENT: ICD-10-CM

## 2025-01-14 PROCEDURE — 93298 REM INTERROG DEV EVAL SCRMS: CPT | Mod: 26 | Performed by: INTERNAL MEDICINE

## 2025-01-14 PROCEDURE — 93298 REM INTERROG DEV EVAL SCRMS: CPT

## 2025-01-15 ENCOUNTER — VIRTUAL VISIT (OUTPATIENT)
Dept: PSYCHOLOGY | Facility: CLINIC | Age: 75
End: 2025-01-15
Payer: MEDICARE

## 2025-01-15 DIAGNOSIS — F43.10 POSTTRAUMATIC STRESS DISORDER: ICD-10-CM

## 2025-01-15 DIAGNOSIS — F33.1 MAJOR DEPRESSIVE DISORDER, RECURRENT EPISODE, MODERATE (H): Primary | ICD-10-CM

## 2025-01-15 DIAGNOSIS — F41.1 GENERALIZED ANXIETY DISORDER: ICD-10-CM

## 2025-01-15 ASSESSMENT — ANXIETY QUESTIONNAIRES
8. IF YOU CHECKED OFF ANY PROBLEMS, HOW DIFFICULT HAVE THESE MADE IT FOR YOU TO DO YOUR WORK, TAKE CARE OF THINGS AT HOME, OR GET ALONG WITH OTHER PEOPLE?: VERY DIFFICULT
GAD7 TOTAL SCORE: 13
GAD7 TOTAL SCORE: 13
IF YOU CHECKED OFF ANY PROBLEMS ON THIS QUESTIONNAIRE, HOW DIFFICULT HAVE THESE PROBLEMS MADE IT FOR YOU TO DO YOUR WORK, TAKE CARE OF THINGS AT HOME, OR GET ALONG WITH OTHER PEOPLE: VERY DIFFICULT
6. BECOMING EASILY ANNOYED OR IRRITABLE: SEVERAL DAYS
5. BEING SO RESTLESS THAT IT IS HARD TO SIT STILL: NOT AT ALL
7. FEELING AFRAID AS IF SOMETHING AWFUL MIGHT HAPPEN: NEARLY EVERY DAY
1. FEELING NERVOUS, ANXIOUS, OR ON EDGE: NEARLY EVERY DAY
3. WORRYING TOO MUCH ABOUT DIFFERENT THINGS: MORE THAN HALF THE DAYS
2. NOT BEING ABLE TO STOP OR CONTROL WORRYING: NEARLY EVERY DAY
4. TROUBLE RELAXING: SEVERAL DAYS
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
## 2025-01-22 ENCOUNTER — VIRTUAL VISIT (OUTPATIENT)
Dept: PSYCHOLOGY | Facility: CLINIC | Age: 75
End: 2025-01-22
Payer: MEDICARE

## 2025-01-22 DIAGNOSIS — F43.10 POSTTRAUMATIC STRESS DISORDER: ICD-10-CM

## 2025-01-22 DIAGNOSIS — F41.1 GENERALIZED ANXIETY DISORDER: ICD-10-CM

## 2025-01-22 DIAGNOSIS — F33.0 MAJOR DEPRESSIVE DISORDER, RECURRENT EPISODE, MILD: Primary | ICD-10-CM
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
## 2025-01-29 ENCOUNTER — VIRTUAL VISIT (OUTPATIENT)
Dept: PSYCHOLOGY | Facility: CLINIC | Age: 75
End: 2025-01-29
Payer: MEDICARE

## 2025-01-29 DIAGNOSIS — F41.1 GENERALIZED ANXIETY DISORDER: ICD-10-CM

## 2025-01-29 DIAGNOSIS — F33.0 MAJOR DEPRESSIVE DISORDER, RECURRENT EPISODE, MILD: Primary | ICD-10-CM

## 2025-01-29 DIAGNOSIS — F43.10 POSTTRAUMATIC STRESS DISORDER: ICD-10-CM

## 2025-01-29 ASSESSMENT — ANXIETY QUESTIONNAIRES
4. TROUBLE RELAXING: SEVERAL DAYS
7. FEELING AFRAID AS IF SOMETHING AWFUL MIGHT HAPPEN: SEVERAL DAYS
GAD7 TOTAL SCORE: 10
5. BEING SO RESTLESS THAT IT IS HARD TO SIT STILL: NOT AT ALL
6. BECOMING EASILY ANNOYED OR IRRITABLE: NEARLY EVERY DAY
8. IF YOU CHECKED OFF ANY PROBLEMS, HOW DIFFICULT HAVE THESE MADE IT FOR YOU TO DO YOUR WORK, TAKE CARE OF THINGS AT HOME, OR GET ALONG WITH OTHER PEOPLE?: VERY DIFFICULT
7. FEELING AFRAID AS IF SOMETHING AWFUL MIGHT HAPPEN: SEVERAL DAYS
GAD7 TOTAL SCORE: 10
2. NOT BEING ABLE TO STOP OR CONTROL WORRYING: MORE THAN HALF THE DAYS
IF YOU CHECKED OFF ANY PROBLEMS ON THIS QUESTIONNAIRE, HOW DIFFICULT HAVE THESE PROBLEMS MADE IT FOR YOU TO DO YOUR WORK, TAKE CARE OF THINGS AT HOME, OR GET ALONG WITH OTHER PEOPLE: VERY DIFFICULT
GAD7 TOTAL SCORE: 10
1. FEELING NERVOUS, ANXIOUS, OR ON EDGE: MORE THAN HALF THE DAYS
3. WORRYING TOO MUCH ABOUT DIFFERENT THINGS: SEVERAL DAYS
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
## 2025-02-03 ENCOUNTER — TELEPHONE (OUTPATIENT)
Dept: FAMILY MEDICINE | Facility: CLINIC | Age: 75
End: 2025-02-03
Payer: MEDICARE

## 2025-02-03 DIAGNOSIS — Z29.89 NEED FOR SBE (SUBACUTE BACTERIAL ENDOCARDITIS) PROPHYLAXIS: Primary | ICD-10-CM

## 2025-02-03 RX ORDER — DOXYCYCLINE 100 MG/1
100 CAPSULE ORAL ONCE
Qty: 1 CAPSULE | Refills: 2 | Status: SHIPPED | OUTPATIENT
Start: 2025-02-03 | End: 2025-02-03

## 2025-02-03 NOTE — TELEPHONE ENCOUNTER
Mary, resident from dental team at [facility] calling about patient getting a tooth extracted today.      She notes the patient had a knee replaced in 2014 and her surgeon has since retired.  No one at surgeon's office comfortable advising if the patient requires antibiotic prophylaxis due to this.  The surgeon's office advised that the PCP should make that call.      Patient has history of C. Diff which provider is unsure if she should be on dental prophylaxis as a result.     Routing to provider to review and advise.    Kristina Kjellberg, MSN, RN  [facility] Primary Care Triage

## 2025-02-03 NOTE — TELEPHONE ENCOUNTER
Patient requires antibiotic prophylaxis. Rx for Doxycycline sent. Inform patient about it. I do not recommend Amoxicillin due to higher risk of C difficile and not Azithromycin due to risk of prolonged QT interval.

## 2025-02-04 ASSESSMENT — PATIENT HEALTH QUESTIONNAIRE - PHQ9
SUM OF ALL RESPONSES TO PHQ QUESTIONS 1-9: 11
10. IF YOU CHECKED OFF ANY PROBLEMS, HOW DIFFICULT HAVE THESE PROBLEMS MADE IT FOR YOU TO DO YOUR WORK, TAKE CARE OF THINGS AT HOME, OR GET ALONG WITH OTHER PEOPLE: VERY DIFFICULT
SUM OF ALL RESPONSES TO PHQ QUESTIONS 1-9: 11

## 2025-02-05 ENCOUNTER — VIRTUAL VISIT (OUTPATIENT)
Dept: PSYCHOLOGY | Facility: CLINIC | Age: 75
End: 2025-02-05
Payer: MEDICARE

## 2025-02-05 DIAGNOSIS — F41.1 GENERALIZED ANXIETY DISORDER: ICD-10-CM

## 2025-02-05 DIAGNOSIS — F33.0 MAJOR DEPRESSIVE DISORDER, RECURRENT EPISODE, MILD: Primary | ICD-10-CM

## 2025-02-05 DIAGNOSIS — F43.10 POSTTRAUMATIC STRESS DISORDER: ICD-10-CM
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
## 2025-02-11 ENCOUNTER — MYC MEDICAL ADVICE (OUTPATIENT)
Dept: FAMILY MEDICINE | Facility: CLINIC | Age: 75
End: 2025-02-11
Payer: MEDICARE

## 2025-02-11 DIAGNOSIS — Z79.2 NEED FOR ANTIBIOTIC PROPHYLAXIS FOR DENTAL PROCEDURE: Primary | ICD-10-CM

## 2025-02-11 RX ORDER — DOXYCYCLINE 100 MG/1
100 CAPSULE ORAL 2 TIMES DAILY
Qty: 20 CAPSULE | Refills: 11 | Status: SHIPPED | OUTPATIENT
Start: 2025-02-11

## 2025-02-12 ENCOUNTER — VIRTUAL VISIT (OUTPATIENT)
Dept: PSYCHOLOGY | Facility: CLINIC | Age: 75
End: 2025-02-12
Payer: MEDICARE

## 2025-02-12 DIAGNOSIS — F41.1 GENERALIZED ANXIETY DISORDER: ICD-10-CM

## 2025-02-12 DIAGNOSIS — F33.1 MAJOR DEPRESSIVE DISORDER, RECURRENT EPISODE, MODERATE (H): Primary | ICD-10-CM

## 2025-02-12 ASSESSMENT — ANXIETY QUESTIONNAIRES
3. WORRYING TOO MUCH ABOUT DIFFERENT THINGS: SEVERAL DAYS
GAD7 TOTAL SCORE: 8
GAD7 TOTAL SCORE: 8
4. TROUBLE RELAXING: SEVERAL DAYS
2. NOT BEING ABLE TO STOP OR CONTROL WORRYING: MORE THAN HALF THE DAYS
8. IF YOU CHECKED OFF ANY PROBLEMS, HOW DIFFICULT HAVE THESE MADE IT FOR YOU TO DO YOUR WORK, TAKE CARE OF THINGS AT HOME, OR GET ALONG WITH OTHER PEOPLE?: VERY DIFFICULT
IF YOU CHECKED OFF ANY PROBLEMS ON THIS QUESTIONNAIRE, HOW DIFFICULT HAVE THESE PROBLEMS MADE IT FOR YOU TO DO YOUR WORK, TAKE CARE OF THINGS AT HOME, OR GET ALONG WITH OTHER PEOPLE: VERY DIFFICULT
GAD7 TOTAL SCORE: 8
7. FEELING AFRAID AS IF SOMETHING AWFUL MIGHT HAPPEN: SEVERAL DAYS
5. BEING SO RESTLESS THAT IT IS HARD TO SIT STILL: NOT AT ALL
6. BECOMING EASILY ANNOYED OR IRRITABLE: SEVERAL DAYS
1. FEELING NERVOUS, ANXIOUS, OR ON EDGE: MORE THAN HALF THE DAYS
7. FEELING AFRAID AS IF SOMETHING AWFUL MIGHT HAPPEN: SEVERAL DAYS
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
## 2025-02-18 DIAGNOSIS — I25.728 CORONARY ARTERY DISEASE OF AUTOLOGOUS BYPASS GRAFT WITH STABLE ANGINA PECTORIS: ICD-10-CM

## 2025-02-19 RX ORDER — NITROGLYCERIN 0.4 MG/1
0.4 TABLET SUBLINGUAL EVERY 5 MIN PRN
Qty: 25 TABLET | Refills: 0 | Status: SHIPPED | OUTPATIENT
Start: 2025-02-19

## 2025-02-20 ENCOUNTER — NURSE TRIAGE (OUTPATIENT)
Dept: NURSING | Facility: CLINIC | Age: 75
End: 2025-02-20
Payer: MEDICARE

## 2025-02-21 NOTE — TELEPHONE ENCOUNTER
"Nurse Triage SBAR    Is this a 2nd Level Triage? NO    Situation:   Right arm pain    Background:   Pt was moving and lifting today.    Assessment:   Pain in the right front part of axilla that radiates down to elbow and neck.  Pt felt the ache start about 2 hours ago and now it's preventing her from sleeping.  Denies feeling it in her jaw.  Denies chest pain or difficulty breathing.    Protocol Recommended Disposition:   Home Care    Recommendation:   Advised pt to monitor at home.  Care advice given.  Pt verbalized understanding.    Nenita Huber RN, BSN Nurse Triage Advisor 2/20/2025 11:03 PM        Reason for Disposition   Caused by overuse from recent vigorous activity (e.g., sports, lifting, physical work)    Additional Information   Negative: Shock suspected (e.g., cold/pale/clammy skin, too weak to stand, low BP, rapid pulse)   Negative: [1] Similar pain previously AND [2] it was from \"heart attack\"   Negative: [1] Similar pain previously AND [2] it was from \"angina\" AND [3] not relieved by nitroglycerin   Negative: Sounds like a life-threatening emergency to the triager   Negative: Followed an arm injury   Negative: Chest pain   Negative: Pain, redness, or swelling at intravenous (IV) site or along course of vein   Negative: Wound looks infected   Negative: Elbow pain is main symptom   Negative: Wrist pain is main symptom   Negative: Difficulty breathing or unusual sweating (e.g., sweating without exertion)   Negative: [1] Age > 40 AND [2] associated chest or jaw pain AND [3] pain lasts > 5 minutes   Negative: [1] Age > 40 AND [2] no obvious cause AND [3] pain even when not moving the arm  (Exception: Pain is clearly made worse by moving arm or bending neck.)   Negative: [1] SEVERE pain AND [2] not improved 2 hours after pain medicine   Negative: [1] Red area or streak AND [2] fever   Negative: [1] Swollen joint AND [2] fever   Negative: Patient sounds very sick or weak to the triager   Negative: [1] Red area " "or streak AND [2] large (> 2 in. or 5 cm)   Negative: Entire arm is swollen   Negative: [1] Cast on wrist or arm AND [2] now increased pain   Negative: Weakness (i.e., loss of strength) in hand or fingers  (Exception: Not truly weak; hand feels weak because of pain.)   Negative: [1] Arm pains with exertion (e.g., walking) AND [2] pain goes away on resting AND [3] not present now   Negative: [1] Painful rash AND [2] multiple small blisters grouped together (i.e., dermatomal distribution or \"band\" or \"stripe\")   Negative: Looks like a boil, infected sore, deep ulcer or other infected rash (spreading redness, pus)   Negative: [1] Localized rash is very painful AND [2] no fever   Negative: Localized pain, redness or hard lump along vein   Negative: Numbness (i.e., loss of sensation) in hand or fingers   Negative: [1] MODERATE pain (e.g., interferes with normal activities) AND [2] present > 3 days   Negative: Pain is worsened or caused by bending the neck   Negative: [1] MILD pain (e.g., does not interfere with normal activities) AND [2] present > 7 days   Negative: Arm pain is a chronic symptom (recurrent or ongoing AND present > 4 weeks)   Negative: Caused by strained muscle    Protocols used: Arm Pain-A-AH    "

## 2025-02-24 ENCOUNTER — HOSPITAL ENCOUNTER (EMERGENCY)
Facility: CLINIC | Age: 75
Discharge: HOME | End: 2025-02-24
Attending: EMERGENCY MEDICINE | Admitting: EMERGENCY MEDICINE
Payer: MEDICARE

## 2025-02-24 VITALS
HEIGHT: 64 IN | HEART RATE: 91 BPM | OXYGEN SATURATION: 98 % | RESPIRATION RATE: 16 BRPM | BODY MASS INDEX: 30.05 KG/M2 | TEMPERATURE: 98.6 F | DIASTOLIC BLOOD PRESSURE: 68 MMHG | SYSTOLIC BLOOD PRESSURE: 147 MMHG | WEIGHT: 176 LBS

## 2025-02-24 DIAGNOSIS — K08.89 PAIN, DENTAL: ICD-10-CM

## 2025-02-24 LAB
ANION GAP SERPL CALCULATED.3IONS-SCNC: 13 MMOL/L (ref 7–15)
BASOPHILS # BLD AUTO: 0 10E3/UL (ref 0–0.2)
BASOPHILS NFR BLD AUTO: 0 %
BUN SERPL-MCNC: 12.9 MG/DL (ref 8–23)
BURR CELLS BLD QL SMEAR: SLIGHT
CALCIUM SERPL-MCNC: 9 MG/DL (ref 8.8–10.4)
CHLORIDE SERPL-SCNC: 109 MMOL/L (ref 98–107)
CREAT SERPL-MCNC: 1.07 MG/DL (ref 0.51–0.95)
EGFRCR SERPLBLD CKD-EPI 2021: 54 ML/MIN/1.73M2
EOSINOPHIL # BLD AUTO: 0.1 10E3/UL (ref 0–0.7)
EOSINOPHIL NFR BLD AUTO: 2 %
ERYTHROCYTE [DISTWIDTH] IN BLOOD BY AUTOMATED COUNT: 13.5 % (ref 10–15)
GLUCOSE SERPL-MCNC: 89 MG/DL (ref 70–99)
HCO3 SERPL-SCNC: 20 MMOL/L (ref 22–29)
HCT VFR BLD AUTO: 39.2 % (ref 35–47)
HGB BLD-MCNC: 13.9 G/DL (ref 11.7–15.7)
IMM GRANULOCYTES # BLD: 0 10E3/UL
IMM GRANULOCYTES NFR BLD: 0 %
INR PPP: 1.07 (ref 0.85–1.15)
LYMPHOCYTES # BLD AUTO: 0.8 10E3/UL (ref 0.8–5.3)
LYMPHOCYTES NFR BLD AUTO: 11 %
MCH RBC QN AUTO: 31.4 PG (ref 26.5–33)
MCHC RBC AUTO-ENTMCNC: 35.5 G/DL (ref 31.5–36.5)
MCV RBC AUTO: 89 FL (ref 78–100)
MONOCYTES # BLD AUTO: 0.5 10E3/UL (ref 0–1.3)
MONOCYTES NFR BLD AUTO: 7 %
NEUTROPHILS # BLD AUTO: 5.8 10E3/UL (ref 1.6–8.3)
NEUTROPHILS NFR BLD AUTO: 80 %
NRBC # BLD AUTO: 0 10E3/UL
NRBC BLD AUTO-RTO: 0 /100
PLAT MORPH BLD: ABNORMAL
PLATELET # BLD AUTO: 139 10E3/UL (ref 150–450)
POTASSIUM SERPL-SCNC: 4 MMOL/L (ref 3.4–5.3)
RBC # BLD AUTO: 4.43 10E6/UL (ref 3.8–5.2)
RBC MORPH BLD: ABNORMAL
SODIUM SERPL-SCNC: 142 MMOL/L (ref 135–145)
WBC # BLD AUTO: 7.3 10E3/UL (ref 4–11)

## 2025-02-24 PROCEDURE — 82310 ASSAY OF CALCIUM: CPT | Performed by: EMERGENCY MEDICINE

## 2025-02-24 PROCEDURE — 99283 EMERGENCY DEPT VISIT LOW MDM: CPT | Performed by: EMERGENCY MEDICINE

## 2025-02-24 PROCEDURE — 80048 BASIC METABOLIC PNL TOTAL CA: CPT | Performed by: EMERGENCY MEDICINE

## 2025-02-24 PROCEDURE — 85610 PROTHROMBIN TIME: CPT | Performed by: EMERGENCY MEDICINE

## 2025-02-24 PROCEDURE — 250N000013 HC RX MED GY IP 250 OP 250 PS 637: Performed by: EMERGENCY MEDICINE

## 2025-02-24 PROCEDURE — 85025 COMPLETE CBC W/AUTO DIFF WBC: CPT | Performed by: EMERGENCY MEDICINE

## 2025-02-24 PROCEDURE — 85041 AUTOMATED RBC COUNT: CPT | Performed by: EMERGENCY MEDICINE

## 2025-02-24 PROCEDURE — 99284 EMERGENCY DEPT VISIT MOD MDM: CPT | Performed by: EMERGENCY MEDICINE

## 2025-02-24 PROCEDURE — 36415 COLL VENOUS BLD VENIPUNCTURE: CPT | Performed by: EMERGENCY MEDICINE

## 2025-02-24 RX ORDER — OXYCODONE HYDROCHLORIDE 5 MG/1
5 TABLET ORAL ONCE
Status: COMPLETED | OUTPATIENT
Start: 2025-02-24 | End: 2025-02-24

## 2025-02-24 RX ORDER — OXYCODONE HYDROCHLORIDE 5 MG/1
5 TABLET ORAL EVERY 6 HOURS PRN
Qty: 6 TABLET | Refills: 0 | Status: SHIPPED | OUTPATIENT
Start: 2025-02-24 | End: 2025-02-27

## 2025-02-24 RX ADMIN — OXYCODONE HYDROCHLORIDE 5 MG: 5 TABLET ORAL at 21:19

## 2025-02-24 ASSESSMENT — ACTIVITIES OF DAILY LIVING (ADL)
ADLS_ACUITY_SCORE: 55
ADLS_ACUITY_SCORE: 55

## 2025-02-24 ASSESSMENT — COLUMBIA-SUICIDE SEVERITY RATING SCALE - C-SSRS
2. HAVE YOU ACTUALLY HAD ANY THOUGHTS OF KILLING YOURSELF IN THE PAST MONTH?: NO
6. HAVE YOU EVER DONE ANYTHING, STARTED TO DO ANYTHING, OR PREPARED TO DO ANYTHING TO END YOUR LIFE?: NO
1. IN THE PAST MONTH, HAVE YOU WISHED YOU WERE DEAD OR WISHED YOU COULD GO TO SLEEP AND NOT WAKE UP?: NO

## 2025-02-25 NOTE — ED PROVIDER NOTES
ED Provider Note  [facility]      History     Chief Complaint   Patient presents with    Dental Pain     HPI  Gem Gama is a 74 year old female with a history of of CHF, CAD, HTN, HLD, CKD stage 3, asthma, esophageal dysfunction, who presents to the emergency department for dental pain.  Earlier today she had 7 teeth extracted from her lower jaw in preparation for dentures.  No known infection.  Her surgery was from 11:50.  After that she noted ongoing bleeding from the extraction sites and was changing a gauze every 10 to 15 minutes for a few hours.  She took a nap at home and when she woke up still felt discomfort and some swelling at the site.  She called on-call dentistry and subsequently presented to the emergency department.  She is able to tolerate her secretions without difficulty.  She denies any shortness of breath.  She is not anticoagulated.    Past Medical History  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     Chronic kidney disease     Clostridium difficile infection 09/04/2022    Congestive heart failure (H) 2019    Right after STEMI, improved Fall 2019    Coronary artery disease involving native coronary artery of native heart without angina pectoris     Depression     Depressive disorder 1970s    Fever and chills 09/24/2021    Gastro-oesophageal reflux disease     Hypertension     Ischemic cardiomyopathy 2019    Major depressive disorder, recurrent episode, moderate (H) 07/25/2013    Narcolepsy     Pituitary microadenoma (H) 2011    MRI 2011- There is a triangular-shaped area with delayed contrast enhancement at the left lateral portion of the pituitary gland posteriorly, measuring  2.5 x 4.3 mm. This is suggestive of a microadenoma, MRI 10/1/22 - Normal pituitary gland and whole brain MRI.    Pyelonephritis 11/01/2022    Pyelonephritis of right kidney 10/24/2021    S/P hip replacement 2004    Bilateral fall 2004 due to OA    Sepsis, due to unspecified organism, unspecified whether acute organ dysfunction present (H) 11/01/2022    Status post total knee replacement 12/29/2014    Urinary tract infection with hematuria, site unspecified 09/24/2021     Past Surgical History:   Procedure Laterality Date    ABDOMEN SURGERY  1974, 1996    Kidney reconstruct. Uterer stenosis    ARTHROPLASTY KNEE  07/09/2014    Procedure: ARTHROPLASTY KNEE;  Surgeon: Levi Johnson MD;  Location:  OR    ARTHROPLASTY KNEE Left 11/24/2014    Procedure: ARTHROPLASTY KNEE;  Surgeon: Levi Johnson MD;  Location:  OR    COLONOSCOPY      Several times dates ??    CV CORONARY ANGIOGRAM N/A 10/09/2019    Procedure: Coronary Angiogram;  Surgeon: Chiquita Chatterjee MD;  Location:  HEART CARDIAC CATH LAB    CV HEART CATHETERIZATION WITH POSSIBLE INTERVENTION N/A 10/10/2019    Procedure: Heart Catheterization with Possible Intervention;  Surgeon: Chin Garcia MD;  Location:  HEART CARDIAC CATH LAB    CV INTRA AORTIC BALLOON N/A 10/09/2019    Procedure: Intra-Aortic Balloon Pump Insertion;  Surgeon: Chiquita Chatterjee MD;  Location:  HEART CARDIAC CATH LAB    CV INTRA AORTIC BALLOON REMOVAL N/A 10/10/2019    Procedure: Intra-Aortic Balloon Pump Removal;  Surgeon: Chin Garcia MD;  Location:  HEART CARDIAC CATH LAB    CV LEFT HEART CATH N/A 12/11/2020    Procedure: Heart Catheterization with Possible Intervention;  Surgeon: Pilo Otoole MD;  Location:  HEART CARDIAC CATH LAB    CV PCI STENT DRUG ELUTING N/A 10/09/2019    Procedure: PCI Stent Drug Eluting;  Surgeon: Chiquita Chatterjee MD;  Location:  HEART CARDIAC CATH LAB    DAVINCI LAPAROSCOPIC CHOLECYSTECTOMY WITHOUT GRAMS N/A  11/8/2023    Procedure: CHOLECYSTECTOMY, ROBOT-ASSISTED, LAPAROSCOPIC, WITHOUT CHOLANGIOGRAM;  Surgeon: Mickey Gallego MD;  Location: [facility] OR    EP LOOP RECORDER IMPLANT N/A 11/22/2023    Procedure: Loop Recorder Implant;  Surgeon: Nadeem Jason MD;  Location:  HEART CARDIAC CATH LAB    EYE SURGERY  2011    Cataract surgery both eyes    GENITOURINARY SURGERY  01/01/2008    Prolift    GENITOURINARY SURGERY  1973    In 1973, she had surgery 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    ORTHOPEDIC SURGERY      bilat total hip    RECTOCELE REPAIR      ZZC TOTAL ABD HYSTERECTOMY+BLAD REPR  01/01/1992    Vaginal approach with oophrectomy    ZZC TOTAL KNEE ARTHROPLASTY       albuterol (PROVENTIL HFA) 108 (90 Base) MCG/ACT inhaler  aspirin (ASA) 81 MG EC tablet  atorvastatin (LIPITOR) 40 MG tablet  bisacodyl (DULCOLAX) 5 MG EC tablet  blood glucose (NO BRAND SPECIFIED) test strip  blood glucose monitoring (NO BRAND SPECIFIED) meter device kit  diazepam (VALIUM) 2 MG tablet  diclofenac (VOLTAREN) 1 % topical gel  doxycycline monohydrate (MONODOX) 100 MG capsule  estradiol (ESTRING) 7.5 MCG/24HR vaginal ring  fluticasone-vilanterol (BREO ELLIPTA) 200-25 MCG/ACT inhaler  isosorbide mononitrate (IMDUR) 30 MG 24 hr tablet  loratadine (CLARITIN) 10 MG tablet  Melatonin 10 MG TABS tablet  nebivolol (BYSTOLIC) 5 MG tablet  nitroGLYcerin (NITROSTAT) 0.4 MG sublingual tablet  pantoprazole (PROTONIX) 40 MG EC tablet  polyethylene glycol (GOLYTELY) 236 g suspension  Prenatal Multivit-Min-Fe-FA (PRENATAL/IRON) TABS  saccharomyces boulardii (FLORASTOR) 250 MG capsule  sertraline (ZOLOFT) 100 MG tablet  UNABLE TO FIND  valsartan (DIOVAN) 40 MG tablet  vitamin D3 (CHOLECALCIFEROL) 2000 units (50 mcg) tablet  zinc gluconate 50 MG  tablet      Allergies   Allergen Reactions    Dust Mites Cough, Headache, Other (See Comments) and Shortness Of Breath    Latex Other (See Comments) and Shortness Of Breath     Runny nose  PN: LW Reaction: DIFFICULTY BREATHING      Sulfa Antibiotics Anaphylaxis, Hives and Itching     PN: LW Reaction: HIVES, Swelling  PN: LW Reaction: HIVES, Swelling    Flagyl [Metronidazole Hcl] Anaphylaxis and Rash    Food      PN: LW FI1: nka LW FI2:    Hydrochlorothiazide W-Triamterene      PN: LW Reaction: skin rash  PN: LW Reaction: skin rash    No Clinical Screening - See Comments      PN: LW Other1: -Adhesive Tape    Seasonal Allergies      sneezing    Tape [Adhesive Tape] Hives    Metoprolol Itching and Rash    Metronidazole Hives and Rash     PN: LW Reaction: HIVES       Family History  Family History   Problem Relation Age of Onset    Hypertension Mother     Arthritis Mother     Cerebrovascular Disease Mother     Anesthesia Reaction Mother         Halucinates    Venous thrombosis Father     Cerebrovascular Disease Father         Three Strokes    Diabetes Father         Type 2, after his 60's    Kidney Disease Father     Hypertension Father     Hyperlipidemia Father     Osteoporosis Father     Thyroid Disease Sister         Low thyroid    Anesthesia Reaction Sister         Hallucinates    Diabetes Brother         After recovered from aspiration pneumonia, developed diabetes from high carb diet from feeding tube    Birth defects Brother     Osteoporosis Maternal Grandmother         Old age lived to 90    Coronary Artery Disease Maternal Grandfather         Strokes, lived to 105    Substance Abuse Maternal Grandfather         Alcohol    Osteoporosis Maternal Grandfather         Old age    Coronary Artery Disease Paternal Grandmother     Osteoporosis Paternal Grandmother         Long term issue for her    Obesity Paternal Grandmother     Coronary Artery Disease Paternal Grandfather     Sjogren's Daughter     Diabetes Daughter   "       Diagnosed after stroke    Cerebrovascular Disease Daughter     Hypertension Daughter         With diabetes and stroke    Hyperlipidemia Daughter     Depression Daughter         Does not acknowledge    Anxiety Disorder Daughter     Asthma Daughter         Anxiety, air quality, and exercise triggered    Obesity Daughter     Diabetes Daughter         Gestational and pre diabetic A1C    Anxiety Disorder Daughter     Asthma Daughter         Excursion induced    Thyroid Disease Daughter     Diabetes Son         On metformin.    Hypertension Son     Obesity Son     Depression Son           ..from job    Mental Illness Other     Substance Abuse Other     Substance Abuse Other     Substance Abuse Other      Social History   Social History     Tobacco Use    Smoking status: Never     Passive exposure: Never    Smokeless tobacco: Never   Vaping Use    Vaping status: Never Used   Substance Use Topics    Alcohol use: No    Drug use: No      A medically appropriate review of systems was performed with pertinent positives and negatives noted in the HPI, and all other systems negative.    Physical Exam   BP: (!) 147/68  Pulse: 91  Temp: 98.6  F (37  C)  Resp: 16  Height: 162.6 cm (5' 4\")  Weight: 79.8 kg (176 lb)  SpO2: 98 %  Physical Exam  General: patient is alert and oriented and in no acute distress   Head: atraumatic and normocephalic   EENT: moist mucus membranes, no trismus, tolerating her secretions without difficulty.  No significant swelling of the tongue or induration of the base of the tongue   neck: supple without induration of the submandibular tissue or fullness of the submandibular tissue  Cardiovascular: regular rate and rhythm  Pulmonary: No respiratory distress   neurological: alert and oriented, moving all extremities symmetrically, gait normal   Skin: warm, dry      ED Course, Procedures, & Data      Procedures            No results found for any visits on 02/24/25.  Medications - No data " to display  Labs Ordered and Resulted from Time of ED Arrival to Time of ED Departure - No data to display  No orders to display          Critical care was not performed.     Medical Decision Making  The patient's presentation was of moderate complexity (an acute complicated injury).    The patient's evaluation involved:  ordering and/or review of 3+ test(s) in this encounter (see separate area of note for details)  discussion of management or test interpretation with another health professional (dentistry)    The patient's management necessitated moderate risk (prescription drug management including medications given in the ED).    Assessment & Plan    Ms. Gama is a 74 year old female with a history of of CHF, CAD, HTN, HLD, CKD stage 3, asthma, esophageal dysfunction, who presents to the emergency department for dental pain.  Hemodynamically stable, afebrile and in no respiratory distress.  She is tolerating her secretions without any difficulty.  I do not appreciate any significant swelling or induration of the submandibular tissue.  Lower suspicion for post operative hematoma.  Hemoglobin is stable at 13.9.  Platelets 139.   Discussed with dentistry and no further work up recommended at this time.  On re-evaluation, still no increased swelling or bleeding.  Will discharge with her daughter to stay with her tonight and go to dental clinic at 8am tomorrow for re-check.  She was given close return precautions and voiced understanding.      I have reviewed the nursing notes. I have reviewed the findings, diagnosis, plan and need for follow up with the patient.    New Prescriptions    OXYCODONE (ROXICODONE) 5 MG TABLET    Take 1 tablet (5 mg) by mouth every 6 hours as needed for pain.       Final diagnoses:   Pain, dental       Veronika Galvez MD  [facility] EMERGENCY DEPARTMENT  2/24/2025     Veronika Galvez MD  02/24/25 [id number]

## 2025-02-25 NOTE — ED NOTES
Pt discharged home by writer and MD. Writer gave pt instructions on AVS and pt is agreeable to plan of care. Aware of appt tomorrow AM.   Pt took meds and tolerated well. PIV removed and pt left amb with good gait to exit with AVS in hand.

## 2025-02-25 NOTE — ED TRIAGE NOTES
"Triage Assessment & Note:    BP (!) 147/68   Pulse 91   Temp 98.6  F (37  C) (Oral)   Resp 16   Ht 1.626 m (5' 4\")   Wt 79.8 kg (176 lb)   LMP  (LMP Unknown)   SpO2 98%   BMI 30.21 kg/m      Patient presents with: PT c/o oral swelling and pain. PT reports she had 8 teeth removed today. PT advises dental clinic advised her to come in and be checked out.     Home Treatments/Remedies: None    Febrile / Afebrile? Afebrile     Duration of C/o:  1 day    Jc Oliveira RN  February 24, 2025       Triage Assessment (Adult)       Row Name 02/24/25 [id number]          Triage Assessment    Airway WDL WDL        Respiratory WDL    Respiratory WDL WDL        Cardiac WDL    Cardiac WDL WDL                     "

## 2025-02-25 NOTE — DISCHARGE INSTRUCTIONS
Please follow up with dentistry tomorrow morning at 8:00am on the 7th floor of [facility].      You may use acetaminophen 1000 mg every 6 hours as needed for pain and oxycodone 5 mg every 6-8 hours sparingly as needed for break through pain.  Do not drive or do other activities that would be dangerous if drowsy as this can make you sleepy.  If you have bleeding, place a gauze pad and apply strong pressure to get bleeding to stop.    If you have worsening symptoms including increased pain, swelling, shortness of breath, difficulty swallowing or other concerns return to the emergency department for re-evaluation.

## 2025-02-26 ENCOUNTER — TELEPHONE (OUTPATIENT)
Dept: GASTROENTEROLOGY | Facility: CLINIC | Age: 75
End: 2025-02-26
Payer: MEDICARE

## 2025-02-26 ENCOUNTER — VIRTUAL VISIT (OUTPATIENT)
Dept: PSYCHOLOGY | Facility: CLINIC | Age: 75
End: 2025-02-26
Payer: MEDICARE

## 2025-02-26 DIAGNOSIS — F41.1 GENERALIZED ANXIETY DISORDER: ICD-10-CM

## 2025-02-26 DIAGNOSIS — F33.0 MAJOR DEPRESSIVE DISORDER, RECURRENT EPISODE, MILD: Primary | ICD-10-CM

## 2025-02-26 DIAGNOSIS — F43.10 POSTTRAUMATIC STRESS DISORDER: ICD-10-CM

## 2025-02-26 ASSESSMENT — ANXIETY QUESTIONNAIRES
4. TROUBLE RELAXING: SEVERAL DAYS
7. FEELING AFRAID AS IF SOMETHING AWFUL MIGHT HAPPEN: SEVERAL DAYS
5. BEING SO RESTLESS THAT IT IS HARD TO SIT STILL: SEVERAL DAYS
IF YOU CHECKED OFF ANY PROBLEMS ON THIS QUESTIONNAIRE, HOW DIFFICULT HAVE THESE PROBLEMS MADE IT FOR YOU TO DO YOUR WORK, TAKE CARE OF THINGS AT HOME, OR GET ALONG WITH OTHER PEOPLE: SOMEWHAT DIFFICULT
IF YOU CHECKED OFF ANY PROBLEMS ON THIS QUESTIONNAIRE, HOW DIFFICULT HAVE THESE PROBLEMS MADE IT FOR YOU TO DO YOUR WORK, TAKE CARE OF THINGS AT HOME, OR GET ALONG WITH OTHER PEOPLE: SOMEWHAT DIFFICULT
1. FEELING NERVOUS, ANXIOUS, OR ON EDGE: SEVERAL DAYS
6. BECOMING EASILY ANNOYED OR IRRITABLE: SEVERAL DAYS
2. NOT BEING ABLE TO STOP OR CONTROL WORRYING: MORE THAN HALF THE DAYS
GAD7 TOTAL SCORE: 8
3. WORRYING TOO MUCH ABOUT DIFFERENT THINGS: SEVERAL DAYS
GAD7 TOTAL SCORE: 8
6. BECOMING EASILY ANNOYED OR IRRITABLE: SEVERAL DAYS
5. BEING SO RESTLESS THAT IT IS HARD TO SIT STILL: SEVERAL DAYS
GAD7 TOTAL SCORE: 8
1. FEELING NERVOUS, ANXIOUS, OR ON EDGE: SEVERAL DAYS
7. FEELING AFRAID AS IF SOMETHING AWFUL MIGHT HAPPEN: SEVERAL DAYS
3. WORRYING TOO MUCH ABOUT DIFFERENT THINGS: SEVERAL DAYS
8. IF YOU CHECKED OFF ANY PROBLEMS, HOW DIFFICULT HAVE THESE MADE IT FOR YOU TO DO YOUR WORK, TAKE CARE OF THINGS AT HOME, OR GET ALONG WITH OTHER PEOPLE?: SOMEWHAT DIFFICULT
2. NOT BEING ABLE TO STOP OR CONTROL WORRYING: MORE THAN HALF THE DAYS
4. TROUBLE RELAXING: SEVERAL DAYS
GAD7 TOTAL SCORE: 8
7. FEELING AFRAID AS IF SOMETHING AWFUL MIGHT HAPPEN: SEVERAL DAYS

## 2025-02-26 ASSESSMENT — PATIENT HEALTH QUESTIONNAIRE - PHQ9
10. IF YOU CHECKED OFF ANY PROBLEMS, HOW DIFFICULT HAVE THESE PROBLEMS MADE IT FOR YOU TO DO YOUR WORK, TAKE CARE OF THINGS AT HOME, OR GET ALONG WITH OTHER PEOPLE: SOMEWHAT DIFFICULT
SUM OF ALL RESPONSES TO PHQ QUESTIONS 1-9: 9
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
## 2025-02-26 NOTE — TELEPHONE ENCOUNTER
Attempted to contact patient in order to complete pre assessment questions.     No answer. Left message to return call to [phone] option 3.    Callback communication sent via General Specific.    Doris Juarez RN

## 2025-02-26 NOTE — TELEPHONE ENCOUNTER
Pre visit planning completed.    Procedure details:    Patient scheduled for Colonoscopy on 03/12/2025.     Arrival time: 1200. Procedure time 1300    Facility location: [facility]; [address], [place] [address]. Check in location: Main entrance at registration desk.  *Disclaimer: Drivers are to check in with patient and stay on campus during procedure.     Sedation type: Conscious sedation     Pre op exam needed? No.    Indication for procedure: Screening    Chart review:     Electronic implanted devices? Yes: Remote Loop Recorder    Recent diagnosis of diverticulitis within the last 6 weeks? No    Medication review:    Diabetic? No    Anticoagulants? No    Weight loss medication/injectable? No GLP-1 medication per patient's medication list. Nursing to verify with pre-assessment call.    Other medication HOLDING recommendations:  N/A    Prep for procedure:     Bowel prep recommendation: Standard Golytely. Bowel prep sent to    [id number] - [place], [place] - [address] AT [facility]  [address]  Please verify if prep needs to be resent.   Due to: CKD noted    Procedure information and instructions sent via Weather Analyticsindira Becker RN  Endoscopy Procedure Pre Assessment   [phone] option 3

## 2025-03-05 ENCOUNTER — VIRTUAL VISIT (OUTPATIENT)
Dept: PSYCHOLOGY | Facility: CLINIC | Age: 75
End: 2025-03-05
Payer: MEDICARE

## 2025-03-05 DIAGNOSIS — F43.10 POSTTRAUMATIC STRESS DISORDER: ICD-10-CM

## 2025-03-05 DIAGNOSIS — F33.1 MAJOR DEPRESSIVE DISORDER, RECURRENT EPISODE, MODERATE (H): Primary | ICD-10-CM

## 2025-03-05 DIAGNOSIS — F41.1 GENERALIZED ANXIETY DISORDER: ICD-10-CM
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
## 2025-03-10 ENCOUNTER — TRANSFERRED RECORDS (OUTPATIENT)
Dept: HEALTH INFORMATION MANAGEMENT | Facility: CLINIC | Age: 75
End: 2025-03-10
Payer: MEDICARE

## 2025-03-12 ENCOUNTER — VIRTUAL VISIT (OUTPATIENT)
Dept: PSYCHOLOGY | Facility: CLINIC | Age: 75
End: 2025-03-12
Payer: MEDICARE

## 2025-03-12 DIAGNOSIS — F33.1 MAJOR DEPRESSIVE DISORDER, RECURRENT EPISODE, MODERATE (H): Primary | ICD-10-CM

## 2025-03-12 DIAGNOSIS — F43.10 POSTTRAUMATIC STRESS DISORDER: ICD-10-CM

## 2025-03-12 DIAGNOSIS — F41.1 GENERALIZED ANXIETY DISORDER: ICD-10-CM

## 2025-03-12 ASSESSMENT — PATIENT HEALTH QUESTIONNAIRE - PHQ9
10. IF YOU CHECKED OFF ANY PROBLEMS, HOW DIFFICULT HAVE THESE PROBLEMS MADE IT FOR YOU TO DO YOUR WORK, TAKE CARE OF THINGS AT HOME, OR GET ALONG WITH OTHER PEOPLE: EXTREMELY DIFFICULT
SUM OF ALL RESPONSES TO PHQ QUESTIONS 1-9: 18
SUM OF ALL RESPONSES TO PHQ QUESTIONS 1-9: 18
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
## 2025-03-14 ASSESSMENT — ASTHMA QUESTIONNAIRES
QUESTION_3 LAST FOUR WEEKS HOW OFTEN DID YOUR ASTHMA SYMPTOMS (WHEEZING, COUGHING, SHORTNESS OF BREATH, CHEST TIGHTNESS OR PAIN) WAKE YOU UP AT NIGHT OR EARLIER THAN USUAL IN THE MORNING: NOT AT ALL
QUESTION_4 LAST FOUR WEEKS HOW OFTEN HAVE YOU USED YOUR RESCUE INHALER OR NEBULIZER MEDICATION (SUCH AS ALBUTEROL): NOT AT ALL
QUESTION_2 LAST FOUR WEEKS HOW OFTEN HAVE YOU HAD SHORTNESS OF BREATH: THREE TO SIX TIMES A WEEK
QUESTION_5 LAST FOUR WEEKS HOW WOULD YOU RATE YOUR ASTHMA CONTROL: WELL CONTROLLED
ACT_TOTALSCORE: 20
QUESTION_1 LAST FOUR WEEKS HOW MUCH OF THE TIME DID YOUR ASTHMA KEEP YOU FROM GETTING AS MUCH DONE AT WORK, SCHOOL OR AT HOME: SOME OF THE TIME
ACT_TOTALSCORE: 20

## 2025-03-14 ASSESSMENT — ANXIETY QUESTIONNAIRES
3. WORRYING TOO MUCH ABOUT DIFFERENT THINGS: MORE THAN HALF THE DAYS
5. BEING SO RESTLESS THAT IT IS HARD TO SIT STILL: NOT AT ALL
6. BECOMING EASILY ANNOYED OR IRRITABLE: NOT AT ALL
1. FEELING NERVOUS, ANXIOUS, OR ON EDGE: SEVERAL DAYS
GAD7 TOTAL SCORE: 5
IF YOU CHECKED OFF ANY PROBLEMS ON THIS QUESTIONNAIRE, HOW DIFFICULT HAVE THESE PROBLEMS MADE IT FOR YOU TO DO YOUR WORK, TAKE CARE OF THINGS AT HOME, OR GET ALONG WITH OTHER PEOPLE: VERY DIFFICULT
7. FEELING AFRAID AS IF SOMETHING AWFUL MIGHT HAPPEN: SEVERAL DAYS
GAD7 TOTAL SCORE: 5
4. TROUBLE RELAXING: NOT AT ALL
8. IF YOU CHECKED OFF ANY PROBLEMS, HOW DIFFICULT HAVE THESE MADE IT FOR YOU TO DO YOUR WORK, TAKE CARE OF THINGS AT HOME, OR GET ALONG WITH OTHER PEOPLE?: VERY DIFFICULT
7. FEELING AFRAID AS IF SOMETHING AWFUL MIGHT HAPPEN: SEVERAL DAYS
2. NOT BEING ABLE TO STOP OR CONTROL WORRYING: SEVERAL DAYS
GAD7 TOTAL SCORE: 5

## 2025-03-17 ENCOUNTER — OFFICE VISIT (OUTPATIENT)
Dept: UROLOGY | Facility: CLINIC | Age: 75
End: 2025-03-17
Payer: MEDICARE

## 2025-03-17 ENCOUNTER — OFFICE VISIT (OUTPATIENT)
Dept: FAMILY MEDICINE | Facility: CLINIC | Age: 75
End: 2025-03-17
Payer: MEDICARE

## 2025-03-17 VITALS
SYSTOLIC BLOOD PRESSURE: 135 MMHG | HEART RATE: 65 BPM | RESPIRATION RATE: 20 BRPM | BODY MASS INDEX: 30.51 KG/M2 | HEIGHT: 63 IN | OXYGEN SATURATION: 98 % | WEIGHT: 172.2 LBS | DIASTOLIC BLOOD PRESSURE: 74 MMHG

## 2025-03-17 DIAGNOSIS — R20.2 PARESTHESIAS IN RIGHT HAND: ICD-10-CM

## 2025-03-17 DIAGNOSIS — N18.32 CKD STAGE G3B/A1, GFR 30-44 AND ALBUMIN CREATININE RATIO <30 MG/G (H): ICD-10-CM

## 2025-03-17 DIAGNOSIS — N95.2 VAGINAL ATROPHY: ICD-10-CM

## 2025-03-17 DIAGNOSIS — F41.1 GAD (GENERALIZED ANXIETY DISORDER): ICD-10-CM

## 2025-03-17 DIAGNOSIS — F33.1 MAJOR DEPRESSIVE DISORDER, RECURRENT EPISODE, MODERATE (H): Primary | ICD-10-CM

## 2025-03-17 DIAGNOSIS — N39.41 URGE INCONTINENCE: Primary | ICD-10-CM

## 2025-03-17 DIAGNOSIS — F43.10 PTSD (POST-TRAUMATIC STRESS DISORDER): ICD-10-CM

## 2025-03-17 DIAGNOSIS — I10 HYPERTENSION GOAL BP (BLOOD PRESSURE) < 130/80: ICD-10-CM

## 2025-03-17 DIAGNOSIS — N39.0 RECURRENT UTI: ICD-10-CM

## 2025-03-17 DIAGNOSIS — I25.119 CORONARY ARTERY DISEASE WITH ANGINA PECTORIS, UNSPECIFIED VESSEL OR LESION TYPE, UNSPECIFIED WHETHER NATIVE OR TRANSPLANTED HEART: ICD-10-CM

## 2025-03-17 DIAGNOSIS — R39.15 URINARY URGENCY: ICD-10-CM

## 2025-03-17 DIAGNOSIS — R15.9 INCONTINENCE OF FECES, UNSPECIFIED FECAL INCONTINENCE TYPE: ICD-10-CM

## 2025-03-17 DIAGNOSIS — I47.29 PAROXYSMAL VENTRICULAR TACHYCARDIA (H): ICD-10-CM

## 2025-03-17 PROBLEM — I50.32 CHRONIC DIASTOLIC HEART FAILURE (H): Status: RESOLVED | Noted: 2024-06-17 | Resolved: 2025-03-17

## 2025-03-17 PROBLEM — F33.0 MAJOR DEPRESSIVE DISORDER, RECURRENT EPISODE, MILD: Status: RESOLVED | Noted: 2024-04-24 | Resolved: 2025-03-17

## 2025-03-17 PROBLEM — F43.22 ADJUSTMENT DISORDER WITH ANXIETY: Status: RESOLVED | Noted: 2023-12-21 | Resolved: 2025-03-17

## 2025-03-17 PROCEDURE — 96127 BRIEF EMOTIONAL/BEHAV ASSMT: CPT | Performed by: INTERNAL MEDICINE

## 2025-03-17 PROCEDURE — 1125F AMNT PAIN NOTED PAIN PRSNT: CPT | Performed by: INTERNAL MEDICINE

## 2025-03-17 PROCEDURE — 3078F DIAST BP <80 MM HG: CPT | Performed by: INTERNAL MEDICINE

## 2025-03-17 PROCEDURE — 99214 OFFICE O/P EST MOD 30 MIN: CPT | Performed by: PHYSICIAN ASSISTANT

## 2025-03-17 PROCEDURE — 3075F SYST BP GE 130 - 139MM HG: CPT | Performed by: INTERNAL MEDICINE

## 2025-03-17 PROCEDURE — 99214 OFFICE O/P EST MOD 30 MIN: CPT | Performed by: INTERNAL MEDICINE

## 2025-03-17 RX ORDER — NEBIVOLOL 5 MG/1
5 TABLET ORAL EVERY EVENING
Qty: 90 TABLET | Refills: 3 | Status: SHIPPED | OUTPATIENT
Start: 2025-03-17

## 2025-03-17 RX ORDER — SERTRALINE HYDROCHLORIDE 100 MG/1
100 TABLET, FILM COATED ORAL DAILY
Qty: 90 TABLET | Refills: 3 | Status: SHIPPED | OUTPATIENT
Start: 2025-03-17

## 2025-03-17 RX ORDER — UBIDECARENONE 30 MG
1 CAPSULE ORAL DAILY
COMMUNITY

## 2025-03-17 RX ORDER — ESTRADIOL 2 MG/1
1 RING VAGINAL
Qty: 1 EACH | Refills: 3 | Status: SHIPPED | OUTPATIENT
Start: 2025-03-17

## 2025-03-17 ASSESSMENT — PAIN SCALES - GENERAL: PAINLEVEL_OUTOF10: MILD PAIN (2)

## 2025-03-17 NOTE — PROGRESS NOTES
"Mental Health Follow-up:  Patient presents to follow-up on Depression & Anxiety.Patient's depression since last visit has been:  Worse  The patient is not having other symptoms associated with depression.  Patient's anxiety since last visit has been:  Worse  The patient is not having other symptoms associated with anxiety.  Any significant life events: health concerns and other  Patient is feeling anxious or having panic attacks.  Patient has no concerns about alcohol or drug use.    Hyperlipidemia:  She presents for follow up of hyperlipidemia.   She is taking medication to lower cholesterol. She is having myalgia or other side effects to statin medications.    Hypertension: She presents for follow up of hypertension.  She does check blood pressure  regularly outside of the clinic. Outpatient blood pressures have not been over 140/90. She follows a low salt diet.     Headaches:   Since the patient's last clinic visit, headaches are: no change  The patient is getting headaches:  Every couple days  She is not able to do normal daily activities when she has a migraine.  The patient is taking the following rescue/relief medications:  Tylenol   Patient states \"The relief is inconsistent\" from the rescue/relief medications.   The patient is taking the following medications to prevent migraines:  No medications to prevent migraines  In the past 4 weeks, the patient has gone to an Urgent Care or Emergency Room 0 times times due to headaches.    Vascular Disease:  She presents for follow up of vascular disease.      She takes daily aspirin.    She eats 4 or more servings of fruits and vegetables daily.She consumes 0 sweetened beverage(s) daily.She exercises with enough effort to increase her heart rate 10 to 19 minutes per day.  She exercises with enough effort to increase her heart rate 5 days per week. She is missing 1 dose(s) of medications per week.  She is not taking prescribed medications regularly due to remembering " to take.  Patient having more frequent tingling sensation of both hands.   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    Possible PLMD (periodic limb movement disorder)    Generalized anxiety disorder    Closed compression fracture of L3 lumbar vertebra, sequela    Bilateral hydronephrosis    Posttraumatic stress disorder    Paroxysmal ventricular tachycardia (H)    Bile salt-induced diarrhea    History of colonic polyps    Major depressive disorder, recurrent episode, mild    CKD stage 3a, GFR 45-59 ml/min (H)    Major depressive disorder, recurrent episode, moderate (H)    Paresthesias in right hand              Review of Systems:     CONSTITUTIONAL: NEGATIVE for fever, chills, change in weight  INTEGUMENTARY/SKIN: NEGATIVE for worrisome rashes, moles or lesions  EYES: NEGATIVE for vision changes or irritation  ENT/MOUTH: NEGATIVE for ear, mouth and throat problems  RESP: NEGATIVE for significant cough or SOB  BREAST: NEGATIVE for masses, tenderness or discharge  CV: As above.  GI: NEGATIVE for nausea, abdominal pain, heartburn, or change in bowel habits  : NEGATIVE for frequency, dysuria, or hematuria  MUSCULOSKELETAL: NEGATIVE for significant arthralgias or myalgia  NEURO: POSITIVE for right hand paresthesia.  ENDOCRINE: NEGATIVE for temperature intolerance, skin/hair changes  HEME: NEGATIVE for bleeding problems  PSYCHIATRIC: As above.             Physical Exam:   /74 (BP Location: Right arm, Patient Position: Sitting, Cuff Size: Adult Regular)   Pulse  "65   Resp 20   Ht 1.602 m (5' 3.09\")   Wt 78.1 kg (172 lb 3.2 oz)   LMP  (LMP Unknown)   SpO2 98%   BMI 30.42 kg/m     Constitutional:   fatigued, cooperative, and appears stated age     Eyes:   extra-ocular muscles intact and sclera clear     ENT:   normocephalic, without obvious abnormality     Lungs:   no increased work of breathing, no retractions, and clear to auscultation     Cardiovascular:   regular rate and rhythm     Musculoskeletal:   no lower extremity pitting edema present     Neuropsychiatric:   Affect: normal           Data:   Epic reviewed.     Disposition:  Follow-up in 4 weeks or as needed.    Danny Conteh MD  Internal Medicine  [facility] Team   "

## 2025-03-17 NOTE — NURSING NOTE
Gem Gama's goals for this visit include:   Chief Complaint   Patient presents with    RECHECK     estring placement       She requests these members of her care team be copied on today's visit information:       PCP: Danny Conteh    Referring Provider:  Referred Self, MD  No address on file    LMP  (LMP Unknown)     Do you need any medication refills at today's visit?     Crystla Salgado LPN on 3/17/2025 at 9:14 AM

## 2025-03-17 NOTE — PROGRESS NOTES
Urology Clinic      Name: Gem Gama    MRN: [id number]   YOB: 1950  Accompanied at today's visit by:self                 Chief Complaint:   Estring exchange          History of Present Illness:   December 16, 2024    HISTORY:   We have been following 73 year old Gem Gama for Vaginal atrophy, hx of UTI with hx of pyelonephritis (stable with estring), UUI, voiding dysfunction. Hx of urethral sling in 1990s. Has been to PFPT in the past which was somewhat helpful for her leakage of urine. Has failed multiple antimuscarinics. Discontinued myrbetriq for interaction with her beta blocker. Fecal incontinence is much better since she had her cholecystectomy. Was seen by Dr. Rodriguez on 7/18/24 discussed third line options for UUI and patient was not interested in options at that time.as she is  planing to decide on a third line option once her daughter gets placement to a living facility. Her daughter has since had placement at a facility but patient has not decided on a third line option. She has been considering going back to PFPT however in the future. Last seen for estring exchange on 12/16/24 and doing well. Continues to be UTI free. Here today for estring exchange. Patient voices no other concerns at this time.            Allergies:     Allergies   Allergen Reactions    Dust Mites Cough, Headache, Other (See Comments) and Shortness Of Breath    Latex Other (See Comments) and Shortness Of Breath     Runny nose  PN: LW Reaction: DIFFICULTY BREATHING      Sulfa Antibiotics Anaphylaxis, Hives and Itching     PN: LW Reaction: HIVES, Swelling  PN: LW Reaction: HIVES, Swelling    Flagyl [Metronidazole Hcl] Anaphylaxis and Rash    Food      PN: LW FI1: nka LW FI2:    Hydrochlorothiazide W-Triamterene      PN: LW Reaction: skin rash  PN: LW Reaction: skin rash    No Clinical Screening - See Comments      PN: LW Other1: -Adhesive Tape    Seasonal Allergies      sneezing    Tape [Adhesive Tape] Hives     Metoprolol Itching and Rash    Metronidazole Hives and Rash     PN: LW Reaction: HIVES              Medications:     Current Outpatient Medications   Medication Sig Dispense Refill    albuterol (PROVENTIL HFA) 108 (90 Base) MCG/ACT inhaler Inhale 2 puffs into the lungs every 6 hours 8.5 g 0    aspirin (ASA) 81 MG EC tablet Take 1 tablet (81 mg) by mouth daily 90 tablet 0    atorvastatin (LIPITOR) 40 MG tablet Take 1 tablet (40 mg) by mouth daily. 90 tablet 3    bisacodyl (DULCOLAX) 5 MG EC tablet Take 2 tablets at 3 pm the day before your procedure. If your procedure is before 11 am, take 2 additional tablets at 11 pm. If your procedure is after 11 am, take 2 additional tablets at 6 am. For additional instructions refer to your colonoscopy prep instructions. 4 tablet 0    blood glucose (NO BRAND SPECIFIED) test strip Use to test blood sugar once a day. 100 strip 3    blood glucose monitoring (NO BRAND SPECIFIED) meter device kit Use to test blood sugar once a day. 1 kit 0    diazepam (VALIUM) 2 MG tablet Take 0.5-1 tablets (1-2 mg) by mouth 2 times daily as needed for anxiety or muscle spasms. 30 tablet 0    diclofenac (VOLTAREN) 1 % topical gel Apply topically 4 times daily as needed for moderate pain For Jaw pain      estradiol (ESTRING) 7.5 MCG/24HR vaginal ring Place 1 each vaginally every 3 months 1 each 3    fluticasone-vilanterol (BREO ELLIPTA) 200-25 MCG/ACT inhaler Inhale 1 puff into the lungs daily 3 each 3    isosorbide mononitrate (IMDUR) 30 MG 24 hr tablet Take 1 tablet (30 mg) by mouth every evening. 90 tablet 3    loratadine (CLARITIN) 10 MG tablet Take 10 mg by mouth At Bedtime      Melatonin 10 MG TABS tablet Take 10 mg by mouth nightly as needed for sleep      nebivolol (BYSTOLIC) 5 MG tablet TAKE 1 TABLET(5 MG) BY MOUTH EVERY EVENING 90 tablet 2    nitroGLYcerin (NITROSTAT) 0.4 MG sublingual tablet PLACE 1 TABLET UNDER THE TONGUE EVERY 5 MINUTES AS NEEDED FOR CHEST PAIN 25 tablet 0    pantoprazole  (PROTONIX) 40 MG EC tablet Take 1 tablet (40 mg) by mouth daily. 90 tablet 3    polyethylene glycol (GOLYTELY) 236 g suspension The night before the exam at 6 pm drink an 8-ounce glass every 15 minutes until the jug is half empty. If you arrive before 11 AM: Drink the other half of the Golytely jug at 11 PM night before procedure. If you arrive after 11 AM: Drink the other half of the Golytely jug at 6 AM day of procedure. For additional instructions refer to your colonoscopy prep instructions. 4000 mL 0    Prenatal Multivit-Min-Fe-FA (PRENATAL/IRON) TABS Take 1 tablet by mouth every morning      saccharomyces boulardii (FLORASTOR) 250 MG capsule Take 1 capsule (250 mg) by mouth 2 times daily 14 capsule 0    sertraline (ZOLOFT) 100 MG tablet Take 1 tablet (100 mg) by mouth daily. 90 tablet 3    UNABLE TO FIND Take 3 tablets by mouth daily MEDICATION NAME: Uqora complete regimen  1 TAB, AM - 2 TAB PM      valsartan (DIOVAN) 40 MG tablet TAKE 1 TABLET(40 MG) BY MOUTH EVERY MORNING 90 tablet 1    vitamin D3 (CHOLECALCIFEROL) 2000 units (50 mcg) tablet Take 1 tablet (2,000 Units) by mouth daily 90 tablet 3    zinc gluconate 50 MG tablet Take 50 mg by mouth every morning      doxycycline monohydrate (MONODOX) 100 MG capsule Take 1 capsule (100 mg) by mouth 2 times daily. 20 capsule 11     No current facility-administered medications for this visit.            Past  Surgical History:     Past Surgical History:   Procedure Laterality Date    ABDOMEN SURGERY  1974, 1996    Kidney reconstruct. Uterer stenosis    ARTHROPLASTY KNEE  07/09/2014    Procedure: ARTHROPLASTY KNEE;  Surgeon: Levi Johnson MD;  Location:  OR    ARTHROPLASTY KNEE Left 11/24/2014    Procedure: ARTHROPLASTY KNEE;  Surgeon: Levi Johnson MD;  Location:  OR    COLONOSCOPY      Several times dates ??    CV CORONARY ANGIOGRAM N/A 10/09/2019    Procedure: Coronary Angiogram;  Surgeon: Chiquita Chatterjee MD;  Location:  HEART CARDIAC CATH LAB     CV HEART CATHETERIZATION WITH POSSIBLE INTERVENTION N/A 10/10/2019    Procedure: Heart Catheterization with Possible Intervention;  Surgeon: Chin Garcia MD;  Location:  HEART CARDIAC CATH LAB    CV INTRA AORTIC BALLOON N/A 10/09/2019    Procedure: Intra-Aortic Balloon Pump Insertion;  Surgeon: Chiquita Chatterjee MD;  Location:  HEART CARDIAC CATH LAB    CV INTRA AORTIC BALLOON REMOVAL N/A 10/10/2019    Procedure: Intra-Aortic Balloon Pump Removal;  Surgeon: Chin Garcia MD;  Location:  HEART CARDIAC CATH LAB    CV LEFT HEART CATH N/A 12/11/2020    Procedure: Heart Catheterization with Possible Intervention;  Surgeon: Pilo Otoole MD;  Location:  HEART CARDIAC CATH LAB    CV PCI STENT DRUG ELUTING N/A 10/09/2019    Procedure: PCI Stent Drug Eluting;  Surgeon: Chiquita Chatterjee MD;  Location:  HEART CARDIAC CATH LAB    DAVINCI LAPAROSCOPIC CHOLECYSTECTOMY WITHOUT GRAMS N/A 11/8/2023    Procedure: CHOLECYSTECTOMY, ROBOT-ASSISTED, LAPAROSCOPIC, WITHOUT CHOLANGIOGRAM;  Surgeon: Mickey Gallego MD;  [place] [facility] [place]    [place] LOOP RECORDER IMPLANT N/A 11/22/2023    Procedure: Loop Recorder Implant;  Surgeon: Nadeem Jason MD;  Location:  HEART CARDIAC CATH LAB    EYE SURGERY  2011    Cataract surgery both eyes    GENITOURINARY SURGERY  01/01/2008    Prolift    GENITOURINARY SURGERY  1973    In 1973, 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    ORTHOPEDIC SURGERY      bilat total hip    RECTOCELE REPAIR      ZZC TOTAL ABD HYSTERECTOMY+BLAD REPR  01/01/1992    Vaginal approach with oophrectomy    Z TOTAL KNEE ARTHROPLASTY               Physical Exam:   There were no vitals filed for this visit.  PSYCH: NAD  EYES: EOMI  NEURO: AAO x3  : vulva unremarkable.   Vaginal mucosa atrophic. Estring removed without issues. No abnormal discharge or vaginal pain/bleeding, ulcers noted on speculum exam. New estring placed vaginally without issues.     LABS:   Creatinine   Date Value Ref Range Status   02/24/2025 1.07 (H) 0.51 - 0.95 mg/dL Final   12/17/2020 1.00 0.52 - 1.04 mg/dL Final     Narrative & Impression   XR CYSTOGRAM VOIDING 6/29/2023 2:43 PM     HISTORY: please evaluate for VUR (bilateral flank pain with some  hydro); Recurrent UTI; History of pyeloplasty; History of pyeloplasty;  Bilateral hydronephrosis      COMPARISON: CT abdomen and pelvis 6/15/2023     FLUOROSCOPY TIME: 3.6 minutes     FINDINGS: The  film demonstrates a nonobstructive bowel gas  pattern. Moderate colonic stool. Bilateral hip prostheses. Surgical  clip overlying the right midabdomen.     After bladder catheterization with aseptic precautions using an 8  Amharic feeding tube, a voiding cystourethrogram was performed with 600  cc of Isovue 250. A single cycle of filling and voiding were observed.  The bladder is normal in shape and position. There is no reflux. The  urethra is faintly opacified and appears grossly normal. Small post  void residual                                                                      IMPRESSION: No vesico ureteric reflux demonstrated.          Assessment and Plan:   73 year old is a pleasant female who has  Vaginal atrophy, hx of UTI (stable with estring), UUI, voiding dysfunction.        Plan:  -  continue estringe exchanges every 3 months. Estring renewed today  -  Patient is considering going back to PFPT in the future before deciding on a third line option.  Will notify our team when has wants PT referral  - contact clinic if decides on a third line option. Of note, Dr. Rodriguez has mentioned possible botox in 8/31/23 encounter.   - contact clinic if develops s/s of UTI in the future.   - After discussing the assessment and plan with patient, patient verbalizes  understanding and agrees to the above plan. All questions answered.     8 minutes spent on the date of the encounter doing chart review, review of labs, exam/estring exchange, review of test results, interpretation of tests, patient visit and documentation.      Fadumo Hays PA-C  Urology  March 17, 2025        Patient Care Team:  Danny Conteh MD as PCP - General (Internal Medicine)  Danny Conteh MD as Assigned PCP  Evangelist Lee MD as Hospitalist (Internal Medicine)  Mel Rodriguez MD as MD (Urology)  Kashif Hadley MD as Assigned Sleep Provider  Agus Segura MD as MD (Surgery)  Mickey Gallego MD as MD (Surgery)  Ramya Keller MD as MD (Otolaryngology)  Boyd Saunders AuD (Audiology)  Ramya Keller MD as MD (Otolaryngology)  Nadeem Jason MD as Assigned Heart and Vascular Provider  Toña Melvin PA-C as Assigned Cancer Care Provider  Luis Fairbanks LICSW as Assigned Behavioral Health Provider  Hortencia Joel PA-C as Physician Assistant (Dermatology)  Fadumo Hays PA-C as Assigned Surgical Provider  SELF, REFERRED

## 2025-03-19 ENCOUNTER — VIRTUAL VISIT (OUTPATIENT)
Dept: PSYCHOLOGY | Facility: CLINIC | Age: 75
End: 2025-03-19
Payer: MEDICARE

## 2025-03-19 DIAGNOSIS — F41.1 GENERALIZED ANXIETY DISORDER: ICD-10-CM

## 2025-03-19 DIAGNOSIS — F33.0 MAJOR DEPRESSIVE DISORDER, RECURRENT EPISODE, MILD: Primary | ICD-10-CM

## 2025-03-19 DIAGNOSIS — F43.10 POSTTRAUMATIC STRESS DISORDER: ICD-10-CM
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
## 2025-03-23 ENCOUNTER — HEALTH MAINTENANCE LETTER (OUTPATIENT)
Age: 75
End: 2025-03-23

## 2025-04-01 DIAGNOSIS — I25.728 CORONARY ARTERY DISEASE OF AUTOLOGOUS BYPASS GRAFT WITH STABLE ANGINA PECTORIS: ICD-10-CM

## 2025-04-02 RX ORDER — NITROGLYCERIN 0.4 MG/1
0.4 TABLET SUBLINGUAL EVERY 5 MIN PRN
Qty: 25 TABLET | Refills: 11 | Status: SHIPPED | OUTPATIENT
Start: 2025-04-02

## 2025-04-10 ENCOUNTER — VIRTUAL VISIT (OUTPATIENT)
Dept: PSYCHOLOGY | Facility: CLINIC | Age: 75
End: 2025-04-10
Payer: MEDICARE

## 2025-04-10 DIAGNOSIS — F33.0 MAJOR DEPRESSIVE DISORDER, RECURRENT EPISODE, MILD: Primary | ICD-10-CM

## 2025-04-10 DIAGNOSIS — F41.1 GENERALIZED ANXIETY DISORDER: ICD-10-CM

## 2025-04-10 ASSESSMENT — ANXIETY QUESTIONNAIRES
5. BEING SO RESTLESS THAT IT IS HARD TO SIT STILL: NOT AT ALL
3. WORRYING TOO MUCH ABOUT DIFFERENT THINGS: SEVERAL DAYS
7. FEELING AFRAID AS IF SOMETHING AWFUL MIGHT HAPPEN: NOT AT ALL
1. FEELING NERVOUS, ANXIOUS, OR ON EDGE: NOT AT ALL
IF YOU CHECKED OFF ANY PROBLEMS ON THIS QUESTIONNAIRE, HOW DIFFICULT HAVE THESE PROBLEMS MADE IT FOR YOU TO DO YOUR WORK, TAKE CARE OF THINGS AT HOME, OR GET ALONG WITH OTHER PEOPLE: SOMEWHAT DIFFICULT
GAD7 TOTAL SCORE: 4
GAD7 TOTAL SCORE: 4
6. BECOMING EASILY ANNOYED OR IRRITABLE: SEVERAL DAYS
2. NOT BEING ABLE TO STOP OR CONTROL WORRYING: SEVERAL DAYS

## 2025-04-10 ASSESSMENT — PATIENT HEALTH QUESTIONNAIRE - PHQ9
5. POOR APPETITE OR OVEREATING: SEVERAL DAYS
SUM OF ALL RESPONSES TO PHQ QUESTIONS 1-9: 6
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
## 2025-04-15 ENCOUNTER — ANCILLARY PROCEDURE (OUTPATIENT)
Dept: CARDIOLOGY | Facility: CLINIC | Age: 75
End: 2025-04-15
Attending: INTERNAL MEDICINE
Payer: MEDICARE

## 2025-04-15 DIAGNOSIS — Z95.818 IMPLANTABLE LOOP RECORDER PRESENT: ICD-10-CM

## 2025-04-15 PROCEDURE — 93298 REM INTERROG DEV EVAL SCRMS: CPT

## 2025-04-15 PROCEDURE — 93298 REM INTERROG DEV EVAL SCRMS: CPT | Mod: 26 | Performed by: INTERNAL MEDICINE

## 2025-04-16 LAB
MDC_IDC_MSMT_BATTERY_DTM: NORMAL
MDC_IDC_PG_IMPLANT_DTM: NORMAL
MDC_IDC_PG_MFG: NORMAL
MDC_IDC_PG_MODEL: NORMAL
MDC_IDC_PG_SERIAL: NORMAL
MDC_IDC_PG_TYPE: NORMAL
MDC_IDC_SESS_CLINIC_NAME: NORMAL
MDC_IDC_SESS_DTM: NORMAL
MDC_IDC_SESS_TYPE: NORMAL
MDC_IDC_STAT_AT_BURDEN_PERCENT: 0 %
MDC_IDC_STAT_EPISODE_RECENT_COUNT: 0
MDC_IDC_STAT_EPISODE_RECENT_COUNT_DTM_END: NORMAL
MDC_IDC_STAT_EPISODE_RECENT_COUNT_DTM_START: NORMAL
MDC_IDC_STAT_EPISODE_TYPE: NORMAL

## 2025-04-30 ENCOUNTER — VIRTUAL VISIT (OUTPATIENT)
Dept: PSYCHOLOGY | Facility: CLINIC | Age: 75
End: 2025-04-30
Payer: MEDICARE

## 2025-04-30 DIAGNOSIS — F41.1 GENERALIZED ANXIETY DISORDER: ICD-10-CM

## 2025-04-30 DIAGNOSIS — F33.0 MAJOR DEPRESSIVE DISORDER, RECURRENT EPISODE, MILD: Primary | ICD-10-CM

## 2025-04-30 DIAGNOSIS — F43.10 POSTTRAUMATIC STRESS DISORDER: ICD-10-CM
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
## 2025-05-03 DIAGNOSIS — I10 HYPERTENSION GOAL BP (BLOOD PRESSURE) < 140/90: ICD-10-CM

## 2025-05-05 ENCOUNTER — PATIENT OUTREACH (OUTPATIENT)
Dept: CARE COORDINATION | Facility: CLINIC | Age: 75
End: 2025-05-05
Payer: MEDICARE

## 2025-05-06 RX ORDER — VALSARTAN 40 MG/1
40 TABLET ORAL EVERY MORNING
Qty: 90 TABLET | Refills: 1 | Status: SHIPPED | OUTPATIENT
Start: 2025-05-06

## 2025-05-13 ENCOUNTER — OFFICE VISIT (OUTPATIENT)
Dept: FAMILY MEDICINE | Facility: CLINIC | Age: 75
End: 2025-05-13
Payer: MEDICARE

## 2025-05-13 VITALS
RESPIRATION RATE: 14 BRPM | WEIGHT: 173 LBS | TEMPERATURE: 96.8 F | OXYGEN SATURATION: 98 % | BODY MASS INDEX: 28.82 KG/M2 | HEIGHT: 65 IN

## 2025-05-13 DIAGNOSIS — R42 POSTURAL DIZZINESS: Primary | ICD-10-CM

## 2025-05-13 DIAGNOSIS — F33.1 MODERATE RECURRENT MAJOR DEPRESSION (H): ICD-10-CM

## 2025-05-13 DIAGNOSIS — I10 HYPERTENSION GOAL BP (BLOOD PRESSURE) < 140/90: ICD-10-CM

## 2025-05-13 DIAGNOSIS — I25.728 CORONARY ARTERY DISEASE OF AUTOLOGOUS BYPASS GRAFT WITH STABLE ANGINA PECTORIS: ICD-10-CM

## 2025-05-13 PROCEDURE — G2211 COMPLEX E/M VISIT ADD ON: HCPCS | Performed by: INTERNAL MEDICINE

## 2025-05-13 PROCEDURE — 1125F AMNT PAIN NOTED PAIN PRSNT: CPT | Performed by: INTERNAL MEDICINE

## 2025-05-13 PROCEDURE — 99214 OFFICE O/P EST MOD 30 MIN: CPT | Performed by: INTERNAL MEDICINE

## 2025-05-13 PROCEDURE — 96127 BRIEF EMOTIONAL/BEHAV ASSMT: CPT | Performed by: INTERNAL MEDICINE

## 2025-05-13 RX ORDER — ASPIRIN 81 MG
100 TABLET, DELAYED RELEASE (ENTERIC COATED) ORAL DAILY PRN
COMMUNITY

## 2025-05-13 RX ORDER — ISOSORBIDE MONONITRATE 30 MG/1
15 TABLET, EXTENDED RELEASE ORAL EVERY EVENING
Qty: 90 TABLET | Refills: 3 | Status: SHIPPED | OUTPATIENT
Start: 2025-05-13

## 2025-05-13 RX ORDER — VALSARTAN 40 MG/1
40 TABLET ORAL EVERY EVENING
Qty: 90 TABLET | Refills: 1 | Status: SHIPPED | OUTPATIENT
Start: 2025-05-13

## 2025-05-13 ASSESSMENT — PAIN SCALES - GENERAL: PAINLEVEL_OUTOF10: MILD PAIN (2)

## 2025-05-13 NOTE — PATIENT INSTRUCTIONS
At [facility], we strive to deliver an exceptional experience to you, every time we see you. If you receive a survey, please let us know what we are doing well and/or what we could improve upon, as we do value your feedback.  If you have MyChart, you can expect to receive results automatically within 24 hours of their completion.  Your provider will send a note interpreting your results as well.   If you do not have MyChart, you should receive your results in about a week by mail.    Your care team:                            Family Medicine Internal Medicine   MD Danny Christian, MD Jahaira Olivas, MD Lee Mason, MD Sirisha Lindquist, PA-C    Sd Wagner, MD Pediatrics   Beulah Bateman, MD Yelena Jack, KATE Negrete CNP Maria E Garcia, MD Raissa Lawrence, MD Elizabeth Choi, CNP     Erin Patino, PA-C Same-Day Provider (No follow-up visits)   KATE Zepeda, LINSEY Bass, PA-C    Anjali Arenas PA-C     Clinic hours: Monday - Thursday 7 am-6 pm; Fridays 7 am-5 pm.   Urgent care: Monday - Friday 10 am- 8 pm; Saturday and Sunday 9 am-5 pm.    Clinic: [phone]       [facility]: Monday - Thursday 8 am - 7 pm; Friday 8 am - 6 pm  [facility]: [phone]

## 2025-05-13 NOTE — PROGRESS NOTES
History  Chief Complaint   Patient presents with   • Medical Problem     Per EMS they responded to call at his home where he was having a campfire, per ems pt said his brain and his hands were not communicating  Per EMS seemed in a fog  Per EMS neuros all WNL and no slurred speech noted  This is a 40-year-old man with the noted past medical history who presents to the emergency department by EMS  He had an episode about 1 hour prior to arrival while he was sitting outside by a camp fire  He felt unwell rather suddenly and got up to go back into his house  While doing so, he suddenly "felt like (he) was going to die" and lay backward on the ground  He "fet like his brain wasn't able to control his body" during the episode  This episode lasted several minutes and has resolved at this point, although he still feels "out of it" to some degree  His father did not witness the episode but did see the patient when he came back into their house several minutes later--he states that the patient was pale and appeared unwell  EMS reported that someone had described dysarthria, although patient was unaware of this  He has never had a similar episode such as this  He denies any headache or visual changes  He did not have any chest pain or palpitations during the episode  He was not dyspneic during the episode, although he noted that he had been unusually dyspneic earlier in the day while he was chopping wood  He had also been slightly more dyspneic than what is normal for him over the past several days with normal physical activity, although not with any chest pain at any point  He was not dyspneic at rest  No fevers at any point over the past several days  No cough/nausea/vomiting/diaphoresis during the episode today  Patient did note that he has had paresthesias in the distal aspect of the toes of both feet for the past several weeks   This was not accompanied by any change in the color of his toes or difficulty [facility] Internal Medicine Progress Note           Assessment and Plan:      Postural dizziness  Borderline orthostatic vitals without appropriate heart response due to beta-blocker use. Reduce iMDUR to 15 mg/day. Change Valsartan to 40 mg every evening. Continue Nebivolol to 50 mg every evening. If postural dizziness persists, then reduce Valsartan to 20 mg/day.    Hypertension goal BP (blood pressure) < 140/90  - valsartan (DIOVAN) 40 MG tablet; Take 1 tablet (40 mg) by mouth every evening.    Coronary artery disease of autologous bypass graft with stable angina pectoris  - isosorbide mononitrate (IMDUR) 30 MG 24 hr tablet; Take 0.5 tablets (15 mg) by mouth every evening.     Moderate recurrent major depression (H)  Continue Sertraline 150 mg/day.Unable to tolerate oral stimulants, but she mainly uses caffeine tablets.          Interval History:   Urgent Care Follow-up Visit:    Facility: [facility]. ADS  Date of visit: 5/9/2025   Indication: Painless rectal bleeding.  Diagnostics: Normal platelet count  Treatments tried: Docusate sodium is effective.  Complication: Felt lightheaded after standing. Advised to reduce Nebivolol to 2.5 mg/day from 5 mg/day. Has loop recorder. Latest report from 4/15/2025 shows zero era episodes. However, patient's smart watch records heart rates less than 50 bpm but none less than 40 bpm. Review of loop recorder threshold show that it only bradycardia episodes if heart rate is less than 30.            Significant Problems:     Patient Active Problem List   Diagnosis    TMJ (temporomandibular joint syndrome)    Congenital ureteric stenosis    Lacrimal duct stenosis    Chronic rhinitis    Intermittent asthma    Hypertension goal BP (blood pressure) < 140/90    Osteoarthritis of right knee    Primary narcolepsy without cataplexy    Vitamin D deficiency    Incontinence of feces with fecal urgency    Overactive bladder    Fatigue, unspecified type    History of  ischemic cardiomyopathy    Hyperlipidemia LDL goal <70    Calculus of gallbladder without cholecystitis without obstruction    Coronary artery disease with angina pectoris, unspecified vessel or lesion type, unspecified whether native or transplanted heart    Environmental allergies    Class 1 obesity due to excess calories with serious comorbidity and body mass index (BMI) of 30.0 to 30.9 in adult    Chronic insomnia    Hiatal hernia    Cheyne-Rogers breathing disorder    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    Possible PLMD (periodic limb movement disorder)    Generalized anxiety disorder    Closed compression fracture of L3 lumbar vertebra, sequela    Bilateral hydronephrosis    Posttraumatic stress disorder    Paroxysmal ventricular tachycardia (H)    Bile salt-induced diarrhea    History of colonic polyps    Major depressive disorder, recurrent episode, mild    CKD stage 3a, GFR 45-59 ml/min (H)    Major depressive disorder, recurrent episode, moderate (H)    Paresthesias in right hand              Review of Systems:     CONSTITUTIONAL: NEGATIVE for fever, chills, change in weight  INTEGUMENTARY/SKIN: NEGATIVE for worrisome rashes, moles or lesions  EYES: NEGATIVE for vision changes or irritation  ENT/MOUTH: NEGATIVE for ear, mouth and throat problems  RESP: NEGATIVE for significant cough or SOB  BREAST: NEGATIVE for masses, tenderness or discharge  CV: As above.  GI: POSITIVE for constipation and internal hemorrhoids.  MUSCULOSKELETAL: NEGATIVE for significant arthralgias or myalgia  NEURO: NEGATIVE for weakness, dizziness or paresthesias  ENDOCRINE: NEGATIVE for temperature intolerance, skin/hair changes  HEME: NEGATIVE for bleeding problems  PSYCHIATRIC: NEGATIVE for changes in mood or affect            Medications:     Current Outpatient Medications   Medication Sig Dispense Refill    albuterol (PROVENTIL HFA) 108 (90 Base) MCG/ACT inhaler Inhale 2 puffs into the lungs every 6 hours 8.5 g 0    aspirin (ASA) 81 MG EC  moving them  He does not have any hx of peripheral vascular disease or peripheral neuropathy  EMS was contacted and found patient awake and alert  He was conversant  No obvious seizure-like activity  Denies any EtOH or recreational drug use  Has had prior hx of EtOH abuse but has been sober for one year  He was treated for Lyme disease last year with doxycycline and completed the recommended abx therapy--he did not undergo recommended f/u testing however  Episode of what sounds like near-syncope  Possible cardiac arrhythmia/electrolyte disturbance  There was some reported dysarthria although this was not observed in the ED; indeed, patient has NIH stroke scale of 0 in the ED  Will obtain CT head  Also obtain CTA chest as he is tachycardic and mildly hypoxemic to 93% on room air  Check for ACS/development of congestive heart failure as well with appropriate labs  Alternately, this may have been a complex partial seizure  Disposition pending  History provided by:  Medical records, EMS personnel and patient  Medical Problem  Associated symptoms: fatigue and shortness of breath    Associated symptoms: no abdominal pain, no chest pain, no cough, no fever, no headaches, no nausea, no rash and no vomiting        Prior to Admission Medications   Prescriptions Last Dose Informant Patient Reported? Taking?    Butalbital-APAP-Caffeine (Fioricet) -40 MG CAPS   No No   Sig: Take 1 tablet by mouth 2 (two) times a day as needed (headache)   Patient not taking: Reported on 3/8/2021   albuterol (PROVENTIL HFA,VENTOLIN HFA) 90 mcg/act inhaler   No No   Sig: Inhale 2 puffs every 6 (six) hours as needed for wheezing   Patient not taking: Reported on 3/22/2021   bisoprolol (ZEBETA) 5 mg tablet   No No   Sig: Take 1 tablet (5 mg total) by mouth daily   ibuprofen (MOTRIN) 400 mg tablet   No No   Sig: Take 1 tablet (400 mg total) by mouth every 6 (six) hours as needed for mild pain   lisinopril (ZESTRIL) 40 mg tablet No No   Sig: Take 1 tablet (40 mg total) by mouth daily   nicotine (NICODERM CQ) 21 mg/24 hr TD 24 hr patch   No No   Sig: Place 1 patch on the skin daily   Patient not taking: Reported on 3/8/2021   pantoprazole (PROTONIX) 40 mg tablet   No No   Sig: Take 1 tablet (40 mg total) by mouth 2 (two) times a day   valACYclovir (VALTREX) 1,000 mg tablet   No No   Sig: Take 1 tablet (1,000 mg total) by mouth 3 (three) times a day for 7 days      Facility-Administered Medications: None       Past Medical History:   Diagnosis Date   • Biceps tendon tear    • Carpal tunnel syndrome    • Chronic pain     back   • Hypertension    • Lyme disease        Past Surgical History:   Procedure Laterality Date   • CARPAL TUNNEL RELEASE     • ROTATOR CUFF REPAIR     • ULNAR NERVE REPAIR         Family History   Problem Relation Age of Onset   • Multiple sclerosis Mother    • No Known Problems Father      I have reviewed and agree with the history as documented  E-Cigarette/Vaping   • E-Cigarette Use Never User      E-Cigarette/Vaping Substances     Social History     Tobacco Use   • Smoking status: Every Day     Packs/day: 1 00     Years: 0 00     Pack years: 0 00     Types: Cigarettes   • Smokeless tobacco: Never   Vaping Use   • Vaping Use: Never used   Substance Use Topics   • Alcohol use: Not Currently   • Drug use: Never       Review of Systems   Constitutional: Positive for fatigue  Negative for chills and fever  Eyes: Negative for photophobia and visual disturbance  Respiratory: Positive for shortness of breath  Negative for cough and chest tightness  Cardiovascular: Negative for chest pain, palpitations and leg swelling  Gastrointestinal: Negative for abdominal pain, nausea and vomiting  Musculoskeletal: Negative for neck pain and neck stiffness  Skin: Negative for color change, pallor, rash and wound     Neurological: Negative for dizziness, syncope, speech difficulty, weakness, light-headedness, numbness and tablet Take 1 tablet (81 mg) by mouth daily 90 tablet 0    atorvastatin (LIPITOR) 40 MG tablet Take 1 tablet (40 mg) by mouth daily. 90 tablet 3    bisacodyl (DULCOLAX) 5 MG EC tablet Take 2 tablets at 3 pm the day before your procedure. If your procedure is before 11 am, take 2 additional tablets at 11 pm. If your procedure is after 11 am, take 2 additional tablets at 6 am. For additional instructions refer to your colonoscopy prep instructions. 4 tablet 0    blood glucose (NO BRAND SPECIFIED) test strip Use to test blood sugar once a day. 100 strip 3    blood glucose monitoring (NO BRAND SPECIFIED) meter device kit Use to test blood sugar once a day. 1 kit 0    coenzyme Q-10 capsule Take 1 capsule by mouth daily.      diazepam (VALIUM) 2 MG tablet Take 0.5-1 tablets (1-2 mg) by mouth 2 times daily as needed for anxiety or muscle spasms. 30 tablet 0    diclofenac (VOLTAREN) 1 % topical gel Apply topically 4 times daily as needed for moderate pain For Jaw pain      estradiol (ESTRING) 7.5 MCG/24HR vaginal ring Place 1 each vaginally every 3 months. 1 each 3    fluticasone-vilanterol (BREO ELLIPTA) 200-25 MCG/ACT inhaler Inhale 1 puff into the lungs daily 3 each 3    isosorbide mononitrate (IMDUR) 30 MG 24 hr tablet Take 1 tablet (30 mg) by mouth every evening. 90 tablet 3    loratadine (CLARITIN) 10 MG tablet Take 10 mg by mouth At Bedtime      magnesium gluconate (MAGONATE) 500 (27 Mg) MG tablet Take 500 mg by mouth 2 times daily.      Melatonin 10 MG TABS tablet Take 10 mg by mouth nightly as needed for sleep      nebivolol (BYSTOLIC) 5 MG tablet Take 1 tablet (5 mg) by mouth every evening. 90 tablet 3    nitroGLYcerin (NITROSTAT) 0.4 MG sublingual tablet PLACE 1 TABLET UNDER THE TONGUE EVERY 5 MINUTES AS NEEDED FOR CHEST PAIN 25 tablet 11    pantoprazole (PROTONIX) 40 MG EC tablet Take 1 tablet (40 mg) by mouth daily. 90 tablet 3    polyethylene glycol (GOLYTELY) 236 g suspension The night before the exam at 6 pm  "drink an 8-ounce glass every 15 minutes until the jug is half empty. If you arrive before 11 AM: Drink the other half of the GolFour Interactively jug at 11 PM night before procedure. If you arrive after 11 AM: Drink the other half of the Imagiin.ly jug at 6 AM day of procedure. For additional instructions refer to your colonoscopy prep instructions. 4000 mL 0    Prenatal Multivit-Min-Fe-FA (PRENATAL/IRON) TABS Take 1 tablet by mouth every morning      saccharomyces boulardii (FLORASTOR) 250 MG capsule Take 1 capsule (250 mg) by mouth 2 times daily 14 capsule 0    sertraline (ZOLOFT) 100 MG tablet Take 1 tablet (100 mg) by mouth daily. Take with Sertraline 50 mg for a total daily of 150 mg/day. 90 tablet 3    sertraline (ZOLOFT) 50 MG tablet Take 1 tablet (50 mg) by mouth daily. Take with Sertraline 100 mg for a total daily dose of 150 mg/day. 90 tablet 3    UNABLE TO FIND Take 3 tablets by mouth daily MEDICATION NAME: [place] complete regimen  1 TAB, AM - 2 TAB PM      valsartan (DIOVAN) 40 MG tablet TAKE 1 TABLET(40 MG) BY MOUTH EVERY MORNING 90 tablet 1    vitamin D3 (CHOLECALCIFEROL) 2000 units (50 mcg) tablet Take 1 tablet (2,000 Units) by mouth daily 90 tablet 3    zinc gluconate 50 MG tablet Take 50 mg by mouth every morning       No current facility-administered medications for this visit.             Physical Exam:   /67   Pulse 61   Temp 96.8  F (36  C) (Temporal)   Resp 14   Ht 1.638 m (5' 4.5\")   Wt 78.5 kg (173 lb)   LMP  (LMP Unknown)   SpO2 97%   BMI 29.24 kg/m     Constitutional:   awake, alert, cooperative, no apparent distress, and appears stated age     Eyes:   extra-ocular muscles intact     Lungs:   no increased work of breathing and no retractions     Cardiovascular:   bradycardic with regular rhythm     Neurologic:   Motor Exam:  moves all extremities well and symmetrically     Neuropsychiatric:   Affect: normal             Data:   Remote loop recorder transmission received and reviewed. Device " headaches  Psychiatric/Behavioral: Negative for confusion  The patient is not nervous/anxious  All other systems reviewed and are negative  Physical Exam  Physical Exam  Vitals and nursing note reviewed  Constitutional:       General: He is awake  He is not in acute distress  Appearance: Normal appearance  He is well-developed  HENT:      Head: Normocephalic and atraumatic  Right Ear: Hearing and external ear normal       Left Ear: Hearing and external ear normal    Eyes:      General: Lids are normal  Vision grossly intact  No visual field deficit  Extraocular Movements: Extraocular movements intact  Conjunctiva/sclera: Conjunctivae normal       Pupils: Pupils are equal, round, and reactive to light  Neck:      Thyroid: No thyroid mass, thyromegaly or thyroid tenderness  Trachea: Trachea and phonation normal    Cardiovascular:      Rate and Rhythm: Regular rhythm  Tachycardia present  Pulses:           Radial pulses are 2+ on the right side and 2+ on the left side  Dorsalis pedis pulses are 2+ on the right side and 2+ on the left side  Posterior tibial pulses are 2+ on the right side and 2+ on the left side  Heart sounds: Normal heart sounds, S1 normal and S2 normal  No murmur heard  No friction rub  No gallop  Pulmonary:      Effort: Pulmonary effort is normal  No respiratory distress  Breath sounds: Normal breath sounds  No stridor  No decreased breath sounds, wheezing, rhonchi or rales  Abdominal:      General: There is no distension  Palpations: There is no mass  Tenderness: There is no abdominal tenderness  There is no guarding or rebound  Musculoskeletal:      Cervical back: Normal range of motion  Skin:     General: Skin is warm and dry  Neurological:      Mental Status: He is alert and oriented to person, place, and time  GCS: GCS eye subscore is 4  GCS verbal subscore is 5  GCS motor subscore is 6        Cranial Nerves: Cranial nerves 2-12 are intact  No cranial nerve deficit, dysarthria or facial asymmetry  Sensory: Sensation is intact  No sensory deficit  Motor: Motor function is intact  No weakness, tremor, abnormal muscle tone or seizure activity  Coordination: Coordination is intact  Comments: PERRLA; EOMI  Sensation intact to light touch over face in V1-V3 distribution bilaterally  Facial expressions symmetric  Tongue/uvula midline  Shoulder shrug equal bilaterally  Strength 5/5 in UE/LE bilaterally  Sensation intact to light touch in UE/LE bilaterally    No dysarthria or aphasia  No tremor or extremity weakness         Vital Signs  ED Triage Vitals   Temperature Pulse Respirations Blood Pressure SpO2   11/24/22 0014 11/24/22 0011 11/24/22 0011 11/24/22 0011 11/24/22 0011   97 9 °F (36 6 °C) (!) 107 18 148/92 98 %      Temp Source Heart Rate Source Patient Position - Orthostatic VS BP Location FiO2 (%)   11/24/22 0014 11/24/22 0011 -- 11/24/22 0011 --   Temporal Monitor  Right arm       Pain Score       11/24/22 0010       No Pain           Vitals:    11/24/22 0215 11/24/22 0230 11/24/22 0245 11/24/22 0315   BP: 145/92 136/93 127/91 140/94   Pulse: 95 94 92 102         Visual Acuity  Visual Acuity    Flowsheet Row Most Recent Value   L Pupil Size (mm) 2   R Pupil Size (mm) 2          ED Medications  Medications   sodium chloride (PF) 0 9 % injection 3 mL (has no administration in time range)   iohexol (OMNIPAQUE) 350 MG/ML injection (SINGLE-DOSE) 85 mL (85 mL Intravenous Given 11/24/22 0037)       Diagnostic Studies  Results Reviewed     Procedure Component Value Units Date/Time    HS Troponin I 2hr [[id number]]  (Normal) Collected: 11/24/22 0231    Lab Status: Final result Specimen: Blood from Arm, Left Updated: 11/24/22 0302     hs TnI 2hr 4 ng/L      Delta 2hr hsTnI 1 ng/L     TSH [[id number]]  (Normal) Collected: 11/24/22 0039    Lab Status: Final result Specimen: Blood from Arm, Left Updated: transmission sent per MD orders.     Device: Medtronic [id number] LINQ II  Normal Device Function   Presenting EGM: Sinus Dysrhythmia 48-60 bpm  Patient Activated Episodes: 0  Tachy Episodes: 0  Pause Episodes: 0  Boubacar Episodes: 0  AF Episodes: 0  AT/AF [place]: 0%  Anticoagulant: None  PVCs (% beats): 0.3%  Loop recorder battery status = Good     Plan: Automatic remote transmission as scheduled on 7/15/25.  SOWMYA Khan Pacemaker ICD Specialist     Remote Loop Recorder      Disposition:  Follow-up on 7/10/2025.    Danny Conteh MD  Internal Medicine  [facility] Team    11/24/22 0219     TSH 3RD GENERATON 2 404 uIU/mL     Narrative:      Patients undergoing fluorescein dye angiography may retain small amounts of fluorescein in the body for 48-72 hours post procedure  Samples containing fluorescein can produce falsely depressed TSH values  If the patient had this procedure,a specimen should be resubmitted post fluorescein clearance  T4, free [[id number]] Collected: 11/24/22 0039    Lab Status: In process Specimen: Blood Updated: 11/24/22 0200    FLU/RSV/COVID - if FLU/RSV clinically relevant [[id number]]  (Normal) Collected: 11/24/22 0039    Lab Status: Final result Specimen: Nares from Nose Updated: 11/24/22 0122     SARS-CoV-2 Negative     INFLUENZA A PCR Negative     INFLUENZA B PCR Negative     RSV PCR Negative    Narrative:      FOR PEDIATRIC PATIENTS - copy/paste COVID Guidelines URL to browser: [email or web address]    SARS-CoV-2 assay is a Nucleic Acid Amplification assay intended for the  qualitative detection of nucleic acid from SARS-CoV-2 in nasopharyngeal  swabs  Results are for the presumptive identification of SARS-CoV-2 RNA  Positive results are indicative of infection with SARS-CoV-2, the virus  causing COVID-19, but do not rule out bacterial infection or co-infection  with other viruses  Laboratories within the [place] and its  territories are required to report all positive results to the appropriate  public health authorities  Negative results do not preclude SARS-CoV-2  infection and should not be used as the sole basis for treatment or other  patient management decisions  Negative results must be combined with  clinical observations, patient history, and epidemiological information  This test has not been FDA cleared or approved  This test has been authorized by FDA under an Emergency Use Authorization  (EUA)   This test is only authorized for the duration of time the  declaration that circumstances exist justifying the authorization of the  emergency use of an in vitro diagnostic tests for detection of SARS-CoV-2  virus and/or diagnosis of COVID-19 infection under section 564(b)(1) of  the Act, 21 U  S C  528QYQ-7(T)(5), unless the authorization is terminated  or revoked sooner  The test has been validated but independent review by FDA  and CLIA is pending  Test performed using [employer] GeneXpert: This RT-PCR assay targets N2,  a region unique to SARS-CoV-2  A conserved region in the E-gene was chosen  for pan-Sarbecovirus detection which includes SARS-CoV-2  According to CMS-2020-01-R, this platform meets the definition of high-throughput technology      HS Troponin 0hr (reflex protocol) [[id number]]  (Normal) Collected: 11/24/22 0039    Lab Status: Final result Specimen: Blood from Arm, Left Updated: 11/24/22 0107     hs TnI 0hr 3 ng/L     Basic metabolic panel [[id number]]  (Abnormal) Collected: 11/24/22 0039    Lab Status: Final result Specimen: Blood from Arm, Left Updated: 11/24/22 0106     Sodium 135 mmol/L      Potassium 3 8 mmol/L      Chloride 102 mmol/L      CO2 25 mmol/L      ANION GAP 8 mmol/L      BUN 24 mg/dL      Creatinine 1 05 mg/dL      Glucose 166 mg/dL      Calcium 8 6 mg/dL      eGFR 83 ml/min/1 73sq m     Narrative:      Jeremi guidelines for Chronic Kidney Disease (CKD):   •  Stage 1 with normal or high GFR (GFR > 90 mL/min/1 73 square meters)  •  Stage 2 Mild CKD (GFR = 60-89 mL/min/1 73 square meters)  •  Stage 3A Moderate CKD (GFR = 45-59 mL/min/1 73 square meters)  •  Stage 3B Moderate CKD (GFR = 30-44 mL/min/1 73 square meters)  •  Stage 4 Severe CKD (GFR = 15-29 mL/min/1 73 square meters)  •  Stage 5 End Stage CKD (GFR <15 mL/min/1 73 square meters)  Note: GFR calculation is accurate only with a steady state creatinine    Magnesium [[id number]]  (Normal) Collected: 11/24/22 0039    Lab Status: Final result Specimen: Blood from Arm, Left Updated: 11/24/22 0106     Magnesium 1 9 mg/dL     NT-BNP PRO [[id number]]  (Normal) Collected: 11/24/22 0039    Lab Status: Final result Specimen: Blood from Arm, Left Updated: 11/24/22 0106     NT-proBNP 10 pg/mL     Protime-INR [[id number]]  (Normal) Collected: 11/24/22 0039    Lab Status: Final result Specimen: Blood from Arm, Left Updated: 11/24/22 0054     Protime 13 2 seconds      INR 0 98    CBC and differential [[id number]] Collected: 11/24/22 0039    Lab Status: Final result Specimen: Blood from Arm, Left Updated: 11/24/22 0046     WBC 7 59 Thousand/uL      RBC 4 98 Million/uL      Hemoglobin 15 5 g/dL      Hematocrit 47 9 %      MCV 96 fL      MCH 31 1 pg      MCHC 32 4 g/dL      RDW 12 7 %      MPV 9 3 fL      Platelets 088 Thousands/uL      nRBC 0 /100 WBCs      Neutrophils Relative 67 %      Immat GRANS % 1 %      Lymphocytes Relative 24 %      Monocytes Relative 6 %      Eosinophils Relative 1 %      Basophils Relative 1 %      Neutrophils Absolute 5 20 Thousands/µL      Immature Grans Absolute 0 06 Thousand/uL      Lymphocytes Absolute 1 79 Thousands/µL      Monocytes Absolute 0 45 Thousand/µL      Eosinophils Absolute 0 05 Thousand/µL      Basophils Absolute 0 04 Thousands/µL                  CT head without contrast   Final Result by Dottie Sims MD (11/24 1746)      No acute intracranial abnormality  Workstation performed: [id number]         CTA ED chest PE Study   Final Result by Dottie Sims MD (11/24 9163)      No evidence of acute pulmonary embolus, thoracic aortic aneurysm or dissection  No acute cardiopulmonary process                    Workstation performed: [id number]                    Procedures  Procedures         ED Course  ED Course as of 11/24/22 0330   u Nov 24, 2022   0018 ECG sinus tachycardia 109 bpm   QRS 98 QtC 476  No acute st/t changes  LAFB new from 16 Jan 2021  No ectopy  Interpreted by me   1017 CT completed and awaiting interpretation 0049 CBC and differential  WBC wnl  Hg/Hct wnl  Plt wnl   0102 Protime-INR  Normal   0103 CT head without contrast  FINDINGS:     PARENCHYMA:  No acute intracranial hemorrhage or mass effect      VENTRICLES AND EXTRA-AXIAL SPACES:  No hydrocephalus or extra-axial collection      VISUALIZED ORBITS AND PARANASAL SINUSES:  Intact globes and orbits  Clear paranasal sinuses      CALVARIUM AND EXTRACRANIAL SOFT TISSUES:  No lytic or blastic lesion or fracture      IMPRESSION:     No acute intracranial abnormality       0103 CTA ED chest PE Study     FINDINGS:     PULMONARY ARTERIAL TREE:  No filling defect in the visualized pulmonary arteries to suggest an acute embolus       LUNGS:  Lungs are clear  There is no tracheal or endobronchial lesion      PLEURA:  No effusion      HEART/GREAT VESSELS: Cardiomegaly  No thoracic aortic aneurysm or dissection      MEDIASTINUM AND ROSA ISELA:  No lymphadenopathy      CHEST WALL AND LOWER NECK:   Unremarkable      VISUALIZED STRUCTURES IN THE UPPER ABDOMEN:  Unremarkable      OSSEOUS STRUCTURES:  No acute fracture or destructive osseous lesion      IMPRESSION:     No evidence of acute pulmonary embolus, thoracic aortic aneurysm or dissection  No acute cardiopulmonary process  0117 HS Troponin 0hr (reflex protocol)  Nonischemic; will require repeat value   0117 NT-BNP PRO  Normal   8960 Basic metabolic panel(!)  Normal   0118 Magnesium  Normal   0118 CT results were normal   These do not account for his symptoms     4742 FLU/RSV/COVID - if FLU/RSV clinically relevant  Negative   0221 TSH  Within normal range   0235 Repeat troponin collected and pending result   0240 ECG  bpm    QtC 477  No acute st/t changes  No ectopy   LAFB  Interpreted by me  No changes from prior   0302 HS Troponin I 2hr  Delta of one not consistent with ACS         MDM  Number of Diagnoses or Management Options  Near syncope  Diagnosis management comments: Workup did not reveal any specific etiology for patient's sx  Still could not exclude cardiac arrhythmia or complex partial seizure, but there is no evidence to further support either of these diagnoses  He will need follow-up with his primary physician for further evaluation: may benefit from Holter monitor, etc  I would not prescribe any specific medications at present  Normal activity/diet recommended without any restrictions  He should of course return to the ED if he were to develop any subsequent episodes  All questions were answered to the satisfaction of the patient and his father prior to discharge  They expressed understanding and agreed to plan  Disposition  Final diagnoses:   Near syncope     Time reflects when diagnosis was documented in both MDM as applicable and the Disposition within this note     Time User Action Codes Description Comment    11/24/2022  3:10 AM Daisy Salter Add [R55] Near syncope       ED Disposition     ED Disposition   Discharge    Condition   Stable    Date/Time   Thu Nov 24, 2022  3:10 AM    Comment   Annette December discharge to home/self care                 Follow-up Information     Follow up With Specialties Details Why Contact Info Additional [address],  Family Medicine Schedule an appointment as soon as possible for a visit in 1 week For recheck of your symptoms [address] [place] [address] [place] [address] Emergency Department Emergency Medicine Go to  If you develop any further episodes like the one that brought you to the ED [phone]  [facility] Emergency Department, [address], [address]          Discharge Medication List as of 11/24/2022  3:11 AM      CONTINUE these medications which have NOT CHANGED    Details   albuterol (PROVENTIL HFA,VENTOLIN HFA) 90 mcg/act inhaler Inhale 2 puffs every 6 (six) hours as needed for wheezing, Starting Tue 1/19/2021, Normal      bisoprolol (ZEBETA) 5 mg tablet Take 1 tablet (5 mg total) by mouth daily, Starting Tue 1/19/2021, Normal      Butalbital-APAP-Caffeine (Fioricet) -40 MG CAPS Take 1 tablet by mouth 2 (two) times a day as needed (headache), Starting Tue 1/19/2021, Normal      ibuprofen (MOTRIN) 400 mg tablet Take 1 tablet (400 mg total) by mouth every 6 (six) hours as needed for mild pain, Starting Tue 9/6/2022, Normal      lisinopril (ZESTRIL) 40 mg tablet Take 1 tablet (40 mg total) by mouth daily, Starting Wed 1/20/2021, Normal      nicotine (NICODERM CQ) 21 mg/24 hr TD 24 hr patch Place 1 patch on the skin daily, Starting Wed 1/20/2021, Normal      pantoprazole (PROTONIX) 40 mg tablet Take 1 tablet (40 mg total) by mouth 2 (two) times a day, Starting Tue 1/19/2021, Until Thu 2/18/2021, Normal      valACYclovir (VALTREX) 1,000 mg tablet Take 1 tablet (1,000 mg total) by mouth 3 (three) times a day for 7 days, Starting Mon 3/8/2021, Until Mon 3/15/2021, Normal             No discharge procedures on file      PDMP Review       Value Time User    PDMP Reviewed  Yes 11/27/2019 11:46 AM SCOTT Ortiz          ED Provider  Electronically Signed by           Keya Morris DO  11/24/22 [id number]

## 2025-05-14 ENCOUNTER — VIRTUAL VISIT (OUTPATIENT)
Dept: PSYCHOLOGY | Facility: CLINIC | Age: 75
End: 2025-05-14
Payer: MEDICARE

## 2025-05-14 DIAGNOSIS — F43.10 POSTTRAUMATIC STRESS DISORDER: ICD-10-CM

## 2025-05-14 DIAGNOSIS — F41.1 GENERALIZED ANXIETY DISORDER: ICD-10-CM

## 2025-05-14 DIAGNOSIS — F33.1 MAJOR DEPRESSIVE DISORDER, RECURRENT EPISODE, MODERATE (H): Primary | ICD-10-CM
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
## 2025-05-21 ENCOUNTER — VIRTUAL VISIT (OUTPATIENT)
Dept: PSYCHOLOGY | Facility: CLINIC | Age: 75
End: 2025-05-21
Payer: MEDICARE

## 2025-05-21 DIAGNOSIS — F41.1 GENERALIZED ANXIETY DISORDER: ICD-10-CM

## 2025-05-21 DIAGNOSIS — F43.10 POSTTRAUMATIC STRESS DISORDER: ICD-10-CM

## 2025-05-21 DIAGNOSIS — F33.1 MAJOR DEPRESSIVE DISORDER, RECURRENT EPISODE, MODERATE (H): Primary | ICD-10-CM

## 2025-05-21 ASSESSMENT — ANXIETY QUESTIONNAIRES
1. FEELING NERVOUS, ANXIOUS, OR ON EDGE: SEVERAL DAYS
3. WORRYING TOO MUCH ABOUT DIFFERENT THINGS: SEVERAL DAYS
7. FEELING AFRAID AS IF SOMETHING AWFUL MIGHT HAPPEN: NOT AT ALL
GAD7 TOTAL SCORE: 6
5. BEING SO RESTLESS THAT IT IS HARD TO SIT STILL: SEVERAL DAYS
GAD7 TOTAL SCORE: 6
IF YOU CHECKED OFF ANY PROBLEMS ON THIS QUESTIONNAIRE, HOW DIFFICULT HAVE THESE PROBLEMS MADE IT FOR YOU TO DO YOUR WORK, TAKE CARE OF THINGS AT HOME, OR GET ALONG WITH OTHER PEOPLE: SOMEWHAT DIFFICULT
6. BECOMING EASILY ANNOYED OR IRRITABLE: SEVERAL DAYS
2. NOT BEING ABLE TO STOP OR CONTROL WORRYING: SEVERAL DAYS

## 2025-05-21 ASSESSMENT — PATIENT HEALTH QUESTIONNAIRE - PHQ9
5. POOR APPETITE OR OVEREATING: SEVERAL DAYS
SUM OF ALL RESPONSES TO PHQ QUESTIONS 1-9: 14
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
## 2025-05-28 ENCOUNTER — VIRTUAL VISIT (OUTPATIENT)
Dept: PSYCHOLOGY | Facility: CLINIC | Age: 75
End: 2025-05-28
Payer: MEDICARE

## 2025-05-28 DIAGNOSIS — F33.1 MAJOR DEPRESSIVE DISORDER, RECURRENT EPISODE, MODERATE (H): Primary | ICD-10-CM

## 2025-05-28 DIAGNOSIS — F41.1 GENERALIZED ANXIETY DISORDER: ICD-10-CM

## 2025-05-28 DIAGNOSIS — F43.10 POSTTRAUMATIC STRESS DISORDER: ICD-10-CM
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
## 2025-06-03 ENCOUNTER — TELEPHONE (OUTPATIENT)
Dept: FAMILY MEDICINE | Facility: CLINIC | Age: 75
End: 2025-06-03

## 2025-06-03 ENCOUNTER — TELEPHONE (OUTPATIENT)
Dept: FAMILY MEDICINE | Facility: CLINIC | Age: 75
End: 2025-06-03
Payer: MEDICARE

## 2025-06-03 NOTE — TELEPHONE ENCOUNTER
Patient calling to update the provider about her symptoms from her 5/13/25 appointment.      Patient notes her pulse has been dipping into the 40's more frequently now that she has changed the medications that Dr. Conteh recommended.  She has been taking 20 mg of Valsartan in the evening for 2 weeks.    She is still having postural dizziness as well. HR was average of 45 bpm last night and dips 6-7x during the day to 40's for about 1-2 min. Patient is feeling tired and lightheaded in the morning still.       Patient also notes the last two nights she has had her oxygen saturation dip into the 70's per her watch.  She admits she has not been wearing her CPAP at night but notes that even when she doesn't the lowest she used to go was the 80's.  She is being treated for Sleep Apnea.  This low of a dip has not happened before.        Encouraged patient to reach out to her sleep doctor as well about the low saturation during the night as they have been managing her Cheyne-Rogers breathing.      Provider: Should patient go back to her old medication regimen for BP meds?  Do you want patient to come and be assessed for these symptoms?

## 2025-06-03 NOTE — TELEPHONE ENCOUNTER
General Call      Reason for Call:  question re: low oxygen    What are your questions or concerns:  Heidy has low oxygen 2 nights in a row. (In the 70s) as well as low heart rate (low 40s), and she is wondering if this is part of cheyne stoke breathing that she is supposed to be watching for?  Is further sleep testing now needed?    Date of last appointment with provider: 04/24/24    Could we send this information to you in [employer] or would you prefer to receive a phone call?:   Patient would prefer a phone call   Okay to leave a detailed message?: Yes at Home number on file [phone] (home)

## 2025-06-03 NOTE — TELEPHONE ENCOUNTER
Uses [employer] watch  Takes a lot of caffeine so unsure what rate is during the day without that  Has been waking up puffy    Heart rate has stays in the 80's usually around 85 but last few nights has dipped into 70s, correlates with low heart rate    Heart meds have been changed around. Patient is talking valsartan at night now and both meds are in half --20 mg valsartan, 15 mg isosorbide    Haven't been using CPAP. Stopped a couple months ago. With everything dental, daughter's health and daughter's house foreclosing, ) going on she hasn't gotten her home set up done.    Internist has been updated by patient.

## 2025-06-03 NOTE — TELEPHONE ENCOUNTER
Yes, restart adaptive-servo ventilation   We know from 2022 study she has low O2 without it on, not too surprising

## 2025-06-03 NOTE — TELEPHONE ENCOUNTER
Patient is due for follow up  Please get download from device   How is the oxygen level being measured?

## 2025-06-04 ENCOUNTER — VIRTUAL VISIT (OUTPATIENT)
Dept: PSYCHOLOGY | Facility: CLINIC | Age: 75
End: 2025-06-04
Payer: MEDICARE

## 2025-06-04 DIAGNOSIS — F43.10 POSTTRAUMATIC STRESS DISORDER: ICD-10-CM

## 2025-06-04 DIAGNOSIS — F41.1 GENERALIZED ANXIETY DISORDER: ICD-10-CM

## 2025-06-04 DIAGNOSIS — F33.1 MAJOR DEPRESSIVE DISORDER, RECURRENT EPISODE, MODERATE (H): Primary | ICD-10-CM
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
## 2025-06-06 NOTE — NURSING NOTE
Chief Complaint   Patient presents with    Recrrent UTI's     Urge urinary incontinence     E-string follow up    Patient states things are going well with E-string.  Patient has no concerns.      Arabella Marcus MA on 8/30/2024 at 3:46 PM     Peripheral IV    Performed by: Stefan Gamez MD  Authorized by: Stefan Gamez MD    Size:  18 G  Laterality:  Right  Location:  Arm  Local Anesthetic:  Injectable  Site Prep:  Alcohol  Technique:  Ultrasound guided  Attempts:  2  Securement: Transparent dressing w/border and Transparent dressing

## 2025-06-10 ENCOUNTER — PATIENT OUTREACH (OUTPATIENT)
Dept: CARE COORDINATION | Facility: CLINIC | Age: 75
End: 2025-06-10
Payer: MEDICARE

## 2025-06-11 ENCOUNTER — VIRTUAL VISIT (OUTPATIENT)
Dept: PSYCHOLOGY | Facility: CLINIC | Age: 75
End: 2025-06-11
Payer: MEDICARE

## 2025-06-11 DIAGNOSIS — F43.10 POSTTRAUMATIC STRESS DISORDER: ICD-10-CM

## 2025-06-11 DIAGNOSIS — F41.1 GENERALIZED ANXIETY DISORDER: ICD-10-CM

## 2025-06-11 DIAGNOSIS — F33.1 MAJOR DEPRESSIVE DISORDER, RECURRENT EPISODE, MODERATE (H): Primary | ICD-10-CM
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
## 2025-06-12 ENCOUNTER — TELEPHONE (OUTPATIENT)
Dept: FAMILY MEDICINE | Facility: CLINIC | Age: 75
End: 2025-06-12
Payer: MEDICARE

## 2025-06-12 NOTE — TELEPHONE ENCOUNTER
"Pt calling with an update for PCP regarding her cardiac medications changes per PCP recommendation due to bradycardia.     She states she has been taking half of both nebvivol and isosorbide for 2 weeks. She reports her resting HR was in the 40s last night while laying down and in the 50s when she woke up this morning. She states these episodes of bradycardia have reduced from a half hour weeks ago to now lasting 5 minutes or less. She states despite this she is still unsure if this is correlated to reducing her prescriptions and she states she is fearful that she, \"may need a pacemaker or something.\" She states he feels very fatigued when bradycardia episodes occur.     She states she also had one episode where she felt like something was squeezing her chest and a couple episodes of possible PVCs that woke her up from her sleep. She states these lasted seconds and symptoms resolved. She denies feeling short of breath when the episodes occurred. She also denies any cardiac symptoms currently, she states she just feels very tired with little to no energy.     She also reports that she has been using her CPAP with sleep for about 1 week and her sleep apnea tracking shows she is getting 50% effective sleep. She does not have a follow scheduled with sleep medicine at this time and questions if a referral would be appropriate. She also states her mask is way too snug and it is uncomfortable to sleep with.     Writer offered to schedule pt with PCP and pt declined. She requests PCP review this information and determine if she should resume the full dose of her prescriptions. Routing to PCP for review.     Pt requests a call back but if unavailable, she requests a [employer] message be sent with her PCPs recommendations.     Sharona Todd RN on 6/12/2025 at 1:55 PM   "

## 2025-06-18 ENCOUNTER — VIRTUAL VISIT (OUTPATIENT)
Dept: PSYCHOLOGY | Facility: CLINIC | Age: 75
End: 2025-06-18
Payer: MEDICARE

## 2025-06-18 DIAGNOSIS — F43.10 POSTTRAUMATIC STRESS DISORDER: ICD-10-CM

## 2025-06-18 DIAGNOSIS — F41.1 GENERALIZED ANXIETY DISORDER: ICD-10-CM

## 2025-06-18 DIAGNOSIS — F33.1 MAJOR DEPRESSIVE DISORDER, RECURRENT EPISODE, MODERATE (H): Primary | ICD-10-CM

## 2025-06-18 ASSESSMENT — ANXIETY QUESTIONNAIRES
2. NOT BEING ABLE TO STOP OR CONTROL WORRYING: MORE THAN HALF THE DAYS
3. WORRYING TOO MUCH ABOUT DIFFERENT THINGS: MORE THAN HALF THE DAYS
8. IF YOU CHECKED OFF ANY PROBLEMS, HOW DIFFICULT HAVE THESE MADE IT FOR YOU TO DO YOUR WORK, TAKE CARE OF THINGS AT HOME, OR GET ALONG WITH OTHER PEOPLE?: VERY DIFFICULT
GAD7 TOTAL SCORE: 11
7. FEELING AFRAID AS IF SOMETHING AWFUL MIGHT HAPPEN: NEARLY EVERY DAY
4. TROUBLE RELAXING: SEVERAL DAYS
1. FEELING NERVOUS, ANXIOUS, OR ON EDGE: MORE THAN HALF THE DAYS
6. BECOMING EASILY ANNOYED OR IRRITABLE: SEVERAL DAYS
IF YOU CHECKED OFF ANY PROBLEMS ON THIS QUESTIONNAIRE, HOW DIFFICULT HAVE THESE PROBLEMS MADE IT FOR YOU TO DO YOUR WORK, TAKE CARE OF THINGS AT HOME, OR GET ALONG WITH OTHER PEOPLE: VERY DIFFICULT
GAD7 TOTAL SCORE: 11
7. FEELING AFRAID AS IF SOMETHING AWFUL MIGHT HAPPEN: NEARLY EVERY DAY
5. BEING SO RESTLESS THAT IT IS HARD TO SIT STILL: NOT AT ALL
GAD7 TOTAL SCORE: 11

## 2025-06-18 ASSESSMENT — PATIENT HEALTH QUESTIONNAIRE - PHQ9
10. IF YOU CHECKED OFF ANY PROBLEMS, HOW DIFFICULT HAVE THESE PROBLEMS MADE IT FOR YOU TO DO YOUR WORK, TAKE CARE OF THINGS AT HOME, OR GET ALONG WITH OTHER PEOPLE: VERY DIFFICULT
SUM OF ALL RESPONSES TO PHQ QUESTIONS 1-9: 15
SUM OF ALL RESPONSES TO PHQ QUESTIONS 1-9: 15
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
## 2025-06-25 ENCOUNTER — OFFICE VISIT (OUTPATIENT)
Dept: DERMATOLOGY | Facility: CLINIC | Age: 75
End: 2025-06-25
Payer: MEDICARE

## 2025-06-25 ENCOUNTER — VIRTUAL VISIT (OUTPATIENT)
Dept: PSYCHOLOGY | Facility: CLINIC | Age: 75
End: 2025-06-25
Payer: MEDICARE

## 2025-06-25 DIAGNOSIS — L81.4 LENTIGINES: ICD-10-CM

## 2025-06-25 DIAGNOSIS — F43.10 POSTTRAUMATIC STRESS DISORDER: ICD-10-CM

## 2025-06-25 DIAGNOSIS — D18.01 CHERRY ANGIOMA: ICD-10-CM

## 2025-06-25 DIAGNOSIS — F41.1 GENERALIZED ANXIETY DISORDER: ICD-10-CM

## 2025-06-25 DIAGNOSIS — D49.2 NEOPLASM OF SKIN: ICD-10-CM

## 2025-06-25 DIAGNOSIS — D22.9 MULTIPLE BENIGN NEVI: Primary | ICD-10-CM

## 2025-06-25 DIAGNOSIS — Z12.83 SCREENING EXAM FOR SKIN CANCER: ICD-10-CM

## 2025-06-25 DIAGNOSIS — F33.1 MAJOR DEPRESSIVE DISORDER, RECURRENT EPISODE, MODERATE (H): Primary | ICD-10-CM

## 2025-06-25 DIAGNOSIS — L82.1 SK (SEBORRHEIC KERATOSIS): ICD-10-CM

## 2025-06-25 DIAGNOSIS — R23.8 INFLAMMATORY PAPULE: ICD-10-CM

## 2025-06-25 DIAGNOSIS — L98.9 FACIAL LESION: ICD-10-CM

## 2025-06-25 ASSESSMENT — PATIENT HEALTH QUESTIONNAIRE - PHQ9
10. IF YOU CHECKED OFF ANY PROBLEMS, HOW DIFFICULT HAVE THESE PROBLEMS MADE IT FOR YOU TO DO YOUR WORK, TAKE CARE OF THINGS AT HOME, OR GET ALONG WITH OTHER PEOPLE: VERY DIFFICULT
SUM OF ALL RESPONSES TO PHQ QUESTIONS 1-9: 11
SUM OF ALL RESPONSES TO PHQ QUESTIONS 1-9: 11
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
## 2025-06-25 NOTE — PROGRESS NOTES
[facility]ology Note  Encounter Date: Jun 25, 2025  Office Visit      Dermatology Problem List:  FBSE:  6/25/24    #NUB L upper lateral forehead, S/p Biopsy performed on 6/25/25, pending results.   ____________________________________________    Assessment & Plan:    # Inflammatory papule R cheek  - Please start a daily moisturizer to your skin. Some good examples are: Cetaphil, Vanicream, Vaseline, Aquaphor.     # Neoplasm of unspecified behavior of the skin (D49.2) on the L upper lateral forehead. The differential diagnosis includes blue nevus vs DN vs MM vs other. .   - Punch biopsy performed today, see procedure note below.  - Photographed today     # Benign findings: multiple benign nevi, lentigines, cherry angiomas, SKs  - edu on benign etiology  - Signs and Symptoms of non-melanoma skin cancer and ABCDEs of melanoma reviewed with patient. Patient encouraged to perform monthly self skin exams and educated on how to perform them. UV precautions reviewed with patient. Patient was asked about new or changing moles/lesions on body.   - Sunscreen: Apply 20 minutes prior to going outdoors and reapply every two hours, when wet or sweating. We recommend using an SPF 30 or higher, and to use one that is water resistant.     - RTC for changes     Procedures Performed:   - Punch biopsy procedure note, location(s): see above. After discussion of benefits and risks including but not limited to bleeding, infection, scar, incomplete removal, recurrence, and non-diagnostic biopsy, written consent and photographs were obtained. The area was cleaned with isopropyl alcohol. 0.5mL of 1% lidocaine with epinephrine was injected to obtain adequate anesthesia and a 4 mm punch biopsy was performed at site(s). 4-0 Prolene sutures were utilized to approximate the epidermal edges. White petrolatum ointment and a bandage was applied to the wound. Explicit verbal and written wound care instructions were provided.  The patient left the dermatology clinic in good condition.      Follow-up: pending path results    Staff and scribe:    Scribe Disclosure:   I, MOSHE DREW, am serving as a scribe; to document services personally performed by Hortencia Joel PA-C -based on data collection and the provider's statements to me.     Provider Disclosure:  I agree with above History, Review of Systems, Physical exam and Plan.  I have reviewed the content of the documentation and have edited it as needed. I have personally performed the services documented here and the documentation accurately represents those services and the decisions I have made.      Electronically signed by:    All risks, benefits and alternatives were discussed with patient.  Patient is in agreement and understands the assessment and plan.  All questions were answered.    Hortencia Joel PA-C, [facility]  [facility]: Phone: [phone], Fax: [phone]  [facility]: Phone: [phone],  Fax: [phone]  [facility]: Phone: [phone], Fax: [phone]  ____________________________________________    CC: Skin Check (An area on my left cheek below my eye has changed from flat to lumpy. Ear my left eyebrow i also have a discolored spot that is getting larger and darker.I have other brownish spots on my face and arms that are dry and scrape off and return. These brownish spots look like what i think are ahe spots. I'm concerned,that they are dry and scrape off. I have never been diagnosed with any type of cancer and my extended farming family has only skin cancer due to sun.)      Reviewed patients past medical history and pertinent chart review prior to patient's visit today.     HPI:  Ms. Gem Gama is a 74 year old female who presents today as a new patient for FBSE. Today patient reported area on her R cheek below  that has changed from flat to  lumpy.     No personal or family hx of skin cancer.     Patient is otherwise feeling well, without additional concerns.    Labs:  N/A    Physical Exam:  Vitals: LMP  (LMP Unknown)   SKIN: Total skin excluding the undergarment areas was performed. The exam included the head/face, neck, both arms, chest, back, abdomen, both legs, digits and/or nails.    - - Balderas's skin type I, has 100 nevi  - There are dome shaped bright red papules on the trunk.   - Multiple regular brown pigmented macules and papules are identified on the trunk and extremities.   - Scattered brown macules on sun exposed areas.  - There are waxy stuck on tan to brown papules on the trunk.     - There is a tan to brown waxy stuck on papule with surrounding erythema on the : x 1 R upper cheek. .   - L upper lateral forehead there is a 4 mm dark blue macule with an irregular border   - inflammatory papule on the R cheek   - No other lesions of concern on areas examined.     L lateral upper forehead       Medications:  Current Outpatient Medications   Medication Sig Dispense Refill    albuterol (PROVENTIL HFA) 108 (90 Base) MCG/ACT inhaler Inhale 2 puffs into the lungs every 6 hours 8.5 g 0    aspirin (ASA) 81 MG EC tablet Take 1 tablet (81 mg) by mouth daily 90 tablet 0    atorvastatin (LIPITOR) 40 MG tablet Take 1 tablet (40 mg) by mouth daily. 90 tablet 3    bisacodyl (DULCOLAX) 5 MG EC tablet Take 2 tablets at 3 pm the day before your procedure. If your procedure is before 11 am, take 2 additional tablets at 11 pm. If your procedure is after 11 am, take 2 additional tablets at 6 am. For additional instructions refer to your colonoscopy prep instructions. 4 tablet 0    blood glucose (NO BRAND SPECIFIED) test strip Use to test blood sugar once a day. 100 strip 3    blood glucose monitoring (NO BRAND SPECIFIED) meter device kit Use to test blood sugar once a day. 1 kit 0    coenzyme Q-10 capsule Take 1 capsule by mouth daily.      diazepam  (VALIUM) 2 MG tablet Take 0.5-1 tablets (1-2 mg) by mouth 2 times daily as needed for anxiety or muscle spasms. 30 tablet 0    diclofenac (VOLTAREN) 1 % topical gel Apply topically 4 times daily as needed for moderate pain For Jaw pain      docusate sodium (COLACE) 100 MG tablet Take 100 mg by mouth daily as needed for constipation.      estradiol (ESTRING) 7.5 MCG/24HR vaginal ring Place 1 each vaginally every 3 months. 1 each 3    fluticasone-vilanterol (BREO ELLIPTA) 200-25 MCG/ACT inhaler Inhale 1 puff into the lungs daily 3 each 3    isosorbide mononitrate (IMDUR) 30 MG 24 hr tablet Take 0.5 tablets (15 mg) by mouth every evening. 90 tablet 3    loratadine (CLARITIN) 10 MG tablet Take 10 mg by mouth At Bedtime      magnesium gluconate (MAGONATE) 500 (27 Mg) MG tablet Take 500 mg by mouth 2 times daily.      Melatonin 10 MG TABS tablet Take 10 mg by mouth nightly as needed for sleep      nebivolol (BYSTOLIC) 5 MG tablet Take 1 tablet (5 mg) by mouth every evening. 90 tablet 3    nitroGLYcerin (NITROSTAT) 0.4 MG sublingual tablet PLACE 1 TABLET UNDER THE TONGUE EVERY 5 MINUTES AS NEEDED FOR CHEST PAIN 25 tablet 11    pantoprazole (PROTONIX) 40 MG EC tablet Take 1 tablet (40 mg) by mouth daily. 90 tablet 3    polyethylene glycol (GOLYTELY) 236 g suspension The night before the exam at 6 pm drink an 8-ounce glass every 15 minutes until the jug is half empty. If you arrive before 11 AM: Drink the other half of the Golytely jug at 11 PM night before procedure. If you arrive after 11 AM: Drink the other half of the Golytely jug at 6 AM day of procedure. For additional instructions refer to your colonoscopy prep instructions. 4000 mL 0    Prenatal Multivit-Min-Fe-FA (PRENATAL/IRON) TABS Take 1 tablet by mouth every morning      saccharomyces boulardii (FLORASTOR) 250 MG capsule Take 1 capsule (250 mg) by mouth 2 times daily 14 capsule 0    sertraline (ZOLOFT) 100 MG tablet Take 1 tablet (100 mg) by mouth daily. Take  with Sertraline 50 mg for a total daily of 150 mg/day. 90 tablet 3    sertraline (ZOLOFT) 50 MG tablet Take 1 tablet (50 mg) by mouth daily. Take with Sertraline 100 mg for a total daily dose of 150 mg/day. 90 tablet 3    UNABLE TO FIND Take 3 tablets by mouth daily MEDICATION NAME: Emerson complete regimen  1 TAB, AM - 2 TAB PM      valsartan (DIOVAN) 40 MG tablet Take 1 tablet (40 mg) by mouth every evening. 90 tablet 1    vitamin D3 (CHOLECALCIFEROL) 2000 units (50 mcg) tablet Take 1 tablet (2,000 Units) by mouth daily 90 tablet 3    zinc gluconate 50 MG tablet Take 50 mg by mouth every morning       No current facility-administered medications for this visit.      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    Vitamin D deficiency    Incontinence of feces with fecal 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    Possible PLMD (periodic limb movement disorder)    Generalized anxiety disorder    Closed compression fracture of L3 lumbar vertebra, sequela    Bilateral hydronephrosis    Posttraumatic stress disorder    Paroxysmal ventricular tachycardia (H)    Bile salt-induced diarrhea    History of colonic  polyps    Major depressive disorder, recurrent episode, mild    CKD stage 3a, GFR 45-59 ml/min (H)    Major depressive disorder, recurrent episode, moderate (H)    Paresthesias in right hand     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     Chronic kidney disease     Clostridium difficile infection 09/04/2022    Congestive heart failure (H) 2019    Right after STEMI, improved Fall 2019    Coronary artery disease involving native coronary artery of native heart without angina pectoris     Depression     Depressive disorder 1970s    Fever and chills 09/24/2021    Gastro-oesophageal reflux disease     Hypertension     Ischemic cardiomyopathy 2019    Major depressive disorder, recurrent episode, moderate (H) 07/25/2013    Narcolepsy     Pituitary microadenoma (H) 2011    MRI 2011- There is a triangular-shaped area with delayed contrast enhancement at the left lateral portion of the pituitary gland posteriorly, measuring 2.5 x 4.3 mm. This is suggestive of a microadenoma, MRI 10/1/22 - Normal pituitary gland and whole brain MRI.    Pyelonephritis 11/01/2022    Pyelonephritis of right kidney 10/24/2021    S/P hip replacement 2004    Bilateral fall 2004 due to OA    Sepsis, due to unspecified organism, unspecified whether acute organ dysfunction present (H) 11/01/2022    Status post total knee replacement 12/29/2014    Urinary tract infection with hematuria, site unspecified 09/24/2021

## 2025-06-25 NOTE — PATIENT INSTRUCTIONS
Wound Care After a Biopsy    What is a skin biopsy?  A skin biopsy allows the doctor to examine a very small piece of tissue under the microscope to determine the diagnosis and the best treatment for the skin condition. A local anesthetic (numbing medicine)  is injected with a very small needle into the skin area to be tested. A small piece of skin is taken from the area. Sometimes a suture (stitch) is used.     What are the risks of a skin biopsy?  I will experience scar, bleeding, swelling, pain, crusting and redness. I may experience incomplete removal or recurrence. Risks of this procedure are excessive bleeding, bruising, infection, nerve damage, numbness, thick (hypertrophic or keloidal) scar and non-diagnostic biopsy.    How should I care for my wound for the first 24 hours?  Keep the wound dry and covered for 24 hours  If it bleeds, hold direct pressure on the area for 15 minutes. If bleeding does not stop then go to the emergency room  Avoid strenuous exercise the first 1-2 days or as your doctor instructs you    How should I care for the wound after 24 hours?  After 24 hours, remove the bandage  You may bathe or shower as normal  If you had a scalp biopsy, you can shampoo as usual and can use shower water to clean the biopsy site daily  Clean the wound twice a day with gentle soap and water  Do not scrub, be gentle  Apply white petroleum/Vaseline after cleaning the wound with a cotton swab or a clean finger, and keep the site covered with a Bandaid /bandage. Bandages are not necessary with a scalp biopsy  If you are unable to cover the site with a Bandaid /bandage, re-apply ointment 2-3 times a day to keep the site moist. Moisture will help with healing  Avoid strenuous activity for first 1-2 days  Avoid lakes, rivers, pools, and oceans until the stitches are removed or the site is healed    How do I clean my wound?  Wash hands thoroughly with soap or use hand  before all wound care  Clean the  wound with gentle soap and water  Apply white petroleum/Vaseline  to wound after it is clean  Replace the Bandaid /bandage to keep the wound covered for the first few days or as instructed by your doctor  If you had a scalp biopsy, warm shower water to the area on a daily basis should suffice    What should I use to clean my wound?   Cotton-tipped applicators (Qtips )  White petroleum jelly (Vaseline ). Use a clean new container and use Q-tips to apply.  Bandaids   as needed  Gentle soap     How should I care for my wound long term?  Do not get your wound dirty  Keep up with wound care for one week or until the area is healed.  A small scab will form and fall off by itself when the area is completely healed. The area will be red and will become pink in color as it heals. Sun protection is very important for how your scar will turn out. Sunscreen with an SPF 30 or greater is recommended once the area is healed.  If you have stitches, stitches need to be removed in 10 days. You may return to our clinic for this or you may have it done locally at your doctor s office.  You should have some soreness but it should be mild and slowly go away over several days. Talk to your doctor about using tylenol for pain,    When should I call my doctor?  If you have increased:   Pain or swelling  Pus or drainage (clear or slightly yellow drainage is ok)  Temperature over 100F  Spreading redness or warmth around wound    When will I hear about my results?  The biopsy results can take 2-3 weeks to come back. The clinic will call you with the results, send you a [employer] message, or have you schedule a follow-up clinic or phone time to discuss the results. Contact our clinics if you do not hear from us in 3 weeks.     Who should I call with questions?  [facility]: [phone]   [facility]: [phone]  For urgent needs outside of business hours call the [facility]  at [phone] and ask for the dermatology resident on call             Proper skin care from [facility]:    -Eliminate harsh soaps as they strip the natural oils from the skin, often resulting in dry itchy skin ( i.e. Dial, Zest, Tamazight Spring)  -Use mild soaps such as Cetaphil or Dove Sensitive Skin in the shower. You do not need to use soap on arms, legs, and trunk every time you shower unless visibly soiled.   -Avoid hot or cold showers.  -After showering, lightly dry off and apply moisturizing within 2-3 minutes. This will help trap moisture in the skin.   -Aggressive use of a moisturizer at least 1-2 times a day to the entire body (including -Vanicream, Cetaphil, Aquaphor or Cerave) and moisturize hands after every washing.  -We recommend using moisturizers that come in a tub that needs to be scooped out, not a pump. This has more of an oil base. It will hold moisture in your skin much better than a water base moisturizer. The above recommended are non-pore clogging.      Wear a sunscreen with at least SPF 30 on your face, ears, neck and V of the chest daily. Wear sunscreen on other areas of the body if those areas are exposed to the sun throughout the day. Sunscreens can contain physical and/or chemical blockers. Physical blockers are less likely to clog pores, these include zinc oxide and titanium dioxide. Reapply every two hour and after swimming.     Sunscreen examples: https://www.ewg.org/sunscreen/    UV radiation  UVA radiation remains constant throughout the day and throughout the year. It is a longer wavelength than UVB and therefore penetrates deeper into the skin leading to immediate and delayed tanning, photoaging, and skin cancer. 70-80% of UVA and UVB radiation occurs between the hours of 10am-2pm.  UVB radiation  UVB radiation causes the most harmful effects and is more significant during the summer months. However, snow and ice can reflect UVB radiation leading to skin damage during  the winter months as well. UVB radiation is responsible for tanning, burning, inflammation, delayed erythema (pinkness), pigmentation (brown spots), and skin cancer.     I recommend self monthly full body exams and yearly full body exams with a dermatology provider. If you develop a new or changing lesion please follow up for examination. Most skin cancers are pink and scaly or pink and pearly. However, we do see blue/brown/black skin cancers.  Consider the ABCDEs of melanoma when giving yourself your monthly full body exam ( don't forget the groin, buttocks, feet, toes, etc). A-asymmetry, B-borders, C-color, D-diameter, E-elevation or evolving. If you see any of these changes please follow up in clinic. If you cannot see your back I recommend purchasing a hand held mirror to use with a larger wall mirror.       Checking for Skin Cancer  You can find cancer early by checking your skin each month. There are 3 kinds of skin cancer. They are melanoma, basal cell carcinoma, and squamous cell carcinoma. Doing monthly skin checks is the best way to find new marks or skin changes. Follow the instructions below for checking your skin.   The ABCDEs of checking moles for melanoma   Check your moles or growths for signs of melanoma using ABCDE:   Asymmetry: the sides of the mole or growth don t match  Border: the edges are ragged, notched, or blurred  Color: the color within the mole or growth varies  Diameter: the mole or growth is larger than 6 mm (size of a pencil eraser)  Evolving: the size, shape, or color of the mole or growth is changing (evolving is not shown in the images below)    Checking for other types of skin cancer  Basal cell carcinoma or squamous cell carcinoma have symptoms such as:     A spot or mole that looks different from all other marks on your skin  Changes in how an area feels, such as itching, tenderness, or pain  Changes in the skin's surface, such as oozing, bleeding, or scaliness  A sore that does  not heal  New swelling or redness beyond the border of a mole    Who s at risk?  Anyone can get skin cancer. But you are at greater risk if you have:   Fair skin, light-colored hair, or light-colored eyes  Many moles or abnormal moles on your skin  A history of sunburns from sunlight or tanning beds  A family history of skin cancer  A history of exposure to radiation or chemicals  A weakened immune system  If you have had skin cancer in the past, you are at risk for recurring skin cancer.   How to check your skin  Do your monthly skin checkups in front of a full-length mirror. Check all parts of your body, including your:   Head (ears, face, neck, and scalp)  Torso (front, back, and sides)  Arms (tops, undersides, upper, and lower armpits)  Hands (palms, backs, and fingers, including under the nails)  Buttocks and genitals  Legs (front, back, and sides)  Feet (tops, soles, toes, including under the nails, and between toes)  If you have a lot of moles, take digital photos of them each month. Make sure to take photos both up close and from a distance. These can help you see if any moles change over time.   Most skin changes are not cancer. But if you see any changes in your skin, call your doctor right away. Only he or she can diagnose a problem. If you have skin cancer, seeing your doctor can be the first step toward getting the treatment that could save your life.   [employer] last reviewed this educational content on 4/1/2019 2000-2020 [employer]. [address] [place] [address], [place] [address]. All rights reserved. This information is not intended as a substitute for professional medical care. Always follow your healthcare professional's instructions.       When should I call my doctor?  If you are worsening or not improving, please, contact us or seek urgent care as noted below.     Who should I call with questions (adults)?    [facility] [place] [phone]  For urgent needs  outside of business hours call the [facility] at [phone] and ask for the dermatology resident on call to be paged  If this is a medical emergency and you are unable to reach an ER, Call [phone]      If you need a prescription refill, please contact your pharmacy. Refills are approved or denied by our Physicians during normal business hours, Monday through Friday.  Per office policy, refills will not be granted if you have not been seen within the past year (or sooner depending on the condition).

## 2025-06-25 NOTE — LETTER
6/25/2025      Gem Gama  [address]  [facility] [address]      Dear Colleague,    Thank you for referring your patient, Gem Gama, to the [facility]. Please see a copy of my visit note below.    [facility] Dermatology Note  Encounter Date: Jun 25, 2025  Office Visit      Dermatology Problem List:  FBSE:  6/25/24    #NUB L upper lateral forehead, S/p Biopsy performed on 6/25/25, pending results.   ____________________________________________    Assessment & Plan:    # Inflammatory papule R cheek  - Please start a daily moisturizer to your skin. Some good examples are: Cetaphil, Vanicream, Vaseline, Aquaphor.     # Neoplasm of unspecified behavior of the skin (D49.2) on the L upper lateral forehead. The differential diagnosis includes blue nevus vs DN vs MM vs other. .   - Punch biopsy performed today, see procedure note below.  - Photographed today     # Benign findings: multiple benign nevi, lentigines, cherry angiomas, SKs  - edu on benign etiology  - Signs and Symptoms of non-melanoma skin cancer and ABCDEs of melanoma reviewed with patient. Patient encouraged to perform monthly self skin exams and educated on how to perform them. UV precautions reviewed with patient. Patient was asked about new or changing moles/lesions on body.   - Sunscreen: Apply 20 minutes prior to going outdoors and reapply every two hours, when wet or sweating. We recommend using an SPF 30 or higher, and to use one that is water resistant.     - RTC for changes     Procedures Performed:   - Punch biopsy procedure note, location(s): see above. After discussion of benefits and risks including but not limited to bleeding, infection, scar, incomplete removal, recurrence, and non-diagnostic biopsy, written consent and photographs were obtained. The area was cleaned with isopropyl alcohol. 0.5mL of 1% lidocaine with epinephrine was injected to obtain adequate anesthesia and a 4  mm punch biopsy was performed at site(s). 4-0 Prolene sutures were utilized to approximate the epidermal edges. White petrolatum ointment and a bandage was applied to the wound. Explicit verbal and written wound care instructions were provided. The patient left the dermatology clinic in good condition.      Follow-up: pending path results    Staff and scribe:    Scribe Disclosure:   I, MOSHE DREW, am serving as a scribe; to document services personally performed by Hortencia Joel PA-C -based on data collection and the provider's statements to me.     Provider Disclosure:  I agree with above History, Review of Systems, Physical exam and Plan.  I have reviewed the content of the documentation and have edited it as needed. I have personally performed the services documented here and the documentation accurately represents those services and the decisions I have made.      Electronically signed by:    All risks, benefits and alternatives were discussed with patient.  Patient is in agreement and understands the assessment and plan.  All questions were answered.    Hortencia Joel PA-C, [facility]  [facility]: Phone: [phone], Fax: [phone]  [facility]: Phone: [phone],  Fax: [phone]  [facility]: Phone: [phone], Fax: [phone]  ____________________________________________    CC: Skin Check (An area on my left cheek below my eye has changed from flat to lumpy. Ear my left eyebrow i also have a discolored spot that is getting larger and darker.I have other brownish spots on my face and arms that are dry and scrape off and return. These brownish spots look like what i think are ahe spots. I'm concerned,that they are dry and scrape off. I have never been diagnosed with any type of cancer and my extended farming family has only skin cancer due to sun.)      Reviewed patients past medical  history and pertinent chart review prior to patient's visit today.     HPI:  Ms. Gem Gama is a 74 year old female who presents today as a new patient for FBSE. Today patient reported area on her R cheek below  that has changed from flat to lumpy.     No personal or family hx of skin cancer.     Patient is otherwise feeling well, without additional concerns.    Labs:  N/A    Physical Exam:  Vitals: LMP  (LMP Unknown)   SKIN: Total skin excluding the undergarment areas was performed. The exam included the head/face, neck, both arms, chest, back, abdomen, both legs, digits and/or nails.    - - Balderas's skin type I, has 100 nevi  - There are dome shaped bright red papules on the trunk.   - Multiple regular brown pigmented macules and papules are identified on the trunk and extremities.   - Scattered brown macules on sun exposed areas.  - There are waxy stuck on tan to brown papules on the trunk.     - There is a tan to brown waxy stuck on papule with surrounding erythema on the : x 1 R upper cheek. .   - L upper lateral forehead there is a 4 mm dark blue macule with an irregular border   - inflammatory papule on the R cheek   - No other lesions of concern on areas examined.     L lateral upper forehead       Medications:  Current Outpatient Medications   Medication Sig Dispense Refill     albuterol (PROVENTIL HFA) 108 (90 Base) MCG/ACT inhaler Inhale 2 puffs into the lungs every 6 hours 8.5 g 0     aspirin (ASA) 81 MG EC tablet Take 1 tablet (81 mg) by mouth daily 90 tablet 0     atorvastatin (LIPITOR) 40 MG tablet Take 1 tablet (40 mg) by mouth daily. 90 tablet 3     bisacodyl (DULCOLAX) 5 MG EC tablet Take 2 tablets at 3 pm the day before your procedure. If your procedure is before 11 am, take 2 additional tablets at 11 pm. If your procedure is after 11 am, take 2 additional tablets at 6 am. For additional instructions refer to your colonoscopy prep instructions. 4 tablet 0     blood glucose (NO BRAND  SPECIFIED) test strip Use to test blood sugar once a day. 100 strip 3     blood glucose monitoring (NO BRAND SPECIFIED) meter device kit Use to test blood sugar once a day. 1 kit 0     coenzyme Q-10 capsule Take 1 capsule by mouth daily.       diazepam (VALIUM) 2 MG tablet Take 0.5-1 tablets (1-2 mg) by mouth 2 times daily as needed for anxiety or muscle spasms. 30 tablet 0     diclofenac (VOLTAREN) 1 % topical gel Apply topically 4 times daily as needed for moderate pain For Jaw pain       docusate sodium (COLACE) 100 MG tablet Take 100 mg by mouth daily as needed for constipation.       estradiol (ESTRING) 7.5 MCG/24HR vaginal ring Place 1 each vaginally every 3 months. 1 each 3     fluticasone-vilanterol (BREO ELLIPTA) 200-25 MCG/ACT inhaler Inhale 1 puff into the lungs daily 3 each 3     isosorbide mononitrate (IMDUR) 30 MG 24 hr tablet Take 0.5 tablets (15 mg) by mouth every evening. 90 tablet 3     loratadine (CLARITIN) 10 MG tablet Take 10 mg by mouth At Bedtime       magnesium gluconate (MAGONATE) 500 (27 Mg) MG tablet Take 500 mg by mouth 2 times daily.       Melatonin 10 MG TABS tablet Take 10 mg by mouth nightly as needed for sleep       nebivolol (BYSTOLIC) 5 MG tablet Take 1 tablet (5 mg) by mouth every evening. 90 tablet 3     nitroGLYcerin (NITROSTAT) 0.4 MG sublingual tablet PLACE 1 TABLET UNDER THE TONGUE EVERY 5 MINUTES AS NEEDED FOR CHEST PAIN 25 tablet 11     pantoprazole (PROTONIX) 40 MG EC tablet Take 1 tablet (40 mg) by mouth daily. 90 tablet 3     polyethylene glycol (GOLYTELY) 236 g suspension The night before the exam at 6 pm drink an 8-ounce glass every 15 minutes until the jug is half empty. If you arrive before 11 AM: Drink the other half of the Golytely jug at 11 PM night before procedure. If you arrive after 11 AM: Drink the other half of the Golytely jug at 6 AM day of procedure. For additional instructions refer to your colonoscopy prep instructions. 4000 mL 0     Prenatal  Multivit-Min-Fe-FA (PRENATAL/IRON) TABS Take 1 tablet by mouth every morning       saccharomyces boulardii (FLORASTOR) 250 MG capsule Take 1 capsule (250 mg) by mouth 2 times daily 14 capsule 0     sertraline (ZOLOFT) 100 MG tablet Take 1 tablet (100 mg) by mouth daily. Take with Sertraline 50 mg for a total daily of 150 mg/day. 90 tablet 3     sertraline (ZOLOFT) 50 MG tablet Take 1 tablet (50 mg) by mouth daily. Take with Sertraline 100 mg for a total daily dose of 150 mg/day. 90 tablet 3     UNABLE TO FIND Take 3 tablets by mouth daily MEDICATION NAME: U[place]ora complete regimen  1 TAB, AM - 2 TAB PM       valsartan (DIOVAN) 40 MG tablet Take 1 tablet (40 mg) by mouth every evening. 90 tablet 1     vitamin D3 (CHOLECALCIFEROL) 2000 units (50 mcg) tablet Take 1 tablet (2,000 Units) by mouth daily 90 tablet 3     zinc gluconate 50 MG tablet Take 50 mg by mouth every morning       No current facility-administered medications for this visit.      Past Medical/Surgical History:   Patient Active Problem List   Diagnosis     TMJ (temporomandibular joint syndrome)     Congenital ureteric stenosis     Lacrimal duct stenosis     Chronic rhinitis     Intermittent asthma     Hypertension goal BP (blood pressure) < 140/90     Osteoarthritis of right knee     Primary narcolepsy without cataplexy     Vitamin D deficiency     Incontinence of feces with fecal urgency     Overactive bladder     Fatigue, unspecified type     History of ischemic cardiomyopathy     Hyperlipidemia LDL goal <70     Calculus of gallbladder without cholecystitis without obstruction     Coronary artery disease with angina pectoris, unspecified vessel or lesion type, unspecified whether native or transplanted heart     Environmental allergies     Class 1 obesity due to excess calories with serious comorbidity and body mass index (BMI) of 30.0 to 30.9 in adult     Chronic insomnia     Hiatal hernia     Cheyne-Rogers breathing disorder     REM sleep without  atonia     Possible PLMD (periodic limb movement disorder)     Generalized anxiety disorder     Closed compression fracture of L3 lumbar vertebra, sequela     Bilateral hydronephrosis     Posttraumatic stress disorder     Paroxysmal ventricular tachycardia (H)     Bile salt-induced diarrhea     History of colonic polyps     Major depressive disorder, recurrent episode, mild     CKD stage 3a, GFR 45-59 ml/min (H)     Major depressive disorder, recurrent episode, moderate (H)     Paresthesias in right hand     Past Medical History:   Diagnosis Date     Acute bilateral low back pain without sciatica 06/30/2023     ADHD (attention deficit hyperactivity disorder)      Anxiety      Arthritis     back, hands, knees, hips     Asthma      Chest pain, unspecified type 10/11/2023     Cheyne-Rogers breathing 09/07/2022     Cheyne-Rogers breathing disorder      Chronic kidney disease      Clostridium difficile infection 09/04/2022     Congestive heart failure (H) 2019    Right after STEMI, improved Fall 2019     Coronary artery disease involving native coronary artery of native heart without angina pectoris      Depression      Depressive disorder 1970s     Fever and chills 09/24/2021     Gastro-oesophageal reflux disease      Hypertension      Ischemic cardiomyopathy 2019     Major depressive disorder, recurrent episode, moderate (H) 07/25/2013     Narcolepsy      Pituitary microadenoma (H) 2011    MRI 2011- There is a triangular-shaped area with delayed contrast enhancement at the left lateral portion of the pituitary gland posteriorly, measuring 2.5 x 4.3 mm. This is suggestive of a microadenoma, MRI 10/1/22 - Normal pituitary gland and whole brain MRI.     Pyelonephritis 11/01/2022     Pyelonephritis of right kidney 10/24/2021     S/P hip replacement 2004    Bilateral fall 2004 due to OA     Sepsis, due to unspecified organism, unspecified whether acute organ dysfunction present (H) 11/01/2022     Status post total knee  replacement 12/29/2014     Urinary tract infection with hematuria, site unspecified 09/24/2021                        Again, thank you for allowing me to participate in the care of your patient.        Sincerely,        Hortencia Joel PA-C    Electronically signed

## 2025-06-27 ENCOUNTER — TELEPHONE (OUTPATIENT)
Dept: PULMONOLOGY | Facility: CLINIC | Age: 75
End: 2025-06-27
Payer: MEDICARE

## 2025-06-27 DIAGNOSIS — J45.20 MILD INTERMITTENT ASTHMA WITHOUT COMPLICATION: Primary | ICD-10-CM

## 2025-06-27 RX ORDER — FLUTICASONE FUROATE AND VILANTEROL 200; 25 UG/1; UG/1
1 POWDER RESPIRATORY (INHALATION) DAILY
Qty: 3 EACH | Refills: 3 | Status: CANCELLED | OUTPATIENT
Start: 2025-06-27

## 2025-06-27 NOTE — TELEPHONE ENCOUNTER
fluticasone-vilanterol (BREO ELLIPTA) 200-25 MCG/ACT inhaler Inhale 1 puff into the lungs daily 3 each 3 ordered 02/26/2024     Last office visit: 2/26/2024 ; last virtual visit: Visit date not found with prescribing provider:  Toña Melvin PA-C   Future Office Visit: 7/2/25  Patients medication may no longer be covered under insurance plan. Message sent to patient to call insurance to see if covered.  Dakota Lopez RN on 6/27/2025 at 11:44 AM

## 2025-06-30 ENCOUNTER — RESULTS FOLLOW-UP (OUTPATIENT)
Dept: DERMATOLOGY | Facility: CLINIC | Age: 75
End: 2025-06-30

## 2025-07-02 ENCOUNTER — VIRTUAL VISIT (OUTPATIENT)
Dept: PSYCHOLOGY | Facility: CLINIC | Age: 75
End: 2025-07-02
Payer: MEDICARE

## 2025-07-02 DIAGNOSIS — F41.1 GENERALIZED ANXIETY DISORDER: ICD-10-CM

## 2025-07-02 DIAGNOSIS — F43.10 POSTTRAUMATIC STRESS DISORDER: ICD-10-CM

## 2025-07-02 DIAGNOSIS — F33.1 MAJOR DEPRESSIVE DISORDER, RECURRENT EPISODE, MODERATE (H): Primary | ICD-10-CM

## 2025-07-02 ASSESSMENT — ANXIETY QUESTIONNAIRES
5. BEING SO RESTLESS THAT IT IS HARD TO SIT STILL: NOT AT ALL
GAD7 TOTAL SCORE: 10
2. NOT BEING ABLE TO STOP OR CONTROL WORRYING: MORE THAN HALF THE DAYS
IF YOU CHECKED OFF ANY PROBLEMS ON THIS QUESTIONNAIRE, HOW DIFFICULT HAVE THESE PROBLEMS MADE IT FOR YOU TO DO YOUR WORK, TAKE CARE OF THINGS AT HOME, OR GET ALONG WITH OTHER PEOPLE: VERY DIFFICULT
8. IF YOU CHECKED OFF ANY PROBLEMS, HOW DIFFICULT HAVE THESE MADE IT FOR YOU TO DO YOUR WORK, TAKE CARE OF THINGS AT HOME, OR GET ALONG WITH OTHER PEOPLE?: VERY DIFFICULT
7. FEELING AFRAID AS IF SOMETHING AWFUL MIGHT HAPPEN: MORE THAN HALF THE DAYS
1. FEELING NERVOUS, ANXIOUS, OR ON EDGE: MORE THAN HALF THE DAYS
GAD7 TOTAL SCORE: 10
4. TROUBLE RELAXING: SEVERAL DAYS
6. BECOMING EASILY ANNOYED OR IRRITABLE: SEVERAL DAYS
GAD7 TOTAL SCORE: 10
7. FEELING AFRAID AS IF SOMETHING AWFUL MIGHT HAPPEN: MORE THAN HALF THE DAYS
3. WORRYING TOO MUCH ABOUT DIFFERENT THINGS: MORE THAN HALF THE DAYS
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
## 2025-07-03 DIAGNOSIS — J45.40 MODERATE PERSISTENT ASTHMA, UNSPECIFIED WHETHER COMPLICATED: ICD-10-CM

## 2025-07-03 RX ORDER — FLUTICASONE FUROATE AND VILANTEROL 200; 25 UG/1; UG/1
1 POWDER RESPIRATORY (INHALATION) DAILY
Qty: 3 EACH | Refills: 3 | Status: SHIPPED | OUTPATIENT
Start: 2025-07-03

## 2025-07-03 NOTE — TELEPHONE ENCOUNTER
fluticasone-vilanterol (BREO ELLIPTA) 200-25 MCG/ACT inhaler Inhale 1 puff into the lungs daily 3 each 3 ordered 02/26/2024      Last Office Visit: 02/26/2024  Future Office visit:  07/21/2025  Next 5 appointments (look out 90 days)      Jul 10, 2025 9:30 AM  (Arrive by 9:10 AM)  Provider Visit with Danny Conteh MD  [facility] ([facility] [place]) [address] [place] [id number]  [phone]             Anali Lara LPN  Pulmonary Medicine:  [facility]  Phone: [phone] Fax: [phone]

## 2025-07-03 NOTE — TELEPHONE ENCOUNTER
[facility] Call Center    Phone Message    May a detailed message be left on voicemail: yes     Reason for Call: Medication Refill Request    Has the patient contacted the pharmacy for the refill? Yes   Name of medication being requested: fluticasone  Provider who prescribed the medication: Unknown  Pharmacy:   [id number] [address] [place] [facility] [address]     Date medication is needed: 7/3/25

## 2025-07-07 ENCOUNTER — OFFICE VISIT (OUTPATIENT)
Dept: UROLOGY | Facility: CLINIC | Age: 75
End: 2025-07-07
Payer: MEDICARE

## 2025-07-07 DIAGNOSIS — N95.2 VAGINAL ATROPHY: ICD-10-CM

## 2025-07-07 DIAGNOSIS — R15.9 INCONTINENCE OF FECES, UNSPECIFIED FECAL INCONTINENCE TYPE: ICD-10-CM

## 2025-07-07 DIAGNOSIS — R39.15 URINARY URGENCY: ICD-10-CM

## 2025-07-07 DIAGNOSIS — N39.41 URGE INCONTINENCE: ICD-10-CM

## 2025-07-07 DIAGNOSIS — N39.0 RECURRENT UTI: Primary | ICD-10-CM

## 2025-07-07 PROCEDURE — 99214 OFFICE O/P EST MOD 30 MIN: CPT | Performed by: PHYSICIAN ASSISTANT

## 2025-07-07 NOTE — NURSING NOTE
Gem Gama's goals for this visit include:   Chief Complaint   Patient presents with    RECHECK     3 month e string exchange       She requests these members of her care team be copied on today's visit information:     PCP: Danny Conteh    Referring Provider:  Referred Self, MD  No address on file    LMP  (LMP Unknown)     Do you need any medication refills at today's visit?     Crystal Salgado LPN on 7/7/2025 at 9:27 AM

## 2025-07-07 NOTE — PROGRESS NOTES
Urology Clinic      Name: Gem Gama    MRN: [id number]   YOB: 1950  Accompanied at today's visit by:self                 Chief Complaint:   Estring exchange          History of Present Illness:   July 7, 2025    HISTORY:   We have been following 74 year old Gem Gama for vaginal atrophy, hx of UTI, hx of pyelonephritis, UUI, voiding dysfunction. Hx of urethral sling in 1990s. Here today for estring exchange. UTIs have continued to be stable with estring. Has been to PFPT in the past which was somewhat helpful for her leakage of urine. Has failed multiple antimuscarinics. Discontinued myrbetriq for interaction with her beta blocker. Fecal incontinence is much better since her cholecystectomy. Of note, has been seen by Dr. Rodriguez on 7/18/24 and discussed third line options for UUI and patient was not interested in options at that time. Has been holding off on third line options given family stressors and was waiting for daughter to get placement at nursing facility. Her daughter has since had placement at a facility but patient has not decided on a third line options. Patient voices no other concerns at this time.            Allergies:     Allergies   Allergen Reactions    Dust Mites Cough, Headache, Other (See Comments) and Shortness Of Breath    Latex Other (See Comments) and Shortness Of Breath     Runny nose  PN: LW Reaction: DIFFICULTY BREATHING      Sulfa Antibiotics Anaphylaxis, Hives and Itching     PN: LW Reaction: HIVES, Swelling  PN: LW Reaction: HIVES, Swelling    Flagyl [Metronidazole Hcl] Anaphylaxis and Rash    Food      PN: LW FI1: nka LW FI2:    Hydrochlorothiazide-Triamterene      PN: LW Reaction: skin rash  PN: LW Reaction: skin rash    No Clinical Screening - See Comments      PN: LW Other1: -Adhesive Tape    Seasonal Allergies      sneezing    Tape [Adhesive Tape] Hives    Metoprolol Itching and Rash    Metronidazole Hives and Rash     PN: LW Reaction: HIVES               Medications:     Current Outpatient Medications   Medication Sig Dispense Refill    albuterol (PROVENTIL HFA) 108 (90 Base) MCG/ACT inhaler Inhale 2 puffs into the lungs every 6 hours 8.5 g 0    aspirin (ASA) 81 MG EC tablet Take 1 tablet (81 mg) by mouth daily 90 tablet 0    atorvastatin (LIPITOR) 40 MG tablet Take 1 tablet (40 mg) by mouth daily. 90 tablet 3    bisacodyl (DULCOLAX) 5 MG EC tablet Take 2 tablets at 3 pm the day before your procedure. If your procedure is before 11 am, take 2 additional tablets at 11 pm. If your procedure is after 11 am, take 2 additional tablets at 6 am. For additional instructions refer to your colonoscopy prep instructions. 4 tablet 0    blood glucose (NO BRAND SPECIFIED) test strip Use to test blood sugar once a day. 100 strip 3    blood glucose monitoring (NO BRAND SPECIFIED) meter device kit Use to test blood sugar once a day. 1 kit 0    coenzyme Q-10 capsule Take 1 capsule by mouth daily.      diazepam (VALIUM) 2 MG tablet Take 0.5-1 tablets (1-2 mg) by mouth 2 times daily as needed for anxiety or muscle spasms. 30 tablet 0    diclofenac (VOLTAREN) 1 % topical gel Apply topically 4 times daily as needed for moderate pain For Jaw pain      docusate sodium (COLACE) 100 MG tablet Take 100 mg by mouth daily as needed for constipation.      estradiol (ESTRING) 7.5 MCG/24HR vaginal ring Place 1 each vaginally every 3 months. 1 each 3    fluticasone-vilanterol (BREO ELLIPTA) 200-25 MCG/ACT inhaler Inhale 1 puff into the lungs daily. 3 each 3    isosorbide mononitrate (IMDUR) 30 MG 24 hr tablet Take 0.5 tablets (15 mg) by mouth every evening. 90 tablet 3    loratadine (CLARITIN) 10 MG tablet Take 10 mg by mouth At Bedtime      magnesium gluconate (MAGONATE) 500 (27 Mg) MG tablet Take 500 mg by mouth 2 times daily.      Melatonin 10 MG TABS tablet Take 10 mg by mouth nightly as needed for sleep      nebivolol (BYSTOLIC) 5 MG tablet Take 1 tablet (5 mg) by mouth every evening. 90  tablet 3    nitroGLYcerin (NITROSTAT) 0.4 MG sublingual tablet PLACE 1 TABLET UNDER THE TONGUE EVERY 5 MINUTES AS NEEDED FOR CHEST PAIN 25 tablet 11    pantoprazole (PROTONIX) 40 MG EC tablet Take 1 tablet (40 mg) by mouth daily. 90 tablet 3    polyethylene glycol (GOLYTELY) 236 g suspension The night before the exam at 6 pm drink an 8-ounce glass every 15 minutes until the jug is half empty. If you arrive before 11 AM: Drink the other half of the Golytely jug at 11 PM night before procedure. If you arrive after 11 AM: Drink the other half of the Golytely jug at 6 AM day of procedure. For additional instructions refer to your colonoscopy prep instructions. 4000 mL 0    Prenatal Multivit-Min-Fe-FA (PRENATAL/IRON) TABS Take 1 tablet by mouth every morning      saccharomyces boulardii (FLORASTOR) 250 MG capsule Take 1 capsule (250 mg) by mouth 2 times daily 14 capsule 0    sertraline (ZOLOFT) 100 MG tablet Take 1 tablet (100 mg) by mouth daily. Take with Sertraline 50 mg for a total daily of 150 mg/day. 90 tablet 3    sertraline (ZOLOFT) 50 MG tablet Take 1 tablet (50 mg) by mouth daily. Take with Sertraline 100 mg for a total daily dose of 150 mg/day. 90 tablet 3    UNABLE TO FIND Take 3 tablets by mouth daily MEDICATION NAME: Uqora complete regimen  1 TAB, AM - 2 TAB PM      valsartan (DIOVAN) 40 MG tablet Take 1 tablet (40 mg) by mouth every evening. 90 tablet 1    vitamin D3 (CHOLECALCIFEROL) 2000 units (50 mcg) tablet Take 1 tablet (2,000 Units) by mouth daily 90 tablet 3    zinc gluconate 50 MG tablet Take 50 mg by mouth every morning       No current facility-administered medications for this visit.               Past  Surgical History:     Past Surgical History:   Procedure Laterality Date    ABDOMEN SURGERY  1974, 1996    Kidney reconstruct. Uterer stenosis    ARTHROPLASTY KNEE  07/09/2014    Procedure: ARTHROPLASTY KNEE;  Surgeon: Levi Johnson MD;  Location: [facility] OR    ARTHROPLASTY KNEE Left 11/24/2014     Procedure: ARTHROPLASTY KNEE;  Surgeon: Levi Johnson MD;  Location:  OR    COLONOSCOPY      Several times dates ??    CV CORONARY ANGIOGRAM N/A 10/09/2019    Procedure: Coronary Angiogram;  Surgeon: Chiquita Chatterjee MD;  Location:  [facility] CATH LAB    CV HEART CATHETERIZATION WITH POSSIBLE INTERVENTION N/A 10/10/2019    Procedure: Heart Catheterization with Possible Intervention;  Surgeon: Chin Garcia MD;  Location:  HEART CARDIAC CATH LAB    CV INTRA AORTIC BALLOON N/A 10/09/2019    Procedure: Intra-Aortic Balloon Pump Insertion;  Surgeon: Chiquita Chatterjee MD;  Location:  HEART CARDIAC CATH LAB    CV INTRA AORTIC BALLOON REMOVAL N/A 10/10/2019    Procedure: Intra-Aortic Balloon Pump Removal;  Surgeon: Chin Garcia MD;  Location:  HEART CARDIAC CATH LAB    CV LEFT HEART CATH N/A 12/11/2020    Procedure: Heart Catheterization with Possible Intervention;  Surgeon: Pilo Otoole MD;  Location:  HEART CARDIAC CATH LAB    CV PCI STENT DRUG ELUTING N/A 10/09/2019    Procedure: PCI Stent Drug Eluting;  Surgeon: Chiquita Chatterjee MD;  Location:  HEART CARDIAC CATH LAB    DAVINCI LAPAROSCOPIC CHOLECYSTECTOMY WITHOUT GRAMS N/A 11/8/2023    Procedure: CHOLECYSTECTOMY, ROBOT-ASSISTED, LAPAROSCOPIC, WITHOUT CHOLANGIOGRAM;  Surgeon: Mickey Gallego MD;  [place] [facility] [place]    EP LOOP RECORDER IMPLANT N/A 11/22/2023    Procedure: Loop Recorder Implant;  Surgeon: Nadeem Jason MD;  Location:  HEART CARDIAC CATH LAB    EYE SURGERY  2011    Cataract surgery both eyes    GENITOURINARY SURGERY  01/01/2008    Prolift    GENITOURINARY SURGERY  1973    In 1973, 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    ORTHOPEDIC SURGERY      bilat total hip    RECTOCELE  REPAIR      [facility] TOTAL ABD HYSTERECTOMY+BLAD REPR  01/01/1992    Vaginal approach with oophrectomy    [facility] TOTAL KNEE ARTHROPLASTY               Physical Exam:   There were no vitals filed for this visit.  PSYCH: NAD  EYES: EOMI  NEURO: AAO x3  : vulva unremarkable. Estring removed without issues. Speculum exam obtained and no obvious lesions, ulcers, bleeding, abnormal discharge. New estring placed vaginally without issues.     LABS:   Creatinine   Date Value Ref Range Status   02/24/2025 1.07 (H) 0.51 - 0.95 mg/dL Final   12/17/2020 1.00 0.52 - 1.04 mg/dL Final            Assessment and Plan:   74 year old is a pleasant female who has  vaginal atrophy, hx of UTI, hx of pyelonephritis, UUI, voiding dysfunction, hx of FI that improved after cholecystectomy. Hx of urethral sling in 1990s.    Plan:  - continue with estring as controlling UTIs. follow-up in 3 months for next estring exchange.  - continue to hold off on third line options as still working to get daughter into different group home that is closer to home.   - contact clinic if develops s/s of UTI in the future.   - After discussing the assessment and plan with patient, patient verbalizes understanding and agrees to the above plan. All questions answered.     15 minutes spent on the date of the encounter doing chart review, review of labs, exam/estring exchange, review of test results, interpretation of tests, patient visit and documentation.      Faduom Hays PA-C  Urology  July 7, 2025      Patient Care Team:  Danny Conteh MD as PCP - General (Internal Medicine)  Danny Conteh MD as Assigned PCP  Evangelist Lee MD as Hospitalist (Internal Medicine)  Mel Rodriguez MD as MD (Urology)  Kashif Hadley MD as Assigned Sleep Provider  Agus Segura MD as MD (Surgery)  Mickey Gallego MD as MD (Surgery)  Ramya Keller MD as MD (Otolaryngology)  Boyd Saunders AuD (Audiology)  Ramya Keller MD as MD  (Otolaryngology)  Toña Melvin PA-C as Assigned Cancer Care Provider  Luis Fairbanks LICSW as Assigned Behavioral Health Provider  Hortencia Joel PA-C as Physician Assistant (Dermatology)  Fadumo Hays PA-C as Assigned Surgical Provider  Luis A Moody MD as MD (Pulmonary Disease)  SELF, REFERRED

## 2025-07-08 ENCOUNTER — TELEPHONE (OUTPATIENT)
Dept: GASTROENTEROLOGY | Facility: CLINIC | Age: 75
End: 2025-07-08
Payer: MEDICARE

## 2025-07-08 ASSESSMENT — PATIENT HEALTH QUESTIONNAIRE - PHQ9
10. IF YOU CHECKED OFF ANY PROBLEMS, HOW DIFFICULT HAVE THESE PROBLEMS MADE IT FOR YOU TO DO YOUR WORK, TAKE CARE OF THINGS AT HOME, OR GET ALONG WITH OTHER PEOPLE: VERY DIFFICULT
SUM OF ALL RESPONSES TO PHQ QUESTIONS 1-9: 14
SUM OF ALL RESPONSES TO PHQ QUESTIONS 1-9: 14

## 2025-07-08 NOTE — TELEPHONE ENCOUNTER
Caller: Gem    Reason for Reschedule/Cancellation (please be detailed, any staff messages or encounters to note?):   Provider not available-patient could be rescheduled at  or     Did you cancel or rescheduled an EUS procedure? No.    Is screening questionnaire older than 3 months from the reschedule date.   If Yes, please complete screening questionnaire. Yes    Prior to reschedule please review:  Ordering Provider: Vocal-cannot see any order-need to get new order  Sedation Determined: MAC  Does patient have any ASC Exclusions, please identify?: Yes-LEA    Notes on Cancelled Procedure:  Procedure: Lower Endoscopy [Colonoscopy]   Date: 9/11/25  Location: [facility]; [address] [place] [address], [address] Floor, [place] [address]   Surgeon: Damien    Rescheduled: No, Left VM and sent MC    Case in depot-patient could be rescheduled at  or

## 2025-07-09 ENCOUNTER — VIRTUAL VISIT (OUTPATIENT)
Dept: PSYCHOLOGY | Facility: CLINIC | Age: 75
End: 2025-07-09
Payer: MEDICARE

## 2025-07-09 DIAGNOSIS — F43.10 POSTTRAUMATIC STRESS DISORDER: ICD-10-CM

## 2025-07-09 DIAGNOSIS — F41.1 GENERALIZED ANXIETY DISORDER: ICD-10-CM

## 2025-07-09 DIAGNOSIS — F33.1 MAJOR DEPRESSIVE DISORDER, RECURRENT EPISODE, MODERATE (H): Primary | ICD-10-CM
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
## 2025-07-10 ENCOUNTER — LAB (OUTPATIENT)
Dept: LAB | Facility: CLINIC | Age: 75
End: 2025-07-10
Payer: MEDICARE

## 2025-07-10 ENCOUNTER — OFFICE VISIT (OUTPATIENT)
Dept: FAMILY MEDICINE | Facility: CLINIC | Age: 75
End: 2025-07-10
Payer: MEDICARE

## 2025-07-10 VITALS
HEART RATE: 53 BPM | OXYGEN SATURATION: 97 % | BODY MASS INDEX: 29.96 KG/M2 | SYSTOLIC BLOOD PRESSURE: 138 MMHG | RESPIRATION RATE: 22 BRPM | DIASTOLIC BLOOD PRESSURE: 76 MMHG | TEMPERATURE: 96.2 F | WEIGHT: 177.25 LBS

## 2025-07-10 DIAGNOSIS — Z13.6 SCREENING FOR CARDIOVASCULAR CONDITION: ICD-10-CM

## 2025-07-10 DIAGNOSIS — F51.05 INSOMNIA DUE TO MENTAL CONDITION: ICD-10-CM

## 2025-07-10 DIAGNOSIS — I10 HYPERTENSION GOAL BP (BLOOD PRESSURE) < 140/90: Primary | ICD-10-CM

## 2025-07-10 DIAGNOSIS — I10 HYPERTENSION GOAL BP (BLOOD PRESSURE) < 130/80: ICD-10-CM

## 2025-07-10 DIAGNOSIS — F33.1 MAJOR DEPRESSIVE DISORDER, RECURRENT EPISODE, MODERATE (H): ICD-10-CM

## 2025-07-10 DIAGNOSIS — I25.119 CORONARY ARTERY DISEASE WITH ANGINA PECTORIS, UNSPECIFIED VESSEL OR LESION TYPE, UNSPECIFIED WHETHER NATIVE OR TRANSPLANTED HEART: ICD-10-CM

## 2025-07-10 DIAGNOSIS — E78.5 HYPERLIPIDEMIA LDL GOAL <70: Primary | ICD-10-CM

## 2025-07-10 DIAGNOSIS — F43.10 PTSD (POST-TRAUMATIC STRESS DISORDER): ICD-10-CM

## 2025-07-10 DIAGNOSIS — N18.32 CKD STAGE G3B/A1, GFR 30-44 AND ALBUMIN CREATININE RATIO <30 MG/G (H): ICD-10-CM

## 2025-07-10 DIAGNOSIS — E78.5 HYPERLIPIDEMIA LDL GOAL <70: ICD-10-CM

## 2025-07-10 DIAGNOSIS — N18.31 CKD STAGE G3A/A1, GFR 45-59 AND ALBUMIN CREATININE RATIO <30 MG/G (H): ICD-10-CM

## 2025-07-10 DIAGNOSIS — R19.7 DIARRHEA, UNSPECIFIED TYPE: ICD-10-CM

## 2025-07-10 DIAGNOSIS — F41.1 GAD (GENERALIZED ANXIETY DISORDER): ICD-10-CM

## 2025-07-10 LAB
ALBUMIN SERPL BCG-MCNC: 4.3 G/DL (ref 3.5–5.2)
ALP SERPL-CCNC: 102 U/L (ref 40–150)
ALT SERPL W P-5'-P-CCNC: 36 U/L (ref 0–50)
ANION GAP SERPL CALCULATED.3IONS-SCNC: 11 MMOL/L (ref 7–15)
AST SERPL W P-5'-P-CCNC: 54 U/L (ref 0–45)
BILIRUB SERPL-MCNC: 0.7 MG/DL
BUN SERPL-MCNC: 12.3 MG/DL (ref 8–23)
CALCIUM SERPL-MCNC: 9.1 MG/DL (ref 8.8–10.4)
CHLORIDE SERPL-SCNC: 106 MMOL/L (ref 98–107)
CHOLEST SERPL-MCNC: 141 MG/DL
CREAT SERPL-MCNC: 0.94 MG/DL (ref 0.51–0.95)
CREAT UR-MCNC: 30.6 MG/DL
EGFRCR SERPLBLD CKD-EPI 2021: 63 ML/MIN/1.73M2
FASTING STATUS PATIENT QL REPORTED: YES
FASTING STATUS PATIENT QL REPORTED: YES
GLUCOSE SERPL-MCNC: 102 MG/DL (ref 70–99)
HCO3 SERPL-SCNC: 23 MMOL/L (ref 22–29)
HDLC SERPL-MCNC: 70 MG/DL
LDLC SERPL CALC-MCNC: 60 MG/DL
MICROALBUMIN UR-MCNC: <12 MG/L
MICROALBUMIN/CREAT UR: NORMAL MG/G{CREAT}
NONHDLC SERPL-MCNC: 71 MG/DL
POTASSIUM SERPL-SCNC: 4.3 MMOL/L (ref 3.4–5.3)
PROT SERPL-MCNC: 7 G/DL (ref 6.4–8.3)
SODIUM SERPL-SCNC: 140 MMOL/L (ref 135–145)
TRIGL SERPL-MCNC: 54 MG/DL

## 2025-07-10 PROCEDURE — 99214 OFFICE O/P EST MOD 30 MIN: CPT | Performed by: INTERNAL MEDICINE

## 2025-07-10 PROCEDURE — 1126F AMNT PAIN NOTED NONE PRSNT: CPT | Performed by: INTERNAL MEDICINE

## 2025-07-10 PROCEDURE — 36415 COLL VENOUS BLD VENIPUNCTURE: CPT | Performed by: INTERNAL MEDICINE

## 2025-07-10 PROCEDURE — 96127 BRIEF EMOTIONAL/BEHAV ASSMT: CPT | Performed by: INTERNAL MEDICINE

## 2025-07-10 PROCEDURE — 80053 COMPREHEN METABOLIC PANEL: CPT | Performed by: INTERNAL MEDICINE

## 2025-07-10 PROCEDURE — 80061 LIPID PANEL: CPT | Performed by: INTERNAL MEDICINE

## 2025-07-10 PROCEDURE — 3075F SYST BP GE 130 - 139MM HG: CPT | Performed by: INTERNAL MEDICINE

## 2025-07-10 PROCEDURE — 3078F DIAST BP <80 MM HG: CPT | Performed by: INTERNAL MEDICINE

## 2025-07-10 RX ORDER — SERTRALINE HYDROCHLORIDE 100 MG/1
200 TABLET, FILM COATED ORAL DAILY
Qty: 180 TABLET | Refills: 1 | Status: SHIPPED | OUTPATIENT
Start: 2025-07-10

## 2025-07-10 RX ORDER — HYDROXYZINE HYDROCHLORIDE 10 MG/1
10 TABLET, FILM COATED ORAL 3 TIMES DAILY PRN
Qty: 90 TABLET | Refills: 2 | Status: SHIPPED | OUTPATIENT
Start: 2025-07-10

## 2025-07-10 RX ORDER — NEBIVOLOL 2.5 MG/1
2.5 TABLET ORAL DAILY
Qty: 90 TABLET | Refills: 1 | Status: SHIPPED | OUTPATIENT
Start: 2025-07-10

## 2025-07-10 RX ORDER — CHOLESTYRAMINE 4 G/9G
1 POWDER, FOR SUSPENSION ORAL 2 TIMES DAILY PRN
Qty: 60 PACKET | Refills: 2 | Status: SHIPPED | OUTPATIENT
Start: 2025-07-10

## 2025-07-10 ASSESSMENT — PAIN SCALES - GENERAL: PAINLEVEL_OUTOF10: NO PAIN (0)

## 2025-07-10 NOTE — PATIENT INSTRUCTIONS
At [facility], we strive to deliver an exceptional experience to you, every time we see you. If you receive a survey, please let us know what we are doing well and/or what we could improve upon, as we do value your feedback.  If you have MyChart, you can expect to receive results automatically within 24 hours of their completion.  Your provider will send a note interpreting your results as well.   If you do not have MyChart, you should receive your results in about a week by mail.    Your care team:                            Family Medicine Internal Medicine   MD Danny Christian, MD Jahaira Olivas, MD Lee Mason, MD Sirisha Lindquist, PA-C KATE Zepeda, LINSEY Wagner, MD Pediatrics   MD Yelena Bravo, MD Erin Patino, PABETO Vallejo APRN CNP   MD Raissa Callahan, MD Elizabeth Choi, CNP      Same-Day Provider (No follow-up visits)    PATTY Sam PA-C     Clinic hours: Monday - Thursday 7 am-6 pm; Fridays 7 am-5 pm.   Urgent care: Monday - Friday 10 am- 8 pm; Saturday and Sunday 9 am-5 pm.    Clinic: [phone]       [facility]: Monday - Thursday 8 am - 7 pm; Friday 8 am - 6 pm  [facility]: [phone]     [facility] Scheduling: Monday - Friday 7 am - 7 pm; Saturday 7 am - 3:30 pm  ([phone] [place] : [phone]

## 2025-07-10 NOTE — TELEPHONE ENCOUNTER
Pt had appointment today with PCP. Closing encounter.     Sharnoa Todd RN on 7/10/2025 at 10:02 AM

## 2025-07-10 NOTE — PROGRESS NOTES
[facility] INTERNAL MEDICINE NOTE    Gem Gama is a 74 year old female who presents to clinic today for the following health issues:     Hypertension follow-up   Outpatient blood pressures monitoring: yes  Low Salt Diet: no  Adverse effects: yes, bradycardia  Compliance: excellent  Secondary causes: none  Chronic kidney disease: yes  Hyperthyroidism: no  Anxiety: yes  Decongestants: no  Substance abuse: no  Diabetes: no  Ischemic heart disease: yes  Stroke: no  Hyperlipidemia: yes.      Mental Health Follow-up:  Patient presents to follow-up on Depression & Anxiety.Patient's depression since last visit has been:  Bad  The patient is not having other symptoms associated with depression.  Patient's anxiety since last visit has been:  Bad  The patient is not having other symptoms associated with anxiety.  Any significant life events: No  Patient is feeling anxious or having panic attacks.  Patient has no concerns about alcohol or drug use.    Vascular Disease:  She presents for follow up of vascular disease.      She takes daily aspirin.  .  Patient Active Problem List   Diagnosis    TMJ (temporomandibular joint syndrome)    Congenital ureteric stenosis    Lacrimal duct stenosis    Chronic rhinitis    Intermittent asthma    Hypertension goal BP (blood pressure) < 140/90    Osteoarthritis of right knee    Primary narcolepsy without cataplexy    Vitamin D deficiency    Incontinence of feces with fecal urgency    Overactive bladder    Fatigue, unspecified type    History of ischemic cardiomyopathy    Hyperlipidemia LDL goal <70    Calculus of gallbladder without cholecystitis without obstruction    Coronary artery disease with angina pectoris, unspecified vessel or lesion type, unspecified whether native or transplanted heart    Environmental allergies    Class 1 obesity due to excess calories with serious comorbidity and body mass index (BMI) of 30.0  to 30.9 in adult    Chronic insomnia    Hiatal hernia    Cheyne-Rogers breathing disorder    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    Possible PLMD (periodic limb movement disorder)    Generalized anxiety disorder    Closed compression fracture of L3 lumbar vertebra, sequela    Bilateral hydronephrosis    Posttraumatic stress disorder    Paroxysmal ventricular tachycardia (H)    Bile salt-induced diarrhea    History of colonic polyps    Major depressive disorder, recurrent episode, mild    CKD stage G3a/A1, GFR 45-59 and albumin creatinine ratio <30 mg/g (H)    Major depressive disorder, recurrent episode, moderate (H)    Paresthesias in right hand     Past Surgical History:   Procedure Laterality Date    ABDOMEN SURGERY  1974, 1996    Kidney reconstruct. Uterer stenosis    ARTHROPLASTY KNEE  07/09/2014    Procedure: ARTHROPLASTY KNEE;  Surgeon: Levi Johnson MD;  Location:  OR    ARTHROPLASTY KNEE Left 11/24/2014    Procedure: ARTHROPLASTY KNEE;  Surgeon: Levi Johnson MD;  Location:  OR    COLONOSCOPY      Several times dates ??    CV CORONARY ANGIOGRAM N/A 10/09/2019    Procedure: Coronary Angiogram;  Surgeon: Chiquita Chatterjee MD;  Location:  HEART CARDIAC CATH LAB    CV HEART CATHETERIZATION WITH POSSIBLE INTERVENTION N/A 10/10/2019    Procedure: Heart Catheterization with Possible Intervention;  Surgeon: Chin Garcia MD;  Location:  HEART CARDIAC CATH LAB    CV INTRA AORTIC BALLOON N/A 10/09/2019    Procedure: Intra-Aortic Balloon Pump Insertion;  Surgeon: Chiquita Chatterjee MD;  Location:  HEART CARDIAC CATH LAB    CV INTRA AORTIC BALLOON REMOVAL N/A 10/10/2019    Procedure: Intra-Aortic Balloon Pump Removal;  Surgeon: Chin Garcia MD;  Location:  HEART CARDIAC CATH LAB    CV LEFT HEART CATH N/A 12/11/2020    Procedure: Heart Catheterization with Possible Intervention;  Surgeon: Pilo Otoole MD;  Location:  HEART CARDIAC CATH LAB    CV PCI STENT DRUG ELUTING N/A 10/09/2019     Procedure: PCI Stent Drug Eluting;  Surgeon: Chiquita Chatterjee MD;  Location:  HEART CARDIAC CATH LAB    DAVINCI LAPAROSCOPIC CHOLECYSTECTOMY WITHOUT GRAMS N/A 11/8/2023    Procedure: CHOLECYSTECTOMY, ROBOT-ASSISTED, LAPAROSCOPIC, WITHOUT CHOLANGIOGRAM;  Surgeon: Mickey Gallego MD;  Location: [facility] OR    EP LOOP RECORDER IMPLANT N/A 11/22/2023    Procedure: Loop Recorder Implant;  Surgeon: Nadeem Jason MD;  Location:  HEART CARDIAC CATH LAB    EYE SURGERY  2011    Cataract surgery both eyes    GENITOURINARY SURGERY  01/01/2008    Prolift    GENITOURINARY SURGERY  1973    In 1973, she had surgery to correct congenital narrowing in the ureter at its connection to the right kidney    GENITOURINARY SURGERY  1997 1997 pt went to [facility] and had surgery to remove the scar tissue, rebuild the bladder from previous ureter surgery.    ORTHOPEDIC SURGERY      bilat total hip    RECTOCELE REPAIR      ZZC TOTAL ABD HYSTERECTOMY+BLAD REPR  01/01/1992    Vaginal approach with oophrectomy    ZZC TOTAL KNEE ARTHROPLASTY         Social History     Tobacco Use    Smoking status: Never     Passive exposure: Never    Smokeless tobacco: Never   Substance Use Topics    Alcohol use: No     Family History   Problem Relation Age of Onset    Hypertension Mother     Arthritis Mother     Cerebrovascular Disease Mother     Anesthesia Reaction Mother         Halucinates    Venous thrombosis Father     Cerebrovascular Disease Father         Three Strokes    Diabetes Father         Type 2, after his 60's    Kidney Disease Father     Hypertension Father     Hyperlipidemia Father     Osteoporosis Father     Thyroid Disease Sister         Low thyroid    Anesthesia Reaction Sister         Hallucinates    Diabetes Brother         After recovered from aspiration pneumonia, developed diabetes from high carb diet from feeding tube    Birth defects Brother     Osteoporosis Maternal Grandmother         Old age lived to 90     Coronary Artery Disease Maternal Grandfather         Strokes, lived to 105    Substance Abuse Maternal Grandfather         Alcohol    Osteoporosis Maternal Grandfather         Old age    Coronary Artery Disease Paternal Grandmother     Osteoporosis Paternal Grandmother         Long term issue for her    Obesity Paternal Grandmother     Coronary Artery Disease Paternal Grandfather     Sjogren's Daughter     Diabetes Daughter         Diagnosed after stroke    Cerebrovascular Disease Daughter     Hypertension Daughter         With diabetes and stroke    Hyperlipidemia Daughter     Depression Daughter         Does not acknowledge    Anxiety Disorder Daughter     Asthma Daughter         Anxiety, air quality, and exercise triggered    Obesity Daughter     Diabetes Daughter         Gestational and pre diabetic A1C    Anxiety Disorder Daughter     Asthma Daughter         Excursion induced    Thyroid Disease Daughter     Diabetes Son         On metformin.    Hypertension Son     Obesity Son     Depression Son           ..from job    Mental Illness Other     Substance Abuse Other     Substance Abuse Other     Substance Abuse Other          Allergies   Allergen Reactions    Dust Mites Cough, Headache, Other (See Comments) and Shortness Of Breath    Latex Other (See Comments) and Shortness Of Breath     Runny nose  PN: LW Reaction: DIFFICULTY BREATHING      Sulfa Antibiotics Anaphylaxis, Hives and Itching     PN: LW Reaction: HIVES, Swelling  PN: LW Reaction: HIVES, Swelling    Flagyl [Metronidazole Hcl] Anaphylaxis and Rash    Food      PN: LW FI1: nka LW FI2:    Hydrochlorothiazide-Triamterene      PN: LW Reaction: skin rash  PN: LW Reaction: skin rash    No Clinical Screening - See Comments      PN: LW Other1: -Adhesive Tape    Seasonal Allergies      sneezing    Tape [Adhesive Tape] Hives    Metoprolol Itching and Rash    Metronidazole Hives and Rash     PN: LW Reaction: HIVES       Recent Labs   Lab Test  07/10/25  1032 02/24/25  1937 11/14/24  1400 08/25/24  1003 07/09/24  1218 10/11/23  1756 09/28/23  1236 08/05/23  1204 11/01/22  0544 09/21/22  0820 09/23/21  1832 12/17/20  1403 12/10/20  0557   A1C  --   --   --  5.6 5.5  --   --   --   --   --   --  5.2  --    LDL 60  --   --   --  66  --   --  56  --   --    < > 37  --    HDL 70  --   --   --  66  --   --  62  --   --    < > 51  --    TRIG 54  --   --   --  75  --   --  88  --   --    < > 168*  --    ALT 36  --   --   --  25  --  118* 19   < >  --    < > 26  --    CR 0.94 1.07*   < > 1.15* 1.04*   < > 0.99* 1.02*   < >  --    < > 1.00 1.04   GFRESTIMATED 63 54*   < > 49* 56*   < > 60* 57*   < >  --    < > 56* 54*   GFRESTBLACK  --   --   --   --   --   --   --   --   --   --   --  65 62   POTASSIUM 4.3 4.0   < > 4.5 4.7   < > 4.9 4.8   < >  --    < > 3.9 4.3   TSH  --   --   --   --  0.73  --   --   --   --  2.02  --   --   --     < > = values in this interval not displayed.      BP Readings from Last 3 Encounters:   07/10/25 138/76   05/02/25 118/61   03/17/25 135/74    Wt Readings from Last 3 Encounters:   07/10/25 80.4 kg (177 lb 4 oz)   05/13/25 78.5 kg (173 lb)   03/17/25 78.1 kg (172 lb 3.2 oz)                    ROS:  C: NEGATIVE for fever, chills, change in weight  I: NEGATIVE for worrisome rashes, moles or lesions  E: NEGATIVE for vision changes or irritation  E/M: NEGATIVE for ear, mouth and throat problems  R: NEGATIVE for significant cough or SOB  B: NEGATIVE for masses, tenderness or discharge  CV: NEGATIVE for chest pain, palpitations or peripheral edema  GI: NEGATIVE for nausea, abdominal pain, heartburn, or change in bowel habits  : NEGATIVE for frequency, dysuria, or hematuria  M: NEGATIVE for significant arthralgias or myalgia  N: NEGATIVE for weakness, dizziness or paresthesias  E: NEGATIVE for temperature intolerance, skin/hair changes  H: NEGATIVE for bleeding problems  P: NEGATIVE for changes in mood or affect    OBJECTIVE:                                                     /76 (BP Location: Left arm, Patient Position: Sitting, Cuff Size: Adult Regular)   Pulse 53   Temp (!) 96.2  F (35.7  C) (Temporal)   Resp 22   Wt 80.4 kg (177 lb 4 oz)   LMP  (LMP Unknown)   SpO2 97%   BMI 29.96 kg/m    Body mass index is 29.96 kg/m .  GENERAL: alert and no distress  EYES: Eyes grossly normal to inspection, PERRL and conjunctivae and sclerae normal  HENT: ear canals and TM's normal, nose and mouth without ulcers or lesions  NECK: no adenopathy, no asymmetry, masses, or scars  RESP: lungs clear to auscultation - no rales, rhonchi or wheezes  CV: regular rates and rhythm and no peripheral edema  NEURO: Normal strength and tone, mentation intact and speech normal  PSYCH: anxious    Diagnostic Test Results:  Results for orders placed or performed in visit on 07/10/25   Comprehensive metabolic panel     Status: Abnormal   Result Value Ref Range    Sodium 140 135 - 145 mmol/L    Potassium 4.3 3.4 - 5.3 mmol/L    Carbon Dioxide (CO2) 23 22 - 29 mmol/L    Anion Gap 11 7 - 15 mmol/L    Urea Nitrogen 12.3 8.0 - 23.0 mg/dL    Creatinine 0.94 0.51 - 0.95 mg/dL    GFR Estimate 63 >60 mL/min/1.73m2    Calcium 9.1 8.8 - 10.4 mg/dL    Chloride 106 98 - 107 mmol/L    Glucose 102 (H) 70 - 99 mg/dL    Alkaline Phosphatase 102 40 - 150 U/L    AST 54 (H) 0 - 45 U/L    ALT 36 0 - 50 U/L    Protein Total 7.0 6.4 - 8.3 g/dL    Albumin 4.3 3.5 - 5.2 g/dL    Bilirubin Total 0.7 <=1.2 mg/dL    Patient Fasting > 8hrs? Yes    Lipid panel reflex to direct LDL Non-fasting     Status: None   Result Value Ref Range    Cholesterol 141 <200 mg/dL    Triglycerides 54 <150 mg/dL    Direct Measure HDL 70 >=50 mg/dL    LDL Cholesterol Calculated 60 <100 mg/dL    Non HDL Cholesterol 71 <130 mg/dL    Patient Fasting > 8hrs? Yes     Narrative    Cholesterol  Desirable: < 200 mg/dL  Borderline High: 200 - 239 mg/dL  High: >= 240 mg/dL    Triglycerides  Normal: < 150 mg/dL  Borderline High:  150 - 199 mg/dL  High: 200-499 mg/dL  Very High: >= 500 mg/dL    Direct Measure HDL  Female: >= 50 mg/dL   Male: >= 40 mg/dL    LDL Cholesterol  Desirable: < 100 mg/dL  Above Desirable: 100 - 129 mg/dL   Borderline High: 130 - 159 mg/dL   High:  160 - 189 mg/dL   Very High: >= 190 mg/dL    Non HDL Cholesterol  Desirable: < 130 mg/dL  Above Desirable: 130 - 159 mg/dL  Borderline High: 160 - 189 mg/dL  High: 190 - 219 mg/dL  Very High: >= 220 mg/dL   Results for orders placed or performed in visit on 07/10/25   Albumin Random Urine Quantitative with Creat Ratio     Status: None   Result Value Ref Range    Creatinine Urine mg/dL 30.6 mg/dL    Albumin Urine mg/L <12.0 mg/L    Albumin Urine mg/g Cr          ASSESSMENT/PLAN:                                                      Hypertension goal BP (blood pressure) < 140/90  - nebivolol (BYSTOLIC) 2.5 MG tablet; Take 1 tablet (2.5 mg) by mouth daily.    LORETTA (generalized anxiety disorder)  - sertraline (ZOLOFT) 100 MG tablet; Take 2 tablets (200 mg) by mouth daily.  - hydrOXYzine HCl (ATARAX) 10 MG tablet; Take 1 tablet (10 mg) by mouth 3 times daily as needed for anxiety or other (sleep).    Major depressive disorder, recurrent episode, moderate (H)  - sertraline (ZOLOFT) 100 MG tablet; Take 2 tablets (200 mg) by mouth daily.    PTSD (post-traumatic stress disorder)  - sertraline (ZOLOFT) 100 MG tablet; Take 2 tablets (200 mg) by mouth daily.    CKD stage G3a/A1, GFR 45-59 and albumin creatinine ratio <30 mg/g (H)  - Comprehensive metabolic panel  - US Renal Complete Non-Vascular; Future    Coronary artery disease with angina pectoris, unspecified vessel or lesion type, unspecified whether native or transplanted heart  - nebivolol (BYSTOLIC) 2.5 MG tablet; Take 1 tablet (2.5 mg) by mouth daily.  - Lipid panel reflex to direct LDL Non-fasting    Insomnia due to mental condition  - hydrOXYzine HCl (ATARAX) 10 MG tablet; Take 1 tablet (10 mg) by mouth 3 times daily as needed  for anxiety or other (sleep).    Diarrhea, unspecified type  - cholestyramine (QUESTRAN) 4 g packet; Take 1 packet (4 g) by mouth 2 times daily as needed (diarrhea). Do not take with other oral medications.    Danny Conteh MD  [facility]

## 2025-07-15 ENCOUNTER — ANCILLARY PROCEDURE (OUTPATIENT)
Dept: CARDIOLOGY | Facility: CLINIC | Age: 75
End: 2025-07-15
Attending: INTERNAL MEDICINE
Payer: MEDICARE

## 2025-07-15 DIAGNOSIS — Z95.818 IMPLANTABLE LOOP RECORDER PRESENT: ICD-10-CM

## 2025-07-15 PROCEDURE — 93298 REM INTERROG DEV EVAL SCRMS: CPT | Mod: 26 | Performed by: INTERNAL MEDICINE

## 2025-07-15 PROCEDURE — 93298 REM INTERROG DEV EVAL SCRMS: CPT

## 2025-07-15 ASSESSMENT — PATIENT HEALTH QUESTIONNAIRE - PHQ9
SUM OF ALL RESPONSES TO PHQ QUESTIONS 1-9: 9
10. IF YOU CHECKED OFF ANY PROBLEMS, HOW DIFFICULT HAVE THESE PROBLEMS MADE IT FOR YOU TO DO YOUR WORK, TAKE CARE OF THINGS AT HOME, OR GET ALONG WITH OTHER PEOPLE: VERY DIFFICULT
SUM OF ALL RESPONSES TO PHQ QUESTIONS 1-9: 9

## 2025-07-16 ENCOUNTER — VIRTUAL VISIT (OUTPATIENT)
Dept: PSYCHOLOGY | Facility: CLINIC | Age: 75
End: 2025-07-16
Payer: MEDICARE

## 2025-07-16 DIAGNOSIS — F41.1 GENERALIZED ANXIETY DISORDER: ICD-10-CM

## 2025-07-16 DIAGNOSIS — F33.0 MAJOR DEPRESSIVE DISORDER, RECURRENT EPISODE, MILD: Primary | ICD-10-CM

## 2025-07-16 DIAGNOSIS — F43.10 POSTTRAUMATIC STRESS DISORDER: ICD-10-CM
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
## 2025-07-17 ENCOUNTER — ANCILLARY PROCEDURE (OUTPATIENT)
Dept: ULTRASOUND IMAGING | Facility: CLINIC | Age: 75
End: 2025-07-17
Attending: INTERNAL MEDICINE
Payer: MEDICARE

## 2025-07-17 DIAGNOSIS — N18.31 CKD STAGE G3A/A1, GFR 45-59 AND ALBUMIN CREATININE RATIO <30 MG/G (H): ICD-10-CM

## 2025-07-17 PROCEDURE — 76770 US EXAM ABDO BACK WALL COMP: CPT

## 2025-07-21 ENCOUNTER — OFFICE VISIT (OUTPATIENT)
Dept: PULMONOLOGY | Facility: CLINIC | Age: 75
End: 2025-07-21
Payer: MEDICARE

## 2025-07-21 VITALS
DIASTOLIC BLOOD PRESSURE: 58 MMHG | HEART RATE: 59 BPM | OXYGEN SATURATION: 97 % | BODY MASS INDEX: 30.22 KG/M2 | SYSTOLIC BLOOD PRESSURE: 104 MMHG | WEIGHT: 177 LBS | HEIGHT: 64 IN

## 2025-07-21 DIAGNOSIS — R07.89 CHEST TIGHTNESS: ICD-10-CM

## 2025-07-21 PROCEDURE — G2211 COMPLEX E/M VISIT ADD ON: HCPCS | Performed by: INTERNAL MEDICINE

## 2025-07-21 PROCEDURE — 3078F DIAST BP <80 MM HG: CPT | Performed by: INTERNAL MEDICINE

## 2025-07-21 PROCEDURE — 3074F SYST BP LT 130 MM HG: CPT | Performed by: INTERNAL MEDICINE

## 2025-07-21 PROCEDURE — 99215 OFFICE O/P EST HI 40 MIN: CPT | Performed by: INTERNAL MEDICINE

## 2025-07-21 PROCEDURE — 1126F AMNT PAIN NOTED NONE PRSNT: CPT | Performed by: INTERNAL MEDICINE

## 2025-07-21 RX ORDER — ALBUTEROL SULFATE 90 UG/1
2 INHALANT RESPIRATORY (INHALATION) EVERY 6 HOURS
Qty: 8.5 G | Refills: 11 | Status: SHIPPED | OUTPATIENT
Start: 2025-07-21

## 2025-07-21 ASSESSMENT — ASTHMA QUESTIONNAIRES
QUESTION_4 LAST FOUR WEEKS HOW OFTEN HAVE YOU USED YOUR RESCUE INHALER OR NEBULIZER MEDICATION (SUCH AS ALBUTEROL): NOT AT ALL
QUESTION_5 LAST FOUR WEEKS HOW WOULD YOU RATE YOUR ASTHMA CONTROL: WELL CONTROLLED
QUESTION_3 LAST FOUR WEEKS HOW OFTEN DID YOUR ASTHMA SYMPTOMS (WHEEZING, COUGHING, SHORTNESS OF BREATH, CHEST TIGHTNESS OR PAIN) WAKE YOU UP AT NIGHT OR EARLIER THAN USUAL IN THE MORNING: ONCE OR TWICE
QUESTION_1 LAST FOUR WEEKS HOW MUCH OF THE TIME DID YOUR ASTHMA KEEP YOU FROM GETTING AS MUCH DONE AT WORK, SCHOOL OR AT HOME: A LITTLE OF THE TIME
ACT_TOTALSCORE: 19
QUESTION_2 LAST FOUR WEEKS HOW OFTEN HAVE YOU HAD SHORTNESS OF BREATH: ONCE A DAY
ACT_TOTALSCORE: 19

## 2025-07-21 ASSESSMENT — PAIN SCALES - GENERAL: PAINLEVEL_OUTOF10: NO PAIN (0)

## 2025-07-21 NOTE — PROGRESS NOTES
"Gem Gama's goals for this visit include: Return  She requests these members of her care team be copied on today's visit information: PCP    PCP: Danny Conteh    Referring Provider:  Luis A Moody MD  [address] [place] [id number]    /58   Pulse 59   Ht 1.638 m (5' 4.49\")   Wt 80.3 kg (177 lb)   LMP  (LMP Unknown)   SpO2 97%   BMI 29.92 kg/m      Do you need any medication refills at today's visit? SHANTAL Lara LPN  Pulmonary Medicine:  [facility]  Phone: [phone] Fax: [phone]      "

## 2025-07-21 NOTE — PROGRESS NOTES
Pulmonary Clinic Return Patient Visit  Reason for Visit: SOB/Asthma  History of Present Illness  Gem Gama is a 73 year old female with a history of CAD status post stents, ischemic cardiomyopathy with now improved EF to 55%, CKD stage III, and LEA previously on CPAP who presents to clinic for management of same.  Last seen in clinic with ZACARIAS Lomeli in 2/2024  To briefly review, the patient was hospitalized 10/9/2019-10/13/2019 due to STEMI and underwent EDWIN x 2 to proximal LAD.  She did have e/o ischemic cardiomyopathy that showed interval improvement on subsequent echocardiogram.She did have some SOB which was very suggestive of asthma and her PFTs did show mild obstruction. She was started on high dose Breo with good results.  Today, she is doing a lot better and feels Breo is working well for her.  No hospitalizations for pneumonia nor asthma flares, hasn't needed prednisone in a long time (can't remember when).  Less shortness of breath and she denies any chest tightness or frequent wheezing. Occasional cough, mostly nonproductive but recently she has had a little more phlegm. No nasal congestion. She was diagnosed with a hiatal hernia, not GERD. Takes protonix daily and eats small frequent meals. She is not reliant on her rescue inhalers.. Had repeated COVID infections with no significant complications.   Long standing issues with dizziness and poor exercise limitations with concerns for bradycardia. Had loop recorder and dose of BB halved.  Never smoked. 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 Does not keep any pets and has a heated furnace with filters changed regularly. No family hx of chronic lung diseases or lung cancer.  Her daughter had a second stroke recently and is now non-verbal.     Review of Systems:  10 of 14 systems reviewed and are negative unless otherwise stated in HPI.    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/07/2022    Cheyne-Rogers breathing disorder     Chronic kidney disease     Clostridium difficile infection 09/04/2022    Congestive heart failure (H) 2019    Right after STEMI, improved Fall 2019    Coronary artery disease involving native coronary artery of native heart without angina pectoris     Depression     Depressive disorder 1970s    Fever and chills 09/24/2021    Gastro-oesophageal reflux disease     Hypertension     Ischemic cardiomyopathy 2019    Major depressive disorder, recurrent episode, moderate (H) 07/25/2013    Narcolepsy     Pituitary microadenoma (H) 2011    MRI 2011- There is a triangular-shaped area with delayed contrast enhancement at the left lateral portion of the pituitary gland posteriorly, measuring 2.5 x 4.3 mm. This is suggestive of a microadenoma, MRI 10/1/22 - Normal pituitary gland and whole brain MRI.    Pyelonephritis 11/01/2022    Pyelonephritis of right kidney 10/24/2021    S/P hip replacement 2004    Bilateral fall 2004 due to OA    Sepsis, due to unspecified organism, unspecified whether acute organ dysfunction present (H) 11/01/2022    Status post total knee replacement 12/29/2014    Urinary tract infection with hematuria, site unspecified 09/24/2021       Past Surgical History:   Procedure Laterality Date    ABDOMEN SURGERY  1974, 1996    Kidney reconstruct. Uterer stenosis    ARTHROPLASTY KNEE  07/09/2014    Procedure: ARTHROPLASTY KNEE;  Surgeon: Levi Johnson MD;  Location:  OR    ARTHROPLASTY KNEE Left 11/24/2014    Procedure: ARTHROPLASTY KNEE;  Surgeon: Levi Johnson MD;  Location:  OR    COLONOSCOPY      Several times dates ??    CV CORONARY ANGIOGRAM N/A 10/09/2019    Procedure: Coronary Angiogram;  Surgeon: Chiquita Chatterjee MD;  Location:   HEART CARDIAC CATH LAB    CV HEART CATHETERIZATION WITH POSSIBLE INTERVENTION N/A 10/10/2019    Procedure: Heart Catheterization with Possible Intervention;  Surgeon: Chin Garcia MD;  Location:  HEART CARDIAC CATH LAB    CV INTRA AORTIC BALLOON N/A 10/09/2019    Procedure: Intra-Aortic Balloon Pump Insertion;  Surgeon: Chiquita Chatterjee MD;  Location:  HEART CARDIAC CATH LAB    CV INTRA AORTIC BALLOON REMOVAL N/A 10/10/2019    Procedure: Intra-Aortic Balloon Pump Removal;  Surgeon: Chin Garcia MD;  Location:  HEART CARDIAC CATH LAB    CV LEFT HEART CATH N/A 12/11/2020    Procedure: Heart Catheterization with Possible Intervention;  Surgeon: Pilo Otoole MD;  Location:  HEART CARDIAC CATH LAB    CV PCI STENT DRUG ELUTING N/A 10/09/2019    Procedure: PCI Stent Drug Eluting;  Surgeon: Chiquita Chatterjee MD;  Location:  HEART CARDIAC CATH LAB    DAVINCI LAPAROSCOPIC CHOLECYSTECTOMY WITHOUT GRAMS N/A 11/8/2023    Procedure: CHOLECYSTECTOMY, ROBOT-ASSISTED, LAPAROSCOPIC, WITHOUT CHOLANGIOGRAM;  Surgeon: Mickey Gallego MD;  [place] [facility] [place]    EP LOOP RECORDER IMPLANT N/A 11/22/2023    Procedure: Loop Recorder Implant;  Surgeon: Nadeem Jason MD;  Location:  HEART CARDIAC CATH LAB    EYE SURGERY  2011    Cataract surgery both eyes    GENITOURINARY SURGERY  01/01/2008    Prolift    GENITOURINARY SURGERY  1973    In 1973, she had surgery to 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    ORTHOPEDIC SURGERY      bilat total hip    RECTOCELE REPAIR      ZZC TOTAL ABD HYSTERECTOMY+BLAD REPR  01/01/1992    Vaginal approach with oophrectomy    ZZC TOTAL KNEE ARTHROPLASTY         Family History   Problem Relation Age of Onset    Hypertension Mother     Arthritis Mother      Cerebrovascular Disease Mother     Anesthesia Reaction Mother         Halucinates    Venous thrombosis Father     Cerebrovascular Disease Father         Three Strokes    Diabetes Father         Type 2, after his 60's    Kidney Disease Father     Hypertension Father     Hyperlipidemia Father     Osteoporosis Father     Thyroid Disease Sister         Low thyroid    Anesthesia Reaction Sister         Hallucinates    Diabetes Brother         After recovered from aspiration pneumonia, developed diabetes from high carb diet from feeding tube    Birth defects Brother     Osteoporosis Maternal Grandmother         Old age lived to 90    Coronary Artery Disease Maternal Grandfather         Strokes, lived to 105    Substance Abuse Maternal Grandfather         Alcohol    Osteoporosis Maternal Grandfather         Old age    Coronary Artery Disease Paternal Grandmother     Osteoporosis Paternal Grandmother         Long term issue for her    Obesity Paternal Grandmother     Coronary Artery Disease Paternal Grandfather     Sjogren's Daughter     Diabetes Daughter         Diagnosed after stroke    Cerebrovascular Disease Daughter     Hypertension Daughter         With diabetes and stroke    Hyperlipidemia Daughter     Depression Daughter         Does not acknowledge    Anxiety Disorder Daughter     Asthma Daughter         Anxiety, air quality, and exercise triggered    Obesity Daughter     Diabetes Daughter         Gestational and pre diabetic A1C    Anxiety Disorder Daughter     Asthma Daughter         Excursion induced    Thyroid Disease Daughter     Diabetes Son         On metformin.    Hypertension Son     Obesity Son     Depression Son           ..from job    Mental Illness Other     Substance Abuse Other     Substance Abuse Other     Substance Abuse Other        Social History     Socioeconomic History    Marital status:      Spouse name: Not on file    Number of children: Not on file    Years of  education: Not on file    Highest education level: Not on file   Occupational History    Not on file   Tobacco Use    Smoking status: Never Smoker    Smokeless tobacco: Never Used   Substance and Sexual Activity    Alcohol use: No    Drug use: No    Sexual activity: Not Currently     Partners: Male     Birth control/protection: None   Other Topics Concern    Parent/sibling w/ CABG, MI or angioplasty before 65F 55M? No     Service No    Blood Transfusions No    Caffeine Concern No    Occupational Exposure No    Hobby Hazards No    Sleep Concern Yes     Comment: Uses CPAP    Stress Concern No    Weight Concern Yes    Special Diet No    Back Care No    Exercise No    Bike Helmet No     Comment:      Seat Belt Yes    Self-Exams Yes   Social History Narrative    Not on file     Social Determinants of Health     Financial Resource Strain: Not on file   Food Insecurity: Not on file   Transportation Needs: Not on file   Physical Activity: Not on file   Stress: Not on file   Social Connections: Not on file   Intimate Partner Violence: Not on file   Housing Stability: Not on file         Allergies   Allergen Reactions    Dust Mites Cough, Headache, Other (See Comments) and Shortness Of Breath    Latex Other (See Comments) and Shortness Of Breath     Runny nose  PN: LW Reaction: DIFFICULTY BREATHING      Sulfa Antibiotics Anaphylaxis, Hives and Itching     PN: LW Reaction: HIVES, Swelling  PN: LW Reaction: HIVES, Swelling    Flagyl [Metronidazole Hcl] Anaphylaxis and Rash    Food      PN: LW FI1: nka LW FI2:    Hydrochlorothiazide-Triamterene      PN: LW Reaction: skin rash  PN: LW Reaction: skin rash    No Clinical Screening - See Comments      PN: LW Other1: -Adhesive Tape    Seasonal Allergies      sneezing    Tape [Adhesive Tape] Hives    Metoprolol Itching and Rash    Metronidazole Hives and Rash     PN: LW Reaction: HIVES           Current Outpatient Medications:     albuterol (PROVENTIL HFA) 108 (90 Base)  MCG/ACT inhaler, Inhale 2 puffs into the lungs every 6 hours, Disp: 8.5 g, Rfl: 0    aspirin (ASA) 81 MG EC tablet, Take 1 tablet (81 mg) by mouth daily, Disp: 90 tablet, Rfl: 0    atorvastatin (LIPITOR) 40 MG tablet, Take 1 tablet (40 mg) by mouth daily., Disp: 90 tablet, Rfl: 3    bisacodyl (DULCOLAX) 5 MG EC tablet, Take 2 tablets at 3 pm the day before your procedure. If your procedure is before 11 am, take 2 additional tablets at 11 pm. If your procedure is after 11 am, take 2 additional tablets at 6 am. For additional instructions refer to your colonoscopy prep instructions., Disp: 4 tablet, Rfl: 0    blood glucose (NO BRAND SPECIFIED) test strip, Use to test blood sugar once a day., Disp: 100 strip, Rfl: 3    blood glucose monitoring (NO BRAND SPECIFIED) meter device kit, Use to test blood sugar once a day., Disp: 1 kit, Rfl: 0    cholestyramine (QUESTRAN) 4 g packet, Take 1 packet (4 g) by mouth 2 times daily as needed (diarrhea). Do not take with other oral medications., Disp: 60 packet, Rfl: 2    coenzyme Q-10 capsule, Take 1 capsule by mouth daily., Disp: , Rfl:     diazepam (VALIUM) 2 MG tablet, Take 0.5-1 tablets (1-2 mg) by mouth 2 times daily as needed for anxiety or muscle spasms., Disp: 30 tablet, Rfl: 0    diclofenac (VOLTAREN) 1 % topical gel, Apply topically 4 times daily as needed for moderate pain For Jaw pain, Disp: , Rfl:     docusate sodium (COLACE) 100 MG tablet, Take 100 mg by mouth daily as needed for constipation., Disp: , Rfl:     estradiol (ESTRING) 7.5 MCG/24HR vaginal ring, Place 1 each vaginally every 3 months., Disp: 1 each, Rfl: 3    fluticasone-vilanterol (BREO ELLIPTA) 200-25 MCG/ACT inhaler, Inhale 1 puff into the lungs daily., Disp: 3 each, Rfl: 3    hydrOXYzine HCl (ATARAX) 10 MG tablet, Take 1 tablet (10 mg) by mouth 3 times daily as needed for anxiety or other (sleep)., Disp: 90 tablet, Rfl: 2    isosorbide mononitrate (IMDUR) 30 MG 24 hr tablet, Take 0.5 tablets (15 mg) by  mouth every evening., Disp: 90 tablet, Rfl: 3    loratadine (CLARITIN) 10 MG tablet, Take 10 mg by mouth At Bedtime, Disp: , Rfl:     magnesium gluconate (MAGONATE) 500 (27 Mg) MG tablet, Take 500 mg by mouth 2 times daily., Disp: , Rfl:     Melatonin 10 MG TABS tablet, Take 10 mg by mouth nightly as needed for sleep, Disp: , Rfl:     nebivolol (BYSTOLIC) 2.5 MG tablet, Take 1 tablet (2.5 mg) by mouth daily., Disp: 90 tablet, Rfl: 1    nitroGLYcerin (NITROSTAT) 0.4 MG sublingual tablet, PLACE 1 TABLET UNDER THE TONGUE EVERY 5 MINUTES AS NEEDED FOR CHEST PAIN, Disp: 25 tablet, Rfl: 11    pantoprazole (PROTONIX) 40 MG EC tablet, Take 1 tablet (40 mg) by mouth daily., Disp: 90 tablet, Rfl: 3    polyethylene glycol (GOLYTELY) 236 g suspension, The night before the exam at 6 pm drink an 8-ounce glass every 15 minutes until the jug is half empty. If you arrive before 11 AM: Drink the other half of the Golytely jug at 11 PM night before procedure. If you arrive after 11 AM: Drink the other half of the Golytely jug at 6 AM day of procedure. For additional instructions refer to your colonoscopy prep instructions., Disp: 4000 mL, Rfl: 0    Prenatal Multivit-Min-Fe-FA (PRENATAL/IRON) TABS, Take 1 tablet by mouth every morning, Disp: , Rfl:     saccharomyces boulardii (FLORASTOR) 250 MG capsule, Take 1 capsule (250 mg) by mouth 2 times daily, Disp: 14 capsule, Rfl: 0    sertraline (ZOLOFT) 100 MG tablet, Take 2 tablets (200 mg) by mouth daily., Disp: 180 tablet, Rfl: 1    UNABLE TO FIND, Take 3 tablets by mouth daily MEDICATION NAME: Uqora complete regimen 1 TAB, AM - 2 TAB PM, Disp: , Rfl:     valsartan (DIOVAN) 40 MG tablet, Take 1 tablet (40 mg) by mouth every evening., Disp: 90 tablet, Rfl: 1    vitamin D3 (CHOLECALCIFEROL) 2000 units (50 mcg) tablet, Take 1 tablet (2,000 Units) by mouth daily, Disp: 90 tablet, Rfl: 3    zinc gluconate 50 MG tablet, Take 50 mg by mouth every morning, Disp: , Rfl:       Physical Exam:  BP  "104/58   Pulse 59   Ht 1.638 m (5' 4.49\")   Wt 80.3 kg (177 lb)   LMP  (LMP Unknown)   SpO2 97%   BMI 29.92 kg/m    GENERAL: Well developed, well nourished, alert, and in no apparent distress.  RESP:  Normal respiratory effort.  CTAB.  No rales, wheezes, rhonchi.  No cyanosis or clubbing.  CV: Normal S1, S2, regular rhythm, normal rate. No murmur.  No LE edema.   SKIN: warm and dry. No rash.  PSYCH: mentation appears normal.   Results:  PFTs: Discussed and reviewed with patient - normal spirometry with positive bronchodilator response  Most Recent [facility] Pulmonary Function Testing    FVC-Pred   Date Value Ref Range Status   06/26/2024 2.46 L      FVC-Pre   Date Value Ref Range Status   06/26/2024 2.05 L      FVC-%Pred-Pre   Date Value Ref Range Status   06/26/2024 83 %      FEV1-Pre   Date Value Ref Range Status   06/26/2024 1.55 L      FEV1-%Pred-Pre   Date Value Ref Range Status   06/26/2024 81 %      FEV1FVC-Pred   Date Value Ref Range Status   06/26/2024 78 %      FEV1FVC-Pre   Date Value Ref Range Status   06/26/2024 76 %      No results found for: \"20029\"  FEFMax-Pred   Date Value Ref Range Status   06/26/2024 5.20 L/sec      FEFMax-Pre   Date Value Ref Range Status   06/26/2024 4.22 L/sec      FEFMax-%Pred-Pre   Date Value Ref Range Status   06/26/2024 81 %      ExpTime-Pre   Date Value Ref Range Status   06/26/2024 2.95 sec      FIFMax-Pre   Date Value Ref Range Status   06/26/2024 3.48 L/sec      FEV1FEV6-Pred   Date Value Ref Range Status   06/26/2024 78 %      FEV1FEV6-Pre   Date Value Ref Range Status   06/26/2024 76 %      No results found for: \"20055\"  Imaging (personally reviewed in clinic today): CT chest without contrast 02/21/2022  IMPRESSION: Osteopenia with extensive degenerative changes in the spine. No CT evidence of advanced interstitial lung disease. Atherosclerosis. Air trapping. Extensive cholelithiasis.  Echocardiogram  Interpretation Summary  Left ventricular visual ejection " fraction is 50-55%.  Hypokinesis of the mid anteroseptal, inferoseptal and distal septal segments.  The right ventricle is normal in size and function.  Mild aortic insufficiency.  The inferior vena cava is normal.   This study was compared with the study from 10/2019, the LV function and wall motion abnormalities have improved substantially    Assessment and Plan:   Moderate Persistent Asthma  Continue ICS-LABA (Breo 200). Due to her history of congenital obstructive uropathy, we should avoid LAMA type inhalers such as Spiriva, Incruse and Trelegy.   Appears to be doing well. Her current symptoms appear to be related to bradycardia  LEA  Continue to follow with her sleep physician and at the [place] [facility]  Questions and concerns were answered to the patient's satisfaction.  she was provided with my contact information should new questions or concerns arise in the interim.  she should return to clinic in 12 months  Up to date on vaccinations  I spent a total of 40 minutes face to face with Gem Gama during today's office visit. Over 50% of this time was spent counseling the patient and/or coordinating care regarding their pulmonary disease.      Luis A Moody MD  Pulmonary, Critical Care and Sleep Medicine  [facility]  Pager: [phone]

## 2025-07-22 ENCOUNTER — NURSE TRIAGE (OUTPATIENT)
Dept: FAMILY MEDICINE | Facility: CLINIC | Age: 75
End: 2025-07-22
Payer: MEDICARE

## 2025-07-22 ASSESSMENT — ANXIETY QUESTIONNAIRES
GAD7 TOTAL SCORE: 7
GAD7 TOTAL SCORE: 7
5. BEING SO RESTLESS THAT IT IS HARD TO SIT STILL: NOT AT ALL
7. FEELING AFRAID AS IF SOMETHING AWFUL MIGHT HAPPEN: SEVERAL DAYS
7. FEELING AFRAID AS IF SOMETHING AWFUL MIGHT HAPPEN: SEVERAL DAYS
IF YOU CHECKED OFF ANY PROBLEMS ON THIS QUESTIONNAIRE, HOW DIFFICULT HAVE THESE PROBLEMS MADE IT FOR YOU TO DO YOUR WORK, TAKE CARE OF THINGS AT HOME, OR GET ALONG WITH OTHER PEOPLE: VERY DIFFICULT
3. WORRYING TOO MUCH ABOUT DIFFERENT THINGS: MORE THAN HALF THE DAYS
2. NOT BEING ABLE TO STOP OR CONTROL WORRYING: SEVERAL DAYS
4. TROUBLE RELAXING: SEVERAL DAYS
6. BECOMING EASILY ANNOYED OR IRRITABLE: NOT AT ALL
1. FEELING NERVOUS, ANXIOUS, OR ON EDGE: MORE THAN HALF THE DAYS
8. IF YOU CHECKED OFF ANY PROBLEMS, HOW DIFFICULT HAVE THESE MADE IT FOR YOU TO DO YOUR WORK, TAKE CARE OF THINGS AT HOME, OR GET ALONG WITH OTHER PEOPLE?: VERY DIFFICULT
GAD7 TOTAL SCORE: 7

## 2025-07-22 ASSESSMENT — PATIENT HEALTH QUESTIONNAIRE - PHQ9
SUM OF ALL RESPONSES TO PHQ QUESTIONS 1-9: 13
10. IF YOU CHECKED OFF ANY PROBLEMS, HOW DIFFICULT HAVE THESE PROBLEMS MADE IT FOR YOU TO DO YOUR WORK, TAKE CARE OF THINGS AT HOME, OR GET ALONG WITH OTHER PEOPLE: VERY DIFFICULT
SUM OF ALL RESPONSES TO PHQ QUESTIONS 1-9: 13

## 2025-07-22 NOTE — TELEPHONE ENCOUNTER
"Nurse Triage SBAR    Is this a 2nd Level Triage? YES, LICENSED PRACTITIONER REVIEW IS REQUIRED    Situation: Patient called and stated her right leg has been \"acting weird\" for the past week. Currently feels weak in right leg. She can walk but not far.     Background: Patient states she has not had this in the past.     After last kidney surgery (in 1997 at [facility]) would have sharp pain down right leg. It eventually went away.     In the past, she would have one leg or another would go to sleep with sitting on it wrong. It would feel weird for a day maybe but would go back to normal.     Assessment:   First became aware of this issue when she sat on the toilet a week or so ago.   She said it was almost like she \"sat wrong\".   Right leg was completely numb when stood up.   Felt like not sure if leg would hold her up. Took too long to get rid of it at the time.    Not \"numb\" numb since then.   Numbness comes and goes.   Has a deep achy pain that comes and goes and changes where it is located. No known trigger or relieving factor(s).  Weaker than normal.   Not going away.   Not getting worse.   Leg feels a little cool but she says the air conditioner is on.    Left leg feels totally okay.     Deep ache isn't there now, but that comes and goes. It comes in different places in right leg. Takes about 10 minutes to go away. Nothing triggers pain. Deep ache was between knee and ankle and also on right quad. Last happened around 12:45 pm. It is \"unpredictable\" when and where it will be located.     Right now just feels weakness in right leg in her whole leg.     Can walk. Does not require support to walk short distances. Says she does not think she could walk down the block right now.    Some pain in right hip, feels positional.     She said she thought at first it was a pinched nerve. She also said several times she is \"afraid there is a clot\" in her right leg. She says why would the leg feel randomly better and then pain " moves around?     Denies numbness elsewhere in her body, confusion, vision changes, headaches, loss of bowel or bladder control, back pain, being unable to walk at all, redness, swelling, constant pain.     Protocol Recommended Disposition:   Call [facility]/Go to ED/UCC Now (Or To Office with PCP Approval)    Recommendation:   Referral to Acute and Diagnostic Services    [phone] ([place]) [facility] [address] [address] [place] [id number]    Transition to Acute & Diagnostic Services Clinic has been discussed with patient, and she agrees with next level of care.   Patient understands that evaluation/treatment at [facility] typically takes significantly longer than in clinic/urgent care (>2 hours).  The [facility] Acute and Diagnostics Services Clinic has been contacted by provider/staff to confirm patient acceptance.         Special issues:      Patient states she has hip replacements and knee replacements.   Patient thinks there is metal in heart monitor. RN notes patient has an EP loop recorder. She states she has 2 stents placed and is not sure what material those consist of.    Advised if gets worse, to go to ER. She verbalized understanding.      Routed to provider    Does the patient meet one of the following criteria for [facility]S visit consideration? 14+ years old, with an [facility] PCP     TIP  Providers, please consider if this condition is appropriate for management at one of our Acute and Diagnostic Services sites.     If patient is a good candidate, please use dotphrase <dot>triageresponse and select Refer to AD[facility] to document.    RN called [place] [facility]. They said they can see patient tomorrow, 7/22/25 and will call patient to schedule.     Malka Gutierrez RN, BSN, PHN  [facility]    Reason for Disposition   Neurologic deficit that was brief (now gone), ANY of the following: * Weakness of the face, arm, or leg on one side of the body * Numbness of the face, arm, or leg on one  side of the body * Loss of speech or garbled speech    Additional Information   Negative: SEVERE weakness (e.g., unable to walk or barely able to walk, requires support) and new-onset or getting worse   Negative: Difficult to awaken or acting confused (e.g., disoriented, slurred speech)   Negative: Sudden onset of weakness of the face, arm or leg on one side of the body and present now (Exception: Bell's palsy suspected: weakness on one side of the face developing over hours to days, with no other symptoms.)   Negative: Sudden onset of numbness of the face, arm or leg on one side of the body and present now   Negative: Sudden onset of loss of speech or garbled speech and present now   Negative: Sounds like a life-threatening emergency to the triager   Negative: Confusion, disorientation, or hallucinations is main symptom   Negative: Dizziness is main symptom   Negative: Followed a head injury within last 3 days   Negative: Headache (with neurologic deficit)   Negative: Unable to urinate (or only a few drops) and bladder feels very full   Negative: Loss of bladder or bowel control (urine or bowel incontinence; wetting self, leaking stool) of new-onset   Negative: Back pain with numbness (loss of sensation) in groin or rectal area    Protocols used: Neurologic Deficit-A-OH

## 2025-07-23 ENCOUNTER — VIRTUAL VISIT (OUTPATIENT)
Dept: PSYCHOLOGY | Facility: CLINIC | Age: 75
End: 2025-07-23
Payer: MEDICARE

## 2025-07-23 DIAGNOSIS — F41.1 GENERALIZED ANXIETY DISORDER: ICD-10-CM

## 2025-07-23 DIAGNOSIS — F43.10 POSTTRAUMATIC STRESS DISORDER: ICD-10-CM

## 2025-07-23 DIAGNOSIS — F33.1 MAJOR DEPRESSIVE DISORDER, RECURRENT EPISODE, MODERATE (H): Primary | ICD-10-CM
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
## 2025-07-24 ENCOUNTER — ANCILLARY PROCEDURE (OUTPATIENT)
Dept: CT IMAGING | Facility: CLINIC | Age: 75
End: 2025-07-24
Attending: PHYSICIAN ASSISTANT
Payer: MEDICARE

## 2025-07-24 ENCOUNTER — OFFICE VISIT (OUTPATIENT)
Dept: PEDIATRICS | Facility: CLINIC | Age: 75
End: 2025-07-24
Payer: MEDICARE

## 2025-07-24 VITALS
SYSTOLIC BLOOD PRESSURE: 125 MMHG | RESPIRATION RATE: 20 BRPM | DIASTOLIC BLOOD PRESSURE: 68 MMHG | HEART RATE: 60 BPM | OXYGEN SATURATION: 96 % | TEMPERATURE: 99.3 F

## 2025-07-24 DIAGNOSIS — M79.604 DIFFUSE PAIN IN RIGHT LOWER EXTREMITY: ICD-10-CM

## 2025-07-24 DIAGNOSIS — M48.061 SPINAL STENOSIS OF LUMBAR REGION, UNSPECIFIED WHETHER NEUROGENIC CLAUDICATION PRESENT: Primary | ICD-10-CM

## 2025-07-24 LAB
ALBUMIN SERPL BCG-MCNC: 4.4 G/DL (ref 3.5–5.2)
ALP SERPL-CCNC: 108 U/L (ref 40–150)
ALT SERPL W P-5'-P-CCNC: 29 U/L (ref 0–50)
ANION GAP SERPL CALCULATED.3IONS-SCNC: 12 MMOL/L (ref 7–15)
AST SERPL W P-5'-P-CCNC: 43 U/L (ref 0–45)
BASOPHILS # BLD AUTO: 0 10E3/UL (ref 0–0.2)
BASOPHILS NFR BLD AUTO: 1 %
BILIRUB SERPL-MCNC: 0.6 MG/DL
BUN SERPL-MCNC: 15.7 MG/DL (ref 8–23)
CALCIUM SERPL-MCNC: 9.2 MG/DL (ref 8.8–10.4)
CHLORIDE SERPL-SCNC: 105 MMOL/L (ref 98–107)
CREAT SERPL-MCNC: 1.09 MG/DL (ref 0.51–0.95)
CRP SERPL-MCNC: <3 MG/L
EGFRCR SERPLBLD CKD-EPI 2021: 52 ML/MIN/1.73M2
EOSINOPHIL # BLD AUTO: 0.3 10E3/UL (ref 0–0.7)
EOSINOPHIL NFR BLD AUTO: 5 %
ERYTHROCYTE [DISTWIDTH] IN BLOOD BY AUTOMATED COUNT: 12.9 % (ref 10–15)
ERYTHROCYTE [SEDIMENTATION RATE] IN BLOOD BY WESTERGREN METHOD: 6 MM/HR (ref 0–30)
GLUCOSE SERPL-MCNC: 105 MG/DL (ref 70–99)
HCO3 SERPL-SCNC: 25 MMOL/L (ref 22–29)
HCT VFR BLD AUTO: 41.8 % (ref 35–47)
HGB BLD-MCNC: 14.8 G/DL (ref 11.7–15.7)
IMM GRANULOCYTES # BLD: 0 10E3/UL
IMM GRANULOCYTES NFR BLD: 0 %
LYMPHOCYTES # BLD AUTO: 1.2 10E3/UL (ref 0.8–5.3)
LYMPHOCYTES NFR BLD AUTO: 21 %
MCH RBC QN AUTO: 30.6 PG (ref 26.5–33)
MCHC RBC AUTO-ENTMCNC: 35.4 G/DL (ref 31.5–36.5)
MCV RBC AUTO: 86 FL (ref 78–100)
MONOCYTES # BLD AUTO: 0.4 10E3/UL (ref 0–1.3)
MONOCYTES NFR BLD AUTO: 8 %
NEUTROPHILS # BLD AUTO: 3.8 10E3/UL (ref 1.6–8.3)
NEUTROPHILS NFR BLD AUTO: 65 %
NRBC # BLD AUTO: 0 10E3/UL
NRBC BLD AUTO-RTO: 0 /100
PLATELET # BLD AUTO: 160 10E3/UL (ref 150–450)
POTASSIUM SERPL-SCNC: 4.4 MMOL/L (ref 3.4–5.3)
PROT SERPL-MCNC: 7.4 G/DL (ref 6.4–8.3)
RBC # BLD AUTO: 4.84 10E6/UL (ref 3.8–5.2)
SODIUM SERPL-SCNC: 142 MMOL/L (ref 135–145)
WBC # BLD AUTO: 5.8 10E3/UL (ref 4–11)

## 2025-07-24 PROCEDURE — 72131 CT LUMBAR SPINE W/O DYE: CPT | Performed by: RADIOLOGY

## 2025-07-24 RX ORDER — METHYLPREDNISOLONE 4 MG/1
TABLET ORAL
Qty: 21 TABLET | Refills: 0 | Status: SHIPPED | OUTPATIENT
Start: 2025-07-24

## 2025-07-24 NOTE — PROGRESS NOTES
Acute and Diagnostic Services Clinic Visit    Assessment & Plan     (M48.061) Spinal stenosis of lumbar region, unspecified whether neurogenic claudication present  (primary encounter diagnosis)  Comment:   Plan:     (M79.604) Diffuse pain in right lower extremity  Comment:   Plan: CT Lumbar Spine w/o Contrast, CBC with         platelets differential, Comprehensive metabolic        panel, CRP inflammation, Erythrocyte         sedimentation rate auto, Spine          Referral, methylPREDNISolone (MEDROL DOSEPAK) 4        MG tablet therapy pack          Heidy 74-year-old female with a past medical history of fecal incontinence, hypertension, hyperlipidemia paroxysmal ventricular tachycardia, coronary artery disease, intermittent asthma, possible periodic limb movement disorder, TMJ, paresthesias in the right hand, chronic kidney disease stage III, close compression fracture of L3 lumbar vertebra in 2023 who presents for evaluation of intermittent pressure and numbness in the right lower extremity, ongoing for the past 2 to 3 weeks.    I considered the possibility of DVT PE, Wells score is 2-year-old, patient has no swelling or tenderness currently in the right lower extremity.  No chest pain or shortness of breath.  I feel the DVT is unlikely today.    Her symptoms sound consistent with radiculopathy versus paresthesias.  She has no focal neurologic deficits on exam, normal sensation to light bilaterally in the lower extremities.  Normal capillary refill.  Strength is 5/5 in all major joints of the lower extremities.  Workup included CBC, CMP, CRP and ESR, unremarkable.    CT scan of the lumbar spine showed L3 superior endplate compression deformity, noted previously on CT scan in June 2023.  There is no lumbar spinal tenderness to base to suggest that this is worsening.  There are degenerative findings with mild spinal canal stenosis suggested at L2-L3.  I feel this may be the cause of the patient's  "paresthesias/radiculopathy in the right lower extremity.  Consider doing an MRI today in clinic to further characterize her concern but unable to perform MRI in this clinical setting due to implantable cardiac device.  I was informed by imaging that she would need to have her MRI completed in the hospital setting if deemed necessary.    I provided a referral to orthopedic spine for further evaluation and management.  Starting her on a Medrol Dosepak, which will help alleviate inflammation related to spinal nerve root compression.  Side effects of Medrol Dosepak discussed.  She has tolerated prednisone in the past as well.    She has no symptoms or findings consistent with cauda equina syndrome or conus medullaris today.  Advised urgent medical evaluation in the emergency department should she develop acutely worsening pain, numbness or tingling, concerns with gait or balance, urinary or bowel incontinence or retention.      Shade Anguiano PA-C    BMI  Estimated body mass index is 29.92 kg/m  as calculated from the following:    Height as of 7/21/25: 1.638 m (5' 4.49\").    Weight as of 7/21/25: 80.3 kg (177 lb).           Naila Moody is a 74 year old, presenting for the following health issues:  Musculoskeletal Problem (Pressure and Numbness to Right Leg Intermittently for weeks )    MANJIT Moody is a 74-year-old female with a past medical history of fecal incontinence, hypertension, hyperlipidemia paroxysmal ventricular tachycardia, coronary artery disease, intermittent asthma, possible periodic limb movement disorder, TMJ, paresthesias in the right hand, chronic kidney disease stage III, close compression fracture of L3 lumbar vertebra in 2023 who presents for evaluation of intermittent pressure and numbness in the right lower extremity, ongoing for the past 2 to 3 weeks.  She describes feeling pain intermittently anterior and medial thigh intermittently, sometimes feels pain on the medial or lateral " aspect of the right lower extremity as well.  When lying on the right side, she feels discomfort or pain in the right hip.  Denies having any difficulties with ambulation or foot drop.  She has no weakness in the lower extremities, no significant symptoms of urinary or bowel incontinence or retention.  No numbness or paresthesias in the groin area.  No low back pain currently.  No syncope, no chest pain or shortness of breath or breathing concerns.  She has a cardiac monitor, remote implantable loop recorder Medtronic LNQ22 LINQ II due to concern for bradycardia/sinus dysrythmia.    Musculoskeletal problem/pain  Onset/Duration: Several Weeks   Description:       Location: Right Leg        Joint swelling: no        Redness: no        Pain: Not painful - will get numb and pressure sensation        Warmth: no   Progression of symptoms same  Accompanying signs and symptoms:       Fevers: no        Numbness/tingling/weakness: YES  History        Trauma to the area: no         Previous history of Gout: no         Alcohol usage: no         Diuretic use: no         Recent illness: no         Previous similar problem: no         Previous evaluation: no   Aggravating factors include: when laying down at night, Sitting - seems very positional per patient   Therapies tried and outcome: Rest, Massage, Elevation   Have you had any surgeries on your arteries of veins: No        Review of Systems  Constitutional, neuro, ENT, endocrine, pulmonary, cardiac, gastrointestinal, genitourinary, musculoskeletal, integument and psychiatric systems are negative, except as otherwise noted.      Objective    LMP  (LMP Unknown)   There is no height or weight on file to calculate BMI.  Physical Exam  Constitutional:       General: She is not in acute distress.     Appearance: Normal appearance. She is not ill-appearing, toxic-appearing or diaphoretic.   HENT:      Head: Normocephalic and atraumatic.      Right Ear: No middle ear effusion. No  mastoid tenderness. Tympanic membrane is not injected, perforated, erythematous, retracted or bulging.      Left Ear:  No middle ear effusion. No mastoid tenderness. Tympanic membrane is not injected, perforated, erythematous, retracted or bulging.      Nose: Nose normal. No congestion or rhinorrhea.      Mouth/Throat:      Mouth: Mucous membranes are moist.      Pharynx: No oropharyngeal exudate or posterior oropharyngeal erythema.   Cardiovascular:      Rate and Rhythm: Normal rate and regular rhythm.      Pulses: Normal pulses.      Heart sounds: Normal heart sounds. No murmur heard.  Pulmonary:      Effort: Pulmonary effort is normal. No respiratory distress.      Breath sounds: Normal breath sounds. No stridor. No wheezing or rhonchi.   Musculoskeletal:      Cervical back: Neck supple. No rigidity. No muscular tenderness.      Lumbar back: Normal. No deformity, spasms or tenderness. Negative right straight leg raise test and negative left straight leg raise test. No scoliosis.   Lymphadenopathy:      Cervical: No cervical adenopathy.      Right cervical: No superficial, deep or posterior cervical adenopathy.     Left cervical: No superficial, deep or posterior cervical adenopathy.   Skin:     General: Skin is warm and dry.   Neurological:      General: No focal deficit present.      Mental Status: She is alert and oriented to person, place, and time.      Sensory: No sensory deficit.      Motor: No weakness.      Deep Tendon Reflexes: Reflexes normal.                  Signed Electronically by: Shade Anguiano PA-C

## 2025-07-30 ENCOUNTER — VIRTUAL VISIT (OUTPATIENT)
Dept: PSYCHOLOGY | Facility: CLINIC | Age: 75
End: 2025-07-30
Payer: MEDICARE

## 2025-07-30 DIAGNOSIS — F41.1 GENERALIZED ANXIETY DISORDER: ICD-10-CM

## 2025-07-30 DIAGNOSIS — F33.0 MAJOR DEPRESSIVE DISORDER, RECURRENT EPISODE, MILD: Primary | ICD-10-CM

## 2025-07-30 DIAGNOSIS — F43.10 POSTTRAUMATIC STRESS DISORDER: ICD-10-CM
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
## 2025-07-31 ENCOUNTER — OFFICE VISIT (OUTPATIENT)
Dept: NEUROSURGERY | Facility: CLINIC | Age: 75
End: 2025-07-31
Attending: PHYSICIAN ASSISTANT
Payer: MEDICARE

## 2025-07-31 VITALS — HEART RATE: 59 BPM | DIASTOLIC BLOOD PRESSURE: 78 MMHG | SYSTOLIC BLOOD PRESSURE: 149 MMHG | OXYGEN SATURATION: 97 %

## 2025-07-31 DIAGNOSIS — M54.50 ACUTE BILATERAL LOW BACK PAIN, UNSPECIFIED WHETHER SCIATICA PRESENT: ICD-10-CM

## 2025-07-31 DIAGNOSIS — M54.16 LUMBAR BACK PAIN WITH RADICULOPATHY AFFECTING RIGHT LOWER EXTREMITY: Primary | ICD-10-CM

## 2025-07-31 DIAGNOSIS — M79.604 DIFFUSE PAIN IN RIGHT LOWER EXTREMITY: ICD-10-CM

## 2025-07-31 PROCEDURE — 99214 OFFICE O/P EST MOD 30 MIN: CPT | Performed by: NURSE PRACTITIONER

## 2025-07-31 PROCEDURE — 3078F DIAST BP <80 MM HG: CPT | Performed by: NURSE PRACTITIONER

## 2025-07-31 PROCEDURE — 3077F SYST BP >= 140 MM HG: CPT | Performed by: NURSE PRACTITIONER

## 2025-07-31 PROCEDURE — 1125F AMNT PAIN NOTED PAIN PRSNT: CPT | Performed by: NURSE PRACTITIONER

## 2025-07-31 RX ORDER — METHOCARBAMOL 500 MG/1
500 TABLET, FILM COATED ORAL 4 TIMES DAILY PRN
Qty: 30 TABLET | Refills: 0 | Status: SHIPPED | OUTPATIENT
Start: 2025-07-31

## 2025-07-31 RX ORDER — METHYLPREDNISOLONE 4 MG/1
TABLET ORAL
Qty: 21 TABLET | Refills: 0 | Status: SHIPPED | OUTPATIENT
Start: 2025-07-31

## 2025-07-31 ASSESSMENT — PAIN SCALES - GENERAL: PAINLEVEL_OUTOF10: MODERATE PAIN (5)

## 2025-08-05 ASSESSMENT — PATIENT HEALTH QUESTIONNAIRE - PHQ9
SUM OF ALL RESPONSES TO PHQ QUESTIONS 1-9: 3
SUM OF ALL RESPONSES TO PHQ QUESTIONS 1-9: 3
10. IF YOU CHECKED OFF ANY PROBLEMS, HOW DIFFICULT HAVE THESE PROBLEMS MADE IT FOR YOU TO DO YOUR WORK, TAKE CARE OF THINGS AT HOME, OR GET ALONG WITH OTHER PEOPLE: SOMEWHAT DIFFICULT

## 2025-08-06 ENCOUNTER — VIRTUAL VISIT (OUTPATIENT)
Dept: PSYCHOLOGY | Facility: CLINIC | Age: 75
End: 2025-08-06
Payer: MEDICARE

## 2025-08-06 DIAGNOSIS — F41.1 GENERALIZED ANXIETY DISORDER: ICD-10-CM

## 2025-08-06 DIAGNOSIS — F33.0 MAJOR DEPRESSIVE DISORDER, RECURRENT EPISODE, MILD: Primary | ICD-10-CM

## 2025-08-06 DIAGNOSIS — F43.10 POSTTRAUMATIC STRESS DISORDER: ICD-10-CM

## 2025-08-09 ENCOUNTER — HOSPITAL ENCOUNTER (OUTPATIENT)
Dept: MRI IMAGING | Facility: CLINIC | Age: 75
Discharge: HOME | End: 2025-08-09
Attending: NURSE PRACTITIONER | Admitting: NURSE PRACTITIONER
Payer: MEDICARE

## 2025-08-09 DIAGNOSIS — M54.50 ACUTE BILATERAL LOW BACK PAIN, UNSPECIFIED WHETHER SCIATICA PRESENT: ICD-10-CM

## 2025-08-09 DIAGNOSIS — M54.16 LUMBAR BACK PAIN WITH RADICULOPATHY AFFECTING RIGHT LOWER EXTREMITY: ICD-10-CM

## 2025-08-09 PROCEDURE — 72148 MRI LUMBAR SPINE W/O DYE: CPT

## 2025-08-11 ENCOUNTER — THERAPY VISIT (OUTPATIENT)
Dept: PHYSICAL THERAPY | Facility: CLINIC | Age: 75
End: 2025-08-11
Attending: NURSE PRACTITIONER
Payer: MEDICARE

## 2025-08-11 DIAGNOSIS — M54.16 LUMBAR BACK PAIN WITH RADICULOPATHY AFFECTING RIGHT LOWER EXTREMITY: ICD-10-CM

## 2025-08-11 DIAGNOSIS — M54.41 ACUTE RIGHT-SIDED LOW BACK PAIN WITH RIGHT-SIDED SCIATICA: Primary | ICD-10-CM

## 2025-08-11 DIAGNOSIS — M54.50 ACUTE BILATERAL LOW BACK PAIN, UNSPECIFIED WHETHER SCIATICA PRESENT: ICD-10-CM

## 2025-08-11 PROCEDURE — 97161 PT EVAL LOW COMPLEX 20 MIN: CPT | Mod: GP | Performed by: PHYSICAL THERAPIST

## 2025-08-11 PROCEDURE — 97110 THERAPEUTIC EXERCISES: CPT | Mod: GP | Performed by: PHYSICAL THERAPIST

## 2025-08-13 ENCOUNTER — VIRTUAL VISIT (OUTPATIENT)
Dept: PSYCHOLOGY | Facility: CLINIC | Age: 75
End: 2025-08-13
Payer: MEDICARE

## 2025-08-13 DIAGNOSIS — F33.0 MAJOR DEPRESSIVE DISORDER, RECURRENT EPISODE, MILD: Primary | ICD-10-CM

## 2025-08-13 DIAGNOSIS — F41.1 GENERALIZED ANXIETY DISORDER: ICD-10-CM

## 2025-08-13 DIAGNOSIS — F43.10 POSTTRAUMATIC STRESS DISORDER: ICD-10-CM

## 2025-08-13 ASSESSMENT — ANXIETY QUESTIONNAIRES
6. BECOMING EASILY ANNOYED OR IRRITABLE: SEVERAL DAYS
1. FEELING NERVOUS, ANXIOUS, OR ON EDGE: MORE THAN HALF THE DAYS
2. NOT BEING ABLE TO STOP OR CONTROL WORRYING: MORE THAN HALF THE DAYS
7. FEELING AFRAID AS IF SOMETHING AWFUL MIGHT HAPPEN: SEVERAL DAYS
3. WORRYING TOO MUCH ABOUT DIFFERENT THINGS: SEVERAL DAYS
7. FEELING AFRAID AS IF SOMETHING AWFUL MIGHT HAPPEN: SEVERAL DAYS
4. TROUBLE RELAXING: SEVERAL DAYS
GAD7 TOTAL SCORE: 8
5. BEING SO RESTLESS THAT IT IS HARD TO SIT STILL: NOT AT ALL
IF YOU CHECKED OFF ANY PROBLEMS ON THIS QUESTIONNAIRE, HOW DIFFICULT HAVE THESE PROBLEMS MADE IT FOR YOU TO DO YOUR WORK, TAKE CARE OF THINGS AT HOME, OR GET ALONG WITH OTHER PEOPLE: SOMEWHAT DIFFICULT
GAD7 TOTAL SCORE: 8
8. IF YOU CHECKED OFF ANY PROBLEMS, HOW DIFFICULT HAVE THESE MADE IT FOR YOU TO DO YOUR WORK, TAKE CARE OF THINGS AT HOME, OR GET ALONG WITH OTHER PEOPLE?: SOMEWHAT DIFFICULT
GAD7 TOTAL SCORE: 8

## 2025-08-18 ENCOUNTER — RESULTS FOLLOW-UP (OUTPATIENT)
Dept: NEUROLOGY | Facility: CLINIC | Age: 75
End: 2025-08-18
Payer: MEDICARE

## 2025-08-19 ENCOUNTER — THERAPY VISIT (OUTPATIENT)
Dept: PHYSICAL THERAPY | Facility: CLINIC | Age: 75
End: 2025-08-19
Attending: NURSE PRACTITIONER
Payer: MEDICARE

## 2025-08-19 DIAGNOSIS — M54.41 ACUTE RIGHT-SIDED LOW BACK PAIN WITH RIGHT-SIDED SCIATICA: Primary | ICD-10-CM

## 2025-08-19 PROCEDURE — 97140 MANUAL THERAPY 1/> REGIONS: CPT | Mod: GP | Performed by: PHYSICAL THERAPIST

## 2025-08-19 PROCEDURE — 97110 THERAPEUTIC EXERCISES: CPT | Mod: GP | Performed by: PHYSICAL THERAPIST

## 2025-08-20 ENCOUNTER — VIRTUAL VISIT (OUTPATIENT)
Dept: PSYCHOLOGY | Facility: CLINIC | Age: 75
End: 2025-08-20
Payer: MEDICARE

## 2025-08-20 DIAGNOSIS — F41.1 GENERALIZED ANXIETY DISORDER: ICD-10-CM

## 2025-08-20 DIAGNOSIS — F33.0 MAJOR DEPRESSIVE DISORDER, RECURRENT EPISODE, MILD: Primary | ICD-10-CM

## 2025-08-20 DIAGNOSIS — F43.10 POSTTRAUMATIC STRESS DISORDER: ICD-10-CM

## 2025-08-20 ASSESSMENT — PATIENT HEALTH QUESTIONNAIRE - PHQ9
10. IF YOU CHECKED OFF ANY PROBLEMS, HOW DIFFICULT HAVE THESE PROBLEMS MADE IT FOR YOU TO DO YOUR WORK, TAKE CARE OF THINGS AT HOME, OR GET ALONG WITH OTHER PEOPLE: SOMEWHAT DIFFICULT
SUM OF ALL RESPONSES TO PHQ QUESTIONS 1-9: 8
SUM OF ALL RESPONSES TO PHQ QUESTIONS 1-9: 8

## 2025-08-26 ENCOUNTER — THERAPY VISIT (OUTPATIENT)
Dept: PHYSICAL THERAPY | Facility: CLINIC | Age: 75
End: 2025-08-26
Attending: NURSE PRACTITIONER
Payer: MEDICARE

## 2025-08-26 DIAGNOSIS — M54.41 ACUTE RIGHT-SIDED LOW BACK PAIN WITH RIGHT-SIDED SCIATICA: Primary | ICD-10-CM

## 2025-08-26 PROCEDURE — 97110 THERAPEUTIC EXERCISES: CPT | Mod: GP | Performed by: PHYSICAL THERAPIST

## 2025-08-26 PROCEDURE — 97140 MANUAL THERAPY 1/> REGIONS: CPT | Mod: GP | Performed by: PHYSICAL THERAPIST

## 2025-08-26 ASSESSMENT — PATIENT HEALTH QUESTIONNAIRE - PHQ9
10. IF YOU CHECKED OFF ANY PROBLEMS, HOW DIFFICULT HAVE THESE PROBLEMS MADE IT FOR YOU TO DO YOUR WORK, TAKE CARE OF THINGS AT HOME, OR GET ALONG WITH OTHER PEOPLE: VERY DIFFICULT
SUM OF ALL RESPONSES TO PHQ QUESTIONS 1-9: 9
SUM OF ALL RESPONSES TO PHQ QUESTIONS 1-9: 9

## 2025-08-27 ENCOUNTER — OFFICE VISIT (OUTPATIENT)
Dept: NEUROLOGY | Facility: CLINIC | Age: 75
End: 2025-08-27
Attending: NURSE PRACTITIONER
Payer: MEDICARE

## 2025-08-27 ENCOUNTER — VIRTUAL VISIT (OUTPATIENT)
Dept: PSYCHOLOGY | Facility: CLINIC | Age: 75
End: 2025-08-27
Payer: MEDICARE

## 2025-08-27 DIAGNOSIS — R20.2 NUMBNESS AND TINGLING OF RIGHT LOWER EXTREMITY: ICD-10-CM

## 2025-08-27 DIAGNOSIS — F41.1 GENERALIZED ANXIETY DISORDER: ICD-10-CM

## 2025-08-27 DIAGNOSIS — R20.0 NUMBNESS AND TINGLING OF RIGHT LOWER EXTREMITY: ICD-10-CM

## 2025-08-27 DIAGNOSIS — F33.0 MAJOR DEPRESSIVE DISORDER, RECURRENT EPISODE, MILD: Primary | ICD-10-CM

## 2025-08-27 DIAGNOSIS — F43.10 POSTTRAUMATIC STRESS DISORDER: ICD-10-CM

## 2025-09-03 ENCOUNTER — VIRTUAL VISIT (OUTPATIENT)
Dept: PSYCHOLOGY | Facility: CLINIC | Age: 75
End: 2025-09-03
Payer: MEDICARE

## 2025-09-03 DIAGNOSIS — F41.1 GENERALIZED ANXIETY DISORDER: ICD-10-CM

## 2025-09-03 DIAGNOSIS — F33.1 MAJOR DEPRESSIVE DISORDER, RECURRENT EPISODE, MODERATE (H): Primary | ICD-10-CM

## 2025-09-04 ENCOUNTER — THERAPY VISIT (OUTPATIENT)
Dept: PHYSICAL THERAPY | Facility: CLINIC | Age: 75
End: 2025-09-04
Payer: MEDICARE

## 2025-09-04 DIAGNOSIS — M54.41 ACUTE RIGHT-SIDED LOW BACK PAIN WITH RIGHT-SIDED SCIATICA: Primary | ICD-10-CM

## (undated) DEVICE — NDL INSUFFLATION 13GA 120MM C2201

## (undated) DEVICE — KIT HAND CONTROL ANGIOTOUCH ACIST 65CM AT-P65

## (undated) DEVICE — CATH ANGIO INFINITI 3DRC 4FRX100CM 538476

## (undated) DEVICE — PREP CHLORAPREP 26ML TINTED ORANGE  260815

## (undated) DEVICE — DRAPE MAYO STAND 23X54 8337

## (undated) DEVICE — CATH DIAGNOSTIC RADIAL 5FR TIG 4.0

## (undated) DEVICE — DAVINCI XI SEAL UNIVERSAL 5-8MM 470361

## (undated) DEVICE — DAVINCI XI FCP BIPOLAR FENESTRATED 470205

## (undated) DEVICE — SPECIMEN CONTAINER W/10% BUFFERED FORMALIN 120ML 591201

## (undated) DEVICE — TOTE ANGIO CORP PC15AT SAN32CC83O

## (undated) DEVICE — DAVINCI XI MONOPOLAR SCISSORS HOT SHEARS 8MM 470179

## (undated) DEVICE — NDL PERC ENTRY THINWALL 18GA 7.0" G00166

## (undated) DEVICE — DAVINCI HOT SHEARS TIP COVER  400180

## (undated) DEVICE — SOL WATER IRRIG 500ML BOTTLE 2F7113

## (undated) DEVICE — ESU GROUND PAD ADULT W/CORD E7507

## (undated) DEVICE — CATH GUIDING BLUE YELLOW PTFE XB3 6FRX100CM 67005200

## (undated) DEVICE — CATH ANGIO INFINITI 3DRC 6FRX100CM 534676T

## (undated) DEVICE — GUIDEWIRE VASC 0.035INX150CM INQWIRE J TIP IQ35F150J3F/A

## (undated) DEVICE — NDL PERC ENTRY 21GA 4CM G00280

## (undated) DEVICE — CATH ANGIO JUDKINS JL4 6FRX100CM INFINITI 534620T

## (undated) DEVICE — DEVICE SUTURE PASSER 14GA WECK EFX EFXSP2

## (undated) DEVICE — WIRE GUIDE 0.035"X260CM SAFE-T-J EXCHANGE G00517

## (undated) DEVICE — SU VICRYL 0 CT-2 27" J334H

## (undated) DEVICE — CATH BALLOON IABP 7.5FRX40ML INTRA-AORTIC 0684-00-0568-01

## (undated) DEVICE — MANIFOLD KIT ANGIO AUTOMATED 014613

## (undated) DEVICE — GOWN XLG DISP 9545

## (undated) DEVICE — INTRO GLIDESHEATH SLENDER 6FR 10X45CM 60-1060

## (undated) DEVICE — INTRO SHEATH 4FRX10CM PINNACLE RSS402

## (undated) DEVICE — SYR 50ML SLIP TIP W/O NDL 309654

## (undated) DEVICE — CATH ANGIO JUDKINS R4 6FRX100CM INFINITI 534621T

## (undated) DEVICE — INFL DVC KIT W/10CC NITRO IN4530

## (undated) DEVICE — DAVINCI XI DRAPE COLUMN 470341

## (undated) DEVICE — SU DERMABOND ADVANCED .7ML DNX6

## (undated) DEVICE — DAVINCI XI DRAPE ARM 470015

## (undated) DEVICE — LINEN TOWEL PACK X5 5464

## (undated) DEVICE — VALVE HEMOSTASIS .096" COPILOT MECH 1003331

## (undated) DEVICE — CATH DIAG 4FR JL 4.5 538417

## (undated) DEVICE — DAVINCI XI CLIP APPLIER LG HEM-O-CLIP 8MM 470230

## (undated) DEVICE — CATH BALLOON EMERGE 2.5X12MM H7493918912250

## (undated) DEVICE — SYR RED NITRO PRNT 10CC

## (undated) DEVICE — INTRODUCER SHEATH GREEN 6.5FRX11CM .038IN PSI-6F-11-038ACT

## (undated) DEVICE — DAVINCI XI GRASPER ENDOWRIST PROGRASP 470093

## (undated) DEVICE — SLEEVE TR BAND RADIAL COMPRESSION DEVICE 24CM TRB24-REG

## (undated) DEVICE — PACK LAP CHOLE CUSTOM ASC

## (undated) DEVICE — DEFIB PRO-PADZ LVP LQD GEL ADULT 8900-2105-01

## (undated) DEVICE — SU MONOCRYL 4-0 PS-2 27" UND Y426H

## (undated) DEVICE — GLOVE BIOGEL PI MICRO SZ 7.5 48575

## (undated) DEVICE — CATH GUIDING BLUE YELLOW PTFE XB3.5 6FRX100CM 67005400

## (undated) DEVICE — GUIDEWIRE VASC 0.014INX180CM RUNTHROUGH 25-1011

## (undated) DEVICE — CATH ANGIO INFINITI JL4 4FRX100CM 538420

## (undated) DEVICE — SURGICEL HEMOSTAT 4X8" 1952

## (undated) DEVICE — ENDO POUCH UNIVERSAL RETRIEVAL SYSTEM INZII 5MM CD003

## (undated) DEVICE — CLIP ENDO HEMO-LOC PURPLE LG 544240

## (undated) RX ORDER — LIDOCAINE HYDROCHLORIDE 10 MG/ML
INJECTION, SOLUTION EPIDURAL; INFILTRATION; INTRACAUDAL; PERINEURAL
Status: DISPENSED
Start: 2020-12-11

## (undated) RX ORDER — GENTAMICIN 40 MG/ML
INJECTION, SOLUTION INTRAMUSCULAR; INTRAVENOUS
Status: DISPENSED
Start: 2023-03-30

## (undated) RX ORDER — FENTANYL CITRATE 50 UG/ML
INJECTION, SOLUTION INTRAMUSCULAR; INTRAVENOUS
Status: DISPENSED
Start: 2023-11-08

## (undated) RX ORDER — FENTANYL CITRATE 50 UG/ML
INJECTION, SOLUTION INTRAMUSCULAR; INTRAVENOUS
Status: DISPENSED
Start: 2019-10-09

## (undated) RX ORDER — HEPARIN SODIUM 1000 [USP'U]/ML
INJECTION, SOLUTION INTRAVENOUS; SUBCUTANEOUS
Status: DISPENSED
Start: 2019-10-10

## (undated) RX ORDER — ACETAMINOPHEN 325 MG/1
TABLET ORAL
Status: DISPENSED
Start: 2023-11-08

## (undated) RX ORDER — ALBUTEROL SULFATE 0.83 MG/ML
SOLUTION RESPIRATORY (INHALATION)
Status: DISPENSED
Start: 2024-06-26

## (undated) RX ORDER — ONDANSETRON 2 MG/ML
INJECTION INTRAMUSCULAR; INTRAVENOUS
Status: DISPENSED
Start: 2023-11-08

## (undated) RX ORDER — BUPIVACAINE HYDROCHLORIDE 2.5 MG/ML
INJECTION, SOLUTION EPIDURAL; INFILTRATION; INTRACAUDAL
Status: DISPENSED
Start: 2023-11-08

## (undated) RX ORDER — LIDOCAINE HYDROCHLORIDE 10 MG/ML
INJECTION, SOLUTION EPIDURAL; INFILTRATION; INTRACAUDAL; PERINEURAL
Status: DISPENSED
Start: 2019-10-10

## (undated) RX ORDER — FENTANYL CITRATE 50 UG/ML
INJECTION, SOLUTION INTRAMUSCULAR; INTRAVENOUS
Status: DISPENSED
Start: 2020-12-11

## (undated) RX ORDER — FENTANYL CITRATE 50 UG/ML
INJECTION, SOLUTION INTRAMUSCULAR; INTRAVENOUS
Status: DISPENSED
Start: 2019-10-10

## (undated) RX ORDER — REGADENOSON 0.08 MG/ML
INJECTION, SOLUTION INTRAVENOUS
Status: DISPENSED
Start: 2021-11-15

## (undated) RX ORDER — EPTIFIBATIDE 2 MG/ML
INJECTION, SOLUTION INTRAVENOUS
Status: DISPENSED
Start: 2019-10-09

## (undated) RX ORDER — LIDOCAINE HYDROCHLORIDE 10 MG/ML
INJECTION, SOLUTION EPIDURAL; INFILTRATION; INTRACAUDAL; PERINEURAL
Status: DISPENSED
Start: 2019-10-09

## (undated) RX ORDER — GENTAMICIN 40 MG/ML
INJECTION, SOLUTION INTRAMUSCULAR; INTRAVENOUS
Status: DISPENSED
Start: 2023-03-31

## (undated) RX ORDER — REGADENOSON 0.08 MG/ML
INJECTION, SOLUTION INTRAVENOUS
Status: DISPENSED
Start: 2020-12-10

## (undated) RX ORDER — CEFAZOLIN SODIUM 2 G/100ML
INJECTION, SOLUTION INTRAVENOUS
Status: DISPENSED
Start: 2023-11-22

## (undated) RX ORDER — REGADENOSON 0.08 MG/ML
INJECTION, SOLUTION INTRAVENOUS
Status: DISPENSED
Start: 2023-08-14

## (undated) RX ORDER — LIDOCAINE HYDROCHLORIDE 20 MG/ML
JELLY TOPICAL
Status: DISPENSED
Start: 2022-02-04

## (undated) RX ORDER — VERAPAMIL HYDROCHLORIDE 2.5 MG/ML
INJECTION, SOLUTION INTRAVENOUS
Status: DISPENSED
Start: 2019-10-09

## (undated) RX ORDER — NITROGLYCERIN 5 MG/ML
VIAL (ML) INTRAVENOUS
Status: DISPENSED
Start: 2019-10-10

## (undated) RX ORDER — SODIUM CHLORIDE 9 MG/ML
INJECTION, SOLUTION INTRAVENOUS
Status: DISPENSED
Start: 2023-11-22

## (undated) RX ORDER — HEPARIN SODIUM 200 [USP'U]/100ML
INJECTION, SOLUTION INTRAVENOUS
Status: DISPENSED
Start: 2020-12-11

## (undated) RX ORDER — CEPHALEXIN 500 MG/1
CAPSULE ORAL
Status: DISPENSED
Start: 2023-08-14

## (undated) RX ORDER — LIDOCAINE HYDROCHLORIDE AND EPINEPHRINE 10; 10 MG/ML; UG/ML
INJECTION, SOLUTION INFILTRATION; PERINEURAL
Status: DISPENSED
Start: 2023-11-22

## (undated) RX ORDER — EPHEDRINE SULFATE 50 MG/ML
INJECTION, SOLUTION INTRAMUSCULAR; INTRAVENOUS; SUBCUTANEOUS
Status: DISPENSED
Start: 2023-11-08

## (undated) RX ORDER — NITROFURANTOIN 25; 75 MG/1; MG/1
CAPSULE ORAL
Status: DISPENSED
Start: 2023-08-14

## (undated) RX ORDER — CEFAZOLIN SODIUM 2 G/50ML
SOLUTION INTRAVENOUS
Status: DISPENSED
Start: 2023-11-08

## (undated) RX ORDER — HEPARIN SODIUM 1000 [USP'U]/ML
INJECTION, SOLUTION INTRAVENOUS; SUBCUTANEOUS
Status: DISPENSED
Start: 2019-10-09

## (undated) RX ORDER — LIDOCAINE HYDROCHLORIDE AND EPINEPHRINE 10; 10 MG/ML; UG/ML
INJECTION, SOLUTION INFILTRATION; PERINEURAL
Status: DISPENSED
Start: 2023-11-08

## (undated) RX ORDER — HEPARIN SODIUM 1000 [USP'U]/ML
INJECTION, SOLUTION INTRAVENOUS; SUBCUTANEOUS
Status: DISPENSED
Start: 2020-12-11

## (undated) RX ORDER — INDOCYANINE GREEN AND WATER 25 MG
KIT INJECTION
Status: DISPENSED
Start: 2023-11-08

## (undated) RX ORDER — KETOROLAC TROMETHAMINE 30 MG/ML
INJECTION, SOLUTION INTRAMUSCULAR; INTRAVENOUS
Status: DISPENSED
Start: 2023-11-08

## (undated) RX ORDER — FUROSEMIDE 10 MG/ML
INJECTION INTRAMUSCULAR; INTRAVENOUS
Status: DISPENSED
Start: 2023-01-23